# Patient Record
Sex: MALE | Race: WHITE | NOT HISPANIC OR LATINO | Employment: OTHER | URBAN - METROPOLITAN AREA
[De-identification: names, ages, dates, MRNs, and addresses within clinical notes are randomized per-mention and may not be internally consistent; named-entity substitution may affect disease eponyms.]

---

## 2017-01-09 ENCOUNTER — ANESTHESIA (OUTPATIENT)
Dept: PERIOP | Facility: HOSPITAL | Age: 72
DRG: 460 | End: 2017-01-09
Payer: MEDICARE

## 2017-01-12 ENCOUNTER — GENERIC CONVERSION - ENCOUNTER (OUTPATIENT)
Dept: OTHER | Facility: OTHER | Age: 72
End: 2017-01-12

## 2017-01-13 ENCOUNTER — HOSPITAL ENCOUNTER (OUTPATIENT)
Dept: RADIOLOGY | Facility: HOSPITAL | Age: 72
Setting detail: SURGERY ADMIT
Discharge: HOME | End: 2017-01-13
Payer: MEDICARE

## 2017-01-13 DIAGNOSIS — M48.061 SPINAL STENOSIS OF LUMBAR REGION: ICD-10-CM

## 2017-01-27 RX ORDER — ALBUTEROL SULFATE 90 UG/1
2 AEROSOL, METERED RESPIRATORY (INHALATION) EVERY 6 HOURS PRN
COMMUNITY
End: 2019-01-01 | Stop reason: SDUPTHER

## 2017-01-27 RX ORDER — AMLODIPINE BESYLATE 2.5 MG/1
2.5 TABLET ORAL DAILY
COMMUNITY
End: 2018-09-30 | Stop reason: SDUPTHER

## 2017-01-27 RX ORDER — ASPIRIN 81 MG/1
81 TABLET ORAL DAILY
COMMUNITY

## 2017-01-27 RX ORDER — CALCIUM CARBONATE 750 MG/1
1 TABLET, CHEWABLE ORAL AS NEEDED
COMMUNITY
End: 2017-09-25

## 2017-01-30 ENCOUNTER — APPOINTMENT (OUTPATIENT)
Dept: RADIOLOGY | Facility: HOSPITAL | Age: 72
DRG: 460 | End: 2017-01-30
Payer: MEDICARE

## 2017-01-30 ENCOUNTER — HOSPITAL ENCOUNTER (INPATIENT)
Facility: HOSPITAL | Age: 72
LOS: 2 days | DRG: 460 | End: 2017-02-01
Attending: NEUROLOGICAL SURGERY | Admitting: NEUROLOGICAL SURGERY
Payer: MEDICARE

## 2017-01-30 DIAGNOSIS — G95.9 CERVICAL MYELOPATHY (HCC): ICD-10-CM

## 2017-01-30 DIAGNOSIS — R26.81 GAIT INSTABILITY: ICD-10-CM

## 2017-01-30 DIAGNOSIS — R20.0 NUMBNESS: Primary | ICD-10-CM

## 2017-01-30 PROBLEM — M43.16 SPONDYLOLISTHESIS OF LUMBAR REGION: Status: ACTIVE | Noted: 2017-01-30

## 2017-01-30 PROBLEM — M51.369 DEGENERATIVE DISC DISEASE, LUMBAR: Status: ACTIVE | Noted: 2017-01-30

## 2017-01-30 PROBLEM — M48.061 FORAMINAL STENOSIS OF LUMBAR REGION: Status: ACTIVE | Noted: 2017-01-30

## 2017-01-30 PROBLEM — M51.36 DEGENERATIVE DISC DISEASE, LUMBAR: Status: ACTIVE | Noted: 2017-01-30

## 2017-01-30 LAB
ABO GROUP BLD: NORMAL
BLD GP AB SCN SERPL QL: NEGATIVE
GLUCOSE SERPL-MCNC: 117 MG/DL (ref 65–140)
PLATELET # BLD AUTO: 169 THOUSANDS/UL (ref 149–390)
PMV BLD AUTO: 10.6 FL (ref 8.9–12.7)
RH BLD: POSITIVE

## 2017-01-30 PROCEDURE — 72100 X-RAY EXAM L-S SPINE 2/3 VWS: CPT

## 2017-01-30 PROCEDURE — 86850 RBC ANTIBODY SCREEN: CPT | Performed by: NEUROLOGICAL SURGERY

## 2017-01-30 PROCEDURE — C1713 ANCHOR/SCREW BN/BN,TIS/BN: HCPCS | Performed by: NEUROLOGICAL SURGERY

## 2017-01-30 PROCEDURE — 86900 BLOOD TYPING SEROLOGIC ABO: CPT | Performed by: NEUROLOGICAL SURGERY

## 2017-01-30 PROCEDURE — 4A11X4G MONITORING OF PERIPHERAL NERVOUS ELECTRICAL ACTIVITY, INTRAOPERATIVE, EXTERNAL APPROACH: ICD-10-PCS | Performed by: NEUROLOGICAL SURGERY

## 2017-01-30 PROCEDURE — 95940 IONM IN OPERATNG ROOM 15 MIN: CPT | Performed by: NEUROLOGICAL SURGERY

## 2017-01-30 PROCEDURE — 0SG00A1 FUSION LUM JT W INTBD FUS DEV, POST APPR P COL, OPEN: ICD-10-PCS | Performed by: NEUROLOGICAL SURGERY

## 2017-01-30 PROCEDURE — 0ST40ZZ RESECTION OF LUMBOSACRAL DISC, OPEN APPROACH: ICD-10-PCS | Performed by: NEUROLOGICAL SURGERY

## 2017-01-30 PROCEDURE — 85049 AUTOMATED PLATELET COUNT: CPT | Performed by: NEUROLOGICAL SURGERY

## 2017-01-30 PROCEDURE — C1769 GUIDE WIRE: HCPCS | Performed by: NEUROLOGICAL SURGERY

## 2017-01-30 PROCEDURE — 0SG30A1 FUSION LUMSAC JT W INTBD FUS DEV, POST APPR P COL, OPEN: ICD-10-PCS | Performed by: NEUROLOGICAL SURGERY

## 2017-01-30 PROCEDURE — 82948 REAGENT STRIP/BLOOD GLUCOSE: CPT

## 2017-01-30 PROCEDURE — 86901 BLOOD TYPING SEROLOGIC RH(D): CPT | Performed by: NEUROLOGICAL SURGERY

## 2017-01-30 PROCEDURE — 0ST20ZZ RESECTION OF LUMBAR VERTEBRAL DISC, OPEN APPROACH: ICD-10-PCS | Performed by: NEUROLOGICAL SURGERY

## 2017-01-30 DEVICE — MAS 54850016535 4.75 EXT TAB CANN 6.5X35
Type: IMPLANTABLE DEVICE | Site: SPINE LUMBAR | Status: FUNCTIONAL
Brand: CD HORIZON® SOLERA® SPINAL SYSTEM

## 2017-01-30 DEVICE — SCREW SET 4.75MM SOLERA PERC: Type: IMPLANTABLE DEVICE | Site: SPINE LUMBAR | Status: FUNCTIONAL

## 2017-01-30 DEVICE — DBM 005005 PROGENIX DBM 5 CC SRVC FEE
Type: IMPLANTABLE DEVICE | Site: SPINE LUMBAR | Status: FUNCTIONAL
Brand: PROGENIX® PUTTY AND PROGENIX® PLUS

## 2017-01-30 DEVICE — SPACER 7770823 ELEVATE STD 23X8MM
Type: IMPLANTABLE DEVICE | Site: SPINE LUMBAR | Status: FUNCTIONAL
Brand: ELEVATE™ SPINAL SYSTEM

## 2017-01-30 RX ORDER — FLUTICASONE PROPIONATE 50 MCG
1 SPRAY, SUSPENSION (ML) NASAL AS NEEDED
Status: DISCONTINUED | OUTPATIENT
Start: 2017-01-30 | End: 2017-02-01 | Stop reason: HOSPADM

## 2017-01-30 RX ORDER — ACETAMINOPHEN 325 MG/1
975 TABLET ORAL ONCE
Status: COMPLETED | OUTPATIENT
Start: 2017-01-30 | End: 2017-01-30

## 2017-01-30 RX ORDER — DOCUSATE SODIUM 100 MG/1
100 CAPSULE, LIQUID FILLED ORAL 2 TIMES DAILY
Status: DISCONTINUED | OUTPATIENT
Start: 2017-01-30 | End: 2017-02-01 | Stop reason: HOSPADM

## 2017-01-30 RX ORDER — METOPROLOL SUCCINATE 25 MG/1
25 TABLET, EXTENDED RELEASE ORAL DAILY
Status: DISCONTINUED | OUTPATIENT
Start: 2017-01-31 | End: 2017-01-30

## 2017-01-30 RX ORDER — CHLORHEXIDINE GLUCONATE 0.12 MG/ML
15 RINSE ORAL ONCE
Status: DISCONTINUED | OUTPATIENT
Start: 2017-01-30 | End: 2017-01-30 | Stop reason: HOSPADM

## 2017-01-30 RX ORDER — LORAZEPAM 1 MG/1
1 TABLET ORAL EVERY 6 HOURS PRN
Status: DISCONTINUED | OUTPATIENT
Start: 2017-01-30 | End: 2017-02-01 | Stop reason: HOSPADM

## 2017-01-30 RX ORDER — LIDOCAINE HYDROCHLORIDE AND EPINEPHRINE 10; 10 MG/ML; UG/ML
INJECTION, SOLUTION INFILTRATION; PERINEURAL AS NEEDED
Status: DISCONTINUED | OUTPATIENT
Start: 2017-01-30 | End: 2017-01-30 | Stop reason: HOSPADM

## 2017-01-30 RX ORDER — ALBUMIN, HUMAN INJ 5% 5 %
SOLUTION INTRAVENOUS CONTINUOUS PRN
Status: DISCONTINUED | OUTPATIENT
Start: 2017-01-30 | End: 2017-01-30 | Stop reason: SURG

## 2017-01-30 RX ORDER — BISACODYL 10 MG
10 SUPPOSITORY, RECTAL RECTAL DAILY PRN
Status: DISCONTINUED | OUTPATIENT
Start: 2017-01-30 | End: 2017-02-01 | Stop reason: HOSPADM

## 2017-01-30 RX ORDER — NITROGLYCERIN 40 MG/1
1 PATCH TRANSDERMAL DAILY
Status: DISCONTINUED | OUTPATIENT
Start: 2017-01-31 | End: 2017-02-01 | Stop reason: HOSPADM

## 2017-01-30 RX ORDER — MIDAZOLAM HYDROCHLORIDE 1 MG/ML
INJECTION INTRAMUSCULAR; INTRAVENOUS AS NEEDED
Status: DISCONTINUED | OUTPATIENT
Start: 2017-01-30 | End: 2017-01-30 | Stop reason: SURG

## 2017-01-30 RX ORDER — LABETALOL HYDROCHLORIDE 5 MG/ML
5 INJECTION, SOLUTION INTRAVENOUS
Status: DISCONTINUED | OUTPATIENT
Start: 2017-01-30 | End: 2017-01-30 | Stop reason: HOSPADM

## 2017-01-30 RX ORDER — ALBUTEROL SULFATE 2.5 MG/3ML
2.5 SOLUTION RESPIRATORY (INHALATION) EVERY 4 HOURS PRN
Status: DISCONTINUED | OUTPATIENT
Start: 2017-01-30 | End: 2017-01-30 | Stop reason: HOSPADM

## 2017-01-30 RX ORDER — GABAPENTIN 100 MG/1
100 CAPSULE ORAL 3 TIMES DAILY
Status: DISCONTINUED | OUTPATIENT
Start: 2017-01-30 | End: 2017-02-01

## 2017-01-30 RX ORDER — SODIUM CHLORIDE, SODIUM LACTATE, POTASSIUM CHLORIDE, CALCIUM CHLORIDE 600; 310; 30; 20 MG/100ML; MG/100ML; MG/100ML; MG/100ML
INJECTION, SOLUTION INTRAVENOUS CONTINUOUS PRN
Status: DISCONTINUED | OUTPATIENT
Start: 2017-01-30 | End: 2017-01-30 | Stop reason: SURG

## 2017-01-30 RX ORDER — CHLORHEXIDINE GLUCONATE 0.12 MG/ML
15 RINSE ORAL EVERY 12 HOURS SCHEDULED
Status: DISCONTINUED | OUTPATIENT
Start: 2017-01-30 | End: 2017-01-30

## 2017-01-30 RX ORDER — LITHIUM CARBONATE 300 MG/1
300 TABLET, FILM COATED, EXTENDED RELEASE ORAL EVERY 12 HOURS SCHEDULED
Status: DISCONTINUED | OUTPATIENT
Start: 2017-01-30 | End: 2017-02-01 | Stop reason: HOSPADM

## 2017-01-30 RX ORDER — HYDROCODONE BITARTRATE AND ACETAMINOPHEN 5; 325 MG/1; MG/1
1 TABLET ORAL EVERY 4 HOURS PRN
Status: DISCONTINUED | OUTPATIENT
Start: 2017-01-30 | End: 2017-02-01 | Stop reason: HOSPADM

## 2017-01-30 RX ORDER — FENTANYL CITRATE/PF 50 MCG/ML
50 SYRINGE (ML) INJECTION
Status: DISCONTINUED | OUTPATIENT
Start: 2017-01-30 | End: 2017-01-30 | Stop reason: HOSPADM

## 2017-01-30 RX ORDER — AMLODIPINE BESYLATE 2.5 MG/1
2.5 TABLET ORAL DAILY
Status: DISCONTINUED | OUTPATIENT
Start: 2017-01-31 | End: 2017-02-01 | Stop reason: HOSPADM

## 2017-01-30 RX ORDER — GLYCOPYRROLATE 0.2 MG/ML
INJECTION INTRAMUSCULAR; INTRAVENOUS AS NEEDED
Status: DISCONTINUED | OUTPATIENT
Start: 2017-01-30 | End: 2017-01-30 | Stop reason: SURG

## 2017-01-30 RX ORDER — LORATADINE 10 MG/1
10 TABLET ORAL DAILY
Status: DISCONTINUED | OUTPATIENT
Start: 2017-01-31 | End: 2017-02-01 | Stop reason: HOSPADM

## 2017-01-30 RX ORDER — LIDOCAINE HYDROCHLORIDE 10 MG/ML
INJECTION, SOLUTION INFILTRATION; PERINEURAL AS NEEDED
Status: DISCONTINUED | OUTPATIENT
Start: 2017-01-30 | End: 2017-01-30 | Stop reason: SURG

## 2017-01-30 RX ORDER — BACLOFEN 10 MG/1
5 TABLET ORAL 3 TIMES DAILY
Status: DISCONTINUED | OUTPATIENT
Start: 2017-01-30 | End: 2017-01-31

## 2017-01-30 RX ORDER — SODIUM CHLORIDE, SODIUM LACTATE, POTASSIUM CHLORIDE, CALCIUM CHLORIDE 600; 310; 30; 20 MG/100ML; MG/100ML; MG/100ML; MG/100ML
125 INJECTION, SOLUTION INTRAVENOUS CONTINUOUS
Status: DISCONTINUED | OUTPATIENT
Start: 2017-01-30 | End: 2017-01-31

## 2017-01-30 RX ORDER — ROCURONIUM BROMIDE 10 MG/ML
INJECTION, SOLUTION INTRAVENOUS AS NEEDED
Status: DISCONTINUED | OUTPATIENT
Start: 2017-01-30 | End: 2017-01-30 | Stop reason: SURG

## 2017-01-30 RX ORDER — BUPIVACAINE HYDROCHLORIDE AND EPINEPHRINE 5; 5 MG/ML; UG/ML
INJECTION, SOLUTION EPIDURAL; INTRACAUDAL; PERINEURAL AS NEEDED
Status: DISCONTINUED | OUTPATIENT
Start: 2017-01-30 | End: 2017-01-30 | Stop reason: HOSPADM

## 2017-01-30 RX ORDER — SODIUM CHLORIDE 9 MG/ML
75 INJECTION, SOLUTION INTRAVENOUS CONTINUOUS
Status: DISCONTINUED | OUTPATIENT
Start: 2017-01-30 | End: 2017-01-31

## 2017-01-30 RX ORDER — FENTANYL CITRATE 50 UG/ML
25 INJECTION, SOLUTION INTRAMUSCULAR; INTRAVENOUS
Status: DISCONTINUED | OUTPATIENT
Start: 2017-01-30 | End: 2017-01-31

## 2017-01-30 RX ORDER — PREGABALIN 75 MG/1
150 CAPSULE ORAL ONCE
Status: COMPLETED | OUTPATIENT
Start: 2017-01-30 | End: 2017-01-30

## 2017-01-30 RX ORDER — PROPOFOL 10 MG/ML
INJECTION, EMULSION INTRAVENOUS CONTINUOUS PRN
Status: DISCONTINUED | OUTPATIENT
Start: 2017-01-30 | End: 2017-01-30 | Stop reason: SURG

## 2017-01-30 RX ORDER — ONDANSETRON 2 MG/ML
4 INJECTION INTRAMUSCULAR; INTRAVENOUS EVERY 6 HOURS PRN
Status: DISCONTINUED | OUTPATIENT
Start: 2017-01-30 | End: 2017-02-01 | Stop reason: HOSPADM

## 2017-01-30 RX ORDER — PANTOPRAZOLE SODIUM 40 MG/1
40 TABLET, DELAYED RELEASE ORAL
Status: DISCONTINUED | OUTPATIENT
Start: 2017-01-31 | End: 2017-02-01 | Stop reason: HOSPADM

## 2017-01-30 RX ORDER — ATORVASTATIN CALCIUM 40 MG/1
40 TABLET, FILM COATED ORAL
Status: DISCONTINUED | OUTPATIENT
Start: 2017-01-30 | End: 2017-02-01 | Stop reason: HOSPADM

## 2017-01-30 RX ORDER — ALBUTEROL SULFATE 90 UG/1
2 AEROSOL, METERED RESPIRATORY (INHALATION) EVERY 6 HOURS PRN
Status: DISCONTINUED | OUTPATIENT
Start: 2017-01-30 | End: 2017-02-01 | Stop reason: HOSPADM

## 2017-01-30 RX ORDER — PROPOFOL 10 MG/ML
INJECTION, EMULSION INTRAVENOUS AS NEEDED
Status: DISCONTINUED | OUTPATIENT
Start: 2017-01-30 | End: 2017-01-30 | Stop reason: SURG

## 2017-01-30 RX ORDER — SODIUM CHLORIDE 9 MG/ML
INJECTION, SOLUTION INTRAVENOUS CONTINUOUS PRN
Status: DISCONTINUED | OUTPATIENT
Start: 2017-01-30 | End: 2017-01-30 | Stop reason: SURG

## 2017-01-30 RX ORDER — FENTANYL CITRATE 50 UG/ML
INJECTION, SOLUTION INTRAMUSCULAR; INTRAVENOUS AS NEEDED
Status: DISCONTINUED | OUTPATIENT
Start: 2017-01-30 | End: 2017-01-30 | Stop reason: SURG

## 2017-01-30 RX ORDER — ONDANSETRON 2 MG/ML
INJECTION INTRAMUSCULAR; INTRAVENOUS AS NEEDED
Status: DISCONTINUED | OUTPATIENT
Start: 2017-01-30 | End: 2017-01-30 | Stop reason: SURG

## 2017-01-30 RX ORDER — HEPARIN SODIUM 5000 [USP'U]/ML
5000 INJECTION, SOLUTION INTRAVENOUS; SUBCUTANEOUS EVERY 8 HOURS SCHEDULED
Status: DISCONTINUED | OUTPATIENT
Start: 2017-01-31 | End: 2017-02-01 | Stop reason: HOSPADM

## 2017-01-30 RX ORDER — ACETAMINOPHEN 325 MG/1
975 TABLET ORAL EVERY 8 HOURS SCHEDULED
Status: DISCONTINUED | OUTPATIENT
Start: 2017-01-30 | End: 2017-01-31

## 2017-01-30 RX ORDER — MEPERIDINE HYDROCHLORIDE 25 MG/ML
12.5 INJECTION INTRAMUSCULAR; INTRAVENOUS; SUBCUTANEOUS AS NEEDED
Status: DISCONTINUED | OUTPATIENT
Start: 2017-01-30 | End: 2017-01-30 | Stop reason: HOSPADM

## 2017-01-30 RX ORDER — METHOCARBAMOL 750 MG/1
750 TABLET, FILM COATED ORAL 4 TIMES DAILY PRN
Status: DISCONTINUED | OUTPATIENT
Start: 2017-01-30 | End: 2017-01-31

## 2017-01-30 RX ORDER — DICYCLOMINE HCL 20 MG
20 TABLET ORAL EVERY 6 HOURS
Status: DISCONTINUED | OUTPATIENT
Start: 2017-01-30 | End: 2017-02-01 | Stop reason: HOSPADM

## 2017-01-30 RX ORDER — METOPROLOL SUCCINATE 25 MG/1
25 TABLET, EXTENDED RELEASE ORAL DAILY
Status: DISCONTINUED | OUTPATIENT
Start: 2017-01-30 | End: 2017-02-01 | Stop reason: HOSPADM

## 2017-01-30 RX ORDER — CALCIUM CHLORIDE 100 MG/ML
INJECTION INTRAVENOUS; INTRAVENTRICULAR AS NEEDED
Status: DISCONTINUED | OUTPATIENT
Start: 2017-01-30 | End: 2017-01-30 | Stop reason: SURG

## 2017-01-30 RX ORDER — ONDANSETRON 2 MG/ML
4 INJECTION INTRAMUSCULAR; INTRAVENOUS EVERY 4 HOURS PRN
Status: DISCONTINUED | OUTPATIENT
Start: 2017-01-30 | End: 2017-01-30 | Stop reason: HOSPADM

## 2017-01-30 RX ADMIN — SODIUM CHLORIDE 75 ML/HR: 0.9 INJECTION, SOLUTION INTRAVENOUS at 13:50

## 2017-01-30 RX ADMIN — BACLOFEN 5 MG: 10 TABLET ORAL at 16:34

## 2017-01-30 RX ADMIN — HYDROMORPHONE HYDROCHLORIDE 0.4 MG: 1 INJECTION, SOLUTION INTRAMUSCULAR; INTRAVENOUS; SUBCUTANEOUS at 12:22

## 2017-01-30 RX ADMIN — HYDROMORPHONE HYDROCHLORIDE 0.4 MG: 1 INJECTION, SOLUTION INTRAMUSCULAR; INTRAVENOUS; SUBCUTANEOUS at 12:42

## 2017-01-30 RX ADMIN — CEFAZOLIN SODIUM 2000 MG: 2 SOLUTION INTRAVENOUS at 08:00

## 2017-01-30 RX ADMIN — FLUTICASONE PROPIONATE AND SALMETEROL 1 PUFF: 50; 250 POWDER RESPIRATORY (INHALATION) at 22:38

## 2017-01-30 RX ADMIN — GLYCOPYRROLATE 0.6 MG: 0.2 INJECTION INTRAMUSCULAR; INTRAVENOUS at 09:08

## 2017-01-30 RX ADMIN — CALCIUM CHLORIDE 0.2 G: 100 INJECTION PARENTERAL at 08:59

## 2017-01-30 RX ADMIN — DEXMEDETOMIDINE HYDROCHLORIDE 0.4 MCG/KG/HR: 100 INJECTION, SOLUTION INTRAVENOUS at 08:05

## 2017-01-30 RX ADMIN — HYDROCODONE BITARTRATE AND ACETAMINOPHEN 1 TABLET: 5; 325 TABLET ORAL at 14:37

## 2017-01-30 RX ADMIN — GABAPENTIN 100 MG: 100 CAPSULE ORAL at 16:34

## 2017-01-30 RX ADMIN — MIDAZOLAM HYDROCHLORIDE 1 MG: 1 INJECTION, SOLUTION INTRAMUSCULAR; INTRAVENOUS at 07:58

## 2017-01-30 RX ADMIN — REMIFENTANIL HYDROCHLORIDE 0.2 MCG/KG/MIN: 1 INJECTION, POWDER, LYOPHILIZED, FOR SOLUTION INTRAVENOUS at 08:05

## 2017-01-30 RX ADMIN — DOCUSATE SODIUM 100 MG: 100 CAPSULE, LIQUID FILLED ORAL at 17:57

## 2017-01-30 RX ADMIN — GLYCOPYRROLATE 0.2 MG: 0.2 INJECTION INTRAMUSCULAR; INTRAVENOUS at 08:02

## 2017-01-30 RX ADMIN — HYDROCODONE BITARTRATE AND ACETAMINOPHEN 1 TABLET: 5; 325 TABLET ORAL at 19:32

## 2017-01-30 RX ADMIN — GABAPENTIN 100 MG: 100 CAPSULE ORAL at 21:07

## 2017-01-30 RX ADMIN — PHENYLEPHRINE HYDROCHLORIDE 20 MCG/MIN: 10 INJECTION INTRAVENOUS at 08:06

## 2017-01-30 RX ADMIN — PROPOFOL 200 MG: 10 INJECTION, EMULSION INTRAVENOUS at 08:00

## 2017-01-30 RX ADMIN — PROPOFOL 140 MCG/KG/MIN: 10 INJECTION, EMULSION INTRAVENOUS at 08:05

## 2017-01-30 RX ADMIN — DEXAMETHASONE SODIUM PHOSPHATE 10 MG: 10 INJECTION INTRAMUSCULAR; INTRAVENOUS at 08:33

## 2017-01-30 RX ADMIN — LITHIUM CARBONATE 300 MG: 300 TABLET, EXTENDED RELEASE ORAL at 22:28

## 2017-01-30 RX ADMIN — LIDOCAINE HYDROCHLORIDE 50 MG: 10 INJECTION, SOLUTION INFILTRATION; PERINEURAL at 08:00

## 2017-01-30 RX ADMIN — ONDANSETRON 4 MG: 2 INJECTION INTRAMUSCULAR; INTRAVENOUS at 11:24

## 2017-01-30 RX ADMIN — FENTANYL CITRATE 50 MCG: 50 INJECTION, SOLUTION INTRAMUSCULAR; INTRAVENOUS at 08:00

## 2017-01-30 RX ADMIN — SODIUM CHLORIDE, SODIUM LACTATE, POTASSIUM CHLORIDE, AND CALCIUM CHLORIDE: .6; .31; .03; .02 INJECTION, SOLUTION INTRAVENOUS at 07:49

## 2017-01-30 RX ADMIN — METHOCARBAMOL 750 MG: 750 TABLET ORAL at 14:37

## 2017-01-30 RX ADMIN — SODIUM CHLORIDE, SODIUM LACTATE, POTASSIUM CHLORIDE, AND CALCIUM CHLORIDE: .6; .31; .03; .02 INJECTION, SOLUTION INTRAVENOUS at 11:31

## 2017-01-30 RX ADMIN — HYDROMORPHONE HYDROCHLORIDE 0.4 MG: 1 INJECTION, SOLUTION INTRAMUSCULAR; INTRAVENOUS; SUBCUTANEOUS at 12:32

## 2017-01-30 RX ADMIN — ACETAMINOPHEN 975 MG: 325 TABLET, FILM COATED ORAL at 07:11

## 2017-01-30 RX ADMIN — NEOSTIGMINE METHYLSULFATE 3 MG: 1 INJECTION INTRAMUSCULAR; INTRAVENOUS; SUBCUTANEOUS at 09:08

## 2017-01-30 RX ADMIN — ALBUMIN HUMAN: 0.05 INJECTION, SOLUTION INTRAVENOUS at 08:46

## 2017-01-30 RX ADMIN — METOPROLOL SUCCINATE 25 MG: 25 TABLET, EXTENDED RELEASE ORAL at 21:14

## 2017-01-30 RX ADMIN — HYDROMORPHONE HYDROCHLORIDE 1 MG: 1 INJECTION, SOLUTION INTRAMUSCULAR; INTRAVENOUS; SUBCUTANEOUS at 21:07

## 2017-01-30 RX ADMIN — FENTANYL CITRATE 50 MCG: 50 INJECTION, SOLUTION INTRAMUSCULAR; INTRAVENOUS at 12:03

## 2017-01-30 RX ADMIN — PREGABALIN 150 MG: 75 CAPSULE ORAL at 07:11

## 2017-01-30 RX ADMIN — ROCURONIUM BROMIDE 10 MG: 10 INJECTION, SOLUTION INTRAVENOUS at 08:00

## 2017-01-30 RX ADMIN — BACLOFEN 5 MG: 10 TABLET ORAL at 21:14

## 2017-01-30 RX ADMIN — CHLORHEXIDINE GLUCONATE 15 ML: 1.2 RINSE ORAL at 07:11

## 2017-01-30 RX ADMIN — CEFAZOLIN SODIUM 1000 MG: 1 SOLUTION INTRAVENOUS at 16:35

## 2017-01-30 RX ADMIN — SODIUM CHLORIDE: 0.9 INJECTION, SOLUTION INTRAVENOUS at 08:10

## 2017-01-30 RX ADMIN — ATORVASTATIN CALCIUM 40 MG: 40 TABLET, FILM COATED ORAL at 16:34

## 2017-01-30 RX ADMIN — ROCURONIUM BROMIDE 30 MG: 10 INJECTION, SOLUTION INTRAVENOUS at 08:02

## 2017-01-31 ENCOUNTER — APPOINTMENT (INPATIENT)
Dept: RADIOLOGY | Facility: HOSPITAL | Age: 72
DRG: 460 | End: 2017-01-31
Attending: NEUROLOGICAL SURGERY
Payer: MEDICARE

## 2017-01-31 LAB
ANION GAP SERPL CALCULATED.3IONS-SCNC: 9 MMOL/L (ref 4–13)
BUN SERPL-MCNC: 19 MG/DL (ref 5–25)
CALCIUM SERPL-MCNC: 9.7 MG/DL (ref 8.3–10.1)
CHLORIDE SERPL-SCNC: 109 MMOL/L (ref 100–108)
CO2 SERPL-SCNC: 23 MMOL/L (ref 21–32)
CREAT SERPL-MCNC: 0.92 MG/DL (ref 0.6–1.3)
ERYTHROCYTE [DISTWIDTH] IN BLOOD BY AUTOMATED COUNT: 14.3 % (ref 11.6–15.1)
GFR SERPL CREATININE-BSD FRML MDRD: >60 ML/MIN/1.73SQ M
GLUCOSE SERPL-MCNC: 127 MG/DL (ref 65–140)
HCT VFR BLD AUTO: 35.8 % (ref 36.5–49.3)
HGB BLD-MCNC: 11.8 G/DL (ref 12–17)
MCH RBC QN AUTO: 29.3 PG (ref 26.8–34.3)
MCHC RBC AUTO-ENTMCNC: 33 G/DL (ref 31.4–37.4)
MCV RBC AUTO: 89 FL (ref 82–98)
PLATELET # BLD AUTO: 197 THOUSANDS/UL (ref 149–390)
PMV BLD AUTO: 11.2 FL (ref 8.9–12.7)
POTASSIUM SERPL-SCNC: 4.5 MMOL/L (ref 3.5–5.3)
RBC # BLD AUTO: 4.03 MILLION/UL (ref 3.88–5.62)
SODIUM SERPL-SCNC: 141 MMOL/L (ref 136–145)
WBC # BLD AUTO: 18.15 THOUSAND/UL (ref 4.31–10.16)

## 2017-01-31 PROCEDURE — 72100 X-RAY EXAM L-S SPINE 2/3 VWS: CPT

## 2017-01-31 PROCEDURE — 97163 PT EVAL HIGH COMPLEX 45 MIN: CPT

## 2017-01-31 PROCEDURE — G8988 SELF CARE GOAL STATUS: HCPCS

## 2017-01-31 PROCEDURE — 97166 OT EVAL MOD COMPLEX 45 MIN: CPT

## 2017-01-31 PROCEDURE — G8979 MOBILITY GOAL STATUS: HCPCS

## 2017-01-31 PROCEDURE — 85027 COMPLETE CBC AUTOMATED: CPT | Performed by: NEUROLOGICAL SURGERY

## 2017-01-31 PROCEDURE — G8987 SELF CARE CURRENT STATUS: HCPCS

## 2017-01-31 PROCEDURE — 80048 BASIC METABOLIC PNL TOTAL CA: CPT | Performed by: NEUROLOGICAL SURGERY

## 2017-01-31 RX ORDER — METHOCARBAMOL 750 MG/1
750 TABLET, FILM COATED ORAL EVERY 6 HOURS SCHEDULED
Status: DISCONTINUED | OUTPATIENT
Start: 2017-01-31 | End: 2017-02-01 | Stop reason: HOSPADM

## 2017-01-31 RX ORDER — HYDROCODONE BITARTRATE AND ACETAMINOPHEN 5; 325 MG/1; MG/1
2 TABLET ORAL EVERY 4 HOURS PRN
Status: DISCONTINUED | OUTPATIENT
Start: 2017-01-31 | End: 2017-02-01 | Stop reason: HOSPADM

## 2017-01-31 RX ADMIN — HYDROMORPHONE HYDROCHLORIDE 1 MG: 1 INJECTION, SOLUTION INTRAMUSCULAR; INTRAVENOUS; SUBCUTANEOUS at 23:49

## 2017-01-31 RX ADMIN — BACLOFEN 5 MG: 10 TABLET ORAL at 08:27

## 2017-01-31 RX ADMIN — HYDROMORPHONE HYDROCHLORIDE 1 MG: 1 INJECTION, SOLUTION INTRAMUSCULAR; INTRAVENOUS; SUBCUTANEOUS at 03:22

## 2017-01-31 RX ADMIN — METOPROLOL SUCCINATE 25 MG: 25 TABLET, EXTENDED RELEASE ORAL at 08:27

## 2017-01-31 RX ADMIN — ALBUTEROL SULFATE 2 PUFF: 90 AEROSOL, METERED RESPIRATORY (INHALATION) at 10:25

## 2017-01-31 RX ADMIN — GABAPENTIN 100 MG: 100 CAPSULE ORAL at 16:26

## 2017-01-31 RX ADMIN — PANTOPRAZOLE SODIUM 40 MG: 40 TABLET, DELAYED RELEASE ORAL at 05:18

## 2017-01-31 RX ADMIN — DICYCLOMINE HYDROCHLORIDE 20 MG: 20 TABLET ORAL at 23:54

## 2017-01-31 RX ADMIN — HYDROCODONE BITARTRATE AND ACETAMINOPHEN 1 TABLET: 5; 325 TABLET ORAL at 09:38

## 2017-01-31 RX ADMIN — FLUTICASONE PROPIONATE 1 SPRAY: 50 SPRAY, METERED NASAL at 10:25

## 2017-01-31 RX ADMIN — GABAPENTIN 100 MG: 100 CAPSULE ORAL at 08:26

## 2017-01-31 RX ADMIN — HEPARIN SODIUM 5000 UNITS: 5000 INJECTION, SOLUTION INTRAVENOUS; SUBCUTANEOUS at 21:41

## 2017-01-31 RX ADMIN — AMLODIPINE BESYLATE 2.5 MG: 2.5 TABLET ORAL at 08:26

## 2017-01-31 RX ADMIN — METHOCARBAMOL 750 MG: 750 TABLET ORAL at 17:49

## 2017-01-31 RX ADMIN — HYDROCODONE BITARTRATE AND ACETAMINOPHEN 2 TABLET: 5; 325 TABLET ORAL at 21:41

## 2017-01-31 RX ADMIN — ATORVASTATIN CALCIUM 40 MG: 40 TABLET, FILM COATED ORAL at 16:26

## 2017-01-31 RX ADMIN — HYDROMORPHONE HYDROCHLORIDE 1 MG: 1 INJECTION, SOLUTION INTRAMUSCULAR; INTRAVENOUS; SUBCUTANEOUS at 12:27

## 2017-01-31 RX ADMIN — METHOCARBAMOL 750 MG: 750 TABLET ORAL at 23:49

## 2017-01-31 RX ADMIN — DOCUSATE SODIUM 100 MG: 100 CAPSULE, LIQUID FILLED ORAL at 17:50

## 2017-01-31 RX ADMIN — FLUTICASONE PROPIONATE AND SALMETEROL 1 PUFF: 50; 250 POWDER RESPIRATORY (INHALATION) at 08:26

## 2017-01-31 RX ADMIN — HYDROMORPHONE HYDROCHLORIDE 1 MG: 1 INJECTION, SOLUTION INTRAMUSCULAR; INTRAVENOUS; SUBCUTANEOUS at 17:55

## 2017-01-31 RX ADMIN — HYDROCODONE BITARTRATE AND ACETAMINOPHEN 2 TABLET: 5; 325 TABLET ORAL at 14:58

## 2017-01-31 RX ADMIN — CEFAZOLIN SODIUM 1000 MG: 1 SOLUTION INTRAVENOUS at 00:53

## 2017-01-31 RX ADMIN — GABAPENTIN 100 MG: 100 CAPSULE ORAL at 21:41

## 2017-01-31 RX ADMIN — HYDROCODONE BITARTRATE AND ACETAMINOPHEN 1 TABLET: 5; 325 TABLET ORAL at 05:18

## 2017-01-31 RX ADMIN — HEPARIN SODIUM 5000 UNITS: 5000 INJECTION, SOLUTION INTRAVENOUS; SUBCUTANEOUS at 14:58

## 2017-01-31 RX ADMIN — DOCUSATE SODIUM 100 MG: 100 CAPSULE, LIQUID FILLED ORAL at 08:27

## 2017-01-31 RX ADMIN — FLUTICASONE PROPIONATE AND SALMETEROL 1 PUFF: 50; 250 POWDER RESPIRATORY (INHALATION) at 21:42

## 2017-01-31 RX ADMIN — HEPARIN SODIUM 5000 UNITS: 5000 INJECTION, SOLUTION INTRAVENOUS; SUBCUTANEOUS at 05:19

## 2017-01-31 RX ADMIN — LITHIUM CARBONATE 300 MG: 300 TABLET, EXTENDED RELEASE ORAL at 21:42

## 2017-02-01 ENCOUNTER — GENERIC CONVERSION - ENCOUNTER (OUTPATIENT)
Dept: OTHER | Facility: OTHER | Age: 72
End: 2017-02-01

## 2017-02-01 ENCOUNTER — HOSPITAL ENCOUNTER (INPATIENT)
Facility: HOSPITAL | Age: 72
LOS: 10 days | Discharge: HOME | DRG: 949 | End: 2017-02-11
Attending: PHYSICAL MEDICINE & REHABILITATION | Admitting: PHYSICAL MEDICINE & REHABILITATION
Payer: MEDICARE

## 2017-02-01 VITALS
BODY MASS INDEX: 28.03 KG/M2 | HEART RATE: 76 BPM | TEMPERATURE: 98.6 F | WEIGHT: 178.57 LBS | RESPIRATION RATE: 18 BRPM | HEIGHT: 67 IN | DIASTOLIC BLOOD PRESSURE: 75 MMHG | OXYGEN SATURATION: 96 % | SYSTOLIC BLOOD PRESSURE: 168 MMHG

## 2017-02-01 DIAGNOSIS — F31.9 BIPOLAR AFFECTIVE DISORDER (HCC): ICD-10-CM

## 2017-02-01 DIAGNOSIS — G95.9 CERVICAL MYELOPATHY (HCC): ICD-10-CM

## 2017-02-01 DIAGNOSIS — M51.06: Primary | ICD-10-CM

## 2017-02-01 LAB
BASOPHILS # BLD AUTO: 0.03 THOUSANDS/ΜL (ref 0–0.1)
BASOPHILS NFR BLD AUTO: 0 % (ref 0–1)
EOSINOPHIL # BLD AUTO: 0.22 THOUSAND/ΜL (ref 0–0.61)
EOSINOPHIL NFR BLD AUTO: 1 % (ref 0–6)
ERYTHROCYTE [DISTWIDTH] IN BLOOD BY AUTOMATED COUNT: 14.4 % (ref 11.6–15.1)
HCT VFR BLD AUTO: 35.5 % (ref 36.5–49.3)
HGB BLD-MCNC: 11.7 G/DL (ref 12–17)
LYMPHOCYTES # BLD AUTO: 1.92 THOUSANDS/ΜL (ref 0.6–4.47)
LYMPHOCYTES NFR BLD AUTO: 11 % (ref 14–44)
MCH RBC QN AUTO: 29.3 PG (ref 26.8–34.3)
MCHC RBC AUTO-ENTMCNC: 33 G/DL (ref 31.4–37.4)
MCV RBC AUTO: 89 FL (ref 82–98)
MONOCYTES # BLD AUTO: 2.04 THOUSAND/ΜL (ref 0.17–1.22)
MONOCYTES NFR BLD AUTO: 12 % (ref 4–12)
NEUTROPHILS # BLD AUTO: 12.77 THOUSANDS/ΜL (ref 1.85–7.62)
NEUTS SEG NFR BLD AUTO: 76 % (ref 43–75)
NRBC BLD AUTO-RTO: 0 /100 WBCS
PLATELET # BLD AUTO: 185 THOUSANDS/UL (ref 149–390)
PMV BLD AUTO: 11.1 FL (ref 8.9–12.7)
RBC # BLD AUTO: 3.99 MILLION/UL (ref 3.88–5.62)
WBC # BLD AUTO: 17.05 THOUSAND/UL (ref 4.31–10.16)

## 2017-02-01 PROCEDURE — 97535 SELF CARE MNGMENT TRAINING: CPT

## 2017-02-01 PROCEDURE — 97530 THERAPEUTIC ACTIVITIES: CPT

## 2017-02-01 PROCEDURE — 97116 GAIT TRAINING THERAPY: CPT

## 2017-02-01 PROCEDURE — 85025 COMPLETE CBC W/AUTO DIFF WBC: CPT | Performed by: PHYSICIAN ASSISTANT

## 2017-02-01 RX ORDER — HYDROMORPHONE HYDROCHLORIDE 2 MG/1
1 TABLET ORAL
Status: DISCONTINUED | OUTPATIENT
Start: 2017-02-01 | End: 2017-02-01

## 2017-02-01 RX ORDER — GABAPENTIN 100 MG/1
200 CAPSULE ORAL 3 TIMES DAILY
Status: DISCONTINUED | OUTPATIENT
Start: 2017-02-01 | End: 2017-02-01 | Stop reason: HOSPADM

## 2017-02-01 RX ORDER — HYDROMORPHONE HYDROCHLORIDE 2 MG/1
1 TABLET ORAL
Status: CANCELLED | OUTPATIENT
Start: 2017-02-01

## 2017-02-01 RX ORDER — HYDROMORPHONE HYDROCHLORIDE 2 MG/1
1 TABLET ORAL
Status: DISCONTINUED | OUTPATIENT
Start: 2017-02-01 | End: 2017-02-01 | Stop reason: HOSPADM

## 2017-02-01 RX ORDER — GABAPENTIN 100 MG/1
200 CAPSULE ORAL 3 TIMES DAILY
Status: CANCELLED | OUTPATIENT
Start: 2017-02-01

## 2017-02-01 RX ORDER — METOPROLOL SUCCINATE 25 MG/1
25 TABLET, EXTENDED RELEASE ORAL DAILY
Status: DISCONTINUED | OUTPATIENT
Start: 2017-02-02 | End: 2017-02-11 | Stop reason: HOSPADM

## 2017-02-01 RX ORDER — HYDROCODONE BITARTRATE AND ACETAMINOPHEN 5; 325 MG/1; MG/1
1 TABLET ORAL EVERY 4 HOURS PRN
Status: DISCONTINUED | OUTPATIENT
Start: 2017-02-01 | End: 2017-02-11 | Stop reason: HOSPADM

## 2017-02-01 RX ORDER — LORAZEPAM 0.5 MG/1
0.5 TABLET ORAL EVERY 6 HOURS PRN
Status: DISCONTINUED | OUTPATIENT
Start: 2017-02-01 | End: 2017-02-11 | Stop reason: HOSPADM

## 2017-02-01 RX ORDER — NITROGLYCERIN 40 MG/1
1 PATCH TRANSDERMAL DAILY
Status: CANCELLED | OUTPATIENT
Start: 2017-02-02

## 2017-02-01 RX ORDER — ALBUTEROL SULFATE 90 UG/1
2 AEROSOL, METERED RESPIRATORY (INHALATION) EVERY 6 HOURS PRN
Status: CANCELLED | OUTPATIENT
Start: 2017-02-01

## 2017-02-01 RX ORDER — LITHIUM CARBONATE 300 MG/1
600 TABLET, FILM COATED, EXTENDED RELEASE ORAL
Status: DISCONTINUED | OUTPATIENT
Start: 2017-02-02 | End: 2017-02-11 | Stop reason: HOSPADM

## 2017-02-01 RX ORDER — LITHIUM CARBONATE 300 MG/1
300 TABLET, FILM COATED, EXTENDED RELEASE ORAL EVERY 12 HOURS SCHEDULED
Status: CANCELLED | OUTPATIENT
Start: 2017-02-01

## 2017-02-01 RX ORDER — ATORVASTATIN CALCIUM 40 MG/1
40 TABLET, FILM COATED ORAL
Status: DISCONTINUED | OUTPATIENT
Start: 2017-02-01 | End: 2017-02-11 | Stop reason: HOSPADM

## 2017-02-01 RX ORDER — LORAZEPAM 1 MG/1
1 TABLET ORAL EVERY 6 HOURS PRN
Status: DISCONTINUED | OUTPATIENT
Start: 2017-02-01 | End: 2017-02-01

## 2017-02-01 RX ORDER — HYDROCODONE BITARTRATE AND ACETAMINOPHEN 5; 325 MG/1; MG/1
2 TABLET ORAL EVERY 4 HOURS PRN
Status: DISCONTINUED | OUTPATIENT
Start: 2017-02-01 | End: 2017-02-11 | Stop reason: HOSPADM

## 2017-02-01 RX ORDER — HYDROCODONE BITARTRATE AND ACETAMINOPHEN 5; 325 MG/1; MG/1
1 TABLET ORAL EVERY 4 HOURS PRN
Status: CANCELLED | OUTPATIENT
Start: 2017-02-01

## 2017-02-01 RX ORDER — HYDROMORPHONE HYDROCHLORIDE 2 MG/1
1 TABLET ORAL EVERY 4 HOURS PRN
Status: DISCONTINUED | OUTPATIENT
Start: 2017-02-01 | End: 2017-02-08

## 2017-02-01 RX ORDER — ATORVASTATIN CALCIUM 40 MG/1
40 TABLET, FILM COATED ORAL
Status: CANCELLED | OUTPATIENT
Start: 2017-02-01

## 2017-02-01 RX ORDER — GABAPENTIN 100 MG/1
200 CAPSULE ORAL 3 TIMES DAILY
Status: DISCONTINUED | OUTPATIENT
Start: 2017-02-01 | End: 2017-02-11 | Stop reason: HOSPADM

## 2017-02-01 RX ORDER — BISACODYL 10 MG
10 SUPPOSITORY, RECTAL RECTAL DAILY PRN
Status: CANCELLED | OUTPATIENT
Start: 2017-02-01

## 2017-02-01 RX ORDER — GUAIFENESIN 600 MG
600 TABLET, EXTENDED RELEASE 12 HR ORAL EVERY 12 HOURS
Status: DISCONTINUED | OUTPATIENT
Start: 2017-02-02 | End: 2017-02-11 | Stop reason: HOSPADM

## 2017-02-01 RX ORDER — LORATADINE 10 MG/1
10 TABLET ORAL DAILY
Status: DISCONTINUED | OUTPATIENT
Start: 2017-02-02 | End: 2017-02-11 | Stop reason: HOSPADM

## 2017-02-01 RX ORDER — ALBUTEROL SULFATE 90 UG/1
2 AEROSOL, METERED RESPIRATORY (INHALATION) EVERY 6 HOURS PRN
Status: DISCONTINUED | OUTPATIENT
Start: 2017-02-01 | End: 2017-02-11 | Stop reason: HOSPADM

## 2017-02-01 RX ORDER — LITHIUM CARBONATE 300 MG/1
300 TABLET, FILM COATED, EXTENDED RELEASE ORAL EVERY 12 HOURS SCHEDULED
Status: DISCONTINUED | OUTPATIENT
Start: 2017-02-01 | End: 2017-02-01

## 2017-02-01 RX ORDER — METOPROLOL SUCCINATE 25 MG/1
25 TABLET, EXTENDED RELEASE ORAL DAILY
Status: CANCELLED | OUTPATIENT
Start: 2017-02-02

## 2017-02-01 RX ORDER — DOCUSATE SODIUM 100 MG/1
100 CAPSULE, LIQUID FILLED ORAL 2 TIMES DAILY
Status: CANCELLED | OUTPATIENT
Start: 2017-02-01

## 2017-02-01 RX ORDER — GUAIFENESIN 600 MG
600 TABLET, EXTENDED RELEASE 12 HR ORAL EVERY 12 HOURS
Status: CANCELLED | OUTPATIENT
Start: 2017-02-02

## 2017-02-01 RX ORDER — PANTOPRAZOLE SODIUM 40 MG/1
40 TABLET, DELAYED RELEASE ORAL
Status: DISCONTINUED | OUTPATIENT
Start: 2017-02-02 | End: 2017-02-11 | Stop reason: HOSPADM

## 2017-02-01 RX ORDER — ACETAMINOPHEN 325 MG/1
650 TABLET ORAL EVERY 6 HOURS PRN
Status: DISCONTINUED | OUTPATIENT
Start: 2017-02-01 | End: 2017-02-04

## 2017-02-01 RX ORDER — AMLODIPINE BESYLATE 2.5 MG/1
2.5 TABLET ORAL DAILY
Status: DISCONTINUED | OUTPATIENT
Start: 2017-02-02 | End: 2017-02-11 | Stop reason: HOSPADM

## 2017-02-01 RX ORDER — HEPARIN SODIUM 5000 [USP'U]/ML
5000 INJECTION, SOLUTION INTRAVENOUS; SUBCUTANEOUS EVERY 8 HOURS SCHEDULED
Status: CANCELLED | OUTPATIENT
Start: 2017-02-01

## 2017-02-01 RX ORDER — HEPARIN SODIUM 5000 [USP'U]/ML
5000 INJECTION, SOLUTION INTRAVENOUS; SUBCUTANEOUS EVERY 8 HOURS SCHEDULED
Status: DISCONTINUED | OUTPATIENT
Start: 2017-02-02 | End: 2017-02-03

## 2017-02-01 RX ORDER — PANTOPRAZOLE SODIUM 40 MG/1
40 TABLET, DELAYED RELEASE ORAL
Status: CANCELLED | OUTPATIENT
Start: 2017-02-02

## 2017-02-01 RX ORDER — DOCUSATE SODIUM 100 MG/1
100 CAPSULE, LIQUID FILLED ORAL EVERY 12 HOURS SCHEDULED
Status: DISCONTINUED | OUTPATIENT
Start: 2017-02-01 | End: 2017-02-11 | Stop reason: HOSPADM

## 2017-02-01 RX ORDER — FLUTICASONE PROPIONATE 50 MCG
1 SPRAY, SUSPENSION (ML) NASAL AS NEEDED
Status: CANCELLED | OUTPATIENT
Start: 2017-02-01

## 2017-02-01 RX ORDER — DICYCLOMINE HCL 20 MG
20 TABLET ORAL EVERY 6 HOURS
Status: CANCELLED | OUTPATIENT
Start: 2017-02-01

## 2017-02-01 RX ORDER — METHOCARBAMOL 750 MG/1
750 TABLET, FILM COATED ORAL EVERY 6 HOURS SCHEDULED
Status: CANCELLED | OUTPATIENT
Start: 2017-02-01

## 2017-02-01 RX ORDER — METHOCARBAMOL 750 MG/1
750 TABLET, FILM COATED ORAL EVERY 6 HOURS SCHEDULED
Status: DISCONTINUED | OUTPATIENT
Start: 2017-02-01 | End: 2017-02-11 | Stop reason: HOSPADM

## 2017-02-01 RX ORDER — AMLODIPINE BESYLATE 2.5 MG/1
2.5 TABLET ORAL DAILY
Status: CANCELLED | OUTPATIENT
Start: 2017-02-02

## 2017-02-01 RX ORDER — FLUTICASONE PROPIONATE 50 MCG
2 SPRAY, SUSPENSION (ML) NASAL DAILY
Status: DISCONTINUED | OUTPATIENT
Start: 2017-02-02 | End: 2017-02-11 | Stop reason: HOSPADM

## 2017-02-01 RX ORDER — FLUTICASONE PROPIONATE 50 MCG
1 SPRAY, SUSPENSION (ML) NASAL DAILY PRN
Status: DISCONTINUED | OUTPATIENT
Start: 2017-02-01 | End: 2017-02-01

## 2017-02-01 RX ORDER — NITROGLYCERIN 40 MG/1
1 PATCH TRANSDERMAL DAILY
Status: DISCONTINUED | OUTPATIENT
Start: 2017-02-02 | End: 2017-02-01

## 2017-02-01 RX ORDER — LORATADINE 10 MG/1
10 TABLET ORAL DAILY
Status: CANCELLED | OUTPATIENT
Start: 2017-02-02

## 2017-02-01 RX ORDER — LORAZEPAM 1 MG/1
1 TABLET ORAL EVERY 6 HOURS PRN
Status: CANCELLED | OUTPATIENT
Start: 2017-02-01

## 2017-02-01 RX ORDER — GUAIFENESIN 600 MG
600 TABLET, EXTENDED RELEASE 12 HR ORAL EVERY 12 HOURS
Status: DISCONTINUED | OUTPATIENT
Start: 2017-02-01 | End: 2017-02-01 | Stop reason: HOSPADM

## 2017-02-01 RX ORDER — BISACODYL 10 MG
10 SUPPOSITORY, RECTAL RECTAL DAILY PRN
Status: DISCONTINUED | OUTPATIENT
Start: 2017-02-01 | End: 2017-02-11 | Stop reason: HOSPADM

## 2017-02-01 RX ORDER — HYDROCODONE BITARTRATE AND ACETAMINOPHEN 5; 325 MG/1; MG/1
2 TABLET ORAL EVERY 4 HOURS PRN
Status: CANCELLED | OUTPATIENT
Start: 2017-02-01

## 2017-02-01 RX ORDER — DICYCLOMINE HCL 20 MG
20 TABLET ORAL EVERY 6 HOURS
Status: DISCONTINUED | OUTPATIENT
Start: 2017-02-01 | End: 2017-02-02

## 2017-02-01 RX ORDER — LITHIUM CARBONATE 300 MG/1
300 TABLET, FILM COATED, EXTENDED RELEASE ORAL
Status: DISCONTINUED | OUTPATIENT
Start: 2017-02-01 | End: 2017-02-11 | Stop reason: HOSPADM

## 2017-02-01 RX ADMIN — FLUTICASONE PROPIONATE AND SALMETEROL 1 PUFF: 50; 250 POWDER RESPIRATORY (INHALATION) at 23:36

## 2017-02-01 RX ADMIN — DOCUSATE SODIUM 100 MG: 100 CAPSULE, LIQUID FILLED ORAL at 08:50

## 2017-02-01 RX ADMIN — HYDROMORPHONE HYDROCHLORIDE 1 MG: 2 TABLET ORAL at 22:45

## 2017-02-01 RX ADMIN — HYDROCODONE BITARTRATE AND ACETAMINOPHEN 2 TABLET: 5; 325 TABLET ORAL at 20:37

## 2017-02-01 RX ADMIN — LITHIUM CARBONATE 300 MG: 300 TABLET, EXTENDED RELEASE ORAL at 20:28

## 2017-02-01 RX ADMIN — ATORVASTATIN CALCIUM 40 MG: 40 TABLET, FILM COATED ORAL at 16:05

## 2017-02-01 RX ADMIN — GABAPENTIN 200 MG: 100 CAPSULE ORAL at 20:28

## 2017-02-01 RX ADMIN — FLUTICASONE PROPIONATE 1 SPRAY: 50 SPRAY, METERED NASAL at 08:52

## 2017-02-01 RX ADMIN — GUAIFENESIN 600 MG: 600 TABLET, EXTENDED RELEASE ORAL at 13:00

## 2017-02-01 RX ADMIN — AMLODIPINE BESYLATE 2.5 MG: 2.5 TABLET ORAL at 08:50

## 2017-02-01 RX ADMIN — HYDROMORPHONE HYDROCHLORIDE 1 MG: 2 TABLET ORAL at 10:36

## 2017-02-01 RX ADMIN — METOPROLOL SUCCINATE 25 MG: 25 TABLET, EXTENDED RELEASE ORAL at 08:50

## 2017-02-01 RX ADMIN — HEPARIN SODIUM 5000 UNITS: 5000 INJECTION, SOLUTION INTRAVENOUS; SUBCUTANEOUS at 06:00

## 2017-02-01 RX ADMIN — GABAPENTIN 100 MG: 100 CAPSULE ORAL at 08:50

## 2017-02-01 RX ADMIN — FLUTICASONE PROPIONATE AND SALMETEROL 1 PUFF: 50; 250 POWDER RESPIRATORY (INHALATION) at 08:50

## 2017-02-01 RX ADMIN — GABAPENTIN 200 MG: 100 CAPSULE ORAL at 16:05

## 2017-02-01 RX ADMIN — METHOCARBAMOL 750 MG: 750 TABLET ORAL at 23:36

## 2017-02-01 RX ADMIN — METHOCARBAMOL 750 MG: 750 TABLET ORAL at 18:35

## 2017-02-01 RX ADMIN — HYDROCODONE BITARTRATE AND ACETAMINOPHEN 2 TABLET: 5; 325 TABLET ORAL at 04:34

## 2017-02-01 RX ADMIN — HYDROCODONE BITARTRATE AND ACETAMINOPHEN 2 TABLET: 5; 325 TABLET ORAL at 08:50

## 2017-02-01 RX ADMIN — HYDROCODONE BITARTRATE AND ACETAMINOPHEN 2 TABLET: 5; 325 TABLET ORAL at 16:07

## 2017-02-01 RX ADMIN — HYDROMORPHONE HYDROCHLORIDE 1 MG: 2 TABLET ORAL at 18:34

## 2017-02-01 RX ADMIN — METHOCARBAMOL 750 MG: 750 TABLET ORAL at 06:00

## 2017-02-01 RX ADMIN — DOCUSATE SODIUM 100 MG: 100 CAPSULE, LIQUID FILLED ORAL at 20:28

## 2017-02-01 RX ADMIN — PANTOPRAZOLE SODIUM 40 MG: 40 TABLET, DELAYED RELEASE ORAL at 06:00

## 2017-02-01 RX ADMIN — METHOCARBAMOL 750 MG: 750 TABLET ORAL at 13:00

## 2017-02-01 RX ADMIN — ALBUTEROL SULFATE 2 PUFF: 90 AEROSOL, METERED RESPIRATORY (INHALATION) at 08:52

## 2017-02-02 LAB
ANION GAP SERPL CALCULATED.3IONS-SCNC: 10 MMOL/L (ref 4–13)
BASOPHILS # BLD AUTO: 0.02 THOUSANDS/ΜL (ref 0–0.1)
BASOPHILS NFR BLD AUTO: 0 % (ref 0–1)
BUN SERPL-MCNC: 15 MG/DL (ref 5–25)
CALCIUM SERPL-MCNC: 9.3 MG/DL (ref 8.3–10.1)
CHLORIDE SERPL-SCNC: 104 MMOL/L (ref 100–108)
CO2 SERPL-SCNC: 24 MMOL/L (ref 21–32)
CREAT SERPL-MCNC: 0.89 MG/DL (ref 0.6–1.3)
EOSINOPHIL # BLD AUTO: 0.3 THOUSAND/ΜL (ref 0–0.61)
EOSINOPHIL NFR BLD AUTO: 2 % (ref 0–6)
ERYTHROCYTE [DISTWIDTH] IN BLOOD BY AUTOMATED COUNT: 14.4 % (ref 11.6–15.1)
GFR SERPL CREATININE-BSD FRML MDRD: >60 ML/MIN/1.73SQ M
GLUCOSE SERPL-MCNC: 108 MG/DL (ref 65–140)
HCT VFR BLD AUTO: 34.5 % (ref 36.5–49.3)
HGB BLD-MCNC: 11.4 G/DL (ref 12–17)
LYMPHOCYTES # BLD AUTO: 1.52 THOUSANDS/ΜL (ref 0.6–4.47)
LYMPHOCYTES NFR BLD AUTO: 10 % (ref 14–44)
MCH RBC QN AUTO: 29.3 PG (ref 26.8–34.3)
MCHC RBC AUTO-ENTMCNC: 33 G/DL (ref 31.4–37.4)
MCV RBC AUTO: 89 FL (ref 82–98)
MONOCYTES # BLD AUTO: 1.52 THOUSAND/ΜL (ref 0.17–1.22)
MONOCYTES NFR BLD AUTO: 10 % (ref 4–12)
NEUTROPHILS # BLD AUTO: 12.15 THOUSANDS/ΜL (ref 1.85–7.62)
NEUTS SEG NFR BLD AUTO: 78 % (ref 43–75)
NRBC BLD AUTO-RTO: 0 /100 WBCS
PLATELET # BLD AUTO: 196 THOUSANDS/UL (ref 149–390)
PMV BLD AUTO: 11.4 FL (ref 8.9–12.7)
POTASSIUM SERPL-SCNC: 3.9 MMOL/L (ref 3.5–5.3)
RBC # BLD AUTO: 3.89 MILLION/UL (ref 3.88–5.62)
SODIUM SERPL-SCNC: 138 MMOL/L (ref 136–145)
WBC # BLD AUTO: 15.57 THOUSAND/UL (ref 4.31–10.16)

## 2017-02-02 PROCEDURE — 97140 MANUAL THERAPY 1/> REGIONS: CPT

## 2017-02-02 PROCEDURE — 97166 OT EVAL MOD COMPLEX 45 MIN: CPT

## 2017-02-02 PROCEDURE — 97535 SELF CARE MNGMENT TRAINING: CPT

## 2017-02-02 PROCEDURE — 97162 PT EVAL MOD COMPLEX 30 MIN: CPT

## 2017-02-02 PROCEDURE — 97116 GAIT TRAINING THERAPY: CPT

## 2017-02-02 PROCEDURE — 85025 COMPLETE CBC W/AUTO DIFF WBC: CPT | Performed by: PHYSICIAN ASSISTANT

## 2017-02-02 PROCEDURE — 80048 BASIC METABOLIC PNL TOTAL CA: CPT | Performed by: PHYSICIAN ASSISTANT

## 2017-02-02 PROCEDURE — 97530 THERAPEUTIC ACTIVITIES: CPT

## 2017-02-02 PROCEDURE — 97110 THERAPEUTIC EXERCISES: CPT

## 2017-02-02 RX ORDER — DICYCLOMINE HCL 20 MG
20 TABLET ORAL EVERY 6 HOURS PRN
Status: DISCONTINUED | OUTPATIENT
Start: 2017-02-02 | End: 2017-02-11 | Stop reason: HOSPADM

## 2017-02-02 RX ADMIN — GUAIFENESIN 600 MG: 600 TABLET, EXTENDED RELEASE ORAL at 22:13

## 2017-02-02 RX ADMIN — METHOCARBAMOL 750 MG: 750 TABLET ORAL at 17:03

## 2017-02-02 RX ADMIN — ALBUTEROL SULFATE 2 PUFF: 90 AEROSOL, METERED RESPIRATORY (INHALATION) at 09:27

## 2017-02-02 RX ADMIN — FLUTICASONE PROPIONATE AND SALMETEROL 1 PUFF: 50; 250 POWDER RESPIRATORY (INHALATION) at 22:12

## 2017-02-02 RX ADMIN — HYDROMORPHONE HYDROCHLORIDE 1 MG: 2 TABLET ORAL at 15:02

## 2017-02-02 RX ADMIN — HYDROCODONE BITARTRATE AND ACETAMINOPHEN 2 TABLET: 5; 325 TABLET ORAL at 05:25

## 2017-02-02 RX ADMIN — FLUTICASONE PROPIONATE 2 SPRAY: 50 SPRAY, METERED NASAL at 09:26

## 2017-02-02 RX ADMIN — METHOCARBAMOL 750 MG: 750 TABLET ORAL at 11:33

## 2017-02-02 RX ADMIN — HEPARIN SODIUM 5000 UNITS: 5000 INJECTION, SOLUTION INTRAVENOUS; SUBCUTANEOUS at 14:35

## 2017-02-02 RX ADMIN — ATORVASTATIN CALCIUM 40 MG: 40 TABLET, FILM COATED ORAL at 17:03

## 2017-02-02 RX ADMIN — FLUTICASONE PROPIONATE AND SALMETEROL 1 PUFF: 50; 250 POWDER RESPIRATORY (INHALATION) at 09:26

## 2017-02-02 RX ADMIN — DOCUSATE SODIUM 100 MG: 100 CAPSULE, LIQUID FILLED ORAL at 22:15

## 2017-02-02 RX ADMIN — HEPARIN SODIUM 5000 UNITS: 5000 INJECTION, SOLUTION INTRAVENOUS; SUBCUTANEOUS at 22:16

## 2017-02-02 RX ADMIN — GABAPENTIN 200 MG: 100 CAPSULE ORAL at 09:20

## 2017-02-02 RX ADMIN — HYDROCODONE BITARTRATE AND ACETAMINOPHEN 2 TABLET: 5; 325 TABLET ORAL at 22:23

## 2017-02-02 RX ADMIN — METOPROLOL SUCCINATE 25 MG: 25 TABLET, EXTENDED RELEASE ORAL at 09:23

## 2017-02-02 RX ADMIN — METHOCARBAMOL 750 MG: 750 TABLET ORAL at 23:57

## 2017-02-02 RX ADMIN — METHOCARBAMOL 750 MG: 750 TABLET ORAL at 05:24

## 2017-02-02 RX ADMIN — GABAPENTIN 200 MG: 100 CAPSULE ORAL at 17:03

## 2017-02-02 RX ADMIN — HYDROMORPHONE HYDROCHLORIDE 1 MG: 2 TABLET ORAL at 02:38

## 2017-02-02 RX ADMIN — TIOTROPIUM BROMIDE 18 MCG: 18 CAPSULE ORAL; RESPIRATORY (INHALATION) at 22:18

## 2017-02-02 RX ADMIN — DOCUSATE SODIUM 100 MG: 100 CAPSULE, LIQUID FILLED ORAL at 09:20

## 2017-02-02 RX ADMIN — HEPARIN SODIUM 5000 UNITS: 5000 INJECTION, SOLUTION INTRAVENOUS; SUBCUTANEOUS at 05:25

## 2017-02-02 RX ADMIN — HYDROCODONE BITARTRATE AND ACETAMINOPHEN 2 TABLET: 5; 325 TABLET ORAL at 17:02

## 2017-02-02 RX ADMIN — HYDROMORPHONE HYDROCHLORIDE 1 MG: 2 TABLET ORAL at 09:24

## 2017-02-02 RX ADMIN — PANTOPRAZOLE SODIUM 40 MG: 40 TABLET, DELAYED RELEASE ORAL at 05:24

## 2017-02-02 RX ADMIN — LITHIUM CARBONATE 600 MG: 300 TABLET, EXTENDED RELEASE ORAL at 22:14

## 2017-02-02 RX ADMIN — AMLODIPINE BESYLATE 2.5 MG: 2.5 TABLET ORAL at 09:23

## 2017-02-02 RX ADMIN — HYDROCODONE BITARTRATE AND ACETAMINOPHEN 2 TABLET: 5; 325 TABLET ORAL at 11:33

## 2017-02-02 RX ADMIN — GABAPENTIN 200 MG: 100 CAPSULE ORAL at 22:14

## 2017-02-03 PROCEDURE — 97530 THERAPEUTIC ACTIVITIES: CPT

## 2017-02-03 PROCEDURE — 97110 THERAPEUTIC EXERCISES: CPT

## 2017-02-03 PROCEDURE — 97535 SELF CARE MNGMENT TRAINING: CPT

## 2017-02-03 RX ORDER — SENNOSIDES 8.6 MG
1 TABLET ORAL
Status: DISCONTINUED | OUTPATIENT
Start: 2017-02-03 | End: 2017-02-11 | Stop reason: HOSPADM

## 2017-02-03 RX ADMIN — METHOCARBAMOL 750 MG: 750 TABLET ORAL at 17:48

## 2017-02-03 RX ADMIN — HYDROCODONE BITARTRATE AND ACETAMINOPHEN 2 TABLET: 5; 325 TABLET ORAL at 09:23

## 2017-02-03 RX ADMIN — FLUTICASONE PROPIONATE AND SALMETEROL 1 PUFF: 50; 250 POWDER RESPIRATORY (INHALATION) at 09:24

## 2017-02-03 RX ADMIN — HYDROCODONE BITARTRATE AND ACETAMINOPHEN 2 TABLET: 5; 325 TABLET ORAL at 19:49

## 2017-02-03 RX ADMIN — GABAPENTIN 200 MG: 100 CAPSULE ORAL at 09:23

## 2017-02-03 RX ADMIN — DOCUSATE SODIUM 100 MG: 100 CAPSULE, LIQUID FILLED ORAL at 09:23

## 2017-02-03 RX ADMIN — GUAIFENESIN 600 MG: 600 TABLET, EXTENDED RELEASE ORAL at 12:28

## 2017-02-03 RX ADMIN — METOPROLOL SUCCINATE 25 MG: 25 TABLET, EXTENDED RELEASE ORAL at 09:25

## 2017-02-03 RX ADMIN — GABAPENTIN 200 MG: 100 CAPSULE ORAL at 15:51

## 2017-02-03 RX ADMIN — HYDROCODONE BITARTRATE AND ACETAMINOPHEN 2 TABLET: 5; 325 TABLET ORAL at 15:01

## 2017-02-03 RX ADMIN — GUAIFENESIN 600 MG: 600 TABLET, EXTENDED RELEASE ORAL at 22:03

## 2017-02-03 RX ADMIN — ENOXAPARIN SODIUM 40 MG: 40 INJECTION SUBCUTANEOUS at 12:28

## 2017-02-03 RX ADMIN — HYDROCODONE BITARTRATE AND ACETAMINOPHEN 2 TABLET: 5; 325 TABLET ORAL at 03:17

## 2017-02-03 RX ADMIN — ALBUTEROL SULFATE 2 PUFF: 90 AEROSOL, METERED RESPIRATORY (INHALATION) at 09:26

## 2017-02-03 RX ADMIN — TIOTROPIUM BROMIDE 18 MCG: 18 CAPSULE ORAL; RESPIRATORY (INHALATION) at 21:14

## 2017-02-03 RX ADMIN — LITHIUM CARBONATE 300 MG: 300 TABLET, EXTENDED RELEASE ORAL at 19:50

## 2017-02-03 RX ADMIN — AMLODIPINE BESYLATE 2.5 MG: 2.5 TABLET ORAL at 09:25

## 2017-02-03 RX ADMIN — METHOCARBAMOL 750 MG: 750 TABLET ORAL at 12:28

## 2017-02-03 RX ADMIN — PANTOPRAZOLE SODIUM 40 MG: 40 TABLET, DELAYED RELEASE ORAL at 06:53

## 2017-02-03 RX ADMIN — FLUTICASONE PROPIONATE 2 SPRAY: 50 SPRAY, METERED NASAL at 09:24

## 2017-02-03 RX ADMIN — METHOCARBAMOL 750 MG: 750 TABLET ORAL at 06:53

## 2017-02-03 RX ADMIN — FLUTICASONE PROPIONATE AND SALMETEROL 1 PUFF: 50; 250 POWDER RESPIRATORY (INHALATION) at 21:13

## 2017-02-03 RX ADMIN — ALBUTEROL SULFATE 2 PUFF: 90 AEROSOL, METERED RESPIRATORY (INHALATION) at 15:02

## 2017-02-03 RX ADMIN — HYDROMORPHONE HYDROCHLORIDE 1 MG: 2 TABLET ORAL at 23:01

## 2017-02-03 RX ADMIN — HYDROMORPHONE HYDROCHLORIDE 1 MG: 2 TABLET ORAL at 06:54

## 2017-02-03 RX ADMIN — ATORVASTATIN CALCIUM 40 MG: 40 TABLET, FILM COATED ORAL at 15:51

## 2017-02-03 RX ADMIN — GABAPENTIN 200 MG: 100 CAPSULE ORAL at 21:11

## 2017-02-03 RX ADMIN — HYDROMORPHONE HYDROCHLORIDE 1 MG: 2 TABLET ORAL at 12:37

## 2017-02-04 LAB
BASOPHILS # BLD AUTO: 0.02 THOUSANDS/ΜL (ref 0–0.1)
BASOPHILS NFR BLD AUTO: 0 % (ref 0–1)
EOSINOPHIL # BLD AUTO: 0.43 THOUSAND/ΜL (ref 0–0.61)
EOSINOPHIL NFR BLD AUTO: 4 % (ref 0–6)
ERYTHROCYTE [DISTWIDTH] IN BLOOD BY AUTOMATED COUNT: 14.1 % (ref 11.6–15.1)
HCT VFR BLD AUTO: 34.3 % (ref 36.5–49.3)
HGB BLD-MCNC: 11.4 G/DL (ref 12–17)
LYMPHOCYTES # BLD AUTO: 1.58 THOUSANDS/ΜL (ref 0.6–4.47)
LYMPHOCYTES NFR BLD AUTO: 14 % (ref 14–44)
MCH RBC QN AUTO: 29.4 PG (ref 26.8–34.3)
MCHC RBC AUTO-ENTMCNC: 33.2 G/DL (ref 31.4–37.4)
MCV RBC AUTO: 88 FL (ref 82–98)
MONOCYTES # BLD AUTO: 1.09 THOUSAND/ΜL (ref 0.17–1.22)
MONOCYTES NFR BLD AUTO: 9 % (ref 4–12)
NEUTROPHILS # BLD AUTO: 8.56 THOUSANDS/ΜL (ref 1.85–7.62)
NEUTS SEG NFR BLD AUTO: 73 % (ref 43–75)
NRBC BLD AUTO-RTO: 0 /100 WBCS
PLATELET # BLD AUTO: 244 THOUSANDS/UL (ref 149–390)
PMV BLD AUTO: 11 FL (ref 8.9–12.7)
RBC # BLD AUTO: 3.88 MILLION/UL (ref 3.88–5.62)
WBC # BLD AUTO: 11.71 THOUSAND/UL (ref 4.31–10.16)

## 2017-02-04 PROCEDURE — 85025 COMPLETE CBC W/AUTO DIFF WBC: CPT | Performed by: NURSE PRACTITIONER

## 2017-02-04 PROCEDURE — 97535 SELF CARE MNGMENT TRAINING: CPT

## 2017-02-04 PROCEDURE — 97116 GAIT TRAINING THERAPY: CPT | Performed by: PHYSICAL THERAPIST

## 2017-02-04 PROCEDURE — 97530 THERAPEUTIC ACTIVITIES: CPT | Performed by: PHYSICAL THERAPIST

## 2017-02-04 PROCEDURE — 97110 THERAPEUTIC EXERCISES: CPT

## 2017-02-04 PROCEDURE — 97110 THERAPEUTIC EXERCISES: CPT | Performed by: PHYSICAL THERAPIST

## 2017-02-04 RX ORDER — ACETAMINOPHEN 325 MG/1
650 TABLET ORAL EVERY 6 HOURS SCHEDULED
Status: DISCONTINUED | OUTPATIENT
Start: 2017-02-04 | End: 2017-02-04

## 2017-02-04 RX ORDER — LIDOCAINE 50 MG/G
1 PATCH TOPICAL DAILY
Status: DISCONTINUED | OUTPATIENT
Start: 2017-02-04 | End: 2017-02-11 | Stop reason: HOSPADM

## 2017-02-04 RX ORDER — ACETAMINOPHEN 325 MG/1
650 TABLET ORAL EVERY 6 HOURS PRN
Status: DISCONTINUED | OUTPATIENT
Start: 2017-02-04 | End: 2017-02-11 | Stop reason: HOSPADM

## 2017-02-04 RX ADMIN — HYDROCODONE BITARTRATE AND ACETAMINOPHEN 2 TABLET: 5; 325 TABLET ORAL at 04:38

## 2017-02-04 RX ADMIN — HYDROMORPHONE HYDROCHLORIDE 1 MG: 2 TABLET ORAL at 11:48

## 2017-02-04 RX ADMIN — TIOTROPIUM BROMIDE 18 MCG: 18 CAPSULE ORAL; RESPIRATORY (INHALATION) at 21:23

## 2017-02-04 RX ADMIN — METOPROLOL SUCCINATE 25 MG: 25 TABLET, EXTENDED RELEASE ORAL at 08:40

## 2017-02-04 RX ADMIN — GABAPENTIN 200 MG: 100 CAPSULE ORAL at 16:09

## 2017-02-04 RX ADMIN — ALBUTEROL SULFATE 2 PUFF: 90 AEROSOL, METERED RESPIRATORY (INHALATION) at 19:25

## 2017-02-04 RX ADMIN — HYDROMORPHONE HYDROCHLORIDE 1 MG: 2 TABLET ORAL at 22:03

## 2017-02-04 RX ADMIN — AMLODIPINE BESYLATE 2.5 MG: 2.5 TABLET ORAL at 08:40

## 2017-02-04 RX ADMIN — FLUTICASONE PROPIONATE AND SALMETEROL 1 PUFF: 50; 250 POWDER RESPIRATORY (INHALATION) at 21:22

## 2017-02-04 RX ADMIN — METHOCARBAMOL 750 MG: 750 TABLET ORAL at 17:38

## 2017-02-04 RX ADMIN — GABAPENTIN 200 MG: 100 CAPSULE ORAL at 08:40

## 2017-02-04 RX ADMIN — METHOCARBAMOL 750 MG: 750 TABLET ORAL at 11:45

## 2017-02-04 RX ADMIN — HYDROCODONE BITARTRATE AND ACETAMINOPHEN 2 TABLET: 5; 325 TABLET ORAL at 14:09

## 2017-02-04 RX ADMIN — HYDROMORPHONE HYDROCHLORIDE 1 MG: 2 TABLET ORAL at 16:10

## 2017-02-04 RX ADMIN — HYDROCODONE BITARTRATE AND ACETAMINOPHEN 2 TABLET: 5; 325 TABLET ORAL at 08:48

## 2017-02-04 RX ADMIN — LITHIUM CARBONATE 600 MG: 300 TABLET, EXTENDED RELEASE ORAL at 19:17

## 2017-02-04 RX ADMIN — GABAPENTIN 200 MG: 100 CAPSULE ORAL at 21:21

## 2017-02-04 RX ADMIN — METHOCARBAMOL 750 MG: 750 TABLET ORAL at 00:24

## 2017-02-04 RX ADMIN — ENOXAPARIN SODIUM 40 MG: 40 INJECTION SUBCUTANEOUS at 08:40

## 2017-02-04 RX ADMIN — DOCUSATE SODIUM 100 MG: 100 CAPSULE, LIQUID FILLED ORAL at 21:21

## 2017-02-04 RX ADMIN — GUAIFENESIN 600 MG: 600 TABLET, EXTENDED RELEASE ORAL at 22:03

## 2017-02-04 RX ADMIN — ALBUTEROL SULFATE 2 PUFF: 90 AEROSOL, METERED RESPIRATORY (INHALATION) at 06:46

## 2017-02-04 RX ADMIN — ATORVASTATIN CALCIUM 40 MG: 40 TABLET, FILM COATED ORAL at 16:09

## 2017-02-04 RX ADMIN — HYDROCODONE BITARTRATE AND ACETAMINOPHEN 2 TABLET: 5; 325 TABLET ORAL at 19:17

## 2017-02-04 RX ADMIN — LORATADINE 10 MG: 10 TABLET ORAL at 08:40

## 2017-02-04 RX ADMIN — HYDROCODONE BITARTRATE AND ACETAMINOPHEN 2 TABLET: 5; 325 TABLET ORAL at 00:24

## 2017-02-04 RX ADMIN — METHOCARBAMOL 750 MG: 750 TABLET ORAL at 05:25

## 2017-02-04 RX ADMIN — GUAIFENESIN 600 MG: 600 TABLET, EXTENDED RELEASE ORAL at 11:45

## 2017-02-04 RX ADMIN — FLUTICASONE PROPIONATE 2 SPRAY: 50 SPRAY, METERED NASAL at 08:42

## 2017-02-04 RX ADMIN — DOCUSATE SODIUM 100 MG: 100 CAPSULE, LIQUID FILLED ORAL at 08:40

## 2017-02-04 RX ADMIN — FLUTICASONE PROPIONATE AND SALMETEROL 1 PUFF: 50; 250 POWDER RESPIRATORY (INHALATION) at 08:42

## 2017-02-04 RX ADMIN — PANTOPRAZOLE SODIUM 40 MG: 40 TABLET, DELAYED RELEASE ORAL at 05:25

## 2017-02-05 PROCEDURE — 97110 THERAPEUTIC EXERCISES: CPT

## 2017-02-05 PROCEDURE — 97116 GAIT TRAINING THERAPY: CPT

## 2017-02-05 PROCEDURE — 97535 SELF CARE MNGMENT TRAINING: CPT

## 2017-02-05 RX ADMIN — HYDROCODONE BITARTRATE AND ACETAMINOPHEN 2 TABLET: 5; 325 TABLET ORAL at 00:35

## 2017-02-05 RX ADMIN — FLUTICASONE PROPIONATE AND SALMETEROL 1 PUFF: 50; 250 POWDER RESPIRATORY (INHALATION) at 20:11

## 2017-02-05 RX ADMIN — GABAPENTIN 200 MG: 100 CAPSULE ORAL at 16:30

## 2017-02-05 RX ADMIN — ALBUTEROL SULFATE 2 PUFF: 90 AEROSOL, METERED RESPIRATORY (INHALATION) at 20:13

## 2017-02-05 RX ADMIN — AMLODIPINE BESYLATE 2.5 MG: 2.5 TABLET ORAL at 08:01

## 2017-02-05 RX ADMIN — TIOTROPIUM BROMIDE 18 MCG: 18 CAPSULE ORAL; RESPIRATORY (INHALATION) at 22:13

## 2017-02-05 RX ADMIN — DOCUSATE SODIUM 100 MG: 100 CAPSULE, LIQUID FILLED ORAL at 20:10

## 2017-02-05 RX ADMIN — LORATADINE 10 MG: 10 TABLET ORAL at 08:00

## 2017-02-05 RX ADMIN — LIDOCAINE 1 PATCH: 50 PATCH CUTANEOUS at 20:10

## 2017-02-05 RX ADMIN — DOCUSATE SODIUM 100 MG: 100 CAPSULE, LIQUID FILLED ORAL at 08:00

## 2017-02-05 RX ADMIN — HYDROCODONE BITARTRATE AND ACETAMINOPHEN 2 TABLET: 5; 325 TABLET ORAL at 11:49

## 2017-02-05 RX ADMIN — FLUTICASONE PROPIONATE AND SALMETEROL 1 PUFF: 50; 250 POWDER RESPIRATORY (INHALATION) at 08:02

## 2017-02-05 RX ADMIN — HYDROCODONE BITARTRATE AND ACETAMINOPHEN 2 TABLET: 5; 325 TABLET ORAL at 16:30

## 2017-02-05 RX ADMIN — METHOCARBAMOL 750 MG: 750 TABLET ORAL at 00:35

## 2017-02-05 RX ADMIN — PANTOPRAZOLE SODIUM 40 MG: 40 TABLET, DELAYED RELEASE ORAL at 05:07

## 2017-02-05 RX ADMIN — METHOCARBAMOL 750 MG: 750 TABLET ORAL at 05:07

## 2017-02-05 RX ADMIN — METHOCARBAMOL 750 MG: 750 TABLET ORAL at 11:45

## 2017-02-05 RX ADMIN — GABAPENTIN 200 MG: 100 CAPSULE ORAL at 08:01

## 2017-02-05 RX ADMIN — GUAIFENESIN 600 MG: 600 TABLET, EXTENDED RELEASE ORAL at 11:45

## 2017-02-05 RX ADMIN — FLUTICASONE PROPIONATE 2 SPRAY: 50 SPRAY, METERED NASAL at 08:02

## 2017-02-05 RX ADMIN — HYDROMORPHONE HYDROCHLORIDE 1 MG: 2 TABLET ORAL at 14:13

## 2017-02-05 RX ADMIN — METHOCARBAMOL 750 MG: 750 TABLET ORAL at 17:23

## 2017-02-05 RX ADMIN — ATORVASTATIN CALCIUM 40 MG: 40 TABLET, FILM COATED ORAL at 16:30

## 2017-02-05 RX ADMIN — HYDROCODONE BITARTRATE AND ACETAMINOPHEN 2 TABLET: 5; 325 TABLET ORAL at 06:24

## 2017-02-05 RX ADMIN — HYDROCODONE BITARTRATE AND ACETAMINOPHEN 2 TABLET: 5; 325 TABLET ORAL at 22:12

## 2017-02-05 RX ADMIN — GABAPENTIN 200 MG: 100 CAPSULE ORAL at 20:10

## 2017-02-05 RX ADMIN — GUAIFENESIN 600 MG: 600 TABLET, EXTENDED RELEASE ORAL at 22:12

## 2017-02-05 RX ADMIN — HYDROMORPHONE HYDROCHLORIDE 1 MG: 2 TABLET ORAL at 09:56

## 2017-02-05 RX ADMIN — ENOXAPARIN SODIUM 40 MG: 40 INJECTION SUBCUTANEOUS at 08:00

## 2017-02-05 RX ADMIN — LITHIUM CARBONATE 300 MG: 300 TABLET, EXTENDED RELEASE ORAL at 20:11

## 2017-02-05 RX ADMIN — METOPROLOL SUCCINATE 25 MG: 25 TABLET, EXTENDED RELEASE ORAL at 08:00

## 2017-02-06 ENCOUNTER — TREATMENT (OUTPATIENT)
Dept: OTHER | Facility: HOSPITAL | Age: 72
End: 2017-02-06

## 2017-02-06 LAB
ANION GAP SERPL CALCULATED.3IONS-SCNC: 8 MMOL/L (ref 4–13)
BASOPHILS # BLD AUTO: 0.03 THOUSANDS/ΜL (ref 0–0.1)
BASOPHILS NFR BLD AUTO: 0 % (ref 0–1)
BUN SERPL-MCNC: 19 MG/DL (ref 5–25)
CALCIUM SERPL-MCNC: 10.1 MG/DL (ref 8.3–10.1)
CHLORIDE SERPL-SCNC: 103 MMOL/L (ref 100–108)
CO2 SERPL-SCNC: 23 MMOL/L (ref 21–32)
CREAT SERPL-MCNC: 0.99 MG/DL (ref 0.6–1.3)
EOSINOPHIL # BLD AUTO: 0.46 THOUSAND/ΜL (ref 0–0.61)
EOSINOPHIL NFR BLD AUTO: 4 % (ref 0–6)
ERYTHROCYTE [DISTWIDTH] IN BLOOD BY AUTOMATED COUNT: 14.1 % (ref 11.6–15.1)
GFR SERPL CREATININE-BSD FRML MDRD: >60 ML/MIN/1.73SQ M
GLUCOSE SERPL-MCNC: 126 MG/DL (ref 65–140)
HCT VFR BLD AUTO: 35.6 % (ref 36.5–49.3)
HGB BLD-MCNC: 11.8 G/DL (ref 12–17)
LYMPHOCYTES # BLD AUTO: 1.58 THOUSANDS/ΜL (ref 0.6–4.47)
LYMPHOCYTES NFR BLD AUTO: 12 % (ref 14–44)
MCH RBC QN AUTO: 29.1 PG (ref 26.8–34.3)
MCHC RBC AUTO-ENTMCNC: 33.1 G/DL (ref 31.4–37.4)
MCV RBC AUTO: 88 FL (ref 82–98)
MONOCYTES # BLD AUTO: 0.94 THOUSAND/ΜL (ref 0.17–1.22)
MONOCYTES NFR BLD AUTO: 7 % (ref 4–12)
NEUTROPHILS # BLD AUTO: 10.23 THOUSANDS/ΜL (ref 1.85–7.62)
NEUTS SEG NFR BLD AUTO: 77 % (ref 43–75)
NRBC BLD AUTO-RTO: 0 /100 WBCS
PLATELET # BLD AUTO: 321 THOUSANDS/UL (ref 149–390)
PMV BLD AUTO: 11.1 FL (ref 8.9–12.7)
POTASSIUM SERPL-SCNC: 4.8 MMOL/L (ref 3.5–5.3)
RBC # BLD AUTO: 4.05 MILLION/UL (ref 3.88–5.62)
SODIUM SERPL-SCNC: 134 MMOL/L (ref 136–145)
WBC # BLD AUTO: 13.3 THOUSAND/UL (ref 4.31–10.16)

## 2017-02-06 PROCEDURE — 97530 THERAPEUTIC ACTIVITIES: CPT | Performed by: OCCUPATIONAL THERAPY ASSISTANT

## 2017-02-06 PROCEDURE — 97116 GAIT TRAINING THERAPY: CPT

## 2017-02-06 PROCEDURE — 97530 THERAPEUTIC ACTIVITIES: CPT

## 2017-02-06 PROCEDURE — 80048 BASIC METABOLIC PNL TOTAL CA: CPT | Performed by: NURSE PRACTITIONER

## 2017-02-06 PROCEDURE — 97110 THERAPEUTIC EXERCISES: CPT

## 2017-02-06 PROCEDURE — 85025 COMPLETE CBC W/AUTO DIFF WBC: CPT | Performed by: NURSE PRACTITIONER

## 2017-02-06 RX ORDER — CALCIUM CARBONATE 200(500)MG
500 TABLET,CHEWABLE ORAL DAILY PRN
Status: DISCONTINUED | OUTPATIENT
Start: 2017-02-06 | End: 2017-02-11 | Stop reason: HOSPADM

## 2017-02-06 RX ADMIN — ENOXAPARIN SODIUM 40 MG: 40 INJECTION SUBCUTANEOUS at 08:01

## 2017-02-06 RX ADMIN — HYDROCODONE BITARTRATE AND ACETAMINOPHEN 2 TABLET: 5; 325 TABLET ORAL at 07:02

## 2017-02-06 RX ADMIN — GABAPENTIN 200 MG: 100 CAPSULE ORAL at 15:46

## 2017-02-06 RX ADMIN — HYDROCODONE BITARTRATE AND ACETAMINOPHEN 2 TABLET: 5; 325 TABLET ORAL at 02:25

## 2017-02-06 RX ADMIN — HYDROCODONE BITARTRATE AND ACETAMINOPHEN 2 TABLET: 5; 325 TABLET ORAL at 20:25

## 2017-02-06 RX ADMIN — ATORVASTATIN CALCIUM 40 MG: 40 TABLET, FILM COATED ORAL at 15:46

## 2017-02-06 RX ADMIN — GUAIFENESIN 600 MG: 600 TABLET, EXTENDED RELEASE ORAL at 22:06

## 2017-02-06 RX ADMIN — DOCUSATE SODIUM 100 MG: 100 CAPSULE, LIQUID FILLED ORAL at 08:01

## 2017-02-06 RX ADMIN — FLUTICASONE PROPIONATE AND SALMETEROL 1 PUFF: 50; 250 POWDER RESPIRATORY (INHALATION) at 20:30

## 2017-02-06 RX ADMIN — TIOTROPIUM BROMIDE 18 MCG: 18 CAPSULE ORAL; RESPIRATORY (INHALATION) at 21:33

## 2017-02-06 RX ADMIN — PANTOPRAZOLE SODIUM 40 MG: 40 TABLET, DELAYED RELEASE ORAL at 05:27

## 2017-02-06 RX ADMIN — LITHIUM CARBONATE 600 MG: 300 TABLET, EXTENDED RELEASE ORAL at 20:29

## 2017-02-06 RX ADMIN — METHOCARBAMOL 750 MG: 750 TABLET ORAL at 05:28

## 2017-02-06 RX ADMIN — DOCUSATE SODIUM 100 MG: 100 CAPSULE, LIQUID FILLED ORAL at 20:29

## 2017-02-06 RX ADMIN — HYDROCODONE BITARTRATE AND ACETAMINOPHEN 2 TABLET: 5; 325 TABLET ORAL at 15:46

## 2017-02-06 RX ADMIN — HYDROMORPHONE HYDROCHLORIDE 1 MG: 2 TABLET ORAL at 22:11

## 2017-02-06 RX ADMIN — HYDROMORPHONE HYDROCHLORIDE 1 MG: 2 TABLET ORAL at 05:30

## 2017-02-06 RX ADMIN — AMLODIPINE BESYLATE 2.5 MG: 2.5 TABLET ORAL at 08:01

## 2017-02-06 RX ADMIN — GABAPENTIN 200 MG: 100 CAPSULE ORAL at 20:29

## 2017-02-06 RX ADMIN — GUAIFENESIN 600 MG: 600 TABLET, EXTENDED RELEASE ORAL at 11:42

## 2017-02-06 RX ADMIN — METHOCARBAMOL 750 MG: 750 TABLET ORAL at 11:42

## 2017-02-06 RX ADMIN — FLUTICASONE PROPIONATE 2 SPRAY: 50 SPRAY, METERED NASAL at 08:03

## 2017-02-06 RX ADMIN — GABAPENTIN 200 MG: 100 CAPSULE ORAL at 08:01

## 2017-02-06 RX ADMIN — HYDROCODONE BITARTRATE AND ACETAMINOPHEN 2 TABLET: 5; 325 TABLET ORAL at 11:44

## 2017-02-06 RX ADMIN — LORATADINE 10 MG: 10 TABLET ORAL at 08:01

## 2017-02-06 RX ADMIN — LIDOCAINE 1 PATCH: 50 PATCH CUTANEOUS at 20:30

## 2017-02-06 RX ADMIN — FLUTICASONE PROPIONATE AND SALMETEROL 1 PUFF: 50; 250 POWDER RESPIRATORY (INHALATION) at 08:02

## 2017-02-06 RX ADMIN — HYDROMORPHONE HYDROCHLORIDE 1 MG: 2 TABLET ORAL at 18:16

## 2017-02-06 RX ADMIN — METHOCARBAMOL 750 MG: 750 TABLET ORAL at 00:35

## 2017-02-06 RX ADMIN — HYDROMORPHONE HYDROCHLORIDE 1 MG: 2 TABLET ORAL at 10:28

## 2017-02-06 RX ADMIN — METOPROLOL SUCCINATE 25 MG: 25 TABLET, EXTENDED RELEASE ORAL at 08:02

## 2017-02-06 RX ADMIN — METHOCARBAMOL 750 MG: 750 TABLET ORAL at 18:12

## 2017-02-06 RX ADMIN — ALBUTEROL SULFATE 2 PUFF: 90 AEROSOL, METERED RESPIRATORY (INHALATION) at 07:03

## 2017-02-07 PROCEDURE — 97110 THERAPEUTIC EXERCISES: CPT

## 2017-02-07 PROCEDURE — 97530 THERAPEUTIC ACTIVITIES: CPT

## 2017-02-07 PROCEDURE — 97116 GAIT TRAINING THERAPY: CPT

## 2017-02-07 PROCEDURE — 97535 SELF CARE MNGMENT TRAINING: CPT

## 2017-02-07 RX ADMIN — HYDROMORPHONE HYDROCHLORIDE 1 MG: 2 TABLET ORAL at 16:22

## 2017-02-07 RX ADMIN — ENOXAPARIN SODIUM 40 MG: 40 INJECTION SUBCUTANEOUS at 08:32

## 2017-02-07 RX ADMIN — HYDROCODONE BITARTRATE AND ACETAMINOPHEN 2 TABLET: 5; 325 TABLET ORAL at 14:13

## 2017-02-07 RX ADMIN — METHOCARBAMOL 750 MG: 750 TABLET ORAL at 00:39

## 2017-02-07 RX ADMIN — METHOCARBAMOL 750 MG: 750 TABLET ORAL at 05:11

## 2017-02-07 RX ADMIN — HYDROCODONE BITARTRATE AND ACETAMINOPHEN 2 TABLET: 5; 325 TABLET ORAL at 18:15

## 2017-02-07 RX ADMIN — GABAPENTIN 200 MG: 100 CAPSULE ORAL at 20:42

## 2017-02-07 RX ADMIN — LITHIUM CARBONATE 300 MG: 300 TABLET, EXTENDED RELEASE ORAL at 20:42

## 2017-02-07 RX ADMIN — HYDROCODONE BITARTRATE AND ACETAMINOPHEN 2 TABLET: 5; 325 TABLET ORAL at 05:14

## 2017-02-07 RX ADMIN — PANTOPRAZOLE SODIUM 40 MG: 40 TABLET, DELAYED RELEASE ORAL at 05:13

## 2017-02-07 RX ADMIN — AMLODIPINE BESYLATE 2.5 MG: 2.5 TABLET ORAL at 08:31

## 2017-02-07 RX ADMIN — HYDROCODONE BITARTRATE AND ACETAMINOPHEN 2 TABLET: 5; 325 TABLET ORAL at 22:12

## 2017-02-07 RX ADMIN — GUAIFENESIN 600 MG: 600 TABLET, EXTENDED RELEASE ORAL at 11:26

## 2017-02-07 RX ADMIN — HYDROCODONE BITARTRATE AND ACETAMINOPHEN 2 TABLET: 5; 325 TABLET ORAL at 09:48

## 2017-02-07 RX ADMIN — FLUTICASONE PROPIONATE AND SALMETEROL 1 PUFF: 50; 250 POWDER RESPIRATORY (INHALATION) at 20:43

## 2017-02-07 RX ADMIN — LORATADINE 10 MG: 10 TABLET ORAL at 08:32

## 2017-02-07 RX ADMIN — GABAPENTIN 200 MG: 100 CAPSULE ORAL at 16:22

## 2017-02-07 RX ADMIN — METOPROLOL SUCCINATE 25 MG: 25 TABLET, EXTENDED RELEASE ORAL at 08:32

## 2017-02-07 RX ADMIN — GUAIFENESIN 600 MG: 600 TABLET, EXTENDED RELEASE ORAL at 22:02

## 2017-02-07 RX ADMIN — ATORVASTATIN CALCIUM 40 MG: 40 TABLET, FILM COATED ORAL at 16:22

## 2017-02-07 RX ADMIN — HYDROCODONE BITARTRATE AND ACETAMINOPHEN 2 TABLET: 5; 325 TABLET ORAL at 00:39

## 2017-02-07 RX ADMIN — FLUTICASONE PROPIONATE AND SALMETEROL 1 PUFF: 50; 250 POWDER RESPIRATORY (INHALATION) at 08:34

## 2017-02-07 RX ADMIN — ALBUTEROL SULFATE 2 PUFF: 90 AEROSOL, METERED RESPIRATORY (INHALATION) at 08:35

## 2017-02-07 RX ADMIN — METHOCARBAMOL 750 MG: 750 TABLET ORAL at 18:06

## 2017-02-07 RX ADMIN — LIDOCAINE 1 PATCH: 50 PATCH CUTANEOUS at 20:42

## 2017-02-07 RX ADMIN — HYDROMORPHONE HYDROCHLORIDE 1 MG: 2 TABLET ORAL at 06:58

## 2017-02-07 RX ADMIN — FLUTICASONE PROPIONATE 2 SPRAY: 50 SPRAY, METERED NASAL at 08:33

## 2017-02-07 RX ADMIN — GABAPENTIN 200 MG: 100 CAPSULE ORAL at 08:32

## 2017-02-07 RX ADMIN — METHOCARBAMOL 750 MG: 750 TABLET ORAL at 11:26

## 2017-02-08 PROCEDURE — 97110 THERAPEUTIC EXERCISES: CPT

## 2017-02-08 PROCEDURE — 97530 THERAPEUTIC ACTIVITIES: CPT | Performed by: OCCUPATIONAL THERAPY ASSISTANT

## 2017-02-08 PROCEDURE — 97530 THERAPEUTIC ACTIVITIES: CPT

## 2017-02-08 PROCEDURE — 97116 GAIT TRAINING THERAPY: CPT

## 2017-02-08 RX ORDER — HYDROMORPHONE HYDROCHLORIDE 2 MG/1
1 TABLET ORAL EVERY 6 HOURS PRN
Status: DISCONTINUED | OUTPATIENT
Start: 2017-02-08 | End: 2017-02-11 | Stop reason: HOSPADM

## 2017-02-08 RX ADMIN — HYDROCODONE BITARTRATE AND ACETAMINOPHEN 2 TABLET: 5; 325 TABLET ORAL at 07:13

## 2017-02-08 RX ADMIN — FLUTICASONE PROPIONATE AND SALMETEROL 1 PUFF: 50; 250 POWDER RESPIRATORY (INHALATION) at 08:13

## 2017-02-08 RX ADMIN — METOPROLOL SUCCINATE 25 MG: 25 TABLET, EXTENDED RELEASE ORAL at 08:11

## 2017-02-08 RX ADMIN — GABAPENTIN 200 MG: 100 CAPSULE ORAL at 08:10

## 2017-02-08 RX ADMIN — HYDROCODONE BITARTRATE AND ACETAMINOPHEN 2 TABLET: 5; 325 TABLET ORAL at 11:39

## 2017-02-08 RX ADMIN — METHOCARBAMOL 750 MG: 750 TABLET ORAL at 05:29

## 2017-02-08 RX ADMIN — HYDROMORPHONE HYDROCHLORIDE 1 MG: 2 TABLET ORAL at 09:43

## 2017-02-08 RX ADMIN — ALBUTEROL SULFATE 2 PUFF: 90 AEROSOL, METERED RESPIRATORY (INHALATION) at 08:13

## 2017-02-08 RX ADMIN — LITHIUM CARBONATE 600 MG: 300 TABLET, EXTENDED RELEASE ORAL at 20:13

## 2017-02-08 RX ADMIN — HYDROCODONE BITARTRATE AND ACETAMINOPHEN 2 TABLET: 5; 325 TABLET ORAL at 02:42

## 2017-02-08 RX ADMIN — FLUTICASONE PROPIONATE AND SALMETEROL 1 PUFF: 50; 250 POWDER RESPIRATORY (INHALATION) at 21:39

## 2017-02-08 RX ADMIN — PANTOPRAZOLE SODIUM 40 MG: 40 TABLET, DELAYED RELEASE ORAL at 05:29

## 2017-02-08 RX ADMIN — TIOTROPIUM BROMIDE 18 MCG: 18 CAPSULE ORAL; RESPIRATORY (INHALATION) at 21:43

## 2017-02-08 RX ADMIN — METHOCARBAMOL 750 MG: 750 TABLET ORAL at 23:48

## 2017-02-08 RX ADMIN — GUAIFENESIN 600 MG: 600 TABLET, EXTENDED RELEASE ORAL at 08:09

## 2017-02-08 RX ADMIN — HYDROMORPHONE HYDROCHLORIDE 1 MG: 2 TABLET ORAL at 05:28

## 2017-02-08 RX ADMIN — LORATADINE 10 MG: 10 TABLET ORAL at 08:11

## 2017-02-08 RX ADMIN — ENOXAPARIN SODIUM 40 MG: 40 INJECTION SUBCUTANEOUS at 08:11

## 2017-02-08 RX ADMIN — TIOTROPIUM BROMIDE 18 MCG: 18 CAPSULE ORAL; RESPIRATORY (INHALATION) at 02:43

## 2017-02-08 RX ADMIN — ATORVASTATIN CALCIUM 40 MG: 40 TABLET, FILM COATED ORAL at 15:50

## 2017-02-08 RX ADMIN — METHOCARBAMOL 750 MG: 750 TABLET ORAL at 11:39

## 2017-02-08 RX ADMIN — GABAPENTIN 200 MG: 100 CAPSULE ORAL at 21:38

## 2017-02-08 RX ADMIN — HYDROMORPHONE HYDROCHLORIDE 1 MG: 2 TABLET ORAL at 17:55

## 2017-02-08 RX ADMIN — METHOCARBAMOL 750 MG: 750 TABLET ORAL at 17:54

## 2017-02-08 RX ADMIN — GUAIFENESIN 600 MG: 600 TABLET, EXTENDED RELEASE ORAL at 21:38

## 2017-02-08 RX ADMIN — METHOCARBAMOL 750 MG: 750 TABLET ORAL at 00:23

## 2017-02-08 RX ADMIN — DOCUSATE SODIUM 100 MG: 100 CAPSULE, LIQUID FILLED ORAL at 08:09

## 2017-02-08 RX ADMIN — GABAPENTIN 200 MG: 100 CAPSULE ORAL at 15:49

## 2017-02-08 RX ADMIN — FLUTICASONE PROPIONATE 2 SPRAY: 50 SPRAY, METERED NASAL at 08:13

## 2017-02-08 RX ADMIN — HYDROCODONE BITARTRATE AND ACETAMINOPHEN 2 TABLET: 5; 325 TABLET ORAL at 15:49

## 2017-02-08 RX ADMIN — LIDOCAINE 1 PATCH: 50 PATCH CUTANEOUS at 21:38

## 2017-02-08 RX ADMIN — HYDROCODONE BITARTRATE AND ACETAMINOPHEN 2 TABLET: 5; 325 TABLET ORAL at 20:11

## 2017-02-08 RX ADMIN — AMLODIPINE BESYLATE 2.5 MG: 2.5 TABLET ORAL at 08:11

## 2017-02-09 LAB
ANION GAP SERPL CALCULATED.3IONS-SCNC: 8 MMOL/L (ref 4–13)
BASOPHILS # BLD AUTO: 0.05 THOUSANDS/ΜL (ref 0–0.1)
BASOPHILS NFR BLD AUTO: 0 % (ref 0–1)
BUN SERPL-MCNC: 19 MG/DL (ref 5–25)
CALCIUM SERPL-MCNC: 10.1 MG/DL (ref 8.3–10.1)
CHLORIDE SERPL-SCNC: 105 MMOL/L (ref 100–108)
CO2 SERPL-SCNC: 24 MMOL/L (ref 21–32)
CREAT SERPL-MCNC: 0.98 MG/DL (ref 0.6–1.3)
EOSINOPHIL # BLD AUTO: 0.47 THOUSAND/ΜL (ref 0–0.61)
EOSINOPHIL NFR BLD AUTO: 4 % (ref 0–6)
ERYTHROCYTE [DISTWIDTH] IN BLOOD BY AUTOMATED COUNT: 14 % (ref 11.6–15.1)
GFR SERPL CREATININE-BSD FRML MDRD: >60 ML/MIN/1.73SQ M
GLUCOSE SERPL-MCNC: 81 MG/DL (ref 65–140)
HCT VFR BLD AUTO: 36.4 % (ref 36.5–49.3)
HGB BLD-MCNC: 11.8 G/DL (ref 12–17)
LYMPHOCYTES # BLD AUTO: 1.36 THOUSANDS/ΜL (ref 0.6–4.47)
LYMPHOCYTES NFR BLD AUTO: 11 % (ref 14–44)
MCH RBC QN AUTO: 28.7 PG (ref 26.8–34.3)
MCHC RBC AUTO-ENTMCNC: 32.4 G/DL (ref 31.4–37.4)
MCV RBC AUTO: 89 FL (ref 82–98)
MONOCYTES # BLD AUTO: 0.9 THOUSAND/ΜL (ref 0.17–1.22)
MONOCYTES NFR BLD AUTO: 7 % (ref 4–12)
NEUTROPHILS # BLD AUTO: 9.75 THOUSANDS/ΜL (ref 1.85–7.62)
NEUTS SEG NFR BLD AUTO: 78 % (ref 43–75)
NRBC BLD AUTO-RTO: 0 /100 WBCS
PLATELET # BLD AUTO: 415 THOUSANDS/UL (ref 149–390)
PMV BLD AUTO: 10.2 FL (ref 8.9–12.7)
POTASSIUM SERPL-SCNC: 4.9 MMOL/L (ref 3.5–5.3)
RBC # BLD AUTO: 4.11 MILLION/UL (ref 3.88–5.62)
SODIUM SERPL-SCNC: 137 MMOL/L (ref 136–145)
WBC # BLD AUTO: 12.64 THOUSAND/UL (ref 4.31–10.16)

## 2017-02-09 PROCEDURE — 97530 THERAPEUTIC ACTIVITIES: CPT

## 2017-02-09 PROCEDURE — 85025 COMPLETE CBC W/AUTO DIFF WBC: CPT | Performed by: NURSE PRACTITIONER

## 2017-02-09 PROCEDURE — 97530 THERAPEUTIC ACTIVITIES: CPT | Performed by: OCCUPATIONAL THERAPY ASSISTANT

## 2017-02-09 PROCEDURE — 97110 THERAPEUTIC EXERCISES: CPT

## 2017-02-09 PROCEDURE — 97110 THERAPEUTIC EXERCISES: CPT | Performed by: OCCUPATIONAL THERAPY ASSISTANT

## 2017-02-09 PROCEDURE — 97535 SELF CARE MNGMENT TRAINING: CPT

## 2017-02-09 PROCEDURE — 80048 BASIC METABOLIC PNL TOTAL CA: CPT | Performed by: NURSE PRACTITIONER

## 2017-02-09 PROCEDURE — 97116 GAIT TRAINING THERAPY: CPT

## 2017-02-09 RX ADMIN — PANTOPRAZOLE SODIUM 40 MG: 40 TABLET, DELAYED RELEASE ORAL at 05:07

## 2017-02-09 RX ADMIN — GUAIFENESIN 600 MG: 600 TABLET, EXTENDED RELEASE ORAL at 20:54

## 2017-02-09 RX ADMIN — METHOCARBAMOL 750 MG: 750 TABLET ORAL at 23:41

## 2017-02-09 RX ADMIN — AMLODIPINE BESYLATE 2.5 MG: 2.5 TABLET ORAL at 08:51

## 2017-02-09 RX ADMIN — HYDROMORPHONE HYDROCHLORIDE 1 MG: 2 TABLET ORAL at 18:11

## 2017-02-09 RX ADMIN — HYDROMORPHONE HYDROCHLORIDE 1 MG: 2 TABLET ORAL at 09:26

## 2017-02-09 RX ADMIN — ENOXAPARIN SODIUM 40 MG: 40 INJECTION SUBCUTANEOUS at 08:51

## 2017-02-09 RX ADMIN — ALBUTEROL SULFATE 2 PUFF: 90 AEROSOL, METERED RESPIRATORY (INHALATION) at 08:50

## 2017-02-09 RX ADMIN — LIDOCAINE 1 PATCH: 50 PATCH CUTANEOUS at 21:02

## 2017-02-09 RX ADMIN — HYDROCODONE BITARTRATE AND ACETAMINOPHEN 2 TABLET: 5; 325 TABLET ORAL at 15:41

## 2017-02-09 RX ADMIN — TIOTROPIUM BROMIDE 18 MCG: 18 CAPSULE ORAL; RESPIRATORY (INHALATION) at 21:03

## 2017-02-09 RX ADMIN — METHOCARBAMOL 750 MG: 750 TABLET ORAL at 05:07

## 2017-02-09 RX ADMIN — HYDROCODONE BITARTRATE AND ACETAMINOPHEN 2 TABLET: 5; 325 TABLET ORAL at 01:12

## 2017-02-09 RX ADMIN — HYDROCODONE BITARTRATE AND ACETAMINOPHEN 2 TABLET: 5; 325 TABLET ORAL at 06:02

## 2017-02-09 RX ADMIN — GABAPENTIN 200 MG: 100 CAPSULE ORAL at 15:40

## 2017-02-09 RX ADMIN — METOPROLOL SUCCINATE 25 MG: 25 TABLET, EXTENDED RELEASE ORAL at 08:51

## 2017-02-09 RX ADMIN — GABAPENTIN 200 MG: 100 CAPSULE ORAL at 20:55

## 2017-02-09 RX ADMIN — METHOCARBAMOL 750 MG: 750 TABLET ORAL at 18:15

## 2017-02-09 RX ADMIN — FLUTICASONE PROPIONATE AND SALMETEROL 1 PUFF: 50; 250 POWDER RESPIRATORY (INHALATION) at 20:57

## 2017-02-09 RX ADMIN — HYDROCODONE BITARTRATE AND ACETAMINOPHEN 2 TABLET: 5; 325 TABLET ORAL at 20:52

## 2017-02-09 RX ADMIN — HYDROMORPHONE HYDROCHLORIDE 1 MG: 2 TABLET ORAL at 03:36

## 2017-02-09 RX ADMIN — GABAPENTIN 200 MG: 100 CAPSULE ORAL at 08:50

## 2017-02-09 RX ADMIN — ATORVASTATIN CALCIUM 40 MG: 40 TABLET, FILM COATED ORAL at 15:42

## 2017-02-09 RX ADMIN — DOCUSATE SODIUM 100 MG: 100 CAPSULE, LIQUID FILLED ORAL at 08:50

## 2017-02-09 RX ADMIN — HYDROMORPHONE HYDROCHLORIDE 1 MG: 2 TABLET ORAL at 23:41

## 2017-02-09 RX ADMIN — METHOCARBAMOL 750 MG: 750 TABLET ORAL at 11:22

## 2017-02-09 RX ADMIN — FLUTICASONE PROPIONATE 2 SPRAY: 50 SPRAY, METERED NASAL at 08:50

## 2017-02-09 RX ADMIN — GUAIFENESIN 600 MG: 600 TABLET, EXTENDED RELEASE ORAL at 08:50

## 2017-02-09 RX ADMIN — LORATADINE 10 MG: 10 TABLET ORAL at 08:50

## 2017-02-09 RX ADMIN — FLUTICASONE PROPIONATE AND SALMETEROL 1 PUFF: 50; 250 POWDER RESPIRATORY (INHALATION) at 08:50

## 2017-02-09 RX ADMIN — HYDROCODONE BITARTRATE AND ACETAMINOPHEN 2 TABLET: 5; 325 TABLET ORAL at 11:22

## 2017-02-09 RX ADMIN — LITHIUM CARBONATE 300 MG: 300 TABLET, EXTENDED RELEASE ORAL at 20:54

## 2017-02-10 PROCEDURE — 97116 GAIT TRAINING THERAPY: CPT

## 2017-02-10 PROCEDURE — 97530 THERAPEUTIC ACTIVITIES: CPT | Performed by: OCCUPATIONAL THERAPY ASSISTANT

## 2017-02-10 PROCEDURE — 97110 THERAPEUTIC EXERCISES: CPT

## 2017-02-10 PROCEDURE — 97110 THERAPEUTIC EXERCISES: CPT | Performed by: OCCUPATIONAL THERAPY ASSISTANT

## 2017-02-10 PROCEDURE — 97535 SELF CARE MNGMENT TRAINING: CPT | Performed by: OCCUPATIONAL THERAPY ASSISTANT

## 2017-02-10 RX ORDER — METHOCARBAMOL 750 MG/1
750 TABLET, FILM COATED ORAL EVERY 6 HOURS PRN
Qty: 120 TABLET | Refills: 0 | Status: SHIPPED | OUTPATIENT
Start: 2017-02-10 | End: 2017-09-13

## 2017-02-10 RX ORDER — HYDROCODONE BITARTRATE AND ACETAMINOPHEN 5; 325 MG/1; MG/1
1 TABLET ORAL EVERY 4 HOURS PRN
Qty: 30 TABLET | Refills: 0 | Status: SHIPPED | OUTPATIENT
Start: 2017-02-10 | End: 2017-02-17

## 2017-02-10 RX ORDER — GABAPENTIN 100 MG/1
200 CAPSULE ORAL 3 TIMES DAILY
Qty: 180 CAPSULE | Refills: 3 | Status: SHIPPED | OUTPATIENT
Start: 2017-02-10 | End: 2017-09-13

## 2017-02-10 RX ORDER — HYDROMORPHONE HYDROCHLORIDE 2 MG/1
1 TABLET ORAL EVERY 6 HOURS PRN
Qty: 20 TABLET | Refills: 0 | Status: SHIPPED | OUTPATIENT
Start: 2017-02-10 | End: 2017-02-17

## 2017-02-10 RX ADMIN — GUAIFENESIN 600 MG: 600 TABLET, EXTENDED RELEASE ORAL at 21:19

## 2017-02-10 RX ADMIN — ALBUTEROL SULFATE 2 PUFF: 90 AEROSOL, METERED RESPIRATORY (INHALATION) at 08:35

## 2017-02-10 RX ADMIN — AMLODIPINE BESYLATE 2.5 MG: 2.5 TABLET ORAL at 08:41

## 2017-02-10 RX ADMIN — LITHIUM CARBONATE 600 MG: 300 TABLET, EXTENDED RELEASE ORAL at 19:59

## 2017-02-10 RX ADMIN — HYDROMORPHONE HYDROCHLORIDE 1 MG: 2 TABLET ORAL at 18:17

## 2017-02-10 RX ADMIN — GABAPENTIN 200 MG: 100 CAPSULE ORAL at 08:40

## 2017-02-10 RX ADMIN — ATORVASTATIN CALCIUM 40 MG: 40 TABLET, FILM COATED ORAL at 15:57

## 2017-02-10 RX ADMIN — GABAPENTIN 200 MG: 100 CAPSULE ORAL at 21:20

## 2017-02-10 RX ADMIN — GABAPENTIN 200 MG: 100 CAPSULE ORAL at 15:57

## 2017-02-10 RX ADMIN — GUAIFENESIN 600 MG: 600 TABLET, EXTENDED RELEASE ORAL at 08:40

## 2017-02-10 RX ADMIN — METHOCARBAMOL 750 MG: 750 TABLET ORAL at 12:16

## 2017-02-10 RX ADMIN — HYDROCODONE BITARTRATE AND ACETAMINOPHEN 2 TABLET: 5; 325 TABLET ORAL at 08:41

## 2017-02-10 RX ADMIN — PANTOPRAZOLE SODIUM 40 MG: 40 TABLET, DELAYED RELEASE ORAL at 06:10

## 2017-02-10 RX ADMIN — METHOCARBAMOL 750 MG: 750 TABLET ORAL at 17:32

## 2017-02-10 RX ADMIN — LIDOCAINE 1 PATCH: 50 PATCH CUTANEOUS at 21:20

## 2017-02-10 RX ADMIN — HYDROMORPHONE HYDROCHLORIDE 1 MG: 2 TABLET ORAL at 06:10

## 2017-02-10 RX ADMIN — ENOXAPARIN SODIUM 40 MG: 40 INJECTION SUBCUTANEOUS at 08:41

## 2017-02-10 RX ADMIN — FLUTICASONE PROPIONATE 2 SPRAY: 50 SPRAY, METERED NASAL at 08:35

## 2017-02-10 RX ADMIN — FLUTICASONE PROPIONATE AND SALMETEROL 1 PUFF: 50; 250 POWDER RESPIRATORY (INHALATION) at 08:35

## 2017-02-10 RX ADMIN — ALBUTEROL SULFATE 2 PUFF: 90 AEROSOL, METERED RESPIRATORY (INHALATION) at 21:19

## 2017-02-10 RX ADMIN — HYDROCODONE BITARTRATE AND ACETAMINOPHEN 2 TABLET: 5; 325 TABLET ORAL at 03:08

## 2017-02-10 RX ADMIN — TIOTROPIUM BROMIDE 18 MCG: 18 CAPSULE ORAL; RESPIRATORY (INHALATION) at 21:19

## 2017-02-10 RX ADMIN — METOPROLOL SUCCINATE 25 MG: 25 TABLET, EXTENDED RELEASE ORAL at 08:39

## 2017-02-10 RX ADMIN — HYDROCODONE BITARTRATE AND ACETAMINOPHEN 2 TABLET: 5; 325 TABLET ORAL at 14:28

## 2017-02-10 RX ADMIN — FLUTICASONE PROPIONATE AND SALMETEROL 1 PUFF: 50; 250 POWDER RESPIRATORY (INHALATION) at 21:19

## 2017-02-10 RX ADMIN — METHOCARBAMOL 750 MG: 750 TABLET ORAL at 06:10

## 2017-02-10 RX ADMIN — HYDROCODONE BITARTRATE AND ACETAMINOPHEN 2 TABLET: 5; 325 TABLET ORAL at 22:21

## 2017-02-10 RX ADMIN — HYDROMORPHONE HYDROCHLORIDE 1 MG: 2 TABLET ORAL at 12:16

## 2017-02-11 VITALS
WEIGHT: 166.45 LBS | RESPIRATION RATE: 20 BRPM | SYSTOLIC BLOOD PRESSURE: 122 MMHG | TEMPERATURE: 98.1 F | HEIGHT: 67 IN | BODY MASS INDEX: 26.12 KG/M2 | DIASTOLIC BLOOD PRESSURE: 69 MMHG | OXYGEN SATURATION: 97 % | HEART RATE: 82 BPM

## 2017-02-11 RX ADMIN — ACETAMINOPHEN 650 MG: 325 TABLET, FILM COATED ORAL at 05:08

## 2017-02-11 RX ADMIN — PANTOPRAZOLE SODIUM 40 MG: 40 TABLET, DELAYED RELEASE ORAL at 05:08

## 2017-02-11 RX ADMIN — FLUTICASONE PROPIONATE 2 SPRAY: 50 SPRAY, METERED NASAL at 08:08

## 2017-02-11 RX ADMIN — DOCUSATE SODIUM 100 MG: 100 CAPSULE, LIQUID FILLED ORAL at 08:06

## 2017-02-11 RX ADMIN — ALBUTEROL SULFATE 2 PUFF: 90 AEROSOL, METERED RESPIRATORY (INHALATION) at 08:08

## 2017-02-11 RX ADMIN — METHOCARBAMOL 750 MG: 750 TABLET ORAL at 01:11

## 2017-02-11 RX ADMIN — LORATADINE 10 MG: 10 TABLET ORAL at 08:06

## 2017-02-11 RX ADMIN — ENOXAPARIN SODIUM 40 MG: 40 INJECTION SUBCUTANEOUS at 08:06

## 2017-02-11 RX ADMIN — FLUTICASONE PROPIONATE AND SALMETEROL 1 PUFF: 50; 250 POWDER RESPIRATORY (INHALATION) at 08:08

## 2017-02-11 RX ADMIN — HYDROCODONE BITARTRATE AND ACETAMINOPHEN 2 TABLET: 5; 325 TABLET ORAL at 09:28

## 2017-02-11 RX ADMIN — HYDROMORPHONE HYDROCHLORIDE 1 MG: 2 TABLET ORAL at 06:55

## 2017-02-11 RX ADMIN — METOPROLOL SUCCINATE 25 MG: 25 TABLET, EXTENDED RELEASE ORAL at 08:06

## 2017-02-11 RX ADMIN — AMLODIPINE BESYLATE 2.5 MG: 2.5 TABLET ORAL at 08:06

## 2017-02-11 RX ADMIN — GUAIFENESIN 600 MG: 600 TABLET, EXTENDED RELEASE ORAL at 08:11

## 2017-02-11 RX ADMIN — HYDROCODONE BITARTRATE AND ACETAMINOPHEN 2 TABLET: 5; 325 TABLET ORAL at 04:22

## 2017-02-11 RX ADMIN — METHOCARBAMOL 750 MG: 750 TABLET ORAL at 05:08

## 2017-02-11 RX ADMIN — GABAPENTIN 200 MG: 100 CAPSULE ORAL at 08:06

## 2017-02-14 ENCOUNTER — ALLSCRIPTS OFFICE VISIT (OUTPATIENT)
Dept: OTHER | Facility: OTHER | Age: 72
End: 2017-02-14

## 2017-02-14 ENCOUNTER — HOSPITAL ENCOUNTER (OUTPATIENT)
Dept: RADIOLOGY | Facility: HOSPITAL | Age: 72
Discharge: HOME | End: 2017-02-14
Payer: MEDICARE

## 2017-02-14 ENCOUNTER — GENERIC CONVERSION - ENCOUNTER (OUTPATIENT)
Dept: OTHER | Facility: OTHER | Age: 72
End: 2017-02-14

## 2017-02-14 ENCOUNTER — TRANSCRIBE ORDERS (OUTPATIENT)
Dept: ADMINISTRATIVE | Facility: HOSPITAL | Age: 72
End: 2017-02-14

## 2017-02-14 DIAGNOSIS — M25.552 PAIN IN LEFT HIP: ICD-10-CM

## 2017-02-14 DIAGNOSIS — M43.16 SPONDYLOLISTHESIS OF LUMBAR REGION: ICD-10-CM

## 2017-02-14 DIAGNOSIS — K76.0 FATTY (CHANGE OF) LIVER, NOT ELSEWHERE CLASSIFIED: ICD-10-CM

## 2017-02-14 DIAGNOSIS — E78.5 HYPERLIPIDEMIA: ICD-10-CM

## 2017-02-14 DIAGNOSIS — Z48.89 ENCOUNTER FOR OTHER SPECIFIED SURGICAL AFTERCARE: ICD-10-CM

## 2017-02-14 DIAGNOSIS — R79.9 ABNORMAL FINDING OF BLOOD CHEMISTRY: ICD-10-CM

## 2017-02-14 DIAGNOSIS — M47.816 SPONDYLOSIS OF LUMBAR REGION WITHOUT MYELOPATHY OR RADICULOPATHY: ICD-10-CM

## 2017-02-14 DIAGNOSIS — M48.061 SPINAL STENOSIS OF LUMBAR REGION: ICD-10-CM

## 2017-02-14 PROCEDURE — 73502 X-RAY EXAM HIP UNI 2-3 VIEWS: CPT

## 2017-02-15 ENCOUNTER — ALLSCRIPTS OFFICE VISIT (OUTPATIENT)
Dept: OTHER | Facility: OTHER | Age: 72
End: 2017-02-15

## 2017-02-15 ENCOUNTER — APPOINTMENT (OUTPATIENT)
Dept: PHYSICAL THERAPY | Facility: CLINIC | Age: 72
End: 2017-02-15
Payer: MEDICARE

## 2017-02-15 ENCOUNTER — GENERIC CONVERSION - ENCOUNTER (OUTPATIENT)
Dept: OTHER | Facility: OTHER | Age: 72
End: 2017-02-15

## 2017-02-15 PROCEDURE — G8979 MOBILITY GOAL STATUS: HCPCS

## 2017-02-15 PROCEDURE — 97162 PT EVAL MOD COMPLEX 30 MIN: CPT

## 2017-02-15 PROCEDURE — G8978 MOBILITY CURRENT STATUS: HCPCS

## 2017-02-22 ENCOUNTER — APPOINTMENT (OUTPATIENT)
Dept: PHYSICAL THERAPY | Facility: CLINIC | Age: 72
End: 2017-02-22
Payer: MEDICARE

## 2017-02-22 ENCOUNTER — GENERIC CONVERSION - ENCOUNTER (OUTPATIENT)
Dept: OTHER | Facility: OTHER | Age: 72
End: 2017-02-22

## 2017-02-22 PROCEDURE — 97110 THERAPEUTIC EXERCISES: CPT

## 2017-02-24 ENCOUNTER — TRANSCRIBE ORDERS (OUTPATIENT)
Dept: LAB | Facility: HOSPITAL | Age: 72
End: 2017-02-24

## 2017-02-24 ENCOUNTER — APPOINTMENT (OUTPATIENT)
Dept: PHYSICAL THERAPY | Facility: CLINIC | Age: 72
End: 2017-02-24
Payer: MEDICARE

## 2017-02-24 ENCOUNTER — LAB (OUTPATIENT)
Dept: LAB | Facility: HOSPITAL | Age: 72
End: 2017-02-24
Payer: MEDICARE

## 2017-02-24 DIAGNOSIS — C61 MALIGNANT NEOPLASM OF PROSTATE (HCC): Primary | ICD-10-CM

## 2017-02-24 DIAGNOSIS — I25.2 OLD MYOCARDIAL INFARCTION: ICD-10-CM

## 2017-02-24 DIAGNOSIS — C61 MALIGNANT NEOPLASM OF PROSTATE (HCC): ICD-10-CM

## 2017-02-24 LAB
ALBUMIN SERPL BCP-MCNC: 3.4 G/DL (ref 3.5–5)
ALP SERPL-CCNC: 241 U/L (ref 46–116)
ALT SERPL W P-5'-P-CCNC: 27 U/L (ref 12–78)
ANION GAP SERPL CALCULATED.3IONS-SCNC: 7 MMOL/L (ref 4–13)
AST SERPL W P-5'-P-CCNC: 13 U/L (ref 5–45)
BILIRUB SERPL-MCNC: 0.38 MG/DL (ref 0.2–1)
BUN SERPL-MCNC: 20 MG/DL (ref 5–25)
CALCIUM SERPL-MCNC: 9.9 MG/DL (ref 8.3–10.1)
CHLORIDE SERPL-SCNC: 105 MMOL/L (ref 100–108)
CHOLEST SERPL-MCNC: 133 MG/DL (ref 50–200)
CK SERPL-CCNC: 58 U/L (ref 39–308)
CO2 SERPL-SCNC: 26 MMOL/L (ref 21–32)
CREAT SERPL-MCNC: 1.02 MG/DL (ref 0.6–1.3)
GFR SERPL CREATININE-BSD FRML MDRD: >60 ML/MIN/1.73SQ M
GLUCOSE SERPL-MCNC: 91 MG/DL (ref 65–140)
HDLC SERPL-MCNC: 34 MG/DL (ref 40–60)
LDLC SERPL CALC-MCNC: 74 MG/DL (ref 0–100)
NT-PROBNP SERPL-MCNC: 155 PG/ML
POTASSIUM SERPL-SCNC: 4.6 MMOL/L (ref 3.5–5.3)
PROT SERPL-MCNC: 7.6 G/DL (ref 6.4–8.2)
PSA SERPL-MCNC: 0.1 NG/ML (ref 0–4)
SODIUM SERPL-SCNC: 138 MMOL/L (ref 136–145)
TRIGL SERPL-MCNC: 126 MG/DL

## 2017-02-24 PROCEDURE — 36415 COLL VENOUS BLD VENIPUNCTURE: CPT

## 2017-02-24 PROCEDURE — 80061 LIPID PANEL: CPT

## 2017-02-24 PROCEDURE — 80053 COMPREHEN METABOLIC PANEL: CPT

## 2017-02-24 PROCEDURE — G0103 PSA SCREENING: HCPCS

## 2017-02-24 PROCEDURE — 97110 THERAPEUTIC EXERCISES: CPT

## 2017-02-24 PROCEDURE — 82550 ASSAY OF CK (CPK): CPT

## 2017-02-24 PROCEDURE — 83880 ASSAY OF NATRIURETIC PEPTIDE: CPT

## 2017-03-01 ENCOUNTER — APPOINTMENT (OUTPATIENT)
Dept: PHYSICAL THERAPY | Facility: CLINIC | Age: 72
End: 2017-03-01
Payer: MEDICARE

## 2017-03-01 PROCEDURE — 97010 HOT OR COLD PACKS THERAPY: CPT

## 2017-03-01 PROCEDURE — 97110 THERAPEUTIC EXERCISES: CPT

## 2017-03-03 ENCOUNTER — TRANSCRIBE ORDERS (OUTPATIENT)
Dept: LAB | Facility: HOSPITAL | Age: 72
End: 2017-03-03

## 2017-03-03 ENCOUNTER — APPOINTMENT (OUTPATIENT)
Dept: PHYSICAL THERAPY | Facility: CLINIC | Age: 72
End: 2017-03-03
Payer: MEDICARE

## 2017-03-03 ENCOUNTER — LAB (OUTPATIENT)
Dept: LAB | Facility: HOSPITAL | Age: 72
End: 2017-03-03
Payer: MEDICARE

## 2017-03-03 DIAGNOSIS — R79.9 ABNORMAL FINDING OF BLOOD CHEMISTRY: ICD-10-CM

## 2017-03-03 LAB
ALBUMIN SERPL BCP-MCNC: 3.3 G/DL (ref 3.5–5)
ALP SERPL-CCNC: 240 U/L (ref 46–116)
ALT SERPL W P-5'-P-CCNC: 26 U/L (ref 12–78)
AST SERPL W P-5'-P-CCNC: 16 U/L (ref 5–45)
BILIRUB DIRECT SERPL-MCNC: 0.13 MG/DL (ref 0–0.2)
BILIRUB SERPL-MCNC: 0.48 MG/DL (ref 0.2–1)
PROT SERPL-MCNC: 7.7 G/DL (ref 6.4–8.2)

## 2017-03-03 PROCEDURE — 36415 COLL VENOUS BLD VENIPUNCTURE: CPT

## 2017-03-03 PROCEDURE — 97110 THERAPEUTIC EXERCISES: CPT

## 2017-03-03 PROCEDURE — 80076 HEPATIC FUNCTION PANEL: CPT

## 2017-03-03 PROCEDURE — 97010 HOT OR COLD PACKS THERAPY: CPT

## 2017-03-06 ENCOUNTER — ALLSCRIPTS OFFICE VISIT (OUTPATIENT)
Dept: OTHER | Facility: OTHER | Age: 72
End: 2017-03-06

## 2017-03-07 ENCOUNTER — GENERIC CONVERSION - ENCOUNTER (OUTPATIENT)
Dept: OTHER | Facility: OTHER | Age: 72
End: 2017-03-07

## 2017-03-08 ENCOUNTER — APPOINTMENT (OUTPATIENT)
Dept: PHYSICAL THERAPY | Facility: CLINIC | Age: 72
End: 2017-03-08
Payer: MEDICARE

## 2017-03-08 PROCEDURE — 97110 THERAPEUTIC EXERCISES: CPT

## 2017-03-10 ENCOUNTER — LAB (OUTPATIENT)
Dept: LAB | Facility: HOSPITAL | Age: 72
End: 2017-03-10
Payer: MEDICARE

## 2017-03-10 ENCOUNTER — TRANSCRIBE ORDERS (OUTPATIENT)
Dept: LAB | Facility: HOSPITAL | Age: 72
End: 2017-03-10

## 2017-03-10 ENCOUNTER — HOSPITAL ENCOUNTER (OUTPATIENT)
Dept: RADIOLOGY | Facility: HOSPITAL | Age: 72
Discharge: HOME | End: 2017-03-10
Attending: NEUROLOGICAL SURGERY
Payer: MEDICARE

## 2017-03-10 ENCOUNTER — APPOINTMENT (OUTPATIENT)
Dept: PHYSICAL THERAPY | Facility: CLINIC | Age: 72
End: 2017-03-10
Payer: MEDICARE

## 2017-03-10 DIAGNOSIS — Z48.89 ENCOUNTER FOR OTHER SPECIFIED SURGICAL AFTERCARE: ICD-10-CM

## 2017-03-10 DIAGNOSIS — M47.816 SPONDYLOSIS OF LUMBAR REGION WITHOUT MYELOPATHY OR RADICULOPATHY: ICD-10-CM

## 2017-03-10 DIAGNOSIS — M43.16 SPONDYLOLISTHESIS OF LUMBAR REGION: ICD-10-CM

## 2017-03-10 DIAGNOSIS — E78.5 HYPERLIPIDEMIA: ICD-10-CM

## 2017-03-10 DIAGNOSIS — M48.061 SPINAL STENOSIS OF LUMBAR REGION: ICD-10-CM

## 2017-03-10 DIAGNOSIS — F31.60 MIXED BIPOLAR I DISORDER (HCC): ICD-10-CM

## 2017-03-10 DIAGNOSIS — K76.0 FATTY (CHANGE OF) LIVER, NOT ELSEWHERE CLASSIFIED: ICD-10-CM

## 2017-03-10 DIAGNOSIS — R79.9 ABNORMAL FINDING OF BLOOD CHEMISTRY: ICD-10-CM

## 2017-03-10 DIAGNOSIS — F31.60 MIXED BIPOLAR I DISORDER (HCC): Primary | ICD-10-CM

## 2017-03-10 LAB
BUN SERPL-MCNC: 23 MG/DL (ref 5–25)
CREAT SERPL-MCNC: 1.09 MG/DL (ref 0.6–1.3)
GFR SERPL CREATININE-BSD FRML MDRD: >60 ML/MIN/1.73SQ M
LITHIUM SERPL-SCNC: 0.9 MMOL/L (ref 0.5–1)
TSH SERPL DL<=0.05 MIU/L-ACNC: 1.85 UIU/ML (ref 0.36–3.74)

## 2017-03-10 PROCEDURE — 80178 ASSAY OF LITHIUM: CPT

## 2017-03-10 PROCEDURE — 72020 X-RAY EXAM OF SPINE 1 VIEW: CPT

## 2017-03-10 PROCEDURE — 84080 ASSAY ALKALINE PHOSPHATASES: CPT

## 2017-03-10 PROCEDURE — 84520 ASSAY OF UREA NITROGEN: CPT

## 2017-03-10 PROCEDURE — 82565 ASSAY OF CREATININE: CPT

## 2017-03-10 PROCEDURE — 97110 THERAPEUTIC EXERCISES: CPT

## 2017-03-10 PROCEDURE — 84443 ASSAY THYROID STIM HORMONE: CPT

## 2017-03-10 PROCEDURE — 36415 COLL VENOUS BLD VENIPUNCTURE: CPT

## 2017-03-11 ENCOUNTER — TRANSCRIBE ORDERS (OUTPATIENT)
Dept: ADMINISTRATIVE | Facility: HOSPITAL | Age: 72
End: 2017-03-11

## 2017-03-11 DIAGNOSIS — C61 CANCER OF PROSTATE (HCC): Primary | ICD-10-CM

## 2017-03-11 DIAGNOSIS — C34.90 MALIGNANT NEOPLASM OF LUNG, UNSPECIFIED LATERALITY, UNSPECIFIED PART OF LUNG (HCC): ICD-10-CM

## 2017-03-14 LAB — ALP BONE SERPL-MCNC: 58.3 UG/L (ref 7.6–25.1)

## 2017-03-15 ENCOUNTER — APPOINTMENT (OUTPATIENT)
Dept: PHYSICAL THERAPY | Facility: CLINIC | Age: 72
End: 2017-03-15
Payer: MEDICARE

## 2017-03-15 PROCEDURE — 97110 THERAPEUTIC EXERCISES: CPT

## 2017-03-16 ENCOUNTER — ALLSCRIPTS OFFICE VISIT (OUTPATIENT)
Dept: OTHER | Facility: OTHER | Age: 72
End: 2017-03-16

## 2017-03-17 ENCOUNTER — HOSPITAL ENCOUNTER (OUTPATIENT)
Dept: RADIOLOGY | Facility: HOSPITAL | Age: 72
Discharge: HOME | End: 2017-03-17
Payer: MEDICARE

## 2017-03-17 ENCOUNTER — APPOINTMENT (OUTPATIENT)
Dept: PHYSICAL THERAPY | Facility: CLINIC | Age: 72
End: 2017-03-17
Payer: MEDICARE

## 2017-03-17 DIAGNOSIS — C34.90 MALIGNANT NEOPLASM OF LUNG, UNSPECIFIED LATERALITY, UNSPECIFIED PART OF LUNG (HCC): ICD-10-CM

## 2017-03-17 DIAGNOSIS — C61 CANCER OF PROSTATE (HCC): ICD-10-CM

## 2017-03-17 PROCEDURE — 74176 CT ABD & PELVIS W/O CONTRAST: CPT

## 2017-03-17 PROCEDURE — 71250 CT THORAX DX C-: CPT

## 2017-03-22 ENCOUNTER — APPOINTMENT (OUTPATIENT)
Dept: PHYSICAL THERAPY | Facility: CLINIC | Age: 72
End: 2017-03-22
Payer: MEDICARE

## 2017-03-22 PROCEDURE — 97110 THERAPEUTIC EXERCISES: CPT

## 2017-03-22 PROCEDURE — G8979 MOBILITY GOAL STATUS: HCPCS

## 2017-03-22 PROCEDURE — G8978 MOBILITY CURRENT STATUS: HCPCS

## 2017-03-22 PROCEDURE — 97112 NEUROMUSCULAR REEDUCATION: CPT

## 2017-03-24 ENCOUNTER — APPOINTMENT (OUTPATIENT)
Dept: PHYSICAL THERAPY | Facility: CLINIC | Age: 72
End: 2017-03-24
Payer: MEDICARE

## 2017-03-24 PROCEDURE — 97112 NEUROMUSCULAR REEDUCATION: CPT

## 2017-03-24 PROCEDURE — 97110 THERAPEUTIC EXERCISES: CPT

## 2017-03-27 ENCOUNTER — ALLSCRIPTS OFFICE VISIT (OUTPATIENT)
Dept: OTHER | Facility: OTHER | Age: 72
End: 2017-03-27

## 2017-03-29 ENCOUNTER — APPOINTMENT (OUTPATIENT)
Dept: PHYSICAL THERAPY | Facility: CLINIC | Age: 72
End: 2017-03-29
Payer: MEDICARE

## 2017-03-29 PROCEDURE — 97110 THERAPEUTIC EXERCISES: CPT

## 2017-03-31 ENCOUNTER — APPOINTMENT (OUTPATIENT)
Dept: PHYSICAL THERAPY | Facility: CLINIC | Age: 72
End: 2017-03-31
Payer: MEDICARE

## 2017-03-31 PROCEDURE — 97110 THERAPEUTIC EXERCISES: CPT

## 2017-04-04 ENCOUNTER — APPOINTMENT (OUTPATIENT)
Dept: PHYSICAL THERAPY | Facility: CLINIC | Age: 72
End: 2017-04-04
Payer: MEDICARE

## 2017-04-04 PROCEDURE — 97150 GROUP THERAPEUTIC PROCEDURES: CPT

## 2017-04-04 PROCEDURE — 97112 NEUROMUSCULAR REEDUCATION: CPT

## 2017-04-04 PROCEDURE — 97110 THERAPEUTIC EXERCISES: CPT

## 2017-04-07 ENCOUNTER — APPOINTMENT (OUTPATIENT)
Dept: PHYSICAL THERAPY | Facility: CLINIC | Age: 72
End: 2017-04-07
Payer: MEDICARE

## 2017-04-07 PROCEDURE — 97150 GROUP THERAPEUTIC PROCEDURES: CPT | Performed by: PHYSICAL THERAPIST

## 2017-04-07 PROCEDURE — 97110 THERAPEUTIC EXERCISES: CPT

## 2017-04-11 ENCOUNTER — ALLSCRIPTS OFFICE VISIT (OUTPATIENT)
Dept: OTHER | Facility: OTHER | Age: 72
End: 2017-04-11

## 2017-04-11 ENCOUNTER — APPOINTMENT (OUTPATIENT)
Dept: PHYSICAL THERAPY | Facility: CLINIC | Age: 72
End: 2017-04-11
Payer: MEDICARE

## 2017-04-11 DIAGNOSIS — M48.061 SPINAL STENOSIS OF LUMBAR REGION: ICD-10-CM

## 2017-04-11 DIAGNOSIS — M50.90 CERVICAL DISC DISORDER: ICD-10-CM

## 2017-04-11 PROCEDURE — 97110 THERAPEUTIC EXERCISES: CPT

## 2017-04-11 PROCEDURE — 97112 NEUROMUSCULAR REEDUCATION: CPT

## 2017-04-14 ENCOUNTER — APPOINTMENT (OUTPATIENT)
Dept: PHYSICAL THERAPY | Facility: CLINIC | Age: 72
End: 2017-04-14
Payer: MEDICARE

## 2017-04-14 PROCEDURE — 97112 NEUROMUSCULAR REEDUCATION: CPT

## 2017-04-14 PROCEDURE — 97110 THERAPEUTIC EXERCISES: CPT

## 2017-04-14 PROCEDURE — 97140 MANUAL THERAPY 1/> REGIONS: CPT

## 2017-04-18 ENCOUNTER — APPOINTMENT (OUTPATIENT)
Dept: PHYSICAL THERAPY | Facility: CLINIC | Age: 72
End: 2017-04-18
Payer: MEDICARE

## 2017-04-21 ENCOUNTER — APPOINTMENT (OUTPATIENT)
Dept: PHYSICAL THERAPY | Facility: CLINIC | Age: 72
End: 2017-04-21
Payer: MEDICARE

## 2017-04-21 PROCEDURE — 97110 THERAPEUTIC EXERCISES: CPT

## 2017-04-21 PROCEDURE — 97112 NEUROMUSCULAR REEDUCATION: CPT

## 2017-04-21 PROCEDURE — 97140 MANUAL THERAPY 1/> REGIONS: CPT

## 2017-04-25 ENCOUNTER — APPOINTMENT (OUTPATIENT)
Dept: PHYSICAL THERAPY | Facility: CLINIC | Age: 72
End: 2017-04-25
Payer: MEDICARE

## 2017-04-25 PROCEDURE — 97150 GROUP THERAPEUTIC PROCEDURES: CPT

## 2017-04-25 PROCEDURE — G8978 MOBILITY CURRENT STATUS: HCPCS

## 2017-04-25 PROCEDURE — G8979 MOBILITY GOAL STATUS: HCPCS

## 2017-04-25 PROCEDURE — 97112 NEUROMUSCULAR REEDUCATION: CPT

## 2017-04-26 ENCOUNTER — GENERIC CONVERSION - ENCOUNTER (OUTPATIENT)
Dept: OTHER | Facility: OTHER | Age: 72
End: 2017-04-26

## 2017-04-28 ENCOUNTER — APPOINTMENT (OUTPATIENT)
Dept: PHYSICAL THERAPY | Facility: CLINIC | Age: 72
End: 2017-04-28
Payer: MEDICARE

## 2017-04-28 PROCEDURE — 97112 NEUROMUSCULAR REEDUCATION: CPT

## 2017-04-28 PROCEDURE — 97150 GROUP THERAPEUTIC PROCEDURES: CPT

## 2017-05-01 ENCOUNTER — GENERIC CONVERSION - ENCOUNTER (OUTPATIENT)
Dept: OTHER | Facility: OTHER | Age: 72
End: 2017-05-01

## 2017-05-02 ENCOUNTER — APPOINTMENT (OUTPATIENT)
Dept: PHYSICAL THERAPY | Facility: CLINIC | Age: 72
End: 2017-05-02
Payer: MEDICARE

## 2017-05-02 PROCEDURE — 97112 NEUROMUSCULAR REEDUCATION: CPT

## 2017-05-02 PROCEDURE — 97150 GROUP THERAPEUTIC PROCEDURES: CPT

## 2017-05-04 ENCOUNTER — GENERIC CONVERSION - ENCOUNTER (OUTPATIENT)
Dept: OTHER | Facility: OTHER | Age: 72
End: 2017-05-04

## 2017-05-05 ENCOUNTER — APPOINTMENT (OUTPATIENT)
Dept: PHYSICAL THERAPY | Facility: CLINIC | Age: 72
End: 2017-05-05
Payer: MEDICARE

## 2017-05-05 PROCEDURE — 97112 NEUROMUSCULAR REEDUCATION: CPT

## 2017-05-09 ENCOUNTER — APPOINTMENT (OUTPATIENT)
Dept: PHYSICAL THERAPY | Facility: CLINIC | Age: 72
End: 2017-05-09
Payer: MEDICARE

## 2017-05-09 PROCEDURE — 97112 NEUROMUSCULAR REEDUCATION: CPT

## 2017-05-09 PROCEDURE — 97010 HOT OR COLD PACKS THERAPY: CPT

## 2017-05-10 DIAGNOSIS — E78.5 HYPERLIPIDEMIA: ICD-10-CM

## 2017-05-10 DIAGNOSIS — I71.9 AORTIC ANEURYSM WITHOUT RUPTURE (HCC): ICD-10-CM

## 2017-05-10 DIAGNOSIS — R79.9 ABNORMAL FINDING OF BLOOD CHEMISTRY: ICD-10-CM

## 2017-05-10 DIAGNOSIS — Z00.00 ENCOUNTER FOR GENERAL ADULT MEDICAL EXAMINATION WITHOUT ABNORMAL FINDINGS: ICD-10-CM

## 2017-05-10 DIAGNOSIS — C61 MALIGNANT NEOPLASM OF PROSTATE (HCC): ICD-10-CM

## 2017-05-12 ENCOUNTER — APPOINTMENT (OUTPATIENT)
Dept: PHYSICAL THERAPY | Facility: CLINIC | Age: 72
End: 2017-05-12
Payer: MEDICARE

## 2017-05-12 PROCEDURE — 97150 GROUP THERAPEUTIC PROCEDURES: CPT

## 2017-05-12 PROCEDURE — 97112 NEUROMUSCULAR REEDUCATION: CPT

## 2017-05-15 ENCOUNTER — GENERIC CONVERSION - ENCOUNTER (OUTPATIENT)
Dept: OTHER | Facility: OTHER | Age: 72
End: 2017-05-15

## 2017-05-15 ENCOUNTER — LAB (OUTPATIENT)
Dept: LAB | Facility: HOSPITAL | Age: 72
End: 2017-05-15
Payer: MEDICARE

## 2017-05-15 ENCOUNTER — TRANSCRIBE ORDERS (OUTPATIENT)
Dept: LAB | Facility: HOSPITAL | Age: 72
End: 2017-05-15

## 2017-05-15 DIAGNOSIS — C61 MALIGNANT NEOPLASM OF PROSTATE (HCC): ICD-10-CM

## 2017-05-15 DIAGNOSIS — E78.5 HYPERLIPIDEMIA: ICD-10-CM

## 2017-05-15 DIAGNOSIS — H02.409 PTOSIS, UNSPECIFIED LATERALITY: ICD-10-CM

## 2017-05-15 DIAGNOSIS — H02.409 PTOSIS, UNSPECIFIED LATERALITY: Primary | ICD-10-CM

## 2017-05-15 DIAGNOSIS — R79.9 ABNORMAL FINDING OF BLOOD CHEMISTRY: ICD-10-CM

## 2017-05-15 LAB
ALBUMIN SERPL BCP-MCNC: 3.7 G/DL (ref 3.5–5)
ALP SERPL-CCNC: 233 U/L (ref 46–116)
ALT SERPL W P-5'-P-CCNC: 34 U/L (ref 12–78)
ANION GAP SERPL CALCULATED.3IONS-SCNC: 4 MMOL/L (ref 4–13)
AST SERPL W P-5'-P-CCNC: 15 U/L (ref 5–45)
BILIRUB SERPL-MCNC: 0.51 MG/DL (ref 0.2–1)
BUN SERPL-MCNC: 23 MG/DL (ref 5–25)
CALCIUM SERPL-MCNC: 9.7 MG/DL (ref 8.3–10.1)
CHLORIDE SERPL-SCNC: 107 MMOL/L (ref 100–108)
CO2 SERPL-SCNC: 29 MMOL/L (ref 21–32)
CREAT SERPL-MCNC: 1.16 MG/DL (ref 0.6–1.3)
GFR SERPL CREATININE-BSD FRML MDRD: >60 ML/MIN/1.73SQ M
GLUCOSE SERPL-MCNC: 98 MG/DL (ref 65–140)
POTASSIUM SERPL-SCNC: 4.5 MMOL/L (ref 3.5–5.3)
PROT SERPL-MCNC: 7.5 G/DL (ref 6.4–8.2)
SODIUM SERPL-SCNC: 140 MMOL/L (ref 136–145)

## 2017-05-15 PROCEDURE — 36415 COLL VENOUS BLD VENIPUNCTURE: CPT

## 2017-05-15 PROCEDURE — 80053 COMPREHEN METABOLIC PANEL: CPT

## 2017-05-15 PROCEDURE — 84238 ASSAY NONENDOCRINE RECEPTOR: CPT

## 2017-05-15 PROCEDURE — 83519 RIA NONANTIBODY: CPT

## 2017-05-16 ENCOUNTER — APPOINTMENT (OUTPATIENT)
Dept: PHYSICAL THERAPY | Facility: CLINIC | Age: 72
End: 2017-05-16
Payer: MEDICARE

## 2017-05-16 PROCEDURE — 97112 NEUROMUSCULAR REEDUCATION: CPT

## 2017-05-16 PROCEDURE — 97110 THERAPEUTIC EXERCISES: CPT

## 2017-05-17 LAB
ACHR BIND AB SER-SCNC: <0.03 NMOL/L (ref 0–0.24)
ACHR BLOCK AB/ACHR TOTAL SFR SER: 10 % (ref 0–25)

## 2017-05-18 ENCOUNTER — ALLSCRIPTS OFFICE VISIT (OUTPATIENT)
Dept: OTHER | Facility: OTHER | Age: 72
End: 2017-05-18

## 2017-05-19 ENCOUNTER — APPOINTMENT (OUTPATIENT)
Dept: PHYSICAL THERAPY | Facility: CLINIC | Age: 72
End: 2017-05-19
Payer: MEDICARE

## 2017-05-19 PROCEDURE — 97110 THERAPEUTIC EXERCISES: CPT

## 2017-05-19 PROCEDURE — 97112 NEUROMUSCULAR REEDUCATION: CPT

## 2017-05-22 ENCOUNTER — HOSPITAL ENCOUNTER (OUTPATIENT)
Dept: NON INVASIVE DIAGNOSTICS | Facility: CLINIC | Age: 72
Discharge: HOME | End: 2017-05-22
Payer: MEDICARE

## 2017-05-22 DIAGNOSIS — I71.9 AORTIC ANEURYSM WITHOUT RUPTURE (HCC): ICD-10-CM

## 2017-05-22 PROCEDURE — 93978 VASCULAR STUDY: CPT

## 2017-05-23 ENCOUNTER — APPOINTMENT (OUTPATIENT)
Dept: PHYSICAL THERAPY | Facility: CLINIC | Age: 72
End: 2017-05-23
Payer: MEDICARE

## 2017-05-23 PROCEDURE — 97112 NEUROMUSCULAR REEDUCATION: CPT

## 2017-05-23 PROCEDURE — 97150 GROUP THERAPEUTIC PROCEDURES: CPT

## 2017-05-26 ENCOUNTER — APPOINTMENT (OUTPATIENT)
Dept: PHYSICAL THERAPY | Facility: CLINIC | Age: 72
End: 2017-05-26
Payer: MEDICARE

## 2017-05-26 LAB — ACHR MOD AB/ACHR TOTAL SFR SER: <12 % (ref 0–20)

## 2017-05-30 ENCOUNTER — APPOINTMENT (OUTPATIENT)
Dept: PHYSICAL THERAPY | Facility: CLINIC | Age: 72
End: 2017-05-30
Payer: MEDICARE

## 2017-06-05 ENCOUNTER — GENERIC CONVERSION - ENCOUNTER (OUTPATIENT)
Dept: OTHER | Facility: OTHER | Age: 72
End: 2017-06-05

## 2017-06-06 ENCOUNTER — ALLSCRIPTS OFFICE VISIT (OUTPATIENT)
Dept: OTHER | Facility: OTHER | Age: 72
End: 2017-06-06

## 2017-06-06 ENCOUNTER — TRANSCRIBE ORDERS (OUTPATIENT)
Dept: ADMINISTRATIVE | Facility: HOSPITAL | Age: 72
End: 2017-06-06

## 2017-06-06 DIAGNOSIS — I25.10 UNSPECIFIED CARDIOVASCULAR DISEASE: Primary | ICD-10-CM

## 2017-06-07 ENCOUNTER — GENERIC CONVERSION - ENCOUNTER (OUTPATIENT)
Dept: OTHER | Facility: OTHER | Age: 72
End: 2017-06-07

## 2017-06-07 ENCOUNTER — TRANSCRIBE ORDERS (OUTPATIENT)
Dept: LAB | Facility: HOSPITAL | Age: 72
End: 2017-06-07

## 2017-06-07 ENCOUNTER — LAB (OUTPATIENT)
Dept: LAB | Facility: HOSPITAL | Age: 72
End: 2017-06-07
Payer: MEDICARE

## 2017-06-07 DIAGNOSIS — Z00.00 ENCOUNTER FOR GENERAL ADULT MEDICAL EXAMINATION WITHOUT ABNORMAL FINDINGS: ICD-10-CM

## 2017-06-07 DIAGNOSIS — F31.60 MIXED BIPOLAR I DISORDER (HCC): ICD-10-CM

## 2017-06-07 DIAGNOSIS — F31.60 MIXED BIPOLAR I DISORDER (HCC): Primary | ICD-10-CM

## 2017-06-07 LAB
BUN SERPL-MCNC: 21 MG/DL (ref 5–25)
CREAT SERPL-MCNC: 1.18 MG/DL (ref 0.6–1.3)
GFR SERPL CREATININE-BSD FRML MDRD: >60 ML/MIN/1.73SQ M
HCV AB SER QL: NORMAL
LITHIUM SERPL-SCNC: 0.8 MMOL/L (ref 0.5–1)
TSH SERPL DL<=0.05 MIU/L-ACNC: 2.18 UIU/ML (ref 0.36–3.74)

## 2017-06-07 PROCEDURE — 80178 ASSAY OF LITHIUM: CPT

## 2017-06-07 PROCEDURE — 86803 HEPATITIS C AB TEST: CPT

## 2017-06-07 PROCEDURE — 82565 ASSAY OF CREATININE: CPT

## 2017-06-07 PROCEDURE — 84443 ASSAY THYROID STIM HORMONE: CPT

## 2017-06-07 PROCEDURE — 84520 ASSAY OF UREA NITROGEN: CPT

## 2017-06-07 PROCEDURE — 36415 COLL VENOUS BLD VENIPUNCTURE: CPT

## 2017-06-21 ENCOUNTER — GENERIC CONVERSION - ENCOUNTER (OUTPATIENT)
Dept: OTHER | Facility: OTHER | Age: 72
End: 2017-06-21

## 2017-07-13 ENCOUNTER — ALLSCRIPTS OFFICE VISIT (OUTPATIENT)
Dept: OTHER | Facility: OTHER | Age: 72
End: 2017-07-13

## 2017-07-17 ENCOUNTER — TRANSCRIBE ORDERS (OUTPATIENT)
Dept: ADMINISTRATIVE | Facility: HOSPITAL | Age: 72
End: 2017-07-17

## 2017-07-17 ENCOUNTER — ALLSCRIPTS OFFICE VISIT (OUTPATIENT)
Dept: OTHER | Facility: OTHER | Age: 72
End: 2017-07-17

## 2017-07-17 DIAGNOSIS — I71.9 HYALINE NECROSIS OF AORTA (HCC): Primary | ICD-10-CM

## 2017-07-18 DIAGNOSIS — E78.5 HYPERLIPIDEMIA: ICD-10-CM

## 2017-08-28 ENCOUNTER — ALLSCRIPTS OFFICE VISIT (OUTPATIENT)
Dept: OTHER | Facility: OTHER | Age: 72
End: 2017-08-28

## 2017-08-30 ENCOUNTER — LAB (OUTPATIENT)
Dept: LAB | Facility: HOSPITAL | Age: 72
End: 2017-08-30
Payer: MEDICARE

## 2017-08-30 ENCOUNTER — TRANSCRIBE ORDERS (OUTPATIENT)
Dept: LAB | Facility: HOSPITAL | Age: 72
End: 2017-08-30

## 2017-08-30 DIAGNOSIS — F31.60 MIXED BIPOLAR I DISORDER (HCC): Primary | ICD-10-CM

## 2017-08-30 LAB
BUN SERPL-MCNC: 23 MG/DL (ref 5–25)
CREAT SERPL-MCNC: 1.16 MG/DL (ref 0.6–1.3)
GFR SERPL CREATININE-BSD FRML MDRD: 64 ML/MIN/1.73SQ M
LITHIUM SERPL-SCNC: 0.8 MMOL/L (ref 0.5–1)
TSH SERPL DL<=0.05 MIU/L-ACNC: 1.71 UIU/ML (ref 0.36–3.74)

## 2017-08-30 PROCEDURE — 84520 ASSAY OF UREA NITROGEN: CPT

## 2017-08-30 PROCEDURE — 80178 ASSAY OF LITHIUM: CPT

## 2017-08-30 PROCEDURE — 82565 ASSAY OF CREATININE: CPT

## 2017-08-30 PROCEDURE — 36415 COLL VENOUS BLD VENIPUNCTURE: CPT

## 2017-08-30 PROCEDURE — 84443 ASSAY THYROID STIM HORMONE: CPT

## 2017-09-08 ENCOUNTER — ALLSCRIPTS OFFICE VISIT (OUTPATIENT)
Dept: OTHER | Facility: OTHER | Age: 72
End: 2017-09-08

## 2017-09-13 ENCOUNTER — HOSPITAL ENCOUNTER (EMERGENCY)
Facility: HOSPITAL | Age: 72
Discharge: HOME | End: 2017-09-13
Attending: EMERGENCY MEDICINE | Admitting: EMERGENCY MEDICINE
Payer: MEDICARE

## 2017-09-13 VITALS
BODY MASS INDEX: 27.41 KG/M2 | DIASTOLIC BLOOD PRESSURE: 108 MMHG | SYSTOLIC BLOOD PRESSURE: 162 MMHG | OXYGEN SATURATION: 97 % | HEART RATE: 76 BPM | RESPIRATION RATE: 22 BRPM | TEMPERATURE: 97.6 F | WEIGHT: 175 LBS

## 2017-09-13 DIAGNOSIS — S40.021A TRAUMATIC ECCHYMOSIS OF RIGHT UPPER ARM, INITIAL ENCOUNTER: Primary | ICD-10-CM

## 2017-09-13 PROCEDURE — 99282 EMERGENCY DEPT VISIT SF MDM: CPT

## 2017-09-13 RX ORDER — NITROGLYCERIN 40 MG/1
1 PATCH TRANSDERMAL DAILY PRN
COMMUNITY
End: 2018-01-21 | Stop reason: ALTCHOICE

## 2017-09-13 RX ORDER — DIAZEPAM 5 MG/1
5 TABLET ORAL
COMMUNITY
End: 2017-09-25

## 2017-09-13 NOTE — DISCHARGE INSTRUCTIONS

## 2017-09-13 NOTE — ED NOTES
Med student at the bedside at this time for patient evaluation        Paris Mcdonald RN  09/13/17 0580

## 2017-09-13 NOTE — ED NOTES
Dr Monserrat Gutierrez at the bedside at this time for patient evaluation        Charles Cisneros RN  09/13/17 0315

## 2017-09-13 NOTE — ED ATTENDING ATTESTATION
I, Charito Neal DO, saw and evaluated the patient  I have discussed the patient with the resident/non-physician practitioner and agree with the resident's/non-physician practitioner's findings, Plan of Care, and MDM as documented in the resident's/non-physician practitioner's note, except where noted  All available labs and Radiology studies were reviewed  At this point I agree with the current assessment done in the Emergency Department  I have conducted an independent evaluation of this patient a history and physical is as follows:    30-year-old male presents with rash on right arm that started Monday night  Recently switched to Spiriva from Atrovent and was concerned that it was related to medication changes  No itching, no difficulty breathing, no other complaints  On exam-no acute distress, heart regular, no respiratory stress, progression right upper extremity consistent with broken capillaries  Plan-patient admits to scratching forearm and suspect this is related to micro trauma      Critical Care Time  CritCare Time

## 2017-09-13 NOTE — ED PROVIDER NOTES
History  Chief Complaint   Patient presents with    Rash     rash began Monday night on right arm, noticed spot last night on left, has recently switched back to Spirva from Atrovent and not sure if related     A 63-year-old male with past medical history of CAD, COPD, aortic aneurysm, bipolar disorder, hyperlipidemia, hypertension, irritable bowel and history of lung cancer; presents with bruising to his right arm  Patient noticed the area earlier this morning  Patient is concerned that it may be an allergic reaction to his Spiriva, since he noticed it approximately 15 minutes after taking the medication  Patient denies injury to the area  Patient denies pain, itching, burning and swelling  Patient has never had similar symptoms  Patient does take a baby aspirin daily  Assessment and plan:  Ecchymosis to the right forearm, likely micro trauma from thinning of the skin  Reassured patient that it was not secondary to his Spiriva, and that he may continue with medication  History provided by:  Patient  Rash       Prior to Admission Medications   Prescriptions Last Dose Informant Patient Reported? Taking? Atorvastatin Calcium (LIPITOR PO)   Yes Yes   Sig: Take 40 mg by mouth daily  B Complex Vitamins (VITAMIN B COMPLEX PO)   Yes Yes   Sig: Take by mouth   Cholecalciferol (VITAMIN D PO)   Yes Yes   Sig: Take by mouth   Metoprolol Succinate (TOPROL XL PO)   Yes Yes   Sig: Take 25 mg by mouth daily  Tiotropium Bromide Monohydrate (SPIRIVA RESPIMAT IN)   Yes No   Sig: Inhale 1 Dose daily  acetaminophen (TYLENOL) 500 mg tablet   Yes Yes   Sig: Take 500 mg by mouth as needed for mild pain     albuterol (PROVENTIL HFA,VENTOLIN HFA) 90 mcg/act inhaler   Yes Yes   Sig: Inhale 2 puffs every 6 (six) hours as needed for wheezing   amLODIPine (NORVASC) 2 5 mg tablet   Yes Yes   Sig: Take 2 5 mg by mouth daily   aspirin (ECOTRIN LOW STRENGTH) 81 mg EC tablet   Yes Yes   Sig: Take 81 mg by mouth daily calcium carbonate (TUMS EX) 750 mg chewable tablet   Yes Yes   Sig: Chew 1 tablet as needed for indigestion or heartburn   diazepam (VALIUM) 5 mg tablet   Yes Yes   Sig: Take 5 mg by mouth daily at bedtime   dicyclomine (BENTYL) 20 mg tablet   Yes Yes   Sig: Take 20 mg by mouth every 6 (six) hours   fexofenadine (ALLEGRA) 180 MG tablet   Yes Yes   Sig: Take 180 mg by mouth daily   fluticasone (FLONASE) 50 mcg/act nasal spray   Yes Yes   Si spray into each nostril as needed  fluticasone-salmeterol (ADVAIR) 250-50 mcg/dose   Yes Yes   Sig: Inhale 1 puff every 12 (twelve) hours  ipratropium (ATROVENT HFA) 17 mcg/act inhaler   Yes Yes   Sig: Inhale 2 puffs every 6 (six) hours   lithium 300 MG tablet   Yes Yes   Sig: Take 600 mg by mouth every other day PM  Alternating with 300 mg every other day  THIS IS A CONTINUOUS RELEASE TABLET    nitroglycerin (NITRODUR) 0 2 mg/hr   Yes Yes   Sig: Place 1 patch on the skin daily as needed   omega-3-acid ethyl esters (LOVAZA) 1 g capsule   Yes Yes   Sig: Take 1 g by mouth 3 (three) times a day  omeprazole (PriLOSEC) 40 MG capsule   Yes Yes   Sig: Take 40 mg by mouth daily  Facility-Administered Medications: None       Past Medical History:   Diagnosis Date    Aortic aneurysm     Bipolar 1 disorder     Cardiac disease     MI    Cervical cord compression with myelopathy     COPD (chronic obstructive pulmonary disease)     Diverticulitis     Gait disturbance     uses cane, leg brace on right    Heart attack     Hx of resection of large bowel 5/3/2016    Hyperlipidemia     Hypertension     IBS (irritable bowel syndrome)     Lumbar stenosis     Lung cancer      and     Shortness of breath     Small bowel obstruction 2016       Past Surgical History:   Procedure Laterality Date    ANGIOPLASTY      stent    CERVICAL SPINE SURGERY      Cervical decompression with cervical fusion from C3-C7 for spinal stenosis      COLON SURGERY      LUNG CANCER SURGERY Right     wedge resection    LUNG LOBECTOMY Left     LA ARTHRODESIS ANT INTERBODY MIN DISCECTOMY, CERVICAL BELOW C2 N/A 5/2/2016    Procedure: Anterior cervical diskectomy C3/4, C5/6, C6/7 with anterior plate fixation fusion C3-7;  Posterior decompressive laminectomy C3-7 with lateral mass fixation fusion C3-7 (IMPULSE MONITORING); Surgeon: Rama Brewer MD;  Location: [facility];  Service: Neurosurgery    LA ARTHRODESIS POSTERIOR INTERBODY LUMBAR N/A 1/30/2017    Procedure: L4-5 AND L5-S1 DECOMPRESSIVE FORAMINOTOMIES, TRANSFORAMINAL LUMBAR INTERBODY AND PEDICLE SCREW FIXATION FUSION L4-S1 (IMPULSE); Surgeon: Rama Brewer MD;  Location: [facility];  Service: Neurosurgery    PROSTATECTOMY      for prostate CA - no chemo/RT    SIGMOIDECTOMY      for divertic    SMALL INTESTINE SURGERY N/A 11/17/2016    Procedure: Exploratory Laparotomy, Lysis of adhesions to release small bowel obstruction;  Surgeon: Nayeli Marshall MD;  Location: [facility];  Service:        Family History   Problem Relation Age of Onset    Heart attack Father     Colon cancer Father      I have reviewed and agree with the history as documented  Social History   Substance Use Topics    Smoking status: Former Smoker     Quit date: 2011    Smokeless tobacco: Never Used    Alcohol use 0 6 oz/week     1 Shots of liquor per week      Comment: social        Review of Systems   Skin: Positive for rash  All other systems reviewed and are negative        Physical Exam  ED Triage Vitals [09/13/17 0603]   Temperature Pulse Respirations Blood Pressure SpO2   97 6 °F (36 4 °C) 76 22 (!) 162/108 97 %      Temp Source Heart Rate Source Patient Position - Orthostatic VS BP Location FiO2 (%)   Oral -- -- -- --      Pain Score       No Pain           Physical Exam   General Appearance: alert and oriented, nad, non toxic appearing  Skin:  Warm, dry, intact  HEENT: atraumatic, normocephalic  Neck: Supple, trachea midline  Cardiac: RRR; no murmurs, rub, gallops  Pulmonary: lungs CTAB; no wheezes, rales, rhonchi  Extremities:  Right forearm with scattered ecchymosis  Forearm is nontender; without swelling and deformity  Full ROM of right upper extremity without pain  No pedal edema, 2+ pulses; no calf tenderness, no clubbing, no cyanosis  Neuro:  no focal motor or sensory deficits, CN 2-12 grossly intact  Psych:  Normal mood and affect, normal judgement and insight      ED Medications  Medications - No data to display    Diagnostic Studies  Labs Reviewed - No data to display    No orders to display       Procedures  Procedures      Phone Consults  ED Phone Contact    ED Course  ED Course            Identification of Seniors at [address] [place] [address] Most Recent Value   (ISAR) Identification of Seniors at Risk   Before the illness or injury that brought you to the Emergency, did you need someone to help you on a regular basis? 0 Filed at: 09/13/2017 0606   In the last 24 hours, have you needed more help than usual?  0 Filed at: 09/13/2017 5946   Have you been hospitalized for one or more nights during the past 6 months? 0 Filed at: 09/13/2017 0606   In general, do you see well?  0 Filed at: 09/13/2017 0606   In general, do you have serious problems with your memory? 0 Filed at: 09/13/2017 3131   Do you take more than three different medications every day? 1 Filed at: 09/13/2017 0606   ISAR Score  1 Filed at: 09/13/2017 0606                        [facility]  CritCare Time    Disposition  Final diagnoses:   Traumatic ecchymosis of right upper arm, initial encounter     ED Disposition     ED Disposition Condition Comment    Discharge  Pina Bhatti discharge to home/self care      Condition at discharge: Good        Follow-up Information     Follow up With Specialties Details Why Contact Ricardo Brooks MD Internal Medicine Schedule an appointment as soon as possible for a visit in 3 days For re-evaluation [address] [place] [address] Juan Kowalski [address]  [phone]          Discharge Medication List as of 9/13/2017  6:52 AM      CONTINUE these medications which have NOT CHANGED    Details   acetaminophen (TYLENOL) 500 mg tablet Take 500 mg by mouth as needed for mild pain , Until Discontinued, Historical Med      albuterol (PROVENTIL HFA,VENTOLIN HFA) 90 mcg/act inhaler Inhale 2 puffs every 6 (six) hours as needed for wheezing, Until Discontinued, Historical Med      amLODIPine (NORVASC) 2 5 mg tablet Take 2 5 mg by mouth daily, Until Discontinued, Historical Med      aspirin (ECOTRIN LOW STRENGTH) 81 mg EC tablet Take 81 mg by mouth daily, Until Discontinued, Historical Med      Atorvastatin Calcium (LIPITOR PO) Take 40 mg by mouth daily  , Until Discontinued, Historical Med      B Complex Vitamins (VITAMIN B COMPLEX PO) Take by mouth, Until Discontinued, Historical Med      calcium carbonate (TUMS EX) 750 mg chewable tablet Chew 1 tablet as needed for indigestion or heartburn, Until Discontinued, Historical Med      Cholecalciferol (VITAMIN D PO) Take by mouth, Until Discontinued, Historical Med      diazepam (VALIUM) 5 mg tablet Take 5 mg by mouth daily at bedtime, Historical Med      dicyclomine (BENTYL) 20 mg tablet Take 20 mg by mouth every 6 (six) hours, Until Discontinued, Historical Med      fexofenadine (ALLEGRA) 180 MG tablet Take 180 mg by mouth daily, Until Discontinued, Historical Med      fluticasone (FLONASE) 50 mcg/act nasal spray 1 spray into each nostril as needed  , Until Discontinued, Historical Med      fluticasone-salmeterol (ADVAIR) 250-50 mcg/dose Inhale 1 puff every 12 (twelve) hours  , Until Discontinued, Historical Med      ipratropium (ATROVENT HFA) 17 mcg/act inhaler Inhale 2 puffs every 6 (six) hours, Historical Med      lithium 300 MG tablet Take 600 mg by mouth every other day PM  Alternating with 300 mg every other day  THIS IS A CONTINUOUS RELEASE TABLET , Until Discontinued, Historical Med Metoprolol Succinate (TOPROL XL PO) Take 25 mg by mouth daily  , Until Discontinued, Historical Med      nitroglycerin (NITRODUR) 0 2 mg/hr Place 1 patch on the skin daily as needed, Historical Med      omega-3-acid ethyl esters (LOVAZA) 1 g capsule Take 1 g by mouth 3 (three) times a day , Until Discontinued, Historical Med      omeprazole (PriLOSEC) 40 MG capsule Take 40 mg by mouth daily  , Until Discontinued, Historical Med      Tiotropium Bromide Monohydrate (SPIRIVA RESPIMAT IN) Inhale 1 Dose daily  , Until Discontinued, Historical Med           No discharge procedures on file  ED Provider  Attending physically available and evaluated Georgina Zepeda  JUAN managed the patient along with the ED Attending      Electronically Signed by       Gerber Alcocer DO  Resident  09/13/17 5243

## 2017-09-21 ENCOUNTER — ALLSCRIPTS OFFICE VISIT (OUTPATIENT)
Dept: OTHER | Facility: OTHER | Age: 72
End: 2017-09-21

## 2017-09-25 ENCOUNTER — APPOINTMENT (EMERGENCY)
Dept: RADIOLOGY | Facility: HOSPITAL | Age: 72
End: 2017-09-25
Payer: MEDICARE

## 2017-09-25 ENCOUNTER — HOSPITAL ENCOUNTER (EMERGENCY)
Facility: HOSPITAL | Age: 72
Discharge: HOME | End: 2017-09-25
Attending: EMERGENCY MEDICINE | Admitting: EMERGENCY MEDICINE
Payer: MEDICARE

## 2017-09-25 VITALS
TEMPERATURE: 98.3 F | OXYGEN SATURATION: 97 % | SYSTOLIC BLOOD PRESSURE: 124 MMHG | HEART RATE: 54 BPM | DIASTOLIC BLOOD PRESSURE: 71 MMHG | RESPIRATION RATE: 20 BRPM

## 2017-09-25 DIAGNOSIS — R06.00 DYSPNEA ON EXERTION: Primary | ICD-10-CM

## 2017-09-25 LAB
ALBUMIN SERPL BCP-MCNC: 3.5 G/DL (ref 3.5–5)
ALP SERPL-CCNC: 188 U/L (ref 46–116)
ALT SERPL W P-5'-P-CCNC: 30 U/L (ref 12–78)
ANION GAP SERPL CALCULATED.3IONS-SCNC: 5 MMOL/L (ref 4–13)
AST SERPL W P-5'-P-CCNC: 19 U/L (ref 5–45)
ATRIAL RATE: 63 BPM
BASOPHILS # BLD AUTO: 0.03 THOUSANDS/ΜL (ref 0–0.1)
BASOPHILS NFR BLD AUTO: 0 % (ref 0–1)
BILIRUB SERPL-MCNC: 0.52 MG/DL (ref 0.2–1)
BUN SERPL-MCNC: 23 MG/DL (ref 5–25)
CALCIUM SERPL-MCNC: 9.3 MG/DL (ref 8.3–10.1)
CHLORIDE SERPL-SCNC: 109 MMOL/L (ref 100–108)
CO2 SERPL-SCNC: 26 MMOL/L (ref 21–32)
CREAT SERPL-MCNC: 1.26 MG/DL (ref 0.6–1.3)
EOSINOPHIL # BLD AUTO: 0.31 THOUSAND/ΜL (ref 0–0.61)
EOSINOPHIL NFR BLD AUTO: 4 % (ref 0–6)
ERYTHROCYTE [DISTWIDTH] IN BLOOD BY AUTOMATED COUNT: 13.8 % (ref 11.6–15.1)
GFR SERPL CREATININE-BSD FRML MDRD: 58 ML/MIN/1.73SQ M
GLUCOSE SERPL-MCNC: 100 MG/DL (ref 65–140)
HCT VFR BLD AUTO: 39.1 % (ref 36.5–49.3)
HGB BLD-MCNC: 12.9 G/DL (ref 12–17)
LYMPHOCYTES # BLD AUTO: 1.37 THOUSANDS/ΜL (ref 0.6–4.47)
LYMPHOCYTES NFR BLD AUTO: 15 % (ref 14–44)
MCH RBC QN AUTO: 29.3 PG (ref 26.8–34.3)
MCHC RBC AUTO-ENTMCNC: 33 G/DL (ref 31.4–37.4)
MCV RBC AUTO: 89 FL (ref 82–98)
MONOCYTES # BLD AUTO: 0.56 THOUSAND/ΜL (ref 0.17–1.22)
MONOCYTES NFR BLD AUTO: 6 % (ref 4–12)
NEUTROPHILS # BLD AUTO: 6.58 THOUSANDS/ΜL (ref 1.85–7.62)
NEUTS SEG NFR BLD AUTO: 75 % (ref 43–75)
NRBC BLD AUTO-RTO: 0 /100 WBCS
NT-PROBNP SERPL-MCNC: 99 PG/ML
P AXIS: 75 DEGREES
PLATELET # BLD AUTO: 184 THOUSANDS/UL (ref 149–390)
PMV BLD AUTO: 10.7 FL (ref 8.9–12.7)
POTASSIUM SERPL-SCNC: 4.8 MMOL/L (ref 3.5–5.3)
PR INTERVAL: 144 MS
PROT SERPL-MCNC: 7 G/DL (ref 6.4–8.2)
QRS AXIS: 74 DEGREES
QRSD INTERVAL: 98 MS
QT INTERVAL: 390 MS
QTC INTERVAL: 399 MS
RBC # BLD AUTO: 4.41 MILLION/UL (ref 3.88–5.62)
SODIUM SERPL-SCNC: 140 MMOL/L (ref 136–145)
T WAVE AXIS: 71 DEGREES
TROPONIN I SERPL-MCNC: <0.02 NG/ML
VENTRICULAR RATE: 63 BPM
WBC # BLD AUTO: 8.87 THOUSAND/UL (ref 4.31–10.16)

## 2017-09-25 PROCEDURE — 93005 ELECTROCARDIOGRAM TRACING: CPT

## 2017-09-25 PROCEDURE — 83880 ASSAY OF NATRIURETIC PEPTIDE: CPT | Performed by: EMERGENCY MEDICINE

## 2017-09-25 PROCEDURE — 80053 COMPREHEN METABOLIC PANEL: CPT | Performed by: EMERGENCY MEDICINE

## 2017-09-25 PROCEDURE — 99285 EMERGENCY DEPT VISIT HI MDM: CPT

## 2017-09-25 PROCEDURE — 84484 ASSAY OF TROPONIN QUANT: CPT | Performed by: EMERGENCY MEDICINE

## 2017-09-25 PROCEDURE — 71020 HB CHEST X-RAY 2VW FRONTAL&LATL: CPT

## 2017-09-25 PROCEDURE — 85025 COMPLETE CBC W/AUTO DIFF WBC: CPT | Performed by: EMERGENCY MEDICINE

## 2017-09-25 PROCEDURE — 36415 COLL VENOUS BLD VENIPUNCTURE: CPT

## 2017-09-28 ENCOUNTER — GENERIC CONVERSION - ENCOUNTER (OUTPATIENT)
Dept: OTHER | Facility: OTHER | Age: 72
End: 2017-09-28

## 2017-10-04 ENCOUNTER — GENERIC CONVERSION - ENCOUNTER (OUTPATIENT)
Dept: OTHER | Facility: OTHER | Age: 72
End: 2017-10-04

## 2017-10-30 ENCOUNTER — ALLSCRIPTS OFFICE VISIT (OUTPATIENT)
Dept: OTHER | Facility: OTHER | Age: 72
End: 2017-10-30

## 2017-11-02 ENCOUNTER — HOSPITAL ENCOUNTER (OUTPATIENT)
Dept: NON INVASIVE DIAGNOSTICS | Facility: CLINIC | Age: 72
Discharge: HOME | End: 2017-11-02
Payer: MEDICARE

## 2017-11-02 DIAGNOSIS — E78.5 HYPERLIPIDEMIA: ICD-10-CM

## 2017-11-02 PROCEDURE — 93306 TTE W/DOPPLER COMPLETE: CPT

## 2017-11-03 NOTE — PROGRESS NOTES
Assessment  1  Irritable bowel syndrome (564 1) (K58 9)   2  Gastroesophageal reflux disease (530 81) (K21 9)   3  Benign colon polyp (211 3) (K63 5)   4  Hepatic steatosis (571 8) (K76 0)    Plan  Gastroesophageal reflux disease    · Follow-up visit in 1 year Evaluation and Treatment  Follow-up  Status: Hold For -  Scheduling  Requested for: 10CHF9479   Ordered; For: Gastroesophageal reflux disease; Ordered By: Justina Martins Performed:  Due: 06WES9997    Discussion/Summary  Discussion Summary:   1  Gastroesophageal reflux disease  omeprazole  Fatty liver disease  next Assise was discussed with the patient  His transaminases have been normal Changes stool habits  Likely secondary to diet  I would consider adding fiber in his diet  also is due for colonoscopy in 2020  Follow up in the office in 1 year  Chief Complaint  Chief Complaint Free Text Note Form: Patient follow up  Complains of soft stools, but believes it is from kale he puts in his juice smoothies  Colonoscopy due in 2020  History of Present Illness  HPI: All over the 82-year-old gentleman who presents to us for follow-up of his gastroesophageal reflux disease  He had bowel obstruction in November 2016 at which time she had lysis of adhesions  Since then he has been careful with his diet  He has been drinking juices with [place]  He has felt that this has changed the consistency of his bowels and they are more soft  He also has had some leakage  He has decreased the juices and this has helped to have the bowel movements more formed  gastroesophageal reflux is controlled on omeprazole  He has tried to come off that in the past but this has not worked for him  At this time he has no dysphagia or odynophagia  He does not have any nocturnal symptoms  No symptoms of LPR  History Reviewed: The history was obtained today from the patient and I agree with the documented history        Review of Systems  Complete-Male GI Adult:   Constitutional: No fever or chills, feels well, no tiredness, no recent weight gain or weight loss  Eyes: No complaints of eye pain, no red eyes, no discharge from eyes, no itchy eyes  ENT: no complaints of earache, no hearing loss, no nosebleeds, no nasal discharge, no sore throat, no hoarseness  Cardiovascular: No complaints of slow heart rate, no fast heart rate, no chest pain, no palpitations, no leg claudication, no lower extremity  Respiratory: No complaints of shortness of breath, no wheezing, no cough, no SOB on exertion, no orthopnea or PND  Gastrointestinal: soft stools, but-- as noted in HPI  Genitourinary: No complaints of dysuria, no incontinence, no hesitancy, no nocturia, no genital lesion, no testicular pain  Musculoskeletal: No complaints of arthralgia, no myalgias, no joint swelling or stiffness, no limb pain or swelling  Integumentary: No complaints of skin rash or skin lesions, no itching, no skin wound, no dry skin  Neurological: No compliants of headache, no confusion, no convulsions, no numbness or tingling, no dizziness or fainting, no limb weakness, no difficulty walking  Psychiatric: Is not suicidal, no sleep disturbances, no anxiety or depression, no change in personality, no emotional problems  Endocrine: No complaints of proptosis, no hot flashes, no muscle weakness, no erectile dysfunction, no deepening of the voice, no feelings of weakness  Hematologic/Lymphatic: No complaints of swollen glands, no swollen glands in the neck, does not bleed easily, no easy bruising  ROS Reviewed:   ROS reviewed  Active Problems  1  Abnormal blood chemistry (790 6) (R79 9)   2  Adenocarcinoma of prostate (185) (C61)   3  Allergic rhinitis (477 9) (J30 9)   4  Aortic aneurysm (441 9) (I71 9)   5  Atherosclerotic heart disease of native coronary artery without angina pectoris (414 01)   (I25 10)   6  Benign colon polyp (211 3) (K63 5)   7  Cervical cord compression with myelopathy (336 9) (G95 20)   8  Cervical disc disease (722 91) (M50 90)   9  Chest pain (786 50) (R07 9)   10  Chronic bipolar disorder (296 80) (F31 9)   11  Chronic rhinitis (472 0) (J31 0)   12  COPD (chronic obstructive pulmonary disease) (496) (J44 9)   13  Cough (786 2) (R05)   14  Diverticulitis (562 11) (K57 92)   15  Dyspnea (786 09) (R06 00)   16  Encounter for postoperative care (V58 49) (Z48 89)   17  Gait disturbance (781 2) (R26 9)   18  Gastroesophageal reflux disease (530 81) (K21 9)   19  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)   20  Hepatic steatosis (571 8) (K76 0)   21  Hip pain, chronic, left (719 45,338 29) (M25 552,G89 29)   22  History of heart artery stent (V45 82) (Z95 5)   23  Hyperlipidemia (272 4) (E78 5)   24  Hypertension (401 9) (I10)   25  Irritable bowel syndrome (564 1) (K58 9)   26  Leukocytosis (288 60) (D72 829)   27  Lumbar spinal stenosis (724 02) (M48 061)   28  Muscle spasms of neck (728 85) (M62 838)   29  Need for pneumococcal vaccination (V03 82) (Z23)   30  Need for prophylactic vaccination and inoculation against influenza (V04 81) (Z23)   31  Neurogenic claudication (724 03) (M48 062)   32  Postop check (V67 00) (Z09)   33  Post-op pain (338 18) (G89 18)   34  Pre-operative cardiovascular examination (V72 81) (Z01 810)   35  Preoperative evaluation to rule out surgical contraindication (V72 83) (Z01 818)   36  Ptosis of left eyelid (374 30) (H02 402)   37  Screening for genitourinary condition (V81 6) (Z13 89)   38  Spondylolisthesis of lumbar region (738 4) (M43 16)   39  Spondylosis of lumbar region without myelopathy or radiculopathy (721 3) (M47 816)   40  Tremor (781 0) (R25 1)   41  Weakness of extremity (729 89) (R29 898)    Past Medical History  1  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)   2  Benign colon polyp (211 3) (K63 5)   3  History of Diverticulosis (562 10) (K57 90)   4  Gastroesophageal reflux disease (530 81) (K21 9)   5   History of acute bronchitis (V12 69) (Z87 09) 6  History of diverticulitis of colon (V12 79) (Z87 19)   7  History of malignant neoplasm of lung (V10 11) (Z85 118)   8  History of malignant neoplasm of prostate (V10 46) (Z85 46)   9  History of small bowel obstruction (V12 79) (Z87 19)   10  Irritable bowel syndrome (564 1) (K58 9)   11  History of Malignant neoplasm of lung, unspecified laterality   12  History of Myocardial infarction involving other coronary artery (410 80) (I21 29)  Active Problems And Past Medical History Reviewed: The active problems and past medical history were reviewed and updated today  Surgical History  1  History of Colon Surgery   2  History of Diagnostic Esophagogastroduodenoscopy   3  History of Lung Lobectomy   4  History of Surgery Prostate Prostatotomy   5  History of Wedge Resection Of Lung  Surgical History Reviewed: The surgical history was reviewed and updated today  Family History  Mother    1  Family history of Carcinoma Of The Small Intestine (V16 0)  Father    2  Family history of Carcinoma Of The Small Intestine (V16 0)   3  Family history of Coronary Artery Disease (V17 49)  Maternal Grandmother    4  Family history of   Paternal Grandmother    11  Family history of   Maternal Grandfather    6  Family history of   Paternal Grandfather    9  Family history of   Family History    8  Family history of Heart Disease (V17 49)   9  Family history of Hyperlipidemia   10  Family history of Hypertension (V17 49)  Family History Reviewed: The family history was reviewed and updated today  Social History   · Being A Social Drinker   · Daily Coffee Consumption (1  Cups/Day)   · Education history   · Former smoker ( 82) (B48 397)   · History of Former smoker (V1 82) (Q65 093)   · Lives independently with spouse   · Lives with spouse   · Marital history   · One child   · Retired  Social History Reviewed: The social history was reviewed and updated today        Current Meds 1  Advair Diskus 250-50 MCG/DOSE Inhalation Aerosol Powder Breath Activated; Use 1   inhalation twice  daily   Rinse mouth after  use; Therapy: 04TWB7419 to (Evaluate:11Oct2018)  Requested for: 52LWS5342; Last   Rx:21Nsc3095 Ordered   2  Allegra 180 MG TABS; TAKE 1 TABLET DAILY AS NEEDED; Therapy: (Recorded:40Tby1812) to Recorded   3  AmLODIPine Besylate 2 5 MG Oral Tablet; Take 1 tablet by mouth  daily; Therapy: 29SIF0295 to (Evaluate:52Mzh2151)  Requested for: 35NXM8513; Last   Rx:93Cfq1342 Ordered   4  Aspirin 81 MG Oral Tablet Delayed Release; TAKE 1 TABLET DAILY; Therapy: (Recorded:14Jun2016) to Recorded   5  Atorvastatin Calcium 40 MG Oral Tablet; Take 1 tablet by mouth  daily; Therapy: 41XYV3084 to (Evaluate:83Ptq9706)  Requested for: 49Aax6222; Last   Rx:06Dow8256 Ordered   6  Atrovent HFA 17 MCG/ACT Inhalation Aerosol Solution; INHALE 2 PUFFS 4 TIMES  DAILY; Therapy: 51QNG7610 to (Evaluate:12Oct2018)  Requested for: 86HWI1071; Last   Rx:17Oct2017; Status: ACTIVE - Retrospective By Protocol Authorization Ordered   7  Dicyclomine HCl - 20 MG Oral Tablet; TAKE 1 TABLET EVERY 4 TO 6 HOURS DAILY AS   NEEDED; Therapy: 95QBJ8904 to (Evaluate:50Eby5571)  Requested for: 08Eba3074; Last   Rx:53Hoa3718 Ordered   8  Fluticasone Propionate 50 MCG/ACT Nasal Suspension; USE 2 SPRAYS IN EACH   NOSTRIL ONCE DAILY; Therapy: 91RAM9859 to (Lucio Bello)  Requested for: 62ILW0491; Last   Rx:63Fjr6933 Ordered   9  Lithium Carbonate 300 MG Oral Capsule; alternate 1 capsule with 2 capsules everyother   day; Therapy: 71Hin5567 to Recorded   10  Metoprolol Succinate ER 25 MG Oral Tablet Extended Release 24 Hour; Take 1 tablet by    mouth  daily; Therapy: 17BHV7281 to (Evaluate:83Car4384)  Requested for: 10PIZ3956; Last    Rx:48Tar9732 Ordered   11  Nitroglycerin 0 2 MG/HR Transdermal Patch 24 Hour; APPLY 1 PATCH DAILY (OFF FOR    12 HOURS);     Therapy: 34Biv6751 to (Evaluate:23Mar2018)  Requested for: 97AOC5419; Last    Rx:28Mar2017 Ordered   12  Omega-3-acid Ethyl Esters 1 GM Oral Capsule; Take 1 capsule by mouth 3  times daily; Therapy: 30Lqe5374 to (Evaluate:29Exa6644)  Requested for: 42Pea2440; Last    Rx:37Iha2832 Ordered   13  Omeprazole 40 MG Oral Capsule Delayed Release; Take 1 capsule by mouth  daily; Therapy: 26JPF7170 to (Evaluate:91Qyt9124)  Requested for: 76AIQ2216; Last    Rx:68Yue6203 Ordered   14  ProAir  (90 Base) MCG/ACT Inhalation Aerosol Solution; INHALE 1 TO 2 PUFFS    EVERY 6 HOURS AS NEEDED; Therapy: 26Zpw5113 to (Evaluate:69Khx7866)  Requested for: 68Qly9110; Last    Rx:55Hbu2054 Ordered   15  Tylenol 500 MG CAPS; 1   PRN; Therapy: (Recorded:71Rfq1608) to Recorded   16  Vitamin B Complex Oral Tablet; TAKE 1 TABLET DAILY; Therapy: (Recorded:70Nfi4312) to Recorded   17  Vitamin D 2000 UNIT Oral Tablet; take 1 tablet by mouth once daily; Therapy: (Recorded:94Dxv1586) to Recorded  Medication List Reviewed: The medication list was reviewed and updated today  Allergies  1  Percocet TABS    Vitals  Vital Signs    Recorded: 06AAL8695 11:25AM   Heart Rate 60   Systolic 897, LUE, Sitting   Diastolic 80, LUE, Sitting   Height 5 ft 7 5 in   Weight 182 lb 2 0 oz   BMI Calculated 28 1   BSA Calculated 1 95   O2 Saturation 98     Physical Exam    Constitutional   General appearance: No acute distress, well appearing and well nourished  Eyes   Conjunctiva and lids: No swelling, erythema, or discharge  Ears, Nose, Mouth, and Throat   External inspection of ears and nose: Normal     Oropharynx: Normal with no erythema, edema, exudate or lesions  Pulmonary   Respiratory effort: No increased work of breathing or signs of respiratory distress  Auscultation of lungs: Clear to auscultation, equal breath sounds bilaterally, no wheezes, no rales, no rhonci  Cardiovascular   Auscultation of heart: Normal rate and rhythm, normal S1 and S2, without murmurs  Examination of extremities for edema and/or varicosities: Normal     Abdomen   Abdomen: Non-tender, no masses  Liver and spleen: No hepatomegaly or splenomegaly  Lymphatic   Palpation of lymph nodes in neck: No lymphadenopathy  Musculoskeletal   Gait and station: Normal     Skin   Skin and subcutaneous tissue: Normal without rashes or lesions  Neurologic   Cranial nerves: Cranial nerves 2-12 intact  Psychiatric   Orientation to person, place and time: Normal          Health Management  Benign colon polyp   COLONOSCOPY; every 5 years; Last 44YPW1144; Next Due: 77KFO0659; Active  COLONOSCOPY (GI, SURG); every 5 years; Last 99EYX2347; Next Due: 06UUP5577; Overdue    Future Appointments    Date/Time Provider Specialty Site   11/30/2017 10:00 AM Norma Haney MD Neurology [place] [address]   12/11/2017 01:30 PM BETITO Fong  Cardiology [facility] CARDIOLOGY [place]   11/20/2017 10:15 AM Niko Parker MD Vascular Surgery [facility]   01/23/2018 09:30 AM BETITO Espinoza   Internal Medicine [facility]     Signatures   Electronically signed by : Mitch Arce MD; Nov 2 2017  5:32PM EST                       (Author)

## 2017-11-15 ENCOUNTER — TRANSCRIBE ORDERS (OUTPATIENT)
Dept: LAB | Facility: HOSPITAL | Age: 72
End: 2017-11-15

## 2017-11-15 ENCOUNTER — LAB (OUTPATIENT)
Dept: LAB | Facility: HOSPITAL | Age: 72
End: 2017-11-15
Payer: MEDICARE

## 2017-11-15 DIAGNOSIS — F31.32 MODERATE DEPRESSED BIPOLAR I DISORDER (HCC): ICD-10-CM

## 2017-11-15 DIAGNOSIS — F31.32 MODERATE DEPRESSED BIPOLAR I DISORDER (HCC): Primary | ICD-10-CM

## 2017-11-15 LAB
BUN SERPL-MCNC: 24 MG/DL (ref 5–25)
CREAT SERPL-MCNC: 1.19 MG/DL (ref 0.6–1.3)
GFR SERPL CREATININE-BSD FRML MDRD: 62 ML/MIN/1.73SQ M
LITHIUM SERPL-SCNC: 0.9 MMOL/L (ref 0.5–1)
TSH SERPL DL<=0.05 MIU/L-ACNC: 1.75 UIU/ML (ref 0.36–3.74)

## 2017-11-15 PROCEDURE — 36415 COLL VENOUS BLD VENIPUNCTURE: CPT

## 2017-11-15 PROCEDURE — 84443 ASSAY THYROID STIM HORMONE: CPT

## 2017-11-15 PROCEDURE — 80178 ASSAY OF LITHIUM: CPT

## 2017-11-15 PROCEDURE — 84520 ASSAY OF UREA NITROGEN: CPT

## 2017-11-15 PROCEDURE — 82565 ASSAY OF CREATININE: CPT

## 2017-11-16 ENCOUNTER — HOSPITAL ENCOUNTER (OUTPATIENT)
Dept: RADIOLOGY | Facility: HOSPITAL | Age: 72
Discharge: HOME | End: 2017-11-16
Attending: SURGERY
Payer: MEDICARE

## 2017-11-16 DIAGNOSIS — I71.9 AORTIC ANEURYSM WITHOUT RUPTURE (HCC): ICD-10-CM

## 2017-11-16 DIAGNOSIS — M48.061 SPINAL STENOSIS OF LUMBAR REGION: ICD-10-CM

## 2017-11-16 PROCEDURE — 74176 CT ABD & PELVIS W/O CONTRAST: CPT

## 2017-11-17 DIAGNOSIS — M48.061 SPINAL STENOSIS OF LUMBAR REGION WITHOUT NEUROGENIC CLAUDICATION: ICD-10-CM

## 2017-11-17 DIAGNOSIS — I71.9 AORTIC ANEURYSM WITHOUT RUPTURE (HCC): ICD-10-CM

## 2017-11-17 DIAGNOSIS — M48.061 SPINAL STENOSIS OF LUMBAR REGION: ICD-10-CM

## 2017-11-17 DIAGNOSIS — R26.9 ABNORMALITY OF GAIT AND MOBILITY: ICD-10-CM

## 2017-11-20 ENCOUNTER — ALLSCRIPTS OFFICE VISIT (OUTPATIENT)
Dept: OTHER | Facility: OTHER | Age: 72
End: 2017-11-20

## 2017-11-21 NOTE — PROGRESS NOTES
Assessment    1  Aortic aneurysm (441 9) (I71 9)    Plan  Aortic aneurysm    · * CT ABDOMEN PELVIS WO CONTRAST; Status:Hold For - Scheduling; Requestedfor:63Afr4822;    Perform:[facility]iology; KDS:96HIZ4188; Ordered;aneurysm; Ordered By:Shan Villalobos;   · Follow Up After Tests Complete Evaluation and Treatment  Follow-up  Status: Hold For -Scheduling  Requested for: 06MIJ4371   Ordered; Aortic aneurysm; Ordered By: Stefanie Butcher Performed:  Due: 82RWE1974    Discussion/Summary  Discussion Summary:   1  4 9â5 0 cm infrarenal abdominal aortic aneurysm unchanged on recent CAT scan  Again we discussed the findings on CAT scan along with the indications for further evaluation treatment  At this time we'll plan noncontrast CT scan follow-up in 6 months  We did discuss the signs and symptoms of aneurysmal rupture and need for emergent evaluation if any symptoms arise  Ventral/incisional hernia  We did discuss this finding and I have advised follow-up with his surgeon  Chief Complaint  Chief Complaint Free Text Note Form:  Lenny here to review my CT ABD pelvisis here to review CT ABD pelvis 11/16/17 at [facility]  Pt denies abdominal pain and loss of appetite  History of Present Illness  HPI: 74-year-old with known abdominal aortic aneurysm measured at approximately 4 9 cm in size in March  He now presents after recent follow-up noncontrast CT scan  He notes no new abdominal or back pain  He remains active without any significant limitation  He does have a history of 2 colonic surgeries but does not follow-up with his colorectal surgeon at this time  Review of Systems  ROS Reviewed:   ROS reviewed  Active Problems    1  Abnormal blood chemistry (790 6) (R79 9)   2  Adenocarcinoma of prostate (185) (C61)   3  Allergic rhinitis (477 9) (J30 9)   4  Aortic aneurysm (441 9) (I71 9)   5  Atherosclerotic heart disease of native coronary artery without angina pectoris (414 01) (I25 10)   6   Benign colon polyp (211  3) (K63 5)   7  Cervical cord compression with myelopathy (336 9) (G95 20)   8  Cervical disc disease (722 91) (M50 90)   9  Chest pain (786 50) (R07 9)   10  Chronic bipolar disorder (296 80) (F31 9)   11  Chronic rhinitis (472 0) (J31 0)   12  COPD (chronic obstructive pulmonary disease) (496) (J44 9)   13  Cough (786 2) (R05)   14  Diverticulitis (562 11) (K57 92)   15  Dyspnea (786 09) (R06 00)   16  Encounter for postoperative care (V58 49) (Z48 89)   17  Gait disturbance (781 2) (R26 9)   18  Gastroesophageal reflux disease (530 81) (K21 9)   19  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)   20  Hepatic steatosis (571 8) (K76 0)   21  Hip pain, chronic, left (719 45,338 29) (M25 552,G89 29)   22  History of heart artery stent (V45 82) (Z95 5)   23  Hyperlipidemia (272 4) (E78 5)   24  Hypertension (401 9) (I10)   25  Irritable bowel syndrome (564 1) (K58 9)   26  Leukocytosis (288 60) (D72 829)   27  Lumbar spinal stenosis (724 02) (M48 061)   28  Muscle spasms of neck (728 85) (M62 838)   29  Need for pneumococcal vaccination (V03 82) (Z23)   30  Need for prophylactic vaccination and inoculation against influenza (V04 81) (Z23)   31  Neurogenic claudication (724 03) (M48 062)   32  Postop check (V67 00) (Z09)   33  Post-op pain (338 18) (G89 18)   34  Pre-operative cardiovascular examination (V72 81) (Z01 810)   35  Preoperative evaluation to rule out surgical contraindication (V72 83) (Z01 818)   36  Ptosis of left eyelid (374 30) (H02 402)   37  Screening for genitourinary condition (V81 6) (Z13 89)   38  Spondylolisthesis of lumbar region (738 4) (M43 16)   39  Spondylosis of lumbar region without myelopathy or radiculopathy (721 3) (M47 816)   40  Tremor (781 0) (R25 1)   41  Weakness of extremity (729 89) (R29 898)    Past Medical History  1  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)   2  Benign colon polyp (211 3) (K63 5)   3  History of Diverticulosis (562 10) (K57 90)   4   Gastroesophageal reflux disease (530 81) (K21 9)   5  History of acute bronchitis (V12 69) (Z87 09)   6  History of diverticulitis of colon (V12 79) (Z87 19)   7  History of malignant neoplasm of lung (V10 11) (Z85 118)   8  History of malignant neoplasm of prostate (V10 46) (Z85 46)   9  History of small bowel obstruction (V12 79) (Z87 19)   10  Irritable bowel syndrome (564 1) (K58 9)   11  History of Malignant neoplasm of lung, unspecified laterality   12  History of Myocardial infarction involving other coronary artery (410 80) (I21 29)  Active Problems And Past Medical History Reviewed: The active problems and past medical history were reviewed and updated today  Surgical History  1  History of Colon Surgery   2  History of Diagnostic Esophagogastroduodenoscopy   3  History of Lung Lobectomy   4  History of Surgery Prostate Prostatotomy   5  History of Wedge Resection Of Lung  Surgical History Reviewed: The surgical history was reviewed and updated today  Family History  Mother    1  Family history of Carcinoma Of The Small Intestine (V16 0)  Father    2  Family history of Carcinoma Of The Small Intestine (V16 0)   3  Family history of Coronary Artery Disease (V17 49)  Maternal Grandmother    4  Family history of   Paternal Grandmother    11  Family history of   Maternal Grandfather    6  Family history of   Paternal Grandfather    9  Family history of   Family History    8  Family history of Heart Disease (V17 49)   9  Family history of Hyperlipidemia   10  Family history of Hypertension (V17 49)  Family History Reviewed: The family history was reviewed and updated today         Social History     · Being A Social Drinker   · Daily Coffee Consumption (1  Cups/Day)   · Education history   · Former smoker (V15 82) (K28 774)   · History of Former smoker (V1 82) (X96 530)   · Lives independently with spouse   · Lives with spouse   · Marital history   · One child   · Retired  Social History Reviewed: The social history was reviewed and updated today  Current Meds   1  Advair Diskus 250-50 MCG/DOSE Inhalation Aerosol Powder Breath Activated; Use 1 inhalation twice  daily   Rinse mouth after  use; Therapy: 28WTZ4447 to (Evaluate:11Oct2018)  Requested for: 40OPJ5119; Last Rx:16Oct2017 Ordered   2  Allegra 180 MG TABS; TAKE 1 TABLET DAILY AS NEEDED; Therapy: (Recorded:14Jun2016) to Recorded   3  AmLODIPine Besylate 2 5 MG Oral Tablet; Take 1 tablet by mouth  daily; Therapy: 36AZG0141 to (Evaluate:48Mav1394)  Requested for: 64WIP6999; Last Rx:75Sqk0883 Ordered   4  Aspirin 81 MG Oral Tablet Delayed Release; TAKE 1 TABLET DAILY; Therapy: (Recorded:14Jun2016) to Recorded   5  Atorvastatin Calcium 40 MG Oral Tablet; Take 1 tablet by mouth  daily; Therapy: 41FJC7498 to (Evaluate:50Apy2553)  Requested for: 46Vah9384; Last Rx:11Iyq7062 Ordered   6  Atrovent HFA 17 MCG/ACT Inhalation Aerosol Solution; INHALE 2 PUFFS 4 TIMES  DAILY; Therapy: 63RHI1608 to (Evaluate:12Oct2018)  Requested for: 94VJY2836; Last Rx:17Oct2017 Ordered   7  Dicyclomine HCl - 20 MG Oral Tablet; TAKE 1 TABLET EVERY 4 TO 6 HOURS DAILY AS NEEDED; Therapy: 81QMC2933 to (Evaluate:32Zim6360)  Requested for: 92Pqz7563; Last Rx:28Cgb1921 Ordered   8  Fluticasone Propionate 50 MCG/ACT Nasal Suspension; USE 2 SPRAYS IN EACH NOSTRIL ONCE DAILY; Therapy: 50QHR3917 to ([facility])  Requested for: 34ONZ3105; Last Rx:40Uuh8446 Ordered   9  Lithium Carbonate 300 MG Oral Capsule; alternate 1 capsule with 2 capsules everyother day; Therapy: 74Hqb1155 to Recorded   10  Metoprolol Succinate ER 25 MG Oral Tablet Extended Release 24 Hour; Take 1 tablet by  mouth  daily; Therapy: 37ZVS0257 to (Evaluate:99Wjt4027)  Requested for: 20ODI6350; Last  Rx:50Kem1581 Ordered   11  Nitroglycerin 0 2 MG/HR Transdermal Patch 24 Hour; APPLY 1 PATCH DAILY (OFF FOR  12 HOURS);   Therapy: 20Apr2012 to (Evaluate:23Mar2018)  Requested for: 22UIV9511; Last Rx:28Mar2017 Ordered   12  Omega-3-acid Ethyl Esters 1 GM Oral Capsule; Take 1 capsule by mouth 3  times daily; Therapy: 24Apr2017 to (Evaluate:19Apr2018)  Requested for: 13Fye9122; Last  Rx:24Apr2017 Ordered   13  Omeprazole 40 MG Oral Capsule Delayed Release; Take 1 capsule by mouth  daily; Therapy: 90BQI1194 to (Evaluate:56Yxr8731)  Requested for: 36GUO7414; Last  Rx:72Apn6604 Ordered   14  ProAir  (90 Base) MCG/ACT Inhalation Aerosol Solution; INHALE 1 TO 2 PUFFS  EVERY 6 HOURS AS NEEDED; Therapy: 80Xkz8954 to (Evaluate:64Vgp8591)  Requested for: 76Zkd8366; Last  Rx:34Wfg3696 Ordered   15  Tylenol 500 MG CAPS; 1   PRN; Therapy: (Recorded:53Qrw3211) to Recorded   16  Vitamin B Complex Oral Tablet; TAKE 1 TABLET DAILY; Therapy: (Recorded:03Nov2016) to Recorded   17  Vitamin D 2000 UNIT Oral Tablet; take 1 tablet by mouth once daily; Therapy: (Recorded:47Olz2335) to Recorded  Medication List Reviewed: The medication list was reviewed and updated today  Allergies  1  Percocet TABS   2  NSAIDs    Vitals  Vital Signs    Recorded: 20Nov2017 10:06AM   Temperature 96 2 F, Tympanic   Heart Rate 64, R Radial   Pulse Quality Normal, R Radial   Respiration Quality Normal   Respiration 16   Systolic 305, RUE, Sitting   Diastolic 80, RUE, Sitting   Height 5 ft 7 in   Weight 181 lb    BMI Calculated 28 35   BSA Calculated 1 94       Physical Exam   Carotid: right 2+,-- no bruit heard on the right,-- left 2+-- and-- no bruit on the left  Radial: right 2+-- and-- left 2+  Femoral: right 2+,-- no bruit heard on the right,-- left 2+-- and-- no bruit on the left  Popliteal: right 2+-- and-- left 2+  Posterior tibialis: right 2+-- and-- left 2+  Dorsalis pedis: right 2+-- and-- left 2+  Distal Pulse Exam: Normal Capillary Refill  Extremities: No upper or lower extremity edema  LE Varicose Veins: No Varicose Veins are Present  The heart rate was normal  The rhythm was regular    Murmurs: No murmurs were heard   Pulmonary  Respiratory effort: No increased work of breathing or signs of respiratory distress  Auscultation of lungs: Clear to auscultation  No wheezing, no rales, no rhonchi  Abdomen  Abdomen:-- Ventral incisional hernia at site of previous midline abdominal incision  There is a large defect in the midline fascia  Palpable aneurysm  -- No bruit heard  Psychiatric  Orientation to person, place and time: Normal   Mood and affect: Normal   Eyes  Pupils and irises: Equal, round and reactive to light  Neck  Neck: No jugular venous distention, normal ROM and extension  No cervical adenopathy, trachea midline, neck supple  Neurologic Sensory exam normal  Motor skills intact  Musculoskeletal  Gait and station: Normal   Muscle strength/tone: Normal   Skin  Skin and subcutaneous tissue: Normal without rashes or lesions  Venous Disease: No lipodermatosclerosis, stasis dermatitis, hyperpigmentation, or atrophie rick noted on exam       Results/Data  Diagnostic Studies Reviewed Vasc: I personally reviewed the films/images/results in the office today  My interpretation follows  CT Scan Review Multiple previous CAT scans are reviewed  Most specifically recent study from 11/16/17 and 3/17/17 are reviewed  These are indicative of a 4 9â5 0 cm infrarenal abdominal aortic aneurysm which has not changed over that timeframe  Official report for the current study is not yet available  Future Appointments    Date/Time Provider Specialty Site   11/30/2017 10:00 AM Greg Ogden MD Neurology [address]   12/12/2017 04:00 PM BETITO Pierson  Cardiology [facility] CARDIOLOGY [place]   11/28/2017 05:00 PM BETITO Horne  Internal Medicine [facility]   01/23/2018 09:30 AM BETITO Horne   Internal Medicine [facility]       Signatures   Electronically signed by : Henrry Lutz MD; Nov 20 2017 10:41AM EST (Author)

## 2017-11-28 ENCOUNTER — ALLSCRIPTS OFFICE VISIT (OUTPATIENT)
Dept: OTHER | Facility: OTHER | Age: 72
End: 2017-11-28

## 2017-11-29 NOTE — PROGRESS NOTES
Assessment    1  Swelling around both eyes (782 2) (R22 0)    Discussion/Summary  Discussion Summary:   Puffiness under eyes: Discussed with patient possible etiologies of this including I fatigue, allergies, genetics  Discussed the importance of getting a good night's sleep, can try cool compresses, patient is already using allergy meds, and he had a recent thyroid test which was normal  He does not have swelling in other areas  Discussed trying a dust mite pillow cover underneath his normal pillow cover, an washing off eyes with warm washcloth before bed, and avoiding any irritants like pet dander  Chief Complaint  Chief Complaint Free Text Note Form: Patient presents to office today with puffiness under both eyes  Patient has been taking fluticasone and allergy medication  Patient denies pain or discomfort  History of Present Illness  HPI: Has been having puffiness under eyes  Patient has been using allergy meds for this without much relief      Review of Systems  Complete-Male:  Constitutional: no fever,-- no chills-- and-- not feeling tired  Cardiovascular: the heart rate was not slow,-- no chest pain,-- the heart rate was not fast-- and-- no palpitations  Respiratory: no shortness of breath,-- no cough,-- no wheezing-- and-- no shortness of breath during exertion  Active Problems  1  Abnormal blood chemistry (790 6) (R79 9)   2  Adenocarcinoma of prostate (185) (C61)   3  Allergic rhinitis (477 9) (J30 9)   4  Aortic aneurysm (441 9) (I71 9)   5  Atherosclerotic heart disease of native coronary artery without angina pectoris (414 01) (I25 10)   6  Benign colon polyp (211 3) (K63 5)   7  Cervical cord compression with myelopathy (336 9) (G95 20)   8  Cervical disc disease (722 91) (M50 90)   9  Chest pain (786 50) (R07 9)   10  Chronic bipolar disorder (296 80) (F31 9)   11  Chronic rhinitis (472 0) (J31 0)   12  COPD (chronic obstructive pulmonary disease) (496) (J44 9)   13   Cough (786 2) (R05)   14  Diverticulitis (562 11) (K57 92)   15  Dyspnea (786 09) (R06 00)   16  Encounter for postoperative care (V58 49) (Z48 89)   17  Gait disturbance (781 2) (R26 9)   18  Gastroesophageal reflux disease (530 81) (K21 9)   19  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)   20  Hepatic steatosis (571 8) (K76 0)   21  Hip pain, chronic, left (719 45,338 29) (M25 552,G89 29)   22  History of heart artery stent (V45 82) (Z95 5)   23  Hyperlipidemia (272 4) (E78 5)   24  Hypertension (401 9) (I10)   25  Irritable bowel syndrome (564 1) (K58 9)   26  Leukocytosis (288 60) (D72 829)   27  Lumbar spinal stenosis (724 02) (M48 061)   28  Muscle spasms of neck (728 85) (M62 838)   29  Need for pneumococcal vaccination (V03 82) (Z23)   30  Need for prophylactic vaccination and inoculation against influenza (V04 81) (Z23)   31  Neurogenic claudication (724 03) (M48 062)   32  Postop check (V67 00) (Z09)   33  Post-op pain (338 18) (G89 18)   34  Pre-operative cardiovascular examination (V72 81) (Z01 810)   35  Preoperative evaluation to rule out surgical contraindication (V72 83) (Z01 818)   36  Ptosis of left eyelid (374 30) (H02 402)   37  Screening for genitourinary condition (V81 6) (Z13 89)   38  Spondylolisthesis of lumbar region (738 4) (M43 16)   39  Spondylosis of lumbar region without myelopathy or radiculopathy (721 3) (M47 816)   40  Tremor (781 0) (R25 1)   41  Weakness of extremity (729 89) (R29 898)    Past Medical History  1  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)   2  Benign colon polyp (211 3) (K63 5)   3  History of Diverticulosis (562 10) (K57 90)   4  Gastroesophageal reflux disease (530 81) (K21 9)   5  History of acute bronchitis (V12 69) (Z87 09)   6  History of diverticulitis of colon (V12 79) (Z87 19)   7  History of malignant neoplasm of lung (V10 11) (Z85 118)   8  History of malignant neoplasm of prostate (V10 46) (Z85 46)   9  History of small bowel obstruction (V12 79) (Z87 19)   10  Irritable bowel syndrome (564 1) (K58 9)   11  History of Malignant neoplasm of lung, unspecified laterality   12  History of Myocardial infarction involving other coronary artery (410 80) (I21 29)    Surgical History  1  History of Colon Surgery   2  History of Diagnostic Esophagogastroduodenoscopy   3  History of Lung Lobectomy   4  History of Surgery Prostate Prostatotomy   5  History of Wedge Resection Of Lung    Family History  Mother    1  Family history of Carcinoma Of The Small Intestine (V16 0)  Father    2  Family history of Carcinoma Of The Small Intestine (V16 0)   3  Family history of Coronary Artery Disease (V17 49)  Maternal Grandmother    4  Family history of   Paternal Grandmother    11  Family history of   Maternal Grandfather    6  Family history of   Paternal Grandfather    9  Family history of   Family History    8  Family history of Heart Disease (V17 49)   9  Family history of Hyperlipidemia   10  Family history of Hypertension (V17 49)    Social History     · Being A Social Drinker   · Daily Coffee Consumption (1  Cups/Day)   · Education history   · Former smoker ( 82) (V40 855)   · History of Former smoker ( 82) (L05 575)   · Lives independently with spouse   · Lives with spouse   · Marital history   · One child   · Retired    Current Meds   1  Advair Diskus 250-50 MCG/DOSE Inhalation Aerosol Powder Breath Activated; Use 1 inhalation twice  daily   Rinse mouth after  use; Therapy: 93OQI7983 to (Evaluate:2018)  Requested for: 68IOC5991; Last Rx:2017 Ordered   2  Allegra 180 MG TABS; TAKE 1 TABLET DAILY AS NEEDED; Therapy: (Recorded:2016) to Recorded   3  AmLODIPine Besylate 2 5 MG Oral Tablet; Take 1 tablet by mouth  daily; Therapy: 51ZYS7555 to (Evaluate:02Ebv0881)  Requested for: 07EJS8448; Last Rx:50Lgb1029 Ordered   4  Aspirin 81 MG Oral Tablet Delayed Release; TAKE 1 TABLET DAILY; Therapy: (Recorded:2016) to Recorded   5  Atorvastatin Calcium 40 MG Oral Tablet; Take 1 tablet by mouth  daily; Therapy: 08ALE0125 to (Evaluate:13Bur7491)  Requested for: 14Tvf5247; Last Rx:20Phx4978 Ordered   6  Atrovent HFA 17 MCG/ACT Inhalation Aerosol Solution; INHALE 2 PUFFS 4 TIMES  DAILY; Therapy: 54ZNT5206 to (Evaluate:09Nie3618)  Requested for: 09YXD5028; Last Rx:73Xat0197 Ordered   7  Dicyclomine HCl - 20 MG Oral Tablet; TAKE 1 TABLET EVERY 4 TO 6 HOURS DAILY AS NEEDED; Therapy: 19XGV3940 to (Evaluate:32Hsl9315)  Requested for: 21Yis2001; Last Rx:94Rrj9674 Ordered   8  Fluticasone Propionate 50 MCG/ACT Nasal Suspension; USE 2 SPRAYS IN EACH NOSTRIL ONCE DAILY; Therapy: 63VKJ8606 to ([facility])  Requested for: 92EQJ2703; Last Rx:84Rqf9772 Ordered   9  Lithium Carbonate 300 MG Oral Capsule; alternate 1 capsule with 2 capsules everyother day; Therapy: 90Qax1317 to Recorded   10  Metoprolol Succinate ER 25 MG Oral Tablet Extended Release 24 Hour; Take 1 tablet by mouth  daily; Therapy: 60INI6672 to (Evaluate:36Trq0292)  Requested for: 08AOX6742; Last Rx:70Han6989  Ordered   11  Nitroglycerin 0 2 MG/HR Transdermal Patch 24 Hour; APPLY 1 PATCH DAILY (OFF FOR 12 HOURS); Therapy: 04Qdr0301 to (Evaluate:23Mar2018)  Requested for: 28Mar2017; Last Rx:28Mar2017  Ordered   12  Omega-3-acid Ethyl Esters 1 GM Oral Capsule; Take 1 capsule by mouth 3  times daily; Therapy: 93Yut5319 to (Evaluate:60Quf5238)  Requested for: 21Xvk4073; Last Rx:14Jlh7021  Ordered   13  Omeprazole 40 MG Oral Capsule Delayed Release; Take 1 capsule by mouth  daily; Therapy: 20AUG3308 to (Evaluate:48Plb8789)  Requested for: 04MPB0625; Last Rx:18Ltw6553  Ordered   14  ProAir  (90 Base) MCG/ACT Inhalation Aerosol Solution; INHALE 1 TO 2 PUFFS EVERY 6  HOURS AS NEEDED; Therapy: 79Bjk1464 to (Evaluate:73Lqh7302)  Requested for: 78Zsi8113; Last Rx:72Vlf9211  Ordered   15  Tylenol 500 MG CAPS; 1   PRN; Therapy: (Recorded:14Jun2016) to Recorded   16   Vitamin B Complex Oral Tablet; TAKE 1 TABLET DAILY; Therapy: (Recorded:70Xlu7058) to Recorded   17  Vitamin D 2000 UNIT Oral Tablet; take 1 tablet by mouth once daily; Therapy: (Recorded:20Apr2015) to Recorded    Allergies  1  Percocet TABS   2  NSAIDs    Vitals  Vital Signs    Recorded: 28Nov2017 04:36PM   Heart Rate 53   Systolic 923   Diastolic 64   Height 5 ft 7 in   Weight 181 lb 4 oz   BMI Calculated 28 39   BSA Calculated 1 94   O2 Saturation 95       Physical Exam   Constitutional  General appearance: No acute distress, well appearing and well nourished  Eyes  Conjunctiva and lids: Abnormal  -- slight puffiness under eyes, no irritation, no crusting -- EOEMI  Neck  Neck: Supple, symmetric, trachea midline, no masses  Thyroid: Normal, no thyromegaly  Health Management  Benign colon polyp   COLONOSCOPY; every 5 years; Last 86VVQ1046; Next Due: 49JCO3960; Active  COLONOSCOPY (GI, SURG); every 5 years; Last 32PVU3722; Next Due: 73VLF0757; Overdue    Future Appointments    Date/Time Provider Specialty Site   11/30/2017 10:00 AM Martha Helms MD Neurology [place] [address]   12/12/2017 04:00 PM BETITO Lopez  Cardiology [facility] CARDIOLOG[facility] [place]   01/23/2018 09:30 AM BETITO Hager   Internal Medicine [facility]       Signatures   Electronically signed by : BETITO Fritz ; Nov 28 2017  4:52PM EST                       (Author)

## 2017-11-30 ENCOUNTER — TRANSCRIBE ORDERS (OUTPATIENT)
Dept: ADMINISTRATIVE | Facility: HOSPITAL | Age: 72
End: 2017-11-30

## 2017-11-30 ENCOUNTER — ALLSCRIPTS OFFICE VISIT (OUTPATIENT)
Dept: OTHER | Facility: OTHER | Age: 72
End: 2017-11-30

## 2017-11-30 DIAGNOSIS — M48.061 SPINAL STENOSIS, LUMBAR REGION, WITHOUT NEUROGENIC CLAUDICATION: Primary | ICD-10-CM

## 2017-12-01 ENCOUNTER — HOSPITAL ENCOUNTER (OUTPATIENT)
Dept: RADIOLOGY | Facility: HOSPITAL | Age: 72
Discharge: HOME | End: 2017-12-01
Attending: PSYCHIATRY & NEUROLOGY
Payer: MEDICARE

## 2017-12-01 DIAGNOSIS — R26.9 ABNORMALITY OF GAIT AND MOBILITY: ICD-10-CM

## 2017-12-01 PROCEDURE — 72050 X-RAY EXAM NECK SPINE 4/5VWS: CPT

## 2017-12-05 NOTE — PROGRESS NOTES
Assessment    1  Gait disturbance (781 2) (R26 9)   2  Lumbar spinal stenosis (724 02) (M48 061)    Plan  Gait disturbance    · * XR SPINE CERVICAL COMPLETE 4 OR 5 VW NON INJURY; Status:Active; Requested  for:30Nov2017;    Perform:[facility]; Teressa De Paz; Ordered; For:Gait disturbance; Ordered By:Yelena Osuna;  Lumbar spinal stenosis    · * MRI LUMBAR SPINE WO CONTRAST; Status:Need Information - Financial  Authorization; Requested for:30Nov2017;    Perform:[facility] Radiology; Teressa De Paz; Last Updated By:Chris Marcial; 11/30/2017 10:48:17 AM;Ordered; For:Lumbar spinal stenosis; Ordered By:Yelena Osuna;   · Follow-up visit in 3 months Evaluation and Treatment  Follow-up  Status: Complete   Done: 72PTS5363   Ordered; For: Lumbar spinal stenosis; Ordered By: Chidi Vaca Performed:  Due: 63UGA2235; Last Updated By: Tanja Fierro; 11/30/2017 10:48:17 AM    Discussion/Summary  Discussion Summary:   1) Lumbar spine stenosis: Patient was doing significantly better after the surgery however complains of again lower back pain and thigh pain and difficulty with walking more so related with pain  Will obtain repeat MRI of the lumbar spine for further evaluation  Patient refusing EMG testing at this time  2) Mild ptosis of the left eye noted: Appears to be chronic by patient and his family  ACh receptor antibodies were negative  Patient was also evaluated by ophthalmology who felt that it is 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 Kilo syndrome causing ptosis  At this point patient does not have any clinical symptoms of double vision or difficulty chewing or swallowing  Seronegative myasthenia is a possibility however is much less likely and He is not interested in trying a Mestinon trial  No Cogan's twitch noted  Patient told to call us if any diplopia or dysphagia or dysarthria noted   Patient again was counseled to obtain an EMG however is refusing it  3) Cervical spinal stenosis/myelomalacia s/p surgery: Patient is following up with neurosurgery  Will order x-ray of the cervical spine to rule out any contribution for gait disturbance  4) Slightly decreased vibration sensation: NO other sensory abnormalities in the feet  This could be related to aging or an early neuropathy cannot be ruled out  Patient refused EMG in the past and again today  Good foot hygiene and foot care is recommended  Patient uses AFO for his flat feet and for follows up with podiatric as well  His right ankle pain could be related to the brace and mechanical injury  Patient to follow-up with podiatric and consider if any change needed for the AFO  Patient will follow-up with us in about 3 months to follow-up test results  Counseling Documentation With Imm: The patient was counseled regarding instructions for management, patient and family education  total time of encounter was 40 minutes and 20 minutes was spent counseling  Chief Complaint  Chief Complaint Free Text Note Form: Patient present for neurology follow up regarding lumbar spinal stenosis  History of Present Illness  HPI: Patient is a 82-year-old gentleman coming in for follow up in regards to difficulty with walking  Patient was last evaluated in July 2017  Today's history, 11/30/17:  Patient was last evaluated in July 2017 and was told to follow up on an as-needed basis  Patient states that he continues to have difficulty walking and is intermittent  He complaints of bilateral thigh pain which prevents him from walking  He denies any falls  He feels initially when he walks he may takes small shuffling steps however when he tries to focus he walks better and feels it is since surgery  He may feel off balance  He feels he improved after surgery but now he feels that he is going back down hill   He also complaints of lower back pain that is intermittent and some left hip pain and radiates into the left lower extremity  He uses a right AFO for few years and complaints of right ankle pain radiating to the foot and is not sure if it is related to the brace  He follows Podiatry, Dr Mariel Carroll as well  Lithium level 0 9, BUN 24, TSH 1 75  CT abdomen and pelvis on 11/16/2017 showed infrarenal abdominal aortic aneurysm measuring 4 7 cm and has increased in size steadily since her least imaging in 2010 however no significant adverse change in size when compared to recent prior scan  Unchanged appearance of abdomen and pelvis without evidence for metastatic disease, chronic findings including chronic diverticulosis, emphysema and postsurgical changes at lung bases and unchanged benign renal cysts  Brief History from prior visits:  Patient has history of MI, CAD, COPD, manic affective disorder, lung cancer, lung lobectomy, prostatectomy for carcinoma, sigmoidectomy for diverticulitis who had ambulatory dysfunction and left leg weakness and left arm weakness for few weeks beginning early 2016 and he was admitted in the hospital in April 2016 and had anteroseptal cervical discectomy C3-C7 with anterior plate fixation fusion C3-C7 and posterior decompressive laminectomy C3-C7 and lateral mass fixation fusion C3-C7 by Dr Ronnie Orta in April 2016 for extensive cervical myelopathy prior to the surgery which has improved significantly  He improved significantly after the surgery and was able to walk well  Patient says that about 6-8 weeks ago he feels that he has trouble walking and with balance, right thigh and stiffness of both legs and usually it is in the night or in the morning  He does complaint of neck pain, 6/10, shoots in to the head, denies any urinary or bowel incontinence  He feels occasionally slightly off balance  He denies any falls     Patient noted to have some ptosis of the left eye however noted on his picture ID as well and he is not clear if this is chronic  Patient denies any shortness of breath, difficulty chewing or swallowing, double vision blurry vision  Patient has history of chronic alcohol use for 25 years and was heavy when he was young and quit in April 2016  MRI cervical spine on 8/10/16 showed postoperative changes are resolving, no cord compression  C3-C7 combine anterior posterior fusion, progressive degenerative changes at caudal margin of the fusion without root compression  Potentially significant stenosis involving bilateral foramina at C5-C6 and left C6-C7 foramen and correlate for bilateral C6 and left C7 radiculitis  MRI thoracic spine in April 2016 was unremarkable  MRI brain in April 2016 showed mild microangiopathic changes in this supratentorial region and right caudate not visualized in 2011    Interval History (from visit in December 2016):  Patient was evaluated by neurosurgery, Dr Claudia Hackett and is planning to have a lower back surgery in January  Patient was also in the hospital recently for small bowel obstruction, status post surgery on 11/17/16  Bartholome Pinks He also checked with his wife and his ptosis seems to be present for a very long time  CT head without contrast on 9/9/16 showed no acute intracranial abnormalities and microangiopathic changes  MRI of the lumbar spine on 9/12/16 showed rotatory curvature within the lumbar spine resulting in spondylosis, most significant at L4-L5 and L5-S1  Central disc protrusion encroaching on the descending bilateral S1 nerve roots, mild/moderate bilateral neural foraminal stenosis at L4-L5 mild bilaterally at L5-S1  Incompletely characterized but essentially stable infrarenal abdominal aneurysm  Acetylcholine receptor antibodies were normal     Interval history 4/11/17:  Patient had a surgery performed by Dr Moore on 1/30/17 for his L-spine and has noticed that it was a big success  He is walking better and uses a brace  He says that brace is helping him   However he has burning and electric feeling on bottom of the right foot in last 1 week ( sometimes worse in his brace)  He has lower back pain and used to shoot in the buttock region but not any more  He is on Gabapentin after the surgery and is weaning it off  He has neck pain 5-7/10 and denies any radiation to upper extremities  He denies any numbness or tingling or weakness in bilateral upper extremities  Patient states again that his ptosis in the left eye is chronic   He denies any diplopia, difficulty chewing or swallowing  He denies any history of cataracts, diabetes  He has history of heart disease but not arrhythmias  Patient again says that he has left eye ptosis     Interval history, 7/13/17:  Patient today states that he has been doing much better  He states that his balance is overall good and is using a brace for the right ankle for longer distances and it helps  He has occasional lower back pain  He states that his wide had noticed the droopy eyelid in the left eye for several years and he is not sure if it is worse or same  Patient was evaluated by ophthalmology, Dr Stan Mccarthy and it was felt that left greater than right ptosis was likely related to levator dehiscence  However ACh Receptor antibody panel was ordered, which was negative  He denies any diplopia, dysphagia  He does feel fatigues after walking for about 15 minutes and he also has COPD  Review of Systems  Neurological ROS:   Constitutional: no fever, no chills, no recent weight gain, no recent weight loss, no complaints of feeling tired, no changes in appetite  HEENT:  no sinus problems, not feeling congested, no blurred vision, no dryness of the eyes, no eye pain, no hearing loss, no tinnitus, no mouth sores, no sore throat, no hoarseness, no dysphagia, no masses, no bleeding  Cardiovascular:  no chest pain or pressure, no palpitations present, the heart rate was not rapid or irregular, no swelling in the arms or legs, no poor circulation  Respiratory:  no unusual or persistant cough, no shortness of breath with or without exertion  Gastrointestinal:  no nausea, no vomiting, no diarrhea, no abdominal pain, no changes in bowel habits, no melena, no loss of bowel control  Genitourinary:  no incontinence, no feelings of urinary urgency, no increase in frequency, no urinary hesitancy, no dysuria, no hematuria  Musculoskeletal: head/neck/back pain and pain while walking  Integumentary  no masses, no rash, no skin lesions, no livedo reticularis  Psychiatric: anxiety  Endocrine  no unusual weight loss or gain, no excessive urination, no excessive thirst, no hair loss or gain, no hot or cold intolerance, no menstrual period change or irregularity, no loss of sexual ability or drive, no erection difficulty, no nipple discharge  Hematologic/Lymphatic:  no unusual bleeding, no tendency for easy bruising, no clotting skin or lumps  Neurological General: waking up at night  Neurological Gait: difficulty walking  ROS Reviewed:   ROS reviewed  Active Problems    1  Abnormal blood chemistry (790 6) (R79 9)   2  Adenocarcinoma of prostate (185) (C61)   3  Allergic rhinitis (477 9) (J30 9)   4  Aortic aneurysm (441 9) (I71 9)   5  Atherosclerotic heart disease of native coronary artery without angina pectoris (414 01)   (I25 10)   6  Benign colon polyp (211 3) (K63 5)   7  Cervical cord compression with myelopathy (336 9) (G95 20)   8  Cervical disc disease (722 91) (M50 90)   9  Chest pain (786 50) (R07 9)   10  Chronic bipolar disorder (296 80) (F31 9)   11  Chronic rhinitis (472 0) (J31 0)   12  COPD (chronic obstructive pulmonary disease) (496) (J44 9)   13  Cough (786 2) (R05)   14  Diverticulitis (562 11) (K57 92)   15  Dyspnea (786 09) (R06 00)   16  Encounter for postoperative care (V58 49) (Z48 89)   17  Gait disturbance (781 2) (R26 9)   18  Gastroesophageal reflux disease (530 81) (K21 9)   19   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)   20  Hepatic steatosis (571 8) (K76 0)   21  Hip pain, chronic, left (719 45,338 29) (M25 552,G89 29)   22  History of heart artery stent (V45 82) (Z95 5)   23  Hyperlipidemia (272 4) (E78 5)   24  Hypertension (401 9) (I10)   25  Irritable bowel syndrome (564 1) (K58 9)   26  Leukocytosis (288 60) (D72 829)   27  Lumbar spinal stenosis (724 02) (M48 061)   28  Muscle spasms of neck (728 85) (M62 838)   29  Need for pneumococcal vaccination (V03 82) (Z23)   30  Need for prophylactic vaccination and inoculation against influenza (V04 81) (Z23)   31  Neurogenic claudication (724 03) (M48 062)   32  Postop check (V67 00) (Z09)   33  Post-op pain (338 18) (G89 18)   34  Pre-operative cardiovascular examination (V72 81) (Z01 810)   35  Preoperative evaluation to rule out surgical contraindication (V72 83) (Z01 818)   36  Ptosis of left eyelid (374 30) (H02 402)   37  Screening for genitourinary condition (V81 6) (Z13 89)   38  Spondylolisthesis of lumbar region (738 4) (M43 16)   39  Spondylosis of lumbar region without myelopathy or radiculopathy (721 3) (M47 816)   40  Swelling around both eyes (782 2) (R22 0)   41  Tremor (781 0) (R25 1)   42  Weakness of extremity (729 89) (R29 898)    Past Medical History    1  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)   2  Benign colon polyp (211 3) (K63 5)   3  History of Diverticulosis (562 10) (K57 90)   4  Gastroesophageal reflux disease (530 81) (K21 9)   5  History of acute bronchitis (V12 69) (Z87 09)   6  History of diverticulitis of colon (V12 79) (Z87 19)   7  History of malignant neoplasm of lung (V10 11) (Z85 118)   8  History of malignant neoplasm of prostate (V10 46) (Z85 46)   9  History of small bowel obstruction (V12 79) (Z87 19)   10  Irritable bowel syndrome (564 1) (K58 9)   11  History of Malignant neoplasm of lung, unspecified laterality   12   History of Myocardial infarction involving other coronary artery (410 80) (I21 29)  Active Problems And Past Medical History Reviewed: The active problems and past medical history were reviewed and updated today  Surgical History    1  History of Colon Surgery   2  History of Diagnostic Esophagogastroduodenoscopy   3  History of Lung Lobectomy   4  History of Surgery Prostate Prostatotomy   5  History of Wedge Resection Of Lung  Surgical History Reviewed: The surgical history was reviewed and updated today  Family History  Mother    1  Family history of Carcinoma Of The Small Intestine (V16 0)  Father    2  Family history of Carcinoma Of The Small Intestine (V16 0)   3  Family history of Coronary Artery Disease (V17 49)  Maternal Grandmother    4  Family history of   Paternal Grandmother    11  Family history of   Maternal Grandfather    6  Family history of   Paternal Grandfather    9  Family history of   Family History    8  Family history of Heart Disease (V17 49)   9  Family history of Hyperlipidemia   10  Family history of Hypertension (V17 49)  Family History Reviewed: The family history was reviewed and updated today  Social History    · Being A Social Drinker   · Daily Coffee Consumption (1  Cups/Day)   · Education history   · Former smoker ( 84) (P37 872)   · History of Former smoker ( 84) (Q85 712)   · Lives independently with spouse   · Lives with spouse   · Marital history   · One child   · Retired  Social History Reviewed: The social history was reviewed and updated today  Current Meds   1  Advair Diskus 250-50 MCG/DOSE Inhalation Aerosol Powder Breath Activated; Use 1   inhalation twice  daily   Rinse mouth after  use; Therapy: 53AMB8037 to (Evaluate:2018)  Requested for: 27MQT9352; Last   Rx:2017 Ordered   2  Allegra 180 MG TABS; TAKE 1 TABLET DAILY AS NEEDED; Therapy: (Recorded:2016) to Recorded   3  AmLODIPine Besylate 2 5 MG Oral Tablet; Take 1 tablet by mouth  daily;    Therapy: 51MDF0540 to (Evaluate:56Vfd4610) Requested for: 64JPP3098; Last   Rx:36Mzs0502 Ordered   4  Aspirin 81 MG Oral Tablet Delayed Release; TAKE 1 TABLET DAILY; Therapy: (Recorded:93Hlz9209) to Recorded   5  Atorvastatin Calcium 40 MG Oral Tablet; Take 1 tablet by mouth  daily; Therapy: 99OLS7330 to (Evaluate:10Pfy8722)  Requested for: 90Ryx6708; Last   Rx:95Jxs0201 Ordered   6  Atrovent HFA 17 MCG/ACT Inhalation Aerosol Solution; INHALE 2 PUFFS 4 TIMES  DAILY; Therapy: 35NLX3378 to (Evaluate:50Ivt2324)  Requested for: 26DFA3263; Last   Rx:70Dmr2652 Ordered   7  Dicyclomine HCl - 20 MG Oral Tablet; TAKE 1 TABLET EVERY 4 TO 6 HOURS DAILY AS   NEEDED; Therapy: 28IBR2477 to (Evaluate:63Uyl7339)  Requested for: 57Dnb4602; Last   Rx:11Dcj4137 Ordered   8  Fluticasone Propionate 50 MCG/ACT Nasal Suspension; USE 2 SPRAYS IN EACH   NOSTRIL ONCE DAILY; Therapy: 01VAA5954 to (Boone Anna)  Requested for: 68BUK1446; Last   Rx:75Avt0124 Ordered   9  Lithium Carbonate 300 MG Oral Capsule; alternate 1 capsule with 2 capsules everyother   day; Therapy: 21Qcw3933 to Recorded   10  Metoprolol Succinate ER 25 MG Oral Tablet Extended Release 24 Hour; Take 1 tablet by    mouth  daily; Therapy: 59VTJ5689 to (Evaluate:87Rwy1490)  Requested for: 03GIY4131; Last    Rx:43Nou0740 Ordered   11  Nitroglycerin 0 2 MG/HR Transdermal Patch 24 Hour; APPLY 1 PATCH DAILY (OFF FOR    12 HOURS); Therapy: 79Ebh1240 to (Evaluate:23Mar2018)  Requested for: 28Mar2017; Last    Rx:28Mar2017 Ordered   12  Omega-3-acid Ethyl Esters 1 GM Oral Capsule; Take 1 capsule by mouth 3  times daily; Therapy: 33Vxp1955 to (Evaluate:20Tgx5477)  Requested for: 30Akj5092; Last    Rx:05Gmv8230 Ordered   13  Omeprazole 40 MG Oral Capsule Delayed Release; Take 1 capsule by mouth  daily; Therapy: 05OLR2973 to (Evaluate:13Mnu7608)  Requested for: 54SSQ9161; Last    Rx:16Ybk4359 Ordered   14   ProAir  (90 Base) MCG/ACT Inhalation Aerosol Solution; INHALE 1 TO 2 PUFFS    EVERY 6 HOURS AS NEEDED; Therapy: 71Lhm4726 to (Evaluate:09Sep2017)  Requested for: 72Nby4484; Last    Rx:30Zjy3461 Ordered   15  Tylenol 500 MG CAPS; 1   PRN; Therapy: (Recorded:21Ihn7000) to Recorded   16  Vitamin B Complex Oral Tablet; TAKE 1 TABLET DAILY; Therapy: (Recorded:31Ami5043) to Recorded   17  Vitamin D 2000 UNIT Oral Tablet; take 1 tablet by mouth once daily; Therapy: (Recorded:39Hzs6904) to Recorded  Medication List Reviewed: The medication list was reviewed and updated today  Allergies    1  Percocet TABS   2  NSAIDs    Vitals  Signs   Recorded: 23JNP6073 10:00AM   Heart Rate: 54  Respiration: 16  Systolic: 119, LUE, Sitting  Diastolic: 76, LUE, Sitting  Height: 5 ft 7 in  Weight: 183 lb   BMI Calculated: 28 66  BSA Calculated: 1 95    Physical Exam                  General examination:  Patient is awake and alert  Eyes: Mild left eyelid ptosis ( patient says it is chronic)   Conjunctiva and sclera are clear  Pupil slightly small on the left  HEENT: External examination is normal  Neck: Supple  Lungs: Clear to auscultation bilaterally  CVS: S1, S2 heard  Abdomen: Soft, nontender  Extremities: No clubbing, edema, cyanosis, flat feet noted  Skin: No rashes  Neurological examination:   Mental status: Patient is awake, alert, oriented to time place and person  Attention, concentration, fund of knowledge is intact  Language: No evidence of aphasia or dysarthria  Memory: Repetition 3/3 and recall 3/3  Cranial nerves: Pupils ? slightly smaller on the left  Extraocular movements intact  Mild left eye ptosis noted ( noted in picture on photo ID as well and chronic per the patient)  Visual fields are full  Fundi are difficult to visualize bilaterally  Facial sensation is intact  No facial weakness is noted  Finger rub test is intact bilaterally  Tongue and uvula are in midline  Gag is intact  Shoulder shrug is 5/5 bilaterally  Motor examination: Tone is normal  No atrophy noted   Strength is 5/5 throughout  No tremor or cogwheeling noted  Sensory examination: Light touch is intact  Pinprick is slightly diminished on the dorsum of the right foot  Vibration is slightly decreased at the toes bilaterally  Proprioception is intact  Deep tendon reflexes: Brisk throughout  Plantars are downgoing bilaterally  Coordination: Finger-nose test showed mild tremor bilaterally and finger tapping intact bilaterally  Gait: Patient has a normal base and stride  Results/Data  * CT ABDOMEN PELVIS WO CONTRAST [id number] 09:14AM Yuli Larson Order Number: [id number]    - Patient Instructions: To schedule this appointment, please contact Central Scheduling at [phone]  Test Name Result Flag Reference   CT ABDOMEN PELVIS WO CONTRAST (Report)     CT ABDOMEN AND PELVIS WITHOUT IV CONTRAST     INDICATION: History of abdominal aortic aneurysm, lung cancer, and prostate cancer  COMPARISON: CT of the chest, abdomen, and pelvis dated 3/17/2017 and CT of the abdomen/pelvis dated 11/14/2016, 12/19/2013, and 9/14/2010  TECHNIQUE: CT examination of the abdomen and pelvis was performed without intravenous contrast  Reformatted images were created in axial, sagittal, and coronal planes  Radiation dose length product (DLP) for this visit: 516 98 mGy-cm   This examination, like all CT scans performed in the 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  This could be congenital or postsurgical  No surgical clips or sutures are demonstrated, though  No suspicious hepatic masses  GALLBLADDER: No calcified gallstones  No pericholecystic inflammatory change  SPLEEN: Unremarkable  PANCREAS: Unremarkable  ADRENAL GLANDS: Unremarkable  KIDNEYS/URETERS: Unchanged renal cysts  No suspicious contour distorting masses  No urolithiasis  Renal collecting systems are decompressed  STOMACH AND BOWEL: Stomach is normal for level distention  No dilated loops of small bowel  Large stool burden  Prior colorectal reanastomosis  Scattered colonic diverticulosis  No colonic wall thickening or pericolonic fat stranding  APPENDIX: A normal appendix was visualized  ABDOMINOPELVIC CAVITY: Unchanged stranding around the retroperitoneal surgical clips seen on series 2 image 48  No is enteric masses  No ascites or encapsulated fluid collections  No free air  No mesenteric, retroperitoneal, or pelvic sidewall    lymphadenopathy  VESSELS: Infrarenal abdominal aortic aneurysm measuring 4 7 cm x 4 6 cm  No significant change from March 2017 or November 2016  The finding measured 3 6 cm in 2013 and 3 1 cm in 2010  No fat stranding around the aneurysm to suggest rupture  Extensive aortoiliac atherosclerosis and peripheral arterial calcifications     PELVIS     REPRODUCTIVE ORGANS: Prior prostatectomy  No pelvic soft tissue masses or sidewall lymphadenopathy  URINARY BLADDER: Unremarkable  ABDOMINAL WALL/INGUINAL REGIONS: Postsurgical changes of the ventral abdomen  Diastasis recti  No hernias  OSSEOUS STRUCTURES: Surgical posterior decompression and S1 with L4-5 and L5 1 discectomies and posterior fixation of L4-S1  No acute fracture or destructive osseous lesion  No osteoblastic metastases demonstrated  IMPRESSION:     1  Infrarenal abdominal aortic aneurysm measuring 4 7 cm  The aneurysm has increased in size steadily since earliest imaging in 2010  However, no significant interval change in size when compared to most recent prior       2  Unchanged appearance of the abdomen and pelvis without evidence for metastatic disease  3  Chronic findings including colonic diverticulosis, emphysema and postsurgical changes at the lung bases, and unchanged benign renal cysts  Workstation performed: [id number]     Signed by:   Willie Cuenca MD   11/21/17     (1) LITHIUM [id number] 07:29AM EPIC, Provider   Test ordered by: [employer]     Test Name Result Flag Reference   LITHIUM 0 9 mmol/L  0 5-1 0     (1) BUN [id number] 07:29AM EPIC, Provider   Test ordered by: [employer]     Test Name Result Flag Reference   BLOOD UREA NITROGEN 24 mg/dL  5-25     (1) CREATININE [id number] 07:29AM EPIC, Provider   Test ordered by: [employer]     Test Name Result Flag Reference   CREATININE 1 19 mg/dL  0 60-1 30   Standardized to IDMS reference method   eGFR 62 ml/min/1 73sq m     National Kidney Disease Education Program recommendations are as follows:  GFR calculation is accurate only with a steady state creatinine  Chronic Kidney disease less than 60 ml/min/1 73 sq  meters  Kidney failure less than 15 ml/min/1 73 sq  meters  (1) TSH [id number] 07:29AM EPIC, Provider   Test ordered by: [employer]     Test Name Result Flag Reference   TSH 1 750 uIU/mL  0 358-3 740   This is a patient instruction: This test is non-fasting  Please drink two glasses of water morning of bloodwork  Patients undergoing fluorescein dye angiography may retain small amounts of fluorescein in the body for 48-72 hours post procedure  Samples containing fluorescein can produce falsely depressed TSH values  If the patient had this procedure,a specimen should be resubmitted post fluorescein clearance       (1) LITHIUM [id number] 07:48AM EPIC, Provider   Test ordered by: [employer]     Test Name Result Flag Reference   LITHIUM 0 8 mmol/L  0 5-1 0     (1) BUN [id number] 07:48AM EPIC, Provider   Test ordered by: [employer]     Test Name Result Flag Reference   BLOOD UREA NITROGEN 23 mg/dL  5-25     (1) CREATININE [id number] 07:48AM EPIC, Provider   Test ordered by: Jose Luis Thornton     Test Name Result Flag Reference   CREATININE 1 16 mg/dL  0 60-1 30   Standardized to IDMS reference method   eGFR 64 ml/min/1 73sq m     National Kidney Disease Education Program recommendations are as follows:  GFR calculation is accurate only with a steady state creatinine  Chronic Kidney disease less than 60 ml/min/1 73 sq  meters  Kidney failure less than 15 ml/min/1 73 sq  meters  (1) TSH [id number] 07:48AM EPIC, Provider   Test ordered by: Jose Luis Thornton     Test Name Result Flag Reference   TSH 1 710 uIU/mL  0 358-3 740   This is a patient instruction: This test is non-fasting  Please drink two glasses of water morning of bloodwork  Patients undergoing fluorescein dye angiography may retain small amounts of fluorescein in the body for 48-72 hours post procedure  Samples containing fluorescein can produce falsely depressed TSH values  If the patient had this procedure,a specimen should be resubmitted post fluorescein clearance  Future Appointments    Date/Time Provider Specialty Site   12/12/2017 04:00 PM BETITO Alexander  Cardiology [facility] CARDIOLOG[facility]   01/23/2018 09:30 AM BETITO Zamora   Internal Medicine [facility]     Signatures   Electronically signed by : Renée Fletcher MD; Nov 30 2017 11:47AM EST                       (Author)

## 2017-12-06 ENCOUNTER — TRANSCRIBE ORDERS (OUTPATIENT)
Dept: LAB | Facility: HOSPITAL | Age: 72
End: 2017-12-06

## 2017-12-06 ENCOUNTER — APPOINTMENT (OUTPATIENT)
Dept: LAB | Facility: HOSPITAL | Age: 72
End: 2017-12-06
Payer: MEDICARE

## 2017-12-06 DIAGNOSIS — E78.5 HYPERLIPIDEMIA: ICD-10-CM

## 2017-12-06 LAB
ALBUMIN SERPL BCP-MCNC: 3.5 G/DL (ref 3.5–5)
ALP SERPL-CCNC: 183 U/L (ref 46–116)
ALT SERPL W P-5'-P-CCNC: 41 U/L (ref 12–78)
ANION GAP SERPL CALCULATED.3IONS-SCNC: 8 MMOL/L (ref 4–13)
AST SERPL W P-5'-P-CCNC: 25 U/L (ref 5–45)
BILIRUB SERPL-MCNC: 0.52 MG/DL (ref 0.2–1)
BUN SERPL-MCNC: 18 MG/DL (ref 5–25)
CALCIUM SERPL-MCNC: 9 MG/DL (ref 8.3–10.1)
CHLORIDE SERPL-SCNC: 110 MMOL/L (ref 100–108)
CHOLEST SERPL-MCNC: 130 MG/DL (ref 50–200)
CK SERPL-CCNC: 134 U/L (ref 39–308)
CO2 SERPL-SCNC: 24 MMOL/L (ref 21–32)
CREAT SERPL-MCNC: 1.16 MG/DL (ref 0.6–1.3)
GFR SERPL CREATININE-BSD FRML MDRD: 64 ML/MIN/1.73SQ M
GLUCOSE P FAST SERPL-MCNC: 109 MG/DL (ref 65–99)
HDLC SERPL-MCNC: 37 MG/DL (ref 40–60)
LDLC SERPL CALC-MCNC: 61 MG/DL (ref 0–100)
NT-PROBNP SERPL-MCNC: 93 PG/ML
POTASSIUM SERPL-SCNC: 4.2 MMOL/L (ref 3.5–5.3)
PROT SERPL-MCNC: 7.1 G/DL (ref 6.4–8.2)
SODIUM SERPL-SCNC: 142 MMOL/L (ref 136–145)
TRIGL SERPL-MCNC: 158 MG/DL

## 2017-12-06 PROCEDURE — 36415 COLL VENOUS BLD VENIPUNCTURE: CPT

## 2017-12-06 PROCEDURE — 83880 ASSAY OF NATRIURETIC PEPTIDE: CPT

## 2017-12-06 PROCEDURE — 80053 COMPREHEN METABOLIC PANEL: CPT

## 2017-12-06 PROCEDURE — 80061 LIPID PANEL: CPT

## 2017-12-06 PROCEDURE — 82550 ASSAY OF CK (CPK): CPT

## 2017-12-12 ENCOUNTER — TRANSCRIBE ORDERS (OUTPATIENT)
Dept: ADMINISTRATIVE | Facility: HOSPITAL | Age: 72
End: 2017-12-12

## 2017-12-12 ENCOUNTER — ALLSCRIPTS OFFICE VISIT (OUTPATIENT)
Dept: OTHER | Facility: OTHER | Age: 72
End: 2017-12-12

## 2017-12-12 DIAGNOSIS — Z85.46 PERSONAL HISTORY OF MALIGNANT NEOPLASM OF PROSTATE: Primary | ICD-10-CM

## 2017-12-12 DIAGNOSIS — I71.4 ABDOMINAL ANEURYSM (HCC): ICD-10-CM

## 2017-12-14 ENCOUNTER — HOSPITAL ENCOUNTER (OUTPATIENT)
Dept: RADIOLOGY | Facility: HOSPITAL | Age: 72
Discharge: HOME | End: 2017-12-14
Attending: PSYCHIATRY & NEUROLOGY
Payer: MEDICARE

## 2017-12-14 ENCOUNTER — GENERIC CONVERSION - ENCOUNTER (OUTPATIENT)
Dept: INTERNAL MEDICINE CLINIC | Facility: CLINIC | Age: 72
End: 2017-12-14

## 2017-12-14 DIAGNOSIS — M48.061 SPINAL STENOSIS OF LUMBAR REGION WITHOUT NEUROGENIC CLAUDICATION: ICD-10-CM

## 2017-12-14 PROCEDURE — 72148 MRI LUMBAR SPINE W/O DYE: CPT

## 2017-12-15 ENCOUNTER — GENERIC CONVERSION - ENCOUNTER (OUTPATIENT)
Dept: OTHER | Facility: OTHER | Age: 72
End: 2017-12-15

## 2017-12-19 ENCOUNTER — GENERIC CONVERSION - ENCOUNTER (OUTPATIENT)
Dept: INTERNAL MEDICINE CLINIC | Facility: CLINIC | Age: 72
End: 2017-12-19

## 2018-01-09 ENCOUNTER — ALLSCRIPTS OFFICE VISIT (OUTPATIENT)
Dept: OTHER | Facility: OTHER | Age: 73
End: 2018-01-09

## 2018-01-09 DIAGNOSIS — M71.38 OTHER BURSAL CYST, OTHER SITE: ICD-10-CM

## 2018-01-09 NOTE — MISCELLANEOUS
Message   Recorded as Task   Date: 11/30/2016 10:04 AM, Created By: Kaila Best   Task Name: Miscellaneous   Assigned To: Aletha Rajput   Regarding Patient: Anette Robins, Status: Active   Comment:    Aletha Rajput - 30 Nov 2016 10:04 AM     TASK CREATED  This pt recently had an exploratory lap with lysis of adhesions 11/17 secondary to a small bowel obstruction  He has concerns if this will impact his upcoming surgery on 1/9/17  Please advise  Thank Aminta Poon - 30 Nov 2016 12:09 PM     TASK REPLIED TO: Previously Assigned To Jose Luis Johnson  Should be OK  Please make sure patient is scheduled for a brief visit back in the office with me  Aletha Rajput - 30 Nov 2016 3:48 PM     TASK REPLIED TO: Previously Assigned To Aletha Rajput                      Pt aware of response and has an appt   on 12/15        Signatures   Electronically signed by : Erika Marina, ; Nov 30 2016  3:49PM EST                       (Author)

## 2018-01-10 NOTE — RESULT NOTES
Message   mild arthritis, otherwise ok     Verified Results  * XR HIP/PELV 2-3 VWS LEFT W PELVIS IF PERFORMED [id number] 10:34AM Lorene Section Order Number: [id number]     Test Name Result Flag Reference   * XR HIP/PELV 2-3 VWS LEFT (Report)     LEFT HIP     INDICATION: Left hip pain when walking and sitting     COMPARISON: None     VIEWS: AP pelvis and 2 coned down views; 3 images     FINDINGS:     There is no fracture or dislocation  Mild degenerative changes with small acetabular osteophytes  Status post laminectomy and fusion of the lower lumbar spine  No lytic or blastic lesions are seen  Soft tissues are unremarkable  IMPRESSION:       1  No acute osseous abnormality  2  Degenerative changes as described         Workstation performed: [id number]     Signed by:   Annemarie Kinney MD   2/15/17

## 2018-01-11 NOTE — PROGRESS NOTES
Assessment   1  Facet arthropathy, lumbar (721 3) (M46 96)   2  Synovial cyst of lumbar spine (727 40) (M71 38)    Plan    · *1 - [facility] Co-Management  *Gait and balance training and core muscle    balance training  Status: Active  Requested for: 93RWH5920   Ordered; For: Synovial cyst of lumbar spine; Ordered By: Kevin Nunes Performed:  Due: 82CMZ7570  Care Summary provided  : Yes   · Follow-up PRN Evaluation and Treatment  Follow-up  Status: Complete  Done:    02EXY4743   Ordered; For: Synovial cyst of lumbar spine; Ordered By: Kevin Nunes Performed:  Due: 51SPV0199    Discussion/Summary      This is a 46-year-old male status post a 2 level fixation fusion who has facet arthropathy and a synovial cyst at the adjacent level    have recommended against surgical intervention at this time  Instead physical therapy with local remedies would be most appropriate  If these are ineffective then a simple decompressive procedure and resection of the synovial cyst with trimming of the medial facet would be indicated  This could be done via a minimally invasive approach  He promised to call as midway through physical therapy to update us about his progress  We will make further plans based on his reaction to this therapy  Chief Complaint   Patient presents for Follow Up regarding new MRI Lspine s/p L4-5 and L5-S1 decompressive foraminotomies, transforaminal lumbar interbody and pedicle screw fixation fusion L4-S1 on 1/30/17 (1 year out)      History of Present Illness   Patient is a 74y old male with history of a previous cervical decompression with fixation fusion  He was doing good in regards to his neck, however, he developed bowel issues requiring surgery  He continued to improve except for his ambulation which was worsening  He was scheduled for a lumbar decompression  was seen post operatively and in general was doing well  He was advised follow up as needed     was referred back to our office on 12/15/17 for follow up regarding new MRI Lspine completed 12/14/17  Reviewed and patient was advised to schedule an appointment to discuss results in detail  patient reports some in his lower back described as pressure on the left side and also pain in hip and leg on the left side but is not constant  complains of intermittent back pain which currently is absent  This may be related to his activities  Physical therapy seems to exacerbate the deep lying pain  Review of Systems        Constitutional: No fever or chills, feels well, no tiredness, no recent weight gain or weight loss  Eyes: No complaints of eye pain, no red eyes, no discharge from eyes, no itchy eyes  ENT: no complaints of earache, no hearing loss, no nosebleeds, no nasal discharge, no sore throat, no hoarseness  Cardiovascular: No complaints of slow heart rate, no fast heart rate, no chest pain, no palpitations, no leg claudication, no lower extremity  Respiratory: No complaints of shortness of breath, no wheezing, no cough, no SOB on exertion, no orthopnea or PND  Gastrointestinal: No complaints of abdominal pain, no constipation, no nausea or vomiting, no diarrhea or bloody stools  Genitourinary: No complaints of dysuria, no incontinence, no hesitancy, no nocturia, no genital lesion, no testicular pain  Musculoskeletal: limb pain-- and-- lower back pain, but-- as noted in HPI  Integumentary: No complaints of skin rash or skin lesions, no itching, no skin wound, no dry skin  Neurological: No compliants of headache, no confusion, no convulsions, no numbness or tingling, no dizziness or fainting, no limb weakness, no difficulty walking  Psychiatric: Is not suicidal, no sleep disturbances, no anxiety or depression, no change in personality, no emotional problems        Endocrine: No complaints of proptosis, no hot flashes, no muscle weakness, no erectile dysfunction, no deepening of the voice, no feelings of weakness  Hematologic/Lymphatic: No complaints of swollen glands, no swollen glands in the neck, does not bleed easily, no easy bruising  ROS reviewed  Active Problems   1  Abnormal blood chemistry (790 6) (R79 9)   2  Abnormal MRI, lumbar spine (793 7) (R93 7)   3  Adenocarcinoma of prostate (185) (C61)   4  Allergic rhinitis (477 9) (J30 9)   5  Aortic aneurysm (441 9) (I71 9)   6  Atherosclerotic heart disease of native coronary artery without angina pectoris (414 01)     (I25 10)   7  Benign colon polyp (211 3) (K63 5)   8  Cervical cord compression with myelopathy (336 9) (G95 20)   9  Cervical disc disease (722 91) (M50 90)   10  Chest pain (786 50) (R07 9)   11  Chronic bipolar disorder (296 80) (F31 9)   12  Chronic rhinitis (472 0) (J31 0)   13  COPD (chronic obstructive pulmonary disease) (496) (J44 9)   14  Cough (786 2) (R05)   15  Diverticulitis (562 11) (K57 92)   16  Dyspnea (786 09) (R06 00)   17  Encounter for postoperative care (V58 49) (Z48 89)   18  Gait disturbance (781 2) (R26 9)   19  Gastroesophageal reflux disease (530 81) (K21 9)   20  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)   21  Hepatic steatosis (571 8) (K76 0)   22  Hip pain, chronic, left (719 45,338 29) (M25 552,G89 29)   23  History of heart artery stent (V45 82) (Z95 5)   24  Hyperlipidemia (272 4) (E78 5)   25  Hypertension (401 9) (I10)   26  Irritable bowel syndrome (564 1) (K58 9)   27  Leukocytosis (288 60) (D72 829)   28  Lumbar spinal stenosis (724 02) (M48 061)   29  Muscle spasms of neck (728 85) (M62 838)   30  Need for pneumococcal vaccination (V03 82) (Z23)   31  Need for prophylactic vaccination and inoculation against influenza (V04 81) (Z23)   32  Neurogenic claudication (724 03) (M48 062)   33  Postop check (V67 00) (Z09)   34  Post-op pain (338 18) (G89 18)   35  Pre-operative cardiovascular examination (V72 81) (Z01 810)   36   Preoperative evaluation to rule out surgical contraindication (V72 83) (Z01 818)   37  Ptosis of left eyelid (374 30) (H02 402)   38  Screening for genitourinary condition (V81 6) (Z13 89)   39  Spondylolisthesis of lumbar region (738 4) (M43 16)   40  Spondylosis of lumbar region without myelopathy or radiculopathy (721 3) (M47 816)   41  Swelling around both eyes (782 2) (R22 0)   42  Tremor (781 0) (R25 1)   43  Weakness of extremity (729 89) (R29 898)    Past Medical History   1  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)   2  Benign colon polyp (211 3) (K63 5)   3  History of Diverticulosis (562 10) (K57 90)   4  Gastroesophageal reflux disease (530 81) (K21 9)   5  History of acute bronchitis (V12 69) (Z87 09)   6  History of diverticulitis of colon (V12 79) (Z87 19)   7  History of malignant neoplasm of lung (V10 11) (Z85 118)   8  History of malignant neoplasm of prostate (V10 46) (Z85 46)   9  History of small bowel obstruction (V12 79) (Z87 19)   10  Irritable bowel syndrome (564 1) (K58 9)   11  History of Malignant neoplasm of lung, unspecified laterality   12  History of Myocardial infarction involving other coronary artery (410 80) (I21 29)     The active problems and past medical history were reviewed and updated today  Surgical History   1  History of Colon Surgery   2  History of Diagnostic Esophagogastroduodenoscopy   3  History of Lung Lobectomy   4  History of Surgery Prostate Prostatotomy   5  History of Wedge Resection Of Lung     The surgical history was reviewed and updated today  Family History   Mother    1  Family history of Carcinoma Of The Small Intestine (V16 0)  Father    2  Family history of Carcinoma Of The Small Intestine (V16 0)   3  Family history of Coronary Artery Disease (V17 49)  Maternal Grandmother    4  Family history of   Paternal Grandmother    11  Family history of   Maternal Grandfather    6  Family history of   Paternal Grandfather    9   Family history of   Family History    8  Family history of Heart Disease (V17 49)   9  Family history of Hyperlipidemia   10  Family history of Hypertension (V17 49)     The family history was reviewed and updated today  Social History    · Being A Social Drinker   · Daily Coffee Consumption (1  Cups/Day)   · Education history   · History of Former smoker (V15 82) (X38 701)   · Former smoker (V15 82) (K23 009)   · Lives independently with spouse   · Lives with spouse   · Marital history   · One child   · Retired  The social history was reviewed and updated today  Current Meds    1  Advair Diskus 250-50 MCG/DOSE Inhalation Aerosol Powder Breath Activated; Use 1     inhalation twice  daily   Rinse mouth after  use; Therapy: 32JOH2541 to (Evaluate:11Oct2018)  Requested for: 21BKO1737; Last     Rx:16Oct2017 Ordered   2  Allegra 180 MG TABS; TAKE 1 TABLET DAILY AS NEEDED; Therapy: (Recorded:14Jun2016) to Recorded   3  AmLODIPine Besylate 2 5 MG Oral Tablet; Take 1 tablet by mouth  daily; Therapy: 58AJQ4372 to (Evaluate:97Nav8022)  Requested for: 33GDU7760; Last     Rx:34Rkn7488 Ordered   4  Aspirin 81 MG Oral Tablet Delayed Release; TAKE 1 TABLET DAILY; Therapy: (Recorded:14Jun2016) to Recorded   5  Atorvastatin Calcium 40 MG Oral Tablet; Take 1 tablet by mouth  daily; Therapy: 06FCX4693 to (Evaluate:21Ohe7219)  Requested for: 55Tmt0574; Last     Rx:28Ejw4749 Ordered   6  Atrovent HFA 17 MCG/ACT Inhalation Aerosol Solution; INHALE 2 PUFFS 3 TIMES  DAILY; Therapy: 39AJU7346 to (Evaluate:38Rli6172)  Requested for: 07Jge3550 Recorded   7  Dicyclomine HCl - 20 MG Oral Tablet; TAKE 1 TABLET EVERY 4 TO 6 HOURS DAILY AS     NEEDED; Therapy: 19ADL9288 to (Evaluate:26Oue5573)  Requested for: 50Wdj6528; Last     Rx:61Fyu1963 Ordered   8  Fluticasone Propionate 50 MCG/ACT Nasal Suspension; USE 2 SPRAYS IN EACH     NOSTRIL ONCE DAILY; Therapy: 91CYQ2781 to (Daisy Wu)  Requested for: 81MVH8487;  Last Rx:05Nov2015 Ordered   9  Lithium Carbonate 300 MG Oral Capsule; alternate 1 capsule with 2 capsules everyother     day; Therapy: 93Ymt1131 to Recorded   10  Metoprolol Succinate ER 25 MG Oral Tablet Extended Release 24 Hour; Take 1 tablet by      mouth  daily; Therapy: 02KJW4419 to (Evaluate:55Kjw4229)  Requested for: 48WZF9114; Last      Rx:43Hwp2413 Ordered   11  Nitroglycerin 0 2 MG/HR Transdermal Patch 24 Hour; APPLY 1 PATCH DAILY (OFF FOR      12 HOURS); Therapy: 19Ooq6679 to (Evaluate:23Mar2018)  Requested for: 28Mar2017; Last      Rx:28Mar2017 Ordered   12  Omega-3-acid Ethyl Esters 1 GM Oral Capsule; Take 1 capsule by mouth 3  times daily; Therapy: 29Rok4996 to (Evaluate:26Rtm3983)  Requested for: 01Lvs8274; Last      Rx:74Mnw2368 Ordered   13  Omeprazole 40 MG Oral Capsule Delayed Release; Take 1 capsule by mouth  daily; Therapy: 23WBW1284 to (Evaluate:79Kih8259)  Requested for: 26QLL9746; Last      Rx:22Sep2017 Ordered   14  ProAir  (90 Base) MCG/ACT Inhalation Aerosol Solution; INHALE 1 TO 2 PUFFS      EVERY 6 HOURS AS NEEDED; Therapy: 95Sug3992 to (Evaluate:09Sep2017)  Requested for: 73Ejg4216; Last      Rx:14Sep2016 Ordered   15  Tylenol 500 MG CAPS; 1   PRN; Therapy: (Recorded:14Jun2016) to Recorded   16  Vitamin B Complex Oral Tablet; TAKE 1 TABLET DAILY; Therapy: (Recorded:03Nov2016) to Recorded   17  Vitamin D 2000 UNIT Oral Tablet; take 1 tablet by mouth once daily; Therapy: (Recorded:20Apr2015) to Recorded     The medication list was reviewed and updated today  Allergies   1  Percocet TABS   2  NSAIDs    Vitals   Vital Signs    Recorded: 89UHW1238 10:23AM   Temperature 97 4 F   Heart Rate 50   Respiration 16   Systolic 048   Diastolic 69   Height 5 ft 7 in   Weight 185 lb    BMI Calculated 28 98   BSA Calculated 1 96   Pain Scale 2     Physical Exam         Mental Status: Alert and Oriented x3  -- Memory is intact  -- Attentive   -- Speech is articulate and fluent  -- Knowledge and vocabulary consistent with education  -- Grossly nonfocal        Cranial Nerve Exam:  2nd cranial nerve: Normal with no noted deficit  -- 3rd, 4th, and 6th cranial nerves: PERRLA and EOMI without later gaze nystagmus  -- 5th cranial nerve: Normal with no noted deficit  -- 7th cranial nerve: Face symmetrical at grimace and at rest  -- 8th cranial nerve: Grossly intact to finger rub bilaterally  -- 9th and 10th cranial nerves: Uvula is mideline and gag reflex present  -- 11th cranial nerve: Shoulder shrug equal bilaterally  -- 12th cranial nerve: Tongue midline, no atrophy present  Motor System - Upper Extremities: Muscle strength: 5/5 bilaterally  -- Muscle tone: Normal bilaterally  -- Muscle Bulk: Normal Muscle bulk bilaterally  Motor System - Lower Extremities: Muscle strength: 5/5 bilaterally  -- Muscle tone: Normal bilaterally  -- Muscle Bulk: Normal Muscle bulk bilaterally  Reflexes:      Reflexes: Abnormal  -- Reduced throughout the lower extremities  Coordination: Coordination: Finger to nose was normal, heel to knee to shin was normal able to perform rapid alternating movements well bilaterally  -- Involuntary movements: None   Sensory:      Sensation: Abnormal  -- Patchy nonradicular sensory changes  Gait and Station:      Gait and station: Abnormal  -- Safe, slow, medium based gait  Results/Data   Diagnostic Studies Reviewed Neuro[facility]:      I personally reviewed the MRI of the LS spine in detail with the patient  MRI Review An MRI of the lumbosacral spine is carefully reviewed  This demonstrates a noncompressive synovial cyst at the adjacent level to the previous fusion  There is facet arthropathy at this level  Health Management   Benign colon polyp   COLONOSCOPY; every 5 years; Last 13JDU7871; Next Due: 10WXQ5938; Active  COLONOSCOPY (GI, SURG); every 5 years; Last 06YRL3146;  Next Due: 48PLV8492; Overdue    Future Appointments      Date/Time Provider Specialty Site   01/23/2018 09:30 AM BETITO Johnson   Internal Medicine [facility]   02/13/2018 10:00 AM Lokesh He MD Pulmonary Medicine [facility]     Signatures    Electronically signed by : BETITO Gomez ; Porter 10 2018  9:20AM EST                       (Author)

## 2018-01-11 NOTE — RESULT NOTES
Verified Results  (1) COMPREHENSIVE METABOLIC PANEL 14LIO2535 15:88OC Bakari Greco Order Number: [id number]     Test Name Result Flag Reference   GLUCOSE,RANDM 98 mg/dL     If the patient is fasting, the ADA then defines impaired fasting glucose as > 100 mg/dL and diabetes as > or equal to 123 mg/dL  SODIUM 140 mmol/L  136-145   POTASSIUM 4 5 mmol/L  3 5-5 3   CHLORIDE 107 mmol/L  100-108   CARBON DIOXIDE 29 mmol/L  21-32   ANION GAP (CALC) 4 mmol/L  4-13   BLOOD UREA NITROGEN 23 mg/dL  5-25   CREATININE 1 16 mg/dL  0 60-1 30   Standardized to IDMS reference method   CALCIUM 9 7 mg/dL  8 3-10 1   BILI, TOTAL 0 51 mg/dL  0 20-1 00   ALK PHOSPHATAS 233 U/L H    ALT (SGPT) 34 U/L  12-78   AST(SGOT) 15 U/L  5-45   ALBUMIN 3 7 g/dL  3 5-5 0   TOTAL PROTEIN 7 5 g/dL  6 4-8 2   eGFR Non-African American      >60 0 ml/min/1 73sq [facility] Disease Education Program recommendations are as follows:  GFR calculation is accurate only with a steady state creatinine  Chronic Kidney disease less than 60 ml/min/1 73 sq  meters  Kidney failure less than 15 ml/min/1 73 sq  meters

## 2018-01-12 NOTE — RESULT NOTES
Discussion/Summary   Hep C test normal     Verified Results  (1) HEP C ANTIBODY 87Sux9723 08:10AM Maddie Escobedo Order Number: [id number]     Test Name Result Flag Reference   HEPATITIS C ANTIBODY Non-reactive  Non-reactive

## 2018-01-12 NOTE — PROGRESS NOTES
Plan   Lumbar spinal stenosis    · Follow-up PRN Evaluation and Treatment  Follow-up  Status: Complete  Done:  31ZDW6438   Ordered; For: Lumbar spinal stenosis; Ordered By: Yoanna Marrero Performed:  Due: 61KRF8485  Post-op pain    · Gabapentin 100 MG Oral Capsule; take 2 capsule 3 times daily   Rx By: Yoanna Marrero; Dispense: 30 Days ; #:180 Capsule; Refill: 1; For: Post-op pain; JANNY = N; Verified Transmission to [facility]; Last Updated By: System, SureScriThirsty; 3/16/2017 11:55:56 AM    Methocarbamol 750 MG Oral Tablet; TAKE 1 TABLET EVERY 6 HOURS AS NEEDED; Therapy: 65IHC9319 to (Evaluate:15Apr2017)  Requested for: 93WPD5261; Last Rx:16Mar2017; Status: ACTIVE Ordered  Rx By: Yoanna Marrero; Dispense: 15 Days ; #:60 Tablet; Refill: 1;   For: Muscle spasms of neck; JANNY = N; Verified Transmission to [facility]; Last Updated By: System, SureScriThirsty; 3/27/2017 9:18:42 AM     Discussion/Summary    53-year-old male status post post-a transForaminal lumbar interbody fusion    In general is doing very well  I've recommended follow-up on a as-needed basis  Chief Complaint  Patient presents 6 weeks post-op s/p L4-5 and L5-S1 decompressive foraminotomies, transforaminal lumbar interbody and pedicle screw fixation fusion L4-S1 by Dr Doug Jones on 1/30/17      Post-Op  Post-Op Lumbar/Sacral Spine:   Trung Rahman is status post  1/30/17 (DKO) L4-5 AND L5-S1 DECOMPRESSIVE FORAMINOTOMIES, TRANSFORAMINAL LUMBAR INTERBODY AND PEDICLE SCREW FIXATION FUSION L4-S1 (IMPULSE) (N/A)  History of Present Illness:     Patient is a 72y old male who was admitted on 4/27/16 with numbness, tingling and weakness in his left upper extremity and bilateral lower extremities  He underwent a CT cervical spine with demonstrated evidence of critical spinal stenosis so he was seen by neurosurgery and underwent diskectomy, laminectomy and fusion on 5/2/16      Patient seen in office on 6/14/16 for 6 weeks pov with cervical spine xrays  He was severely myelopathic from the spinal cord compression preoperatively which was significantly improved, albeit completely gone  Continued follow-up is essential  We renewed his Lyrica 100 mg TID and Methocarbamol 750 mg q6h prn  We ordered MRI cervical Spine for 6 month follow up in Dec  2016  Patient was seen by Dr Debra Ramires (Neurology) for gait disturbance and numbness in his right thigh and stiffness of both the legs  An MRI lumbar was ordered and completed  Due findings patient was referred to our office for evaluation of this new problem  His primary complaint was that of gait  Walking any distance produces severe he feels that his legs just can't go further  Imaging studies demonstrate severe degenerative disc disease with posterior projecting disc osteophyte complexes at L4-5 and L5-S1  He failed to improve with conservative measures  Patient scheduled for L4/5 and L5/S1 decompressive foraminotomies Transforaminal lumbar interbody and pedicle screw fixation fusion L4-S1 on 1/9/17  Patient was advised to keep his routine follow up appointment today regarding his cervical spine as noted above, with MRI cervical spine completed 12/9/16  He is in general doing very well from the standpoint of his neck  Unfortunately he did have an intercurrent bowel issue requiring surgery  He does continue to improve except for his ambulation which is actually worsening  He is scheduled for a lumbar decompressive surgery in early January however I don't feel that moving this up would be of benefit given his recent GI surgery  On 1/30/17, he underwent a L4-5 and L5-S1 decompressive foraminotomies, transforaminal lumbar interbody and pedicle screw fixation fusion L4-S1      The patient reports lower extremity pain (more like soreness) and walking/standing/sitting intolerance (uses a cane but doing PT and improving), but no nausea, no fever, no lower extremity weakness, no lower extremity numbness and normal bowel and bladder function    The patient presents with complaints of left lower back pain (and center)  Physical Examination:   Assessment:   Plan:        Review of Systems    Constitutional: No fever or chills, feels well, no tiredness, no recent weight gain or weight loss  Eyes: No complaints of eye pain, no red eyes, no discharge from eyes, no itchy eyes  ENT: no complaints of earache, no hearing loss, no nosebleeds, no nasal discharge, no sore throat, no hoarseness  Cardiovascular: No complaints of slow heart rate, no fast heart rate, no chest pain, no palpitations, no leg claudication, no lower extremity  Respiratory: No complaints of shortness of breath, no wheezing, no cough, no SOB on exertion, no orthopnea or PND  Gastrointestinal: No complaints of abdominal pain, no constipation, no nausea or vomiting, no diarrhea or bloody stools  Genitourinary: No complaints of dysuria, no incontinence, no hesitancy, no nocturia, no genital lesion, no testicular pain  Musculoskeletal: limb pain and lower back pain, but as noted in HPI  Integumentary: No complaints of skin rash or skin lesions, no itching, no skin wound, no dry skin  Neurological: difficulty walking (uses a cane), but as noted in HPI, no numbness, no tingling and no limb weakness  Psychiatric: Is not suicidal, no sleep disturbances, no anxiety or depression, no change in personality, no emotional problems  Endocrine: No complaints of proptosis, no hot flashes, no muscle weakness, no erectile dysfunction, no deepening of the voice, no feelings of weakness  Hematologic/Lymphatic: No complaints of swollen glands, no swollen glands in the neck, does not bleed easily, no easy bruising  Active Problems     1  Abnormal blood chemistry (790 6) (R79 9)   2  Adenocarcinoma of prostate (185) (C61)   3  Allergic rhinitis (477 9) (J30 9)   4  Aortic aneurysm (441 9) (I71 9)   5   Atherosclerotic heart disease of native coronary artery without angina pectoris (414 01)   (I25 10)   6  Benign colon polyp (211 3) (K63 5)   7  Bipolar disorder (296 80) (F31 9)   8  Cervical cord compression with myelopathy (336 9) (G95 20)   9  Chest pain (786 50) (R07 9)   10  Cough (786 2) (R05)   11  Diverticulitis (562 11) (K57 92)   12  Dyspnea (786 09) (R06 00)   13  Encounter for postoperative care (V58 49) (Z48 89)   14  Fatty liver (571 8) (K76 0)   15  Gait disturbance (781 2) (R26 9)   16  Gastro-esophageal reflux (530 81) (K21 9)   17  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)   18  Hip pain, chronic, left (719 45,338 29) (M25 552,G89 29)   19  History of heart artery stent (V45 82) (Z95 5)   20  Hyperlipidemia (272 4) (E78 5)   21  Hypertension (401 9) (I10)   22  Irritable bowel syndrome (564 1) (K58 9)   23  Leukocytosis (288 60) (D72 829)   24  Lumbar spinal stenosis (724 02) (M48 06)   25  Muscle spasms of neck (728 85) (M62 838)   26  Need for pneumococcal vaccination (V03 82) (Z23)   27  Need for prophylactic vaccination and inoculation against influenza (V04 81) (Z23)   28  Neurogenic claudication (724 03) (M48 06)   29  Postop check (V67 00) (Z09)   30  Post-op pain (338 18) (G89 18)   31  Pre-operative cardiovascular examination (V72 81) (Z01 810)   32  Preoperative evaluation to rule out surgical contraindication (V72 83) (Z01 818)   33  Screening for genitourinary condition (V81 6) (Z13 89)   34  Spondylolisthesis of lumbar region (738 4) (M43 16)   35  Spondylosis of lumbar region without myelopathy or radiculopathy (721 3) (M47 816)   36  Tremor (781 0) (R25 1)   37  Weakness of extremity (729 89) (R29 898)    Chronic rhinitis (472 0) (J31 0)       Chronic obstructive pulmonary disease, unspecified (496) (J44 9)          Social History    · Being A Social Drinker   · Daily Coffee Consumption (1  Cups/Day)   · Education history   · [employer]   · Former smoker (I28 16) (O86 412)   · Quit July 2013  [place] [facility] Had smoked for 35 years, a pack of cigarettes a day   · Lives independently with spouse   · Lives with spouse   · Marital history   ·    · One child   · Retired   ·     Current Meds   1  Advair Diskus 250-50 MCG/DOSE Inhalation Aerosol Powder Breath Activated; USE 1   INHALATION TWICE A DAY RINSE MOUTH AFTER USE; Therapy: 04XKS6020 to (Naun Cutler)  Requested for: 86Mcp8931; Last   Rx:02Aug2016 Ordered   2  Albuterol Sulfate 108 (90 Base) MCG/ACT AERS; INHALE PUFFS  PRN; Therapy: (Recorded:86Qbp8790) to Recorded   3  Allegra 180 MG TABS; TAKE 1 TABLET DAILY AS NEEDED; Therapy: (Recorded:14Jun2016) to Recorded   4  AmLODIPine Besylate 2 5 MG Oral Tablet; Take 1 tablet daily; Therapy: 44GBG0290 to ([facility])  Requested for: 74XIN6943; Last   Rx:07Nov2016 Ordered   5  Aspirin 81 MG Oral Tablet Delayed Release; TAKE 1 TABLET DAILY; Therapy: (Recorded:14Jun2016) to Recorded   6  Atorvastatin Calcium 40 MG Oral Tablet; TAKE 1 TABLET DAILY  Requested for:   88IDX2319; Last Rx:08Xlz2266 Ordered   7  Dicyclomine HCl - 20 MG Oral Tablet; TAKE 1 TABLET EVERY 4 TO 6 HOURS DAILY AS   NEEDED; Therapy: 88YOJ2294 to (Evaluate:59Bug4964)  Requested for: 03Jtv2092; Last   Rx:29Aug2016 Ordered   8  Fluticasone Propionate 50 MCG/ACT Nasal Suspension; USE 2 SPRAYS IN EACH   NOSTRIL ONCE DAILY; Therapy: 23TYO9132 to ([phone])  Requested for: 38CDC9093; Last   Rx:05Nov2015 Ordered   9  Gabapentin 100 MG Oral Capsule; take 2 capsule 3 times daily  Requested for:   95SDN0170; Last Rx:06Mar2017 Ordered   10  Hydrocodone-Acetaminophen 5-325 MG Oral Tablet; Take 1/2 to 1 tablet every 6 hours as    needed for pain; Therapy: 00KDH5104 to (Last Rx:45Rrv1464) Ordered   11  Lithium Carbonate 300 MG Oral Capsule; alternate 1 capsule with 2 capsules everyother    day; Therapy: 94End5772 to Recorded   12  LORazepam 1 MG Oral Tablet; take 1 tablet daily prn; Therapy: (Recorded:03Nov2016) to Recorded   13   Metoprolol Succinate ER 25 MG Oral Tablet Extended Release 24 Hour; TAKE 1 TABLET    DAILY; Therapy: 89XCL4384 to (Tavo Goodrich)  Requested for: 55GFV7522; Last    Rx:07Nov2016 Ordered   14  Nitroglycerin 0 2 MG/HR Transdermal Patch 24 Hour; APPLY 1 PATCH DAILY (OFF FOR    12 HOURS); Therapy: 58Lad1975 to (Last Elisabeth Books)  Requested for: 68Xks9825 Ordered   15  Omega-3-acid Ethyl Esters 1 GM Oral Capsule; TAKE 1 TABLET TID  Requested for:    31Vpk5816; Last Rx:54Hmw5747 Ordered   16  Omeprazole 40 MG Oral Capsule Delayed Release; TAKE 1 CAPSULE DAILY; Last    Rx:07Nov2016 Ordered   17  ProAir  (90 Base) MCG/ACT Inhalation Aerosol Solution; INHALE 1 TO 2 PUFFS    EVERY 6 HOURS AS NEEDED; Therapy: 40Evl9637 to (Evaluate:09Sep2017)  Requested for: 72Bwl2302; Last    Rx:14Sep2016 Ordered   18  Robaxin TABS; Therapy: (Recorded:06Mar2017) to Recorded   19  Spiriva Respimat 2 5 MCG/ACT Inhalation Aerosol Solution; TAKE 2 INHALATIONS DAILY; Therapy: 87BUR6031 to (Evaluate:09Sep2017)  Requested for: 52Xjo9935; Last    Rx:14Sep2016 Ordered   20  Tylenol 500 MG CAPS; 1   PRN; Therapy: (Recorded:14Jun2016) to Recorded   21  Vitamin B Complex Oral Tablet; TAKE 1 TABLET DAILY; Therapy: (Recorded:03Nov2016) to Recorded   22  Vitamin D 2000 UNIT Oral Tablet; take 1 tablet by mouth once daily; Therapy: (Recorded:20Apr2015) to Recorded    Allergies    1  Percocet TABS    Vitals   Recorded: 87PXV7034 11:15AM   Temperature 96 1 F   Heart Rate 65   Respiration 16   Systolic 703   Diastolic 58   Height 5 ft 7 5 in   Weight 170 lb    BMI Calculated 26 23   BSA Calculated 1 9   Pain Scale 5     Physical Exam   (incisions are clean and dry  ROM is good  Station and gait are normal)  Results/Data    I personally reviewed the xrays of the LS spine in detail with the patient  X-ray Review X-rays of the LS spine demonstrate instrumentation to be in good position   There is no movement on flexion extension films  The calcified aorta outlines and abdominal aortic aneurysm which the patient artery new about  Message  Return to work or school:   Antonio Fiore is under my professional care  He was seen in my office on 3/16/17   He is able to return to work on  4/10/17            Future Appointments    Date/Time Provider Specialty Site   04/11/2017 02:30 PM Elver Jack MD Neurology [address] [facility]   05/18/2017 10:30 AM BETITO Carrington   Internal Medicine [facility]     Signatures   Electronically signed by : BETITO Juarez ; Apr 4 2017 12:58PM EST                       (Author)

## 2018-01-12 NOTE — MISCELLANEOUS
Message   Recorded as Task   Date: 08/15/2016 01:34 PM, Created By: Lucie Mcneill   Task Name: Miscellaneous   Assigned To: Aletha Rajput   Regarding Patient: Kwasi Bradley, Status: Active   Comment:    Aletha Rajput - 15 Aug 2016 1:34 PM     TASK CREATED  Pt volunteers in the ED of [facility]  He has already returned to his volunteer duties, but has just been asked for a return to duty note  This was provided and faxed to [phone]  Pt notified          Signatures   Electronically signed by : Blade Meadows, ; Aug 15 2016  1:34PM EST                       (Author)

## 2018-01-13 VITALS
WEIGHT: 175 LBS | HEART RATE: 61 BPM | BODY MASS INDEX: 27 KG/M2 | TEMPERATURE: 97.6 F | DIASTOLIC BLOOD PRESSURE: 76 MMHG | OXYGEN SATURATION: 99 % | SYSTOLIC BLOOD PRESSURE: 120 MMHG | RESPIRATION RATE: 18 BRPM

## 2018-01-13 VITALS
OXYGEN SATURATION: 97 % | BODY MASS INDEX: 26.98 KG/M2 | TEMPERATURE: 97 F | WEIGHT: 178 LBS | SYSTOLIC BLOOD PRESSURE: 112 MMHG | HEART RATE: 60 BPM | HEIGHT: 68 IN | DIASTOLIC BLOOD PRESSURE: 64 MMHG | RESPIRATION RATE: 18 BRPM

## 2018-01-13 VITALS
HEIGHT: 68 IN | OXYGEN SATURATION: 99 % | RESPIRATION RATE: 16 BRPM | HEART RATE: 55 BPM | SYSTOLIC BLOOD PRESSURE: 108 MMHG | BODY MASS INDEX: 26.83 KG/M2 | DIASTOLIC BLOOD PRESSURE: 62 MMHG | WEIGHT: 177.05 LBS | TEMPERATURE: 97.5 F

## 2018-01-13 VITALS
HEIGHT: 68 IN | SYSTOLIC BLOOD PRESSURE: 128 MMHG | OXYGEN SATURATION: 97 % | BODY MASS INDEX: 27.17 KG/M2 | WEIGHT: 179.25 LBS | HEART RATE: 48 BPM | DIASTOLIC BLOOD PRESSURE: 72 MMHG

## 2018-01-13 VITALS
HEIGHT: 68 IN | HEART RATE: 73 BPM | OXYGEN SATURATION: 95 % | BODY MASS INDEX: 26.07 KG/M2 | SYSTOLIC BLOOD PRESSURE: 120 MMHG | RESPIRATION RATE: 16 BRPM | WEIGHT: 172 LBS | TEMPERATURE: 96.2 F | DIASTOLIC BLOOD PRESSURE: 78 MMHG

## 2018-01-13 VITALS
WEIGHT: 169.56 LBS | HEART RATE: 67 BPM | DIASTOLIC BLOOD PRESSURE: 64 MMHG | OXYGEN SATURATION: 99 % | SYSTOLIC BLOOD PRESSURE: 112 MMHG | HEIGHT: 68 IN | BODY MASS INDEX: 25.7 KG/M2

## 2018-01-13 VITALS
HEIGHT: 67 IN | BODY MASS INDEX: 28.72 KG/M2 | RESPIRATION RATE: 16 BRPM | WEIGHT: 183 LBS | HEART RATE: 54 BPM | SYSTOLIC BLOOD PRESSURE: 124 MMHG | DIASTOLIC BLOOD PRESSURE: 76 MMHG

## 2018-01-13 VITALS
BODY MASS INDEX: 26.83 KG/M2 | HEIGHT: 68 IN | DIASTOLIC BLOOD PRESSURE: 80 MMHG | RESPIRATION RATE: 18 BRPM | HEART RATE: 69 BPM | SYSTOLIC BLOOD PRESSURE: 120 MMHG | OXYGEN SATURATION: 97 % | WEIGHT: 177.05 LBS

## 2018-01-13 VITALS
BODY MASS INDEX: 28.41 KG/M2 | DIASTOLIC BLOOD PRESSURE: 80 MMHG | SYSTOLIC BLOOD PRESSURE: 132 MMHG | HEIGHT: 67 IN | TEMPERATURE: 96.2 F | RESPIRATION RATE: 16 BRPM | WEIGHT: 181 LBS | HEART RATE: 64 BPM

## 2018-01-14 VITALS
SYSTOLIC BLOOD PRESSURE: 130 MMHG | OXYGEN SATURATION: 98 % | WEIGHT: 182.13 LBS | BODY MASS INDEX: 27.6 KG/M2 | HEART RATE: 60 BPM | HEIGHT: 68 IN | DIASTOLIC BLOOD PRESSURE: 80 MMHG

## 2018-01-14 VITALS
HEART RATE: 53 BPM | DIASTOLIC BLOOD PRESSURE: 64 MMHG | HEIGHT: 67 IN | OXYGEN SATURATION: 95 % | WEIGHT: 181.25 LBS | BODY MASS INDEX: 28.45 KG/M2 | SYSTOLIC BLOOD PRESSURE: 130 MMHG

## 2018-01-14 VITALS
DIASTOLIC BLOOD PRESSURE: 58 MMHG | HEART RATE: 65 BPM | WEIGHT: 170 LBS | SYSTOLIC BLOOD PRESSURE: 111 MMHG | HEIGHT: 68 IN | RESPIRATION RATE: 16 BRPM | BODY MASS INDEX: 25.76 KG/M2 | TEMPERATURE: 96.1 F

## 2018-01-14 VITALS
SYSTOLIC BLOOD PRESSURE: 134 MMHG | HEIGHT: 68 IN | BODY MASS INDEX: 26.45 KG/M2 | WEIGHT: 174.5 LBS | DIASTOLIC BLOOD PRESSURE: 75 MMHG | RESPIRATION RATE: 16 BRPM | HEART RATE: 89 BPM

## 2018-01-14 NOTE — RESULT NOTES
Verified Results  (1) CK (CPK) 86GNX2478 09:00AM Edelmira [place] Order Number: [id number]  TW Order Number: [id number] Order Number: [id number] Order Number: [id number]     Test Name Result Flag Reference   CK (CPK) 156 U/L

## 2018-01-14 NOTE — MISCELLANEOUS
Assessment    1  Small bowel obstruction (560 9) (K56 69)   2  Hypertension (401 9) (I10)   3  Hyperlipidemia (272 4) (E78 5)    Discussion/Summary  Discussion Summary:   S/P surgery for small bowel obstruction: no post op complications  HTN: continue blood pressure medication(s)  Hyperlipidemia: continue med(s), avoid saturated fats  Moving towards a more plant-based diet will also improve your cholesterol  Chief Complaint  Chief Complaint Free Text Note Form: TCM      History of Present Illness  TCM Communication [facility]: The patient is being contacted for follow-up after hospitalization  Hospital records were reviewed  He was hospitalized at [facility]  The date of admission: 11/17/2016, date of discharge: 11/25/2016  Diagnosis: Small bowel obstruction  He was discharged to home  Medications were not reviewed today  He scheduled a follow up appointment  The patient is currently asymptomatic  Counseling was provided to the patient  Communication performed and completed by Kaycee Workman   HPI: I reviewed hospitalization for SBO and exploratory laparotomy with lysis of adhesions  No f/c/s, cough, no SOB, no dysuria, no calf swelling or pain      Review of Systems  Complete-Male:   Constitutional: no fever, no chills and not feeling tired  Cardiovascular: the heart rate was not slow, no chest pain, the heart rate was not fast, no palpitations and no extremity edema  Respiratory: shortness of breath during exertion, but no shortness of breath and no cough  Gastrointestinal: no constipation and no diarrhea  Genitourinary: no dysuria  Psychiatric: no anxiety and no depression  Active Problems    1  Acute bronchitis (466 0) (J20 9)   2  Adenocarcinoma of prostate (185) (C61)   3  Allergic rhinitis (477 9) (J30 9)   4  Aortic aneurysm (441 9) (I71 9)   5  Atherosclerotic heart disease of native coronary artery without angina pectoris (414 01)   (I25 10)   6   Benign colon polyp (211  3) (K63 5)   7  Bipolar disorder (296 80) (F31 9)   8  Cervical cord compression with myelopathy (336 9) (G95 20)   9  Chest pain (786 50) (R07 9)   10  Chronic obstructive pulmonary disease (496) (J44 9)   11  Chronic obstructive pulmonary disease, unspecified (496) (J44 9)   12  Chronic rhinitis (472 0) (J31 0)   13  Cough (786 2) (R05)   14  Diverticulitis (562 11) (K57 92)   15  Dyspnea (786 09) (R06 00)   16  Fatty liver (571 8) (K76 0)   17  Gait disturbance (781 2) (R26 9)   18  Gastro-esophageal reflux (530 81) (K21 9)   19  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)   20  Heart burn (787 1) (R12)   21  History of heart artery stent (V45 82) (Z95 5)   22  Hypercholesterolemia (272 0) (E78 00)   23  Hyperlipidemia (272 4) (E78 5)   24  Hypertension (401 9) (I10)   25  Irritable bowel syndrome (564 1) (K58 9)   26  Leukocytosis (288 60) (D72 829)   27  Lumbar spinal stenosis (724 02) (M48 06)   28  Muscle spasms of neck (728 85) (M62 838)   29  Need for pneumococcal vaccination (V03 82) (Z23)   30  Pre-operative cardiovascular examination (V72 81) (Z01 810)   31  Preoperative evaluation to rule out surgical contraindication (V72 83) (Z01 818)   32  Screening for genitourinary condition (V81 6) (Z13 89)   33  Spondylolisthesis of lumbar region (738 4) (M43 16)   34  Spondylosis of lumbar region without myelopathy or radiculopathy (721 3) (M47 816)   35  Tremor (781 0) (R25 1)   36  Weakness of extremity (729 89) (M62 81)    Past Medical History    1  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)   2  Benign colon polyp (211 3) (K63 5)   3  History of Diverticulosis (562 10) (K57 90)   4  Gastro-esophageal reflux (530 81) (K21 9)   5  History of diverticulitis of colon (V12 79) (Z87 19)   6  History of malignant neoplasm of lung (V10 11) (Z85 118)   7  History of malignant neoplasm of prostate (V10 46) (Z85 46)   8  Irritable bowel syndrome (564 1) (K58 9)   9   History of Malignant neoplasm of lung, unspecified laterality   10  History of Myocardial infarction involving other coronary artery (410 80) (I21 29)    Surgical History    1  History of Colon Surgery   2  History of Diagnostic Esophagogastroduodenoscopy   3  History of Lung Lobectomy   4  History of Surgery Prostate Prostatotomy   5  History of Wedge Resection Of Lung    Family History  Mother    1  Family history of Carcinoma Of The Small Intestine (V16 0)  Father    2  Family history of Carcinoma Of The Small Intestine (V16 0)   3  Family history of Coronary Artery Disease (V17 49)  Maternal Grandmother    4  Family history of   Paternal Grandmother    11  Family history of   Maternal Grandfather    6  Family history of   Paternal Grandfather    9  Family history of   Family History    8  Family history of Heart Disease (V17 49)   9  Family history of Hyperlipidemia   10  Family history of Hypertension (V17 49)    Social History    · Being A Social Drinker   · Daily Coffee Consumption (1  Cups/Day)   · Education history   · Former smoker (V15 82) (C40 856)   · Lives independently with spouse   · Lives with spouse   · Marital history   · One child   · Retired    Current Meds   1  Advair Diskus 250-50 MCG/DOSE Inhalation Aerosol Powder Breath Activated; USE 1   INHALATION TWICE A DAY RINSE MOUTH AFTER USE; Therapy: 30YYR3964 to (Lindy Fuller)  Requested for: 23Mph7230; Last   Rx:68Cby9805 Ordered   2  Albuterol Sulfate 108 (90 Base) MCG/ACT AERS; INHALE PUFFS  PRN; Therapy: (Recorded:18Niu0601) to Recorded   3  Allegra 180 MG TABS; TAKE 1 TABLET DAILY AS NEEDED; Therapy: (Recorded:79Che4607) to Recorded   4  AmLODIPine Besylate 2 5 MG Oral Tablet; Take 1 tablet daily; Therapy: 53DUC3228 to (Lizzie Vivar)  Requested for: 31YXC2846; Last   Rx:2016 Ordered   5  AmLODIPine Besylate 2 5 MG Oral Tablet; TAKE ONE TABLET BY MOUTH IN THE   EVENING  Requested for: 18IBW8121; Last Rx:2015 Ordered   6  Aspirin 81 MG Oral Tablet Delayed Release; TAKE 1 TABLET DAILY; Therapy: (Recorded:14Jun2016) to Recorded   7  Atorvastatin Calcium 40 MG Oral Tablet; TAKE 1 TABLET DAILY  Requested for:   72DNY8789; Last FH:00AKE0881 Ordered   8  Dicyclomine HCl - 20 MG Oral Tablet; TAKE 1 TABLET EVERY 4 TO 6 HOURS DAILY AS   NEEDED; Therapy: 38TPY3793 to (Evaluate:68Kmf7801)  Requested for: 35Anj7725; Last   Rx:62Pxi1455 Ordered   9  Fluticasone Propionate 50 MCG/ACT Nasal Suspension; USE 2 SPRAYS IN EACH   NOSTRIL ONCE DAILY; Therapy: 04XCF9689 to (Mihailene Jewels)  Requested for: 50WSW0098; Last   Rx:05Nov2015 Ordered   10  Lithium Carbonate 300 MG Oral Capsule; alternate 1 capsule with 2 capsules everyother    day; Therapy: 21Nli7854 to Recorded   11  LORazepam 1 MG Oral Tablet; take 1 tablet daily prn; Therapy: (Recorded:03Nov2016) to Recorded   12  Metoprolol Succinate ER 25 MG Oral Tablet Extended Release 24 Hour; TAKE 1 TABLET    DAILY; Therapy: 24DUD4511 to (Pricila Hsieh)  Requested for: 41TID0835; Last    Rx:07Nov2016 Ordered   13  Metoprolol Succinate ER 25 MG Oral Tablet Extended Release 24 Hour; TAKE 1 TABLET    ONCE DAILY  Requested for: 73NJK8252; Last Rx:75Zli3111 Ordered   14  Nitroglycerin 0 2 MG/HR Transdermal Patch 24 Hour; APPLY 1 PATCH DAILY (OFF FOR    12 HOURS); Therapy: 29Wrj7701 to (Ke Anton)  Requested for: 08Ddj0315 Ordered   15  Omega-3-acid Ethyl Esters 1 GM Oral Capsule; TAKE 1 TABLET TID  Requested for:    61Eda1025; Last Rx:43Kiv6846 Ordered   16  Omeprazole 40 MG Oral Capsule Delayed Release; TAKE 1 CAPSULE DAILY; Last    Rx:07Nov2016 Ordered   17  ProAir  (90 Base) MCG/ACT Inhalation Aerosol Solution; INHALE 1 TO 2 PUFFS    EVERY 6 HOURS AS NEEDED; Therapy: 55Got5324 to (Evaluate:84Pms1718)  Requested for: 00Jln4797; Last    Rx:19Xua0748 Ordered   18  Spiriva Respimat 2 5 MCG/ACT Inhalation Aerosol Solution; TAKE 2 INHALATIONS DAILY;     Therapy: 01ZTW5784 to (Evaluate:12Uwa4415)  Requested for: 73Uwm5292; Last    Rx:60Nux9731 Ordered   19  Tylenol 500 MG CAPS; 1   PRN; Therapy: (Recorded:64Yns4209) to Recorded   20  Vitamin B Complex Oral Tablet; TAKE 1 TABLET DAILY; Therapy: (Recorded:67Spt1993) to Recorded   21  Vitamin D 2000 UNIT Oral Tablet; take 1 tablet by mouth once daily; Therapy: (Recorded:74Csy6329) to Recorded    Allergies    1  Percocet TABS    Vitals  Signs   Recorded: 02AJW2655 01:56PM   Respiration: 16  Systolic: 690, Sitting  Diastolic: 60, Sitting  Height: 5 ft 7 5 in  Weight: 174 lb   BMI Calculated: 26 85  BSA Calculated: 1 92    Physical Exam    Constitutional   General appearance: No acute distress, well appearing and well nourished  Head and Face   Head and face: Normal     Ears, Nose, Mouth, and Throat   External inspection of ears and nose: Normal     Oropharynx: Normal with no erythema, edema, exudate or lesions  Neck   Neck: Supple, symmetric, trachea midline, no masses  Thyroid: Normal, no thyromegaly  Pulmonary   Respiratory effort: No increased work of breathing or signs of respiratory distress  Auscultation of lungs: Clear to auscultation  Cardiovascular   Auscultation of heart: Normal rate and rhythm, normal S1 and S2, no murmurs  Carotid pulses: 2+ bilaterally  Examination of extremities for edema and/or varicosities: Normal     Abdomen   Abdomen: Non-tender, no masses  incision intact, no erythema or drainage  Lymphatic   Palpation of lymph nodes in neck: No lymphadenopathy  Neurologic   Cortical function: Normal mental status  Psychiatric   Recent and remote memory: Intact  Mood and affect: Normal        Health Management  Benign colon polyp   COLONOSCOPY; every 5 years; Last 61UTB8596; Next Due: 23QFK2778; Active  COLONOSCOPY (GI, SURG); every 5 years; Last 77XCN8935; Next Due: 93WNW4225;  Overdue    Future Appointments    Date/Time Provider Specialty Site   12/06/2016 03:00 PM Ludwin Pate MD Neurology [facility]   01/23/2017 10:30 AM NeuroSX, Nursetwo Schedule  [facility]   12/15/2016 10:00 AM BETITO Arauz  Neurosurgery [facility]   01/09/2017 08:00 AM BETITO Arauz  [address]   02/23/2017 10:00 AM BETITO Arauz  Neurosurgery [facility]   12/22/2016 03:45 PM Khadijah Bautista MD Vascular Surgery [facility]   12/20/2016 02:30 PM BETITO Sofia  [facility] Medicine [facility]   02/28/2017 10:30 AM BETITO Sofia   [facility] Medicine [facility]     Signatures   Electronically signed by : Kael Everett, ; Nov 25 2016  2:02PM EST                       (Author)    Electronically signed by : BETITO Nation ; Nov 30 2016  2:07PM EST                       (Author)

## 2018-01-15 VITALS
SYSTOLIC BLOOD PRESSURE: 113 MMHG | RESPIRATION RATE: 16 BRPM | HEART RATE: 63 BPM | BODY MASS INDEX: 26.98 KG/M2 | WEIGHT: 178 LBS | HEIGHT: 68 IN | DIASTOLIC BLOOD PRESSURE: 65 MMHG

## 2018-01-15 NOTE — MISCELLANEOUS
Message   Recorded as Task   Date: 08/11/2016 03:35 PM, Created By: Duke Cordova   Task Name: Miscellaneous   Assigned To: Aletha Rajput   Regarding Patient: Nini Mao, Status: Active   Comment:    Aletha Rajput - 11 Aug 2016 3:35 PM     TASK CREATED  Reviewed recent MRI C spine with NATI PICKARD and notified pt that MRI results do not account for his current symptoms of lower extremity weakness and instabiltiy  Pt asked if he should consider Neurology referral  Discussed with NATI Arreola and it was felt this was a reasonable request  Referral ordered  He was instructed to keep December appt  and a new script for f/u MRI would be sent to his home  He stated an understanding          Signatures   Electronically signed by : Nelson Martins, ; Aug 11 2016  3:36PM EST                       (Author)

## 2018-01-15 NOTE — MISCELLANEOUS
Message   Recorded as Task   Date: 02/14/2017 02:19 PM, Created By: Yeison Shaikh   Task Name: Med Renewal Request   Assigned To: Yeison Shaikh   Regarding Patient: Blessing Graham, Status: Active   Comment:    Alteha Rajput - 14 Feb 2017 2:19 PM     TASK CREATED  Pt requests a refill of Hydrocodone/acetaminophen 5/325  Pt admits that he did not always take the medication as prescribed  he took 1 5 pills = 7 5/325 and is therefore out of medication  Discussed with pt that this would be reviewed with a provider to determine refill and dosage  He did report he does not want to accept anything but the 7 5 mg dose  Discussed with DKO, and informed the pt we would provide a script for the medication and dosage prescribed at d/c  from the [facility]  When the script is p/u at the office he may schedule an appt with someone to discuss the management of his current discomfort  He was agreeable to this plan  It was explained that pts should not change the medication dosage on their own for safety reasons, as well as making it difficult to renew if they have taken more than prescribed  He stated an understanding  He reports he did this on the week end because the office was closed  He was informed he could call the office at any time even if closed, there is always someone on call  He stated an understanding     Aletha Rajput - 14 Feb 2017 2:23 PM     TASK EDITED  See patient report in chart        Signatures   Electronically signed by : [facility], ; Feb 14 2017  2:24PM EST                       (Author)

## 2018-01-16 NOTE — MISCELLANEOUS
Message   Recorded as Task   Date: 08/03/2016 02:17 PM, Created By: Kaila Best   Task Name: Miscellaneous   Assigned To: Aletha Rajput   Regarding Patient: Anette Robins, Status: Active   Comment:    Aletha Rajput - 03 Aug 2016 2:17 PM     TASK CREATED  Pt reports having a worsening of some symptoms  His post-op neck and shoulder pain has increased  He at times gets shooting pain from neck to head  His R leg feels different from his L, and at times he feels his balance is off  discussed with Etienne Magaña  Inquired if pt had a pain specialist since relief of the pain symptom was not necessarily an end result of this surgery  He is aware of this  it was discussed that if he would like to have the MRI due in Dec  completed earlier he may  He has decided to wait and monitor and if symptoms progressively worsen and gate because an issue he will consider MRI          Signatures   Electronically signed by : Erika Marina, ; Aug  3 2016  2:17PM EST                       (Author)

## 2018-01-16 NOTE — PROGRESS NOTES
Assessment    1  Lumbar spinal stenosis (724 02) (M48 06)   2  Neurogenic claudication (724 03) (M48 06)   3  Encounter for postoperative care (S36 47) (H88 20)    Discussion/Summary    Very pleasant 46-year-old male, returns for postoperative follow-up approximately 2 weeks, and to discuss his postprocedure pain medication  He reports his walking is markedly improved both duration and discomfort, his right leg pain is also somewhat less  He is overall very pleased with surgical outcome  Clinically he is doing very well, there are no focal deficits in his ambulation is improved  Incisions are well healing nicely  Pain medication was discussed, and he understands to call should he run a little low, he was advised to continue with his current taper, he reports, he staking hydromorphone approximately 3 times daily as opposed to scheduled for, and he reports he takes is hydrocodone 5/3/25, 1 1/4 or 1-1/2 tabs every 4-6 hours  He continues on Neurontin 200 mg 3 times daily, and is taking his methocarbamol 3-4 times daily as needed  He has been advised relative to tapering Neurontin but this should not be for at least 2 months, is also advised to at minimum take methocarbamol at bedtime for the foreseeable future  She understands activities, lifting no more than 10 pounds, occasional bending, and ambulation as tolerated  Wound care was reviewed with the patient, specifically he is to observe for redness, swelling, increased pain, drainage or fever, should these be observed he understands he is too call and/or return immediately for reassessment  Additionally patient understands he may shower, wash with soap and water, pat wound dry, he also understands he is to avoid immersion in water such as swimming, hot tub use or tub bath  Further follow-up is planned in approximately 4 weeks with Dr Judy Montelongo      These findings, impressions and recommendations are reviewed in great detail with the patient, he expressed understanding and agreement, his questions were answered completely and to his satisfaction  Follow up has been scheduled  The patient has the current Goals: Increase activity level gradually  The patent has the current Barriers: Post operative pain  Patient is able to Self-Care  The patient, patient's family was counseled regarding instructions for management, patient and family education, importance of compliance with treatment  Chief Complaint  Postoperative follow-up, 2 weeks, status post lumbar decompression  Review further pain management      Post-Op  Post-Op Lumbar/Sacral Spine:   Taurus Gale is status post on 1/30/2017   L4-5 AND L5-S1 DECOMPRESSIVE FORAMINOTOMIES, TRANSFORAMINAL LUMBAR INTERBODY AND PEDICLE SCREW FIXATION FUSION L4-S1 (IMPULSE) (N/A)        History of Present Illness: The patient reports back pain and lower extremity pain, but no nausea, no fever, no lower extremity weakness, no lower extremity numbness, normal bowel and bladder function and walking/standing/sitting tolerance  Physical Examination:   Surgical incision site is clean, dry and intact (the incision site was intact and had no drainage )  The surrounding skin findings included normal skin turgor  (Healing very nicely some small areas of eshar, no sign of infection, wound dry without drainage)  Evaluation of the back demonstrates no warmth, no erythema, no swelling, no induration, no ecchymosis and no tenderness  The gait was normal and the station was normal    Lower Extremity DVT Assessment: no signs or symptoms suggestive of deep vein thrombosis, no calf swelling and no palpable cord in calf  Assessment:   Post-op, the patient is doing well, with good pain control and without signs of an infection  Plan:        Review of Systems    Constitutional: No fever or chills, feels well, no tiredness, no recent weight gain or weight loss     Eyes: No complaints of eye pain, no red eyes, no discharge from eyes, no itchy eyes  ENT: no complaints of earache, no hearing loss, no nosebleeds, no nasal discharge, no sore throat, no hoarseness  Cardiovascular: No complaints of slow heart rate, no fast heart rate, no chest pain, no palpitations, no leg claudication, no lower extremity  Respiratory: No complaints of shortness of breath, no wheezing, no cough, no SOB on exertion, no orthopnea or PND  Gastrointestinal: No complaints of abdominal pain, no constipation, no nausea or vomiting, no diarrhea or bloody stools  Genitourinary: No complaints of dysuria, no incontinence, no hesitancy, no nocturia, no genital lesion, no testicular pain  Musculoskeletal: arthralgias, limb pain and joint stiffness, but as noted in HPI  Integumentary: skin wound and healing  Neurological: tingling (anterior thighs)  Psychiatric: Is not suicidal, no sleep disturbances, no anxiety or depression, no change in personality, no emotional problems  Endocrine: No complaints of proptosis, no hot flashes, no muscle weakness, no erectile dysfunction, no deepening of the voice, no feelings of weakness  Hematologic/Lymphatic: No complaints of swollen glands, no swollen glands in the neck, does not bleed easily, no easy bruising  ROS reviewed  Active Problems    1  Adenocarcinoma of prostate (185) (C61)   2  Allergic rhinitis (477 9) (J30 9)   3  Aortic aneurysm (441 9) (I71 9)   4  Atherosclerotic heart disease of native coronary artery without angina pectoris (414 01)   (I25 10)   5  Benign colon polyp (211 3) (K63 5)   6  Bipolar disorder (296 80) (F31 9)   7  Cervical cord compression with myelopathy (336 9) (G95 20)   8  Chest pain (786 50) (R07 9)   9  Chronic obstructive pulmonary disease, unspecified (496) (J44 9)   10  Chronic rhinitis (472 0) (J31 0)   11  Cough (786 2) (R05)   12  Diverticulitis (562 11) (K57 92)   13  Dyspnea (786 09) (R06 00)   14  Fatty liver (571 8) (K76 0)   15   Gait disturbance (781 2) (R26 9)   16  Gastro-esophageal reflux (530 81) (K21 9)   17  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)   18  Hip pain, chronic, left (719 45,338 29) (M25 552,G89 29)   19  History of heart artery stent (V45 82) (Z95 5)   20  Hyperlipidemia (272 4) (E78 5)   21  Hypertension (401 9) (I10)   22  Irritable bowel syndrome (564 1) (K58 9)   23  Leukocytosis (288 60) (D72 829)   24  Lumbar spinal stenosis (724 02) (M48 06)   25  Muscle spasms of neck (728 85) (M62 838)   26  Need for pneumococcal vaccination (V03 82) (Z23)   27  Need for prophylactic vaccination and inoculation against influenza (V04 81) (Z23)   28  Neurogenic claudication (724 03) (M48 06)   29  Postop check (V67 00) (Z09)   30  Post-op pain (338 18) (G89 18)   31  Pre-operative cardiovascular examination (V72 81) (Z01 810)   32  Preoperative evaluation to rule out surgical contraindication (V72 83) (Z01 818)   33  Screening for genitourinary condition (V81 6) (Z13 89)   34  Spondylolisthesis of lumbar region (738 4) (M43 16)   35  Spondylosis of lumbar region without myelopathy or radiculopathy (721 3) (M47 816)   36  Tremor (781 0) (R25 1)   37  Weakness of extremity (729 89) (M62 81)    Social History    · Being A Social Drinker   · Daily Coffee Consumption (1  Cups/Day)   · Education history   · [employer]   · Former smoker (B10 06) (J41 331)   · Quit July 2013  Cori Sanches Had smoked for 35 years, a pack of cigarettes a day   · Lives independently with spouse   · Lives with spouse   · Marital history   ·    · One child   · Retired   ·     Current Meds   1  Advair Diskus 250-50 MCG/DOSE Inhalation Aerosol Powder Breath Activated; USE 1   INHALATION TWICE A DAY RINSE MOUTH AFTER USE; Therapy: 92XSC0933 to (Claire Donta)  Requested for: 37Wkc6421; Last   Rx:29Ixp0828 Ordered   2  Albuterol Sulfate 108 (90 Base) MCG/ACT AERS; INHALE PUFFS  PRN; Therapy: (Recorded:14Sep2016) to Recorded   3   Allegra 180 MG TABS; TAKE 1 TABLET DAILY AS NEEDED; Therapy: (Recorded:14Jun2016) to Recorded   4  AmLODIPine Besylate 2 5 MG Oral Tablet; Take 1 tablet daily; Therapy: 85PUE6976 to (Tung Rubio)  Requested for: 80TJK8388; Last   Rx:07Nov2016 Ordered   5  Aspirin 81 MG Oral Tablet Delayed Release; TAKE 1 TABLET DAILY; Therapy: (Recorded:14Jun2016) to Recorded   6  Atorvastatin Calcium 40 MG Oral Tablet; TAKE 1 TABLET DAILY  Requested for:   46ECW1315; Last Rx:11Nah7592 Ordered   7  Dicyclomine HCl - 20 MG Oral Tablet; TAKE 1 TABLET EVERY 4 TO 6 HOURS DAILY AS   NEEDED; Therapy: 02ALB5712 to (Evaluate:65Ipv7734)  Requested for: 61Kye1850; Last   Rx:60Bqr7071 Ordered   8  Fluticasone Propionate 50 MCG/ACT Nasal Suspension; USE 2 SPRAYS IN EACH   NOSTRIL ONCE DAILY; Therapy: 29JFY9471 to (Juan Mccoy)  Requested for: 89TOZ0212; Last   Rx:05Nov2015 Ordered   9  Hydrocodone-Acetaminophen 5-325 MG Oral Tablet; TAKE 1 TABLET Every 4 hours PRN; Therapy: 17WUJ4498 to (Evaluate:79Xjj8939); Last Rx:99Meo7515 Ordered   10  Lithium Carbonate 300 MG Oral Capsule; alternate 1 capsule with 2 capsules everyother    day; Therapy: 15Fsu6764 to Recorded   11  LORazepam 1 MG Oral Tablet; take 1 tablet daily prn; Therapy: (Recorded:03Nov2016) to Recorded   12  Metoprolol Succinate ER 25 MG Oral Tablet Extended Release 24 Hour; TAKE 1 TABLET    DAILY; Therapy: 39ZVO0929 to (Tung Rubio)  Requested for: 72OAZ9428; Last    Rx:07Nov2016 Ordered   13  Nitroglycerin 0 2 MG/HR Transdermal Patch 24 Hour; APPLY 1 PATCH DAILY (OFF FOR    12 HOURS); Therapy: 29Hvw1035 to (Last Cynthea Reasons)  Requested for: 82Mkh5841 Ordered   14  Omega-3-acid Ethyl Esters 1 GM Oral Capsule; TAKE 1 TABLET TID  Requested for:    58Hyh3331; Last Rx:22Jun2016 Ordered   15  Omeprazole 40 MG Oral Capsule Delayed Release; TAKE 1 CAPSULE DAILY; Last    Rx:07Nov2016 Ordered   16   ProAir  (90 Base) MCG/ACT Inhalation Aerosol Solution; INHALE 1 TO 2 PUFFS    EVERY 6 HOURS AS NEEDED; Therapy: 25Fdy4345 to (Evaluate:09Sep2017)  Requested for: 50Gfr8578; Last    Rx:03Ctj0827 Ordered   17  Spiriva Respimat 2 5 MCG/ACT Inhalation Aerosol Solution; TAKE 2 INHALATIONS DAILY; Therapy: 11THB6929 to (Evaluate:09Sep2017)  Requested for: 54Wum3358; Last    Rx:11Jhk2592 Ordered   18  Tylenol 500 MG CAPS; 1   PRN; Therapy: (Recorded:86Ukp9988) to Recorded   19  Vitamin B Complex Oral Tablet; TAKE 1 TABLET DAILY; Therapy: (Recorded:68Xpu9873) to Recorded   20  Vitamin D 2000 UNIT Oral Tablet; take 1 tablet by mouth once daily; Therapy: (Recorded:95Saf5515) to Recorded    Allergies    1  Percocet TABS    Vitals   Recorded: 45QTY7190 01:12PM   Temperature 96 1 F   Heart Rate 85   Respiration 16   Systolic 198   Diastolic 75   Height 5 ft 7 5 in   Weight 167 lb 12 8 oz   BMI Calculated 25 89   BSA Calculated 1 89     Physical Exam     Respiratory Respiratory effort: Normal   Auscultation of lungs: Clear to auscultation bilaterally  Cardiovascular Auscultation of heart: Rate is regular  Rhythm is regular  No heart murmur appreciated  Future Appointments    Date/Time Provider Specialty Site   04/11/2017 02:30 PM Adria Wall MD Neurology [address]   03/06/2017 03:00 PM BETITO Jolley  Cardiology [facility]   03/14/2017 09:30 AM BETITO Nuno  Neurosurgery [facility]   05/18/2017 10:30 AM BETITO Dimas   Internal Medicine [facility]   03/27/2017 09:20 AM Arlington Callow, MD Pulmonary Medicine [facility]     Signatures   Electronically signed by : Cachorro Vidal [facility]; Feb 15 2017  1:57PM EST                       (Author)    Electronically signed by : BETITO Wells ; Feb 24 2017  2:44PM EST                       (Author)

## 2018-01-16 NOTE — MISCELLANEOUS
Message  S/w pt on telephone regarding surgery scheduled 1/13/17  Verified allergies and went over pre-op instructions, including bathing and NPO status  Patient currently denies taking any blood thinning medications and states that he has been holding his ASA and fish oil x10 days  Answered any questions he may have had at this time        Signatures   Electronically signed by : Shasta Joyce RN; Jan 12 2017 11:40AM EST                       (Author)

## 2018-01-16 NOTE — MISCELLANEOUS
Assessment    1  Hypertension (401 9) (I10)   2  Hyperlipidemia (272 4) (E78 5)   3  Chronic obstructive pulmonary disease, unspecified (496) (J44 9)    Discussion/Summary  Discussion Summary:   HTN: continue meds    COPD: continue inhalers    Back pain follow up surgery for post-op pain    Hyperchol: cont med  Chief Complaint  Chief Complaint Free Text Note Form: TCM      History of Present Illness  TCM Communication [facility]: The patient is being contacted for follow-up after hospitalization  Hospital records were reviewed  He was hospitalized at [facility]  The date of admission: 02/01/2017, date of discharge: 02/11/2017  Diagnosis: 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  He was discharged to home  Medications were not reviewed today  He scheduled a follow up appointment  Symptoms: leg pain left side  The patient is currently experiencing symptoms  Lower back pain Counseling was provided to the patient  Communication performed and completed by Emily Whitney   HPI: I reviewed hospitalization for back surgery  No f/c/s, no dysuria, no cough, no constipation  Pt does still have pain in both legs  No CP or SOB      Review of Systems  Complete-Male:   Constitutional: no fever, no chills and not feeling tired  Cardiovascular: the heart rate was not slow, no chest pain, the heart rate was not fast, no palpitations and no extremity edema  Respiratory: no shortness of breath, no cough, no wheezing and no shortness of breath during exertion  Gastrointestinal: no abdominal pain, no nausea, no constipation and no diarrhea  Genitourinary: no dysuria  Psychiatric: no anxiety and no depression  Active Problems    1  Acute bronchitis (466 0) (J20 9)   2  Adenocarcinoma of prostate (185) (C61)   3  Allergic rhinitis (477 9) (J30 9)   4  Aortic aneurysm (441 9) (I71 9)   5   Atherosclerotic heart disease of native coronary artery without angina pectoris (414 01)   (I25 10)   6  Benign colon polyp (211 3) (K63 5)   7  Bipolar disorder (296 80) (F31 9)   8  Cervical cord compression with myelopathy (336 9) (G95 20)   9  Chest pain (786 50) (R07 9)   10  Chronic obstructive pulmonary disease (496) (J44 9)   11  Chronic obstructive pulmonary disease, unspecified (496) (J44 9)   12  Chronic rhinitis (472 0) (J31 0)   13  Cough (786 2) (R05)   14  Diverticulitis (562 11) (K57 92)   15  Dyspnea (786 09) (R06 00)   16  Fatty liver (571 8) (K76 0)   17  Gait disturbance (781 2) (R26 9)   18  Gastro-esophageal reflux (530 81) (K21 9)   19  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)   20  Heart burn (787 1) (R12)   21  History of heart artery stent (V45 82) (Z95 5)   22  Hypercholesterolemia (272 0) (E78 00)   23  Hyperlipidemia (272 4) (E78 5)   24  Hypertension (401 9) (I10)   25  Irritable bowel syndrome (564 1) (K58 9)   26  Leukocytosis (288 60) (D72 829)   27  Lumbar spinal stenosis (724 02) (M48 06)   28  Muscle spasms of neck (728 85) (M62 838)   29  Need for pneumococcal vaccination (V03 82) (Z23)   30  Need for prophylactic vaccination and inoculation against influenza (V04 81) (Z23)   31  Neurogenic claudication (724 03) (M48 06)   32  Pre-operative cardiovascular examination (V72 81) (Z01 810)   33  Preoperative evaluation to rule out surgical contraindication (V72 83) (Z01 818)   34  Screening for genitourinary condition (V81 6) (Z13 89)   35  Small bowel obstruction (560 9) (K56 69)   36  Spondylolisthesis of lumbar region (738 4) (M43 16)   37  Spondylosis of lumbar region without myelopathy or radiculopathy (721 3) (M47 816)   38  Tremor (781 0) (R25 1)   39  Weakness of extremity (729 89) (M62 81)    Past Medical History    1  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)   2  Benign colon polyp (211 3) (K63 5)   3  History of Diverticulosis (562 10) (K57 90)   4  Gastro-esophageal reflux (530 81) (K21 9)   5   History of diverticulitis of colon (V12 79) (Z87 19)   6  History of malignant neoplasm of lung (V10 11) (Z85 118)   7  History of malignant neoplasm of prostate (V10 46) (Z85 46)   8  Irritable bowel syndrome (564 1) (K58 9)   9  History of Malignant neoplasm of lung, unspecified laterality   10  History of Myocardial infarction involving other coronary artery (410 80) (I21 29)    Surgical History    1  History of Colon Surgery   2  History of Diagnostic Esophagogastroduodenoscopy   3  History of Lung Lobectomy   4  History of Surgery Prostate Prostatotomy   5  History of Wedge Resection Of Lung    Family History  Mother    1  Family history of Carcinoma Of The Small Intestine (V16 0)  Father    2  Family history of Carcinoma Of The Small Intestine (V16 0)   3  Family history of Coronary Artery Disease (V17 49)  Maternal Grandmother    4  Family history of   Paternal Grandmother    11  Family history of   Maternal Grandfather    6  Family history of   Paternal Grandfather    9  Family history of   Family History    8  Family history of Heart Disease (V17 49)   9  Family history of Hyperlipidemia   10  Family history of Hypertension (V17 49)    Social History    · Being A Social Drinker   · Daily Coffee Consumption (1  Cups/Day)   · Education history   · Former smoker (V15 82) (X89 305)   · Lives independently with spouse   · Lives with spouse   · Marital history   · One child   · Retired  Social History Reviewed: The social history was reviewed and updated today  Current Meds   1  Advair Diskus 250-50 MCG/DOSE Inhalation Aerosol Powder Breath Activated; USE 1   INHALATION TWICE A DAY RINSE MOUTH AFTER USE; Therapy: 69PDH0966 to (Kellie Linton)  Requested for: 67Nmy6641; Last   Rx:38Ose2376 Ordered   2  Albuterol Sulfate 108 (90 Base) MCG/ACT AERS; INHALE PUFFS  PRN; Therapy: (Recorded:88Vbq2332) to Recorded   3  Allegra 180 MG TABS; TAKE 1 TABLET DAILY AS NEEDED; Therapy: (Recorded:2016) to Recorded   4  AmLODIPine Besylate 2 5 MG Oral Tablet; Take 1 tablet daily; Therapy: 86IMX6915 to (Kristian Blanco)  Requested for: 12NVJ4947; Last   Rx:07Nov2016 Ordered   5  Aspirin 81 MG Oral Tablet Delayed Release; TAKE 1 TABLET DAILY; Therapy: (Recorded:14Jun2016) to Recorded   6  Atorvastatin Calcium 40 MG Oral Tablet; TAKE 1 TABLET DAILY  Requested for:   23IYU2813; Last Rx:02Dvj6604 Ordered   7  Dicyclomine HCl - 20 MG Oral Tablet; TAKE 1 TABLET EVERY 4 TO 6 HOURS DAILY AS   NEEDED; Therapy: 10RNG2161 to (Evaluate:06Bxl5406)  Requested for: 43Zol7760; Last   Rx:79Klz4937 Ordered   8  Fluticasone Propionate 50 MCG/ACT Nasal Suspension; USE 2 SPRAYS IN EACH   NOSTRIL ONCE DAILY; Therapy: 42WHX4838 to (Laura Gatica)  Requested for: 83MDR6211; Last   Rx:05Nov2015 Ordered   9  Lithium Carbonate 300 MG Oral Capsule; alternate 1 capsule with 2 capsules everyother   day; Therapy: 79Ybq1789 to Recorded   10  LORazepam 1 MG Oral Tablet; take 1 tablet daily prn; Therapy: (Recorded:03Nov2016) to Recorded   11  Losartan Potassium 25 MG Oral Tablet; TAKE 1 TABLET DAILY  Requested for:    01ZFS3683; Last Rx:16Jan2017 Ordered   12  Metoprolol Succinate ER 25 MG Oral Tablet Extended Release 24 Hour; TAKE 1 TABLET    DAILY; Therapy: 69SAE4722 to (Kristian Blanco)  Requested for: 38ZFX6253; Last    Rx:07Nov2016 Ordered   13  Nitroglycerin 0 2 MG/HR Transdermal Patch 24 Hour; APPLY 1 PATCH DAILY (OFF FOR    12 HOURS); Therapy: 37Ucy1302 to (Ke Parsons)  Requested for: 04Qor7785 Ordered   14  Omega-3-acid Ethyl Esters 1 GM Oral Capsule; TAKE 1 TABLET TID  Requested for:    53Xuy3789; Last Rx:22Jun2016 Ordered   15  Omeprazole 40 MG Oral Capsule Delayed Release; TAKE 1 CAPSULE DAILY; Last    Rx:07Nov2016 Ordered   16  ProAir  (90 Base) MCG/ACT Inhalation Aerosol Solution; INHALE 1 TO 2 PUFFS    EVERY 6 HOURS AS NEEDED;     Therapy: 77Oya0936 to (Evaluate:09Dvy2569)  Requested for: 78Lxo6815; Last Rx: 67Nad6268 Ordered   17  Spiriva Respimat 2 5 MCG/ACT Inhalation Aerosol Solution; TAKE 2 INHALATIONS DAILY; Therapy: 22BSJ5343 to (Evaluate:09Sep2017)  Requested for: 14Sep2016; Last    Rx:14Sep2016 Ordered   18  Tylenol 500 MG CAPS; 1   PRN; Therapy: (Recorded:14Jun2016) to Recorded   19  Vitamin B Complex Oral Tablet; TAKE 1 TABLET DAILY; Therapy: (Recorded:69Rvn7234) to Recorded   20  Vitamin D 2000 UNIT Oral Tablet; take 1 tablet by mouth once daily; Therapy: (Recorded:72Fch5835) to Recorded  Medication List Reviewed: The medication list was reviewed and updated today  Allergies    1  Percocet TABS    Vitals  Signs   Recorded: 05ZWK9136 09:55AM   Temperature: 97 6 F, Oral  Heart Rate: 92  Respiration: 16  Systolic: 938, Sitting  Diastolic: 84, Sitting  Height: 5 ft 7 5 in  Weight: 168 lb 0 4 oz  BMI Calculated: 25 93  BSA Calculated: 1 89    Physical Exam    Constitutional   General appearance: No acute distress, well appearing and well nourished  Head and Face   Head and face: Normal     Eyes   Conjunctiva and lids: No erythema, swelling or discharge  Ears, Nose, Mouth, and Throat   External inspection of ears and nose: Normal     Neck   Neck: Supple, symmetric, trachea midline, no masses  Thyroid: Normal, no thyromegaly  Pulmonary   Respiratory effort: No increased work of breathing or signs of respiratory distress  Auscultation of lungs: Clear to auscultation  Cardiovascular   Auscultation of heart: Normal rate and rhythm, normal S1 and S2, no murmurs  Carotid pulses: 2+ bilaterally  Examination of extremities for edema and/or varicosities: Normal     Skin   Skin and subcutaneous tissue: Normal without rashes or lesions  incision intact, no drainage  Neurologic   Cortical function: Normal mental status  Psychiatric   Recent and remote memory: Intact  Mood and affect: Normal        Health Management  Benign colon polyp   COLONOSCOPY; every 5 years;  Last 90EFP8328; Next Due: 85TCT1767; Active  COLONOSCOPY (GI, SURG); every 5 years; Last 02JIF2789; Next Due: 18ABD5633; Overdue    Future Appointments    Date/Time Provider Specialty Site   04/11/2017 02:30 PM Elver Jack MD Neurology [address]   03/14/2017 09:30 AM BETITO Rice  Neurosurgery [facility] NEUROSURGICAL [facility]   04/27/2017 10:00 AM BETITO Carrington   Internal Medicine [facility]   03/27/2017 09:20 AM Pam Lee MD Pulmonary Medicine [place] [address]     Signatures   Electronically signed by : Marylin Alvarenga, ; Feb 13 2017  9:17AM EST                       (Author)    Electronically signed by : BETITO Carcamo ; Feb 14 2017 10:07AM EST                       (Author)

## 2018-01-16 NOTE — MISCELLANEOUS
Message   Date: 28 Sep 2017 9:13 AM EST, Recorded By: Edwardo Quinones   Calling For: Erick Byrne: Jessica George, Self   Phone: [phone] (Home), [phone] (Mobile Phone)   Reason: General Medical Question   pt called regarding appt for the 1st week of october he was seen this week in the emergency room due to sob and was told to f/up w/ pulmonary he called and receive a call back from dr Ailyn Severino whom he says told him to call the office to see where they can fit him in next week  Active Problems    1  Abnormal blood chemistry (790 6) (R79 9)   2  Adenocarcinoma of prostate (185) (C61)   3  Allergic rhinitis (477 9) (J30 9)   4  Aortic aneurysm (441 9) (I71 9)   5  Atherosclerotic heart disease of native coronary artery without angina pectoris (414 01)   (I25 10)   6  Benign colon polyp (211 3) (K63 5)   7  Cervical cord compression with myelopathy (336 9) (G95 20)   8  Cervical disc disease (722 91) (M50 90)   9  Chest pain (786 50) (R07 9)   10  Chronic bipolar disorder (296 80) (F31 9)   11  Chronic rhinitis (472 0) (J31 0)   12  COPD (chronic obstructive pulmonary disease) (496) (J44 9)   13  Cough (786 2) (R05)   14  Diverticulitis (562 11) (K57 92)   15  Dyspnea (786 09) (R06 00)   16  Encounter for postoperative care (V58 49) (Z48 89)   17  Fatty liver (571 8) (K76 0)   18  Gait disturbance (781 2) (R26 9)   19  Gastro-esophageal reflux (530 81) (K21 9)   20  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)   21  Hip pain, chronic, left (719 45,338 29) (M25 552,G89 29)   22  History of heart artery stent (V45 82) (Z95 5)   23  Hyperlipidemia (272 4) (E78 5)   24  Hypertension (401 9) (I10)   25  Irritable bowel syndrome (564 1) (K58 9)   26  Leukocytosis (288 60) (D72 829)   27  Lumbar spinal stenosis (724 02) (M48 06)   28  Muscle spasms of neck (728 85) (M62 838)   29  Need for pneumococcal vaccination (V03 82) (Z23)   30   Need for prophylactic vaccination and inoculation against influenza (V04 81) (Z23)   31  Neurogenic claudication (724 03) (M48 06)   32  Postop check (V67 00) (Z09)   33  Post-op pain (338 18) (G89 18)   34  Pre-operative cardiovascular examination (V72 81) (Z01 810)   35  Preoperative evaluation to rule out surgical contraindication (V72 83) (Z01 818)   36  Ptosis of left eyelid (374 30) (H02 402)   37  Screening for genitourinary condition (V81 6) (Z13 89)   38  Spondylolisthesis of lumbar region (738 4) (M43 16)   39  Spondylosis of lumbar region without myelopathy or radiculopathy (721 3) (M47 816)   40  Tremor (781 0) (R25 1)   41  Weakness of extremity (729 89) (R29 898)    Current Meds   1  Advair Diskus 250-50 MCG/DOSE Inhalation Aerosol Powder Breath Activated; Use 1   inhalation twice  daily   Rinse mouth after  use; Therapy: 43QIL8436 to (Evaluate:36Zgq1014)  Requested for: 47NWS8746; Last   Rx:00Jpf2993 Ordered   2  Allegra 180 MG TABS (Fexofenadine HCl); TAKE 1 TABLET DAILY AS NEEDED; Therapy: (Recorded:14Jun2016) to Recorded   3  AmLODIPine Besylate 2 5 MG Oral Tablet; Take 1 tablet by mouth  daily; Therapy: 79QQF6418 to (Evaluate:81Czu9281)  Requested for: 18NYR0432; Last   Rx:68Qec8322 Ordered   4  Aspirin 81 MG Oral Tablet Delayed Release; TAKE 1 TABLET DAILY; Therapy: (Recorded:14Jun2016) to Recorded   5  Atorvastatin Calcium 40 MG Oral Tablet; Take 1 tablet by mouth  daily; Therapy: 47WGO9173 to (Evaluate:12Xvv6028)  Requested for: 92Vdv1225; Last   Rx:83Ipv1328 Ordered   6  Atrovent HFA 17 MCG/ACT Inhalation Aerosol Solution; INHALE 2 PUFFS 4 TIMES   DAILY; Therapy: 11ENL2221 to (Eriberto Lindquist)  Requested for: 27Mar2017; Last   Rx:27Mar2017 Ordered   7  Dicyclomine HCl - 20 MG Oral Tablet; TAKE 1 TABLET EVERY 4 TO 6 HOURS DAILY AS   NEEDED; Therapy: 15GAX7491 to (Evaluate:43Ihc3309)  Requested for: 84Whe6220; Last   Rx:52Pqe8125 Ordered   8   Fluticasone Propionate 50 MCG/ACT Nasal Suspension; USE 2 SPRAYS IN EACH   NOSTRIL ONCE DAILY; Therapy: 67HRB0708 to [phone])  Requested for: 17EYM2859; Last   Rx:05Nov2015 Ordered   9  Lithium Carbonate 300 MG Oral Capsule; alternate 1 capsule with 2 capsules everyother   day; Therapy: 53Dbg9316 to Recorded   10  Metoprolol Succinate ER 25 MG Oral Tablet Extended Release 24 Hour; Take 1 tablet by    mouth  daily; Therapy: 79SYI1310 to (Evaluate:51Hab8845)  Requested for: 88ZNQ7297; Last    Rx:22Sep2017 Ordered   11  Nitroglycerin 0 2 MG/HR Transdermal Patch 24 Hour; APPLY 1 PATCH DAILY (OFF FOR    12 HOURS); Therapy: 75Ghc2813 to (Evaluate:23Mar2018)  Requested for: 28Mar2017; Last    Rx:28Mar2017 Ordered   12  Omega-3-acid Ethyl Esters 1 GM Oral Capsule (Lovaza); Take 1 capsule by mouth 3     times daily; Therapy: 34Zln8579 to (Evaluate:93Fgy5409)  Requested for: 02Kks1261; Last    Rx:89Wli6671 Ordered   13  Omeprazole 40 MG Oral Capsule Delayed Release; Take 1 capsule by mouth  daily; Therapy: 65CNO8908 to (Evaluate:87Ofs5059)  Requested for: 13IXZ1958; Last    Rx:78Nor1128 Ordered   14  ProAir  (90 Base) MCG/ACT Inhalation Aerosol Solution; INHALE 1 TO 2 PUFFS    EVERY 6 HOURS AS NEEDED; Therapy: 10Bvi7703 to (Evaluate:29Wwy7898)  Requested for: 29Jyh7238; Last    Rx:17Kgs0526 Ordered   15  Tylenol 500 MG CAPS; 1   PRN; Therapy: (Recorded:12Ell8567) to Recorded   16  Vitamin B Complex Oral Tablet; TAKE 1 TABLET DAILY; Therapy: (Recorded:53Cfa3352) to Recorded   17  Vitamin D 2000 UNIT Oral Tablet; take 1 tablet by mouth once daily; Therapy: (Recorded:33Pew1600) to Recorded    Allergies    1   Percocet TABS    Signatures   Electronically signed by : Cristela Dejesus, ; Sep 28 2017  9:17AM EST                       (Author)

## 2018-01-16 NOTE — PROGRESS NOTES
Assessment    1  Encounter for preventive health examination (V70 0) (Z00 00)    Discussion/Summary  Impression: Subsequent Annual Wellness Visit, with preventive exam as well as age and risk appropriate counseling completed  Cardiovascular screening and counseling: the risks and benefits of screening were discussed and screening is current  Diabetes screening and counseling: the risks and benefits of screening were discussed and screening is current  Colorectal cancer screening and counseling: the risks and benefits of screening were discussed and screening is current  Prostate cancer screening and counseling: the risks and benefits of screening were discussed, screening is current and sees Dr Satish Stern  Glaucoma screening and counseling: the risks and benefits of screening were discussed and screening is current  Immunizations: the risks and benefits of influenza vaccination were discussed with the patient, influenza vaccine is up to date this year, influenza vaccination is recommended annually, the risks and benefits of pneumococcal vaccination were discussed with the patient, I rec Prevnar 13 today, the risks and benefits of the Zostavax vaccine were discussed with the patient and Zostavax vaccination up to date  History of Present Illness  Welcome to Medicare and Wellness Visits: The patient is being seen for the subsequent annual wellness visit  Drug and Alcohol Use: The patient is a former cigarette smoker and quit smoking 2013  He has 40 pack year(s) of cigarette use  The patient reports occasional alcohol use  Alcohol concern:   The patient has no concerns about alcohol abuse  Diet and Physical Activity: Current diet includes well balanced meals  He exercises 3 times per week  Exercise: walking, stretching  Mood Disorder and Cognitive Impairment Screening: He denies feeling down, depressed, or hopeless over the past two weeks   He denies feeling little interest or pleasure in doing things over the past two weeks  Cognitive impairment screening: denies difficulty learning/retaining new information, denies difficulty handling complex tasks, denies difficulty with reasoning, denies difficulty with spatial ability and orientation, denies difficulty with language and denies difficulty with behavior  Functional Ability/Level of Safety: Hearing is normal bilaterally  The patient is currently able to do activities of daily living without limitations, able to do instrumental activities of daily living without limitations, able to participate in social activities without limitations and able to drive without limitations  Activities of daily living details: does not need help using the phone, no transportation help needed, does not need help shopping, no meal preparation help needed, does not need help doing housework, does not need help doing laundry, does not need help managing medications and does not need help managing money  Home safety risk factors:  no unfamiliar surroundings, no loose rugs, no poor household lighting, no uneven floors, no household clutter, grab bars in the bathroom and handrails on the stairs  Advance Directives: Advance directives: living will, durable power of  for health care directives and advance directives  Co-Managers and Medical Equipment/Suppliers: See Patient Care Team       Preventive Quality Program 65 and Older: Falls Risk: The patient fell 0 times in the past 12 months  Urinary Incontinence Symptoms includes: no urinary incontinence        Patient Care Team    Care Team Member Role Specialty Office Number   Edwardo Cisneros MD Specialist Pulmonary Medicine [phone]   Roxane Matt MD Attending Internal Medicine [phone]   Manuel Arroyo MD Specialist Cardiology [phone] [address] [facility]  Colon and Rectal Surgery [phone]   Guevara Montiel MD Specialist Vascular Surgery [phone]   Arsen ABDI   Specialist Neurosurgery [phone]   Gabi Helms MD  Ophthalmology [phone]   Ivana Bell MD  Gastroenterology Adult [phone]     Review of Systems    Constitutional: no fever, no chills, no malaise and no fatigue  Cardiovascular: no chest pain, no palpitations, the heart is not racing and no lightheadedness  Respiratory: no shortness of breath, no wheezing and no cough  Gastrointestinal: no abdominal pain, no nausea, no vomiting, no diarrhea and no constipation  Psychiatric: no anxiety and no depression  Active Problems    1  Acute bronchitis (466 0) (J20 9)   2  Adenocarcinoma of prostate (185) (C61)   3  Allergic rhinitis (477 9) (J30 9)   4  Aortic aneurysm (441 9) (I71 9)   5  Atherosclerotic heart disease of native coronary artery without angina pectoris (414 01)   (I25 10)   6  Benign colon polyp (211 3) (K63 5)   7  Bipolar disorder (296 80) (F31 9)   8  Cervical cord compression with myelopathy (336 9) (G95 20)   9  Chest pain (786 50) (R07 9)   10  Chronic obstructive pulmonary disease (496) (J44 9)   11  Chronic obstructive pulmonary disease, unspecified (496) (J44 9)   12  Chronic rhinitis (472 0) (J31 0)   13  Cough (786 2) (R05)   14  Diverticulitis (562 11) (K57 92)   15  Dyspnea (786 09) (R06 00)   16  Fatty liver (571 8) (K76 0)   17  Gait disturbance (781 2) (R26 9)   18  Gastro-esophageal reflux (530 81) (K21 9)   19  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)   20  Heart burn (787 1) (R12)   21  History of heart artery stent (V45 82) (Z95 5)   22  Hypercholesterolemia (272 0) (E78 00)   23  Hyperlipidemia (272 4) (E78 5)   24  Hypertension (401 9) (I10)   25  Irritable bowel syndrome (564 1) (K58 9)   26  Leukocytosis (288 60) (D72 829)   27  Lumbar spinal stenosis (724 02) (M48 06)   28  Muscle spasms of neck (728 85) (M62 838)   29  Pre-operative cardiovascular examination (V72 81) (Z01 810)   30   Preoperative evaluation to rule out surgical contraindication (V72 83) (Z01 818) 31  Screening for genitourinary condition (V81 6) (Z13 89)   32  Tremor (781 0) (R25 1)   33  Weakness of extremity (729 89) (M62 81)    Past Medical History    · 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)   · Benign colon polyp (211 3) (K63 5)   · History of Diverticulosis (562 10) (K57 90)   · Gastro-esophageal reflux (530 81) (K21 9)   · History of diverticulitis of colon (V12 79) (Z87 19)   · History of malignant neoplasm of lung (V10 11) (Z85 118)   · History of malignant neoplasm of prostate (V10 46) (Z85 46)   · Irritable bowel syndrome (564 1) (K58 9)   · History of Malignant neoplasm of lung, unspecified laterality   · History of Myocardial infarction involving other coronary artery (410 80) (I21 29)    Surgical History    · History of Colon Surgery   · History of Diagnostic Esophagogastroduodenoscopy   · History of Lung Lobectomy   · History of Surgery Prostate Prostatotomy   · History of Wedge Resection Of Lung    Family History  Mother    · Family history of Carcinoma Of The Small Intestine (V16 0)  Father    · Family history of Carcinoma Of The Small Intestine (V16 0)   · Family history of Coronary Artery Disease (V17 49)  Maternal Grandmother    · Family history of   Paternal Grandmother    · Family history of   Maternal Grandfather    · Family history of   Paternal Grandfather    · Family history of   Family History    · Family history of Heart Disease (V17 49)   · Family history of Hyperlipidemia   · Family history of Hypertension (V17 49)    Social History    · Being A Social Drinker   · Daily Coffee Consumption (1  Cups/Day)   · Education history   · College-    · Former smoker (O46 70) (F84 677)   · Quit 2013  Nnamdi Agudelo Had smoked for 35 years, a pack of cigarettes a day   · Lives independently with spouse   · Lives with spouse   · Marital history   ·    · One child   · Retired   ·     Current Meds   1   Advair Diskus 250-50 MCG/DOSE Inhalation Aerosol Powder Breath Activated; USE 1   INHALATION TWICE A DAY RINSE MOUTH AFTER USE; Therapy: 51ZTR6155 to (490 08 485)  Requested for: 60Ssz7752; Last   Rx:33Dud7832 Ordered   2  Albuterol Sulfate 108 (90 Base) MCG/ACT AERS; INHALE PUFFS  PRN; Therapy: (Recorded:75Oxr2084) to Recorded   3  Allegra 180 MG TABS; TAKE 1 TABLET DAILY AS NEEDED; Therapy: (Recorded:14Jun2016) to Recorded   4  AmLODIPine Besylate 2 5 MG Oral Tablet; TAKE ONE TABLET BY MOUTH IN THE   EVENING  Requested for: 05YAZ2591; Last Rx:11Nov2015 Ordered   5  Aspirin 81 MG Oral Tablet Delayed Release; TAKE 1 TABLET DAILY; Therapy: (Recorded:14Jun2016) to Recorded   6  Atorvastatin Calcium 40 MG Oral Tablet; TAKE 1 TABLET DAILY  Requested for:   99YOJ3324; Last Rx:55Fry5707 Ordered   7  Dicyclomine HCl - 20 MG Oral Tablet; TAKE 1 TABLET EVERY 4 TO 6 HOURS DAILY AS   NEEDED; Therapy: 40CGL3367 to (Evaluate:24Lfs8062)  Requested for: 49Gdo7381; Last   Rx:54Uip6263 Ordered   8  Fluticasone Propionate 50 MCG/ACT Nasal Suspension; USE 2 SPRAYS IN EACH   NOSTRIL ONCE DAILY; Therapy: 33VZM1381 to (05 [phone] 30)  Requested for: 12WBL3586; Last   Rx:05Cou9463 Ordered   9  Lithium Carbonate 300 MG Oral Capsule; alternate 1 capsule with 2 capsules everyother   day; Therapy: 34Qiv2898 to Recorded   10  Metoprolol Succinate ER 25 MG Oral Tablet Extended Release 24 Hour; TAKE 1 TABLET    ONCE DAILY  Requested for: 09FHS2678; Last Rx:96Oen7288 Ordered   11  Nitroglycerin 0 2 MG/HR Transdermal Patch 24 Hour; APPLY 1 PATCH DAILY (OFF FOR    12 HOURS); Therapy: 64Lwz4474 to (Last [facility])  Requested for: 40Qgr0751 Ordered   12  Omega-3-acid Ethyl Esters 1 GM Oral Capsule; TAKE 1 TABLET TID  Requested for:    22Ekb6925; Last Rx:76Bto7725 Ordered   13  Omeprazole 40 MG Oral Capsule Delayed Release; TAKE 1 CAPSULE DAILY; Therapy: (Dot Cutler) to Recorded   14   ProAir  (90 Base) MCG/ACT Inhalation Aerosol Solution; INHALE 1 TO 2 PUFFS    EVERY 6 HOURS AS NEEDED; Therapy: 93Xth7910 to (Evaluate:07Uqc0001)  Requested for: 31Mat5003; Last    Rx:49Jrt6887 Ordered   15  Spiriva Respimat 2 5 MCG/ACT Inhalation Aerosol Solution; TAKE 2 INHALATIONS    DAILY; Therapy: 34UVO0089 to (Evaluate:34Klq6093)  Requested for: 75Rmo2156; Last    Rx:84Oht4918 Ordered   16  Tylenol 500 MG CAPS; 1   PRN; Therapy: (Recorded:46Oeh3074) to Recorded   17  Vitamin D 2000 UNIT Oral Tablet; take 1 tablet by mouth once daily; Therapy: (Recorded:23Vjq8974) to Recorded    Allergies    1  Percocet TABS    Immunizations   1 2 3    Influenza  10-Oct-2012 01-Oct-2015 01-Oct-2013    Tetanus  Dec 2009       Vitals  Signs    Systolic: 071  Diastolic: 80  Heart Rate: 72  Respiration: 12  Height: 5 ft 10 in  Weight: 182 lb   BMI Calculated: 26 11  BSA Calculated: 2 01    Physical Exam    Constitutional   General appearance: No acute distress, well appearing and well nourished  Head and Face   Head and face: Normal     Ears, Nose, Mouth, and Throat   Oropharynx: Normal with no erythema, edema, exudate or lesions  Neck   Neck: Supple, symmetric, trachea midline, no masses  Thyroid: Normal, no thyromegaly  Pulmonary   Respiratory effort: No increased work of breathing or signs of respiratory distress  Auscultation of lungs: Clear to auscultation  Cardiovascular   Auscultation of heart: Normal rate and rhythm, normal S1 and S2, no murmurs  Carotid pulses: 2+ bilaterally  Examination of extremities for edema and/or varicosities: Normal     Lymphatic   Palpation of lymph nodes in neck: No lymphadenopathy  Neurologic   Cortical function: Normal mental status  Psychiatric   Recent and remote memory: Intact  Mood and affect: Normal        Health Management  Benign colon polyp   COLONOSCOPY; every 5 years; Last 68GXS0247; Next Due: 88JTF6644; Active  COLONOSCOPY (GI, SURG); every 5 years; Last 06RLN3672;  Next Due: 03DGY8890; Overdue    Future Appointments    Date/Time Provider Specialty Site   12/06/2016 03:00 PM Noa Addison MD Neurology [place] [address]   11/03/2016 01:30 PM Romana Boozer, M D  Neurosurgery [facility]   12/15/2016 10:00 AM Romana Boozer, M D   Neurosurgery [facility]   11/17/2016 02:45 PM Jonathan Grigsby MD Vascular Surgery [address] [place] [address]     Signatures   Electronically signed by : BETITO Padilla ; Oct 25 2016  2:23PM EST                       (Author)

## 2018-01-17 ENCOUNTER — APPOINTMENT (OUTPATIENT)
Dept: PHYSICAL THERAPY | Facility: CLINIC | Age: 73
End: 2018-01-17
Payer: MEDICARE

## 2018-01-17 DIAGNOSIS — M71.38 OTHER BURSAL CYST, OTHER SITE: ICD-10-CM

## 2018-01-17 PROCEDURE — 97162 PT EVAL MOD COMPLEX 30 MIN: CPT

## 2018-01-17 PROCEDURE — G8978 MOBILITY CURRENT STATUS: HCPCS

## 2018-01-17 PROCEDURE — G8979 MOBILITY GOAL STATUS: HCPCS

## 2018-01-18 ENCOUNTER — APPOINTMENT (OUTPATIENT)
Dept: PHYSICAL THERAPY | Facility: CLINIC | Age: 73
End: 2018-01-18
Payer: MEDICARE

## 2018-01-18 PROCEDURE — 97110 THERAPEUTIC EXERCISES: CPT

## 2018-01-18 PROCEDURE — 97112 NEUROMUSCULAR REEDUCATION: CPT

## 2018-01-18 NOTE — MISCELLANEOUS
Chief Complaint  Chief Complaint Free Text Note Form: No LESLY was made for this patient because he was discharged to acute rehab  Active Problems    1  Acute bronchitis (466 0) (J20 9)   2  Adenocarcinoma of prostate (185) (C61)   3  Allergic rhinitis (477 9) (J30 9)   4  Aortic aneurysm (441 9) (I71 9)   5  Atherosclerotic heart disease of native coronary artery without angina pectoris (414 01)   (I25 10)   6  Benign colon polyp (211 3) (K63 5)   7  Bipolar disorder (296 80) (F31 9)   8  Cervical cord compression with myelopathy (336 9) (G95 20)   9  Chest pain (786 50) (R07 9)   10  Chronic obstructive pulmonary disease (496) (J44 9)   11  Chronic obstructive pulmonary disease, unspecified (496) (J44 9)   12  Chronic rhinitis (472 0) (J31 0)   13  Cough (786 2) (R05)   14  Diverticulitis (562 11) (K57 92)   15  Dyspnea (786 09) (R06 00)   16  Fatty liver (571 8) (K76 0)   17  Gait disturbance (781 2) (R26 9)   18  Gastro-esophageal reflux (530 81) (K21 9)   19  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)   20  Heart burn (787 1) (R12)   21  History of heart artery stent (V45 82) (Z95 5)   22  Hypercholesterolemia (272 0) (E78 00)   23  Hyperlipidemia (272 4) (E78 5)   24  Hypertension (401 9) (I10)   25  Irritable bowel syndrome (564 1) (K58 9)   26  Leukocytosis (288 60) (D72 829)   27  Lumbar spinal stenosis (724 02) (M48 06)   28  Muscle spasms of neck (728 85) (M62 838)   29  Need for pneumococcal vaccination (V03 82) (Z23)   30  Need for prophylactic vaccination and inoculation against influenza (V04 81) (Z23)   31  Neurogenic claudication (724 03) (M48 06)   32  Pre-operative cardiovascular examination (V72 81) (Z01 810)   33  Preoperative evaluation to rule out surgical contraindication (V72 83) (Z01 818)   34  Screening for genitourinary condition (V81 6) (Z13 89)   35  Small bowel obstruction (560 9) (K56 69)   36  Spondylolisthesis of lumbar region (738 4) (M43 16)   37   Spondylosis of lumbar region without myelopathy or radiculopathy (721 3) (M47 816)   38  Tremor (781 0) (R25 1)   39  Weakness of extremity (729 89) (M62 81)    Past Medical History    1  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)   2  Benign colon polyp (211 3) (K63 5)   3  History of Diverticulosis (562 10) (K57 90)   4  Gastro-esophageal reflux (530 81) (K21 9)   5  History of diverticulitis of colon (V12 79) (Z87 19)   6  History of malignant neoplasm of lung (V10 11) (Z85 118)   7  History of malignant neoplasm of prostate (V10 46) (Z85 46)   8  Irritable bowel syndrome (564 1) (K58 9)   9  History of Malignant neoplasm of lung, unspecified laterality   10  History of Myocardial infarction involving other coronary artery (410 80) (I21 29)    Surgical History    1  History of Colon Surgery   2  History of Diagnostic Esophagogastroduodenoscopy   3  History of Lung Lobectomy   4  History of Surgery Prostate Prostatotomy   5  History of Wedge Resection Of Lung    Family History  Mother    1  Family history of Carcinoma Of The Small Intestine (V16 0)  Father    2  Family history of Carcinoma Of The Small Intestine (V16 0)   3  Family history of Coronary Artery Disease (V17 49)  Maternal Grandmother    4  Family history of   Paternal Grandmother    11  Family history of   Maternal Grandfather    6  Family history of   Paternal Grandfather    9  Family history of   Family History    8  Family history of Heart Disease (V17 49)   9  Family history of Hyperlipidemia   10  Family history of Hypertension (V17 49)    Social History    · Being A Social Drinker   · Daily Coffee Consumption (1  Cups/Day)   · Education history   · Former smoker (V15 82) (D04 451)   · Lives independently with spouse   · Lives with spouse   · Marital history   · One child   · Retired    Current Meds   1   Advair Diskus 250-50 MCG/DOSE Inhalation Aerosol Powder Breath Activated; USE 1   INHALATION TWICE A DAY RINSE MOUTH AFTER USE; Therapy: 63JFS8958 to (467 20 767)  Requested for: 67Feb3403; Last   Rx:30Asc0543 Ordered   2  Albuterol Sulfate 108 (90 Base) MCG/ACT AERS; INHALE PUFFS  PRN; Therapy: (Recorded:36Xna2945) to Recorded   3  Allegra 180 MG TABS (Fexofenadine HCl); TAKE 1 TABLET DAILY AS NEEDED; Therapy: (Recorded:14Jun2016) to Recorded   4  AmLODIPine Besylate 2 5 MG Oral Tablet; Take 1 tablet daily; Therapy: 43QPS6645 to (Skyler Whitfield)  Requested for: 63CHF5606; Last   Rx:07Nov2016 Ordered   5  Aspirin 81 MG Oral Tablet Delayed Release; TAKE 1 TABLET DAILY; Therapy: (Recorded:14Jun2016) to Recorded   6  Atorvastatin Calcium 40 MG Oral Tablet; TAKE 1 TABLET DAILY  Requested for:   36DXZ0004; Last Rx:43Ruv2789 Ordered   7  Dicyclomine HCl - 20 MG Oral Tablet; TAKE 1 TABLET EVERY 4 TO 6 HOURS DAILY AS   NEEDED; Therapy: 75OQN0139 to (Evaluate:00Kqv4406)  Requested for: 32Nxx1102; Last   Rx:97Wgv4719 Ordered   8  Fluticasone Propionate 50 MCG/ACT Nasal Suspension; USE 2 SPRAYS IN EACH   NOSTRIL ONCE DAILY; Therapy: 69ZST9842 to (Frederick Pham)  Requested for: 07OXX2735; Last   Rx:05Nov2015 Ordered   9  Lithium Carbonate 300 MG Oral Capsule; alternate 1 capsule with 2 capsules everyother   day; Therapy: 41Qxn1824 to Recorded   10  LORazepam 1 MG Oral Tablet; take 1 tablet daily prn; Therapy: (Recorded:03Nov2016) to Recorded   11  Losartan Potassium 25 MG Oral Tablet; TAKE 1 TABLET DAILY  Requested for:    86TNR7633; Last Rx:16Jan2017 Ordered   12  Metoprolol Succinate ER 25 MG Oral Tablet Extended Release 24 Hour; TAKE 1 TABLET    DAILY; Therapy: 60QEI8121 to (Skyler Whitfield)  Requested for: 95WMW0651; Last    Rx:07Nov2016 Ordered   13  Nitroglycerin 0 2 MG/HR Transdermal Patch 24 Hour; APPLY 1 PATCH DAILY (OFF FOR    12 HOURS); Therapy: 95Kbe1056 to (Last [facility])  Requested for: 83Qgd5642 Ordered   14   Omega-3-acid Ethyl Esters 1 GM Oral Capsule (Lovaza); TAKE 1 TABLET TID  Requested for: 46Aje6538; Last Rx:22Jun2016 Ordered   15  Omeprazole 40 MG Oral Capsule Delayed Release; TAKE 1 CAPSULE DAILY; Last    Rx:07Nov2016 Ordered   16  ProAir  (90 Base) MCG/ACT Inhalation Aerosol Solution; INHALE 1 TO 2 PUFFS    EVERY 6 HOURS AS NEEDED; Therapy: 49Yky9463 to (Evaluate:09Sep2017)  Requested for: 40Djq4093; Last    Rx:23Lll7031 Ordered   17  Spiriva Respimat 2 5 MCG/ACT Inhalation Aerosol Solution; TAKE 2 INHALATIONS DAILY; Therapy: 30TED8551 to (Evaluate:79Izn4164)  Requested for: 43Umv5873; Last    Rx:60Ahd6993 Ordered   18  Tylenol 500 MG CAPS; 1   PRN; Therapy: (Recorded:14Jun2016) to Recorded   19  Vitamin B Complex Oral Tablet; TAKE 1 TABLET DAILY; Therapy: (Recorded:03Nov2016) to Recorded   20  Vitamin D 2000 UNIT Oral Tablet; take 1 tablet by mouth once daily; Therapy: (Recorded:99Kqy6110) to Recorded    Allergies    1  Percocet TABS    Health Management  Benign colon polyp   COLONOSCOPY; every 5 years; Last 13CDY5318; Next Due: 88KBJ4876; Active  COLONOSCOPY (GI, SURG); every 5 years; Last 16FQA6901; Next Due: 44MZI8110; Overdue    Future Appointments    Date/Time Provider Specialty Site   04/11/2017 02:30 PM Sachin Lin MD Neurology [facility]  [address]   02/13/2017 10:00 AM [facility] Schedule  [facility]   03/14/2017 09:30 AM BETITO Schwartz  Neurosurgery [facility]   04/27/2017 10:00 AM BETITO Flores   [facility] Medicine [facility]   03/27/2017 09:20 AM Rick Ruiz MD Pulmonary Medicine [place] [address]     Signatures   Electronically signed by : Skye Dai, ; Feb 1 2017  3:09PM EST                       (Author)

## 2018-01-21 ENCOUNTER — HOSPITAL ENCOUNTER (INPATIENT)
Facility: HOSPITAL | Age: 73
LOS: 2 days | Discharge: HOME | DRG: 871 | End: 2018-01-23
Attending: EMERGENCY MEDICINE | Admitting: HOSPITALIST
Payer: MEDICARE

## 2018-01-21 ENCOUNTER — APPOINTMENT (EMERGENCY)
Dept: RADIOLOGY | Facility: HOSPITAL | Age: 73
DRG: 871 | End: 2018-01-21
Payer: MEDICARE

## 2018-01-21 DIAGNOSIS — A41.9 SEPSIS (HCC): ICD-10-CM

## 2018-01-21 DIAGNOSIS — J18.9 PNEUMONIA: ICD-10-CM

## 2018-01-21 DIAGNOSIS — I48.91 NEW ONSET A-FIB (HCC): ICD-10-CM

## 2018-01-21 DIAGNOSIS — J11.1 INFLUENZA: ICD-10-CM

## 2018-01-21 DIAGNOSIS — J44.1 COPD EXACERBATION (HCC): Primary | ICD-10-CM

## 2018-01-21 PROBLEM — I49.9 ARRHYTHMIA: Status: ACTIVE | Noted: 2018-01-21

## 2018-01-21 PROBLEM — I50.30 DIASTOLIC HEART FAILURE (HCC): Chronic | Status: ACTIVE | Noted: 2018-01-21

## 2018-01-21 PROBLEM — I50.30 DIASTOLIC HEART FAILURE (HCC): Status: ACTIVE | Noted: 2018-01-21

## 2018-01-21 LAB
ANION GAP SERPL CALCULATED.3IONS-SCNC: 8 MMOL/L (ref 4–13)
BASOPHILS # BLD AUTO: 0.02 THOUSANDS/ΜL (ref 0–0.1)
BASOPHILS NFR BLD AUTO: 0 % (ref 0–1)
BUN SERPL-MCNC: 26 MG/DL (ref 5–25)
CALCIUM SERPL-MCNC: 9.4 MG/DL (ref 8.3–10.1)
CHLORIDE SERPL-SCNC: 111 MMOL/L (ref 100–108)
CO2 SERPL-SCNC: 22 MMOL/L (ref 21–32)
CREAT SERPL-MCNC: 1.29 MG/DL (ref 0.6–1.3)
EOSINOPHIL # BLD AUTO: 0.16 THOUSAND/ΜL (ref 0–0.61)
EOSINOPHIL NFR BLD AUTO: 1 % (ref 0–6)
ERYTHROCYTE [DISTWIDTH] IN BLOOD BY AUTOMATED COUNT: 14.4 % (ref 11.6–15.1)
FLUAV AG SPEC QL IA: POSITIVE
FLUBV AG SPEC QL IA: NEGATIVE
GFR SERPL CREATININE-BSD FRML MDRD: 56 ML/MIN/1.73SQ M
GLUCOSE SERPL-MCNC: 118 MG/DL (ref 65–140)
HCT VFR BLD AUTO: 39.6 % (ref 36.5–49.3)
HGB BLD-MCNC: 13.3 G/DL (ref 12–17)
INR PPP: 1.04 (ref 0.86–1.16)
L PNEUMO1 AG UR QL IA.RAPID: NEGATIVE
LACTATE SERPL-SCNC: 1.1 MMOL/L (ref 0.5–2)
LYMPHOCYTES # BLD AUTO: 0.71 THOUSANDS/ΜL (ref 0.6–4.47)
LYMPHOCYTES NFR BLD AUTO: 5 % (ref 14–44)
MCH RBC QN AUTO: 29.8 PG (ref 26.8–34.3)
MCHC RBC AUTO-ENTMCNC: 33.6 G/DL (ref 31.4–37.4)
MCV RBC AUTO: 89 FL (ref 82–98)
MONOCYTES # BLD AUTO: 0.99 THOUSAND/ΜL (ref 0.17–1.22)
MONOCYTES NFR BLD AUTO: 6 % (ref 4–12)
NEUTROPHILS # BLD AUTO: 13.42 THOUSANDS/ΜL (ref 1.85–7.62)
NEUTS SEG NFR BLD AUTO: 88 % (ref 43–75)
NRBC BLD AUTO-RTO: 0 /100 WBCS
PLATELET # BLD AUTO: 172 THOUSANDS/UL (ref 149–390)
PMV BLD AUTO: 11.3 FL (ref 8.9–12.7)
POTASSIUM SERPL-SCNC: 4.1 MMOL/L (ref 3.5–5.3)
PROTHROMBIN TIME: 13.6 SECONDS (ref 12.1–14.4)
RBC # BLD AUTO: 4.46 MILLION/UL (ref 3.88–5.62)
S PNEUM AG UR QL: NEGATIVE
SODIUM SERPL-SCNC: 141 MMOL/L (ref 136–145)
TROPONIN I SERPL-MCNC: <0.02 NG/ML
WBC # BLD AUTO: 15.36 THOUSAND/UL (ref 4.31–10.16)

## 2018-01-21 PROCEDURE — 96374 THER/PROPH/DIAG INJ IV PUSH: CPT

## 2018-01-21 PROCEDURE — 87449 NOS EACH ORGANISM AG IA: CPT | Performed by: HOSPITALIST

## 2018-01-21 PROCEDURE — 71046 X-RAY EXAM CHEST 2 VIEWS: CPT

## 2018-01-21 PROCEDURE — 80048 BASIC METABOLIC PNL TOTAL CA: CPT | Performed by: EMERGENCY MEDICINE

## 2018-01-21 PROCEDURE — 84484 ASSAY OF TROPONIN QUANT: CPT | Performed by: INTERNAL MEDICINE

## 2018-01-21 PROCEDURE — 87040 BLOOD CULTURE FOR BACTERIA: CPT | Performed by: EMERGENCY MEDICINE

## 2018-01-21 PROCEDURE — 93005 ELECTROCARDIOGRAM TRACING: CPT | Performed by: EMERGENCY MEDICINE

## 2018-01-21 PROCEDURE — 87070 CULTURE OTHR SPECIMN AEROBIC: CPT | Performed by: HOSPITALIST

## 2018-01-21 PROCEDURE — 93005 ELECTROCARDIOGRAM TRACING: CPT | Performed by: HOSPITALIST

## 2018-01-21 PROCEDURE — 84484 ASSAY OF TROPONIN QUANT: CPT | Performed by: HOSPITALIST

## 2018-01-21 PROCEDURE — 87400 INFLUENZA A/B EACH AG IA: CPT | Performed by: EMERGENCY MEDICINE

## 2018-01-21 PROCEDURE — 87205 SMEAR GRAM STAIN: CPT | Performed by: HOSPITALIST

## 2018-01-21 PROCEDURE — 85025 COMPLETE CBC W/AUTO DIFF WBC: CPT | Performed by: EMERGENCY MEDICINE

## 2018-01-21 PROCEDURE — 94760 N-INVAS EAR/PLS OXIMETRY 1: CPT

## 2018-01-21 PROCEDURE — 99285 EMERGENCY DEPT VISIT HI MDM: CPT

## 2018-01-21 PROCEDURE — 85610 PROTHROMBIN TIME: CPT | Performed by: EMERGENCY MEDICINE

## 2018-01-21 PROCEDURE — 36415 COLL VENOUS BLD VENIPUNCTURE: CPT | Performed by: EMERGENCY MEDICINE

## 2018-01-21 PROCEDURE — 94644 CONT INHLJ TX 1ST HOUR: CPT

## 2018-01-21 PROCEDURE — 96376 TX/PRO/DX INJ SAME DRUG ADON: CPT

## 2018-01-21 PROCEDURE — 83605 ASSAY OF LACTIC ACID: CPT | Performed by: EMERGENCY MEDICINE

## 2018-01-21 RX ORDER — FLUTICASONE PROPIONATE 50 MCG
1 SPRAY, SUSPENSION (ML) NASAL DAILY
Status: DISCONTINUED | OUTPATIENT
Start: 2018-01-22 | End: 2018-01-23 | Stop reason: HOSPADM

## 2018-01-21 RX ORDER — ACETAMINOPHEN 325 MG/1
650 TABLET ORAL EVERY 6 HOURS PRN
Status: DISCONTINUED | OUTPATIENT
Start: 2018-01-21 | End: 2018-01-23 | Stop reason: HOSPADM

## 2018-01-21 RX ORDER — DOCUSATE SODIUM 100 MG/1
100 CAPSULE, LIQUID FILLED ORAL 2 TIMES DAILY
Status: DISCONTINUED | OUTPATIENT
Start: 2018-01-21 | End: 2018-01-23 | Stop reason: HOSPADM

## 2018-01-21 RX ORDER — DIAZEPAM 2 MG/1
2 TABLET ORAL AS NEEDED
Status: DISCONTINUED | OUTPATIENT
Start: 2018-01-21 | End: 2018-01-23 | Stop reason: HOSPADM

## 2018-01-21 RX ORDER — CHLORAL HYDRATE 500 MG
1000 CAPSULE ORAL DAILY
Status: DISCONTINUED | OUTPATIENT
Start: 2018-01-22 | End: 2018-01-23 | Stop reason: HOSPADM

## 2018-01-21 RX ORDER — ALBUTEROL SULFATE 90 UG/1
2 AEROSOL, METERED RESPIRATORY (INHALATION) EVERY 6 HOURS PRN
Status: DISCONTINUED | OUTPATIENT
Start: 2018-01-21 | End: 2018-01-21

## 2018-01-21 RX ORDER — LITHIUM CARBONATE 300 MG/1
600 TABLET, FILM COATED, EXTENDED RELEASE ORAL EVERY OTHER DAY
Status: DISCONTINUED | OUTPATIENT
Start: 2018-01-22 | End: 2018-01-23 | Stop reason: HOSPADM

## 2018-01-21 RX ORDER — PANTOPRAZOLE SODIUM 40 MG/1
40 TABLET, DELAYED RELEASE ORAL
Status: DISCONTINUED | OUTPATIENT
Start: 2018-01-21 | End: 2018-01-23 | Stop reason: HOSPADM

## 2018-01-21 RX ORDER — DILTIAZEM HYDROCHLORIDE 60 MG/1
60 CAPSULE, EXTENDED RELEASE ORAL EVERY 12 HOURS SCHEDULED
Status: DISCONTINUED | OUTPATIENT
Start: 2018-01-21 | End: 2018-01-21

## 2018-01-21 RX ORDER — METHYLPREDNISOLONE SODIUM SUCCINATE 40 MG/ML
40 INJECTION, POWDER, LYOPHILIZED, FOR SOLUTION INTRAMUSCULAR; INTRAVENOUS EVERY 12 HOURS SCHEDULED
Status: DISCONTINUED | OUTPATIENT
Start: 2018-01-21 | End: 2018-01-22

## 2018-01-21 RX ORDER — POLYETHYLENE GLYCOL 3350 17 G/17G
17 POWDER, FOR SOLUTION ORAL DAILY PRN
Status: DISCONTINUED | OUTPATIENT
Start: 2018-01-21 | End: 2018-01-23 | Stop reason: HOSPADM

## 2018-01-21 RX ORDER — OSELTAMIVIR PHOSPHATE 75 MG/1
75 CAPSULE ORAL EVERY 12 HOURS SCHEDULED
Status: DISCONTINUED | OUTPATIENT
Start: 2018-01-21 | End: 2018-01-23

## 2018-01-21 RX ORDER — ONDANSETRON 2 MG/ML
4 INJECTION INTRAMUSCULAR; INTRAVENOUS EVERY 6 HOURS PRN
Status: DISCONTINUED | OUTPATIENT
Start: 2018-01-21 | End: 2018-01-23 | Stop reason: HOSPADM

## 2018-01-21 RX ORDER — ALBUTEROL SULFATE 90 UG/1
2 AEROSOL, METERED RESPIRATORY (INHALATION) 4 TIMES DAILY
Status: DISCONTINUED | OUTPATIENT
Start: 2018-01-21 | End: 2018-01-22

## 2018-01-21 RX ORDER — SENNOSIDES 8.6 MG
1 TABLET ORAL DAILY
Status: DISCONTINUED | OUTPATIENT
Start: 2018-01-21 | End: 2018-01-23 | Stop reason: HOSPADM

## 2018-01-21 RX ORDER — AZITHROMYCIN 250 MG/1
500 TABLET, FILM COATED ORAL EVERY 24 HOURS
Status: DISCONTINUED | OUTPATIENT
Start: 2018-01-21 | End: 2018-01-23 | Stop reason: HOSPADM

## 2018-01-21 RX ORDER — ATORVASTATIN CALCIUM 40 MG/1
40 TABLET, FILM COATED ORAL
Status: DISCONTINUED | OUTPATIENT
Start: 2018-01-21 | End: 2018-01-23 | Stop reason: HOSPADM

## 2018-01-21 RX ORDER — AMLODIPINE BESYLATE 2.5 MG/1
2.5 TABLET ORAL DAILY
Status: DISCONTINUED | OUTPATIENT
Start: 2018-01-21 | End: 2018-01-23 | Stop reason: HOSPADM

## 2018-01-21 RX ORDER — LITHIUM CARBONATE 300 MG/1
300 TABLET, FILM COATED, EXTENDED RELEASE ORAL EVERY OTHER DAY
Status: DISCONTINUED | OUTPATIENT
Start: 2018-01-22 | End: 2018-01-21

## 2018-01-21 RX ORDER — METOPROLOL SUCCINATE 25 MG/1
25 TABLET, EXTENDED RELEASE ORAL DAILY
Status: DISCONTINUED | OUTPATIENT
Start: 2018-01-22 | End: 2018-01-21

## 2018-01-21 RX ORDER — SODIUM CHLORIDE 9 MG/ML
75 INJECTION, SOLUTION INTRAVENOUS CONTINUOUS
Status: DISCONTINUED | OUTPATIENT
Start: 2018-01-21 | End: 2018-01-21

## 2018-01-21 RX ORDER — SODIUM CHLORIDE FOR INHALATION 0.9 %
3 VIAL, NEBULIZER (ML) INHALATION ONCE
Status: COMPLETED | OUTPATIENT
Start: 2018-01-21 | End: 2018-01-21

## 2018-01-21 RX ORDER — ALBUTEROL SULFATE 2.5 MG/3ML
2.5 SOLUTION RESPIRATORY (INHALATION) EVERY 6 HOURS PRN
Status: DISCONTINUED | OUTPATIENT
Start: 2018-01-21 | End: 2018-01-23 | Stop reason: HOSPADM

## 2018-01-21 RX ORDER — LITHIUM CARBONATE 300 MG/1
600 TABLET, FILM COATED, EXTENDED RELEASE ORAL EVERY OTHER DAY
Status: DISCONTINUED | OUTPATIENT
Start: 2018-01-21 | End: 2018-01-21

## 2018-01-21 RX ORDER — DICYCLOMINE HCL 20 MG
20 TABLET ORAL EVERY 6 HOURS
Status: DISCONTINUED | OUTPATIENT
Start: 2018-01-21 | End: 2018-01-23 | Stop reason: HOSPADM

## 2018-01-21 RX ORDER — LITHIUM CARBONATE 300 MG/1
300 TABLET, FILM COATED, EXTENDED RELEASE ORAL EVERY OTHER DAY
Status: DISCONTINUED | OUTPATIENT
Start: 2018-01-21 | End: 2018-01-23 | Stop reason: HOSPADM

## 2018-01-21 RX ORDER — LEVALBUTEROL 1.25 MG/.5ML
1.25 SOLUTION, CONCENTRATE RESPIRATORY (INHALATION)
Status: DISCONTINUED | OUTPATIENT
Start: 2018-01-21 | End: 2018-01-21

## 2018-01-21 RX ORDER — METHYLPREDNISOLONE SODIUM SUCCINATE 125 MG/2ML
125 INJECTION, POWDER, LYOPHILIZED, FOR SOLUTION INTRAMUSCULAR; INTRAVENOUS ONCE
Status: COMPLETED | OUTPATIENT
Start: 2018-01-21 | End: 2018-01-21

## 2018-01-21 RX ORDER — METHYLPREDNISOLONE SODIUM SUCCINATE 40 MG/ML
40 INJECTION, POWDER, LYOPHILIZED, FOR SOLUTION INTRAMUSCULAR; INTRAVENOUS EVERY 8 HOURS SCHEDULED
Status: DISCONTINUED | OUTPATIENT
Start: 2018-01-21 | End: 2018-01-21

## 2018-01-21 RX ORDER — DIAZEPAM 5 MG/1
5 TABLET ORAL AS NEEDED
COMMUNITY
End: 2021-08-10

## 2018-01-21 RX ORDER — METOPROLOL SUCCINATE 25 MG/1
25 TABLET, EXTENDED RELEASE ORAL
Status: DISCONTINUED | OUTPATIENT
Start: 2018-01-21 | End: 2018-01-23 | Stop reason: HOSPADM

## 2018-01-21 RX ORDER — ASPIRIN 81 MG/1
81 TABLET, CHEWABLE ORAL DAILY
Status: DISCONTINUED | OUTPATIENT
Start: 2018-01-21 | End: 2018-01-23 | Stop reason: HOSPADM

## 2018-01-21 RX ORDER — LORATADINE 10 MG/1
10 TABLET ORAL DAILY
Status: DISCONTINUED | OUTPATIENT
Start: 2018-01-22 | End: 2018-01-23 | Stop reason: HOSPADM

## 2018-01-21 RX ORDER — OSELTAMIVIR PHOSPHATE 75 MG/1
75 CAPSULE ORAL ONCE
Status: COMPLETED | OUTPATIENT
Start: 2018-01-21 | End: 2018-01-21

## 2018-01-21 RX ORDER — MELATONIN
2000 DAILY
Status: DISCONTINUED | OUTPATIENT
Start: 2018-01-22 | End: 2018-01-23 | Stop reason: HOSPADM

## 2018-01-21 RX ADMIN — METHYLPREDNISOLONE SODIUM SUCCINATE 40 MG: 40 INJECTION, POWDER, FOR SOLUTION INTRAMUSCULAR; INTRAVENOUS at 22:33

## 2018-01-21 RX ADMIN — ENOXAPARIN SODIUM 80 MG: 80 INJECTION SUBCUTANEOUS at 22:38

## 2018-01-21 RX ADMIN — ISODIUM CHLORIDE 13 ML: 0.03 SOLUTION RESPIRATORY (INHALATION) at 05:05

## 2018-01-21 RX ADMIN — ALBUTEROL SULFATE 10 MG: 2.5 SOLUTION RESPIRATORY (INHALATION) at 05:05

## 2018-01-21 RX ADMIN — ALBUTEROL SULFATE 2 PUFF: 90 AEROSOL, METERED RESPIRATORY (INHALATION) at 13:23

## 2018-01-21 RX ADMIN — PANTOPRAZOLE SODIUM 40 MG: 40 TABLET, DELAYED RELEASE ORAL at 08:05

## 2018-01-21 RX ADMIN — ASPIRIN 81 MG 81 MG: 81 TABLET ORAL at 09:38

## 2018-01-21 RX ADMIN — DICYCLOMINE HYDROCHLORIDE 20 MG: 20 TABLET ORAL at 08:05

## 2018-01-21 RX ADMIN — FLUTICASONE PROPIONATE AND SALMETEROL 1 PUFF: 50; 250 POWDER RESPIRATORY (INHALATION) at 11:13

## 2018-01-21 RX ADMIN — ENOXAPARIN SODIUM 80 MG: 80 INJECTION SUBCUTANEOUS at 11:06

## 2018-01-21 RX ADMIN — SENNOSIDES 8.6 MG: 8.6 TABLET, FILM COATED ORAL at 08:21

## 2018-01-21 RX ADMIN — OSELTAMIVIR PHOSPHATE 75 MG: 75 CAPSULE ORAL at 22:41

## 2018-01-21 RX ADMIN — METHYLPREDNISOLONE SODIUM SUCCINATE 125 MG: 125 INJECTION, POWDER, FOR SOLUTION INTRAMUSCULAR; INTRAVENOUS at 06:08

## 2018-01-21 RX ADMIN — IPRATROPIUM BROMIDE 0.5 MG: 0.5 SOLUTION RESPIRATORY (INHALATION) at 12:29

## 2018-01-21 RX ADMIN — IPRATROPIUM BROMIDE 2 PUFF: 17 AEROSOL, METERED RESPIRATORY (INHALATION) at 19:44

## 2018-01-21 RX ADMIN — OSELTAMIVIR PHOSPHATE 75 MG: 75 CAPSULE ORAL at 06:27

## 2018-01-21 RX ADMIN — METHYLPREDNISOLONE SODIUM SUCCINATE 40 MG: 40 INJECTION, POWDER, FOR SOLUTION INTRAMUSCULAR; INTRAVENOUS at 05:00

## 2018-01-21 RX ADMIN — IPRATROPIUM BROMIDE 2 PUFF: 17 AEROSOL, METERED RESPIRATORY (INHALATION) at 14:14

## 2018-01-21 RX ADMIN — AMLODIPINE BESYLATE 2.5 MG: 2.5 TABLET ORAL at 22:31

## 2018-01-21 RX ADMIN — LEVALBUTEROL HYDROCHLORIDE 1.25 MG: 1.25 SOLUTION, CONCENTRATE RESPIRATORY (INHALATION) at 12:29

## 2018-01-21 RX ADMIN — FLUTICASONE PROPIONATE AND SALMETEROL 1 PUFF: 50; 250 POWDER RESPIRATORY (INHALATION) at 19:45

## 2018-01-21 RX ADMIN — LITHIUM CARBONATE 300 MG: 300 TABLET, FILM COATED, EXTENDED RELEASE ORAL at 22:31

## 2018-01-21 RX ADMIN — DIAZEPAM 2 MG: 2 TABLET ORAL at 22:31

## 2018-01-21 RX ADMIN — METOPROLOL SUCCINATE 25 MG: 25 TABLET, EXTENDED RELEASE ORAL at 22:32

## 2018-01-21 RX ADMIN — AZITHROMYCIN MONOHYDRATE 500 MG: 500 INJECTION, POWDER, LYOPHILIZED, FOR SOLUTION INTRAVENOUS at 07:45

## 2018-01-21 RX ADMIN — IPRATROPIUM BROMIDE 1 MG: 0.5 SOLUTION RESPIRATORY (INHALATION) at 05:05

## 2018-01-21 RX ADMIN — CEFTRIAXONE SODIUM 1000 MG: 10 INJECTION, POWDER, FOR SOLUTION INTRAVENOUS at 06:52

## 2018-01-21 RX ADMIN — SODIUM CHLORIDE 75 ML/HR: 0.9 INJECTION, SOLUTION INTRAVENOUS at 08:05

## 2018-01-21 NOTE — PROGRESS NOTES
[facility] Internal Medicine Progress Note  Patient: Saadia Guevara 81 y o  male   MRN: [id number]  PCP: Naya Lemon MD  Unit/Bed#: ED 12 Encounter: [id number]  Date Of Visit: 01/21/18    Assessment:    Principal Problem:    Sepsis ([facility] )  Active Problems:    COPD exacerbation ([facility] )    Hypercholesterolemia    Hypertension    Influenza    Arrhythmia    Diastolic heart failure ([facility] )      Plan:    # Sepsis - met SIRs criteria with tachycardia/leukocytosis, source being influenza A  - s/p official cxr results w/o infiltrate thus d/c ctx/azithromycin; will hold on ID consult for now  - cont tamiflu  - f/u blood cx   - cont supportive care  - nl lactic acid, trend leukocytosis    # COPD exacerbation - likely precipated by above  - titrate solumedrol to bid  - cont pulm toilet (use xopenex instead), advair    # New onset afib/flutter - etiology possibly d/t to above  - Cardiology consulted  - pending tsh though has been nl more recently in 11/2017  - echo pending  - serial troponin  - cont toprol xl, [id number] of 3 thus would require a/c; on therapeutic dosed lovenox for now  - cont telemetry monitoring    # Chronic diastolic HF, grade I s/p echo 11/2017  - no sign of exacerbation    # HTN, stable    # Bipolar d/o - stable, cont meds      VTE Pharmacologic Prophylaxis:   Pharmacologic: Enoxaparin (Lovenox)  Mechanical VTE Prophylaxis in Place: No    Patient Centered Rounds: I have performed bedside rounds with nursing staff today  Discussions with Specialists or Other Care Team Provider:     Education and Discussions with Family / Patient: Patient    Time Spent for Care: 30 minutes  More than 50% of total time spent on counseling and coordination of care as described above      Current Length of Stay: 0 day(s)    Current Patient Status: Inpatient   Certification Statement: The patient will continue to require additional inpatient hospital stay due to Clinical condition    Discharge Plan / Estimated Discharge Date: Code Status: Level 1 - Full Code      Subjective:   Dyspnea only with activity and during coughing fits otherwise fine at rest   Remains in aflutter, HR in l00's; denies cp, palpitations  Objective:     Vitals:   Temp (24hrs), Av 4 °F (36 9 °C), Min:98 4 °F (36 9 °C), Max:98 4 °F (36 9 °C)    HR:  [] 92  Resp:  [19-43] 19  BP: (106-134)/(56-97) 113/56  SpO2:  [87 %-100 %] 95 %  Body mass index is 27 78 kg/m²  Input and Output Summary (last 24 hours):     No intake or output data in the 24 hours ending 18 1155    Physical Exam:     Physical Exam   Constitutional: He is oriented to person, place, and time  He appears well-developed and well-nourished  Cardiovascular: Normal heart sounds  Exam reveals no gallop and no friction rub  No murmur heard  Irregularly, irregular, tachycardiac   Pulmonary/Chest: Effort normal  No respiratory distress  He has wheezes  He has no rales  He exhibits no tenderness  Good air entry, scattered wheezing   Abdominal: Soft  Bowel sounds are normal  He exhibits no distension and no mass  There is no tenderness  There is no rebound and no guarding  Musculoskeletal: Normal range of motion  He exhibits no edema, tenderness or deformity  Neurological: He is alert and oriented to person, place, and time  He has normal reflexes  He displays normal reflexes  No cranial nerve deficit  He exhibits normal muscle tone  Coordination normal    Skin: Skin is warm and dry  No rash noted  No erythema  No pallor  Psychiatric: He has a normal mood and affect   His behavior is normal  Judgment and thought content normal        Additional Data:     Labs:      Results from last 7 days  Lab Units 18  0455   WBC Thousand/uL 15 36*   HEMOGLOBIN g/dL 13 3   HEMATOCRIT % 39 6   PLATELETS Thousands/uL 172   NEUTROS PCT % 88*   LYMPHS PCT % 5*   MONOS PCT % 6   EOS PCT % 1       Results from last 7 days  Lab Units 18  0455   SODIUM mmol/L 141   POTASSIUM mmol/L 4 1 CHLORIDE mmol/L 111*   CO2 mmol/L 22   BUN mg/dL 26*   CREATININE mg/dL 1 29   CALCIUM mg/dL 9 4   GLUCOSE RANDOM mg/dL 118       Results from last 7 days  Lab Units 01/21/18  0646   INR  1 04       * I Have Reviewed All Lab Data Listed Above  * Additional Pertinent Lab Tests Reviewed: All Labs Within Last 24 Hours Reviewed    Imaging:    Imaging Reports Reviewed Today Include: CXR  Imaging Personally Reviewed by Myself Includes:      Recent Cultures (last 7 days):           Last 24 Hours Medication List:     amLODIPine 2 5 mg Oral Daily   aspirin 81 mg Oral Daily   atorvastatin 40 mg Oral After Dinner   [START ON 1/22/2018] cholecalciferol 2,000 Units Oral Daily   dicyclomine 20 mg Oral Q6H   docusate sodium 100 mg Oral BID   enoxaparin 1 mg/kg Subcutaneous Q12H [facility]   [START ON 1/22/2018] fish oil 1,000 mg Oral Daily   [START ON 1/22/2018] fluticasone 1 spray Nasal Daily   fluticasone-salmeterol 1 puff Inhalation Q12H [facility]   ipratropium 2 puff Inhalation Q6H   [START ON 1/22/2018] lithium carbonate 300 mg Oral Every Other Day   lithium carbonate 600 mg Oral Every Other Day   [START ON 1/22/2018] loratadine 10 mg Oral Daily   methylPREDNISolone sodium succinate 40 mg Intravenous Q8H [facility]   [START ON 1/22/2018] metoprolol succinate 25 mg Oral Daily   oseltamivir 75 mg Oral Q12H KATHRYN   pantoprazole 40 mg Oral Early Morning   senna 1 tablet Oral Daily        Today, Patient Was Seen By: Anna Dotson DO    ** Please Note: This note has been constructed using a voice recognition system   **

## 2018-01-21 NOTE — ED NOTES
Assumed care of patient at this time  Patient aware he is awaiting occupied bed to be available  Patient currently ordering dinner tray at this time  Patient refusing bentyl at this time due to no stomach complaints          Sean Nagel RN  01/21/18 1600

## 2018-01-21 NOTE — ED ATTENDING ATTESTATION
Eden Baca DO, saw and evaluated the patient  I have discussed the patient with the resident/non-physician practitioner and agree with the resident's/non-physician practitioner's findings, Plan of Care, and MDM as documented in the resident's/non-physician practitioner's note, except where noted  All available labs and Radiology studies were reviewed  At this point I agree with the current assessment done in the Emergency Department  I have conducted an independent evaluation of this patient a history and physical is as follows:    80 yo male with hx of COPD presents for evaluation of cough with phlegm, dyspnea  Last COPD exacerbation 2 years ago  Symptoms generalized in nature, no a/e factors  Wife has similar symptoms  Denies f/c/n/v, CP, abd pain, leg pain or swelling  Pt has conversational dyspnea on exam   Decreased air movement bilaterally with bibasilar rhonchi  Imp: acute COPD exacerbation  Plan: cardiac eval, flu swab, tx sx, admit for further eval and tx as pt is hypoxic and acutely dyspneic        Critical Care Time  CritCare Time    Procedures

## 2018-01-21 NOTE — H&P
H&P- Hallie Fish 1945, 81 y o  male MRN: [id number]    Unit/Bed#: ED 12 Encounter: [id number]    Primary Care Provider: Christoph Maloney MD   Date and time admitted to hospital: 1/21/2018  4:29 AM        * Sepsis [facility])   Assessment & Plan    Patient presented with leukocytosis tachycardia tachypnea found positive rapid influenza test   opacity on imaging  Follow-up official imaging study report  Patient started on Tamiflu, antibiotic to cover CAP  Check Legionella and urine antigen  Follow-up urine and blood culture  Follow-up temperature curve and WBC  Continue nebs respiratory protocol  Droplet precaution  ID evaluation  Lactic acid pending  Gentle IV hydration Tylenol antiemetic        Influenza   Assessment & Plan    Management as above        COPD exacerbation (HCC)   Assessment & Plan    Continue nebs, Symbicort  Respiratory protocol  Solu-Medrol  Antibiotic          Arrhythmia   Assessment & Plan    No history of atrial fibrillation, patient will be evaluated by cardiologist  Most likely triggered by underlying infectious, process will check TSH   Admitted to telemetry, serial cardiac  Echocardiogram  Beta-blocker for HR control  Cardiology consult   [id number] 3, patient on Aspirin ,therapeutic dose of Lovenox (renal function at baseline)        Hypertension   Assessment & Plan    Continue amlodipine        Hypercholesterolemia   Assessment & Plan    Continue statin        Diastolic heart failure (HCC)   Assessment & Plan    Grade 1 diastolic dysfunction on most recent echocardiogram  Continue blood pressure meds              VTE Prophylaxis: Enoxaparin (Lovenox)  / sequential compression device   Code Status: full  POLST: There is no POLST form on file for this patient (pre-hospital)      Anticipated Length of Stay:  Patient will be admitted on an Inpatient basis with an anticipated length of stay of  > 2 midnights     Justification for Hospital Stay: ID and cardiology evaluation    Total Time for Visit, including Counseling / Coordination of Care: 45 minutes  Greater than 50% of this total time spent on direct patient counseling and coordination of care  Chief Complaint:   Shortness of breath ,tachypnea, tachycardia    History of Present Illness:    Terrell Barnhart is a 81 y o  male with history of COPD, no oxygen dependent, dHF,hypertension who presented with 2 days of sore throat,getting worse dyspnea on exertion associated with yellow sputum productive cough, chills, nasal congestion, wheezes  Patient denies fever nausea vomiting diarrhea abdominal pain chest pain headache, leg edema  Extensive workup in the emergency room reveal positive rapid influenza A test ,consolidation on imaging study supportive pneumonia   due to respiratory distress patient was given IV Solu-Medrol, nebs , started on antibiotic for community-acquired pneumonia and Tamiflu  Patient reports improvement of symptoms with current treatment  WBC [id number] lactic acid pending heart rate 118 rest rate 28 present on admission  EKG showed atrial fibrillation ( patient does not have a history of AFib)  Remains afebrile and hemodynamically stable  He will be admitted on an inpatient basis for multidisciplinary approach   Review of Systems:    Review of Systems   Constitutional: Positive for activity change  HENT: Positive for sore throat  Respiratory: Positive for cough and shortness of breath          Past Medical and Surgical History:     Past Medical History:   Diagnosis Date    Aortic aneurysm ([facility] )     Bipolar 1 disorder ([facility] )     Cardiac disease     MI    Cervical cord compression with myelopathy ([facility] )     COPD (chronic obstructive pulmonary disease) (HCC)     Diverticulitis     Gait disturbance     uses cane, leg brace on right    Heart attack 1996    Hx of resection of large bowel 5/3/2016    Hyperlipidemia     Hypertension     IBS (irritable bowel syndrome)     Lumbar stenosis     Lung cancer ([facility] ) 2007 and 2011    Shortness of breath     Small bowel obstruction 11/16/2016       Past Surgical History:   Procedure Laterality Date    ANGIOPLASTY      stent    CERVICAL SPINE SURGERY      Cervical decompression with cervical fusion from C3-C7 for spinal stenosis   COLON SURGERY      LUNG CANCER SURGERY Right     wedge resection    LUNG LOBECTOMY Left     [place] ARTHRODESIS ANT INTERBODY MIN DISCECTOMY, CERVICAL BELOW C2 N/A 5/2/2016    Procedure: Anterior cervical diskectomy C3/4, C5/6, C6/7 with anterior plate fixation fusion C3-7;  Posterior decompressive laminectomy C3-7 with lateral mass fixation fusion C3-7 (IMPULSE MONITORING); Surgeon: Hailee Coughlin MD;  [facility]: [facility];  Service: Neurosurgery    [place] ARTHRODESIS POSTERIOR INTERBODY LUMBAR N/A 1/30/2017    Procedure: L4-5 AND L5-S1 DECOMPRESSIVE FORAMINOTOMIES, TRANSFORAMINAL LUMBAR INTERBODY AND PEDICLE SCREW FIXATION FUSION L4-S1 (IMPULSE); Surgeon: Hailee Coughlin MD;  Location: [facility];  Service: Neurosurgery    PROSTATECTOMY      for prostate CA - no chemo/RT    SIGMOIDECTOMY      for divertic    SMALL INTESTINE SURGERY N/A 11/17/2016    Procedure: Exploratory Laparotomy, Lysis of adhesions to release small bowel obstruction;  Surgeon: Laverne Boeck, MD;  Location: [facility];  Service:        Meds/Allergies:    Prior to Admission medications    Medication Sig Start Date End Date Taking? Authorizing Provider   acetaminophen (TYLENOL) 500 mg tablet Take 500 mg by mouth as needed for mild pain     Yes Historical Provider, MD   albuterol (PROVENTIL HFA,VENTOLIN HFA) 90 mcg/act inhaler Inhale 2 puffs every 6 (six) hours as needed for wheezing   Yes Historical Provider, MD   amLODIPine (NORVASC) 2 5 mg tablet Take 2 5 mg by mouth daily   Yes Historical Provider, MD   aspirin (ECOTRIN LOW STRENGTH) 81 mg EC tablet Take 81 mg by mouth daily   Yes Historical Provider, MD   Atorvastatin Calcium (LIPITOR PO) Take 40 mg by mouth daily  Yes Historical Provider, MD   B Complex Vitamins (VITAMIN B COMPLEX PO) Take by mouth   Yes Historical Provider, MD   Cholecalciferol (VITAMIN D PO) Take 2,000 Units by mouth daily     Yes Historical Provider, MD   diazepam (VALIUM) 5 mg tablet Take 5 mg by mouth as needed for anxiety   Yes Historical Provider, MD   dicyclomine (BENTYL) 20 mg tablet Take 20 mg by mouth every 6 (six) hours   Yes Historical Provider, MD   fexofenadine (ALLEGRA) 180 MG tablet Take 180 mg by mouth daily   Yes Historical Provider, MD   fluticasone (FLONASE) 50 mcg/act nasal spray 1 spray into each nostril as needed  Yes Historical Provider, MD   fluticasone-salmeterol (ADVAIR) 250-50 mcg/dose Inhale 1 puff every 12 (twelve) hours  Yes Historical Provider, MD   ipratropium (ATROVENT HFA) 17 mcg/act inhaler Inhale 2 puffs every 6 (six) hours   Yes Historical Provider, MD   lithium 300 MG tablet Take 600 mg by mouth every other day PM  Alternating with 300 mg every other day  THIS IS A CONTINUOUS RELEASE TABLET    Yes Historical Provider, MD   Metoprolol Succinate (TOPROL XL PO) Take 25 mg by mouth daily  Yes Historical Provider, MD   omega-3-acid ethyl esters (LOVAZA) 1 g capsule Take 1 g by mouth 3 (three) times a day  Yes Historical Provider, MD   omeprazole (PriLOSEC) 40 MG capsule Take 40 mg by mouth daily  Yes Historical Provider, MD   nitroglycerin (NITRODUR) 0 2 mg/hr Place 1 patch on the skin daily as needed  1/21/18  Historical Provider, MD   Tiotropium Bromide Monohydrate (SPIRIVA RESPIMAT IN) Inhale 1 Dose daily  1/21/18  Historical Provider, MD     I have reviewed home medications with a medical source (PCP, Pharmacy, other)  Allergies:    Allergies   Allergen Reactions    Oxycodone Rash       Social History:     Marital Status: /Civil Union   Occupation: none  Patient Pre-hospital Living Situation: home  Patient Pre-hospital Level of Mobility: reg  Patient Pre-hospital Diet Restrictions: reg  Substance Use History:   History   Alcohol Use    Yes     Comment: rarely     History   Smoking Status    Former Smoker    Quit date: 2011   Smokeless Tobacco    Never Used     History   Drug Use No       Family History:    non-contributory    Physical Exam:     Vitals:   Blood Pressure: 131/88 (01/21/18 0630)  Pulse: (!) 110 (01/21/18 0630)  Temperature: 98 4 °F (36 9 °C) (01/21/18 0425)  Respirations: (!) 43 (01/21/18 0630)  Weight - Scale: 81 6 kg (180 lb) (01/21/18 0425)  SpO2: 97 % (01/21/18 0630)    Physical Exam   Constitutional: He appears well-nourished  HENT:   Head: Normocephalic  Eyes: Pupils are equal, round, and reactive to light  Neck: Normal range of motion  Cardiovascular:   Irregular heart rate   Pulmonary/Chest:   Scattered wheezes   Abdominal: He exhibits distension  There is no tenderness  Musculoskeletal: He exhibits no edema  Neurological: He is alert  No cranial nerve deficit  Skin: Skin is warm  Psychiatric: He has a normal mood and affect  Additional Data:     Lab Results: I have personally reviewed pertinent reports  Results from last 7 days  Lab Units 01/21/18  0455   WBC Thousand/uL 15 36*   HEMOGLOBIN g/dL 13 3   HEMATOCRIT % 39 6   PLATELETS Thousands/uL 172   NEUTROS PCT % 88*   LYMPHS PCT % 5*   MONOS PCT % 6   EOS PCT % 1       Results from last 7 days  Lab Units 01/21/18  0455   SODIUM mmol/L 141   POTASSIUM mmol/L 4 1   CHLORIDE mmol/L 111*   CO2 mmol/L 22   BUN mg/dL 26*   CREATININE mg/dL 1 29   CALCIUM mg/dL 9 4   GLUCOSE RANDOM mg/dL 118       Results from last 7 days  Lab Units 01/21/18  0646   INR  1 04       Imaging: I have personally reviewed pertinent reports        XR chest 2 views   ED Interpretation by Bela Sarabia DO (01/21 0601)   Right lower lobe infiltrate           EKG, Pathology, and Other Studies Reviewed on Admission:   EKG: Afib  Allscripts / Epic Records Reviewed: Yes     ** Please Note: This note has been constructed using a voice recognition system   **

## 2018-01-21 NOTE — ASSESSMENT & PLAN NOTE
No history of atrial fibrillation, patient will be evaluated by cardiologist  Most likely triggered by underlying infectious, process will check TSH   Admitted to telemetry, serial cardiac  Echocardiogram  Beta-blocker for HR control  Cardiology consult   [id number] 3, patient on Aspirin ,therapeutic dose of Lovenox (renal function at baseline)

## 2018-01-21 NOTE — ED PROVIDER NOTES
History  Chief Complaint   Patient presents with    Flu Symptoms     flu symptoms, yellow phlegm, cough, sob,      82 y/o male, hx of COPD, presents to the ED for cough, sob, and congestion x 2 days  Patient states that his wife has similar symptoms  On advair and atrovent daily for copd  Takes albuterol prn- has not needed it more then usual over the last 2 days  Denies any fever, abd pain, chest pain, N/V/D, or urinary symptoms  States that he has not had a COPD exacerbation in over 2 yrs, which was the last time he had steriods and abx  He reports that he is a past smoker and not on oxygen at home  No other complaints  History provided by:  Patient  Flu Symptoms   Presenting symptoms: cough and shortness of breath    Presenting symptoms: no diarrhea, no fever, no headaches, no nausea, no sore throat and no vomiting    Severity:  Moderate  Onset quality:  Gradual  Duration:  2 days  Progression:  Worsening  Chronicity:  New  Relieved by:  None tried  Worsened by:  Nothing  Ineffective treatments:  None tried  Associated symptoms: nasal congestion    Associated symptoms: no chills, no ear pain and no neck stiffness    Risk factors: being elderly and sick contacts        Prior to Admission Medications   Prescriptions Last Dose Informant Patient Reported? Taking? Atorvastatin Calcium (LIPITOR PO) 1/21/2018 at Unknown time  Yes Yes   Sig: Take 40 mg by mouth daily  B Complex Vitamins (VITAMIN B COMPLEX PO) 1/21/2018 at Unknown time  Yes Yes   Sig: Take by mouth   Cholecalciferol (VITAMIN D PO) 1/21/2018 at Unknown time  Yes Yes   Sig: Take 2,000 Units by mouth daily     Metoprolol Succinate (TOPROL XL PO) 1/21/2018 at Unknown time  Yes Yes   Sig: Take 25 mg by mouth daily  acetaminophen (TYLENOL) 500 mg tablet 1/21/2018 at Unknown time  Yes Yes   Sig: Take 500 mg by mouth as needed for mild pain     albuterol (PROVENTIL HFA,VENTOLIN HFA) 90 mcg/act inhaler 1/21/2018 at Unknown time  Yes Yes   Sig: Inhale 2 puffs every 6 (six) hours as needed for wheezing   amLODIPine (NORVASC) 2 5 mg tablet 2018 at Unknown time  Yes Yes   Sig: Take 2 5 mg by mouth daily   aspirin (ECOTRIN LOW STRENGTH) 81 mg EC tablet 2018 at Unknown time  Yes Yes   Sig: Take 81 mg by mouth daily   diazepam (VALIUM) 5 mg tablet   Yes Yes   Sig: Take 5 mg by mouth as needed for anxiety   dicyclomine (BENTYL) 20 mg tablet 2018 at Unknown time  Yes Yes   Sig: Take 20 mg by mouth every 6 (six) hours   fexofenadine (ALLEGRA) 180 MG tablet 2018 at Unknown time  Yes Yes   Sig: Take 180 mg by mouth daily   fluticasone (FLONASE) 50 mcg/act nasal spray 2018 at Unknown time  Yes Yes   Si spray into each nostril as needed  fluticasone-salmeterol (ADVAIR) 250-50 mcg/dose 2018 at Unknown time  Yes Yes   Sig: Inhale 1 puff every 12 (twelve) hours  ipratropium (ATROVENT HFA) 17 mcg/act inhaler 2018 at Unknown time  Yes Yes   Sig: Inhale 2 puffs every 6 (six) hours   lithium 300 MG tablet 2018 at Unknown time  Yes Yes   Sig: Take 600 mg by mouth every other day PM  Alternating with 300 mg every other day  THIS IS A CONTINUOUS RELEASE TABLET    omega-3-acid ethyl esters (LOVAZA) 1 g capsule 2018 at Unknown time  Yes Yes   Sig: Take 1 g by mouth 3 (three) times a day  omeprazole (PriLOSEC) 40 MG capsule 2018 at Unknown time  Yes Yes   Sig: Take 40 mg by mouth daily        Facility-Administered Medications: None       Past Medical History:   Diagnosis Date    Aortic aneurysm (HCC)     Bipolar 1 disorder (HCC)     Cardiac disease     MI    Cervical cord compression with myelopathy (HCC)     COPD (chronic obstructive pulmonary disease) (HCC)     Diverticulitis     Gait disturbance     uses cane, leg brace on right    Heart attack     Hx of resection of large bowel 5/3/2016    Hyperlipidemia     Hypertension     IBS (irritable bowel syndrome)     Lumbar stenosis     Lung cancer ([facility] [address] ) 2007 and 2011    Shortness of breath     Small bowel obstruction 11/16/2016       Past Surgical History:   Procedure Laterality Date    ANGIOPLASTY      stent    CERVICAL SPINE SURGERY      Cervical decompression with cervical fusion from C3-C7 for spinal stenosis   COLON SURGERY      LUNG CANCER SURGERY Right     wedge resection    LUNG LOBECTOMY Left     TX ARTHRODESIS ANT INTERBODY MIN DISCECTOMY, CERVICAL BELOW C2 N/A 5/2/2016    Procedure: Anterior cervical diskectomy C3/4, C5/6, C6/7 with anterior plate fixation fusion C3-7;  Posterior decompressive laminectomy C3-7 with lateral mass fixation fusion C3-7 (IMPULSE MONITORING); Surgeon: Usha Arango MD;  [facility];  Service: Neurosurgery    TX ARTHRODESIS POSTERIOR INTERBODY LUMBAR N/A 1/30/2017    Procedure: L4-5 AND L5-S1 DECOMPRESSIVE FORAMINOTOMIES, TRANSFORAMINAL LUMBAR INTERBODY AND PEDICLE SCREW FIXATION FUSION L4-S1 (IMPULSE); Surgeon: Usha Arango MD;  [facility];  Service: Neurosurgery    PROSTATECTOMY      for prostate CA - no chemo/RT    SIGMOIDECTOMY      for divertic    SMALL INTESTINE SURGERY N/A 11/17/2016    Procedure: Exploratory Laparotomy, Lysis of adhesions to release small bowel obstruction;  Surgeon: Ivonne Wells MD;  [facility];  Service:        Family History   Problem Relation Age of Onset    Heart attack Father     Colon cancer Father      I have reviewed and agree with the history as documented  Social History   Substance Use Topics    Smoking status: Former Smoker     Quit date: 2011    Smokeless tobacco: Never Used    Alcohol use Yes      Comment: rarely        Review of Systems   Constitutional: Negative for chills and fever  HENT: Positive for congestion  Negative for ear pain and sore throat  Eyes: Negative for pain and visual disturbance  Respiratory: Positive for cough and shortness of breath  Negative for wheezing      Cardiovascular: Negative for chest pain and leg swelling  Gastrointestinal: Negative for abdominal pain, diarrhea, nausea and vomiting  Genitourinary: Negative for dysuria, frequency, hematuria and urgency  Musculoskeletal: Negative for neck pain and neck stiffness  Skin: Negative for rash and wound  Neurological: Negative for weakness, numbness and headaches  Psychiatric/Behavioral: Negative for agitation and confusion  All other systems reviewed and are negative  Physical Exam  ED Triage Vitals   Temperature Pulse Respirations Blood Pressure SpO2   01/21/18 0425 01/21/18 0425 01/21/18 0425 01/21/18 0425 01/21/18 0425   98 4 °F (36 9 °C) 105 (!) 28 134/97 (!) 87 %      Temp Source Heart Rate Source Patient Position - Orthostatic VS BP Location FiO2 (%)   01/21/18 2154 01/21/18 0630 01/21/18 1115 01/21/18 1115 --   Oral Monitor Lying Right arm       Pain Score       01/21/18 0425       No Pain           Orthostatic Vital Signs  Vitals:    01/21/18 1212 01/21/18 1515 01/21/18 1856 01/21/18 2154   BP: 120/69 106/76 115/64 133/67   Pulse: 103 104 88 77   Patient Position - Orthostatic VS:   Lying Lying       Physical Exam   Constitutional: He is oriented to person, place, and time  He appears well-developed and well-nourished  HENT:   Head: Normocephalic and atraumatic  Mouth/Throat: Oropharynx is clear and moist    Eyes: EOM are normal  Pupils are equal, round, and reactive to light  Neck: Normal range of motion  Neck supple  Cardiovascular: An irregularly irregular rhythm present  Tachycardia present  Pulmonary/Chest: He has decreased breath sounds in the right lower field and the left lower field  He has rhonchi in the right lower field and the left lower field  Conversational dyspnea  Abdominal: Soft  Bowel sounds are normal  He exhibits no distension  There is no tenderness  Musculoskeletal: Normal range of motion  Neurological: He is alert and oriented to person, place, and time     No focal deficits Skin: Skin is warm and dry  Nursing note and vitals reviewed        ED Medications  Medications   oseltamivir (TAMIFLU) capsule 75 mg (75 mg Oral Given 1/21/18 2241)   aspirin chewable tablet 81 mg (81 mg Oral Given 1/21/18 0938)   amLODIPine (NORVASC) tablet 2 5 mg (2 5 mg Oral Given 1/21/18 2231)   atorvastatin (LIPITOR) tablet 40 mg (0 mg Oral Hold 1/21/18 1956)   cholecalciferol (VITAMIN D3) tablet 2,000 Units (not administered)   diazepam (VALIUM) tablet 2 mg (2 mg Oral Given 1/21/18 2231)   dicyclomine (BENTYL) tablet 20 mg (20 mg Oral Not Given 1/21/18 1953)   loratadine (CLARITIN) tablet 10 mg (not administered)   fluticasone (FLONASE) 50 mcg/act nasal spray 1 spray (not administered)   fluticasone-salmeterol (ADVAIR) 250-50 mcg/dose inhaler 1 puff (1 puff Inhalation Not Given 1/21/18 2207)   fish oil capsule 1,000 mg (not administered)   pantoprazole (PROTONIX) EC tablet 40 mg (40 mg Oral Given 1/21/18 0805)   ondansetron (ZOFRAN) injection 4 mg (not administered)   docusate sodium (COLACE) capsule 100 mg (100 mg Oral Not Given 1/21/18 1946)   senna (SENOKOT) tablet 8 6 mg (8 6 mg Oral Given 1/21/18 0821)   polyethylene glycol (MIRALAX) packet 17 g (not administered)   acetaminophen (TYLENOL) tablet 650 mg (not administered)   enoxaparin (LOVENOX) subcutaneous injection 80 mg (80 mg Subcutaneous Given 1/21/18 2238)   methylPREDNISolone sodium succinate (Solu-MEDROL) injection 40 mg (40 mg Intravenous Given 1/21/18 2233)   azithromycin (ZITHROMAX) tablet 500 mg (500 mg Oral Not Given 1/21/18 1505)   ceftriaxone (ROCEPHIN) 1 g/50 mL in dextrose IVPB (1,000 mg Intravenous Not Given 1/21/18 1508)   ipratropium (ATROVENT HFA) inhaler 2 puff (2 puffs Inhalation Not Given 1/21/18 2207)   albuterol (PROVENTIL HFA,VENTOLIN HFA) inhaler 2 puff (not administered)   albuterol inhalation solution 2 5 mg (not administered)   metoprolol succinate (TOPROL-XL) 24 hr tablet 25 mg (25 mg Oral Given 1/21/18 2237)   lithium carbonate (LITHOBID) CR tablet 300 mg (300 mg Oral Given 1/21/18 2231)   lithium carbonate (LITHOBID) CR tablet 600 mg (not administered)   albuterol inhalation solution 10 mg (10 mg Nebulization Given 1/21/18 0505)   ipratropium (ATROVENT) 0 02 % inhalation solution 1 mg (1 mg Nebulization Given 1/21/18 0505)   sodium chloride 0 9 % inhalation solution 3 mL (13 mL Nebulization Given 1/21/18 0505)   methylPREDNISolone sodium succinate (Solu-MEDROL) injection 125 mg (125 mg Intravenous Given 1/21/18 9537)   oseltamivir (TAMIFLU) capsule 75 mg (75 mg Oral Given 1/21/18 0611)   ceftriaxone (ROCEPHIN) 1 g/50 mL in dextrose IVPB (0 mg Intravenous Stopped 1/21/18 2640)   azithromycin (ZITHROMAX) 500 mg in sodium chloride 0 9% 250mL IVPB 500 mg (0 mg Intravenous Stopped 1/21/18 0850)       Diagnostic Studies  Results Reviewed     Procedure Component Value Units Date/Time    Strep Pneumoniae, Urine [[id number]]  (Normal) Collected:  01/21/18 1121    Lab Status:  Final result Specimen:  Urine from Urine, Clean Catch Updated:  01/21/18 2041     Strep pneumoniae antigen, urine Negative    Legionella antigen, urine [[id number]]  (Normal) Collected:  01/21/18 1121    Lab Status:  Final result Specimen:  Urine from Urine, Clean Catch Updated:  01/21/18 2041     Legionella Urinary Antigen Negative    Troponin I [[id number]]  (Normal) Collected:  01/21/18 1234    Lab Status:  Final result Specimen:  Blood from Arm, Left Updated:  01/21/18 1308     Troponin I <0 02 ng/mL     Narrative:         [employer] Chemistry analyzer 99% cutoff is > 0 04 ng/mL in network labs    o cTnI 99% cutoff is useful only when applied to patients in the clinical setting of myocardial ischemia  o cTnI 99% cutoff should be interpreted in the context of clinical history, ECG findings and possibly cardiac imaging to establish correct diagnosis    o cTnI 99% cutoff may be suggestive but clearly not indicative of a coronary event without the clinical setting of myocardial ischemia  Troponin I [[id number]]     Lab Status:  No result Specimen:  Blood     Troponin I [[id number]]  (Normal) Collected:  01/21/18 1119    Lab Status:  Final result Specimen:  Blood from Arm, Left Updated:  01/21/18 1200     Troponin I <0 02 ng/mL     Narrative:         [employer] Chemistry analyzer 99% cutoff is > 0 04 ng/mL in network labs    o cTnI 99% cutoff is useful only when applied to patients in the clinical setting of myocardial ischemia  o cTnI 99% cutoff should be interpreted in the context of clinical history, ECG findings and possibly cardiac imaging to establish correct diagnosis  o cTnI 99% cutoff may be suggestive but clearly not indicative of a coronary event without the clinical setting of myocardial ischemia  Sputum culture and Gram stain [[id number]] Collected:  01/21/18 0827    Lab Status: In process Specimen:  Sputum from Expectorated Sputum Updated:  01/21/18 0850    Troponin I [[id number]]  (Normal) Collected:  01/21/18    Lab Status:  Final result Specimen:  Blood Updated:  01/21/18 0721     Troponin I <0 02 ng/mL     Narrative:         [employer] Chemistry analyzer 99% cutoff is > 0 04 ng/mL in network labs    o cTnI 99% cutoff is useful only when applied to patients in the clinical setting of myocardial ischemia  o cTnI 99% cutoff should be interpreted in the context of clinical history, ECG findings and possibly cardiac imaging to establish correct diagnosis  o cTnI 99% cutoff may be suggestive but clearly not indicative of a coronary event without the clinical setting of myocardial ischemia  Lactic acid, plasma [[id number]]  (Normal) Collected:  01/21/18 0645    Lab Status:  Final result Specimen:  Blood from Arm, Left Updated:  01/21/18 0719     LACTIC ACID 1 1 mmol/L     Narrative:         Result may be elevated if tourniquet was used during collection      Protime-INR [[id number]]  (Normal) Collected:  01/21/18 0646    Lab Status:  Final result Specimen:  Blood from Arm, Left Updated:  01/21/18 0716     Protime 13 6 seconds      INR 1 04    Blood culture [[id number]] Collected:  01/21/18 0645    Lab Status: In process Specimen:  Blood from Arm, Right Updated:  01/21/18 0653    Blood culture [[id number]] Collected:  01/21/18 0645    Lab Status: In process Specimen:  Blood from Arm, Left Updated:  01/21/18 1284    Basic metabolic panel [[id number]]  (Abnormal) Collected:  01/21/18 0455    Lab Status:  Final result Specimen:  Blood from Arm, Left Updated:  01/21/18 0532     Sodium 141 mmol/L      Potassium 4 1 mmol/L      Chloride 111 (H) mmol/L      CO2 22 mmol/L      Anion Gap 8 mmol/L      BUN 26 (H) mg/dL      Creatinine 1 29 mg/dL      Glucose 118 mg/dL      Calcium 9 4 mg/dL      eGFR 56 ml/min/1 73sq m     Narrative:         National Kidney Disease Education Program recommendations are as follows:  GFR calculation is accurate only with a steady state creatinine  Chronic Kidney disease less than 60 ml/min/1 73 sq  meters  Kidney failure less than 15 ml/min/1 73 sq  meters      Rapid Influenza Screen with Reflex PCR (indicated for patients <2 mo of age) [[id number]]  (Abnormal) Collected:  01/21/18 0457    Lab Status:  Final result Specimen:  Nasopharyngeal from Nasopharyngeal Swab Updated:  01/21/18 0521     Rapid Influenza A Ag Positive (A)     Rapid Influenza B Ag Negative    CBC and differential [[id number]]  (Abnormal) Collected:  01/21/18 0455    Lab Status:  Final result Specimen:  Blood from Arm, Left Updated:  01/21/18 0515     WBC 15 36 (H) Thousand/uL      RBC 4 46 Million/uL      Hemoglobin 13 3 g/dL      Hematocrit 39 6 %      MCV 89 fL      MCH 29 8 pg      MCHC 33 6 g/dL      RDW 14 4 %      MPV 11 3 fL      Platelets 614 Thousands/uL      nRBC 0 /100 WBCs      Neutrophils Relative 88 (H) %      Lymphocytes Relative 5 (L) %      Monocytes Relative 6 %      Eosinophils Relative 1 %      Basophils Relative 0 %      Neutrophils Absolute 13 42 (H) Thousands/µL      Lymphocytes Absolute 0 71 Thousands/µL      Monocytes Absolute 0 99 Thousand/µL      Eosinophils Absolute 0 16 Thousand/µL      Basophils Absolute 0 02 Thousands/µL                  XR chest 2 views   ED Interpretation by Racquel Lynch DO (01/21 0601)   Right lower lobe infiltrate       Final Result by Baldemar Quiroga DO (01/21 1050)      Stable chest without acute infiltrates  COPD with post surgical changes right hemithorax  Workstation performed: [id number]         XR chest pa & lateral    (Results Pending)         Procedures  ECG 12 Lead Documentation  Date/Time: 1/21/2018 6:42 AM  Performed by: Renu Moura  Authorized by: Nany Matta     Indications / Diagnosis:  Sob   ECG reviewed by me, the ED Provider: yes    Patient location:  ED  Previous ECG:     Previous ECG:  Compared to current    Comparison ECG info:  9/25/17    Similarity:  Changes noted  Rate:     ECG rate:  109    ECG rate assessment: tachycardic    Rhythm:     Rhythm: atrial fibrillation    Ectopy:     Ectopy: none    QRS:     QRS axis:  Normal    QRS intervals:  Normal  ST segments:     ST segments: no acute changes   T waves:     T waves comment:  No acute changes           Phone Consults  ED Phone Contact    ED Course  ED Course as of Jan 21 2246   Sun Jan 21, 2018   0601 Rapid Influenza A AgOra Rinks Positive   2763 80 y/o male, copd exacerbation, pneumonia, +flu, new onset afib  Admitted to [facility]            Identification of Seniors at [facility] Most Recent Value   (ISAR) Identification of Seniors at Risk   Before the illness or injury that brought you to the Emergency, did you need someone to help you on a regular basis? 0 Filed at: 01/21/2018 0427   In the last 24 hours, have you needed more help than usual?  1 Filed at: 01/21/2018 4282   Have you been hospitalized for one or more nights during the past 6 months?   0 Filed at: 01/21/2018 0427   In general, do you see well?  0 Filed at: 01/21/2018 0427   In general, do you have serious problems with your memory? 0 Filed at: 01/21/2018 1613   Do you take more than three different medications every day? 1 Filed at: 01/21/2018 0427   ISAR Score  2 Filed at: 01/21/2018 0427                    Initial Sepsis Screening     Row Name 01/21/18 5492                Is the patient's history suggestive of a new or worsening infection? (!)  Yes (Proceed)  -SP        Suspected source of infection pneumonia  -SP        Are two or more of the following signs & symptoms of infection both present and new to the patient? (!)  Yes (Proceed)  -SP        Indicate SIRS criteria Tachycardia > 90 bpm;Tachypnea > 20 resp per min;Leukocytosis (WBC > 41210 IJL)  -SP        If the answer is yes to both questions, suspicion of sepsis is present          If severe sepsis is present AND tissue hypoperfusion perists in the hour after fluid resuscitation or lactate > 4, the patient meets criteria for SEPTIC SHOCK          Are any of the following organ dysfunction criteria present within 6 hours of suspected infection and SIRS criteria that are NOT considered to be chronic conditions? No  -SP        Organ dysfunction          Date of presentation of severe sepsis          Time of presentation of severe sepsis          Tissue hypoperfusion persists in the hour after crystalloid fluid administration, evidenced, by either:          Was hypotension present within one hour of the conclusion of crystalloid fluid administration?           Date of presentation of septic shock          Time of presentation of septic shock            User Key  (r) = Recorded By, (t) = Taken By, (c) = Cosigned By    Initials Name Provider Type    PAZ Ugalde DO Physician                  MDM  Number of Diagnoses or Management Options  COPD exacerbation [facility]): new and requires workup  Influenza: new and requires workup  New onset a-fib [facility]): new and requires workup  Pneumonia: new and requires workup  Diagnosis management comments: Patient with cough and sob likely 2/2 copd exacerbation  Will get labs, cxr, flu, and give steriods, and breathing treatment for symptom relief  Will also admit  Patient reevaluated and feels improved  Patient updated on results of tests and plan of care including admission to hospital for further evaluation of presenting complaint  Patient demonstrates verbal understanding and agrees with plan  Report to Dr Georgina Martinez  with [facility] for continuation of patient care          Amount and/or Complexity of Data Reviewed  Clinical lab tests: ordered and reviewed  Tests in the radiology section of CPT®: ordered and reviewed  Tests in the medicine section of CPT®: ordered and reviewed  Discussion of test results with the performing providers: yes  Decide to obtain previous medical records or to obtain history from someone other than the patient: yes  Obtain history from someone other than the patient: yes  Review and summarize past medical records: yes  Discuss the patient with other providers: yes  Independent visualization of images, tracings, or specimens: yes    Patient Progress  Patient progress: improved    CritCare Time    Disposition  Final diagnoses:   COPD exacerbation ([facility] )   Pneumonia   Influenza   New onset a-fib ([facility] )     Time reflects when diagnosis was documented in both MDM as applicable and the Disposition within this note     Time User Action Codes Description Comment    1/21/2018  6:24 AM Mauriciowsgavin [place] A Add [J44 1] COPD exacerbation ([facility] )     1/21/2018  6:24 AM Mauriciowsgavin [place] A Add [J18 9] Pneumonia     1/21/2018  6:24 AM Karen [place] A Add [J11 1] Influenza     1/21/2018  6:24 AM Jeremie Jones Add [I48 91] New onset a-fib ([facility] )     1/21/2018  6:33 AM Nicol Abebe Add [A41 9] Sepsis [facility])       ED Disposition     ED Disposition Condition Comment    Admit  Case was discussed with [facility] and the patient's admission status was agreed to be Admission Status: inpatient status to the service of Dr Jose Luis Oviedo   Follow-up Information    None       Current Discharge Medication List      CONTINUE these medications which have NOT CHANGED    Details   acetaminophen (TYLENOL) 500 mg tablet Take 500 mg by mouth as needed for mild pain  albuterol (PROVENTIL HFA,VENTOLIN HFA) 90 mcg/act inhaler Inhale 2 puffs every 6 (six) hours as needed for wheezing      amLODIPine (NORVASC) 2 5 mg tablet Take 2 5 mg by mouth daily      aspirin (ECOTRIN LOW STRENGTH) 81 mg EC tablet Take 81 mg by mouth daily      Atorvastatin Calcium (LIPITOR PO) Take 40 mg by mouth daily  B Complex Vitamins (VITAMIN B COMPLEX PO) Take by mouth      Cholecalciferol (VITAMIN D PO) Take 2,000 Units by mouth daily        diazepam (VALIUM) 5 mg tablet Take 5 mg by mouth as needed for anxiety      dicyclomine (BENTYL) 20 mg tablet Take 20 mg by mouth every 6 (six) hours      fexofenadine (ALLEGRA) 180 MG tablet Take 180 mg by mouth daily      fluticasone (FLONASE) 50 mcg/act nasal spray 1 spray into each nostril as needed  fluticasone-salmeterol (ADVAIR) 250-50 mcg/dose Inhale 1 puff every 12 (twelve) hours  ipratropium (ATROVENT HFA) 17 mcg/act inhaler Inhale 2 puffs every 6 (six) hours      lithium 300 MG tablet Take 600 mg by mouth every other day PM  Alternating with 300 mg every other day  THIS IS A CONTINUOUS RELEASE TABLET       Metoprolol Succinate (TOPROL XL PO) Take 25 mg by mouth daily  omega-3-acid ethyl esters (LOVAZA) 1 g capsule Take 1 g by mouth 3 (three) times a day  omeprazole (PriLOSEC) 40 MG capsule Take 40 mg by mouth daily  No discharge procedures on file  ED Provider  Attending physically available and evaluated Mery Lafleur I managed the patient along with the ED Attending      Electronically Signed by         Jose F Ott DO  01/21/18 2363

## 2018-01-21 NOTE — ASSESSMENT & PLAN NOTE
Patient presented with leukocytosis tachycardia tachypnea found positive rapid influenza test   opacity on imaging    Follow-up official imaging study report  Patient started on Tamiflu, antibiotic to cover CAP  Check Legionella and urine antigen  Follow-up urine and blood culture  Follow-up temperature curve and WBC  Continue nebs respiratory protocol  Droplet precaution  ID evaluation  Lactic acid pending  Gentle IV hydration Tylenol antiemetic

## 2018-01-21 NOTE — SEPSIS NOTE
Sepsis Note   Patel Ryan 81 y o  male MRN: [id number]  Unit/Bed#: ED 12 Encounter: [id number]            Initial Sepsis Screening     Row Name 01/21/18 3895                Is the patient's history suggestive of a new or worsening infection? (!)  Yes (Proceed)  -SP        Suspected source of infection pneumonia  -SP        Are two or more of the following signs & symptoms of infection both present and new to the patient? (!)  Yes (Proceed)  -SP        Indicate SIRS criteria Tachycardia > 90 bpm;Tachypnea > 20 resp per min;Leukocytosis (WBC > 60936 IJL)  -SP        If the answer is yes to both questions, suspicion of sepsis is present          If severe sepsis is present AND tissue hypoperfusion perists in the hour after fluid resuscitation or lactate > 4, the patient meets criteria for SEPTIC SHOCK          Are any of the following organ dysfunction criteria present within 6 hours of suspected infection and SIRS criteria that are NOT considered to be chronic conditions? No  -SP        Organ dysfunction          Date of presentation of severe sepsis          Time of presentation of severe sepsis          Tissue hypoperfusion persists in the hour after crystalloid fluid administration, evidenced, by either:          Was hypotension present within one hour of the conclusion of crystalloid fluid administration?           Date of presentation of septic shock          Time of presentation of septic shock            User Key  (r) = Recorded By, (t) = Taken By, (c) = Cosigned By    [address] Name Provider Type    PAZ Kuhn, DO Physician

## 2018-01-21 NOTE — ED NOTES
Patient bed still noted as being occupied  Patient aware  Patient made comfortable        Leanor RUSLAN Humphrey  01/21/18 7063

## 2018-01-21 NOTE — CONSULTS
Consultation - Infectious Disease   Teresa Hernandez 81 y o  male MRN: [id number]  Unit/Bed#: ED 12 Encounter: [id number]      IMPRESSION & RECOMMENDATIONS:   Impression/Recommendations: This is a 81 y o  male, with multiple medical problems, presented to the ER earlier today with dyspnea and hypoxia  His rapid influenza test was positive for influenza A  Tamiflu was started  Ceftriaxone/azithromycin was also started for pneumonia  Patient is clinically improved  1   Sepsis, POA , presented with leukocytosis, tachycardia and tachypnea  Source is most likely respiratory infection  Despite sepsis, patient remains hemodynamically stable, without hypotension  Admission blood cultures negative so far  Antibiotic/antiviral plan as in below  Monitor hemodynamics  Follow-up on pending blood cultures  2  Influenza A  Rapid test positive  PCR pending  Patient is accident needed  Hopefully, his clinical infection will be attenuated  Agree with Tamiflu  Continue Tamiflu  Droplet precaution  Monitor respiratory symptoms  3   Possible bacterial pneumonia  CXR is not convincing the patient has significant respiratory symptoms, hypoxia and leukocytosis  I would not expect to see leukocytosis with influenza alone  Will continue antibiotic regimen for community-acquired pneumonia for now  Will repeat CXR in a m  Continue ceftriaxone/azithromycin for now  Monitor temperature/WBC  Monitor respiratory symptoms  Repeat CXR in a m  4   COPD exacerbation  Systemic corticosteroid started per primary service  Steroid and nebulizers per primary service  5   Hypoxia, secondary to all above  This is much improved with O2 support  O2 support per primary service  Monitor O2 saturation  Discussed with patient in detail regarding the above plan  Thank you for this consultation  We will follow along with you  HISTORY OF PRESENT ILLNESS:  Reason for Consult:  Pneumonia      HPI: Nu Pyle Iggy Bartholomew is a 81 y o  male, with multiple medical problems, came to the ER early this morning with 2 day history of progressing dyspnea and cough productive of yellow sputum  He also had fever/chills  On presentation, patient did not have fever but had leukocytosis  Ceftriaxone/azithromycin was started for presumptive pneumonia  His rapid influenza came back positive  Tamiflu was also started  Patient also had evidence of COPD exacerbation  O2 and systemic corticosteroid were given  For all these reasons, we are asked to evaluate the patient  At present, patient states that he feels a little better  Dyspnea is improved  Cough is improved  Patient states that he did receive influenza vaccination a few months ago  His wife also had respiratory symptoms last week  Patient works as a volunteer in the [employer]  REVIEW OF SYSTEMS:  A complete 12 point system-based review of systems is otherwise negative  PAST MEDICAL HISTORY:  Past Medical History:   Diagnosis Date    Aortic aneurysm (HCC)     [facility] 1 disorder ([facility])     Cardiac disease     MI    [facility] with [facility]     COPD (chronic obstructive pulmonary disease) ([facility])     Diverticulitis     Gait disturbance     uses cane, leg brace on right    Heart attack 1996    Hx of resection of large bowel 5/3/2016    Hyperlipidemia     Hypertension     IBS (irritable bowel syndrome)     Lumbar stenosis     Lung cancer [facility]     2007 and 2011    Shortness of breath     Small bowel obstruction 11/16/2016     Past Surgical History:   Procedure Laterality Date    ANGIOPLASTY      stent    CERVICAL SPINE SURGERY      Cervical decompression with cervical fusion from C3-C7 for spinal stenosis      COLON SURGERY      LUNG CANCER SURGERY Right     wedge resection    LUNG LOBECTOMY Left     GA ARTHRODESIS ANT INTERBODY MIN DISCECTOMY, CERVICAL BELOW C2 N/A 5/2/2016    Procedure: Anterior cervical diskectomy C3/4, C5/6, C6/7 with anterior plate fixation fusion C3-7;  Posterior decompressive laminectomy C3-7 with lateral mass fixation fusion C3-7 (IMPULSE MONITORING); Surgeon: Kulwinder Herrera MD;  [facility];  Service: Neurosurgery    AL ARTHRODESIS POSTERIOR INTERBODY LUMBAR N/A 2017    Procedure: L4-5 AND L5-S1 DECOMPRESSIVE FORAMINOTOMIES, TRANSFORAMINAL LUMBAR INTERBODY AND PEDICLE SCREW FIXATION FUSION L4-S1 (IMPULSE); Surgeon: Kulwinder Herrera MD;  [facility];  Service: Neurosurgery    PROSTATECTOMY      for prostate CA - no chemo/RT    SIGMOIDECTOMY      for divertic    SMALL INTESTINE SURGERY N/A 2016    Procedure: Exploratory Laparotomy, Lysis of adhesions to release small bowel obstruction;  Surgeon: Amber Davies MD;  [facility];  Service:      Problem list reviewed  FAMILY HISTORY:  Non-contributory    SOCIAL HISTORY:  History   Alcohol Use    Yes     Comment: rarely     History   Drug Use No     History   Smoking Status    Former Smoker    Quit date:    Smokeless Tobacco    Never Used       ALLERGIES:  Allergies   Allergen Reactions    Oxycodone Rash       MEDICATIONS:  All current active medications have been reviewed  Patient is currently on ceftriaxone/azithromycin//Tamiflu  PHYSICAL EXAM:  Vitals:  HR:  [] 92  Resp:  [19-43] 19  BP: (106-134)/(56-97) 113/56  SpO2:  [87 %-100 %] 95 %  Temp (24hrs), Av 4 °F (36 9 °C), Min:98 4 °F (36 9 °C), Max:98 4 °F (36 9 °C)  Current: Temperature: 98 4 °F (36 9 °C)     Physical Exam:  General:  Well-nourished, well-developed, in no acute distress  Awake, alert and oriented x 3    Eyes:  Conjunctive clear with no hemorrhages or effusions  Oropharynx:  No ulcers, no lesions, pharynx benign, no tonsillitis  Neck:  Supple, no lymphadenopathy, no mass, nontender  Lungs:  Expansion symmetric, mild bibasilar rales, mild diffuse wheezing, no accessory muscle use  Cardiac:  Tachycardic with slightly irregular rhythm, normal S1, normal S2, no murmurs  Abdomen:  Soft, nondistended, non-tender, no HSM  Extremities:  No edema, no erythema, nontender  No ulcers  Skin:  No rashes, no ulcers  Neurological:  Moves all four extremities spontaneously, sensation grossly intact    LABS, IMAGING, & OTHER STUDIES:  Lab Results:  I have personally reviewed pertinent labs  Results from last 7 days  Lab Units 01/21/18  0455   SODIUM mmol/L 141   POTASSIUM mmol/L 4 1   CHLORIDE mmol/L 111*   CO2 mmol/L 22   ANION GAP mmol/L 8   BUN mg/dL 26*   CREATININE mg/dL 1 29   EGFR ml/min/1 73sq m 56   GLUCOSE RANDOM mg/dL 118   CALCIUM mg/dL 9 4       Results from last 7 days  Lab Units 01/21/18  0455   WBC Thousand/uL 15 36*   HEMOGLOBIN g/dL 13 3   PLATELETS Thousands/uL 172           Imaging Studies:   I have personally reviewed pertinent imaging study reports and images in PACS  CXR reviewed personally  No obvious infiltrates  EKG, Pathology, and Other Studies:   I have personally reviewed pertinent reports

## 2018-01-22 ENCOUNTER — APPOINTMENT (INPATIENT)
Dept: NON INVASIVE DIAGNOSTICS | Facility: HOSPITAL | Age: 73
DRG: 871 | End: 2018-01-22
Payer: MEDICARE

## 2018-01-22 ENCOUNTER — APPOINTMENT (INPATIENT)
Dept: RADIOLOGY | Facility: HOSPITAL | Age: 73
DRG: 871 | End: 2018-01-22
Attending: INTERNAL MEDICINE
Payer: MEDICARE

## 2018-01-22 VITALS
SYSTOLIC BLOOD PRESSURE: 128 MMHG | BODY MASS INDEX: 26.52 KG/M2 | DIASTOLIC BLOOD PRESSURE: 76 MMHG | OXYGEN SATURATION: 98 % | WEIGHT: 175 LBS | TEMPERATURE: 97.2 F | RESPIRATION RATE: 18 BRPM | HEART RATE: 61 BPM | HEIGHT: 68 IN

## 2018-01-22 VITALS
WEIGHT: 167.8 LBS | RESPIRATION RATE: 16 BRPM | DIASTOLIC BLOOD PRESSURE: 75 MMHG | TEMPERATURE: 96.1 F | SYSTOLIC BLOOD PRESSURE: 126 MMHG | BODY MASS INDEX: 25.43 KG/M2 | HEART RATE: 85 BPM | HEIGHT: 68 IN

## 2018-01-22 VITALS
RESPIRATION RATE: 16 BRPM | BODY MASS INDEX: 25.47 KG/M2 | WEIGHT: 168.03 LBS | TEMPERATURE: 97.6 F | SYSTOLIC BLOOD PRESSURE: 128 MMHG | DIASTOLIC BLOOD PRESSURE: 84 MMHG | HEIGHT: 68 IN | HEART RATE: 92 BPM

## 2018-01-22 LAB
ANION GAP SERPL CALCULATED.3IONS-SCNC: 7 MMOL/L (ref 4–13)
ATRIAL RATE: 220 BPM
ATRIAL RATE: 340 BPM
BUN SERPL-MCNC: 30 MG/DL (ref 5–25)
CALCIUM SERPL-MCNC: 9.8 MG/DL (ref 8.3–10.1)
CHLORIDE SERPL-SCNC: 111 MMOL/L (ref 100–108)
CO2 SERPL-SCNC: 22 MMOL/L (ref 21–32)
CREAT SERPL-MCNC: 1.27 MG/DL (ref 0.6–1.3)
ERYTHROCYTE [DISTWIDTH] IN BLOOD BY AUTOMATED COUNT: 14.2 % (ref 11.6–15.1)
GFR SERPL CREATININE-BSD FRML MDRD: 57 ML/MIN/1.73SQ M
GLUCOSE SERPL-MCNC: 123 MG/DL (ref 65–140)
HCT VFR BLD AUTO: 39 % (ref 36.5–49.3)
HGB BLD-MCNC: 12.6 G/DL (ref 12–17)
MAGNESIUM SERPL-MCNC: 2.2 MG/DL (ref 1.6–2.6)
MCH RBC QN AUTO: 28.9 PG (ref 26.8–34.3)
MCHC RBC AUTO-ENTMCNC: 32.3 G/DL (ref 31.4–37.4)
MCV RBC AUTO: 89 FL (ref 82–98)
P AXIS: 0 DEGREES
PLATELET # BLD AUTO: 190 THOUSANDS/UL (ref 149–390)
PMV BLD AUTO: 11.3 FL (ref 8.9–12.7)
POTASSIUM SERPL-SCNC: 4.4 MMOL/L (ref 3.5–5.3)
QRS AXIS: 75 DEGREES
QRS AXIS: 79 DEGREES
QRSD INTERVAL: 92 MS
QRSD INTERVAL: 96 MS
QT INTERVAL: 298 MS
QT INTERVAL: 340 MS
QTC INTERVAL: 401 MS
QTC INTERVAL: 404 MS
RBC # BLD AUTO: 4.36 MILLION/UL (ref 3.88–5.62)
SODIUM SERPL-SCNC: 140 MMOL/L (ref 136–145)
T WAVE AXIS: 50 DEGREES
T WAVE AXIS: 66 DEGREES
TSH SERPL DL<=0.05 MIU/L-ACNC: 0.93 UIU/ML (ref 0.36–3.74)
VENTRICULAR RATE: 109 BPM
VENTRICULAR RATE: 85 BPM
WBC # BLD AUTO: 13.43 THOUSAND/UL (ref 4.31–10.16)

## 2018-01-22 PROCEDURE — 94640 AIRWAY INHALATION TREATMENT: CPT

## 2018-01-22 PROCEDURE — 71046 X-RAY EXAM CHEST 2 VIEWS: CPT

## 2018-01-22 PROCEDURE — 80048 BASIC METABOLIC PNL TOTAL CA: CPT | Performed by: HOSPITALIST

## 2018-01-22 PROCEDURE — 83735 ASSAY OF MAGNESIUM: CPT | Performed by: HOSPITALIST

## 2018-01-22 PROCEDURE — 94760 N-INVAS EAR/PLS OXIMETRY 1: CPT

## 2018-01-22 PROCEDURE — 85027 COMPLETE CBC AUTOMATED: CPT | Performed by: HOSPITALIST

## 2018-01-22 PROCEDURE — 93306 TTE W/DOPPLER COMPLETE: CPT

## 2018-01-22 PROCEDURE — 84443 ASSAY THYROID STIM HORMONE: CPT | Performed by: HOSPITALIST

## 2018-01-22 RX ORDER — ALBUTEROL SULFATE 90 UG/1
2 AEROSOL, METERED RESPIRATORY (INHALATION) EVERY 4 HOURS PRN
Status: DISCONTINUED | OUTPATIENT
Start: 2018-01-22 | End: 2018-01-23 | Stop reason: HOSPADM

## 2018-01-22 RX ORDER — PREDNISONE 20 MG/1
40 TABLET ORAL DAILY
Status: DISCONTINUED | OUTPATIENT
Start: 2018-01-23 | End: 2018-01-23

## 2018-01-22 RX ADMIN — ENOXAPARIN SODIUM 80 MG: 80 INJECTION SUBCUTANEOUS at 08:54

## 2018-01-22 RX ADMIN — VITAMIN D, TAB 1000IU (100/BT) 2000 UNITS: 25 TAB at 08:51

## 2018-01-22 RX ADMIN — ENOXAPARIN SODIUM 80 MG: 80 INJECTION SUBCUTANEOUS at 21:48

## 2018-01-22 RX ADMIN — Medication 1000 MG: at 08:51

## 2018-01-22 RX ADMIN — ATORVASTATIN CALCIUM 40 MG: 40 TABLET, FILM COATED ORAL at 17:17

## 2018-01-22 RX ADMIN — FLUTICASONE PROPIONATE AND SALMETEROL 1 PUFF: 50; 250 POWDER RESPIRATORY (INHALATION) at 21:49

## 2018-01-22 RX ADMIN — ALBUTEROL SULFATE 2 PUFF: 90 AEROSOL, METERED RESPIRATORY (INHALATION) at 08:53

## 2018-01-22 RX ADMIN — LORATADINE 10 MG: 10 TABLET ORAL at 08:51

## 2018-01-22 RX ADMIN — IPRATROPIUM BROMIDE 2 PUFF: 17 AEROSOL, METERED RESPIRATORY (INHALATION) at 12:00

## 2018-01-22 RX ADMIN — AZITHROMYCIN 500 MG: 250 TABLET, FILM COATED ORAL at 08:51

## 2018-01-22 RX ADMIN — ASPIRIN 81 MG 81 MG: 81 TABLET ORAL at 08:52

## 2018-01-22 RX ADMIN — ALBUTEROL SULFATE 2.5 MG: 2.5 SOLUTION RESPIRATORY (INHALATION) at 23:15

## 2018-01-22 RX ADMIN — FLUTICASONE PROPIONATE AND SALMETEROL 1 PUFF: 50; 250 POWDER RESPIRATORY (INHALATION) at 08:53

## 2018-01-22 RX ADMIN — FLUTICASONE PROPIONATE 1 SPRAY: 50 SPRAY, METERED NASAL at 08:52

## 2018-01-22 RX ADMIN — LITHIUM CARBONATE 600 MG: 300 TABLET, FILM COATED, EXTENDED RELEASE ORAL at 21:49

## 2018-01-22 RX ADMIN — PANTOPRAZOLE SODIUM 40 MG: 40 TABLET, DELAYED RELEASE ORAL at 05:32

## 2018-01-22 RX ADMIN — CEFTRIAXONE SODIUM 1000 MG: 10 INJECTION, POWDER, FOR SOLUTION INTRAVENOUS at 05:58

## 2018-01-22 RX ADMIN — IPRATROPIUM BROMIDE 2 PUFF: 17 AEROSOL, METERED RESPIRATORY (INHALATION) at 08:53

## 2018-01-22 RX ADMIN — AMLODIPINE BESYLATE 2.5 MG: 2.5 TABLET ORAL at 21:48

## 2018-01-22 RX ADMIN — OSELTAMIVIR PHOSPHATE 75 MG: 75 CAPSULE ORAL at 10:25

## 2018-01-22 RX ADMIN — METOPROLOL SUCCINATE 25 MG: 25 TABLET, EXTENDED RELEASE ORAL at 17:17

## 2018-01-22 RX ADMIN — IPRATROPIUM BROMIDE 2 PUFF: 17 AEROSOL, METERED RESPIRATORY (INHALATION) at 21:49

## 2018-01-22 RX ADMIN — IPRATROPIUM BROMIDE 2 PUFF: 17 AEROSOL, METERED RESPIRATORY (INHALATION) at 17:17

## 2018-01-22 RX ADMIN — METHYLPREDNISOLONE SODIUM SUCCINATE 40 MG: 40 INJECTION, POWDER, FOR SOLUTION INTRAMUSCULAR; INTRAVENOUS at 08:52

## 2018-01-22 RX ADMIN — OSELTAMIVIR PHOSPHATE 75 MG: 75 CAPSULE ORAL at 22:30

## 2018-01-22 RX ADMIN — DIAZEPAM 2 MG: 2 TABLET ORAL at 21:51

## 2018-01-22 NOTE — ED NOTES
Contacted p-7 rn for tele box number unavailable at this time in an isolation room       Suzanne Padilla, RUSLAN  01/21/18 9463

## 2018-01-22 NOTE — PROGRESS NOTES
Progress Note - Infectious Disease   Karla Malone 81 y o  male MRN: [id number]  Unit/Bed#: [facility] 709-01 Encounter: [id number]      Impression/Recommendations:  1  Sepsis, POA , presented with leukocytosis, tachycardia and tachypnea  Source is most likely respiratory infection  Despite sepsis, patient remains hemodynamically stable, without hypotension  He is clinically much improved  Sepsis has resolved  Tachycardia /tachypnea have resolved  WBC decreasing  Admission blood cultures negative so far  Antibiotic/antiviral plan as in below  Monitor hemodynamics  Follow-up on pending blood cultures      2  Influenza A  Rapid test positive  PCR pending  Patient is vaccinated  Hopefully, his clinical infection will be attenuated  Continue Tamiflu  Follow-up influenza PCR  Monitor respiratory symptoms  3   Possible bacterial pneumonia  Admission CXR is not convincing  However, patient has significant respiratory symptoms, hypoxia and leukocytosis  I would not expect to see leukocytosis with influenza alone  Repeat CXR this morning also without pneumonia  I will treat him for bronchitis with azithromycin  No further need for ceftriaxone  Continue azithromycin x 5 day course  No further need for IV ceftriaxone  I would discontinue  Monitor temperature/WBC  Monitor respiratory symptoms       4  COPD exacerbation  Systemic corticosteroid started per primary service  Steroid and nebulizers per primary service      5  Hypoxia, secondary to all above  This is much improved with O2 support  O2 support per primary service  Monitor O2 saturation      Discussed with patient in detail regarding the above plan  Antibiotics:  Tamiflu/ceftriaxone/azithromycin # 2    Subjective:  Patient feels a lot better  Dyspnea/cough improved  Temperature is down  No further chills  He is tolerating antibiotic well  No nausea, vomiting or diarrhea      Objective:  Vitals:  HR:  [] 68  Resp:  [18-20] 18  BP: (106-133)/(64-76) 125/70  SpO2:  [96 %-98 %] 98 %  Temp (24hrs), Av 4 °F (36 9 °C), Min:98 1 °F (36 7 °C), Max:98 6 °F (37 °C)  Current: Temperature: 98 6 °F (37 °C)    Physical Exam:     General: Awake, alert, cooperative, no distress  Lungs: Expansion symmetric, no rales, no wheezing, respirations unlabored  Heart[de-identified]  Regular rate and rhythm, S1 and S2 normal, no murmur  Abdomen: Soft, nondistended, non-tender, bowel sounds active all four quadrants,        no masses, no organomegaly  Extremities: No edema  No erythema/warmth  No ulcer  Nontender to palpation  Skin:  No rash  Invasive Devices     Peripheral Intravenous Line            Peripheral IV 18 Left Wrist 1 day                Labs studies:   I have personally reviewed pertinent labs  Results from last 7 days  Lab Units 18  0553 18  0455   SODIUM mmol/L 140 141   POTASSIUM mmol/L 4 4 4 1   CHLORIDE mmol/L 111* 111*   CO2 mmol/L 22 22   ANION GAP mmol/L 7 8   BUN mg/dL 30* 26*   CREATININE mg/dL 1 27 1 29   EGFR ml/min/1 73sq m 57 56   GLUCOSE RANDOM mg/dL 123 118   CALCIUM mg/dL 9 8 9 4       Results from last 7 days  Lab Units 18  0553 18  0455   WBC Thousand/uL 13 43* 15 36*   HEMOGLOBIN g/dL 12 6 13 3   PLATELETS Thousands/uL 190 172       Results from last 7 days  Lab Units 18  1121 18  0827 18  0645   BLOOD CULTURE   --   --  No Growth at 24 hrs  No Growth at 24 hrs  SPUTUM CULTURE   --  Culture results to follow  2+ Growth of Candida sp  presumptively albicans*  --    GRAM STAIN RESULT   --  1+ Epithelial Cells  3+ Polys  2+ Gram positive cocci in pairs  1+ Budding yeast  --    LEGIONELLA URINARY ANTIGEN  Negative  --   --        Imaging Studies:   I have personally reviewed pertinent imaging study reports and images in PACS  Repeat CXR reviewed personally  No infiltrates or consolidations      EKG, Pathology, and Other Studies:   I have personally reviewed pertinent reports

## 2018-01-22 NOTE — ED NOTES
Contacted p-7 charge RN due to no bed status change  She will have RN assigned to that room call me back        Bronwyn Burton RN  01/21/18 4026

## 2018-01-22 NOTE — ED NOTES
Patient advised his room is currently in the cleaning stage         Sheryle Latina, RN  01/21/18 2047

## 2018-01-22 NOTE — SOCIAL WORK
Pt new admit to the floor  CM met with patient and explained cm role  Pt alert and oriented  Pt reports she lives in [facility] w/a loft w/2 [address]  Pt report being independent PTA, reports good support from family and friends, he drives and has transport home w/wife  Pt reports DME: cane, denies VNA and rehab  Pts pharmacy is [facility] in [place]  Pts PCP is Geraldine Sprague  Pts PCP is Bety Tam     CM reviewed d/c planning process including the following: identifying help at home, patient preference for d/c planning needs, Discharge Lounge, [facility] Meds to Bed program, availability of treatment team to discuss questions or concerns patient and/or family may have regarding understanding medications and recognizing signs and symptoms once discharged  CM also encouraged patient to follow up with all recommended appointments after discharge  Patient advised of importance for patient and family to participate in managing patients medical well being

## 2018-01-22 NOTE — ASSESSMENT & PLAN NOTE
· No acute issues  Prior echo had shown grade 1 diastolic dysfunction  · Patient is euvolemic    Follow up on echo today

## 2018-01-22 NOTE — ASSESSMENT & PLAN NOTE
Patient presented with leukocytosis, tachycardia, tachypnea, found positive rapid influenza test with opacity on imaging  Repeat chest x-ray this morning shows no evidence of pneumonia  Leukocytosis is improved, tachycardia and tachypnea resolved  He is on Tamiflu and will be continued on azithromycin for bronchitis    Ceftriaxone discontinued  Nebs p r n , droplet precautions  DC telemetry, DC IV fluids

## 2018-01-22 NOTE — ED NOTES
Per patient request contacted pharmacy and updated patient medications administration times         Nazia Rice, RN  01/21/18 2034

## 2018-01-22 NOTE — PROGRESS NOTES
Progress Note - Elfida Meckel 1945, 81 y o  male MRN: [id number]    Unit/Bed#: [facility] 709-01 Encounter: [id number]    Primary Care Provider: Roger Mora MD   Date and time admitted to hospital: 1/21/2018  4:29 AM    * Sepsis [facility])   Assessment & Plan    Patient presented with leukocytosis, tachycardia, tachypnea, found positive rapid influenza test with opacity on imaging  Repeat chest x-ray this morning shows no evidence of pneumonia  Strept and Legionella urine antigen negative  Leukocytosis is improved, tachycardia and tachypnea resolved  He is on Tamiflu and will be continued on azithromycin for bronchitis  Ceftriaxone discontinued  Nebs p r n , droplet precautions  DC IV fluids      Influenza   Assessment & Plan    · Influenza A positive  · Continue Tamiflu      COPD exacerbation (HCC)   Assessment & Plan    · Mild exacerbation  Patient significantly improved with nonlabored respirations and saturating high 90s on room air    · Continue home medications, p r n  nebs and change steroids p o  beginning tomorrow with a short taper by 10 mg daily over the next 4 days  · Continue respiratory protocol, p o  azithromycin      Hypertension   Assessment & Plan    · Stable, continue amlodipine      Diastolic heart failure (HCC)   Assessment & Plan    · No acute issues  Prior echo had shown grade 1 diastolic dysfunction  · Patient is euvolemic  Follow up on echo today     Atrial fibrillation   Assessment & Plan    · Likely secondary to respiratory infection   · Patient is now in normal sinus rhythm      Chronic kidney disease, stage III (moderate)   Assessment & Plan    · Creatinine is at baseline          VTE Pharmacologic Prophylaxis:   Pharmacologic: Enoxaparin (Lovenox)  Mechanical VTE Prophylaxis in Place: No    Patient Centered Rounds: I have performed bedside rounds with nursing staff today      Discussions with Specialists or Other Care Team Provider:  Nursing    Education and Discussions with Family / Patient:  Patient    Current Length of Stay: 1 day(s)    Current Patient Status: Inpatient   Certification Statement: The patient will continue to require additional inpatient hospital stay due to Sepsis, influenza A infection    Discharge Plan:  Anticipate next 24 hours if stable    Code Status: Level 1 - Full Code      Subjective:   Feels much better today  Denies any chest pains or shortness of breath, no fever or chills    Objective:     Vitals:   Temp (24hrs), Av 4 °F (36 9 °C), Min:98 1 °F (36 7 °C), Max:98 6 °F (37 °C)    HR:  [] 68  Resp:  [18-20] 18  BP: (106-133)/(64-76) 125/70  SpO2:  [96 %-98 %] 98 %  Body mass index is 27 kg/m²  Input and Output Summary (last 24 hours):        Intake/Output Summary (Last 24 hours) at 18 1344  Last data filed at 18 1156   Gross per 24 hour   Intake             1000 ml   Output              600 ml   Net              400 ml       Physical Exam:  General Appearance:    Alert, cooperative, no distress, appropriately responsive    Head:    Normocephalic, without obvious abnormality, atraumatic, mucous membranes moist    Eyes:    Conjunctiva/corneas clear, EOM's intact   Neck:   Supple   Lungs:     Clear to auscultation bilaterally, respirations unlabored, no crackles or wheeze     Heart:    Regular rate and rhythm, S1 and S2    Abdomen:     Soft, non-tender, bowel sounds active all four quadrants,     no masses, no organomegaly   Extremities:   Extremities normal, atraumatic, no cyanosis or edema   Neurologic:  nonfocal         Additional Data:     Labs:      Results from last 7 days  Lab Units 18  0553 18  0455   WBC Thousand/uL 13 43* 15 36*   HEMOGLOBIN g/dL 12 6 13 3   HEMATOCRIT % 39 0 39 6   PLATELETS Thousands/uL 190 172   NEUTROS PCT %  --  88*   LYMPHS PCT %  --  5*   MONOS PCT %  --  6   EOS PCT %  --  1       Results from last 7 days  Lab Units 18  0553   SODIUM mmol/L 140   POTASSIUM mmol/L 4 4   CHLORIDE mmol/L 111*   CO2 mmol/L 22   BUN mg/dL 30*   CREATININE mg/dL 1 27   CALCIUM mg/dL 9 8   GLUCOSE RANDOM mg/dL 123       Results from last 7 days  Lab Units 01/21/18  0646   INR  1 04       * I Have Reviewed All Lab Data Listed Above  * Additional Pertinent Lab Tests Reviewed: Alix [facility] Admission Reviewed    Cultures:   Blood Culture:   Lab Results   Component Value Date    BLOODCX No Growth at 24 hrs  01/21/2018    BLOODCX No Growth at 24 hrs  01/21/2018    BLOODCX No Growth After 5 Days  11/14/2016    BLOODCX No Growth After 5 Days  11/14/2016     Urine Culture:   Lab Results   Component Value Date    URINECX No Growth <1000 cfu/mL 11/14/2016     Sputum Culture: No components found for: SPUTUMCX  Wound Culture: No results found for: WOUNDCULT    Last 24 Hours Medication List:     amLODIPine 2 5 mg Oral Daily   aspirin 81 mg Oral Daily   atorvastatin 40 mg Oral After Dinner   azithromycin 500 mg Oral Q24H   cholecalciferol 2,000 Units Oral Daily   dicyclomine 20 mg Oral Q6H   docusate sodium 100 mg Oral BID   enoxaparin 1 mg/kg Subcutaneous Q12H [facility]   fish oil 1,000 mg Oral Daily   fluticasone 1 spray Nasal Daily   fluticasone-salmeterol 1 puff Inhalation Q12H KATHRYN   ipratropium 2 puff Inhalation 4x Daily   lithium carbonate 300 mg Oral Every Other Day   lithium carbonate 600 mg Oral Every Other Day   loratadine 10 mg Oral Daily   metoprolol succinate 25 mg Oral After Dinner   oseltamivir 75 mg Oral Q12H KATHRYN   pantoprazole 40 mg Oral Early Morning   [START ON 1/23/2018] predniSONE 40 mg Oral Daily   senna 1 tablet Oral Daily        Today, Patient Was Seen By: Pham Jacinto MD    ** Please Note: Dragon 360 Dictation voice to text software may have been used in the creation of this document   **

## 2018-01-22 NOTE — ED NOTES
Patient provided mask for transport updated p-7 charge oanh that patient is on his way to floor        Sydney Leggett RN  01/21/18 0803

## 2018-01-22 NOTE — RESTORATIVE TECHNICIAN NOTE
Restorative Specialist Mobility Note       Activity: Ambulate in abraham, Ambulate in room, Bathroom privileges, Chair, Dangle, Stand at bedside (Educated/encouraged pt to ambulate w/assistance 3-4 x's/day   Pt callbell, phone/tray within reach )     Assistive Device: None (Face mask on for flu precautions)       Carlita CORTEZ, Restorative Technician,

## 2018-01-22 NOTE — ED NOTES
Patient requesting his atrovent and advair inhalers at this time  Patient states he usually takes these meds at 1900 in the evening  Patient medicated as requested        Raymundo Gooden RN  01/21/18 2047

## 2018-01-22 NOTE — ASSESSMENT & PLAN NOTE
· Mild exacerbation    Patient significantly improved with nonlabored respirations and saturating high 90s on room air    · Continue home medications, p r n  nebs and change steroids p o  beginning tomorrow with a short taper by 10 mg daily over the next 4 days  · Continue respiratory protocol, p o  azithromycin

## 2018-01-22 NOTE — CASE MANAGEMENT
Initial Clinical Review    Admission: Date/Time/Statement: 1/21/18 @ 0634     Orders Placed This Encounter   Procedures    Inpatient Admission     Standing Status:   Standing     Number of Occurrences:   1     Order Specific Question:   Admitting Physician     Answer:   Clayton Saint     Order Specific Question:   Level of Care     Answer:   Med Surg [16]     Order Specific Question:   Estimated length of stay     Answer:   More than 2 Midnights     Order Specific Question:   Certification     Answer:   I certify that inpatient services are medically necessary for this patient for a duration of greater than two midnights  See H&P and MD Progress Notes for additional information about the patient's course of treatment  ED: Date/Time/Mode of Arrival:   ED Arrival Information     Expected Arrival Acuity Means of Arrival Escorted By Service Admission Type    - 1/21/2018 04:18 Emergent Walk-In Self General Medicine Emergency    Arrival Complaint    Flu Like Symptoms          Chief Complaint:   Chief Complaint   Patient presents with    Flu Symptoms     flu symptoms, yellow phlegm, cough, sob,        History of Illness:     Gabriella Lauren is a 81 y o  male with history of COPD, no oxygen dependent, dHF,hypertension who presented with 2 days of sore throat,getting worse dyspnea on exertion associated with yellow sputum productive cough, chills, nasal congestion, wheezes  Patient denies fever nausea vomiting diarrhea abdominal pain chest pain headache, leg edema  Extensive workup in the emergency room reveal positive rapid influenza A test ,consolidation on imaging study supportive pneumonia   due to respiratory distress patient was given IV Solu-Medrol, nebs , started on antibiotic for community-acquired pneumonia and Tamiflu    Patient reports improvement of symptoms with current treatment  WBC 05422 lactic acid pending heart rate 118 rest rate 28 present on admission  EKG showed atrial fibrillation ( patient does not have a history of AFib)  ED Vital Signs:   ED Triage Vitals   Temperature Pulse Respirations Blood Pressure SpO2   01/21/18 0425 01/21/18 0425 01/21/18 0425 01/21/18 0425 01/21/18 0425   98 4 °F (36 9 °C) 105 (!) 28 134/97 (!) 87 %      Temp Source Heart Rate Source Patient Position - Orthostatic VS BP Location FiO2 (%)   01/21/18 2154 01/21/18 0630 01/21/18 1115 01/21/18 1115 --   Oral Monitor Lying Right arm       Pain Score       01/21/18 0425       No Pain        Wt Readings from Last 1 Encounters:   01/22/18 79 4 kg (175 lb)       Vital Signs (abnormal):     01/21/18 2154  98 1 °F (36 7 °C)  77  18  133/67  96 %  --  Lying   01/21/18 1856  --  88  20  115/64  96 %  Nasal cannula  Lying   01/21/18 1515  --  104  20  106/76  --  --  --   01/21/18 1212  --  103  18  120/69  96 %  --  --   01/21/18 1115  --  92  19  113/56  95 %  Nasal cannula  Lying   01/21/18 1045  --  84  20  106/68  95 %  None (Room air)  --   01/21/18 0630  --   110   43  131/88  97 %  None (Room air)  --   01/21/18 0545  --   118   30  --  100 %  --  --   01/21/18 0507  --  --  --  --  100 %  --                 Abnormal Labs/Diagnostic Test Results:     Lab Units 01/21/18  0455   WBC Thousand/uL 15 36     XR chest 2 views  Right lower lobe infiltrate     EKG: Afib    Culture SPUTUM     Culture results to follow         2+ Growth of Candida sp  presumptively albicans        Stain     1+ Epithelial Cells      3+ Polys      2+ Gram positive cocci in pairs      1+ Budding yeast       ED Treatment:   Medication Administration from 01/21/2018 0418 to 01/21/2018 2124       Date/Time Order Dose Route     01/21/2018 0505 albuterol inhalation solution 10 mg 10 mg Nebulization     01/21/2018 0505 ipratropium (ATROVENT) 0 02 % inhalation solution 1 mg 1 mg Nebulization     01/21/2018 0505 sodium chloride 0 9 % inhalation solution 3 mL 13 mL Nebulization     01/21/2018 0608 methylPREDNISolone sodium succinate (Solu-MEDROL) injection 125 mg 125 mg Intravenous     01/21/2018 0627 oseltamivir (TAMIFLU) capsule 75 mg 75 mg Oral     01/21/2018 0652 ceftriaxone (ROCEPHIN) 1 g/50 mL in dextrose IVPB 1,000 mg Intravenous     01/21/2018 0745 azithromycin (ZITHROMAX) 500 mg in sodium chloride 0 9% 250mL IVPB 500 mg 500 mg Intravenous     01/21/2018 0500 methylPREDNISolone sodium succinate (Solu-MEDROL) injection 40 mg 40 mg Intravenous     01/21/2018 0938 aspirin chewable tablet 81 mg 81 mg Oral     01/21/2018 1323 albuterol (PROVENTIL HFA,VENTOLIN HFA) inhaler 2 puff 2 puff Inhalation     01/21/2018 0805 dicyclomine (BENTYL) tablet 20 mg 20 mg Oral     01/21/2018 1945 fluticasone-salmeterol (ADVAIR) 250-50 mcg/dose inhaler 1 puff 1 puff Inhalation     01/21/2018 1113 fluticasone-salmeterol (ADVAIR) 250-50 mcg/dose inhaler 1 puff 1 puff Inhalation     01/21/2018 0805 pantoprazole (PROTONIX) EC tablet 40 mg 40 mg Oral     01/21/2018 0805 sodium chloride 0 9 % infusion 75 mL/hr Intravenous     01/21/2018 0821 senna (SENOKOT) tablet 8 6 mg 8 6 mg Oral     01/21/2018 1106 enoxaparin (LOVENOX) subcutaneous injection 80 mg 80 mg Subcutaneous     01/21/2018 1229 ipratropium (ATROVENT) 0 02 % inhalation solution 0 5 mg 0 5 mg Nebulization     01/21/2018 1229 levalbuterol (XOPENEX) inhalation solution 1 25 mg 1 25 mg Nebulization     01/21/2018 1944 ipratropium (ATROVENT HFA) inhaler 2 puff 2 puff Inhalation     01/21/2018 1414 ipratropium (ATROVENT HFA) inhaler 2 puff 2 puff Inhalation          Past Medical/Surgical History:    Active Ambulatory Problems     Diagnosis Date Noted    Numbness 04/27/2016    Gait instability 04/27/2016    Cervical myelopathy (HCC) 04/28/2016    Adenocarcinoma of prostate ([facility] ) 05/03/2016    Coronary arteriosclerosis in native artery 05/03/2016    Bipolar affective disorder ([facility] ) 05/03/2016    COPD exacerbation ([facility] ) 05/03/2016    Gastroesophageal reflux disease 05/03/2016    Hypercholesterolemia 05/03/2016    Hypertension 05/03/2016    Aneurysm of infrarenal abdominal aorta ([facility] ) 05/03/2016    Hx of resection of large bowel 05/03/2016    Chronic kidney disease, stage III (moderate) 05/04/2016    Impaired mobility and activities of daily living 05/05/2016    Cervical radiculopathy due to degenerative joint disease of spine 05/05/2016    Small bowel obstruction 11/16/2016    Degenerative disc disease, lumbar 01/30/2017    Foraminal stenosis of lumbar region 01/30/2017    Spondylolisthesis of lumbar region 01/30/2017    Myelopathy concurrent with and due to lumbosacral intervertebral disc disorder 02/01/2017     Resolved Ambulatory Problems     Diagnosis Date Noted    Weakness of left lower extremity 04/27/2016    Acute kidney injury ([facility] ) 05/04/2016    Spondylogenic compression of cervical spinal cord 05/05/2016     Past Medical History:   Diagnosis Date    Aortic aneurysm (HCC)     Bipolar 1 disorder (HCC)     Cardiac disease     Cervical cord compression with myelopathy (HCC)     [facility]     Diverticulitis     Gait disturbance     Heart attack 1996    Hx of resection of large bowel 5/3/2016    Hyperlipidemia     Hypertension     IBS (irritable bowel syndrome)     Lumbar stenosis     Lung cancer ([facility] )     Shortness of breath     Small bowel obstruction 11/16/2016       Admitting Diagnosis: Influenza [J11 1]  Pneumonia [J18 9]  New onset a-fib ([facility] ) [I48 91]  COPD exacerbation ([facility]  ) [J44 1]  Flu-like symptoms [R68 89]  Sepsis ([place] [address] ) [A41 9]    Age/Sex: 81 y o  male    Assessment/Plan:     Sepsis ([facility] )   Assessment & Plan     Patient presented with leukocytosis tachycardia tachypnea found positive rapid influenza test   opacity on imaging    Follow-up official imaging study report  Patient started on Tamiflu, antibiotic to cover CAP  Check Legionella and urine antigen  Follow-up urine and blood culture  Follow-up temperature curve and WBC  Continue nebs respiratory protocol  Droplet precaution  ID evaluation  Lactic acid pending  Gentle IV hydration Tylenol antiemetic       Influenza   Assessment & Plan     Management as above       COPD exacerbation (HCC)   Assessment & Plan     Continue nebs, Symbicort  Respiratory protocol  Solu-Medrol  Antibiotic          Arrhythmia   Assessment & Plan     No history of atrial fibrillation, patient will be evaluated by cardiologist  Most likely triggered by underlying infectious, process will check TSH   Admitted to telemetry, serial cardiac  Echocardiogram  Beta-blocker for HR control  Cardiology consult   [id number], patient on Aspirin ,therapeutic dose of Lovenox (renal function at baseline)       Hypertension   Assessment & Plan     Continue amlodipine       Hypercholesterolemia   Assessment & Plan     Continue statin       Diastolic heart failure (HCC)   Assessment & Plan     Grade 1 diastolic dysfunction on most recent echocardiogram  Continue blood pressure meds            Admission Orders:    SPUTUM CULTURE   ECHO  TELEMETRY  2 3 GM NA DIET   CONSULT INFECTIOUS DISEASE   CONSULT CARDIOLOGY     Scheduled Meds:   amLODIPine 2 5 mg Oral Daily   aspirin 81 mg Oral Daily   atorvastatin 40 mg Oral After Dinner   azithromycin 500 mg Oral Q24H   cholecalciferol 2,000 Units Oral Daily   dicyclomine 20 mg Oral Q6H   docusate sodium 100 mg Oral BID   enoxaparin 1 mg/kg Subcutaneous Q12H KATHRYN   fish oil 1,000 mg Oral Daily   fluticasone 1 spray Nasal Daily   fluticasone-salmeterol 1 puff Inhalation Q12H KATHRYN   ipratropium 2 puff Inhalation 4x Daily   lithium carbonate 300 mg Oral Every Other Day   lithium carbonate 600 mg Oral Every Other Day   loratadine 10 mg Oral Daily   metoprolol succinate 25 mg Oral After Dinner   oseltamivir 75 mg Oral Q12H KATHRYN   pantoprazole 40 mg Oral Early Morning   [START ON 1/23/2018] predniSONE 40 mg Oral Daily   senna 1 tablet Oral Daily     Continuous Infusions:    PRN Meds:  acetaminophen    albuterol    diazepam    ondansetron    polyethylene glycol

## 2018-01-23 ENCOUNTER — APPOINTMENT (OUTPATIENT)
Dept: PHYSICAL THERAPY | Facility: CLINIC | Age: 73
End: 2018-01-23
Payer: MEDICARE

## 2018-01-23 VITALS
BODY MASS INDEX: 26.52 KG/M2 | DIASTOLIC BLOOD PRESSURE: 96 MMHG | RESPIRATION RATE: 18 BRPM | WEIGHT: 175 LBS | HEART RATE: 62 BPM | TEMPERATURE: 97.5 F | SYSTOLIC BLOOD PRESSURE: 155 MMHG | OXYGEN SATURATION: 96 % | HEIGHT: 68 IN

## 2018-01-23 VITALS
HEART RATE: 50 BPM | SYSTOLIC BLOOD PRESSURE: 141 MMHG | TEMPERATURE: 97.4 F | HEIGHT: 67 IN | WEIGHT: 185 LBS | RESPIRATION RATE: 16 BRPM | BODY MASS INDEX: 29.03 KG/M2 | DIASTOLIC BLOOD PRESSURE: 69 MMHG

## 2018-01-23 VITALS
HEART RATE: 67 BPM | DIASTOLIC BLOOD PRESSURE: 76 MMHG | BODY MASS INDEX: 28.44 KG/M2 | OXYGEN SATURATION: 98 % | WEIGHT: 181.19 LBS | HEIGHT: 67 IN | SYSTOLIC BLOOD PRESSURE: 124 MMHG

## 2018-01-23 PROBLEM — I48.91 NEW ONSET ATRIAL FIBRILLATION (HCC): Status: ACTIVE | Noted: 2018-01-23

## 2018-01-23 LAB
ATRIAL RATE: 51 BPM
BACTERIA SPT RESP CULT: ABNORMAL
BACTERIA SPT RESP CULT: ABNORMAL
GRAM STN SPEC: ABNORMAL
P AXIS: 73 DEGREES
PR INTERVAL: 136 MS
QRS AXIS: 68 DEGREES
QRSD INTERVAL: 104 MS
QT INTERVAL: 428 MS
QTC INTERVAL: 394 MS
T WAVE AXIS: 58 DEGREES
VENTRICULAR RATE: 51 BPM

## 2018-01-23 PROCEDURE — 93005 ELECTROCARDIOGRAM TRACING: CPT | Performed by: PHYSICIAN ASSISTANT

## 2018-01-23 RX ORDER — AZITHROMYCIN 500 MG/1
250 TABLET, FILM COATED ORAL EVERY 24 HOURS
Qty: 2 TABLET | Refills: 0 | Status: SHIPPED | OUTPATIENT
Start: 2018-01-24 | End: 2018-01-23

## 2018-01-23 RX ORDER — AZITHROMYCIN 250 MG/1
250 TABLET, FILM COATED ORAL EVERY 24 HOURS
Qty: 2 TABLET | Refills: 0 | Status: SHIPPED | OUTPATIENT
Start: 2018-01-24 | End: 2018-01-26

## 2018-01-23 RX ORDER — OSELTAMIVIR PHOSPHATE 30 MG/1
30 CAPSULE ORAL EVERY 12 HOURS SCHEDULED
Qty: 4 CAPSULE | Refills: 0 | Status: SHIPPED | OUTPATIENT
Start: 2018-01-24 | End: 2018-01-26

## 2018-01-23 RX ORDER — OSELTAMIVIR PHOSPHATE 30 MG/1
30 CAPSULE ORAL EVERY 12 HOURS SCHEDULED
Status: DISCONTINUED | OUTPATIENT
Start: 2018-01-23 | End: 2018-01-23 | Stop reason: HOSPADM

## 2018-01-23 RX ORDER — PREDNISONE 10 MG/1
TABLET ORAL
Qty: 6 TABLET | Refills: 0 | Status: SHIPPED | OUTPATIENT
Start: 2018-01-24 | End: 2018-01-29 | Stop reason: ALTCHOICE

## 2018-01-23 RX ADMIN — FLUTICASONE PROPIONATE AND SALMETEROL 1 PUFF: 50; 250 POWDER RESPIRATORY (INHALATION) at 08:48

## 2018-01-23 RX ADMIN — PANTOPRAZOLE SODIUM 40 MG: 40 TABLET, DELAYED RELEASE ORAL at 06:58

## 2018-01-23 RX ADMIN — Medication 1000 MG: at 08:46

## 2018-01-23 RX ADMIN — IPRATROPIUM BROMIDE 2 PUFF: 17 AEROSOL, METERED RESPIRATORY (INHALATION) at 11:35

## 2018-01-23 RX ADMIN — VITAMIN D, TAB 1000IU (100/BT) 2000 UNITS: 25 TAB at 08:46

## 2018-01-23 RX ADMIN — FLUTICASONE PROPIONATE 1 SPRAY: 50 SPRAY, METERED NASAL at 08:48

## 2018-01-23 RX ADMIN — ENOXAPARIN SODIUM 80 MG: 80 INJECTION SUBCUTANEOUS at 08:48

## 2018-01-23 RX ADMIN — ASPIRIN 81 MG 81 MG: 81 TABLET ORAL at 08:47

## 2018-01-23 RX ADMIN — AZITHROMYCIN 500 MG: 250 TABLET, FILM COATED ORAL at 08:47

## 2018-01-23 RX ADMIN — ALBUTEROL SULFATE 2 PUFF: 90 AEROSOL, METERED RESPIRATORY (INHALATION) at 11:35

## 2018-01-23 RX ADMIN — LORATADINE 10 MG: 10 TABLET ORAL at 08:47

## 2018-01-23 RX ADMIN — ATORVASTATIN CALCIUM 40 MG: 40 TABLET, FILM COATED ORAL at 17:26

## 2018-01-23 RX ADMIN — IPRATROPIUM BROMIDE 2 PUFF: 17 AEROSOL, METERED RESPIRATORY (INHALATION) at 08:48

## 2018-01-23 RX ADMIN — PREDNISONE 40 MG: 20 TABLET ORAL at 08:47

## 2018-01-23 RX ADMIN — DICYCLOMINE HYDROCHLORIDE 20 MG: 20 TABLET ORAL at 08:51

## 2018-01-23 RX ADMIN — METOPROLOL SUCCINATE 25 MG: 25 TABLET, EXTENDED RELEASE ORAL at 17:26

## 2018-01-23 NOTE — CONSULTS
Consultation - Cardiology   Gabriella Lauren 81 y o  male MRN: [id number]  Unit/Bed#: [facility] 709-01 Encounter: [id number]    Assessment/Plan     Assessment:  81y o  year old male past medical history significant of hypertension, , coronary artery disease status post PCI in 1998 presents for evaluation of atrial fibrillation  Plan:  1  New onset Atrial fibrillation  · Likely secondary to infection as troponins have been negative, echo showed normal ejection fraction, TSH within normal limits, potassium and magnesium within normal limits  · Initial EKG on admission on January 21st showed atrial fibrillation with incomplete right bundle branch block with a heart rate 85  Patient spontaneously converted shortly after presenting to the ED has been normal sinus since  Telemetry was then subsequently discontinued  Patient had a repeat EKG performed today showed normal sinus  Heart rate currently in the 50s  Patient currently on metoprolol succinate 25 daily  · No prior history of AFib  · CHADS2 Vasc 3 points for CAD, HTN, and age  · Patient currently on therapeutic Lovenox, will discontinue Lovenox  · Patient currently opting to speak to his Cardiologist Dr Evelyn Aleman before being started on anticoagulation  Will schedule close follow up so patient can discuss options for anticoagulation with his Cardiologist  · Patient can be discharged from the hospital from a Cardiology standpoint     Coronary artery disease  · Status post MI in [address] [place] [address], PCI in 1998  · Continue aspirin, statin, beta-blocker  · No current chest pain    History of Present Illness   Physician Requesting Consult: Jann Canada MD  Reason for Consult / Principal Problem:  Atrial fibrillation  HPI: Gabriella Lauren is a 81y o  year old male past medical history significant of hypertension, , coronary artery disease status post PCI in 1998 presents for evaluation of atrial fibrillation    Patient presented 2 days ago with complaints of productive cough with yellow sputum, chills, nasal congestion, wheezing  He was found to be in acute atrial fibrillation in the ED  Shortly after admission patient was back in normal sinus rhythm and telemetry was discontinued  Patient notes he has never had atrial fibrillation in the past   Patient was noted to positive for flu A  Currently being treated with antibiotics for bronchitis and COPD exacerbation  Patient had an echo performed showed a normal ejection fraction with grade 1 diastolic dysfunction  Troponins have been negative x3  Potassium 4 4, magnesium 2 2   TSH normal  Repeat EKG performed today showed normal sinus rhythm         Inpatient consult to Cardiology     Performed by  Jean Isabel     Authorized by Yariel Dalal              Review of Systems   Constitutional: Negative for appetite change, chills, diaphoresis, fatigue, fever and unexpected weight change  HENT: Positive for congestion, rhinorrhea and sore throat  Eyes: Negative for photophobia and visual disturbance  Respiratory: Positive for cough, shortness of breath and wheezing  Cardiovascular: Negative for chest pain, palpitations and leg swelling  Gastrointestinal: Negative for abdominal pain, anal bleeding, blood in stool, constipation, diarrhea, nausea and vomiting  Genitourinary: Negative for decreased urine volume, difficulty urinating, dysuria, flank pain, frequency, hematuria and urgency  Musculoskeletal: Negative for arthralgias, back pain, joint swelling and myalgias  Skin: Negative for color change and rash  Neurological: Negative for dizziness, seizures, facial asymmetry, speech difficulty, numbness and headaches  Psychiatric/Behavioral: Negative for agitation, confusion and decreased concentration  The patient is not nervous/anxious          Historical Information   Past Medical History:   Diagnosis Date    [facility]     [facility] disorder ([facility] )     Cardiac disease     MI    Cervical cord compression with myelopathy ([facility] )     COPD (chronic obstructive pulmonary disease) (HCC)     Diverticulitis     Gait disturbance     uses cane, leg brace on right    Heart attack 1996    Hx of resection of large bowel 5/3/2016    Hyperlipidemia     Hypertension     IBS (irritable bowel syndrome)     Lumbar stenosis     Lung cancer ([facility] )     2007 and 2011    Shortness of breath     Small bowel obstruction 11/16/2016     Past Surgical History:   Procedure Laterality Date    ANGIOPLASTY      stent    CERVICAL SPINE SURGERY      Cervical decompression with cervical fusion from C3-C7 for spinal stenosis   COLON SURGERY      LUNG CANCER SURGERY Right     wedge resection    LUNG LOBECTOMY Left     MD ARTHRODESIS ANT INTERBODY MIN DISCECTOMY, CERVICAL BELOW C2 N/A 5/2/2016    Procedure: Anterior cervical diskectomy C3/4, C5/6, C6/7 with anterior plate fixation fusion C3-7;  Posterior decompressive laminectomy C3-7 with lateral mass fixation fusion C3-7 (IMPULSE MONITORING); Surgeon: Naa Padron MD;  [facility]: [facility];  Service: Neurosurgery    MD ARTHRODESIS POSTERIOR INTERBODY LUMBAR N/A 1/30/2017    Procedure: L4-5 AND L5-S1 DECOMPRESSIVE FORAMINOTOMIES, TRANSFORAMINAL LUMBAR INTERBODY AND PEDICLE SCREW FIXATION FUSION L4-S1 (IMPULSE);   Surgeon: Naa Padron MD;  [facility]: [facility];  Service: Neurosurgery    PROSTATECTOMY      for prostate CA - no chemo/RT    SIGMOIDECTOMY      for divertic    SMALL INTESTINE SURGERY N/A 11/17/2016    Procedure: Exploratory Laparotomy, Lysis of adhesions to release small bowel obstruction;  Surgeon: Horace Bello MD;  [facility];  Service:      History   Alcohol Use    Yes     Comment: rarely     History   Drug Use No     History   Smoking Status    Former Smoker    Quit date: 2011   Smokeless Tobacco    Never Used     Family History:   Family History   Problem Relation Age of Onset    Heart attack Father     Colon cancer Father Meds/Allergies   current meds:   Current Facility-Administered Medications   Medication Dose Route Frequency    acetaminophen (TYLENOL) tablet 650 mg  650 mg Oral Q6H PRN    albuterol (PROVENTIL HFA,VENTOLIN HFA) inhaler 2 puff  2 puff Inhalation Q4H PRN    albuterol inhalation solution 2 5 mg  2 5 mg Nebulization Q6H PRN    amLODIPine (NORVASC) tablet 2 5 mg  2 5 mg Oral Daily    aspirin chewable tablet 81 mg  81 mg Oral Daily    atorvastatin (LIPITOR) tablet 40 mg  40 mg Oral After Dinner    azithromycin (ZITHROMAX) tablet 500 mg  500 mg Oral Q24H    cholecalciferol (VITAMIN D3) tablet 2,000 Units  2,000 Units Oral Daily    diazepam (VALIUM) tablet 2 mg  2 mg Oral PRN    dicyclomine (BENTYL) tablet 20 mg  20 mg Oral Q6H    docusate sodium (COLACE) capsule 100 mg  100 mg Oral BID    enoxaparin (LOVENOX) subcutaneous injection 80 mg  1 mg/kg Subcutaneous Q12H [facility]    fish oil capsule 1,000 mg  1,000 mg Oral Daily    fluticasone (FLONASE) 50 mcg/act nasal spray 1 spray  1 spray Nasal Daily    fluticasone-salmeterol (ADVAIR) 250-50 mcg/dose inhaler 1 puff  1 puff Inhalation Q12H [facility]    ipratropium (ATROVENT HFA) inhaler 2 puff  2 puff Inhalation 4x Daily    lithium carbonate (LITHOBID) CR tablet 300 mg  300 mg Oral Every Other Day    lithium carbonate (LITHOBID) CR tablet 600 mg  600 mg Oral Every Other Day    loratadine (CLARITIN) tablet 10 mg  10 mg Oral Daily    metoprolol succinate (TOPROL-XL) 24 hr tablet 25 mg  25 mg Oral After Dinner    ondansetron (ZOFRAN) injection 4 mg  4 mg Intravenous Q6H PRN    oseltamivir (TAMIFLU) capsule 30 mg  30 mg Oral Q12H [facility]    pantoprazole (PROTONIX) EC tablet 40 mg  40 mg Oral Early Morning    polyethylene glycol (MIRALAX) packet 17 g  17 g Oral Daily PRN    [START ON 1/24/2018] predniSONE tablet 30 mg  30 mg Oral Daily    senna (SENOKOT) tablet 8 6 mg  1 tablet Oral Daily    and PTA meds:   Prior to Admission Medications   Prescriptions Last Dose Informant Patient Reported? Taking? Atorvastatin Calcium (LIPITOR PO) 2018 at Unknown time  Yes Yes   Sig: Take 40 mg by mouth daily  B Complex Vitamins (VITAMIN B COMPLEX PO) 2018 at Unknown time  Yes Yes   Sig: Take by mouth   Cholecalciferol (VITAMIN D PO) 2018 at Unknown time  Yes Yes   Sig: Take 2,000 Units by mouth daily     Metoprolol Succinate (TOPROL XL PO) 2018 at Unknown time  Yes Yes   Sig: Take 25 mg by mouth daily  acetaminophen (TYLENOL) 500 mg tablet 2018 at Unknown time  Yes Yes   Sig: Take 500 mg by mouth as needed for mild pain  albuterol (PROVENTIL HFA,VENTOLIN HFA) 90 mcg/act inhaler 2018 at Unknown time  Yes Yes   Sig: Inhale 2 puffs every 6 (six) hours as needed for wheezing   amLODIPine (NORVASC) 2 5 mg tablet 2018 at Unknown time  Yes Yes   Sig: Take 2 5 mg by mouth daily   aspirin (ECOTRIN LOW STRENGTH) 81 mg EC tablet 2018 at Unknown time  Yes Yes   Sig: Take 81 mg by mouth daily   diazepam (VALIUM) 5 mg tablet   Yes Yes   Sig: Take 5 mg by mouth as needed for anxiety   dicyclomine (BENTYL) 20 mg tablet 2018 at Unknown time  Yes Yes   Sig: Take 20 mg by mouth every 6 (six) hours   fexofenadine (ALLEGRA) 180 MG tablet 2018 at Unknown time  Yes Yes   Sig: Take 180 mg by mouth daily   fluticasone (FLONASE) 50 mcg/act nasal spray 2018 at Unknown time  Yes Yes   Si spray into each nostril as needed  fluticasone-salmeterol (ADVAIR) 250-50 mcg/dose 2018 at Unknown time  Yes Yes   Sig: Inhale 1 puff every 12 (twelve) hours     ipratropium (ATROVENT HFA) 17 mcg/act inhaler 2018 at Unknown time  Yes Yes   Sig: Inhale 2 puffs every 6 (six) hours   lithium 300 MG tablet 2018 at Unknown time  Yes Yes   Sig: Take 600 mg by mouth every other day PM  Alternating with 300 mg every other day  THIS IS A CONTINUOUS RELEASE TABLET    omega-3-acid ethyl esters (LOVAZA) 1 g capsule 2018 at Unknown time  Yes Yes Sig: Take 1 g by mouth 3 (three) times a day  omeprazole (PriLOSEC) 40 MG capsule 1/21/2018 at Unknown time  Yes Yes   Sig: Take 40 mg by mouth daily  Facility-Administered Medications: None     Allergies   Allergen Reactions    Oxycodone Rash       Objective   Vitals: Blood pressure 118/75, pulse (!) 53, temperature 97 8 °F (36 6 °C), temperature source Oral, resp  rate 18, height 5' 7 5" (1 715 m), weight 79 4 kg (175 lb), SpO2 96 %  Orthostatic Blood Pressures    Flowsheet Row Most Recent Value   Blood Pressure  118/75 filed at 01/23/2018 0719   Patient Position - Orthostatic VS  Lying filed at 01/23/2018 0719            Intake/Output Summary (Last 24 hours) at 01/23/18 1359  Last data filed at 01/23/18 1254   Gross per 24 hour   Intake             1505 ml   Output                0 ml   Net             1505 ml       Invasive Devices     Peripheral Intravenous Line            Peripheral IV 01/21/18 Left Wrist 2 days                Physical Exam   Constitutional: He is oriented to person, place, and time  He appears well-developed and well-nourished  No distress  HENT:   Head: Normocephalic and atraumatic  Nose: Nose normal    Mouth/Throat: Oropharynx is clear and moist  No oropharyngeal exudate  Eyes: EOM are normal  Pupils are equal, round, and reactive to light  Right eye exhibits no discharge  Left eye exhibits no discharge  No scleral icterus  Neck: Normal range of motion  Neck supple  No JVD present  Cardiovascular: Normal rate, regular rhythm, normal heart sounds and intact distal pulses  Exam reveals no gallop and no friction rub  No murmur heard  Pulmonary/Chest: Effort normal and breath sounds normal  No respiratory distress  He has no wheezes  He has no rales  He exhibits no tenderness  Abdominal: Soft  Bowel sounds are normal  He exhibits no distension and no mass  There is no tenderness  There is no rebound and no guarding  Musculoskeletal: Normal range of motion   He exhibits no edema, tenderness or deformity  Lymphadenopathy:     He has no cervical adenopathy  Neurological: He is alert and oriented to person, place, and time  He has normal reflexes  Skin: Skin is warm and dry  No rash noted  He is not diaphoretic  No erythema  No pallor  Psychiatric: He has a normal mood and affect  His behavior is normal        Lab Results: I have personally reviewed pertinent lab results  Imaging: I have personally reviewed pertinent reports  EKG:  Sinus bradycardia  VTE Prophylaxis: Sequential compression device (Venodyne)     Code Status: Level 1 - Full Code  Advance Directive and Living Will:      Power of :    POLST:      Counseling / Coordination of Care  Total floor / unit time spent today 60 minutes  Greater than 50% of total time was spent with the patient and / or family counseling and / or coordination of care    A description of the counseling / coordination of care:See HPI

## 2018-01-23 NOTE — ASSESSMENT & PLAN NOTE
· Mild acute exacerbation  · Patient respiratory status significantly improved with nonlabored respirations and saturating high 90s on room air  · Continue respiratory protocol  · Continue p o   Azithromycin today 4/5  · Continue prednisone taper by 10mg/day   · Sputum culture with candida albicans, likely colonization

## 2018-01-23 NOTE — ASSESSMENT & PLAN NOTE
· No prior history in review of Dr Kate Braden outpatient records   · [id number] = 3 given HTN, age 74-69, CHF   · Likely triggered by respiratory illness   · Appreciate Cardiology input  · Because he presented with atrial fibrillation, Cardiology recommends anticoagulation at least temporarily  However patient refusing and would like to discuss this with his primary cardiologist Dr Delene Litten    · D/ therapeutic lovenox   · Continue asa, beta blocker

## 2018-01-23 NOTE — DISCHARGE INSTRUCTIONS
Please take prednisone 30 mg on Wednesday 1/24/18 in decreased by 10 mg tablet every day until off  Take prednisone 20 mg on Thursday and 10 mg on Friday  Take azithromycin 250 mg for 2 more days starting on Wednesday  Take Tamiflu 30 mg twice a day starting on Wednesday for 2 more days  Please follow up with Dr Shelby Arzate regarding long-term anticoagulation/blood thinner for your atrial fibrillation  Based on your risk factors including high blood pressure, diastolic congestive heart failure and her age, you are at high risk for stroke  Anticoagulation would present stroke  Please read below for signs and symptoms of stroke  If you experience any unilateral weakness, numbness, tingling, difficulty speaking or slurring of her words, dizziness, falls please present to the emergency department immediately as these are signs and symptoms of stroke  Please take aspirin daily  The cardiologist office will call you regarding your appointment  If they do not call, please call them tomorrow to schedule  Effects of a Stroke   WHAT YOU NEED TO KNOW:   The effects of a stroke may be different for everyone  They may depend on where the stroke happened in your brain and how much damage occurred there  Some people may make a full recovery  Other people may have long-term effects  It is important to talk to your healthcare provider about any symptoms you have after a stroke  There are medicines and therapies to help manage the effects of a stroke  DISCHARGE INSTRUCTIONS:   Call 911 or have someone else call 911 for any of the following:   · You have any of the following signs of a stroke:      ? Numbness or drooping on one side of your face      ? Weakness in an arm or leg     ?  Confusion or difficulty speaking     ? Dizziness, a severe headache, or vision loss     ·             · You cannot be woken up       · You fall and hit your head       · You have a seizure       · You feel lightheaded, short of breath, and have chest pain      · You cough up blood  Seek care immediately if:   · Your arm or leg is painful, red, or larger than normal       · You feel weak, dizzy, or faint       · You have a fall without hitting your head       · Your blood sugar level or blood pressure is higher or lower than your healthcare provider said it should be      · You bleed heavily or cannot stop bleeding from a cut or injury       · You have a new, severe headache  Contact your healthcare provider if:   · You have a fever       · You have a rash       · You have new or worsening symptoms       · You feel extremely sad or anxious       · You feel you are unable to cope with your condition       · You have trouble sleeping       · You have open sores       · You choke or cough when eating or drinking       · You have questions or concerns about your condition or care  Problems with language, speech, and memory:   · Difficulty finding the right words or putting complete sentences together     · Difficulty putting words together that make sense     · Difficulty paying attention or a short attention span     · Difficulty writing or reading     · Problems with memory or being disoriented to time, place, or situation     · Problems thinking clearly or feeling confused     · Difficulty understanding written or spoken language or learning something new  Muscle and nerve problems: A stroke may affect one side of your body or part of one side  You may be at an increased risk for falling if you have difficulty moving your leg muscles   You may have any of the following:  · Inability to move your arm, leg, or one side of your face (paralysis)     · Muscle weakness, spasms, or muscles that stay in one position (contracted)     · Poor balance, difficulty walking, or difficulty grasping objects     · Changes in your vision or poor vision     · Ignoring, or being unaware of one side of your body     · Numbness of your arm, hand, fingers, leg, foot, or toes     · Pain, tickling, or prickling in weak or paralyzed parts of your body  Bowel and bladder problems:   · Loss of control of your urine or bowel movements     · Feeling like you have to urinate frequently, even when your bladder is not full     · Difficulty emptying your bladder or constipation  Swallowing and eating problems:   · Difficulty swallowing food     · Difficulty feeding yourself     · A lack of taste or a change in the way you taste things     · An increased risk for aspiration (food moves into the lungs) and pneumonia due to swallowing problems  Changes in personality or mood:   · Depression, sadness, irritability, or hopelessness     · Anger, frustration, or anxiety     · Difficulty controlling emotions or expressing inappropriate emotions     · Quick mood changes  Fatigue:   · Low energy levels     · Tiring easily     · A lack of motivation  Problems sleeping:   · Development of sleep apnea     · Changes in sleep such as sleeping too much or not enough  Problems with sexual function:   · Difficulty having or keeping an erection     · Vaginal dryness     · A decreased interest in sex  Self care after a stroke:   · Go to rehabilitation (rehab) as directed  Rehab is an important part of treatment  A speech therapist helps you relearn or improve your ability to talk and swallow  You may start slowly and start doing more difficult tasks over time  Physical therapists can help you gain strength and build endurance  Occupational therapists teach you new ways to do daily activities, such as getting dressed  Therapy can help you improve your ability to walk or keep your balance  Your therapy may include tasks or movements you will need to do for everyday activities  An example is being able to raise or lower yourself from a chair      · Prevent falls in your home  Remove anything you might trip over  Tape electrical cords down  Keep paths clear throughout your home  Make sure your home is well lighted  Put nonslip materials on surfaces that might be slippery  An example is your bathtub or shower floor       · Use assistive devices  A cane or walker may help you keep your balance as you walk       · Manage other medical conditions  Manage your heart conditions, blood pressure, and diabetes  Management of these conditions can reduce your risk for another stroke  Take your medicines as directed  Follow your healthcare provider's instructions for diet       · Join a support group  Life after a stroke can be difficult  It may be helpful to talk to others who have had a stroke  There are also support groups for family members or support persons of those whom have had a stroke  Ask your healthcare provider for more information about support groups  Know the signs of depression: Depression may happen after you have a stroke  Depression can lower your quality of life and cause you to stop doing things to help you be stronger  Depression can cause you to become less independent  Know the signs of depression so that you can receive help  Tell your family and friends that if they see these signs, to let your healthcare provider know  You may show any of the following signs of depression:  · Extreme sadness     · Avoiding social interaction with family or friends     · A lack of interest in things you once enjoyed     · Irritability     · Trouble sleeping     · Low energy levels     · A change in eating habits or sudden weight gain or loss  Driving after a stroke: Do not drive a car without talking to your healthcare provider or occupational therapist  The effects of a stroke may make it difficult or unsafe for you to drive  You may need to have a  evaluation before you can safely and legally drive a car  You may also need to take a  training program or get special equipment installed in your car  Ask your healthcare provider for more information about driving after a stroke     For more information and support: · National Stroke Association  [address]  Phone: 3- 149 [phone]  Web Address: [employer]  Follow up with your healthcare provider as directed: You may need to come in for regular tests of your brain function  If you are taking warfarin, you will need to come in for regular blood tests  Your INR levels will also need to be checked  These tests help make sure you are taking the right amount of warfarin  Write down your questions so you remember to ask them during your visits  © 2017 [address] Information is for End User's use only and may not be sold, redistributed or otherwise used for commercial purposes  All illustrations and images included in CareNotes® are the copyrighted property of [employer]  or [employer]  The above information is an  only  It is not intended as medical advice for individual conditions or treatments  Talk to your doctor, nurse or pharmacist before following any medical regimen to see if it is safe and effective for you        A-fib (Atrial Fibrillation)   WHAT YOU NEED TO KNOW:   A-fib may come and go, or it may be a long-term condition  A-fib can cause blood clots, stroke, or heart failure  These conditions may become life-threatening  It is important to treat and manage a-fib to help prevent a blood clot, stroke, or heart failure          DISCHARGE INSTRUCTIONS:   Call 911 for any of the following:   · You have any of the following signs of a heart attack:      ¨ Squeezing, pressure, or pain in your chest that lasts longer than 5 minutes or returns    ¨ Discomfort or pain in your back, neck, jaw, stomach, or arm     ¨ Trouble breathing    ¨ Nausea or vomiting    ¨ Lightheadedness or a sudden cold sweat, especially with chest pain or trouble breathing    · You have any of the following signs of a stroke:      ¨ Numbness or drooping on one side of your face     ¨ Weakness in an arm or leg    ¨ Confusion or difficulty speaking    ¨ Dizziness, a severe headache, or vision loss  Seek care immediately if:  You have any of the following signs of a blood clot:  · You feel lightheaded, are short of breath, and have chest pain  · You cough up blood  · You have swelling, redness, pain, or warmth in your arm or leg  Contact your cardiologist or healthcare provider if:   · Your heart rate is higher than your healthcare provider said it should be  · You have new or worsening swelling in your legs, feet, ankles, or abdomen  · You are short of breath, even at rest      · You have questions or concerns about your condition or care  Medicines: You may need any of the following:  · Heart medicines  help control your heart rate and rhythm  You may need more than one medicine to treat your symptoms  · Blood thinners    help prevent blood clots  Examples of blood thinners include heparin and warfarin  Clots can cause strokes, heart attacks, and death  The following are general safety guidelines to follow while you are taking a blood thinner:    ¨ Watch for bleeding and bruising while you take blood thinners  Watch for bleeding from your gums or nose  Watch for blood in your urine and bowel movements  Use a soft washcloth on your skin, and a soft toothbrush to brush your teeth  This can keep your skin and gums from bleeding  If you shave, use an electric shaver  Do not play contact sports  ¨ Tell your dentist and other healthcare providers that you take anticoagulants  Wear a bracelet or necklace that says you take this medicine  ¨ Do not start or stop any medicines unless your healthcare provider tells you to  Many medicines cannot be used with blood thinners  ¨ Tell your healthcare provider right away if you forget to take the medicine, or if you take too much  ¨ Warfarin  is a blood thinner that you may need to take   The following are things you should be aware of if you take warfarin  § Foods and medicines can affect the amount of warfarin in your blood  Do not make major changes to your diet while you take warfarin  Warfarin works best when you eat about the same amount of vitamin K every day  Vitamin K is found in green leafy vegetables and certain other foods  Ask for more information about what to eat when you are taking warfarin  § You will need to see your healthcare provider for follow-up visits when you are on warfarin  You will need regular blood tests  These tests are used to decide how much medicine you need  · Antiplatelets , such as aspirin, help prevent blood clots  Take your antiplatelet medicine exactly as directed  These medicines make it more likely for you to bleed or bruise  If you are told to take aspirin, do not take acetaminophen or ibuprofen instead  · Take your medicine as directed  Contact your healthcare provider if you think your medicine is not helping or if you have side effects  Tell him or her if you are allergic to any medicine  Keep a list of the medicines, vitamins, and herbs you take  Include the amounts, and when and why you take them  Bring the list or the pill bottles to follow-up visits  Carry your medicine list with you in case of an emergency  Follow up with your cardiologist as directed: You will need regular blood tests and monitoring  Write down your questions so you remember to ask them during your visits  Manage A-fib:   · Know your target heart rate  Learn how to take your pulse and monitor your heart rate  · Manage other health conditions  This includes high blood pressure, sleep apnea, thyroid disease, diabetes, and other heart conditions  Take medicine as directed and follow your treatment plan  · Limit or do not drink alcohol  Alcohol can make a-fib hard to manage  Ask your healthcare provider if it is safe for you to drink alcohol   A drink of alcohol is 12 ounces of beer, 5 ounces of wine, or 1½ ounces of liquor  · Do not smoke  Nicotine and other chemicals in cigarettes and cigars can cause heart and lung damage  Ask your healthcare provider for information if you currently smoke and need help to quit  E-cigarettes or smokeless tobacco still contain nicotine  Talk to your healthcare provider before you use these products  · Eat heart-healthy foods  Heart healthy foods will help keep your cholesterol low  These include fruits, vegetables, whole-grain breads, low-fat dairy products, beans, lean meats, and fish  Replace butter and margarine with heart-healthy oils such as olive oil and canola oil  · Maintain a healthy weight  Ask your healthcare provider how much you should weigh  Ask him to help you create a weight loss plan if you are overweight  · Exercise for 30 minutes  most days of the week  Ask your healthcare provider about the best exercise plan for you  © 2017 [employer] Information is for End User's use only and may not be sold, redistributed or otherwise used for commercial purposes  All illustrations and images included in CareNotes® are the copyrighted property of [employer]  or [address]  The above information is an  only  It is not intended as medical advice for individual conditions or treatments  Talk to your doctor, nurse or pharmacist before following any medical regimen to see if it is safe and effective for you  Influenza   WHAT YOU NEED TO KNOW:   Influenza (the flu) is an infection caused by the influenza virus  The flu is easily spread when an infected person coughs, sneezes, or has close contact with others  You may be able to spread the flu to others for 1 week or longer after signs or symptoms appear  DISCHARGE INSTRUCTIONS:   Call 911 for any of the following:   · You have trouble breathing, and your lips look purple or blue      · You have a seizure    Seek care immediately if:   · You are dizzy, or you are urinating less or not at all       · You have a headache with a stiff neck, and you feel tired or confused      · You have new pain or pressure in your chest      · Your symptoms, such as shortness of breath, vomiting, or diarrhea, get worse       · Your symptoms, such as fever and coughing, seem to get better, but then get worse  Contact your healthcare provider if:   · You have new muscle pain or weakness      · You have questions or concerns about your condition or care  Medicines: You may need any of the following:  · Acetaminophen decreases pain and fever  It is available without a doctor's order  Ask how much to take and how often to take it  Follow directions  Acetaminophen can cause liver damage if not taken correctly      · NSAIDs , such as ibuprofen, help decrease swelling, pain, and fever  This medicine is available with or without a doctor's order  NSAIDs can cause stomach bleeding or kidney problems in certain people  If you take blood thinner medicine, always ask your healthcare provider if NSAIDs are safe for you  Always read the medicine label and follow directions      · Antivirals help fight a viral infection      · Take your medicine as directed  Contact your healthcare provider if you think your medicine is not helping or if you have side effects  Tell him or her if you are allergic to any medicine  Keep a list of the medicines, vitamins, and herbs you take  Include the amounts, and when and why you take them  Bring the list or the pill bottles to follow-up visits  Carry your medicine list with you in case of an emergency  Rest as much as you can to help you recover  Drink liquids as directed to help prevent dehydration  Ask how much liquid to drink each day and which liquids are best for you  Prevent the spread of influenza:   · Wash your hands often  Use soap and water  Wash your hands after you use the bathroom, change a child's diapers, or sneeze  Wash your hands before you prepare or eat food  Use gel hand cleanser when soap and water are not available  Do not touch your eyes, nose, or mouth unless you have washed your hands first            · Cover your mouth when you sneeze or cough  Cough into a tissue or the bend of your arm      · Clean shared items with a germ-killing   Clean table surfaces, doorknobs, and light switches  Do not share towels, silverware, and dishes with people who are sick  Wash bed sheets, towels, silverware, and dishes with soap and water       · Wear a mask over your mouth and nose if you are sick or are near anyone who is sick       · Stay away from others if you are sick       · Influenza vaccine helps prevent influenza (flu)  Everyone older than 6 months should get a yearly influenza vaccine  Get the vaccine as soon as it is available, usually in September or October each year  Follow up with your healthcare provider as directed: Write down your questions so you remember to ask them during your visits  © 2017 [facility] Information is for End User's use only and may not be sold, redistributed or otherwise used for commercial purposes  All illustrations and images included in CareNotes® are the copyrighted property of [employer]  or [employer]  The above information is an  only  It is not intended as medical advice for individual conditions or treatments   Talk to your doctor, nurse or pharmacist before following any medical regimen to see if it is safe and effective for you

## 2018-01-23 NOTE — ASSESSMENT & PLAN NOTE
· Prior echo had shown grade 1 diastolic dysfunction, U9UL echo yesterday with LVEF 55%  · Patient is euvolemic

## 2018-01-23 NOTE — CONSULTS
Chief Complaint  Patient presents for a routine follow up with test results  History of Present Illness  This gentleman has a complex history  Coronary angioplasty in 1998  Myocardial infarction in 1996 with angioplasty  Robotic prostatic surgery in 2007 for prostate cancer  Carcinoma of the lung on the left side in 2007  In March of 2011 carcinoma of the right lung  At that time he had a wedge resection lesion was 2 1 cm-lymph nodes were negative-there was lymphocytic invasion  He did not have chemotherapy-the margins were not involved  He is followed [facility] for his lung cancer--he gets a CAT scan every 6 months   His aortic abdominal aneurysm 3 2 cm and is monitored at the time of his contrast CAT scan  He has fatty liver  Stress test done March of 2011 with 12% difference-he may have some residual ischemia but he is asymptomatic  He had a repeat stress test in March of 2013; it was read as scar and mild ischemia  He is asymptomatic  On my last visit in April 2013 his LDL was 124 and triglycerides were 250; at that time increase his Lipitor to 30 and he is on fish oil  His present laboratory values have improved  His HDL is 32 from 24 his triglycerides have dropped to 84 from approximately 250; his LDL is below 100 and liver functions are normal  I did not make any changes in his medical program at this time  Most recent CAT scan of chest due to 2013 no evidence of significant mediastinal or axillary adenopathy and small nodules are stable    Patient seen on March 11, 2014  From cardiac standpoint he has been stable  He's had multiple tests but CAT scans for diverticulosis  He is followed by GI physician  One of the CAT scan shows aortic aneurysms is now 4 cm  I asked him to get in touch with the vascular group  He will let me know if he wants to remain here or be followed at another institution  [facility]  I will repeat his cholesterol levels    His blood pressure he tells me is less than 1:30 on average  He is presently taking cold tablets and his blood pressure today is elevated  He is basically on a beta blocker, statin, and arb  From a cardiac standpoint he has been very stable     Patient seen September 15, 2014 previous myocardial infarction, PTCA in [address], prostate carcinoma, 2 separate carcinomas of the lung as noted above in 2007 2011, fatty liver, hyperlipidemia most recent LDL of 100 in July of 2014  Last CAT scan showed a aortic aneurysm 4 cm  He is being referred for vascular for further follow up  He is on a statin, arb, and beta blocker  He is asymptomatic  His last stress test was in 2013  His there is consideration of possibly getting a colon resection  We will do a stress test before that  2013 he had mild ischemia  Patient seen February 3, 2050 his diagnoses are listed in the history of present illness  Essentially has myocardial infarction 1996, angioplasty 1998, hyperlipidemia with his last LDL in January 2015 of 86, hypertension which has been variable, [place] aneurysm followed by the vascular group, prostate carcinoma 2007 followed by urology, colon resection for diverticulitis followed by: Surgery, cancer of the lung 2007 in 2011 followed by oncology,  Recent stress made his blood pressure go up  We did increase his amlodipine  He is dealing with a stress better  I would decrease the amlodipine 2 5 daily  He'll be changing from Diovan to losartan  He will call us with blood pressures at that time  May require further changes in his medication  From cardiac standpoint, he has been stable  His last stress test October 2014  He is on a statin, ACE inhibitor, and beta blocker  Patient seen May 12, 2015  His history is summarized above  He is having no chest pain  His last stress test October 2015  We discussed the use of a fibrate   He was put on by another physician  He prefers to remain on that   His his triglyceride levels were quite high  I was considering dropping the dose and checking his triglycerides but he wants to stay on this medication  His LDL is 86 in January 2015  A cardiac standpoint he is stable  He did see a psychiatrist since he was extraordinarily anxious about last visit  He is improved  He is in lithium was increased  Patient seen November 11, 2015  His history is summarized above  However in general, he has myocardial infarction 1996, stent [address], low HDL and elevated triglycerides, hypertension, abdominal aneurysm of 3 9 cm range followed by vascular, prostate carcinoma status post treatment, lung cancer operated on in 2007 2011 with recent CAT scan negative for tumor recurrence, and bipolar history  From the LAD his LDL is 86  This was October 2015  His BNP is 236  Triglycerides are 126  I'm going to discontinue his fiber 8 and check his triglycerides and I will put him on a carbohydrate restriction and continue to fish oil    Patient seen February 1, 2016  Carries a diagnosis of previous myocardial infarction, stents in 1998, elevated triglycerides and very low HDL, hypertension, abdominal aneurysm that's under surveillance, past history of prostate carcinoma with a PSA of less than 0 1, lung carcinoma in 07 and 2011, history of bipolar, chronic obstructive lung disease  Recently and in the hospital in the emergency room after being treated by his family doctor for bronchitis  He was placed on steroids, Z-Gerson, and ultimately Levaquin  He never had pneumonia  He essentially got better with treatment for bronchospasm  In the emergency room we did have a CAT scan  No embolus  No tumor  No pneumonia  This was done in December 2016  The good news is that his HDL is now up to 49  It had been 32    Patient seen March 6, 2017  He has a complex past history that includes the following   Myocardial infarction, stent in 1998 his coronaries, elevated cholesterol, hypertension, abdominal aneurysm followed by vascular, prostate carcinoma, lung carcinoma operated on 2007 2011 end followed in [place], chronic obstructive lung disease, cervical spine surgery 2016, back surgery 2017, hyperlipidemia, fatty liver,  His alkaline phosphatase is elevated  I suspect this is secondary to his back surgery  His a fatty liver  His no evidence of metastases in his liver  I'll order a bone specific alkaline phosphatase  From cardiac standpoint his been amazingly stable  He also had a bowel obstruction recently  His LDL is 74 from a cardiac standpoint he has no angina or heart failure  His echocardiogram is normal  That was on a February 2017  Patient seen June 6, 2017  Long list of issues above  From cardiac standpoint he is very stable  His creatinine is 1 16  His aortic aneurysm is slightly less than 5 cm was being watched carefully  His alkaline phosphatase elevation is probably secondary to his back fusion  It is coming down very slowly  His last LDL is less than 80  His no cardiac complaints  Patient seen December 12, 2017  His complex past history is best summarized by March 6, 2017 note  I also should have mentioned that he is bipolar and is on lithium  Presently is creatinine is 1  16  Alk phosphatase is 233 last year and it is down to 183  This is not from the liver  It was from the bone  LDL is 61  Id a CT scan of the abdomen and pelvis  Aortic aneurysm 4 7 cm  This is followed could for carefully by the vascular group  Echocardiogram shows left her function to be 65%  There is minimal mitral regurgitation  His pulmonary disease is followed by the pulmonary group  From cardiac standpoint he is very stable  No change in cardiac medications that includes a statin and a beta-blocker and aspirin  As noted above he had a stent in 1998 to is coronaries  He has no chest pain           Active Problems    1  Abnormal blood chemistry (790 6) (R79 9)   2  Adenocarcinoma of prostate (185) (C61)   3   Allergic rhinitis (477 9) (J30 9)   4  Aortic aneurysm (441 9) (I71 9)   5  Atherosclerotic heart disease of native coronary artery without angina pectoris (414 01)   (I25 10)   6  Benign colon polyp (211 3) (K63 5)   7  Cervical cord compression with myelopathy (336 9) (G95 20)   8  Cervical disc disease (722 91) (M50 90)   9  Chest pain (786 50) (R07 9)   10  Chronic bipolar disorder (296 80) (F31 9)   11  Chronic rhinitis (472 0) (J31 0)   12  COPD (chronic obstructive pulmonary disease) (496) (J44 9)   13  Cough (786 2) (R05)   14  Diverticulitis (562 11) (K57 92)   15  Dyspnea (786 09) (R06 00)   16  Encounter for postoperative care (V58 49) (Z48 89)   17  Gait disturbance (781 2) (R26 9)   18  Gastroesophageal reflux disease (530 81) (K21 9)   19  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)   20  Hepatic steatosis (571 8) (K76 0)   21  Hip pain, chronic, left (719 45,338 29) (M25 552,G89 29)   22  History of heart artery stent (V45 82) (Z95 5)   23  Hyperlipidemia (272 4) (E78 5)   24  Hypertension (401 9) (I10)   25  Irritable bowel syndrome (564 1) (K58 9)   26  Leukocytosis (288 60) (D72 829)   27  Lumbar spinal stenosis (724 02) (M48 061)   28  Muscle spasms of neck (728 85) (M62 838)   29  Need for pneumococcal vaccination (V03 82) (Z23)   30  Need for prophylactic vaccination and inoculation against influenza (V04 81) (Z23)   31  Neurogenic claudication (724 03) (M48 062)   32  Postop check (V67 00) (Z09)   33  Post-op pain (338 18) (G89 18)   34  Pre-operative cardiovascular examination (V72 81) (Z01 810)   35  Preoperative evaluation to rule out surgical contraindication (V72 83) (Z01 818)   36  Ptosis of left eyelid (374 30) (H02 402)   37  Screening for genitourinary condition (V81 6) (Z13 89)   38  Spondylolisthesis of lumbar region (738 4) (M43 16)   39  Spondylosis of lumbar region without myelopathy or radiculopathy (721 3) (M47 816)   40  Swelling around both eyes (782 2) (R22 0)   41  Tremor (781 0) (R25 1)   42   Weakness of extremity (729 89) (R29 898)    Past Medical History    · 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)   · Benign colon polyp (211 3) (K63 5)   · History of Diverticulosis (562 10) (K57 90)   · Gastroesophageal reflux disease (530 81) (K21 9)   · History of acute bronchitis (V12 69) (Z87 09)   · History of diverticulitis of colon (V12 79) (Z87 19)   · History of malignant neoplasm of lung (V10 11) (Z85 118)   · History of malignant neoplasm of prostate (V10 46) (Z85 46)   · History of small bowel obstruction (V12 79) (Z87 19)   · Irritable bowel syndrome (564 1) (K58 9)   · History of Malignant neoplasm of lung, unspecified laterality   · History of Myocardial infarction involving other coronary artery (410 80) (I21 29)    Surgical History    · History of Colon Surgery   · History of Diagnostic Esophagogastroduodenoscopy   · History of Lung Lobectomy   · History of Surgery Prostate Prostatotomy   · History of Wedge Resection Of Lung    Family History    · Family history of Carcinoma Of The Small Intestine (V16 0)    · Family history of Carcinoma Of The Small Intestine (V16 0)   · Family history of Coronary Artery Disease (V17 49)    · Family history of     · Family history of     · Family history of     · Family history of     · Family history of Heart Disease (V17 49)   · Family history of Hyperlipidemia   · Family history of Hypertension (V17 49)    Social History    · Being A Social Drinker   · Daily Coffee Consumption (1  Cups/Day)   · Education history   · Former smoker (V15 82) (H38 610)   · History of Former smoker (V15 82) (I81 325)   · Lives independently with spouse   · Lives with spouse   · Marital history   · One child   · Retired  The social history was reviewed and is unchanged  Current Meds   1  Advair Diskus 250-50 MCG/DOSE Inhalation Aerosol Powder Breath Activated; Use 1   inhalation twice  daily   Rinse mouth after  use;    Therapy: 00QAR9359 to (Evaluate:11Oct2018)  Requested for: 04IJR9073; Last   Rx:59Lge3579 Ordered   2  Allegra 180 MG TABS; TAKE 1 TABLET DAILY AS NEEDED; Therapy: (Recorded:14Jun2016) to Recorded   3  AmLODIPine Besylate 2 5 MG Oral Tablet; Take 1 tablet by mouth  daily; Therapy: 74UUG4510 to (Evaluate:93Jgh8824)  Requested for: 71NAP1381; Last   Rx:24Glk4645 Ordered   4  Aspirin 81 MG Oral Tablet Delayed Release; TAKE 1 TABLET DAILY; Therapy: (Recorded:12Gkn6695) to Recorded   5  Atorvastatin Calcium 40 MG Oral Tablet; Take 1 tablet by mouth  daily; Therapy: 56TAY5804 to (Evaluate:10Abg5125)  Requested for: 90Gbx3872; Last   Rx:58Jfk1957 Ordered   6  Atrovent HFA 17 MCG/ACT Inhalation Aerosol Solution; INHALE 2 PUFFS 3 TIMES  DAILY; Therapy: 00PYE7872 to (Evaluate:61Fku1825)  Requested for: 50Jxy6269 Recorded   7  Dicyclomine HCl - 20 MG Oral Tablet; TAKE 1 TABLET EVERY 4 TO 6 HOURS DAILY AS   NEEDED; Therapy: 75LPZ5450 to (Evaluate:53Phb7426)  Requested for: 54Pxv0500; Last   Rx:76Uun7203 Ordered   8  Fluticasone Propionate 50 MCG/ACT Nasal Suspension; USE 2 SPRAYS IN EACH   NOSTRIL ONCE DAILY; Therapy: 23GZD8213 to (Minoo Leal)  Requested for: 99AUC4807; Last   Rx:05Nco1742 Ordered   9  Lithium Carbonate 300 MG Oral Capsule; alternate 1 capsule with 2 capsules everyother   day; Therapy: 44Vjz3920 to Recorded   10  Metoprolol Succinate ER 25 MG Oral Tablet Extended Release 24 Hour; Take 1 tablet by    mouth  daily; Therapy: 81GEJ1604 to (Evaluate:93Hcc0849)  Requested for: 33LBH1271; Last    Rx:35Jiw1137 Ordered   11  Nitroglycerin 0 2 MG/HR Transdermal Patch 24 Hour; APPLY 1 PATCH DAILY (OFF FOR    12 HOURS); Therapy: 79Dqg8274 to (Evaluate:23Mar2018)  Requested for: 28Mar2017; Last    Rx:28Mar2017 Ordered   12  Omega-3-acid Ethyl Esters 1 GM Oral Capsule; Take 1 capsule by mouth 3  times daily; Therapy: 24Apr2017 to (Evaluate:19Apr2018)  Requested for: 24Apr2017; Last    Rx:24Apr2017 Ordered   13  Omeprazole 40 MG Oral Capsule Delayed Release; Take 1 capsule by mouth  daily; Therapy: 60YNZ6710 to (Evaluate:52Hap4992)  Requested for: 92HAU8304; Last    Rx:38Hvh3574 Ordered   14  ProAir  (90 Base) MCG/ACT Inhalation Aerosol Solution; INHALE 1 TO 2 PUFFS    EVERY 6 HOURS AS NEEDED; Therapy: 37Fhs9313 to (Evaluate:22Cwv9164)  Requested for: 82Tvq3507; Last    Rx:90Yjt0821 Ordered   15  Tylenol 500 MG CAPS; 1   PRN; Therapy: (Recorded:91Tql1692) to Recorded   16  Vitamin B Complex Oral Tablet; TAKE 1 TABLET DAILY; Therapy: (Recorded:55Yns5856) to Recorded   17  Vitamin D 2000 UNIT Oral Tablet; take 1 tablet by mouth once daily; Therapy: (Recorded:01Emo2726) to Recorded    The medication list was reviewed and updated today  Allergies    1  Percocet TABS   2  NSAIDs    Vitals   Recorded: 97Bwz1732 03:59PM   Heart Rate 67   Systolic 949, LUE, Sitting   Diastolic 76, LUE, Sitting   Height 5 ft 7 in   Weight 181 lb 3 oz   BMI Calculated 28 38   BSA Calculated 1 94   O2 Saturation 98     Physical Exam    Constitutional   General appearance: No acute distress, well appearing and well nourished  Still somewhat painful from surgery in May 2016  Neck   Neck and thyroid: Abnormal     Pulmonary   Auscultation of lungs: Abnormal   Postop for carcinoma of the lung  Cardiovascular   Auscultation of heart: Normal rate and rhythm, normal S1 and S2, no murmurs  Examination of extremities for edema and/or varicosities: Normal     Abdomen   Abdomen: Abnormal   Known abdominal aneurysm  Neurologic - Cranial nerves: II - XII intact  Psychiatric - Orientation to person, place, and time: Normal  Mood and affect: Normal       Assessment    1  Atherosclerotic heart disease of native coronary artery without angina pectoris (414 01)   (I25 10)   · Hx MI and angioplasty in 1996  Stent in 1998   2  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)   3  Hypertension (401 9) (I10)   4   Hyperlipidemia (272 4) (E78 5)    Plan  Abnormal blood chemistry, Hyperlipidemia    · (1) HEMOGLOBIN A1C; Status:Active; Requested for:12Oct2018; Perform:[facility]; XFF:01OXF3339;XUQVCPH; For:Abnormal blood chemistry, Hyperlipidemia; Ordered By:Lucas Loving; Hyperlipidemia    · (1) CBC/PLT/DIFF; Status:Active; Requested for:12Oct2018; Perform:[facility]; QDV:68PXP0020;UYRLJDX;  Quyen Barrera; Ordered By:Jeancarlos Loving;   · (1) CK (CPK); Status:Active; Requested for:12Oct2018; Perform:[facility]; JIR:43SGN0404;DPQTDWR;  Quyen Barrera; Ordered By:Jeancarlos Loving;   · (1) COMPREHENSIVE METABOLIC PANEL; Status:Active; Requested for:12Oct2018; Perform:[facility]; CFY:14XVJ6217;MSDPWVN;  Quyen Barrera; Ordered By:Jeancarlos Loving;   · (1) LIPID PANEL, FASTING; Status:Active; Requested for:12Oct2018; Perform:[facility]; ZHY:27RMU4623;WRCTMPF;  Quyen Barrera; Ordered By:Jeancarlos Loving;   · Follow Up After Tests Complete Evaluation and Treatment  Follow-up  Status: Hold For -  Scheduling  Requested for: 19Alz8726   Ordered; For: Hyperlipidemia; Ordered By: Alejandro Sullivan Performed:  Due: 34JJZ9703    Discussion/Summary    From a cardiac standpoint, he has had a myocardial infarction 1996 and angioplasty 1998  His other diagnoses are listed above  His blood pressure is fine and his last LDL is 100  He has had a very low HDL in the past  He has been as low as 32  He is 37 now  His LDL is up to 100  I'm increasing his Lipitor to 40 a day  I would prefer in to be at 79 he'll DL  A stress test may be done in next several months after he sees a colon surgeon    Patient seen December 29, 2014  Psychologically he is very upset about having to condominiums  Blood pressure is elevated  I am changing his medication Norvasc 5 mg and he may require further changes  Making no changes in his cholesterol medication   His been asymptomatic with regard to his cardiac situation  He is a colon resection sigmoid area due to restriction in November 2014  Stress test was done before that  LV function is 58% with a scar and minimal ischemia    Patient seen on May 12, 2015  He has improved psychologically  He did see a psychiatrist his medications were changed  Blood pressures been fine  He has no chest pain no heart failure  All his issues are being followed by specialist as noted in previous note  We did discuss the use offibrates  He strongly wants to continue  He was placed on this by his physician in [place] many years ago  Patient seen February 3, 2015  His stress level seems to be better  With the amlodipine 2 5 twice a day blood pressure somewhat lower  Her go back to 2 5 amlodipine daily  When he is changes from Diovan to losartan, we may have to readjust  His LDL in January of 2015 is 80  His no cardiac symptoms at this time  His list of diagnoses is noted in the history of present illness  Patient seen November 11, 2015  His been extremely stable  Major change am making today's decrease his fiber 8  Will recheck his lipid profile  The meantime he has gained 11 pounds  I'm asked him to lose weight and go on carbohydrate restriction  We did call the vascular group  They are following abdominal aneurysm    He should seen March 6, 2017  Diagnosis and labs are noted in the history of present illness  Pending will be an alkaline phosphatase of fractionation from bone  Cardiac-wise he is stable    Patient seen June 6, 2017 from cardiac standpoint is very stable  No change in medication  His multiple other issues  The most notable issue I think is his aortic aneurysm is somewhat less than 5 cm being watched on an every 6 month basis  Patient seen December 12, 2017  His multiple diagnosis from a cardiac standpoint he had a stent in his coronaries in 1998 and a history of a myocardial infarction but he has well-preserved left ventricular function  His LDL is 61  Uric aneurysms followed carefully by vascular        Future Appointments    Signatures   Electronically signed by : BETITO Weaver ; Dec 12 2017  4:23PM EST                       (Author)

## 2018-01-23 NOTE — ASSESSMENT & PLAN NOTE
· No prior history in review of Dr Hansel Vincent outpatient records   · [id number]-Xigd = 3 given HTN, age 74-69, CHF   · Likely triggered by respiratory illness   · Cards consult fell off, d/w cards AP-will re-order cardiology consultation today   · Check EKG now, was taken off of tele yesterday, last charted to have irregular rhythm on 1/21  · On therapeutic lovenox   · Continue asa, beta blocker

## 2018-01-23 NOTE — PROGRESS NOTES
Progress Note - Berhane Arenas 1945, 81 y o  male MRN: [id number]    Unit/Bed#: [facility] 709-01 Encounter: [id number] DOS: 1/23/18    Primary Care Provider: Solo Colon MD   Date and time admitted to hospital: 1/21/2018  4:29 AM    Influenza   Assessment & Plan    · Influenza A positive   · Continue Tamiflu 4/5   · Supportive care         COPD exacerbation ([place] [address] )   Assessment & Plan    · Mild acute exacerbation  · Patient respiratory status significantly improved with nonlabored respirations and saturating high 90s on room air  · Continue respiratory protocol  · Continue p o  Azithromycin today 4/5  · Continue prednisone taper by 10mg/day   · Obtain ambulatory pulse ox   · Sputum culture with candida albicans, likely colonization         * Sepsis ([place] [address] )   Assessment & Plan    · Patient presented with leukocytosis, tachycardia, tachypnea, found positive rapid influenza test with opacity on imaging  Repeat chest x-ray shows no evidence of pneumonia  · Leukocytosis is improved, tachycardia and tachypnea resolved  · He is on Tamiflu 4/5 and will be continued on azithromycin 4/5 for bronchitis  Ceftriaxone discontinued  · Nebs p r n , droplet precautions  · Blood cultures neg to date         New onset atrial fibrillation (HCC)   Assessment & Plan    · No prior history in review of Dr Crissy Tsai outpatient records   · [id number] = 3 given HTN, age 74-69, CHF   · Likely triggered by respiratory illness   · Cards consult fell off, d/w cards AP-will re-order cardiology consultation today   · Check EKG now, was taken off of tele yesterday, last charted to have irregular rhythm on 1/21  · On therapeutic lovenox   · Continue asa, beta blocker         Diastolic heart failure ([facility] )   Assessment & Plan    · Prior echo had shown grade 1 diastolic dysfunction, W1UY echo yesterday with LVEF 55%  · Patient is euvolemic          Bipolar affective disorder [facility])   Assessment & Plan    · Continue lithium         Chronic kidney disease, stage III (moderate)   Assessment & Plan    · Creatinine is at baseline        Hypercholesterolemia   Assessment & Plan    · Continue statin        Hypertension   Assessment & Plan    · Stable, continue amlodipine and metoprolol         CAD (coronary artery disease)   Assessment & Plan    · S/p MI , angioplasty   · continue asa, statin, beta blocker  · No angina           VTE Pharmacologic Prophylaxis:   Pharmacologic: Enoxaparin (Lovenox)  Mechanical VTE Prophylaxis in Place: Yes    Patient Centered Rounds: I have performed bedside rounds with nursing staff today  Discussions with Specialists or Other Care Team Provider: nursing, cards AP     Education and Discussions with Family / Patient: patient     Time Spent for Care: 30 minutes  More than 50% of total time spent on counseling and coordination of care as described above  Current Length of Stay: 2 day(s)    Current Patient Status: Inpatient   Certification Statement: The patient will continue to require additional inpatient hospital stay due to cardiology evaluation, follow up blood cultures, d/c planning in regards to anticoagulation     Discharge Plan / Estimated Discharge Date: pending clinical course, possible d/c today or tomorrow     Code Status: Level 1 - Full Code      Subjective:   Patient seen and examined at bedside nursing  Patient denies any new complaints  States he wishes ambulating in the halls without oxygen and denies any shortness of breath, chest pain or palpitations  We discussed his new onset atrial fibrillation and he tells me does want to be on long-term anticoagulation   States he has good relationship with his cardiologist who like to talk to him  Patient hopes to go home today      Objective:     Vitals:   Temp (24hrs), Av 8 °F (36 6 °C), Min:97 3 °F (36 3 °C), Max:98 2 °F (36 8 °C)    HR:  [53-80] 53  Resp:  [18] 18  BP: ()/(57-75) 118/75  SpO2:  [95 %-98 %] 96 %  Body mass index is 27 kg/m²  Input and Output Summary (last 24 hours): Intake/Output Summary (Last 24 hours) at 01/23/18 1311  Last data filed at 01/23/18 1254   Gross per 24 hour   Intake             1505 ml   Output                0 ml   Net             1505 ml       Physical Exam:     Physical Exam   Constitutional: He is oriented to person, place, and time  He appears well-developed and well-nourished  No distress  HENT:   Head: Normocephalic and atraumatic  Mouth/Throat: Oropharynx is clear and moist    Eyes: EOM are normal  No scleral icterus  Neck: Normal range of motion  Neck supple  Cardiovascular: Regular rhythm and normal heart sounds  No murmur heard  Reg rhythm, slightly bradycardic rate    Pulmonary/Chest: Effort normal and breath sounds normal  No respiratory distress  He has no wheezes  He has no rales  No w/r/r   Abdominal: Soft  Bowel sounds are normal    Musculoskeletal: Normal range of motion  He exhibits no edema  Neurological: He is oriented to person, place, and time  Skin: Skin is warm and dry  Psychiatric: He has a normal mood and affect  Vitals reviewed  Additional Data:     Labs:      Results from last 7 days  Lab Units 01/22/18  0553 01/21/18  0455   WBC Thousand/uL 13 43* 15 36*   HEMOGLOBIN g/dL 12 6 13 3   HEMATOCRIT % 39 0 39 6   PLATELETS Thousands/uL 190 172   NEUTROS PCT %  --  88*   LYMPHS PCT %  --  5*   MONOS PCT %  --  6   EOS PCT %  --  1       Results from last 7 days  Lab Units 01/22/18  0553   SODIUM mmol/L 140   POTASSIUM mmol/L 4 4   CHLORIDE mmol/L 111*   CO2 mmol/L 22   BUN mg/dL 30*   CREATININE mg/dL 1 27   CALCIUM mg/dL 9 8   GLUCOSE RANDOM mg/dL 123       Results from last 7 days  Lab Units 01/21/18  0646   INR  1 04       * I Have Reviewed All Lab Data Listed Above  * Additional Pertinent Lab Tests Reviewed:  All Labs For Current Hospital Admission Reviewed    Imaging:    Imaging Reports Reviewed Today Include: cxr   Imaging Personally Reviewed by Myself Includes:  None     Recent Cultures (last 7 days):       Results from last 7 days  Lab Units 01/21/18  1121 01/21/18  0827 01/21/18  0645   BLOOD CULTURE   --   --  No Growth at 48 hrs  No Growth at 48 hrs  SPUTUM CULTURE   --  2+ Growth of Candida sp  presumptively albicans*  3+ Growth of   --    GRAM STAIN RESULT   --  1+ Epithelial Cells  3+ Polys  2+ Gram positive cocci in pairs  1+ Budding yeast  --    LEGIONELLA URINARY ANTIGEN  Negative  --   --        Last 24 Hours Medication List:     amLODIPine 2 5 mg Oral Daily   aspirin 81 mg Oral Daily   atorvastatin 40 mg Oral After Dinner   azithromycin 500 mg Oral Q24H   cholecalciferol 2,000 Units Oral Daily   dicyclomine 20 mg Oral Q6H   docusate sodium 100 mg Oral BID   enoxaparin 1 mg/kg Subcutaneous Q12H KATHRYN   fish oil 1,000 mg Oral Daily   fluticasone 1 spray Nasal Daily   fluticasone-salmeterol 1 puff Inhalation Q12H KATHRYN   ipratropium 2 puff Inhalation 4x Daily   lithium carbonate 300 mg Oral Every Other Day   lithium carbonate 600 mg Oral Every Other Day   loratadine 10 mg Oral Daily   metoprolol succinate 25 mg Oral After Dinner   oseltamivir 30 mg Oral Q12H KATHRYN   pantoprazole 40 mg Oral Early Morning   [START ON 1/24/2018] predniSONE 30 mg Oral Daily   senna 1 tablet Oral Daily        Today, Patient Was Seen By: Eduardo Spring PA-C    ** Please Note: This note has been constructed using a voice recognition system   **

## 2018-01-23 NOTE — PROGRESS NOTES
Progress Note - Infectious Disease   Ova Gey 81 y o  male MRN: [id number]  Unit/Bed#: [facility] 709-01 Encounter: [id number]      Impression/Recommendations:  1   Sepsis, POA , presented with leukocytosis, tachycardia and tachypnea   Source is most likely respiratory infection   Despite sepsis, patient remains hemodynamically stable, without hypotension   He is clinically much improved  Sepsis has resolved  Tachycardia /tachypnea have resolved  WBC decreasing  Admission blood cultures negative so far  Antibiotic/antiviral plan as in below  Monitor hemodynamics  Follow-up on pending blood cultures      2  Influenza A   Rapid test positive   PCR pending   Patient is vaccinated   Hopefully, his clinical infection will be attenuated  Continue Tamiflu x5 day course, another 2 day  Follow-up influenza PCR  Monitor respiratory symptoms      3   Probable bronchitis  This may be all influenza  However, given leukocytosis, would treat him with a course of antibiotic for possible bacterial bronchitis  CXR without pneumonia  Continue azithromycin x 5 day course, another 2 days  Monitor temperature/WBC  Monitor respiratory symptoms       4   COPD exacerbation   Systemic corticosteroid started per primary service  Steroid and nebulizers per primary service      5   Hypoxia, secondary to all above   This is much improved with O2 support  Patient is now off O2  He is ambulating well  Monitor      Discussed with patient in detail regarding the above plan  Okay for discharge from ID viewpoint      Antibiotics:  Tamiflu/azithromycin # 3     Subjective:  Patient feels well  Dyspnea/cough much improved  He feels back to his normal self  Temperature stays down  No further chills  He is tolerating antibiotic well  No nausea, vomiting or diarrhea      Objective:  Vitals:  HR:  [53-80] 53  Resp:  [18] 18  BP: ()/(57-75) 118/75  SpO2:  [95 %-98 %] 97 %  Temp (24hrs), Av 9 °F (36 6 °C), Min:97 3 °F (36 3 °C), Max:98 6 °F (37 °C)  Current: Temperature: 97 8 °F (36 6 °C)    Physical Exam:     General: Awake, alert, cooperative, no distress  Lungs: Expansion symmetric, no rales, no wheezing, respirations unlabored  Heart[de-identified]  Regular rate and rhythm, S1 and S2 normal, no murmur  Abdomen: Soft, nondistended, non-tender, bowel sounds active all four quadrants,        no masses, no organomegaly  Extremities: No edema  No erythema/warmth  No ulcer  Nontender to palpation  Skin:  No rash  Invasive Devices     Peripheral Intravenous Line            Peripheral IV 01/21/18 Left Wrist 2 days                Labs studies:   I have personally reviewed pertinent labs  Results from last 7 days  Lab Units 01/22/18  0553 01/21/18  0455   SODIUM mmol/L 140 141   POTASSIUM mmol/L 4 4 4 1   CHLORIDE mmol/L 111* 111*   CO2 mmol/L 22 22   ANION GAP mmol/L 7 8   BUN mg/dL 30* 26*   CREATININE mg/dL 1 27 1 29   EGFR ml/min/1 73sq m 57 56   GLUCOSE RANDOM mg/dL 123 118   CALCIUM mg/dL 9 8 9 4       Results from last 7 days  Lab Units 01/22/18  0553 01/21/18  0455   WBC Thousand/uL 13 43* 15 36*   HEMOGLOBIN g/dL 12 6 13 3   PLATELETS Thousands/uL 190 172       Results from last 7 days  Lab Units 01/21/18  1121 01/21/18  0827 01/21/18  0645   BLOOD CULTURE   --   --  No Growth at 48 hrs  No Growth at 48 hrs  SPUTUM CULTURE   --  2+ Growth of Candida sp  presumptively albicans*  3+ Growth of   --    GRAM STAIN RESULT   --  1+ Epithelial Cells  3+ Polys  2+ Gram positive cocci in pairs  1+ Budding yeast  --    LEGIONELLA URINARY ANTIGEN  Negative  --   --        Imaging Studies:   I have personally reviewed pertinent imaging study reports and images in PACS  EKG, Pathology, and Other Studies:   I have personally reviewed pertinent reports

## 2018-01-23 NOTE — ASSESSMENT & PLAN NOTE
· Prior echo had shown grade 1 diastolic dysfunction, Y7FG echo yesterday with LVEF 55%  · Patient is euvolemic

## 2018-01-23 NOTE — RESULT NOTES
Message  Reviewed MRI L-spine  As new cyst noted will refer patient to neurosurgery  I called the patient and discussed it with him and made him aware he will be referred to neurosurgery      Plan  Abnormal MRI, lumbar spine    · *1 - [facility]  Please evaluate the recently developed  cyst notd on MRI L-spine  Status: Active  Requested for: [id number]  Care Summary provided  : Yes    Results/Data     * MRI LUMBAR SPINE WO CONTRAST   MRI LUMBAR SPINE WO CONTRAST: MRI LUMBAR SPINE WITHOUT CONTRAST     INDICATION:  Central low back pain, difficulty walking since surgery in January 20, 2017     COMPARISON:  MR 9/9/2016     TECHNIQUE:  Sagittal T1, sagittal T2, sagittal inversion recovery, axial T1 and axial T2,  coronal T2        IMAGE QUALITY:  Diagnostic     FINDINGS:     ALIGNMENT:  Decreased degenerative anterolisthesis of L4 on L5  Bilateral posterior  fusion L4-S1, accomplished with independent rods and graft material at the L4-5 and  L5-S1 levels  Left laminectomy at both levels  Monia Le Leftward translation of L1 and, to     a lesser degree L2  MARROW SIGNAL:  Artifact from posterior surgical hardware obscures to some degree  the marrow signal at L4 and L5  Reactive marrow edema has increased in the  osteophytes extending ventrally from the L5 and S1 vertebral bodies  New marrow  edema in the    spinous process of L3  DISTAL CORD AND CONUS:  Normal size and signal within the distal cord and conus  The conus ends at the L1 level  PARASPINAL SOFT TISSUES:  Paraspinal soft tissues are unremarkable  Previously  documented 4 5 cm infrarenal abdominal aortic aneurysm  SACRUM:  Normal signal within the sacrum  No evidence of insufficiency or stress  fracture  LOWER THORACIC DISC SPACES:  Normal disc height and signal   No disc herniation,  canal stenosis or foraminal narrowing  LUMBAR DISC SPACES:          L1-L2:  Small marginal osteophytes       L2-L3:  Mild facet arthrosis, very slight retrolisthesis of L2, minor bulge  L3-L4:  Decreased disc height, circumferential bulge  L3 spinous process marrow  edema, new  24 mm dorsal epidural mass centered the disc space which may represent  a bursal cyst or, synovial facet cyst   This does produce mass effect upon the thecal  sac    yet no definite compression of the descending L4 root within the lateral recess  L4-L5:  Posterior fusion, right laminectomy, interbody graft fused, slightly reduced  anterolisthesis  No critical stenosis  L5-S1:  Circumferential bulging of the disc, marginal osteophytes, posterior fusion, right  laminectomy and interbody graft  Material present out in the right foramen likely bulging  disc  This is in contact with but does not clearly compress the L5    root  IMPRESSION:     PLIF L4-S1 accomplished with independent rods, pedicle screws and right laminectomy  and placement of graft material in the both disc spaces  No critical  mass effect  Complex 2 4 cm dorsal epidural cyst has developed at the L3-L4 level, representing  either synovial facet or bursal cyst   Although this narrows the canal, there is no critical  mass effect upon the cauda equina  Stable 4 5 cm infrarenal aortic aneurysm  Workstation performed: [id number]     Signed by:    Britta Díaz MD   12/15/17     Signatures   Electronically signed by : Akanksha Riojas MD; Dec 15 2017 11:10AM EST                       (Author)

## 2018-01-23 NOTE — RESTORATIVE TECHNICIAN NOTE
Restorative Specialist Mobility Note       Activity: Ambulate in abraham, Ambulate in room, Bathroom privileges, Chair, Dangle, Stand at bedside (Educated/encouraged pt to ambulate w/assistance 3-4 x's/day   Pt callbell, phone/tray within reach )     Assistive Device: None (Face mask on for flu precautions)       Luther CORTEZ, Restorative Technician,

## 2018-01-23 NOTE — SOCIAL WORK
CM was informed that pt is medically stable for dc today 1/23  Pt states no family or friends are available to provide transport  No money      Taxi voucher provided to pts bedside RN An

## 2018-01-23 NOTE — ASSESSMENT & PLAN NOTE
· Patient presented with leukocytosis, tachycardia, tachypnea, found positive rapid influenza test with opacity on imaging  Repeat chest x-ray shows no evidence of pneumonia  · Leukocytosis is improved, tachycardia and tachypnea resolved  · He is on Tamiflu 4/5 and will be continued on azithromycin 4/5 for bronchitis    Ceftriaxone discontinued  · Nebs p r n , droplet precautions  · Blood cultures neg to date

## 2018-01-23 NOTE — ASSESSMENT & PLAN NOTE
· Patient presented with leukocytosis, tachycardia, tachypnea, found positive rapid influenza test with opacity on imaging  Repeat chest x-ray shows no evidence of pneumonia    · Leukocytosis is improved, tachycardia and tachypnea resolved  · Continue Tamiflu and azithromycin each for a 5 day course  · Blood cultures neg to date

## 2018-01-23 NOTE — DISCHARGE SUMMARY
Discharge- Laury Lundborg 1945, 81 y o  male MRN: [id number]    [facility] Encounter: [id number] DOS: 1/23/18    Primary Care Provider: Lenny Coker MD   Date and time admitted to hospital: 1/21/2018  4:29 AM    Influenza   Assessment & Plan    · Influenza A positive   · Continue Tamiflu for 5 day course  · Supportive care         COPD exacerbation ([place] [address] )   Assessment & Plan    · Mild acute exacerbation  · Patient respiratory status significantly improved with nonlabored respirations and saturating high 90s on room air  · Continue respiratory protocol  · Continue p o  Azithromycin today 4/5  · Continue prednisone taper by 10mg/day   · Sputum culture with candida albicans, likely colonization         * Sepsis ([place] [address] )   Assessment & Plan    · Patient presented with leukocytosis, tachycardia, tachypnea, found positive rapid influenza test with opacity on imaging  Repeat chest x-ray shows no evidence of pneumonia  · Leukocytosis is improved, tachycardia and tachypnea resolved  · Continue Tamiflu and azithromycin each for a 5 day course  · Blood cultures neg to date         New onset atrial fibrillation (HCC)   Assessment & Plan    · No prior history in review of Dr Sharlene Joseph outpatient records   · [id number] = 3 given HTN, age 74-69, CHF   · Likely triggered by respiratory illness   · Appreciate Cardiology input  · Because he presented with atrial fibrillation, Cardiology recommends anticoagulation at least temporarily  However patient refusing and would like to discuss this with his primary cardiologist Dr Noemi Talley  · D/ therapeutic lovenox   · Continue asa, beta blocker         Diastolic heart failure (HCC)   Assessment & Plan    · Prior echo had shown grade 1 diastolic dysfunction, H9TO echo yesterday with LVEF 55%  · Patient is euvolemic          Bipolar affective disorder ([facility] )   Assessment & Plan    · Continue lithium         Chronic kidney disease, stage III (moderate)   Assessment & Plan    · Creatinine is at baseline        Hypercholesterolemia   Assessment & Plan    · Continue statin        Hypertension   Assessment & Plan    · Stable, continue amlodipine and metoprolol         CAD (coronary artery disease)   Assessment & Plan    · S/p MI 1996, angioplasty 1998  · continue asa, statin, beta blocker  · No angina           Consultations During Hospital Stay:  · Infectious Disease  · Cardiology    Procedures Performed:     · None    Significant Findings / Test Results:     · Chest x-ray 1/21 with stable chest without acute infiltrates, COPD with postsurgical changes right hemithorax  · Chest x-ray 1/22 with chronic lung changes  Postsurgical changes on the right  No active pulmonary disease  · Echocardiogram- left ventricular ejection fraction 55%, no regional wall motion abnormalities  · New onset atrial fibrillation, now in normal sinus rhythm  · Acute hypoxic respiratory failure  · Sepsis POA  · Influenza a infection  · COPD exacerbation, acute bronchitis    Incidental Findings:   · None     Test Results Pending at Discharge (will require follow up): · None     Outpatient Tests Requested:  · Follow-up with Cardiology    Complications:  None    Reason for Admission:  Shortness of breath    Hospital Course:     Saadia Guevara is a 81 y o  male patient with past medical history significant for diastolic heart failure, hypertension, history of COPD who originally presented to the hospital on 1/21/2018 due to 2 day history of sore throat, dyspnea on exertion with productive cough, chills, congestion, wheezing  Patient was admitted for further evaluation  He was found to have mild COPD exacerbation and possible pneumonia and started on IV steroids and IV antibiotics  Patient was evaluated by Infectious Disease the end felt that there was no pneumonia after 2 chest x-rays  Patient maintained on azithromycin for COPD exacerbation/acute bronchitis and Tamiflu for influenza a infection  Patient was weaned off oxygen with improvement in respiratory status  Patient on admission was found to be in atrial fibrillation and he did not have a prior history of this  Cardiology was consulted and felt that the patient would benefit from long-term anticoagulation  Patient refusing and wishes to discuss this with his primary cardiologist before starting  Patient's chads Vasc score is 3 and patient would benefit from was thinners  I discussed with with the patient and Cardiology  Signs and symptoms of stroke were discussed with the patient and he was given reading material prior to discharge  Patient expressed understanding and agreed with plan for close outpatient follow-up with his cardiologist regarding anticoagulation  Please see above list of diagnoses and related plan for additional information  Condition at Discharge: stable     Discharge Day Visit / Exam:     * Please refer to separate progress note for these details *    Discussion with Family: n/a     Discharge instructions/Information to patient and family:   See after visit summary for information provided to patient and family  Provisions for Follow-Up Care:  See after visit summary for information related to follow-up care and any pertinent home health orders  Disposition:     Home    For Discharges to [facility] SNF:   · Not Applicable to this Patient - Not Applicable to this Patient    Planned Readmission: none     Discharge Statement:  I spent 35 minutes discharging the patient  This time was spent on the day of discharge  I had direct contact with the patient on the day of discharge  Greater than 50% of the total time was spent examining patient, answering all patient questions, arranging and discussing plan of care with patient as well as directly providing post-discharge instructions  Additional time then spent on discharge activities      Discharge Medications:  See after visit summary for reconciled discharge medications provided to patient and family        ** Please Note: This note has been constructed using a voice recognition system **

## 2018-01-23 NOTE — ASSESSMENT & PLAN NOTE
· Mild acute exacerbation  · Patient respiratory status significantly improved with nonlabored respirations and saturating high 90s on room air  · Continue respiratory protocol  · Continue p o   Azithromycin today 4/5  · Continue prednisone taper by 10mg/day   · Obtain ambulatory pulse ox   · Sputum culture with candida albicans, likely colonization

## 2018-01-24 ENCOUNTER — TRANSITIONAL CARE MANAGEMENT (OUTPATIENT)
Dept: INTERNAL MEDICINE CLINIC | Facility: CLINIC | Age: 73
End: 2018-01-24

## 2018-01-25 ENCOUNTER — APPOINTMENT (OUTPATIENT)
Dept: PHYSICAL THERAPY | Facility: CLINIC | Age: 73
End: 2018-01-25
Payer: MEDICARE

## 2018-01-26 ENCOUNTER — PATIENT OUTREACH (OUTPATIENT)
Dept: INTERNAL MEDICINE CLINIC | Facility: CLINIC | Age: 73
End: 2018-01-26

## 2018-01-26 LAB
BACTERIA BLD CULT: NORMAL
BACTERIA BLD CULT: NORMAL

## 2018-01-26 NOTE — PROGRESS NOTES
HPI completed  Patient states he has all medication, DME in home  patient is ambulatory without assistive device  Patient denies any current s/sx of infection/exacerbation of COPD  Patient is completing outpatient PT  Patient states neither he nor his spouse need assistive/community services  patient states they are able to self care and he is feeling well  Educated patient on BPCI/CM role and contact information  Verbalized understanding

## 2018-01-29 ENCOUNTER — OFFICE VISIT (OUTPATIENT)
Dept: CARDIOLOGY CLINIC | Facility: CLINIC | Age: 73
End: 2018-01-29
Payer: MEDICARE

## 2018-01-29 VITALS
OXYGEN SATURATION: 99 % | DIASTOLIC BLOOD PRESSURE: 74 MMHG | HEART RATE: 65 BPM | SYSTOLIC BLOOD PRESSURE: 136 MMHG | WEIGHT: 180.3 LBS | BODY MASS INDEX: 28.3 KG/M2 | HEIGHT: 67 IN

## 2018-01-29 DIAGNOSIS — I48.0 PAROXYSMAL ATRIAL FIBRILLATION (HCC): Primary | ICD-10-CM

## 2018-01-29 DIAGNOSIS — I48.91 ATRIAL FIBRILLATION, UNSPECIFIED TYPE (HCC): Primary | ICD-10-CM

## 2018-01-29 PROCEDURE — 99214 OFFICE O/P EST MOD 30 MIN: CPT | Performed by: INTERNAL MEDICINE

## 2018-01-29 RX ORDER — WARFARIN SODIUM 2.5 MG/1
TABLET ORAL
COMMUNITY
End: 2018-01-29 | Stop reason: ALTCHOICE

## 2018-01-29 NOTE — PROGRESS NOTES
This gentleman has a complex history  Coronary angioplasty in 1998  Myocardial infarction in 1996 with angioplasty  Robotic prostatic surgery in 2007 for prostate cancer  Carcinoma of the lung on the left side in 2007  In March of 2011 carcinoma of the right lung  At that time he had a wedge resection lesion was 2 1 cm-lymph nodes were negative-there was lymphocytic invasion  He did not have chemotherapy-the margins were not involved  He is followed [facility] for his lung cancer--he gets a CAT scan every 6 months   His aortic abdominal aneurysm 3 2 cm and is monitored at the time of his contrast CAT scan  He has fatty liver  Stress test done March of 2011 with 12% difference-he may have some residual ischemia but he is asymptomatic  He had a repeat stress test in March of 2013; it was read as scar and mild ischemia  He is asymptomatic  On my last visit in April 2013 his LDL was 124 and triglycerides were 250; at that time increase his Lipitor to 30 and he is on fish oil  His present laboratory values have improved  His HDL is 32 from 24 his triglycerides have dropped to 84 from approximately 250; his LDL is below 100 and liver functions are normal  I did not make any changes in his medical program at this time  Most recent CAT scan of chest due to 2013 no evidence of significant mediastinal or axillary adenopathy and small nodules are stable     Patient seen on March 11, 2014  From cardiac standpoint he has been stable  He's had multiple tests but CAT scans for diverticulosis  He is followed by GI physician  One of the CAT scan shows aortic aneurysms is now 4 cm  I asked him to get in touch with the vascular group  He will let me know if he wants to remain here or be followed at another institution  His carcinoma is followed as [address] [place] [address]  I will repeat his cholesterol levels  His blood pressure he tells me is less than 1:30 on average   He is presently taking cold tablets and his blood pressure today is elevated  He is basically on a beta blocker, statin, and arb  From a cardiac standpoint he has been very stable      Patient seen September 15, 2014 previous myocardial infarction, PTCA in [address], prostate carcinoma, 2 separate carcinomas of the lung as noted above in 2007 2011, fatty liver, hyperlipidemia most recent LDL of 100 in July of 2014  Last CAT scan showed a aortic aneurysm 4 cm  He is being referred for vascular for further follow up  He is on a statin, arb, and beta blocker  He is asymptomatic  His last stress test was in 2013  His there is consideration of possibly getting a colon resection  We will do a stress test before that  2013 he had mild ischemia      Patient seen February 3, 2050 his diagnoses are listed in the history of present illness  Essentially has myocardial infarction 1996, angioplasty 1998, hyperlipidemia with his last LDL in January 2015 of 86, hypertension which has been variable, [place] aneurysm followed by the vascular group, prostate carcinoma 2007 followed by urology, colon resection for diverticulitis followed by: Surgery, cancer of the lung 2007 in 2011 followed by oncology,  Recent stress made his blood pressure go up  We did increase his amlodipine  He is dealing with a stress better  I would decrease the amlodipine 2 5 daily  He'll be changing from Diovan to losartan  He will call us with blood pressures at that time  May require further changes in his medication  From cardiac standpoint, he has been stable  His last stress test October 2014  He is on a statin, ACE inhibitor, and beta blocker      Patient seen May 12, 2015  His history is summarized above  He is having no chest pain  His last stress test October 2015  We discussed the use of a fibrate   He was put on by another physician  He prefers to remain on that  His his triglyceride levels were quite high   I was considering dropping the dose and checking his triglycerides but he wants to stay on this medication  His LDL is 86 in January 2015  A cardiac standpoint he is stable  He did see a psychiatrist since he was extraordinarily anxious about last visit  He is improved  He is in lithium was increased      Patient seen November 11, 2015      His history is summarized above  However in general, he has myocardial infarction 1996, stent in [address], low HDL and elevated triglycerides, hypertension, abdominal aneurysm of 3 9 cm range followed by vascular, prostate carcinoma status post treatment, lung cancer operated on in 2007 2011 with recent CAT scan negative for tumor recurrence, and bipolar history  From the LAD his LDL is 86  This was October 2015  His BNP is 236  Triglycerides are 126  I'm going to discontinue his fiber 8 and check his triglycerides and I will put him on a carbohydrate restriction and continue to fish oil     Patient seen February 1, 2016  Carries a diagnosis of previous myocardial infarction, stents in 1998, elevated triglycerides and very low HDL, hypertension, abdominal aneurysm that's under surveillance, past history of prostate carcinoma with a PSA of less than 0 1, lung carcinoma in 07 and 2011, history of bipolar, chronic obstructive lung disease  Recently and in the hospital in the emergency room after being treated by his family doctor for bronchitis  He was placed on steroids, Z-Gerson, and ultimately Levaquin  He never had pneumonia  He essentially got better with treatment for bronchospasm  In the emergency room we did have a CAT scan  No embolus  No tumor  No pneumonia  This was done in December 2016  The good news is that his HDL is now up to 49  It had been 32     Patient seen March 6, 2017  He has a complex past history that includes the following   Myocardial infarction, stent in 1998 his coronaries, elevated cholesterol, hypertension, abdominal aneurysm followed by vascular, prostate carcinoma, lung carcinoma operated on 2007 2011 end followed in [facility] obstructive lung disease, cervical spine surgery 2016, back surgery 2017, hyperlipidemia, fatty liver,  His alkaline phosphatase is elevated  I suspect this is secondary to his back surgery  His a fatty liver  His no evidence of metastases in his liver  I'll order a bone specific alkaline phosphatase  From cardiac standpoint his been amazingly stable  He also had a bowel obstruction recently  His LDL is 74 from a cardiac standpoint he has no angina or heart failure  His echocardiogram is normal  That was on a February 2017      Patient seen June 6, 2017  Long list of issues above  From cardiac standpoint he is very stable  His creatinine is 1 16  His aortic aneurysm is slightly less than 5 cm was being watched carefully  His alkaline phosphatase elevation is probably secondary to his back fusion  It is coming down very slowly  His last LDL is less than 80  His no cardiac complaints      Patient seen December 12, 2017  His complex past history is best summarized by March 6, 2017 note  I also should have mentioned that he is bipolar and is on lithium  Presently is creatinine is 1  16  Alk phosphatase is 233 last year and it is down to 183  This is not from the liver  It was from the bone  LDL is 61  Id a CT scan of the abdomen and pelvis  Aortic aneurysm 4 7 cm  This is followed could for carefully by the vascular group  Echocardiogram shows left her function to be 65%  There is minimal mitral regurgitation  His pulmonary disease is followed by the pulmonary group  From cardiac standpoint he is very stable  No change in cardiac medications that includes a statin and a beta-blocker and aspirin  As noted above he had a stent in 1998 to is coronaries  He has no chest pain              Active Problems    1  Abnormal blood chemistry (790 6) (R79 9)   2  Adenocarcinoma of prostate (185) (C61)   3  Allergic rhinitis (477 9) (J30 9)   4  Aortic aneurysm (441 9) (I71 9)   5   Atherosclerotic heart disease of native coronary artery without angina pectoris (414 01)   (I25 10)   6  Benign colon polyp (211 3) (K63 5)   7  Cervical cord compression with myelopathy (336 9) (G95 20)   8  Cervical disc disease (722 91) (M50 90)   9  Chest pain (786 50) (R07 9)   10  Chronic bipolar disorder (296 80) (F31 9)   11  Chronic rhinitis (472 0) (J31 0)   12  COPD (chronic obstructive pulmonary disease) (496) (J44 9)   13  Cough (786 2) (R05)   14  Diverticulitis (562 11) (K57 92)   15  Dyspnea (786 09) (R06 00)   16  Encounter for postoperative care (V58 49) (Z48 89)   17  Gait disturbance (781 2) (R26 9)   18  Gastroesophageal reflux disease (530 81) (K21 9)   19  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)   20  Hepatic steatosis (571 8) (K76 0)   21  Hip pain, chronic, left (719 45,338 29) (M25 552,G89 29)   22  History of heart artery stent (V45 82) (Z95 5)   23  Hyperlipidemia (272 4) (E78 5)   24  Hypertension (401 9) (I10)   25  Irritable bowel syndrome (564 1) (K58 9)   26  Leukocytosis (288 60) (D72 829)   27  Lumbar spinal stenosis (724 02) (M48 061)   28  Muscle spasms of neck (728 85) (M62 838)   29  Need for pneumococcal vaccination (V03 82) (Z23)   30  Need for prophylactic vaccination and inoculation against influenza (V04 81) (Z23)   31  Neurogenic claudication (724 03) (M48 062)   32  Postop check (V67 00) (Z09)   33  Post-op pain (338 18) (G89 18)   34  Pre-operative cardiovascular examination (V72 81) (Z01 810)   35  Preoperative evaluation to rule out surgical contraindication (V72 83) (Z01 818)   36  Ptosis of left eyelid (374 30) (H02 402)   37  Screening for genitourinary condition (V81 6) (Z13 89)   38  Spondylolisthesis of lumbar region (738 4) (M43 16)   39  Spondylosis of lumbar region without myelopathy or radiculopathy (721 3) (M47 816)   40  Swelling around both eyes (782 2) (R22 0)   41  Tremor (781 0) (R25 1)   42   Weakness of extremity (729 89) (R29 898)     Past Medical History    · 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)   · Benign colon polyp (211 3) (K63 5)   · History of Diverticulosis (562 10) (K57 90)   · Gastroesophageal reflux disease (530 81) (K21 9)   · History of acute bronchitis (V12 69) (Z87 09)   · History of diverticulitis of colon (V12 79) (Z87 19)   · History of malignant neoplasm of lung (V10 11) (Z85 118)   · History of malignant neoplasm of prostate (V10 46) (Z85 46)   · History of small bowel obstruction (V12 79) (Z87 19)   · Irritable bowel syndrome (564 1) (K58 9)   · History of Malignant neoplasm of lung, unspecified laterality   · History of Myocardial infarction involving other coronary artery (410 80) (I21 29)     Surgical History    · History of Colon Surgery   · History of Diagnostic Esophagogastroduodenoscopy   · History of Lung Lobectomy   · History of Surgery Prostate Prostatotomy   · History of Wedge Resection Of Lung     Family History    · Family history of Carcinoma Of The Small Intestine (V16 0)    · Family history of Carcinoma Of The Small Intestine (V16 0)   · Family history of Coronary Artery Disease (V17 49)    · Family history of     · Family history of     · Family history of     · Family history of     · Family history of Heart Disease (V17 49)   · Family history of Hyperlipidemia   · Family history of Hypertension (V17 49)     Social History    · Being A Social Drinker   · Daily Coffee Consumption (1  Cups/Day)   · Education history   · Former smoker (V15 82) (T83 524)   · History of Former smoker (V1 82) (V50 436)   · Lives independently with spouse   · Lives with spouse   · Marital history   · One child   · Retired  The social history was reviewed and is unchanged  Current Meds   1  Advair Diskus 250-50 MCG/DOSE Inhalation Aerosol Powder Breath Activated; Use 1   inhalation twice  daily   Rinse mouth after  use; Therapy: 53SWU6076 to (Evaluate:2018)  Requested for: 23ZZT0792; Last   Rx:2017 Ordered   2   Allegra 180 MG TABS; TAKE 1 TABLET DAILY AS NEEDED; Therapy: (Recorded:14Jun2016) to Recorded   3  AmLODIPine Besylate 2 5 MG Oral Tablet; Take 1 tablet by mouth  daily; Therapy: 25KZI0426 to (Evaluate:00Aav7780)  Requested for: 41EWV7762; Last   Rx:57Vcx5406 Ordered   4  Aspirin 81 MG Oral Tablet Delayed Release; TAKE 1 TABLET DAILY; Therapy: (Recorded:14Jun2016) to Recorded   5  Atorvastatin Calcium 40 MG Oral Tablet; Take 1 tablet by mouth  daily; Therapy: 99AFF4872 to (Evaluate:89Epw9501)  Requested for: 50Dgt4641; Last   Rx:56Gzv9076 Ordered   6  Atrovent HFA 17 MCG/ACT Inhalation Aerosol Solution; INHALE 2 PUFFS 3 TIMES  DAILY; Therapy: 09IQL4540 to (Evaluate:02Noo4463)  Requested for: 25Pry9187 Recorded   7  Dicyclomine HCl - 20 MG Oral Tablet; TAKE 1 TABLET EVERY 4 TO 6 HOURS DAILY AS   NEEDED; Therapy: 44JFM9850 to (Evaluate:42Vpp1185)  Requested for: 81Xgp4836; Last   Rx:91Cyi2922 Ordered   8  Fluticasone Propionate 50 MCG/ACT Nasal Suspension; USE 2 SPRAYS IN EACH   NOSTRIL ONCE DAILY; Therapy: 43OYW5140 to (72 231 101)  Requested for: 59KOY7551; Last   Rx:05Nov2015 Ordered   9  Lithium Carbonate 300 MG Oral Capsule; alternate 1 capsule with 2 capsules everyother   day; Therapy: 98Ryv9979 to Recorded   10  Metoprolol Succinate ER 25 MG Oral Tablet Extended Release 24 Hour; Take 1 tablet by    mouth  daily; Therapy: 62TOZ5919 to (Evaluate:67Jsi7596)  Requested for: 57QDE4714; Last    Rx:98Uxe0761 Ordered   11  Nitroglycerin 0 2 MG/HR Transdermal Patch 24 Hour; APPLY 1 PATCH DAILY (OFF FOR    12 HOURS); Therapy: 74Aum9425 to (Evaluate:23Mar2018)  Requested for: 28Mar2017; Last    Rx:28Mar2017 Ordered   12  Omega-3-acid Ethyl Esters 1 GM Oral Capsule; Take 1 capsule by mouth 3  times daily; Therapy: 82Lnd3957 to (Evaluate:81Xys6900)  Requested for: 70Vmx3751; Last    Rx:24Apr2017 Ordered   13  Omeprazole 40 MG Oral Capsule Delayed Release; Take 1 capsule by mouth  daily;     Therapy: 44SVC4420 to (Evaluate:34Lpv2403)  Requested for: 79CWD7244; Last    Rx:13Ktu1720 Ordered   14  ProAir  (90 Base) MCG/ACT Inhalation Aerosol Solution; INHALE 1 TO 2 PUFFS    EVERY 6 HOURS AS NEEDED; Therapy: 29Hnu8352 to (Evaluate:16Jvr3451)  Requested for: 00Bih5297; Last    Rx:12Atg3293 Ordered   15  Tylenol 500 MG CAPS; 1   PRN; Therapy: (Recorded:85Qjy5223) to Recorded   16  Vitamin B Complex Oral Tablet; TAKE 1 TABLET DAILY; Therapy: (Recorded:38Teg5854) to Recorded   17  Vitamin D 2000 UNIT Oral Tablet; take 1 tablet by mouth once daily; Therapy: (Recorded:54Inw5922) to Recorded     The medication list was reviewed and updated today  Allergies    1  Percocet TABS   2  NSAIDs     Vitals    Recorded: 57Hcp3122 03:59PM   Heart Rate 67   Systolic 852, LUE, Sitting   Diastolic 76, LUE, Sitting   Height 5 ft 7 in   Weight 181 lb 3 oz   BMI Calculated 28 38   BSA Calculated 1 94   O2 Saturation 98      Physical Exam     Constitutional   General appearance: No acute distress, well appearing and well nourished    Still somewhat painful from surgery in May 2016  Neck   Neck and thyroid: Abnormal     Pulmonary   Auscultation of lungs: Abnormal   Postop for carcinoma of the lung  Cardiovascular   Auscultation of heart: Normal rate and rhythm, normal S1 and S2, no murmurs  Examination of extremities for edema and/or varicosities: Normal     Abdomen   Abdomen: Abnormal   Known abdominal aneurysm  Neurologic - Cranial nerves: II - XII intact  Psychiatric - Orientation to person, place, and time: Normal  Mood and affect: Normal       Assessment    1  Atherosclerotic heart disease of native coronary artery without angina pectoris (414 01)   (I25 10)   · Hx MI and angioplasty in 1996  Stent in 1998   2  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)   3  Hypertension (401 9) (I10)   4   Hyperlipidemia (272 4) (E78 5)     Plan  Abnormal blood chemistry, Hyperlipidemia    · (1) HEMOGLOBIN A1C; Status:Active; Requested for:12Oct2018; Perform:[facility]; DDN:[id number];EJJFDRC; For:Abnormal blood chemistry, Hyperlipidemia; Ordered By:Mag Loving; Hyperlipidemia    · (1) CBC/PLT/DIFF; Status:Active; Requested for:12Oct2018; Perform:[facility]; JLK:84FVE9479;LZJFEMN;  Maylon Bang; Ordered By:Jeancarlos Loving;   · (1) CK (CPK); Status:Active; Requested for:12Oct2018; Perform:[facility]; GRETTA:31GWP5650;PJMPBAN;  Maylon Bang; Ordered By:Jeancarlos Loving;   · (1) COMPREHENSIVE METABOLIC PANEL; Status:Active; Requested for:12Oct2018; Perform:[facility]; NHP:47THR7608;VLYMPXS;  Maylon Bang; Ordered By:Jeancarlos Loving;   · (1) LIPID PANEL, FASTING; Status:Active; Requested for:12Oct2018; Perform:[facility]; KIRK:78OKZ3689;RUSGPXE;  Maylon Bang; Ordered By:Jeancarlos Loving;   · Follow Up After Tests Complete Evaluation and Treatment  Follow-up  Status: Hold For -  Scheduling  Requested for: 12Dec2017   Ordered; For: Hyperlipidemia; Ordered By: Richard Bolanos Performed:  Due: 54DCB2883     Discussion/Summary     From a cardiac standpoint, he has had a myocardial infarction 1996 and angioplasty 1998  His other diagnoses are listed above  His blood pressure is fine and his last LDL is 100  He has had a very low HDL in the past  He has been as low as 32  He is 37 now  His LDL is up to 100  I'm increasing his Lipitor to 40 a day  I would prefer in to be at 79 he'll DL  A stress test may be done in next several months after he sees a colon surgeon     Patient seen December 29, 2014  Psychologically he is very upset about having to condominiums  Blood pressure is elevated  I am changing his medication Norvasc 5 mg and he may require further changes  Making no changes in his cholesterol medication  His been asymptomatic with regard to his cardiac situation   He is a colon resection sigmoid area due to restriction in November 2014  Stress test was done before that  LV function is 58% with a scar and minimal ischemia     Patient seen on May 12, 2015  He has improved psychologically  He did see a psychiatrist his medications were changed  Blood pressures been fine  He has no chest pain no heart failure  All his issues are being followed by specialist as noted in previous note  We did discuss the use offibrates  He strongly wants to continue  He was placed on this by his physician in [place] many years ago       Patient seen February 3, 2015  His stress level seems to be better  With the amlodipine 2 5 twice a day blood pressure somewhat lower  Her go back to 2 5 amlodipine daily  When he is changes from Diovan to losartan, we may have to readjust  His LDL in January of 2015 is 80  His no cardiac symptoms at this time  His list of diagnoses is noted in the history of present illness      Patient seen November 11, 2015  His been extremely stable  Major change am making today's decrease his fiber 8  Will recheck his lipid profile  The meantime he has gained 11 pounds  I'm asked him to lose weight and go on carbohydrate restriction  We did call the vascular group  They are following abdominal aneurysm     He should seen March 6, 2017  Diagnosis and labs are noted in the history of present illness  Pending will be an alkaline phosphatase of fractionation from bone  Cardiac-wise he is stable     Patient seen June 6, 2017 from cardiac standpoint is very stable  No change in medication  His multiple other issues  The most notable issue I think is his aortic aneurysm is somewhat less than 5 cm being watched on an every 6 month basis      Patient seen December 12, 2017  His multiple diagnosis from a cardiac standpoint he had a stent in his coronaries in 1998 and a history of a myocardial infarction but he has well-preserved left ventricular function  His LDL is 61  Uric aneurysms followed carefully by vascular          Patient seen January 29, 2018  Since I last saw him he was in the hospital with the flu  He was placed on antibiotics  He had an episode of atrial fibrillation  He is not aware of the atrial fibrillation  Therefore the cardiologist recommended that he would be best served if he was placed on anticoagulation  He came in today to discuss that  I am placing him on anticoagulation  He has chosen Coumadin  We will do several Holter is and perhaps decide if he really does need the Coumadin  He is quite reluctant to take it    Future Appointments     Signatures   Electronically signed by : BETITO Bolanos ; Dec 12 2017  4:23PM EST                       (Author)            Electronically signed by Miriam Nieves MD at 1/22/2018 11:33 PM

## 2018-01-30 ENCOUNTER — OFFICE VISIT (OUTPATIENT)
Dept: INTERNAL MEDICINE CLINIC | Facility: CLINIC | Age: 73
End: 2018-01-30
Payer: MEDICARE

## 2018-01-30 ENCOUNTER — PATIENT OUTREACH (OUTPATIENT)
Dept: INTERNAL MEDICINE CLINIC | Facility: CLINIC | Age: 73
End: 2018-01-30

## 2018-01-30 ENCOUNTER — APPOINTMENT (OUTPATIENT)
Dept: PHYSICAL THERAPY | Facility: CLINIC | Age: 73
End: 2018-01-30
Payer: MEDICARE

## 2018-01-30 VITALS
BODY MASS INDEX: 27.37 KG/M2 | RESPIRATION RATE: 18 BRPM | DIASTOLIC BLOOD PRESSURE: 80 MMHG | TEMPERATURE: 98.2 F | SYSTOLIC BLOOD PRESSURE: 120 MMHG | OXYGEN SATURATION: 99 % | HEART RATE: 63 BPM | WEIGHT: 180.6 LBS | HEIGHT: 68 IN

## 2018-01-30 DIAGNOSIS — E78.00 HYPERCHOLESTEROLEMIA: Chronic | ICD-10-CM

## 2018-01-30 DIAGNOSIS — I48.91 NEW ONSET ATRIAL FIBRILLATION (HCC): Primary | ICD-10-CM

## 2018-01-30 DIAGNOSIS — J11.1 INFLUENZA: ICD-10-CM

## 2018-01-30 DIAGNOSIS — I10 HYPERTENSION, UNSPECIFIED TYPE: Chronic | ICD-10-CM

## 2018-01-30 PROCEDURE — 99496 TRANSJ CARE MGMT HIGH F2F 7D: CPT | Performed by: INTERNAL MEDICINE

## 2018-01-30 PROCEDURE — 93000 ELECTROCARDIOGRAM COMPLETE: CPT | Performed by: INTERNAL MEDICINE

## 2018-01-30 NOTE — ASSESSMENT & PLAN NOTE
Patient is either having frequent ectopy on exam, or is back in Afib, will check EKG today  EKG done today shows normal sinus rhythm with premature atrial contractions, no A fib showed up

## 2018-01-30 NOTE — PROGRESS NOTES
Assessment/Plan:    New onset atrial fibrillation [facility])    Patient is either having frequent ectopy on exam, or is back in Afib, will check EKG today  EKG done today shows normal sinus rhythm with premature atrial contractions, no A fib showed up  Influenza    Treated, improved, no signs of secondary infection, follow-up if respiratory symptoms return  Hypertension   Controlled, continue current medications  Hypercholesterolemia    Continue statin  Diagnoses and all orders for this visit:    New onset atrial fibrillation ([address] )  -     POCT ECG    Influenza    Hypertension, unspecified type    Hypercholesterolemia    Other orders  -     fluticasone-salmeterol (ADVAIR) 250-50 mcg/dose inhaler; Inhale 1 puff every 12 (twelve) hours          Subjective:      Patient ID: Teresa Hernandez is a 81 y o  male  Date and time hospital follow up call was made:  1/24/2018 10:36 AM  Hospital care reviewed:  Records reviewed  Patient was hopsitalized at:  [facility]  Date of admission:  1/21/18  Date of discharge:  1/23/18  Diagnosis:  Sepsis  Were the patients medicaitons reviewed and updated:  No  Current symptoms:  Fatigue  Scheduled for follow up?:  Yes  Patients specialists:  Cardiologist  I have advised the patient to call PCP with any new or worsening symptoms (please type in name along with any credentials):  Gracy Gutierrez  Counseling:  Patient          I reviewed patient's hospitalization for influenza and new onset Afib  Atrial fibrillation was thought to be precipitated by acute respiratory illness, patient will get Holter monitor  He did go back into the normal sinus rhythm during the hospitalization as confirmed on EKG  influenza:  Symptoms have improved , no signs of secondary infection on history          The following portions of the patient's history were reviewed and updated as appropriate: allergies, current medications, past family history, past medical history, past social history, past surgical history and problem list     Family History   Problem Relation Age of Onset    Colon cancer Mother     Heart attack Father     Colon cancer Father     Coronary artery disease Father     Heart disease Family     Hyperlipidemia Family     Hypertension Family      Past Medical History:   Diagnosis Date    Aortic aneurysm ([place] [address] )     Benign colon polyp     Bipolar 1 disorder ([place] [address] )     Cardiac disease     MI    Cervical cord compression with myelopathy ([place] [address] )     COPD (chronic obstructive pulmonary disease) (HCC)     Diverticulitis     Diverticulosis     Gait disturbance     uses cane, leg brace on right    GERD (gastroesophageal reflux disease)     Heart attack 1996    Hx of resection of large bowel 5/3/2016    Hyperlipidemia     Hypertension     IBS (irritable bowel syndrome)     Lumbar stenosis     Lung cancer ([place] [address] )     2007 Left lower lobectomy and 2011 Right lung with surgery     Myocardial infarction     involving other coronary artery    Prostate cancer ([place] [address] )     Shortness of breath     Small bowel obstruction 11/16/2016     Social History     Social History    Marital status: /Civil Union     Spouse name: N/A    Number of children: 1    Years of education: N/A     Occupational History    Not on file       Social History Main Topics    Smoking status: Former Smoker     Packs/day: 1 00     Years: 35 00     Quit date: 2011    Smokeless tobacco: Never Used    Alcohol use Yes      Comment: rarely    Drug use: No    Sexual activity: Not on file     Other Topics Concern    Not on file     Social History Narrative    No narrative on file       Current Outpatient Prescriptions:     acetaminophen (TYLENOL) 500 mg tablet, Take 500 mg by mouth as needed for mild pain , Disp: , Rfl:     albuterol (PROVENTIL HFA,VENTOLIN HFA) 90 mcg/act inhaler, Inhale 2 puffs every 6 (six) hours as needed for wheezing, Disp: , Rfl:     amLODIPine (NORVASC) 2 5 mg tablet, Take 2 5 mg by mouth daily, Disp: , Rfl:     aspirin (ECOTRIN LOW STRENGTH) 81 mg EC tablet, Take 81 mg by mouth daily, Disp: , Rfl:     Atorvastatin Calcium (LIPITOR PO), Take 40 mg by mouth daily  , Disp: , Rfl:     B Complex Vitamins (VITAMIN B COMPLEX PO), Take by mouth, Disp: , Rfl:     Cholecalciferol (VITAMIN D PO), Take 2,000 Units by mouth daily  , Disp: , Rfl:     diazepam (VALIUM) 5 mg tablet, Take 5 mg by mouth as needed for anxiety, Disp: , Rfl:     dicyclomine (BENTYL) 20 mg tablet, Take 20 mg by mouth every 6 (six) hours, Disp: , Rfl:     fexofenadine (ALLEGRA) 180 MG tablet, Take 180 mg by mouth daily, Disp: , Rfl:     fluticasone (FLONASE) 50 mcg/act nasal spray, 1 spray into each nostril as needed  , Disp: , Rfl:     fluticasone-salmeterol (ADVAIR) 250-50 mcg/dose inhaler, Inhale 1 puff every 12 (twelve) hours, Disp: , Rfl:     ipratropium (ATROVENT HFA) 17 mcg/act inhaler, Inhale 2 puffs every 6 (six) hours, Disp: , Rfl:     lithium 300 MG tablet, Take 600 mg by mouth every other day PM  Alternating with 300 mg every other day THIS IS A CONTINUOUS RELEASE TABLET , Disp: , Rfl:     Metoprolol Succinate (TOPROL XL PO), Take 25 mg by mouth daily  , Disp: , Rfl:     omega-3-acid ethyl esters (LOVAZA) 1 g capsule, Take 1 g by mouth 3 (three) times a day , Disp: , Rfl:     omeprazole (PriLOSEC) 40 MG capsule, Take 40 mg by mouth daily  , Disp: , Rfl:   Allergies   Allergen Reactions    Nsaids      Annotation - [id number]: unable to take due to use of lithium   Oxycodone Rash       Review of Systems   Constitutional: Negative for chills, fatigue and fever  HENT: Negative for congestion, nosebleeds and postnasal drip  Eyes: Negative for pain and visual disturbance  Respiratory: Negative for cough, chest tightness, shortness of breath and wheezing  Cardiovascular: Negative for chest pain, palpitations and leg swelling     Gastrointestinal: Negative for abdominal pain, constipation, diarrhea, nausea and vomiting  Endocrine: Negative for polydipsia and polyuria  Genitourinary: Negative for dysuria, flank pain and hematuria  Musculoskeletal: Negative for arthralgias  Skin: Negative for rash  Neurological: Negative for dizziness, tremors and headaches  Hematological: Does not bruise/bleed easily  Psychiatric/Behavioral: Negative for confusion and dysphoric mood  The patient is not nervous/anxious  Objective:     Physical Exam   Constitutional: He is oriented to person, place, and time  He appears well-developed and well-nourished  No distress  HENT:   Head: Normocephalic and atraumatic  Right Ear: External ear normal    Left Ear: External ear normal    Eyes: Conjunctivae are normal  No scleral icterus  Neck: Normal range of motion  Neck supple  No tracheal deviation present  No thyromegaly present  Cardiovascular: Normal rate and normal heart sounds  An irregular rhythm present  Frequent extrasystoles are present  No murmur heard  Pulmonary/Chest: Effort normal and breath sounds normal  No respiratory distress  He has no wheezes  He has no rales  Abdominal: Soft  Bowel sounds are normal  There is no tenderness  There is no rebound and no guarding  Musculoskeletal: He exhibits no edema  Lymphadenopathy:     He has no cervical adenopathy  Neurological: He is alert and oriented to person, place, and time  Psychiatric: He has a normal mood and affect  His behavior is normal  Judgment and thought content normal    Vitals reviewed

## 2018-01-30 NOTE — PROGRESS NOTES
F2F visit with patient in PCP office  Patient is here for KHADIJAH CHAVEZ visit post hospital stay 1/21-1/23 for A-fib  Patient saw Dr Giorgi Tolentino yesterday for a F?U  Patient has decided that he will not be taking any additional anticoagulant except for his 81mg of aspirin  Patient denies chest pain/pressure, SOB, edema, palpitations, dizziness or weakness  Patient denies any further symptoms from the flu  No fever, chills, cough, pain, body aches  Patient does have some mild to moderate low back pain  patient is post laminectomy January, 2017  Patient has restarted his outpatient PT on advice of neurosurgery  Patient will be having a Holter monitor placed to R/O any further cardiac issues  Reviewed s/sx of cardiac distress & how/when to report new/worsening symptoms  Reviewed BPCI/CM role and contact information  Patient states both he & his wife are coping well & need no community services at this time

## 2018-02-01 ENCOUNTER — APPOINTMENT (OUTPATIENT)
Dept: PHYSICAL THERAPY | Facility: CLINIC | Age: 73
End: 2018-02-01
Payer: MEDICARE

## 2018-02-02 ENCOUNTER — OFFICE VISIT (OUTPATIENT)
Dept: PHYSICAL THERAPY | Facility: CLINIC | Age: 73
End: 2018-02-02
Payer: MEDICARE

## 2018-02-02 DIAGNOSIS — M51.36 DEGENERATIVE DISC DISEASE, LUMBAR: Primary | ICD-10-CM

## 2018-02-02 PROCEDURE — G8978 MOBILITY CURRENT STATUS: HCPCS

## 2018-02-02 PROCEDURE — 97112 NEUROMUSCULAR REEDUCATION: CPT

## 2018-02-02 PROCEDURE — 97164 PT RE-EVAL EST PLAN CARE: CPT

## 2018-02-02 PROCEDURE — G8979 MOBILITY GOAL STATUS: HCPCS

## 2018-02-02 NOTE — PROGRESS NOTES
PT Re-Evaluation     Today's date: 2018  Patient name: Laury Lundborg  : 1945  MRN: [id number]  Referring provider: Dario Kehr, MD  Dx:   Encounter Diagnosis   Name Primary?  Degenerative disc disease, lumbar Yes       Start Time: 1100  Stop Time: 1200  Total time in clinic (min): 60 minutes    Assessment  Impairments: abnormal gait, abnormal movement, activity intolerance, impaired balance, impaired physical strength, lacks appropriate home exercise program and pain with function    Patient is not irritable  Assessment details: Laury Lundborg is a 81 y o  male who presents to physical therapy for a re-evaluation following admission to the hospital secondary to the flu  Patient demonstrates impaired lower extremity strength per MMT and the 5x STS, impaired balance per the Presley and the mCTSIB, and impaired gait speed and endurance per the 6 MWT  Patient may benefit from continued skilled physical therapy to address the above impairments and facilitate return to daily activities with independence and decreased risk for falls     Understanding of Dx/Px/POC: good  Goals  STG 1: Patient will complete the Presley Balance Assessment with a score of 48/56 in 4 weeks   STG 2: Patient will increase bilateral LE strength by 1/2 MMT grade in 4 weeks  STG 3: Patient will complete the 5x STS in <15 seconds with good stability and no UE use in 4 weeks  STG 4: Patient will report LBP <4/10 at worst in 4 weeks    LTG 1: Patient will deny falls or near falls in 8 weeks  LTG 2: Patient will be independent with HEP in 8 weeks  LTG 3: Patient will complete the Presley Balance Assessment with a score of 53/56 in 8 weeks   LTG 4: Patient will ambulate 1100 feet during the 6 MWT with no AD and O2 saturation above 90% during the following test in 8 weeks      Plan  Patient would benefit from: skilled PT  Referral necessary: NoPlanned therapy interventions: neuromuscular re-education, balance, strengthening, patient education, gait training, functional ROM exercises and home exercise program  Frequency: 2x week  Duration in visits: 12  Duration in weeks: 6  Treatment plan discussed with: patient        Subjective Evaluation    History of Present Illness  Mechanism of injury: Patient was admitted to the hospital with the flu but is feeling much better now  Patient denies other changes with functional mobility at home  Patient reports that he has continued with his exercises and reports that they have been helping  Not a recurrent problem Quality of life: good    Pain  Current pain ratin  At best pain ratin  At worst pain ratin  Location: low back  Quality: sharp, burning and radiating (Left hip/buttock pain as well that travels down the left lateral thigh)    Social Support  Stairs in house: yes (does not go upstairs)   Lives in: condominium  Lives with: spouse    not workingHand dominance: right    Treatments  Current treatment: physical therapy  Patient Goals  Patient goals for therapy: improved balance, decreased pain, return to sport/leisure activities, independence with ADLs/IADLs and increased strength          Objective     Static Posture     Head  Forward  Cervical Spine  Shifted right  Shoulders  Asymmetric shoulders and rounded  Ambulation     Ambulation: Level Surfaces   Ambulation without assistive device: independent    Observational Gait   Gait: asymmetric   Decreased walking speed and stride length  Left foot contact pattern: heel to toe  Right foot contact pattern: heel to toe  Left arm swing: decreased  Right arm swing: within functional limits    Quality of Movement During Gait     Pelvis    Pelvis (Left): Positive Trendelenburg  Pelvis (Right): Positive Trendelenburg  Ankle    Ankle (Left): Positive pronated  Ankle (Right): Positive pronated       PT/OT Neuro Exam  Neurologic Exam   Balance Test    6 Minute Walk Test (ft): 880 feet with noted SOB   2 Minute Walk Test (ft): NT Gait Speed (ft/s): 20 feet/6 94 =2 88 ft/sec   5x Sit To Stand (s): 18 13 sec with no UE   Presley 45/56         MCTSIB Number of Seconds   Feet Together, Eyes Open 30   Feet Together, Eyes Closed 30   Feet Together, Eyes Open Foam 10 31   Feet Together, Eyes Closed Foam unable       Manual Muscle Testing - Hip Left Right   Flexion 4+ 4+   Extension 4 4+   Abduction 4- 4   External Rotation NT NT     Manual Muscle Testing - Knee Left Right   Flexion 4+ 4+   Extension 4 4+     Manual Muscle Testing - Ankle Left Right   Doriflexion 4 4-   Plantarflexion NT NT        Transfers    Sit To Stand Independent   W/C To Bed Independent   Sit To Supine Independent   Roll Independent       Precautions: C3-7 cervical fusion, adenocarcinoma of the prostate, COPD, LBP with spondylosis and spondylolisthesis     Daily Treatment Diary     Exercise Diary  2/2            TA in supine 10"x10            TA with marches 15x ea 2#            TA with bridges 15x            TA with heel slides 12x ea            Clamshells in supine GTB 15x             Tandem gait 2 laps

## 2018-02-05 DIAGNOSIS — I70.90: Primary | ICD-10-CM

## 2018-02-05 RX ORDER — OMEGA-3-ACID ETHYL ESTERS 1 G/1
CAPSULE, LIQUID FILLED ORAL
Qty: 270 CAPSULE | Refills: 5 | Status: SHIPPED | OUTPATIENT
Start: 2018-02-05 | End: 2019-03-08 | Stop reason: SDUPTHER

## 2018-02-06 ENCOUNTER — OFFICE VISIT (OUTPATIENT)
Dept: PHYSICAL THERAPY | Facility: CLINIC | Age: 73
End: 2018-02-06
Payer: MEDICARE

## 2018-02-06 DIAGNOSIS — M51.36 DEGENERATIVE DISC DISEASE, LUMBAR: Primary | ICD-10-CM

## 2018-02-06 PROCEDURE — 97110 THERAPEUTIC EXERCISES: CPT

## 2018-02-06 PROCEDURE — 97112 NEUROMUSCULAR REEDUCATION: CPT

## 2018-02-06 NOTE — PROGRESS NOTES
Daily Note     Today's date: 2018  Patient name: Dequan Flores  : 1945  MRN: [id number]  Referring provider: Gwendolyn Barry MD  Dx:   Encounter Diagnosis   Name Primary?  Degenerative disc disease, lumbar Yes                  Subjective: Pt said he was feeling a little under the weather today and feeling tired  Pt said is feeling some pain L LB 3/10  Objective: See treatment diary below    Daily Treatment Diary      Exercise Diary                     TA in supine 10"x10  10" x 10                   TA with marches 15x ea 2#  20 x 2  2#                     TA with bridges 15x  10 x 2                    TA with heel slides 12x ea  15x ea                   Clamshells in supine GTB 15x   GTB  2  X 10                   Tandem gait 2 laps  2 laps                    FTEO foam    2 x 30"                    FTEC foam    2x30"                    tandem stance    2 x 30"                    alt toe taps    2 x 20                                                                                                                                                                                                                                                                           Assessment: Tolerated treatment well  Pt performed balance exercises well, demonstrated some postural sway on tandem balance  Pt reports that he is feeling good and has no pain at end of session  Patient exhibited good technique with therapeutic exercises and would benefit from continued PT     Supervised by Jonny Martínez PTA for entire session  Plan: Continue per plan of care

## 2018-02-08 ENCOUNTER — OFFICE VISIT (OUTPATIENT)
Dept: PHYSICAL THERAPY | Facility: CLINIC | Age: 73
End: 2018-02-08
Payer: MEDICARE

## 2018-02-08 DIAGNOSIS — M51.36 DEGENERATIVE DISC DISEASE, LUMBAR: Primary | ICD-10-CM

## 2018-02-08 PROCEDURE — 97110 THERAPEUTIC EXERCISES: CPT

## 2018-02-08 PROCEDURE — 97112 NEUROMUSCULAR REEDUCATION: CPT

## 2018-02-12 ENCOUNTER — PROCEDURE VISIT (OUTPATIENT)
Dept: CARDIOLOGY CLINIC | Facility: CLINIC | Age: 73
End: 2018-02-12
Payer: MEDICARE

## 2018-02-12 DIAGNOSIS — I48.91 ATRIAL FIBRILLATION, UNSPECIFIED TYPE (HCC): Primary | ICD-10-CM

## 2018-02-13 ENCOUNTER — OFFICE VISIT (OUTPATIENT)
Dept: PULMONOLOGY | Facility: CLINIC | Age: 73
End: 2018-02-13
Payer: MEDICARE

## 2018-02-13 ENCOUNTER — OFFICE VISIT (OUTPATIENT)
Dept: PHYSICAL THERAPY | Facility: CLINIC | Age: 73
End: 2018-02-13
Payer: MEDICARE

## 2018-02-13 VITALS
HEIGHT: 68 IN | WEIGHT: 180 LBS | BODY MASS INDEX: 27.28 KG/M2 | OXYGEN SATURATION: 99 % | HEART RATE: 70 BPM | TEMPERATURE: 96.3 F | DIASTOLIC BLOOD PRESSURE: 72 MMHG | RESPIRATION RATE: 18 BRPM | SYSTOLIC BLOOD PRESSURE: 144 MMHG

## 2018-02-13 DIAGNOSIS — J44.9 CHRONIC OBSTRUCTIVE PULMONARY DISEASE, UNSPECIFIED COPD TYPE (HCC): Primary | ICD-10-CM

## 2018-02-13 DIAGNOSIS — M51.36 DEGENERATIVE DISC DISEASE, LUMBAR: Primary | ICD-10-CM

## 2018-02-13 PROCEDURE — 97110 THERAPEUTIC EXERCISES: CPT

## 2018-02-13 PROCEDURE — 97112 NEUROMUSCULAR REEDUCATION: CPT

## 2018-02-13 PROCEDURE — 94010 BREATHING CAPACITY TEST: CPT | Performed by: INTERNAL MEDICINE

## 2018-02-13 PROCEDURE — 99214 OFFICE O/P EST MOD 30 MIN: CPT | Performed by: INTERNAL MEDICINE

## 2018-02-13 NOTE — PROGRESS NOTES
Daily Note     Today's date: 2018  Patient name: Dane Ruiz  : 1945  MRN: [id number]  Referring provider: Estella Calle MD  Dx:   Encounter Diagnosis   Name Primary?  Degenerative disc disease, lumbar Yes                  Subjective: Pt reports moderate LBP, did not formally rate  Reports that he was sore following last session but feels better today  Objective: See treatment diary below    Daily Treatment Diary      Exercise Diary                 TA in supine 10"x10  10" x 10  5" x 10reps x 2 sets  10"x10               TA with marches 15x ea 2#  20 x 2  2#   20 x 2 ,                  TA with bridges 15x  10 x 2   2 x 10  15x 2               TA with heel slides 12x ea  15x ea  2 x10 each side  2x15 ea side               Clamshells in supine GTB 15x   GTB  2  X 10  GTB  2 x10  GTB   25x               Tandem gait 2 laps  2 laps  2 laps                  Semitandem foam    2 x 30"  NP  2x30"                FTEC foam    2x30"  2 x 30"  2x30"                tandem stance    2 x 30"  2 x30"  2x30"                alt toe taps    2 x 20  2 x 20  2#  20x2 from  foam                abdominal brace + knee ext + SLR      2 x 10  d/c                high knee marches      forward/ lateral  2 laps each                  SLS unilateral UE      2 x 30"  2x30"               Figure 4 stretch in supine - B       60 sec ea               Hamstring stretch in sitting                        SB marches        15xea                SB LAQ        15xea                TB step outs        10x2                                                                       Assessment:  Patient tolerated session well with fatigue but no exacerbation of LBP  Patient tolerated progression of exercises with most difficulty with core stabilization with sitting on the stability ball  Plan to continue with exercises from today next session  Plan: Continue per plan of care

## 2018-02-13 NOTE — PROGRESS NOTES
Progress Note - Pulmonary   Gabriella Lauren 81 y o  male MRN: [id number]  Unit/Bed#:  Encounter: [id number]      Assessment:  · Chronic obstructive pulmonary disease  · Influenza a infection  · Allergic rhinitis  Plan:  · Spirometry  · Advair 250/50, 1 inhalation twice a day  · Ipratropium HFA 2 inhalations 4 times a day  · Follow-up in 6 months  Discussion:  The patient's influenza infection has resolved  He is back to his baseline  His COPD is in remission  He is concerned about the medication cost   He will check with his prescription plan to see which is the preferred alternative to Advair  He will call me with that and I will prescribe it  His allergic rhinitis is well treated  He will continue with the fluticasone nasal spray 2 sprays to each nostril once a day  I will see him in 6 months in a follow-up visit  Subjective: The patient is here for a follow-up visit  About 3 weeks ago he was admitted to [facility] with influenza a infection  Was treated with Tamiflu  His influenza infection was mild  He received the vaccine last fall  Initially he felt tired and weak also had shortness of breath on exertion  Over the last few days he feels he has completely recovered and he is back to his baseline  He has shortness of breath on exertion which is chronic  He has occasional cough but no sputum production  Denies any wheezing or chest tightness  No nocturnal symptoms  Vitals: Blood pressure 144/72, pulse 70, temperature (!) 96 3 °F (35 7 °C), resp  rate 18, height 5' 7 5" (1 715 m), weight 81 6 kg (180 lb), SpO2 99 %  ,     Physical Exam  Gen: Awake, alert, oriented x 3, no acute distress  HEENT: Mucous membranes moist, no oral lesions, no thrush  NECK: No accessory muscle use, JVP not elevated  Cardiac: Regular, single S1, single S2, no murmurs, no rubs, no gallops  Lungs:  Decreased breath sounds  No wheezing or rhonchi    Abdomen: normoactive bowel sounds, soft nontender, nondistended, no rebound or rigidity, no guarding  Extremities: no cyanosis, no clubbing, no edema    Labs reviewed and his nasal swab was positive for influenza A  Spirometry done today in the office and it showed moderate airflow limitation  No significant change compared to the study from 2015        Kuldip Alvarez MD

## 2018-02-14 ENCOUNTER — APPOINTMENT (OUTPATIENT)
Dept: LAB | Facility: HOSPITAL | Age: 73
End: 2018-02-14
Payer: MEDICARE

## 2018-02-14 ENCOUNTER — TRANSCRIBE ORDERS (OUTPATIENT)
Dept: LAB | Facility: HOSPITAL | Age: 73
End: 2018-02-14

## 2018-02-14 ENCOUNTER — HOSPITAL ENCOUNTER (OUTPATIENT)
Dept: NON INVASIVE DIAGNOSTICS | Facility: HOSPITAL | Age: 73
Discharge: HOME | End: 2018-02-14

## 2018-02-14 DIAGNOSIS — I48.0 PAROXYSMAL ATRIAL FIBRILLATION (HCC): ICD-10-CM

## 2018-02-14 DIAGNOSIS — F31.60 MIXED BIPOLAR I DISORDER (HCC): Primary | ICD-10-CM

## 2018-02-14 DIAGNOSIS — F31.60 MIXED BIPOLAR I DISORDER (HCC): ICD-10-CM

## 2018-02-14 LAB
BUN SERPL-MCNC: 20 MG/DL (ref 5–25)
CREAT SERPL-MCNC: 1.11 MG/DL (ref 0.6–1.3)
GFR SERPL CREATININE-BSD FRML MDRD: 67 ML/MIN/1.73SQ M
LITHIUM SERPL-SCNC: 0.7 MMOL/L (ref 0.5–1)
TSH SERPL DL<=0.05 MIU/L-ACNC: 1.87 UIU/ML (ref 0.36–3.74)

## 2018-02-14 PROCEDURE — 93225 XTRNL ECG REC<48 HRS REC: CPT

## 2018-02-14 PROCEDURE — 80178 ASSAY OF LITHIUM: CPT

## 2018-02-14 PROCEDURE — 93224 XTRNL ECG REC UP TO 48 HRS: CPT | Performed by: INTERNAL MEDICINE

## 2018-02-14 PROCEDURE — 82565 ASSAY OF CREATININE: CPT

## 2018-02-14 PROCEDURE — 93226 XTRNL ECG REC<48 HR SCAN A/R: CPT

## 2018-02-14 PROCEDURE — 36415 COLL VENOUS BLD VENIPUNCTURE: CPT

## 2018-02-14 PROCEDURE — 84520 ASSAY OF UREA NITROGEN: CPT

## 2018-02-14 PROCEDURE — 84443 ASSAY THYROID STIM HORMONE: CPT

## 2018-02-15 ENCOUNTER — OFFICE VISIT (OUTPATIENT)
Dept: PHYSICAL THERAPY | Facility: CLINIC | Age: 73
End: 2018-02-15
Payer: MEDICARE

## 2018-02-15 DIAGNOSIS — M51.36 DEGENERATIVE DISC DISEASE, LUMBAR: Primary | ICD-10-CM

## 2018-02-15 PROCEDURE — 97110 THERAPEUTIC EXERCISES: CPT | Performed by: PHYSICAL THERAPIST

## 2018-02-15 PROCEDURE — 97112 NEUROMUSCULAR REEDUCATION: CPT | Performed by: PHYSICAL THERAPIST

## 2018-02-15 NOTE — PROGRESS NOTES
Today's date: 2/15/2018  Patient name: Juhi Ackerman  : 1945  MRN: [id number]  Referring provider: Torito Francis MD  Dx:   Encounter Diagnosis   Name Primary?  Degenerative disc disease, lumbar Yes                  Subjective: Pt reports LBP upon arrival  Reports 4/10 pain this morning, states that he put anesthetic cream on, and currently rates pain as 2-3/10  Reports some discomfort in left hip  Objective: See treatment diary below    Daily Treatment Diary      Exercise Diary  2/2  2/6  2/8  2/13  2/15/18             TA in supine 10"x10  10" x 10  5" x 10reps x 2 sets  10"x10  d/c             TA with marches 15x ea 2#  20 x 2  2#   20 x 2 ,                  TA with bridges 15x  10 x 2   2 x 10  15x 2               TA with heel slides 12x ea  15x ea  2 x10 each side  2x15 ea side               Clamshells in supine GTB 15x   GTB  2  X 10  GTB  2 x10  GTB   25x               Tandem gait 2 laps  2 laps  2 laps  2 laps              Semitandem foam    2 x 30"  NP  2x30"  30''x2              FTEC foam    2x30"  2 x 30"  2x30"  30''x2              tandem stance    2 x 30"  2 x30"  2x30"                alt toe taps    2 x 20  2 x 20  2#  20x2 from  foam  2x10 from foam              abdominal brace + knee ext + SLR      2 x 10  d/c                high knee marches      forward/ lateral  2 laps each                  SLS unilateral UE      2 x 30"  2x30"  30''x2             Figure 4 stretch in supine - B       60 sec ea               Hamstring stretch in sitting                        SB marches        15xea  2x10              SB LAQ        15xea  2x10              TB step outs        10x2                STS         2X10                                                                                                              Assessment: Patient educated in incorporating TA with balance training  Patient demonstrates some mild instability at times with proprioceptive training   Was challenged with some core weakness and instability noted with seated pball exercises  Plan: Continue per plan of care

## 2018-02-20 ENCOUNTER — OFFICE VISIT (OUTPATIENT)
Dept: PHYSICAL THERAPY | Facility: CLINIC | Age: 73
End: 2018-02-20
Payer: MEDICARE

## 2018-02-20 DIAGNOSIS — M51.36 DEGENERATIVE DISC DISEASE, LUMBAR: Primary | ICD-10-CM

## 2018-02-20 PROCEDURE — 97112 NEUROMUSCULAR REEDUCATION: CPT

## 2018-02-20 PROCEDURE — 97110 THERAPEUTIC EXERCISES: CPT

## 2018-02-20 NOTE — PROGRESS NOTES
Today's date: 2018  Patient name: Chata Gonzalez  : 1945  MRN: [id number]  Referring provider: Katy Phillips MD  Dx:   Encounter Diagnosis   Name Primary?  Degenerative disc disease, lumbar Yes                  Subjective: Pt reports pain on L side of LB has increased and is feeling radicular symptoms down posterior thigh  Pain was 6/10-7/10 pain yesterday while at work and increases with weightbearing on L side    Pt said his balance most feels off when his is walking and turning       Objective: See treatment diary below    Daily Treatment Diary      Exercise Diary  2/2  2/6  2/8  2/13  2/15/18  2/20/           TA in supine 10"x10  10" x 10  5" x 10reps x 2 sets  10"x10  d/c             TA with marches 15x ea 2#  20 x 2  2#   20 x 2 ,       20x           TA with bridges 15x  10 x 2   2 x 10  15x 2    10 x 3           TA with heel slides 12x ea  15x ea  2 x10 each side  2x15 ea side               Clamshells in supine GTB 15x   GTB  2  X 10  GTB  2 x10  GTB   25x               Tandem gait 2 laps  2 laps  2 laps  2 laps  3 laps            Semitandem foam    2 x 30"  NP  2x30"  30''x2  2 x 30"            FTEC foam    2x30"  2 x 30"  2x30"  30''x2  2 x 30"    2 x 30"  HT/HN            tandem stance    2 x 30"  2 x30"  2x30"                alt toe taps    2 x 20  2 x 20  2#  20x2 from  foam  2x10 from foam              abdominal brace + knee ext + SLR      2 x 10  d/c                high knee marches      forward/ lateral  2 laps each                  SLS unilateral UE      2 x 30"  2x30"  30''x2             Figure 4 stretch in supine - B       60 sec ea               Hamstring stretch in sitting                        SB marches        15xea  2x10  20x 2            SB LAQ        15xea  2x10  2 x 10            TB step outs        10x2                STS         2X10             Ambulation w/ 360 turns      2 laps       Piriformis stretch      2 x 30"                                                                              Assessment:  Session started 15 min late  Pt performed exercises well though exhibits instability while seated on [place] ball  Pt able to perform ambulation with 360 turns with minimal dizziness  Pt said he was feeling well post session          Plan: Continue per plan of care

## 2018-02-22 ENCOUNTER — OFFICE VISIT (OUTPATIENT)
Dept: PHYSICAL THERAPY | Facility: CLINIC | Age: 73
End: 2018-02-22
Payer: MEDICARE

## 2018-02-22 DIAGNOSIS — M51.36 DEGENERATIVE DISC DISEASE, LUMBAR: Primary | ICD-10-CM

## 2018-02-22 PROCEDURE — 97112 NEUROMUSCULAR REEDUCATION: CPT

## 2018-02-22 PROCEDURE — 97110 THERAPEUTIC EXERCISES: CPT

## 2018-02-22 NOTE — PROGRESS NOTES
Today's date: 2018  Patient name: Prabhjot Sandhu  : 1945  MRN: [id number]  Referring provider: Malaika Alvarez MD  Dx:   Encounter Diagnosis   Name Primary?  Degenerative disc disease, lumbar Yes                  Subjective: Pt reports continued pain on L side of LB has increased and is feeling radicular symptoms down posterior thigh   Pain 6/10-7/10 pain today but currently is 3/37 due to application of aspercream       Objective: See treatment diary below    Daily Treatment Diary      Exercise Diary  2/2  2/6  2/8  2/13  2/15/18  2/20  2/22         TA in supine 10"x10  10" x 10  5" x 10reps x 2 sets  10"x10  d/c             TA with marches 15x ea 2#  20 x 2  2#   20 x 2 ,       20x           TA with bridges 15x  10 x 2   2 x 10  15x 2    10 x 3           TA with heel slides 12x ea  15x ea  2 x10 each side  2x15 ea side               Clamshells in supine GTB 15x   GTB  2  X 10  GTB  2 x10  GTB   25x               Tandem gait 2 laps  2 laps  2 laps  2 laps  3 laps  3 laps          Semitandem foam    2 x 30"  NP  2x30"  30''x2  2 x 30"  2 x 30"          FTEC foam    2x30"  2 x 30"  2x30"  30''x2  2 x 30"    2 x 30"  HT/HN  2 x 30"          tandem stance    2 x 30"  2 x30"  2x30"      2 x 30"          alt toe taps    2 x 20  2 x 20  2#  20x2 from  foam  2x10 from foam    2 x10  foam          abdominal brace + knee ext + SLR      2 x 10  d/c                high knee marches      forward/ lateral  2 laps each        3 laps          SLS unilateral UE      2 x 30"  2x30"  30''x2             Figure 4 stretch in supine - B       60 sec ea               Hamstring stretch in sitting                        SB marches        15xea  2x10  20x 2  20x2          SB LAQ        15xea  2x10  2 x 10  20 x2          TB step outs        10x2                STS         2X10             Ambulation w/ 360 turns      2 laps 2 laps      Piriformis stretch      2 x 30"       nustep       10 min   L6      Ambulation w/ HT/HN       1 lap each      Step ups fwd/lat       20x ea                                      Assessment:  Pt has difficulty raising knees high and advancing onto step, improves when given Vcs  Pt demonstrates improved balance on swiss ball but is unable to fully extend leg  Pt said his LB was 4/10pain at the end of the session  Plan: Continue per plan of care

## 2018-02-26 ENCOUNTER — OFFICE VISIT (OUTPATIENT)
Dept: INTERNAL MEDICINE CLINIC | Facility: CLINIC | Age: 73
End: 2018-02-26
Payer: MEDICARE

## 2018-02-26 VITALS
OXYGEN SATURATION: 98 % | BODY MASS INDEX: 28.38 KG/M2 | HEIGHT: 67 IN | WEIGHT: 180.8 LBS | RESPIRATION RATE: 18 BRPM | DIASTOLIC BLOOD PRESSURE: 78 MMHG | SYSTOLIC BLOOD PRESSURE: 162 MMHG | HEART RATE: 71 BPM

## 2018-02-26 DIAGNOSIS — M25.552 LEFT HIP PAIN: Primary | ICD-10-CM

## 2018-02-26 PROBLEM — A41.9 SEPSIS (HCC): Status: RESOLVED | Noted: 2018-01-21 | Resolved: 2018-02-26

## 2018-02-26 PROCEDURE — 99213 OFFICE O/P EST LOW 20 MIN: CPT | Performed by: INTERNAL MEDICINE

## 2018-02-26 NOTE — PROGRESS NOTES
Assessment/Plan:    No problem-specific Assessment & Plan notes found for this encounter  Diagnoses and all orders for this visit:    Left hip pain  -     Ambulatory referral to Orthopedic Surgery; Future  -     XR hip/pelv 2-3 vws left if performed; Future          Subjective:      Patient ID: Berhane Arenas is a 81 y o  male  Left hip pain: Patient has had this about a year, but worse recently  He has difficulty bearing weight on it  No rash in the area  No fevers chills or sweats  He can't sleep on his left side due to pain  The following portions of the patient's history were reviewed and updated as appropriate: allergies, current medications, past family history, past medical history, past social history, past surgical history and problem list     Review of Systems   Constitutional: Negative for chills and fatigue  Gastrointestinal: Negative for constipation and diarrhea  Genitourinary: Negative for dysuria  Musculoskeletal: Positive for arthralgias  Skin: Negative for rash  Objective:      /78 (BP Location: Left arm, Patient Position: Sitting, Cuff Size: Standard)   Pulse 71   Resp 18   Ht 5' 7" (1 702 m)   Wt 82 kg (180 lb 12 8 oz)   SpO2 98%   BMI 28 32 kg/m²          Physical Exam   Constitutional: He appears well-developed and well-nourished     Musculoskeletal:    Some pain with range of motion of left hip, no weakness

## 2018-02-27 ENCOUNTER — APPOINTMENT (OUTPATIENT)
Dept: PHYSICAL THERAPY | Facility: CLINIC | Age: 73
End: 2018-02-27
Payer: MEDICARE

## 2018-02-27 ENCOUNTER — TRANSCRIBE ORDERS (OUTPATIENT)
Dept: RADIOLOGY | Facility: CLINIC | Age: 73
End: 2018-02-27

## 2018-02-27 ENCOUNTER — APPOINTMENT (OUTPATIENT)
Dept: RADIOLOGY | Facility: CLINIC | Age: 73
End: 2018-02-27
Payer: MEDICARE

## 2018-02-27 ENCOUNTER — TELEPHONE (OUTPATIENT)
Dept: CARDIOLOGY CLINIC | Facility: CLINIC | Age: 73
End: 2018-02-27

## 2018-02-27 DIAGNOSIS — C61 MALIGNANT NEOPLASM OF PROSTATE (HCC): Primary | ICD-10-CM

## 2018-02-27 DIAGNOSIS — M25.552 LEFT HIP PAIN: ICD-10-CM

## 2018-02-27 PROCEDURE — 73502 X-RAY EXAM HIP UNI 2-3 VIEWS: CPT

## 2018-02-27 NOTE — PROGRESS NOTES
Order(s) created erroneously   Erroneous order ID: [id number]   Order canceled by: Radha Ram   Order cancel date/time: 02/26/2018 9:55 PM

## 2018-02-27 NOTE — TELEPHONE ENCOUNTER
PT CALLED HE RECVD LETTER TO SEE DR Uzma Chaidez IN MAY,SCHEDULED 5/8 PT HAS SCRIPT TO GET BLOOD WORK IN OCTOBER,SHOULD HE GET BW DONR BEFORE HIS MAY APPT?  ID SO CAN HE USE THE SAME SCRIPT   PLEASE ADVISE HIM

## 2018-03-01 ENCOUNTER — APPOINTMENT (OUTPATIENT)
Dept: PHYSICAL THERAPY | Facility: CLINIC | Age: 73
End: 2018-03-01
Payer: MEDICARE

## 2018-03-06 ENCOUNTER — EVALUATION (OUTPATIENT)
Dept: PHYSICAL THERAPY | Facility: CLINIC | Age: 73
End: 2018-03-06
Payer: MEDICARE

## 2018-03-06 DIAGNOSIS — M25.552 LEFT HIP PAIN: ICD-10-CM

## 2018-03-06 DIAGNOSIS — M51.36 DEGENERATIVE DISC DISEASE, LUMBAR: Primary | ICD-10-CM

## 2018-03-06 PROCEDURE — G8979 MOBILITY GOAL STATUS: HCPCS

## 2018-03-06 PROCEDURE — G8978 MOBILITY CURRENT STATUS: HCPCS

## 2018-03-06 PROCEDURE — 97110 THERAPEUTIC EXERCISES: CPT

## 2018-03-06 PROCEDURE — 97164 PT RE-EVAL EST PLAN CARE: CPT

## 2018-03-06 NOTE — PROGRESS NOTES
PT Re-Evaluation     Today's date: 3/6/2018  Patient name: Nan Hillman  : 1945  MRN: [id number]  Referring provider: Sher Covarrubias MD  Dx:   Encounter Diagnoses   Name Primary?  Degenerative disc disease, lumbar Yes    Left hip pain        Start Time: 1000  Stop Time: 1100  Total time in clinic (min): 60 minutes    Assessment  Impairments: abnormal gait, abnormal movement, activity intolerance, impaired balance and pain with function    Patient is not irritable  Assessment details: Re-evaluation completed this session  Compared to initial evaluation patient demonstrates significantly improved LBP, improved balance with no increased risk for falls per the Presley, and improved LE strength per MMT and the 5x STS  Patient complains of increased left hip pain with weight bearing activities and defers treatment at this time to follow up with the orthopedic MD  Patient has made progress toward all his goals and is in agreement with a 30 day hold at this time pending his visit to the orthopedic     Understanding of Dx/Px/POC: good  Goals  STG 1: Patient will complete the Presley Balance Assessment with a score of 48/56 in 4 weeks - met  STG 2: Patient will increase bilateral LE strength by 1/2 MMT grade in 4 weeks - partially met  STG 3: Patient will complete the 5x STS in <15 seconds with good stability and no UE use in 4 weeks - met  STG 4: Patient will report LBP <4/10 at worst in 4 weeks - met    LTG 1: Patient will deny falls or near falls in 8 weeks - met  LTG 2: Patient will be independent with HEP in 8 weeks - met  LTG 3: Patient will complete the Presley Balance Assessment with a score of 53/56 in 8 weeks - partially met        Plan  Referral necessary: No  Plan details: 30 day hold        Subjective Evaluation    History of Present Illness  Mechanism of injury: Patient reports that he has been having increased pain in the left hip, took xrays with noted mild-mod OA and osteoporosis - going to see the orthopedic on 3/27  Patient reports it also goes down the thigh and the back of the knee  Worse with walking, transfers, sitting, ER of the left hip is very painful  Patient also reports that his back is much better and he only has mild pain occasionally  Not a recurrent problem   Quality of life: excellent    Pain  Current pain ratin  At best pain ratin  At worst pain rating: 3  Location: low back  Quality: sharp (Left hip/buttock pain as well that travels down the left lateral thigh)    Social Support  Stairs in house: yes (does not go upstairs)   Lives in: condominium  Lives with: spouse    Employment status: not working  Hand dominance: right      Diagnostic Tests  X-ray: abnormal (OA in the left hip)  Treatments  Current treatment: physical therapy  Patient Goals  Patient goals for therapy: improved balance, decreased pain, return to sport/leisure activities, independence with ADLs/IADLs and increased strength          Objective     Static Posture     Head  Forward  Cervical Spine  Shifted right  Shoulders  Asymmetric shoulders and rounded  Passive Range of Motion   Left Hip   Flexion: 90 degrees with pain  Extension: 5 degrees with pain  External rotation (90/90): 45 degrees with pain    Ambulation     Ambulation: Level Surfaces   Ambulation without assistive device: independent    Observational Gait   Gait: asymmetric   Decreased walking speed and stride length  Left foot contact pattern: heel to toe  Right foot contact pattern: heel to toe  Left arm swing: decreased  Right arm swing: within functional limits    Quality of Movement During Gait     Pelvis    Pelvis (Left): Positive Trendelenburg  Pelvis (Right): Positive Trendelenburg  Ankle    Ankle (Left): Positive pronated  Ankle (Right): Positive pronated       PT/OT Neuro Exam  Neurologic Exam   Balance Test    6 Minute Walk Test (ft): 880 feet with noted SOB   2 Minute Walk Test (ft): NT   Gait Speed (ft/s): 20 feet/6 94 =2 88 ft/sec   5x Sit To Stand (s): 15 13 sec with no UE   Presley 50/56         MCTSIB Number of Seconds   Feet Together, Eyes Open 30   Feet Together, Eyes Closed 30   Feet Together, Eyes Open Foam 10 31   Feet Together, Eyes Closed Foam unable       Manual Muscle Testing - Hip Left Right   Flexion 4+ 4+   Extension 4 4+   Abduction 4- 4   External Rotation NT NT     Manual Muscle Testing - Knee Left Right   Flexion 4+ 5   Extension 4 5     Manual Muscle Testing - Ankle Left Right   Doriflexion 4+ 4   Plantarflexion NT NT        Transfers    Sit To Stand Independent   W/C To Bed Independent   Sit To Supine Independent   Roll Independent       Precautions: C3-7 cervical fusion, adenocarcinoma of the prostate, COPD, LBP with spondylosis and spondylolisthesis     Daily Treatment Diary     Exercise Diary  2/2  2/6  2/8  2/13  2/15/18  2/20  2/22  3/6       TA in supine 10"x10  10" x 10  5" x 10reps x 2 sets  10"x10  d/c             TA with marches 15x ea 2#  20 x 2  2#   20 x 2 ,       20x           TA with bridges 15x  10 x 2   2 x 10  15x 2    10 x 3           TA with heel slides 12x ea  15x ea  2 x10 each side  2x15 ea side               Clamshells in supine GTB 15x   GTB  2  X 10  GTB  2 x10  GTB   25x               Tandem gait 2 laps  2 laps  2 laps  2 laps  3 laps  3 laps          Semitandem foam    2 x 30"  NP  2x30"  30''x2  2 x 30"  2 x 30"          FTEC foam    2x30"  2 x 30"  2x30"  30''x2  2 x 30"    2 x 30"  HT/HN  2 x 30"          tandem stance    2 x 30"  2 x30"  2x30"      2 x 30"          alt toe taps    2 x 20  2 x 20  2#  20x2 from  foam  2x10 from foam    2 x10  foam          abdominal brace + knee ext + SLR      2 x 10  d/c                high knee marches      forward/ lateral  2 laps each        3 laps          SLS unilateral UE      2 x 30"  2x30"  30''x2             Figure 4 stretch in supine - B       60 sec ea               Hamstring stretch in sitting                60 secx2        SB elizabeth        15xea  2x10  20x 2  20x2          SB LAQ        15xea  2x10  2 x 10  20 x2          TB step outs        10x2                STS         2X10             Ambulation w/ 360 turns      2 laps 2 laps      Piriformis stretch L      2 x 30"  2x30"     nustep       10 min   L6      Ambulation w/ HT/HN       1 lap each      Step ups fwd/lat       20x ea      SKC        30"x2     Isometric hip add/abd                3"x15 ea

## 2018-03-08 ENCOUNTER — APPOINTMENT (OUTPATIENT)
Dept: PHYSICAL THERAPY | Facility: CLINIC | Age: 73
End: 2018-03-08
Payer: MEDICARE

## 2018-03-13 ENCOUNTER — APPOINTMENT (OUTPATIENT)
Dept: PHYSICAL THERAPY | Facility: CLINIC | Age: 73
End: 2018-03-13
Payer: MEDICARE

## 2018-03-13 ENCOUNTER — PATIENT OUTREACH (OUTPATIENT)
Dept: INTERNAL MEDICINE CLINIC | Facility: CLINIC | Age: 73
End: 2018-03-13

## 2018-03-13 NOTE — PROGRESS NOTES
Outreach TC to patient for Outpatient care   No answer to call  Left voicemail requesting that patient return a telephone call to [address]; Marlen Baltazar RN,BSN @ [phone]

## 2018-03-14 ENCOUNTER — APPOINTMENT (OUTPATIENT)
Dept: LAB | Facility: HOSPITAL | Age: 73
End: 2018-03-14
Payer: MEDICARE

## 2018-03-14 DIAGNOSIS — C61 MALIGNANT NEOPLASM OF PROSTATE (HCC): ICD-10-CM

## 2018-03-14 LAB — PSA SERPL-MCNC: <0.1 NG/ML (ref 0–4)

## 2018-03-14 PROCEDURE — 84153 ASSAY OF PSA TOTAL: CPT

## 2018-03-15 ENCOUNTER — APPOINTMENT (OUTPATIENT)
Dept: PHYSICAL THERAPY | Facility: CLINIC | Age: 73
End: 2018-03-15
Payer: MEDICARE

## 2018-03-20 ENCOUNTER — OFFICE VISIT (OUTPATIENT)
Dept: UROLOGY | Facility: CLINIC | Age: 73
End: 2018-03-20
Payer: MEDICARE

## 2018-03-20 VITALS
DIASTOLIC BLOOD PRESSURE: 84 MMHG | HEIGHT: 67 IN | HEART RATE: 78 BPM | WEIGHT: 180 LBS | BODY MASS INDEX: 28.25 KG/M2 | SYSTOLIC BLOOD PRESSURE: 135 MMHG

## 2018-03-20 DIAGNOSIS — C61 PROSTATE CANCER (HCC): Primary | ICD-10-CM

## 2018-03-20 LAB
SL AMB  POCT GLUCOSE, UA: ABNORMAL
SL AMB LEUKOCYTE ESTERASE,UA: ABNORMAL
SL AMB POCT BLOOD,UA: ABNORMAL
SL AMB POCT CLARITY,UA: CLEAR
SL AMB POCT COLOR,UA: YELLOW
SL AMB POCT KETONES,UA: ABNORMAL
SL AMB POCT NITRITE,UA: ABNORMAL
SL AMB POCT PH,UA: 5.5
SL AMB POCT URINE PROTEIN: ABNORMAL

## 2018-03-20 PROCEDURE — 81002 URINALYSIS NONAUTO W/O SCOPE: CPT | Performed by: PHYSICIAN ASSISTANT

## 2018-03-20 PROCEDURE — 99213 OFFICE O/P EST LOW 20 MIN: CPT | Performed by: PHYSICIAN ASSISTANT

## 2018-03-20 NOTE — PROGRESS NOTES
UROLOGY PROGRESS NOTE   Patient Identifiers: Marla Cantu (MRN [id number])  Date of Service: 3/20/2018    Subjective:    81year old male hx of prostate cancer  He had an RRP in [place] in 2007  PSA <0 1  He has good urinary control with no other complaints  He had previous cervical and more recent lumbar surgery with Dr Tre Macario   Patient has  no complaints  Objective:     VITALS:    Vitals:    03/20/18 1146   BP: 135/84   Pulse: 78           LABS:  Lab Results   Component Value Date    HGB 12 6 01/22/2018    HCT 39 0 01/22/2018    WBC 13 43 (H) 01/22/2018     01/22/2018   ]    Lab Results   Component Value Date     01/22/2018    K 4 4 01/22/2018     (H) 01/22/2018    CO2 22 01/22/2018    BUN 20 02/14/2018    CREATININE 1 11 02/14/2018    CALCIUM 9 8 01/22/2018    GLUCOSE 123 01/22/2018   ]    INPATIENT MEDS:    Current Outpatient Prescriptions:     acetaminophen (TYLENOL) 500 mg tablet, Take 500 mg by mouth as needed for mild pain , Disp: , Rfl:     albuterol (PROVENTIL HFA,VENTOLIN HFA) 90 mcg/act inhaler, Inhale 2 puffs every 6 (six) hours as needed for wheezing, Disp: , Rfl:     amLODIPine (NORVASC) 2 5 mg tablet, Take 2 5 mg by mouth daily, Disp: , Rfl:     aspirin (ECOTRIN LOW STRENGTH) 81 mg EC tablet, Take 81 mg by mouth daily, Disp: , Rfl:     Atorvastatin Calcium (LIPITOR PO), Take 40 mg by mouth daily  , Disp: , Rfl:     B Complex Vitamins (VITAMIN B COMPLEX PO), Take by mouth, Disp: , Rfl:     Cholecalciferol (VITAMIN D PO), Take 2,000 Units by mouth daily  , Disp: , Rfl:     diazepam (VALIUM) 5 mg tablet, Take 5 mg by mouth as needed for anxiety, Disp: , Rfl:     dicyclomine (BENTYL) 20 mg tablet, Take 20 mg by mouth every 6 (six) hours, Disp: , Rfl:     fluticasone (FLONASE) 50 mcg/act nasal spray, 1 spray into each nostril as needed    , Disp: , Rfl:     fluticasone-salmeterol (ADVAIR) 250-50 mcg/dose inhaler, Inhale 1 puff every 12 (twelve) hours, Disp: , Rfl:     ipratropium (ATROVENT HFA) 17 mcg/act inhaler, Inhale 2 puffs every 6 (six) hours, Disp: , Rfl:     lithium 300 MG tablet, Take 600 mg by mouth every other day PM  Alternating with 300 mg every other day THIS IS A CONTINUOUS RELEASE TABLET , Disp: , Rfl:     Metoprolol Succinate (TOPROL XL PO), Take 25 mg by mouth daily  , Disp: , Rfl:     omega-3-acid ethyl esters (LOVAZA) 1 g capsule, TAKE 1 CAPSULE BY MOUTH 3  TIMES DAILY, Disp: 270 capsule, Rfl: 5    omeprazole (PriLOSEC) 40 MG capsule, Take 40 mg by mouth daily  , Disp: , Rfl:     fexofenadine (ALLEGRA) 180 MG tablet, Take 180 mg by mouth daily, Disp: , Rfl:       Physical Exam:   /84 (BP Location: Right arm, Patient Position: Sitting, Cuff Size: Adult)   Pulse 78   Ht 5' 7" (1 702 m)   Wt 81 6 kg (180 lb)   BMI 28 19 kg/m²   GEN: no acute distress    RESP: breathing comfortably with no accessory muscle use    ABD: soft, non-tender, non-distended   INCISION:    EXT: no significant peripheral edema   (Male): Penis circumcised, phallus normal, meatus patent  Testicles descended into scrotum bilaterally without masses nor tenderness  No inguinal hernias bilaterally  [place]: Prostate is surgically abscent    RADIOLOGY:   none     Assessment:   1   Prostate cancer     Plan:   - follow up one year PSA prior  -  -  -

## 2018-03-27 ENCOUNTER — OFFICE VISIT (OUTPATIENT)
Dept: OBGYN CLINIC | Facility: HOSPITAL | Age: 73
End: 2018-03-27
Payer: MEDICARE

## 2018-03-27 VITALS
HEART RATE: 69 BPM | WEIGHT: 179.9 LBS | SYSTOLIC BLOOD PRESSURE: 162 MMHG | DIASTOLIC BLOOD PRESSURE: 94 MMHG | BODY MASS INDEX: 28.24 KG/M2 | HEIGHT: 67 IN

## 2018-03-27 DIAGNOSIS — M25.552 LEFT HIP PAIN: Primary | ICD-10-CM

## 2018-03-27 PROCEDURE — 20610 DRAIN/INJ JOINT/BURSA W/O US: CPT | Performed by: ORTHOPAEDIC SURGERY

## 2018-03-27 PROCEDURE — 99203 OFFICE O/P NEW LOW 30 MIN: CPT | Performed by: ORTHOPAEDIC SURGERY

## 2018-03-27 RX ORDER — BETAMETHASONE SODIUM PHOSPHATE AND BETAMETHASONE ACETATE 3; 3 MG/ML; MG/ML
6 INJECTION, SUSPENSION INTRA-ARTICULAR; INTRALESIONAL; INTRAMUSCULAR; SOFT TISSUE
Status: COMPLETED | OUTPATIENT
Start: 2018-03-27 | End: 2018-03-27

## 2018-03-27 RX ORDER — LIDOCAINE HYDROCHLORIDE 10 MG/ML
2 INJECTION, SOLUTION INFILTRATION; PERINEURAL
Status: COMPLETED | OUTPATIENT
Start: 2018-03-27 | End: 2018-03-27

## 2018-03-27 RX ORDER — BUPIVACAINE HYDROCHLORIDE 2.5 MG/ML
2 INJECTION, SOLUTION INFILTRATION; PERINEURAL
Status: COMPLETED | OUTPATIENT
Start: 2018-03-27 | End: 2018-03-27

## 2018-03-27 RX ADMIN — BUPIVACAINE HYDROCHLORIDE 2 ML: 2.5 INJECTION, SOLUTION INFILTRATION; PERINEURAL at 11:55

## 2018-03-27 RX ADMIN — BETAMETHASONE SODIUM PHOSPHATE AND BETAMETHASONE ACETATE 6 MG: 3; 3 INJECTION, SUSPENSION INTRA-ARTICULAR; INTRALESIONAL; INTRAMUSCULAR; SOFT TISSUE at 11:55

## 2018-03-27 RX ADMIN — LIDOCAINE HYDROCHLORIDE 2 ML: 10 INJECTION, SOLUTION INFILTRATION; PERINEURAL at 11:55

## 2018-03-27 NOTE — PROGRESS NOTES
81 y o male presents for evaluation of progressive left hip pain  Patient states that his pain is well localized to the lateral aspect of hip  Patient denies any groin pain  Patient's pain is made worse with increased activity and subsided somewhat at rest   Patient denies any radicular pain  Patient denies any weakness  Patient denies any radicular pain  Patient denies any numbness or tingling  Patient has a history of multiple lumbar and cervical spine procedure  Patient has recently been doing intensive physical therapy for lumbar back pain and his hip pain has precluded him from participating to his full capacity  He states that he is unable to sleep on his left side because of increased pain  However he is able to sleep on his right side  Review of Systems  Review of systems negative unless otherwise specified in HPI    Past Medical History  Past Medical History:   Diagnosis Date    [facility]     Benign colon polyp     Bipolar 1 disorder [facility]     Cardiac disease     MI    Cervical cord compression with myelopathy [facility]     COPD (chronic obstructive pulmonary disease) (HCC)     Diverticulitis     Diverticulosis     Gait disturbance     uses cane, leg brace on right    GERD (gastroesophageal reflux disease)     Heart attack 1996    Hx of resection of large bowel 5/3/2016    Hyperlipidemia     Hypertension     IBS (irritable bowel syndrome)     Lumbar stenosis     [facility]     2007 Left lower lobectomy and 2011 Right lung with surgery     Myocardial infarction     involving other coronary artery    Prostate cancer [facility]     Shortness of breath     Small bowel obstruction 11/16/2016       Past Surgical History  Past Surgical History:   Procedure Laterality Date    ANGIOPLASTY      stent    CERVICAL SPINE SURGERY      Cervical decompression with cervical fusion from C3-C7 for spinal stenosis      COLON SURGERY  11/04/2014    ESOPHAGOGASTRODUODENOSCOPY 01/03/2013    with possible Schatzki's ring & small hiatal hernia, mild gastritis    LUNG CANCER SURGERY Right 03/2011    wedge resection for lung tumor, 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    LUNG LOBECTOMY Left     VA ARTHRODESIS ANT INTERBODY MIN DISCECTOMY, CERVICAL BELOW C2 N/A 5/2/2016    Procedure: Anterior cervical diskectomy C3/4, C5/6, C6/7 with anterior plate fixation fusion C3-7;  Posterior decompressive laminectomy C3-7 with lateral mass fixation fusion C3-7 (IMPULSE MONITORING); Surgeon: Emily Silva MD;  [facility];  Service: Neurosurgery    VA ARTHRODESIS POSTERIOR INTERBODY LUMBAR N/A 1/30/2017    Procedure: L4-5 AND L5-S1 DECOMPRESSIVE FORAMINOTOMIES, TRANSFORAMINAL LUMBAR INTERBODY AND PEDICLE SCREW FIXATION FUSION L4-S1 (IMPULSE); Surgeon: Emily Silva MD;  [facility];  Service: Neurosurgery    PROSTATECTOMY  2007    for prostate CA - no chemo/RT    SIGMOIDECTOMY      for divertic    SMALL INTESTINE SURGERY N/A 11/17/2016    Procedure: Exploratory Laparotomy, Lysis of adhesions to release small bowel obstruction;  Surgeon: Angelina Chacon MD;  Location: [facility];  Service:        Current Medications  Current Outpatient Prescriptions on File Prior to Visit   Medication Sig Dispense Refill    acetaminophen (TYLENOL) 500 mg tablet Take 500 mg by mouth as needed for mild pain   albuterol (PROVENTIL HFA,VENTOLIN HFA) 90 mcg/act inhaler Inhale 2 puffs every 6 (six) hours as needed for wheezing      amLODIPine (NORVASC) 2 5 mg tablet Take 2 5 mg by mouth daily      aspirin (ECOTRIN LOW STRENGTH) 81 mg EC tablet Take 81 mg by mouth daily      Atorvastatin Calcium (LIPITOR PO) Take 40 mg by mouth daily          B Complex Vitamins (VITAMIN B COMPLEX PO) Take by mouth      Cholecalciferol (VITAMIN D PO) Take 2,000 Units by mouth daily        diazepam (VALIUM) 5 mg tablet Take 5 mg by mouth as needed for anxiety      dicyclomine (BENTYL) 20 mg tablet Take 20 mg by mouth every 6 (six) hours      fexofenadine (ALLEGRA) 180 MG tablet Take 180 mg by mouth daily      fluticasone (FLONASE) 50 mcg/act nasal spray 1 spray into each nostril as needed   fluticasone-salmeterol (ADVAIR) 250-50 mcg/dose inhaler Inhale 1 puff every 12 (twelve) hours      ipratropium (ATROVENT HFA) 17 mcg/act inhaler Inhale 2 puffs every 6 (six) hours      lithium 300 MG tablet Take 600 mg by mouth every other day PM  Alternating with 300 mg every other day  THIS IS A CONTINUOUS RELEASE TABLET       Metoprolol Succinate (TOPROL XL PO) Take 25 mg by mouth daily   omega-3-acid ethyl esters (LOVAZA) 1 g capsule TAKE 1 CAPSULE BY MOUTH 3  TIMES DAILY 270 capsule 5    omeprazole (PriLOSEC) 40 MG capsule Take 40 mg by mouth daily  No current facility-administered medications on file prior to visit          Recent Labs [facility]    0  Lab Value Date/Time   HCT 39 0 01/22/2018 0553   HCT 39 5 10/19/2015 0911   HGB 12 6 01/22/2018 0553   HGB 12 6 10/19/2015 0911   WBC 13 43 (H) 01/22/2018 0553   WBC 9 47 10/19/2015 0911   INR 1 04 01/21/2018 0646   ESR 79 (H) 05/10/2016 0731   CRP 66 4 (H) 05/10/2016 0644   GLUCOSE 123 01/22/2018 0553   GLUCOSE 187 (H) 11/14/2016 0615   GLUCOSE 99 10/19/2015 0912         Physical exam  · General: Awake, Alert, Oriented  · Eyes: Pupils equal, round and reactive to light  · Heart: regular rate and rhythm  · Lungs: No audible wheezing  · Abdomen: soft  left Lower extremity  · Skin intact, no erythema, no ecchymosis  · Tenderness palpation over lateral trochanteric flare, no tenderness palpation in groin  · No groin pain with internal external rotation of hip  · 5/5 strength with knee flexion-extension, hip flexion extension  · Distal extremity warm and sensate    Procedure  Large joint arthrocentesis  Date/Time: 3/27/2018 11:55 AM  Site marked: site marked  Timeout: Immediately prior to procedure a time out was called to verify the correct patient, procedure, equipment, support staff and site/side marked as required   Supporting Documentation  Indications: pain   Procedure Details  Location: hip - L greater trochanteric bursa  Needle size: 22 G  Ultrasound guidance: no  Approach: direct   Medications administered: 2 mL bupivacaine 0 25 %; 2 mL lidocaine 1 %; 6 mg betamethasone acetate-betamethasone sodium phosphate 6 (3-3) mg/mL    Patient tolerance: patient tolerated the procedure well with no immediate complications  Dressing:  Sterile dressing applied          Imaging  X-rays of left hip reviewed at today's visit  They reveal mild degenerative changes left hip joint without joint space narrowing or significant subchondral sclerosis  Assessment:   72 y o male with progressive left hip pain secondary to left greater trochanteric bursitis and a tight iliotibial band    Plan  · Weightbearing:  As tolerated left lower extremeity  · Activity:   As tolerated  · Discussion was had with patient concerning etiology of pain  Given patient's constellation of symptoms and physical exam findings, his pain is likely stemming from tight iliotibial band and irritation of his greater trochanteric bursa  This was explained in detail patient and patient was in agreement with this diagnosis  Patient was provided with corticosteroid injection left greater trochanteric bursa in attempt to alleviate his symptoms  Patient tolerated this procedure well  Patient was provided with exercises in attempt to loosen his iliotibial band  This was provided to the patient in writing for the patient's future reference  Patient will be seen back in office in 3 months time for repeat evaluation    · Follow-up: 3 months

## 2018-03-27 NOTE — PATIENT INSTRUCTIONS
WEight bearing as tolerated left lower extremity    Continue home exercises as described     OK for continued PT as it relates to lumbar pain     Follow up in 3 months

## 2018-04-05 ENCOUNTER — EVALUATION (OUTPATIENT)
Dept: PHYSICAL THERAPY | Facility: CLINIC | Age: 73
End: 2018-04-05
Payer: MEDICARE

## 2018-04-05 DIAGNOSIS — M51.36 DEGENERATIVE DISC DISEASE, LUMBAR: Primary | ICD-10-CM

## 2018-04-05 PROCEDURE — 97164 PT RE-EVAL EST PLAN CARE: CPT

## 2018-04-05 PROCEDURE — G8978 MOBILITY CURRENT STATUS: HCPCS

## 2018-04-05 PROCEDURE — G8979 MOBILITY GOAL STATUS: HCPCS

## 2018-04-05 NOTE — PROGRESS NOTES
PT Re-Evaluation     Today's date: 2018  Patient name: Tha Baker  : 1945  MRN: [id number]  Referring provider: Colby Pop MD  Dx:   Encounter Diagnosis   Name Primary?  Degenerative disc disease, lumbar Yes       Start Time: 1545  Stop Time: 1620  Total time in clinic (min): 35 minutes    Assessment  Impairments: abnormal gait, abnormal movement, activity intolerance, impaired balance and pain with function    Patient is not irritable  Assessment details: Re-evaluation completed this session  Results of re-eval suggest LLE weakness, low back and L hip pain due to bursitis, impaired endurance, and impaired balance  Pt has shown no change in functional outcome measures since last re-eval, but has increased low back pain and weakness in LLE due to bursitis  This is all resulting in difficulty squatting to the floor, intolerance to activity due to pain, and walking for long distances  Pt  Would benefit form PT to address these deficits  Understanding of Dx/Px/POC: good  Goals  STG 1: Patient will complete the Presley Balance Assessment with a score of 53/56 in 4 weeks   STG 2: Patient will increase LLE strength by 1/2 MMT grade in 4 weeks  STG 3: Pt  Will improve endurance demonstrated by increasing 6 minute walk test by >160ft by 4 weeks in order to facilitate participation in community walks  STG 4: Patient will report LBP <4/10 at worst in 4 weeks   STG 5: Pt  Will consistently report <2/10 pain following recreational walks in 4 weeks           Plan  Referral necessary: No  Planned modality interventions: TENS and cryotherapy  Planned therapy interventions: abdominal trunk stabilization, balance, body mechanics training, flexibility, gait training, home exercise program, manual therapy, neuromuscular re-education, patient education, postural training, strengthening and therapeutic exercise  Frequency: 2x week  Duration in weeks: 4  Treatment plan discussed with: patient  Plan details: Continue current POC, assess 6 MWT next visit  Subjective Evaluation    History of Present Illness  Mechanism of injury: Reports seeing orthopedist and receiving Cortizone injection in L hip on 3/27/18  Pain has improved since injection, but pt  Feels that injection is taking time to take effect  Also reports continued mild weakness in LLE  Has been consistent with HEP performing 4-5 times/week  Goals reduce pain, improve mobility, and endurance   Not a recurrent problem   Quality of life: excellent    Pain  Current pain rating: 3  At best pain ratin  At worst pain ratin  Location: low back  Quality: sharp (Left hip/buttock pain as well that travels down the left lateral thigh)    Social Support  Stairs in house: yes (does not go upstairs)   Lives in: condominium  Lives with: spouse    Employment status: not working  Hand dominance: right      Diagnostic Tests  X-ray: abnormal (OA in the left hip)  Treatments  Current treatment: physical therapy  Patient Goals  Patient goals for therapy: improved balance, decreased pain, return to sport/leisure activities, independence with ADLs/IADLs and increased strength          Objective     Static Posture     Head  Forward  Cervical Spine  Shifted right  Shoulders  Asymmetric shoulders and rounded  Passive Range of Motion   Left Hip   Flexion: 90 degrees with pain  Extension: 5 degrees with pain  External rotation (90/90): 45 degrees with pain    Ambulation     Ambulation: Level Surfaces   Ambulation without assistive device: independent    Observational Gait   Gait: asymmetric   Decreased walking speed and stride length  Left foot contact pattern: heel to toe  Right foot contact pattern: heel to toe  Left arm swing: decreased  Right arm swing: within functional limits    Quality of Movement During Gait     Pelvis    Pelvis (Left): Positive Trendelenburg  Pelvis (Right): Positive Trendelenburg  Ankle    Ankle (Left): Positive pronated  Ankle (Right): Positive pronated       PT/OT Neuro Exam  Neurologic Exam   Balance Test    6 Minute Walk Test (ft): NT   2 Minute Walk Test (ft): NT   Gait Speed (ft/s): 20 feet/6 55 =3 05 ft/sec   5x Sit To Stand (s): 12 52 sec with no UE   Presley 49/56         MCTSIB Number of Seconds   Feet Together, Eyes Open NT   Feet Together, Eyes Closed NT   Feet Together, Eyes Open Foam NT   Feet Together, Eyes Closed Foam NT       Manual Muscle Testing - Hip Left Right   Flexion 4 5   Extension 3 (pain) 4+   Abduction 3(pain) 5   External Rotation NT NT     Manual Muscle Testing - Knee Left Right   Flexion 4 5   Extension 4 5     Manual Muscle Testing - Ankle Left Right   Doriflexion 4 5   Plantarflexion NT NT        Transfers    Sit To Stand Independent   W/C To Bed Independent   Sit To Supine Independent   Roll Independent     R-evaluation completed by Tre SHARP under supervision of Su Lockwood DPT    Precautions: C3-7 cervical fusion, adenocarcinoma of the prostate, COPD, LBP with spondylosis and spondylolisthesis   Daily Treatment Diary     Exercise Diary  2/2  2/6  2/8  2/13  2/15/18  2/20  2/22  3/6       TA in supine 10"x10  10" x 10  5" x 10reps x 2 sets  10"x10  d/c             TA with marches 15x ea 2#  20 x 2  2#   20 x 2 ,       20x           TA with bridges 15x  10 x 2   2 x 10  15x 2    10 x 3           TA with heel slides 12x ea  15x ea  2 x10 each side  2x15 ea side               Clamshells in supine GTB 15x   GTB  2  X 10  GTB  2 x10  GTB   25x               Tandem gait 2 laps  2 laps  2 laps  2 laps  3 laps  3 laps          Semitandem foam    2 x 30"  NP  2x30"  30''x2  2 x 30"  2 x 30"          FTEC foam    2x30"  2 x 30"  2x30"  30''x2  2 x 30"    2 x 30"  HT/HN  2 x 30"          tandem stance    2 x 30"  2 x30"  2x30"      2 x 30"          alt toe taps    2 x 20  2 x 20  2#  20x2 from  foam  2x10 from foam    2 x10  foam          abdominal brace + knee ext + SLR      2 x 10  d/c              high knee marches      forward/ lateral  2 laps each        3 laps          SLS unilateral UE      2 x 30"  2x30"  30''x2             Figure 4 stretch in supine - B       60 sec ea               Hamstring stretch in sitting                60 secx2        SB marches        15xea  2x10  20x 2  20x2          SB LAQ        15xea  2x10  2 x 10  20 x2          TB step outs        10x2                STS         2X10             Ambulation w/ 360 turns      2 laps 2 laps      Piriformis stretch L      2 x 30"  2x30"     nustep       10 min   L6      Ambulation w/ HT/HN       1 lap each      Step ups fwd/lat       20x ea      SKC        30"x2     Isometric hip add/abd                3"x15 ea

## 2018-04-10 ENCOUNTER — OFFICE VISIT (OUTPATIENT)
Dept: PHYSICAL THERAPY | Facility: CLINIC | Age: 73
End: 2018-04-10
Payer: MEDICARE

## 2018-04-10 DIAGNOSIS — M25.552 LEFT HIP PAIN: ICD-10-CM

## 2018-04-10 DIAGNOSIS — M51.36 DEGENERATIVE DISC DISEASE, LUMBAR: Primary | ICD-10-CM

## 2018-04-10 PROCEDURE — 97110 THERAPEUTIC EXERCISES: CPT

## 2018-04-10 PROCEDURE — 97112 NEUROMUSCULAR REEDUCATION: CPT

## 2018-04-10 NOTE — PROGRESS NOTES
Daily Note     Today's date: 4/10/2018  Patient name: Lalia Cerda  : 1945  MRN: [id number]  Referring provider: Petros Saucedo MD  Dx:   Encounter Diagnosis     ICD-10-CM    1  Degenerative disc disease, lumbar M51 36    2  Left hip pain M25 552        Start Time: 1000  Stop Time: 1100  Total time in clinic (min): 60 minutes    Subjective: Patient reports that he went for a walk yesterday and did the best he has done in a long time, walked for 20 minutes  Patient reports that his hip and back are a little sore, but not bad at the moment        Objective: See treatment diary below    Precautions: C3-7 cervical fusion, adenocarcinoma of the prostate, COPD, LBP with spondylosis and spondylolisthesis     Daily Treatment Diary     6 MWT: 3119 with no AD, noted SOB  Gait speed: 20 ft/5 81 sec = 3 44 ft/sec    Exercise Diary  2/2  2/6  2/8  2/13  2/15/18  2/20  2/22  3/6  4/10     Hip IR/ER   10" x 10  5" x 10reps x 2 sets  10"x10  d/c        15x L     TA with marches 15x ea 2#  20 x 2  2#   20 x 2 ,       20x           TA with bridges 15x  10 x 2   2 x 10  15x 2    10 x 3      15x2     TA with knee extension, lifting  12x ea  15x ea  2 x10 each side  2x15 ea side         15xea     Clamshells in supine GTB 15x   GTB  2  X 10  GTB  2 x10  GTB   25x               Tandem gait 2 laps  2 laps  2 laps  2 laps  3 laps  3 laps          Semitandem foam    2 x 30"  NP  2x30"  30''x2  2 x 30"  2 x 30"          FTEC foam    2x30"  2 x 30"  2x30"  30''x2  2 x 30"    2 x 30"  HT/HN  2 x 30"    2x30"      tandem stance    2 x 30"  2 x30"  2x30"      2 x 30"          alt toe taps    2 x 20  2 x 20  2#  20x2 from  foam  2x10 from foam    2 x10  foam         Hip 3 way      2 x 10  d/c          10x ea standing on right  OTB    high knee marches      forward/ lateral  2 laps each        3 laps          SLS unilateral UE      2 x 30"  2x30"  30''x2             Figure 4 stretch in sitting, left       60 sec ea          60"    Hamstring stretch in sitting                60 secx2  60"      SB marches        15xea  2x10  20x 2  20x2          SB LAQ        15xea  2x10  2 x 10  20 x2          TB step outs        10x2          OTB  10x2      STS         2X10             Ambulation w/ 360 turns      2 laps 2 laps      Piriformis stretch L      2 x 30"  2x30" 60"    nustep       10 min   L6  8 min  L5    Ambulation w/ HT/HN       1 lap each      Step ups fwd/lat       20x ea      SKC        30"x2     Isometric hip add/abd                3"x15 ea       Manual: left hip distraction with IR and ER                   Assessment: Tolerated treatment fait with a mild increase in hip and back pain following the 6 MWT, but he demonstrates signficant improvement in gait speed and endurance per the 6 MWT  Patient with improved symptoms by end of session, good exercise technique noted throughout visit  Patient would benefit from continued PT  Plan: Progress treatment as tolerated  Patient to perform PT 1x per week for 4 weeks  Plan to attempt hip 3 way with OTB next session

## 2018-04-14 DIAGNOSIS — I25.10 CORONARY ARTERY DISEASE INVOLVING NATIVE CORONARY ARTERY OF NATIVE HEART WITHOUT ANGINA PECTORIS: Primary | ICD-10-CM

## 2018-04-16 RX ORDER — ATORVASTATIN CALCIUM 40 MG/1
TABLET, FILM COATED ORAL
Qty: 90 TABLET | Refills: 5 | Status: SHIPPED | OUTPATIENT
Start: 2018-04-16 | End: 2019-04-17 | Stop reason: SDUPTHER

## 2018-04-17 ENCOUNTER — OFFICE VISIT (OUTPATIENT)
Dept: PHYSICAL THERAPY | Facility: CLINIC | Age: 73
End: 2018-04-17
Payer: MEDICARE

## 2018-04-17 ENCOUNTER — APPOINTMENT (OUTPATIENT)
Dept: PHYSICAL THERAPY | Facility: CLINIC | Age: 73
End: 2018-04-17
Payer: MEDICARE

## 2018-04-17 DIAGNOSIS — M25.552 LEFT HIP PAIN: ICD-10-CM

## 2018-04-17 DIAGNOSIS — M51.36 DEGENERATIVE DISC DISEASE, LUMBAR: Primary | ICD-10-CM

## 2018-04-17 PROCEDURE — 97112 NEUROMUSCULAR REEDUCATION: CPT

## 2018-04-17 PROCEDURE — 97110 THERAPEUTIC EXERCISES: CPT

## 2018-04-17 NOTE — PROGRESS NOTES
Daily Note     Today's date: 2018  Patient name: Denny Sim  : 1945  MRN: [id number]  Referring provider: Preet Hairston MD  Dx:   Encounter Diagnosis     ICD-10-CM    1  Degenerative disc disease, lumbar M51 36    2  Left hip pain M25 552        Start Time: 1000  Stop Time: 1100  Total time in clinic (min): 60 minutes    Subjective: Patient reports he is doing well with the exercises at home, reports 1/10 LBP and left hip pain upon start of session        Objective: See treatment diary below    Precautions: C3-7 cervical fusion, adenocarcinoma of the prostate, COPD, LBP with spondylosis and spondylolisthesis     Daily Treatment Diary     Exercise Diary   2/20  2/22  3/6  4/10  4/17   Seated hip IR/ER        15x L 15xL with OTB   TA with marches  20x           TA with bridges  10 x 3      15x2  30x   TA with knee extension, lifting        15xea     Quadruped with alt UE lift     10x ea arm   Cat-camel        Supine on 1/2 FR with TA and marches     15x ea   Clamshells in supine             Tandem gait  3 laps  3 laps          Semitandem foam  2 x 30"  2 x 30"          FTEC foam  2 x 30"    2 x 30"  HT/HN  2 x 30"    2x30" 3x30"    tandem stance    2 x 30"      foam 30"x2    alt toe taps    2 x10  foam         Hip 3 way        10x ea standing on right  OTB 10x ea standing on right    high knee marches    3 laps          SLS unilateral UE             Figure 4 stretch in sitting, left        60"   60"   Hamstring stretch in sitting      60 secx2  60"  60" ea    SB marches  20x 2  20x2          SB LAQ  2 x 10  20 x2          TB step outs        OTB  10x2  OTB 10x ea    STS             Ambulation w/ 360 turns 2 laps 2 laps      Piriformis stretch L 2 x 30"  2x30" 60" 60" x 2   nustep  10 min   L6  8 min  L5    Ambulation w/ HT/HN  1 lap each      Step ups fwd/lat  20x ea      SKC   30"x2     Isometric hip add/abd      3"x15 ea       Manual: left hip distraction with IR and ER     3 min Assessment: Tolerated treatment well with increased activity tolerance this visit  Attempted quadruped with alternating UE elevation with difficulty maintaining core stability to limit trunk movement, able to improve with cues  Patient also presents with difficulty maintaining balance and core stability with supine on the 1/2 foam roller with LE marching  Patient with improved standing balance, progressing to tandem on the foam with minimal postural sway  Patient would benefit from continued PT  Plan: Progress treatment as tolerated

## 2018-04-18 ENCOUNTER — APPOINTMENT (OUTPATIENT)
Dept: PHYSICAL THERAPY | Facility: CLINIC | Age: 73
End: 2018-04-18
Payer: MEDICARE

## 2018-04-24 ENCOUNTER — APPOINTMENT (OUTPATIENT)
Dept: PHYSICAL THERAPY | Facility: CLINIC | Age: 73
End: 2018-04-24
Payer: MEDICARE

## 2018-04-25 ENCOUNTER — OFFICE VISIT (OUTPATIENT)
Dept: PHYSICAL THERAPY | Facility: CLINIC | Age: 73
End: 2018-04-25
Payer: MEDICARE

## 2018-04-25 DIAGNOSIS — M25.552 LEFT HIP PAIN: ICD-10-CM

## 2018-04-25 DIAGNOSIS — M51.36 DEGENERATIVE DISC DISEASE, LUMBAR: Primary | ICD-10-CM

## 2018-04-25 PROCEDURE — 97112 NEUROMUSCULAR REEDUCATION: CPT

## 2018-04-25 PROCEDURE — 97110 THERAPEUTIC EXERCISES: CPT

## 2018-04-25 NOTE — PROGRESS NOTES
Daily Note     Today's date: 2018  Patient name: Ren Murphy  : 1945  MRN: [id number]  Referring provider: Karin Murray MD  Dx:   Encounter Diagnosis     ICD-10-CM    1  Degenerative disc disease, lumbar M51 36    2  Left hip pain M25 552        Start Time: 1200  Stop Time: 1255  Total time in clinic (min): 55 minutes    Subjective: Patient reports his hip is really bothering him today  He was trying to adjust a heating pad under his back over the weekend and thinks he pulled a muscle  Objective: See treatment diary below  Precautions: C3-7 cervical fusion, adenocarcinoma of the prostate, COPD, LBP with spondylosis and spondylolisthesis     Daily Treatment Diary     Exercise Diary         Seated hip IR/ER  15xL with OTB       TA with bridges 30x        Quadruped with alt UE lift, then alt LE lift 10x ea arm  5x ea leg       Cat-camel 10x       Supine on 1/2 FR with TA with alt UE  15x ea       Supine on 1/2 FR with TA and marches 15x ea       Clamshells in supine        Tandem gait         Semitandem foam         FTEC foam 30"x2        tandem stance Foam 30"x2        alt toe taps        Hip 3 way OTB 12x ea standing on RLE        high knee marches         SLS unilateral UE        Figure 4 stretch in sitting, left        Hamstring stretch in sitting         SB marches         SB LAQ         TB step outs GTB  10x ea        STS        Ambulation w/ 360 turns        Piriformis stretch L 60"x2       nustep        Ambulation w/ HT/HN        Step ups fwd/lat        SKC        Isometric hip add/abd        Manual: left hip distraction with IR and ER 3 min x 2           Assessment: Tolerated treatment well with a reduction in hip pain following long axis distraction with IR and Er  Patient tolerated progression of core stabilization exercises with difficulty and required cues for exercise technique   Patient demonstrates improved balance overall and would benefit from continued PT      Plan: Progress treatment as tolerated

## 2018-05-01 ENCOUNTER — OFFICE VISIT (OUTPATIENT)
Dept: PHYSICAL THERAPY | Facility: CLINIC | Age: 73
End: 2018-05-01
Payer: MEDICARE

## 2018-05-01 DIAGNOSIS — M51.36 DEGENERATIVE DISC DISEASE, LUMBAR: Primary | ICD-10-CM

## 2018-05-01 DIAGNOSIS — M25.552 LEFT HIP PAIN: ICD-10-CM

## 2018-05-01 PROCEDURE — 97110 THERAPEUTIC EXERCISES: CPT

## 2018-05-01 PROCEDURE — 97112 NEUROMUSCULAR REEDUCATION: CPT

## 2018-05-01 NOTE — PROGRESS NOTES
Daily Note     Today's date: 2018  Patient name: Sadie Quispe  : 1945  MRN: [id number]  Referring provider: Raymundo Ty MD  Dx:   Encounter Diagnosis     ICD-10-CM    1  Degenerative disc disease, lumbar M51 36    2  Left hip pain M25 552        Start Time: 0900  Stop Time: 1000  Total time in clinic (min): 60 minutes    Subjective: Patient reports that his back is doing better but his hip hurts this morning  Objective: See treatment diary below  Precautions: C3-7 cervical fusion, adenocarcinoma of the prostate, COPD, LBP with spondylosis and spondylolisthesis     Daily Treatment Diary     Exercise Diary        Seated hip IR/ER  15xL with OTB 15x L with OTB      TA with bridges 30x  Single leg, maintaining even pelvis, 15x ea      Quadruped with alt UE lift, then alt LE lift 10x ea arm  5x ea leg 10x ea arm  5x ea leg      Cat-camel 10x 15x      Supine on 1/2 FR with TA with alt UE  15x ea 15x ea      Supine on 1/2 FR with TA and marches 15x ea 20x ea      Clamshells in supine        Tandem gait         Semitandem foam         FTEC foam 30"x2 30"x1  semitandem 30"x1 ea       tandem stance, foam 30"x2 30"x2       alt toe taps        Hip 3 way OTB 12x ea standing on RLE OTB 12x ea standing on RLE       high knee marches         SLS unilateral UE        Figure 4 stretch in sitting, left        Hamstring stretch in sitting         SB marches         SB LAQ         TB step outs GTB  10x ea GTB 15x ea       STS        Ambulation w/ 360 turns        Piriformis stretch L 60"x2       nustep        Ambulation w/ HT/HN        Step ups fwd/lat        SKC        Isometric hip add/abd        Hip hikes: Standing on right  4" step  15x       Manual: left hip distraction with IR and ER 3 min x 2 3 minx2            Assessment: Tolerated treatment well with reduction in hip pain and ability to progress repetitions of exercises   Attempted hip hikes this session, patient demonstrated fair exercise form but complained of stress on the right ankle  Patient exhibited good technique with therapeutic exercises and would benefit from continued PT  Plan: Perform a progress update next session with potential discharge

## 2018-05-08 ENCOUNTER — EVALUATION (OUTPATIENT)
Dept: PHYSICAL THERAPY | Facility: CLINIC | Age: 73
End: 2018-05-08
Payer: MEDICARE

## 2018-05-08 ENCOUNTER — APPOINTMENT (OUTPATIENT)
Dept: PHYSICAL THERAPY | Facility: CLINIC | Age: 73
End: 2018-05-08
Payer: MEDICARE

## 2018-05-08 DIAGNOSIS — M51.36 DEGENERATIVE DISC DISEASE, LUMBAR: Primary | ICD-10-CM

## 2018-05-08 DIAGNOSIS — M25.552 LEFT HIP PAIN: ICD-10-CM

## 2018-05-08 PROCEDURE — G8979 MOBILITY GOAL STATUS: HCPCS

## 2018-05-08 PROCEDURE — G8980 MOBILITY D/C STATUS: HCPCS

## 2018-05-08 PROCEDURE — 97112 NEUROMUSCULAR REEDUCATION: CPT

## 2018-05-08 NOTE — PROGRESS NOTES
PT Discharge Summary    Today's date: 2018  Patient name: Katty Patel  : 1945  MRN: [id number]  Referring provider: Alanis Almanza MD  Dx:   Encounter Diagnoses   Name Primary?  Degenerative disc disease, lumbar Yes    Left hip pain        Start Time: 1500  Stop Time: 1550  Total time in clinic (min): 50 minutes    Assessment    Patient is not irritable  Assessment details: Progress update completed this session  Compared to initial evaluation patient demonstrates significantly improved LE strength, balance per the mCSTIB and the Presley, gait speed and endurance per the 6 MWT, and decreased hip and back pain per patient report  Patient is independent with his HEP and has met most of his goals, he is in agreement with discharge from PT to update HEP at this time  Understanding of Dx/Px/POC: good  Goals  STG 1: Patient will complete the Presley Balance Assessment with a score of 53/56 in 4 weeks - MET  STG 2: Patient will increase LLE strength by 1/2 MMT grade in 4 weeks - MET  STG 3: Pt  Will improve endurance demonstrated by increasing 6 minute walk test by >160ft by 4 weeks in order to facilitate participation in community walks  - MET  STG 4: Patient will report LBP <4/10 at worst in 4 weeks - PARTIALLY MET  STG 5: Pt  Will consistently report <2/10 pain following recreational walks in 4 weeks - MET    Plan  Treatment plan discussed with: patient  Plan details: Discharge from physical therapy to updated [facility]  Subjective Evaluation    History of Present Illness  Mechanism of injury: Patient reports that he has improved in balance, pain, and core strength  He notes he has seen a real connection between exercise and reduction in pain   Patient reports his pain is less intense and does not last all   Not a recurrent problem   Quality of life: excellent    Pain  Current pain ratin  At best pain ratin  At worst pain ratin (Worse in the morning)  Location: low back  Quality: sharp (Left hip/buttock pain as well that travels down the left lateral thigh)    Social Support  Stairs in house: yes (does not go upstairs)   Lives in: condominium  Lives with: spouse    Employment status: not working  Hand dominance: right      Diagnostic Tests  X-ray: abnormal (OA in the left hip)  Treatments  Current treatment: physical therapy  Patient Goals  Patient goals for therapy: improved balance, decreased pain, return to sport/leisure activities, independence with ADLs/IADLs and increased strength          Objective     Static Posture     Head  Forward  Cervical Spine  Shifted right  Shoulders  Asymmetric shoulders and rounded  Passive Range of Motion   Left Hip   Flexion: 110 degrees   Extension: 5 degrees   External rotation (90/90): 60 degrees     Ambulation     Ambulation: Level Surfaces   Ambulation without assistive device: independent    Observational Gait   Gait: asymmetric   Left foot contact pattern: heel to toe  Right foot contact pattern: heel to toe  Left arm swing: decreased  Right arm swing: within functional limits    Quality of Movement During Gait     Pelvis    Pelvis (Left): Positive Trendelenburg  Ankle    Ankle (Left): Positive pronated  Ankle (Right): Positive pronated       PT/OT Neuro Exam  Neurologic Exam   Balance Test    6 Minute Walk Test (ft): 1100 ft with no AD   2 Minute Walk Test (ft): NT   Gait Speed (ft/s): 20 feet/5 5 sec = 3 64 ft/sec   5x Sit To Stand (s): 12 52 sec with no UE   Presley 53/56       MCTSIB Number of Seconds   Feet Together, Eyes Open 30    Feet Together, Eyes Closed 30   Feet Together, Eyes Open Foam 30   Feet Together, Eyes Closed Foam 30       Manual Muscle Testing - Hip Left Right   Flexion 4+ 5   Extension 4- 4+   Abduction 4- 5   External Rotation NT NT     Manual Muscle Testing - Knee Left Right   Flexion 5 5   Extension 5 5     Manual Muscle Testing - Ankle Left Right   Doriflexion 5 5   Plantarflexion NT NT        Transfers    Sit To Stand Independent   W/C To Bed Independent   Sit To Supine Independent   Roll Independent       Precautions: C3-7 cervical fusion, adenocarcinoma of the prostate, COPD, LBP with spondylosis and spondylolisthesis   Daily Treatment Diary       Exercise Diary  4/25 5/1 5/8     Seated hip IR/ER  15xL with OTB 15x L with OTB      TA with bridges 30x  Single leg, maintaining even pelvis, 15x ea      Quadruped with alt UE lift, then alt LE lift 10x ea arm  5x ea leg 10x ea arm  5x ea leg 10x ea arm     Cat-camel 10x 15x      Supine on 1/2 FR with TA with alt UE  15x ea 15x ea      Supine on 1/2 FR with TA and marches 15x ea 20x ea      Clamshells in supine        Tandem gait         Semitandem foam         FTEC foam 30"x2 30"x1  semitandem 30"x1 ea       tandem stance, foam 30"x2 30"x2       alt toe taps        Hip 3 way OTB 12x ea standing on RLE OTB 12x ea standing on RLE       high knee marches         SLS unilateral UE        Figure 4 stretch in sitting, left        Hamstring stretch in sitting         SB marches         SB LAQ         TB step outs GTB  10x ea GTB 15x ea       STS        Ambulation w/ 360 turns        Piriformis stretch L 60"x2       nustep        Ambulation w/ HT/HN        Step ups fwd/lat        SKC        Isometric hip add/abd        Hip hikes: Standing on right  4" step  15x       Manual: left hip distraction with IR and ER 3 min x 2 3 minx2

## 2018-05-09 ENCOUNTER — APPOINTMENT (OUTPATIENT)
Dept: PHYSICAL THERAPY | Facility: CLINIC | Age: 73
End: 2018-05-09
Payer: MEDICARE

## 2018-05-15 ENCOUNTER — TRANSCRIBE ORDERS (OUTPATIENT)
Dept: LAB | Facility: CLINIC | Age: 73
End: 2018-05-15

## 2018-05-15 ENCOUNTER — APPOINTMENT (OUTPATIENT)
Dept: LAB | Facility: CLINIC | Age: 73
End: 2018-05-15
Payer: MEDICARE

## 2018-05-15 DIAGNOSIS — F31.60 MIXED BIPOLAR I DISORDER (HCC): Primary | ICD-10-CM

## 2018-05-15 DIAGNOSIS — F31.60 MIXED BIPOLAR I DISORDER (HCC): ICD-10-CM

## 2018-05-15 LAB
BUN SERPL-MCNC: 21 MG/DL (ref 5–25)
CREAT SERPL-MCNC: 1.2 MG/DL (ref 0.6–1.3)
GFR SERPL CREATININE-BSD FRML MDRD: 61 ML/MIN/1.73SQ M
LITHIUM SERPL-SCNC: 0.8 MMOL/L (ref 0.5–1)
TSH SERPL DL<=0.05 MIU/L-ACNC: 1.54 UIU/ML (ref 0.36–3.74)

## 2018-05-15 PROCEDURE — 36415 COLL VENOUS BLD VENIPUNCTURE: CPT

## 2018-05-15 PROCEDURE — 84520 ASSAY OF UREA NITROGEN: CPT

## 2018-05-15 PROCEDURE — 82565 ASSAY OF CREATININE: CPT

## 2018-05-15 PROCEDURE — 84443 ASSAY THYROID STIM HORMONE: CPT

## 2018-05-15 PROCEDURE — 80178 ASSAY OF LITHIUM: CPT

## 2018-05-16 ENCOUNTER — HOSPITAL ENCOUNTER (OUTPATIENT)
Dept: RADIOLOGY | Facility: HOSPITAL | Age: 73
Discharge: HOME | End: 2018-05-16
Payer: MEDICARE

## 2018-05-16 DIAGNOSIS — I71.4 ABDOMINAL ANEURYSM (HCC): ICD-10-CM

## 2018-05-16 DIAGNOSIS — Z85.46 PERSONAL HISTORY OF MALIGNANT NEOPLASM OF PROSTATE: ICD-10-CM

## 2018-05-16 PROCEDURE — 74176 CT ABD & PELVIS W/O CONTRAST: CPT

## 2018-05-16 PROCEDURE — 71250 CT THORAX DX C-: CPT

## 2018-06-02 ENCOUNTER — TRANSCRIBE ORDERS (OUTPATIENT)
Dept: LAB | Facility: CLINIC | Age: 73
End: 2018-06-02

## 2018-06-02 ENCOUNTER — APPOINTMENT (OUTPATIENT)
Dept: LAB | Facility: CLINIC | Age: 73
End: 2018-06-02
Payer: MEDICARE

## 2018-06-02 DIAGNOSIS — Z85.46 PERSONAL HISTORY OF MALIGNANT NEOPLASM OF PROSTATE: Primary | ICD-10-CM

## 2018-06-02 DIAGNOSIS — Z85.118 PERSONAL HISTORY OF MALIGNANT NEOPLASM OF BRONCHUS AND LUNG: ICD-10-CM

## 2018-06-02 LAB
ALBUMIN SERPL BCP-MCNC: 3.6 G/DL (ref 3.5–5)
ALP SERPL-CCNC: 150 U/L (ref 46–116)
ALT SERPL W P-5'-P-CCNC: 36 U/L (ref 12–78)
ANION GAP SERPL CALCULATED.3IONS-SCNC: 5 MMOL/L (ref 4–13)
AST SERPL W P-5'-P-CCNC: 23 U/L (ref 5–45)
BASOPHILS # BLD AUTO: 0.07 THOUSANDS/ΜL (ref 0–0.1)
BASOPHILS NFR BLD AUTO: 1 % (ref 0–1)
BILIRUB SERPL-MCNC: 0.66 MG/DL (ref 0.2–1)
BUN SERPL-MCNC: 18 MG/DL (ref 5–25)
CALCIUM SERPL-MCNC: 9.2 MG/DL (ref 8.3–10.1)
CHLORIDE SERPL-SCNC: 108 MMOL/L (ref 100–108)
CO2 SERPL-SCNC: 29 MMOL/L (ref 21–32)
CREAT SERPL-MCNC: 1.17 MG/DL (ref 0.6–1.3)
EOSINOPHIL # BLD AUTO: 0.41 THOUSAND/ΜL (ref 0–0.61)
EOSINOPHIL NFR BLD AUTO: 4 % (ref 0–6)
ERYTHROCYTE [DISTWIDTH] IN BLOOD BY AUTOMATED COUNT: 14.7 % (ref 11.6–15.1)
GFR SERPL CREATININE-BSD FRML MDRD: 63 ML/MIN/1.73SQ M
GLUCOSE P FAST SERPL-MCNC: 103 MG/DL (ref 65–99)
HCT VFR BLD AUTO: 42.5 % (ref 36.5–49.3)
HGB BLD-MCNC: 13.7 G/DL (ref 12–17)
IMM GRANULOCYTES # BLD AUTO: 0.04 THOUSAND/UL (ref 0–0.2)
IMM GRANULOCYTES NFR BLD AUTO: 0 % (ref 0–2)
LYMPHOCYTES # BLD AUTO: 1.92 THOUSANDS/ΜL (ref 0.6–4.47)
LYMPHOCYTES NFR BLD AUTO: 21 % (ref 14–44)
MCH RBC QN AUTO: 29.1 PG (ref 26.8–34.3)
MCHC RBC AUTO-ENTMCNC: 32.2 G/DL (ref 31.4–37.4)
MCV RBC AUTO: 90 FL (ref 82–98)
MONOCYTES # BLD AUTO: 0.82 THOUSAND/ΜL (ref 0.17–1.22)
MONOCYTES NFR BLD AUTO: 9 % (ref 4–12)
NEUTROPHILS # BLD AUTO: 6.06 THOUSANDS/ΜL (ref 1.85–7.62)
NEUTS SEG NFR BLD AUTO: 65 % (ref 43–75)
NRBC BLD AUTO-RTO: 0 /100 WBCS
PLATELET # BLD AUTO: 183 THOUSANDS/UL (ref 149–390)
PMV BLD AUTO: 11.6 FL (ref 8.9–12.7)
POTASSIUM SERPL-SCNC: 4.5 MMOL/L (ref 3.5–5.3)
PROT SERPL-MCNC: 6.9 G/DL (ref 6.4–8.2)
RBC # BLD AUTO: 4.7 MILLION/UL (ref 3.88–5.62)
SODIUM SERPL-SCNC: 142 MMOL/L (ref 136–145)
WBC # BLD AUTO: 9.32 THOUSAND/UL (ref 4.31–10.16)

## 2018-06-02 PROCEDURE — 36415 COLL VENOUS BLD VENIPUNCTURE: CPT

## 2018-06-02 PROCEDURE — 85025 COMPLETE CBC W/AUTO DIFF WBC: CPT

## 2018-06-02 PROCEDURE — 80053 COMPREHEN METABOLIC PANEL: CPT

## 2018-06-05 RX ORDER — METOPROLOL SUCCINATE 25 MG/1
TABLET, EXTENDED RELEASE ORAL
COMMUNITY
Start: 2018-04-28 | End: 2018-06-16 | Stop reason: SDUPTHER

## 2018-06-06 ENCOUNTER — OFFICE VISIT (OUTPATIENT)
Dept: VASCULAR SURGERY | Facility: CLINIC | Age: 73
End: 2018-06-06
Payer: MEDICARE

## 2018-06-06 ENCOUNTER — TELEPHONE (OUTPATIENT)
Dept: VASCULAR SURGERY | Facility: CLINIC | Age: 73
End: 2018-06-06

## 2018-06-06 VITALS
DIASTOLIC BLOOD PRESSURE: 72 MMHG | SYSTOLIC BLOOD PRESSURE: 136 MMHG | HEIGHT: 67 IN | WEIGHT: 144 LBS | HEART RATE: 64 BPM | BODY MASS INDEX: 22.6 KG/M2 | TEMPERATURE: 96.3 F | RESPIRATION RATE: 18 BRPM

## 2018-06-06 DIAGNOSIS — I71.4 ANEURYSM OF INFRARENAL ABDOMINAL AORTA (HCC): Primary | Chronic | ICD-10-CM

## 2018-06-06 PROCEDURE — 99214 OFFICE O/P EST MOD 30 MIN: CPT | Performed by: SURGERY

## 2018-06-06 RX ORDER — LITHIUM CARBONATE 300 MG/1
TABLET, FILM COATED, EXTENDED RELEASE ORAL
COMMUNITY
Start: 2018-05-24 | End: 2018-06-06 | Stop reason: SDUPTHER

## 2018-06-06 NOTE — ASSESSMENT & PLAN NOTE
5 2 cm abdominal aortic aneurysm based upon recent CT scan  We discussed the findings along with the indications for treatment, the treatment options available and the associated risks and benefits  We will plan on proceeding with CT angiogram to better define anatomy in preparation for endovascular repair  Will also obtain cardiac clearance in preparation for surgery

## 2018-06-06 NOTE — PROGRESS NOTES
Assessment/Plan:    Aneurysm of infrarenal abdominal aorta (HCC)  5 2 cm abdominal aortic aneurysm based upon recent CT scan  We discussed the findings along with the indications for treatment, the treatment options available and the associated risks and benefits  We will plan on proceeding with CT angiogram to better define anatomy in preparation for endovascular repair  Will also obtain cardiac clearance in preparation for surgery  Diagnoses and all orders for this visit:    Aneurysm of infrarenal abdominal aorta ([address] )  -     CTA abdomen pelvis w wo contrast; Future  -     Ambulatory referral to Cardiology; Future    Other orders  -     metoprolol succinate (TOPROL-XL) 25 mg 24 hr tablet;   -     Discontinue: lithium carbonate (LITHOBID) 300 mg CR tablet;           Subjective:      Patient ID: Perla Sweet is a 81 y o  male  Patient had CT chest/abd/pelvis on 5/16  Patient has known AAA  Patient denies stomach pain or post prandial pain  He has chronic back pain  He offers no other complaints or concerns at this time  72-year-old with known infrarenal abdominal aortic aneurysm presents after recent CT scan  He denies any abdominal pain  He does have some chronic lower back pain with the history of spinal surgery  He has also been diagnosed with bursitis of the left hip  Otherwise he remains active with no major limitations  Noncontrast CT scan 05/16/2018 with infrarenal abdominal aortic aneurysm of maximum diameter of 5 2 cm  On comparison to previous study 11/16/2017 at the same position the aneurysm measured 5 0 cm in maximum diameter  Overall office visit of 30 min of which approximately 20 min was spent with patient discussion as noted above          The following portions of the patient's history were reviewed and updated as appropriate: allergies, current medications, past family history, past medical history, past social history, past surgical history and problem list     Review of Systems   Constitutional: Negative  HENT: Negative  Eyes: Negative  Respiratory: Negative  Cardiovascular: Negative  Gastrointestinal: Negative  Endocrine: Negative  Genitourinary: Negative  Musculoskeletal: Negative  Skin: Negative  Allergic/Immunologic: Negative  Neurological: Negative  Hematological: Negative  Psychiatric/Behavioral: Negative            Objective:      /72 (BP Location: Right arm, Patient Position: Sitting, Cuff Size: Adult)   Pulse 64   Temp (!) 96 3 °F (35 7 °C) (Tympanic)   Resp 18   Ht 5' 7" (1 702 m)   Wt 65 3 kg (144 lb)   BMI 22 55 kg/m²          Physical Exam

## 2018-06-06 NOTE — TELEPHONE ENCOUNTER
Spoke with Moe Rios at Cardiology  Patient was recently seen in January may not need to be seen  Was told to give to nursing and if he needs an appt then they will call him to make

## 2018-06-06 NOTE — PATIENT INSTRUCTIONS
Aneurysm of infrarenal abdominal aorta (HCC)  5 2 cm abdominal aortic aneurysm based upon recent CT scan  We discussed the findings along with the indications for treatment, the treatment options available and the associated risks and benefits  We will plan on proceeding with CT angiogram to better define anatomy in preparation for endovascular repair  Will also obtain cardiac clearance in preparation for surgery

## 2018-06-06 NOTE — LETTER
June 6, 2018     Pauline Lowery MD  [address] [address]    Patient: Doris Chavira   YOB: 1945   Date of Visit: 6/6/2018       Dear Dr Epi Swann:    Thank you for referring Geraldo Phoenix to me for evaluation  Below are the relevant portions of my assessment and plan of care  Aneurysm of infrarenal abdominal aorta (HCC)  5 2 cm abdominal aortic aneurysm based upon recent CT scan  We discussed the findings along with the indications for treatment, the treatment options available and the associated risks and benefits  We will plan on proceeding with CT angiogram to better define anatomy in preparation for endovascular repair  Will also obtain cardiac clearance in preparation for surgery  If you have questions, please do not hesitate to call me  I look forward to following Mar Alegre along with you           Sincerely,        Syeda Lees MD        CC: Shaun Prado MD

## 2018-06-08 ENCOUNTER — HOSPITAL ENCOUNTER (OUTPATIENT)
Dept: RADIOLOGY | Facility: HOSPITAL | Age: 73
Discharge: HOME | End: 2018-06-08
Attending: SURGERY
Payer: MEDICARE

## 2018-06-08 ENCOUNTER — OFFICE VISIT (OUTPATIENT)
Dept: INTERNAL MEDICINE CLINIC | Facility: CLINIC | Age: 73
End: 2018-06-08
Payer: MEDICARE

## 2018-06-08 VITALS
HEIGHT: 68 IN | TEMPERATURE: 97.5 F | BODY MASS INDEX: 28.07 KG/M2 | WEIGHT: 185.2 LBS | SYSTOLIC BLOOD PRESSURE: 150 MMHG | OXYGEN SATURATION: 95 % | DIASTOLIC BLOOD PRESSURE: 96 MMHG | HEART RATE: 73 BPM

## 2018-06-08 DIAGNOSIS — E78.2 MIXED HYPERLIPIDEMIA: ICD-10-CM

## 2018-06-08 DIAGNOSIS — I48.91 NEW ONSET ATRIAL FIBRILLATION (HCC): ICD-10-CM

## 2018-06-08 DIAGNOSIS — I71.4 ANEURYSM OF INFRARENAL ABDOMINAL AORTA (HCC): Chronic | ICD-10-CM

## 2018-06-08 DIAGNOSIS — C61 ADENOCARCINOMA OF PROSTATE (HCC): ICD-10-CM

## 2018-06-08 DIAGNOSIS — I10 ESSENTIAL HYPERTENSION: ICD-10-CM

## 2018-06-08 DIAGNOSIS — K21.9 GASTROESOPHAGEAL REFLUX DISEASE, ESOPHAGITIS PRESENCE NOT SPECIFIED: Primary | ICD-10-CM

## 2018-06-08 DIAGNOSIS — Z23 NEED FOR SHINGLES VACCINE: ICD-10-CM

## 2018-06-08 PROCEDURE — 74174 CTA ABD&PLVS W/CONTRAST: CPT

## 2018-06-08 PROCEDURE — 99214 OFFICE O/P EST MOD 30 MIN: CPT | Performed by: INTERNAL MEDICINE

## 2018-06-08 RX ADMIN — IOHEXOL 100 ML: 350 INJECTION, SOLUTION INTRAVENOUS at 15:21

## 2018-06-08 NOTE — PROGRESS NOTES
Assessment/Plan:    Gastroesophageal reflux disease   GERD: can do trial off the proton pump inhibitor to see if symptoms of heartburn return  Try behavioral changes to reduce GERD including watching diet and avoiding foods that cause symptoms  Common foods that cause symptoms are coffee, alcohol, chocolate, spicy foods, and fatty foods  Try to titrate down med slowly to avoid possible rebound hyperacidity  Since patient has a lot of the 40s, will try every other day dosing of the 40 mg  If patient has symptoms on the day he is not taking the medication, then will try 20 mg daily    Essential hypertension  A little up today, continue meds    Mixed hyperlipidemia   Continue with diet, exercise, and statin    Aden[facility] of prostate ([facility] )   PSA in March was less than 0 1, follow-up Urology    New onset atrial fibrillation ([facility] )   Feels in normal sinus rhythm on exam with occasional ectopy       Diagnoses and all orders for this visit:    Gastroesophageal reflux disease, esophagitis presence not specified    Essential hypertension    Mixed hyperlipidemia    Adenocarcinoma of prostate ([facility] )    New onset atrial fibrillation ([facility] )    Need for shingles vaccine  -     Zoster Vac Recomb Adjuvanted ([address]) 50 MCG SUSR; Inject 50 mcg into the shoulder, thigh, or buttocks once for 1 dose Repeat one dose in 2 to 6 months          Subjective:      Patient ID: Eduarda Toney is a 81 y o  male  GERD:  Patient taking omeprazole 40 mg daily, he has a lot of tablets left, no significant GERD problems,   Has occasional sensation of food getting stuck  hypertension: Patient tolerating blood pressure meds, no ankle swelling or constipation  No acute cardiopulmonary complaints     hyperlipidemia:  Patient tolerating atorvastatin, no significant muscle aches          The following portions of the patient's history were reviewed and updated as appropriate: allergies, current medications, past family history, past medical history, past social history, past surgical history and problem list     Review of Systems   Constitutional: Negative for chills, fatigue and fever  HENT: Negative for congestion, nosebleeds, postnasal drip, sore throat and trouble swallowing  Eyes: Negative for pain  Respiratory: Positive for shortness of breath (  With exertion)  Negative for cough, chest tightness and wheezing  Cardiovascular: Negative for chest pain, palpitations and leg swelling  Gastrointestinal: Negative for abdominal pain, constipation, diarrhea, nausea and vomiting  Endocrine: Negative for polydipsia and polyuria  Genitourinary: Negative for dysuria, flank pain and hematuria  Musculoskeletal: Negative for arthralgias  Skin: Negative for rash  Neurological: Negative for dizziness, tremors and headaches  Hematological: Does not bruise/bleed easily  Psychiatric/Behavioral: Negative for confusion and dysphoric mood  The patient is not nervous/anxious  Objective:      /96   Pulse 73   Temp 97 5 °F (36 4 °C)   Ht 5' 7 5" (1 715 m)   Wt 84 kg (185 lb 3 2 oz)   SpO2 95%   BMI 28 58 kg/m²          Physical Exam   Constitutional: He is oriented to person, place, and time  He appears well-developed and well-nourished  No distress  HENT:   Head: Normocephalic and atraumatic  Right Ear: External ear normal    Left Ear: External ear normal    Eyes: Conjunctivae are normal  No scleral icterus  Neck: Normal range of motion  Neck supple  No tracheal deviation present  No thyromegaly present  Cardiovascular: Normal rate, regular rhythm and normal heart sounds  Occasional extrasystoles are present  No murmur heard  Pulmonary/Chest: Effort normal and breath sounds normal  No respiratory distress  He has no wheezes  He has no rales  Abdominal: Soft  Bowel sounds are normal  There is no tenderness  There is no rebound and no guarding  Musculoskeletal: He exhibits no edema     Lymphadenopathy:     He has no cervical adenopathy  Neurological: He is alert and oriented to person, place, and time  Psychiatric: He has a normal mood and affect  His behavior is normal  Judgment and thought content normal    Vitals reviewed

## 2018-06-08 NOTE — PATIENT INSTRUCTIONS
Problem List Items Addressed This Visit     Adenocarcinoma of prostate [facility])      PSA in March was less than 0 1, follow-up Urology         Gastroesophageal reflux disease - Primary      GERD: can do trial off the proton pump inhibitor to see if symptoms of heartburn return  Try behavioral changes to reduce GERD including watching diet and avoiding foods that cause symptoms  Common foods that cause symptoms are coffee, alcohol, chocolate, spicy foods, and fatty foods  Try to titrate down med slowly to avoid possible rebound hyperacidity  Since patient has a lot of the 40s, will try every other day dosing of the 40 mg    If patient has symptoms on the day he is not taking the medication, then will try 20 mg daily         Mixed hyperlipidemia      Continue with diet, exercise, and statin         Essential hypertension     A little up today, continue meds         New onset atrial fibrillation (HCC)      Feels in normal sinus rhythm on exam with occasional ectopy           Other Visit Diagnoses     Need for shingles vaccine        Relevant Medications    Zoster Vac Recomb Adjuvanted ([address]) 50 MCG SUSR

## 2018-06-08 NOTE — ASSESSMENT & PLAN NOTE
GERD: can do trial off the proton pump inhibitor to see if symptoms of heartburn return  Try behavioral changes to reduce GERD including watching diet and avoiding foods that cause symptoms  Common foods that cause symptoms are coffee, alcohol, chocolate, spicy foods, and fatty foods  Try to titrate down med slowly to avoid possible rebound hyperacidity  Since patient has a lot of the 40s, will try every other day dosing of the 40 mg    If patient has symptoms on the day he is not taking the medication, then will try 20 mg daily

## 2018-06-12 ENCOUNTER — TELEPHONE (OUTPATIENT)
Dept: PULMONOLOGY | Facility: CLINIC | Age: 73
End: 2018-06-12

## 2018-06-12 NOTE — TELEPHONE ENCOUNTER
Patient calling to schedule follow up with Dr Thomas Hannah for September in [facility]  Once his schedule is out, can you please schedule

## 2018-06-14 ENCOUNTER — TELEPHONE (OUTPATIENT)
Dept: PULMONOLOGY | Facility: CLINIC | Age: 73
End: 2018-06-14

## 2018-06-14 NOTE — TELEPHONE ENCOUNTER
Went to set up appointment but already done        ----- Message from [facility] sent at 6/13/2018  8:18 AM EDT -----  Set up an appointment with Dr Darlene Hernandez for September when the schedule is opened

## 2018-06-16 DIAGNOSIS — I10 ESSENTIAL HYPERTENSION: Primary | ICD-10-CM

## 2018-06-16 RX ORDER — METOPROLOL SUCCINATE 25 MG/1
TABLET, EXTENDED RELEASE ORAL
Qty: 90 TABLET | Refills: 5 | Status: SHIPPED | OUTPATIENT
Start: 2018-06-16 | End: 2019-06-28 | Stop reason: SDUPTHER

## 2018-06-19 ENCOUNTER — OFFICE VISIT (OUTPATIENT)
Dept: CARDIOLOGY CLINIC | Facility: CLINIC | Age: 73
End: 2018-06-19
Payer: MEDICARE

## 2018-06-19 VITALS
DIASTOLIC BLOOD PRESSURE: 84 MMHG | WEIGHT: 188.7 LBS | BODY MASS INDEX: 28.6 KG/M2 | HEIGHT: 68 IN | SYSTOLIC BLOOD PRESSURE: 146 MMHG | HEART RATE: 47 BPM

## 2018-06-19 DIAGNOSIS — I25.10 CORONARY ARTERY DISEASE INVOLVING NATIVE CORONARY ARTERY OF NATIVE HEART WITHOUT ANGINA PECTORIS: ICD-10-CM

## 2018-06-19 DIAGNOSIS — I10 ESSENTIAL HYPERTENSION: ICD-10-CM

## 2018-06-19 DIAGNOSIS — Z95.5 HISTORY OF HEART ARTERY STENT: ICD-10-CM

## 2018-06-19 DIAGNOSIS — I25.2 PAST HISTORY OF MYOCARDIAL INFARCTION: ICD-10-CM

## 2018-06-19 DIAGNOSIS — Z01.810 PRE-OPERATIVE CARDIOVASCULAR EXAMINATION: Primary | ICD-10-CM

## 2018-06-19 PROCEDURE — 99214 OFFICE O/P EST MOD 30 MIN: CPT | Performed by: INTERNAL MEDICINE

## 2018-06-19 PROCEDURE — 93000 ELECTROCARDIOGRAM COMPLETE: CPT | Performed by: INTERNAL MEDICINE

## 2018-06-19 NOTE — PROGRESS NOTES
Follow-up - Cardiology   Laurie Leon 81 y o  male MRN: [id number]        Problems    Problem List Items Addressed This Visit     Essential hypertension      Other Visit Diagnoses     Pre-operative cardiovascular examination    -  Primary    Relevant Orders    POCT ECG    Coronary artery disease involving native coronary artery of native heart without angina pectoris        History of heart artery stent        Past history of myocardial infarction                Plan we will do a nuclear stress test prior to his anticipated procedure on his aortic aneurysm  Last nuclear was 2014  HPI: Laurie Leon is a 81y o  year old male Patient seen November 11, 2015      His history is summarized above  However in general, he has myocardial infarction 1996, stent in [address], low HDL and elevated triglycerides, hypertension, abdominal aneurysm of 3 9 cm range followed by vascular, prostate carcinoma status post treatment, lung cancer operated on in 2007 2011 with recent CAT scan negative for tumor recurrence, and bipolar history  From the LAD his LDL is 86  This was October 2015  His BNP is 236  Triglycerides are 126  I'm going to discontinue his fiber 8 and check his triglycerides and I will put him on a carbohydrate restriction and continue to fish oil     Patient seen February 1, 2016  Carries a diagnosis of previous myocardial infarction, stents in 1998, elevated triglycerides and very low HDL, hypertension, abdominal aneurysm that's under surveillance, past history of prostate carcinoma with a PSA of less than 0 1, lung carcinoma in 07 and 2011, history of bipolar, chronic obstructive lung disease  Recently and in the hospital in the emergency room after being treated by his family doctor for bronchitis  He was placed on steroids, Z-Gerson, and ultimately Levaquin  He never had pneumonia  He essentially got better with treatment for bronchospasm  In the emergency room we did have a CAT scan  No embolus   No tumor  No pneumonia  This was done in December 2016  The good news is that his HDL is now up to 49  It had been 32     Patient seen March 6, 2017  He has a complex past history that includes the following  Myocardial infarction, stent in 1998 his coronaries, elevated cholesterol, hypertension, abdominal aneurysm followed by vascular, prostate carcinoma, lung carcinoma operated on 2007 2011 end followed in [place], chronic obstructive lung disease, cervical spine surgery 2016, back surgery 2017, hyperlipidemia, fatty liver,  His alkaline phosphatase is elevated  I suspect this is secondary to his back surgery  His a fatty liver  His no evidence of metastases in his liver  I'll order a bone specific alkaline phosphatase  From cardiac standpoint his been amazingly stable  He also had a bowel obstruction recently  His LDL is 74 from a cardiac standpoint he has no angina or heart failure  His echocardiogram is normal  That was on a February 2017      Patient seen June 6, 2017  Long list of issues above  From cardiac standpoint he is very stable  His creatinine is 1 16  His aortic aneurysm is slightly less than 5 cm was being watched carefully  His alkaline phosphatase elevation is probably secondary to his back fusion  It is coming down very slowly  His last LDL is less than 80  His no cardiac complaints      Patient seen December 12, 2017  His complex past history is best summarized by March 6, 2017 note  I also should have mentioned that he is bipolar and is on lithium  Presently is creatinine is 1  16  Alk phosphatase is 233 last year and it is down to 183  This is not from the liver  It was from the bone  LDL is 61  Id a CT scan of the abdomen and pelvis  Aortic aneurysm 4 7 cm  This is followed could for carefully by the vascular group  Echocardiogram shows left her function to be 65%  There is minimal mitral regurgitation  His pulmonary disease is followed by the pulmonary group    From cardiac standpoint he is very stable  No change in cardiac medications that includes a statin and a beta-blocker and aspirin  As noted above he had a stent in 1998 to is coronaries  He has no chest      Patient seen June 19, 2018  His history includes the following old myocardial infarction  Stent in his coronary 1998  Hyperlipidemia  Hypertension  Abdominal aneurysm that is now 46 mm and he is scheduled for a stent  History prostate cancer  Lung cancer 2007 2011 with surgery in followed in [facility]  obstructive lung disease  Cervical spine surgery  Back surgery in 2017  Hyperlipidemia  Fatty liver  Bowel obstruction in the past  One episode of atrial fibrillation 2018  Now not taking Coumadin  No chest pain  To clear him for surgery I will do a nuclear stress test   Last nuclear stress test 2014  History of bipolar on lithium  Premature atrial contractions on Holter in February 2018  No cardiac complaints        Review of Systems   Constitutional: Negative  Respiratory: Negative  Neurological: Negative  Psychiatric/Behavioral: Negative            Past Medical History:   Diagnosis Date    Aortic aneurysm (HCC)     Benign colon polyp     Bipolar 1 disorder ([address] )     Cardiac disease     MI    Cervical cord compression with myelopathy (HCC)     COPD (chronic obstructive pulmonary disease) (HCC)     Diverticulitis     Diverticulosis     Gait disturbance     uses cane, leg brace on right    GERD (gastroesophageal reflux disease)     Heart attack ([address] ) 1996    Hx of resection of large bowel 5/3/2016    Hyperlipidemia     Hypertension     IBS (irritable bowel syndrome)     Lumbar stenosis     [facility]     2007 Left lower lobectomy and 2011 Right lung with surgery     Myocardial infarction [facility])     involving other coronary artery    Prostate cancer [facility] )     Shortness of breath     Small bowel obstruction ([facility] ) 11/16/2016     History   Alcohol Use    Yes     Comment: rarely     History Drug Use No     History   Smoking Status    Former Smoker    Packs/day: 1 00    Years: 35 00    Quit date: 2011   Smokeless Tobacco    Never Used       Allergies: Allergies   Allergen Reactions    Nsaids      Annotation - [id number]: unable to take due to use of lithium   Oxycodone Rash       Medications:     Current Outpatient Prescriptions:     acetaminophen (TYLENOL) 500 mg tablet, Take 500 mg by mouth as needed for mild pain , Disp: , Rfl:     albuterol (PROVENTIL HFA,VENTOLIN HFA) 90 mcg/act inhaler, Inhale 2 puffs every 6 (six) hours as needed for wheezing, Disp: , Rfl:     amLODIPine (NORVASC) 2 5 mg tablet, Take 2 5 mg by mouth daily, Disp: , Rfl:     aspirin (ECOTRIN LOW STRENGTH) 81 mg EC tablet, Take 81 mg by mouth daily, Disp: , Rfl:     atorvastatin (LIPITOR) 40 mg tablet, TAKE 1 TABLET BY MOUTH  DAILY, Disp: 90 tablet, Rfl: 5    B Complex Vitamins (VITAMIN B COMPLEX PO), Take by mouth, Disp: , Rfl:     Cholecalciferol (VITAMIN D PO), Take 2,000 Units by mouth daily  , Disp: , Rfl:     diazepam (VALIUM) 5 mg tablet, Take 5 mg by mouth as needed for anxiety, Disp: , Rfl:     dicyclomine (BENTYL) 20 mg tablet, Take 20 mg by mouth every 6 (six) hours, Disp: , Rfl:     fexofenadine (ALLEGRA) 180 MG tablet, Take 180 mg by mouth daily, Disp: , Rfl:     fluticasone (FLONASE) 50 mcg/act nasal spray, 1 spray into each nostril as needed    , Disp: , Rfl:     fluticasone-salmeterol (ADVAIR) 250-50 mcg/dose inhaler, Inhale 1 puff every 12 (twelve) hours, Disp: , Rfl:     ipratropium (ATROVENT HFA) 17 mcg/act inhaler, Inhale 2 puffs every 6 (six) hours, Disp: , Rfl:     lithium 300 MG tablet, Take 600 mg by mouth every other day PM  Alternating with 300 mg every other day THIS IS A CONTINUOUS RELEASE TABLET , Disp: , Rfl:     metoprolol succinate (TOPROL-XL) 25 mg 24 hr tablet, TAKE 1 TABLET BY MOUTH  DAILY, Disp: 90 tablet, Rfl: 5    omega-3-acid ethyl esters (LOVAZA) 1 g capsule, TAKE 1 CAPSULE BY MOUTH 3  TIMES DAILY, Disp: 270 capsule, Rfl: 5    omeprazole (PriLOSEC) 40 MG capsule, Take 40 mg by mouth daily  , Disp: , Rfl:       Physical Exam   Constitutional: He is oriented to person, place, and time  He appears well-developed  Cardiovascular:   Sinus bradycardia   Pulmonary/Chest: Effort normal    Musculoskeletal: He exhibits no edema  Neurological: He is oriented to person, place, and time  Laboratory Studies:  CMP:    NT-proBNP:   Lab Results   Component Value Date    NTBNP 93 2017      Coags:    Lipid Profile:   Lab Results   Component Value Date    CHOL 130 2017     Lab Results   Component Value Date    HDL 37 (L) 2017     Lab Results   Component Value Date    LDLCALC 61 2017     Lab Results   Component Value Date    TRIG 158 (H) 2017       Cardiac testing:     EKG reviewed personally:     Results for orders placed during the hospital encounter of 18   Echo complete with contrast if indicated    Narrative [facility]  [facility], [address] [facility]  [phone]    Transthoracic Echocardiogram  2D, M-mode, Doppler, and Color Doppler    Study date:  2018    Patient: Tianna Maddox  MR number: [id number]  Account number: [de-identified]  : 1945  Age: 81 years  Gender: Male  Status: Inpatient  Location: Bedside  Height: 67 in  Weight: 179 5 lb  BP: 128/ 76 mmHg    Indications: Limited for LV function    Diagnoses: I48 0 - Atrial fibrillation    Sonographer:  DANA Dubois, RDCS  Primary Physician:  Sylwia Levine MD  Referring Physician:  Meño Crabtree MD  Group:  [facility]  Cardiology Fellow:  Marcy Frias MD  Interpreting Physician:  Nkechi Mac MD    SUMMARY    LEFT VENTRICLE:  Systolic function was normal  Ejection fraction was estimated to be 55 %  There were no regional wall motion abnormalities    Wall thickness was at the upper limits of normal     MITRAL VALVE:  There was trace regurgitation  TRICUSPID VALVE:  There was mild regurgitation  Estimated peak PA pressure was 43 mmHg  HISTORY: PRIOR HISTORY: COPD, Hypertension, Hyperlipidemia, A fib, Sepsis    PROCEDURE: The procedure was performed at the bedside  This was a routine study  The transthoracic approach was used  The study included complete 2D imaging, M-mode, complete spectral Doppler, and color Doppler  The heart rate was 72 bpm,  at the start of the study  Images were obtained from the parasternal, apical, subcostal, and suprasternal notch acoustic windows  Image quality was adequate  LEFT VENTRICLE: Size was normal  Systolic function was normal  Ejection fraction was estimated to be 55 %  There were no regional wall motion abnormalities  Wall thickness was at the upper limits of normal  There was no evidence of  concentric hypertrophy  DOPPLER: Left ventricular diastolic function parameters were normal     RIGHT VENTRICLE: The size was normal  Systolic function was normal     LEFT ATRIUM: Size was normal     RIGHT ATRIUM: Size was normal     MITRAL VALVE: Valve structure was normal  There was normal leaflet separation  DOPPLER: The transmitral velocity was within the normal range  There was no evidence for stenosis  There was trace regurgitation  AORTIC VALVE: The valve was trileaflet  Leaflets exhibited normal thickness and normal cuspal separation  DOPPLER: Transaortic velocity was within the normal range  There was no evidence for stenosis  There was no regurgitation  TRICUSPID VALVE: The valve structure was normal  There was normal leaflet separation  DOPPLER: The transtricuspid velocity was within the normal range  There was no evidence for stenosis  There was mild regurgitation  Pulmonary artery  systolic pressure was mildly increased  Estimated peak PA pressure was 43 mmHg  PULMONIC VALVE: Not well visualized   DOPPLER: The transpulmonic velocity was within the normal range  There was no evidence for stenosis  There was no significant regurgitation  PERICARDIUM: There was no pericardial effusion  A pericardial fat pad was present  AORTA: The root exhibited normal size  SYSTEMIC VEINS: IVC: The inferior vena cava was normal in size and course  Respirophasic changes were normal     PULMONARY VEINS: Not assessed  SYSTEM MEASUREMENT TABLES    2D  %FS: 28 95 %  EDV(Teich): 115 71 ml  EF(Teich): 55 44 %  ESV(Teich): 51 56 ml  IVSd: 1 07 cm  LA Diam: 3 51 cm  LVEDV MOD A4C: 115 44 ml  LVEF MOD A4C: 60 62 %  LVESV MOD A4C: 45 46 ml  LVIDd: 4 95 cm  LVIDs: 3 52 cm  LVLd A4C: 8 78 cm  LVLs A4C: 7 06 cm  LVPWd: 0 96 cm  SV MOD A4C: 69 98 ml  SV(Teich): 64 15 ml    CW  TR Vmax: 2 93 m/s  TR maxP 25 mmHg    PW  E': 0 1 m/s  E/E': 8 44  MV A [place]: 0 59 m/s  MV Dec [place]: 4 51 m/s2  MV DecT: 178 2 ms  MV E [place]oyo: 0 8 m/s  MV E/A Ratio: 1 37  MV PHT: 51 68 ms  MVA By PHT: 4 26 cm2    [employer] Accredited [facility]chocardi[facility]    Prepared and electronically signed by    Kanika Kramer MD  Signed 2018 14:50:06       No results found for this or any previous visit  No results found for this or any previous visit  No results found for this or any previous visit  Katty Malik MD    Portions of the record may have been created with voice recognition software   Occasional wrong word or "sound a like" substitutions may have occurred due to the inherent limitations of voice recognition software   Read the chart carefully and recognize, using context, where substitutions have occurred

## 2018-06-26 ENCOUNTER — HOSPITAL ENCOUNTER (OUTPATIENT)
Dept: NON INVASIVE DIAGNOSTICS | Facility: CLINIC | Age: 73
Discharge: HOME | End: 2018-06-26
Payer: MEDICARE

## 2018-06-26 DIAGNOSIS — I10 ESSENTIAL HYPERTENSION: ICD-10-CM

## 2018-06-26 DIAGNOSIS — Z01.810 PRE-OPERATIVE CARDIOVASCULAR EXAMINATION: ICD-10-CM

## 2018-06-26 PROCEDURE — 93017 CV STRESS TEST TRACING ONLY: CPT

## 2018-06-26 PROCEDURE — 78452 HT MUSCLE IMAGE SPECT MULT: CPT

## 2018-06-26 PROCEDURE — A9502 TC99M TETROFOSMIN: HCPCS

## 2018-06-26 RX ADMIN — REGADENOSON 0.4 MG: 0.08 INJECTION, SOLUTION INTRAVENOUS at 09:10

## 2018-06-27 LAB
MAX DIASTOLIC BP: 88 MMHG
MAX HEART RATE: 100 BPM
MAX PREDICTED HEART RATE: 150 BPM
MAX. SYSTOLIC BP: 172 MMHG
PROTOCOL NAME: NORMAL
REASON FOR TERMINATION: NORMAL
TARGET HR FORMULA: NORMAL
TEST INDICATION: NORMAL
TIME IN EXERCISE PHASE: NORMAL

## 2018-07-03 ENCOUNTER — OFFICE VISIT (OUTPATIENT)
Dept: OBGYN CLINIC | Facility: HOSPITAL | Age: 73
End: 2018-07-03
Payer: MEDICARE

## 2018-07-03 VITALS
HEART RATE: 59 BPM | SYSTOLIC BLOOD PRESSURE: 166 MMHG | BODY MASS INDEX: 28.6 KG/M2 | WEIGHT: 188.7 LBS | DIASTOLIC BLOOD PRESSURE: 83 MMHG | HEIGHT: 68 IN

## 2018-07-03 DIAGNOSIS — M25.552 LEFT HIP PAIN: ICD-10-CM

## 2018-07-03 DIAGNOSIS — M70.72 OTHER BURSITIS OF HIP, LEFT HIP: Primary | ICD-10-CM

## 2018-07-03 PROCEDURE — 99213 OFFICE O/P EST LOW 20 MIN: CPT | Performed by: STUDENT IN AN ORGANIZED HEALTH CARE EDUCATION/TRAINING PROGRAM

## 2018-07-03 PROCEDURE — 20610 DRAIN/INJ JOINT/BURSA W/O US: CPT | Performed by: ORTHOPAEDIC SURGERY

## 2018-07-03 RX ORDER — BUPIVACAINE HYDROCHLORIDE 2.5 MG/ML
2 INJECTION, SOLUTION INFILTRATION; PERINEURAL
Status: COMPLETED | OUTPATIENT
Start: 2018-07-03 | End: 2018-07-03

## 2018-07-03 RX ORDER — LIDOCAINE HYDROCHLORIDE 10 MG/ML
2 INJECTION, SOLUTION INFILTRATION; PERINEURAL
Status: COMPLETED | OUTPATIENT
Start: 2018-07-03 | End: 2018-07-03

## 2018-07-03 RX ORDER — BETAMETHASONE SODIUM PHOSPHATE AND BETAMETHASONE ACETATE 3; 3 MG/ML; MG/ML
12 INJECTION, SUSPENSION INTRA-ARTICULAR; INTRALESIONAL; INTRAMUSCULAR; SOFT TISSUE
Status: COMPLETED | OUTPATIENT
Start: 2018-07-03 | End: 2018-07-03

## 2018-07-03 RX ADMIN — LIDOCAINE HYDROCHLORIDE 2 ML: 10 INJECTION, SOLUTION INFILTRATION; PERINEURAL at 09:32

## 2018-07-03 RX ADMIN — BETAMETHASONE SODIUM PHOSPHATE AND BETAMETHASONE ACETATE 12 MG: 3; 3 INJECTION, SUSPENSION INTRA-ARTICULAR; INTRALESIONAL; INTRAMUSCULAR; SOFT TISSUE at 09:32

## 2018-07-03 RX ADMIN — BUPIVACAINE HYDROCHLORIDE 2 ML: 2.5 INJECTION, SOLUTION INFILTRATION; PERINEURAL at 09:32

## 2018-07-03 NOTE — PROGRESS NOTES
81 y o male presents for a 3 month follow-up after a left greater trochanteric bursa injection  Patient states that he received immediate pain reliefe which lasted for about 4 weeks before returning  Patient states that the left hip now waxes and wanes  He takes Excedrin and uses a topical lidocaine cream which provides some pain relief  Patient also has a history of lower back pain for which he has follow-up with another physician  On present exam, patient has well localized left lateral hip pain which is worse with palpation and improves with rest and the modalities mentioned above  No other orthopaedic complaints at this time  Review of Systems  Review of systems negative unless otherwise specified in HPI    Past Medical History  Past Medical History:   Diagnosis Date    A[facility]     Benign colon polyp     Bipolar 1 disorder ([facility] )     Cardiac disease     MI    Cervical cord compression with myelopathy ([facility] )     COPD (chronic obstructive pulmonary disease) (HCC)     Diverticulitis     Diverticulosis     Gait disturbance     uses cane, leg brace on right    GERD (gastroesophageal reflux disease)     Heart attack ([facility] ) 1996    Hx of resection of large bowel 5/3/2016    Hyperlipidemia     Hypertension     IBS (irritable bowel syndrome)     Lumbar stenosis     Lung cancer ([facility] )     2007 Left lower lobectomy and 2011 Right lung with surgery     Myocardial infarction [facility])     involving other coronary artery    Prostate cancer ([facility] )     Shortness of breath     Small bowel obstruction ([facility] ) 11/16/2016       Past Surgical History  Past Surgical History:   Procedure Laterality Date    ANGIOPLASTY      stent    CERVICAL SPINE SURGERY      Cervical decompression with cervical fusion from C3-C7 for spinal stenosis      COLON SURGERY  11/04/2014    ESOPHAGOGASTRODUODENOSCOPY  01/03/2013    with possible Schatzki's ring & small hiatal hernia, mild gastritis    LUNG CANCER SURGERY Right 03/2011    wedge resection for lung tumor, 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    LUNG LOBECTOMY Left     AL ARTHRODESIS ANT INTERBODY MIN DISCECTOMY, CERVICAL BELOW C2 N/A 5/2/2016    Procedure: Anterior cervical diskectomy C3/4, C5/6, C6/7 with anterior plate fixation fusion C3-7;  Posterior decompressive laminectomy C3-7 with lateral mass fixation fusion C3-7 (IMPULSE MONITORING); Surgeon: Hanna Ruiz MD;  [facility]: [facility];  Service: Neurosurgery    AL ARTHRODESIS POSTERIOR INTERBODY LUMBAR N/A 1/30/2017    Procedure: L4-5 AND L5-S1 DECOMPRESSIVE FORAMINOTOMIES, TRANSFORAMINAL LUMBAR INTERBODY AND PEDICLE SCREW FIXATION FUSION L4-S1 (IMPULSE); Surgeon: Hanna Ruiz MD;  [facility];  Service: Neurosurgery    PROSTATECTOMY  2007    for prostate CA - no chemo/RT    SIGMOIDECTOMY      for divertic    SMALL INTESTINE SURGERY N/A 11/17/2016    Procedure: Exploratory Laparotomy, Lysis of adhesions to release small bowel obstruction;  Surgeon: Faisal Hicks MD;  Location: [facility];  Service:        Current Medications  Current Outpatient Prescriptions on File Prior to Visit   Medication Sig Dispense Refill    acetaminophen (TYLENOL) 500 mg tablet Take 500 mg by mouth as needed for mild pain        albuterol (PROVENTIL HFA,VENTOLIN HFA) 90 mcg/act inhaler Inhale 2 puffs every 6 (six) hours as needed for wheezing      amLODIPine (NORVASC) 2 5 mg tablet Take 2 5 mg by mouth daily      aspirin (ECOTRIN LOW STRENGTH) 81 mg EC tablet Take 81 mg by mouth daily      atorvastatin (LIPITOR) 40 mg tablet TAKE 1 TABLET BY MOUTH  DAILY 90 tablet 5    B Complex Vitamins (VITAMIN B COMPLEX PO) Take by mouth      Cholecalciferol (VITAMIN D PO) Take 2,000 Units by mouth daily        diazepam (VALIUM) 5 mg tablet Take 5 mg by mouth as needed for anxiety      dicyclomine (BENTYL) 20 mg tablet Take 20 mg by mouth every 6 (six) hours      fexofenadine (ALLEGRA) 180 MG tablet Take 180 mg by mouth daily      fluticasone (FLONASE) 50 mcg/act nasal spray 1 spray into each nostril as needed   fluticasone-salmeterol (ADVAIR) 250-50 mcg/dose inhaler Inhale 1 puff every 12 (twelve) hours      ipratropium (ATROVENT HFA) 17 mcg/act inhaler Inhale 2 puffs every 6 (six) hours      lithium 300 MG tablet Take 600 mg by mouth every other day PM  Alternating with 300 mg every other day  THIS IS A CONTINUOUS RELEASE TABLET       metoprolol succinate (TOPROL-XL) 25 mg 24 hr tablet TAKE 1 TABLET BY MOUTH  DAILY 90 tablet 5    omega-3-acid ethyl esters (LOVAZA) 1 g capsule TAKE 1 CAPSULE BY MOUTH 3  TIMES DAILY 270 capsule 5    omeprazole (PriLOSEC) 40 MG capsule Take 40 mg by mouth daily  No current facility-administered medications on file prior to visit          Recent Labs [facility])    0  Lab Value Date/Time   HCT 42 5 06/02/2018 0835   HCT 39 5 10/19/2015 0911   HGB 13 7 06/02/2018 0835   HGB 12 6 10/19/2015 0911   WBC 9 32 06/02/2018 0835   WBC 9 47 10/19/2015 0911   INR 1 04 01/21/2018 0646   ESR 79 (H) 05/10/2016 0731   CRP 66 4 (H) 05/10/2016 0644   GLUCOSE 123 01/22/2018 0553   GLUCOSE 187 (H) 11/14/2016 0615   GLUCOSE 99 10/19/2015 0912         Physical exam  · General: Awake, Alert, Oriented  · Eyes: Pupils equal, round and reactive to light  · Heart: regular rate and rhythm  · Lungs: No audible wheezing  · Abdomen: soft  left Lower extremity  · Skin intact  · TTP over posterior aspect of greater troch  · Motor intact TA/GS/QD/HS/IP  · 2+DP    Procedure  Procedure- Orthopedics   Alex He 81 y o  male MRN: [id number]  Unit/Bed#: [unfilled]    Large joint arthrocentesis  Date/Time: 7/3/2018 9:32 AM  Procedure Details  Location: hip - L greater trochanteric bursa  Needle size: 22 G  Ultrasound guidance: no  Approach: lateral  Medications administered: 2 mL bupivacaine 0 25 %; 2 mL lidocaine 1 %; 12 mg betamethasone acetate-betamethasone sodium phosphate 6 (3-3) mg/mL    Patient tolerance: patient tolerated the procedure well with no immediate complications  Dressing:  Sterile dressing applied        Madai Feliz MD    Imaging  February left hip x-ray shows mild degenerative changes     80 yo M with persistent Left GT bursitis s/p Celestone injection  -WBAT LLE  -Continue stretches as recommended by PT  -Continue local modalities   -Please return to the clinic in 3 months for follow-up with Dr Hamilton Arce

## 2018-07-12 ENCOUNTER — OFFICE VISIT (OUTPATIENT)
Dept: VASCULAR SURGERY | Facility: CLINIC | Age: 73
End: 2018-07-12
Payer: MEDICARE

## 2018-07-12 VITALS
BODY MASS INDEX: 29.66 KG/M2 | WEIGHT: 189 LBS | SYSTOLIC BLOOD PRESSURE: 134 MMHG | HEIGHT: 67 IN | DIASTOLIC BLOOD PRESSURE: 68 MMHG | HEART RATE: 72 BPM | TEMPERATURE: 96.8 F

## 2018-07-12 DIAGNOSIS — I71.4 ANEURYSM OF INFRARENAL ABDOMINAL AORTA (HCC): Primary | Chronic | ICD-10-CM

## 2018-07-12 PROCEDURE — 99214 OFFICE O/P EST MOD 30 MIN: CPT | Performed by: SURGERY

## 2018-07-12 NOTE — ASSESSMENT & PLAN NOTE
5 2 cm infrarenal abdominal aortic aneurysm  We discussed the findings on recent CT angiogram along with the indications for treatment and treatment options available  We discussed at length the option of proceeding with endovascular aneurysm repair  He understands the risks and benefits and would like to proceed    He will require his cardiac clearance 1st but has already undergone stress test

## 2018-07-12 NOTE — PATIENT INSTRUCTIONS
Aneurysm of infrarenal abdominal aorta (HCC)  5 2 cm infrarenal abdominal aortic aneurysm  We discussed the findings on recent CT angiogram along with the indications for treatment and treatment options available  We discussed at length the option of proceeding with endovascular aneurysm repair  He understands the risks and benefits and would like to proceed    He will require his cardiac clearance 1st but has already undergone stress test

## 2018-07-12 NOTE — TELEPHONE ENCOUNTER
pts ov for 7/11 was cx, pt saw Dr Enedina Vizcarra 6/19 instead  He ordered stress which was done 6/26/18  Called his office s/w Vana Severe - she will let Dr Enedina Vizcarra know we are awaiting clearance

## 2018-07-12 NOTE — PROGRESS NOTES
Assessment/Plan:    Aneurysm of infrarenal abdominal aorta (HCC)  5 2 cm infrarenal abdominal aortic aneurysm  We discussed the findings on recent CT angiogram along with the indications for treatment and treatment options available  We discussed at length the option of proceeding with endovascular aneurysm repair  He understands the risks and benefits and would like to proceed  He will require his cardiac clearance 1st but has already undergone stress test        Diagnoses and all orders for this visit:    Aneurysm of infrarenal abdominal aorta (HCC)          Subjective:      Patient ID: Paulina Darby is a 81 y o  male  Patient had CTA on 6/8/2018  Patient has known AAA  Patient has chronic back pain  Patient has a bloated feeling occasionally  28-year-old with known abdominal aortic aneurysm which was noted to have increased in size on recent follow-up study now presents following CT angiogram   On examination today he denies any new abdominal or back pain  He remains active without any blue limitations though he does have some underlying shortness of breath as well as a long history of spinal disease and associated musculoskeletal symptoms  CT scan is reviewed at length and diagrammed in preparation for endovascular aneurysm repair  The anatomy is amenable to endovascular repair and maximum aneurysm size is 5 2 cm  I have spent 30 minutes with patient and his wife today in which greater than 50% of this time was spent in counseling/coordination of care regarding Diagnostic results, Prognosis, Risks and benefits of tx options and Impressions  The following portions of the patient's history were reviewed and updated as appropriate: allergies, current medications, past family history, past medical history, past social history, past surgical history and problem list     Review of Systems   Constitutional: Negative for chills and fever     HENT: Negative for dental problem, sinus pain and sinus pressure  Eyes: Negative for pain and discharge  Respiratory: Positive for shortness of breath (with activity)  Cardiovascular: Negative for leg swelling  Gastrointestinal: Negative for diarrhea and nausea  Endocrine: Negative for cold intolerance and heat intolerance  Genitourinary: Positive for urgency  Musculoskeletal: Positive for arthralgias, back pain and gait problem  Skin: Negative for wound  Allergic/Immunologic: Negative for food allergies  Neurological: Negative for dizziness and light-headedness  Psychiatric/Behavioral: Negative for behavioral problems and self-injury           Objective:      /68 (BP Location: Right arm, Patient Position: Sitting)   Pulse 72   Temp (!) 96 8 °F (36 °C)   Ht 5' 7" (1 702 m)   Wt 85 7 kg (189 lb)   BMI 29 60 kg/m²          Physical Exam      Operative Scheduling Information:    Hospital:  [facility]    Physician:  Tommy Vieira    Surgery:   Endovascular aneurysm repairSurgery:    Urgency:  Standard    Case Length:  Normal    Post-op Bed:  Stepdown    OR Table:  Discovery    Equipment Needs:  Rep:  Viper excluder graft    Medication Instructions:  Aspirin:   Continue (do not hold)    Hydration:  No

## 2018-07-12 NOTE — LETTER
July 12, 2018     Zulay BroderickOtiliopa U  51   [address] [address]    Patient: Ren Murphy   YOB: 1945   Date of Visit: 7/12/2018       Dear Dr Jhon Rodriguez: Thank you for referring Render Holiday to me for evaluation  Below are the relevant portions of my assessment and plan of care  Aneurysm of infrarenal abdominal aorta (HCC)  5 2 cm infrarenal abdominal aortic aneurysm  We discussed the findings on recent CT angiogram along with the indications for treatment and treatment options available  We discussed at length the option of proceeding with endovascular aneurysm repair  He understands the risks and benefits and would like to proceed  He will require his cardiac clearance 1st but has already undergone stress test       If you have questions, please do not hesitate to call me  I look forward to following Lisa Ward along with you           Sincerely,        Richie Granado MD        CC: No Recipients

## 2018-07-16 ENCOUNTER — TELEPHONE (OUTPATIENT)
Dept: VASCULAR SURGERY | Facility: CLINIC | Age: 73
End: 2018-07-16

## 2018-07-16 NOTE — TELEPHONE ENCOUNTER
The patient was seen by Doctor Dariusz Jiménez on 07/12/18  The patient needs Cardiac clearance for an Endovascular Aneurysm Repair  I called on 07/12/18 and 07/13/18 to check if the patient needed to be seen since he recently was seen by Doctor Ronnie Avila  I was told he was not in and that His MA wasn't either that they would be back on Monday 07/16/18  The Paperwork was faxed Over and Mireya His MA filled it and faxed it over  I also sent the paperwork to nursing and the surgery schedulers

## 2018-07-18 ENCOUNTER — PREP FOR PROCEDURE (OUTPATIENT)
Dept: VASCULAR SURGERY | Facility: CLINIC | Age: 73
End: 2018-07-18

## 2018-07-18 ENCOUNTER — TELEPHONE (OUTPATIENT)
Dept: VASCULAR SURGERY | Facility: CLINIC | Age: 73
End: 2018-07-18

## 2018-07-18 DIAGNOSIS — I71.4 ABDOMINAL AORTIC ANEURYSM WITHOUT RUPTURE (HCC): Primary | ICD-10-CM

## 2018-07-18 NOTE — TELEPHONE ENCOUNTER
I spoke with pt and confirmed date of surgery for 8/6/18 with Dr Nikhil Castrejon at [place]  Pt will go for his pre admission blood work and CXR  NPO after MN  Showering and other instructions reviewed and understood

## 2018-07-24 ENCOUNTER — APPOINTMENT (OUTPATIENT)
Dept: LAB | Facility: HOSPITAL | Age: 73
End: 2018-07-24
Attending: SURGERY
Payer: MEDICARE

## 2018-07-24 ENCOUNTER — HOSPITAL ENCOUNTER (OUTPATIENT)
Dept: RADIOLOGY | Facility: HOSPITAL | Age: 73
Discharge: HOME | End: 2018-07-24
Payer: MEDICARE

## 2018-07-24 DIAGNOSIS — I71.4 ABDOMINAL AORTIC ANEURYSM WITHOUT RUPTURE (HCC): ICD-10-CM

## 2018-07-24 DIAGNOSIS — I71.4 ANEURYSM OF INFRARENAL ABDOMINAL AORTA (HCC): Chronic | ICD-10-CM

## 2018-07-24 LAB
ABO GROUP BLD: NORMAL
ANION GAP SERPL CALCULATED.3IONS-SCNC: 6 MMOL/L (ref 4–13)
BLD GP AB SCN SERPL QL: NEGATIVE
BUN SERPL-MCNC: 21 MG/DL (ref 5–25)
CALCIUM SERPL-MCNC: 9.4 MG/DL (ref 8.3–10.1)
CHLORIDE SERPL-SCNC: 109 MMOL/L (ref 100–108)
CO2 SERPL-SCNC: 27 MMOL/L (ref 21–32)
CREAT SERPL-MCNC: 1.18 MG/DL (ref 0.6–1.3)
ERYTHROCYTE [DISTWIDTH] IN BLOOD BY AUTOMATED COUNT: 13.5 % (ref 11.6–15.1)
EST. AVERAGE GLUCOSE BLD GHB EST-MCNC: 105 MG/DL
GFR SERPL CREATININE-BSD FRML MDRD: 62 ML/MIN/1.73SQ M
GLUCOSE P FAST SERPL-MCNC: 89 MG/DL (ref 65–99)
HBA1C MFR BLD: 5.3 % (ref 4.2–6.3)
HCT VFR BLD AUTO: 43 % (ref 36.5–49.3)
HGB BLD-MCNC: 14 G/DL (ref 12–17)
INR PPP: 1.01 (ref 0.86–1.17)
MCH RBC QN AUTO: 30.1 PG (ref 26.8–34.3)
MCHC RBC AUTO-ENTMCNC: 32.6 G/DL (ref 31.4–37.4)
MCV RBC AUTO: 93 FL (ref 82–98)
PLATELET # BLD AUTO: 199 THOUSANDS/UL (ref 149–390)
PMV BLD AUTO: 11.4 FL (ref 8.9–12.7)
POTASSIUM SERPL-SCNC: 4.6 MMOL/L (ref 3.5–5.3)
PROTHROMBIN TIME: 13.4 SECONDS (ref 11.8–14.2)
RBC # BLD AUTO: 4.65 MILLION/UL (ref 3.88–5.62)
RH BLD: POSITIVE
SODIUM SERPL-SCNC: 142 MMOL/L (ref 136–145)
SPECIMEN EXPIRATION DATE: NORMAL
WBC # BLD AUTO: 8.79 THOUSAND/UL (ref 4.31–10.16)

## 2018-07-24 PROCEDURE — 85027 COMPLETE CBC AUTOMATED: CPT

## 2018-07-24 PROCEDURE — 86901 BLOOD TYPING SEROLOGIC RH(D): CPT

## 2018-07-24 PROCEDURE — 83036 HEMOGLOBIN GLYCOSYLATED A1C: CPT

## 2018-07-24 PROCEDURE — 85610 PROTHROMBIN TIME: CPT

## 2018-07-24 PROCEDURE — 36415 COLL VENOUS BLD VENIPUNCTURE: CPT

## 2018-07-24 PROCEDURE — 80048 BASIC METABOLIC PNL TOTAL CA: CPT

## 2018-07-24 PROCEDURE — 71046 X-RAY EXAM CHEST 2 VIEWS: CPT

## 2018-07-24 PROCEDURE — 86850 RBC ANTIBODY SCREEN: CPT

## 2018-07-24 PROCEDURE — 86900 BLOOD TYPING SEROLOGIC ABO: CPT

## 2018-07-24 NOTE — PRE-PROCEDURE INSTRUCTIONS
Pre-Surgery Instructions:   Medication Instructions    albuterol (PROVENTIL HFA,VENTOLIN HFA) 90 mcg/act inhaler Instructed patient per Anesthesia Guidelines   amLODIPine (NORVASC) 2 5 mg tablet Instructed patient per Anesthesia Guidelines   aspirin (ECOTRIN LOW STRENGTH) 81 mg EC tablet Instructed patient per Anesthesia Guidelines   atorvastatin (LIPITOR) 40 mg tablet Instructed patient per Anesthesia Guidelines   B Complex Vitamins (VITAMIN B COMPLEX PO) Instructed patient per Anesthesia Guidelines   Cholecalciferol (VITAMIN D PO) Instructed patient per Anesthesia Guidelines   fexofenadine (ALLEGRA) 180 MG tablet Instructed patient per Anesthesia Guidelines   fluticasone (FLONASE) 50 mcg/act nasal spray Instructed patient per Anesthesia Guidelines   fluticasone-salmeterol (ADVAIR) 250-50 mcg/dose inhaler Instructed patient per Anesthesia Guidelines   ipratropium (ATROVENT HFA) 17 mcg/act inhaler Instructed patient per Anesthesia Guidelines   lithium 300 MG tablet Instructed patient per Anesthesia Guidelines   metoprolol succinate (TOPROL-XL) 25 mg 24 hr tablet Instructed patient per Anesthesia Guidelines   omega-3-acid ethyl esters (LOVAZA) 1 g capsule Instructed patient per Anesthesia Guidelines  Acetaminophen Med Class     Continue to take this medication on your normal schedule  If this is an oral medication and you take it in the morning, then you may take this medicine with a sip of water  Antipsychotic Med Class     Continue to take this medication on your normal schedule  If this is an oral medication and you take it in the morning, then you may take this medicine with a sip of water  Benzodiazepine antagonist Med Class     If this medication is needed please continue to take on your normal schedule  If you take it in the morning, then you may take this medicine with a sip of water    ASA Med Class: Aspirin     Should be discontinued at least one week prior to planned operation, unless specifically stated otherwise by surgical service  Your Surgeon may have patient stop taking aspirin up to a week before surgery if having intracranial, middle ear, posterior eye, spine surgery or prostate surgery  [Patients taking aspirin for coronary stents should be reviewed by an anesthesiologist in the optimization clinic  Please do not discontinue aspirin in patients with coronary stents unless given specific permission to do so by the cardiologist who prescribed medication ]   If your surgeon approves please continue to take this medication on your normal schedule  You may take this medication on the morning of your surgery with a sip of water  Beta blocker Med Class     Continue to take this heart medication on your normal schedule  If this is an oral medication and you take it in the morning, then you may take this medicine with a sip of water  Calcium Channel Blocker Med Class     Continue to take this heart medication on your normal schedule  If this is an oral medication and you take it in the morning, then you may take this medicine with a sip of water  Inhalational Med Class     Continue to take these inhaler medications on your normal schedule up to and including the day of surgery  NonStatin Med Class     Continue to take this medication on your normal schedule  If this is an oral medication and you take it in the morning, then you may take this medicine with a sip of water  Statin Med Class     Continue to take this medication on your normal schedule  If this is an oral medication and you take it in the morning, then you may take this medicine with a sip of water  Pre op instructions reviewed with pAtient and wife at [facility] appointment on 7/24    Meds, bathing and hospital instructions reviewed at this time with understanding

## 2018-07-27 ENCOUNTER — OFFICE VISIT (OUTPATIENT)
Dept: INTERNAL MEDICINE CLINIC | Facility: CLINIC | Age: 73
End: 2018-07-27
Payer: MEDICARE

## 2018-07-27 VITALS
SYSTOLIC BLOOD PRESSURE: 132 MMHG | BODY MASS INDEX: 27.92 KG/M2 | HEART RATE: 54 BPM | TEMPERATURE: 97.5 F | HEIGHT: 68 IN | WEIGHT: 184.2 LBS | DIASTOLIC BLOOD PRESSURE: 80 MMHG | OXYGEN SATURATION: 98 %

## 2018-07-27 DIAGNOSIS — L98.9 SKIN LESION: Primary | ICD-10-CM

## 2018-07-27 PROCEDURE — 99213 OFFICE O/P EST LOW 20 MIN: CPT | Performed by: INTERNAL MEDICINE

## 2018-07-27 RX ORDER — SULFAMETHOXAZOLE AND TRIMETHOPRIM 800; 160 MG/1; MG/1
1 TABLET ORAL EVERY 12 HOURS SCHEDULED
Qty: 14 TABLET | Refills: 0 | Status: SHIPPED | OUTPATIENT
Start: 2018-07-27 | End: 2018-08-03

## 2018-07-27 NOTE — ASSESSMENT & PLAN NOTE
This could be some scar tissue, other possibility includes early ingrown hair reaction    Will start Bactrim and refer to surgery to see if anything else needs to be done for this

## 2018-07-27 NOTE — PROGRESS NOTES
Assessment/Plan:    Skin lesion    This could be some scar tissue, other possibility includes early ingrown hair reaction  Will start Bactrim and refer to surgery to see if anything else needs to be done for this       Diagnoses and all orders for this visit:    Skin lesion  -     sulfamethoxazole-trimethoprim (BACTRIM DS) 800-160 mg per tablet; Take 1 tablet by mouth every 12 (twelve) hours for 7 days  -     Ambulatory referral to General Surgery; Future          Subjective:      Patient ID: Pina Bhatti is a 81 y o  male  Patient noticed a red bump around where his navel used to be  Noticed it about a week ago, more prominent over the past few days  He has slight tenderness when he pushes on the area  No drainage  Patient is undergoing vascular surgery in about 10 days  The following portions of the patient's history were reviewed and updated as appropriate: allergies, current medications, past family history, past medical history, past social history, past surgical history and problem list     Review of Systems   Constitutional: Negative for chills and fever  Skin: Positive for wound           Objective:      /80   Pulse (!) 54   Temp 97 5 °F (36 4 °C)   Ht 5' 7 5" (1 715 m)   Wt 83 6 kg (184 lb 3 2 oz)   SpO2 98%   BMI 28 42 kg/m²          Physical Exam   Skin:

## 2018-08-01 ENCOUNTER — TRANSCRIBE ORDERS (OUTPATIENT)
Dept: LAB | Facility: CLINIC | Age: 73
End: 2018-08-01

## 2018-08-01 ENCOUNTER — APPOINTMENT (OUTPATIENT)
Dept: LAB | Facility: CLINIC | Age: 73
DRG: 684 | End: 2018-08-01
Payer: MEDICARE

## 2018-08-01 DIAGNOSIS — F31.60 MIXED BIPOLAR I DISORDER (HCC): ICD-10-CM

## 2018-08-01 DIAGNOSIS — F31.0 BIPOLAR I DISORDER, MOST RECENT EPISODE HYPOMANIC (HCC): Primary | ICD-10-CM

## 2018-08-01 LAB
BUN SERPL-MCNC: 28 MG/DL (ref 5–25)
CREAT SERPL-MCNC: 1.73 MG/DL (ref 0.6–1.3)
GFR SERPL CREATININE-BSD FRML MDRD: 39 ML/MIN/1.73SQ M
LITHIUM SERPL-SCNC: 1 MMOL/L (ref 0.5–1)
TSH SERPL DL<=0.05 MIU/L-ACNC: 1.33 UIU/ML (ref 0.36–3.74)

## 2018-08-01 PROCEDURE — 84520 ASSAY OF UREA NITROGEN: CPT

## 2018-08-01 PROCEDURE — 80178 ASSAY OF LITHIUM: CPT

## 2018-08-01 PROCEDURE — 84443 ASSAY THYROID STIM HORMONE: CPT

## 2018-08-01 PROCEDURE — 82565 ASSAY OF CREATININE: CPT

## 2018-08-01 PROCEDURE — 36415 COLL VENOUS BLD VENIPUNCTURE: CPT

## 2018-08-03 ENCOUNTER — HOSPITAL ENCOUNTER (INPATIENT)
Facility: HOSPITAL | Age: 73
LOS: 1 days | Discharge: HOME | DRG: 684 | End: 2018-08-04
Attending: EMERGENCY MEDICINE | Admitting: INTERNAL MEDICINE
Payer: MEDICARE

## 2018-08-03 ENCOUNTER — ANESTHESIA EVENT (OUTPATIENT)
Dept: PERIOP | Facility: HOSPITAL | Age: 73
DRG: 269 | End: 2018-08-03
Payer: MEDICARE

## 2018-08-03 DIAGNOSIS — N17.9 AKI (ACUTE KIDNEY INJURY) (HCC): Primary | ICD-10-CM

## 2018-08-03 DIAGNOSIS — E87.5 HYPERKALEMIA: ICD-10-CM

## 2018-08-03 DIAGNOSIS — Z86.79 HISTORY OF ABDOMINAL AORTIC ANEURYSM: ICD-10-CM

## 2018-08-03 LAB
ANION GAP SERPL CALCULATED.3IONS-SCNC: 5 MMOL/L (ref 4–13)
BACTERIA UR QL AUTO: NORMAL /HPF
BASOPHILS # BLD AUTO: 0.05 THOUSANDS/ΜL (ref 0–0.1)
BASOPHILS NFR BLD AUTO: 1 % (ref 0–1)
BILIRUB UR QL STRIP: NEGATIVE
BUN SERPL-MCNC: 32 MG/DL (ref 5–25)
CALCIUM SERPL-MCNC: 8.5 MG/DL (ref 8.3–10.1)
CHLORIDE SERPL-SCNC: 113 MMOL/L (ref 100–108)
CLARITY UR: CLEAR
CO2 SERPL-SCNC: 20 MMOL/L (ref 21–32)
COLOR UR: YELLOW
COLOR, POC: YELLOW
CREAT SERPL-MCNC: 1.67 MG/DL (ref 0.6–1.3)
EOSINOPHIL # BLD AUTO: 0.27 THOUSAND/ΜL (ref 0–0.61)
EOSINOPHIL NFR BLD AUTO: 4 % (ref 0–6)
ERYTHROCYTE [DISTWIDTH] IN BLOOD BY AUTOMATED COUNT: 13.2 % (ref 11.6–15.1)
GFR SERPL CREATININE-BSD FRML MDRD: 41 ML/MIN/1.73SQ M
GLUCOSE SERPL-MCNC: 110 MG/DL (ref 65–140)
GLUCOSE UR STRIP-MCNC: NEGATIVE MG/DL
HCT VFR BLD AUTO: 38.5 % (ref 36.5–49.3)
HGB BLD-MCNC: 12.9 G/DL (ref 12–17)
HGB UR QL STRIP.AUTO: ABNORMAL
HYALINE CASTS #/AREA URNS LPF: NORMAL /LPF
IMM GRANULOCYTES # BLD AUTO: 0.02 THOUSAND/UL (ref 0–0.2)
IMM GRANULOCYTES NFR BLD AUTO: 0 % (ref 0–2)
KETONES UR STRIP-MCNC: NEGATIVE MG/DL
LEUKOCYTE ESTERASE UR QL STRIP: NEGATIVE
LYMPHOCYTES # BLD AUTO: 1.11 THOUSANDS/ΜL (ref 0.6–4.47)
LYMPHOCYTES NFR BLD AUTO: 14 % (ref 14–44)
MCH RBC QN AUTO: 30.4 PG (ref 26.8–34.3)
MCHC RBC AUTO-ENTMCNC: 33.5 G/DL (ref 31.4–37.4)
MCV RBC AUTO: 91 FL (ref 82–98)
MONOCYTES # BLD AUTO: 0.59 THOUSAND/ΜL (ref 0.17–1.22)
MONOCYTES NFR BLD AUTO: 8 % (ref 4–12)
NEUTROPHILS # BLD AUTO: 5.69 THOUSANDS/ΜL (ref 1.85–7.62)
NEUTS SEG NFR BLD AUTO: 73 % (ref 43–75)
NITRITE UR QL STRIP: NEGATIVE
NON-SQ EPI CELLS URNS QL MICRO: NORMAL /HPF
NRBC BLD AUTO-RTO: 0 /100 WBCS
PH UR STRIP.AUTO: 5.5 [PH] (ref 4.5–8)
PLATELET # BLD AUTO: 180 THOUSANDS/UL (ref 149–390)
PMV BLD AUTO: 10.5 FL (ref 8.9–12.7)
POTASSIUM SERPL-SCNC: 5.5 MMOL/L (ref 3.5–5.3)
PROT UR STRIP-MCNC: ABNORMAL MG/DL
RBC # BLD AUTO: 4.25 MILLION/UL (ref 3.88–5.62)
RBC #/AREA URNS AUTO: NORMAL /HPF
SODIUM SERPL-SCNC: 138 MMOL/L (ref 136–145)
SP GR UR STRIP.AUTO: 1.01 (ref 1–1.03)
UROBILINOGEN UR QL STRIP.AUTO: 0.2 E.U./DL
WBC # BLD AUTO: 7.73 THOUSAND/UL (ref 4.31–10.16)
WBC #/AREA URNS AUTO: NORMAL /HPF

## 2018-08-03 PROCEDURE — 81001 URINALYSIS AUTO W/SCOPE: CPT

## 2018-08-03 PROCEDURE — 80048 BASIC METABOLIC PNL TOTAL CA: CPT | Performed by: EMERGENCY MEDICINE

## 2018-08-03 PROCEDURE — 96360 HYDRATION IV INFUSION INIT: CPT

## 2018-08-03 PROCEDURE — 85025 COMPLETE CBC W/AUTO DIFF WBC: CPT | Performed by: EMERGENCY MEDICINE

## 2018-08-03 PROCEDURE — 99223 1ST HOSP IP/OBS HIGH 75: CPT | Performed by: INTERNAL MEDICINE

## 2018-08-03 PROCEDURE — 99284 EMERGENCY DEPT VISIT MOD MDM: CPT

## 2018-08-03 PROCEDURE — 36415 COLL VENOUS BLD VENIPUNCTURE: CPT | Performed by: EMERGENCY MEDICINE

## 2018-08-03 RX ORDER — LITHIUM CARBONATE 300 MG/1
300 TABLET, FILM COATED, EXTENDED RELEASE ORAL EVERY OTHER DAY
Status: DISCONTINUED | OUTPATIENT
Start: 2018-08-04 | End: 2018-08-04 | Stop reason: HOSPADM

## 2018-08-03 RX ORDER — LITHIUM CARBONATE 300 MG/1
600 TABLET, FILM COATED, EXTENDED RELEASE ORAL EVERY OTHER DAY
Status: DISCONTINUED | OUTPATIENT
Start: 2018-08-03 | End: 2018-08-04 | Stop reason: HOSPADM

## 2018-08-03 RX ORDER — DICYCLOMINE HCL 20 MG
20 TABLET ORAL EVERY 6 HOURS
Status: DISCONTINUED | OUTPATIENT
Start: 2018-08-03 | End: 2018-08-04 | Stop reason: HOSPADM

## 2018-08-03 RX ORDER — METOPROLOL SUCCINATE 25 MG/1
25 TABLET, EXTENDED RELEASE ORAL DAILY
Status: DISCONTINUED | OUTPATIENT
Start: 2018-08-03 | End: 2018-08-04 | Stop reason: HOSPADM

## 2018-08-03 RX ORDER — ONDANSETRON 2 MG/ML
4 INJECTION INTRAMUSCULAR; INTRAVENOUS EVERY 6 HOURS PRN
Status: DISCONTINUED | OUTPATIENT
Start: 2018-08-03 | End: 2018-08-04 | Stop reason: HOSPADM

## 2018-08-03 RX ORDER — DIAZEPAM 5 MG/1
5 TABLET ORAL DAILY PRN
Status: DISCONTINUED | OUTPATIENT
Start: 2018-08-03 | End: 2018-08-04 | Stop reason: HOSPADM

## 2018-08-03 RX ORDER — ATORVASTATIN CALCIUM 40 MG/1
40 TABLET, FILM COATED ORAL DAILY
Status: DISCONTINUED | OUTPATIENT
Start: 2018-08-03 | End: 2018-08-04 | Stop reason: HOSPADM

## 2018-08-03 RX ORDER — AMLODIPINE BESYLATE 2.5 MG/1
2.5 TABLET ORAL DAILY
Status: DISCONTINUED | OUTPATIENT
Start: 2018-08-03 | End: 2018-08-04 | Stop reason: HOSPADM

## 2018-08-03 RX ORDER — SODIUM CHLORIDE 9 MG/ML
75 INJECTION, SOLUTION INTRAVENOUS CONTINUOUS
Status: DISCONTINUED | OUTPATIENT
Start: 2018-08-03 | End: 2018-08-04 | Stop reason: HOSPADM

## 2018-08-03 RX ORDER — ASPIRIN 81 MG/1
81 TABLET ORAL DAILY
Status: DISCONTINUED | OUTPATIENT
Start: 2018-08-03 | End: 2018-08-04 | Stop reason: HOSPADM

## 2018-08-03 RX ORDER — FLUTICASONE PROPIONATE 50 MCG
1 SPRAY, SUSPENSION (ML) NASAL 2 TIMES DAILY
Status: DISCONTINUED | OUTPATIENT
Start: 2018-08-03 | End: 2018-08-04 | Stop reason: HOSPADM

## 2018-08-03 RX ORDER — HEPARIN SODIUM 5000 [USP'U]/ML
5000 INJECTION, SOLUTION INTRAVENOUS; SUBCUTANEOUS EVERY 8 HOURS SCHEDULED
Status: DISCONTINUED | OUTPATIENT
Start: 2018-08-03 | End: 2018-08-04 | Stop reason: HOSPADM

## 2018-08-03 RX ORDER — LORATADINE 10 MG/1
10 TABLET ORAL DAILY
Status: DISCONTINUED | OUTPATIENT
Start: 2018-08-03 | End: 2018-08-04 | Stop reason: HOSPADM

## 2018-08-03 RX ORDER — ALBUTEROL SULFATE 90 UG/1
2 AEROSOL, METERED RESPIRATORY (INHALATION) EVERY 6 HOURS PRN
Status: DISCONTINUED | OUTPATIENT
Start: 2018-08-03 | End: 2018-08-04 | Stop reason: HOSPADM

## 2018-08-03 RX ORDER — FLUTICASONE FUROATE AND VILANTEROL 100; 25 UG/1; UG/1
1 POWDER RESPIRATORY (INHALATION) DAILY
Status: DISCONTINUED | OUTPATIENT
Start: 2018-08-03 | End: 2018-08-04 | Stop reason: HOSPADM

## 2018-08-03 RX ORDER — ACETAMINOPHEN 325 MG/1
500 TABLET ORAL DAILY PRN
Status: DISCONTINUED | OUTPATIENT
Start: 2018-08-03 | End: 2018-08-04 | Stop reason: HOSPADM

## 2018-08-03 RX ORDER — PANTOPRAZOLE SODIUM 40 MG/1
40 TABLET, DELAYED RELEASE ORAL
Status: DISCONTINUED | OUTPATIENT
Start: 2018-08-04 | End: 2018-08-04 | Stop reason: HOSPADM

## 2018-08-03 RX ADMIN — SODIUM CHLORIDE 1000 ML: 0.9 INJECTION, SOLUTION INTRAVENOUS at 09:28

## 2018-08-03 RX ADMIN — LITHIUM CARBONATE EXTENDED-RELEASE TABLET 600 MG: 300 TABLET, FILM COATED, EXTENDED RELEASE ORAL at 17:43

## 2018-08-03 RX ADMIN — ATORVASTATIN CALCIUM 40 MG: 40 TABLET, FILM COATED ORAL at 15:48

## 2018-08-03 RX ADMIN — HEPARIN SODIUM 5000 UNITS: 5000 INJECTION, SOLUTION INTRAVENOUS; SUBCUTANEOUS at 15:49

## 2018-08-03 RX ADMIN — ASPIRIN 81 MG: 81 TABLET, COATED ORAL at 15:48

## 2018-08-03 RX ADMIN — METOPROLOL SUCCINATE 25 MG: 25 TABLET, EXTENDED RELEASE ORAL at 15:48

## 2018-08-03 RX ADMIN — SODIUM CHLORIDE 75 ML/HR: 0.9 INJECTION, SOLUTION INTRAVENOUS at 14:25

## 2018-08-03 RX ADMIN — SODIUM CHLORIDE 75 ML/HR: 0.9 INJECTION, SOLUTION INTRAVENOUS at 12:07

## 2018-08-03 RX ADMIN — IPRATROPIUM BROMIDE 2 PUFF: 17 AEROSOL, METERED RESPIRATORY (INHALATION) at 17:43

## 2018-08-03 RX ADMIN — AMLODIPINE BESYLATE 2.5 MG: 2.5 TABLET ORAL at 15:48

## 2018-08-03 RX ADMIN — HEPARIN SODIUM 5000 UNITS: 5000 INJECTION, SOLUTION INTRAVENOUS; SUBCUTANEOUS at 21:55

## 2018-08-03 RX ADMIN — FLUTICASONE FUROATE AND VILANTEROL TRIFENATATE 1 PUFF: 100; 25 POWDER RESPIRATORY (INHALATION) at 15:48

## 2018-08-03 RX ADMIN — FLUTICASONE PROPIONATE 1 SPRAY: 50 SPRAY, METERED NASAL at 17:43

## 2018-08-03 NOTE — ED ATTENDING ATTESTATION
Lidia Arita MD, saw and evaluated the patient  I have discussed the patient with the resident/non-physician practitioner and agree with the resident's/non-physician practitioner's findings, Plan of Care, and MDM as documented in the resident's/non-physician practitioner's note, except where noted  All available labs and Radiology studies were reviewed  At this point I agree with the current assessment done in the Emergency Department  I have conducted an independent evaluation of this patient a history and physical is as follows:     Pt was having labs to check lithium  and was noted to have increase in creatinine and came to ED Pt denies change in urination States he is eating and drinking well  Pt is on Lithium which level was noted to be 1 0 on August 1  Pt denies feeling poorly He is concerned because of increase in Cr in brief period an scheduled for surgery on Monday  Pt was on bactirm for a pustule that was removed by surgery   PE: alert nad heart reg lungs clear small wound healing well no evidence of infection neuro nonfocal MDM: will check labs and give fluids Discuss with vascular  Critical Care Time  CritCare Time    Procedures

## 2018-08-03 NOTE — ASSESSMENT & PLAN NOTE
Hx MI 1996 ansd stent placed 1998 - follows with Dr Kassidy Menjivar and recently cleared for AAA repair  Continue amlodipine 2 5mg/ metoprolol 25mg/ lipitor 40  Nil acute presently

## 2018-08-03 NOTE — ASSESSMENT & PLAN NOTE
Suspected 2" Bactrim DS over last 6 days  DC Bactrim - comlpeted course with improved abdominal wall wound  Monitor BMP  IVF

## 2018-08-03 NOTE — PLAN OF CARE
DISCHARGE PLANNING     Discharge to home or other facility with appropriate resources Progressing        Knowledge Deficit     Patient/family/caregiver demonstrates understanding of disease process, treatment plan, medications, and discharge instructions Progressing        PAIN - ADULT     Verbalizes/displays adequate comfort level or baseline comfort level Progressing

## 2018-08-03 NOTE — ED PROVIDER NOTES
History  Chief Complaint   Patient presents with    Abnormal Lab     Pt states scheduled for AAA surgery on Monday and styasusi had abnormal lab result  Pt states here to see if the result was correct as all other tests done were normal     81year old male presents to the ED for evaluation of increased creatinine  Patient is due to have AAA repair of his 5 2 infrarenal abdominal aortic aneusym by Dr Bouchra Ghotra on August 6th  Patient is on bactrim for the past week given concern for celluitis under his umblilicus  Despite not taking this bactrim this morning, he has been compliant with his medications  Patient states that he drinks 3 16 oz of water everyday  Patient endorses increased urination but has had this for several months  Denies hematuria, dysruria  Denies fever, chest pain, shortness of breath, nausea, vomiting, abdominal pain, constipation, diarrhea  Prior to Admission Medications   Prescriptions Last Dose Informant Patient Reported? Taking? B Complex Vitamins (VITAMIN B COMPLEX PO)   Yes No   Sig: Take by mouth   Cholecalciferol (VITAMIN D PO)   Yes No   Sig: Take 2,000 Units by mouth daily     acetaminophen (TYLENOL) 500 mg tablet   Yes No   Sig: Take 500 mg by mouth as needed for mild pain     albuterol (PROVENTIL HFA,VENTOLIN HFA) 90 mcg/act inhaler   Yes No   Sig: Inhale 2 puffs every 6 (six) hours as needed for wheezing   amLODIPine (NORVASC) 2 5 mg tablet   Yes No   Sig: Take 2 5 mg by mouth daily   aspirin (ECOTRIN LOW STRENGTH) 81 mg EC tablet   Yes No   Sig: Take 81 mg by mouth daily   atorvastatin (LIPITOR) 40 mg tablet   No No   Sig: TAKE 1 TABLET BY MOUTH  DAILY   diazepam (VALIUM) 5 mg tablet   Yes No   Sig: Take 5 mg by mouth as needed for anxiety   dicyclomine (BENTYL) 20 mg tablet   Yes No   Sig: Take 20 mg by mouth every 6 (six) hours   fexofenadine (ALLEGRA) 180 MG tablet   Yes No   Sig: Take 180 mg by mouth daily   fluticasone (FLONASE) 50 mcg/act nasal spray   Yes No   Sig: 1 spray into each nostril as needed  fluticasone-salmeterol (ADVAIR) 250-50 mcg/dose inhaler   Yes No   Sig: Inhale 1 puff every 12 (twelve) hours   ipratropium (ATROVENT HFA) 17 mcg/act inhaler   Yes No   Sig: Inhale 2 puffs every 6 (six) hours   lithium 300 MG tablet   Yes No   Sig: Take 600 mg by mouth every other day PM  Alternating with 300 mg every other day  THIS IS A CONTINUOUS RELEASE TABLET    metoprolol succinate (TOPROL-XL) 25 mg 24 hr tablet   No No   Sig: TAKE 1 TABLET BY MOUTH  DAILY   omega-3-acid ethyl esters (LOVAZA) 1 g capsule   No No   Sig: TAKE 1 CAPSULE BY MOUTH 3  TIMES DAILY   omeprazole (PriLOSEC) 40 MG capsule   Yes No   Sig: Take 40 mg by mouth daily  Facility-Administered Medications: None       Past Medical History:   Diagnosis Date    Aortic aneurysm (HCC)     Benign colon polyp     [facility] 1 disorder [facility]     Cardiac disease     MI    [facility]ical [facility] compression [facility] myelopathy [facility]     COPD (chronic obstructive pulmonary disease) ([facility])     Diverticulitis     Diverticulosis     Gait disturbance     uses cane, leg brace on right    GERD (gastroesophageal reflux disease)     Heart attack ([facility] ) 1996    Hx of resection of large bowel 5/3/2016    Hyperlipidemia     Hypertension     IBS (irritable bowel syndrome)     Lumbar stenosis     Lung cancer ([facility] )     2007 Left lower lobectomy and 2011 Right lung with surgery     Myocardial infarction [facility])     involving other coronary artery    Prostate cancer ([facility] )     Shortness of breath     [facility] 11/16[facility]2016       Past Surgical History:   Procedure Laterality Date    ANGIOPLASTY      stent    CARDIAC SURGERY      CERVICAL SPINE SURGERY      Cervical decompression with cervical fusion from C3-C7 for spinal stenosis      COLON SURGERY  11/04/2014    ESOPHAGOGASTRODUODENOSCOPY  01/03/2013    with possible Schatzki's ring & small hiatal hernia, mild gastritis    LUNG CANCER SURGERY Right 03/2011    wedge resection for lung tumor, 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    LUNG LOBECTOMY Left     WI ARTHRODESIS ANT INTERBODY MIN DISCECTOMY, CERVICAL BELOW C2 N/A 5/2/2016    Procedure: Anterior cervical diskectomy C3/4, C5/6, C6/7 with anterior plate fixation fusion C3-7;  Posterior decompressive laminectomy C3-7 with lateral mass fixation fusion C3-7 (IMPULSE MONITORING); Surgeon: Floridalma López MD;  [facility];  Service: Neurosurgery    WI ARTHRODESIS POSTERIOR INTERBODY LUMBAR N/A 1/30/2017    Procedure: L4-5 AND L5-S1 DECOMPRESSIVE FORAMINOTOMIES, TRANSFORAMINAL LUMBAR INTERBODY AND PEDICLE SCREW FIXATION FUSION L4-S1 (IMPULSE); Surgeon: Floridalma López MD;  [facility]: [facility];  Service: Neurosurgery    PROSTATECTOMY  2007    for prostate CA - no chemo/RT    SIGMOIDECTOMY      for divertic    SMALL INTESTINE SURGERY N/A 11/17/2016    Procedure: Exploratory Laparotomy, Lysis of adhesions to release small bowel obstruction;  Surgeon: Ludmila Palacios MD;  [facility];  Service:        Family History   Problem Relation Age of Onset    Heart attack Father     Colon cancer Father     Coronary artery disease Father     Heart disease Family     Hyperlipidemia Family     Hypertension Family      I have reviewed and agree with the history as documented  Social History   Substance Use Topics    Smoking status: Former Smoker     Packs/day: 1 00     Years: 35 00     Quit date: 2011    Smokeless tobacco: Never Used    Alcohol use Yes      Comment: 6 drinks a week        Review of Systems   Constitutional: Negative for appetite change, chills, diaphoresis, fatigue and fever  HENT: Negative for congestion, ear discharge, ear pain, hearing loss, postnasal drip, rhinorrhea, sneezing and sore throat  Eyes: Negative for pain, discharge and redness  Respiratory: Negative for cough, choking, chest tightness, shortness of breath, wheezing and stridor  Cardiovascular: Negative for chest pain and palpitations  Gastrointestinal: Negative for abdominal distention, abdominal pain, blood in stool, constipation, diarrhea, nausea and vomiting  Genitourinary: Negative for decreased urine volume, difficulty urinating, dysuria, flank pain, frequency and hematuria  Musculoskeletal: Negative for arthralgias, gait problem, joint swelling and neck pain  Skin: Negative for color change, pallor and rash  Allergic/Immunologic: Negative for environmental allergies, food allergies and immunocompromised state  Neurological: Negative for dizziness, seizures, weakness, light-headedness, numbness and headaches  Hematological: Negative for adenopathy  Does not bruise/bleed easily  Psychiatric/Behavioral: Negative for agitation and behavioral problems  Physical Exam  ED Triage Vitals   Temperature Pulse Respirations Blood Pressure SpO2   08/03/18 0838 08/03/18 0828 08/03/18 0828 08/03/18 0828 08/03/18 0828   97 6 °F (36 4 °C) (!) 54 18 168/81 97 %      Temp Source Heart Rate Source Patient Position - Orthostatic VS BP Location FiO2 (%)   08/03/18 0838 08/03/18 0828 08/03/18 0828 08/03/18 0828 --   Oral Monitor Sitting Left arm       Pain Score       08/03/18 0828       No Pain           Orthostatic Vital Signs  Vitals:    08/03/18 1414 08/03/18 1500 08/03/18 2300 08/04/18 0800   BP: 138/88 147/80 109/64 144/71   Pulse: 58 60 56 (!) 50   Patient Position - Orthostatic VS: Sitting Sitting Lying Sitting       Physical Exam   Constitutional: He is oriented to person, place, and time  He appears well-developed and well-nourished  HENT:   Head: Normocephalic and atraumatic  Nose: Nose normal    Mouth/Throat: Oropharynx is clear and moist    Eyes: Conjunctivae and EOM are normal  Pupils are equal, round, and reactive to light  Neck: Normal range of motion  Neck supple  Cardiovascular: Normal rate, regular rhythm and normal heart sounds    Exam reveals no gallop and no friction rub  No murmur heard  Pulmonary/Chest: Effort normal and breath sounds normal  No respiratory distress  He has no wheezes  He has no rales  Abdominal: Soft  Bowel sounds are normal  He exhibits no distension  There is no tenderness  There is no rebound and no guarding  Musculoskeletal: Normal range of motion  Neurological: He is alert and oriented to person, place, and time  Skin: Skin is warm and dry  Psychiatric: He has a normal mood and affect  His behavior is normal    Nursing note and vitals reviewed  ED Medications  Medications   sodium chloride 0 9 % bolus 1,000 mL (0 mL Intravenous Stopped 8/3/18 1002)       Diagnostic Studies  Results Reviewed     Procedure Component Value Units Date/Time    Basic metabolic panel [[id number]]  (Abnormal) Collected:  08/03/18 1002    Lab Status:  Final result Specimen:  Blood from Arm, Left Updated:  08/03/18 1035     Sodium 138 mmol/L      Potassium 5 5 (H) mmol/L      Chloride 113 (H) mmol/L      CO2 20 (L) mmol/L      Anion Gap 5 mmol/L      BUN 32 (H) mg/dL      Creatinine 1 67 (H) mg/dL      Glucose 110 mg/dL      Calcium 8 5 mg/dL      eGFR 41 ml/min/1 73sq m     Narrative:         National Kidney Disease Education Program recommendations are as follows:  GFR calculation is accurate only with a steady state creatinine  Chronic Kidney disease less than 60 ml/min/1 73 sq  meters  Kidney failure less than 15 ml/min/1 73 sq  meters      CBC and differential [[id number]] Collected:  08/03/18 1002    Lab Status:  Final result Specimen:  Blood from Arm, Left Updated:  08/03/18 1010     WBC 7 73 Thousand/uL      RBC 4 25 Million/uL      Hemoglobin 12 9 g/dL      Hematocrit 38 5 %      MCV 91 fL      MCH 30 4 pg      MCHC 33 5 g/dL      RDW 13 2 %      MPV 10 5 fL      Platelets 887 Thousands/uL      nRBC 0 /100 WBCs      Neutrophils Relative 73 %      Immat GRANS % 0 %      Lymphocytes Relative 14 %      Monocytes Relative 8 %      Eosinophils Relative 4 %      Basophils Relative 1 %      Neutrophils Absolute 5 69 Thousands/µL      Immature Grans Absolute 0 02 Thousand/uL      Lymphocytes Absolute 1 11 Thousands/µL      Monocytes Absolute 0 59 Thousand/µL      Eosinophils Absolute 0 27 Thousand/µL      Basophils Absolute 0 05 Thousands/µL     Urine Microscopic [[id number]]  (Normal) Collected:  08/03/18 0952    Lab Status:  Final result Specimen:  Urine from Urine, Clean Catch Updated:  08/03/18 0957     RBC, UA None Seen /hpf      WBC, UA None Seen /hpf      Epithelial Cells None Seen /hpf      Bacteria, UA None Seen /hpf      Hyaline Casts, UA None Seen /lpf     POCT urinalysis dipstick [[id number]]  (Normal) Resulted:  08/03/18 0942    Lab Status:  Final result Specimen:  Urine Updated:  08/03/18 0942     Color, UA yellow    ED Urine Macroscopic [[id number]]  (Abnormal) Collected:  08/03/18 0952    Lab Status:  Final result Specimen:  Urine Updated:  08/03/18 0941     Color, UA Yellow     Clarity, UA Clear     pH, UA 5 5     Leukocytes, UA Negative     Nitrite, UA Negative     Protein,  (2+) (A) mg/dl      Glucose, UA Negative mg/dl      Ketones, UA Negative mg/dl      Urobilinogen, UA 0 2 E U /dl      Bilirubin, UA Negative     Blood, UA Trace (A)     Specific [place], UA 1 015    Narrative:       CLINITEK RESULT                 No orders to display         Procedures  Procedures      Phone Consults  ED Phone Contact    ED Course  ED Course as of Aug 05 2209   Fri Aug 03, 2018   1041 Protein, UA: (!) 100 (2+)   1059 Admitted to [facility] for AILYN            Identification of Seniors at Risk      Most Recent Value   (ISAR) Identification of Seniors at Risk   Before the illness or injury that brought you to the Emergency, did you need someone to help you on a regular basis?   0 Filed at: 08/03/2018 0830   In the last 24 hours, have you needed more help than usual?  0 Filed at: 08/03/2018 0830   Have you been hospitalized for one or more nights during the past 6 months? 0 Filed at: 08/03/2018 0830   In general, do you see well?  0 Filed at: 08/03/2018 0830   In general, do you have serious problems with your memory? 0 Filed at: 08/03/2018 0830   Do you take more than three different medications every day? 1 Filed at: 08/03/2018 0830   ISAR Score  1 Filed at: 08/03/2018 0830                          MDM  Number of Diagnoses or Management Options  Diagnosis management comments: 81year old male presents to the ED for evaluation of abnormal lab    MDM: I will order cbc, bmp, urinalysis, normal saline bolus  Reassess  May contact vascular surgery to let them know patient is here and if patient is still amenable to surgery on Monday  CritCare Time    Disposition  Final diagnoses:   [facility]   Hyperkalemia   History of abdominal aortic aneurysm     Time reflects when diagnosis was documented in both MDM as applicable and the Disposition within this note     Time User Action Codes Description Comment    8/3/2018 10:58 AM Mariano [place] Add [N17 9] AILYN (acute kidney injury) ([facility] [address] )     8/3/2018 10:58 AM Mariano [place] Add [E87 5] Hyperkalemia     8/3/2018 10:59 AM Mariano [place] Add [Z86 79] History of abdominal aortic aneurysm       ED Disposition     ED Disposition Condition Comment    Admit  Case was discussed with [facility] and the patient's admission status was agreed to be Admission Status: inpatient status to the service of Dr Sheryl Dumont           Follow-up Information    None         Discharge Medication List as of 8/4/2018  2:46 PM      CONTINUE these medications which have NOT CHANGED    Details   acetaminophen (TYLENOL) 500 mg tablet Take 500 mg by mouth as needed for mild pain , Historical Med      albuterol (PROVENTIL HFA,VENTOLIN HFA) 90 mcg/act inhaler Inhale 2 puffs every 6 (six) hours as needed for wheezing, Historical Med      amLODIPine (NORVASC) 2 5 mg tablet Take 2 5 mg by mouth daily, Historical Med      aspirin (ECOTRIN LOW STRENGTH) 81 mg EC tablet Take 81 mg by mouth daily, Historical Med      atorvastatin (LIPITOR) 40 mg tablet TAKE 1 TABLET BY MOUTH  DAILY, Normal      B Complex Vitamins (VITAMIN B COMPLEX PO) Take by mouth, Historical Med      Cholecalciferol (VITAMIN D PO) Take 2,000 Units by mouth daily  , Historical Med      diazepam (VALIUM) 5 mg tablet Take 5 mg by mouth as needed for anxiety, Historical Med      dicyclomine (BENTYL) 20 mg tablet Take 20 mg by mouth every 6 (six) hours, Historical Med      fexofenadine (ALLEGRA) 180 MG tablet Take 180 mg by mouth daily, Historical Med      fluticasone (FLONASE) 50 mcg/act nasal spray 1 spray into each nostril as needed   , Historical Med      fluticasone-salmeterol (ADVAIR) 250-50 mcg/dose inhaler Inhale 1 puff every 12 (twelve) hours, Historical Med      ipratropium (ATROVENT HFA) 17 mcg/act inhaler Inhale 2 puffs every 6 (six) hours, Historical Med      lithium 300 MG tablet Take 600 mg by mouth every other day PM  Alternating with 300 mg every other day  THIS IS A CONTINUOUS RELEASE TABLET , Historical Med      metoprolol succinate (TOPROL-XL) 25 mg 24 hr tablet TAKE 1 TABLET BY MOUTH  DAILY, Normal      omega-3-acid ethyl esters (LOVAZA) 1 g capsule TAKE 1 CAPSULE BY MOUTH 3  TIMES DAILY, Normal      omeprazole (PriLOSEC) 40 MG capsule Take 40 mg by mouth daily  , Historical Med           No discharge procedures on file  ED Provider  Attending physically available and evaluated Georgina Zepeda  JUAN managed the patient along with the ED Attending      Electronically Signed by         Lorri Benitez MD  08/05/18 4725

## 2018-08-03 NOTE — H&P
H&P- Eduarda Toney 1945, 81 y o  male MRN: [id number]    Unit/Bed#: [facility] 806-01 Encounter: [id number]    Primary Care Provider: Juan Ram MD   Date and time admitted to hospital: 8/3/2018  8:30 AM    Aneurysm of infrarenal abdominal aorta [facility])   Assessment & Plan    Planned elective repair on Monday august 6  Consult vascular        CAD (coronary artery disease)   Assessment & Plan    Hx MI 1996 ansd stent placed 1998 - follows with Dr Tc Kruse and recently cleared for AAA repair  Continue amlodipine 2 5mg/ metoprolol 25mg/ lipitor 40  Nil acute presently        Adenocarcinoma of prostate ([facility] [address] )   Assessment & Plan    Stable  outpt f/u        * AILYN (acute kidney injury) ([facility] [address] )   Assessment & Plan    Suspected 2" Bactrim DS over last 6 days  DC Bactrim - comlpeted course with improved abdominal wall wound  Monitor BMP  IVF          VTE Prophylaxis: Heparin  / sequential compression device   Code Status: Level 1 - Full Code  POLST:     Anticipated Length of Stay:  Patient will be admitted on an Inpatient basis with an anticipated length of stay of  > 2 midnights  Justification for Hospital Stay:     Total Time for Visit, including Counseling / Coordination of Care: 1 hour  Greater than 50% of this total time spent on direct patient counseling and coordination of care  Chief Complaint:   By blood test was abnormal    of Present Illness:    Eduarda Toney is a 81 y o  male who presents with abnormal renal functions that he noticed when he checked his records on [employer] website  He was reviewed his labs done recently in anticipation of AAA repair Monday and also having been started on Bactrim by PCP 6 days ago  Note he also takes lithium for a long time  No other new change to medications or any aspects of health  all other ROS negative  Note extensive PMHx ofCAD, lung CA s/p resection, Prostate CA, cspine surgery      Review of Systems:    Review of Systems   All other systems reviewed and are negative  Past Medical and Surgical History:     Past Medical History:   Diagnosis Date    Aortic aneurysm ([facility] [address] )     Benign colon polyp     Bipolar 1 disorder ([facility] [address] )     Cardiac disease     MI    Cervical cord compression with myelopathy (HCC)     COPD (chronic obstructive pulmonary disease) (HCC)     Diverticulitis     Diverticulosis     Gait disturbance     uses cane, leg brace on right    GERD (gastroesophageal reflux disease)     Heart attack ([facility] [address] ) 1996    Hx of resection of large bowel 5/3/2016    Hyperlipidemia     Hypertension     IBS (irritable bowel syndrome)     Lumbar stenosis     Lung cancer ([facility] [address] )     2007 Left lower lobectomy and 2011 Right lung with surgery     Myocardial infarction [facility])     involving other coronary artery    Prostate cancer ([facility] [address] )     Shortness of breath     Small bowel obstruction ([facility] [address] ) 11/16/2016       Past Surgical History:   Procedure Laterality Date    ANGIOPLASTY      stent    CARDIAC SURGERY      CERVICAL SPINE SURGERY      Cervical decompression with cervical fusion from C3-C7 for spinal stenosis   COLON SURGERY  11/04/2014    ESOPHAGOGASTRODUODENOSCOPY  01/03/2013    with possible Schatzki's ring & small hiatal hernia, mild gastritis    LUNG CANCER SURGERY Right 03/2011    wedge resection for lung tumor, 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    LUNG LOBECTOMY Left     AL ARTHRODESIS ANT INTERBODY MIN DISCECTOMY, CERVICAL BELOW C2 N/A 5/2/2016    Procedure: Anterior cervical diskectomy C3/4, C5/6, C6/7 with anterior plate fixation fusion C3-7;  Posterior decompressive laminectomy C3-7 with lateral mass fixation fusion C3-7 (IMPULSE MONITORING); Surgeon: Komal Jackson MD;  Location: [facility];  Service: Neurosurgery    AL ARTHRODESIS POSTERIOR INTERBODY LUMBAR N/A 1/30/2017    Procedure: L4-5 AND L5-S1 DECOMPRESSIVE FORAMINOTOMIES, TRANSFORAMINAL LUMBAR INTERBODY AND PEDICLE SCREW FIXATION FUSION L4-S1 (IMPULSE);   Surgeon: Chyna Hill Oriana Medel MD;  Location: [facility];  Service: Neurosurgery    PROSTATECTOMY      for prostate CA - no chemo/RT    SIGMOIDECTOMY      for divertic    SMALL INTESTINE SURGERY N/A 2016    Procedure: Exploratory Laparotomy, Lysis of adhesions to release small bowel obstruction;  Surgeon: Cierra Rivero MD;  Location: [facility];  Service:        Meds/Allergies:    PTA meds:   Prior to Admission Medications   Prescriptions Last Dose Informant Patient Reported? Taking? B Complex Vitamins (VITAMIN B COMPLEX PO)   Yes No   Sig: Take by mouth   Cholecalciferol (VITAMIN D PO)   Yes No   Sig: Take 2,000 Units by mouth daily     acetaminophen (TYLENOL) 500 mg tablet   Yes No   Sig: Take 500 mg by mouth as needed for mild pain  albuterol (PROVENTIL HFA,VENTOLIN HFA) 90 mcg/act inhaler   Yes No   Sig: Inhale 2 puffs every 6 (six) hours as needed for wheezing   amLODIPine (NORVASC) 2 5 mg tablet   Yes No   Sig: Take 2 5 mg by mouth daily   aspirin (ECOTRIN LOW STRENGTH) 81 mg EC tablet   Yes No   Sig: Take 81 mg by mouth daily   atorvastatin (LIPITOR) 40 mg tablet   No No   Sig: TAKE 1 TABLET BY MOUTH  DAILY   diazepam (VALIUM) 5 mg tablet   Yes No   Sig: Take 5 mg by mouth as needed for anxiety   dicyclomine (BENTYL) 20 mg tablet   Yes No   Sig: Take 20 mg by mouth every 6 (six) hours   fexofenadine (ALLEGRA) 180 MG tablet   Yes No   Sig: Take 180 mg by mouth daily   fluticasone (FLONASE) 50 mcg/act nasal spray   Yes No   Si spray into each nostril as needed       fluticasone-salmeterol (ADVAIR) 250-50 mcg/dose inhaler   Yes No   Sig: Inhale 1 puff every 12 (twelve) hours   ipratropium (ATROVENT HFA) 17 mcg/act inhaler   Yes No   Sig: Inhale 2 puffs every 6 (six) hours   lithium 300 MG tablet   Yes No   Sig: Take 600 mg by mouth every other day PM  Alternating with 300 mg every other day  THIS IS A CONTINUOUS RELEASE TABLET    metoprolol succinate (TOPROL-XL) 25 mg 24 hr tablet   No No   Sig: TAKE 1 TABLET BY MOUTH  DAILY   omega-3-acid ethyl esters (LOVAZA) 1 g capsule   No No   Sig: TAKE 1 CAPSULE BY MOUTH 3  TIMES DAILY   omeprazole (PriLOSEC) 40 MG capsule   Yes No   Sig: Take 40 mg by mouth daily  Facility-Administered Medications: None       Allergies: Allergies   Allergen Reactions    Nsaids      Annotation - [id number]: unable to take due to use of lithium   Oxycodone Rash     History:     Marital Status: /Civil Union   Occupation:   Patient Pre-hospital Living Situation:   Patient Pre-hospital Level of Mobility:   Patient Pre-hospital Diet Restrictions:   Substance Use History:   History   Alcohol Use    Yes     Comment: 6 drinks a week     History   Smoking Status    Former Smoker    Packs/day: 1 00    Years: 35 00    Quit date: 2011   Smokeless Tobacco    Never Used     History   Drug Use No       Family History:    Family History   Problem Relation Age of Onset    Heart attack Father     Colon cancer Father     Coronary artery disease Father     Heart disease Family     Hyperlipidemia Family     Hypertension Family        Physical Exam:     Vitals:   Blood Pressure: 138/88 (08/03/18 1414)  Pulse: 58 (08/03/18 1414)  Temperature: 97 7 °F (36 5 °C) (08/03/18 1414)  Temp Source: Oral (08/03/18 1414)  Respirations: 18 (08/03/18 1414)  Height: 5' 7 5" (171 5 cm) (08/03/18 1414)  Weight - Scale: 83 kg (182 lb 15 7 oz) (08/03/18 1414)  SpO2: 95 % (08/03/18 1414)    Physical Exam   Constitutional: He is oriented to person, place, and time  HENT:   Head: Normocephalic  Eyes: Pupils are equal, round, and reactive to light  Cardiovascular: Normal heart sounds  Pulmonary/Chest: Effort normal and breath sounds normal    Abdominal: Soft  Small suture near umbilicus   Neurological: He is alert and oriented to person, place, and time  Skin: There is pallor           Lab and Imaging Results: I have personally reviewed pertinent films in PACS      Results from last 7 days  Lab Units 08/03/18  1002   WBC Thousand/uL 7 73   HEMOGLOBIN g/dL 12 9   HEMATOCRIT % 38 5   PLATELETS Thousands/uL 180   NEUTROS PCT % 73   LYMPHS PCT % 14   MONOS PCT % 8   EOS PCT % 4       Results from last 7 days  Lab Units 08/03/18  1002   SODIUM mmol/L 138   POTASSIUM mmol/L 5 5*   CHLORIDE mmol/L 113*   CO2 mmol/L 20*   BUN mg/dL 32*   CREATININE mg/dL 1 67*   CALCIUM mg/dL 8 5   GLUCOSE RANDOM mg/dL 110           Xr Chest Pa & Lateral    Result Date: 7/27/2018  Narrative: CHEST INDICATION:   I71 4: Abdominal aortic aneurysm, without rupture  COMPARISON:  January 22, 2018 EXAM PERFORMED/VIEWS:  XR CHEST PA & LATERAL FINDINGS: Cardiomediastinal silhouette appears unremarkable  Surgical clips at the right hilum and staples in the upper right lung  Chronic right-sided pleural thickening, especially at the apex  Several left-sided calcified granulomas  Lungs and pleural spaces otherwise clear  No pneumothorax  Bilateral healed rib fractures  Prior cervical spine surgery  Degenerative disease in the thoracic spine  Impression: Postoperative changes in the right hemithorax  No evidence of acute abnormality in the chest  Workstation performed: [id number]       EKG, Pathology, and Other Studies Reviewed on Admission:   Allscripts Records Reviewed: Yes     ** Please Note: Dragon 360 Dictation voice to text software may have been used in the creation of this document   **

## 2018-08-03 NOTE — RESPIRATORY THERAPY NOTE
RT Protocol Note  Rhoda Phan 81 y o  male MRN: [id number]  Unit/Bed#: [facility] 806-01 Encounter: [id number]    Assessment    Principal Problem:    AILYN (acute kidney injury) ([place] [address] )  Active Problems:    Adenocarcinoma of prostate ([place] [address] )    CAD (coronary artery disease)    Aneurysm of infrarenal abdominal aorta (HCC)      Home Pulmonary Medications:  Advair, atrovent MDI QID, albuteral mdi prn       Past Medical History:   Diagnosis Date    Aortic aneurysm ([facility])     Benign colon polyp     Bipolar 1 disorder ([place] [address] )     Cardiac disease     MI    Cervical cord compression with myelopathy ([facility])     COPD (chronic obstructive pulmonary disease) ([place] [address] )     Diverticulitis     Diverticulosis     Gait disturbance     uses cane, leg brace on right    GERD (gastroesophageal reflux disease)     Heart attack ([place] [address] ) 1996    Hx of resection of large bowel 5/3/2016    Hyperlipidemia     Hypertension     IBS (irritable bowel syndrome)     Lumbar stenosis     Lung cancer ([place] [address] )     2007 Left lower lobectomy and 2011 Right lung with surgery     Myocardial infarction [facility])     involving other coronary artery    Prostate cancer ([place] [address] )     Shortness of breath     Small bowel obstruction ([place] [address] ) 11/16/2016     Social History     Social History    Marital status: /Civil Union     Spouse name: N/A    Number of children: 1    Years of education: N/A     Occupational History    Retired      Social History Main Topics    Smoking status: Former Smoker     Packs/day: 1 00     Years: 35 00     Quit date: 2011    Smokeless tobacco: Never Used    Alcohol use Yes      Comment: 6 drinks a week    Drug use: No    Sexual activity: Not Asked     Other Topics Concern    None     Social History Narrative    None       Subjective         Objective    Physical Exam:   Assessment Type: (P) Assess only  General Appearance: (P) Awake, Alert  Respiratory Pattern: (P) Normal  Chest Assessment: (P) Chest expansion symmetrical  Bilateral Breath Sounds: (P) Clear, Diminished  R Breath Sounds: (P) Clear, Diminished  L Breath Sounds: (P) Clear, Diminished    Vitals:  Blood pressure 138/88, pulse 58, temperature 97 7 °F (36 5 °C), temperature source Oral, resp  rate 18, height 5' 7 5" (1 715 m), weight 83 kg (182 lb 15 7 oz), SpO2 95 %  Imaging and other studies: I have personally reviewed pertinent reports  Plan    Respiratory Plan: (P) Discontinue Protocol        Resp Comments: (P) Pt evaluated per resp protocol  Pt does have hx of COPD for which he sees Dr Odalys Nickerson for and has a regiment that he feels is working well  Pt has been ordered on the same regiment (all MDI's) by [facility]  Pt was not admitted for any pulmonary issues and denies sob/distress @ this time  Will plan to DC pt from resp protocol

## 2018-08-04 VITALS
RESPIRATION RATE: 16 BRPM | TEMPERATURE: 97.7 F | BODY MASS INDEX: 27.73 KG/M2 | HEART RATE: 50 BPM | OXYGEN SATURATION: 98 % | WEIGHT: 182.98 LBS | HEIGHT: 68 IN | DIASTOLIC BLOOD PRESSURE: 71 MMHG | SYSTOLIC BLOOD PRESSURE: 144 MMHG

## 2018-08-04 LAB
ALBUMIN SERPL BCP-MCNC: 3.5 G/DL (ref 3.5–5)
ALP SERPL-CCNC: 126 U/L (ref 46–116)
ALT SERPL W P-5'-P-CCNC: 38 U/L (ref 12–78)
ANION GAP SERPL CALCULATED.3IONS-SCNC: 5 MMOL/L (ref 4–13)
AST SERPL W P-5'-P-CCNC: 27 U/L (ref 5–45)
BILIRUB SERPL-MCNC: 0.43 MG/DL (ref 0.2–1)
BUN SERPL-MCNC: 24 MG/DL (ref 5–25)
CALCIUM SERPL-MCNC: 8.8 MG/DL (ref 8.3–10.1)
CHLORIDE SERPL-SCNC: 112 MMOL/L (ref 100–108)
CO2 SERPL-SCNC: 20 MMOL/L (ref 21–32)
CREAT SERPL-MCNC: 1.42 MG/DL (ref 0.6–1.3)
ERYTHROCYTE [DISTWIDTH] IN BLOOD BY AUTOMATED COUNT: 13.2 % (ref 11.6–15.1)
GFR SERPL CREATININE-BSD FRML MDRD: 50 ML/MIN/1.73SQ M
GLUCOSE SERPL-MCNC: 97 MG/DL (ref 65–140)
HCT VFR BLD AUTO: 38.9 % (ref 36.5–49.3)
HGB BLD-MCNC: 12.7 G/DL (ref 12–17)
MCH RBC QN AUTO: 29.8 PG (ref 26.8–34.3)
MCHC RBC AUTO-ENTMCNC: 32.6 G/DL (ref 31.4–37.4)
MCV RBC AUTO: 91 FL (ref 82–98)
PLATELET # BLD AUTO: 183 THOUSANDS/UL (ref 149–390)
PMV BLD AUTO: 10.6 FL (ref 8.9–12.7)
POTASSIUM SERPL-SCNC: 4.9 MMOL/L (ref 3.5–5.3)
PROT SERPL-MCNC: 6.6 G/DL (ref 6.4–8.2)
RBC # BLD AUTO: 4.26 MILLION/UL (ref 3.88–5.62)
SODIUM SERPL-SCNC: 137 MMOL/L (ref 136–145)
TSH SERPL DL<=0.05 MIU/L-ACNC: 1.8 UIU/ML (ref 0.36–3.74)
WBC # BLD AUTO: 7.67 THOUSAND/UL (ref 4.31–10.16)

## 2018-08-04 PROCEDURE — 99239 HOSP IP/OBS DSCHRG MGMT >30: CPT | Performed by: GENERAL PRACTICE

## 2018-08-04 PROCEDURE — 84443 ASSAY THYROID STIM HORMONE: CPT | Performed by: INTERNAL MEDICINE

## 2018-08-04 PROCEDURE — 85027 COMPLETE CBC AUTOMATED: CPT | Performed by: INTERNAL MEDICINE

## 2018-08-04 PROCEDURE — 80053 COMPREHEN METABOLIC PANEL: CPT | Performed by: INTERNAL MEDICINE

## 2018-08-04 RX ADMIN — IPRATROPIUM BROMIDE 2 PUFF: 17 AEROSOL, METERED RESPIRATORY (INHALATION) at 00:57

## 2018-08-04 RX ADMIN — SODIUM CHLORIDE 75 ML/HR: 0.9 INJECTION, SOLUTION INTRAVENOUS at 03:27

## 2018-08-04 RX ADMIN — ASPIRIN 81 MG: 81 TABLET, COATED ORAL at 10:31

## 2018-08-04 RX ADMIN — METOPROLOL SUCCINATE 25 MG: 25 TABLET, EXTENDED RELEASE ORAL at 10:30

## 2018-08-04 RX ADMIN — AMLODIPINE BESYLATE 2.5 MG: 2.5 TABLET ORAL at 10:31

## 2018-08-04 RX ADMIN — IPRATROPIUM BROMIDE 2 PUFF: 17 AEROSOL, METERED RESPIRATORY (INHALATION) at 06:20

## 2018-08-04 RX ADMIN — ATORVASTATIN CALCIUM 40 MG: 40 TABLET, FILM COATED ORAL at 10:30

## 2018-08-04 RX ADMIN — PANTOPRAZOLE SODIUM 40 MG: 40 TABLET, DELAYED RELEASE ORAL at 06:19

## 2018-08-04 RX ADMIN — FLUTICASONE PROPIONATE 1 SPRAY: 50 SPRAY, METERED NASAL at 10:31

## 2018-08-04 RX ADMIN — LORATADINE 10 MG: 10 TABLET ORAL at 10:30

## 2018-08-04 RX ADMIN — FLUTICASONE FUROATE AND VILANTEROL TRIFENATATE 1 PUFF: 100; 25 POWDER RESPIRATORY (INHALATION) at 10:31

## 2018-08-04 RX ADMIN — HEPARIN SODIUM 5000 UNITS: 5000 INJECTION, SOLUTION INTRAVENOUS; SUBCUTANEOUS at 06:19

## 2018-08-04 NOTE — DISCHARGE INSTRUCTIONS
Would avoid Bactrim as it led to an acute injury  Please return to hospital on Monday for surgery  Acute Kidney Injury, Ambulatory Care   GENERAL INFORMATION:   Acute kidney injury  happens when your kidneys suddenly stop working correctly  Normally, the kidneys turn fluid, chemicals, and waste from your blood into urine  In acute kidney injury, your kidneys can no longer do this  In most cases, it is temporary, but it may become a chronic kidney condition  Common symptoms include the following:   · Decreased urination or dark-colored urine    · Swelling in your arms, legs, or feet     · Abdominal or low back pain    · Vomiting, diarrhea, or loss of appetite    · Fatigue     · Skin rash  Seek immediate care for the following symptoms:   · Heart beating faster than normal for you    · Sudden chest pain or trouble breathing    · Seizure  Treatment for acute kidney injury:  Treatment depends upon the cause of your acute kidney injury and how severe it is  Medicines may be given to increase blood flow to your kidneys and protect your kidneys  You may also need medicine to decrease inflammation in your kidneys  You may be given IV fluids to replenish fluids and help your heart pump blood  Dialysis may be needed to remove chemicals and waste from your blood when your kidneys cannot  Manage acute kidney injury:   · Manage other health conditions  Care for your diabetes, high blood pressure, or heart disease  These conditions increase your risk for acute kidney injury  · Talk to your healthcare provider before you take over-the-counter-medicine  NSAIDs, stomach medicine, or laxatives may harm your kidneys and increase your risk for acute kidney injury  Follow up with your healthcare provider as directed:  Write down your questions so you remember to ask them during your visits  CARE AGREEMENT:   You have the right to help plan your care  Learn about your health condition and how it may be treated   Discuss treatment options with your caregivers to decide what care you want to receive  You always have the right to refuse treatment  The above information is an  only  It is not intended as medical advice for individual conditions or treatments  Talk to your doctor, nurse or pharmacist before following any medical regimen to see if it is safe and effective for you  © 2014 [address] is for End User's use only and may not be sold, redistributed or otherwise used for commercial purposes  All illustrations and images included in CareNotes® are the copyrighted property of A D A M [employer]  or [employer]

## 2018-08-04 NOTE — ASSESSMENT & PLAN NOTE
Planned elective repair on Monday august 6  D/w Vascular - ok to d/c home w/ planned readmission Monday

## 2018-08-04 NOTE — SOCIAL WORK
CM met with pt and pt aware cm role at discharge   Pt lives in a condo with his wife Nir Barnhart [phone]   There is one step to get in and a ramp   Pt can live on one level   Prior to admission pt was independent all ADL" s   Pt has a walker and cane at home but does not use them   Pt denies any VNA in the past and was at [facility] reahb   Pt denies any mental health for [facility] rehab in the past  Pt gets medication from [facility]   Pt is for d/c today and will return Monday for AAA surgery   Pt wife will transport home   CM reviewed d/c planning process including the following: identifying help at home, patient preference for d/c planning needs, Discharge Lounge, [facility] Meds to Bed program, availability of treatment team to discuss questions or concerns patient and/or family may have regarding understanding medications and recognizing signs and symptoms once discharged  CM also encouraged patient to follow up with all recommended appointments after discharge  Patient advised of importance for patient and family to participate in managing patients medical well being

## 2018-08-04 NOTE — ASSESSMENT & PLAN NOTE
Hx MI 1996 ansd stent placed 1998 - follows with Dr Ravin Knox and recently cleared for AAA repair  Continue amlodipine 2 5mg/ metoprolol 25mg/ lipitor 40  Nil acute presently

## 2018-08-04 NOTE — DISCHARGE SUMMARY
Discharge- Shabana Pritchett 1945, 81 y o  male MRN: [id number]    Unit/Bed#: [facility] 806-01 Encounter: [id number]    Primary Care Provider: Allegra Genao MD   Date and time admitted to hospital: 8/3/2018  8:30 AM        * AILYN (acute kidney injury) (HCC)resolved as of 8/4/2018   Assessment & Plan    Suspected 2" Bactrim DS over last 6 days  DC Bactrim - comlpeted course with improved abdominal wall wound  IVF given, and Cr improved to 1 4, which is near b/l of 1 2        Aneurysm of infrarenal abdominal aorta [facility])   Assessment & Plan    Planned elective repair on Monday august 6  D/w Vascular - ok to d/c home w/ planned readmission Monday        CAD (coronary artery disease)   Assessment & Plan    Hx MI 1996 ansd stent placed 1998 - follows with Dr Michael Gregorio and recently cleared for AAA repair  Continue amlodipine 2 5mg/ metoprolol 25mg/ lipitor 40  Nil acute presently        Adenocarcinoma of prostate [facility])   Assessment & Plan    Stable  outpt f/u          Discharging Physician / Practitioner: Flynn Cary DO  PCP: Allegra Genao MD  Admission Date:   Admission Orders     Ordered        08/03/18 1058  Inpatient Admission (expected length of stay for this patient is greater than two midnights)  Once             Discharge Date: 08/04/18    Resolved Problems  Date Reviewed: 8/4/2018          Resolved    * (Principal)AILYN (acute kidney injury) ([facility] [address] ) 8/4/2018     Resolved by  Flynn Cary DO          Consultations During Hospital Stay:  · none    Procedures Performed:     · none    Significant Findings / Test Results:     · See above    Incidental Findings:   · none     Test Results Pending at Discharge (will require follow up):   · none     Outpatient Tests Requested:  · none    Complications:  none    Reason for Admission: AILYN    Hospital Course:     Shabana Pritchett is a 81 y o  male patient who originally presented to the hospital on 8/3/2018 due to abnormal BW    Pt given IVF and his AILYN and hyperK resolved  AILYN 2/2 Bactrim  I have listed an intolerance to Bactrim on the chart as it causes AILYN and hyperK  Should avoid Bactrim unless infxn resistant to all other abx  I talked w/ vascular surgery and confirmed pt is to report to [facility] 6 AM for his EVAR  He is cleared for his surgery  Pt has suture in abdomen  I told pt suture should stay in and not be removed until Monday at earliest, possibly at end of coming week  Please note that patient was discharged earlier than expected as his AILYN resolved w/ IVF and vascular said he was stable for d/c and planned re-admit  Please see above list of diagnoses and related plan for additional information  Condition at Discharge: stable     Discharge Day Visit / Exam:     Subjective:  No acute complaints  Vitals: Blood Pressure: 144/71 (08/04/18 0800)  Pulse: (!) 50 (08/04/18 0800)  Temperature: 97 7 °F (36 5 °C) (08/04/18 0800)  Temp Source: Oral (08/04/18 0800)  Respirations: 16 (08/04/18 0800)  Height: 5' 7 5" (171 5 cm) (08/03/18 1414)  Weight - Scale: 83 kg (182 lb 15 7 oz) (08/03/18 1414)  SpO2: 98 % (08/04/18 0800)  Exam:   Physical Exam   Constitutional: He is oriented to person, place, and time  No distress  HENT:   Head: Normocephalic and atraumatic  Eyes: Conjunctivae and EOM are normal    Neck: Normal range of motion  Neck supple  Cardiovascular: Normal rate and regular rhythm  Pulmonary/Chest: Effort normal and breath sounds normal  He has no wheezes  He has no rales  Abdominal: Soft  Bowel sounds are normal  He exhibits no distension  There is no tenderness  Musculoskeletal: Normal range of motion  He exhibits no edema  Neurological: He is alert and oriented to person, place, and time  Skin: Skin is warm  He is not diaphoretic  Discussion with Family: yes    Discharge instructions/Information to patient and family:   See after visit summary for information provided to patient and family        Provisions for Follow-Up Care:  See after visit summary for information related to follow-up care and any pertinent home health orders  Disposition:     Home    For Discharges to [facility] SNF:   · Not Applicable to this Patient - Not Applicable to this Patient    Planned Readmission: Patient will be readmitted by vascular service 8/6 for EVAR     Discharge Statement:  I spent 40 minutes discharging the patient  This time was spent on the day of discharge  I had direct contact with the patient on the day of discharge  Greater than 50% of the total time was spent examining patient, answering all patient questions, arranging and discussing plan of care with patient as well as directly providing post-discharge instructions  Additional time then spent on discharge activities  Discharge Medications:  See after visit summary for reconciled discharge medications provided to patient and family        ** Please Note: This note has been constructed using a voice recognition system **

## 2018-08-04 NOTE — ASSESSMENT & PLAN NOTE
Suspected 2" Bactrim DS over last 6 days  DC Bactrim - comlpeted course with improved abdominal wall wound  IVF given, and Cr improved to 1 4, which is near b/l of 1 2

## 2018-08-05 NOTE — ANESTHESIA PREPROCEDURE EVALUATION
Review of Systems/Medical History  Patient summary reviewed  Chart reviewed  No history of anesthetic complications     Cardiovascular  Hyperlipidemia, Hypertension , Past MI (1996) , CAD , Cardiac stents (1998)  History of percutaneous transluminal coronary angioplasty, Dysrhythmias (h/o PVCs) , Aortic disease (AAA 5 2 cm infrarenal),   Comment: 7/28/18 - negative nuclear stress test,  Pulmonary  Smoker ex-smoker  , COPD , Shortness of breath,   Comment: H/o lung cancer, s/p LL lobectomy 2007, Right wedge resection 2011  GI/Hepatic    GERD well controlled,   Comment: S/p colonic resection 2016; irritable bowel syndrome     Chronic kidney disease (Cr = 1 42 (8/5)) , Prostatic disorder (s/p prostatectomy), history of prostate cancer       Endo/Other     GYN       Hematology   Musculoskeletal  Back pain , lumbar pain and spinal stenosis,   Arthritis     Neurology      Comment: S/p cervical fusion C3-7 Psychology   Depression (long term lithium therapy) , bipolar disorder,              Physical Exam    Airway    Mallampati score: II  TM Distance: >3 FB  Neck ROM: full     Dental       Cardiovascular  Rhythm: regular, Rate: normal,     Pulmonary  Breath sounds clear to auscultation,     Other Findings        Anesthesia Plan  ASA Score- 3     Anesthesia Type- general with ASA Monitors  Additional Monitors: arterial line  Airway Plan: ETT  Comment: Large bore PIV x2  Plan Factors-    Induction- intravenous  Postoperative Plan- Plan for postoperative opioid use  Planned trial extubation    Informed Consent- Anesthetic plan and risks discussed with patient  I personally reviewed this patient with the CRNA  Discussed and agreed on the Anesthesia Plan with the CRNA  Gely Antunez

## 2018-08-06 ENCOUNTER — ANESTHESIA (OUTPATIENT)
Dept: PERIOP | Facility: HOSPITAL | Age: 73
DRG: 269 | End: 2018-08-06
Payer: MEDICARE

## 2018-08-06 ENCOUNTER — APPOINTMENT (OUTPATIENT)
Dept: RADIOLOGY | Facility: HOSPITAL | Age: 73
DRG: 269 | End: 2018-08-06
Attending: SURGERY
Payer: MEDICARE

## 2018-08-06 ENCOUNTER — HOSPITAL ENCOUNTER (INPATIENT)
Facility: HOSPITAL | Age: 73
LOS: 1 days | Discharge: HOME | DRG: 269 | End: 2018-08-07
Attending: SURGERY | Admitting: SURGERY
Payer: MEDICARE

## 2018-08-06 DIAGNOSIS — I71.4 ABDOMINAL AORTIC ANEURYSM WITHOUT RUPTURE (HCC): ICD-10-CM

## 2018-08-06 LAB
ANION GAP SERPL CALCULATED.3IONS-SCNC: 6 MMOL/L (ref 4–13)
ANION GAP SERPL CALCULATED.3IONS-SCNC: 7 MMOL/L (ref 4–13)
BASOPHILS # BLD AUTO: 0.03 THOUSANDS/ΜL (ref 0–0.1)
BASOPHILS NFR BLD AUTO: 0 % (ref 0–1)
BUN SERPL-MCNC: 20 MG/DL (ref 5–25)
BUN SERPL-MCNC: 20 MG/DL (ref 5–25)
CALCIUM SERPL-MCNC: 8.1 MG/DL (ref 8.3–10.1)
CALCIUM SERPL-MCNC: 8.3 MG/DL (ref 8.3–10.1)
CHLORIDE SERPL-SCNC: 113 MMOL/L (ref 100–108)
CHLORIDE SERPL-SCNC: 113 MMOL/L (ref 100–108)
CO2 SERPL-SCNC: 21 MMOL/L (ref 21–32)
CO2 SERPL-SCNC: 21 MMOL/L (ref 21–32)
CREAT SERPL-MCNC: 1.17 MG/DL (ref 0.6–1.3)
CREAT SERPL-MCNC: 1.22 MG/DL (ref 0.6–1.3)
EOSINOPHIL # BLD AUTO: 0.14 THOUSAND/ΜL (ref 0–0.61)
EOSINOPHIL NFR BLD AUTO: 2 % (ref 0–6)
ERYTHROCYTE [DISTWIDTH] IN BLOOD BY AUTOMATED COUNT: 13.3 % (ref 11.6–15.1)
GFR SERPL CREATININE-BSD FRML MDRD: 60 ML/MIN/1.73SQ M
GFR SERPL CREATININE-BSD FRML MDRD: 63 ML/MIN/1.73SQ M
GLUCOSE SERPL-MCNC: 103 MG/DL (ref 65–140)
GLUCOSE SERPL-MCNC: 107 MG/DL (ref 65–140)
GLUCOSE SERPL-MCNC: 113 MG/DL (ref 65–140)
HCT VFR BLD AUTO: 34.2 % (ref 36.5–49.3)
HGB BLD-MCNC: 11.1 G/DL (ref 12–17)
IMM GRANULOCYTES # BLD AUTO: 0.1 THOUSAND/UL (ref 0–0.2)
IMM GRANULOCYTES NFR BLD AUTO: 1 % (ref 0–2)
KCT BLD-ACNC: 177 SEC (ref 89–137)
KCT BLD-ACNC: 195 SEC (ref 89–137)
KCT BLD-ACNC: 212 SEC (ref 89–137)
LYMPHOCYTES # BLD AUTO: 1.34 THOUSANDS/ΜL (ref 0.6–4.47)
LYMPHOCYTES NFR BLD AUTO: 17 % (ref 14–44)
MAGNESIUM SERPL-MCNC: 1.7 MG/DL (ref 1.6–2.6)
MCH RBC QN AUTO: 29.6 PG (ref 26.8–34.3)
MCHC RBC AUTO-ENTMCNC: 32.5 G/DL (ref 31.4–37.4)
MCV RBC AUTO: 91 FL (ref 82–98)
MONOCYTES # BLD AUTO: 0.69 THOUSAND/ΜL (ref 0.17–1.22)
MONOCYTES NFR BLD AUTO: 9 % (ref 4–12)
NEUTROPHILS # BLD AUTO: 5.7 THOUSANDS/ΜL (ref 1.85–7.62)
NEUTS SEG NFR BLD AUTO: 71 % (ref 43–75)
NRBC BLD AUTO-RTO: 0 /100 WBCS
PLATELET # BLD AUTO: 156 THOUSANDS/UL (ref 149–390)
PMV BLD AUTO: 10.7 FL (ref 8.9–12.7)
POTASSIUM SERPL-SCNC: 4.8 MMOL/L (ref 3.5–5.3)
POTASSIUM SERPL-SCNC: 5 MMOL/L (ref 3.5–5.3)
RBC # BLD AUTO: 3.75 MILLION/UL (ref 3.88–5.62)
SODIUM SERPL-SCNC: 140 MMOL/L (ref 136–145)
SODIUM SERPL-SCNC: 141 MMOL/L (ref 136–145)
SPECIMEN SOURCE: ABNORMAL
WBC # BLD AUTO: 8 THOUSAND/UL (ref 4.31–10.16)

## 2018-08-06 PROCEDURE — 76937 US GUIDE VASCULAR ACCESS: CPT

## 2018-08-06 PROCEDURE — C1760 CLOSURE DEV, VASC: HCPCS | Performed by: SURGERY

## 2018-08-06 PROCEDURE — 34713 PERQ ACCESS & CLSR FEM ART: CPT | Performed by: RADIOLOGY

## 2018-08-06 PROCEDURE — 34705 EVAC RPR A-BIILIAC NDGFT: CPT | Performed by: SURGERY

## 2018-08-06 PROCEDURE — 83735 ASSAY OF MAGNESIUM: CPT | Performed by: SURGERY

## 2018-08-06 PROCEDURE — C1894 INTRO/SHEATH, NON-LASER: HCPCS | Performed by: SURGERY

## 2018-08-06 PROCEDURE — 85025 COMPLETE CBC W/AUTO DIFF WBC: CPT | Performed by: SURGERY

## 2018-08-06 PROCEDURE — 80048 BASIC METABOLIC PNL TOTAL CA: CPT | Performed by: ANESTHESIOLOGY

## 2018-08-06 PROCEDURE — 04V03DZ RESTRICTION OF ABDOMINAL AORTA WITH INTRALUMINAL DEVICE, PERCUTANEOUS APPROACH: ICD-10-PCS | Performed by: SURGERY

## 2018-08-06 PROCEDURE — B41BYZZ FLUOROSCOPY OF OTHER INTRA-ABDOMINAL ARTERIES USING OTHER CONTRAST: ICD-10-PCS | Performed by: RADIOLOGY

## 2018-08-06 PROCEDURE — 85347 COAGULATION TIME ACTIVATED: CPT

## 2018-08-06 PROCEDURE — 82948 REAGENT STRIP/BLOOD GLUCOSE: CPT

## 2018-08-06 PROCEDURE — 76937 US GUIDE VASCULAR ACCESS: CPT | Performed by: RADIOLOGY

## 2018-08-06 PROCEDURE — 34705 EVAC RPR A-BIILIAC NDGFT: CPT | Performed by: RADIOLOGY

## 2018-08-06 PROCEDURE — 80048 BASIC METABOLIC PNL TOTAL CA: CPT | Performed by: SURGERY

## 2018-08-06 PROCEDURE — C1769 GUIDE WIRE: HCPCS | Performed by: SURGERY

## 2018-08-06 PROCEDURE — 34713 PERQ ACCESS & CLSR FEM ART: CPT | Performed by: SURGERY

## 2018-08-06 DEVICE — IMPLANTABLE DEVICE: Type: IMPLANTABLE DEVICE | Site: AORTA | Status: FUNCTIONAL

## 2018-08-06 DEVICE — PERCLOSE PROGLIDE™ SUTURE-MEDIATED CLOSURE SYSTEM
Type: IMPLANTABLE DEVICE | Site: ARTERIAL | Status: FUNCTIONAL
Brand: PERCLOSE PROGLIDE™

## 2018-08-06 DEVICE — IMPLANTABLE DEVICE: Type: IMPLANTABLE DEVICE | Site: ARTERIAL | Status: FUNCTIONAL

## 2018-08-06 RX ORDER — ACETAMINOPHEN 325 MG/1
650 TABLET ORAL EVERY 6 HOURS PRN
Status: DISCONTINUED | OUTPATIENT
Start: 2018-08-06 | End: 2018-08-07 | Stop reason: HOSPADM

## 2018-08-06 RX ORDER — HYDROCODONE BITARTRATE AND ACETAMINOPHEN 5; 325 MG/1; MG/1
1 TABLET ORAL EVERY 4 HOURS PRN
Status: DISCONTINUED | OUTPATIENT
Start: 2018-08-06 | End: 2018-08-07 | Stop reason: HOSPADM

## 2018-08-06 RX ORDER — HEPARIN SODIUM 5000 [USP'U]/ML
5000 INJECTION, SOLUTION INTRAVENOUS; SUBCUTANEOUS EVERY 8 HOURS SCHEDULED
Status: DISCONTINUED | OUTPATIENT
Start: 2018-08-06 | End: 2018-08-07 | Stop reason: HOSPADM

## 2018-08-06 RX ORDER — MEPERIDINE HYDROCHLORIDE 25 MG/ML
12.5 INJECTION INTRAMUSCULAR; INTRAVENOUS; SUBCUTANEOUS
Status: DISCONTINUED | OUTPATIENT
Start: 2018-08-06 | End: 2018-08-06 | Stop reason: HOSPADM

## 2018-08-06 RX ORDER — IPRATROPIUM BROMIDE AND ALBUTEROL SULFATE 2.5; .5 MG/3ML; MG/3ML
3 SOLUTION RESPIRATORY (INHALATION) ONCE AS NEEDED
Status: DISCONTINUED | OUTPATIENT
Start: 2018-08-06 | End: 2018-08-06 | Stop reason: HOSPADM

## 2018-08-06 RX ORDER — HEPARIN SODIUM 1000 [USP'U]/ML
INJECTION, SOLUTION INTRAVENOUS; SUBCUTANEOUS AS NEEDED
Status: DISCONTINUED | OUTPATIENT
Start: 2018-08-06 | End: 2018-08-06 | Stop reason: SURG

## 2018-08-06 RX ORDER — PANTOPRAZOLE SODIUM 40 MG/1
40 TABLET, DELAYED RELEASE ORAL
Status: DISCONTINUED | OUTPATIENT
Start: 2018-08-07 | End: 2018-08-07 | Stop reason: HOSPADM

## 2018-08-06 RX ORDER — FLUTICASONE FUROATE AND VILANTEROL 200; 25 UG/1; UG/1
1 POWDER RESPIRATORY (INHALATION)
Status: DISCONTINUED | OUTPATIENT
Start: 2018-08-06 | End: 2018-08-07 | Stop reason: HOSPADM

## 2018-08-06 RX ORDER — ASPIRIN 81 MG/1
81 TABLET ORAL DAILY
Status: DISCONTINUED | OUTPATIENT
Start: 2018-08-07 | End: 2018-08-07 | Stop reason: HOSPADM

## 2018-08-06 RX ORDER — ROCURONIUM BROMIDE 10 MG/ML
INJECTION, SOLUTION INTRAVENOUS AS NEEDED
Status: DISCONTINUED | OUTPATIENT
Start: 2018-08-06 | End: 2018-08-06 | Stop reason: SURG

## 2018-08-06 RX ORDER — SODIUM CHLORIDE 9 MG/ML
75 INJECTION, SOLUTION INTRAVENOUS CONTINUOUS
Status: DISCONTINUED | OUTPATIENT
Start: 2018-08-06 | End: 2018-08-07

## 2018-08-06 RX ORDER — GLYCOPYRROLATE 0.2 MG/ML
INJECTION INTRAMUSCULAR; INTRAVENOUS AS NEEDED
Status: DISCONTINUED | OUTPATIENT
Start: 2018-08-06 | End: 2018-08-06 | Stop reason: SURG

## 2018-08-06 RX ORDER — CHLORHEXIDINE GLUCONATE 0.12 MG/ML
15 RINSE ORAL ONCE
Status: COMPLETED | OUTPATIENT
Start: 2018-08-06 | End: 2018-08-06

## 2018-08-06 RX ORDER — SODIUM CHLORIDE FOR INHALATION 0.9 %
VIAL, NEBULIZER (ML) INHALATION
Status: COMPLETED
Start: 2018-08-06 | End: 2018-08-06

## 2018-08-06 RX ORDER — MAGNESIUM SULFATE 1 G/100ML
1 INJECTION INTRAVENOUS ONCE
Status: COMPLETED | OUTPATIENT
Start: 2018-08-06 | End: 2018-08-06

## 2018-08-06 RX ORDER — ALBUTEROL SULFATE 90 UG/1
2 AEROSOL, METERED RESPIRATORY (INHALATION) EVERY 6 HOURS PRN
Status: DISCONTINUED | OUTPATIENT
Start: 2018-08-06 | End: 2018-08-07 | Stop reason: HOSPADM

## 2018-08-06 RX ORDER — ONDANSETRON 2 MG/ML
4 INJECTION INTRAMUSCULAR; INTRAVENOUS ONCE AS NEEDED
Status: DISCONTINUED | OUTPATIENT
Start: 2018-08-06 | End: 2018-08-06 | Stop reason: HOSPADM

## 2018-08-06 RX ORDER — LITHIUM CARBONATE 300 MG/1
600 TABLET, FILM COATED, EXTENDED RELEASE ORAL EVERY OTHER DAY
Status: DISCONTINUED | OUTPATIENT
Start: 2018-08-07 | End: 2018-08-07 | Stop reason: HOSPADM

## 2018-08-06 RX ORDER — ONDANSETRON 2 MG/ML
INJECTION INTRAMUSCULAR; INTRAVENOUS AS NEEDED
Status: DISCONTINUED | OUTPATIENT
Start: 2018-08-06 | End: 2018-08-06 | Stop reason: SURG

## 2018-08-06 RX ORDER — DIAZEPAM 5 MG/1
5 TABLET ORAL DAILY PRN
Status: DISCONTINUED | OUTPATIENT
Start: 2018-08-06 | End: 2018-08-07 | Stop reason: HOSPADM

## 2018-08-06 RX ORDER — IPRATROPIUM BROMIDE AND ALBUTEROL SULFATE 2.5; .5 MG/3ML; MG/3ML
3 SOLUTION RESPIRATORY (INHALATION) ONCE
Status: COMPLETED | OUTPATIENT
Start: 2018-08-06 | End: 2018-08-06

## 2018-08-06 RX ORDER — FENTANYL CITRATE/PF 50 MCG/ML
25 SYRINGE (ML) INJECTION
Status: DISCONTINUED | OUTPATIENT
Start: 2018-08-06 | End: 2018-08-06 | Stop reason: HOSPADM

## 2018-08-06 RX ORDER — PROTAMINE SULFATE 10 MG/ML
INJECTION, SOLUTION INTRAVENOUS AS NEEDED
Status: DISCONTINUED | OUTPATIENT
Start: 2018-08-06 | End: 2018-08-06 | Stop reason: SURG

## 2018-08-06 RX ORDER — SODIUM CHLORIDE 9 MG/ML
75 INJECTION, SOLUTION INTRAVENOUS CONTINUOUS
Status: DISCONTINUED | OUTPATIENT
Start: 2018-08-06 | End: 2018-08-06

## 2018-08-06 RX ORDER — METOPROLOL SUCCINATE 25 MG/1
25 TABLET, EXTENDED RELEASE ORAL DAILY
Status: DISCONTINUED | OUTPATIENT
Start: 2018-08-07 | End: 2018-08-07 | Stop reason: HOSPADM

## 2018-08-06 RX ORDER — MIDAZOLAM HYDROCHLORIDE 1 MG/ML
INJECTION INTRAMUSCULAR; INTRAVENOUS AS NEEDED
Status: DISCONTINUED | OUTPATIENT
Start: 2018-08-06 | End: 2018-08-06 | Stop reason: SURG

## 2018-08-06 RX ORDER — PROMETHAZINE HYDROCHLORIDE 25 MG/ML
12.5 INJECTION, SOLUTION INTRAMUSCULAR; INTRAVENOUS ONCE AS NEEDED
Status: DISCONTINUED | OUTPATIENT
Start: 2018-08-06 | End: 2018-08-06 | Stop reason: HOSPADM

## 2018-08-06 RX ORDER — SODIUM CHLORIDE, SODIUM LACTATE, POTASSIUM CHLORIDE, CALCIUM CHLORIDE 600; 310; 30; 20 MG/100ML; MG/100ML; MG/100ML; MG/100ML
INJECTION, SOLUTION INTRAVENOUS CONTINUOUS PRN
Status: DISCONTINUED | OUTPATIENT
Start: 2018-08-06 | End: 2018-08-06 | Stop reason: SURG

## 2018-08-06 RX ORDER — ESMOLOL HYDROCHLORIDE 10 MG/ML
INJECTION INTRAVENOUS AS NEEDED
Status: DISCONTINUED | OUTPATIENT
Start: 2018-08-06 | End: 2018-08-06 | Stop reason: SURG

## 2018-08-06 RX ORDER — AMLODIPINE BESYLATE 2.5 MG/1
2.5 TABLET ORAL DAILY
Status: DISCONTINUED | OUTPATIENT
Start: 2018-08-07 | End: 2018-08-07 | Stop reason: HOSPADM

## 2018-08-06 RX ORDER — ATORVASTATIN CALCIUM 40 MG/1
40 TABLET, FILM COATED ORAL DAILY
Status: DISCONTINUED | OUTPATIENT
Start: 2018-08-06 | End: 2018-08-07 | Stop reason: HOSPADM

## 2018-08-06 RX ORDER — FLUTICASONE PROPIONATE 50 MCG
1 SPRAY, SUSPENSION (ML) NASAL DAILY PRN
Status: DISCONTINUED | OUTPATIENT
Start: 2018-08-06 | End: 2018-08-07 | Stop reason: HOSPADM

## 2018-08-06 RX ORDER — PROPOFOL 10 MG/ML
INJECTION, EMULSION INTRAVENOUS AS NEEDED
Status: DISCONTINUED | OUTPATIENT
Start: 2018-08-06 | End: 2018-08-06 | Stop reason: SURG

## 2018-08-06 RX ORDER — LIDOCAINE HYDROCHLORIDE 10 MG/ML
INJECTION, SOLUTION INFILTRATION; PERINEURAL AS NEEDED
Status: DISCONTINUED | OUTPATIENT
Start: 2018-08-06 | End: 2018-08-06 | Stop reason: SURG

## 2018-08-06 RX ORDER — FENTANYL CITRATE 50 UG/ML
INJECTION, SOLUTION INTRAMUSCULAR; INTRAVENOUS AS NEEDED
Status: DISCONTINUED | OUTPATIENT
Start: 2018-08-06 | End: 2018-08-06 | Stop reason: SURG

## 2018-08-06 RX ADMIN — HEPARIN SODIUM 1000 UNITS: 1000 INJECTION INTRAVENOUS; SUBCUTANEOUS at 09:24

## 2018-08-06 RX ADMIN — ESMOLOL HYDROCHLORIDE 30 MG: 100 INJECTION, SOLUTION INTRAVENOUS at 09:53

## 2018-08-06 RX ADMIN — NEOSTIGMINE METHYLSULFATE 3 MG: 1 INJECTION, SOLUTION INTRAMUSCULAR; INTRAVENOUS; SUBCUTANEOUS at 09:53

## 2018-08-06 RX ADMIN — MIDAZOLAM 2 MG: 1 INJECTION INTRAMUSCULAR; INTRAVENOUS at 07:45

## 2018-08-06 RX ADMIN — ISODIUM CHLORIDE 3 ML: 0.03 SOLUTION RESPIRATORY (INHALATION) at 07:29

## 2018-08-06 RX ADMIN — GLYCOPYRROLATE 0.4 MG: 0.2 INJECTION, SOLUTION INTRAMUSCULAR; INTRAVENOUS at 09:53

## 2018-08-06 RX ADMIN — SODIUM CHLORIDE 75 ML/HR: 0.9 INJECTION, SOLUTION INTRAVENOUS at 10:42

## 2018-08-06 RX ADMIN — CHLORHEXIDINE GLUCONATE 15 ML: 1.2 RINSE ORAL at 07:29

## 2018-08-06 RX ADMIN — IPRATROPIUM BROMIDE 2 PUFF: 17 AEROSOL, METERED RESPIRATORY (INHALATION) at 20:16

## 2018-08-06 RX ADMIN — FENTANYL CITRATE 100 MCG: 50 INJECTION, SOLUTION INTRAMUSCULAR; INTRAVENOUS at 07:54

## 2018-08-06 RX ADMIN — FENTANYL CITRATE 100 MCG: 50 INJECTION, SOLUTION INTRAMUSCULAR; INTRAVENOUS at 09:55

## 2018-08-06 RX ADMIN — SODIUM CHLORIDE: 0.9 INJECTION, SOLUTION INTRAVENOUS at 08:15

## 2018-08-06 RX ADMIN — ONDANSETRON 4 MG: 2 INJECTION INTRAMUSCULAR; INTRAVENOUS at 09:48

## 2018-08-06 RX ADMIN — HEPARIN SODIUM 2000 UNITS: 1000 INJECTION INTRAVENOUS; SUBCUTANEOUS at 09:00

## 2018-08-06 RX ADMIN — FENTANYL CITRATE 25 MCG: 50 INJECTION, SOLUTION INTRAMUSCULAR; INTRAVENOUS at 10:26

## 2018-08-06 RX ADMIN — HEPARIN SODIUM 5000 UNITS: 5000 INJECTION, SOLUTION INTRAVENOUS; SUBCUTANEOUS at 21:36

## 2018-08-06 RX ADMIN — FENTANYL CITRATE 25 MCG: 50 INJECTION, SOLUTION INTRAMUSCULAR; INTRAVENOUS at 10:29

## 2018-08-06 RX ADMIN — ROCURONIUM BROMIDE 20 MG: 10 INJECTION INTRAVENOUS at 08:59

## 2018-08-06 RX ADMIN — GLYCOPYRROLATE 0.2 MG: 0.2 INJECTION, SOLUTION INTRAMUSCULAR; INTRAVENOUS at 08:13

## 2018-08-06 RX ADMIN — ROCURONIUM BROMIDE 60 MG: 10 INJECTION INTRAVENOUS at 07:54

## 2018-08-06 RX ADMIN — PROPOFOL 170 MG: 10 INJECTION, EMULSION INTRAVENOUS at 07:54

## 2018-08-06 RX ADMIN — LIDOCAINE HYDROCHLORIDE 50 MG: 10 INJECTION, SOLUTION INFILTRATION; PERINEURAL at 07:54

## 2018-08-06 RX ADMIN — IPRATROPIUM BROMIDE 2 PUFF: 17 AEROSOL, METERED RESPIRATORY (INHALATION) at 14:27

## 2018-08-06 RX ADMIN — HEPARIN SODIUM 7000 UNITS: 1000 INJECTION INTRAVENOUS; SUBCUTANEOUS at 08:50

## 2018-08-06 RX ADMIN — ATORVASTATIN CALCIUM 40 MG: 40 TABLET, FILM COATED ORAL at 14:16

## 2018-08-06 RX ADMIN — ACETAMINOPHEN 650 MG: 325 TABLET, FILM COATED ORAL at 14:16

## 2018-08-06 RX ADMIN — ROCURONIUM BROMIDE 20 MG: 10 INJECTION INTRAVENOUS at 08:35

## 2018-08-06 RX ADMIN — SODIUM CHLORIDE, SODIUM LACTATE, POTASSIUM CHLORIDE, AND CALCIUM CHLORIDE: .6; .31; .03; .02 INJECTION, SOLUTION INTRAVENOUS at 09:52

## 2018-08-06 RX ADMIN — SODIUM CHLORIDE, SODIUM LACTATE, POTASSIUM CHLORIDE, AND CALCIUM CHLORIDE: .6; .31; .03; .02 INJECTION, SOLUTION INTRAVENOUS at 07:43

## 2018-08-06 RX ADMIN — PROTAMINE SULFATE 40 MG: 10 INJECTION, SOLUTION INTRAVENOUS at 09:42

## 2018-08-06 RX ADMIN — CEFAZOLIN SODIUM 2000 MG: 2 SOLUTION INTRAVENOUS at 08:21

## 2018-08-06 RX ADMIN — MAGNESIUM SULFATE HEPTAHYDRATE 1 G: 1 INJECTION, SOLUTION INTRAVENOUS at 14:27

## 2018-08-06 RX ADMIN — IPRATROPIUM BROMIDE AND ALBUTEROL SULFATE 3 ML: .5; 3 SOLUTION RESPIRATORY (INHALATION) at 07:29

## 2018-08-06 NOTE — DISCHARGE INSTRUCTIONS
DISCHARGE INSTRUCTIONS  ENDOVASCULAR ANEURYSM REPAIR    Following discharge from the hospital, you may have some questions about your operation, your activities or your general condition  These instructions may answer some of your questions and help you adjust during the first few weeks following your operation  ACTIVITY:  Limit your physical activity to slow walking  Avoid climbing or heavy lifting for the first two weeks after surgery  Walking up steps and normal activities may be resumed, as you feel ready  You should not drive a car for three to four days following discharge from the hospital   You may ride in a car upon discharge  DIET:  Resume your normal diet  Try to eat low fat and low cholesterol foods  INCISION:  You may include the operated area in a shower on the second day following surgery  Sitting in a tub is not recommended for the first week following surgery or if you have any open wounds  Your physician may have chosen to use a type of adhesive glue, to close your incision  There are stitches present under the skin which will absorb on their own  The glue is used to cover the incision, assist in closure, and prevent contamination  This adhesive will darken and peel away on its own within one to two weeks  If one is present, you may remove the dressing, band-aid or steri-strips over your wound after two days  Numbness in the region of the incision may occur following the surgery  This normally resolves in six to twelve months  It is normal to have some bruising, swelling or mild discoloration around the incision  If increasing redness or pain develops, call our office immediately  If you notice any active bleeding, apply pressure to the site and call 911 or go to the nearest Emergency Department       Appt terrell/ Dr Crowe Brine: 8/21/18 at 11:45am, [facility] office  [address] [place] [address]-718[address] [place] [address] [phone] TOLL FREE [phone]  [address] [facility] , [address], [place], [address]  [address], [place], [address]  [address], [facility], [address]  [address] [facility], [address] [facility], [address], [facility], [address]  [address], [address]th [address], [address]  [address], [address]   [address] [facility], [address] [facility], [address] [facility], [address] [facility], [place], Diana Holder 6    FOLLOW UP STUDIES:  Doppler ultrasound studies, CT scans and abdominal x-rays are very important to your post-operative care  Your surgeon will arrange for them at your first postoperative visit

## 2018-08-06 NOTE — OP NOTE
OPERATIVE REPORT  PATIENT NAME: Conor Negro    :  1945  MRN: [id number]  Pt Location: [facility] HYBRID OR ROOM 02    SURGERY DATE: 2018    Surgeon(s) and Role:     * Joe Phan MD - Primary     * Sobeida Suárez MD - [address] [place] [address] MD Vinod - Assisting    Aneurysm of infrarenal abdominal aorta [facility]) [I71 4]    Post-Op Diagnosis Codes:     * Aneurysm of infrarenal abdominal aorta ([address] ) [I71 4]      Procedure(s):  REPAIR ANEURYSM ENDOVASCULAR ABDOMINAL AORTIC  (EVAR) WITH BILATERAL PERCUTANEOUS FEMORAL ACCESS WITH ULTRASOUND GUIDANCE ON THE RIGHT AND PRE CLOSURE    Specimen(s):  * No specimens in log *    Estimated Blood Loss:   Minimal    Drains:  Urethral Catheter Latex 16 Fr  (Active)   Site Assessment Clean;Skin intact 2018  7:57 AM   Collection Container Standard drainage bag 2018  7:57 AM   Securement Method Securing device (Describe) 2018  7:57 AM   Number of days: 0       Anesthesia Type:   General    Operative Indications:  Aneurysm of infrarenal abdominal aorta (HCC) [I704]  81year-old with increase in aneurysm size now measured at 5 2 cm presents for elective aneurysm repair    Operative Findings:  Successful exclusion of aortic aneurysm with 23 x 14 5 x 140 mm [place] C3 graft via the right femoral artery with left 16 x 95 mm iliac limb  At the conclusion of the procedure there was a late endoleak via lumbar arteries  Bilateral renal arteries and bilateral internal arteries remained widely patent with good graft position and rapid outflow  Patient had palpable pedal pulses at the conclusion of the procedure  Complications:   None    Procedure and Technique: Both a vascular surgeon and interventional radiologist were present throughout this procedures due to their unique skill sets and the necessity to simultaneously manipulate multiple endovascular devices    Qualified surgical resident was not available    General anesthesia was administered  Radial A-line was placed  The abdomen and bilateral upper legs were prepped and draped in normal sterile fashion with chlorhexidine prep  The femoral head was imaged with fluoroscopy and marked bilaterally  Transverse incisions were made overlying both common femoral arteries  Ultrasound was used to identify the common femoral arteries and micropuncture kit its were utilized to cannulate both common femoral arteries under direct ultrasound guidance on the right  The tract was dilated and pre closure was performed with 2 ProGlide closure systems in each common femoral artery  An 8 [place] sheath was placed in both common femoral arteries  IV heparin was administered  A 12 [place]e dry seal sheath was placed via the left common femoral puncture and a 12 [place] DrySeal sheath was placed via the right common femoral artery puncture  A pigtail catheter was passed via the left femoral sheath into the pararenal aorta  A 23 x 14 5 x 140 mm [place] C3 graft was then passed via the right sheath and positioned appropriately at the pararenal aorta  A pararenal flush aortogram was performed with CO2  The renal arteries were marked  The stent graft was then partially deployed just below the lowest renal artery  The contralateral sheath was withdrawn into the aortic sac and the contralateral limb was cannulated with a [place] the catheter and Bentson wire  Positioning within the main body graft was confirmed by positioning a pigtail catheter at the aortic neck and rotating it 360°  The pigtail catheter was then advanced to a position just above the main body graft and a pararenal aortogram was performed  The renal arteries were then again marked  The graft was adjusted and deployed proximally  A retrograde image of the iliac bifurcation was obtained in an appropriate obliquity via the contralateral sheath with CO2  The internal iliac artery was marked    A 16 x 95 mm iliac limb was then passed via the contralateral sheath, positioned appropriately and deployed  The delivery system was then removed  A retrograde image was performed of the ipsilateral iliac bifurcation in an appropriate obliquity using CO2  The iliac bifurcation was marked  The main body graft was then fully deployed  The delivery system was then removed  Angioplasty was then performed with an aortic compliant balloon at all points of graft overlap and all landing points  A completion arteriogram was then performed with 40 cc of dilute, 1-1, contrast   This arteriogram showed wide patency of bilateral main renal arteries and bilateral internal iliac arteries  There was rapid flow through the graft with good positioning  There is no evidence of a type 1 or type 3 endoleak  There was a late type 2 endoleak via lumbar arteries  All guidewires and catheters were withdrawn and the pre closure system was used to close both common femoral artery puncture sites  Manual compression was held for hemostasis  The wounds were then closed with Histacryl dressings  The patient was then woken from anesthesia and transferred to the recovery room  I was present for the entire procedure    Patient Disposition:  PACU      Vascular Quality Initiative - EVAR    Patient History  Preop Functional Status: Fully active; able to carry on all predisease activities without restriction    Genetic History: none  Prior Aortic Surgery: none  Unfit for Open AAA Repair?:  no  Maximum AAA Diameter: 52 mm  Right Iliac Aneurysm: none  Left Iliac Aneurysm: none  Aortic Neck Length:  22 mm  Aortic Neck Diameter:  21 mm  Aorta-Neck Length:na  Neck-AAA Angle: <45 degrees        Procedure Information  Contrast:  20 ml  Fluoro time:  17 1 minutes  Crystalloid: 900 ml    Right-sided access: Percutaneous femoral  Ultrasound Guidance: yes     Right-sided iliac adjunct: none    Left-sided access: Percutaneous femoral  Ultrasound Guidance: no        Left-sided iliac adjunct:  none    Aortic main device:  bifurcated, infra-renal    Right iliac endpoint: Common  Left iliac endpoint: Common    Anchors Used?:  none    Coil Occlusion?: none    Devices  ______________________________________________________________    Main Aortic Device : Nalini MORSE C3 23 x 14 5 by 140 mm  Device Diameter:  23 mm  Device Length:  140 mm  ______________________________________________________________    Number of Proximal Aortic Extensions:  none  ______________________________________________________________    Number of RIGHT Iliac Devices:  none  ______________________________________________________________    Number of LEFT Iliac Devices:  1    Distal endpoint: Common    ______________________________________________________________    Intraoperative Complications/Endoleak:     Endoleak at completion?: lumbar branch (II)  Renal artery coverage?:  no  Iliac artery injury?: none  Iliac/Femoral Thrombectomy?: no  Distal Embolectomy?: no  Conversion to open repair?:  no  Other complications?: no    SIGNATURE: Jad Mehta MD  DATE: August 6, 2018  TIME: 9:53 AM

## 2018-08-06 NOTE — PROGRESS NOTES
Post-op Note - Vascular Surgery   Gearl Stains 81 y o  male MRN: [id number]  Unit/Bed#: [facility] 529-01 Encounter: [id number]    Assessment/Plan:  81 y o  male with known abdominal aortic aneurysm s/p EVAR w/ successful exclusion of aortic aneurysm with 23 x 14 5 x 140 mm [place] C3 graft via the right femoral artery with left 16 x 95 mm iliac limb  Subjective/Objective     Subjective: Patient doing well post-operatively  His pain is well controlled and mostly soreness in his groin and lower abdomen  He denies any nausea  Objective:     Vitals: Blood pressure 166/84, pulse (!) 50, temperature (!) 97 4 °F (36 3 °C), temperature source Oral, resp  rate 14, height 5' 7 5" (1 715 m), weight 82 1 kg (181 lb), SpO2 99 %  ,Body mass index is 27 93 kg/m²      I/O       08/04 0701 - 08/05 0700 08/05 0701 - 08/06 0700 08/06 0701 - 08/07 0700    I V  (mL/kg)   1900 (23 1)    Total Intake(mL/kg)   1900 (23 1)    Urine (mL/kg/hr)   1225 (2)    Total Output     1225    Net     +675                 Physical Exam:  GEN: NAD  HEENT: MMM  CV: RRR, 2+ bilateral femoral, DP/PT  Lung: Normal effort  Ab: Soft, NT/ND, bilateral groin incisions, no ecchymosis   Extrem: No CCE,   Neuro: AOx3 motor/sensory grossly intact bilaterally    Lab, Imaging and other studies:   CBC with diff:   Lab Results   Component Value Date    WBC 8 00 08/06/2018    HGB 11 1 (L) 08/06/2018    HCT 34 2 (L) 08/06/2018    MCV 91 08/06/2018     08/06/2018    MCH 29 6 08/06/2018    MCHC 32 5 08/06/2018    RDW 13 3 08/06/2018    MPV 10 7 08/06/2018    NRBC 0 08/06/2018   , BMP/CMP:   Lab Results   Component Value Date     08/06/2018    K 5 0 08/06/2018     (H) 08/06/2018    CO2 21 08/06/2018    ANIONGAP 6 08/06/2018    BUN 20 08/06/2018    CREATININE 1 17 08/06/2018    GLUCOSE 107 08/06/2018    CALCIUM 8 3 08/06/2018    EGFR 63 08/06/2018   VTE Pharmacologic Prophylaxis: Heparin  VTE Mechanical Prophylaxis: sequential compression device

## 2018-08-06 NOTE — H&P (VIEW-ONLY)
Assessment/Plan:    Aneurysm of infrarenal abdominal aorta (HCC)  5 2 cm infrarenal abdominal aortic aneurysm  We discussed the findings on recent CT angiogram along with the indications for treatment and treatment options available  We discussed at length the option of proceeding with endovascular aneurysm repair  He understands the risks and benefits and would like to proceed  He will require his cardiac clearance 1st but has already undergone stress test        Diagnoses and all orders for this visit:    Aneurysm of infrarenal abdominal aorta (HCC)          Subjective:      Patient ID: Pina Bhatti is a 81 y o  male  Patient had CTA on 6/8/2018  Patient has known AAA  Patient has chronic back pain  Patient has a bloated feeling occasionally  61-year-old with known abdominal aortic aneurysm which was noted to have increased in size on recent follow-up study now presents following CT angiogram   On examination today he denies any new abdominal or back pain  He remains active without any blue limitations though he does have some underlying shortness of breath as well as a long history of spinal disease and associated musculoskeletal symptoms  CT scan is reviewed at length and diagrammed in preparation for endovascular aneurysm repair  The anatomy is amenable to endovascular repair and maximum aneurysm size is 5 2 cm  I have spent 30 minutes with patient and his wife today in which greater than 50% of this time was spent in counseling/coordination of care regarding Diagnostic results, Prognosis, Risks and benefits of tx options and Impressions  The following portions of the patient's history were reviewed and updated as appropriate: allergies, current medications, past family history, past medical history, past social history, past surgical history and problem list     Review of Systems   Constitutional: Negative for chills and fever     HENT: Negative for dental problem, sinus pain and sinus pressure  Eyes: Negative for pain and discharge  Respiratory: Positive for shortness of breath (with activity)  Cardiovascular: Negative for leg swelling  Gastrointestinal: Negative for diarrhea and nausea  Endocrine: Negative for cold intolerance and heat intolerance  Genitourinary: Positive for urgency  Musculoskeletal: Positive for arthralgias, back pain and gait problem  Skin: Negative for wound  Allergic/Immunologic: Negative for food allergies  Neurological: Negative for dizziness and light-headedness  Psychiatric/Behavioral: Negative for behavioral problems and self-injury           Objective:      /68 (BP Location: Right arm, Patient Position: Sitting)   Pulse 72   Temp (!) 96 8 °F (36 °C)   Ht 5' 7" (1 702 m)   Wt 85 7 kg (189 lb)   BMI 29 60 kg/m²          Physical Exam      Operative Scheduling Information:    Hospital:  [facility]    Physician:  Paulie Valle    Surgery:   Endovascular aneurysm repairSurgery:    Urgency:  Standard    Case Length:  Normal    Post-op Bed:  Stepdown    OR Table:  Discovery    Equipment Needs:  Rep:  [place] excluder graft    Medication Instructions:  Aspirin:   Continue (do not hold)    Hydration:  No

## 2018-08-06 NOTE — ANESTHESIA PROCEDURE NOTES
Arterial Line Insertion  Date/Time: 8/6/2018 8:15 AM  Performed by: Enrico Slaughter by: Aysha Morning   Consent: Verbal consent obtained  Risks and benefits: risks, benefits and alternatives were discussed  Consent given by: patient  Patient understanding: patient states understanding of the procedure being performed  Time out: Immediately prior to procedure a "time out" was called to verify the correct patient, procedure, equipment, support staff and site/side marked as required  Preparation: Patient was prepped and draped in the usual sterile fashion    Indications: hemodynamic monitoring  Orientation:  Left  Location: radial artery  Sedation:  Patient sedated: GA     Procedure Details:  Needle gauge: 20  Seldinger technique: Seldinger technique used  Number of attempts: 2    Post-procedure:  Post-procedure: dressing applied  Waveform: good waveform  Post-procedure CNS: normal  Patient tolerance: Patient tolerated the procedure well with no immediate complications

## 2018-08-06 NOTE — CASE MANAGEMENT
Initial Clinical Review    Admission: Date/Time/Statement: 8/3/18 @ 1058     Orders Placed This Encounter   Procedures    Inpatient Admission (expected length of stay for this patient is greater than two midnights)     Standing Status:   Standing     Number of Occurrences:   1     Order Specific Question:   Admitting Physician     Answer:   Jeronimo Acosta [[id number]]     Order Specific Question:   Level of Care     Answer:   Med Surg [[id number]]     Order Specific Question:   Estimated length of stay     Answer:   More than 2 Midnights     Order Specific Question:   Certification     Answer:   I certify that inpatient services are medically necessary for this patient for a duration of greater than two midnights  See H&P and MD Progress Notes for additional information about the patient's course of treatment  ED: Date/Time/Mode of Arrival:   ED Arrival Information     Expected Arrival Acuity Means of Arrival Escorted By Service Admission Type    - 8/3/2018 08:20 Urgent Walk-In Self Family [facility] Urgent    Arrival Complaint    Abnormal Test Result          Chief Complaint:   Chief Complaint   Patient presents with    Abnormal Lab     Pt states scheduled for AAA surgery on Monday and styates had abnormal lab result  Pt states here to see if the result was correct as all other tests done were normal       History of Illness: 81 y o  male who presents with abnormal renal functions that he noticed when he checked his records on [employer] website  He was reviewed his labs done recently in anticipation of AAA repair Monday and also having been started on Bactrim by PCP 6 days ago  Note he also takes lithium for a long time  No other new change to medications or any aspects of health  all other ROS negative  Note extensive PMHx ofCAD, lung CA s/p resection, Prostate CA, cspine surgery      ED Vital Signs:   ED Triage Vitals   Temperature Pulse Respirations Blood Pressure SpO2   08/03/18 0838 08/03/18 0828 08/03/18 0828 08/03/18 9797 08/03/18 0828   97 6 °F (36 4 °C) (!) 54 18 168/81 97 %      Temp Source Heart Rate Source Patient Position - Orthostatic VS BP Location FiO2 (%)   08/03/18 0838 08/03/18 0828 08/03/18 0828 08/03/18 0828 --   Oral Monitor Sitting Left arm       Pain Score       08/03/18 0828       No Pain        Wt Readings from Last 1 Encounters:   08/06/18 82 1 kg (181 lb)       Vital Signs (abnormal): 97 3--50--16--144/71    Abnormal Labs/Diagnostic Test Results:    Ref Range & Units 08/03/18 1002   Sodium 136 - 145 mmol/L 138    Potassium 3 5 - 5 3 mmol/L 5 5     Chloride 100 - 108 mmol/L 113     CO2 21 - 32 mmol/L 20     Anion Gap 4 - 13 mmol/L 5    BUN 5 - 25 mg/dL 32     Creatinine 0 60 - 1 30 mg/dL 1 67     Glucose 65 - 140 mg/dL 110       Ref Range & Units 08/03/18 0952   Color, UA  Yellow    Clarity, UA  Clear    pH, UA 4 5 - 8 0 5 5    Leukocytes, UA Negative Negative    Nitrite, UA Negative Negative    Protein, UA Negative mg/dl 100 (2+)     Glucose, UA Negative mg/dl Negative    Ketones, UA Negative mg/dl Negative    Urobilinogen, UA 0 2, 1 0 E U /dl E U /dl 0 2    Bilirubin, UA Negative Negative    Blood, UA Negative Trace     Specific [place], UA 1 003 - 1 030 1 015          ED Treatment:   Medication Administration from 08/03/2018 0820 to 08/03/2018 1406       Date/Time Order Dose Route Action Action by Comments     08/03/2018 0928 sodium chloride 0 9 % bolus 1,000 mL 1,000 mL Intravenous [address] Juliet Martinez RN      08/03/2018 1207 sodium chloride 0 9 % infusion 75 mL/hr Intravenous New Bag Sruthi Gibson RN           Past Medical/Surgical History:    Active Ambulatory Problems     Diagnosis Date Noted    Numbness 04/27/2016    Gait instability 04/27/2016    Cervical myelopathy (HCC) 04/28/2016    Adenocarcinoma of prostate ([facility] ) 05/03/2016    CAD (coronary artery disease) 05/03/2016    Bipolar affective disorder ([facility] ) 05/03/2016    COPD exacerbation ([place] [address] ) 05/03/2016    Gastroesophageal reflux disease 05/03/2016    Mixed hyperlipidemia 05/03/2016    Essential hypertension 05/03/2016    Aneurysm of infrarenal abdominal aorta ([facility] ) 05/03/2016    Hx of resection of large bowel 05/03/2016    Impaired mobility and activities of daily living 05/05/2016    Cervical radiculopathy due to degenerative joint disease of spine 05/05/2016    Degenerative disc disease, lumbar 01/30/2017    Foraminal stenosis of lumbar region 01/30/2017    Spondylolisthesis of lumbar region 01/30/2017    Myelopathy concurrent with and due to lumbosacral intervertebral disc disorder 02/01/2017    Arrhythmia 22/39/6811    Diastolic heart failure ([address] ) 01/21/2018    New onset atrial fibrillation ([address] ) 01/23/2018    Left hip pain 02/26/2018    Other bursitis of hip, left hip 07/03/2018    Skin lesion 07/27/2018     Past Medical History:   Diagnosis Date    Aortic aneurysm (HCC)     Benign colon polyp     Bipolar 1 disorder (HCC)     Cardiac disease     Cervical cord compression with myelopathy ([address] )     [facility]ch[facility] disease) [facility]     Diverticulitis     Diverticulosis     Gait disturbance     GERD (gastroesophageal reflux disease)     Heart attack ([address] ) 1996    Hx of resection of large bowel 5/3/2016    Hyperlipidemia     Hypertension     IBS (irritable bowel syndrome)     Lumbar stenosis     Lung cancer ([address] )     Myocardial infarction ([address] )     Prostate cancer ([address] )     Shortness of breath     Small bowel obstruction ([facility] ) 11/16/2016       Admitting Diagnosis: Hyperkalemia [E87 5]  AILYN (acute kidney injury) ([address] ) [N17 9]  History of abdominal aortic aneurysm [Z86 79]  Illness, unspecified [R69]    Age/Sex: 81 y o  male    Assessment/Plan:   Aneurysm of infrarenal abdominal aorta [facility])   Assessment & Plan     Planned elective repair on Monday august 6  Consult vascular          CAD (coronary artery disease)   Assessment & Plan     Hx MI 1996 ansd stent placed 1998 - follows with   Maximilian Masters and recently cleared for AAA repair  Continue amlodipine 2 5mg/ metoprolol 25mg/ lipitor 40  Nil acute presently          Adenocarcinoma of prostate ([facility] )   Assessment & Plan     Stable  outpt f/u          * AILYN (acute kidney injury) ([facility] )   Assessment & Plan     Suspected 2" Bactrim DS over last 6 days  DC Bactrim - comlpeted course with improved abdominal wall wound  Monitor BMP  IVF             VTE Prophylaxis: Heparin  / sequential compression device   Code Status: Level 1 - Full Code  POLST:      Anticipated Length of Stay:  Patient will be admitted on an Inpatient basis with an anticipated length of stay of  > 2 midnights     Justification for Hospital Stay:          Admission Orders:  M/S/Tele unit  Telem  Consult vascular surgery  Cardiac diet  SCD's    Scheduled Meds:   Facility-Administered Medications Ordered in Other Encounters:  acetaminophen 650 mg Oral Q6H PRN Diego Loveless    albuterol 2 puff Inhalation Q6H PRN Diego Loveless    [START ON 8/7/2018] amLODIPine 2 5 mg Oral Daily Diego Loveless    [START ON 8/7/2018] aspirin 81 mg Oral Daily Makayla Mata    atorvastatin 40 mg Oral Daily Makayla Mata    diazepam 5 mg Oral Daily PRN Dania Dulce Maria Mata    fluticasone 1 spray Nasal Daily PRN Dania Dulce Maria Mata    fluticasone-vilanterol 1 puff Inhalation Daily Makayla Mata    heparin (porcine) 5,000 Units Subcutaneous Q8H [facility] Makayla Mata    ipratropium 2 puff Inhalation Q6H Makayla Mata    [START ON 8/7/2018] lithium carbonate 600 mg Oral Every Other Day Dania Dulce Maria Mata    magnesium sulfate 1 g Intravenous Once Diego Loveless    [START ON 8/7/2018] metoprolol succinate 25 mg Oral Daily Diego Loveless    [START ON 8/7/2018] pantoprazole 40 mg Oral Early Morning Makayla Mata    sodium chloride 75 mL/hr Intravenous Continuous Torey Gann MD Last Rate: Stopped (08/06/18 7952)   sodium chloride 75 mL/hr Intravenous Continuous Diego Loveless Last Rate: 75 mL/hr (08/06/18 1042)     Continuous Infusions: NSS @ 75 ml/hr        D/C summary --   Hospital Course:      Laila Cerda is a 81 y o  male patient who originally presented to the hospital on 8/3/2018 due to abnormal BW  Pt given IVF and his AILYN and hyperK resolved  AILYN 2/2 Bactrim  I have listed an intolerance to Bactrim on the chart as it causes AILYN and hyperK  Should avoid Bactrim unless infxn resistant to all other abx  I talked w/ vascular surgery and confirmed pt is to report to [facility] 6 AM for his EVAR  He is cleared for his surgery       Pt has suture in abdomen    I told pt suture should stay in and not be removed until Monday at earliest, possibly at end of coming week      Please note that patient was discharged earlier than expected as his AILYN resolved w/ IVF and vascular said he was stable for d/c and planned re-admit

## 2018-08-07 ENCOUNTER — TRANSITIONAL CARE MANAGEMENT (OUTPATIENT)
Dept: INTERNAL MEDICINE CLINIC | Facility: CLINIC | Age: 73
End: 2018-08-07

## 2018-08-07 VITALS
OXYGEN SATURATION: 96 % | RESPIRATION RATE: 18 BRPM | HEART RATE: 62 BPM | BODY MASS INDEX: 27.43 KG/M2 | TEMPERATURE: 97.6 F | SYSTOLIC BLOOD PRESSURE: 146 MMHG | DIASTOLIC BLOOD PRESSURE: 72 MMHG | HEIGHT: 68 IN | WEIGHT: 181 LBS

## 2018-08-07 LAB
ANION GAP SERPL CALCULATED.3IONS-SCNC: 5 MMOL/L (ref 4–13)
BASOPHILS # BLD AUTO: 0.02 THOUSANDS/ΜL (ref 0–0.1)
BASOPHILS NFR BLD AUTO: 0 % (ref 0–1)
BUN SERPL-MCNC: 14 MG/DL (ref 5–25)
CALCIUM SERPL-MCNC: 8.8 MG/DL (ref 8.3–10.1)
CHLORIDE SERPL-SCNC: 115 MMOL/L (ref 100–108)
CO2 SERPL-SCNC: 22 MMOL/L (ref 21–32)
CREAT SERPL-MCNC: 1 MG/DL (ref 0.6–1.3)
EOSINOPHIL # BLD AUTO: 0.38 THOUSAND/ΜL (ref 0–0.61)
EOSINOPHIL NFR BLD AUTO: 3 % (ref 0–6)
ERYTHROCYTE [DISTWIDTH] IN BLOOD BY AUTOMATED COUNT: 13.4 % (ref 11.6–15.1)
GFR SERPL CREATININE-BSD FRML MDRD: 76 ML/MIN/1.73SQ M
GLUCOSE SERPL-MCNC: 105 MG/DL (ref 65–140)
HCT VFR BLD AUTO: 35.1 % (ref 36.5–49.3)
HGB BLD-MCNC: 11.7 G/DL (ref 12–17)
IMM GRANULOCYTES # BLD AUTO: 0.04 THOUSAND/UL (ref 0–0.2)
IMM GRANULOCYTES NFR BLD AUTO: 0 % (ref 0–2)
LYMPHOCYTES # BLD AUTO: 1.01 THOUSANDS/ΜL (ref 0.6–4.47)
LYMPHOCYTES NFR BLD AUTO: 9 % (ref 14–44)
MAGNESIUM SERPL-MCNC: 1.9 MG/DL (ref 1.6–2.6)
MCH RBC QN AUTO: 30.5 PG (ref 26.8–34.3)
MCHC RBC AUTO-ENTMCNC: 33.3 G/DL (ref 31.4–37.4)
MCV RBC AUTO: 92 FL (ref 82–98)
MONOCYTES # BLD AUTO: 0.84 THOUSAND/ΜL (ref 0.17–1.22)
MONOCYTES NFR BLD AUTO: 8 % (ref 4–12)
NEUTROPHILS # BLD AUTO: 8.97 THOUSANDS/ΜL (ref 1.85–7.62)
NEUTS SEG NFR BLD AUTO: 80 % (ref 43–75)
NRBC BLD AUTO-RTO: 0 /100 WBCS
PLATELET # BLD AUTO: 158 THOUSANDS/UL (ref 149–390)
PMV BLD AUTO: 10.9 FL (ref 8.9–12.7)
POTASSIUM SERPL-SCNC: 4.7 MMOL/L (ref 3.5–5.3)
RBC # BLD AUTO: 3.83 MILLION/UL (ref 3.88–5.62)
SODIUM SERPL-SCNC: 142 MMOL/L (ref 136–145)
WBC # BLD AUTO: 11.26 THOUSAND/UL (ref 4.31–10.16)

## 2018-08-07 PROCEDURE — 80048 BASIC METABOLIC PNL TOTAL CA: CPT | Performed by: SURGERY

## 2018-08-07 PROCEDURE — 83735 ASSAY OF MAGNESIUM: CPT | Performed by: SURGERY

## 2018-08-07 PROCEDURE — 85025 COMPLETE CBC W/AUTO DIFF WBC: CPT | Performed by: SURGERY

## 2018-08-07 PROCEDURE — 99024 POSTOP FOLLOW-UP VISIT: CPT | Performed by: SURGERY

## 2018-08-07 RX ADMIN — AMLODIPINE BESYLATE 2.5 MG: 2.5 TABLET ORAL at 08:55

## 2018-08-07 RX ADMIN — FLUTICASONE PROPIONATE 1 SPRAY: 50 SPRAY, METERED NASAL at 09:00

## 2018-08-07 RX ADMIN — IPRATROPIUM BROMIDE 2 PUFF: 17 AEROSOL, METERED RESPIRATORY (INHALATION) at 05:05

## 2018-08-07 RX ADMIN — METOPROLOL SUCCINATE 25 MG: 25 TABLET, EXTENDED RELEASE ORAL at 08:54

## 2018-08-07 RX ADMIN — ATORVASTATIN CALCIUM 40 MG: 40 TABLET, FILM COATED ORAL at 08:55

## 2018-08-07 RX ADMIN — HEPARIN SODIUM 5000 UNITS: 5000 INJECTION, SOLUTION INTRAVENOUS; SUBCUTANEOUS at 05:05

## 2018-08-07 RX ADMIN — PANTOPRAZOLE SODIUM 40 MG: 40 TABLET, DELAYED RELEASE ORAL at 05:05

## 2018-08-07 RX ADMIN — ASPIRIN 81 MG: 81 TABLET, COATED ORAL at 08:54

## 2018-08-07 NOTE — SOCIAL WORK
Cm met with patient to complete a general SW assessment and discuss discharge needs  Patient is a 30 day readmission; recently discharged on 8/4/2018  Cm confirmed all information from the most recent social work note (8/4/2018); patient reports all information is the same  No additional needs reported  See copied note below:    CM met with pt and pt aware cm role at discharge   Pt lives in a condo with his wife Jefferson Pod [phone]   There is one step to get in and a ramp   Pt can live on one level   Prior to admission pt was independent all ADL" s   Pt has a walker and cane at home but does not use them   Pt denies any VNA in the past and was at [facility] for reahb   Pt denies any mental health for [facility] rehab in the past  Pt gets medication from [facility]   Pt is for d/c today and will return Monday for AAA surgery   Pt wife will transport home   CM reviewed d/c planning process including the following: identifying help at home, patient preference for d/c planning needs, Discharge Lounge, [facility] Meds to Bed program, availability of treatment team to discuss questions or concerns patient and/or family may have regarding understanding medications and recognizing signs and symptoms once discharged  CM also encouraged patient to follow up with all recommended appointments after discharge  Patient advised of importance for patient and family to participate in managing patients medical well being

## 2018-08-07 NOTE — DISCHARGE SUMMARY
Discharge Summary    Denny Sim 81 y o  male MRN: [id number]    Unit/Bed#: [facility] 529-01 Encounter: [id number]    Admission Date: 8/6/2018     Discharge Date: 8/7/2018    Attending: Dr Yudith Schrader    Admitting Diagnosis: Aneurysm of infrarenal abdominal aorta (HCC) [I71 4]    Principle Diagnosis: Aneurysm of infrarenal abdominal aorta ([facility] ) [I71 4]    Secondary Diagnosis:  Past Medical History:   Diagnosis Date    [facility] )     Benign colon polyp     Bipolar 1 disorder ([facility] )     Cardiac disease     MI    Cervical cord compression with myelopathy ([facility] )     COPD (chronic obstructive pulmonary disease) ([facility] )     Diverticulitis     Diverticulosis     Gait disturbance     uses cane, leg brace on right    GERD (gastroesophageal reflux disease)     Heart attack ([facility] ) 1996    Hx of resection of large bowel 5/3/2016    Hyperlipidemia     Hypertension     IBS (irritable bowel syndrome)     Lumbar stenosis     Lung cancer ([facility] )     2007 Left lower lobectomy and 2011 Right lung with surgery     Myocardial infarction [facility])     involving other coronary artery    Prostate cancer ([facility] )     Shortness of breath     Small bowel obstruction ([facility] ) 11/16/2016     Past Surgical History:   Procedure Laterality Date    ANGIOPLASTY      stent    CARDIAC SURGERY      CERVICAL SPINE SURGERY      Cervical decompression with cervical fusion from C3-C7 for spinal stenosis      COLON SURGERY  11/04/2014    ESOPHAGOGASTRODUODENOSCOPY  01/03/2013    with possible Schatzki's ring & small hiatal hernia, mild gastritis    LUNG CANCER SURGERY Right 03/2011    wedge resection for lung tumor, 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    LUNG LOBECTOMY Left     TN ARTHRODESIS ANT INTERBODY MIN DISCECTOMY, CERVICAL BELOW C2 N/A 5/2/2016    Procedure: Anterior cervical diskectomy C3/4, C5/6, C6/7 with anterior plate fixation fusion C3-7;  Posterior decompressive laminectomy C3-7 with lateral mass fixation fusion C3-7 (IMPULSE MONITORING); Surgeon: Dottie Morales MD;  [facility];  Service: Neurosurgery    [place] ARTHRODESIS POSTERIOR INTERBODY LUMBAR N/A 1/30/2017    Procedure: L4-5 AND L5-S1 DECOMPRESSIVE FORAMINOTOMIES, TRANSFORAMINAL LUMBAR INTERBODY AND PEDICLE SCREW FIXATION FUSION L4-S1 (IMPULSE); Surgeon: Dottie Morales MD;  [facility];  Service: Neurosurgery    PROSTATECTOMY  2007    for prostate CA - no chemo/RT    SIGMOIDECTOMY      for divertic    SMALL INTESTINE SURGERY N/A 11/17/2016    Procedure: Exploratory Laparotomy, Lysis of adhesions to release small bowel obstruction;  Surgeon: Mirian Farooq MD;  [facility];  Service:         Procedures Performed: EVAR 8/6- Dr Ozzie Oviedo    Discharge Medications:  See after visit summary for reconciled discharge medications provided to patient and family  Hospital Course:   27-year-old gentleman admitted electively for treatment of AAA on 8/6/18  He underwent EVAR by Dr Ozzie Oviedo on the day of his admission as scheduled  He tolerated his procedure well  He was transferred to step-down unit for recovery overnight  On POD #1 he was found to be hemodynamically stable and neurovascular exam was intact  He was able to tolerate a diet, ambulate and void without difficulty  His pain was well controlled  His creatinine remained stable at 1 0 following surgery  He was deemed appropriate for discharge home in the care of his family  He was given instructions for his discharge medications, instructions for wound care, and activity recommendations  He was instructed to call with any questions concerns or changes in his condition  Condition at Discharge: stable     Provisions for Follow-Up Care:  See after visit summary for information related to follow-up care and any pertinent home health orders        Disposition: Home    Discharge instructions/Information to patient and family:   See after visit summary for information provided to patient and family  Planned Readmission: No    Discharge Statement   I spent 30 minutes discharging the patient  This time was spent on the day of discharge  I had direct contact with the patient on the day of discharge  Additional documentation is required if more than 30 minutes were spent on discharge

## 2018-08-07 NOTE — PROGRESS NOTES
Patient complained of some abdominal fullness  Vitals:    08/07/18 0250   BP: 138/55   Pulse: 55   Resp: 20   Temp: 97 8 °F (36 6 °C)   SpO2: 95%     Gen: NAD, lying in bed  Bilateral groin: Incisions healing well, no hematoma, mild bruising in right groin    Hgb: 67    A: 81year old male with AAA s/p EVAR on 8/6      P:  - Monitor for improvement in abdominal fullness  - Discharge planning per Vascular surgery

## 2018-08-07 NOTE — PHYSICIAN ADVISOR
Current patient class: Inpatient  The patient is currently on Hospital Day: 2 at [facility]        The patient was admitted to the hospital  on 8/6/18 at [phone] for the following diagnosis:  Aneurysm of infrarenal abdominal aorta (HCC) [I71 4]       There is documentation in the medical record of an expected length of stay of at least 2 midnights  The patient is therefore expected to satisfy the 2 midnight benchmark and given the 2 midnight presumption is appropriate for INPATIENT ADMISSION  Given this expectation of a satisfying stay, CMS instructs us that the patient is most often appropriate for inpatient admission under part A provided medical necessity is documented in the chart  After review of the relevant documentation, labs, vital signs and test results, the patient is appropriate for INPATIENT ADMISSION  Admission to the hospital as an inpatient is a complex decision making process which requires the practitioner to consider the patients presenting complaint, history and physical examination and all relevant testing  With this in mind, in this case, the patient was deemed appropriate for INPATIENT ADMISSION  After review of the documentation and testing available at the time of the admission I concur with this clinical determination of medical necessity  The patient does have an inpatient admission within the previous 30 days  The patient was admitted on 8/3/18 and discharged on 8/4/18 as an inpatient  The patient therefore required readmission review  During that admission, the patient was admitted with AILYN secondary to antibiotic use for abscess with improvement after IVF hydration  The patient is otherwise a planned re-admit for Monday August 6th for AAA repair, In this case the patient should be considered a SEPARATE and UNRELATED INPATIENT ADMISSION  The patient had been discharged in stable condition with a completed care plan   There were no unresolved acute medical issues at the time of discharged which would have reasonably been expected to prompt this readmission  Rationale is as follows: The patient is a 81 yrs male with a complicated PMH including HTN, HLD and AAA with a 56 2 cm infrarenal aneurysm scheduled for elective repair with vascular surgery on 8/6/2018  The patient underwent a successful and uncomplicated EVAR and was transferred to the step-down unit for recovery  He was noted to have stable renal function post surgery  The patient's procedure is an INPATIENT ONLY and therefore he is appropriate for inpatient admission  Furthermore, this was a planned admission and should be considered SEPARATE AND UNRELATED given the previous admission was secondary to AILYN from medication use with resolution and dc in stable condition       The patients vitals on arrival were ED Triage Vitals   Temperature Pulse Respirations Blood Pressure SpO2   08/06/18 0618 08/06/18 0618 08/06/18 0618 08/06/18 0618 08/06/18 0618   99 5 °F (37 5 °C) 63 16 134/86 98 %      Temp Source Heart Rate Source Patient Position - Orthostatic VS BP Location FiO2 (%)   08/06/18 0618 08/06/18 0618 -- -- --   Tymp Core Monitor         Pain Score       08/06/18 1026       5           Past Medical History:   Diagnosis Date    Aortic aneurysm (HCC)     Benign colon polyp     Bipolar 1 disorder (HCC)     Cardiac disease     MI    Cervical cord compression with myelopathy (HCC)     COPD (chronic obstructive pulmonary disease) (HCC)     Diverticulitis     Diverticulosis     Gait disturbance     uses cane, leg brace on right    GERD (gastroesophageal reflux disease)     Heart attack ([facility] ) 1996    Hx of resection of large bowel 5/3/2016    Hyperlipidemia     Hypertension     IBS (irritable bowel syndrome)     Lumbar stenosis     Lung cancer ([facility] )     2007 Left lower lobectomy and 2011 Right lung with surgery     Myocardial infarction [facility])     involving other coronary artery    Prostate cancer ([facility] )     Shortness of breath     Small bowel obstruction ([facility] ) 11/16/2016     Past Surgical History:   Procedure Laterality Date    ANGIOPLASTY      stent    CARDIAC SURGERY      CERVICAL SPINE SURGERY      Cervical decompression with cervical fusion from C3-C7 for spinal stenosis   COLON SURGERY  11/04/2014    ESOPHAGOGASTRODUODENOSCOPY  01/03/2013    with possible Schatzki's ring & small hiatal hernia, mild gastritis    LUNG CANCER SURGERY Right 03/2011    wedge resection for lung tumor, 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    LUNG LOBECTOMY Left     VT ARTHRODESIS ANT INTERBODY MIN DISCECTOMY, CERVICAL BELOW C2 N/A 5/2/2016    Procedure: Anterior cervical diskectomy C3/4, C5/6, C6/7 with anterior plate fixation fusion C3-7;  Posterior decompressive laminectomy C3-7 with lateral mass fixation fusion C3-7 (IMPULSE MONITORING); Surgeon: Komal Jackson MD;  [facility];  Service: Neurosurgery    VT ARTHRODESIS POSTERIOR INTERBODY LUMBAR N/A 1/30/2017    Procedure: L4-5 AND L5-S1 DECOMPRESSIVE FORAMINOTOMIES, TRANSFORAMINAL LUMBAR INTERBODY AND PEDICLE SCREW FIXATION FUSION L4-S1 (IMPULSE);   Surgeon: Komal Jackson MD;  [facility];  Service: Neurosurgery    [address] RPR DPLMNT AORTO-AORTIC NDGFT N/A 8/6/2018    Procedure: REPAIR ANEURYSM ENDOVASCULAR ABDOMINAL AORTIC  (EVAR) WITH BILATERAL PERCUTANEOUS FEMORAL ACCESS WITH ULTRASOUND GUIDANCE ON THE RIGHT AND PRE CLOSURE;  Surgeon: Destini Knight MD;  [facility];  Service: Vascular    PROSTATECTOMY  2007    for prostate CA - no chemo/RT    SIGMOIDECTOMY      for divertic    SMALL INTESTINE SURGERY N/A 11/17/2016    Procedure: Exploratory Laparotomy, Lysis of adhesions to release small bowel obstruction;  Surgeon: Rivera Limon MD;  [facility];  Service:            Consults have been placed to:   IP CONSULT TO CASE MANAGEMENT    Vitals:    08/06/18 1900 08/06/18 8850 08/07/18 0250 08/07/18 0708   BP: (!) 90/47 118/68 138/55 146/72   Pulse: 63 55 55 62   Resp: 18 20 20 18   Temp: 98 °F (36 7 °C) 97 7 °F (36 5 °C) 97 8 °F (36 6 °C) 97 6 °F (36 4 °C)   TempSrc: Oral Oral Oral Oral   SpO2: 98% 97% 95% 96%   Weight:       Height:           Most recent labs:    Recent Labs      08/07/18   0503   WBC  11 26*   HGB  11 7*   HCT  35 1*   PLT  158   K  4 7   NA  142   CALCIUM  8 8   BUN  14   CREATININE  1 00       Scheduled Meds:  Current Facility-Administered Medications:  acetaminophen 650 mg Oral Q6H PRN [place] Carmen Mata   albuterol 2 puff Inhalation Q6H PRN [place] Carmen Mata   amLODIPine 2 5 mg Oral Daily Makayla Mata   aspirin 81 mg Oral Daily Makayla Mata   atorvastatin 40 mg Oral Daily Makayla Mata   diazepam 5 mg Oral Daily PRN [place] Carmen Mata   fluticasone 1 spray Nasal Daily PRN [place] Carmen Mata   fluticasone-vilanterol 1 puff Inhalation Daily Makayla Mata   heparin (porcine) 5,000 Units Subcutaneous Q8H [address] Makayla Mata   HYDROcodone-acetaminophen 1 tablet Oral Q4H PRN Silke Shields MD   ipratropium 2 puff Inhalation Q6H Makayla Mata   lithium carbonate 600 mg Oral Every Other Day [place] Carmen Mata   metoprolol succinate 25 mg Oral Daily Makayla Mata   pantoprazole 40 mg Oral Early Morning Makayla Mata     Continuous Infusions:   PRN Meds:   acetaminophen    albuterol    diazepam    fluticasone    HYDROcodone-acetaminophen    Surgical procedures (if appropriate):  Procedure(s):  REPAIR ANEURYSM ENDOVASCULAR ABDOMINAL AORTIC  (EVAR) WITH BILATERAL PERCUTANEOUS FEMORAL ACCESS WITH ULTRASOUND GUIDANCE ON THE RIGHT AND PRE CLOSURE

## 2018-08-07 NOTE — NURSING NOTE
Pt  Seen and discharged by vascular  Discharge instructions and medication regime reviewed with pt  Verbalized understanding of same

## 2018-08-07 NOTE — PLAN OF CARE
Problem: DISCHARGE PLANNING - CARE MANAGEMENT  Goal: Discharge to post-acute care or home with appropriate resources  INTERVENTIONS:  - Conduct assessment to determine patient/family and health care team treatment goals, and need for post-acute services based on payer coverage, community resources, and patient preferences, and barriers to discharge  - Address psychosocial, clinical, and financial barriers to discharge as identified in assessment in conjunction with the patient/family and health care team  - Arrange appropriate level of post-acute services according to patients   needs and preference and payer coverage in collaboration with the physician and health care team  - Communicate with and update the patient/family, physician, and health care team regarding progress on the discharge plan  - Arrange appropriate transportation to post-acute venues  Outcome: Completed Date Met: 08/07/18  Patient to be discharged with appropriate services when medically cleared by MD  No additional needs reported at present  CM to follow as needed

## 2018-08-07 NOTE — CASE MANAGEMENT
Initial Clinical Review    Age/Sex: 81 y o  male admitted for elective surgery  Surgery Date: 8/6/18    Procedure:   REPAIR ANEURYSM ENDOVASCULAR ABDOMINAL AORTIC  (EVAR) WITH BILATERAL PERCUTANEOUS FEMORAL ACCESS WITH ULTRASOUND GUIDANCE ON THE RIGHT AND PRE CLOSURE     Anesthesia: General    Admission Orders: Date/Time/Statement: 8/6/18 @ 0959 INPATIENT    Orders Placed This Encounter   Procedures    Inpatient Admission     Standing Status:   Standing     Number of Occurrences:   1     Order Specific Question:   Admitting Physician     Answer:   Thomas Crowe     Order Specific Question:   Level of Care     Answer:   Level 1 Stepdown [13]     Order Specific Question:   Estimated length of stay     Answer:   Inpatient Only Surgery       Vital Signs: /72   Pulse 62   Temp 97 6 °F (36 4 °C) (Oral)   Resp 18   Ht 5' 7 5" (1 715 m)   Wt 82 1 kg (181 lb)   SpO2 96%   BMI 27 93 kg/m²     Diet:  Cardiac Step 1    Mobility: Ambulate in [place]    DVT Prophylaxis: sequential compression device    08/07             amLODIPine (NORVASC) tablet 2 5 mg  Dose: 2 5 mg  Freq: Daily Route: PO  Start: 08/07/18 0900 End: 08/07/18 1532    Admin Instructions:   LOOK ALIKE SOUND ALIKE MED    Order specific questions:   Hold for systolic blood pressure less than (mmHg) 110             0855     1532-D/C'd      amLODIPine (NORVASC) tablet 2 5 mg  Dose: 2 5 mg  Freq: Daily Route: PO  Start: 08/03/18 1415 End: 08/04/18 1903    Admin Instructions:   LOOK ALIKE SOUND ALIKE MED    Order specific questions:   Hold for systolic blood pressure less than (mmHg) 110         1548      1031     1903-D/C'd         aspirin (ECOTRIN LOW STRENGTH) EC tablet 81 mg  Dose: 81 mg  Freq: Daily Route: PO  Start: 08/07/18 0900 End: 08/07/18 1532    Admin Instructions:   Do Not Crush - Enteric coated               0854     1532-D/C'd      aspirin (ECOTRIN LOW STRENGTH) EC tablet 81 mg  Dose: 81 mg  Freq: Daily Route: PO  Start: 08/03/18 1415 End: 08/04/18 1903    Admin Instructions:   Do Not Crush - Enteric coated  1548      1031     1903-D/C'd         atorvastatin (LIPITOR) tablet 40 mg  Dose: 40 mg  Freq: Daily Route: PO  Start: 08/06/18 1015 End: 08/07/18 1532            1416      0855     1532-D/C'd      atorvastatin (LIPITOR) tablet 40 mg  Dose: 40 mg  Freq: Daily Route: PO  Start: 08/03/18 1415 End: 08/04/18 1903         1548      1030     1903-D/C'd         ceFAZolin (ANCEF) 1,000 mg in sodium chloride 0 9 % 1,000 mL irrigation bottle  Freq: Once Route: IR  Start: 08/06/18 1000 End: 08/06/18 1106    Admin Instructions:   Look alike sound alike  1000     1106-D/C'd       ceFAZolin (ANCEF) IVPB (premix) 2,000 mg  Dose: 2,000 mg  Freq: Once Route: IV  Start: 08/06/18 0645 End: 08/06/18 7671    Admin Instructions: To be given in pre-op holding  Look alike sound alike             9152         chlorhexidine (PERIDEX) 0 12 % oral rinse 15 mL  Dose: 15 mL  Freq: Once Route: SWISH & SPIT  Start: 08/06/18 0645 End: 08/06/18 0729    Admin Instructions:   Swish orally for 30 seconds, then expectorate   Do not swallow  **DISPOSE IN 8 GALLON BLACK CONTAINER**            0708         dicyclomine (BENTYL) tablet 20 mg  Dose: 20 mg  Freq: Every 6 hours Route: PO  Start: 08/03/18 1415 End: 08/04/18 1903         (3906) (7134) (7708) (6183)     1417     1903-D/C'd         fluticasone (FLONASE) 50 mcg/act nasal spray 1 spray  Dose: 1 spray  Freq: 2 times daily Route: NA  Start: 08/03/18 1800 End: 08/04/18 1903    Admin Instructions:   LOOK ALIKE SOUND ALIKE MED         4580      6060     1903-D/C'd         fluticasone-vilanterol (BREO ELLIPTA) 100-25 mcg/inh inhaler 1 puff  Dose: 1 puff  Freq: Daily Route: IN  Start: 08/03/18 1415 End: 08/04/18 1903    Admin Instructions:   Rinse mouth after use           7121 3855     1903-D/C'd         fluticasone-vilanterol (BREO ELLIPTA) 200-25 MCG/INH inhaler 1 puff  Dose: 1 puff  Freq: Daily (RESP) Route: IN  Start: 08/06/18 1300 End: 08/07/18 1532    Admin Instructions:   Rinse mouth after use  (0975) (4576)     1532-D/C'd      heparin (porcine) 2,000 Units, papaverine 60 mg in multi-electrolyte (ISOLYTE-S PH 7 4 equivalent) 500 mL irrigation  Freq: Once Route: IR  Start: 08/06/18 1000 End: 08/06/18 1106    Admin Instructions:   LOOK ALIKE SOUND ALIKE MED            1000     1106-D/C'd       heparin (porcine) subcutaneous injection 5,000 Units  Dose: 5,000 Units  Freq: Every 8 hours scheduled Route: SC  Start: 08/06/18 2200 End: 08/07/18 1532    Admin Instructions:   Check for allergies to pork or pork derivatives/dietary restrictions before administration  High alert medication  LOOK ALIKE SOUND ALIKE MED            2136      0505     1532-D/C'd      heparin (porcine) subcutaneous injection 5,000 Units  Dose: 5,000 Units  Freq: Every 8 hours scheduled Route: SC  Start: 08/03/18 1415 End: 08/04/18 1903    Admin Instructions:   Check for allergies to pork or pork derivatives/dietary restrictions before administration  High alert medication  LOOK ALIKE SOUND ALIKE MED         4070     2157      0681     1400     1903-D/C'd         ipratropium (ATROVENT HFA) inhaler 2 puff  Dose: 2 puff  Freq: Every 6 hours Route: IN  Start: 08/06/18 1200 End: 08/07/18 1532    Admin Instructions:   **SEAL THE FOLLOWING LEFTOVER/UNUSED MEDICATION IN A ZIP LOCK BAG AND SEND TO PHARMACY**            1429     2016      (4307) [C]     6194     1200     1532-D/C'd      ipratropium (ATROVENT HFA) inhaler 2 puff  Dose: 2 puff  Freq: Every 6 hours Route: IN  Start: 08/03/18 1800 End: 08/04/18 1903    Admin Instructions:   **SEAL THE FOLLOWING LEFTOVER/UNUSED MEDICATION IN A ZIP LOCK BAG AND SEND TO PHARMACY**         1748      0052     0620     1200     1903-D/C'd         ipratropium-albuterol (DUO-NEB) 0 5-2 5 mg/3 mL inhalation solution 3 mL  Dose: 3 mL  Freq:  Once Route: NEBULIZATION  Start: 08/06/18 9276 End: 08/06/18 0729    Admin Instructions:   Give in pre-procedure area prior to OR            0729         lithium carbonate (LITHOBID) CR tablet 300 mg  Dose: 300 mg  Freq: Every other day Route: PO  Start: 08/04/18 0900 End: 08/04/18 1903    Admin Instructions:   Swallow whole; do not crush, chew or split  LOOK ALIKE SOUND ALIKE MED          1903-D/C'd         lithium carbonate (LITHOBID) CR tablet 600 mg  Dose: 600 mg  Freq: Every other day Route: PO  Start: 08/07/18 0900 End: 08/07/18 1532    Admin Instructions:   Swallow whole; do not crush, chew or split  LOOK ALIKE SOUND ALIKE MED             (8839) [C]     1532-D/C'd      lithium carbonate (LITHOBID) CR tablet 600 mg  Dose: 600 mg  Freq: Every other day Route: PO  Start: 08/03/18 1500 End: 08/04/18 1903    Admin Instructions:   Swallow whole; do not crush, chew or split  LOOK ALIKE SOUND ALIKE MED         1745      1903-D/C'd         loratadine (CLARITIN) tablet 10 mg  Dose: 10 mg  Freq: Daily Route: PO  Start: 08/03/18 1415 End: 08/04/18 1903         (3807) 2553     1903-D/C'd         magnesium sulfate IVPB (premix) SOLN 1 g  Dose: 1 g  Freq: Once Route: IV  Last Dose: Stopped (08/06/18 1600)  Start: 08/06/18 1300 End: 08/06/18 1600    Admin Instructions:   High-alert medication! 1427     1600         metoprolol succinate (TOPROL-XL) 24 hr tablet 25 mg  Dose: 25 mg  Freq: Daily Route: PO  Start: 08/07/18 0900 End: 08/07/18 1532    Admin Instructions:   Hold for heart rate less than 50 beats per minute  Do not crush or chew  LOOK ALIKE SOUND ALIKE MED    Order specific questions:   Hold for systolic blood pressure less than (mmHg) 110             0854     1532-D/C'd      metoprolol succinate (TOPROL-XL) 24 hr tablet 25 mg  Dose: 25 mg  Freq: Daily Route: PO  Start: 08/03/18 1415 End: 08/04/18 1903    Admin Instructions:   Hold for heart rate less than 50 beats per minute  Do not crush or chew   LOOK ALIKE SOUND ALIKE MED    Order specific questions:   Hold for systolic blood pressure less than (mmHg) 110         1548      1030     1903-D/C'd         pantoprazole (PROTONIX) EC tablet 40 mg  Dose: 40 mg  Freq: Daily (early morning) Route: PO  Start: 08/07/18 0600 End: 08/07/18 1532    Admin Instructions:   Swallow whole; do not crush, chew or split  LOOK ALIKE SOUND ALIKE MED             0505     1532-D/C'd      pantoprazole (PROTONIX) EC tablet 40 mg  Dose: 40 mg  Freq: Daily (early morning) Route: PO  Start: 08/04/18 0600 End: 08/04/18 1903    Admin Instructions:   Swallow whole; do not crush, chew or split  LOOK ALIKE SOUND ALIKE MED          8534     1903-D/C'd         sodium chloride 0 9 % bolus 1,000 mL  Dose: 1,000 mL  Freq: Once Route: IV  Indications of Use: FLUID AND ELECTROLYTE DISTURBANCE  Last Dose: Stopped (08/03/18 1002)  Start: 08/03/18 0900 End: 08/03/18 1002         0928     1002            sodium chloride 0 9 % inhalation solution **AcuDose Override Pull**  Start: 08/06/18 0701 End: 08/06/18 0729    Admin Instructions:   Created by cabinet override            0729         Medications 07/29 07/30 07/31 08/01 08/02 08/03 08/04 08/05 08/06 08/07       Continuous Meds Sorted by Name   for Emilio Pena as of 08/07/18 1538    Legend:                          Inactive     Active     Other Encounter    Linked               Medications 07/29 07/30 07/31 08/01 08/02 08/03 08/04 08/05 08/06 08/07   heparin sodium 1000 units in 500 mL infusion (premix) for sheath patency  Freq:  Intra-op continuous PRN  Last Dose: Stopped (08/06/18 1100)  Start: 08/06/18 2687 End: 08/06/18 0923            1816     1100         lactated ringers infusion  Freq: Continuous PRN  Start: 08/06/18 0743 End: 08/06/18 1010            0743     0904     0952     0959     1010-D/C'd       sodium chloride 0 9 % infusion  Rate: 75 mL/hr Dose: 75 mL/hr  Freq: Continuous Route: IV  Last Dose: 75 mL/hr (08/06/18 1042)  Start: 08/06/18 1015 End: 08/07/18 7416 1042      0634-D/C'd      sodium chloride 0 9 % infusion  Rate: 75 mL/hr Dose: 75 mL/hr  Freq: Continuous Route: IV  Indications of Use: IV Hydration  Last Dose: Stopped (08/06/18 0958)  Start: 08/06/18 0645 End: 08/06/18 1438            0815     0904     0958     1003     1041     1438-D/C'd       sodium chloride 0 9 % infusion  Rate: 75 mL/hr Dose: 75 mL/hr  Freq: Continuous Route: IV  Indications of Use: IV Hydration  Last Dose: 75 mL/hr (08/04/18 0327)  Start: 08/03/18 1145 End: 08/04/18 1903         1207     1425      0327     1903-D/C'd         Medications 07/29 07/30 07/31 08/01 08/02 08/03 08/04 08/05 08/06 08/07       PRN Meds Sorted by Name   for Wilmon Scales as of 08/07/18 1538    Legend:                          Inactive     Active     Other Encounter    Linked               Medications 07/29 07/30 07/31 08/01 08/02 08/03 08/04 08/05 08/06 08/07   acetaminophen (TYLENOL) tablet 488 mg  Dose: 488 mg  Freq: Daily PRN Route: PO  PRN Reason: mild pain  Start: 08/03/18 1412 End: 08/04/18 1903          1903-D/C'd         acetaminophen (TYLENOL) tablet 650 mg  Dose: 650 mg  Freq: Every 6 hours PRN Route: PO  PRN Reasons: mild pain,headaches,fever  Start: 08/06/18 1003 End: 08/07/18 1532            1416      1532-D/C'd      albuterol (PROVENTIL HFA,VENTOLIN HFA) inhaler 2 puff  Dose: 2 puff  Freq: Every 6 hours PRN Route: IN  PRN Reason: wheezing  Start: 08/06/18 0959 End: 08/07/18 1532    Admin Instructions:   **SEAL THE FOLLOWING LEFTOVER/UNUSED MEDICATION IN A ZIP LOCK BAG AND SEND TO PHARMACY**             1532-D/C'd      albuterol (PROVENTIL HFA,VENTOLIN HFA) inhaler 2 puff  Dose: 2 puff  Freq: Every 6 hours PRN Route: IN  PRN Reason: wheezing  Start: 08/03/18 1407 End: 08/04/18 1712    Admin Instructions:   **SEAL THE FOLLOWING LEFTOVER/UNUSED MEDICATION IN A ZIP LOCK BAG AND SEND TO PHARMACY**          (2491) 5556-D/C'a         diazepam (VALIUM) tablet 5 mg  Dose: 5 mg  Freq: Daily PRN Route: PO  PRN Reason: anxiety  Start: 08/06/18 1113 End: 08/07/18 1532    Admin Instructions:   High alert medication  1532-D/C'd      diazepam (VALIUM) tablet 5 mg  Dose: 5 mg  Freq: Daily PRN Route: PO  PRN Reason: anxiety  Start: 08/03/18 1418 End: 08/04/18 1903    Admin Instructions:   High alert medication  1903-D/C'd         esmolol (BREVIBLOC) IV bolus  Freq: As needed  Start: 08/06/18 0953 End: 08/06/18 1010            0953     1010-D/C'd       fentaNYL (SUBLIMAZE) injection 25 mcg  Dose: 25 mcg  Freq: Every 3 minutes PRN Route: IV  PRN Reason: Pain - PACU  PRN Comment: breakthrough pain  First Line  Start: 08/06/18 1007 End: 08/06/18 1106    Admin Instructions:   ANALGESICS Select a single agent  If more than one agent selected, indicate sequence to follow  NOTE: If pain score greater than 3 after at least 2 doses of medication, the nurse may administer the next sequenced medication  Moving to the next sequenced medication discontinues the previous medication in the sequence  High Alert medication  LOOK ALIKE SOUND ALIKE MED            1026 [C]     1029 [C]     1106-D/C'd       fentanyl citrate (PF) 100 MCG/2ML  Freq: As needed Route: IV  Start: 08/06/18 0754 End: 08/06/18 1010            0754     0955     1010-D/C'd       fluticasone (FLONASE) 50 mcg/act nasal spray 1 spray  Dose: 1 spray  Freq: Daily PRN Route: NA  PRN Reasons: rhinitis,allergies  Start: 08/06/18 1001 End: 08/07/18 1532    Admin Instructions:   LOOK ALIKE SOUND ALIKE MED             0900     1532-D/C'd      glycopyrrolate (ROBINUL) injection  Freq: As needed Route: IV  PRN Reason: pulmonary secretions  Start: 08/06/18 0813 End: 08/06/18 1010            0813     0953     1010-D/C'd       heparin (porcine) injection  Freq: As needed  Start: 08/06/18 0850 End: 08/06/18 1010            0850     0900     0924     1010-D/C'd       heparin sodium 1000 units in 500 mL infusion (premix) for sheath patency  Freq:  Intra-op continuous PRN  Start: 08/06/18 0923 End: 08/06/18 0923            0923     1100         HYDROcodone-acetaminophen (NORCO) 5-325 mg per tablet 1 tablet  Dose: 1 tablet  Freq: Every 4 hours PRN Route: PO  PRN Reason: moderate pain  Start: 08/06/18 1447 End: 08/07/18 1532    Admin Instructions:   High Alert Medication  LOOK ALIKE SOUND ALIKE MED             1532-D/C'd      iodixanol (VISIPAQUE) 320 MG/ML injection  Freq: As needed  Start: 08/06/18 0937 End: 08/06/18 0954            0937     0954-D/C'd       ipratropium-albuterol (DUO-NEB) 0 5-2 5 mg/3 mL inhalation solution 3 mL  Dose: 3 mL  Freq: Once as needed Route: NEBULIZATION  PRN Reason: wheezing  Start: 08/06/18 1015 End: 08/06/18 1106            1106-D/C'd       lactated ringers infusion  Freq: Continuous PRN  Start: 08/06/18 0743 End: 08/06/18 1010            0743     0904     0952     0959     1010-D/C'd       lidocaine (XYLOCAINE) 1 % injection  Freq: As needed  Start: 08/06/18 0754 End: 08/06/18 1010            0754     1010-D/C'd       meperidine (DEMEROL) injection 12 5 mg  Dose: 12 5 mg  Freq: Every 10 minutes PRN Route: IV  PRN Reason: shivering  Start: 08/06/18 1007 End: 08/06/18 1106    Admin Instructions:   High alert medication  1106-D/C'd       midazolam (VERSED) injection  Freq: As needed Route: IV  Start: 08/06/18 0745 End: 08/06/18 1010            0745     1010-D/C'd       neostigmine (BLOXIVERZ) injection  Freq: As needed Route: IV  Start: 08/06/18 0953 End: 08/06/18 1010            0953     1010-D/C'd       ondansetron (ZOFRAN) injection  Freq: As needed  PRN Reasons: nausea,vomiting  Start: 08/06/18 0948 End: 08/06/18 1010            0948     1010-D/C'd       ondansetron (ZOFRAN) injection 4 mg  Dose: 4 mg  Freq: Once as needed Route: IV  PRN Reason: nausea  PRN Comment: First Line For Nausea  Start: 08/06/18 1007 End: 08/06/18 1106    Admin Instructions:   Push over 2 minutes              1106-D/C'd       ondansetron (ZOFRAN) injection 4 mg  Dose: 4 mg  Freq: Every 6 hours PRN Route: IV  PRN Reasons: nausea,vomiting  Start: 08/03/18 1409 End: 08/04/18 1903    Admin Instructions:   Push over 2 minutes  1903-D/C'd         phenlyephrine bolus from bag  Freq: As needed  Start: 08/06/18 0809 End: 08/06/18 1010            0809     0846     1010-D/C'd       promethazine (PHENERGAN) injection 12 5 mg  Dose: 12 5 mg  Freq: Once as needed Route: IV  PRN Reason: nausea  PRN Comment: Second Line For Nausea  Start: 08/06/18 1007 End: 08/06/18 1106    Admin Instructions:   If given IV, dilute in 10 mL of NS  Max rate: 25 mg/min  If given IM, administer via deep IM injection  1106-D/C'd       propofol (DIPRIVAN) 200 MG/20ML bolus injection  Freq: As needed Route: IV  Start: 08/06/18 0754 End: 08/06/18 1010            0754     1010-D/C'd       protamine injection  Freq: As needed  Start: 08/06/18 0942 End: 08/06/18 1010            0942     1010-D/C'd       psyllium (METAMUCIL) 1 packet  Dose: 1 packet  Freq: Daily PRN Route: PO  PRN Comment: constipation  Start: 08/04/18 0928 End: 08/04/18 1903    Admin Instructions:   Drink at least 8 ounces of liquid with each dose            1903-D/C'd         rocuronium (ZEMURON) injection  Freq: As needed  Start: 08/06/18 0754 End: 08/06/18 1010            0754     0835     0859     1010-D/C'd       Medications 07/29 07/30 07/31 08/01 08/02 08/03 08/04 08/05 08/06 08/07            Orders: Arterial line, stein catheter

## 2018-08-07 NOTE — ASSESSMENT & PLAN NOTE
AAA    ~S/P EVAR- 8/6    Plan:  --D/C [facility]  --D/C [facility]  --Saline lock  --OOB/Amb  --Continue diet  --Pain contril  --Med/Surg  --D/C Home later today

## 2018-08-07 NOTE — RESTORATIVE TECHNICIAN NOTE
Restorative Specialist Mobility Note       Activity: Chair, Dangle, Stand at bedside, Turn, Ambulate in room, Ambulate in [place]     Assistive Device: Other (Comment) (pt pushed dash monitor during ambulation)     Ambulation Response:  Tolerated fairly well  Repositioned: Sitting, Up in chair

## 2018-08-09 ENCOUNTER — TELEPHONE (OUTPATIENT)
Dept: VASCULAR SURGERY | Facility: CLINIC | Age: 73
End: 2018-08-09

## 2018-08-09 NOTE — TELEPHONE ENCOUNTER
Procedure: REPAIR ANEURYSM ENDOVASCULAR ABDOMINAL AORTIC  (EVAR) WITH BILATERAL PERCUTANEOUS FEMORAL ACCESS WITH ULTRASOUND GUIDANCE ON THE RIGHT AND PRE CLOSURE (N/A Abdomen)    Date of Procedure: 8/6/18                               Surgeon: Dr Mariah Augustin    Discharge Date: 8/7/18      Leg weakness?: no    Leg swelling?: no    Leg numbness?: no    Chest pain?:  no    Shortness of breath?: yes- has copd  Has not gotten any worse post po    Orthopnea?: no    Bowel/Bladder function stable?: yes    ASA or Plavix?: Asa 81mg    Bleeding?: no    Pain Controlled?: tylenol otc prn    Incision stable?: yes    Fever/chills?: no      NEXT OFFICE VISIT SCHEDULED: 8/21/18    Any further questions/concerns?  no

## 2018-08-10 ENCOUNTER — OFFICE VISIT (OUTPATIENT)
Dept: INTERNAL MEDICINE CLINIC | Facility: CLINIC | Age: 73
End: 2018-08-10
Payer: MEDICARE

## 2018-08-10 ENCOUNTER — PATIENT OUTREACH (OUTPATIENT)
Dept: INTERNAL MEDICINE CLINIC | Facility: CLINIC | Age: 73
End: 2018-08-10

## 2018-08-10 VITALS
WEIGHT: 181.6 LBS | DIASTOLIC BLOOD PRESSURE: 74 MMHG | OXYGEN SATURATION: 97 % | SYSTOLIC BLOOD PRESSURE: 122 MMHG | BODY MASS INDEX: 28.5 KG/M2 | HEART RATE: 61 BPM | HEIGHT: 67 IN | TEMPERATURE: 97.8 F

## 2018-08-10 DIAGNOSIS — I10 ESSENTIAL HYPERTENSION: ICD-10-CM

## 2018-08-10 DIAGNOSIS — I71.4 ANEURYSM OF INFRARENAL ABDOMINAL AORTA (HCC): Primary | Chronic | ICD-10-CM

## 2018-08-10 DIAGNOSIS — J44.9 CHRONIC OBSTRUCTIVE PULMONARY DISEASE, UNSPECIFIED COPD TYPE (HCC): ICD-10-CM

## 2018-08-10 PROCEDURE — 99496 TRANSJ CARE MGMT HIGH F2F 7D: CPT | Performed by: INTERNAL MEDICINE

## 2018-08-10 NOTE — PATIENT INSTRUCTIONS
Problem List Items Addressed This Visit     COPD (chronic obstructive pulmonary disease) ([facility] [address] )      Continue inhalers         Essential hypertension      Controlled, continue current medications         Aneurysm of infrarenal abdominal aorta (HCC) - Primary (Chronic)      Status post endovascular repair, follow-up vascular surgery    Patient has mild constipation has been improving, use instructed to use MiraLax if he were to have any more constipation

## 2018-08-10 NOTE — PROGRESS NOTES
Assessment/Plan:    Aneurysm of infrarenal abdominal aorta (HCC)   Status post endovascular repair, follow-up vascular surgery  Patient has mild constipation has been improving, use instructed to use MiraLax if he were to have any more constipation    Essential hypertension   Controlled, continue current medications    COPD (chronic obstructive pulmonary disease) ([facility])   Continue inhalers       Diagnoses and all orders for this visit:    Aneurysm of infrarenal abdominal aorta ([facility] [address] )    Essential hypertension    Chronic obstructive pulmonary disease, unspecified COPD type ([facility] [address] )          Subjective:   Date and time hospital follow up call was made:  8/7/2018  8:30 AM  Hospital care reviewed:  Records reviewed  Patient was hopsitalized at:  [facility]  Date of admission:  8/3/18  Date of discharge:  8/4/18  Diagnosis:  AILYN  Disposition:  Home  Were the patients medicaitons reviewed and updated:  No  Current symptoms:  None  Post hospital issues:  None  Should patient be enrolled in anticoag monitoring?:  No  Scheduled for follow up?:  Yes  Patients specialists:  Other (comment)  Other specialists Name:  Surgeon, Dr Bethany Troncoso  Do you need help managing your perscriptions or medications:  Yes  What type of assistance do you need:  He doesn't want to take Bactrim anymore  I have advised the patient to call PCP with any new or worsening symptoms (please type in name along with any credentials):  Yessica Boggs,   Are you recieving outpatient services:  No  Are you recieving home care services:  No  Are you using any community resources:  No  Have you fallen in the last 12 months:  No  Interperter language line required?:  No  Counseling:  Patient            Patient ID: Alex He is a 81 y o  male  I reviewed pt's hospitalization for EVAR for aortic aneurysm  Pt has had a little constipation, but this has been improving  He was hydrated preop due to an elevated creatinine    Most recent creatinine was normal at 1 0  No fevers chills or sweats, no difficulty urinating, no calf pain or swelling, no cough  No oozing from groin procedure entry site        The following portions of the patient's history were reviewed and updated as appropriate: allergies, current medications, past family history, past medical history, past social history, past surgical history and problem list     Review of Systems   Constitutional: Positive for fatigue  Negative for chills and fever  HENT: Negative for congestion, nosebleeds, postnasal drip, sore throat and trouble swallowing  Eyes: Negative for pain  Respiratory: Negative for cough, chest tightness, shortness of breath and wheezing  Cardiovascular: Negative for chest pain, palpitations and leg swelling  Gastrointestinal: Positive for constipation (  Mild)  Negative for abdominal pain, diarrhea, nausea and vomiting  Endocrine: Negative for polydipsia and polyuria  Genitourinary: Negative for dysuria, flank pain and hematuria  Musculoskeletal: Negative for arthralgias  Skin: Negative for rash  Neurological: Negative for dizziness, tremors and headaches  Hematological: Does not bruise/bleed easily  Psychiatric/Behavioral: Negative for confusion and dysphoric mood  The patient is not nervous/anxious  Objective:      /74   Pulse 61   Temp 97 8 °F (36 6 °C)   Ht 5' 7" (1 702 m)   Wt 82 4 kg (181 lb 9 6 oz)   SpO2 97%   BMI 28 44 kg/m²          Physical Exam   Constitutional: He is oriented to person, place, and time  He appears well-developed and well-nourished  No distress  HENT:   Head: Normocephalic and atraumatic  Right Ear: External ear normal    Left Ear: External ear normal    Eyes: Conjunctivae are normal  No scleral icterus  Neck: Normal range of motion  Neck supple  No tracheal deviation present  No thyromegaly present  Cardiovascular: Normal rate, regular rhythm and normal heart sounds  Pulmonary/Chest: Effort normal and breath sounds normal  No respiratory distress  He has no wheezes  He has no rales  Abdominal: Soft  Bowel sounds are normal  There is no tenderness  There is no rebound and no guarding  Musculoskeletal: He exhibits no edema  Lymphadenopathy:     He has no cervical adenopathy  Neurological: He is alert and oriented to person, place, and time  Psychiatric: He has a normal mood and affect   His behavior is normal  Judgment and thought content normal

## 2018-08-10 NOTE — PROGRESS NOTES
HPI completed during face to face visit with patient at PCP office  Patient educated on BPCI/CM role/contact information  Patient agrees to additional calls  Patient is s/p AAA repair at [facility]  Comprehensive assessment completed at the same visit  Please see notes from that assessment for additional details  Comprehensive assessment completed at face to face visit with patient at PCP visit  patient is S/P endovascular AAA repair  Patient states he is doing very well  Patient denies any pain, no numbness/tingling of BLE, good pedal pulses, no discoloration of BLE  Patient does wear a brace on his RLE  This is unrelated to current issue  Patient has had some intermittent constipation & was advised to use Miralax  Reviewed discharge list of medications with understanding verbalized  Reviewed future appointments  Patient lives with a spouse who is currently providing transportation, IADL assist & is available for any other assist as needed  Patient declines any need for community services,DME, medication assist or transportation  agrees to additional calls

## 2018-08-10 NOTE — ASSESSMENT & PLAN NOTE
Status post endovascular repair, follow-up vascular surgery    Patient has mild constipation has been improving, use instructed to use MiraLax if he were to have any more constipation

## 2018-08-20 ENCOUNTER — APPOINTMENT (OUTPATIENT)
Dept: LAB | Facility: CLINIC | Age: 73
End: 2018-08-20
Payer: MEDICARE

## 2018-08-20 LAB
BUN SERPL-MCNC: 20 MG/DL (ref 5–25)
CREAT SERPL-MCNC: 1.13 MG/DL (ref 0.6–1.3)
GFR SERPL CREATININE-BSD FRML MDRD: 65 ML/MIN/1.73SQ M
LITHIUM SERPL-SCNC: 0.7 MMOL/L (ref 0.5–1)
TSH SERPL DL<=0.05 MIU/L-ACNC: 1.42 UIU/ML (ref 0.36–3.74)

## 2018-08-20 PROCEDURE — 84443 ASSAY THYROID STIM HORMONE: CPT

## 2018-08-20 PROCEDURE — 82565 ASSAY OF CREATININE: CPT

## 2018-08-20 PROCEDURE — 36415 COLL VENOUS BLD VENIPUNCTURE: CPT

## 2018-08-20 PROCEDURE — 80178 ASSAY OF LITHIUM: CPT

## 2018-08-20 PROCEDURE — 84520 ASSAY OF UREA NITROGEN: CPT

## 2018-08-21 ENCOUNTER — OFFICE VISIT (OUTPATIENT)
Dept: VASCULAR SURGERY | Facility: CLINIC | Age: 73
End: 2018-08-21

## 2018-08-21 VITALS
WEIGHT: 180 LBS | TEMPERATURE: 97.1 F | SYSTOLIC BLOOD PRESSURE: 124 MMHG | HEIGHT: 67 IN | RESPIRATION RATE: 18 BRPM | DIASTOLIC BLOOD PRESSURE: 70 MMHG | HEART RATE: 72 BPM | BODY MASS INDEX: 28.25 KG/M2

## 2018-08-21 DIAGNOSIS — I71.4 ANEURYSM OF INFRARENAL ABDOMINAL AORTA (HCC): Primary | Chronic | ICD-10-CM

## 2018-08-21 PROCEDURE — 99024 POSTOP FOLLOW-UP VISIT: CPT | Performed by: SURGERY

## 2018-08-21 RX ORDER — LITHIUM CARBONATE 300 MG/1
300 TABLET, FILM COATED, EXTENDED RELEASE ORAL DAILY
COMMUNITY
Start: 2018-08-16

## 2018-08-21 NOTE — PROGRESS NOTES
Assessment/Plan:    Aneurysm of infrarenal abdominal aorta (HCC)  Abdominal aortic aneurysm status post endovascular repair  He is done well and has now returned to relatively normal level of activity  There are no restrictions at this point and I have encouraged him to return to work  We will plan standard follow-up with CT angiogram in approximately 1 month  We will recheck his creatinine prior to this study  Diagnoses and all orders for this visit:    Aneurysm of infrarenal abdominal aorta (HCC)    Other orders  -     lithium carbonate (LITHOBID) 300 mg CR tablet;           Subjective:      Patient ID: Sp Barahona is a 81 y o  male  Patient is s/p EVAR on 8/6 by Dr Bright Castaneda  Patient denies any abdominal pain or post prandial pain  He chronic back pain  He offers no concerns or complaints at this time  He denies fever or chills  82-year-old status post endovascular repair of abdominal aortic aneurysm on 08/06/2018  He has no complaints at this time and has returned to a relatively normal level of activity  On examination his puncture wounds are healing well  He has mild residual bruising with easily palpable femoral and popliteal pulses  Abdominal exam is benign and I cannot palpate a aortic pulsation  Of note he did have some evidence of acute increase in creatinine preprocedure following Bactrim use for a UTI  His most recent and post procedural creatinine levels have returned to normal         The following portions of the patient's history were reviewed and updated as appropriate: allergies, current medications, past family history, past medical history, past social history, past surgical history and problem list     Review of Systems   Constitutional: Positive for unexpected weight change (weight loss 3 lbs)  HENT: Negative  Eyes: Negative  Respiratory: Negative  Cardiovascular: Negative  Gastrointestinal: Negative  Endocrine: Negative  Genitourinary: Negative  Musculoskeletal: Negative  Skin: Negative  Allergic/Immunologic: Negative  Neurological: Negative  Hematological: Negative  Psychiatric/Behavioral: Negative            Objective:      /70 (BP Location: Right arm, Patient Position: Sitting, Cuff Size: Adult)   Pulse 72   Temp (!) 97 1 °F (36 2 °C) (Tympanic)   Resp 18   Ht 5' 7" (1 702 m)   Wt 81 6 kg (180 lb)   BMI 28 19 kg/m²          Physical Exam

## 2018-08-21 NOTE — LETTER
August 21, 2018     Patient: Lizy Barksdale   YOB: 1945   Date of Visit: 8/21/2018       To Whom it May Concern:    Janiya Montilla is under my professional care  He was seen in my office on 8/21/2018  He may return to work on 08/22/2018  If you have any questions or concerns, please don't hesitate to call           Sincerely,          Enedina Regalado MD        CC: No Recipients

## 2018-08-21 NOTE — LETTER
August 21, 2018     Mayra Helm, [id number] [id number]    Patient: Alex Hudson   YOB: 1945   Date of Visit: 8/21/2018       Dear Dr Kalpana Still: Thank you for referring Shagufta Mitchell to me for evaluation  Below are the relevant portions of my assessment and plan of care  Aneurysm of infrarenal abdominal aorta (HCC)  Abdominal aortic aneurysm status post endovascular repair  He is done well and has now returned to relatively normal level of activity  There are no restrictions at this point and I have encouraged him to return to work  We will plan standard follow-up with CT angiogram in approximately 1 month  We will recheck his creatinine prior to this study  If you have questions, please do not hesitate to call me  I look forward to following Sofi Pascal along with you           Sincerely,        Bethany Troncoso MD        CC: No Recipients

## 2018-08-21 NOTE — PATIENT INSTRUCTIONS
Aneurysm of infrarenal abdominal aorta (HCC)  Abdominal aortic aneurysm status post endovascular repair  He is done well and has now returned to relatively normal level of activity  There are no restrictions at this point and I have encouraged him to return to work  We will plan standard follow-up with CT angiogram in approximately 1 month  We will recheck his creatinine prior to this study

## 2018-08-21 NOTE — ASSESSMENT & PLAN NOTE
Abdominal aortic aneurysm status post endovascular repair  He is done well and has now returned to relatively normal level of activity  There are no restrictions at this point and I have encouraged him to return to work  We will plan standard follow-up with CT angiogram in approximately 1 month  We will recheck his creatinine prior to this study

## 2018-08-28 ENCOUNTER — OFFICE VISIT (OUTPATIENT)
Dept: INTERNAL MEDICINE CLINIC | Facility: CLINIC | Age: 73
End: 2018-08-28
Payer: MEDICARE

## 2018-08-28 VITALS
SYSTOLIC BLOOD PRESSURE: 118 MMHG | TEMPERATURE: 97.3 F | DIASTOLIC BLOOD PRESSURE: 64 MMHG | BODY MASS INDEX: 28.22 KG/M2 | HEIGHT: 67 IN | WEIGHT: 179.8 LBS | HEART RATE: 60 BPM | OXYGEN SATURATION: 98 %

## 2018-08-28 DIAGNOSIS — L98.9 SKIN LESION: Primary | ICD-10-CM

## 2018-08-28 PROCEDURE — 99213 OFFICE O/P EST LOW 20 MIN: CPT | Performed by: INTERNAL MEDICINE

## 2018-08-28 RX ORDER — LITHIUM CARBONATE 300 MG
600 TABLET ORAL EVERY OTHER DAY
COMMUNITY
End: 2018-09-11 | Stop reason: CLARIF

## 2018-08-28 NOTE — PATIENT INSTRUCTIONS
Problem List Items Addressed This Visit     Skin lesion - Primary     Will refer to surg for eval, I can't tell if this is a little suture or ingrown hair           Relevant Orders    Ambulatory referral to General Surgery

## 2018-08-28 NOTE — PROGRESS NOTES
Assessment/Plan:    Skin lesion  Will refer to surg for eval, I can't tell if this is a little suture or ingrown hair  Diagnoses and all orders for this visit:    Skin lesion  -     Ambulatory referral to General Surgery; Future    Other orders  -     lithium 300 MG tablet; Take 600 mg by mouth every other day          Subjective:      Patient ID: Tito Cardona is a 81 y o  male  Patient having similar issue around old incision site in mid abdomen, where  He had a little losing right next to the old incision site, no erythema no pain, no fevers chills or sweats  The following portions of the patient's history were reviewed and updated as appropriate: allergies, current medications, past family history, past medical history, past social history, past surgical history and problem list     Review of Systems   Constitutional: Negative for chills and fever  Skin: Positive for wound  Objective:      /64   Pulse 60   Temp (!) 97 3 °F (36 3 °C)   Ht 5' 7" (1 702 m)   Wt 81 6 kg (179 lb 12 8 oz)   SpO2 98%   BMI 28 16 kg/m²          Physical Exam   Constitutional: He appears well-developed and well-nourished     Skin:   Has slight pimple like structure just next to midline incision in lower abdomen, has some firmness under skin which could be scar tissue verses mesh, no erythema, no purulent drainage

## 2018-09-10 PROCEDURE — 87070 CULTURE OTHR SPECIMN AEROBIC: CPT | Performed by: SURGERY

## 2018-09-10 PROCEDURE — 87205 SMEAR GRAM STAIN: CPT | Performed by: SURGERY

## 2018-09-11 ENCOUNTER — TRANSCRIBE ORDERS (OUTPATIENT)
Dept: LAB | Facility: CLINIC | Age: 73
End: 2018-09-11

## 2018-09-11 ENCOUNTER — APPOINTMENT (OUTPATIENT)
Dept: LAB | Facility: CLINIC | Age: 73
End: 2018-09-11
Payer: MEDICARE

## 2018-09-11 ENCOUNTER — LAB REQUISITION (OUTPATIENT)
Dept: LAB | Facility: HOSPITAL | Age: 73
End: 2018-09-11
Payer: MEDICARE

## 2018-09-11 ENCOUNTER — OFFICE VISIT (OUTPATIENT)
Dept: PULMONOLOGY | Facility: CLINIC | Age: 73
End: 2018-09-11
Payer: MEDICARE

## 2018-09-11 VITALS
SYSTOLIC BLOOD PRESSURE: 110 MMHG | TEMPERATURE: 97.3 F | RESPIRATION RATE: 16 BRPM | OXYGEN SATURATION: 99 % | HEIGHT: 68 IN | DIASTOLIC BLOOD PRESSURE: 60 MMHG | BODY MASS INDEX: 27.28 KG/M2 | WEIGHT: 180 LBS | HEART RATE: 53 BPM

## 2018-09-11 DIAGNOSIS — J30.9 ALLERGIC RHINITIS: ICD-10-CM

## 2018-09-11 DIAGNOSIS — J44.9 COPD (CHRONIC OBSTRUCTIVE PULMONARY DISEASE) (HCC): Primary | ICD-10-CM

## 2018-09-11 DIAGNOSIS — S31.609A: ICD-10-CM

## 2018-09-11 DIAGNOSIS — I71.4 ANEURYSM OF INFRARENAL ABDOMINAL AORTA (HCC): Chronic | ICD-10-CM

## 2018-09-11 LAB
CREAT SERPL-MCNC: 1.09 MG/DL (ref 0.6–1.3)
GFR SERPL CREATININE-BSD FRML MDRD: 68 ML/MIN/1.73SQ M

## 2018-09-11 PROCEDURE — 82565 ASSAY OF CREATININE: CPT

## 2018-09-11 PROCEDURE — 99213 OFFICE O/P EST LOW 20 MIN: CPT | Performed by: INTERNAL MEDICINE

## 2018-09-11 PROCEDURE — 36415 COLL VENOUS BLD VENIPUNCTURE: CPT

## 2018-09-11 NOTE — PROGRESS NOTES
Office Progress Note - Pulmonary    Jaylin Nair 81 y o  male MRN: [id number]    Encounter: [id number]      Assessment:  · Chronic obstructive pulmonary disease  · Allergic rhinitis  Plan:   · Advair 250/50, 1 inhalation twice a day  · Ipratropium HFA 2 inhalations 4 times a day  · Follow-up in 6 months  Discussion:   The patient's COPD is in remission  I have maintained him on the Advair 250/50, 1 inhalation twice a day  His co-payment is very high when he is in the donut hole  The plan is to use what he has of Advair and in the beginning of the year he will call the office and we will switch him to Bonifacio Sitter 1 inhalation once a day  Hopefully we will be able to help him with samples so he will not hit the donut hole early  His allergic rhinitis is well treated  I have maintained him on the fluticasone nasal spray 2 sprays to each nostril once a day  I have reminded him to take the influenza vaccine this season  I will see him in 6 months in a follow-up visit  Subjective: The patient is here for a follow-up visit  He had endovascular repair of his infrarenal abdominal aortic aneurysm  He did very well  He denies any shortness of breath  He has occasional cough with no significant sputum production  Denies any chest pain or palpitations  He is using Advair 250/50, 1 inhalation twice a day  He is also using ipratropium HFA 2 inhalations 4 times a day  His allergic rhinitis is well treated  He is on fluticasone 2 sprays to each nostril once a day  He has no nocturnal symptoms  Review of systems:  A 12 point system review is done and aside from what is stated above the rest of the review of systems is negative  Family history and social history are reviewed  Medications list is reviewed  Vitals: Blood pressure 110/60, pulse (!) 53, temperature (!) 97 3 °F (36 3 °C), resp  rate 16, height 5' 7 5" (1 715 m), weight 81 6 kg (180 lb), SpO2 99 %  ,     Physical Exam  Gen: Awake, alert, oriented x 3, no acute distress  HEENT: Mucous membranes moist, no oral lesions, no thrush  NECK: No accessory muscle use, JVP not elevated  Cardiac: Regular, single S1, single S2, no murmurs, no rubs, no gallops  Lungs:   Clear breath sounds  No wheezing or rhonchi  Abdomen: normoactive bowel sounds, soft nontender, nondistended, no rebound or rigidity, no guarding  Extremities: no cyanosis, no clubbing, no edema  Neuro:  Grossly nonfocal   Skin:  No rash

## 2018-09-12 ENCOUNTER — TELEPHONE (OUTPATIENT)
Dept: ADMINISTRATIVE | Facility: HOSPITAL | Age: 73
End: 2018-09-12

## 2018-09-12 DIAGNOSIS — I71.4 ABDOMINAL AORTIC ANEURYSM WITHOUT RUPTURE (HCC): Primary | ICD-10-CM

## 2018-09-13 ENCOUNTER — HOSPITAL ENCOUNTER (OUTPATIENT)
Dept: CT IMAGING | Facility: HOSPITAL | Age: 73
Discharge: HOME | End: 2018-09-13
Attending: SURGERY
Payer: MEDICARE

## 2018-09-13 DIAGNOSIS — I71.4 ABDOMINAL AORTIC ANEURYSM WITHOUT RUPTURE (HCC): Primary | ICD-10-CM

## 2018-09-13 DIAGNOSIS — I71.4 ANEURYSM OF INFRARENAL ABDOMINAL AORTA (HCC): Chronic | ICD-10-CM

## 2018-09-13 PROCEDURE — 74174 CTA ABD&PLVS W/CONTRAST: CPT

## 2018-09-13 RX ADMIN — IOHEXOL 100 ML: 350 INJECTION, SOLUTION INTRAVENOUS at 09:31

## 2018-09-14 LAB
BACTERIA WND AEROBE CULT: NO GROWTH
GRAM STN SPEC: NORMAL

## 2018-09-17 ENCOUNTER — TELEPHONE (OUTPATIENT)
Dept: INTERNAL MEDICINE CLINIC | Facility: CLINIC | Age: 73
End: 2018-09-17

## 2018-09-17 NOTE — TELEPHONE ENCOUNTER
Pt volunteers at [facility] and they are giving out flu shots and he would like to know which one you want him to have due to his vast medical history    States he got the "regular" (quad) one last year    Please advise ty

## 2018-09-17 NOTE — TELEPHONE ENCOUNTER
I echo by the recommendations of high dose vaccine for people over 65, so the high dose vaccine for him

## 2018-09-29 ENCOUNTER — APPOINTMENT (OUTPATIENT)
Dept: LAB | Facility: CLINIC | Age: 73
End: 2018-09-29
Payer: MEDICARE

## 2018-09-29 ENCOUNTER — TRANSCRIBE ORDERS (OUTPATIENT)
Dept: LAB | Facility: CLINIC | Age: 73
End: 2018-09-29

## 2018-09-29 DIAGNOSIS — F31.60 MIXED BIPOLAR I DISORDER (HCC): Primary | ICD-10-CM

## 2018-09-29 LAB
BUN SERPL-MCNC: 18 MG/DL (ref 5–25)
CREAT SERPL-MCNC: 1.15 MG/DL (ref 0.6–1.3)
GFR SERPL CREATININE-BSD FRML MDRD: 64 ML/MIN/1.73SQ M
LITHIUM SERPL-SCNC: 0.7 MMOL/L (ref 0.5–1)
TSH SERPL DL<=0.05 MIU/L-ACNC: 1.25 UIU/ML (ref 0.36–3.74)

## 2018-09-29 PROCEDURE — 36415 COLL VENOUS BLD VENIPUNCTURE: CPT

## 2018-09-29 PROCEDURE — 80178 ASSAY OF LITHIUM: CPT

## 2018-09-29 PROCEDURE — 82565 ASSAY OF CREATININE: CPT

## 2018-09-29 PROCEDURE — 84520 ASSAY OF UREA NITROGEN: CPT

## 2018-09-29 PROCEDURE — 84443 ASSAY THYROID STIM HORMONE: CPT

## 2018-09-30 DIAGNOSIS — J44.9 COPD (CHRONIC OBSTRUCTIVE PULMONARY DISEASE) (HCC): Primary | ICD-10-CM

## 2018-09-30 DIAGNOSIS — I10 ESSENTIAL HYPERTENSION: Primary | ICD-10-CM

## 2018-10-01 RX ORDER — AMLODIPINE BESYLATE 2.5 MG/1
TABLET ORAL
Qty: 90 TABLET | Refills: 3 | Status: SHIPPED | OUTPATIENT
Start: 2018-10-01 | End: 2019-12-03 | Stop reason: SDUPTHER

## 2018-10-09 ENCOUNTER — OFFICE VISIT (OUTPATIENT)
Dept: OBGYN CLINIC | Facility: HOSPITAL | Age: 73
End: 2018-10-09
Payer: MEDICARE

## 2018-10-09 VITALS
WEIGHT: 174.8 LBS | SYSTOLIC BLOOD PRESSURE: 148 MMHG | BODY MASS INDEX: 27.44 KG/M2 | HEIGHT: 67 IN | DIASTOLIC BLOOD PRESSURE: 85 MMHG | HEART RATE: 53 BPM

## 2018-10-09 DIAGNOSIS — M70.72 OTHER BURSITIS OF HIP, LEFT HIP: Primary | ICD-10-CM

## 2018-10-09 DIAGNOSIS — M25.552 LEFT HIP PAIN: ICD-10-CM

## 2018-10-09 PROCEDURE — 20610 DRAIN/INJ JOINT/BURSA W/O US: CPT | Performed by: ORTHOPAEDIC SURGERY

## 2018-10-09 PROCEDURE — 99213 OFFICE O/P EST LOW 20 MIN: CPT | Performed by: ORTHOPAEDIC SURGERY

## 2018-10-09 RX ORDER — BUPIVACAINE HYDROCHLORIDE 2.5 MG/ML
4 INJECTION, SOLUTION INFILTRATION; PERINEURAL
Status: COMPLETED | OUTPATIENT
Start: 2018-10-09 | End: 2018-10-09

## 2018-10-09 RX ORDER — BETAMETHASONE SODIUM PHOSPHATE AND BETAMETHASONE ACETATE 3; 3 MG/ML; MG/ML
12 INJECTION, SUSPENSION INTRA-ARTICULAR; INTRALESIONAL; INTRAMUSCULAR; SOFT TISSUE
Status: COMPLETED | OUTPATIENT
Start: 2018-10-09 | End: 2018-10-09

## 2018-10-09 RX ORDER — FLUOXETINE 10 MG/1
10 TABLET, FILM COATED ORAL DAILY
COMMUNITY
End: 2019-01-15

## 2018-10-09 RX ADMIN — BUPIVACAINE HYDROCHLORIDE 4 ML: 2.5 INJECTION, SOLUTION INFILTRATION; PERINEURAL at 09:18

## 2018-10-09 RX ADMIN — BETAMETHASONE SODIUM PHOSPHATE AND BETAMETHASONE ACETATE 12 MG: 3; 3 INJECTION, SUSPENSION INTRA-ARTICULAR; INTRALESIONAL; INTRAMUSCULAR; SOFT TISSUE at 09:18

## 2018-10-09 NOTE — PROGRESS NOTES
Assessment:  No diagnosis found  Plan:  Left GT bursa injected today  Ice today  Activities as tolerated  Explained to the patient that some of his symptoms are coming for the L-spine, the injection will help to determine which symptoms are GT and which are L-spine    To do next visit:  Return in about 3 months (around 1/9/2019)  Scribe Attestation    I,:   Arby Galeazzi am acting as a scribe while in the presence of the attending physician :        I,:   Lucian Garcia MD personally performed the services described in this documentation    as scribed in my presence :              Subjective:   Mikie Crane is a 81 y o  male who presents today for follow up of left troch bursitis  Patient received an injection at the last visit on 7/3/18 which did help for a few months but since has worn off  He states his pain comes and goes  Patient localizes his pain to the lateral hip and along the IT band as well as posterior over the SI joint and sciatic notch  Patient denies any groin pain today  He has increased pain with forward flexion of the back  Patient presents with the assistance of a cane today  Patient had a prior lumbar fusion L4-S1 in 2016        Review of systems negative unless otherwise specified in HPI    Past Medical History:   Diagnosis Date    Aortic aneurysm ([address] )     Benign colon polyp     Bipolar 1 disorder ([address] )     Cardiac disease     MI    Cervical cord compression with myelopathy (HCC)     COPD (chronic obstructive pulmonary disease) (HCC)     Diverticulitis     Diverticulosis     Gait disturbance     uses cane, leg brace on right    GERD (gastroesophageal reflux disease)     Heart attack ([address] ) 1996    Hx of resection of large bowel 5/3/2016    Hyperlipidemia     Hypertension     IBS (irritable bowel syndrome)     Lumbar stenosis     Lung cancer ([address] )     2007 Left lower lobectomy and 2011 Right lung with surgery     Myocardial infarction [facility])     involving other coronary artery    Prostate cancer ([facility] [address] )     Shortness of breath     Small bowel obstruction ([facility] [address] ) 11/16/2016       Past Surgical History:   Procedure Laterality Date    ANGIOPLASTY      stent    CARDIAC SURGERY      CERVICAL SPINE SURGERY      Cervical decompression with cervical fusion from C3-C7 for spinal stenosis   COLON SURGERY  11/04/2014    ESOPHAGOGASTRODUODENOSCOPY  01/03/2013    with possible Schatzki's ring & small hiatal hernia, mild gastritis    IR EVAR  8/6/2018    LUNG CANCER SURGERY Right 03/2011    wedge resection for lung tumor, 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    LUNG LOBECTOMY Left     MI ARTHRODESIS ANT INTERBODY MIN DISCECTOMY, CERVICAL BELOW C2 N/A 5/2/2016    Procedure: Anterior cervical diskectomy C3/4, C5/6, C6/7 with anterior plate fixation fusion C3-7;  Posterior decompressive laminectomy C3-7 with lateral mass fixation fusion C3-7 (IMPULSE MONITORING); Surgeon: Regina Israel MD;  [facility];  Service: Neurosurgery    MI ARTHRODESIS POSTERIOR INTERBODY LUMBAR N/A 1/30/2017    Procedure: L4-5 AND L5-S1 DECOMPRESSIVE FORAMINOTOMIES, TRANSFORAMINAL LUMBAR INTERBODY AND PEDICLE SCREW FIXATION FUSION L4-S1 (IMPULSE);   Surgeon: Regina Israel MD;  [facility];  Service: Neurosurgery    MI [address] DPLMNT AORTO-AORTIC NDGFT N/A 8/6/2018    Procedure: REPAIR ANEURYSM ENDOVASCULAR ABDOMINAL AORTIC  (EVAR) WITH BILATERAL PERCUTANEOUS FEMORAL ACCESS WITH ULTRASOUND GUIDANCE ON THE RIGHT AND PRE CLOSURE;  Surgeon: Caro Arevalo MD;  [facility];  Service: Vascular    PROSTATECTOMY  2007    for prostate CA - no chemo/RT    SIGMOIDECTOMY      for divertic    SMALL INTESTINE SURGERY N/A 11/17/2016    Procedure: Exploratory Laparotomy, Lysis of adhesions to release small bowel obstruction;  Surgeon: Enid Gama MD;  [facility];  Service:        Family History   Problem Relation Age of Onset    Heart attack Father  Colon cancer Father     Coronary artery disease Father     Heart disease Family     Hyperlipidemia Family     Hypertension Family        Social History     Occupational History    Retired      Social History Main Topics    Smoking status: Former Smoker     Packs/day: 1 00     Years: 35 00     Types: Cigarettes     Quit date: 2011    Smokeless tobacco: Never Used    Alcohol use Yes      Comment: 6 drinks a week    Drug use: No    Sexual activity: Not on file         Current Outpatient Prescriptions:     acetaminophen (TYLENOL) 500 mg tablet, Take 500 mg by mouth as needed for mild pain , Disp: , Rfl:     ADVAIR DISKUS 250-50 MCG/DOSE inhaler, USE 1 INHALATION TWICE  DAILY   RINSE MOUTH AFTER  USE, Disp: 1 Inhaler, Rfl: 5    albuterol (PROVENTIL HFA,VENTOLIN HFA) 90 mcg/act inhaler, Inhale 2 puffs every 6 (six) hours as needed for wheezing, Disp: , Rfl:     amLODIPine (NORVASC) 2 5 mg tablet, TAKE 1 TABLET BY MOUTH  DAILY, Disp: 90 tablet, Rfl: 3    aspirin (ECOTRIN LOW STRENGTH) 81 mg EC tablet, Take 81 mg by mouth daily, Disp: , Rfl:     atorvastatin (LIPITOR) 40 mg tablet, TAKE 1 TABLET BY MOUTH  DAILY, Disp: 90 tablet, Rfl: 5    B Complex Vitamins (VITAMIN B COMPLEX PO), Take by mouth, Disp: , Rfl:     Cholecalciferol (VITAMIN D PO), Take 2,000 Units by mouth daily  , Disp: , Rfl:     diazepam (VALIUM) 5 mg tablet, Take 5 mg by mouth as needed for anxiety, Disp: , Rfl:     dicyclomine (BENTYL) 20 mg tablet, Take 20 mg by mouth every 6 (six) hours, Disp: , Rfl:     fexofenadine (ALLEGRA) 180 MG tablet, Take 180 mg by mouth daily, Disp: , Rfl:     FLUoxetine (PROzac) 10 MG tablet, Take 10 mg by mouth daily, Disp: , Rfl:     fluticasone (FLONASE) 50 mcg/act nasal spray, 1 spray into each nostril as needed    , Disp: , Rfl:     ipratropium (ATROVENT HFA) 17 mcg/act inhaler, Inhale 2 puffs every 6 (six) hours, Disp: , Rfl:     lithium carbonate (LITHOBID) 300 mg CR tablet, Take 300 mg by mouth One tab by mouth every other day and alternating with 2 tabs every other day at bedtime  , Disp: , Rfl:     metoprolol succinate (TOPROL-XL) 25 mg 24 hr tablet, TAKE 1 TABLET BY MOUTH  DAILY, Disp: 90 tablet, Rfl: 5    omega-3-acid ethyl esters (LOVAZA) 1 g capsule, TAKE 1 CAPSULE BY MOUTH 3  TIMES DAILY, Disp: 270 capsule, Rfl: 5    omeprazole (PriLOSEC) 40 MG capsule, Take 40 mg by mouth daily  , Disp: , Rfl:     Wheat Dextrin (BENEFIBER DRINK MIX PO), Take 1 Package by mouth, Disp: , Rfl:     Allergies   Allergen Reactions    Bactrim [Sulfamethoxazole-Trimethoprim] Other (See Comments)     AILYN    Nsaids      Annotation - [id number]: unable to take due to use of lithium   Oxycodone Rash            Vitals:    10/09/18 0838   BP: 148/85   Pulse: (!) 53       Objective:          Physical Exam                    Left Hip Exam     Tenderness   The patient is experiencing tenderness in the greater trochanter (SI joint and sciatic notch)  Range of Motion   The patient has normal left hip ROM      Other   Sensation: normal  Pulse: present    Comments:    No erythema, ecchymosis or effusion on exam today            Diagnostics, reviewed and taken today if performed as documented:    None performed      Procedures, if performed today:  Large joint arthrocentesis  Date/Time: 10/9/2018 9:18 AM  Consent given by: patient  Site marked: site marked  Timeout: Immediately prior to procedure a time out was called to verify the correct patient, procedure, equipment, support staff and site/side marked as required   Supporting Documentation  Indications: pain   Procedure Details  Location: hip - L greater trochanteric bursa  Preparation: Patient was prepped and draped in the usual sterile fashion  Needle size: 22 G  Ultrasound guidance: no  Approach: lateral  Medications administered: 4 mL bupivacaine 0 25 %; 12 mg betamethasone acetate-betamethasone sodium phosphate 6 (3-3) mg/mL    Patient tolerance: patient tolerated the procedure well with no immediate complications  Dressing:  Sterile dressing applied        Portions of the record may have been created with voice recognition software   Occasional wrong word or "sound a like" substitutions may have occurred due to the inherent limitations of voice recognition software   Read the chart carefully and recognize, using context, where substitutions have occurred

## 2018-10-11 ENCOUNTER — OFFICE VISIT (OUTPATIENT)
Dept: INTERNAL MEDICINE CLINIC | Facility: CLINIC | Age: 73
End: 2018-10-11
Payer: MEDICARE

## 2018-10-11 VITALS
DIASTOLIC BLOOD PRESSURE: 72 MMHG | SYSTOLIC BLOOD PRESSURE: 112 MMHG | HEIGHT: 67 IN | TEMPERATURE: 97.6 F | BODY MASS INDEX: 27.4 KG/M2 | OXYGEN SATURATION: 99 % | HEART RATE: 53 BPM | WEIGHT: 174.6 LBS

## 2018-10-11 DIAGNOSIS — Z00.00 MEDICARE ANNUAL WELLNESS VISIT, SUBSEQUENT: ICD-10-CM

## 2018-10-11 DIAGNOSIS — K21.9 GASTROESOPHAGEAL REFLUX DISEASE, ESOPHAGITIS PRESENCE NOT SPECIFIED: Primary | ICD-10-CM

## 2018-10-11 DIAGNOSIS — E78.2 MIXED HYPERLIPIDEMIA: ICD-10-CM

## 2018-10-11 DIAGNOSIS — I10 ESSENTIAL HYPERTENSION: ICD-10-CM

## 2018-10-11 DIAGNOSIS — F31.9 BIPOLAR AFFECTIVE DISORDER, REMISSION STATUS UNSPECIFIED (HCC): ICD-10-CM

## 2018-10-11 PROCEDURE — 99214 OFFICE O/P EST MOD 30 MIN: CPT | Performed by: INTERNAL MEDICINE

## 2018-10-11 PROCEDURE — G0439 PPPS, SUBSEQ VISIT: HCPCS | Performed by: INTERNAL MEDICINE

## 2018-10-11 NOTE — ASSESSMENT & PLAN NOTE
Continue medication  Avoid saturated fats  Moving towards a more plant based diet will also improve your cholesterol

## 2018-10-11 NOTE — PATIENT INSTRUCTIONS
Problem List Items Addressed This Visit        Digestive    Gastroesophageal reflux disease - Primary       GERD: can do trial off the proton pump inhibitor to see if symptoms of heartburn return  Try behavioral changes to reduce GERD including watching diet and avoiding foods that cause symptoms  Common foods that cause symptoms are coffee, alcohol, chocolate, spicy foods, and fatty foods  Try to titrate down med slowly to avoid possible rebound hyperacidity  Next step would be to try 20 mg of omeprazole every other day  Cardiovascular and Mediastinum    Essential hypertension       Controlled, continue medications  Continue with healthy diet, regular exercise, and pursue stress relieving activities, as these will all help lower blood pressure  Other    Bipolar affective disorder ([facility] [address] )     Follow up psych  Mixed hyperlipidemia       Continue medication  Avoid saturated fats  Moving towards a more plant based diet will also improve your cholesterol  Medicare annual wellness visit, subsequent      Discussed preventative health, cancer screening, immunizations, and safety issues  I recommend getting the Shingrix shot to help prevent Shingles  You can get it a pharmacy, and they can administer it there  It is a two shot series with the second shot needed between 2-6 months after the first shot  I would not recommend getting the shot before an important or fun event in case you were to have a reaction to the shot like a sore arm or flu-like symptoms  I make the same recommendation about any shot, as people can have a reaction to any shot

## 2018-10-11 NOTE — ASSESSMENT & PLAN NOTE
Controlled, continue medications  Continue with healthy diet, regular exercise, and pursue stress relieving activities, as these will all help lower blood pressure

## 2018-10-11 NOTE — PROGRESS NOTES
Assessment/Plan:    Gastroesophageal reflux disease    GERD: can do trial off the proton pump inhibitor to see if symptoms of heartburn return  Try behavioral changes to reduce GERD including watching diet and avoiding foods that cause symptoms  Common foods that cause symptoms are coffee, alcohol, chocolate, spicy foods, and fatty foods  Try to titrate down med slowly to avoid possible rebound hyperacidity  Next step would be to try 20 mg of omeprazole every other day  Essential hypertension    Controlled, continue medications  Continue with healthy diet, regular exercise, and pursue stress relieving activities, as these will all help lower blood pressure  Mixed hyperlipidemia    Continue medication  Avoid saturated fats  Moving towards a more plant based diet will also improve your cholesterol  Bipolar affective disorder ([facility] )  Follow up psych  Medicare annual wellness visit, subsequent   Discussed preventative health, cancer screening, immunizations, and safety issues  I recommend getting the Shingrix shot to help prevent Shingles  You can get it a pharmacy, and they can administer it there  It is a two shot series with the second shot needed between 2-6 months after the first shot  I would not recommend getting the shot before an important or fun event in case you were to have a reaction to the shot like a sore arm or flu-like symptoms  I make the same recommendation about any shot, as people can have a reaction to any shot         Diagnoses and all orders for this visit:    Gastroesophageal reflux disease, esophagitis presence not specified    Essential hypertension    Mixed hyperlipidemia    Bipolar affective disorder, remission status unspecified ([facility] )    Medicare annual wellness visit, subsequent    Other orders  -     Cancel: influenza vaccine, [id number], high-dose, PF 0 5 mL, for patients 65 yr+ (FLUZONE HIGH-DOSE)          Subjective:      Patient ID: Nan Rafy is a 81 y o  male  Interval history:  Patient has been feeling depressed, saw Psychiatry, and fluoxetine 10 mg was started about 2 weeks ago  GERD:  Patient is currently taking omeprazole 40 mg every other day, no significant GERD problems, no problems with food getting stuck    Hypertension:  Patient reports compliance with amlodipine, no constipation or ankle swelling  The following portions of the patient's history were reviewed and updated as appropriate: allergies, current medications, past family history, past medical history, past social history, past surgical history and problem list     Review of Systems   Constitutional: Negative for chills, fatigue and fever  HENT: Negative for congestion, nosebleeds, postnasal drip, sore throat and trouble swallowing  Eyes: Negative for pain  Respiratory: Negative for cough, chest tightness, shortness of breath and wheezing  Cardiovascular: Negative for chest pain, palpitations and leg swelling  Gastrointestinal: Negative for abdominal pain, bowel incontinence, constipation, diarrhea, nausea and vomiting  Endocrine: Negative for polydipsia and polyuria  Genitourinary: Negative for dysuria, flank pain and hematuria  Musculoskeletal: Negative for arthralgias  Skin: Negative for rash  Neurological: Negative for dizziness, tremors and headaches  Hematological: Does not bruise/bleed easily  Psychiatric/Behavioral: Positive for dysphoric mood  Negative for confusion and sleep disturbance  The patient is not nervous/anxious  Objective:      /72   Pulse (!) 53   Temp 97 6 °F (36 4 °C)   Ht 5' 7" (1 702 m)   Wt 79 2 kg (174 lb 9 6 oz)   SpO2 99%   BMI 27 35 kg/m²          Physical Exam   Constitutional: He is oriented to person, place, and time  He appears well-developed and well-nourished  No distress  HENT:   Head: Normocephalic and atraumatic     Right Ear: External ear normal    Left Ear: External ear normal    Eyes: Conjunctivae are normal  No scleral icterus  Neck: Normal range of motion  Neck supple  No tracheal deviation present  No thyromegaly present  Cardiovascular: Normal rate, regular rhythm and normal heart sounds  No murmur heard  Pulmonary/Chest: Effort normal and breath sounds normal  No respiratory distress  He has no wheezes  He has no rales  Abdominal: Soft  Bowel sounds are normal  There is no tenderness  There is no rebound and no guarding  Musculoskeletal: He exhibits no edema  Lymphadenopathy:     He has no cervical adenopathy  Neurological: He is alert and oriented to person, place, and time  Psychiatric: He has a normal mood and affect  His behavior is normal  Judgment and thought content normal    Vitals reviewed  Assessment and Plan:    Problem List Items Addressed This Visit        Digestive    Gastroesophageal reflux disease - Primary       GERD: can do trial off the proton pump inhibitor to see if symptoms of heartburn return  Try behavioral changes to reduce GERD including watching diet and avoiding foods that cause symptoms  Common foods that cause symptoms are coffee, alcohol, chocolate, spicy foods, and fatty foods  Try to titrate down med slowly to avoid possible rebound hyperacidity  Next step would be to try 20 mg of omeprazole every other day  Cardiovascular and Mediastinum    Essential hypertension       Controlled, continue medications  Continue with healthy diet, regular exercise, and pursue stress relieving activities, as these will all help lower blood pressure  Other    Bipolar affective disorder ([facility] [address] )     Follow up psych  Mixed hyperlipidemia       Continue medication  Avoid saturated fats  Moving towards a more plant based diet will also improve your cholesterol  Medicare annual wellness visit, subsequent      Discussed preventative health, cancer screening, immunizations, and safety issues      I recommend getting the Shingrix shot to help prevent Shingles  You can get it a pharmacy, and they can administer it there  It is a two shot series with the second shot needed between 2-6 months after the first shot  I would not recommend getting the shot before an important or fun event in case you were to have a reaction to the shot like a sore arm or flu-like symptoms  I make the same recommendation about any shot, as people can have a reaction to any shot  Health Maintenance Due   Topic Date Due    Pneumococcal PPSV23/PCV13 65+ Years / High and Highest Risk (2 of 2 - PPSV23) 12/20/2016    INFLUENZA VACCINE  07/01/2018         HPI:  Sunny Matthews is a 81 y o  male here for his Subsequent Wellness Visit      Patient Active Problem List   Diagnosis    Numbness    Gait instability    Cervical myelopathy (HCC)    Adenocarcinoma of prostate ([address] )    CAD (coronary artery disease)    Bipolar affective disorder ([facility] [address] [facility]    COPD (chronic obstructive pulmonary disease) ([facility] [address] )    Gastroesophageal reflux disease    Mixed hyperlipidemia    Essential hypertension    Aneurysm of infrarenal abdominal aorta (HCC)    Hx of resection of large bowel    Impaired mobility and activities of daily living    Cervical radiculopathy due to degenerative joint disease of spine    Degenerative disc disease, lumbar    Foraminal stenosis of lumbar region    Spondylolisthesis of lumbar region    Myelopathy concurrent with and due to lumbosacral intervertebral disc disorder    Arrhythmia    Diastolic heart failure ([address] )    New onset atrial fibrillation ([address] )    Left hip pain    Other bursitis of hip, left hip    Skin lesion    Medicare annual wellness visit, subsequent     Past Medical History:   Diagnosis Date    Aortic aneurysm ([address] )     Benign colon polyp     Bipolar 1 disorder ([address] )     Cardiac disease     MI    Cervical cord compression with myelopathy ([address] )     COPD (chronic obstructive pulmonary disease) ([address] )  Diverticulitis     Diverticulosis     Gait disturbance     uses cane, leg brace on right    GERD (gastroesophageal reflux disease)     Heart attack ([facility] [address] ) 1996    Hx of resection of large bowel 5/3/2016    Hyperlipidemia     Hypertension     IBS (irritable bowel syndrome)     Lumbar stenosis     Lung cancer ([facility] [address] )     2007 Left lower lobectomy and 2011 Right lung with surgery     Myocardial infarction [facility])     involving other coronary artery    Prostate cancer ([facility] [address] )     Shortness of breath     Small bowel obstruction ([facility] [address] ) 11/16/2016     Past Surgical History:   Procedure Laterality Date    ANGIOPLASTY      stent    CARDIAC SURGERY      CERVICAL SPINE SURGERY      Cervical decompression with cervical fusion from C3-C7 for spinal stenosis   COLON SURGERY  11/04/2014    ESOPHAGOGASTRODUODENOSCOPY  01/03/2013    with possible Schatzki's ring & small hiatal hernia, mild gastritis    IR EVAR  8/6/2018    LUNG CANCER SURGERY Right 03/2011    wedge resection for lung tumor, 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    LUNG LOBECTOMY Left     ME ARTHRODESIS ANT INTERBODY MIN DISCECTOMY, CERVICAL BELOW C2 N/A 5/2/2016    Procedure: Anterior cervical diskectomy C3/4, C5/6, C6/7 with anterior plate fixation fusion C3-7;  Posterior decompressive laminectomy C3-7 with lateral mass fixation fusion C3-7 (IMPULSE MONITORING); Surgeon: Regina Israel MD;  [facility];  Service: Neurosurgery    ME ARTHRODESIS POSTERIOR INTERBODY LUMBAR N/A 1/30/2017    Procedure: L4-5 AND L5-S1 DECOMPRESSIVE FORAMINOTOMIES, TRANSFORAMINAL LUMBAR INTERBODY AND PEDICLE SCREW FIXATION FUSION L4-S1 (IMPULSE);   Surgeon: Regina Israel MD;  [facility];  Service: Neurosurgery    ME [address] RPR DPLMNT AORTO-AORTIC NDGFT N/A 8/6/2018    Procedure: REPAIR ANEURYSM ENDOVASCULAR ABDOMINAL AORTIC  (EVAR) WITH BILATERAL PERCUTANEOUS FEMORAL ACCESS WITH ULTRASOUND GUIDANCE ON THE RIGHT AND PRE CLOSURE;  Surgeon: Sandra Llanes MD;  [facility];  Service: Vascular    PROSTATECTOMY  2007    for prostate CA - no chemo/RT    SIGMOIDECTOMY      for divertic    SMALL INTESTINE SURGERY N/A 11/17/2016    Procedure: Exploratory Laparotomy, Lysis of adhesions to release small bowel obstruction;  Surgeon: Kenrick Diego MD;  [facility];  Service:      Family History   Problem Relation Age of Onset    Heart attack Father     Colon cancer Father     Coronary artery disease Father     Heart disease Family     Hyperlipidemia Family     Hypertension Family      History   Smoking Status    Former Smoker    Packs/day: 1 00    Years: 35 00    Types: Cigarettes    Quit date: 2011   Smokeless Tobacco    Never Used     History   Alcohol Use    Yes     Comment: 6 drinks a week      History   Drug Use No       Current Outpatient Prescriptions   Medication Sig Dispense Refill    acetaminophen (TYLENOL) 500 mg tablet Take 500 mg by mouth as needed for mild pain   ADVAIR DISKUS 250-50 MCG/DOSE inhaler USE 1 INHALATION TWICE  DAILY    RINSE MOUTH AFTER  USE 1 Inhaler 5    albuterol (PROVENTIL HFA,VENTOLIN HFA) 90 mcg/act inhaler Inhale 2 puffs every 6 (six) hours as needed for wheezing      amLODIPine (NORVASC) 2 5 mg tablet TAKE 1 TABLET BY MOUTH  DAILY 90 tablet 3    aspirin (ECOTRIN LOW STRENGTH) 81 mg EC tablet Take 81 mg by mouth daily      atorvastatin (LIPITOR) 40 mg tablet TAKE 1 TABLET BY MOUTH  DAILY 90 tablet 5    B Complex Vitamins (VITAMIN B COMPLEX PO) Take by mouth      Cholecalciferol (VITAMIN D PO) Take 2,000 Units by mouth daily        diazepam (VALIUM) 5 mg tablet Take 5 mg by mouth as needed for anxiety      dicyclomine (BENTYL) 20 mg tablet Take 20 mg by mouth every 6 (six) hours      fexofenadine (ALLEGRA) 180 MG tablet Take 180 mg by mouth daily      FLUoxetine (PROzac) 10 MG tablet Take 10 mg by mouth daily      fluticasone (FLONASE) 50 mcg/act nasal spray 1 spray into each nostril as needed   ipratropium (ATROVENT HFA) 17 mcg/act inhaler Inhale 2 puffs every 6 (six) hours      lithium carbonate (LITHOBID) 300 mg CR tablet Take 300 mg by mouth One tab by mouth every other day and alternating with 2 tabs every other day at bedtime   metoprolol succinate (TOPROL-XL) 25 mg 24 hr tablet TAKE 1 TABLET BY MOUTH  DAILY 90 tablet 5    omega-3-acid ethyl esters (LOVAZA) 1 g capsule TAKE 1 CAPSULE BY MOUTH 3  TIMES DAILY 270 capsule 5    omeprazole (PriLOSEC) 40 MG capsule Take 40 mg by mouth daily   Wheat Dextrin (BENEFIBER DRINK MIX PO) Take 1 Package by mouth       No current facility-administered medications for this visit  Allergies   Allergen Reactions    Bactrim [Sulfamethoxazole-Trimethoprim] Other (See Comments)     AILYN    Nsaids      Annotation - [id number]: unable to take due to use of lithium   Oxycodone Rash     Immunization History   Administered Date(s) Administered    Influenza 10/01/2015    Influenza Quadrivalent Preservative Free 3 years and older IM 10/01/2015    Influenza Split High Dose Preservative Free IM 10/25/2016    Influenza TIV (IM) 1945, 10/10/2012    Pneumococcal Conjugate 13-Valent 10/25/2016    Td (adult), adsorbed 12/01/2009    Zoster 01/01/2014       Patient Care Team:  Meagan Hamm MD as PCP - General  Jono Mcmahan, MD Leon Calle, MD Johnny Sevilla, MD Humberto Christiansen, MD Ivonne Wells, MD Tyrone Carter, PADAVE Hamm, MD Sebastian Serrano, MD Sulema Cheney, RN as Care Manager    Medicare Screening Tests and Risk Assessments:      Health Risk Assessment:  Patient rates overall health as fair  Patient feels that their physical health rating is Same  Eyesight was rated as Same  Hearing was rated as Same  Patient feels that their emotional and mental health rating is Slightly worse  Pain experienced by patient in the last 7 days has been Some  Patient's pain rating has been 8/10  Emotional/Mental Health:  Patient has been feeling nervous/anxious  PHQ-9 Depression Screening:    Frequency of the following problems over the past two weeks:      1  Little interest or pleasure in doing things: 1 - several days      2  Feeling down, depressed, or hopeless: 1 - several days      3  Trouble falling or staying asleep, or sleeping too much: 0 - not at all      4  Feeling tired or having little energy: 0 - not at all      5  Poor appetite or overeatin - not at all      6  Feeling bad about yourself - or that you are a failure or have let yourself or your family down: 1 - several days      7  Trouble concentrating on things, such as reading the newspaper or watching television: 0 - not at all      8  Moving or speaking so slowly that other people could have noticed  Or the opposite - being so fidgety or restless that you have been moving around a lot more than usual: 0 - not at all      9  Thoughts that you would be better off dead, or of hurting yourself in some way: 0 - not at all  PHQ-2 Score: 2  PHQ-9 Score: 3    Broken Bones/Falls: Fall Risk Assessment:    In the past year, patient has experienced: No history of falling in past year          Bladder/Bowel:  Patient has leaked urine accidently in the last six months  Patient reports no loss of bowel control  Immunizations:  Patient has had a flu vaccination within the last year  Patient has received a pneumonia shot  Patient has received a shingles shot  Patient has received tetanus/diphtheria shot  (Additional Comments: The patient had Tdap at [facility], unsure of date)    Home Safety:  Patient does not have trouble with stairs inside or outside of their home     Patient currently reports that there are no safety hazards present in home, working smoke alarms,     Preventative Screenings:   prostate cancer screen performed, colon cancer screen completed, 2015  cholesterol screen completed,     Nutrition:  Current diet: Regular with servings of the following:    Medications:  Patient is not currently taking any over-the-counter supplements  Patient is able to manage medications  Lifestyle Choices:  Patient reports no tobacco use  Patient has smoked or used tobacco in the past   Patient has stopped his tobacco use  Patient reports no alcohol use  Patient drives a vehicle  Patient wears seat belt  Current level of exercise of physical activity described by patient as: somewhat active  Activities of Daily Living:  Can get out of bed by his or her self, able to dress self, able to make own meals, able to do own shopping, able to bathe self, can do own laundry/housekeeping, can manage own money, pay bills and track expenses    Previous Hospitalizations:  Hospitalization or ED visit in past 12 months  Number of hospitalizations within the last year: 1-2        Advanced Directives:  Patient has decided on a power of   Patient has spoken to designated power of   Patient has completed advanced directive  Preventative Screening/Counseling:      Cardiovascular:      General: Risks and Benefits Discussed and Screening Current          Diabetes:      General: Risks and Benefits Discussed and Screening Current          Colorectal Cancer:      General: Risks and Benefits Discussed and Screening Current          Prostate Cancer:      General: Risks and Benefits Discussed and Screening Current          Immunizations:      Influenza: Risks & Benefits Discussed, Influenza UTD This Year and Influenza Recommended Annually      Pneumococcal: Risks & Benefits Discussed and Lifetime Vaccine Completed      Shingrix: Risks & Benefits Discussed      Zostavax: Risks & Benefits Discussed and Zostavax Vaccine UTD      TDAP: Risks & Benefits Discussed, Vaccine Status Unknown and Tdap Vaccine UTD  Additional Comments:  Unsure of date of Pneumovax 23        Other Preventative Counseling (Non-Medicare):  Car/seat belt/driving safety reviewed, Skin self-exam and Sunscreen use

## 2018-10-11 NOTE — ASSESSMENT & PLAN NOTE
GERD: can do trial off the proton pump inhibitor to see if symptoms of heartburn return  Try behavioral changes to reduce GERD including watching diet and avoiding foods that cause symptoms  Common foods that cause symptoms are coffee, alcohol, chocolate, spicy foods, and fatty foods  Try to titrate down med slowly to avoid possible rebound hyperacidity  Next step would be to try 20 mg of omeprazole every other day

## 2018-10-11 NOTE — ASSESSMENT & PLAN NOTE
Discussed preventative health, cancer screening, immunizations, and safety issues  I recommend getting the Shingrix shot to help prevent Shingles  You can get it a pharmacy, and they can administer it there  It is a two shot series with the second shot needed between 2-6 months after the first shot  I would not recommend getting the shot before an important or fun event in case you were to have a reaction to the shot like a sore arm or flu-like symptoms  I make the same recommendation about any shot, as people can have a reaction to any shot

## 2018-10-12 ENCOUNTER — TRANSCRIBE ORDERS (OUTPATIENT)
Dept: LAB | Facility: CLINIC | Age: 73
End: 2018-10-12

## 2018-10-12 ENCOUNTER — APPOINTMENT (OUTPATIENT)
Dept: LAB | Facility: CLINIC | Age: 73
End: 2018-10-12
Payer: MEDICARE

## 2018-10-12 DIAGNOSIS — R79.9 ABNORMAL FINDING OF BLOOD CHEMISTRY: ICD-10-CM

## 2018-10-12 DIAGNOSIS — E78.5 HYPERLIPIDEMIA: ICD-10-CM

## 2018-10-12 LAB
ALBUMIN SERPL BCP-MCNC: 3.9 G/DL (ref 3.5–5)
ALP SERPL-CCNC: 110 U/L (ref 46–116)
ALT SERPL W P-5'-P-CCNC: 33 U/L (ref 12–78)
ANION GAP SERPL CALCULATED.3IONS-SCNC: 9 MMOL/L (ref 4–13)
AST SERPL W P-5'-P-CCNC: 15 U/L (ref 5–45)
BASOPHILS # BLD AUTO: 0.03 THOUSANDS/ΜL (ref 0–0.1)
BASOPHILS NFR BLD AUTO: 0 % (ref 0–1)
BILIRUB SERPL-MCNC: 0.62 MG/DL (ref 0.2–1)
BUN SERPL-MCNC: 26 MG/DL (ref 5–25)
CALCIUM SERPL-MCNC: 9.6 MG/DL (ref 8.3–10.1)
CHLORIDE SERPL-SCNC: 107 MMOL/L (ref 100–108)
CHOLEST SERPL-MCNC: 108 MG/DL (ref 50–200)
CK SERPL-CCNC: 59 U/L (ref 39–308)
CO2 SERPL-SCNC: 24 MMOL/L (ref 21–32)
CREAT SERPL-MCNC: 1.29 MG/DL (ref 0.6–1.3)
EOSINOPHIL # BLD AUTO: 0.24 THOUSAND/ΜL (ref 0–0.61)
EOSINOPHIL NFR BLD AUTO: 2 % (ref 0–6)
ERYTHROCYTE [DISTWIDTH] IN BLOOD BY AUTOMATED COUNT: 14 % (ref 11.6–15.1)
EST. AVERAGE GLUCOSE BLD GHB EST-MCNC: 105 MG/DL
GFR SERPL CREATININE-BSD FRML MDRD: 56 ML/MIN/1.73SQ M
GLUCOSE P FAST SERPL-MCNC: 100 MG/DL (ref 65–99)
HBA1C MFR BLD: 5.3 % (ref 4.2–6.3)
HCT VFR BLD AUTO: 41.6 % (ref 36.5–49.3)
HDLC SERPL-MCNC: 34 MG/DL (ref 40–60)
HGB BLD-MCNC: 13.5 G/DL (ref 12–17)
IMM GRANULOCYTES # BLD AUTO: 0.04 THOUSAND/UL (ref 0–0.2)
IMM GRANULOCYTES NFR BLD AUTO: 0 % (ref 0–2)
LDLC SERPL CALC-MCNC: 44 MG/DL (ref 0–100)
LYMPHOCYTES # BLD AUTO: 1.76 THOUSANDS/ΜL (ref 0.6–4.47)
LYMPHOCYTES NFR BLD AUTO: 16 % (ref 14–44)
MCH RBC QN AUTO: 30 PG (ref 26.8–34.3)
MCHC RBC AUTO-ENTMCNC: 32.5 G/DL (ref 31.4–37.4)
MCV RBC AUTO: 92 FL (ref 82–98)
MONOCYTES # BLD AUTO: 0.88 THOUSAND/ΜL (ref 0.17–1.22)
MONOCYTES NFR BLD AUTO: 8 % (ref 4–12)
NEUTROPHILS # BLD AUTO: 8.06 THOUSANDS/ΜL (ref 1.85–7.62)
NEUTS SEG NFR BLD AUTO: 74 % (ref 43–75)
NONHDLC SERPL-MCNC: 74 MG/DL
NRBC BLD AUTO-RTO: 0 /100 WBCS
PLATELET # BLD AUTO: 206 THOUSANDS/UL (ref 149–390)
PMV BLD AUTO: 11.4 FL (ref 8.9–12.7)
POTASSIUM SERPL-SCNC: 4.3 MMOL/L (ref 3.5–5.3)
PROT SERPL-MCNC: 7.3 G/DL (ref 6.4–8.2)
RBC # BLD AUTO: 4.5 MILLION/UL (ref 3.88–5.62)
SODIUM SERPL-SCNC: 140 MMOL/L (ref 136–145)
TRIGL SERPL-MCNC: 152 MG/DL
WBC # BLD AUTO: 11.01 THOUSAND/UL (ref 4.31–10.16)

## 2018-10-12 PROCEDURE — 83036 HEMOGLOBIN GLYCOSYLATED A1C: CPT

## 2018-10-12 PROCEDURE — 85025 COMPLETE CBC W/AUTO DIFF WBC: CPT

## 2018-10-12 PROCEDURE — 82550 ASSAY OF CK (CPK): CPT

## 2018-10-12 PROCEDURE — 80053 COMPREHEN METABOLIC PANEL: CPT

## 2018-10-12 PROCEDURE — 36415 COLL VENOUS BLD VENIPUNCTURE: CPT

## 2018-10-12 PROCEDURE — 80061 LIPID PANEL: CPT

## 2018-11-07 ENCOUNTER — OFFICE VISIT (OUTPATIENT)
Dept: CARDIOLOGY CLINIC | Facility: CLINIC | Age: 73
End: 2018-11-07
Payer: MEDICARE

## 2018-11-07 VITALS
BODY MASS INDEX: 26.83 KG/M2 | WEIGHT: 177 LBS | SYSTOLIC BLOOD PRESSURE: 120 MMHG | OXYGEN SATURATION: 99 % | HEIGHT: 68 IN | HEART RATE: 52 BPM | DIASTOLIC BLOOD PRESSURE: 68 MMHG

## 2018-11-07 DIAGNOSIS — I71.4 ABDOMINAL AORTIC ANEURYSM WITHOUT RUPTURE (HCC): ICD-10-CM

## 2018-11-07 DIAGNOSIS — I25.2 PAST HISTORY OF MYOCARDIAL INFARCTION: ICD-10-CM

## 2018-11-07 DIAGNOSIS — E78.2 MIXED HYPERLIPIDEMIA: Primary | ICD-10-CM

## 2018-11-07 DIAGNOSIS — Z95.5 HISTORY OF HEART ARTERY STENT: ICD-10-CM

## 2018-11-07 DIAGNOSIS — I25.10 CORONARY ARTERY DISEASE INVOLVING NATIVE CORONARY ARTERY OF NATIVE HEART WITHOUT ANGINA PECTORIS: ICD-10-CM

## 2018-11-07 DIAGNOSIS — I10 ESSENTIAL HYPERTENSION: ICD-10-CM

## 2018-11-07 PROCEDURE — 99214 OFFICE O/P EST MOD 30 MIN: CPT | Performed by: INTERNAL MEDICINE

## 2018-11-07 NOTE — PROGRESS NOTES
Follow-up - Cardiology   Gracie West 81 y o  male MRN: [id number]        Problems    Problem List Items Addressed This Visit     Mixed hyperlipidemia - Primary    Essential hypertension      Other Visit Diagnoses     Abdominal aortic aneurysm without rupture [facility])        Coronary artery disease involving native coronary artery of native heart without angina pectoris        History of heart artery stent        Past history of myocardial infarction                Plan no change in meds  He had a is abdominal aorta stent in August   He has no leak  Cardiac-wise he is stable  HPI: Gracie West is a 81y o  year old male Patient seen June 6, 2017  Long list of issues above  From cardiac standpoint he is very stable  His creatinine is 1 16  His aortic aneurysm is slightly less than 5 cm was being watched carefully  His alkaline phosphatase elevation is probably secondary to his back fusion  It is coming down very slowly  His last LDL is less than 80  His no cardiac complaints      Patient seen December 12, 2017  His complex past history is best summarized by March 6, 2017 note  I also should have mentioned that he is bipolar and is on lithium  Presently is creatinine is 1  16  Alk phosphatase is 233 last year and it is down to 183  This is not from the liver  It was from the bone  LDL is 61  Id a CT scan of the abdomen and pelvis  Aortic aneurysm 4 7 cm  This is followed could for carefully by the vascular group  Echocardiogram shows left her function to be 65%  There is minimal mitral regurgitation  His pulmonary disease is followed by the pulmonary group  From cardiac standpoint he is very stable  No change in cardiac medications that includes a statin and a beta-blocker and aspirin  As noted above he had a stent in 1998 to is coronaries   He has no chest        Patient seen June 19, 2018  His history includes the following old myocardial infarction  Stent in his coronary 1998  Hyperlipidemia  Hypertension  Abdominal aneurysm that is now 46 mm and he is scheduled for a stent  History prostate cancer  Lung cancer 2007 2011 with surgery in followed in [facility]  obstructive lung disease  Cervical spine surgery  Back surgery in 2017  Hyperlipidemia  Fatty liver  Bowel obstruction in the past  One episode of atrial fibrillation 2018  Now not taking Coumadin  No chest pain  To clear him for surgery I will do a nuclear stress test   Last nuclear stress test 2014  History of bipolar on lithium  Premature atrial contractions on Holter in February 2018  No cardiac complaints      Patient seen November 7, 2018  Since I last saw him he got a stent in his aortic aneurysm  There is no leak  From a cardiac standpoint very stable  A1c is 5 3  LDL is 44  He has no chest pain  No recurrent atrial fibrillation  No heart failure  His last nuclear stress test his preoperative in July of 2018  There is a scar present  Otherwise diagnosis is as summarized in previous note        Review of Systems   Constitutional: Negative  Respiratory: Negative  Endocrine: Negative  Genitourinary: Negative  Neurological: Negative  Hematological: Negative  Psychiatric/Behavioral: Negative            Past Medical History:   Diagnosis Date    Aortic aneurysm (HCC)     Benign colon polyp     Bipolar 1 disorder ([address] )     Cardiac disease     MI    Cervical cord compression with myelopathy (HCC)     COPD (chronic obstructive pulmonary disease) (HCC)     Diverticulitis     Diverticulosis     Gait disturbance     uses cane, leg brace on right    GERD (gastroesophageal reflux disease)     Heart attack ([address] ) 1996    Hx of resection of large bowel 5/3/2016    Hyperlipidemia     Hypertension     IBS (irritable bowel syndrome)     Lumbar stenosis     [facility]     2007 Left lower lobectomy and 2011 Right lung with surgery     Myocardial infarction [facility])     involving other coronary artery    Prostate cancer (HCC)     Shortness of breath     [facility] bowel obstruction [facility] [address] [facility] 11/16/2016     History   Alcohol Use    Yes     Comment: 6 drinks a week     History   Drug Use No     History   Smoking Status    Former Smoker    Packs/day: 1 00    Years: 35 00    Types: Cigarettes    Quit date: 2011   Smokeless Tobacco    Never Used       Allergies: Allergies   Allergen Reactions    Bactrim [Sulfamethoxazole-Trimethoprim] Other (See Comments)     AILYN    Nsaids      Annotation - [id number]: unable to take due to use of lithium   Oxycodone Rash       Medications:     Current Outpatient Prescriptions:     acetaminophen (TYLENOL) 500 mg tablet, Take 500 mg by mouth as needed for mild pain , Disp: , Rfl:     ADVAIR DISKUS 250-50 MCG/DOSE inhaler, USE 1 INHALATION TWICE  DAILY   RINSE MOUTH AFTER  USE, Disp: 1 Inhaler, Rfl: 5    albuterol (PROVENTIL HFA,VENTOLIN HFA) 90 mcg/act inhaler, Inhale 2 puffs every 6 (six) hours as needed for wheezing, Disp: , Rfl:     amLODIPine (NORVASC) 2 5 mg tablet, TAKE 1 TABLET BY MOUTH  DAILY, Disp: 90 tablet, Rfl: 3    aspirin (ECOTRIN LOW STRENGTH) 81 mg EC tablet, Take 81 mg by mouth daily, Disp: , Rfl:     atorvastatin (LIPITOR) 40 mg tablet, TAKE 1 TABLET BY MOUTH  DAILY, Disp: 90 tablet, Rfl: 5    B Complex Vitamins (VITAMIN B COMPLEX PO), Take by mouth, Disp: , Rfl:     Cholecalciferol (VITAMIN D PO), Take 2,000 Units by mouth daily  , Disp: , Rfl:     diazepam (VALIUM) 5 mg tablet, Take 5 mg by mouth as needed for anxiety, Disp: , Rfl:     dicyclomine (BENTYL) 20 mg tablet, Take 20 mg by mouth every 6 (six) hours, Disp: , Rfl:     fexofenadine (ALLEGRA) 180 MG tablet, Take 180 mg by mouth daily, Disp: , Rfl:     FLUoxetine (PROzac) 10 MG tablet, Take 10 mg by mouth daily, Disp: , Rfl:     fluticasone (FLONASE) 50 mcg/act nasal spray, 1 spray into each nostril as needed    , Disp: , Rfl:     ipratropium (ATROVENT HFA) 17 mcg/act inhaler, Inhale 2 puffs every 6 (six) hours, Disp: , Rfl:     lithium carbonate (LITHOBID) 300 mg CR tablet, Take 300 mg by mouth One tab by mouth every other day and alternating with 2 tabs every other day at bedtime  , Disp: , Rfl:     metoprolol succinate (TOPROL-XL) 25 mg 24 hr tablet, TAKE 1 TABLET BY MOUTH  DAILY, Disp: 90 tablet, Rfl: 5    omega-3-acid ethyl esters (LOVAZA) 1 g capsule, TAKE 1 CAPSULE BY MOUTH 3  TIMES DAILY, Disp: 270 capsule, Rfl: 5    omeprazole (PriLOSEC) 40 MG capsule, Take 40 mg by mouth every other day  , Disp: , Rfl:     Wheat Dextrin (BENEFIBER DRINK MIX PO), Take 1 Package by mouth, Disp: , Rfl:       Physical Exam   Constitutional: He is oriented to person, place, and time  Cardiovascular: Regular rhythm  No murmur heard  Pulmonary/Chest: Breath sounds normal    Musculoskeletal: He exhibits no edema  Neurological: He is oriented to person, place, and time           Laboratory Studies:  CMP:      Invalid input(s): ALBUMIN  NT-proBNP:   Lab Results   Component Value Date    NTBNP 93 2017      Coags:    Lipid Profile:   Lab Results   Component Value Date    CHOL 139 10/19/2015     Lab Results   Component Value Date    HDL 34 (L) 10/12/2018     Lab Results   Component Value Date    LDLCALC 44 10/12/2018     Lab Results   Component Value Date    TRIG 152 (H) 10/12/2018       Cardiac testing:     EKG reviewed personally:     Results for orders placed during the hospital encounter of 18   Echo complete with contrast if indicated    Narrative [address]  [address]  [phone]    Transthoracic Echocardiogram  2D, M-mode, Doppler, and Color Doppler    Study date:  2018    Patient: Vickie Rosales  MR number: [id number]  Account number: [de-identified]  : 1945  Age: 81 years  Gender: Male  Status: Inpatient  Location: Bedside  Height: 67 in  Weight: 179 5 lb  BP: 128/ 76 mmHg    Indications: Limited for LV function    Diagnoses: I48 0 - Atrial fibrillation    Sonographer:  DANA Doherty, RDCS  Primary Physician:  Sherrill Goss MD  Referring Physician:  Mere Crain MD  Group:  [facility]  Cardiology Fellow:  Jazzy Hough MD  Interpreting Physician:  Zachary Ortiz MD    SUMMARY    LEFT VENTRICLE:  Systolic function was normal  Ejection fraction was estimated to be 55 %  There were no regional wall motion abnormalities  Wall thickness was at the upper limits of normal     MITRAL VALVE:  There was trace regurgitation  TRICUSPID VALVE:  There was mild regurgitation  Estimated peak PA pressure was 43 mmHg  HISTORY: PRIOR HISTORY: COPD, Hypertension, Hyperlipidemia, A fib, Sepsis    PROCEDURE: The procedure was performed at the bedside  This was a routine study  The transthoracic approach was used  The study included complete 2D imaging, M-mode, complete spectral Doppler, and color Doppler  The heart rate was 72 bpm,  at the start of the study  Images were obtained from the parasternal, apical, subcostal, and suprasternal notch acoustic windows  Image quality was adequate  LEFT VENTRICLE: Size was normal  Systolic function was normal  Ejection fraction was estimated to be 55 %  There were no regional wall motion abnormalities  Wall thickness was at the upper limits of normal  There was no evidence of  concentric hypertrophy  DOPPLER: Left ventricular diastolic function parameters were normal     RIGHT VENTRICLE: The size was normal  Systolic function was normal     LEFT ATRIUM: Size was normal     RIGHT ATRIUM: Size was normal     MITRAL VALVE: Valve structure was normal  There was normal leaflet separation  DOPPLER: The transmitral velocity was within the normal range  There was no evidence for stenosis  There was trace regurgitation  AORTIC VALVE: The valve was trileaflet  Leaflets exhibited normal thickness and normal cuspal separation   DOPPLER: Transaortic velocity was within the normal range  There was no evidence for stenosis  There was no regurgitation  TRICUSPID VALVE: The valve structure was normal  There was normal leaflet separation  DOPPLER: The transtricuspid velocity was within the normal range  There was no evidence for stenosis  There was mild regurgitation  Pulmonary artery  systolic pressure was mildly increased  Estimated peak PA pressure was 43 mmHg  PULMONIC VALVE: Not well visualized  DOPPLER: The transpulmonic velocity was within the normal range  There was no evidence for stenosis  There was no significant regurgitation  PERICARDIUM: There was no pericardial effusion  A pericardial fat pad was present  AORTA: The root exhibited normal size  SYSTEMIC VEINS: IVC: The inferior vena cava was normal in size and course  Respirophasic changes were normal     PULMONARY VEINS: Not assessed  SYSTEM MEASUREMENT TABLES    2D  %FS: 28 95 %  EDV(Teich): 115 71 ml  EF(Teich): 55 44 %  ESV(Teich): 51 56 ml  IVSd: 1 07 cm  LA Diam: 3 51 cm  LVEDV MOD A4C: 115 44 ml  LVEF MOD A4C: 60 62 %  LVESV MOD A4C: 45 46 ml  LVIDd: 4 95 cm  LVIDs: 3 52 cm  LVLd A4C: 8 78 cm  LVLs A4C: 7 06 cm  LVPWd: 0 96 cm  SV MOD A4C: 69 98 ml  SV(Teich): 64 15 ml    CW  TR Vmax: 2 93 m/s  TR maxP 25 mmHg    PW  E': 0 1 m/s  E/E': 8 44  MV A [place]: 0 59 m/s  MV Dec [place]: 4 51 m/s2  MV DecT: 178 2 ms  MV E Goyo: 0 8 m/s  MV E/A Ratio: 1 37  MV PHT: 51 68 ms  MVA By PHT: 4 26 cm2    [employer] Accredited Echocardiography Laboratory    Prepared and electronically signed by    Eric Day MD  Signed 2018 14:50:06       No results found for this or any previous visit  No results found for this or any previous visit    Results for orders placed during the hospital encounter of 18   NM myocardial perfusion spect (stress and/or rest)    Brett Warner [address] [place] [address]  [phone]    Rest/Stress Gated SPECT Myocardial Perfusion Imaging After Regadenoson    Patient: Jerri Bansal  MR number: [id number]  Account number: [de-identified]  : 1945  Age: 81 years  Gender: Male  Status: Outpatient  Location: [address] [place] [address] [facility] [place]  Height: 67 in  Weight: 185 lb  BP: 160/ 80 mmHg    Allergies: NSAIDS, OXYCODONE    Diagnosis: Z01 810 - Encounter for preprocedural cardiovascular examination    Interpreting Physician:  Lolis Lu MD  Referring Physician:  Debra Cullen MD  Primary Physician:  Zoraida Almonte MD  Technician:  Lissette Bonilla  RN:  Celso Robles RN  Group:  [facility]  Report Prepared by[de-identified]  Celso Robles RN    INDICATIONS: Pre-operative risk assessment for AAA repair    HISTORY: The patient is a 81year old  male  History of 5 2 cm AAA, bipolar disorder, cervical and lumbar back surgery, wears brace right leg Chest pain status: no chest pain  Coronary artery disease risk factors: dyslipidemia, hypertension, and former smoking  Cardiovascular  history: coronary artery disease, prior myocardial infarction (), and arrhythmia  Prior cardiovascular procedures: percutaneous transluminal coronary angioplasty s/p stent x 1 () Co-morbidity: history of lung cancer s/p  chemotherapy and left lobectomy and right wedge resection,COPD, and prostate cancer s/p prostatectomy Medications: a beta blocker, a calcium channel blocker, aspirin, and a lipid lowering agent  Previous test results: abnormal nuclear  study  PHYSICAL EXAM: Baseline physical exam screening: no wheezes audible  REST ECG: Normal sinus rhythm  The ECG showed occasional premature ventricular contractions  PROCEDURE: The study was performed at the [address] [facility] [place]  A regadenoson infusion pharmacologic stress test was performed   Gated SPECT myocardial perfusion imaging was performed after stress and at rest  Systolic blood  pressure was 160 mmHg, at the start of the study  Diastolic blood pressure was 80 mmHg, at the start of the study  The heart rate was 64 bpm, at the start of the study  IV double checked  Regadenoson protocol:  HR bpm SBP mmHg DBP mmHg Symptoms  Baseline 64 160 80 none  Immediate 100 172 88 mild dyspnea  1 min 67 148 86 subsiding  2 min 65 144 82 none  The patient also performed low level exercise  STRESS SUMMARY: Duration of pharmacologic stress was 3 min  Maximal heart rate during stress was 100 bpm  The heart rate response to stress was normal  There was resting hypertension with an appropriate blood pressure response to stress  The rate-pressure product for the peak heart rate and blood pressure was 92073  There was no chest pain during stress  The stress test was terminated due to protocol completion  Pre oxygen saturation: 97 %  Peak oxygen saturation: 97 %  The stress ECG was negative for ischemia and normal  Arrhythmia during stress: isolated premature ventricular beats  ISOTOPE ADMINISTRATION:  Resting isotope administration Stress isotope administration  Agent Tetrofosmin Tetrofosmin  Dose 10 47 mCi 32 6 mCi  Date 06/26/2018 06/26/2018  Injection-image interval 40 min 54 min    The radiopharmaceutical was injected at the peak effect of pharmacologic stress  MYOCARDIAL PERFUSION IMAGING:  The image quality was good  Rotating projection images reveal mild diaphragmatic attenuation  Left ventricular size was normal  The TID ratio was 1 17  PERFUSION DEFECTS:  -  No significant ischemia visualized  There was a small, severe, fixed myocardial perfusion defect of the basalto mid inferoseptal wall  PERFUSION QUANTITATION:  The summed perfusion score measured 6 during stress, 2 at rest, with a difference of 3  GATED SPECT:  Non-gated study secondary to frequent PVCs  SUMMARY:  -  Stress results: There was resting hypertension with an appropriate blood pressure response to stress  There was no chest pain during stress  -  ECG conclusions: The stress ECG was negative for ischemia and normal   -  Perfusion imaging: No significant ischemia visualized  There was a small, severe, fixed myocardial perfusion defect of the basalto mid inferoseptal wall  -  Gated SPECT: Non-gated study secondary to frequent PVCs  Prepared and signed by    Lorenzo Lindquist MD  Signed 06/26/2018 13:18:39         Jackie Crystal MD    Portions of the record may have been created with voice recognition software   Occasional wrong word or "sound a like" substitutions may have occurred due to the inherent limitations of voice recognition software   Read the chart carefully and recognize, using context, where substitutions have occurred

## 2018-11-15 ENCOUNTER — HOSPITAL ENCOUNTER (OUTPATIENT)
Dept: NON INVASIVE DIAGNOSTICS | Facility: CLINIC | Age: 73
Discharge: HOME | End: 2018-11-15
Payer: MEDICARE

## 2018-11-15 DIAGNOSIS — I71.4 ABDOMINAL AORTIC ANEURYSM WITHOUT RUPTURE (HCC): ICD-10-CM

## 2018-11-15 PROCEDURE — 93978 VASCULAR STUDY: CPT

## 2018-11-15 PROCEDURE — 93978 VASCULAR STUDY: CPT | Performed by: SURGERY

## 2018-11-28 ENCOUNTER — HOSPITAL ENCOUNTER (OUTPATIENT)
Dept: RADIOLOGY | Facility: HOSPITAL | Age: 73
Discharge: HOME | End: 2018-11-28
Attending: ORTHOPAEDIC SURGERY
Payer: MEDICARE

## 2018-11-28 ENCOUNTER — OFFICE VISIT (OUTPATIENT)
Dept: OBGYN CLINIC | Facility: HOSPITAL | Age: 73
End: 2018-11-28
Payer: MEDICARE

## 2018-11-28 VITALS
BODY MASS INDEX: 26.83 KG/M2 | DIASTOLIC BLOOD PRESSURE: 88 MMHG | WEIGHT: 177 LBS | SYSTOLIC BLOOD PRESSURE: 150 MMHG | HEART RATE: 54 BPM | HEIGHT: 68 IN

## 2018-11-28 DIAGNOSIS — M25.552 PAIN IN LEFT HIP: Primary | ICD-10-CM

## 2018-11-28 DIAGNOSIS — Z98.890 HISTORY OF LUMBAR SURGERY: ICD-10-CM

## 2018-11-28 DIAGNOSIS — M25.552 PAIN IN LEFT HIP: ICD-10-CM

## 2018-11-28 DIAGNOSIS — R26.81 GAIT INSTABILITY: ICD-10-CM

## 2018-11-28 DIAGNOSIS — M25.552 LEFT HIP PAIN: ICD-10-CM

## 2018-11-28 DIAGNOSIS — M70.62 TROCHANTERIC BURSITIS OF LEFT HIP: ICD-10-CM

## 2018-11-28 DIAGNOSIS — M51.06: ICD-10-CM

## 2018-11-28 PROCEDURE — 73502 X-RAY EXAM HIP UNI 2-3 VIEWS: CPT

## 2018-11-28 PROCEDURE — 20610 DRAIN/INJ JOINT/BURSA W/O US: CPT | Performed by: PHYSICIAN ASSISTANT

## 2018-11-28 PROCEDURE — 99213 OFFICE O/P EST LOW 20 MIN: CPT | Performed by: ORTHOPAEDIC SURGERY

## 2018-11-28 RX ORDER — BETAMETHASONE SODIUM PHOSPHATE AND BETAMETHASONE ACETATE 3; 3 MG/ML; MG/ML
6 INJECTION, SUSPENSION INTRA-ARTICULAR; INTRALESIONAL; INTRAMUSCULAR; SOFT TISSUE
Status: COMPLETED | OUTPATIENT
Start: 2018-11-28 | End: 2018-11-28

## 2018-11-28 RX ORDER — FLUOXETINE 10 MG/1
10 CAPSULE ORAL DAILY
COMMUNITY
Start: 2018-11-13

## 2018-11-28 RX ORDER — LIDOCAINE HYDROCHLORIDE 10 MG/ML
2 INJECTION, SOLUTION INFILTRATION; PERINEURAL
Status: COMPLETED | OUTPATIENT
Start: 2018-11-28 | End: 2018-11-28

## 2018-11-28 RX ADMIN — BETAMETHASONE SODIUM PHOSPHATE AND BETAMETHASONE ACETATE 6 MG: 3; 3 INJECTION, SUSPENSION INTRA-ARTICULAR; INTRALESIONAL; INTRAMUSCULAR; SOFT TISSUE at 15:05

## 2018-11-28 RX ADMIN — LIDOCAINE HYDROCHLORIDE 2 ML: 10 INJECTION, SOLUTION INFILTRATION; PERINEURAL at 15:05

## 2018-11-28 NOTE — PROGRESS NOTES
Subjective; Adult male patient known to the practice  He has a significant past history for lumbar spine fusion January 2017  This surgery was done for leg weakness alteration in gait and pain  He believes it has helped him  He wears a right foot and ankle orthosis which preceded the lumbar spinal surgery for a different reason  He has left lateral hip pain in the form of bursitis that is improved by periodic injections  In lieu of his January follow-up appointment he comes the office today because of increased hip pain  He diagrams on a piece of paper anterior thigh pain from the ileo inguinal crease down to the distal thigh posterior thigh pain from the inferior buttock to the popliteal crease  He diagrams no symptoms or pain below the level of the knee or distal thigh left leg  He reports no symptoms in the right hip or thigh  His last x-ray of the left hip was February also noted are recent CT of the abdomen and pelvis done in September of this year  He is accompanied by his wife, and repeat x-rays of the left hip were done at this time      Past Medical History:   Diagnosis Date    Aortic aneurysm (HCC)     Benign colon polyp     [facility] disorder [facility] )     Cardiac disease     MI    Cervical cord compression with myelopathy (HCC)     COPD (chronic obstructive pulmonary disease) (HCC)     Diverticulitis     Diverticulosis     Gait disturbance     uses cane, leg brace on right    GERD (gastroesophageal reflux disease)     Heart attack ([address] ) 1996    Hx of resection of large bowel 5/3/2016    Hyperlipidemia     Hypertension     IBS (irritable bowel syndrome)     Lumbar stenosis     Lung cancer ([facility] )     2007 Left lower lobectomy and 2011 Right lung with surgery     Myocardial infarction [facility])     involving other coronary artery    Prostate cancer ([facility] )     Shortness of breath     Small bowel obstruction ([facility] [address] [facility] ) 11/16/2016       Past Surgical History:   Procedure Laterality Date    ANGIOPLASTY      stent    CARDIAC SURGERY      CERVICAL SPINE SURGERY      Cervical decompression with cervical fusion from C3-C7 for spinal stenosis   COLON SURGERY  11/04/2014    ESOPHAGOGASTRODUODENOSCOPY  01/03/2013    with possible Schatzki's ring & small hiatal hernia, mild gastritis    IR EVAR  8/6/2018    LUNG CANCER SURGERY Right 03/2011    wedge resection for lung tumor, 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    LUNG LOBECTOMY Left     IN ARTHRODESIS ANT INTERBODY MIN DISCECTOMY, CERVICAL BELOW C2 N/A 5/2/2016    Procedure: Anterior cervical diskectomy C3/4, C5/6, C6/7 with anterior plate fixation fusion C3-7;  Posterior decompressive laminectomy C3-7 with lateral mass fixation fusion C3-7 (IMPULSE MONITORING); Surgeon: Trent Hall MD;  [facility];  Service: Neurosurgery    IN ARTHRODESIS POSTERIOR INTERBODY LUMBAR N/A 1/30/2017    Procedure: L4-5 AND L5-S1 DECOMPRESSIVE FORAMINOTOMIES, TRANSFORAMINAL LUMBAR INTERBODY AND PEDICLE SCREW FIXATION FUSION L4-S1 (IMPULSE);   Surgeon: Trent Hall MD;  [facility];  Service: Neurosurgery    IN [address] RPR DPLMNT AORTO-AORTIC NDGFT N/A 8/6/2018    Procedure: REPAIR ANEURYSM ENDOVASCULAR ABDOMINAL AORTIC  (EVAR) WITH BILATERAL PERCUTANEOUS FEMORAL ACCESS WITH ULTRASOUND GUIDANCE ON THE RIGHT AND PRE CLOSURE;  Surgeon: Claudio Shi MD;  [facility];  Service: Vascular    PROSTATECTOMY  2007    for prostate CA - no chemo/RT    SIGMOIDECTOMY      for divertic    SMALL INTESTINE SURGERY N/A 11/17/2016    Procedure: Exploratory Laparotomy, Lysis of adhesions to release small bowel obstruction;  Surgeon: Amber Davies MD;  [facility];  Service:        Family History   Problem Relation Age of Onset    Heart attack Father     Colon cancer Father     Coronary artery disease Father     Heart disease Family     Hyperlipidemia Family     Hypertension Family        Social History   Substance Use Topics    Smoking status: Former Smoker     Packs/day: 1 00     Years: 35 00     Types: Cigarettes     Quit date: 2011    Smokeless tobacco: Never Used    Alcohol use Yes      Comment: 6 drinks a week     Exam;    This gentleman appears so somewhat comfortable while seated slightly pale, and with slight tremulous or shaking  Again noted is the ankle-foot orthosis right lower extremity right ankle  In the seated position both legs were extended the hips flexed at 90° the knees fully extended and no gross quad atrophy was visible quad size thigh circumference was symmetrical he had symmetrical motor function hip flexion knee extension  With hip flexion to 90° internal and wrote external rotation are nonpainful right hip  With 90° of hip flexion left hip he has discomfort in the buttock with internal rotation  Sensory intact to soft sharp touch L2-L3 L4 dermatomes bilaterally  New x-rays left hip series, the appearance of his hip on today's x-ray again is age-appropriate though it has degenerative signs these are modest, and exhibits no overt signs of a fracture  Large joint arthrocentesis  Date/Time: 11/28/2018 3:05 PM  Consent given by: patient  Procedure Details  Location: hip - L greater trochanteric bursa  Needle size: 22 G  Approach: lateral  Medications administered: 2 mL lidocaine 1 %; 6 mg betamethasone acetate-betamethasone sodium phosphate 6 (3-3) mg/mL    Patient tolerance: patient tolerated the procedure well with no immediate complications  Dressing:  Sterile dressing applied      Impression; Left hip and leg pain  Left greater troch bursitis  Past history of lumbar fusion surgery and decompression for stenosis due to leg weakness and instability  Use of a right foot and ankle orthosis    Plan;    He received a well placed injection to the left greater troch bursa today    The attending surgeon recommended a fluoroscopic guided hip injection to identify or clarify etiology of his hip and thigh pain,(back versus hip in origin)  His experience was supervised by exam provided by and plan formulated by the attending surgeon it was my privilege to assist him in its delivery

## 2018-12-11 ENCOUNTER — TRANSCRIBE ORDERS (OUTPATIENT)
Dept: RADIOLOGY | Facility: HOSPITAL | Age: 73
End: 2018-12-11

## 2018-12-11 ENCOUNTER — HOSPITAL ENCOUNTER (OUTPATIENT)
Dept: RADIOLOGY | Facility: HOSPITAL | Age: 73
Discharge: HOME | End: 2018-12-11
Admitting: RADIOLOGY
Payer: MEDICARE

## 2018-12-11 DIAGNOSIS — M25.552 LEFT HIP PAIN: ICD-10-CM

## 2018-12-11 PROCEDURE — 77002 NEEDLE LOCALIZATION BY XRAY: CPT

## 2018-12-11 PROCEDURE — 20610 DRAIN/INJ JOINT/BURSA W/O US: CPT

## 2018-12-11 RX ORDER — LIDOCAINE HYDROCHLORIDE 10 MG/ML
10 INJECTION, SOLUTION INFILTRATION; PERINEURAL
Status: COMPLETED | OUTPATIENT
Start: 2018-12-11 | End: 2018-12-11

## 2018-12-11 RX ORDER — BUPIVACAINE HYDROCHLORIDE 2.5 MG/ML
20 INJECTION, SOLUTION EPIDURAL; INFILTRATION; INTRACAUDAL
Status: COMPLETED | OUTPATIENT
Start: 2018-12-11 | End: 2018-12-11

## 2018-12-11 RX ORDER — METHYLPREDNISOLONE ACETATE 80 MG/ML
80 INJECTION, SUSPENSION INTRA-ARTICULAR; INTRALESIONAL; INTRAMUSCULAR; SOFT TISSUE
Status: COMPLETED | OUTPATIENT
Start: 2018-12-11 | End: 2018-12-11

## 2018-12-11 RX ADMIN — IOHEXOL 3 ML: 300 INJECTION, SOLUTION INTRAVENOUS at 13:59

## 2018-12-11 RX ADMIN — BUPIVACAINE HYDROCHLORIDE 2 ML: 2.5 INJECTION, SOLUTION EPIDURAL; INFILTRATION; INTRACAUDAL; PERINEURAL at 13:59

## 2018-12-11 RX ADMIN — METHYLPREDNISOLONE ACETATE 80 MG: 80 INJECTION, SUSPENSION INTRA-ARTICULAR; INTRALESIONAL; INTRAMUSCULAR; SOFT TISSUE at 14:00

## 2018-12-11 RX ADMIN — LIDOCAINE HYDROCHLORIDE 5 ML: 10 INJECTION, SOLUTION INFILTRATION; PERINEURAL at 14:00

## 2019-01-01 DIAGNOSIS — J44.9 CHRONIC OBSTRUCTIVE PULMONARY DISEASE, UNSPECIFIED COPD TYPE (HCC): Primary | ICD-10-CM

## 2019-01-01 DIAGNOSIS — I25.10 CORONARY ARTERY DISEASE INVOLVING NATIVE HEART WITHOUT ANGINA PECTORIS, UNSPECIFIED VESSEL OR LESION TYPE: Primary | ICD-10-CM

## 2019-01-02 RX ORDER — NITROGLYCERIN 40 MG/1
PATCH TRANSDERMAL
Qty: 90 PATCH | Refills: 3 | Status: SHIPPED | OUTPATIENT
Start: 2019-01-02 | End: 2020-10-05

## 2019-01-02 RX ORDER — IPRATROPIUM BROMIDE 17 UG/1
AEROSOL, METERED RESPIRATORY (INHALATION)
Qty: 51.6 G | Refills: 5 | Status: SHIPPED | OUTPATIENT
Start: 2019-01-02 | End: 2019-12-20 | Stop reason: SDUPTHER

## 2019-01-04 ENCOUNTER — DOCUMENTATION (OUTPATIENT)
Dept: PULMONOLOGY | Facility: CLINIC | Age: 74
End: 2019-01-04

## 2019-01-15 ENCOUNTER — OFFICE VISIT (OUTPATIENT)
Dept: OBGYN CLINIC | Facility: HOSPITAL | Age: 74
End: 2019-01-15
Payer: MEDICARE

## 2019-01-15 VITALS
HEART RATE: 64 BPM | HEIGHT: 68 IN | BODY MASS INDEX: 27.19 KG/M2 | DIASTOLIC BLOOD PRESSURE: 76 MMHG | SYSTOLIC BLOOD PRESSURE: 130 MMHG | WEIGHT: 179.4 LBS

## 2019-01-15 DIAGNOSIS — M70.62 TROCHANTERIC BURSITIS OF LEFT HIP: Primary | ICD-10-CM

## 2019-01-15 DIAGNOSIS — M16.12 PRIMARY OSTEOARTHRITIS OF ONE HIP, LEFT: ICD-10-CM

## 2019-01-15 PROCEDURE — 20610 DRAIN/INJ JOINT/BURSA W/O US: CPT | Performed by: PHYSICIAN ASSISTANT

## 2019-01-15 PROCEDURE — 99213 OFFICE O/P EST LOW 20 MIN: CPT | Performed by: ORTHOPAEDIC SURGERY

## 2019-01-15 RX ORDER — BETAMETHASONE SODIUM PHOSPHATE AND BETAMETHASONE ACETATE 3; 3 MG/ML; MG/ML
12 INJECTION, SUSPENSION INTRA-ARTICULAR; INTRALESIONAL; INTRAMUSCULAR; SOFT TISSUE
Status: COMPLETED | OUTPATIENT
Start: 2019-01-15 | End: 2019-01-15

## 2019-01-15 RX ORDER — BUPIVACAINE HYDROCHLORIDE 5 MG/ML
2 INJECTION, SOLUTION EPIDURAL; INTRACAUDAL
Status: COMPLETED | OUTPATIENT
Start: 2019-01-15 | End: 2019-01-15

## 2019-01-15 RX ORDER — LIDOCAINE HYDROCHLORIDE 20 MG/ML
2 INJECTION, SOLUTION INFILTRATION; PERINEURAL
Status: COMPLETED | OUTPATIENT
Start: 2019-01-15 | End: 2019-01-15

## 2019-01-15 RX ADMIN — BUPIVACAINE HYDROCHLORIDE 2 ML: 5 INJECTION, SOLUTION EPIDURAL; INTRACAUDAL at 09:53

## 2019-01-15 RX ADMIN — BETAMETHASONE SODIUM PHOSPHATE AND BETAMETHASONE ACETATE 12 MG: 3; 3 INJECTION, SUSPENSION INTRA-ARTICULAR; INTRALESIONAL; INTRAMUSCULAR; SOFT TISSUE at 09:53

## 2019-01-15 RX ADMIN — LIDOCAINE HYDROCHLORIDE 2 ML: 20 INJECTION, SOLUTION INFILTRATION; PERINEURAL at 09:53

## 2019-01-15 NOTE — PROGRESS NOTES
Assessment:  No diagnosis found  Plan:      To do next visit:  No Follow-up on file  The above stated was discussed in layman's terms and the patient expressed understanding  All questions were answered to the patient's satisfaction  Scribe Attestation    I,:   Rainer Persaud am acting as a scribe while in the presence of the attending physician :        I,:   Payton Hernandez MD personally performed the services described in this documentation    as scribed in my presence :              Subjective:   Elfida Meckel is a 72 y o  male who presents       Review of systems negative unless otherwise specified in HPI    Past Medical History:   Diagnosis Date    Aortic aneurysm ([facility] )     Benign colon polyp     Bipolar 1 disorder ([address] )     Cardiac disease     MI    Cervical cord compression with myelopathy ([address] )     COPD (chronic obstructive pulmonary disease) ([address] )     Diverticulitis     Diverticulosis     Gait disturbance     uses cane, leg brace on right    GERD (gastroesophageal reflux disease)     Heart attack ([address] ) 1996    Hx of resection of large bowel 5/3/2016    Hyperlipidemia     Hypertension     IBS (irritable bowel syndrome)     Lumbar stenosis     Lung cancer ([facility] [address] )     2007 Left lower lobectomy and 2011 Right lung with surgery     Myocardial infarction [facility])     involving other coronary artery    Prostate cancer ([facility] [address] )     Shortness of breath     Small bowel obstruction ([facility] [address] ) 11/16/2016       Past Surgical History:   Procedure Laterality Date    ANGIOPLASTY      stent    CARDIAC SURGERY      CERVICAL SPINE SURGERY      Cervical decompression with cervical fusion from C3-C7 for spinal stenosis      COLON SURGERY  11/04/2014    ESOPHAGOGASTRODUODENOSCOPY  01/03/2013    with possible Schatzki's ring & small hiatal hernia, mild gastritis    FL INJECTION LEFT HIP (NON ARTHROGRAM)  12/11/2018    IR EVAR  8/6/2018    LUNG CANCER SURGERY Right 03/2011    wedge resection for lung tumor, 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    LUNG LOBECTOMY Left     VT ARTHRODESIS ANT INTERBODY MIN DISCECTOMY, CERVICAL BELOW C2 N/A 5/2/2016    Procedure: Anterior cervical diskectomy C3/4, C5/6, C6/7 with anterior plate fixation fusion C3-7;  Posterior decompressive laminectomy C3-7 with lateral mass fixation fusion C3-7 (IMPULSE MONITORING); Surgeon: John Klein MD;  [facility];  Service: Neurosurgery    VT ARTHRODESIS POSTERIOR INTERBODY LUMBAR N/A 1/30/2017    Procedure: L4-5 AND L5-S1 DECOMPRESSIVE FORAMINOTOMIES, TRANSFORAMINAL LUMBAR INTERBODY AND PEDICLE SCREW FIXATION FUSION L4-S1 (IMPULSE);   Surgeon: John Klein MD;  [facility];  Service: Neurosurgery    VT [address] RPR DPLMNT AORTO-AORTIC NDGFT N/A 8/6/2018    Procedure: REPAIR ANEURYSM ENDOVASCULAR ABDOMINAL AORTIC  (EVAR) WITH BILATERAL PERCUTANEOUS FEMORAL ACCESS WITH ULTRASOUND GUIDANCE ON THE RIGHT AND PRE CLOSURE;  Surgeon: Liliana Patterson MD;  [facility];  Service: Vascular    PROSTATECTOMY  2007    for prostate CA - no chemo/RT    SIGMOIDECTOMY      for divertic    SMALL INTESTINE SURGERY N/A 11/17/2016    Procedure: Exploratory Laparotomy, Lysis of adhesions to release small bowel obstruction;  Surgeon: Melani Acuna MD;  [facility];  Service:        Family History   Problem Relation Age of Onset    Heart attack Father     Colon cancer Father     Coronary artery disease Father     Heart disease Family     Hyperlipidemia Family     Hypertension Family        Social History     Occupational History    Retired      Social History Main Topics    Smoking status: Former Smoker     Packs/day: 1 00     Years: 35 00     Types: Cigarettes     Quit date: 2011    Smokeless tobacco: Never Used    Alcohol use Yes      Comment: 6 drinks a week    Drug use: No    Sexual activity: Not on file         Current Outpatient Prescriptions:     acetaminophen (TYLENOL) 500 mg tablet, Take 500 mg by mouth as needed for mild pain , Disp: , Rfl:     ADVAIR DISKUS 250-50 MCG/DOSE inhaler, USE 1 INHALATION TWICE  DAILY   RINSE MOUTH AFTER  USE, Disp: 1 Inhaler, Rfl: 5    amLODIPine (NORVASC) 2 5 mg tablet, TAKE 1 TABLET BY MOUTH  DAILY, Disp: 90 tablet, Rfl: 3    aspirin (ECOTRIN LOW STRENGTH) 81 mg EC tablet, Take 81 mg by mouth daily, Disp: , Rfl:     atorvastatin (LIPITOR) 40 mg tablet, TAKE 1 TABLET BY MOUTH  DAILY, Disp: 90 tablet, Rfl: 5    ATROVENT HFA 17 MCG/ACT inhaler, USE 2 PUFFS 4 TIMES DAILY, Disp: 51 6 g, Rfl: 5    B Complex Vitamins (VITAMIN B COMPLEX PO), Take by mouth, Disp: , Rfl:     Cholecalciferol (VITAMIN D PO), Take 2,000 Units by mouth daily  , Disp: , Rfl:     diazepam (VALIUM) 5 mg tablet, Take 5 mg by mouth as needed for anxiety, Disp: , Rfl:     dicyclomine (BENTYL) 20 mg tablet, Take 20 mg by mouth every 6 (six) hours, Disp: , Rfl:     fexofenadine (ALLEGRA) 180 MG tablet, Take 180 mg by mouth daily, Disp: , Rfl:     FLUoxetine (PROzac) 10 mg capsule, , Disp: , Rfl:     fluticasone (FLONASE) 50 mcg/act nasal spray, 1 spray into each nostril as needed  , Disp: , Rfl:     lithium carbonate (LITHOBID) 300 mg CR tablet, Take 300 mg by mouth One tab by mouth every other day and alternating with 2 tabs every other day at bedtime   , Disp: , Rfl:     metoprolol succinate (TOPROL-XL) 25 mg 24 hr tablet, TAKE 1 TABLET BY MOUTH  DAILY, Disp: 90 tablet, Rfl: 5    nitroglycerin (NITRODUR) 0 2 mg/hr, APPLY 1 PATCH DAILY (OFF  FOR 12 HOURS), Disp: 90 patch, Rfl: 3    omega-3-acid ethyl esters (LOVAZA) 1 g capsule, TAKE 1 CAPSULE BY MOUTH 3  TIMES DAILY, Disp: 270 capsule, Rfl: 5    omeprazole (PriLOSEC) 40 MG capsule, Take 40 mg by mouth every other day  , Disp: , Rfl:     PROAIR  (90 Base) MCG/ACT inhaler, INHALE 2 PUFFS BY MOUTH  EVERY 6 HOURS AS NEEDED , Disp: 1 Inhaler, Rfl: 5    Wheat Dextrin (BENEFIBER DRINK MIX PO), Take 1 Package by mouth, Disp: , Rfl: Allergies   Allergen Reactions    Bactrim [Sulfamethoxazole-Trimethoprim] Other (See Comments)     AILYN    Nsaids      Annotation - [id number]: unable to take due to use of lithium   Oxycodone Rash            Vitals:    01/15/19 0856   BP: 130/76   Pulse: 64       Objective:                    Ortho Exam    Diagnostics, reviewed and taken today if performed as documented:    None performed      The attending physician has personally reviewed the pertinent films in PACS and interpretation is as follows:      Procedures, if performed today:    Procedures    None performed      Portions of the record may have been created with voice recognition software   Occasional wrong word or "sound a like" substitutions may have occurred due to the inherent limitations of voice recognition software   Read the chart carefully and recognize, using context, where substitutions have occurred

## 2019-01-25 ENCOUNTER — APPOINTMENT (OUTPATIENT)
Dept: LAB | Facility: CLINIC | Age: 74
End: 2019-01-25
Payer: MEDICARE

## 2019-01-25 ENCOUNTER — TRANSCRIBE ORDERS (OUTPATIENT)
Dept: LAB | Facility: CLINIC | Age: 74
End: 2019-01-25

## 2019-01-25 DIAGNOSIS — F31.60 MIXED BIPOLAR I DISORDER (HCC): Primary | ICD-10-CM

## 2019-01-25 DIAGNOSIS — F31.60 MIXED BIPOLAR I DISORDER (HCC): ICD-10-CM

## 2019-01-25 LAB
BUN SERPL-MCNC: 25 MG/DL (ref 5–25)
CREAT SERPL-MCNC: 1.3 MG/DL (ref 0.6–1.3)
GFR SERPL CREATININE-BSD FRML MDRD: 55 ML/MIN/1.73SQ M
LITHIUM SERPL-SCNC: 0.8 MMOL/L (ref 0.5–1)
TSH SERPL DL<=0.05 MIU/L-ACNC: 0.84 UIU/ML (ref 0.36–3.74)

## 2019-01-25 PROCEDURE — 84520 ASSAY OF UREA NITROGEN: CPT

## 2019-01-25 PROCEDURE — 36415 COLL VENOUS BLD VENIPUNCTURE: CPT

## 2019-01-25 PROCEDURE — 82565 ASSAY OF CREATININE: CPT

## 2019-01-25 PROCEDURE — 84443 ASSAY THYROID STIM HORMONE: CPT

## 2019-01-25 PROCEDURE — 80178 ASSAY OF LITHIUM: CPT

## 2019-02-14 ENCOUNTER — OFFICE VISIT (OUTPATIENT)
Dept: INTERNAL MEDICINE CLINIC | Facility: CLINIC | Age: 74
End: 2019-02-14
Payer: MEDICARE

## 2019-02-14 VITALS
WEIGHT: 177.4 LBS | HEIGHT: 68 IN | TEMPERATURE: 97.5 F | HEART RATE: 55 BPM | DIASTOLIC BLOOD PRESSURE: 80 MMHG | OXYGEN SATURATION: 95 % | BODY MASS INDEX: 26.89 KG/M2 | SYSTOLIC BLOOD PRESSURE: 122 MMHG

## 2019-02-14 DIAGNOSIS — K21.9 GASTROESOPHAGEAL REFLUX DISEASE, ESOPHAGITIS PRESENCE NOT SPECIFIED: Primary | ICD-10-CM

## 2019-02-14 DIAGNOSIS — F31.9 BIPOLAR AFFECTIVE DISORDER, REMISSION STATUS UNSPECIFIED (HCC): ICD-10-CM

## 2019-02-14 DIAGNOSIS — R14.3 EXCESSIVE GAS: ICD-10-CM

## 2019-02-14 DIAGNOSIS — I48.91 NEW ONSET ATRIAL FIBRILLATION (HCC): ICD-10-CM

## 2019-02-14 DIAGNOSIS — C34.90 MALIGNANT NEOPLASM OF LUNG, UNSPECIFIED LATERALITY, UNSPECIFIED PART OF LUNG (HCC): ICD-10-CM

## 2019-02-14 DIAGNOSIS — I50.32 CHRONIC DIASTOLIC HEART FAILURE (HCC): ICD-10-CM

## 2019-02-14 DIAGNOSIS — I71.4 ABDOMINAL AORTIC ANEURYSM WITHOUT RUPTURE (HCC): ICD-10-CM

## 2019-02-14 DIAGNOSIS — I10 ESSENTIAL HYPERTENSION: ICD-10-CM

## 2019-02-14 DIAGNOSIS — G95.9 CERVICAL MYELOPATHY (HCC): ICD-10-CM

## 2019-02-14 DIAGNOSIS — E78.2 MIXED HYPERLIPIDEMIA: ICD-10-CM

## 2019-02-14 PROCEDURE — 99214 OFFICE O/P EST MOD 30 MIN: CPT | Performed by: INTERNAL MEDICINE

## 2019-02-14 RX ORDER — OMEPRAZOLE 20 MG/1
20 CAPSULE, DELAYED RELEASE ORAL DAILY
Qty: 90 CAPSULE | Refills: 3 | Status: SHIPPED | OUTPATIENT
Start: 2019-02-14 | End: 2019-10-02 | Stop reason: SDUPTHER

## 2019-02-14 RX ORDER — DICYCLOMINE HCL 20 MG
20 TABLET ORAL EVERY 6 HOURS
Qty: 150 TABLET | Refills: 3 | Status: SHIPPED | OUTPATIENT
Start: 2019-02-14 | End: 2019-10-03

## 2019-02-14 NOTE — PATIENT INSTRUCTIONS
Problem List Items Addressed This Visit        Digestive    Gastroesophageal reflux disease - Primary     I recommend trying omeprazole 20 mg daily, and then if patient doing well, he can titrate down to 20 mg every other day if he tolerates that dosing regimen         Relevant Medications    omeprazole (PriLOSEC) 20 mg delayed release capsule    dicyclomine (BENTYL) 20 mg tablet       Respiratory    Malignant neoplasm of lung (HCC)       Cardiovascular and Mediastinum    Diastolic heart failure (HCC) (Chronic)    Essential hypertension     Controlled, continue current meds         New onset atrial fibrillation (HCC)       Nervous and Auditory    Cervical myelopathy (HCC)       Other    Bipolar affective disorder (HCC)     Continue meds as per psych         Mixed hyperlipidemia     Continue statin         Excessive gas     I recommend trial of simethicone 3 times a day with meals as needed         Relevant Medications    dicyclomine (BENTYL) 20 mg tablet      Other Visit Diagnoses     Abdominal aortic aneurysm without rupture ([facility] )

## 2019-02-14 NOTE — PROGRESS NOTES
Assessment/Plan:    Gastroesophageal reflux disease  I recommend trying omeprazole 20 mg daily, and then if patient doing well, he can titrate down to 20 mg every other day if he tolerates that dosing regimen    Excessive gas  I recommend trial of simethicone 3 times a day with meals as needed    Bipolar affective disorder ([facility] )  Continue meds as per psych    Essential hypertension  Controlled, continue current meds    Mixed hyperlipidemia  Continue statin       Diagnoses and all orders for this visit:    Gastroesophageal reflux disease, esophagitis presence not specified  -     omeprazole (PriLOSEC) 20 mg delayed release capsule; Take 1 capsule (20 mg total) by mouth daily    Abdominal aortic aneurysm without rupture (HCC)    Cervical myelopathy (HCC)    Chronic diastolic heart failure (HCC)    New onset atrial fibrillation (HCC)    Malignant neoplasm of lung, unspecified laterality, unspecified part of lung (HCC)    Excessive gas  -     dicyclomine (BENTYL) 20 mg tablet; Take 1 tablet (20 mg total) by mouth every 6 (six) hours    Bipolar affective disorder, remission status unspecified ([facility] )    Essential hypertension    Mixed hyperlipidemia          Subjective:      Patient ID: Patel Davis is a 72 y o  male  GERD:  Patient decreased omeprazole from 40 mg daily down to 40 mg 3 times a week, but he get some breakthrough GERD symptoms  No blood in the stool, occasionally food feels little like it is getting stuck  Hypertension:  Patient reports compliance with blood pressure meds, no cardiopulmonary complaints    Next hypercholesterolemia:  Patient tolerating statin without any significant muscle aches in legs      The following portions of the patient's history were reviewed and updated as appropriate: allergies, current medications, past family history, past medical history, past social history, past surgical history and problem list     Review of Systems   Constitutional: Negative for chills, fatigue and fever  HENT: Negative for congestion, nosebleeds, postnasal drip, sore throat and trouble swallowing  Eyes: Negative for pain  Respiratory: Negative for cough, chest tightness, shortness of breath and wheezing  Cardiovascular: Negative for chest pain, palpitations and leg swelling  Gastrointestinal: Negative for abdominal pain, constipation, diarrhea, nausea and vomiting  Has some gas   Endocrine: Negative for polydipsia and polyuria  Genitourinary: Negative for dysuria, flank pain and hematuria  Musculoskeletal: Negative for arthralgias  Skin: Negative for rash  Neurological: Negative for dizziness, tremors and headaches  Hematological: Does not bruise/bleed easily  Psychiatric/Behavioral: Negative for confusion and dysphoric mood  The patient is not nervous/anxious  Objective:      /80   Pulse 55   Temp 97 5 °F (36 4 °C)   Ht 5' 7 5" (1 715 m)   Wt 80 5 kg (177 lb 6 4 oz)   SpO2 95%   BMI 27 37 kg/m²          Physical Exam   Constitutional: He is oriented to person, place, and time  He appears well-developed and well-nourished  No distress  HENT:   Head: Normocephalic and atraumatic  Right Ear: External ear normal    Left Ear: External ear normal    Eyes: Conjunctivae are normal  No scleral icterus  Neck: Normal range of motion  Neck supple  No tracheal deviation present  No thyromegaly present  Cardiovascular: Normal rate, regular rhythm and normal heart sounds  Pulmonary/Chest: Effort normal and breath sounds normal  No respiratory distress  He has no wheezes  He has no rales  Abdominal: Soft  Bowel sounds are normal  There is no tenderness  There is no rebound and no guarding  Musculoskeletal: He exhibits no edema  Lymphadenopathy:     He has no cervical adenopathy  Neurological: He is alert and oriented to person, place, and time  Psychiatric: He has a normal mood and affect   His behavior is normal  Judgment and thought content normal  Vitals reviewed

## 2019-02-14 NOTE — ASSESSMENT & PLAN NOTE
I recommend trying omeprazole 20 mg daily, and then if patient doing well, he can titrate down to 20 mg every other day if he tolerates that dosing regimen

## 2019-03-06 ENCOUNTER — OFFICE VISIT (OUTPATIENT)
Dept: PULMONOLOGY | Facility: CLINIC | Age: 74
End: 2019-03-06
Payer: MEDICARE

## 2019-03-06 VITALS
HEART RATE: 51 BPM | SYSTOLIC BLOOD PRESSURE: 120 MMHG | WEIGHT: 178.4 LBS | DIASTOLIC BLOOD PRESSURE: 68 MMHG | BODY MASS INDEX: 27.04 KG/M2 | OXYGEN SATURATION: 97 % | HEIGHT: 68 IN | TEMPERATURE: 97.6 F

## 2019-03-06 DIAGNOSIS — J30.9 ALLERGIC RHINITIS: ICD-10-CM

## 2019-03-06 DIAGNOSIS — J44.9 COPD (CHRONIC OBSTRUCTIVE PULMONARY DISEASE) (HCC): Primary | ICD-10-CM

## 2019-03-06 PROCEDURE — 99213 OFFICE O/P EST LOW 20 MIN: CPT | Performed by: INTERNAL MEDICINE

## 2019-03-06 NOTE — PROGRESS NOTES
Office Progress Note - Pulmonary    Laury Lundborg 72 y o  male MRN: [id number]    Encounter: [id number]      Assessment:  · Chronic obstructive pulmonary disease  · Allergic rhinitis  Plan:   · Advair 250/50, 1 inhalation twice a day  · Discontinue the Advair after he finishes what he has at home and start him on Breo 200/25, 1 inhalation once a day  · Ipratropium HFA 2 inhalations 4 times a day  · Fluticasone nasal spray 2 sprays to each nostril once a day  · Start fexofenadine 180 mg p o  Daily in about a month  · Follow-up in 6 months  Discussion:   The patient's COPD is in remission  I have maintained him on the Advair 250/50, 1 inhalation twice a day  The patient has 2 Advir Diskus  Once he finishes dose he will call my office and we will change the Advair to Breo 200/25, 1 inhalation once a day  He will continue to use the ipratropium HFA 2 inhalations 4 times a day  His allergic rhinitis is in remission  He will continue the fluticasone nasal spray 2 sprays to each nostril once a day  As his allergies usually flares up in the spring I have recommended to start taking the fexofenadine 180 mg once a day in about a month  Subjective: The patient is here for a follow-up visit  He denies any significant cough or sputum production  Denies wheezing or chest tightness  He has no nocturnal symptoms  He is using Advair twice a day and ipratropium inhaler 4 times a day  He does not need to use his rescue inhaler  His nose sometimes gets congested  He is using the fluticasone nasal spray 2 sprays to each nostril once a day  He still volunteering at [employer]  Review of systems:  A 12 point system review is done and aside from what is stated above the rest of the review of systems is negative  Family history and social history are reviewed  Medications list is reviewed        Vitals: Blood pressure 120/68, pulse (!) 51, temperature 97 6 °F (36 4 °C), temperature source Tympanic, height 5' 7 5" (1 715 m), weight 80 9 kg (178 lb 6 4 oz), SpO2 97 %  ,     Physical Exam  Gen: Awake, alert, oriented x 3, no acute distress  HEENT: Mucous membranes moist, no oral lesions, no thrush  NECK: No accessory muscle use, JVP not elevated  Cardiac: Regular, single S1, single S2, no murmurs, no rubs, no gallops  Lungs:  Decreased breath sound for the right base  No wheezing  Abdomen: normoactive bowel sounds, soft nontender, nondistended, no rebound or rigidity, no guarding  Extremities: no cyanosis, no clubbing, no edema  Neuro:  Grossly nonfocal   Skin:  No rash        Answers for HPI/ROS submitted by the patient on 3/3/2019   Primary symptoms  Do you experience frequent throat clearing?: Yes  When did you first notice your symptoms?: more than 1 month ago  How often do your symptoms occur?: intermittently  Since you first noticed this problem, how has it changed?: unchanged  Have you had a change in appetite?: No  Do you have chest pain?: No  Do you have shortness of breath that occurs with effort or exertion?: No  Do you have ear congestion?: No  Do you have ear pain?: No  Do you have a fever?: No  Do you have headaches?: No  Do you have nasal congestion?: No  Do you have shortness of breath when lying flat?: No  Do you have shortness of breath when you wake up?: No  Do you have post-nasal drip?: No  Do you have a runny nose?: No  Do you have sneezing?: No  Do you have a sore throat?: No  Do you have sweats?: No  Do you have trouble swallowing?: No  Have you experienced weight loss?: No

## 2019-03-08 DIAGNOSIS — I70.90: ICD-10-CM

## 2019-03-10 RX ORDER — OMEGA-3-ACID ETHYL ESTERS 1 G/1
CAPSULE, LIQUID FILLED ORAL
Qty: 270 CAPSULE | Refills: 5 | Status: SHIPPED | OUTPATIENT
Start: 2019-03-10 | End: 2020-04-23

## 2019-03-20 ENCOUNTER — APPOINTMENT (OUTPATIENT)
Dept: LAB | Facility: CLINIC | Age: 74
End: 2019-03-20
Payer: MEDICARE

## 2019-03-20 ENCOUNTER — TRANSCRIBE ORDERS (OUTPATIENT)
Dept: LAB | Facility: CLINIC | Age: 74
End: 2019-03-20

## 2019-03-20 DIAGNOSIS — I25.10 CORONARY ARTERY DISEASE INVOLVING NATIVE CORONARY ARTERY OF NATIVE HEART WITHOUT ANGINA PECTORIS: ICD-10-CM

## 2019-03-20 DIAGNOSIS — C61 PROSTATE CANCER (HCC): ICD-10-CM

## 2019-03-20 LAB
ALBUMIN SERPL BCP-MCNC: 4 G/DL (ref 3.5–5)
ALP SERPL-CCNC: 128 U/L (ref 46–116)
ALT SERPL W P-5'-P-CCNC: 30 U/L (ref 12–78)
ANION GAP SERPL CALCULATED.3IONS-SCNC: 5 MMOL/L (ref 4–13)
AST SERPL W P-5'-P-CCNC: 16 U/L (ref 5–45)
BASOPHILS # BLD AUTO: 0.06 THOUSANDS/ΜL (ref 0–0.1)
BASOPHILS NFR BLD AUTO: 1 % (ref 0–1)
BILIRUB SERPL-MCNC: 0.8 MG/DL (ref 0.2–1)
BUN SERPL-MCNC: 22 MG/DL (ref 5–25)
CALCIUM SERPL-MCNC: 9.2 MG/DL (ref 8.3–10.1)
CHLORIDE SERPL-SCNC: 109 MMOL/L (ref 100–108)
CHOLEST SERPL-MCNC: 131 MG/DL (ref 50–200)
CK SERPL-CCNC: 96 U/L (ref 39–308)
CO2 SERPL-SCNC: 27 MMOL/L (ref 21–32)
CREAT SERPL-MCNC: 1.28 MG/DL (ref 0.6–1.3)
EOSINOPHIL # BLD AUTO: 0.27 THOUSAND/ΜL (ref 0–0.61)
EOSINOPHIL NFR BLD AUTO: 2 % (ref 0–6)
ERYTHROCYTE [DISTWIDTH] IN BLOOD BY AUTOMATED COUNT: 12.7 % (ref 11.6–15.1)
GFR SERPL CREATININE-BSD FRML MDRD: 56 ML/MIN/1.73SQ M
GLUCOSE P FAST SERPL-MCNC: 105 MG/DL (ref 65–99)
HCT VFR BLD AUTO: 41.9 % (ref 36.5–49.3)
HDLC SERPL-MCNC: 43 MG/DL (ref 40–60)
HGB BLD-MCNC: 13.4 G/DL (ref 12–17)
IMM GRANULOCYTES # BLD AUTO: 0.05 THOUSAND/UL (ref 0–0.2)
IMM GRANULOCYTES NFR BLD AUTO: 1 % (ref 0–2)
LDLC SERPL CALC-MCNC: 73 MG/DL (ref 0–100)
LYMPHOCYTES # BLD AUTO: 1.41 THOUSANDS/ΜL (ref 0.6–4.47)
LYMPHOCYTES NFR BLD AUTO: 13 % (ref 14–44)
MCH RBC QN AUTO: 30 PG (ref 26.8–34.3)
MCHC RBC AUTO-ENTMCNC: 32 G/DL (ref 31.4–37.4)
MCV RBC AUTO: 94 FL (ref 82–98)
MONOCYTES # BLD AUTO: 0.82 THOUSAND/ΜL (ref 0.17–1.22)
MONOCYTES NFR BLD AUTO: 7 % (ref 4–12)
NEUTROPHILS # BLD AUTO: 8.44 THOUSANDS/ΜL (ref 1.85–7.62)
NEUTS SEG NFR BLD AUTO: 76 % (ref 43–75)
NONHDLC SERPL-MCNC: 88 MG/DL
NRBC BLD AUTO-RTO: 0 /100 WBCS
PLATELET # BLD AUTO: 199 THOUSANDS/UL (ref 149–390)
PMV BLD AUTO: 11.3 FL (ref 8.9–12.7)
POTASSIUM SERPL-SCNC: 4.4 MMOL/L (ref 3.5–5.3)
PROT SERPL-MCNC: 7.1 G/DL (ref 6.4–8.2)
PSA SERPL-MCNC: <0.1 NG/ML (ref 0–4)
RBC # BLD AUTO: 4.47 MILLION/UL (ref 3.88–5.62)
SODIUM SERPL-SCNC: 141 MMOL/L (ref 136–145)
TRIGL SERPL-MCNC: 77 MG/DL
WBC # BLD AUTO: 11.05 THOUSAND/UL (ref 4.31–10.16)

## 2019-03-20 PROCEDURE — 80053 COMPREHEN METABOLIC PANEL: CPT

## 2019-03-20 PROCEDURE — 80061 LIPID PANEL: CPT

## 2019-03-20 PROCEDURE — 36415 COLL VENOUS BLD VENIPUNCTURE: CPT

## 2019-03-20 PROCEDURE — 82550 ASSAY OF CK (CPK): CPT

## 2019-03-20 PROCEDURE — 85025 COMPLETE CBC W/AUTO DIFF WBC: CPT

## 2019-03-20 PROCEDURE — 84153 ASSAY OF PSA TOTAL: CPT

## 2019-03-28 ENCOUNTER — OFFICE VISIT (OUTPATIENT)
Dept: UROLOGY | Facility: AMBULATORY SURGERY CENTER | Age: 74
End: 2019-03-28
Payer: MEDICARE

## 2019-03-28 VITALS
HEIGHT: 68 IN | HEART RATE: 56 BPM | DIASTOLIC BLOOD PRESSURE: 64 MMHG | BODY MASS INDEX: 26.83 KG/M2 | WEIGHT: 177 LBS | SYSTOLIC BLOOD PRESSURE: 124 MMHG

## 2019-03-28 DIAGNOSIS — C61 PROSTATE CANCER (HCC): Primary | ICD-10-CM

## 2019-03-28 PROCEDURE — 99213 OFFICE O/P EST LOW 20 MIN: CPT | Performed by: PHYSICIAN ASSISTANT

## 2019-03-28 NOTE — PROGRESS NOTES
UROLOGY PROGRESS NOTE   Patient Identifiers: Irving Lr (MRN [id number])  Date of Service: 3/28/2019    Subjective:     68-year-old man history of prostate cancer  He had a radical prostatectomy in [place] in 2007  PSA remains undetectable less than 0 1  He has very good urinary control with no other complaints  He has occasional urgency and social situations  We did discuss the possible use of anticholinergic in this setting  Patient has  no complaints  Objective:     VITALS:    Vitals:    03/28/19 1053   BP: 124/64   Pulse: 56     AUA SYMPTOM SCORE      Most Recent Value   AUA SYMPTOM SCORE   How often have you had a sensation of not emptying your bladder completely after you finished urinating? 1   How often have you had to urinate again less than two hours after you finished urinating? 3   How often have you found you stopped and started again several times when you urinate? 1   How often have you found it difficult to postpone urination? 1   How often have you had a weak urinary stream?  1   How often have you had to push or strain to begin urination? 0   How many times did you most typically get up to urinate from the time you went to bed at night until the time you got up in the morning?   2   Quality of Life: If you were to spend the rest of your life with your urinary condition just the way it is now, how would you feel about that?  2   AUA SYMPTOM SCORE  9            LABS:  Lab Results   Component Value Date    HGB 13 4 03/20/2019    HCT 41 9 03/20/2019    WBC 11 05 (H) 03/20/2019     03/20/2019   ]    Lab Results   Component Value Date     10/19/2015    K 4 4 03/20/2019     (H) 03/20/2019    CO2 27 03/20/2019    BUN 22 03/20/2019    CREATININE 1 28 03/20/2019    CALCIUM 9 2 03/20/2019    GLUCOSE 187 (H) 11/14/2016   ]        INPATIENT MEDS:    Current Outpatient Medications:     acetaminophen (TYLENOL) 500 mg tablet, Take 500 mg by mouth as needed for mild pain , Disp: , Rfl:     ADVAIR DISKUS 250-50 MCG/DOSE inhaler, USE 1 INHALATION TWICE  DAILY   RINSE MOUTH AFTER  USE, Disp: 1 Inhaler, Rfl: 5    amLODIPine (NORVASC) 2 5 mg tablet, TAKE 1 TABLET BY MOUTH  DAILY, Disp: 90 tablet, Rfl: 3    aspirin (ECOTRIN LOW STRENGTH) 81 mg EC tablet, Take 81 mg by mouth daily, Disp: , Rfl:     atorvastatin (LIPITOR) 40 mg tablet, TAKE 1 TABLET BY MOUTH  DAILY, Disp: 90 tablet, Rfl: 5    ATROVENT HFA 17 MCG/ACT inhaler, USE 2 PUFFS 4 TIMES DAILY, Disp: 51 6 g, Rfl: 5    B Complex Vitamins (VITAMIN B COMPLEX PO), Take by mouth, Disp: , Rfl:     Cholecalciferol (VITAMIN D PO), Take 2,000 Units by mouth daily  , Disp: , Rfl:     diazepam (VALIUM) 5 mg tablet, Take 5 mg by mouth as needed for anxiety, Disp: , Rfl:     dicyclomine (BENTYL) 20 mg tablet, Take 1 tablet (20 mg total) by mouth every 6 (six) hours, Disp: 150 tablet, Rfl: 3    fexofenadine (ALLEGRA) 180 MG tablet, Take 180 mg by mouth daily, Disp: , Rfl:     FLUoxetine (PROzac) 10 mg capsule, , Disp: , Rfl:     fluticasone (FLONASE) 50 mcg/act nasal spray, 1 spray into each nostril as needed  , Disp: , Rfl:     lithium carbonate (LITHOBID) 300 mg CR tablet, Take 300 mg by mouth One tab by mouth every other day and alternating with 2 tabs every other day at bedtime   , Disp: , Rfl:     metoprolol succinate (TOPROL-XL) 25 mg 24 hr tablet, TAKE 1 TABLET BY MOUTH  DAILY, Disp: 90 tablet, Rfl: 5    nitroglycerin (NITRODUR) 0 2 mg/hr, APPLY 1 PATCH DAILY (OFF  FOR 12 HOURS), Disp: 90 patch, Rfl: 3    omega-3-acid ethyl esters (LOVAZA) 1 g capsule, TAKE 1 CAPSULE BY MOUTH 3  TIMES DAILY, Disp: 270 capsule, Rfl: 5    omeprazole (PriLOSEC) 20 mg delayed release capsule, Take 1 capsule (20 mg total) by mouth daily, Disp: 90 capsule, Rfl: 3    PROAIR  (90 Base) MCG/ACT inhaler, INHALE 2 PUFFS BY MOUTH  EVERY 6 HOURS AS NEEDED , Disp: 1 Inhaler, Rfl: 5    Wheat Dextrin (BENEFIBER DRINK MIX PO), Take 1 Package by mouth, Disp: , Rfl:       Physical Exam:   /64 (BP Location: Right arm, Patient Position: Sitting, Cuff Size: Adult)   Pulse 56   Ht 5' 7 5" (1 715 m)   Wt 80 3 kg (177 lb)   BMI 27 31 kg/m²   GEN: no acute distress    RESP: breathing comfortably with no accessory muscle use    ABD: soft, non-tender, non-distended   INCISION:    EXT: no significant peripheral edema   (Male): Penis circumcised, phallus normal, meatus patent  Testicles descended into scrotum bilaterally without masses nor tenderness  No inguinal hernias bilaterally  [place]: Prostate is   Surgically absent with no rectal masses  RADIOLOGY:     None     Assessment:    1  Prostate cancer     Plan:   - he has cleared more than 10 years of surveillance and his PSA has remained undetectable  - he may follow up p r n  And continue to get annual PSA testing with his primary physician  - if he would like he could try oxybutynin or VESIcare p r n   In social situations for his severe urgency  -

## 2019-04-02 ENCOUNTER — OFFICE VISIT (OUTPATIENT)
Dept: CARDIOLOGY CLINIC | Facility: CLINIC | Age: 74
End: 2019-04-02
Payer: MEDICARE

## 2019-04-02 VITALS
DIASTOLIC BLOOD PRESSURE: 76 MMHG | HEIGHT: 68 IN | WEIGHT: 178.8 LBS | BODY MASS INDEX: 27.1 KG/M2 | SYSTOLIC BLOOD PRESSURE: 140 MMHG | HEART RATE: 47 BPM | OXYGEN SATURATION: 98 %

## 2019-04-02 DIAGNOSIS — I10 ESSENTIAL HYPERTENSION: ICD-10-CM

## 2019-04-02 DIAGNOSIS — I71.4 ABDOMINAL AORTIC ANEURYSM WITHOUT RUPTURE (HCC): ICD-10-CM

## 2019-04-02 DIAGNOSIS — E78.2 MIXED HYPERLIPIDEMIA: ICD-10-CM

## 2019-04-02 DIAGNOSIS — I25.2 PAST HISTORY OF MYOCARDIAL INFARCTION: ICD-10-CM

## 2019-04-02 DIAGNOSIS — I25.10 ATHEROSCLEROSIS OF NATIVE CORONARY ARTERY OF NATIVE HEART WITHOUT ANGINA PECTORIS: Primary | ICD-10-CM

## 2019-04-02 DIAGNOSIS — I25.10 CORONARY ARTERY DISEASE INVOLVING NATIVE CORONARY ARTERY OF NATIVE HEART WITHOUT ANGINA PECTORIS: ICD-10-CM

## 2019-04-02 DIAGNOSIS — Z95.5 HISTORY OF HEART ARTERY STENT: ICD-10-CM

## 2019-04-02 PROCEDURE — 99214 OFFICE O/P EST MOD 30 MIN: CPT | Performed by: INTERNAL MEDICINE

## 2019-04-02 PROCEDURE — 93000 ELECTROCARDIOGRAM COMPLETE: CPT | Performed by: INTERNAL MEDICINE

## 2019-04-16 ENCOUNTER — OFFICE VISIT (OUTPATIENT)
Dept: OBGYN CLINIC | Facility: HOSPITAL | Age: 74
End: 2019-04-16
Payer: MEDICARE

## 2019-04-16 VITALS
HEART RATE: 63 BPM | SYSTOLIC BLOOD PRESSURE: 141 MMHG | BODY MASS INDEX: 26.98 KG/M2 | HEIGHT: 68 IN | WEIGHT: 178 LBS | DIASTOLIC BLOOD PRESSURE: 85 MMHG

## 2019-04-16 DIAGNOSIS — M70.62 TROCHANTERIC BURSITIS OF LEFT HIP: Primary | ICD-10-CM

## 2019-04-16 DIAGNOSIS — M16.12 PRIMARY OSTEOARTHRITIS OF LEFT HIP: ICD-10-CM

## 2019-04-16 PROCEDURE — 99213 OFFICE O/P EST LOW 20 MIN: CPT | Performed by: ORTHOPAEDIC SURGERY

## 2019-04-16 PROCEDURE — 20610 DRAIN/INJ JOINT/BURSA W/O US: CPT | Performed by: ORTHOPAEDIC SURGERY

## 2019-04-16 RX ORDER — BUPIVACAINE HYDROCHLORIDE 2.5 MG/ML
2 INJECTION, SOLUTION INFILTRATION; PERINEURAL
Status: COMPLETED | OUTPATIENT
Start: 2019-04-16 | End: 2019-04-16

## 2019-04-16 RX ORDER — METHYLPREDNISOLONE ACETATE 40 MG/ML
2 INJECTION, SUSPENSION INTRA-ARTICULAR; INTRALESIONAL; INTRAMUSCULAR; SOFT TISSUE
Status: COMPLETED | OUTPATIENT
Start: 2019-04-16 | End: 2019-04-16

## 2019-04-16 RX ORDER — LIDOCAINE HYDROCHLORIDE 20 MG/ML
2 INJECTION, SOLUTION EPIDURAL; INFILTRATION; INTRACAUDAL; PERINEURAL
Status: COMPLETED | OUTPATIENT
Start: 2019-04-16 | End: 2019-04-16

## 2019-04-16 RX ADMIN — METHYLPREDNISOLONE ACETATE 2 ML: 40 INJECTION, SUSPENSION INTRA-ARTICULAR; INTRALESIONAL; INTRAMUSCULAR; SOFT TISSUE at 09:56

## 2019-04-16 RX ADMIN — LIDOCAINE HYDROCHLORIDE 2 ML: 20 INJECTION, SOLUTION EPIDURAL; INFILTRATION; INTRACAUDAL; PERINEURAL at 09:56

## 2019-04-16 RX ADMIN — BUPIVACAINE HYDROCHLORIDE 2 ML: 2.5 INJECTION, SOLUTION INFILTRATION; PERINEURAL at 09:56

## 2019-04-17 DIAGNOSIS — I25.10 CORONARY ARTERY DISEASE INVOLVING NATIVE CORONARY ARTERY OF NATIVE HEART WITHOUT ANGINA PECTORIS: ICD-10-CM

## 2019-04-17 RX ORDER — ATORVASTATIN CALCIUM 40 MG/1
TABLET, FILM COATED ORAL
Qty: 90 TABLET | Refills: 5 | Status: SHIPPED | OUTPATIENT
Start: 2019-04-17 | End: 2020-04-27

## 2019-04-19 DIAGNOSIS — Z00.00 HEALTHCARE MAINTENANCE: Primary | ICD-10-CM

## 2019-04-20 ENCOUNTER — APPOINTMENT (OUTPATIENT)
Dept: LAB | Facility: CLINIC | Age: 74
End: 2019-04-20
Payer: MEDICARE

## 2019-04-20 ENCOUNTER — TRANSCRIBE ORDERS (OUTPATIENT)
Dept: LAB | Facility: CLINIC | Age: 74
End: 2019-04-20

## 2019-04-20 DIAGNOSIS — F31.60 MIXED BIPOLAR I DISORDER (HCC): Primary | ICD-10-CM

## 2019-04-20 DIAGNOSIS — F31.60 MIXED BIPOLAR I DISORDER (HCC): ICD-10-CM

## 2019-04-20 LAB
BUN SERPL-MCNC: 29 MG/DL (ref 5–25)
CREAT SERPL-MCNC: 1.31 MG/DL (ref 0.6–1.3)
GFR SERPL CREATININE-BSD FRML MDRD: 54 ML/MIN/1.73SQ M
LITHIUM SERPL-SCNC: 0.7 MMOL/L (ref 0.5–1)
TSH SERPL DL<=0.05 MIU/L-ACNC: 1.15 UIU/ML (ref 0.36–3.74)

## 2019-04-20 PROCEDURE — 80178 ASSAY OF LITHIUM: CPT

## 2019-04-20 PROCEDURE — 36415 COLL VENOUS BLD VENIPUNCTURE: CPT

## 2019-04-20 PROCEDURE — 82565 ASSAY OF CREATININE: CPT

## 2019-04-20 PROCEDURE — 84443 ASSAY THYROID STIM HORMONE: CPT

## 2019-04-20 PROCEDURE — 84520 ASSAY OF UREA NITROGEN: CPT

## 2019-04-24 ENCOUNTER — TRANSCRIBE ORDERS (OUTPATIENT)
Dept: ADMINISTRATIVE | Facility: HOSPITAL | Age: 74
End: 2019-04-24

## 2019-04-24 DIAGNOSIS — Z85.110 PERSONAL HISTORY OF MALIGNANT CARCINOID TUMOR OF BRONCHUS AND LUNG: Primary | ICD-10-CM

## 2019-04-25 ENCOUNTER — TELEPHONE (OUTPATIENT)
Dept: VASCULAR SURGERY | Facility: CLINIC | Age: 74
End: 2019-04-25

## 2019-04-26 DIAGNOSIS — J44.9 CHRONIC OBSTRUCTIVE PULMONARY DISEASE, UNSPECIFIED COPD TYPE (HCC): Primary | ICD-10-CM

## 2019-04-26 RX ORDER — FLUTICASONE FUROATE AND VILANTEROL 200; 25 UG/1; UG/1
1 POWDER RESPIRATORY (INHALATION) DAILY
Qty: 3 INHALER | Refills: 3 | Status: SHIPPED | OUTPATIENT
Start: 2019-04-26 | End: 2019-12-20 | Stop reason: SDUPTHER

## 2019-05-09 ENCOUNTER — HOSPITAL ENCOUNTER (OUTPATIENT)
Dept: RADIOLOGY | Facility: HOSPITAL | Age: 74
Discharge: HOME | End: 2019-05-09
Payer: MEDICARE

## 2019-05-09 DIAGNOSIS — M16.12 PRIMARY OSTEOARTHRITIS OF ONE HIP, LEFT: ICD-10-CM

## 2019-05-09 PROCEDURE — 77002 NEEDLE LOCALIZATION BY XRAY: CPT

## 2019-05-09 PROCEDURE — 20610 DRAIN/INJ JOINT/BURSA W/O US: CPT

## 2019-05-09 RX ORDER — METHYLPREDNISOLONE ACETATE 80 MG/ML
80 INJECTION, SUSPENSION INTRA-ARTICULAR; INTRALESIONAL; INTRAMUSCULAR; SOFT TISSUE ONCE
Status: COMPLETED | OUTPATIENT
Start: 2019-05-09 | End: 2019-05-09

## 2019-05-09 RX ORDER — BUPIVACAINE HYDROCHLORIDE 2.5 MG/ML
10 INJECTION, SOLUTION EPIDURAL; INFILTRATION; INTRACAUDAL ONCE
Status: COMPLETED | OUTPATIENT
Start: 2019-05-09 | End: 2019-05-09

## 2019-05-09 RX ORDER — LIDOCAINE HYDROCHLORIDE 10 MG/ML
20 INJECTION, SOLUTION EPIDURAL; INFILTRATION; INTRACAUDAL; PERINEURAL ONCE
Status: COMPLETED | OUTPATIENT
Start: 2019-05-09 | End: 2019-05-09

## 2019-05-09 RX ADMIN — BUPIVACAINE HYDROCHLORIDE 2 ML: 2.5 INJECTION, SOLUTION EPIDURAL; INFILTRATION; INTRACAUDAL at 13:55

## 2019-05-09 RX ADMIN — LIDOCAINE HYDROCHLORIDE 5 ML: 10 INJECTION, SOLUTION EPIDURAL; INFILTRATION; INTRACAUDAL; PERINEURAL at 13:55

## 2019-05-09 RX ADMIN — IOHEXOL 3 ML: 300 INJECTION, SOLUTION INTRAVENOUS at 13:54

## 2019-05-09 RX ADMIN — METHYLPREDNISOLONE ACETATE 80 MG: 80 INJECTION, SUSPENSION INTRA-ARTICULAR; INTRALESIONAL; INTRAMUSCULAR; SOFT TISSUE at 13:55

## 2019-05-10 ENCOUNTER — HOSPITAL ENCOUNTER (OUTPATIENT)
Dept: RADIOLOGY | Facility: HOSPITAL | Age: 74
Discharge: HOME | End: 2019-05-10
Payer: MEDICARE

## 2019-05-10 DIAGNOSIS — Z85.110 PERSONAL HISTORY OF MALIGNANT CARCINOID TUMOR OF BRONCHUS AND LUNG: ICD-10-CM

## 2019-05-10 PROCEDURE — 71250 CT THORAX DX C-: CPT

## 2019-05-10 PROCEDURE — 74176 CT ABD & PELVIS W/O CONTRAST: CPT

## 2019-06-07 DIAGNOSIS — J44.9 CHRONIC OBSTRUCTIVE PULMONARY DISEASE, UNSPECIFIED COPD TYPE (HCC): ICD-10-CM

## 2019-06-19 ENCOUNTER — OFFICE VISIT (OUTPATIENT)
Dept: INTERNAL MEDICINE CLINIC | Facility: CLINIC | Age: 74
End: 2019-06-19
Payer: MEDICARE

## 2019-06-19 VITALS
SYSTOLIC BLOOD PRESSURE: 120 MMHG | DIASTOLIC BLOOD PRESSURE: 80 MMHG | WEIGHT: 177.2 LBS | TEMPERATURE: 98.1 F | OXYGEN SATURATION: 96 % | BODY MASS INDEX: 26.86 KG/M2 | HEIGHT: 68 IN | HEART RATE: 57 BPM

## 2019-06-19 DIAGNOSIS — E78.2 MIXED HYPERLIPIDEMIA: ICD-10-CM

## 2019-06-19 DIAGNOSIS — G96.198 EXTRADURAL CYST OF SPINE: ICD-10-CM

## 2019-06-19 DIAGNOSIS — I10 ESSENTIAL HYPERTENSION: Primary | ICD-10-CM

## 2019-06-19 PROCEDURE — 99214 OFFICE O/P EST MOD 30 MIN: CPT | Performed by: INTERNAL MEDICINE

## 2019-06-19 RX ORDER — DIAZEPAM 2 MG/1
2 TABLET ORAL EVERY 6 HOURS PRN
COMMUNITY
Start: 2019-05-05 | End: 2021-12-22 | Stop reason: SDUPTHER

## 2019-06-28 DIAGNOSIS — I10 ESSENTIAL HYPERTENSION: ICD-10-CM

## 2019-06-30 RX ORDER — METOPROLOL SUCCINATE 25 MG/1
TABLET, EXTENDED RELEASE ORAL
Qty: 90 TABLET | Refills: 5 | Status: SHIPPED | OUTPATIENT
Start: 2019-06-30 | End: 2020-06-30

## 2019-07-02 ENCOUNTER — HOSPITAL ENCOUNTER (OUTPATIENT)
Dept: RADIOLOGY | Facility: HOSPITAL | Age: 74
Discharge: HOME | End: 2019-07-02
Payer: MEDICARE

## 2019-07-02 DIAGNOSIS — G96.198 EXTRADURAL CYST OF SPINE: ICD-10-CM

## 2019-07-02 PROCEDURE — 72148 MRI LUMBAR SPINE W/O DYE: CPT

## 2019-07-03 DIAGNOSIS — M48.061 FORAMINAL STENOSIS OF LUMBAR REGION: Primary | ICD-10-CM

## 2019-07-11 ENCOUNTER — TRANSCRIBE ORDERS (OUTPATIENT)
Dept: LAB | Facility: CLINIC | Age: 74
End: 2019-07-11

## 2019-07-11 ENCOUNTER — APPOINTMENT (OUTPATIENT)
Dept: LAB | Facility: CLINIC | Age: 74
End: 2019-07-11
Payer: MEDICARE

## 2019-07-11 DIAGNOSIS — F31.60 MIXED BIPOLAR I DISORDER (HCC): Primary | ICD-10-CM

## 2019-07-11 DIAGNOSIS — F31.60 MIXED BIPOLAR I DISORDER (HCC): ICD-10-CM

## 2019-07-11 LAB
BUN SERPL-MCNC: 24 MG/DL (ref 5–25)
CREAT SERPL-MCNC: 1.09 MG/DL (ref 0.6–1.3)
GFR SERPL CREATININE-BSD FRML MDRD: 68 ML/MIN/1.73SQ M
LITHIUM SERPL-SCNC: 0.8 MMOL/L (ref 0.5–1)
TSH SERPL DL<=0.05 MIU/L-ACNC: 1.34 UIU/ML (ref 0.36–3.74)

## 2019-07-11 PROCEDURE — 84520 ASSAY OF UREA NITROGEN: CPT

## 2019-07-11 PROCEDURE — 82565 ASSAY OF CREATININE: CPT

## 2019-07-11 PROCEDURE — 80178 ASSAY OF LITHIUM: CPT

## 2019-07-11 PROCEDURE — 36415 COLL VENOUS BLD VENIPUNCTURE: CPT

## 2019-07-11 PROCEDURE — 84443 ASSAY THYROID STIM HORMONE: CPT

## 2019-07-16 ENCOUNTER — OFFICE VISIT (OUTPATIENT)
Dept: OBGYN CLINIC | Facility: HOSPITAL | Age: 74
End: 2019-07-16
Payer: MEDICARE

## 2019-07-16 VITALS
HEART RATE: 60 BPM | WEIGHT: 177 LBS | DIASTOLIC BLOOD PRESSURE: 93 MMHG | HEIGHT: 68 IN | BODY MASS INDEX: 26.83 KG/M2 | SYSTOLIC BLOOD PRESSURE: 161 MMHG

## 2019-07-16 DIAGNOSIS — M70.62 GREATER TROCHANTERIC BURSITIS, LEFT: Primary | ICD-10-CM

## 2019-07-16 DIAGNOSIS — M16.12 PRIMARY OSTEOARTHRITIS OF ONE HIP, LEFT: ICD-10-CM

## 2019-07-16 PROCEDURE — 99213 OFFICE O/P EST LOW 20 MIN: CPT | Performed by: ORTHOPAEDIC SURGERY

## 2019-07-16 PROCEDURE — 20610 DRAIN/INJ JOINT/BURSA W/O US: CPT | Performed by: ORTHOPAEDIC SURGERY

## 2019-07-16 RX ORDER — BUPIVACAINE HYDROCHLORIDE 2.5 MG/ML
2 INJECTION, SOLUTION INFILTRATION; PERINEURAL
Status: COMPLETED | OUTPATIENT
Start: 2019-07-16 | End: 2019-07-16

## 2019-07-16 RX ORDER — LIDOCAINE HYDROCHLORIDE 20 MG/ML
2 INJECTION, SOLUTION INFILTRATION; PERINEURAL
Status: COMPLETED | OUTPATIENT
Start: 2019-07-16 | End: 2019-07-16

## 2019-07-16 RX ORDER — BETAMETHASONE SODIUM PHOSPHATE AND BETAMETHASONE ACETATE 3; 3 MG/ML; MG/ML
12 INJECTION, SUSPENSION INTRA-ARTICULAR; INTRALESIONAL; INTRAMUSCULAR; SOFT TISSUE
Status: COMPLETED | OUTPATIENT
Start: 2019-07-16 | End: 2019-07-16

## 2019-07-16 RX ADMIN — LIDOCAINE HYDROCHLORIDE 2 ML: 20 INJECTION, SOLUTION INFILTRATION; PERINEURAL at 09:17

## 2019-07-16 RX ADMIN — BETAMETHASONE SODIUM PHOSPHATE AND BETAMETHASONE ACETATE 12 MG: 3; 3 INJECTION, SUSPENSION INTRA-ARTICULAR; INTRALESIONAL; INTRAMUSCULAR; SOFT TISSUE at 09:17

## 2019-07-16 RX ADMIN — BUPIVACAINE HYDROCHLORIDE 2 ML: 2.5 INJECTION, SOLUTION INFILTRATION; PERINEURAL at 09:17

## 2019-07-16 NOTE — PROGRESS NOTES
72 y o male presenting as a follow up for left hip pain  At last visit he was provided a greater trochanteric injection, and a few weeks later obtained an intra-articular injection with radiology  He reports that these injections improved his pain, but they have since worn off  In the interim he has also seen his PCP who ordered an MRI of the back showing a new L3 disc herniation  He denies any pain shooting into the foot, but does have burning pain to the anteromedial thigh  Review of Systems  Review of systems negative unless otherwise specified in HPI    Past Medical History  Past Medical History:   Diagnosis Date    [facility]     Benign colon polyp     Bipolar 1 disorder ([facility]     Cardiac disease     MI    Cervical cord compression with myelopathy ([facility]     COPD (chronic obstructive pulmonary disease) (HCC)     Diverticulitis     Diverticulosis     Gait disturbance     uses cane, leg brace on right    GERD (gastroesophageal reflux disease)     Heart attack ([facility] ) 1996    Hx of resection of large bowel 5/3/2016    Hyperlipidemia     Hypertension     IBS (irritable bowel syndrome)     Lumbar stenosis     Lung cancer ([facility]     2007 Left lower lobectomy and 2011 Right lung with surgery     Myocardial infarction [facility])     involving other coronary artery    Prostate cancer ([facility] )     Shortness of breath     Small bowel obstruction ([facility] ) 11/16/2016       Past Surgical History  Past Surgical History:   Procedure Laterality Date    ANGIOPLASTY      stent    CARDIAC SURGERY      CERVICAL SPINE SURGERY      Cervical decompression with cervical fusion from C3-C7 for spinal stenosis      COLON SURGERY  11/04/2014    ESOPHAGOGASTRODUODENOSCOPY  01/03/2013    with possible Schatzki's ring & small hiatal hernia, mild gastritis    FL INJECTION LEFT HIP (NON ARTHROGRAM)  12/11/2018    FL INJECTION LEFT HIP (NON ARTHROGRAM)  5/9/2019    IR EVAR  8/6/2018    LUNG CANCER SURGERY Right 03/2011    wedge resection for lung tumor, 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    LUNG LOBECTOMY Left     DC ARTHRODESIS ANT INTERBODY MIN DISCECTOMY, CERVICAL BELOW C2 N/A 5/2/2016    Procedure: Anterior cervical diskectomy C3/4, C5/6, C6/7 with anterior plate fixation fusion C3-7;  Posterior decompressive laminectomy C3-7 with lateral mass fixation fusion C3-7 (IMPULSE MONITORING); Surgeon: Margaret Aiken MD;  [facility];  Service: Neurosurgery    DC ARTHRODESIS POSTERIOR INTERBODY LUMBAR N/A 1/30/2017    Procedure: L4-5 AND L5-S1 DECOMPRESSIVE FORAMINOTOMIES, TRANSFORAMINAL LUMBAR INTERBODY AND PEDICLE SCREW FIXATION FUSION L4-S1 (IMPULSE); Surgeon: Margaret Aiken MD;  [facility];  Service: Neurosurgery    DC 1808 Juan Hunt RPR DPLMNT AORTO-AORTIC NDGFT N/A 8/6/2018    Procedure: REPAIR ANEURYSM ENDOVASCULAR ABDOMINAL AORTIC  (EVAR) WITH BILATERAL PERCUTANEOUS FEMORAL ACCESS WITH ULTRASOUND GUIDANCE ON THE RIGHT AND PRE CLOSURE;  Surgeon: Kellie Barton MD;  [facility];  Service: Vascular    PROSTATECTOMY  2007    for prostate CA - no chemo/RT    SIGMOIDECTOMY      for divertic    SMALL INTESTINE SURGERY N/A 11/17/2016    Procedure: Exploratory Laparotomy, Lysis of adhesions to release small bowel obstruction;  Surgeon: Rafael Kc MD;  [facility];  Service:        Current Medications  Current Outpatient Medications on File Prior to Visit   Medication Sig Dispense Refill    acetaminophen (TYLENOL) 500 mg tablet Take 500 mg by mouth as needed for mild pain        amLODIPine (NORVASC) 2 5 mg tablet TAKE 1 TABLET BY MOUTH  DAILY 90 tablet 3    aspirin (ECOTRIN LOW STRENGTH) 81 mg EC tablet Take 81 mg by mouth daily      atorvastatin (LIPITOR) 40 mg tablet TAKE 1 TABLET BY MOUTH  DAILY 90 tablet 5    ATROVENT HFA 17 MCG/ACT inhaler USE 2 PUFFS 4 TIMES DAILY 51 6 g 5    B Complex Vitamins (VITAMIN B COMPLEX PO) Take by mouth      Cholecalciferol (VITAMIN D PO) Take 2,000 Units by mouth daily        diazepam (VALIUM) 2 mg tablet       diazepam (VALIUM) 5 mg tablet Take 5 mg by mouth as needed for anxiety      dicyclomine (BENTYL) 20 mg tablet Take 1 tablet (20 mg total) by mouth every 6 (six) hours 150 tablet 3    fexofenadine (ALLEGRA) 180 MG tablet Take 180 mg by mouth daily      FLUoxetine (PROzac) 10 mg capsule       fluticasone (FLONASE) 50 mcg/act nasal spray 1 spray into each nostril as needed   fluticasone-vilanterol (BREO ELLIPTA) 200-25 MCG/INH inhaler Inhale 1 puff daily Rinse mouth after use  3 Inhaler 3    lithium carbonate (LITHOBID) 300 mg CR tablet Take 300 mg by mouth One tab by mouth every other day and alternating with 2 tabs every other day at bedtime   metoprolol succinate (TOPROL-XL) 25 mg 24 hr tablet TAKE 1 TABLET BY MOUTH  DAILY 90 tablet 5    nitroglycerin (NITRODUR) 0 2 mg/hr APPLY 1 PATCH DAILY (OFF  FOR 12 HOURS) 90 patch 3    omega-3-acid ethyl esters (LOVAZA) 1 g capsule TAKE 1 CAPSULE BY MOUTH 3  TIMES DAILY 270 capsule 5    omeprazole (PriLOSEC) 20 mg delayed release capsule Take 1 capsule (20 mg total) by mouth daily 90 capsule 3    PROAIR  (90 Base) MCG/ACT inhaler USE 2 PUFFS EVERY 6 HOURS  AS NEEDED 1 Inhaler 5    Wheat Dextrin (BENEFIBER DRINK MIX PO) Take 1 Package by mouth       No current facility-administered medications on file prior to visit          Recent Labs [facility])  0   Lab Value Date/Time    HCT 41 9 03/20/2019 0847    HCT 39 5 10/19/2015 0911    HGB 13 4 03/20/2019 0847    HGB 12 6 10/19/2015 0911    WBC 11 05 (H) 03/20/2019 0847    WBC 9 47 10/19/2015 0911    INR 1 01 07/24/2018 1150    ESR 79 (H) 05/10/2016 0731    CRP 66 4 (H) 05/10/2016 0644    GLUCOSE 187 (H) 11/14/2016 0615    GLUCOSE 99 10/19/2015 0912    HGBA1C 5 3 10/12/2018 0816         Physical exam  · General: Awake, Alert, Oriented  · Eyes: Pupils equal, round and reactive to light  · Heart: regular rate and rhythm  · Lungs: No audible wheezing  · Abdomen: soft  MSK LLE:  -skin intact without lesions or erythema  -TTP over the greater trochanter  -Moderate groin pain with internal/external rotation of the hip  -SILT L3-S1  -Motor grossly intact to hip flexion, knee flex/ex, ankle DF/PF, EHL/FHL  -Negative straight leg raise for radicular sxs    Imaging  Imaging of the right hip shows moderate degenerative changes with mild coxa-valga deformity    Procedure  Large joint arthrocentesis: L greater trochanteric bursa  Date/Time: 7/16/2019 9:17 AM  Consent given by: patient  Site marked: site marked  Timeout: Immediately prior to procedure a time out was called to verify the correct patient, procedure, equipment, support staff and site/side marked as required   Supporting Documentation  Indications: pain   Procedure Details  Location: hip - L greater trochanteric bursa  Preparation: Patient was prepped and draped in the usual sterile fashion  Needle size: 22 G  Approach: lateral  Medications administered: 2 mL bupivacaine 0 25 %; 2 mL lidocaine 2 %; 12 mg betamethasone acetate-betamethasone sodium phosphate 6 (3-3) mg/mL    Patient tolerance: patient tolerated the procedure well with no immediate complications  Dressing:  Sterile dressing applied            Assessment/Plan:   73 y o male with left hip osteoarthritis and greater trochanteric bursitis  Patient was educated that because he has been getting relief from his injections, it confirms that he does have hip pathology, but that some of the residual pain that does not improve with injections could be due to his disc herniation  He was instructed to follow up in 3 months for repeat examination

## 2019-08-13 ENCOUNTER — OFFICE VISIT (OUTPATIENT)
Dept: NEUROSURGERY | Facility: CLINIC | Age: 74
End: 2019-08-13
Payer: MEDICARE

## 2019-08-13 VITALS
BODY MASS INDEX: 27.13 KG/M2 | HEART RATE: 54 BPM | HEIGHT: 68 IN | DIASTOLIC BLOOD PRESSURE: 72 MMHG | TEMPERATURE: 97 F | WEIGHT: 179 LBS | SYSTOLIC BLOOD PRESSURE: 158 MMHG | RESPIRATION RATE: 16 BRPM

## 2019-08-13 DIAGNOSIS — M48.061 FORAMINAL STENOSIS OF LUMBAR REGION: Primary | ICD-10-CM

## 2019-08-13 PROCEDURE — 99213 OFFICE O/P EST LOW 20 MIN: CPT | Performed by: NEUROLOGICAL SURGERY

## 2019-08-13 NOTE — PROGRESS NOTES
[facility] Office Note  Jaylin Nair is a 72 y o  male      Type of Visit: Follow-up      Diagnoses and all orders for this visit:    Foraminal stenosis of lumbar region  -     Ambulatory referral to Neurosurgery  -     Ambulatory referral to Physical Therapy; Future        DISCUSSION SUMMARY  MRI of the LS spine demonstrates a disc herniation at the L3-4 level eccentric to the left side  The patient is radiculopathy involving the hip and upper leg which is likely result of this disc herniation as well as his known hip pathology    I have recommended that he go through physical therapy and return to the office in 6 weeks time for re-evaluation  If he is unimproved then a minimally invasive resection of this disc herniation would be indicated  Return in about 6 weeks (around 9/24/2019)  CHIEF COMPLAINT  Patient presents for follow up with MRI Lsp    NEUROSURGERY PROCEDURES  5/2/16 DKO: Anterior cervical diskectomy C3/4, C5/6, C6/7 with anterior plate fixation fusion C3-7;  Posterior decompressive laminectomy C3-7 with lateral mass fixation fusion C3-7 (IMPULSE MONITORING) (N/A ) - anterior part end of procedure at 1359  1/30/17 DKO: L4-5 and L5-S1 decompressive foraminotomies, transforaminal lumbar interbody and pedicle screw fixation fusion L4-S1  HISTORY OF PRESENT ILLNESS  Patient is a right-handed male known to our practice for previous surgical history as noted above in the cervical and lumbar region  Last seen on 1/9/18  Patient reports he had a complex 2 4 cm epidural cyst at L3-L4 on MRI in 2017, patient wondering if some of his back pain can be related to this epidural cyst     Patient is having periods of exacerbation followed by resolution  His low back pain is centered in his back and radiates to the left hip and upper thigh  It causes him to have worsening gait problems  He has difficulty traversing stairs with his left leg    He is receiving steroid injections for his hip in this helps his back  He denies having fallen  He has periods where there is leg pain is so severe that he can barely move and then other periods of time where this is much improved  I reviewed the remainder of the HPI  REVIEW OF SYSTEMS  Review of Systems   Constitutional: Negative  HENT: Negative  Eyes: Negative  Respiratory: Negative  Cardiovascular: Negative  Gastrointestinal: Negative  Endocrine: Negative  Genitourinary: Negative  Musculoskeletal: Positive for back pain (low back pain in the center radiating to the left side into hip and into the thigh) and gait problem (off balance at times and uses a cane when needed)  Skin: Negative  Allergic/Immunologic: Negative  Neurological: Positive for weakness (both legs from previous surgeries)  Hematological: Negative  Psychiatric/Behavioral: Negative  All other systems reviewed and are negative  I reviewed the ROS  I did carefully reviewed the ROS      MEDICAL HISTORY  Active Ambulatory Problems     Diagnosis Date Noted    Gait instability 04/27/2016    Cervical myelopathy (HCC) 04/28/2016    Adenocarcinoma of prostate ([facility] ) 05/03/2016    CAD (coronary artery disease) 05/03/2016    Bipolar affective disorder ([facility] ) 05/03/2016    COPD (chronic obstructive pulmonary disease) ([facility] ) 05/03/2016    Gastroesophageal reflux disease 05/03/2016    Mixed hyperlipidemia 05/03/2016    Essential hypertension 05/03/2016    [facility] 05/03/2016    History of lumbar surgery 05/03/2016    Impaired mobility and activities of daily living 05/05/2016    Cervical radiculopathy due to degenerative joint disease of spine 05/05/2016    Degenerative disc disease, lumbar 01/30/2017    Foraminal stenosis of lumbar region 01/30/2017    Spondylolisthesis of lumbar region 01/30/2017    Myelopathy concurrent with and due to lumbosacral intervertebral disc disorder 02/01/2017    Arrhythmia 60/58/6762    Diastolic heart failure ([address] ) 01/21/2018    New onset atrial fibrillation ([address] ) 01/23/2018    Pain in left hip 02/26/2018    Greater trochanteric bursitis, left 07/03/2018    Skin lesion 07/27/2018    Medicare annual wellness visit, subsequent 10/11/2018    Trochanteric bursitis of left hip 11/28/2018    Primary osteoarthritis of left hip 01/15/2019    Malignant neoplasm of lung ([address] ) 02/14/2019    Excessive gas 02/14/2019    Extradural cyst of spine 06/19/2019     Resolved Ambulatory Problems     Diagnosis Date Noted    Weakness of left lower extremity 04/27/2016    AILYN (acute kidney injury) ([address] ) 05/04/2016    Chronic kidney disease, stage III (moderate) ([facility]) 05/04/2016    Spondylogenic compression of cervical spinal cord 05/05/2016    Small bowel obstruction ([facility] ) 11/16/2016    Sepsis ([facility] ) 01/21/2018    Influenza 01/21/2018     Past Medical History:   Diagnosis Date    Aortic aneurysm (HCC)     Benign colon polyp     Bipolar 1 disorder (HCC)     Cardiac disease     Cervical cord compression with myelopathy ([facility])     Diverticulitis     Diverticulosis     Gait disturbance     GERD (gastroesophageal reflux disease)     Heart attack ([address] ) 1996    Hx of resection of large bowel 5/3/2016    Hyperlipidemia     Hypertension     IBS (irritable bowel syndrome)     Lumbar stenosis     Lung cancer ([address] )     Myocardial infarction ([address] )     Prostate cancer ([facility] )     Shortness of breath        Past Surgical History:   Procedure Laterality Date    ANGIOPLASTY      stent    CARDIAC SURGERY      CERVICAL SPINE SURGERY      Cervical decompression with cervical fusion from C3-C7 for spinal stenosis      COLON SURGERY  11/04/2014    ESOPHAGOGASTRODUODENOSCOPY  01/03/2013    with possible Schatzki's ring & small hiatal hernia, mild gastritis    FL INJECTION LEFT HIP (NON ARTHROGRAM)  12/11/2018    FL INJECTION LEFT HIP (NON ARTHROGRAM)  5/9/2019    IR EVAR  8/6/2018    LUNG CANCER SURGERY Right 2011    wedge resection for lung tumor, 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    LUNG LOBECTOMY Left     ND ARTHRODESIS ANT INTERBODY MIN DISCECTOMY, CERVICAL BELOW C2 N/A 2016    Procedure: Anterior cervical diskectomy C3/4, C5/6, C6/7 with anterior plate fixation fusion C3-7;  Posterior decompressive laminectomy C3-7 with lateral mass fixation fusion C3-7 (IMPULSE MONITORING); Surgeon: Adolphus Boas, MD;  [facility];  Service: Neurosurgery    ND ARTHRODESIS POSTERIOR INTERBODY LUMBAR N/A 2017    Procedure: L4-5 AND L5-S1 DECOMPRESSIVE FORAMINOTOMIES, TRANSFORAMINAL LUMBAR INTERBODY AND PEDICLE SCREW FIXATION FUSION L4-S1 (IMPULSE);   Surgeon: Adolphus Boas, MD;  [facility];  Service: Neurosurgery     Juan Hunt RPR DPLMNT AORTO-AORTIC NDGFT N/A 2018    Procedure: REPAIR ANEURYSM ENDOVASCULAR ABDOMINAL AORTIC  (EVAR) WITH BILATERAL PERCUTANEOUS FEMORAL ACCESS WITH ULTRASOUND GUIDANCE ON THE RIGHT AND PRE CLOSURE;  Surgeon: William Tavarez MD;  [facility];  Service: Vascular    PROSTATECTOMY      for prostate CA - no chemo/RT    SIGMOIDECTOMY      for divertic    SMALL INTESTINE SURGERY N/A 2016    Procedure: Exploratory Laparotomy, Lysis of adhesions to release small bowel obstruction;  Surgeon: Kathleen Tabor MD;  [facility];  Service:        Social History     Tobacco Use   Smoking Status Former Smoker    Packs/day: 1 00    Years: 35 00    Pack years: 35 00    Types: Cigarettes    Last attempt to quit:     Years since quittin 6   Smokeless Tobacco Never Used       Social History     Substance and Sexual Activity   Alcohol Use Yes    Comment: 6 drinks a week       Social History     Substance and Sexual Activity   Drug Use No       Vitals:    19 1505   BP: 158/72   BP Location: Right arm   Patient Position: Sitting   Cuff Size: Standard   Pulse: (!) 54   Resp: 16   Temp: (!) 97 °F (36 1 °C) TempSrc: Tympanic   Weight: 81 2 kg (179 lb)   Height: 5' 7 5" (1 715 m)         Current Outpatient Medications:     acetaminophen (TYLENOL) 500 mg tablet, Take 500 mg by mouth as needed for mild pain , Disp: , Rfl:     amLODIPine (NORVASC) 2 5 mg tablet, TAKE 1 TABLET BY MOUTH  DAILY, Disp: 90 tablet, Rfl: 3    aspirin (ECOTRIN LOW STRENGTH) 81 mg EC tablet, Take 81 mg by mouth daily, Disp: , Rfl:     atorvastatin (LIPITOR) 40 mg tablet, TAKE 1 TABLET BY MOUTH  DAILY, Disp: 90 tablet, Rfl: 5    ATROVENT HFA 17 MCG/ACT inhaler, USE 2 PUFFS 4 TIMES DAILY, Disp: 51 6 g, Rfl: 5    Cholecalciferol (VITAMIN D PO), Take 2,000 Units by mouth daily  , Disp: , Rfl:     diazepam (VALIUM) 2 mg tablet, Take 2 mg by mouth every 6 (six) hours as needed , Disp: , Rfl:     diazepam (VALIUM) 5 mg tablet, Take 5 mg by mouth as needed for anxiety, Disp: , Rfl:     dicyclomine (BENTYL) 20 mg tablet, Take 1 tablet (20 mg total) by mouth every 6 (six) hours, Disp: 150 tablet, Rfl: 3    fexofenadine (ALLEGRA) 180 MG tablet, Take 180 mg by mouth daily, Disp: , Rfl:     FLUoxetine (PROzac) 10 mg capsule, Take 10 mg by mouth daily , Disp: , Rfl:     fluticasone (FLONASE) 50 mcg/act nasal spray, 1 spray into each nostril as needed  , Disp: , Rfl:     fluticasone-vilanterol (BREO ELLIPTA) 200-25 MCG/INH inhaler, Inhale 1 puff daily Rinse mouth after use , Disp: 3 Inhaler, Rfl: 3    lithium carbonate (LITHOBID) 300 mg CR tablet, Take 300 mg by mouth One tab by mouth every other day and alternating with 2 tabs every other day at bedtime   , Disp: , Rfl:     metoprolol succinate (TOPROL-XL) 25 mg 24 hr tablet, TAKE 1 TABLET BY MOUTH  DAILY, Disp: 90 tablet, Rfl: 5    nitroglycerin (NITRODUR) 0 2 mg/hr, APPLY 1 PATCH DAILY (OFF  FOR 12 HOURS), Disp: 90 patch, Rfl: 3    omega-3-acid ethyl esters (LOVAZA) 1 g capsule, TAKE 1 CAPSULE BY MOUTH 3  TIMES DAILY, Disp: 270 capsule, Rfl: 5    omeprazole (PriLOSEC) 20 mg delayed release capsule, Take 1 capsule (20 mg total) by mouth daily, Disp: 90 capsule, Rfl: 3    PROAIR  (90 Base) MCG/ACT inhaler, USE 2 PUFFS EVERY 6 HOURS  AS NEEDED, Disp: 1 Inhaler, Rfl: 5     Allergies   Allergen Reactions    Bactrim [Sulfamethoxazole-Trimethoprim] Other (See Comments)     AILYN    Nsaids      Annotation - [id number]: unable to take due to use of lithium   Oxycodone Rash        The following portions of the patient's history were updated by MA and reviewed by MD: allergies, current medications, past family history, past medical history, past social history, past surgical history and problem list       Physical Exam  Awake and alert  Extraocular movements are intact  Speech is clear and comprehensible  His power in his upper extremities appear to be 5/5  In the lower extremities his power for hip flexion and extension is at 4/5 on the left and these actions are intact on the right  He has a straight leg raising sign on the left and none on the right he has no cross leg raising sign  He wears an AFO on the right side  His deep tendon reflexes are absent at the patella on the left and 1/4 on the right there 1/4 at the Achilles bilaterally  His station and gait demonstrates a slow wide-based gait  He cannot perform tandem gait  RESULTS/DATA  An MRI of the lumbosacral spine is carefully reviewed and compared with previous studies  This demonstrates some a disc herniation at the L3-4 level eccentric to the left side  This causes moderate neural foraminal narrowing  The instrumentation is in adequate position

## 2019-08-16 ENCOUNTER — EVALUATION (OUTPATIENT)
Dept: PHYSICAL THERAPY | Facility: REHABILITATION | Age: 74
End: 2019-08-16
Payer: MEDICARE

## 2019-08-16 DIAGNOSIS — G89.29 CHRONIC LEFT-SIDED LOW BACK PAIN, WITH SCIATICA PRESENCE UNSPECIFIED: Primary | ICD-10-CM

## 2019-08-16 DIAGNOSIS — M54.5 CHRONIC LEFT-SIDED LOW BACK PAIN, WITH SCIATICA PRESENCE UNSPECIFIED: Primary | ICD-10-CM

## 2019-08-16 DIAGNOSIS — M48.061 FORAMINAL STENOSIS OF LUMBAR REGION: ICD-10-CM

## 2019-08-16 PROCEDURE — 97162 PT EVAL MOD COMPLEX 30 MIN: CPT

## 2019-08-21 ENCOUNTER — OFFICE VISIT (OUTPATIENT)
Dept: PHYSICAL THERAPY | Facility: REHABILITATION | Age: 74
End: 2019-08-21
Payer: MEDICARE

## 2019-08-21 DIAGNOSIS — G89.29 CHRONIC LEFT-SIDED LOW BACK PAIN, WITH SCIATICA PRESENCE UNSPECIFIED: ICD-10-CM

## 2019-08-21 DIAGNOSIS — M54.5 CHRONIC LEFT-SIDED LOW BACK PAIN, WITH SCIATICA PRESENCE UNSPECIFIED: ICD-10-CM

## 2019-08-21 DIAGNOSIS — M48.061 FORAMINAL STENOSIS OF LUMBAR REGION: Primary | ICD-10-CM

## 2019-08-21 PROCEDURE — 97110 THERAPEUTIC EXERCISES: CPT

## 2019-08-21 PROCEDURE — 97112 NEUROMUSCULAR REEDUCATION: CPT

## 2019-08-21 NOTE — PROGRESS NOTES
Daily Note     Today's date: 2019  Patient name: Tameka Abreu  : 1945  MRN: [id number]  Referring provider: Merline Shores, MD  Dx:   Encounter Diagnosis     ICD-10-CM    1  Foraminal stenosis of lumbar region M99 83    2  Chronic left-sided low back pain, with sciatica presence unspecified M54 5     G89 29          Subjective: Pt reports pain 4/10 in low back and L hip this morning  Reports compliance with standing lumbar extension, however, has not done PPU d/t difficulty of performing on soft bed at home  Objective: See treatment diary below      Assessment: Tolerated treatment well  Following lumbar extension, pt reports centralization of pain  Pt tolerated addition of lumbar stabilization exercise without adverse effects today  Mod VCs required for initiation of TrA  Patient would benefit from continued PT in order to fully centralize symptoms and improve lumbar stability and body mechanics for bending and lifting  Plan: Continue per plan of care        Precautions: PMH: COPD, Gerd, Hx of MI, HTN, Lung Cancer, Prostate Cancer, Lumbar stenosis, Aortic aneurysm        Manual                        L1-L5 P-A Grade III-IV Mob                                                                                                                             Exercise Diary   8 21                     Nu step (warmup)  5'                     Prone on elbows  2 min                     PPU  10x                     PPU with self OP                       Standing lumbar ext 3"x15            Standing lumbar ext w/ towel OP  3"x10                     TrA in prone  10" 2x10                     Bridges  1x10                      TrA w/ hip abd  5" 2x10                      TrA w/ hip add  5" 2x10

## 2019-08-23 ENCOUNTER — OFFICE VISIT (OUTPATIENT)
Dept: PHYSICAL THERAPY | Facility: REHABILITATION | Age: 74
End: 2019-08-23
Payer: MEDICARE

## 2019-08-23 DIAGNOSIS — M54.5 CHRONIC LEFT-SIDED LOW BACK PAIN, WITH SCIATICA PRESENCE UNSPECIFIED: ICD-10-CM

## 2019-08-23 DIAGNOSIS — G89.29 CHRONIC LEFT-SIDED LOW BACK PAIN, WITH SCIATICA PRESENCE UNSPECIFIED: ICD-10-CM

## 2019-08-23 DIAGNOSIS — M48.061 FORAMINAL STENOSIS OF LUMBAR REGION: Primary | ICD-10-CM

## 2019-08-23 PROCEDURE — 97112 NEUROMUSCULAR REEDUCATION: CPT

## 2019-08-23 PROCEDURE — 97110 THERAPEUTIC EXERCISES: CPT

## 2019-08-23 NOTE — PROGRESS NOTES
Daily Note     Today's date: 2019  Patient name: Leta Hall  : 1945  MRN: [id number]  Referring provider: Elza Lesches, MD  Dx:   Encounter Diagnosis     ICD-10-CM    1  Foraminal stenosis of lumbar region M99 83    2  Chronic left-sided low back pain, with sciatica presence unspecified M54 5     G89 29          Subjective: Pt reports mild discomfort in the low back pre treatment  Reports compliance with HEP and extension exercises daily  Objective: See treatment diary below      Assessment: Tolerated treatment well  Pt tolerated progression of lumbar stabilization exercise without adverse effects today  Less VCs required for initiation of TrA  Following standing lumbar extension, pt reports centralization of pain (0/10)  Patient would benefit from continued PT in order to improve lumbar stability and body mechanics for bending and lifting  Plan: Continue per plan of care        Precautions: PMH: COPD, Gerd, Hx of MI, HTN, Lung Cancer, Prostate Cancer, Lumbar stenosis, Aortic aneurysm        Manual                        L1-L5 P-A Grade III-IV Mob                                                                                                                             Exercise Diary   8 21  8 20                   Nu step (warmup)  5'  6'                   Prone on elbows  2 min                     PPU  10x                     PPU with self OP                       Standing lumbar ext 3"x15 3"x15           Standing lumbar ext w/ towel OP  3"x10  3"x10                   TrA in prone  10" 2x10  10" 2x10                   Bridges  1x10  2x10                    TrA w/ hip abd  5" 2x10  5" 2x10                    TrA w/ hip add  5" 2x10  5" 2x10 OTB                    TrA with march   1x10 ea

## 2019-08-28 ENCOUNTER — OFFICE VISIT (OUTPATIENT)
Dept: PHYSICAL THERAPY | Facility: REHABILITATION | Age: 74
End: 2019-08-28
Payer: MEDICARE

## 2019-08-28 DIAGNOSIS — M48.061 FORAMINAL STENOSIS OF LUMBAR REGION: Primary | ICD-10-CM

## 2019-08-28 DIAGNOSIS — G89.29 CHRONIC LEFT-SIDED LOW BACK PAIN, WITH SCIATICA PRESENCE UNSPECIFIED: ICD-10-CM

## 2019-08-28 DIAGNOSIS — M54.5 CHRONIC LEFT-SIDED LOW BACK PAIN, WITH SCIATICA PRESENCE UNSPECIFIED: ICD-10-CM

## 2019-08-28 PROCEDURE — 97110 THERAPEUTIC EXERCISES: CPT

## 2019-08-28 PROCEDURE — 97140 MANUAL THERAPY 1/> REGIONS: CPT

## 2019-08-28 NOTE — PROGRESS NOTES
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    2  Chronic left-sided low back pain, with sciatica presence unspecified M54 5     G89 29          Subjective: Pt reports no pain in the back pre tx  Reports keeping up with HEP  Objective: See treatment diary below    Assessment: Tolerated treatment well  Pt tolerated progression of lumbar stabilization to sitting position without adverse effects  Demonstrates improvements in initiation of TrA today  Min VCs to not hold breathe  Patient would benefit from continued PT in order to improve lumbar stability and body mechanics for bending and lifting  Plan: Continue per plan of care        Precautions: PMH: COPD, Gerd, Hx of MI, HTN, Lung Cancer, Prostate Cancer, Lumbar stenosis, Aortic aneurysm        Manual                        L1-L5 P-A Grade III-IV Mob                                                                                                                             Exercise Diary   8 21  7 25  10 30                 Nu step (warmup)  5'  6'  6'                 Prone on elbows  2 min                     PPU  10x                     PPU with self OP                       Standing lumbar ext 3"x15 3"x15 3"x15          Standing lumbar ext w/ towel OP  3"x10  3"x10  3"x15                 TrA in prone  10" 2x10  10" 2x10  10" 2x10                 Bridges  1x10  2x10  2x10                  TrA w/ hip abd  5" 2x10  5" 2x10 OTB  5" 2x10 OTB                  TrA w/ hip add  5" 2x10  5" 2x10   5" 2x10                  TrA with march   1x10 ea  2x10 ea                  Seated pball TrA      10"x10                  Seated pball D2 flex      1x10 ea

## 2019-08-30 ENCOUNTER — OFFICE VISIT (OUTPATIENT)
Dept: PHYSICAL THERAPY | Facility: REHABILITATION | Age: 74
End: 2019-08-30
Payer: MEDICARE

## 2019-08-30 DIAGNOSIS — M54.5 CHRONIC LEFT-SIDED LOW BACK PAIN, WITH SCIATICA PRESENCE UNSPECIFIED: ICD-10-CM

## 2019-08-30 DIAGNOSIS — M48.061 FORAMINAL STENOSIS OF LUMBAR REGION: Primary | ICD-10-CM

## 2019-08-30 DIAGNOSIS — G89.29 CHRONIC LEFT-SIDED LOW BACK PAIN, WITH SCIATICA PRESENCE UNSPECIFIED: ICD-10-CM

## 2019-08-30 PROCEDURE — 97112 NEUROMUSCULAR REEDUCATION: CPT

## 2019-08-30 PROCEDURE — 97110 THERAPEUTIC EXERCISES: CPT

## 2019-08-30 NOTE — PROGRESS NOTES
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    2  Chronic left-sided low back pain, with sciatica presence unspecified M54 5     G89 29          Subjective: Pt reports no pain in the back pre tx, mild L hip pain pre tx  Reports keeping up with HEP  Objective: See treatment diary below    Assessment: Tolerated treatment well  Pt tolerated progression of lumbar stabilization today and addition of mini squats to begin working on LE strength and an intro to lifting mechanics  Patient would benefit from continued PT in order to improve lumbar stability and body mechanics for bending and lifting  Plan: Continue per plan of care        Precautions: PMH: COPD, Gerd, Hx of MI, HTN, Lung Cancer, Prostate Cancer, Lumbar stenosis, Aortic aneurysm        Manual                        L1-L5 P-A Grade III-IV Mob                                                                                                                             Exercise Diary   8 21  8 23  8 28             Nu step (warmup)  5'  6'  6'  6'           Prone on elbows  2 min                 PPU  10x                 PPU with self OP                   Standing lumbar ext 3"x15 3"x15 3"x15 3"x15       Standing lumbar ext w/ towel OP  3"x10  3"x10  3"x15  3"x15           TrA in prone  10" 2x10  10" 2x10  10" 2x10  10"2x10           Bridges  1x10  2x10  2x10  2x12            TrA w/ hip abd  5" 2x10  5" 2x10 OTB  5" 2x10 OTB  5" 3x10 OTB            TrA w/ hip add  5" 2x10  5" 2x10   5" 2x10  5" 2x10            TrA with march   1x10 ea  2x10 ea  2x15 ea            Seated pball TrA      10"x10  10"x10            Seated pball D2 flex      1x10 ea  2x10 ea            Mini squat with hip abd TB       1x10

## 2019-09-03 ENCOUNTER — OFFICE VISIT (OUTPATIENT)
Dept: PHYSICAL THERAPY | Facility: REHABILITATION | Age: 74
End: 2019-09-03
Payer: MEDICARE

## 2019-09-03 DIAGNOSIS — G89.29 CHRONIC LEFT-SIDED LOW BACK PAIN, WITH SCIATICA PRESENCE UNSPECIFIED: ICD-10-CM

## 2019-09-03 DIAGNOSIS — M54.5 CHRONIC LEFT-SIDED LOW BACK PAIN, WITH SCIATICA PRESENCE UNSPECIFIED: ICD-10-CM

## 2019-09-03 DIAGNOSIS — M48.061 FORAMINAL STENOSIS OF LUMBAR REGION: Primary | ICD-10-CM

## 2019-09-03 PROCEDURE — 97110 THERAPEUTIC EXERCISES: CPT

## 2019-09-03 PROCEDURE — 97112 NEUROMUSCULAR REEDUCATION: CPT

## 2019-09-03 NOTE — PROGRESS NOTES
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    2  Chronic left-sided low back pain, with sciatica presence unspecified M54 5     G89 29          Subjective: Pt reports no pain in the back pre tx, mild soreness in low back yesterday after significantly increasing activity at home (walking, exercise)  Objective: See treatment diary below    Assessment: Tolerated treatment well  Pt tolerated progression of lumbar stabilization today and demonstrated improvements in technique with mini squats as well as requiring less VCs, for mini squats  Patient would benefit from continued PT in order to improve lumbar stability and body mechanics for bending and lifting  Plan: Continue per plan of care  Precautions: PMH: COPD, Gerd, Hx of MI, HTN, Lung Cancer, Prostate Cancer, Lumbar stenosis, Aortic aneurysm        Manual                        L1-L5 P-A Grade III-IV Mob                                                                                                                             Exercise Diary   8 21  8 23  8 28 8  30   9 3         Nu step (warmup)  5'  6'  6'  6'  8'         Prone on elbows  2 min                 PPU  10x                 PPU with self OP                   Standing lumbar ext 3"x15 3"x15 3"x15 3"x15 3"x15      Standing lumbar ext w/ towel OP  3"x10  3"x10  3"x15  3"x15  3"x15         TrA in prone  10" 2x10  10" 2x10  10" 2x10  10" 2x10  10" 3x10         Bridges  1x10  2x10  2x10  2x12  3x12          TrA w/ hip abd  5" 2x10  5" 2x10 OTB  5" 2x10 OTB  5" 3x10 OTB  5" [id number]          TrA w/ hip add  5" 2x10  5" 2x10   5" 2x10  5" 2x10  5" 3x10          TrA with march   1x10 ea  2x10 ea  2x15 ea  2x15 ea          Seated pball TrA      10"x10  10"x10  10"x10          Seated pball D2 flex      1x10 ea  2x10 ea  2x10 ea          Mini squat with hip abd TB       1x10  1x10 YTB

## 2019-09-05 ENCOUNTER — OFFICE VISIT (OUTPATIENT)
Dept: PHYSICAL THERAPY | Facility: REHABILITATION | Age: 74
End: 2019-09-05
Payer: MEDICARE

## 2019-09-05 DIAGNOSIS — M48.061 FORAMINAL STENOSIS OF LUMBAR REGION: Primary | ICD-10-CM

## 2019-09-05 DIAGNOSIS — G89.29 CHRONIC LEFT-SIDED LOW BACK PAIN, WITH SCIATICA PRESENCE UNSPECIFIED: ICD-10-CM

## 2019-09-05 DIAGNOSIS — M54.5 CHRONIC LEFT-SIDED LOW BACK PAIN, WITH SCIATICA PRESENCE UNSPECIFIED: ICD-10-CM

## 2019-09-05 PROCEDURE — 97112 NEUROMUSCULAR REEDUCATION: CPT

## 2019-09-05 PROCEDURE — 97110 THERAPEUTIC EXERCISES: CPT

## 2019-09-05 NOTE — PROGRESS NOTES
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    2  Chronic left-sided low back pain, with sciatica presence unspecified M54 5     G89 29          Subjective: Pt reports no pain in the back pre tx  Pt "feeling good"  Objective: See treatment diary below    Assessment: Tolerated treatment well  Pt tolerated progression of lumbar stabilization today  Demonstrates good technique with mini squats requiring min VCs  Patient would benefit from continued PT in order to improve lumbar stability and body mechanics for bending and lifting  Plan: Continue per plan of care  Add stability press and hip hinge nv  Precautions: PMH: COPD, Gerd, Hx of MI, HTN, Lung Cancer, Prostate Cancer, Lumbar stenosis, Aortic aneurysm        Manual                        L1-L5 P-A Grade III-IV Mob                                                                                                                             Exercise Diary   8 21  8 23  8 28 8  30   9 3  9 5       Nu step (warmup)  5'  6'  6'  6'  8'  8'       Prone on elbows  2 min                 PPU  10x                 PPU with self OP                   Standing lumbar ext 3"x15 3"x15 3"x15 3"x15 3"x15 3"x15     Standing lumbar ext w/ towel OP  3"x10  3"x10  3"x15  3"x15  3"x15  3"x15       TrA in prone  10" 2x10  10" 2x10  10" 2x10  10" 2x10  10" 3x10  10" 3x10       Bridges  1x10  2x10  2x10  2x12  3x12  3x12        TrA w/ hip abd  5" 2x10  5" 2x10 OTB  5" 2x10 OTB  5" 3x10 OTB  5" [id number]  5" 3x10 GTB        TrA w/ hip add  5" 2x10  5" 2x10   5" 2x10  5" 2x10  5" 3x10  5" 3x10        TrA with march   1x10 ea  2x10 ea  2x15 ea  2x15 ea  2x15 ea        Seated pball TrA      10"x10  10"x10  10"x10  10"x10        Seated pball D2 flex      1x10 ea  2x10 ea  2x10 ea  2x10 YTB        Mini squat with hip abd TB       1x10  1x10 YTB  2x10 YTB       Stability Press                    Hip hinge

## 2019-09-10 ENCOUNTER — OFFICE VISIT (OUTPATIENT)
Dept: PHYSICAL THERAPY | Facility: REHABILITATION | Age: 74
End: 2019-09-10
Payer: MEDICARE

## 2019-09-10 DIAGNOSIS — M54.5 CHRONIC LEFT-SIDED LOW BACK PAIN, WITH SCIATICA PRESENCE UNSPECIFIED: ICD-10-CM

## 2019-09-10 DIAGNOSIS — M48.061 FORAMINAL STENOSIS OF LUMBAR REGION: Primary | ICD-10-CM

## 2019-09-10 DIAGNOSIS — G89.29 CHRONIC LEFT-SIDED LOW BACK PAIN, WITH SCIATICA PRESENCE UNSPECIFIED: ICD-10-CM

## 2019-09-10 PROCEDURE — 97112 NEUROMUSCULAR REEDUCATION: CPT

## 2019-09-10 PROCEDURE — 97110 THERAPEUTIC EXERCISES: CPT

## 2019-09-10 NOTE — PROGRESS NOTES
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    2  Chronic left-sided low back pain, with sciatica presence unspecified M54 5     G89 29          Subjective: Pt reports no pain pre tx  Reports being able to incorporate better bending/lifting mechanics from working on mini squats  Objective: See treatment diary below    Assessment: Tolerated treatment well  Pt demonstrates good lumbar stabilization  Demonstrates good technique with mini squats requiring min VCs  Pt demonstrated good lifting mechanics with hip hinge to  laundry basket from 6" step this visit  Patient would benefit from continued PT in order to improve lumbar stability and body mechanics for bending and lifting  Plan: Continue per plan of care  Progress lifting and body mechanics training nv  Precautions: PMH: COPD, Gerd, Hx of MI, HTN, Lung Cancer, Prostate Cancer, Lumbar stenosis, Aortic aneurysm        Manual                        L1-L5 P-A Grade III-IV Mob                                                                                                                             Exercise Diary   8 21  8 23  8 28 8  30   9 3  9 5  9 10     Nu step (warmup)  5'  6'  6'  6'  8'  8'  8'     Prone on elbows  2 min                 PPU  10x                 PPU with self OP                   Standing lumbar ext 3"x15 3"x15 3"x15 3"x15 3"x15 3"x15 3"x15    Standing lumbar ext w/ towel OP  3"x10  3"x10  3"x15  3"x15  3"x15  3"x15  3"x15     TrA in prone  10" 2x10  10" 2x10  10" 2x10  10" 2x10  10" 3x10  10" 3x10  10" 3x10     Bridges  1x10  2x10  2x10  2x12  3x12  3x12  3x15      TrA w/ hip abd  5" 2x10  5" 2x10 OTB  5" 2x10 OTB  5" 3x10 OTB  5" [id number]  5" 3x10 GTB  5" 3x10 GTB      TrA w/ hip add  5" 2x10  5" 2x10   5" 2x10  5" 2x10  5" 3x10  5" 3x10  5" 3x10      TrA with march   1x10 ea  2x10 ea  2x15 ea  2x15 ea  2x15 ea  2x20 ea      Seated pball TrA      10"x10  10"x10  10"x10  10"x10  dc      Seated pball D2 flex      1x10 ea  2x10 ea  2x10 ea  2x10 YTB  2x10 YTB      Mini squat with hip abd TB       1x10  1x10 YTB  2x10 YTB  2x10 YTB     Stability Press              nv      Hip hinge ( laundry basket form 6" step)              1x10

## 2019-09-12 ENCOUNTER — OFFICE VISIT (OUTPATIENT)
Dept: PHYSICAL THERAPY | Facility: REHABILITATION | Age: 74
End: 2019-09-12
Payer: MEDICARE

## 2019-09-12 DIAGNOSIS — M48.061 FORAMINAL STENOSIS OF LUMBAR REGION: Primary | ICD-10-CM

## 2019-09-12 DIAGNOSIS — G89.29 CHRONIC LEFT-SIDED LOW BACK PAIN, WITH SCIATICA PRESENCE UNSPECIFIED: ICD-10-CM

## 2019-09-12 DIAGNOSIS — M54.5 CHRONIC LEFT-SIDED LOW BACK PAIN, WITH SCIATICA PRESENCE UNSPECIFIED: ICD-10-CM

## 2019-09-12 PROCEDURE — 97110 THERAPEUTIC EXERCISES: CPT

## 2019-09-12 PROCEDURE — 97112 NEUROMUSCULAR REEDUCATION: CPT

## 2019-09-12 NOTE — PROGRESS NOTES
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    2  Chronic left-sided low back pain, with sciatica presence unspecified M54 5     G89 29          Subjective: Pt reports mild low back soreness pre tx  Objective: See treatment diary below    Assessment: Tolerated treatment well  Pt demonstrates good technique with lumbar stabilization  Demonstrates good technique with mini squats and hip hinging requiring no VCs  Tolerated use of 10# weight for hip hinge without adverse effects  Reintroduced gentle lumbar flexion today which pt tolerated well with no peripheralization of symptoms  Patient would benefit from continued PT in order to improve lumbar stability and body mechanics for bending and lifting  Plan: Continue per plan of care  Progress lifting and body mechanics training nv  Precautions: PMH: COPD, Gerd, Hx of MI, HTN, Lung Cancer, Prostate Cancer, Lumbar stenosis, Aortic aneurysm        Manual                        L1-L5 P-A Grade III-IV Mob                                                                                                                             Exercise Diary   8 21  8 23  8 28 8  30   9 3  9 5  9 10  9 12   Nu step (warmup)  5'  6'  6'  6'  8'  8'  8'  8'   Prone on elbows  2 min                 PPU  10x                 PPU with self OP                   Standing lumbar ext 3"x15 3"x15 3"x15 3"x15 3"x15 3"x15 3"x15 3"x15   Standing lumbar ext w/ towel OP  3"x10  3"x10  3"x15  3"x15  3"x15  3"x15  3"x15  3"x15   TrA in prone  10" 2x10  10" 2x10  10" 2x10  10" 2x10  10" 3x10  10" 3x10  10" 3x10  10" 3x10   Bridges  1x10  2x10  2x10  2x12  3x12  3x12  3x15  3x15    TrA w/ hip abd  5" 2x10  5" 2x10 OTB  5" 2x10 OTB  5" 3x10 OTB  5" 6o13CLK  5" 3x10 GTB  5" 3x10 GTB  2"8R96 GTB    TrA w/ hip add  5" 2x10  5" 2x10   5" 2x10  5" 2x10  5" 3x10  5" 3x10  5" 3x10  5" 3x15    TrA with march   1x10 ea  2x10 ea  2x15 ea  2x15 ea  2x15 ea  2x20 ea  2x20 ea    Seated pball TrA      10"x10  10"x10  10"x10  10"x10  dc      Seated pball D2 flex      1x10 ea  2x10 ea  2x10 ea  2x10 YTB  2x10 YTB  2x10 YTB    Mini squat with hip abd TB       1x10  1x10 YTB  2x10 YTB  2x10 YTB  2x10 YTB   Stability Press                    Hip hinge ( laundry basket form 6" step)              1x10  2x5 10#    DKC              10"x5

## 2019-09-16 ENCOUNTER — EVALUATION (OUTPATIENT)
Dept: PHYSICAL THERAPY | Facility: REHABILITATION | Age: 74
End: 2019-09-16
Payer: MEDICARE

## 2019-09-16 DIAGNOSIS — G89.29 CHRONIC LEFT-SIDED LOW BACK PAIN, WITH SCIATICA PRESENCE UNSPECIFIED: ICD-10-CM

## 2019-09-16 DIAGNOSIS — M48.061 FORAMINAL STENOSIS OF LUMBAR REGION: Primary | ICD-10-CM

## 2019-09-16 DIAGNOSIS — M54.5 CHRONIC LEFT-SIDED LOW BACK PAIN, WITH SCIATICA PRESENCE UNSPECIFIED: ICD-10-CM

## 2019-09-16 PROCEDURE — 97110 THERAPEUTIC EXERCISES: CPT

## 2019-09-16 PROCEDURE — 97112 NEUROMUSCULAR REEDUCATION: CPT

## 2019-09-16 NOTE — PROGRESS NOTES
PT Re-Evaluation     Today's date: 2019  Patient name: Tito Cardona  : 1945  MRN: [id number]  Referring provider: Anuel Limon MD  Dx:   Encounter Diagnosis     ICD-10-CM    1  Foraminal stenosis of lumbar region M99 83    2  Chronic left-sided low back pain, with sciatica presence unspecified M54 5     G89 29                   Assessment  Assessment details: Pt presents to PT for re-evaluation of LBP  Since beginning physical therapy, pt has fully centralized pain in low back  The pt has improved lumbar spine extension ROM, lumbar stability, and has also seen improvements in functional mobility  Pt has improved standing and walking tolerance to greater than one hour  Additionally, the pt has demonstrated independence in posture management, as well as independence with bending and lifting mechanics  The pt is agreeable to d/c today to [facility]  Pt was educated on HEP and self management of LBP  Pt was instructed to seek the help of physical therapy if any further issues arise in the future  Symptom irritability: moderateUnderstanding of Dx/Px/POC: good   Prognosis: good    Goals  STG (3 weeks)  1  Pt to be I in HEP  Met   2  Pt to reduce pain with activity by 50%  Met   3  Pt to improve lumbar AROM by 50% Partially met  4  Pt to be I in posture management without VCs  Met  LTG (6 weeks)  1  Pt to reduce pain with activity to < 2/10  Met  2 Pt to improve FOTO score to predicted dc value  3  Pt to manage symptoms independently  Met   4  Pt to improve standing and walking tolerance to > 1 hour  Met  Plan  Plan details: Discharge to [facility]    Planned therapy interventions: home exercise program  Plan of Care beginning date: 2019  Plan of Care expiration date: 10/16/2019  Treatment plan discussed with: patient        Subjective Evaluation    Pain  Current pain ratin  At best pain ratin  At worst pain ratin  Relieving factors: rest and medications  Progression: improved    Social Support    Employment status: not working  Exercise comments: walks a mile 4-5 times per week  Diagnostic Tests  MRI studies: abnormal  Treatments  Previous treatment: physical therapy        Objective     Red Flags: Negative for night pain, unexplained weight loss, bowel or bladder dysfunction, saddle anesthesia, fever, chills, N/V, changes in vision, headaches, dizziness, gait disturbances   Positive: hx of Ca    Posture:  Seated: Good    Gait:   Pt ambulates with increased step width and decreased step length  B increased toe out gait, with decreased heel strike at [facility]  Flat foot at IC  Decreased hip extension in TS  Neuro Screen:  Reflexes: 3+ B patellar, 2+ B Achilles  Dermatomes: In tact to light touch  Myotomes: WNL    Range of Motion:    Lumbar ROM  Flexion Min loss   Extension Mod loss   R Lateral Flexion Min loss   L Lateral Flexion Min loss   R Rotation Mod loss   L Rotation Mod loss        Manual Muscle Testing:    Lower Quarter  Hip flexion R 4+/5 L 4/5   Hip extension R 4/5 L 4/5   Hip abduction    Hip adduction    Hip external rotation R 4+/5 L 4/5   Hip internal rotation R 4+/5 L 4/5   Knee flexion R 4/5 L 4-/5   Knee extension R 4+/5 L 4/5   Ankle DF R 5/5 L 5/5   Ankle PF R 4+/5 L 4+/5   Ankle inversion    Ankle eversion    Great toe extension R 5/5 L 5/5   Great toe flexion          Precautions: PMH: COPD, Gerd, Hx of MI, HTN, Lung Cancer, Prostate Cancer, Lumbar stenosis, Aortic aneurysm       Precautions: PMH: COPD, Gerd, Hx of MI, HTN, Lung Cancer, Prostate Cancer, Lumbar stenosis, Aortic aneurysm        Manual                        L1-L5 P-A Grade III-IV Mob                                                                                                                             Exercise Diary   8 21  8 23  8 28 8  30   9 3  9 5  9 10  9 12 9 16   Nu step (warmup)  5'  6'  6'  6'  8'  8'  8'  8' np   Prone on elbows  2 min                  PPU  10x                  PPU with self OP                    Standing lumbar ext 3"x15 3"x15 3"x15 3"x15 3"x15 3"x15 3"x15 3"x15 3"x15   Standing lumbar ext w/ towel OP  3"x10  3"x10  3"x15  3"x15  3"x15  3"x15  3"x15  3"x15 3"x15   TrA in prone  10" 2x10  10" 2x10  10" 2x10  10" 2x10  10" 3x10  10" 3x10  10" 3x10  10" 3x10 10"3x15   Bridges  1x10  2x10  2x10  2x12  3x12  3x12  3x15  3x15     TrA w/ hip abd  5" 2x10  5" 2x10 OTB  5" 2x10 OTB  5" 3x10 OTB  5" [id number]  5" 3x10 GTB  5" 3x10 GTB  3"1C56 GTB np    TrA w/ hip add  5" 2x10  5" 2x10   5" 2x10  5" 2x10  5" 3x10  5" 3x10  5" 3x10  5" 3x15 np    TrA with march   1x10 ea  2x10 ea  2x15 ea  2x15 ea  2x15 ea  2x20 ea  2x20 ea 2x20 ea    Seated pball TrA      10"x10  10"x10  10"x10  10"x10  dc       Seated pball D2 flex      1x10 ea  2x10 ea  2x10 ea  2x10 YTB  2x10 YTB  2x10 YTB np    Mini squat with hip abd TB       1x10  1x10 YTB  2x10 YTB  2x10 YTB  2x10 YTB 2x12 YTB   Stability Press                     Hip hinge ( laundry basket form 6" step)              1x10  2x5 10# 2x10 10#    DKC               10"x5 10"x5

## 2019-09-17 ENCOUNTER — OFFICE VISIT (OUTPATIENT)
Dept: PULMONOLOGY | Facility: CLINIC | Age: 74
End: 2019-09-17
Payer: MEDICARE

## 2019-09-17 VITALS
TEMPERATURE: 97.8 F | OXYGEN SATURATION: 97 % | DIASTOLIC BLOOD PRESSURE: 80 MMHG | BODY MASS INDEX: 26.73 KG/M2 | HEIGHT: 68 IN | HEART RATE: 66 BPM | SYSTOLIC BLOOD PRESSURE: 130 MMHG | WEIGHT: 176.4 LBS

## 2019-09-17 DIAGNOSIS — J44.9 CHRONIC OBSTRUCTIVE PULMONARY DISEASE, UNSPECIFIED COPD TYPE (HCC): Primary | ICD-10-CM

## 2019-09-17 DIAGNOSIS — J30.9 ALLERGIC RHINITIS, UNSPECIFIED SEASONALITY, UNSPECIFIED TRIGGER: ICD-10-CM

## 2019-09-17 PROCEDURE — 99213 OFFICE O/P EST LOW 20 MIN: CPT | Performed by: INTERNAL MEDICINE

## 2019-09-24 ENCOUNTER — OFFICE VISIT (OUTPATIENT)
Dept: NEUROSURGERY | Facility: CLINIC | Age: 74
End: 2019-09-24
Payer: MEDICARE

## 2019-09-24 VITALS
TEMPERATURE: 96.6 F | SYSTOLIC BLOOD PRESSURE: 128 MMHG | HEIGHT: 68 IN | WEIGHT: 176.1 LBS | HEART RATE: 65 BPM | DIASTOLIC BLOOD PRESSURE: 84 MMHG | BODY MASS INDEX: 26.69 KG/M2 | RESPIRATION RATE: 16 BRPM

## 2019-09-24 DIAGNOSIS — M51.26 HERNIATED LUMBAR DISC WITHOUT MYELOPATHY: ICD-10-CM

## 2019-09-24 DIAGNOSIS — M51.36 DEGENERATIVE DISC DISEASE, LUMBAR: Primary | ICD-10-CM

## 2019-09-24 DIAGNOSIS — M48.061 FORAMINAL STENOSIS OF LUMBAR REGION: ICD-10-CM

## 2019-09-24 DIAGNOSIS — R26.81 GAIT INSTABILITY: ICD-10-CM

## 2019-09-24 PROCEDURE — 99212 OFFICE O/P EST SF 10 MIN: CPT | Performed by: NEUROLOGICAL SURGERY

## 2019-09-26 DIAGNOSIS — J44.9 CHRONIC OBSTRUCTIVE PULMONARY DISEASE, UNSPECIFIED COPD TYPE (HCC): ICD-10-CM

## 2019-09-26 RX ORDER — ALBUTEROL SULFATE 90 UG/1
2 AEROSOL, METERED RESPIRATORY (INHALATION) EVERY 6 HOURS PRN
Qty: 3 INHALER | Refills: 3 | Status: SHIPPED | OUTPATIENT
Start: 2019-09-26 | End: 2022-05-05 | Stop reason: SDUPTHER

## 2019-10-02 DIAGNOSIS — K21.9 GASTROESOPHAGEAL REFLUX DISEASE, ESOPHAGITIS PRESENCE NOT SPECIFIED: ICD-10-CM

## 2019-10-02 RX ORDER — OMEPRAZOLE 20 MG/1
CAPSULE, DELAYED RELEASE ORAL
Qty: 90 CAPSULE | Refills: 3 | Status: SHIPPED | OUTPATIENT
Start: 2019-10-02 | End: 2020-10-05

## 2019-10-03 ENCOUNTER — OFFICE VISIT (OUTPATIENT)
Dept: INTERNAL MEDICINE CLINIC | Facility: CLINIC | Age: 74
End: 2019-10-03
Payer: MEDICARE

## 2019-10-03 VITALS
SYSTOLIC BLOOD PRESSURE: 136 MMHG | HEIGHT: 68 IN | HEART RATE: 78 BPM | BODY MASS INDEX: 26.64 KG/M2 | DIASTOLIC BLOOD PRESSURE: 76 MMHG | WEIGHT: 175.8 LBS | OXYGEN SATURATION: 98 %

## 2019-10-03 DIAGNOSIS — R14.3 EXCESSIVE GAS: ICD-10-CM

## 2019-10-03 DIAGNOSIS — K21.9 GASTROESOPHAGEAL REFLUX DISEASE, ESOPHAGITIS PRESENCE NOT SPECIFIED: ICD-10-CM

## 2019-10-03 DIAGNOSIS — Z23 NEED FOR 23-POLYVALENT PNEUMOCOCCAL POLYSACCHARIDE VACCINE: ICD-10-CM

## 2019-10-03 DIAGNOSIS — I10 ESSENTIAL HYPERTENSION: Primary | ICD-10-CM

## 2019-10-03 DIAGNOSIS — J44.9 CHRONIC OBSTRUCTIVE PULMONARY DISEASE, UNSPECIFIED COPD TYPE (HCC): ICD-10-CM

## 2019-10-03 PROCEDURE — G0009 ADMIN PNEUMOCOCCAL VACCINE: HCPCS

## 2019-10-03 PROCEDURE — 99214 OFFICE O/P EST MOD 30 MIN: CPT | Performed by: INTERNAL MEDICINE

## 2019-10-03 PROCEDURE — 90732 PPSV23 VACC 2 YRS+ SUBQ/IM: CPT

## 2019-10-03 RX ORDER — DICYCLOMINE HCL 20 MG
20 TABLET ORAL EVERY 6 HOURS PRN
Qty: 90 TABLET | Refills: 3
Start: 2019-10-03 | End: 2022-03-30 | Stop reason: SDUPTHER

## 2019-10-03 NOTE — PROGRESS NOTES
Assessment/Plan:    Essential hypertension  Controlled, continue med along with healthy diet and exercise    COPD (chronic obstructive pulmonary disease) ([facility])  Stable, continue inhalers and follow up Pulmonary    Gastroesophageal reflux disease  Discussed trying every other day dosing of the omeprazole if he tolerates it that way       Diagnoses and all orders for this visit:    Essential hypertension    Need for 23-polyvalent pneumococcal polysaccharide vaccine  -     PNEUMOCOCCAL POLYSACCHARIDE VACCINE 23-VALENT =>3YO SQ IM    Excessive gas  -     dicyclomine (BENTYL) 20 mg tablet; Take 1 tablet (20 mg total) by mouth every 6 (six) hours as needed (abd cramping)    Chronic obstructive pulmonary disease, unspecified COPD type ([facility] )    Gastroesophageal reflux disease, esophagitis presence not specified    Other orders  -     Cancel: influenza vaccine, [id number], high-dose, PF 0 5 mL (FLUZONE HIGH-DOSE)          Subjective:      Patient ID: Evelia Ludwig is a 72 y o  male  Hypertension:  Patient reports compliance with blood pressure meds, no ankle swelling, no constipation, no lightheadedness  Hypercholesterolemia:  Patient tolerating statin without any significant muscle aches in legs  COPD:  Patient follows with Pulmonary for this and has been doing well    GERD:  Patient using 20 mg daily of the proton pump inhibitor, this patient has occasional GERD symptoms, no food getting stuck  The following portions of the patient's history were reviewed and updated as appropriate: allergies, current medications, past family history, past medical history, past social history, past surgical history and problem list     Review of Systems   Constitutional: Negative for chills, fatigue and fever  HENT: Negative for congestion, nosebleeds, postnasal drip, sore throat and trouble swallowing  Eyes: Negative for pain  Respiratory: Negative for cough, chest tightness, shortness of breath and wheezing  Cardiovascular: Negative for chest pain, palpitations and leg swelling  Gastrointestinal: Negative for abdominal pain, constipation, diarrhea, nausea and vomiting  Endocrine: Negative for polydipsia and polyuria  Genitourinary: Negative for dysuria, flank pain and hematuria  Musculoskeletal: Negative for arthralgias  Skin: Negative for rash  Neurological: Negative for dizziness, tremors and headaches  Hematological: Does not bruise/bleed easily  Psychiatric/Behavioral: Negative for confusion and dysphoric mood  The patient is not nervous/anxious  Objective:      /76   Pulse 78   Ht 5' 7 5" (1 715 m)   Wt 79 7 kg (175 lb 12 8 oz)   SpO2 98%   BMI 27 13 kg/m²          Physical Exam   Constitutional: He is oriented to person, place, and time  He appears well-developed and well-nourished  No distress  HENT:   Head: Normocephalic and atraumatic  Right Ear: External ear normal    Left Ear: External ear normal    Eyes: Conjunctivae are normal  No scleral icterus  Neck: Normal range of motion  Neck supple  No tracheal deviation present  No thyromegaly present  Cardiovascular: Normal rate, regular rhythm and normal heart sounds  No murmur heard  Pulmonary/Chest: Effort normal and breath sounds normal  No respiratory distress  He has no wheezes  He has no rales  Abdominal: Soft  Bowel sounds are normal  There is no tenderness  There is no rebound and no guarding  Musculoskeletal: He exhibits no edema  Lymphadenopathy:     He has no cervical adenopathy  Neurological: He is alert and oriented to person, place, and time  Psychiatric: He has a normal mood and affect  His behavior is normal  Judgment and thought content normal    Vitals reviewed

## 2019-10-03 NOTE — PATIENT INSTRUCTIONS
Problem List Items Addressed This Visit        Digestive    Gastroesophageal reflux disease     Discussed trying every other day dosing of the omeprazole if he tolerates it that way         Relevant Medications    dicyclomine (BENTYL) 20 mg tablet       Respiratory    COPD (chronic obstructive pulmonary disease) ([facility])     Stable, continue inhalers and follow up Pulmonary            Cardiovascular and Mediastinum    Essential hypertension - Primary     Controlled, continue med along with healthy diet and exercise            Other    Excessive gas    Relevant Medications    dicyclomine (BENTYL) 20 mg tablet      Other Visit Diagnoses     Need for 23-polyvalent pneumococcal polysaccharide vaccine        Relevant Orders    PNEUMOCOCCAL POLYSACCHARIDE VACCINE 23-VALENT =>3YO SQ IM

## 2019-10-04 ENCOUNTER — APPOINTMENT (OUTPATIENT)
Dept: LAB | Facility: CLINIC | Age: 74
End: 2019-10-04
Payer: MEDICARE

## 2019-10-04 ENCOUNTER — TRANSCRIBE ORDERS (OUTPATIENT)
Dept: LAB | Facility: CLINIC | Age: 74
End: 2019-10-04

## 2019-10-04 DIAGNOSIS — F31.30 BIPOLAR I DISORDER, MOST RECENT EPISODE DEPRESSED (HCC): ICD-10-CM

## 2019-10-04 DIAGNOSIS — F31.30 BIPOLAR I DISORDER, MOST RECENT EPISODE DEPRESSED (HCC): Primary | ICD-10-CM

## 2019-10-04 LAB
BUN SERPL-MCNC: 27 MG/DL (ref 5–25)
CREAT SERPL-MCNC: 1.18 MG/DL (ref 0.6–1.3)
GFR SERPL CREATININE-BSD FRML MDRD: 62 ML/MIN/1.73SQ M
LITHIUM SERPL-SCNC: 0.7 MMOL/L (ref 0.5–1)
TSH SERPL DL<=0.05 MIU/L-ACNC: 1.16 UIU/ML (ref 0.36–3.74)

## 2019-10-04 PROCEDURE — 84443 ASSAY THYROID STIM HORMONE: CPT

## 2019-10-04 PROCEDURE — 80178 ASSAY OF LITHIUM: CPT

## 2019-10-04 PROCEDURE — 36415 COLL VENOUS BLD VENIPUNCTURE: CPT

## 2019-10-04 PROCEDURE — 82565 ASSAY OF CREATININE: CPT

## 2019-10-04 PROCEDURE — 84520 ASSAY OF UREA NITROGEN: CPT

## 2019-10-22 ENCOUNTER — OFFICE VISIT (OUTPATIENT)
Dept: OBGYN CLINIC | Facility: HOSPITAL | Age: 74
End: 2019-10-22
Payer: MEDICARE

## 2019-10-22 VITALS
DIASTOLIC BLOOD PRESSURE: 94 MMHG | HEART RATE: 64 BPM | SYSTOLIC BLOOD PRESSURE: 158 MMHG | WEIGHT: 175 LBS | HEIGHT: 68 IN | BODY MASS INDEX: 26.52 KG/M2

## 2019-10-22 DIAGNOSIS — M70.62 GREATER TROCHANTERIC BURSITIS, LEFT: Primary | ICD-10-CM

## 2019-10-22 DIAGNOSIS — M16.12 PRIMARY OSTEOARTHRITIS OF ONE HIP, LEFT: ICD-10-CM

## 2019-10-22 DIAGNOSIS — R26.81 GAIT INSTABILITY: ICD-10-CM

## 2019-10-22 PROCEDURE — 99213 OFFICE O/P EST LOW 20 MIN: CPT | Performed by: ORTHOPAEDIC SURGERY

## 2019-10-22 PROCEDURE — 20610 DRAIN/INJ JOINT/BURSA W/O US: CPT | Performed by: ORTHOPAEDIC SURGERY

## 2019-10-22 RX ORDER — LIDOCAINE HYDROCHLORIDE 10 MG/ML
2 INJECTION, SOLUTION INFILTRATION; PERINEURAL
Status: COMPLETED | OUTPATIENT
Start: 2019-10-22 | End: 2019-10-22

## 2019-10-22 RX ORDER — BETAMETHASONE SODIUM PHOSPHATE AND BETAMETHASONE ACETATE 3; 3 MG/ML; MG/ML
12 INJECTION, SUSPENSION INTRA-ARTICULAR; INTRALESIONAL; INTRAMUSCULAR; SOFT TISSUE
Status: COMPLETED | OUTPATIENT
Start: 2019-10-22 | End: 2019-10-22

## 2019-10-22 RX ORDER — BUPIVACAINE HYDROCHLORIDE 2.5 MG/ML
2 INJECTION, SOLUTION INFILTRATION; PERINEURAL
Status: COMPLETED | OUTPATIENT
Start: 2019-10-22 | End: 2019-10-22

## 2019-10-22 RX ADMIN — BETAMETHASONE SODIUM PHOSPHATE AND BETAMETHASONE ACETATE 12 MG: 3; 3 INJECTION, SUSPENSION INTRA-ARTICULAR; INTRALESIONAL; INTRAMUSCULAR; SOFT TISSUE at 09:15

## 2019-10-22 RX ADMIN — BUPIVACAINE HYDROCHLORIDE 2 ML: 2.5 INJECTION, SOLUTION INFILTRATION; PERINEURAL at 09:15

## 2019-10-22 RX ADMIN — LIDOCAINE HYDROCHLORIDE 2 ML: 10 INJECTION, SOLUTION INFILTRATION; PERINEURAL at 09:15

## 2019-10-22 NOTE — PROGRESS NOTES
Assessment:  1  Greater trochanteric bursitis, left     2  Primary osteoarthritis of one hip, left     3  Gait instability         Plan:  The patient was provided with left trochanteric bursa steroid injection  The patient should follow up in 3 months  To do next visit:  Return in about 3 months (around 1/22/2020)  The above stated was discussed in layman's terms and the patient expressed understanding  All questions were answered to the patient's satisfaction  Scribe Attestation    I,:   Dilan Arcos am acting as a scribe while in the presence of the attending physician :        I,:   Hong Gordon MD personally performed the services described in this documentation    as scribed in my presence :              Subjective:   Cherry Mayfield is a 72 y o  male who presents for follow up of left hip  He is s/p left trochanteric bursa steroid injection, 7/16/19  Today he complains of left lateral and anterior hip and left anterolateral thigh pain to the mid thigh  He had completed physical therapy for his low back with benefit to low back pain          Review of systems negative unless otherwise specified in HPI    Past Medical History:   Diagnosis Date    [facility]ortic [facility] [address] )     Benign colon polyp     Bipolar 1 disorder ([facility] [address] )     Cardiac disease     MI    Cervical cord compression with myelopathy (HCC)     COPD (chronic obstructive pulmonary disease) (HCC)     Diverticulitis     Diverticulosis     Gait disturbance     uses cane, leg brace on right    GERD (gastroesophageal reflux disease)     Heart attack ([facility] [address] ) 1996    Hx of resection of large bowel 5/3/2016    Hyperlipidemia     Hypertension     IBS (irritable bowel syndrome)     Lumbar stenosis     Lung cancer ([facility] [address] )     2007 Left lower lobectomy and 2011 Right lung with surgery     Myocardial infarction [facility])     involving other coronary artery    Prostate cancer (HCC)     Shortness of breath     Small bowel obstruction ([address] ) 11/16/2016       Past Surgical History:   Procedure Laterality Date    ANGIOPLASTY      stent    CARDIAC SURGERY      CERVICAL SPINE SURGERY      Cervical decompression with cervical fusion from C3-C7 for spinal stenosis   COLON SURGERY  11/04/2014    ESOPHAGOGASTRODUODENOSCOPY  01/03/2013    with possible Schatzki's ring & small hiatal hernia, mild gastritis    FL INJECTION LEFT HIP (NON ARTHROGRAM)  12/11/2018    FL INJECTION LEFT HIP (NON ARTHROGRAM)  5/9/2019    IR EVAR  8/6/2018    LUNG CANCER SURGERY Right 03/2011    wedge resection for lung tumor, 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    LUNG LOBECTOMY Left     NH ARTHRODESIS ANT INTERBODY MIN DISCECTOMY, CERVICAL BELOW C2 N/A 5/2/2016    Procedure: Anterior cervical diskectomy C3/4, C5/6, C6/7 with anterior plate fixation fusion C3-7;  Posterior decompressive laminectomy C3-7 with lateral mass fixation fusion C3-7 (IMPULSE MONITORING); Surgeon: Patrick Gallego MD;  [facility];  Service: Neurosurgery    NH ARTHRODESIS POSTERIOR INTERBODY LUMBAR N/A 1/30/2017    Procedure: L4-5 AND L5-S1 DECOMPRESSIVE FORAMINOTOMIES, TRANSFORAMINAL LUMBAR INTERBODY AND PEDICLE SCREW FIXATION FUSION L4-S1 (IMPULSE);   Surgeon: Patrick Gallego MD;  [facility];  Service: Neurosurgery    [facility] Juan Hunt RPR DPLMNT AORTO-AORTIC NDGFT N/A 8/6/2018    Procedure: REPAIR ANEURYSM ENDOVASCULAR ABDOMINAL AORTIC  (EVAR) WITH BILATERAL PERCUTANEOUS FEMORAL ACCESS WITH ULTRASOUND GUIDANCE ON THE RIGHT AND PRE CLOSURE;  Surgeon: Zack Borden MD;  [facility];  Service: Vascular    PROSTATECTOMY  2007    for prostate CA - no chemo/RT    SIGMOIDECTOMY      for divertic    SMALL INTESTINE SURGERY N/A 11/17/2016    Procedure: Exploratory Laparotomy, Lysis of adhesions to release small bowel obstruction;  Surgeon: Eugenia Machuca MD;  [facility];  Service:        Family History   Problem Relation Age of Onset    Heart attack Father     Colon cancer Father     Coronary artery disease Father     Heart disease Family     Hyperlipidemia Family     Hypertension Family        Social History     Occupational History    Occupation: Retired   Tobacco Use    Smoking status: Former Smoker     Packs/day: 1 00     Years: 35 00     Pack years: 35 00     Types: Cigarettes     Last attempt to quit:      Years since quittin 8    Smokeless tobacco: Never Used   Substance and Sexual Activity    Alcohol use: Yes     Comment: 6 drinks a week    Drug use: No    Sexual activity: Not on file         Current Outpatient Medications:     acetaminophen (TYLENOL) 500 mg tablet, Take 500 mg by mouth as needed for mild pain , Disp: , Rfl:     albuterol (PROAIR HFA) 90 mcg/act inhaler, Inhale 2 puffs every 6 (six) hours as needed for wheezing, Disp: 3 Inhaler, Rfl: 3    amLODIPine (NORVASC) 2 5 mg tablet, TAKE 1 TABLET BY MOUTH  DAILY, Disp: 90 tablet, Rfl: 3    aspirin (ECOTRIN LOW STRENGTH) 81 mg EC tablet, Take 81 mg by mouth daily, Disp: , Rfl:     atorvastatin (LIPITOR) 40 mg tablet, TAKE 1 TABLET BY MOUTH  DAILY, Disp: 90 tablet, Rfl: 5    ATROVENT HFA 17 MCG/ACT inhaler, USE 2 PUFFS 4 TIMES DAILY, Disp: 51 6 g, Rfl: 5    Cholecalciferol (VITAMIN D PO), Take 2,000 Units by mouth daily  , Disp: , Rfl:     diazepam (VALIUM) 2 mg tablet, Take 2 mg by mouth every 6 (six) hours as needed , Disp: , Rfl:     diazepam (VALIUM) 5 mg tablet, Take 5 mg by mouth as needed for anxiety, Disp: , Rfl:     dicyclomine (BENTYL) 20 mg tablet, Take 1 tablet (20 mg total) by mouth every 6 (six) hours as needed (abd cramping), Disp: 90 tablet, Rfl: 3    fexofenadine (ALLEGRA) 180 MG tablet, Take 180 mg by mouth daily, Disp: , Rfl:     FLUoxetine (PROzac) 10 mg capsule, Take 10 mg by mouth daily , Disp: , Rfl:     fluticasone (FLONASE) 50 mcg/act nasal spray, 1 spray into each nostril as needed    , Disp: , Rfl:     fluticasone-vilanterol (BREO ELLIPTA) 200-25 MCG/INH inhaler, Inhale 1 puff daily Rinse mouth after use , Disp: 3 Inhaler, Rfl: 3    lithium carbonate (LITHOBID) 300 mg CR tablet, Take 300 mg by mouth One tab by mouth every other day and alternating with 2 tabs every other day at bedtime  , Disp: , Rfl:     metoprolol succinate (TOPROL-XL) 25 mg 24 hr tablet, TAKE 1 TABLET BY MOUTH  DAILY, Disp: 90 tablet, Rfl: 5    nitroglycerin (NITRODUR) 0 2 mg/hr, APPLY 1 PATCH DAILY (OFF  FOR 12 HOURS) (Patient taking differently: as needed ), Disp: 90 patch, Rfl: 3    omega-3-acid ethyl esters (LOVAZA) 1 g capsule, TAKE 1 CAPSULE BY MOUTH 3  TIMES DAILY, Disp: 270 capsule, Rfl: 5    omeprazole (PriLOSEC) 20 mg delayed release capsule, TAKE 1 CAPSULE BY MOUTH  DAILY, Disp: 90 capsule, Rfl: 3    Allergies   Allergen Reactions    Bactrim [Sulfamethoxazole-Trimethoprim] Other (See Comments)     AILYN    Nsaids      Annotation - [id number]: unable to take due to use of lithium      Oxycodone Rash            Vitals:    10/22/19 0840   BP: (!) 175/95   Pulse: 74       Objective:  Physical exam  · General: Awake, Alert, Oriented  · Eyes: Pupils equal, round and reactive to light  · Heart: regular rate and rhythm  · Lungs: No audible wheezing  · Abdomen: soft                    Ortho Exam   Left hip:  TTP trochanteric bursa   No pain with ROM  No groin pain with IR  NVID     Diagnostics, reviewed and taken today if performed as documented:    None performed     Procedures, if performed today:    Large joint arthrocentesis: L greater trochanteric bursa  Date/Time: 10/22/2019 9:15 AM  Consent given by: patient  Site marked: site marked  Supporting Documentation  Indications: pain   Procedure Details  Location: hip - L greater trochanteric bursa  Needle size: 22 G  Ultrasound guidance: no  Approach: lateral  Medications administered: 12 mg betamethasone acetate-betamethasone sodium phosphate 6 (3-3) mg/mL; 2 mL bupivacaine 0 25 %; 2 mL lidocaine 1 %    Patient tolerance: patient tolerated the procedure well with no immediate complications  Dressing:  Sterile dressing applied             Portions of the record may have been created with voice recognition software  Occasional wrong word or "sound a like" substitutions may have occurred due to the inherent limitations of voice recognition software  Read the chart carefully and recognize, using context, where substitutions have occurred

## 2019-11-12 ENCOUNTER — TELEPHONE (OUTPATIENT)
Dept: NEUROSURGERY | Facility: CLINIC | Age: 74
End: 2019-11-12

## 2019-11-12 NOTE — TELEPHONE ENCOUNTER
Pt called inquiring about a f/u with [facility]  He reports some of his previous symptoms have improved with therapy, but others have not and he has had new symptoms arrise   Appt offered and accepted

## 2019-11-19 ENCOUNTER — HOSPITAL ENCOUNTER (OUTPATIENT)
Dept: NON INVASIVE DIAGNOSTICS | Facility: CLINIC | Age: 74
Discharge: HOME | End: 2019-11-19
Payer: MEDICARE

## 2019-11-19 DIAGNOSIS — I71.4 ABDOMINAL AORTIC ANEURYSM WITHOUT RUPTURE (HCC): ICD-10-CM

## 2019-11-19 PROCEDURE — 93978 VASCULAR STUDY: CPT

## 2019-11-19 PROCEDURE — 93978 VASCULAR STUDY: CPT | Performed by: SURGERY

## 2019-11-19 PROCEDURE — 93924 LWR XTR VASC STDY BILAT: CPT

## 2019-11-23 ENCOUNTER — APPOINTMENT (OUTPATIENT)
Dept: LAB | Facility: CLINIC | Age: 74
End: 2019-11-23
Payer: MEDICARE

## 2019-11-23 DIAGNOSIS — I25.10 CORONARY ARTERY DISEASE INVOLVING NATIVE CORONARY ARTERY OF NATIVE HEART WITHOUT ANGINA PECTORIS: ICD-10-CM

## 2019-11-23 DIAGNOSIS — E78.2 MIXED HYPERLIPIDEMIA: ICD-10-CM

## 2019-11-23 DIAGNOSIS — I71.4 ABDOMINAL AORTIC ANEURYSM WITHOUT RUPTURE (HCC): ICD-10-CM

## 2019-11-23 LAB
ALBUMIN SERPL BCP-MCNC: 3.6 G/DL (ref 3.5–5)
ALP SERPL-CCNC: 101 U/L (ref 46–116)
ALT SERPL W P-5'-P-CCNC: 33 U/L (ref 12–78)
ANION GAP SERPL CALCULATED.3IONS-SCNC: 4 MMOL/L (ref 4–13)
AST SERPL W P-5'-P-CCNC: 21 U/L (ref 5–45)
BASOPHILS # BLD AUTO: 0.07 THOUSANDS/ΜL (ref 0–0.1)
BASOPHILS NFR BLD AUTO: 1 % (ref 0–1)
BILIRUB SERPL-MCNC: 0.75 MG/DL (ref 0.2–1)
BUN SERPL-MCNC: 22 MG/DL (ref 5–25)
CALCIUM SERPL-MCNC: 9 MG/DL (ref 8.3–10.1)
CHLORIDE SERPL-SCNC: 106 MMOL/L (ref 100–108)
CO2 SERPL-SCNC: 30 MMOL/L (ref 21–32)
CREAT SERPL-MCNC: 1.14 MG/DL (ref 0.6–1.3)
EOSINOPHIL # BLD AUTO: 0.18 THOUSAND/ΜL (ref 0–0.61)
EOSINOPHIL NFR BLD AUTO: 2 % (ref 0–6)
ERYTHROCYTE [DISTWIDTH] IN BLOOD BY AUTOMATED COUNT: 12.6 % (ref 11.6–15.1)
EST. AVERAGE GLUCOSE BLD GHB EST-MCNC: 94 MG/DL
GFR SERPL CREATININE-BSD FRML MDRD: 64 ML/MIN/1.73SQ M
GLUCOSE P FAST SERPL-MCNC: 96 MG/DL (ref 65–99)
HBA1C MFR BLD: 4.9 % (ref 4.2–6.3)
HCT VFR BLD AUTO: 41.9 % (ref 36.5–49.3)
HGB BLD-MCNC: 13.4 G/DL (ref 12–17)
IMM GRANULOCYTES # BLD AUTO: 0.03 THOUSAND/UL (ref 0–0.2)
IMM GRANULOCYTES NFR BLD AUTO: 0 % (ref 0–2)
LYMPHOCYTES # BLD AUTO: 1.35 THOUSANDS/ΜL (ref 0.6–4.47)
LYMPHOCYTES NFR BLD AUTO: 15 % (ref 14–44)
MCH RBC QN AUTO: 30 PG (ref 26.8–34.3)
MCHC RBC AUTO-ENTMCNC: 32 G/DL (ref 31.4–37.4)
MCV RBC AUTO: 94 FL (ref 82–98)
MONOCYTES # BLD AUTO: 0.64 THOUSAND/ΜL (ref 0.17–1.22)
MONOCYTES NFR BLD AUTO: 7 % (ref 4–12)
NEUTROPHILS # BLD AUTO: 6.74 THOUSANDS/ΜL (ref 1.85–7.62)
NEUTS SEG NFR BLD AUTO: 75 % (ref 43–75)
NRBC BLD AUTO-RTO: 0 /100 WBCS
NT-PROBNP SERPL-MCNC: 422 PG/ML
PLATELET # BLD AUTO: 196 THOUSANDS/UL (ref 149–390)
PMV BLD AUTO: 11 FL (ref 8.9–12.7)
POTASSIUM SERPL-SCNC: 4.1 MMOL/L (ref 3.5–5.3)
PROT SERPL-MCNC: 7.1 G/DL (ref 6.4–8.2)
RBC # BLD AUTO: 4.46 MILLION/UL (ref 3.88–5.62)
SODIUM SERPL-SCNC: 140 MMOL/L (ref 136–145)
WBC # BLD AUTO: 9.01 THOUSAND/UL (ref 4.31–10.16)

## 2019-11-23 PROCEDURE — 83036 HEMOGLOBIN GLYCOSYLATED A1C: CPT

## 2019-11-23 PROCEDURE — 83880 ASSAY OF NATRIURETIC PEPTIDE: CPT

## 2019-11-23 PROCEDURE — 36415 COLL VENOUS BLD VENIPUNCTURE: CPT

## 2019-11-23 PROCEDURE — 85025 COMPLETE CBC W/AUTO DIFF WBC: CPT

## 2019-11-23 PROCEDURE — 80053 COMPREHEN METABOLIC PANEL: CPT

## 2019-12-03 ENCOUNTER — OFFICE VISIT (OUTPATIENT)
Dept: CARDIOLOGY CLINIC | Facility: CLINIC | Age: 74
End: 2019-12-03
Payer: MEDICARE

## 2019-12-03 VITALS
HEART RATE: 52 BPM | WEIGHT: 179.4 LBS | SYSTOLIC BLOOD PRESSURE: 132 MMHG | DIASTOLIC BLOOD PRESSURE: 66 MMHG | BODY MASS INDEX: 27.19 KG/M2 | HEIGHT: 68 IN | OXYGEN SATURATION: 94 %

## 2019-12-03 DIAGNOSIS — I25.10 CORONARY ARTERY DISEASE INVOLVING NATIVE CORONARY ARTERY OF NATIVE HEART WITHOUT ANGINA PECTORIS: ICD-10-CM

## 2019-12-03 DIAGNOSIS — E78.2 MIXED HYPERLIPIDEMIA: ICD-10-CM

## 2019-12-03 DIAGNOSIS — I10 ESSENTIAL HYPERTENSION: ICD-10-CM

## 2019-12-03 DIAGNOSIS — I10 ESSENTIAL HYPERTENSION: Primary | ICD-10-CM

## 2019-12-03 DIAGNOSIS — Z95.5 HISTORY OF HEART ARTERY STENT: ICD-10-CM

## 2019-12-03 DIAGNOSIS — I25.2 PAST HISTORY OF MYOCARDIAL INFARCTION: ICD-10-CM

## 2019-12-03 DIAGNOSIS — I71.4 ABDOMINAL AORTIC ANEURYSM WITHOUT RUPTURE (HCC): ICD-10-CM

## 2019-12-03 PROCEDURE — 99214 OFFICE O/P EST MOD 30 MIN: CPT | Performed by: INTERNAL MEDICINE

## 2019-12-03 RX ORDER — AMLODIPINE BESYLATE 2.5 MG/1
2.5 TABLET ORAL DAILY
Qty: 90 TABLET | Refills: 3 | Status: SHIPPED | OUTPATIENT
Start: 2019-12-03 | End: 2020-10-26

## 2019-12-03 NOTE — PROGRESS NOTES
Follow-up - Cardiology   Chavez Harris 72 y o  male MRN: [id number]        Problems    Problem List Items Addressed This Visit        Cardiovascular and Mediastinum    CAD (coronary artery disease)    Essential hypertension - Primary       Other    Mixed hyperlipidemia      Other Visit Diagnoses     Abdominal aortic aneurysm without rupture [facility])        History of heart artery stent        Past history of myocardial infarction                Plan patient seen December 3, 2019  History of old myocardial infarction-no chest pain  Nuclear study 02/2018 without ischemia  Possibly small scar  Echocardiogram 2018 PA pressure my mildly elevated  Cardiac stent 1998-no angina  LDL 73  Hypertension well treated  Stent in abdominal aorta  Followed by vascular  Prostate carcinoma history  Lung cancer operated of 2002 and 2011  Lung cancer followed in [place]  Chronic obstructive lung disease  Cervical spine surgery in the past  Back surgery 2017  History of atrial fibrillation in 2018 under stressful situation no recurrence of atrial fibrillation  Treated for bipolar  He is basically asymptomatic from a cardiac standpoint  Made no change in his medication  HPI: Chavez Harris is a 72y o  year old male    HPI: Chavez Harris is a 72y o  year old male    HPI: Tonny Baig is a 81y o  year old male Patient seen June 6, 2017  Long list of issues above  From cardiac standpoint he is very stable  His creatinine is 1 16  His aortic aneurysm is slightly less than 5 cm was being watched carefully  His alkaline phosphatase elevation is probably secondary to his back fusion  It is coming down very slowly  His last LDL is less than 80  His no cardiac complaints      Patient seen December 12, 2017  His complex past history is best summarized by March 6, 2017 note  I also should have mentioned that he is bipolar and is on lithium  Presently is creatinine is 1  16   Alk phosphatase is 233 last year and it is down to 183  This is not from the liver  It was from the bone  LDL is 61  Id a CT scan of the abdomen and pelvis  Aortic aneurysm 4 7 cm  This is followed could for carefully by the vascular group  Echocardiogram shows left her function to be 65%  There is minimal mitral regurgitation  His pulmonary disease is followed by the pulmonary group  From cardiac standpoint he is very stable  No change in cardiac medications that includes a statin and a beta-blocker and aspirin  As noted above he had a stent in 1998 to is coronaries  He has no chest        Patient seen June 19, 2018  His history includes the following old myocardial infarction  Stent in his coronary 1998  Hyperlipidemia  Hypertension  Abdominal aneurysm that is now 46 mm and he is scheduled for a stent  History prostate cancer  Lung cancer 2007 2011 with surgery in followed in [facility]  obstructive lung disease  Cervical spine surgery  Back surgery in 2017  Hyperlipidemia  Fatty liver  Bowel obstruction in the past  One episode of atrial fibrillation 2018   Now not taking Coumadin  No chest pain  To clear him for surgery I will do a nuclear stress test   Last nuclear stress test 2014  History of bipolar on lithium  Premature atrial contractions on Holter in February 2018  No cardiac complaints        Patient seen November 7, 2018  Since I last saw him he got a stent in his aortic aneurysm  Teryl Block is no leak  From a cardiac standpoint very stable  A1c is 5 3  LDL is 44  He has no chest pain  No recurrent atrial fibrillation  No heart failure  His last nuclear stress test his preoperative in July of 2018  Teryl Block is a scar present  Otherwise diagnosis is as summarized in previous note           Review of Systems   Constitutional: Negative  Respiratory: Negative  Cardiovascular: Negative  Neurological: Negative            Past Medical History:   Diagnosis Date    Aortic aneurysm (HCC)     Benign colon polyp     Bipolar 1 disorder [facility])     Cardiac disease     MI    Cervical cord compression with myelopathy (HCC)     COPD (chronic obstructive pulmonary disease) (HCC)     Diverticulitis     Diverticulosis     Gait disturbance     uses cane, leg brace on right    GERD (gastroesophageal reflux disease)     Heart attack ([place] [address] )     Hx of resection of large bowel 5/3/2016    Hyperlipidemia     Hypertension     IBS (irritable bowel syndrome)     Lumbar stenosis     Lung cancer ([place] [address] )      Left lower lobectomy and  Right lung with surgery     Myocardial infarction [facility])     involving other coronary artery    Prostate cancer ([place] [address] )     Shortness of breath     Small bowel obstruction ([place] [address] ) 2016     Social History     Substance and Sexual Activity   Alcohol Use Yes    Comment: 6 drinks a week     Social History     Substance and Sexual Activity   Drug Use No     Social History     Tobacco Use   Smoking Status Former Smoker    Packs/day: 1 00    Years: 35 00    Pack years: 35 00    Types: Cigarettes    Last attempt to quit:     Years since quittin 9   Smokeless Tobacco Never Used       Allergies: Allergies   Allergen Reactions    Bactrim [Sulfamethoxazole-Trimethoprim] Other (See Comments)     AILYN    Nsaids      Annotation - [id number]: unable to take due to use of lithium      Oxycodone Rash       Medications:     Current Outpatient Medications:     acetaminophen (TYLENOL) 500 mg tablet, Take 500 mg by mouth as needed for mild pain , Disp: , Rfl:     albuterol (PROAIR HFA) 90 mcg/act inhaler, Inhale 2 puffs every 6 (six) hours as needed for wheezing, Disp: 3 Inhaler, Rfl: 3    amLODIPine (NORVASC) 2 5 mg tablet, TAKE 1 TABLET BY MOUTH  DAILY, Disp: 90 tablet, Rfl: 3    aspirin (ECOTRIN LOW STRENGTH) 81 mg EC tablet, Take 81 mg by mouth daily, Disp: , Rfl:     atorvastatin (LIPITOR) 40 mg tablet, TAKE 1 TABLET BY MOUTH  DAILY, Disp: 90 tablet, Rfl: 5    ATROVENT HFA 17 MCG/ACT inhaler, USE 2 PUFFS 4 TIMES DAILY, Disp: 51 6 g, Rfl: 5    Cholecalciferol (VITAMIN D PO), Take 2,000 Units by mouth daily  , Disp: , Rfl:     diazepam (VALIUM) 2 mg tablet, Take 2 mg by mouth every 6 (six) hours as needed , Disp: , Rfl:     diazepam (VALIUM) 5 mg tablet, Take 5 mg by mouth as needed for anxiety, Disp: , Rfl:     dicyclomine (BENTYL) 20 mg tablet, Take 1 tablet (20 mg total) by mouth every 6 (six) hours as needed (abd cramping), Disp: 90 tablet, Rfl: 3    fexofenadine (ALLEGRA) 180 MG tablet, Take 180 mg by mouth daily, Disp: , Rfl:     FLUoxetine (PROzac) 10 mg capsule, Take 10 mg by mouth daily , Disp: , Rfl:     fluticasone (FLONASE) 50 mcg/act nasal spray, 1 spray into each nostril as needed  , Disp: , Rfl:     fluticasone-vilanterol (BREO ELLIPTA) 200-25 MCG/INH inhaler, Inhale 1 puff daily Rinse mouth after use , Disp: 3 Inhaler, Rfl: 3    lithium carbonate (LITHOBID) 300 mg CR tablet, Take 300 mg by mouth One tab by mouth every other day and alternating with 2 tabs every other day at bedtime  , Disp: , Rfl:     metoprolol succinate (TOPROL-XL) 25 mg 24 hr tablet, TAKE 1 TABLET BY MOUTH  DAILY, Disp: 90 tablet, Rfl: 5    nitroglycerin (NITRODUR) 0 2 mg/hr, APPLY 1 PATCH DAILY (OFF  FOR 12 HOURS) (Patient taking differently: as needed ), Disp: 90 patch, Rfl: 3    omega-3-acid ethyl esters (LOVAZA) 1 g capsule, TAKE 1 CAPSULE BY MOUTH 3  TIMES DAILY, Disp: 270 capsule, Rfl: 5    omeprazole (PriLOSEC) 20 mg delayed release capsule, TAKE 1 CAPSULE BY MOUTH  DAILY, Disp: 90 capsule, Rfl: 3      Physical Exam   Constitutional: He is oriented to person, place, and time  He appears well-developed and well-nourished  No distress  Cardiovascular: Normal rate and normal heart sounds  No murmur heard  Pulmonary/Chest: Effort normal and breath sounds normal  No stridor  No respiratory distress  Musculoskeletal: Normal range of motion  He exhibits no edema, tenderness or deformity     Neurological: He is alert and oriented to person, place, and time  Skin: Skin is warm and dry  No rash noted  He is not diaphoretic  No erythema  Laboratory Studies:  CMP:      Invalid input(s): ALBUMIN  NT-proBNP:   Lab Results   Component Value Date    NTBNP 422 (H) 2019      Coags:    Lipid Profile:   Lab Results   Component Value Date    CHOL 139 10/19/2015     Lab Results   Component Value Date    HDL 43 2019     Lab Results   Component Value Date    LDLCALC 73 2019     Lab Results   Component Value Date    TRIG 77 2019       Cardiac testing:     EKG reviewed personally:     Results for orders placed during the hospital encounter of 18   Echo complete with contrast if indicated    N[facility]ative [facility]  [address]  [phone]    Transthoracic Echocardiogram  2D, M-mode, Doppler, and Color Doppler    Study date:  2018    Patient: Panchito Lauren  MR number: [id number]  Account number: [de-identified]  : 1945  Age: 81 years  Gender: Male  Status: Inpatient  Location: Bedside  Height: 67 in  Weight: 179 5 lb  BP: 128/ 76 mmHg    Indications: Limited for LV function    Diagnoses: I48 0 - Atrial fibrillation    Sonographer:  DANA Henry, RDCS  Primary Physician:  Grecia Gloria MD  Referring Physician:  Karin Rainey MD  Group:  [facility]  Cardiology Fellow:  Chela Higgins MD  Interpreting Physician:  Tracy Davis MD    SUMMARY    LEFT VENTRICLE:  Systolic function was normal  Ejection fraction was estimated to be 55 %  There were no regional wall motion abnormalities  Wall thickness was at the upper limits of normal     MITRAL VALVE:  There was trace regurgitation  TRICUSPID VALVE:  There was mild regurgitation  Estimated peak PA pressure was 43 mmHg      HISTORY: PRIOR HISTORY: COPD, Hypertension, Hyperlipidemia, A fib, Sepsis    PROCEDURE: The procedure was performed at the bedside  This was a routine study  The transthoracic approach was used  The study included complete 2D imaging, M-mode, complete spectral Doppler, and color Doppler  The heart rate was 72 bpm,  at the start of the study  Images were obtained from the parasternal, apical, subcostal, and suprasternal notch acoustic windows  Image quality was adequate  LEFT VENTRICLE: Size was normal  Systolic function was normal  Ejection fraction was estimated to be 55 %  There were no regional wall motion abnormalities  Wall thickness was at the upper limits of normal  There was no evidence of  concentric hypertrophy  DOPPLER: Left ventricular diastolic function parameters were normal     RIGHT VENTRICLE: The size was normal  Systolic function was normal     LEFT ATRIUM: Size was normal     RIGHT ATRIUM: Size was normal     MITRAL VALVE: Valve structure was normal  There was normal leaflet separation  DOPPLER: The transmitral velocity was within the normal range  There was no evidence for stenosis  There was trace regurgitation  AORTIC VALVE: The valve was trileaflet  Leaflets exhibited normal thickness and normal cuspal separation  DOPPLER: Transaortic velocity was within the normal range  There was no evidence for stenosis  There was no regurgitation  TRICUSPID VALVE: The valve structure was normal  There was normal leaflet separation  DOPPLER: The transtricuspid velocity was within the normal range  There was no evidence for stenosis  There was mild regurgitation  Pulmonary artery  systolic pressure was mildly increased  Estimated peak PA pressure was 43 mmHg  PULMONIC VALVE: Not well visualized  DOPPLER: The transpulmonic velocity was within the normal range  There was no evidence for stenosis  There was no significant regurgitation  PERICARDIUM: There was no pericardial effusion  A pericardial fat pad was present  AORTA: The root exhibited normal size      SYSTEMIC VEINS: IVC: The inferior vena cava was normal in size and course  Respirophasic changes were normal     PULMONARY VEINS: Not assessed  SYSTEM MEASUREMENT TABLES    2D  %FS: 28 95 %  EDV(Teich): 115 71 ml  EF(Teich): 55 44 %  ESV(Teich): 51 56 ml  IVSd: 1 07 cm  LA Diam: 3 51 cm  LVEDV MOD A4C: 115 44 ml  LVEF MOD A4C: 60 62 %  LVESV MOD A4C: 45 46 ml  LVIDd: 4 95 cm  LVIDs: 3 52 cm  LVLd A4C: 8 78 cm  LVLs A4C: 7 06 cm  LVPWd: 0 96 cm  SV MOD A4C: 69 98 ml  SV(Teich): 64 15 ml    CW  TR Vmax: 2 93 m/s  TR maxP 25 mmHg    PW  E': 0 1 m/s  E/E': 8 44  MV A Goyo: 0 59 m/s  MV Dec Doddridge: 4 51 m/s2  MV DecT: 178 2 ms  MV E Goyo: 0 8 m/s  MV E/A Ratio: 1 37  MV PHT: 51 68 ms  MVA By PHT: 4 26 cm2    [facility] Accredited Echocardi[facility]    Prepared and electronically signed by    Tina Alva MD  Signed 2018 14:50:06       No results found for this or any previous visit  No results found for this or any previous visit    Results for orders placed during the hospital encounter of 18   NM myocardial perfusion spect (stress and/or rest)    [address]  [address] [facility], [address] [place] [address]  [phone]    Rest/Stress Gated SPECT Myocardial Perfusion Imaging After Regadenoson    Patient: Jeremias Alvarez  MR number: [id number]  Account number: [de-identified]  : 1945  Age: 81 years  Gender: Male  Status: Outpatient  Location: [address] [place] [address] [facility]  Height: 67 in  Weight: 185 lb  BP: 160/ 80 mmHg    Allergies: NSAIDS, OXYCODONE    Diagnosis: Z01 810 - Encounter for preprocedural cardiovascular examination    Interpreting Physician:  Arabella Dan MD  Referring Physician:  Madai Shelton MD  Primary Physician:  Sierra Lyon MD  Technician:  Soo Reynoso  RN:  Renee Scott RN  Group:  [facility]  Report Prepared by[de-identified]  Renee Scott RN    INDICATIONS: Pre-operative risk assessment for AAA repair    HISTORY: The patient is a 81 year old  male  History of 5 2 cm AAA, bipolar disorder, cervical and lumbar back surgery, wears brace right leg Chest pain status: no chest pain  Coronary artery disease risk factors: dyslipidemia, hypertension, and former smoking  Cardiovascular  history: coronary artery disease, prior myocardial infarction (1996), and arrhythmia  Prior cardiovascular procedures: percutaneous transluminal coronary angioplasty s/p stent x 1 (1998) Co-morbidity: history of lung cancer s/p  chemotherapy and left lobectomy and right wedge resection,COPD, and prostate cancer s/p prostatectomy Medications: a beta blocker, a calcium channel blocker, aspirin, and a lipid lowering agent  Previous test results: abnormal nuclear  study  PHYSICAL EXAM: Baseline physical exam screening: no wheezes audible  REST ECG: Normal sinus rhythm  The ECG showed occasional premature ventricular contractions  PROCEDURE: The study was performed at the [place] [address] [facility]  A regadenoson infusion pharmacologic stress test was performed  Gated SPECT myocardial perfusion imaging was performed after stress and at rest  Systolic blood  pressure was 160 mmHg, at the start of the study  Diastolic blood pressure was 80 mmHg, at the start of the study  The heart rate was 64 bpm, at the start of the study  IV double checked  Regadenoson protocol:  HR bpm SBP mmHg DBP mmHg Symptoms  Baseline 64 160 80 none  Immediate 100 172 88 mild dyspnea  1 min 67 148 86 subsiding  2 min 65 144 82 none  The patient also performed low level exercise  STRESS SUMMARY: Duration of pharmacologic stress was 3 min  Maximal heart rate during stress was 100 bpm  The heart rate response to stress was normal  There was resting hypertension with an appropriate blood pressure response to stress  The rate-pressure product for the peak heart rate and blood pressure was 64816  There was no chest pain during stress   The stress test was terminated due to protocol completion  Pre oxygen saturation: 97 %  Peak oxygen saturation: 97 %  The stress ECG was negative for ischemia and normal  Arrhythmia during stress: isolated premature ventricular beats  ISOTOPE ADMINISTRATION:  Resting isotope administration Stress isotope administration  Agent Tetrofosmin Tetrofosmin  Dose 10 47 mCi 32 6 mCi  Date 06/26/2018 06/26/2018  Injection-image interval 40 min 54 min    The radiopharmaceutical was injected at the peak effect of pharmacologic stress  MYOCARDIAL PERFUSION IMAGING:  The image quality was good  Rotating projection images reveal mild diaphragmatic attenuation  Left ventricular size was normal  The TID ratio was 1 17  PERFUSION DEFECTS:  -  No significant ischemia visualized  There was a small, severe, fixed myocardial perfusion defect of the basalto mid inferoseptal wall  PERFUSION QUANTITATION:  The summed perfusion score measured 6 during stress, 2 at rest, with a difference of 3  GATED SPECT:  Non-gated study secondary to frequent PVCs  SUMMARY:  -  Stress results: There was resting hypertension with an appropriate blood pressure response to stress  There was no chest pain during stress  -  ECG conclusions: The stress ECG was negative for ischemia and normal   -  Perfusion imaging: No significant ischemia visualized  There was a small, severe, fixed myocardial perfusion defect of the basalto mid inferoseptal wall  -  Gated SPECT: Non-gated study secondary to frequent PVCs  Prepared and signed by    Charito Burger MD  Signed 06/26/2018 13:18:39         Clai Nguyen MD    Portions of the record may have been created with voice recognition software   Occasional wrong word or "sound a like" substitutions may have occurred due to the inherent limitations of voice recognition software   Read the chart carefully and recognize, using context, where substitutions have occurred

## 2019-12-11 ENCOUNTER — TELEPHONE (OUTPATIENT)
Dept: NEUROLOGY | Facility: CLINIC | Age: 74
End: 2019-12-11

## 2019-12-11 ENCOUNTER — OFFICE VISIT (OUTPATIENT)
Dept: INTERNAL MEDICINE CLINIC | Facility: CLINIC | Age: 74
End: 2019-12-11
Payer: MEDICARE

## 2019-12-11 VITALS
WEIGHT: 177.2 LBS | BODY MASS INDEX: 26.86 KG/M2 | HEART RATE: 58 BPM | TEMPERATURE: 98.1 F | DIASTOLIC BLOOD PRESSURE: 78 MMHG | OXYGEN SATURATION: 93 % | SYSTOLIC BLOOD PRESSURE: 136 MMHG | HEIGHT: 68 IN

## 2019-12-11 DIAGNOSIS — Z00.00 MEDICARE ANNUAL WELLNESS VISIT, SUBSEQUENT: ICD-10-CM

## 2019-12-11 DIAGNOSIS — R26.9 GAIT ABNORMALITY: Primary | ICD-10-CM

## 2019-12-11 DIAGNOSIS — I10 ESSENTIAL HYPERTENSION: ICD-10-CM

## 2019-12-11 DIAGNOSIS — E78.2 MIXED HYPERLIPIDEMIA: ICD-10-CM

## 2019-12-11 PROCEDURE — 99214 OFFICE O/P EST MOD 30 MIN: CPT | Performed by: INTERNAL MEDICINE

## 2019-12-11 PROCEDURE — G0439 PPPS, SUBSEQ VISIT: HCPCS | Performed by: INTERNAL MEDICINE

## 2019-12-11 NOTE — PATIENT INSTRUCTIONS
Problem List Items Addressed This Visit        Cardiovascular and Mediastinum    Essential hypertension     Slightly elevated at 1st, then came down to normal range on recheck, continue meds            Other    Mixed hyperlipidemia     Continue statin along with healthy diet and exercise         Medicare annual wellness visit, subsequent     Discussed preventative health, cancer screening, immunizations, and safety issues  I recommend getting the Shingrix shot to help prevent Shingles  You can get it a pharmacy, and they can administer it there  It is a two shot series with the second shot needed between 2-6 months after the first shot  I would not recommend getting the shot before an important or fun event in case you were to have a reaction to the shot like a sore arm or flu-like symptoms  I make the same recommendation about any shot, as people can have a reaction to any shot  Gait abnormality - Primary     With gait a little shuffling, I recommend eval with neurology  Pt also does have chronic low back pain symptoms  Relevant Orders    Ambulatory referral to Neurology          Medicare Preventive Visit Patient Instructions  Thank you for completing your Welcome to Medicare Visit or Medicare Annual Wellness Visit today  Your next wellness visit will be due in one year (12/11/2020)  The screening/preventive services that you may require over the next 5-10 years are detailed below  Some tests may not apply to you based off risk factors and/or age  Screening tests ordered at today's visit but not completed yet may show as past due  Also, please note that scanned in results may not display below    Preventive Screenings:  Service Recommendations Previous Testing/Comments   Colorectal Cancer Screening  · Colonoscopy    · Fecal Occult Blood Test (FOBT)/Fecal Immunochemical Test (FIT)  · Fecal DNA/Cologuard Test  · Flexible Sigmoidoscopy Age: 54-65 years old   Colonoscopy: every 10 years (May be performed more frequently if at higher risk)  OR  FOBT/FIT: every 1 year  OR  Cologuard: every 3 years  OR  Sigmoidoscopy: every 5 years  Screening may be recommended earlier than age 48 if at higher risk for colorectal cancer  Also, an individualized decision between you and your healthcare provider will decide whether screening between the ages of 74-80 would be appropriate  Colonoscopy: 06/11/2015  FOBT/FIT: Not on file  Cologuard: Not on file  Sigmoidoscopy: Not on file    Screening Current     Prostate Cancer Screening Individualized decision between patient and health care provider in men between ages of 53-78   Medicare will cover every 12 months beginning on the day after your 50th birthday PSA: <0 1 ng/mL     History Prostate Cancer     Hepatitis C Screening Once for adults born between 1945 and 1965  More frequently in patients at high risk for Hepatitis C Hep C Antibody: 06/07/2017    Screening Current   Diabetes Screening 1-2 times per year if you're at risk for diabetes or have pre-diabetes Fasting glucose: 96 mg/dL   A1C: 4 9 %    Screening Current   Cholesterol Screening Once every 5 years if you don't have a lipid disorder  May order more often based on risk factors  Lipid panel: 03/20/2019    Screening Not Indicated  History Lipid Disorder      Other Preventive Screenings Covered by Medicare:  1  Abdominal Aortic Aneurysm (AAA) Screening: covered once if your at risk  You're considered to be at risk if you have a family history of AAA or a male between the age of 73-68 who smoking at least 100 cigarettes in your lifetime    2  Lung Cancer Screening: covers low dose CT scan once per year if you meet all of the following conditions: (1) Age 50-69; (2) No signs or symptoms of lung cancer; (3) Current smoker or have quit smoking within the last 15 years; (4) You have a tobacco smoking history of at least 30 pack years (packs per day x number of years you smoked); (5) You get a written order from a healthcare provider  3  Glaucoma Screening: covered annually if you're considered high risk: (1) You have diabetes OR (2) Family history of glaucoma OR (3)  aged 48 and older OR (3)  American aged 72 and older  3  Osteoporosis Screening: covered every 2 years if you meet one of the following conditions: (1) Have a vertebral abnormality; (2) On glucocorticoid therapy for more than 3 months; (3) Have primary hyperparathyroidism; (4) On osteoporosis medications and need to assess response to drug therapy  5  HIV Screening: covered annually if you're between the age of 12-76  Also covered annually if you are younger than 13 and older than 72 with risk factors for HIV infection  For pregnant patients, it is covered up to 3 times per pregnancy  Immunizations:  Immunization Recommendations   Influenza Vaccine Annual influenza vaccination during flu season is recommended for all persons aged >= 6 months who do not have contraindications   Pneumococcal Vaccine (Prevnar and Pneumovax)  * Prevnar = PCV13  * Pneumovax = PPSV23 Adults 25-60 years old: 1-3 doses may be recommended based on certain risk factors  Adults 72 years old: Prevnar (PCV13) vaccine recommended followed by Pneumovax (PPSV23) vaccine  If already received PPSV23 since turning 65, then PCV13 recommended at least one year after PPSV23 dose  Hepatitis B Vaccine 3 dose series if at intermediate or high risk (ex: diabetes, end stage renal disease, liver disease)   Tetanus (Td) Vaccine - COST NOT COVERED BY MEDICARE PART B Following completion of primary series, a booster dose should be given every 10 years to maintain immunity against tetanus  Td may also be given as tetanus wound prophylaxis  Tdap Vaccine - COST NOT COVERED BY MEDICARE PART B Recommended at least once for all adults  For pregnant patients, recommended with each pregnancy     Shingles Vaccine (Shingrix) - COST NOT COVERED BY MEDICARE PART B  2 shot series recommended in those aged 48 and above     Health Maintenance Due:      Topic Date Due    CRC Screening: Colonoscopy  10/31/2023    Hepatitis C Screening  Completed     Immunizations Due:  There are no preventive care reminders to display for this patient  Advance Directives   What are advance directives? Advance directives are legal documents that state your wishes and plans for medical care  These plans are made ahead of time in case you lose your ability to make decisions for yourself  Advance directives can apply to any medical decision, such as the treatments you want, and if you want to donate organs  What are the types of advance directives? There are many types of advance directives, and each state has rules about how to use them  You may choose a combination of any of the following:  · Living will: This is a written record of the treatment you want  You can also choose which treatments you do not want, which to limit, and which to stop at a certain time  This includes surgery, medicine, IV fluid, and tube feedings  · Durable power of  for healthcare [facility]): This is a written record that states who you want to make healthcare choices for you when you are unable to make them for yourself  This person, called a proxy, is usually a family member or a friend  You may choose more than 1 proxy  · Do not resuscitate (DNR) order:  A DNR order is used in case your heart stops beating or you stop breathing  It is a request not to have certain forms of treatment, such as CPR  A DNR order may be included in other types of advance directives  · Medical directive: This covers the care that you want if you are in a coma, near death, or unable to make decisions for yourself  You can list the treatments you want for each condition  Treatment may include pain medicine, surgery, blood transfusions, dialysis, IV or tube feedings, and a ventilator (breathing machine)  · Values history:   This document has questions about your views, beliefs, and how you feel and think about life  This information can help others choose the care that you would choose  Why are advance directives important? An advance directive helps you control your care  Although spoken wishes may be used, it is better to have your wishes written down  Spoken wishes can be misunderstood, or not followed  Treatments may be given even if you do not want them  An advance directive may make it easier for your family to make difficult choices about your care  Weight Management   Why it is important to manage your weight:  Being overweight increases your risk of health conditions such as heart disease, high blood pressure, type 2 diabetes, and certain types of cancer  It can also increase your risk for osteoarthritis, sleep apnea, and other respiratory problems  Aim for a slow, steady weight loss  Even a small amount of weight loss can lower your risk of health problems  How to lose weight safely:  A safe and healthy way to lose weight is to eat fewer calories and get regular exercise  You can lose up about 1 pound a week by decreasing the number of calories you eat by 500 calories each day  Healthy meal plan for weight management:  A healthy meal plan includes a variety of foods, contains fewer calories, and helps you stay healthy  A healthy meal plan includes the following:  · Eat whole-grain foods more often  A healthy meal plan should contain fiber  Fiber is the part of grains, fruits, and vegetables that is not broken down by your body  Whole-grain foods are healthy and provide extra fiber in your diet  Some examples of whole-grain foods are whole-wheat breads and pastas, oatmeal, brown rice, and bulgur  · Eat a variety of vegetables every day  Include dark, leafy greens such as spinach, kale, nya greens, and mustard greens  Eat yellow and orange vegetables such as carrots, sweet potatoes, and winter squash     · Eat a variety of fruits every day  Choose fresh or canned fruit (canned in its own juice or light syrup) instead of juice  Fruit juice has very little or no fiber  · Eat low-fat dairy foods  Drink fat-free (skim) milk or 1% milk  Eat fat-free yogurt and low-fat cottage cheese  Try low-fat cheeses such as mozzarella and other reduced-fat cheeses  · Choose meat and other protein foods that are low in fat  Choose beans or other legumes such as split peas or lentils  Choose fish, skinless poultry (chicken or turkey), or lean cuts of red meat (beef or pork)  Before you cook meat or poultry, cut off any visible fat  · Use less fat and oil  Try baking foods instead of frying them  Add less fat, such as margarine, sour cream, regular salad dressing and mayonnaise to foods  Eat fewer high-fat foods  Some examples of high-fat foods include french fries, doughnuts, ice cream, and cakes  · Eat fewer sweets  Limit foods and drinks that are high in sugar  This includes candy, cookies, regular soda, and sweetened drinks  Exercise:  Exercise at least 30 minutes per day on most days of the week  Some examples of exercise include walking, biking, dancing, and swimming  You can also fit in more physical activity by taking the stairs instead of the elevator or parking farther away from stores  Ask your healthcare provider about the best exercise plan for you  © Copyright [employer] 2018 Information is for End User's use only and may not be sold, redistributed or otherwise used for commercial purposes   All illustrations and images included in CareNotes® are the copyrighted property of A D [employer] M , [employer]  or [address] [place] [address] [email or web address]

## 2019-12-11 NOTE — PROGRESS NOTES
Assessment/Plan:    Medicare annual wellness visit, subsequent  Discussed preventative health, cancer screening, immunizations, and safety issues  I recommend getting the Shingrix shot to help prevent Shingles  You can get it a pharmacy, and they can administer it there  It is a two shot series with the second shot needed between 2-6 months after the first shot  I would not recommend getting the shot before an important or fun event in case you were to have a reaction to the shot like a sore arm or flu-like symptoms  I make the same recommendation about any shot, as people can have a reaction to any shot  Gait abnormality  With gait a little shuffling, I recommend eval with neurology  Pt also does have chronic low back pain symptoms  Essential hypertension  Slightly elevated at 1st, then came down to normal range on recheck, continue meds    Mixed hyperlipidemia  Continue statin along with healthy diet and exercise       Diagnoses and all orders for this visit:    Gait abnormality  -     Ambulatory referral to Neurology; Future    Essential hypertension    Mixed hyperlipidemia    Medicare annual wellness visit, subsequent          Subjective:      Patient ID: Kirit Daniels is a 72 y o  male  Patient was here for annual wellness visit, when walking was noticed that he had a shuffling gait and slightly stooped posture  Patient denies difficulty getting out of chairs, patient denies tremor, patient denies micrographia,      The following portions of the patient's history were reviewed and updated as appropriate: allergies, current medications, past family history, past medical history, past social history, past surgical history and problem list     Review of Systems   Constitutional: Negative for chills, fatigue and fever  HENT: Negative for congestion, nosebleeds, postnasal drip, sore throat and trouble swallowing  Eyes: Negative for pain     Respiratory: Negative for cough, chest tightness, shortness of breath and wheezing  Cardiovascular: Negative for chest pain, palpitations and leg swelling  Gastrointestinal: Negative for abdominal pain, constipation, diarrhea, nausea and vomiting  Endocrine: Negative for polydipsia and polyuria  Genitourinary: Negative for dysuria, flank pain and hematuria  Musculoskeletal: Negative for arthralgias  Skin: Negative for rash  Neurological: Negative for dizziness, tremors and headaches  Hematological: Does not bruise/bleed easily  Psychiatric/Behavioral: Negative for confusion and dysphoric mood  The patient is not nervous/anxious  Objective:      /78   Pulse 58   Temp 98 1 °F (36 7 °C)   Ht 5' 7 5" (1 715 m)   Wt 80 4 kg (177 lb 3 2 oz)   SpO2 93%   BMI 27 34 kg/m²          Physical Exam   Constitutional: He is oriented to person, place, and time  He appears well-developed and well-nourished  No distress  HENT:   Head: Normocephalic and atraumatic  Right Ear: External ear normal    Left Ear: External ear normal    Eyes: Conjunctivae are normal  No scleral icterus  Neck: Normal range of motion  Neck supple  No tracheal deviation present  No thyromegaly present  Cardiovascular: Normal rate, regular rhythm and normal heart sounds  Pulmonary/Chest: Effort normal and breath sounds normal  No respiratory distress  He has no wheezes  He has no rales  Abdominal: Soft  Bowel sounds are normal  There is no tenderness  There is no rebound and no guarding  Musculoskeletal: He exhibits no edema  Lymphadenopathy:     He has no cervical adenopathy  Neurological: He is alert and oriented to person, place, and time  Posture little stooped, shuffling gait, but patient is wearing new thick soled sneakers and walking on a carpet   Psychiatric: He has a normal mood and affect   His behavior is normal  Judgment and thought content normal

## 2019-12-11 NOTE — PROGRESS NOTES
Assessment and Plan:     Problem List Items Addressed This Visit     None           Preventive health issues were discussed with patient, and age appropriate screening tests were ordered as noted in patient's After Visit Summary  Personalized health advice and appropriate referrals for health education or preventive services given if needed, as noted in patient's After Visit Summary       History of Present Illness:     Patient presents for Medicare Annual Wellness visit    Patient Care Team:  Iron Gutierrez MD as PCP - Reid Spitz, MD Emmalene Chen, MD Alfreda July, MD Achilles Medley, MD Normand Gosselin, MD Sven Catalan, MD Lenor Mylar, MD Dee Dee Silva MD     Problem List:     Patient Active Problem List   Diagnosis    Gait instability    Cervical myelopathy [facility])    Adenocarcinoma of prostate ([facility]    CAD (coronary artery disease)    Bipolar affective disorder ([facility] )    COPD (chronic obstructive pulmonary disease) ([facility] )    Gastroesophageal reflux disease    Mixed hyperlipidemia    Essential hypertension    Aneurysm of infrarenal abdominal aorta ([facility] )    History of lumbar surgery    Impaired mobility and activities of daily living    Cervical radiculopathy due to degenerative joint disease of spine    Degenerative disc disease, lumbar    Foraminal stenosis of lumbar region    Spondylolisthesis of lumbar region    Myelopathy concurrent with and due to lumbosacral intervertebral disc disorder    Arrhythmia    Diastolic heart failure ([address]    New onset atrial fibrillation ([address] )    Pain in left hip    Greater trochanteric bursitis, left    Skin lesion    Medicare annual wellness visit, subsequent    Trochanteric bursitis of left hip    Primary osteoarthritis of left hip    Malignant neoplasm of lung ([address] )    Excessive gas    Extradural cyst of spine    Herniated lumbar disc without myelopathy      Past Medical and Surgical History:     Past Medical History:   Diagnosis Date    Aortic aneurysm (HCC)     Benign colon polyp     Bipolar 1 disorder ([facility] )     Cardiac disease     MI    Cervical cord compression with myelopathy (HCC)     COPD (chronic obstructive pulmonary disease) (HCC)     Diverticulitis     Diverticulosis     Gait disturbance     uses cane, leg brace on right    GERD (gastroesophageal reflux disease)     Heart attack ([facility] ) 1996    Hx of resection of large bowel 5/3/2016    Hyperlipidemia     Hypertension     IBS (irritable bowel syndrome)     Lumbar stenosis     Lung cancer ([facility] )     2007 Left lower lobectomy and 2011 Right lung with surgery     Myocardial infarction [facility])     involving other coronary artery    Prostate cancer ([facility] )     Shortness of breath     Small bowel obstruction ([facility] ) 11/16/2016     Past Surgical History:   Procedure Laterality Date    ANGIOPLASTY      stent    CARDIAC SURGERY      CERVICAL SPINE SURGERY      Cervical decompression with cervical fusion from C3-C7 for spinal stenosis   COLON SURGERY  11/04/2014    ESOPHAGOGASTRODUODENOSCOPY  01/03/2013    with possible Schatzki's ring & small hiatal hernia, mild gastritis    FL INJECTION LEFT HIP (NON ARTHROGRAM)  12/11/2018    FL INJECTION LEFT HIP (NON ARTHROGRAM)  5/9/2019    IR EVAR  8/6/2018    LUNG CANCER SURGERY Right 03/2011    wedge resection for lung tumor, 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    LUNG LOBECTOMY Left     WA ARTHRODESIS ANT INTERBODY MIN DISCECTOMY, CERVICAL BELOW C2 N/A 5/2/2016    Procedure: Anterior cervical diskectomy C3/4, C5/6, C6/7 with anterior plate fixation fusion C3-7;  Posterior decompressive laminectomy C3-7 with lateral mass fixation fusion C3-7 (IMPULSE MONITORING);   Surgeon: Patrick Gallego MD;  [facility];  Service: Neurosurgery    WA ARTHRODESIS POSTERIOR INTERBODY LUMBAR N/A 1/30/2017    Procedure: L4-5 AND L5-S1 DECOMPRESSIVE FORAMINOTOMIES, TRANSFORAMINAL LUMBAR INTERBODY AND PEDICLE SCREW FIXATION FUSION L4-S1 (IMPULSE);   Surgeon: Sabino Martinez MD;  [facility];  Service: Neurosurgery    [place] [address] RPR DPLMNT AORTO-AORTIC NDGFT N/A 2018    Procedure: REPAIR ANEURYSM ENDOVASCULAR ABDOMINAL AORTIC  (EVAR) WITH BILATERAL PERCUTANEOUS FEMORAL ACCESS WITH ULTRASOUND GUIDANCE ON THE RIGHT AND PRE CLOSURE;  Surgeon: Dc Garcia MD;  [facility];  Service: Vascular    PROSTATECTOMY      for prostate CA - no chemo/RT    SIGMOIDECTOMY      for divertic    SMALL INTESTINE SURGERY N/A 2016    Procedure: Exploratory Laparotomy, Lysis of adhesions to release small bowel obstruction;  Surgeon: Laura Gates MD;  [facility];  Service:       Family History:     Family History   Problem Relation Age of Onset    Heart attack Father     Colon cancer Father     Coronary artery disease Father     Heart disease Family     Hyperlipidemia Family     Hypertension Family       Social History:     Social History     Socioeconomic History    Marital status: /Civil Union     Spouse name: Not on file    Number of children: 1    Years of education: Not on file    Highest education level: Not on file   Occupational History    Occupation: Retired   Social Needs    Financial resource strain: Not on file    Food insecurity:     Worry: Not on file     Inability: Not on file    Transportation needs:     Medical: Not on file     Non-medical: Not on file   Tobacco Use    Smoking status: Former Smoker     Packs/day: 1 00     Years: 35 00     Pack years: 35 00     Types: Cigarettes     Last attempt to quit: 2011     Years since quittin 9    Smokeless tobacco: Never Used   Substance and Sexual Activity    Alcohol use: Yes     Comment: 6 drinks a week    Drug use: No    Sexual activity: Not on file   Lifestyle    Physical activity:     Days per week: Not on file     Minutes per session: Not on file    Stress: Not on file   Relationships  Social connections:     Talks on phone: Not on file     Gets together: Not on file     Attends Muslim service: Not on file     Active member of club or organization: Not on file     Attends meetings of clubs or organizations: Not on file     Relationship status: Not on file    Intimate partner violence:     Fear of current or ex partner: Not on file     Emotionally abused: Not on file     Physically abused: Not on file     Forced sexual activity: Not on file   Other Topics Concern    Not on file   Social History Narrative    Not on file       Medications and Allergies:     Current Outpatient Medications   Medication Sig Dispense Refill    acetaminophen (TYLENOL) 500 mg tablet Take 500 mg by mouth as needed for mild pain   albuterol (PROAIR HFA) 90 mcg/act inhaler Inhale 2 puffs every 6 (six) hours as needed for wheezing 3 Inhaler 3    amLODIPine (NORVASC) 2 5 mg tablet Take 1 tablet (2 5 mg total) by mouth daily 90 tablet 3    aspirin (ECOTRIN LOW STRENGTH) 81 mg EC tablet Take 81 mg by mouth daily      atorvastatin (LIPITOR) 40 mg tablet TAKE 1 TABLET BY MOUTH  DAILY 90 tablet 5    ATROVENT HFA 17 MCG/ACT inhaler USE 2 PUFFS 4 TIMES DAILY 51 6 g 5    Cholecalciferol (VITAMIN D PO) Take 2,000 Units by mouth daily        diazepam (VALIUM) 2 mg tablet Take 2 mg by mouth every 6 (six) hours as needed       diazepam (VALIUM) 5 mg tablet Take 5 mg by mouth as needed for anxiety      dicyclomine (BENTYL) 20 mg tablet Take 1 tablet (20 mg total) by mouth every 6 (six) hours as needed (abd cramping) 90 tablet 3    fexofenadine (ALLEGRA) 180 MG tablet Take 180 mg by mouth daily      FLUoxetine (PROzac) 10 mg capsule Take 10 mg by mouth daily       fluticasone (FLONASE) 50 mcg/act nasal spray 1 spray into each nostril as needed   fluticasone-vilanterol (BREO ELLIPTA) 200-25 MCG/INH inhaler Inhale 1 puff daily Rinse mouth after use   3 Inhaler 3    lithium carbonate (LITHOBID) 300 mg CR tablet Take 300 mg by mouth One tab by mouth every other day and alternating with 2 tabs every other day at bedtime   metoprolol succinate (TOPROL-XL) 25 mg 24 hr tablet TAKE 1 TABLET BY MOUTH  DAILY 90 tablet 5    nitroglycerin (NITRODUR) 0 2 mg/hr APPLY 1 PATCH DAILY (OFF  FOR 12 HOURS) (Patient taking differently: as needed ) 90 patch 3    omega-3-acid ethyl esters (LOVAZA) 1 g capsule TAKE 1 CAPSULE BY MOUTH 3  TIMES DAILY 270 capsule 5    omeprazole (PriLOSEC) 20 mg delayed release capsule TAKE 1 CAPSULE BY MOUTH  DAILY 90 capsule 3     No current facility-administered medications for this visit  Allergies   Allergen Reactions    Bactrim [Sulfamethoxazole-Trimethoprim] Other (See Comments)     AILYN    Nsaids      Annotation - [id number]: unable to take due to use of lithium   Oxycodone Rash      Immunizations:     Immunization History   Administered Date(s) Administered    INFLUENZA 10/01/2015, 10/07/2018    Influenza Quadrivalent Preservative Free 3 years and older IM 10/01/2015    Influenza Split High Dose Preservative Free IM 10/25/2016, 10/03/2018    Influenza TIV (IM) 1947, 10/10/2012    Influenza, high dose seasonal 0 5 mL 09/27/2019    Pneumococcal Conjugate 13-Valent 10/25/2016    Pneumococcal Polysaccharide PPV23 10/03/2019    Td (adult), adsorbed 12/01/2009    Zoster 01/01/2014, 01/01/2014      Health Maintenance:         Topic Date Due    CRC Screening: Colonoscopy  10/31/2023    Hepatitis C Screening  Completed     There are no preventive care reminders to display for this patient  Medicare Health Risk Assessment:     /89   Pulse 58   Temp 98 1 °F (36 7 °C)   Ht 5' 7 5" (1 715 m)   Wt 80 4 kg (177 lb 3 2 oz)   SpO2 93%   BMI 27 34 kg/m²      Geneva Musa is here for his Subsequent Wellness visit  Health Risk Assessment:   Patient rates overall health as fair  Patient feels that their physical health rating is same  Eyesight was rated as same   Hearing was rated as same  Patient feels that their emotional and mental health rating is same  Pain experienced in the last 7 days has been some  Patient's pain rating has been 4/10  Patient states that he has experienced no weight loss or gain in last 6 months  Depression Screening:   PHQ-2 Score: 0      Fall Risk Screening: In the past year, patient has experienced: no history of falling in past year      Home Safety:  Patient has trouble with stairs inside or outside of their home  Patient has working smoke alarms and has working carbon monoxide detector  Home safety hazards include: none  Nutrition:   Current diet is Regular  Medications:   Patient is currently taking over-the-counter supplements  OTC medications include: see medication list  Patient is able to manage medications  Activities of Daily Living (ADLs)/Instrumental Activities of Daily Living (IADLs):   Walk and transfer into and out of bed and chair?: Yes  Dress and groom yourself?: Yes    Bathe or shower yourself?: Yes    Feed yourself? Yes  Do your laundry/housekeeping?: Yes  Manage your money, pay your bills and track your expenses?: Yes  Make your own meals?: Yes    Do your own shopping?: Yes    Durable Medical Equipment Suppliers  walker, cane    Previous Hospitalizations:   Any hospitalizations or ED visits within the last 12 months?: No      Advance Care Planning:   Living will: Yes    Durable POA for healthcare:  Yes    Advanced directive: Yes      Cognitive Screening:   Provider or family/friend/caregiver concerned regarding cognition?: No    PREVENTIVE SCREENINGS      Cardiovascular Screening:    General: Screening Not Indicated and History Lipid Disorder      Diabetes Screening:     General: Screening Current      Colorectal Cancer Screening:     General: Screening Current      Prostate Cancer Screening:    General: History Prostate Cancer      Osteoporosis Screening:    General: Screening Not Indicated      Abdominal Aortic Aneurysm (AAA) Screening:    Risk factors include: age between 73-69 yo and tobacco use        General: Screening Not Indicated and History AAA      Lung Cancer Screening:     General: Screening Not Indicated and History Lung Cancer      Hepatitis C Screening:    General: Screening Current    Other Counseling Topics:   Car/seat belt/driving safety, skin self-exam and sunscreen         Iron Gutierrez MD

## 2019-12-11 NOTE — ASSESSMENT & PLAN NOTE
With gait a little shuffling, I recommend eval with neurology  Pt also does have chronic low back pain symptoms

## 2019-12-12 ENCOUNTER — TELEPHONE (OUTPATIENT)
Dept: NEUROLOGY | Facility: CLINIC | Age: 74
End: 2019-12-12

## 2019-12-12 NOTE — TELEPHONE ENCOUNTER
Best contact number for NEFTALI:[phone]    Emergency Contact name and number:    Referring provider:Melany Cardona    Referring provider Telephone:________________    Primary Care Provider Name: Leo Duncan    Reason for Appointment/Dx:Gait Abnormality    Neurology Location patient would like to be seen:    Order received? Yes                                                Records Received? No    Have you ever seen another Neurologist? No    Insurance Information    Insurance Name:Medicare A,B/[employer]    ID/Policy #:    Secondary Insurance:    ID/Policy#: Workman's Comp/ Accident/ School  Information      Workman's Comp/Accident/School related?  No    If yes name of Insurance company:    Date of Injury:    Open Claim:No    [address] Name and Telephone Number:    Notes:Brianda Hunt/rosie garcias srnt                   Appointment date:03/27/20 10:00pm _____________________________

## 2019-12-20 DIAGNOSIS — J44.9 CHRONIC OBSTRUCTIVE PULMONARY DISEASE, UNSPECIFIED COPD TYPE (HCC): ICD-10-CM

## 2019-12-21 ENCOUNTER — APPOINTMENT (OUTPATIENT)
Dept: LAB | Facility: CLINIC | Age: 74
End: 2019-12-21
Payer: MEDICARE

## 2019-12-21 ENCOUNTER — TRANSCRIBE ORDERS (OUTPATIENT)
Dept: LAB | Facility: CLINIC | Age: 74
End: 2019-12-21

## 2019-12-21 DIAGNOSIS — F31.60 MIXED BIPOLAR I DISORDER (HCC): ICD-10-CM

## 2019-12-21 DIAGNOSIS — F31.60 MIXED BIPOLAR I DISORDER (HCC): Primary | ICD-10-CM

## 2019-12-21 LAB
BUN SERPL-MCNC: 21 MG/DL (ref 5–25)
CREAT SERPL-MCNC: 1.07 MG/DL (ref 0.6–1.3)
GFR SERPL CREATININE-BSD FRML MDRD: 69 ML/MIN/1.73SQ M
LITHIUM SERPL-SCNC: 0.5 MMOL/L (ref 0.5–1)
TSH SERPL DL<=0.05 MIU/L-ACNC: 1.81 UIU/ML (ref 0.36–3.74)

## 2019-12-21 PROCEDURE — 84520 ASSAY OF UREA NITROGEN: CPT

## 2019-12-21 PROCEDURE — 82565 ASSAY OF CREATININE: CPT

## 2019-12-21 PROCEDURE — 80178 ASSAY OF LITHIUM: CPT

## 2019-12-21 PROCEDURE — 36415 COLL VENOUS BLD VENIPUNCTURE: CPT

## 2019-12-21 PROCEDURE — 84443 ASSAY THYROID STIM HORMONE: CPT

## 2019-12-23 ENCOUNTER — TELEPHONE (OUTPATIENT)
Dept: UROLOGY | Facility: MEDICAL CENTER | Age: 74
End: 2019-12-23

## 2019-12-23 ENCOUNTER — HOSPITAL ENCOUNTER (EMERGENCY)
Facility: HOSPITAL | Age: 74
Discharge: HOME | End: 2019-12-23
Attending: EMERGENCY MEDICINE
Payer: MEDICARE

## 2019-12-23 VITALS
HEIGHT: 68 IN | DIASTOLIC BLOOD PRESSURE: 91 MMHG | HEART RATE: 50 BPM | OXYGEN SATURATION: 97 % | WEIGHT: 177.47 LBS | SYSTOLIC BLOOD PRESSURE: 171 MMHG | TEMPERATURE: 97.5 F | BODY MASS INDEX: 26.9 KG/M2 | RESPIRATION RATE: 20 BRPM

## 2019-12-23 DIAGNOSIS — R35.0 URINARY FREQUENCY: Primary | ICD-10-CM

## 2019-12-23 LAB
ALBUMIN SERPL BCP-MCNC: 3.1 G/DL (ref 3.5–5)
ALP SERPL-CCNC: 107 U/L (ref 46–116)
ALT SERPL W P-5'-P-CCNC: 32 U/L (ref 12–78)
ANION GAP SERPL CALCULATED.3IONS-SCNC: 2 MMOL/L (ref 4–13)
AST SERPL W P-5'-P-CCNC: 22 U/L (ref 5–45)
BACTERIA UR QL AUTO: NORMAL /HPF
BASOPHILS # BLD AUTO: 0.04 THOUSANDS/ΜL (ref 0–0.1)
BASOPHILS NFR BLD AUTO: 1 % (ref 0–1)
BILIRUB SERPL-MCNC: 0.87 MG/DL (ref 0.2–1)
BILIRUB UR QL STRIP: NEGATIVE
BUN SERPL-MCNC: 21 MG/DL (ref 5–25)
CALCIUM SERPL-MCNC: 8.7 MG/DL (ref 8.3–10.1)
CHLORIDE SERPL-SCNC: 109 MMOL/L (ref 100–108)
CLARITY UR: CLEAR
CO2 SERPL-SCNC: 29 MMOL/L (ref 21–32)
COLOR UR: YELLOW
COLOR, POC: ABNORMAL
CREAT SERPL-MCNC: 1.1 MG/DL (ref 0.6–1.3)
EOSINOPHIL # BLD AUTO: 0.12 THOUSAND/ΜL (ref 0–0.61)
EOSINOPHIL NFR BLD AUTO: 1 % (ref 0–6)
ERYTHROCYTE [DISTWIDTH] IN BLOOD BY AUTOMATED COUNT: 12.8 % (ref 11.6–15.1)
GFR SERPL CREATININE-BSD FRML MDRD: 67 ML/MIN/1.73SQ M
GLUCOSE SERPL-MCNC: 99 MG/DL (ref 65–140)
GLUCOSE UR STRIP-MCNC: NEGATIVE MG/DL
HCT VFR BLD AUTO: 40.3 % (ref 36.5–49.3)
HGB BLD-MCNC: 12.9 G/DL (ref 12–17)
HGB UR QL STRIP.AUTO: ABNORMAL
HYALINE CASTS #/AREA URNS LPF: NORMAL /LPF
IMM GRANULOCYTES # BLD AUTO: 0.03 THOUSAND/UL (ref 0–0.2)
IMM GRANULOCYTES NFR BLD AUTO: 0 % (ref 0–2)
KETONES UR STRIP-MCNC: NEGATIVE MG/DL
LEUKOCYTE ESTERASE UR QL STRIP: NEGATIVE
LYMPHOCYTES # BLD AUTO: 0.9 THOUSANDS/ΜL (ref 0.6–4.47)
LYMPHOCYTES NFR BLD AUTO: 11 % (ref 14–44)
MCH RBC QN AUTO: 29.7 PG (ref 26.8–34.3)
MCHC RBC AUTO-ENTMCNC: 32 G/DL (ref 31.4–37.4)
MCV RBC AUTO: 93 FL (ref 82–98)
MONOCYTES # BLD AUTO: 0.66 THOUSAND/ΜL (ref 0.17–1.22)
MONOCYTES NFR BLD AUTO: 8 % (ref 4–12)
NEUTROPHILS # BLD AUTO: 6.63 THOUSANDS/ΜL (ref 1.85–7.62)
NEUTS SEG NFR BLD AUTO: 79 % (ref 43–75)
NITRITE UR QL STRIP: NEGATIVE
NON-SQ EPI CELLS URNS QL MICRO: NORMAL /HPF
NRBC BLD AUTO-RTO: 0 /100 WBCS
PH UR STRIP.AUTO: 7 [PH] (ref 4.5–8)
PLATELET # BLD AUTO: 157 THOUSANDS/UL (ref 149–390)
PMV BLD AUTO: 11.4 FL (ref 8.9–12.7)
POTASSIUM SERPL-SCNC: 3.9 MMOL/L (ref 3.5–5.3)
PROT SERPL-MCNC: 6.4 G/DL (ref 6.4–8.2)
PROT UR STRIP-MCNC: >=300 MG/DL
RBC # BLD AUTO: 4.34 MILLION/UL (ref 3.88–5.62)
RBC #/AREA URNS AUTO: NORMAL /HPF
SODIUM SERPL-SCNC: 140 MMOL/L (ref 136–145)
SP GR UR STRIP.AUTO: 1.02 (ref 1–1.03)
UROBILINOGEN UR QL STRIP.AUTO: 1 E.U./DL
WBC # BLD AUTO: 8.38 THOUSAND/UL (ref 4.31–10.16)
WBC #/AREA URNS AUTO: NORMAL /HPF

## 2019-12-23 PROCEDURE — 81001 URINALYSIS AUTO W/SCOPE: CPT

## 2019-12-23 PROCEDURE — 51798 US URINE CAPACITY MEASURE: CPT

## 2019-12-23 PROCEDURE — 99284 EMERGENCY DEPT VISIT MOD MDM: CPT | Performed by: EMERGENCY MEDICINE

## 2019-12-23 PROCEDURE — 85025 COMPLETE CBC W/AUTO DIFF WBC: CPT | Performed by: EMERGENCY MEDICINE

## 2019-12-23 PROCEDURE — 36415 COLL VENOUS BLD VENIPUNCTURE: CPT | Performed by: EMERGENCY MEDICINE

## 2019-12-23 PROCEDURE — 99283 EMERGENCY DEPT VISIT LOW MDM: CPT

## 2019-12-23 PROCEDURE — 80053 COMPREHEN METABOLIC PANEL: CPT | Performed by: EMERGENCY MEDICINE

## 2019-12-23 RX ORDER — IPRATROPIUM BROMIDE 17 UG/1
AEROSOL, METERED RESPIRATORY (INHALATION)
Qty: 51.6 G | Refills: 5 | Status: SHIPPED | OUTPATIENT
Start: 2019-12-23 | End: 2021-06-21 | Stop reason: SDUPTHER

## 2019-12-23 NOTE — ED NOTES
Patient ambulatory to bathroom to provide urine sample at this time      John Farias RN  12/23/19 1960

## 2019-12-23 NOTE — TELEPHONE ENCOUNTER
This is a patient of Dr Nidia oMsley seen in [place]  Patient was in ER for urinary frequency  Patitent states he is urinating a lot at night  Please call him back at [phone]

## 2019-12-23 NOTE — ED PROVIDER NOTES
History  Chief Complaint   Patient presents with    Urinary Frequency     Patient is a 81-year-old male with history of prostate cancer status post prostatectomy who presents for urinary frequency  Patient says last 2-3 days he has been having increasing urination and some incontinence  He said that originally he felt like he was having to strain more to urinate but says that that has improved  He says that originally he felt like he was not emptying completely but says that now he feels like he completely empties but has has to go more frequently  He denies any dysuria, hematuria, flank pain, fevers, chills, nausea, vomiting  He says that originally he had significant incontinence but that has since improved some minor dribbling especially last night  He denies any increasing back pain from his chronic back issues  He denies any numbness or tingling or weakness in his legs  Prior to Admission Medications   Prescriptions Last Dose Informant Patient Reported? Taking? ATROVENT HFA 17 MCG/ACT inhaler   No No   Sig: USE 2 PUFFS 4 TIMES DAILY   BREO ELLIPTA 200-25 MCG/INH inhaler   No No   Sig: USE 1 INHALATION BY MOUTH  DAILY   RINSE MOUTH AFTER  USE  Cholecalciferol (VITAMIN D PO)  Self Yes No   Sig: Take 2,000 Units by mouth daily     FLUoxetine (PROzac) 10 mg capsule  Self Yes No   Sig: Take 10 mg by mouth daily    acetaminophen (TYLENOL) 500 mg tablet  Self Yes No   Sig: Take 500 mg by mouth as needed for mild pain     albuterol (PROAIR HFA) 90 mcg/act inhaler  Self No No   Sig: Inhale 2 puffs every 6 (six) hours as needed for wheezing   amLODIPine (NORVASC) 2 5 mg tablet   No No   Sig: Take 1 tablet (2 5 mg total) by mouth daily   aspirin (ECOTRIN LOW STRENGTH) 81 mg EC tablet  Self Yes No   Sig: Take 81 mg by mouth daily   atorvastatin (LIPITOR) 40 mg tablet  Self No No   Sig: TAKE 1 TABLET BY MOUTH  DAILY   diazepam (VALIUM) 2 mg tablet  Self Yes No   Sig: Take 2 mg by mouth every 6 (six) hours as needed    diazepam (VALIUM) 5 mg tablet  Self Yes No   Sig: Take 5 mg by mouth as needed for anxiety   dicyclomine (BENTYL) 20 mg tablet  Self No No   Sig: Take 1 tablet (20 mg total) by mouth every 6 (six) hours as needed (abd cramping)   fexofenadine (ALLEGRA) 180 MG tablet  Self Yes No   Sig: Take 180 mg by mouth daily   fluticasone (FLONASE) 50 mcg/act nasal spray  Self Yes No   Si spray into each nostril as needed  lithium carbonate (LITHOBID) 300 mg CR tablet  Self Yes No   Sig: Take 300 mg by mouth One tab by mouth every other day and alternating with 2 tabs every other day at bedtime      metoprolol succinate (TOPROL-XL) 25 mg 24 hr tablet  Self No No   Sig: TAKE 1 TABLET BY MOUTH  DAILY   nitroglycerin (NITRODUR) 0 2 mg/hr  Self No No   Sig: APPLY 1 PATCH DAILY (OFF  FOR 12 HOURS)   Patient taking differently: as needed    omega-3-acid ethyl esters (LOVAZA) 1 g capsule  Self No No   Sig: TAKE 1 CAPSULE BY MOUTH 3  TIMES DAILY   omeprazole (PriLOSEC) 20 mg delayed release capsule  Self No No   Sig: TAKE 1 CAPSULE BY MOUTH  DAILY      Facility-Administered Medications: None       Past Medical History:   Diagnosis Date    Aortic aneurysm (HCC)     Benign colon polyp     Bipolar 1 disorder (HCC)     Cardiac disease     MI    Cervical cord compression with myelopathy (HCC)     COPD (chronic obstructive pulmonary disease) (HCC)     Diverticulitis     Diverticulosis     Gait disturbance     uses cane, leg brace on right    GERD (gastroesophageal reflux disease)     [facility] )     Hx of resection of large bowel 5/3/2016    Hyperlipidemia     Hypertension     IBS (irritable bowel syndrome)     Lumbar stenosis     Lung cancer ([facility] )      Left lower lobectomy and  Right lung with surgery     Myocardial infarction [facility])     involving other coronary artery    Prostate cancer ([facility] [facility] )     Shortness of breath     Small bowel obstruction ([facility] ) 2016       Past Surgical History:   Procedure Laterality Date    ANGIOPLASTY      stent    CARDIAC SURGERY      CERVICAL SPINE SURGERY      Cervical decompression with cervical fusion from C3-C7 for spinal stenosis   COLON SURGERY  11/04/2014    ESOPHAGOGASTRODUODENOSCOPY  01/03/2013    with possible Schatzki's ring & small hiatal hernia, mild gastritis    FL INJECTION LEFT HIP (NON ARTHROGRAM)  12/11/2018    FL INJECTION LEFT HIP (NON ARTHROGRAM)  5/9/2019    IR EVAR  8/6/2018    LUNG CANCER SURGERY Right 03/2011    wedge resection for lung tumor, 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    LUNG LOBECTOMY Left     NV ARTHRODESIS ANT INTERBODY MIN DISCECTOMY, CERVICAL BELOW C2 N/A 5/2/2016    Procedure: Anterior cervical diskectomy C3/4, C5/6, C6/7 with anterior plate fixation fusion C3-7;  Posterior decompressive laminectomy C3-7 with lateral mass fixation fusion C3-7 (IMPULSE MONITORING); Surgeon: Barbara Rae MD;  [facility];  Service: Neurosurgery    NV ARTHRODESIS POSTERIOR INTERBODY LUMBAR N/A 1/30/2017    Procedure: L4-5 AND L5-S1 DECOMPRESSIVE FORAMINOTOMIES, TRANSFORAMINAL LUMBAR INTERBODY AND PEDICLE SCREW FIXATION FUSION L4-S1 (IMPULSE);   Surgeon: Barbara Rae MD;  [facility];  Service: Neurosurgery    NV [address] R[address] DPLMNT AORTO-AORTIC NDGFT N/A 8/6/2018    Procedure: REPAIR ANEURYSM ENDOVASCULAR ABDOMINAL AORTIC  (EVAR) WITH BILATERAL PERCUTANEOUS FEMORAL ACCESS WITH ULTRASOUND GUIDANCE ON THE RIGHT AND PRE CLOSURE;  Surgeon: Baylee Jones MD;  [facility];  Service: Vascular    PROSTATECTOMY  2007    for prostate CA - no chemo/RT    SIGMOIDECTOMY      for divertic    SMALL INTESTINE SURGERY N/A 11/17/2016    Procedure: Exploratory Laparotomy, Lysis of adhesions to release small bowel obstruction;  Surgeon: Leonel Espinoza MD;  [facility];  Service:        Family History   Problem Relation Age of Onset    Heart attack Father     Colon cancer Father     Coronary artery disease Father     Heart disease Family     Hyperlipidemia Family     Hypertension Family      I have reviewed and agree with the history as documented  Social History     Tobacco Use    Smoking status: Former Smoker     Packs/day: 1 00     Years: 35 00     Pack years: 35 00     Types: Cigarettes     Last attempt to quit:      Years since quittin 9    Smokeless tobacco: Never Used   Substance Use Topics    Alcohol use: Yes     Comment: 6 drinks a week    Drug use: No        Review of Systems   Constitutional: Negative for chills, fever and unexpected weight change  HENT: Negative for congestion, sore throat and trouble swallowing  Eyes: Negative for pain, discharge and itching  Respiratory: Negative for cough, chest tightness, shortness of breath and wheezing  Cardiovascular: Negative for chest pain, palpitations and leg swelling  Gastrointestinal: Negative for abdominal pain, blood in stool, diarrhea, nausea and vomiting  Endocrine: Negative for polyuria  Genitourinary: Positive for frequency  Negative for difficulty urinating, dysuria, hematuria, penile swelling and testicular pain  Musculoskeletal: Negative for arthralgias and back pain  Neurological: Negative for dizziness, syncope, weakness, light-headedness and headaches  Physical Exam  ED Triage Vitals [19 07]   Temperature Pulse Respirations Blood Pressure SpO2   97 5 °F (36 4 °C) 55 20 (!) 185/90 97 %      Temp Source Heart Rate Source Patient Position - Orthostatic VS BP Location FiO2 (%)   Oral Monitor Lying Right arm --      Pain Score       No Pain             Orthostatic Vital Signs  Vitals:    19 0719 19 0730   BP: (!) 185/90 (!) 171/91   Pulse: 55 (!) 50   Patient Position - Orthostatic VS: Lying Lying       Physical Exam   Constitutional: He is oriented to person, place, and time  He appears well-developed and well-nourished  No distress     HENT:   Head: Normocephalic and atraumatic  Right Ear: External ear normal    Left Ear: External ear normal    Eyes: Pupils are equal, round, and reactive to light  Conjunctivae and EOM are normal    Neck: Normal range of motion  Cardiovascular: Normal rate, regular rhythm, normal heart sounds and intact distal pulses  Exam reveals no gallop and no friction rub  No murmur heard  Pulmonary/Chest: Effort normal and breath sounds normal  No respiratory distress  He has no wheezes  He has no rales  Abdominal: Soft  Bowel sounds are normal  He exhibits no distension  There is no tenderness  There is no guarding  Musculoskeletal: Normal range of motion  He exhibits no edema, tenderness or deformity  Lymphadenopathy:     He has no cervical adenopathy  Neurological: He is alert and oriented to person, place, and time  No cranial nerve deficit or sensory deficit  He exhibits normal muscle tone  Skin: Skin is warm and dry  Psychiatric: He has a normal mood and affect  His behavior is normal    Nursing note and vitals reviewed        ED Medications  Medications - No data to display    Diagnostic Studies  Results Reviewed     Procedure Component Value Units Date/Time    Urine Microscopic [[id number]]  (Normal) Collected:  12/23/19 0810    Lab Status:  Final result Specimen:  Urine, Clean Catch Updated:  12/23/19 0840     RBC, UA None Seen /hpf      WBC, UA None Seen /hpf      Epithelial Cells None Seen /hpf      Bacteria, UA None Seen /hpf      Hyaline Casts, UA None Seen /lpf     Comprehensive metabolic panel [[id number]]  (Abnormal) Collected:  12/23/19 0759    Lab Status:  Final result Specimen:  Blood from Arm, Right Updated:  12/23/19 0828     Sodium 140 mmol/L      Potassium 3 9 mmol/L      Chloride 109 mmol/L      CO2 29 mmol/L      ANION GAP 2 mmol/L      BUN 21 mg/dL      Creatinine 1 10 mg/dL      Glucose 99 mg/dL      Calcium 8 7 mg/dL      AST 22 U/L      ALT 32 U/L      Alkaline Phosphatase 107 U/L      Total Protein 6 4 g/dL      Albumin 3 1 g/dL      Total Bilirubin 0 87 mg/dL      eGFR 67 ml/min/1 73sq m     Narrative:       [place] guidelines for Chronic Kidney Disease (CKD):     Stage 1 with normal or high GFR (GFR > 90 mL/min/1 73 square meters)    Stage 2 Mild CKD (GFR = 60-89 mL/min/1 73 square meters)    Stage 3A Moderate CKD (GFR = 45-59 mL/min/1 73 square meters)    Stage 3B Moderate CKD (GFR = 30-44 mL/min/1 73 square meters)    Stage 4 Severe CKD (GFR = 15-29 mL/min/1 73 square meters)    Stage 5 End Stage CKD (GFR <15 mL/min/1 73 square meters)  Note: GFR calculation is accurate only with a steady state creatinine    POCT urinalysis dipstick [[id number]]  (Abnormal) Resulted:  12/23/19 0809    Lab Status:  Final result Specimen:  Urine Updated:  12/23/19 0809     Color, UA see chart    CBC and differential [[id number]]  (Abnormal) Collected:  12/23/19 0759    Lab Status:  Final result Specimen:  Blood from Arm, Right Updated:  12/23/19 0809     WBC 8 38 Thousand/uL      RBC 4 34 Million/uL      Hemoglobin 12 9 g/dL      Hematocrit 40 3 %      MCV 93 fL      MCH 29 7 pg      MCHC 32 0 g/dL      RDW 12 8 %      MPV 11 4 fL      Platelets 508 Thousands/uL      nRBC 0 /100 WBCs      Neutrophils Relative 79 %      Immat GRANS % 0 %      Lymphocytes Relative 11 %      Monocytes Relative 8 %      Eosinophils Relative 1 %      Basophils Relative 1 %      Neutrophils Absolute 6 63 Thousands/µL      Immature Grans Absolute 0 03 Thousand/uL      Lymphocytes Absolute 0 90 Thousands/µL      Monocytes Absolute 0 66 Thousand/µL      Eosinophils Absolute 0 12 Thousand/µL      Basophils Absolute 0 04 Thousands/µL     Urine Macroscopic, POC [[id number]]  (Abnormal) Collected:  12/23/19 0810    Lab Status:  Final result Specimen:  Urine Updated:  12/23/19 0809     Color, UA Yellow     Clarity, UA Clear     pH, UA 7 0     Leukocytes, UA Negative     Nitrite, UA Negative     Protein, UA >=300 mg/dl Glucose, UA Negative mg/dl      Ketones, UA Negative mg/dl      Urobilinogen, UA 1 0 E U /dl      Bilirubin, UA Negative     Blood, UA Small     Specific [place], UA 1 020    Narrative:       [facility] RESULT                 No orders to display         Procedures  Procedures      ED Course                               MDM  Number of Diagnoses or Management Options  Urinary frequency:   Diagnosis management comments: 72-year-old male presenting for increasing urinary frequency and some minor incontinence  No dysuria, hematuria, flank pain, fevers or chills  No increasing back pain, leg weakness, saddle anesthesia has been able to ambulate per baseline  Will obtain CBC, BMP to check electrolytes and kidney function  Will obtain UA  UA negative for infection  Labs within normal limits  Believe patient's symptoms may be secondary to overstimulation of bladder  Patient told to follow up with his urologist         Disposition  Final diagnoses:   Urinary frequency     Time reflects when diagnosis was documented in both MDM as applicable and the Disposition within this note     Time User Action Codes Description Comment    12/23/2019  9:02 AM Michael Elizabeth Add [R35 0] Urinary frequency       ED Disposition     ED Disposition Condition Date/Time Comment    Discharge Stable Mon Dec 23, 2019  9:02 AM Sai Flores discharge to home/self care              Follow-up Information     Follow up With Specialties Details Why Contact Info    Leo Duncan MD Internal Medicine Schedule an appointment as soon as possible for a visit  For follow-up of symptoms [address] [address] [place] Nila Ortega MD Urology Schedule an appointment as soon as possible for a visit  For follow-up of symptoms [facility]  [phone]            Discharge Medication List as of 12/23/2019  9:04 AM      START taking these medications    Details   ATROVENT HFA 17 MCG/ACT inhaler USE 2 PUFFS 4 TIMES DAILY, Normal         CONTINUE these medications which have NOT CHANGED    Details   acetaminophen (TYLENOL) 500 mg tablet Take 500 mg by mouth as needed for mild pain , Historical Med      albuterol (PROAIR HFA) 90 mcg/act inhaler Inhale 2 puffs every 6 (six) hours as needed for wheezing, Starting Thu 9/26/2019, Normal      amLODIPine (NORVASC) 2 5 mg tablet Take 1 tablet (2 5 mg total) by mouth daily, Starting Tue 12/3/2019, Normal      aspirin (ECOTRIN LOW STRENGTH) 81 mg EC tablet Take 81 mg by mouth daily, Historical Med      atorvastatin (LIPITOR) 40 mg tablet TAKE 1 TABLET BY MOUTH  DAILY, Normal      BREO ELLIPTA 200-25 MCG/INH inhaler USE 1 INHALATION BY MOUTH  DAILY   RINSE MOUTH AFTER  USE , Normal      Cholecalciferol (VITAMIN D PO) Take 2,000 Units by mouth daily  , Historical Med      !! diazepam (VALIUM) 2 mg tablet Take 2 mg by mouth every 6 (six) hours as needed , Starting Sun 5/5/2019, Historical Med      !! diazepam (VALIUM) 5 mg tablet Take 5 mg by mouth as needed for anxiety, Historical Med      dicyclomine (BENTYL) 20 mg tablet Take 1 tablet (20 mg total) by mouth every 6 (six) hours as needed (abd cramping), Starting Thu 10/3/2019, No Print      fexofenadine (ALLEGRA) 180 MG tablet Take 180 mg by mouth daily, Historical Med      FLUoxetine (PROzac) 10 mg capsule Take 10 mg by mouth daily , Starting Tue 11/13/2018, Historical Med      fluticasone (FLONASE) 50 mcg/act nasal spray 1 spray into each nostril as needed   , Historical Med      lithium carbonate (LITHOBID) 300 mg CR tablet Take 300 mg by mouth One tab by mouth every other day and alternating with 2 tabs every other day at bedtime   , Starting Th 8/16/2018, Historical Med      metoprolol succinate (TOPROL-XL) 25 mg 24 hr tablet TAKE 1 TABLET BY MOUTH  DAILY, Normal      nitroglycerin (NITRODUR) 0 2 mg/hr APPLY 1 PATCH DAILY (OFF  FOR 12 HOURS), Normal      omega-3-acid ethyl esters (LOVAZA) 1 g capsule TAKE 1 CAPSULE BY MOUTH 3  TIMES DAILY, Normal      omeprazole (PriLOSEC) 20 mg delayed release capsule TAKE 1 CAPSULE BY MOUTH  DAILY, Normal       !! - Potential duplicate medications found  Please discuss with provider  No discharge procedures on file  ED Provider  Attending physically available and evaluated Sagar Foley  I managed the patient along with the ED Attending      Electronically Signed by         Aaron Bazan DO  12/23/19 7561

## 2019-12-23 NOTE — ED NOTES
Patient voided 200 cc of urine   Bladder scanned right after, PVR is [address], RN  12/23/19 7614

## 2019-12-23 NOTE — TELEPHONE ENCOUNTER
Patient managed in the [facility] office, last seen by Tarik Rodriguez PA-C on 3/28/19  Patient with history of prostate cancer, S/P prostatectomy in [place] in 2007  At time of last visit PSA was undetectable and patient without any urinary complaints  Currently no urology follow up scheduled  Reviewed chart, patient seen in the ER this morning with reports of urinary frequency and dribbling at night  Bladder scan done in ER showed PVR of 16 ml  UA obtained in ER, shows no signs of infection, culture not obtained  Patient discharged with instruction to follow up with urology  Spoke with patient  Patient requesting to make an appointment with Dr Bethany Hill to further discuss  Patient instructed on avoiding bladder irritants and limiting fluid intake at night  Patient scheduled for 1/8/20 at 9:30 in the [facility] office with Dr Bethany Hill

## 2019-12-23 NOTE — ED ATTENDING ATTESTATION
12/23/2019  IRenu MD, saw and evaluated the patient  I have discussed the patient with the resident/non-physician practitioner and agree with the resident's/non-physician practitioner's findings, Plan of Care, and MDM as documented in the resident's/non-physician practitioner's note, except where noted  All available labs and Radiology studies were reviewed  I was present for key portions of any procedure(s) performed by the resident/non-physician practitioner and I was immediately available to provide assistance  At this point I agree with the current assessment done in the Emergency Department  I have conducted an independent evaluation of this patient a history and physical is as follows:   The patient presents with complaints of increasing urination with incontinence and frequency patient has no headache no abdominal pain no back pain patient has a prior prostatectomy for prostate cancer  Patient has no actual dysuria  He has noticed no blood in his urine he has had no fever or chills  Exam the patient is in no acute distress  Lungs clear heart regular abdomen soft positive bowel sounds nontender nondistended no mass noted  Patient was able and is bladder a bladder scanner was placed after he emptied his bladder there was only 16 cc of urine  Lab workup unremarkable urine negative including microscopic  Impression urinary frequency nothing on urinalysis to suggest urinary tract infection will have patient follow up Urology as an outpatient  ED Course         Critical Care Time  Procedures

## 2020-01-03 ENCOUNTER — TELEPHONE (OUTPATIENT)
Dept: INTERNAL MEDICINE CLINIC | Facility: CLINIC | Age: 75
End: 2020-01-03

## 2020-01-03 NOTE — TELEPHONE ENCOUNTER
Patient wears a brace on his leg  He needs to have the straps replaced and he called [phone] [address] [address] and he will need a script  [facility] phone number is [phone], and fax is [phone]  Patient no longer sees the doctor who gave him the brace

## 2020-01-03 NOTE — TELEPHONE ENCOUNTER
I called patient to find out which leg, if he just needs the straps, or the whole leg brace and straps, if there is a name for the brace, and the diagnosis for this, left message

## 2020-01-03 NOTE — TELEPHONE ENCOUNTER
Rt leg, he needs the straps for sure not positive about the whole brace    A "keyonna brace" is the name of it      He needs it for pronation/pain

## 2020-01-04 ENCOUNTER — TELEPHONE (OUTPATIENT)
Dept: OTHER | Facility: OTHER | Age: 75
End: 2020-01-04

## 2020-01-04 NOTE — TELEPHONE ENCOUNTER
Re: the repair of the brace by [facility]  I got confused by the messages back and forth  I would only like the repair to the strap and no other procedure     Sorry for the confusion  If there are any other concerns, please give me a call back

## 2020-01-08 ENCOUNTER — OFFICE VISIT (OUTPATIENT)
Dept: UROLOGY | Facility: CLINIC | Age: 75
End: 2020-01-08
Payer: MEDICARE

## 2020-01-08 VITALS
HEIGHT: 68 IN | HEART RATE: 56 BPM | BODY MASS INDEX: 26.28 KG/M2 | WEIGHT: 173.4 LBS | DIASTOLIC BLOOD PRESSURE: 88 MMHG | SYSTOLIC BLOOD PRESSURE: 144 MMHG

## 2020-01-08 DIAGNOSIS — R35.0 URINARY FREQUENCY: Primary | ICD-10-CM

## 2020-01-08 DIAGNOSIS — N39.43 POST-VOID DRIBBLING: ICD-10-CM

## 2020-01-08 DIAGNOSIS — Z85.46 HISTORY OF PROSTATE CANCER: ICD-10-CM

## 2020-01-08 DIAGNOSIS — R39.15 URINARY URGENCY: ICD-10-CM

## 2020-01-08 DIAGNOSIS — R32 URINARY INCONTINENCE, UNSPECIFIED TYPE: ICD-10-CM

## 2020-01-08 LAB
SL AMB  POCT GLUCOSE, UA: NORMAL
SL AMB LEUKOCYTE ESTERASE,UA: NORMAL
SL AMB POCT BILIRUBIN,UA: NORMAL
SL AMB POCT BLOOD,UA: NORMAL
SL AMB POCT CLARITY,UA: CLEAR
SL AMB POCT COLOR,UA: YELLOW
SL AMB POCT KETONES,UA: NORMAL
SL AMB POCT NITRITE,UA: NORMAL
SL AMB POCT PH,UA: 8
SL AMB POCT SPECIFIC GRAVITY,UA: 1
SL AMB POCT URINE PROTEIN: NORMAL
SL AMB POCT UROBILINOGEN: NORMAL

## 2020-01-08 PROCEDURE — 99214 OFFICE O/P EST MOD 30 MIN: CPT | Performed by: UROLOGY

## 2020-01-08 PROCEDURE — 81002 URINALYSIS NONAUTO W/O SCOPE: CPT | Performed by: UROLOGY

## 2020-01-08 RX ORDER — TOLTERODINE 4 MG/1
4 CAPSULE, EXTENDED RELEASE ORAL DAILY
Qty: 30 CAPSULE | Refills: 3 | Status: SHIPPED | OUTPATIENT
Start: 2020-01-08 | End: 2020-03-05 | Stop reason: ALTCHOICE

## 2020-01-08 NOTE — PROGRESS NOTES
Progress Note - Urology  Ruth Willis 69 y o  male MRN: [id number]  Encounter: [id number]      Chief Complaint:   Chief Complaint   Patient presents with    Prostate Cancer    Urinary Frequency       HPI:  68-year-old male history of prostate cancer status post robotic prostatectomy in [place] in 2007  His PSA has remained undetectable throughout the entire time  Most recently noted in March of this year  He had good flow good control for several years but approximately 3 years ago began to note that he had some episodes of dripping  Primarily postvoid  Since December he had noted exacerbation of the situation and continuation so that he may wear 2 pads per day  He does not have any large volumes of urine leakage  He can sense when he needs to void  He does void generally in the sitting position for better urinary stream   He has no dysuria or hematuria  Recent visit to the emergency facility did show a normal urinalysis  He also has had some back surgery over the past few years  But he has no evidence of neuropathy  He has no saddle sensation  MEDS:    Current Outpatient Medications:     acetaminophen (TYLENOL) 500 mg tablet, Take 500 mg by mouth as needed for mild pain , Disp: , Rfl:     albuterol (PROAIR HFA) 90 mcg/act inhaler, Inhale 2 puffs every 6 (six) hours as needed for wheezing, Disp: 3 Inhaler, Rfl: 3    amLODIPine (NORVASC) 2 5 mg tablet, Take 1 tablet (2 5 mg total) by mouth daily, Disp: 90 tablet, Rfl: 3    aspirin (ECOTRIN LOW STRENGTH) 81 mg EC tablet, Take 81 mg by mouth daily, Disp: , Rfl:     atorvastatin (LIPITOR) 40 mg tablet, TAKE 1 TABLET BY MOUTH  DAILY, Disp: 90 tablet, Rfl: 5    ATROVENT HFA 17 MCG/ACT inhaler, USE 2 PUFFS 4 TIMES DAILY, Disp: 51 6 g, Rfl: 5    BREO ELLIPTA 200-25 MCG/INH inhaler, USE 1 INHALATION BY MOUTH  DAILY    RINSE MOUTH AFTER  USE , Disp: 180 each, Rfl: 3    Cholecalciferol (VITAMIN D PO), Take 2,000 Units by mouth daily  , Disp: , Rfl:     diazepam (VALIUM) 2 mg tablet, Take 2 mg by mouth every 6 (six) hours as needed , Disp: , Rfl:     diazepam (VALIUM) 5 mg tablet, Take 5 mg by mouth as needed for anxiety, Disp: , Rfl:     dicyclomine (BENTYL) 20 mg tablet, Take 1 tablet (20 mg total) by mouth every 6 (six) hours as needed (abd cramping), Disp: 90 tablet, Rfl: 3    fexofenadine (ALLEGRA) 180 MG tablet, Take 180 mg by mouth daily, Disp: , Rfl:     FLUoxetine (PROzac) 10 mg capsule, Take 10 mg by mouth daily , Disp: , Rfl:     fluticasone (FLONASE) 50 mcg/act nasal spray, 1 spray into each nostril as needed  , Disp: , Rfl:     lithium carbonate (LITHOBID) 300 mg CR tablet, Take 300 mg by mouth One tab by mouth every other day and alternating with 2 tabs every other day at bedtime   , Disp: , Rfl:     metoprolol succinate (TOPROL-XL) 25 mg 24 hr tablet, TAKE 1 TABLET BY MOUTH  DAILY, Disp: 90 tablet, Rfl: 5    nitroglycerin (NITRODUR) 0 2 mg/hr, APPLY 1 PATCH DAILY (OFF  FOR 12 HOURS) (Patient taking differently: as needed ), Disp: 90 patch, Rfl: 3    omega-3-acid ethyl esters (LOVAZA) 1 g capsule, TAKE 1 CAPSULE BY MOUTH 3  TIMES DAILY, Disp: 270 capsule, Rfl: 5    omeprazole (PriLOSEC) 20 mg delayed release capsule, TAKE 1 CAPSULE BY MOUTH  DAILY, Disp: 90 capsule, Rfl: 3      PMH:  Past Medical History:   Diagnosis Date    Aortic aneurysm (HCC)     Benign colon polyp     Bipolar 1 disorder (HCC)     Cardiac disease     MI    Cervical cord compression with myelopathy (HCC)     COPD (chronic obstructive pulmonary disease) (HCC)     Diverticulitis     Diverticulosis     Gait disturbance     uses cane, leg brace on right    GERD (gastroesophageal reflux disease)     Heart attack ([facility] [address] ) 1996    Hx of resection of large bowel 5/3/2016    Hyperlipidemia     Hypertension     IBS (irritable bowel syndrome)     Lumbar stenosis     Lung cancer ([facility] [address] )     2007 Left lower lobectomy and 2011 Right lung with surgery     Myocardial infarction [facility])     involving other coronary artery    Prostate cancer ([facility] [address] )     Shortness of breath     Small bowel obstruction ([facility] [address] ) 11/16/2016         PSH  Past Surgical History:   Procedure Laterality Date    ANGIOPLASTY      stent    CARDIAC SURGERY      CERVICAL SPINE SURGERY      Cervical decompression with cervical fusion from C3-C7 for spinal stenosis   COLON SURGERY  11/04/2014    ESOPHAGOGASTRODUODENOSCOPY  01/03/2013    with possible Schatzki's ring & small hiatal hernia, mild gastritis    FL INJECTION LEFT HIP (NON ARTHROGRAM)  12/11/2018    FL INJECTION LEFT HIP (NON ARTHROGRAM)  5/9/2019    IR EVAR  8/6/2018    LUNG CANCER SURGERY Right 03/2011    wedge resection for lung tumor, 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    LUNG LOBECTOMY Left     VT ARTHRODESIS ANT INTERBODY MIN DISCECTOMY, CERVICAL BELOW C2 N/A 5/2/2016    Procedure: Anterior cervical diskectomy C3/4, C5/6, C6/7 with anterior plate fixation fusion C3-7;  Posterior decompressive laminectomy C3-7 with lateral mass fixation fusion C3-7 (IMPULSE MONITORING); Surgeon: Lobo Grace MD;  [facility];  Service: Neurosurgery    VT ARTHRODESIS POSTERIOR INTERBODY LUMBAR N/A 1/30/2017    Procedure: L4-5 AND L5-S1 DECOMPRESSIVE FORAMINOTOMIES, TRANSFORAMINAL LUMBAR INTERBODY AND PEDICLE SCREW FIXATION FUSION L4-S1 (IMPULSE);   Surgeon: Lobo Grace MD;  [facility];  Service: Neurosurgery    [place] [address] R[address] DPLMNT AORTO-AORTIC NDGFT N/A 8/6/2018    Procedure: REPAIR ANEURYSM ENDOVASCULAR ABDOMINAL AORTIC  (EVAR) WITH BILATERAL PERCUTANEOUS FEMORAL ACCESS WITH ULTRASOUND GUIDANCE ON THE RIGHT AND PRE CLOSURE;  Surgeon: Lance Limon MD;  [facility];  Service: Vascular    PROSTATECTOMY  2007    for prostate CA - no chemo/RT    SIGMOIDECTOMY      for divertic    SMALL INTESTINE SURGERY N/A 11/17/2016    Procedure: Exploratory Laparotomy, Lysis of adhesions to release small bowel obstruction;  Surgeon: Alan Ordonez MD;  [facility];  Service:          FH  Family History   Problem Relation Age of Onset    Heart attack Father     Colon cancer Father     Coronary artery disease Father     Heart disease Family     Hyperlipidemia Family     Hypertension Family         SH  Social History     Socioeconomic History    Marital status: /Civil Union     Spouse name: None    Number of children: 1    Years of education: None    Highest education level: None   Occupational History    Occupation: Retired   Social Needs    Financial resource strain: None    Food insecurity:     Worry: None     Inability: None    Transportation needs:     Medical: None     Non-medical: None   Tobacco Use    Smoking status: Former Smoker     Packs/day: 1 00     Years: 35 00     Pack years: 35 00     Types: Cigarettes     Last attempt to quit:      Years since quittin 0    Smokeless tobacco: Never Used   Substance and Sexual Activity    Alcohol use: Yes     Comment: 6 drinks a week    Drug use: No    Sexual activity: None   Lifestyle    Physical activity:     Days per week: None     Minutes per session: None    Stress: None   Relationships    Social connections:     Talks on phone: None     Gets together: None     Attends Yazdanism service: None     Active member of club or organization: None     Attends meetings of clubs or organizations: None     Relationship status: None    Intimate partner violence:     Fear of current or ex partner: None     Emotionally abused: None     Physically abused: None     Forced sexual activity: None   Other Topics Concern    None   Social History Narrative    None          ROS:  Review of Systems   Constitutional: Positive for activity change  Respiratory: Negative  Cardiovascular: Negative  Gastrointestinal:          Occasional abdominal cramping    Prior history of bowel resection for obstruction   Musculoskeletal: Positive for back pain and gait problem  Neurological:         Primarily related to gait dysfunction   Psychiatric/Behavioral: Negative  Vitals:  Blood pressure 144/88, pulse 56, height 5' 7 5" (1 715 m), weight 78 7 kg (173 lb 6 4 oz)  Physical Exam:   82-year-old male in no obvious distress  He is unsteady gait  Having multiple back surgeries  Examination of the abdomen reveals a postop ventral hernia a large midline scar above the umbilicus extending below the umbilicus  I appreciate no abdominal mass  There is no abdominal tenderness  Cecy Embs shows an uncircumcised penis  He is wearing a pad but does not appear to be wet  The scrotum is palpated  There is no mass  Both testes are palpated unremarkable no hernia defect noted cord is normal and testes show no mass  On rectal examination there is good sphincter tone  He is able to contract the rectal sphincter  No masses palpated         Lab, Imaging and other studies:  Recent Results (from the past 672 hour(s))   Creatinine, serum    Collection Time: 12/21/19  8:07 AM   Result Value Ref Range    Creatinine 1 07 0 60 - 1 30 mg/dL    eGFR 69 ml/min/1 73sq m   Lithium level    Collection Time: 12/21/19  8:07 AM   Result Value Ref Range    Lithium Lvl 0 5 0 5 - 1 0 mmol/L   TSH, 3rd generation    Collection Time: 12/21/19  8:07 AM   Result Value Ref Range    TSH 3RD GENERATON 1 810 0 358 - 3 740 uIU/mL   BUN    Collection Time: 12/21/19  8:07 AM   Result Value Ref Range    BUN 21 5 - 25 mg/dL   CBC and differential    Collection Time: 12/23/19  7:59 AM   Result Value Ref Range    WBC 8 38 4 31 - 10 16 Thousand/uL    RBC 4 34 3 88 - 5 62 Million/uL    Hemoglobin 12 9 12 0 - 17 0 g/dL    Hematocrit 40 3 36 5 - 49 3 %    MCV 93 82 - 98 fL    MCH 29 7 26 8 - 34 3 pg    MCHC 32 0 31 4 - 37 4 g/dL    RDW 12 8 11 6 - 15 1 %    MPV 11 4 8 9 - 12 7 fL    Platelets 280 335 - 984 Thousands/uL    nRBC 0 /100 WBCs    Neutrophils Relative 79 (H) 43 - 75 %    Immat GRANS % 0 0 - 2 %    Lymphocytes Relative 11 (L) 14 - 44 %    Monocytes Relative 8 4 - 12 %    Eosinophils Relative 1 0 - 6 %    Basophils Relative 1 0 - 1 %    Neutrophils Absolute 6 63 1 85 - 7 62 Thousands/µL    Immature Grans Absolute 0 03 0 00 - 0 20 Thousand/uL    Lymphocytes Absolute 0 90 0 60 - 4 47 Thousands/µL    Monocytes Absolute 0 66 0 17 - 1 22 Thousand/µL    Eosinophils Absolute 0 12 0 00 - 0 61 Thousand/µL    Basophils Absolute 0 04 0 00 - 0 10 Thousands/µL   Comprehensive metabolic panel    Collection Time: 12/23/19  7:59 AM   Result Value Ref Range    Sodium 140 136 - 145 mmol/L    Potassium 3 9 3 5 - 5 3 mmol/L    Chloride 109 (H) 100 - 108 mmol/L    CO2 29 21 - 32 mmol/L    ANION GAP 2 (L) 4 - 13 mmol/L    BUN 21 5 - 25 mg/dL    Creatinine 1 10 0 60 - 1 30 mg/dL    Glucose 99 65 - 140 mg/dL    Calcium 8 7 8 3 - 10 1 mg/dL    AST 22 5 - 45 U/L    ALT 32 12 - 78 U/L    Alkaline Phosphatase 107 46 - 116 U/L    Total Protein 6 4 6 4 - 8 2 g/dL    Albumin 3 1 (L) 3 5 - 5 0 g/dL    Total Bilirubin 0 87 0 20 - 1 00 mg/dL    eGFR 67 ml/min/1 73sq m   POCT urinalysis dipstick    Collection Time: 12/23/19  8:09 AM   Result Value Ref Range    Color, UA see chart    Urine Macroscopic, POC    Collection Time: 12/23/19  8:10 AM   Result Value Ref Range    Color, UA Yellow     Clarity, UA Clear     pH, UA 7 0 4 5 - 8 0    Leukocytes, UA Negative Negative    Nitrite, UA Negative Negative    Protein, UA >=300 (A) Negative mg/dl    Glucose, UA Negative Negative mg/dl    Ketones, UA Negative Negative mg/dl    Urobilinogen, UA 1 0 0 2, 1 0 E U /dl E U /dl    Bilirubin, UA Negative Negative    Blood, UA Small (A) Negative    Specific Gravity, UA 1 020 1 003 - 1 030   Urine Microscopic    Collection Time: 12/23/19  8:10 AM   Result Value Ref Range    RBC, UA None Seen None Seen, 0-5 /hpf    WBC, UA None Seen None Seen, 0-5, 5-55, 5-65 /hpf    Epithelial Cells None Seen None Seen, Occasional /hpf    Bacteria, UA None Seen None Seen, Occasional /hpf    Hyaline Casts, UA None Seen None Seen /lpf   POCT urine dip    Collection Time: 01/08/20  9:26 AM   Result Value Ref Range    LEUKOCYTE ESTERASE,UA -     NITRITE,UA -     SL AMB POCT UROBILINOGEN -     POCT URINE PROTEIN +++      PH,UA 8     BLOOD,UA ++     SPECIFIC GRAVITY,UA 1 000     KETONES,UA -     BILIRUBIN,UA -     GLUCOSE, UA -      COLOR,UA yellow     CLARITY,UA clear            IMPRESSION:   1  Postvoid urinary dribbling   2  History prostate cancer   3  Urinary incontinence --mild   4  Urinary urgency    PLAN:   I did instruct him on the use of Kegel exercises  He will be more  Diligent in the process  I will also give him a trial of Detrol LA 4 mg and recheck in 3 months    Please note :  Voice dictation software has been used to create this document  There may be inadvertent transcription errors

## 2020-01-21 ENCOUNTER — OFFICE VISIT (OUTPATIENT)
Dept: OBGYN CLINIC | Facility: HOSPITAL | Age: 75
End: 2020-01-21
Payer: MEDICARE

## 2020-01-21 VITALS
HEART RATE: 65 BPM | SYSTOLIC BLOOD PRESSURE: 162 MMHG | BODY MASS INDEX: 26.22 KG/M2 | HEIGHT: 68 IN | WEIGHT: 173 LBS | DIASTOLIC BLOOD PRESSURE: 98 MMHG

## 2020-01-21 DIAGNOSIS — M70.62 GREATER TROCHANTERIC BURSITIS, LEFT: Primary | ICD-10-CM

## 2020-01-21 PROCEDURE — 99213 OFFICE O/P EST LOW 20 MIN: CPT | Performed by: ORTHOPAEDIC SURGERY

## 2020-01-21 PROCEDURE — 20610 DRAIN/INJ JOINT/BURSA W/O US: CPT | Performed by: ORTHOPAEDIC SURGERY

## 2020-01-21 RX ORDER — BETAMETHASONE SODIUM PHOSPHATE AND BETAMETHASONE ACETATE 3; 3 MG/ML; MG/ML
12 INJECTION, SUSPENSION INTRA-ARTICULAR; INTRALESIONAL; INTRAMUSCULAR; SOFT TISSUE
Status: COMPLETED | OUTPATIENT
Start: 2020-01-21 | End: 2020-01-21

## 2020-01-21 RX ORDER — BUPIVACAINE HYDROCHLORIDE 2.5 MG/ML
2 INJECTION, SOLUTION INFILTRATION; PERINEURAL
Status: COMPLETED | OUTPATIENT
Start: 2020-01-21 | End: 2020-01-21

## 2020-01-21 RX ORDER — LIDOCAINE HYDROCHLORIDE 10 MG/ML
2 INJECTION, SOLUTION INFILTRATION; PERINEURAL
Status: COMPLETED | OUTPATIENT
Start: 2020-01-21 | End: 2020-01-21

## 2020-01-21 RX ADMIN — BUPIVACAINE HYDROCHLORIDE 2 ML: 2.5 INJECTION, SOLUTION INFILTRATION; PERINEURAL at 10:09

## 2020-01-21 RX ADMIN — BETAMETHASONE SODIUM PHOSPHATE AND BETAMETHASONE ACETATE 12 MG: 3; 3 INJECTION, SUSPENSION INTRA-ARTICULAR; INTRALESIONAL; INTRAMUSCULAR; SOFT TISSUE at 10:09

## 2020-01-21 RX ADMIN — LIDOCAINE HYDROCHLORIDE 2 ML: 10 INJECTION, SOLUTION INFILTRATION; PERINEURAL at 10:09

## 2020-01-21 NOTE — PROGRESS NOTES
Assessment:  1  Greater trochanteric bursitis, left         Plan:  The patient was provided with left trochanteric bursa steroid injection  He should follow up in 3 months  To do next visit:  Return in about 3 months (around 4/21/2020)  The above stated was discussed in layman's terms and the patient expressed understanding  All questions were answered to the patient's satisfaction  Scribe Attestation    I,:   Sergio Severin am acting as a scribe while in the presence of the attending physician :        I,:   Alena Posadas MD personally performed the services described in this documentation    as scribed in my presence :              Subjective:   Naz Prater is a 69 y o  male who presents for follow up of bilateral hips  Today he complains of mild low back pain with greater lateral hip, anterior thigh and lateral thigh pain  He rates his pain at 3/10 and 8/10 at its worse  Bending, stretching and transitional positions aggravate  He did participate in physical therapy through Dr Redmond with benefit  He did a HEP with benefit following  He is s/p cervical and lumbar surgery with Dr Redmond          Review of systems negative unless otherwise specified in HPI    Past Medical History:   Diagnosis Date    [facility]     Benign colon polyp     [facility] ([facility] [address] [facility]     Cardiac disease     MI    Cervical cord compression with myelopathy (HCC)     COPD (chronic obstructive pulmonary disease) (HCC)     Diverticulitis     Diverticulosis     Gait disturbance     uses cane, leg brace on right    GERD (gastroesophageal reflux disease)     Heart attack ([facility] [address] ) 1996    Hx of resection of large bowel 5/3/2016    Hyperlipidemia     Hypertension     IBS (irritable bowel syndrome)     Lumbar stenosis     Lung cancer ([facility] [address] )     2007 Left lower lobectomy and 2011 Right lung with surgery     Myocardial infarction [facility])     involving other coronary artery    Prostate cancer ([facility] [address] )     Shortness of breath     Small bowel obstruction ([facility] [address] ) 11/16/2016       Past Surgical History:   Procedure Laterality Date    ANGIOPLASTY      stent    CARDIAC SURGERY      CERVICAL SPINE SURGERY      Cervical decompression with cervical fusion from C3-C7 for spinal stenosis   COLON SURGERY  11/04/2014    ESOPHAGOGASTRODUODENOSCOPY  01/03/2013    with possible Schatzki's ring & small hiatal hernia, mild gastritis    FL INJECTION LEFT HIP (NON ARTHROGRAM)  12/11/2018    FL INJECTION LEFT HIP (NON ARTHROGRAM)  5/9/2019    IR EVAR  8/6/2018    LUNG CANCER SURGERY Right 03/2011    wedge resection for lung tumor, 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    LUNG LOBECTOMY Left     [facility] ARTHRODESIS ANT INTERBODY MIN DISCECTOMY, CERVICAL BELOW C2 N/A 5/2/2016    Procedure: Anterior cervical diskectomy C3/4, C5/6, C6/7 with anterior plate fixation fusion C3-7;  Posterior decompressive laminectomy C3-7 with lateral mass fixation fusion C3-7 (IMPULSE MONITORING); Surgeon: Faustino Forrest MD;  [facility];  Service: Neurosurgery    VA ARTHRODESIS POSTERIOR INTERBODY LUMBAR N/A 1/30/2017    Procedure: L4-5 AND L5-S1 DECOMPRESSIVE FORAMINOTOMIES, TRANSFORAMINAL LUMBAR INTERBODY AND PEDICLE SCREW FIXATION FUSION L4-S1 (IMPULSE);   Surgeon: Faustino Forrest MD;  [facility];  Service: Neurosurgery    [facility] [address] RPR DPLMNT AORTO-AORTIC NDGFT N/A 8/6/2018    Procedure: REPAIR ANEURYSM ENDOVASCULAR ABDOMINAL AORTIC  (EVAR) WITH BILATERAL PERCUTANEOUS FEMORAL ACCESS WITH ULTRASOUND GUIDANCE ON THE RIGHT AND PRE CLOSURE;  Surgeon: Jamie Rodriguez MD;  [facility];  Service: Vascular    PROSTATECTOMY  2007    for prostate CA - no chemo/RT    SIGMOIDECTOMY      for divertic    SMALL INTESTINE SURGERY N/A 11/17/2016    Procedure: Exploratory Laparotomy, Lysis of adhesions to release small bowel obstruction;  Surgeon: Shiela García MD;  [facility];  Service:        Family History   Problem Relation Age of Onset    Heart attack Father     Colon cancer Father     Coronary artery disease Father     Heart disease Family     Hyperlipidemia Family     Hypertension Family        Social History     Occupational History    Occupation: Retired   Tobacco Use    Smoking status: Former Smoker     Packs/day: 1 00     Years: 35 00     Pack years: 35 00     Types: Cigarettes     Last attempt to quit: 2011     Years since quittin 0    Smokeless tobacco: Never Used   Substance and Sexual Activity    Alcohol use: Yes     Comment: 6 drinks a week    Drug use: No    Sexual activity: Not on file         Current Outpatient Medications:     acetaminophen (TYLENOL) 500 mg tablet, Take 500 mg by mouth as needed for mild pain , Disp: , Rfl:     albuterol (PROAIR HFA) 90 mcg/act inhaler, Inhale 2 puffs every 6 (six) hours as needed for wheezing, Disp: 3 Inhaler, Rfl: 3    amLODIPine (NORVASC) 2 5 mg tablet, Take 1 tablet (2 5 mg total) by mouth daily, Disp: 90 tablet, Rfl: 3    aspirin (ECOTRIN LOW STRENGTH) 81 mg EC tablet, Take 81 mg by mouth daily, Disp: , Rfl:     atorvastatin (LIPITOR) 40 mg tablet, TAKE 1 TABLET BY MOUTH  DAILY, Disp: 90 tablet, Rfl: 5    ATROVENT HFA 17 MCG/ACT inhaler, USE 2 PUFFS 4 TIMES DAILY, Disp: 51 6 g, Rfl: 5    BREO ELLIPTA 200-25 MCG/INH inhaler, USE 1 INHALATION BY MOUTH  DAILY    RINSE MOUTH AFTER  USE , Disp: 180 each, Rfl: 3    Cholecalciferol (VITAMIN D PO), Take 2,000 Units by mouth daily  , Disp: , Rfl:     diazepam (VALIUM) 2 mg tablet, Take 2 mg by mouth every 6 (six) hours as needed , Disp: , Rfl:     diazepam (VALIUM) 5 mg tablet, Take 5 mg by mouth as needed for anxiety, Disp: , Rfl:     dicyclomine (BENTYL) 20 mg tablet, Take 1 tablet (20 mg total) by mouth every 6 (six) hours as needed (abd cramping), Disp: 90 tablet, Rfl: 3    fexofenadine (ALLEGRA) 180 MG tablet, Take 180 mg by mouth daily, Disp: , Rfl:     FLUoxetine (PROzac) 10 mg capsule, Take 10 mg by mouth daily , Disp: , Rfl:     fluticasone (FLONASE) 50 mcg/act nasal spray, 1 spray into each nostril as needed  , Disp: , Rfl:     lithium carbonate (LITHOBID) 300 mg CR tablet, Take 300 mg by mouth One tab by mouth every other day and alternating with 2 tabs every other day at bedtime  , Disp: , Rfl:     metoprolol succinate (TOPROL-XL) 25 mg 24 hr tablet, TAKE 1 TABLET BY MOUTH  DAILY, Disp: 90 tablet, Rfl: 5    nitroglycerin (NITRODUR) 0 2 mg/hr, APPLY 1 PATCH DAILY (OFF  FOR 12 HOURS) (Patient taking differently: as needed ), Disp: 90 patch, Rfl: 3    omega-3-acid ethyl esters (LOVAZA) 1 g capsule, TAKE 1 CAPSULE BY MOUTH 3  TIMES DAILY, Disp: 270 capsule, Rfl: 5    omeprazole (PriLOSEC) 20 mg delayed release capsule, TAKE 1 CAPSULE BY MOUTH  DAILY, Disp: 90 capsule, Rfl: 3    tolterodine (DETROL LA) 4 mg 24 hr capsule, Take 1 capsule (4 mg total) by mouth daily, Disp: 30 capsule, Rfl: 3    Allergies   Allergen Reactions    Bactrim [Sulfamethoxazole-Trimethoprim] Other (See Comments)     AILYN    Nsaids      Annotation - [id number]: unable to take due to use of lithium      Oxycodone Rash            Vitals:    01/21/20 0929   BP: 162/98   Pulse: 65       Objective:  Physical exam  · General: Awake, Alert, Oriented  · Eyes: Pupils equal, round and reactive to light  · Heart: regular rate and rhythm  · Lungs: No audible wheezing  · Abdomen: soft                    Ortho Exam   Bilateral hips:  TTP over left trochanteric bursa  Non tender right trochanteric bursa  Patient sits comfortably in chair with hip flexed at 90 degrees  Patient stands from seated position without assistance  Calf compartments soft and supple  Sensation intact  Toes are warm sensate and mobile      Diagnostics, reviewed and taken today if performed as documented:    None performed     Procedures, if performed today:    Large joint arthrocentesis: L greater trochanteric bursa  Date/Time: 1/21/2020 10:09 AM  Consent given by: patient  Site marked: site marked  Supporting Documentation  Indications: pain   Procedure Details  Location: hip - L greater trochanteric bursa  Needle size: 22 G  Ultrasound guidance: no  Approach: lateral  Medications administered: 12 mg betamethasone acetate-betamethasone sodium phosphate 6 (3-3) mg/mL; 2 mL bupivacaine 0 25 %; 2 mL lidocaine 1 %    Patient tolerance: patient tolerated the procedure well with no immediate complications  Dressing:  Sterile dressing applied          Portions of the record may have been created with voice recognition software  Occasional wrong word or "sound a like" substitutions may have occurred due to the inherent limitations of voice recognition software  Read the chart carefully and recognize, using context, where substitutions have occurred

## 2020-01-27 ENCOUNTER — OFFICE VISIT (OUTPATIENT)
Dept: INTERNAL MEDICINE CLINIC | Facility: CLINIC | Age: 75
End: 2020-01-27
Payer: MEDICARE

## 2020-01-27 VITALS
HEART RATE: 60 BPM | OXYGEN SATURATION: 93 % | SYSTOLIC BLOOD PRESSURE: 112 MMHG | WEIGHT: 169.6 LBS | TEMPERATURE: 98.5 F | DIASTOLIC BLOOD PRESSURE: 76 MMHG | BODY MASS INDEX: 26.17 KG/M2

## 2020-01-27 DIAGNOSIS — C61 ADENOCARCINOMA OF PROSTATE (HCC): ICD-10-CM

## 2020-01-27 DIAGNOSIS — R22.41 SKIN LUMP OF LEG, RIGHT: Primary | ICD-10-CM

## 2020-01-27 PROCEDURE — 99213 OFFICE O/P EST LOW 20 MIN: CPT | Performed by: INTERNAL MEDICINE

## 2020-01-27 NOTE — ASSESSMENT & PLAN NOTE
Probable superficial thrombophlebitis, can try warm compress on this  It does not have a head on it, but patient instructed to contact me if it starts looking more like a boil

## 2020-01-27 NOTE — PROGRESS NOTES
Assessment/Plan:    Skin lump of leg, right  Probable superficial thrombophlebitis, can try warm compress on this  It does not have a head on it, but patient instructed to contact me if it starts looking more like a boil  Diagnoses and all orders for this visit:    Skin lump of leg, right    Adenocarcinoma of prostate ([facility] [address] )  -     PSA Total, Diagnostic; Future        BMI Counseling: Body mass index is 26 17 kg/m²  The BMI is above normal  Nutrition recommendations include encouraging healthy choices of fruits and vegetables and moderation in carbohydrate intake  Exercise recommendations include exercising 3-5 times per week  Subjective:      Patient ID: Evelia Ludwig is a 69 y o  male  Pt noticed a lump on right calf for a few days  No significant tenderness, no drainage  No fevers or chills      The following portions of the patient's history were reviewed and updated as appropriate: allergies, current medications, past family history, past medical history, past social history, past surgical history and problem list     Review of Systems   Constitutional: Negative for chills and fever  Objective:      /76 (BP Location: Left arm, Patient Position: Sitting, Cuff Size: Large)   Pulse 60   Temp 98 5 °F (36 9 °C)   Wt 76 9 kg (169 lb 9 6 oz)   SpO2 93%   BMI 26 17 kg/m²          Physical Exam   Skin:   Patient has 3/4 inch diameter area of erythema right medial calf, slight prominence in the area, no drainage   Vitals reviewed

## 2020-01-27 NOTE — PATIENT INSTRUCTIONS
Problem List Items Addressed This Visit        Musculoskeletal and Integument    Skin lump of leg, right - Primary     Probable superficial thrombophlebitis, can try warm compress on this  It does not have a head on it, but patient instructed to contact me if it starts looking more like a boil              Genitourinary    Adenocarcinoma of prostate [facility])    Relevant Orders    PSA Total, Diagnostic

## 2020-02-05 ENCOUNTER — OFFICE VISIT (OUTPATIENT)
Dept: UROLOGY | Facility: CLINIC | Age: 75
End: 2020-02-05
Payer: MEDICARE

## 2020-02-05 VITALS — HEIGHT: 68 IN | BODY MASS INDEX: 26.17 KG/M2

## 2020-02-05 DIAGNOSIS — R35.0 URINARY FREQUENCY: Primary | ICD-10-CM

## 2020-02-05 LAB — POST-VOID RESIDUAL VOLUME, ML POC: 41 ML

## 2020-02-05 PROCEDURE — 3074F SYST BP LT 130 MM HG: CPT | Performed by: PHYSICIAN ASSISTANT

## 2020-02-05 PROCEDURE — 3078F DIAST BP <80 MM HG: CPT | Performed by: PHYSICIAN ASSISTANT

## 2020-02-05 PROCEDURE — 51798 US URINE CAPACITY MEASURE: CPT | Performed by: PHYSICIAN ASSISTANT

## 2020-02-05 PROCEDURE — 1160F RVW MEDS BY RX/DR IN RCRD: CPT | Performed by: PHYSICIAN ASSISTANT

## 2020-02-05 PROCEDURE — 99213 OFFICE O/P EST LOW 20 MIN: CPT | Performed by: PHYSICIAN ASSISTANT

## 2020-02-05 PROCEDURE — 1036F TOBACCO NON-USER: CPT | Performed by: PHYSICIAN ASSISTANT

## 2020-02-05 PROCEDURE — 4040F PNEUMOC VAC/ADMIN/RCVD: CPT | Performed by: PHYSICIAN ASSISTANT

## 2020-02-05 NOTE — PROGRESS NOTES
UROLOGY PROGRESS NOTE   Patient Identifiers: Karen Lambert (MRN [id number])  Date of Service: 2/5/2020    Subjective:     72-year-old man history of prostate cancer  He had a robotic prostatectomy in  [place] in 2007  His PSA has been< 0 1  He saw Dr Giana Dia January 8th and was complaining of some episodes of dripping primarily postvoid  He was started on Detrol and was wearing up to 2 pads per day  He stopped me on Monday morning and was complaining of difficulty voiding and starting the stream   I instructed him to stop the medication  Today his PVR is 40 mL  He feels most of his symptoms have subsided  Reason for visit:  Incontinence follow-up    Objective:     VITALS:    There were no vitals filed for this visit  LABS:  Lab Results   Component Value Date    HGB 12 9 12/23/2019    HCT 40 3 12/23/2019    WBC 8 38 12/23/2019     12/23/2019   ]    Lab Results   Component Value Date     10/19/2015    K 3 9 12/23/2019     (H) 12/23/2019    CO2 29 12/23/2019    BUN 21 12/23/2019    CREATININE 1 10 12/23/2019    CALCIUM 8 7 12/23/2019    GLUCOSE 187 (H) 11/14/2016   ]        INPATIENT MEDS:    Current Outpatient Medications:     acetaminophen (TYLENOL) 500 mg tablet, Take 500 mg by mouth as needed for mild pain , Disp: , Rfl:     albuterol (PROAIR HFA) 90 mcg/act inhaler, Inhale 2 puffs every 6 (six) hours as needed for wheezing, Disp: 3 Inhaler, Rfl: 3    amLODIPine (NORVASC) 2 5 mg tablet, Take 1 tablet (2 5 mg total) by mouth daily, Disp: 90 tablet, Rfl: 3    aspirin (ECOTRIN LOW STRENGTH) 81 mg EC tablet, Take 81 mg by mouth daily, Disp: , Rfl:     atorvastatin (LIPITOR) 40 mg tablet, TAKE 1 TABLET BY MOUTH  DAILY, Disp: 90 tablet, Rfl: 5    ATROVENT HFA 17 MCG/ACT inhaler, USE 2 PUFFS 4 TIMES DAILY, Disp: 51 6 g, Rfl: 5    BREO ELLIPTA 200-25 MCG/INH inhaler, USE 1 INHALATION BY MOUTH  DAILY    RINSE MOUTH AFTER  USE , Disp: 180 each, Rfl: 3    Cholecalciferol (VITAMIN D PO), Take 2,000 Units by mouth daily  , Disp: , Rfl:     diazepam (VALIUM) 2 mg tablet, Take 2 mg by mouth every 6 (six) hours as needed , Disp: , Rfl:     diazepam (VALIUM) 5 mg tablet, Take 5 mg by mouth as needed for anxiety, Disp: , Rfl:     dicyclomine (BENTYL) 20 mg tablet, Take 1 tablet (20 mg total) by mouth every 6 (six) hours as needed (abd cramping), Disp: 90 tablet, Rfl: 3    fexofenadine (ALLEGRA) 180 MG tablet, Take 180 mg by mouth daily, Disp: , Rfl:     FLUoxetine (PROzac) 10 mg capsule, Take 10 mg by mouth daily , Disp: , Rfl:     fluticasone (FLONASE) 50 mcg/act nasal spray, 1 spray into each nostril as needed  , Disp: , Rfl:     lithium carbonate (LITHOBID) 300 mg CR tablet, Take 300 mg by mouth One tab by mouth every other day and alternating with 2 tabs every other day at bedtime  , Disp: , Rfl:     metoprolol succinate (TOPROL-XL) 25 mg 24 hr tablet, TAKE 1 TABLET BY MOUTH  DAILY, Disp: 90 tablet, Rfl: 5    nitroglycerin (NITRODUR) 0 2 mg/hr, APPLY 1 PATCH DAILY (OFF  FOR 12 HOURS) (Patient taking differently: as needed ), Disp: 90 patch, Rfl: 3    omega-3-acid ethyl esters (LOVAZA) 1 g capsule, TAKE 1 CAPSULE BY MOUTH 3  TIMES DAILY, Disp: 270 capsule, Rfl: 5    omeprazole (PriLOSEC) 20 mg delayed release capsule, TAKE 1 CAPSULE BY MOUTH  DAILY, Disp: 90 capsule, Rfl: 3    tolterodine (DETROL LA) 4 mg 24 hr capsule, Take 1 capsule (4 mg total) by mouth daily, Disp: 30 capsule, Rfl: 3      Physical Exam:   Ht 5' 7 5" (1 715 m)   BMI 26 17 kg/m²   GEN: no acute distress    RESP: breathing comfortably with no accessory muscle use    ABD: soft, non-tender, non-distended   INCISION:    EXT: no significant peripheral edema       RADIOLOGY:    none     Assessment:    1  Prostate cancer status post radical prostatectomy   2   Post prostatectomy incontinence     Plan:   - he will stay off the anticholinergic for now  - I will see him back in about 3 months for follow-up  - he should call me if his symptoms recur or with any questions or concerns  - we may consider a cystoscopy to rule out any stricture or bladder neck contracture in the future

## 2020-03-05 ENCOUNTER — OFFICE VISIT (OUTPATIENT)
Dept: INTERNAL MEDICINE CLINIC | Facility: CLINIC | Age: 75
End: 2020-03-05
Payer: MEDICARE

## 2020-03-05 VITALS
WEIGHT: 172 LBS | HEART RATE: 66 BPM | BODY MASS INDEX: 26.07 KG/M2 | TEMPERATURE: 97.6 F | RESPIRATION RATE: 16 BRPM | DIASTOLIC BLOOD PRESSURE: 82 MMHG | OXYGEN SATURATION: 92 % | HEIGHT: 68 IN | SYSTOLIC BLOOD PRESSURE: 122 MMHG

## 2020-03-05 DIAGNOSIS — J44.9 CHRONIC OBSTRUCTIVE PULMONARY DISEASE, UNSPECIFIED COPD TYPE (HCC): ICD-10-CM

## 2020-03-05 DIAGNOSIS — K21.9 GASTROESOPHAGEAL REFLUX DISEASE, ESOPHAGITIS PRESENCE NOT SPECIFIED: Primary | ICD-10-CM

## 2020-03-05 DIAGNOSIS — I10 ESSENTIAL HYPERTENSION: ICD-10-CM

## 2020-03-05 PROCEDURE — 3008F BODY MASS INDEX DOCD: CPT | Performed by: INTERNAL MEDICINE

## 2020-03-05 PROCEDURE — 1036F TOBACCO NON-USER: CPT | Performed by: INTERNAL MEDICINE

## 2020-03-05 PROCEDURE — 99214 OFFICE O/P EST MOD 30 MIN: CPT | Performed by: INTERNAL MEDICINE

## 2020-03-05 PROCEDURE — 3079F DIAST BP 80-89 MM HG: CPT | Performed by: INTERNAL MEDICINE

## 2020-03-05 PROCEDURE — 4040F PNEUMOC VAC/ADMIN/RCVD: CPT | Performed by: INTERNAL MEDICINE

## 2020-03-05 PROCEDURE — 3074F SYST BP LT 130 MM HG: CPT | Performed by: INTERNAL MEDICINE

## 2020-03-05 PROCEDURE — 1160F RVW MEDS BY RX/DR IN RCRD: CPT | Performed by: INTERNAL MEDICINE

## 2020-03-05 NOTE — PATIENT INSTRUCTIONS
Problem List Items Addressed This Visit        Digestive    Gastroesophageal reflux disease - Primary     GERD: can do trial off the proton pump inhibitor to see if symptoms of heartburn return  Try behavioral changes to reduce GERD including watching diet and avoiding foods that cause symptoms  Common foods that cause symptoms are coffee, alcohol, chocolate, spicy foods, and fatty foods  Try to titrate down med slowly to avoid possible rebound hyperacidity    Patient start by trying every other day dosing            Respiratory    COPD (chronic obstructive pulmonary disease) (HCC)     Stable, continue inhalers            Cardiovascular and Mediastinum    Essential hypertension     Controlled, continue current meds along with healthy diet and exercise

## 2020-03-05 NOTE — PROGRESS NOTES
Assessment/Plan:    Gastroesophageal reflux disease  GERD: can do trial off the proton pump inhibitor to see if symptoms of heartburn return  Try behavioral changes to reduce GERD including watching diet and avoiding foods that cause symptoms  Common foods that cause symptoms are coffee, alcohol, chocolate, spicy foods, and fatty foods  Try to titrate down med slowly to avoid possible rebound hyperacidity  Patient start by trying every other day dosing    Essential hypertension  Controlled, continue current meds along with healthy diet and exercise    COPD (chronic obstructive pulmonary disease) ([facility])  Stable, continue inhalers       Diagnoses and all orders for this visit:    Gastroesophageal reflux disease, esophagitis presence not specified    Chronic obstructive pulmonary disease, unspecified COPD type ([facility] )    Essential hypertension          Subjective:      Patient ID: Verna Rosales is a 69 y o  male  HTN: Pt reports compliance with meds, no lightheadedness    GERD:  Patient taking proton pump inhibitor daily, no significant GERD symptoms, no food getting stuck    COPD:  Patient using inhalers, but uses the albuterol as needed  He is able to walk without significant shortness of breath      The following portions of the patient's history were reviewed and updated as appropriate: allergies, current medications, past family history, past medical history, past social history, past surgical history and problem list     Review of Systems   Constitutional: Negative for chills, fatigue and fever  HENT: Negative for congestion, nosebleeds, postnasal drip, sore throat and trouble swallowing  Eyes: Negative for pain  Respiratory: Negative for cough, chest tightness, shortness of breath and wheezing  Cardiovascular: Negative for chest pain, palpitations and leg swelling  Gastrointestinal: Negative for abdominal pain, constipation, diarrhea, nausea and vomiting     Endocrine: Negative for polydipsia and polyuria  Genitourinary: Negative for dysuria, flank pain and hematuria  Musculoskeletal: Negative for arthralgias  Skin: Negative for rash  Neurological: Negative for dizziness, tremors, light-headedness and headaches  Hematological: Does not bruise/bleed easily  Psychiatric/Behavioral: Negative for confusion and dysphoric mood  The patient is not nervous/anxious  Objective:      /82   Pulse 66   Temp 97 6 °F (36 4 °C) (Oral)   Resp 16   Ht 5' 7 5" (1 715 m)   Wt 78 kg (172 lb)   SpO2 92%   BMI 26 54 kg/m²          Physical Exam   Constitutional: He is oriented to person, place, and time  He appears well-developed and well-nourished  No distress  HENT:   Head: Normocephalic and atraumatic  Right Ear: External ear normal    Left Ear: External ear normal    Eyes: Conjunctivae are normal  No scleral icterus  Neck: Normal range of motion  Neck supple  No tracheal deviation present  No thyromegaly present  Cardiovascular: Normal rate, regular rhythm and normal heart sounds  No murmur heard  Pulmonary/Chest: Effort normal and breath sounds normal  No respiratory distress  He has no wheezes  He has no rales  Abdominal: Soft  Bowel sounds are normal  There is no tenderness  There is no rebound and no guarding  Musculoskeletal: He exhibits no edema  Lymphadenopathy:     He has no cervical adenopathy  Neurological: He is alert and oriented to person, place, and time  Psychiatric: He has a normal mood and affect  His behavior is normal  Judgment and thought content normal    Vitals reviewed

## 2020-03-05 NOTE — ASSESSMENT & PLAN NOTE
GERD: can do trial off the proton pump inhibitor to see if symptoms of heartburn return  Try behavioral changes to reduce GERD including watching diet and avoiding foods that cause symptoms  Common foods that cause symptoms are coffee, alcohol, chocolate, spicy foods, and fatty foods  Try to titrate down med slowly to avoid possible rebound hyperacidity    Patient start by trying every other day dosing

## 2020-03-16 ENCOUNTER — TRANSCRIBE ORDERS (OUTPATIENT)
Dept: LAB | Facility: CLINIC | Age: 75
End: 2020-03-16

## 2020-03-16 ENCOUNTER — APPOINTMENT (OUTPATIENT)
Dept: LAB | Facility: CLINIC | Age: 75
End: 2020-03-16
Payer: MEDICARE

## 2020-03-16 DIAGNOSIS — F31.60 MIXED BIPOLAR I DISORDER (HCC): ICD-10-CM

## 2020-03-16 DIAGNOSIS — C61 ADENOCARCINOMA OF PROSTATE (HCC): ICD-10-CM

## 2020-03-16 DIAGNOSIS — F31.60 MIXED BIPOLAR I DISORDER (HCC): Primary | ICD-10-CM

## 2020-03-16 LAB
BUN SERPL-MCNC: 27 MG/DL (ref 5–25)
CREAT SERPL-MCNC: 1.13 MG/DL (ref 0.6–1.3)
GFR SERPL CREATININE-BSD FRML MDRD: 65 ML/MIN/1.73SQ M
LITHIUM SERPL-SCNC: 0.8 MMOL/L (ref 0.5–1)
PSA SERPL-MCNC: 0.1 NG/ML (ref 0–4)
TSH SERPL DL<=0.05 MIU/L-ACNC: 1.62 UIU/ML (ref 0.36–3.74)

## 2020-03-16 PROCEDURE — 36415 COLL VENOUS BLD VENIPUNCTURE: CPT

## 2020-03-16 PROCEDURE — 80178 ASSAY OF LITHIUM: CPT

## 2020-03-16 PROCEDURE — 84520 ASSAY OF UREA NITROGEN: CPT

## 2020-03-16 PROCEDURE — 84443 ASSAY THYROID STIM HORMONE: CPT

## 2020-03-16 PROCEDURE — 82565 ASSAY OF CREATININE: CPT

## 2020-03-16 PROCEDURE — 84153 ASSAY OF PSA TOTAL: CPT

## 2020-03-18 DIAGNOSIS — I71.4 ABDOMINAL AORTIC ANEURYSM WITHOUT RUPTURE (HCC): Primary | ICD-10-CM

## 2020-03-23 ENCOUNTER — TRANSCRIBE ORDERS (OUTPATIENT)
Dept: ADMINISTRATIVE | Facility: HOSPITAL | Age: 75
End: 2020-03-23

## 2020-03-23 DIAGNOSIS — Z85.118 PERSONAL HISTORY OF MALIGNANT NEOPLASM OF BRONCHUS AND LUNG: Primary | ICD-10-CM

## 2020-03-24 ENCOUNTER — TELEPHONE (OUTPATIENT)
Dept: NEUROLOGY | Facility: CLINIC | Age: 75
End: 2020-03-24

## 2020-03-24 NOTE — TELEPHONE ENCOUNTER
If the issues is fairly long standing, which it sounds like it is, I would probably reschedule  We can offer video visit but it would be difficult to assess his gait over the phone

## 2020-03-24 NOTE — TELEPHONE ENCOUNTER
Pt called and states that he has upcoming appt w/ Dr Mary Mason on 3/27/20 (consult appt)  States that he is considered high risk  COVID-19 screening completed and he answered NO to all questions  "I am feeling fine"  Pt is asking for your recommendation  Asking if he should reschedule his appt?                [phone] ok to leave detailed message

## 2020-03-24 NOTE — TELEPHONE ENCOUNTER
Called pt and advised of the below  He verbalized understanding     Appt rescheduled 4/23/20 at 11 am

## 2020-03-30 ENCOUNTER — TELEPHONE (OUTPATIENT)
Dept: GASTROENTEROLOGY | Facility: CLINIC | Age: 75
End: 2020-03-30

## 2020-03-31 ENCOUNTER — PREP FOR PROCEDURE (OUTPATIENT)
Dept: GASTROENTEROLOGY | Facility: MEDICAL CENTER | Age: 75
End: 2020-03-31

## 2020-03-31 DIAGNOSIS — Z86.010 HISTORY OF COLON POLYPS: Primary | ICD-10-CM

## 2020-04-07 ENCOUNTER — TELEMEDICINE (OUTPATIENT)
Dept: PULMONOLOGY | Facility: CLINIC | Age: 75
End: 2020-04-07

## 2020-04-07 DIAGNOSIS — J44.9 CHRONIC OBSTRUCTIVE PULMONARY DISEASE, UNSPECIFIED COPD TYPE (HCC): Primary | ICD-10-CM

## 2020-04-07 DIAGNOSIS — J30.1 SEASONAL ALLERGIC RHINITIS DUE TO POLLEN: ICD-10-CM

## 2020-04-07 PROCEDURE — 99213 OFFICE O/P EST LOW 20 MIN: CPT | Performed by: PHYSICIAN ASSISTANT

## 2020-04-23 ENCOUNTER — TELEMEDICINE (OUTPATIENT)
Dept: NEUROLOGY | Facility: CLINIC | Age: 75
End: 2020-04-23
Payer: MEDICARE

## 2020-04-23 DIAGNOSIS — I70.90: ICD-10-CM

## 2020-04-23 DIAGNOSIS — R26.9 GAIT ABNORMALITY: ICD-10-CM

## 2020-04-23 PROCEDURE — 99215 OFFICE O/P EST HI 40 MIN: CPT | Performed by: PSYCHIATRY & NEUROLOGY

## 2020-04-23 RX ORDER — OMEGA-3-ACID ETHYL ESTERS 1 G/1
CAPSULE, LIQUID FILLED ORAL
Qty: 270 CAPSULE | Refills: 5 | Status: SHIPPED | OUTPATIENT
Start: 2020-04-23 | End: 2021-06-08

## 2020-04-26 DIAGNOSIS — I25.10 CORONARY ARTERY DISEASE INVOLVING NATIVE CORONARY ARTERY OF NATIVE HEART WITHOUT ANGINA PECTORIS: ICD-10-CM

## 2020-04-27 RX ORDER — ATORVASTATIN CALCIUM 40 MG/1
TABLET, FILM COATED ORAL
Qty: 90 TABLET | Refills: 5 | Status: SHIPPED | OUTPATIENT
Start: 2020-04-27 | End: 2021-09-08

## 2020-05-06 ENCOUNTER — TELEMEDICINE (OUTPATIENT)
Dept: UROLOGY | Facility: CLINIC | Age: 75
End: 2020-05-06
Payer: MEDICARE

## 2020-05-06 DIAGNOSIS — C61 PROSTATE CANCER (HCC): Primary | ICD-10-CM

## 2020-05-06 PROCEDURE — 99442 PR PHYS/QHP TELEPHONE EVALUATION 11-20 MIN: CPT | Performed by: PHYSICIAN ASSISTANT

## 2020-05-21 ENCOUNTER — OFFICE VISIT (OUTPATIENT)
Dept: OBGYN CLINIC | Facility: HOSPITAL | Age: 75
End: 2020-05-21
Payer: MEDICARE

## 2020-05-21 VITALS
BODY MASS INDEX: 26.98 KG/M2 | HEIGHT: 68 IN | SYSTOLIC BLOOD PRESSURE: 179 MMHG | WEIGHT: 178 LBS | DIASTOLIC BLOOD PRESSURE: 96 MMHG | HEART RATE: 60 BPM

## 2020-05-21 DIAGNOSIS — M70.62 TROCHANTERIC BURSITIS OF LEFT HIP: Primary | ICD-10-CM

## 2020-05-21 PROCEDURE — 20610 DRAIN/INJ JOINT/BURSA W/O US: CPT | Performed by: ORTHOPAEDIC SURGERY

## 2020-05-21 PROCEDURE — 1036F TOBACCO NON-USER: CPT | Performed by: ORTHOPAEDIC SURGERY

## 2020-05-21 PROCEDURE — 4040F PNEUMOC VAC/ADMIN/RCVD: CPT | Performed by: ORTHOPAEDIC SURGERY

## 2020-05-21 PROCEDURE — 3077F SYST BP >= 140 MM HG: CPT | Performed by: ORTHOPAEDIC SURGERY

## 2020-05-21 PROCEDURE — 99213 OFFICE O/P EST LOW 20 MIN: CPT | Performed by: ORTHOPAEDIC SURGERY

## 2020-05-21 PROCEDURE — 3080F DIAST BP >= 90 MM HG: CPT | Performed by: ORTHOPAEDIC SURGERY

## 2020-05-21 PROCEDURE — 1160F RVW MEDS BY RX/DR IN RCRD: CPT | Performed by: ORTHOPAEDIC SURGERY

## 2020-05-21 PROCEDURE — 3008F BODY MASS INDEX DOCD: CPT | Performed by: ORTHOPAEDIC SURGERY

## 2020-05-21 RX ORDER — LIDOCAINE HYDROCHLORIDE 10 MG/ML
2 INJECTION, SOLUTION INFILTRATION; PERINEURAL
Status: COMPLETED | OUTPATIENT
Start: 2020-05-21 | End: 2020-05-21

## 2020-05-21 RX ORDER — BETAMETHASONE SODIUM PHOSPHATE AND BETAMETHASONE ACETATE 3; 3 MG/ML; MG/ML
12 INJECTION, SUSPENSION INTRA-ARTICULAR; INTRALESIONAL; INTRAMUSCULAR; SOFT TISSUE
Status: COMPLETED | OUTPATIENT
Start: 2020-05-21 | End: 2020-05-21

## 2020-05-21 RX ORDER — BUPIVACAINE HYDROCHLORIDE 2.5 MG/ML
2 INJECTION, SOLUTION INFILTRATION; PERINEURAL
Status: COMPLETED | OUTPATIENT
Start: 2020-05-21 | End: 2020-05-21

## 2020-05-21 RX ADMIN — BETAMETHASONE SODIUM PHOSPHATE AND BETAMETHASONE ACETATE 12 MG: 3; 3 INJECTION, SUSPENSION INTRA-ARTICULAR; INTRALESIONAL; INTRAMUSCULAR; SOFT TISSUE at 16:02

## 2020-05-21 RX ADMIN — LIDOCAINE HYDROCHLORIDE 2 ML: 10 INJECTION, SOLUTION INFILTRATION; PERINEURAL at 16:02

## 2020-05-21 RX ADMIN — BUPIVACAINE HYDROCHLORIDE 2 ML: 2.5 INJECTION, SOLUTION INFILTRATION; PERINEURAL at 16:02

## 2020-06-01 ENCOUNTER — HOSPITAL ENCOUNTER (OUTPATIENT)
Dept: RADIOLOGY | Facility: HOSPITAL | Age: 75
Discharge: HOME | End: 2020-06-01
Payer: MEDICARE

## 2020-06-01 DIAGNOSIS — Z85.118 PERSONAL HISTORY OF MALIGNANT NEOPLASM OF BRONCHUS AND LUNG: ICD-10-CM

## 2020-06-01 PROCEDURE — 71250 CT THORAX DX C-: CPT

## 2020-06-01 PROCEDURE — 74176 CT ABD & PELVIS W/O CONTRAST: CPT

## 2020-06-09 ENCOUNTER — TELEPHONE (OUTPATIENT)
Dept: INTERNAL MEDICINE CLINIC | Facility: CLINIC | Age: 75
End: 2020-06-09

## 2020-06-11 ENCOUNTER — OFFICE VISIT (OUTPATIENT)
Dept: INTERNAL MEDICINE CLINIC | Facility: CLINIC | Age: 75
End: 2020-06-11
Payer: MEDICARE

## 2020-06-11 VITALS
HEIGHT: 68 IN | DIASTOLIC BLOOD PRESSURE: 86 MMHG | HEART RATE: 58 BPM | TEMPERATURE: 97.6 F | SYSTOLIC BLOOD PRESSURE: 148 MMHG | WEIGHT: 178.4 LBS | BODY MASS INDEX: 27.04 KG/M2 | OXYGEN SATURATION: 95 %

## 2020-06-11 DIAGNOSIS — I48.91 NEW ONSET ATRIAL FIBRILLATION (HCC): ICD-10-CM

## 2020-06-11 DIAGNOSIS — F31.9 BIPOLAR AFFECTIVE DISORDER, REMISSION STATUS UNSPECIFIED (HCC): ICD-10-CM

## 2020-06-11 DIAGNOSIS — J44.9 CHRONIC OBSTRUCTIVE PULMONARY DISEASE, UNSPECIFIED COPD TYPE (HCC): ICD-10-CM

## 2020-06-11 DIAGNOSIS — I10 ESSENTIAL HYPERTENSION: ICD-10-CM

## 2020-06-11 DIAGNOSIS — I25.10 CORONARY ARTERY DISEASE INVOLVING NATIVE HEART WITHOUT ANGINA PECTORIS, UNSPECIFIED VESSEL OR LESION TYPE: ICD-10-CM

## 2020-06-11 DIAGNOSIS — E78.2 MIXED HYPERLIPIDEMIA: ICD-10-CM

## 2020-06-11 DIAGNOSIS — K21.9 GASTROESOPHAGEAL REFLUX DISEASE, ESOPHAGITIS PRESENCE NOT SPECIFIED: Primary | ICD-10-CM

## 2020-06-11 PROCEDURE — 1036F TOBACCO NON-USER: CPT | Performed by: INTERNAL MEDICINE

## 2020-06-11 PROCEDURE — 1160F RVW MEDS BY RX/DR IN RCRD: CPT | Performed by: INTERNAL MEDICINE

## 2020-06-11 PROCEDURE — 99214 OFFICE O/P EST MOD 30 MIN: CPT | Performed by: INTERNAL MEDICINE

## 2020-06-11 PROCEDURE — 3008F BODY MASS INDEX DOCD: CPT | Performed by: INTERNAL MEDICINE

## 2020-06-11 PROCEDURE — 3077F SYST BP >= 140 MM HG: CPT | Performed by: INTERNAL MEDICINE

## 2020-06-11 PROCEDURE — 4040F PNEUMOC VAC/ADMIN/RCVD: CPT | Performed by: INTERNAL MEDICINE

## 2020-06-11 PROCEDURE — 3079F DIAST BP 80-89 MM HG: CPT | Performed by: INTERNAL MEDICINE

## 2020-06-15 ENCOUNTER — TRANSCRIBE ORDERS (OUTPATIENT)
Dept: LAB | Facility: CLINIC | Age: 75
End: 2020-06-15

## 2020-06-15 ENCOUNTER — APPOINTMENT (OUTPATIENT)
Dept: LAB | Facility: CLINIC | Age: 75
End: 2020-06-15
Payer: MEDICARE

## 2020-06-15 DIAGNOSIS — Z85.118 PERSONAL HISTORY OF MALIGNANT NEOPLASM OF BRONCHUS AND LUNG: Primary | ICD-10-CM

## 2020-06-15 DIAGNOSIS — Z85.118 PERSONAL HISTORY OF MALIGNANT NEOPLASM OF BRONCHUS AND LUNG: ICD-10-CM

## 2020-06-15 LAB
ALBUMIN SERPL BCP-MCNC: 3.7 G/DL (ref 3.5–5)
ALP SERPL-CCNC: 123 U/L (ref 46–116)
ALT SERPL W P-5'-P-CCNC: 33 U/L (ref 12–78)
ANION GAP SERPL CALCULATED.3IONS-SCNC: 3 MMOL/L (ref 4–13)
AST SERPL W P-5'-P-CCNC: 21 U/L (ref 5–45)
BASOPHILS # BLD AUTO: 0.04 THOUSANDS/ΜL (ref 0–0.1)
BASOPHILS NFR BLD AUTO: 1 % (ref 0–1)
BILIRUB SERPL-MCNC: 0.93 MG/DL (ref 0.2–1)
BUN SERPL-MCNC: 24 MG/DL (ref 5–25)
CALCIUM SERPL-MCNC: 9 MG/DL (ref 8.3–10.1)
CHLORIDE SERPL-SCNC: 107 MMOL/L (ref 100–108)
CO2 SERPL-SCNC: 31 MMOL/L (ref 21–32)
CREAT SERPL-MCNC: 1.29 MG/DL (ref 0.6–1.3)
EOSINOPHIL # BLD AUTO: 0.25 THOUSAND/ΜL (ref 0–0.61)
EOSINOPHIL NFR BLD AUTO: 3 % (ref 0–6)
ERYTHROCYTE [DISTWIDTH] IN BLOOD BY AUTOMATED COUNT: 12.8 % (ref 11.6–15.1)
GFR SERPL CREATININE-BSD FRML MDRD: 55 ML/MIN/1.73SQ M
GLUCOSE P FAST SERPL-MCNC: 89 MG/DL (ref 65–99)
HCT VFR BLD AUTO: 43 % (ref 36.5–49.3)
HGB BLD-MCNC: 14 G/DL (ref 12–17)
IMM GRANULOCYTES # BLD AUTO: 0.04 THOUSAND/UL (ref 0–0.2)
IMM GRANULOCYTES NFR BLD AUTO: 1 % (ref 0–2)
LYMPHOCYTES # BLD AUTO: 1.23 THOUSANDS/ΜL (ref 0.6–4.47)
LYMPHOCYTES NFR BLD AUTO: 14 % (ref 14–44)
MCH RBC QN AUTO: 30.8 PG (ref 26.8–34.3)
MCHC RBC AUTO-ENTMCNC: 32.6 G/DL (ref 31.4–37.4)
MCV RBC AUTO: 95 FL (ref 82–98)
MONOCYTES # BLD AUTO: 0.72 THOUSAND/ΜL (ref 0.17–1.22)
MONOCYTES NFR BLD AUTO: 8 % (ref 4–12)
NEUTROPHILS # BLD AUTO: 6.49 THOUSANDS/ΜL (ref 1.85–7.62)
NEUTS SEG NFR BLD AUTO: 73 % (ref 43–75)
NRBC BLD AUTO-RTO: 0 /100 WBCS
PLATELET # BLD AUTO: 180 THOUSANDS/UL (ref 149–390)
PMV BLD AUTO: 11.3 FL (ref 8.9–12.7)
POTASSIUM SERPL-SCNC: 4.5 MMOL/L (ref 3.5–5.3)
PROT SERPL-MCNC: 7 G/DL (ref 6.4–8.2)
PSA SERPL-MCNC: <0.1 NG/ML (ref 0–4)
RBC # BLD AUTO: 4.55 MILLION/UL (ref 3.88–5.62)
SODIUM SERPL-SCNC: 141 MMOL/L (ref 136–145)
WBC # BLD AUTO: 8.77 THOUSAND/UL (ref 4.31–10.16)

## 2020-06-15 PROCEDURE — 84153 ASSAY OF PSA TOTAL: CPT

## 2020-06-15 PROCEDURE — 80053 COMPREHEN METABOLIC PANEL: CPT

## 2020-06-15 PROCEDURE — 85025 COMPLETE CBC W/AUTO DIFF WBC: CPT

## 2020-06-15 PROCEDURE — 36415 COLL VENOUS BLD VENIPUNCTURE: CPT

## 2020-06-18 ENCOUNTER — TELEPHONE (OUTPATIENT)
Dept: PULMONOLOGY | Facility: CLINIC | Age: 75
End: 2020-06-18

## 2020-06-22 ENCOUNTER — PREP FOR PROCEDURE (OUTPATIENT)
Dept: GASTROENTEROLOGY | Facility: CLINIC | Age: 75
End: 2020-06-22

## 2020-06-22 ENCOUNTER — DOCUMENTATION (OUTPATIENT)
Dept: CARDIAC SURGERY | Facility: CLINIC | Age: 75
End: 2020-06-22

## 2020-06-22 ENCOUNTER — TELEPHONE (OUTPATIENT)
Dept: PULMONOLOGY | Facility: CLINIC | Age: 75
End: 2020-06-22

## 2020-06-22 DIAGNOSIS — Z20.822 ENCOUNTER FOR LABORATORY TESTING FOR COVID-19 VIRUS: Primary | ICD-10-CM

## 2020-06-22 DIAGNOSIS — R91.1 LUNG NODULE: Primary | ICD-10-CM

## 2020-06-29 ENCOUNTER — TELEPHONE (OUTPATIENT)
Dept: NEUROLOGY | Facility: CLINIC | Age: 75
End: 2020-06-29

## 2020-06-30 DIAGNOSIS — I10 ESSENTIAL HYPERTENSION: ICD-10-CM

## 2020-06-30 RX ORDER — METOPROLOL SUCCINATE 25 MG/1
TABLET, EXTENDED RELEASE ORAL
Qty: 90 TABLET | Refills: 5 | Status: SHIPPED | OUTPATIENT
Start: 2020-06-30 | End: 2021-09-08

## 2020-07-06 ENCOUNTER — HOSPITAL ENCOUNTER (OUTPATIENT)
Dept: RADIOLOGY | Facility: HOSPITAL | Age: 75
Discharge: HOME | End: 2020-07-06
Attending: INTERNAL MEDICINE | Admitting: RADIOLOGY
Payer: MEDICARE

## 2020-07-06 VITALS
SYSTOLIC BLOOD PRESSURE: 188 MMHG | HEART RATE: 44 BPM | RESPIRATION RATE: 17 BRPM | WEIGHT: 173 LBS | HEIGHT: 67 IN | OXYGEN SATURATION: 98 % | DIASTOLIC BLOOD PRESSURE: 90 MMHG | TEMPERATURE: 98.1 F | BODY MASS INDEX: 27.15 KG/M2

## 2020-07-06 DIAGNOSIS — R91.1 LUNG NODULE: ICD-10-CM

## 2020-07-06 LAB
ERYTHROCYTE [DISTWIDTH] IN BLOOD BY AUTOMATED COUNT: 13.1 % (ref 11.6–15.1)
HCT VFR BLD AUTO: 40.4 % (ref 36.5–49.3)
HGB BLD-MCNC: 13.4 G/DL (ref 12–17)
INR PPP: 0.93 (ref 0.84–1.19)
MCH RBC QN AUTO: 30.8 PG (ref 26.8–34.3)
MCHC RBC AUTO-ENTMCNC: 33.2 G/DL (ref 31.4–37.4)
MCV RBC AUTO: 93 FL (ref 82–98)
PLATELET # BLD AUTO: 150 THOUSANDS/UL (ref 149–390)
PMV BLD AUTO: 10.8 FL (ref 8.9–12.7)
PROTHROMBIN TIME: 12.5 SECONDS (ref 11.6–14.5)
RBC # BLD AUTO: 4.35 MILLION/UL (ref 3.88–5.62)
WBC # BLD AUTO: 7.8 THOUSAND/UL (ref 4.31–10.16)

## 2020-07-06 PROCEDURE — 88341 IMHCHEM/IMCYTCHM EA ADD ANTB: CPT | Performed by: PATHOLOGY

## 2020-07-06 PROCEDURE — 88342 IMHCHEM/IMCYTCHM 1ST ANTB: CPT | Performed by: PATHOLOGY

## 2020-07-06 PROCEDURE — 88312 SPECIAL STAINS GROUP 1: CPT | Performed by: PATHOLOGY

## 2020-07-06 PROCEDURE — 77012 CT SCAN FOR NEEDLE BIOPSY: CPT | Performed by: RADIOLOGY

## 2020-07-06 PROCEDURE — 32405 PR BIOPSY LUNG/MEDIASTINUM PERCUTANEOUS NEEDLE: CPT | Performed by: RADIOLOGY

## 2020-07-06 PROCEDURE — 88334 PATH CONSLTJ SURG CYTO XM EA: CPT | Performed by: PATHOLOGY

## 2020-07-06 PROCEDURE — 88333 PATH CONSLTJ SURG CYTO XM 1: CPT | Performed by: PATHOLOGY

## 2020-07-06 PROCEDURE — 99153 MOD SED SAME PHYS/QHP EA: CPT

## 2020-07-06 PROCEDURE — 32405 HB PERCUT BX LUNG/MEDIASTINUM: CPT

## 2020-07-06 PROCEDURE — 99152 MOD SED SAME PHYS/QHP 5/>YRS: CPT

## 2020-07-06 PROCEDURE — 85610 PROTHROMBIN TIME: CPT | Performed by: RADIOLOGY

## 2020-07-06 PROCEDURE — 88305 TISSUE EXAM BY PATHOLOGIST: CPT | Performed by: PATHOLOGY

## 2020-07-06 PROCEDURE — 77012 CT SCAN FOR NEEDLE BIOPSY: CPT

## 2020-07-06 PROCEDURE — 99152 MOD SED SAME PHYS/QHP 5/>YRS: CPT | Performed by: RADIOLOGY

## 2020-07-06 PROCEDURE — 85027 COMPLETE CBC AUTOMATED: CPT | Performed by: RADIOLOGY

## 2020-07-06 RX ORDER — FENTANYL CITRATE 50 UG/ML
INJECTION, SOLUTION INTRAMUSCULAR; INTRAVENOUS CODE/TRAUMA/SEDATION MEDICATION
Status: COMPLETED | OUTPATIENT
Start: 2020-07-06 | End: 2020-07-06

## 2020-07-06 RX ORDER — MIDAZOLAM HYDROCHLORIDE 2 MG/2ML
INJECTION, SOLUTION INTRAMUSCULAR; INTRAVENOUS CODE/TRAUMA/SEDATION MEDICATION
Status: COMPLETED | OUTPATIENT
Start: 2020-07-06 | End: 2020-07-06

## 2020-07-06 RX ORDER — SODIUM CHLORIDE 9 MG/ML
75 INJECTION, SOLUTION INTRAVENOUS CONTINUOUS
Status: DISCONTINUED | OUTPATIENT
Start: 2020-07-06 | End: 2020-07-06 | Stop reason: HOSPADM

## 2020-07-06 RX ADMIN — MIDAZOLAM 1 MG: 1 INJECTION INTRAMUSCULAR; INTRAVENOUS at 10:43

## 2020-07-06 RX ADMIN — MIDAZOLAM 1 MG: 1 INJECTION INTRAMUSCULAR; INTRAVENOUS at 10:51

## 2020-07-06 RX ADMIN — FENTANYL CITRATE 50 MCG: 50 INJECTION, SOLUTION INTRAMUSCULAR; INTRAVENOUS at 10:51

## 2020-07-06 RX ADMIN — MIDAZOLAM 1 MG: 1 INJECTION INTRAMUSCULAR; INTRAVENOUS at 11:00

## 2020-07-06 RX ADMIN — FENTANYL CITRATE 50 MCG: 50 INJECTION, SOLUTION INTRAMUSCULAR; INTRAVENOUS at 10:44

## 2020-07-06 RX ADMIN — SODIUM CHLORIDE 75 ML/HR: 0.9 INJECTION, SOLUTION INTRAVENOUS at 08:54

## 2020-07-06 NOTE — DISCHARGE INSTRUCTIONS
Needle Biopsy of the Lung    WHAT YOU NEED TO KNOW:  A needle biopsy of the lung is a procedure to remove cells or tissue from your lung  You may have a fine needle aspiration biopsy (FNAB), or a core needle biopsy (CNB)  A FNAB is used to remove cells through a thin needle  CNB uses a thicker needle to remove lung tissue  The samples are collected and tested for inflammation, infection, or cancer  DISCHARGE INSTRUCTIONS:   Resume your normal diet  Small sips of flat soda will help with nausea  Limit your activity for 24 hours  Wound Care:      - Remove band aid in 24 hours      Contact Interventional Radiology at [phone] [place] PATIENTS: Contact Interventional Radiology at [phone]) Rasheeda Hernández PATIENTS: Contact Interventional Radiology at [phone]) if any of the following occur:    - You have a fever greater than 101*    - You cough up large amounts of bright red blood     - You have chest pain with breathing    - You have shortness of breath    -You have persistent nausea and vomiting    - You have pus, redness or swelling around your biopsy site    - You have questions or concerns about your condition or care

## 2020-07-06 NOTE — BRIEF OP NOTE (RAD/CATH)
IR IMAGE GUIDED BIOPSY/ASPIRATION LUNG Procedure Note    PATIENT NAME: Nan Hillman  : 1945  MRN: [id number]    Pre-op Diagnosis:   1  Lung nodule      Post-op Diagnosis:   1  Lung nodule        Surgeon:   Amy Dior MD  Assistants:     No qualified resident was available, Resident is only observing    Estimated Blood Loss:  Minimal  Findings:  Successful left lung biopsy      Specimens:  18 gauge core x2    Complications:  Non    Anesthesia: Conscious sedation    Amy Dior MD     Date: 2020  Time: 1:39 PM

## 2020-07-10 ENCOUNTER — TELEPHONE (OUTPATIENT)
Dept: PULMONOLOGY | Facility: CLINIC | Age: 75
End: 2020-07-10

## 2020-07-10 DIAGNOSIS — Z20.822 ENCOUNTER FOR LABORATORY TESTING FOR COVID-19 VIRUS: ICD-10-CM

## 2020-07-10 PROCEDURE — U0003 INFECTIOUS AGENT DETECTION BY NUCLEIC ACID (DNA OR RNA); SEVERE ACUTE RESPIRATORY SYNDROME CORONAVIRUS 2 (SARS-COV-2) (CORONAVIRUS DISEASE [COVID-19]), AMPLIFIED PROBE TECHNIQUE, MAKING USE OF HIGH THROUGHPUT TECHNOLOGIES AS DESCRIBED BY CMS-2020-01-R: HCPCS

## 2020-07-10 NOTE — TELEPHONE ENCOUNTER
I have called the patient with the results  It showed granulomas and negative for malignancy  The patient will follow-up with me in the office in early October

## 2020-07-11 LAB
INPATIENT: NORMAL
SARS-COV-2 RNA SPEC QL NAA+PROBE: NOT DETECTED

## 2020-07-15 ENCOUNTER — ANESTHESIA EVENT (OUTPATIENT)
Dept: GASTROENTEROLOGY | Facility: HOSPITAL | Age: 75
End: 2020-07-15

## 2020-07-15 RX ORDER — SODIUM CHLORIDE 9 MG/ML
125 INJECTION, SOLUTION INTRAVENOUS CONTINUOUS
Status: CANCELLED | OUTPATIENT
Start: 2020-07-15

## 2020-07-15 RX ORDER — LIDOCAINE HYDROCHLORIDE 10 MG/ML
0.5 INJECTION, SOLUTION EPIDURAL; INFILTRATION; INTRACAUDAL; PERINEURAL ONCE AS NEEDED
Status: CANCELLED | OUTPATIENT
Start: 2020-07-15

## 2020-07-16 ENCOUNTER — HOSPITAL ENCOUNTER (OUTPATIENT)
Dept: GASTROENTEROLOGY | Facility: HOSPITAL | Age: 75
Setting detail: OUTPATIENT SURGERY
Discharge: HOME | End: 2020-07-16
Attending: INTERNAL MEDICINE
Payer: MEDICARE

## 2020-07-16 ENCOUNTER — ANESTHESIA (OUTPATIENT)
Dept: GASTROENTEROLOGY | Facility: HOSPITAL | Age: 75
End: 2020-07-16

## 2020-07-16 VITALS
HEIGHT: 67 IN | SYSTOLIC BLOOD PRESSURE: 135 MMHG | DIASTOLIC BLOOD PRESSURE: 76 MMHG | BODY MASS INDEX: 27.47 KG/M2 | TEMPERATURE: 98.9 F | RESPIRATION RATE: 16 BRPM | HEART RATE: 51 BPM | OXYGEN SATURATION: 95 % | WEIGHT: 175 LBS

## 2020-07-16 DIAGNOSIS — Z86.010 HISTORY OF COLON POLYPS: ICD-10-CM

## 2020-07-16 PROCEDURE — 45385 COLONOSCOPY W/LESION REMOVAL: CPT | Performed by: INTERNAL MEDICINE

## 2020-07-16 PROCEDURE — 88305 TISSUE EXAM BY PATHOLOGIST: CPT | Performed by: PATHOLOGY

## 2020-07-16 RX ORDER — SODIUM CHLORIDE 9 MG/ML
INJECTION, SOLUTION INTRAVENOUS CONTINUOUS PRN
Status: DISCONTINUED | OUTPATIENT
Start: 2020-07-16 | End: 2020-07-16 | Stop reason: SURG

## 2020-07-16 RX ORDER — PROPOFOL 10 MG/ML
INJECTION, EMULSION INTRAVENOUS CONTINUOUS PRN
Status: DISCONTINUED | OUTPATIENT
Start: 2020-07-16 | End: 2020-07-16 | Stop reason: SURG

## 2020-07-16 RX ORDER — PROPOFOL 10 MG/ML
INJECTION, EMULSION INTRAVENOUS AS NEEDED
Status: DISCONTINUED | OUTPATIENT
Start: 2020-07-16 | End: 2020-07-16 | Stop reason: SURG

## 2020-07-16 RX ADMIN — SODIUM CHLORIDE: 0.9 INJECTION, SOLUTION INTRAVENOUS at 13:16

## 2020-07-16 RX ADMIN — PROPOFOL 70 MG: 10 INJECTION, EMULSION INTRAVENOUS at 13:22

## 2020-07-16 RX ADMIN — PROPOFOL 100 MCG/KG/MIN: 10 INJECTION, EMULSION INTRAVENOUS at 13:22

## 2020-07-16 NOTE — ANESTHESIA PREPROCEDURE EVALUATION
Review of Systems/Medical History  Patient summary reviewed  Chart reviewed  No history of anesthetic complications     Cardiovascular  EKG reviewed, Hyperlipidemia, Hypertension , Past MI , CAD , Dysrhythmias , atrial fibrillation, Aortic disease,   Comment: Stress test neg  Echo 2018: 55%EF,mildTR  Infrarenal AAA,  Pulmonary  COPD ,   Comment: Lung cancer     GI/Hepatic    GERD ,   Comment: Diverticulitis     Prostatic disorder, history of prostate cancer       Endo/Other     GYN       Hematology   Musculoskeletal  Back pain , cervical pain,        Neurology   Psychology   Anxiety, Depression , bipolar disorder,              Physical Exam    Airway    Mallampati score: I  TM Distance: >3 FB  Neck ROM: full     Dental   No notable dental hx     Cardiovascular      Pulmonary      Other Findings        Anesthesia Plan  ASA Score- 3     Anesthesia Type- IV sedation with anesthesia with ASA Monitors  Additional Monitors:   Airway Plan:         Plan Factors-  Patient did not smoke on day of surgery  Induction-     Postoperative Plan-     Informed Consent- Anesthetic plan and risks discussed with patient  I personally reviewed this patient with the CRNA  Discussed and agreed on the Anesthesia Plan with the CRNA  Wilbur Frank

## 2020-07-16 NOTE — ANESTHESIA POSTPROCEDURE EVALUATION
Post-Op Assessment Note    CV Status:  Stable    Pain management: adequate     Mental Status:  Awake and lethargic   Hydration Status:  Stable   PONV Controlled:  None   Airway Patency:  Patent   Post Op Vitals Reviewed: Yes      Staff: CRNA           /56 (07/16/20 1349)    Temp      Pulse 60 (07/16/20 1349)   Resp 16 (07/16/20 1349)    SpO2 100 % (07/16/20 1349)

## 2020-07-18 ENCOUNTER — APPOINTMENT (OUTPATIENT)
Dept: LAB | Facility: CLINIC | Age: 75
End: 2020-07-18
Payer: MEDICARE

## 2020-07-18 ENCOUNTER — TRANSCRIBE ORDERS (OUTPATIENT)
Dept: LAB | Facility: CLINIC | Age: 75
End: 2020-07-18

## 2020-07-18 DIAGNOSIS — F31.60 MIXED BIPOLAR I DISORDER (HCC): Primary | ICD-10-CM

## 2020-07-18 DIAGNOSIS — F31.60 MIXED BIPOLAR I DISORDER (HCC): ICD-10-CM

## 2020-07-18 LAB
BUN SERPL-MCNC: 21 MG/DL (ref 5–25)
CREAT SERPL-MCNC: 1.29 MG/DL (ref 0.6–1.3)
GFR SERPL CREATININE-BSD FRML MDRD: 55 ML/MIN/1.73SQ M
LITHIUM SERPL-SCNC: 0.8 MMOL/L (ref 0.5–1)
TSH SERPL DL<=0.05 MIU/L-ACNC: 1.08 UIU/ML (ref 0.36–3.74)

## 2020-07-18 PROCEDURE — 36415 COLL VENOUS BLD VENIPUNCTURE: CPT

## 2020-07-18 PROCEDURE — 80178 ASSAY OF LITHIUM: CPT

## 2020-07-18 PROCEDURE — 82565 ASSAY OF CREATININE: CPT

## 2020-07-18 PROCEDURE — 84443 ASSAY THYROID STIM HORMONE: CPT

## 2020-07-18 PROCEDURE — 84520 ASSAY OF UREA NITROGEN: CPT

## 2020-07-22 ENCOUNTER — TELEPHONE (OUTPATIENT)
Dept: GASTROENTEROLOGY | Facility: MEDICAL CENTER | Age: 75
End: 2020-07-22

## 2020-07-27 ENCOUNTER — TELEPHONE (OUTPATIENT)
Dept: GASTROENTEROLOGY | Facility: MEDICAL CENTER | Age: 75
End: 2020-07-27

## 2020-07-27 NOTE — TELEPHONE ENCOUNTER
----- Message from Jayy Conteh MD sent at 7/27/2020  2:35 PM EDT -----  Please call patient :  Benign but precancerous colon polyps  Removed  Repeat colonoscopy in 2-3 years

## 2020-08-05 ENCOUNTER — APPOINTMENT (OUTPATIENT)
Dept: LAB | Facility: CLINIC | Age: 75
End: 2020-08-05
Payer: MEDICARE

## 2020-08-05 DIAGNOSIS — E78.2 MIXED HYPERLIPIDEMIA: ICD-10-CM

## 2020-08-05 DIAGNOSIS — I25.10 CORONARY ARTERY DISEASE INVOLVING NATIVE CORONARY ARTERY OF NATIVE HEART WITHOUT ANGINA PECTORIS: ICD-10-CM

## 2020-08-05 DIAGNOSIS — I10 ESSENTIAL HYPERTENSION: ICD-10-CM

## 2020-08-05 DIAGNOSIS — I71.4 ABDOMINAL AORTIC ANEURYSM WITHOUT RUPTURE (HCC): ICD-10-CM

## 2020-08-05 DIAGNOSIS — C61 PROSTATE CANCER (HCC): ICD-10-CM

## 2020-08-05 LAB
ALBUMIN SERPL BCP-MCNC: 3.4 G/DL (ref 3.5–5)
ALP SERPL-CCNC: 106 U/L (ref 46–116)
ALT SERPL W P-5'-P-CCNC: 29 U/L (ref 12–78)
ANION GAP SERPL CALCULATED.3IONS-SCNC: 3 MMOL/L (ref 4–13)
AST SERPL W P-5'-P-CCNC: 22 U/L (ref 5–45)
BASOPHILS # BLD AUTO: 0.05 THOUSANDS/ΜL (ref 0–0.1)
BASOPHILS NFR BLD AUTO: 1 % (ref 0–1)
BILIRUB SERPL-MCNC: 0.83 MG/DL (ref 0.2–1)
BUN SERPL-MCNC: 27 MG/DL (ref 5–25)
CALCIUM SERPL-MCNC: 9.6 MG/DL (ref 8.3–10.1)
CHLORIDE SERPL-SCNC: 110 MMOL/L (ref 100–108)
CHOLEST SERPL-MCNC: 140 MG/DL (ref 50–200)
CK SERPL-CCNC: 113 U/L (ref 39–308)
CO2 SERPL-SCNC: 27 MMOL/L (ref 21–32)
CREAT SERPL-MCNC: 1.34 MG/DL (ref 0.6–1.3)
EOSINOPHIL # BLD AUTO: 0.2 THOUSAND/ΜL (ref 0–0.61)
EOSINOPHIL NFR BLD AUTO: 2 % (ref 0–6)
ERYTHROCYTE [DISTWIDTH] IN BLOOD BY AUTOMATED COUNT: 13.3 % (ref 11.6–15.1)
GFR SERPL CREATININE-BSD FRML MDRD: 53 ML/MIN/1.73SQ M
GLUCOSE P FAST SERPL-MCNC: 98 MG/DL (ref 65–99)
HCT VFR BLD AUTO: 39.8 % (ref 36.5–49.3)
HDLC SERPL-MCNC: 43 MG/DL
HGB BLD-MCNC: 13.2 G/DL (ref 12–17)
IMM GRANULOCYTES # BLD AUTO: 0.02 THOUSAND/UL (ref 0–0.2)
IMM GRANULOCYTES NFR BLD AUTO: 0 % (ref 0–2)
LDLC SERPL CALC-MCNC: 81 MG/DL (ref 0–100)
LYMPHOCYTES # BLD AUTO: 1.16 THOUSANDS/ΜL (ref 0.6–4.47)
LYMPHOCYTES NFR BLD AUTO: 13 % (ref 14–44)
MCH RBC QN AUTO: 31.5 PG (ref 26.8–34.3)
MCHC RBC AUTO-ENTMCNC: 33.2 G/DL (ref 31.4–37.4)
MCV RBC AUTO: 95 FL (ref 82–98)
MONOCYTES # BLD AUTO: 0.76 THOUSAND/ΜL (ref 0.17–1.22)
MONOCYTES NFR BLD AUTO: 8 % (ref 4–12)
NEUTROPHILS # BLD AUTO: 6.95 THOUSANDS/ΜL (ref 1.85–7.62)
NEUTS SEG NFR BLD AUTO: 76 % (ref 43–75)
NONHDLC SERPL-MCNC: 97 MG/DL
NRBC BLD AUTO-RTO: 0 /100 WBCS
PLATELET # BLD AUTO: 165 THOUSANDS/UL (ref 149–390)
PMV BLD AUTO: 11.2 FL (ref 8.9–12.7)
POTASSIUM SERPL-SCNC: 4.2 MMOL/L (ref 3.5–5.3)
PROT SERPL-MCNC: 6.5 G/DL (ref 6.4–8.2)
PSA SERPL-MCNC: <0.1 NG/ML (ref 0–4)
RBC # BLD AUTO: 4.19 MILLION/UL (ref 3.88–5.62)
SODIUM SERPL-SCNC: 140 MMOL/L (ref 136–145)
TRIGL SERPL-MCNC: 80 MG/DL
WBC # BLD AUTO: 9.14 THOUSAND/UL (ref 4.31–10.16)

## 2020-08-05 PROCEDURE — 80061 LIPID PANEL: CPT

## 2020-08-05 PROCEDURE — 80053 COMPREHEN METABOLIC PANEL: CPT

## 2020-08-05 PROCEDURE — 84153 ASSAY OF PSA TOTAL: CPT

## 2020-08-05 PROCEDURE — 85025 COMPLETE CBC W/AUTO DIFF WBC: CPT

## 2020-08-05 PROCEDURE — 82550 ASSAY OF CK (CPK): CPT

## 2020-08-05 PROCEDURE — 36415 COLL VENOUS BLD VENIPUNCTURE: CPT

## 2020-08-10 ENCOUNTER — OFFICE VISIT (OUTPATIENT)
Dept: GASTROENTEROLOGY | Facility: CLINIC | Age: 75
End: 2020-08-10
Payer: MEDICARE

## 2020-08-10 VITALS
DIASTOLIC BLOOD PRESSURE: 90 MMHG | HEART RATE: 43 BPM | SYSTOLIC BLOOD PRESSURE: 177 MMHG | TEMPERATURE: 96.1 F | WEIGHT: 170 LBS | BODY MASS INDEX: 26.63 KG/M2

## 2020-08-10 DIAGNOSIS — Z86.010 HX OF ADENOMATOUS COLONIC POLYPS: Primary | ICD-10-CM

## 2020-08-10 PROCEDURE — 3080F DIAST BP >= 90 MM HG: CPT | Performed by: PHYSICIAN ASSISTANT

## 2020-08-10 PROCEDURE — 3077F SYST BP >= 140 MM HG: CPT | Performed by: PHYSICIAN ASSISTANT

## 2020-08-10 PROCEDURE — 1160F RVW MEDS BY RX/DR IN RCRD: CPT | Performed by: PHYSICIAN ASSISTANT

## 2020-08-10 PROCEDURE — 99213 OFFICE O/P EST LOW 20 MIN: CPT | Performed by: PHYSICIAN ASSISTANT

## 2020-08-10 PROCEDURE — 4040F PNEUMOC VAC/ADMIN/RCVD: CPT | Performed by: PHYSICIAN ASSISTANT

## 2020-08-10 PROCEDURE — 1036F TOBACCO NON-USER: CPT | Performed by: PHYSICIAN ASSISTANT

## 2020-08-10 NOTE — PROGRESS NOTES
[facility] - Outpatient Follow-up Note  Mikie Crane 69 y o  male MRN: [id number]  Encounter: [id number]          ASSESSMENT AND PLAN:      1  Hx of adenomatous colonic polyps: underwent colonoscopy 7/16/20 for screening purposes  He had multiple polyps including cream removed from the cecum, ascending colon x3, transverse colon x2, descending colon and sigmoid colon polyp all removed  Three of which were tubular adenomas  He was recommended to have repeat in 2 years secondary to personal history of polyps  He denies any complaints including abdominal pain, change in bowel habits, melena or hematochezia  -repeat colonoscopy in 2 years, reminder entered  -follow-up as needed    ______________________________________________________________________    SUBJECTIVE:  Cam Roper is a pleasant 28-year-old male with a past medical history of hypertension, hyperlipidemia, diverticulosis, COPD, bipolar 1 disorder, CAD who is here for follow-up after colonoscopy  He underwent a colonoscopy 07/16/2024 colon cancer screening purposes  He had multiple polyps removed throughout the colon including 3 from the cecum, 3 from the ascending colon, 2 from the transverse colon, a descending polyp as well as a sigmoid polyp  Biopsies did reveal 3 tubular adenomas  Repeat colonoscopy was recommended in 2 years due to personal history of polyps  Today he denies any GI complaints including nausea, vomiting, abdominal pain, change in bowel movements, melena or hematochezia  REVIEW OF SYSTEMS IS OTHERWISE NEGATIVE        Historical Information   Past Medical History:   Diagnosis Date    Aortic aneurysm (HCC)     Benign colon polyp     Bipolar 1 disorder [facility] )     Cardiac disease     MI    Cervical cord compression with myelopathy (HCC)     COPD (chronic obstructive pulmonary disease) (HCC)     Diverticulitis     Diverticulosis     Gait disturbance     uses cane, leg brace on right    GERD (gastroesophageal reflux disease)     Heart attack ([facility] 1996    Hx of resection of large bowel 5/3/2016    Hyperlipidemia     Hypertension     IBS (irritable bowel syndrome)     Lumbar stenosis     Lung cancer [facility]     2007 Left lower lobectomy and 2011 Right lung with surgery     Myocardial infarction [facility])     involving other coronary artery    Prostate cancer ([facility] )     Shortness of breath     Small bowel obstruction ([facility] ) 11/16/2016     Past Surgical History:   Procedure Laterality Date    ANGIOPLASTY      stent    CARDIAC SURGERY      CERVICAL SPINE SURGERY      Cervical decompression with cervical fusion from C3-C7 for spinal stenosis   COLON SURGERY  11/04/2014    ESOPHAGOGASTRODUODENOSCOPY  01/03/2013    with possible Schatzki's ring & small hiatal hernia, mild gastritis    FL INJECTION LEFT HIP (NON ARTHROGRAM)  12/11/2018    FL INJECTION LEFT HIP (NON ARTHROGRAM)  5/9/2019    IR EVAR  8/6/2018    IR IMAGE GUIDED BIOPSY/ASPIRATION LUNG  7/6/2020    LUNG CANCER SURGERY Right 03/2011    wedge resection for lung tumor, 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    LUNG LOBECTOMY Left     MO ARTHRODESIS ANT INTERBODY MIN DISCECTOMY, CERVICAL BELOW C2 N/A 5/2/2016    Procedure: Anterior cervical diskectomy C3/4, C5/6, C6/7 with anterior plate fixation fusion C3-7;  Posterior decompressive laminectomy C3-7 with lateral mass fixation fusion C3-7 (IMPULSE MONITORING); Surgeon: Erendira Angulo MD;  [facility];  Service: Neurosurgery    MO ARTHRODESIS POSTERIOR INTERBODY LUMBAR N/A 1/30/2017    Procedure: L4-5 AND L5-S1 DECOMPRESSIVE FORAMINOTOMIES, TRANSFORAMINAL LUMBAR INTERBODY AND PEDICLE SCREW FIXATION FUSION L4-S1 (IMPULSE);   Surgeon: Erendira Angulo MD;  Location: [facility];  Service: Neurosurgery    [address] RPR DPLMNT AORTO-AORTIC NDGFT N/A 8/6/2018    Procedure: REPAIR ANEURYSM ENDOVASCULAR ABDOMINAL AORTIC  (EVAR) WITH BILATERAL PERCUTANEOUS FEMORAL ACCESS WITH ULTRASOUND GUIDANCE ON THE RIGHT AND PRE CLOSURE;  Surgeon: Claudio Shi MD;  [facility];  Service: Vascular    PROSTATECTOMY      for prostate CA - no chemo/RT    SIGMOIDECTOMY      for divertic    SMALL INTESTINE SURGERY N/A 2016    Procedure: Exploratory Laparotomy, Lysis of adhesions to release small bowel obstruction;  Surgeon: Amber Davies MD;  [facility];  Service:      Social History   Social History     Substance and Sexual Activity   Alcohol Use Yes    Frequency: 4 or more times a week    Drinks per session: 1 or 2    Comment: 6 drinks a week     Social History     Substance and Sexual Activity   Drug Use No     Social History     Tobacco Use   Smoking Status Former Smoker    Packs/day: 1 00    Years: 35 00    Pack years: 35 00    Types: Cigarettes    Last attempt to quit:     Years since quittin 6   Smokeless Tobacco Never Used     Family History   Problem Relation Age of Onset    Heart attack Father     Colon cancer Father     Coronary artery disease Father     Heart disease Family     Hyperlipidemia Family     Hypertension Family        Meds/Allergies       Current Outpatient Medications:     acetaminophen (TYLENOL) 500 mg tablet    albuterol (PROAIR HFA) 90 mcg/act inhaler    amLODIPine (NORVASC) 2 5 mg tablet    aspirin (ECOTRIN LOW STRENGTH) 81 mg EC tablet    atorvastatin (LIPITOR) 40 mg tablet    ATROVENT HFA 17 MCG/ACT inhaler    BREO ELLIPTA 200-25 MCG/INH inhaler    Cholecalciferol (VITAMIN D PO)    diazepam (VALIUM) 2 mg tablet    diazepam (VALIUM) 5 mg tablet    dicyclomine (BENTYL) 20 mg tablet    fexofenadine (ALLEGRA) 180 MG tablet    FLUoxetine (PROzac) 10 mg capsule    fluticasone (FLONASE) 50 mcg/act nasal spray    lithium carbonate (LITHOBID) 300 mg CR tablet    metoprolol succinate (TOPROL-XL) 25 mg 24 hr tablet    nitroglycerin (NITRODUR) 0 2 mg/hr    omega-3-acid ethyl esters (LOVAZA) 1 g capsule    omeprazole (PriLOSEC) 20 mg delayed release capsule    Allergies   Allergen Reactions    Bactrim [Sulfamethoxazole-Trimethoprim] Other (See Comments)     AILYN    Nsaids      Annotation - [id number]: unable to take due to use of lithium   Oxycodone Rash           Objective     Blood pressure (!) 177/90, pulse (!) 43, temperature (!) 96 1 °F (35 6 °C), temperature source Tympanic, weight 77 1 kg (170 lb)  Body mass index is 26 63 kg/m²  PHYSICAL EXAM:      General Appearance:   Alert, cooperative, no distress   HEENT:   Normocephalic, atraumatic, anicteric  Right eye exhibits no discharge  Left eye exhibits no discharge  No scleral icterus     Neck:  Supple, symmetrical, trachea midline, no stridor    Lungs:   Clear to auscultation bilaterally; no rales, rhonchi or wheezing; respirations unlabored    Heart[de-identified]   Regular rate and rhythm; no murmur, rub, or gallop  Abdomen:   Soft, non-tender, non-distended; normal bowel sounds; no masses, no organomegaly    Genitalia:   Deferred    Rectal:   Deferred    Extremities:  No cyanosis, clubbing or edema        Skin:  No jaundice, rashes, or lesions          Lab Results:   No visits with results within 1 Day(s) from this visit     Latest known visit with results is:   Appointment on 08/05/2020   Component Date Value    PSA, Diagnostic 08/05/2020 <0 1     WBC 08/05/2020 9 14     RBC 08/05/2020 4 19     Hemoglobin 08/05/2020 13 2     Hematocrit 08/05/2020 39 8     MCV 08/05/2020 95     MCH 08/05/2020 31 5     MCHC 08/05/2020 33 2     RDW 08/05/2020 13 3     MPV 08/05/2020 11 2     Platelets 07/94/7616 165     nRBC 08/05/2020 0     Neutrophils Relative 08/05/2020 76*    Immat GRANS % 08/05/2020 0     Lymphocytes Relative 08/05/2020 13*    Monocytes Relative 08/05/2020 8     Eosinophils Relative 08/05/2020 2     Basophils Relative 08/05/2020 1     Neutrophils Absolute 08/05/2020 6 95     Immature Grans Absolute 08/05/2020 0 02     Lymphocytes Absolute 08/05/2020 1 16     Monocytes Absolute 08/05/2020 0 76     Eosinophils Absolute 08/05/2020 0 20     Basophils Absolute 08/05/2020 0 05     Total CK 08/05/2020 113     Cholesterol 08/05/2020 140     Triglycerides 08/05/2020 80     HDL, Direct 08/05/2020 43     LDL Calculated 08/05/2020 81     Non-HDL-Chol (CHOL-HDL) 08/05/2020 97     Sodium 08/05/2020 140     Potassium 08/05/2020 4 2     Chloride 08/05/2020 110*    CO2 08/05/2020 27     ANION GAP 08/05/2020 3*    BUN 08/05/2020 27*    Creatinine 08/05/2020 1 34*    Glucose, Fasting 08/05/2020 98     Calcium 08/05/2020 9 6     AST 08/05/2020 22     ALT 08/05/2020 29     Alkaline Phosphatase 08/05/2020 106     Total Protein 08/05/2020 6 5     Albumin 08/05/2020 3 4*    Total Bilirubin 08/05/2020 0 83     eGFR 08/05/2020 53          Radiology Results:   No results found

## 2020-08-11 ENCOUNTER — OFFICE VISIT (OUTPATIENT)
Dept: NEUROLOGY | Facility: CLINIC | Age: 75
End: 2020-08-11
Payer: MEDICARE

## 2020-08-11 VITALS
SYSTOLIC BLOOD PRESSURE: 156 MMHG | WEIGHT: 173.2 LBS | HEIGHT: 67 IN | DIASTOLIC BLOOD PRESSURE: 84 MMHG | TEMPERATURE: 96.9 F | BODY MASS INDEX: 27.18 KG/M2

## 2020-08-11 DIAGNOSIS — Z97.8: ICD-10-CM

## 2020-08-11 DIAGNOSIS — R26.9 UNSPECIFIED ABNORMALITIES OF GAIT AND MOBILITY: Primary | ICD-10-CM

## 2020-08-11 PROCEDURE — 3077F SYST BP >= 140 MM HG: CPT | Performed by: PSYCHIATRY & NEUROLOGY

## 2020-08-11 PROCEDURE — 4040F PNEUMOC VAC/ADMIN/RCVD: CPT | Performed by: PSYCHIATRY & NEUROLOGY

## 2020-08-11 PROCEDURE — 1036F TOBACCO NON-USER: CPT | Performed by: PSYCHIATRY & NEUROLOGY

## 2020-08-11 PROCEDURE — 1160F RVW MEDS BY RX/DR IN RCRD: CPT | Performed by: PSYCHIATRY & NEUROLOGY

## 2020-08-11 PROCEDURE — 3079F DIAST BP 80-89 MM HG: CPT | Performed by: PSYCHIATRY & NEUROLOGY

## 2020-08-11 PROCEDURE — 3008F BODY MASS INDEX DOCD: CPT | Performed by: PSYCHIATRY & NEUROLOGY

## 2020-08-11 PROCEDURE — 99214 OFFICE O/P EST MOD 30 MIN: CPT | Performed by: PSYCHIATRY & NEUROLOGY

## 2020-08-11 NOTE — PROGRESS NOTES
Assessment/Plan:    Gait abnormality  72-year-old man presents in follow-up for a multifactorial gait disorder  Patient has some very subtle right-sided motor symptoms including a action tremor slight rigidity  The symptoms may be related to his lithium  Given that he has numerous processes contributing to his gait dysfunction is difficult to say with certainty if there is a primary neurologic component at play  We discussed a few different options regarding management including monitoring his symptoms over time verses a diagnostic trial of carbidopa/levodopa  Given his polypharmacy we agreed it was better to hold on any medication trials  Will refer the patient for physical therapy for his multifactorial gait disorder  PM&R referral for his ill-fitting right ankle brace  Diagnoses and all orders for this visit:    Unspecified abnormalities of gait and mobility  -     Cancel: Ambulatory referral to Physical Medicine Rehab; Future  -     Ambulatory referral to Physical Therapy; Future  -     Ambulatory referral to Physical Medicine Rehab; Future    Braces as ambulation aid  -     Cancel: Ambulatory referral to Physical Medicine Rehab; Future  -     Ambulatory referral to Physical Medicine Rehab; Future        Subjective:     Patient ID: Burt Tai is a 69 y o  male  I had the pleasure of seeing your patient, Burt Tai in the Movement Disorders Clinic at the [facility]  Jayna Jarrett is a 72-year-old man who presents in follow up for a multifactorial gait disorder  He has longstanding gait dysfunction and was evaluated for this in our office back in 2016  His gait dysfunction has contributions from his cervical and lumbar degenerative disease, prior myelopathy and possible peripheral neuropathy and various musculoskeletal issues  Patient is on lithium for his bipolar disorder    He does not have any of the typical non motor symptoms we would expect to accompany idiopathic Parkinson's disease  Interval history:  Progressive over the past few years   One close call when getting out of the shower  Overall his symptoms have been stable since her 1st telemedicine visit  The following portions of the patient's history were reviewed and updated as appropriate: allergies, current medications, past family history, past medical history, past social history, past surgical history and problem list       Objective:  /84   Temp (!) 96 9 °F (36 1 °C)   Ht 5' 7" (1 702 m)   Wt 78 6 kg (173 lb 3 2 oz)   BMI 27 13 kg/m²     Physical Exam    Neurological Exam     GENERAL MEDICAL EXAMINATION:  General appearance: alert, in no apparent distress  Appropriately dressed and groomed  Conversing and interacting appropriately  Eyes: Sclera are non-injected  Ears, nose, Mouth, Throat: Mucous membranes are moist    Resp: Breathing comfortably on RA   Musculoskeletal: No evidence of deformities  No contractures  No Edema  Skin: No visible rashes  Warm and well perfused  Psych: normal and appropriate affect     Mental Status:  Alert and oriented to person place and time  Able to relate history without difficulty  Attentive to conversation  Language is fluent and appropriate with normal prosody  There were no paraphasic errors  Speech was not dysarthric  Able to follow both midline and appendicular commands  Somewhat sedate appearing     General Neurology examination:   No hypomimia or hypophonia  Normal global spontaneous movements  Slight rigidity in the right upper extremity  Rapid alternating movements were effortful throughout but without obvious decrement or hesitations  There is a postural and kinetic tremor in the right upper extremity, up to 3 cm  This resolved when at rest   He had some difficulty arising from the chair  Feet were externally rotated  Base was very narrow  Reduced step height bilaterally, more so on the left    Ankle brace on the right   Turn in 4 steps  Mostly carries his cane  Dysmetria: none on FNF  Dyskinesia: none  Dystonia: ? left hand striatal     Review of Systems   Constitutional: Negative  Negative for appetite change and fever  HENT: Negative  Negative for hearing loss, tinnitus, trouble swallowing and voice change  Eyes: Negative  Negative for photophobia and pain  Respiratory: Negative  Negative for shortness of breath  Cardiovascular: Negative  Negative for palpitations  Gastrointestinal: Negative  Negative for nausea and vomiting  Endocrine: Negative  Negative for cold intolerance  Genitourinary: Negative  Negative for dysuria, frequency and urgency  Musculoskeletal: Positive for gait problem (balance and difficulty walking )  Negative for myalgias and neck pain  Skin: Negative  Negative for rash  Neurological: Negative for dizziness, tremors, seizures, syncope, facial asymmetry, speech difficulty, weakness, light-headedness, numbness and headaches  Hematological: Negative  Does not bruise/bleed easily  Psychiatric/Behavioral: Negative  Negative for confusion, hallucinations and sleep disturbance  The above ROS was reviewed and updated  Ana Lilia Arcos MD  Medical Director   [facility]  Movement and Memory Specialist       Current Outpatient Medications on File Prior to Visit   Medication Sig Dispense Refill    acetaminophen (TYLENOL) 500 mg tablet Take 500 mg by mouth as needed for mild pain        albuterol (PROAIR HFA) 90 mcg/act inhaler Inhale 2 puffs every 6 (six) hours as needed for wheezing 3 Inhaler 3    amLODIPine (NORVASC) 2 5 mg tablet Take 1 tablet (2 5 mg total) by mouth daily 90 tablet 3    aspirin (ECOTRIN LOW STRENGTH) 81 mg EC tablet Take 81 mg by mouth daily      atorvastatin (LIPITOR) 40 mg tablet TAKE 1 TABLET BY MOUTH  DAILY 90 tablet 5    ATROVENT HFA 17 MCG/ACT inhaler USE 2 PUFFS 4 TIMES DAILY 51 6 g 5    BREO ELLIPTA 200-25 MCG/INH inhaler USE 1 INHALATION BY MOUTH  DAILY   RINSE MOUTH AFTER  USE  180 each 3    Cholecalciferol (VITAMIN D PO) Take 2,000 Units by mouth daily        diazepam (VALIUM) 2 mg tablet Take 2 mg by mouth every 6 (six) hours as needed       diazepam (VALIUM) 5 mg tablet Take 5 mg by mouth as needed for anxiety      dicyclomine (BENTYL) 20 mg tablet Take 1 tablet (20 mg total) by mouth every 6 (six) hours as needed (abd cramping) 90 tablet 3    fexofenadine (ALLEGRA) 180 MG tablet Take 180 mg by mouth as needed       FLUoxetine (PROzac) 10 mg capsule Take 10 mg by mouth daily       fluticasone (FLONASE) 50 mcg/act nasal spray 1 spray into each nostril daily       lithium carbonate (LITHOBID) 300 mg CR tablet Take 300 mg by mouth One tab by mouth every other day and alternating with 2 tabs every other day at bedtime   metoprolol succinate (TOPROL-XL) 25 mg 24 hr tablet TAKE 1 TABLET BY MOUTH  DAILY 90 tablet 5    nitroglycerin (NITRODUR) 0 2 mg/hr APPLY 1 PATCH DAILY (OFF  FOR 12 HOURS) (Patient taking differently: as needed ) 90 patch 3    omega-3-acid ethyl esters (LOVAZA) 1 g capsule TAKE 1 CAPSULE BY MOUTH 3  TIMES DAILY 270 capsule 5    omeprazole (PriLOSEC) 20 mg delayed release capsule TAKE 1 CAPSULE BY MOUTH  DAILY 90 capsule 3     No current facility-administered medications on file prior to visit

## 2020-08-11 NOTE — ASSESSMENT & PLAN NOTE
82-year-old man presents in follow-up for a multifactorial gait disorder  Patient has some very subtle right-sided motor symptoms including a action tremor slight rigidity  The symptoms may be related to his lithium  Given that he has numerous processes contributing to his gait dysfunction is difficult to say with certainty if there is a primary neurologic component at play  We discussed a few different options regarding management including monitoring his symptoms over time verses a diagnostic trial of carbidopa/levodopa  Given his polypharmacy we agreed it was better to hold on any medication trials  Will refer the patient for physical therapy for his multifactorial gait disorder  PM&R referral for his ill-fitting right ankle brace

## 2020-08-18 ENCOUNTER — TELEPHONE (OUTPATIENT)
Dept: OBGYN CLINIC | Facility: HOSPITAL | Age: 75
End: 2020-08-18

## 2020-08-18 NOTE — TELEPHONE ENCOUNTER
Left voice message for patient call to  back regarding his appointment on 8/20/20  If the patient calls back please do COVID screening and document on the appointment line      Thank you

## 2020-08-20 ENCOUNTER — OFFICE VISIT (OUTPATIENT)
Dept: OBGYN CLINIC | Facility: HOSPITAL | Age: 75
End: 2020-08-20
Payer: MEDICARE

## 2020-08-20 VITALS
DIASTOLIC BLOOD PRESSURE: 76 MMHG | BODY MASS INDEX: 27.94 KG/M2 | HEIGHT: 67 IN | WEIGHT: 178 LBS | HEART RATE: 56 BPM | SYSTOLIC BLOOD PRESSURE: 124 MMHG

## 2020-08-20 DIAGNOSIS — M70.62 TROCHANTERIC BURSITIS OF LEFT HIP: Primary | ICD-10-CM

## 2020-08-20 PROCEDURE — 99212 OFFICE O/P EST SF 10 MIN: CPT | Performed by: ORTHOPAEDIC SURGERY

## 2020-08-20 PROCEDURE — 1036F TOBACCO NON-USER: CPT | Performed by: ORTHOPAEDIC SURGERY

## 2020-08-20 PROCEDURE — 4040F PNEUMOC VAC/ADMIN/RCVD: CPT | Performed by: ORTHOPAEDIC SURGERY

## 2020-08-20 PROCEDURE — 3074F SYST BP LT 130 MM HG: CPT | Performed by: ORTHOPAEDIC SURGERY

## 2020-08-20 PROCEDURE — 3078F DIAST BP <80 MM HG: CPT | Performed by: ORTHOPAEDIC SURGERY

## 2020-08-20 PROCEDURE — 20610 DRAIN/INJ JOINT/BURSA W/O US: CPT | Performed by: ORTHOPAEDIC SURGERY

## 2020-08-20 PROCEDURE — 3008F BODY MASS INDEX DOCD: CPT | Performed by: ORTHOPAEDIC SURGERY

## 2020-08-20 PROCEDURE — 1160F RVW MEDS BY RX/DR IN RCRD: CPT | Performed by: ORTHOPAEDIC SURGERY

## 2020-08-20 RX ORDER — LIDOCAINE HYDROCHLORIDE 10 MG/ML
2 INJECTION, SOLUTION INFILTRATION; PERINEURAL
Status: COMPLETED | OUTPATIENT
Start: 2020-08-20 | End: 2020-08-20

## 2020-08-20 RX ORDER — BETAMETHASONE SODIUM PHOSPHATE AND BETAMETHASONE ACETATE 3; 3 MG/ML; MG/ML
6 INJECTION, SUSPENSION INTRA-ARTICULAR; INTRALESIONAL; INTRAMUSCULAR; SOFT TISSUE
Status: COMPLETED | OUTPATIENT
Start: 2020-08-20 | End: 2020-08-20

## 2020-08-20 RX ADMIN — BETAMETHASONE SODIUM PHOSPHATE AND BETAMETHASONE ACETATE 6 MG: 3; 3 INJECTION, SUSPENSION INTRA-ARTICULAR; INTRALESIONAL; INTRAMUSCULAR; SOFT TISSUE at 13:22

## 2020-08-20 RX ADMIN — LIDOCAINE HYDROCHLORIDE 2 ML: 10 INJECTION, SOLUTION INFILTRATION; PERINEURAL at 13:22

## 2020-08-20 NOTE — PROGRESS NOTES
Subjective;    69year-old individual well known to the practice  He receives periodic injections for left hip trochanteric bursitis  An injection was provided by the attending surgeon in May is the best shot he has ever received  Also today on arrival, and automatic blood pressure measuring device has registered a hypertensive value  This gentleman believes his pressure is far lower  His blood pressure was retaken with a spike go manometer and stethoscope  Left arm seated blood pressure was 126/74  Past Medical History:   Diagnosis Date    Aortic aneurysm (HCC)     Benign colon polyp     Bipolar 1 disorder ([address] )     Cardiac disease     MI    Cervical cord compression with myelopathy (HCC)     COPD [facility]chronic obstructive pulmonary disease) [facility]     Diverticulitis     Diverticulosis     Gait disturbance     uses cane, leg brace on right    GERD (gastroesophageal reflux disease)     Heart attack ([address] ) 1996    Hx of resection of large bowel 5/3/2016    Hyperlipidemia     Hypertension     IBS (irritable bowel syndrome)     Lumbar stenosis     [facility] [address] [facility]     2007 Left lower lobectomy and 2011 Right lung with surgery     Myocardial infarction [facility])     involving other coronary artery    Prostate cancer ([facility] [address] )     Shortness of breath     Small bowel obstruction ([address] ) 11/16/2016       Past Surgical History:   Procedure Laterality Date    ANGIOPLASTY      stent    CARDIAC SURGERY      CERVICAL SPINE SURGERY      Cervical decompression with cervical fusion from C3-C7 for spinal stenosis      COLON SURGERY  11/04/2014    ESOPHAGOGASTRODUODENOSCOPY  01/03/2013    with possible Schatzki's ring & small hiatal hernia, mild gastritis    FL INJECTION LEFT HIP (NON ARTHROGRAM)  12/11/2018    FL INJECTION LEFT HIP (NON ARTHROGRAM)  5/9/2019    IR EVAR  8/6/2018    IR IMAGE GUIDED BIOPSY/ASPIRATION LUNG  7/6/2020    LUNG CANCER SURGERY Right 03/2011    wedge resection for lung tumor, 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    LUNG LOBECTOMY Left     [place] ARTHRODESIS ANT INTERBODY MIN DISCECTOMY, CERVICAL BELOW C2 N/A 2016    Procedure: Anterior cervical diskectomy C3/4, C5/6, C6/7 with anterior plate fixation fusion C3-7;  Posterior decompressive laminectomy C3-7 with lateral mass fixation fusion C3-7 (IMPULSE MONITORING); Surgeon: John Klein MD;  [facility];  Service: Neurosurgery    [place] ARTHRODESIS POSTERIOR INTERBODY LUMBAR N/A 2017    Procedure: L4-5 AND L5-S1 DECOMPRESSIVE FORAMINOTOMIES, TRANSFORAMINAL LUMBAR INTERBODY AND PEDICLE SCREW FIXATION FUSION L4-S1 (IMPULSE);   Surgeon: John Klein MD;  [facility];  Service: Neurosurgery    [place] [address] R[address] DPLMNT AORTO-AORTIC NDGFT N/A 2018    Procedure: REPAIR ANEURYSM ENDOVASCULAR ABDOMINAL AORTIC  (EVAR) WITH BILATERAL PERCUTANEOUS FEMORAL ACCESS WITH ULTRASOUND GUIDANCE ON THE RIGHT AND PRE CLOSURE;  Surgeon: Liliana Patterson MD;  [facility];  Service: Vascular    PROSTATECTOMY      for prostate CA - no chemo/RT    SIGMOIDECTOMY      for divertic    SMALL INTESTINE SURGERY N/A 2016    Procedure: Exploratory Laparotomy, Lysis of adhesions to release small bowel obstruction;  Surgeon: Melani Acuna MD;  [facility];  Service:        Family History   Problem Relation Age of Onset    Heart attack Father     Colon cancer Father     Coronary artery disease Father     Heart disease Family     Hyperlipidemia Family     Hypertension Family        Social History     Tobacco Use    Smoking status: Former Smoker     Packs/day: 1 00     Years: 35 00     Pack years: 35 00     Types: Cigarettes     Last attempt to quit: 2011     Years since quittin 6    Smokeless tobacco: Never Used   Substance Use Topics    Alcohol use: Yes     Frequency: 4 or more times a week     Drinks per session: 1 or 2     Comment: 6 drinks a week    Drug use: No     Exam;    This gentleman exhibits tenderness to palpation overlying the lateral aspect of his left hip  He has no discoloration overlying the lateral aspect of his left hip  He has no intra-articular hip pain of significance left hip    Large joint arthrocentesis: L greater trochanteric bursa  Date/Time: 8/20/2020 1:22 PM  Consent given by: patient  Procedure Details  Location: hip - L greater trochanteric bursa  Needle size: 22 G  Approach: lateral  Medications administered: 2 mL lidocaine 1 %; 6 mg betamethasone acetate-betamethasone sodium phosphate 6 (3-3) mg/mL    Patient tolerance: patient tolerated the procedure well with no immediate complications  Dressing:  Sterile dressing applied      Impression; Left hip greater trochanteric bursitis    Plan;    He underwent injection of steroid to the left hip bursa  We can see him in follow-up in 2-3 months as his symptoms occur or recur    This completed his treatment his again tire experience was supervised by and plan formulated by the attending

## 2020-09-15 ENCOUNTER — EVALUATION (OUTPATIENT)
Dept: PHYSICAL THERAPY | Facility: REHABILITATION | Age: 75
End: 2020-09-15
Payer: MEDICARE

## 2020-09-15 DIAGNOSIS — R26.9 UNSPECIFIED ABNORMALITIES OF GAIT AND MOBILITY: Primary | ICD-10-CM

## 2020-09-15 PROCEDURE — 97161 PT EVAL LOW COMPLEX 20 MIN: CPT

## 2020-09-15 NOTE — PROGRESS NOTES
PT Evaluation     Today's date: 9/15/2020  Patient name: Eden Lee  : 1945  MRN: [id number]  Referring provider: Himanshu Restrepo MD  Dx:   Encounter Diagnosis     ICD-10-CM    1  Unspecified abnormalities of gait and mobility  R26 9 Ambulatory referral to Physical Therapy                  Assessment  Assessment details: Pt is a 69 y o  male who presents to PT for evaluation of abnormal gait  Pt reports he has had ongoing gait disturbances for the past several years  Reports no balance issues, no worries or fear of falling  Limited in stairs, walking for prolonged periods, making sharp turns, and STS transfers  Pt demonstrates weakness of BL LE as well as balance deficits contributing to his gait abnormalities  Pt would benefit from skilled PT in order to normalize gait, improve balance, and maximize safety with functional mobility tasks  Pt was educated regarding physical therapy diagnosis and the recommended plan of care, as well as the potential risks and benefits of treatment  Impairments: abnormal gait, abnormal movement and lacks appropriate home exercise program  Functional limitations: gait, stairsUnderstanding of Dx/Px/POC: good   Prognosis: fair    Goals  STG (3 weeks)  1  Pt to be I in HEP  2  Pt to increase LE strength by 1/2 grade  LTG (By DC)  1 Pt to perform 10 or more STS in 30 seconds  2 Pt to improve FOTO score to predicted dc value  3  Pt to peform SLS BL for 10 seconds or more      Plan  Patient would benefit from: skilled physical therapy  Planned therapy interventions: home exercise program, graded exercise, gait training, coordination, balance/weight bearing training, balance, patient education and neuromuscular re-education  Frequency: 2x week  Duration in visits: 20  Duration in weeks: 8  Plan of Care beginning date: 9/15/2020  Plan of Care expiration date: 12/15/2020  Treatment plan discussed with: patient        Subjective Evaluation    History of Present Illness  Date of onset: 9/15/2019  Pain  No pain reported  Progression: no change    Social Support  Steps to enter house: no  Stairs in house: yes   Lives in: multiple-level home    Treatments  No previous or current treatments  Patient Goals  Patient goal: improve gait and balance        Objective     Neurological Testing     Sensation     Knee   Left Knee   Intact: light touch    Right Knee   Intact: light touch     Reflexes   Left   Patellar (L4): brisk (3+)    Right   Patellar (L4): normal (2+)    Strength/Myotome Testing     Left Hip   Planes of Motion   Flexion: 4-  External rotation: 4+  Internal rotation: 4+    Right Hip   Planes of Motion   Flexion: 4  External rotation: 4+  Internal rotation: 4+    Left Knee   Flexion: 4  Extension: 5    Right Knee   Flexion: 4+  Extension: 5    Left Ankle/Foot   Dorsiflexion: 4-  Plantar flexion: 4+  Great toe extension: 5    Right Ankle/Foot   Dorsiflexion: 4  Plantar flexion: 4+  Great toe extension: 5    Functional Assessment        Comments  30 seconds STS: 3 times  FT EO: able to hold 30 seconds  FT EC: able to hold 30 seconds  FT EO foam: Able to hold 30 seconds  FT EC foam: Able to hold 8 seconds  Tandem: R unable / L 5 seconds  SLS: Unable to hold      Flowsheet Rows      Most Recent Value   PT/OT G-Codes   Current Score  60   Projected Score  66             Precautions: Fall risk  PMH: MI, HTN, aortic aneurysm, Ca, lumbar stenosis, COPD    Manuals 9/15                                                                Neuro Re-Ed             Tandem Stance             Obstacle course (over and around obstacles)             Standing marches             FT EC on Biodex Foam             Marching on foam (fwd, lat)                                       Ther Ex             Nu step warmup             STS             Standing toe raises/ heel raises             Standing hip 3 way             Step ups             Pt ed 8'                                      Ther Activity Gait Training             Gait with SPC                          Modalities

## 2020-09-23 ENCOUNTER — OFFICE VISIT (OUTPATIENT)
Dept: PHYSICAL THERAPY | Facility: REHABILITATION | Age: 75
End: 2020-09-23
Payer: MEDICARE

## 2020-09-23 DIAGNOSIS — R26.9 UNSPECIFIED ABNORMALITIES OF GAIT AND MOBILITY: Primary | ICD-10-CM

## 2020-09-23 PROCEDURE — 97112 NEUROMUSCULAR REEDUCATION: CPT

## 2020-09-23 PROCEDURE — 97110 THERAPEUTIC EXERCISES: CPT

## 2020-09-23 NOTE — PROGRESS NOTES
Daily Note     Today's date: 2020  Patient name: Laury Lundborg  : 1945  MRN: [id number]  Referring provider: Taryn Pereira MD  Dx:   Encounter Diagnosis     ICD-10-CM    1  Unspecified abnormalities of gait and mobility  R26 9                   Subjective: Pt reports he is doing well  Reports compliance with HEP  Objective: See treatment diary below      Assessment: Tolerated treatment well  Pt tolerated tx well without adverse effects or pain  Pt demonstrated moderate difficulty with balance exercises  Required max VCs for technique with exercises this visit  Patient demonstrated fatigue post treatment and would benefit from continued PT  Plan: Progress treatment as tolerated         Precautions: Fall risk  PMH: MI, HTN, aortic aneurysm, Ca, lumbar stenosis, COPD    Manuals 9/15 9/23                                                               Neuro Re-Ed             Tandem Stance  2x1'           Obstacle course (over and around obstacles)             Standing marches  20x ea           FT EC on Biodex Foam             Marching on foam (fwd, lat)             SLS  3x30"                        Ther Ex             Nu step warmup  10' L3           STS  2x10           Standing toe raises/ heel raises  2x20 ea           Standing hip 3 way BL  2x15 ea           Step ups             Pt ed 8'                                      Ther Activity                                       Gait Training             Gait with [facility]                          Modalities

## 2020-09-25 ENCOUNTER — OFFICE VISIT (OUTPATIENT)
Dept: PHYSICAL THERAPY | Facility: REHABILITATION | Age: 75
End: 2020-09-25
Payer: MEDICARE

## 2020-09-25 DIAGNOSIS — R26.9 UNSPECIFIED ABNORMALITIES OF GAIT AND MOBILITY: Primary | ICD-10-CM

## 2020-09-25 PROCEDURE — 97112 NEUROMUSCULAR REEDUCATION: CPT

## 2020-09-25 PROCEDURE — 97110 THERAPEUTIC EXERCISES: CPT

## 2020-09-25 NOTE — PROGRESS NOTES
Daily Note     Today's date: 2020  Patient name: Hawa Corey  : 1945  MRN: [id number]  Referring provider: Remi Escalante MD  Dx:   Encounter Diagnosis     ICD-10-CM    1  Unspecified abnormalities of gait and mobility  R26 9                   Subjective: Pt reports he is doing well  No new complaints this visit  Objective: See treatment diary below      Assessment: Tolerated treatment well  Pt required mod VCs for appropriate technique with exercises this visit  Requires VCs for heel strike and adequate dorsiflexion for foot clearance during swing phase of gait  Patient demonstrated fatigue post treatment and would benefit from continued PT      Plan: Progress treatment as tolerated         Precautions: Fall risk  PMH: MI, HTN, aortic aneurysm, Ca, lumbar stenosis, COPD    Manuals 9/15 9/23 9/25                                                              Neuro Re-Ed             Tandem Stance  2x1'           Obstacle course (over and around obstacles)             Standing marches  20x ea 2x20 on biodex foam          FT EC on Biodex Foam             Marching on foam (fwd, lat)             SLS  3x30"           FT EO Biodex   3x1' L 4          Step ups to biodex foam   20x ea on 4"          Airex balance beam   8 laps (fwd, lat)          Ther Ex             Nu step warmup  10' L3 10' L3          STS  2x10           Standing toe raises/ heel raises  2x20 ea           Standing hip 3 way BL  2x15 ea                        Pt ed 8'                                      Ther Activity                                       Gait Training             Gait with SPC                          Modalities

## 2020-09-30 ENCOUNTER — OFFICE VISIT (OUTPATIENT)
Dept: PHYSICAL THERAPY | Facility: REHABILITATION | Age: 75
End: 2020-09-30
Payer: MEDICARE

## 2020-09-30 DIAGNOSIS — R26.9 UNSPECIFIED ABNORMALITIES OF GAIT AND MOBILITY: Primary | ICD-10-CM

## 2020-09-30 PROCEDURE — 97112 NEUROMUSCULAR REEDUCATION: CPT

## 2020-09-30 PROCEDURE — 97110 THERAPEUTIC EXERCISES: CPT

## 2020-09-30 NOTE — PROGRESS NOTES
Daily Note     Today's date: 2020  Patient name: Prabhjot Sandhu  : 1945  MRN: [id number]  Referring provider: Gerson Medel MD  Dx:   Encounter Diagnosis     ICD-10-CM    1  Unspecified abnormalities of gait and mobility  R26 9                   Subjective: Pt reports he is doing well  No new complaints reported this visit  Objective: See treatment diary below      Assessment: Tolerated treatment fair  Pt tolerated tx without adverse effects  Pt demonstrated difficulty with biodex LOS and random control this visit, requiring max VCs and education for weight shifting this session  Patient would benefit from continued PT      Plan: Progress treatment as tolerated         Precautions: Fall risk  PMH: MI, HTN, aortic aneurysm, Ca, lumbar stenosis, COPD    Manuals 9/15 9/23 9/25 9/30                                                             Neuro Re-Ed             Tandem Stance  2x1'           Obstacle course (over and around obstacles)             Standing marches  20x ea 2x20 on biodex foam          Biodex LOS    5'         Biodex Random control    5'         SLS  3x30"           FT EO Biodex   3x1' L 4 3x1' L4         Step ups    20x ea on 4" (with biodex foam) 10x ea 6" (to SLS)         Airex balance beam   8 laps (fwd, lat) 8 laps (fwd, lat march)         Ther Ex             Nu step warmup  10' L3 10' L3 10' L3         STS  2x10           Standing toe raises/ heel raises  2x20 ea           Standing hip 3 way BL  2x15 ea                        Pt ed 8'   15'                                   Ther Activity                                       Gait Training             Gait with [facility]                          Modalities

## 2020-10-02 ENCOUNTER — OFFICE VISIT (OUTPATIENT)
Dept: PHYSICAL THERAPY | Facility: REHABILITATION | Age: 75
End: 2020-10-02
Payer: MEDICARE

## 2020-10-02 DIAGNOSIS — R26.9 UNSPECIFIED ABNORMALITIES OF GAIT AND MOBILITY: Primary | ICD-10-CM

## 2020-10-02 PROCEDURE — 97110 THERAPEUTIC EXERCISES: CPT

## 2020-10-02 PROCEDURE — 97112 NEUROMUSCULAR REEDUCATION: CPT

## 2020-10-03 DIAGNOSIS — K21.9 GASTROESOPHAGEAL REFLUX DISEASE: ICD-10-CM

## 2020-10-03 DIAGNOSIS — I25.10 CORONARY ARTERY DISEASE INVOLVING NATIVE HEART WITHOUT ANGINA PECTORIS, UNSPECIFIED VESSEL OR LESION TYPE: ICD-10-CM

## 2020-10-05 ENCOUNTER — OFFICE VISIT (OUTPATIENT)
Dept: PULMONOLOGY | Facility: CLINIC | Age: 75
End: 2020-10-05
Payer: MEDICARE

## 2020-10-05 VITALS
SYSTOLIC BLOOD PRESSURE: 124 MMHG | BODY MASS INDEX: 27.15 KG/M2 | DIASTOLIC BLOOD PRESSURE: 80 MMHG | OXYGEN SATURATION: 97 % | WEIGHT: 173 LBS | TEMPERATURE: 97.7 F | HEIGHT: 67 IN | RESPIRATION RATE: 16 BRPM | HEART RATE: 57 BPM

## 2020-10-05 DIAGNOSIS — Z23 ENCOUNTER FOR IMMUNIZATION: ICD-10-CM

## 2020-10-05 DIAGNOSIS — J30.1 SEASONAL ALLERGIC RHINITIS DUE TO POLLEN: ICD-10-CM

## 2020-10-05 DIAGNOSIS — Z85.118 HISTORY OF LUNG CANCER: ICD-10-CM

## 2020-10-05 DIAGNOSIS — J44.9 CHRONIC OBSTRUCTIVE PULMONARY DISEASE, UNSPECIFIED COPD TYPE (HCC): Primary | ICD-10-CM

## 2020-10-05 PROCEDURE — 90662 IIV NO PRSV INCREASED AG IM: CPT

## 2020-10-05 PROCEDURE — 99214 OFFICE O/P EST MOD 30 MIN: CPT | Performed by: INTERNAL MEDICINE

## 2020-10-05 PROCEDURE — G0008 ADMIN INFLUENZA VIRUS VAC: HCPCS

## 2020-10-05 RX ORDER — NITROGLYCERIN 40 MG/1
PATCH TRANSDERMAL
Qty: 90 PATCH | Refills: 3 | Status: SHIPPED | OUTPATIENT
Start: 2020-10-05 | End: 2022-05-25

## 2020-10-05 RX ORDER — OMEPRAZOLE 20 MG/1
CAPSULE, DELAYED RELEASE ORAL
Qty: 90 CAPSULE | Refills: 3 | Status: SHIPPED | OUTPATIENT
Start: 2020-10-05 | End: 2021-12-24

## 2020-10-06 ENCOUNTER — OFFICE VISIT (OUTPATIENT)
Dept: CARDIOLOGY CLINIC | Facility: CLINIC | Age: 75
End: 2020-10-06
Payer: MEDICARE

## 2020-10-06 VITALS
WEIGHT: 175.4 LBS | HEIGHT: 68 IN | DIASTOLIC BLOOD PRESSURE: 68 MMHG | SYSTOLIC BLOOD PRESSURE: 134 MMHG | HEART RATE: 48 BPM | BODY MASS INDEX: 26.58 KG/M2 | OXYGEN SATURATION: 99 % | TEMPERATURE: 97.2 F

## 2020-10-06 DIAGNOSIS — I25.10 CORONARY ARTERY DISEASE INVOLVING NATIVE CORONARY ARTERY OF NATIVE HEART WITHOUT ANGINA PECTORIS: ICD-10-CM

## 2020-10-06 DIAGNOSIS — I25.2 PAST HISTORY OF MYOCARDIAL INFARCTION: ICD-10-CM

## 2020-10-06 DIAGNOSIS — I10 ESSENTIAL HYPERTENSION: Primary | ICD-10-CM

## 2020-10-06 DIAGNOSIS — E78.2 MIXED HYPERLIPIDEMIA: ICD-10-CM

## 2020-10-06 DIAGNOSIS — I71.4 ABDOMINAL AORTIC ANEURYSM WITHOUT RUPTURE (HCC): ICD-10-CM

## 2020-10-06 DIAGNOSIS — Z95.5 HISTORY OF HEART ARTERY STENT: ICD-10-CM

## 2020-10-06 PROCEDURE — 99214 OFFICE O/P EST MOD 30 MIN: CPT | Performed by: INTERNAL MEDICINE

## 2020-10-07 ENCOUNTER — APPOINTMENT (OUTPATIENT)
Dept: PHYSICAL THERAPY | Facility: REHABILITATION | Age: 75
End: 2020-10-07
Payer: MEDICARE

## 2020-10-09 ENCOUNTER — APPOINTMENT (OUTPATIENT)
Dept: PHYSICAL THERAPY | Facility: REHABILITATION | Age: 75
End: 2020-10-09
Payer: MEDICARE

## 2020-10-12 ENCOUNTER — APPOINTMENT (OUTPATIENT)
Dept: PHYSICAL THERAPY | Facility: REHABILITATION | Age: 75
End: 2020-10-12
Payer: MEDICARE

## 2020-10-12 ENCOUNTER — OFFICE VISIT (OUTPATIENT)
Dept: PHYSICAL THERAPY | Facility: REHABILITATION | Age: 75
End: 2020-10-12
Payer: MEDICARE

## 2020-10-12 DIAGNOSIS — R26.9 UNSPECIFIED ABNORMALITIES OF GAIT AND MOBILITY: Primary | ICD-10-CM

## 2020-10-12 PROCEDURE — 97112 NEUROMUSCULAR REEDUCATION: CPT

## 2020-10-12 PROCEDURE — 97110 THERAPEUTIC EXERCISES: CPT

## 2020-10-13 ENCOUNTER — OFFICE VISIT (OUTPATIENT)
Dept: INTERNAL MEDICINE CLINIC | Facility: CLINIC | Age: 75
End: 2020-10-13
Payer: MEDICARE

## 2020-10-13 VITALS
RESPIRATION RATE: 16 BRPM | HEART RATE: 58 BPM | HEIGHT: 67 IN | DIASTOLIC BLOOD PRESSURE: 86 MMHG | BODY MASS INDEX: 27.12 KG/M2 | SYSTOLIC BLOOD PRESSURE: 126 MMHG | OXYGEN SATURATION: 98 % | WEIGHT: 172.8 LBS | TEMPERATURE: 97.3 F

## 2020-10-13 DIAGNOSIS — L60.8 TOENAIL DEFORMITY: ICD-10-CM

## 2020-10-13 DIAGNOSIS — E78.2 MIXED HYPERLIPIDEMIA: ICD-10-CM

## 2020-10-13 DIAGNOSIS — I10 ESSENTIAL HYPERTENSION: Primary | ICD-10-CM

## 2020-10-13 DIAGNOSIS — S91.109A OPEN WOUND OF TOE, INITIAL ENCOUNTER: ICD-10-CM

## 2020-10-13 DIAGNOSIS — K21.9 GASTROESOPHAGEAL REFLUX DISEASE, UNSPECIFIED WHETHER ESOPHAGITIS PRESENT: ICD-10-CM

## 2020-10-13 DIAGNOSIS — F31.9 BIPOLAR AFFECTIVE DISORDER, REMISSION STATUS UNSPECIFIED (HCC): ICD-10-CM

## 2020-10-13 PROCEDURE — 99214 OFFICE O/P EST MOD 30 MIN: CPT | Performed by: INTERNAL MEDICINE

## 2020-10-14 ENCOUNTER — OFFICE VISIT (OUTPATIENT)
Dept: PHYSICAL THERAPY | Facility: REHABILITATION | Age: 75
End: 2020-10-14
Payer: MEDICARE

## 2020-10-14 DIAGNOSIS — R26.9 UNSPECIFIED ABNORMALITIES OF GAIT AND MOBILITY: Primary | ICD-10-CM

## 2020-10-14 PROCEDURE — 97112 NEUROMUSCULAR REEDUCATION: CPT

## 2020-10-15 ENCOUNTER — LAB (OUTPATIENT)
Dept: LAB | Facility: CLINIC | Age: 75
End: 2020-10-15
Payer: MEDICARE

## 2020-10-15 DIAGNOSIS — F31.60 BIPOLAR I DISORDER, MOST RECENT EPISODE MIXED (HCC): Primary | ICD-10-CM

## 2020-10-15 LAB
BUN SERPL-MCNC: 27 MG/DL (ref 5–25)
CREAT SERPL-MCNC: 1.25 MG/DL (ref 0.6–1.3)
GFR SERPL CREATININE-BSD FRML MDRD: 57 ML/MIN/1.73SQ M
LITHIUM SERPL-SCNC: 0.8 MMOL/L (ref 0.5–1)
TSH SERPL DL<=0.05 MIU/L-ACNC: 2.07 UIU/ML (ref 0.36–3.74)

## 2020-10-15 PROCEDURE — 36415 COLL VENOUS BLD VENIPUNCTURE: CPT

## 2020-10-15 PROCEDURE — 84443 ASSAY THYROID STIM HORMONE: CPT

## 2020-10-15 PROCEDURE — 84520 ASSAY OF UREA NITROGEN: CPT

## 2020-10-15 PROCEDURE — 80178 ASSAY OF LITHIUM: CPT

## 2020-10-15 PROCEDURE — 82565 ASSAY OF CREATININE: CPT

## 2020-10-16 ENCOUNTER — APPOINTMENT (OUTPATIENT)
Dept: PHYSICAL THERAPY | Facility: REHABILITATION | Age: 75
End: 2020-10-16
Payer: MEDICARE

## 2020-10-19 ENCOUNTER — OFFICE VISIT (OUTPATIENT)
Dept: PHYSICAL THERAPY | Facility: REHABILITATION | Age: 75
End: 2020-10-19
Payer: MEDICARE

## 2020-10-19 DIAGNOSIS — R26.9 UNSPECIFIED ABNORMALITIES OF GAIT AND MOBILITY: Primary | ICD-10-CM

## 2020-10-19 PROCEDURE — 97110 THERAPEUTIC EXERCISES: CPT

## 2020-10-19 PROCEDURE — 97112 NEUROMUSCULAR REEDUCATION: CPT

## 2020-10-23 ENCOUNTER — OFFICE VISIT (OUTPATIENT)
Dept: PHYSICAL THERAPY | Facility: REHABILITATION | Age: 75
End: 2020-10-23
Payer: MEDICARE

## 2020-10-23 DIAGNOSIS — R26.9 UNSPECIFIED ABNORMALITIES OF GAIT AND MOBILITY: Primary | ICD-10-CM

## 2020-10-23 PROCEDURE — 97112 NEUROMUSCULAR REEDUCATION: CPT | Performed by: PHYSICAL MEDICINE & REHABILITATION

## 2020-10-23 PROCEDURE — 97110 THERAPEUTIC EXERCISES: CPT | Performed by: PHYSICAL MEDICINE & REHABILITATION

## 2020-10-25 DIAGNOSIS — I10 ESSENTIAL HYPERTENSION: ICD-10-CM

## 2020-10-26 RX ORDER — AMLODIPINE BESYLATE 2.5 MG/1
TABLET ORAL
Qty: 90 TABLET | Refills: 3 | Status: SHIPPED | OUTPATIENT
Start: 2020-10-26 | End: 2021-12-06

## 2020-10-29 ENCOUNTER — OFFICE VISIT (OUTPATIENT)
Dept: OBGYN CLINIC | Facility: HOSPITAL | Age: 75
End: 2020-10-29
Payer: MEDICARE

## 2020-10-29 VITALS
HEART RATE: 76 BPM | WEIGHT: 174 LBS | HEIGHT: 67 IN | DIASTOLIC BLOOD PRESSURE: 90 MMHG | SYSTOLIC BLOOD PRESSURE: 164 MMHG | BODY MASS INDEX: 27.31 KG/M2

## 2020-10-29 DIAGNOSIS — M70.62 TROCHANTERIC BURSITIS OF LEFT HIP: Primary | ICD-10-CM

## 2020-10-29 PROCEDURE — 20610 DRAIN/INJ JOINT/BURSA W/O US: CPT

## 2020-10-29 PROCEDURE — 99213 OFFICE O/P EST LOW 20 MIN: CPT

## 2020-10-29 RX ORDER — BUPIVACAINE HYDROCHLORIDE 2.5 MG/ML
2 INJECTION, SOLUTION INFILTRATION; PERINEURAL
Status: COMPLETED | OUTPATIENT
Start: 2020-10-29 | End: 2020-10-29

## 2020-10-29 RX ORDER — BETAMETHASONE SODIUM PHOSPHATE AND BETAMETHASONE ACETATE 3; 3 MG/ML; MG/ML
12 INJECTION, SUSPENSION INTRA-ARTICULAR; INTRALESIONAL; INTRAMUSCULAR; SOFT TISSUE
Status: COMPLETED | OUTPATIENT
Start: 2020-10-29 | End: 2020-10-29

## 2020-10-29 RX ORDER — LIDOCAINE HYDROCHLORIDE 10 MG/ML
2 INJECTION, SOLUTION INFILTRATION; PERINEURAL
Status: COMPLETED | OUTPATIENT
Start: 2020-10-29 | End: 2020-10-29

## 2020-10-29 RX ADMIN — BETAMETHASONE SODIUM PHOSPHATE AND BETAMETHASONE ACETATE 12 MG: 3; 3 INJECTION, SUSPENSION INTRA-ARTICULAR; INTRALESIONAL; INTRAMUSCULAR; SOFT TISSUE at 15:57

## 2020-10-29 RX ADMIN — BUPIVACAINE HYDROCHLORIDE 2 ML: 2.5 INJECTION, SOLUTION INFILTRATION; PERINEURAL at 15:57

## 2020-10-29 RX ADMIN — LIDOCAINE HYDROCHLORIDE 2 ML: 10 INJECTION, SOLUTION INFILTRATION; PERINEURAL at 15:57

## 2020-11-10 DIAGNOSIS — I71.4 ABDOMINAL AORTIC ANEURYSM WITHOUT RUPTURE (HCC): Primary | ICD-10-CM

## 2020-11-17 ENCOUNTER — TELEMEDICINE (OUTPATIENT)
Dept: NEUROLOGY | Facility: CLINIC | Age: 75
End: 2020-11-17
Payer: MEDICARE

## 2020-11-17 DIAGNOSIS — R26.81 GAIT INSTABILITY: Primary | ICD-10-CM

## 2020-11-17 PROCEDURE — 99213 OFFICE O/P EST LOW 20 MIN: CPT | Performed by: PSYCHIATRY & NEUROLOGY

## 2020-11-29 NOTE — MISCELLANEOUS
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 The Attending was available to me as a supervising provider if needed.

## 2020-12-10 ENCOUNTER — HOSPITAL ENCOUNTER (OUTPATIENT)
Dept: NON INVASIVE DIAGNOSTICS | Facility: CLINIC | Age: 75
Discharge: HOME | End: 2020-12-10
Payer: MEDICARE

## 2020-12-10 DIAGNOSIS — I71.4 ABDOMINAL AORTIC ANEURYSM WITHOUT RUPTURE (HCC): ICD-10-CM

## 2020-12-10 PROCEDURE — 93978 VASCULAR STUDY: CPT

## 2020-12-10 PROCEDURE — 93978 VASCULAR STUDY: CPT | Performed by: SURGERY

## 2020-12-10 PROCEDURE — 93923 UPR/LXTR ART STDY 3+ LVLS: CPT

## 2020-12-14 ENCOUNTER — OFFICE VISIT (OUTPATIENT)
Dept: INTERNAL MEDICINE CLINIC | Facility: CLINIC | Age: 75
End: 2020-12-14
Payer: MEDICARE

## 2020-12-14 VITALS — SYSTOLIC BLOOD PRESSURE: 130 MMHG | DIASTOLIC BLOOD PRESSURE: 80 MMHG

## 2020-12-14 DIAGNOSIS — Z00.00 MEDICARE ANNUAL WELLNESS VISIT, SUBSEQUENT: Primary | ICD-10-CM

## 2020-12-14 PROCEDURE — G0439 PPPS, SUBSEQ VISIT: HCPCS | Performed by: INTERNAL MEDICINE

## 2020-12-14 PROCEDURE — 1123F ACP DISCUSS/DSCN MKR DOCD: CPT | Performed by: INTERNAL MEDICINE

## 2020-12-15 ENCOUNTER — LAB (OUTPATIENT)
Dept: LAB | Facility: CLINIC | Age: 75
End: 2020-12-15
Payer: MEDICARE

## 2020-12-15 ENCOUNTER — TRANSCRIBE ORDERS (OUTPATIENT)
Dept: LAB | Facility: CLINIC | Age: 75
End: 2020-12-15

## 2020-12-15 DIAGNOSIS — F31.60 MIXED BIPOLAR I DISORDER (HCC): ICD-10-CM

## 2020-12-15 DIAGNOSIS — F31.60 MIXED BIPOLAR I DISORDER (HCC): Primary | ICD-10-CM

## 2020-12-15 LAB
BUN SERPL-MCNC: 31 MG/DL (ref 5–25)
CREAT SERPL-MCNC: 1.17 MG/DL (ref 0.6–1.3)
GFR SERPL CREATININE-BSD FRML MDRD: 62 ML/MIN/1.73SQ M
LITHIUM SERPL-SCNC: 0.6 MMOL/L (ref 0.5–1)
TSH SERPL DL<=0.05 MIU/L-ACNC: 1.69 UIU/ML (ref 0.36–3.74)

## 2020-12-15 PROCEDURE — 84520 ASSAY OF UREA NITROGEN: CPT

## 2020-12-15 PROCEDURE — 80178 ASSAY OF LITHIUM: CPT

## 2020-12-15 PROCEDURE — 82565 ASSAY OF CREATININE: CPT

## 2020-12-15 PROCEDURE — 84443 ASSAY THYROID STIM HORMONE: CPT

## 2020-12-15 PROCEDURE — 36415 COLL VENOUS BLD VENIPUNCTURE: CPT

## 2020-12-28 ENCOUNTER — TRANSCRIBE ORDERS (OUTPATIENT)
Dept: ADMINISTRATIVE | Facility: HOSPITAL | Age: 75
End: 2020-12-28

## 2020-12-28 DIAGNOSIS — Z85.118 PERSONAL HISTORY OF OTHER MALIGNANT NEOPLASM OF BRONCHUS AND LUNG: Primary | ICD-10-CM

## 2021-01-06 ENCOUNTER — TELEPHONE (OUTPATIENT)
Dept: UROLOGY | Facility: CLINIC | Age: 76
End: 2021-01-06

## 2021-01-06 DIAGNOSIS — C61 PROSTATE CANCER (HCC): Primary | ICD-10-CM

## 2021-01-07 ENCOUNTER — LAB (OUTPATIENT)
Dept: LAB | Facility: CLINIC | Age: 76
End: 2021-01-07
Payer: MEDICARE

## 2021-01-07 DIAGNOSIS — C61 PROSTATE CANCER (HCC): ICD-10-CM

## 2021-01-07 LAB — PSA SERPL-MCNC: <0.1 NG/ML (ref 0–4)

## 2021-01-07 PROCEDURE — 84153 ASSAY OF PSA TOTAL: CPT

## 2021-01-12 ENCOUNTER — OFFICE VISIT (OUTPATIENT)
Dept: UROLOGY | Facility: CLINIC | Age: 76
End: 2021-01-12
Payer: MEDICARE

## 2021-01-12 VITALS
BODY MASS INDEX: 28.09 KG/M2 | WEIGHT: 179 LBS | HEART RATE: 52 BPM | HEIGHT: 67 IN | SYSTOLIC BLOOD PRESSURE: 132 MMHG | DIASTOLIC BLOOD PRESSURE: 84 MMHG

## 2021-01-12 DIAGNOSIS — R35.0 URINARY FREQUENCY: Primary | ICD-10-CM

## 2021-01-12 DIAGNOSIS — C61 PROSTATE CANCER (HCC): ICD-10-CM

## 2021-01-12 LAB
SL AMB  POCT GLUCOSE, UA: NORMAL
SL AMB LEUKOCYTE ESTERASE,UA: NORMAL
SL AMB POCT BILIRUBIN,UA: NORMAL
SL AMB POCT BLOOD,UA: NORMAL
SL AMB POCT CLARITY,UA: CLEAR
SL AMB POCT COLOR,UA: YELLOW
SL AMB POCT KETONES,UA: NORMAL
SL AMB POCT NITRITE,UA: NORMAL
SL AMB POCT PH,UA: 6.5
SL AMB POCT SPECIFIC GRAVITY,UA: 1.01
SL AMB POCT URINE PROTEIN: 300
SL AMB POCT UROBILINOGEN: 0.2

## 2021-01-12 PROCEDURE — 99213 OFFICE O/P EST LOW 20 MIN: CPT | Performed by: PHYSICIAN ASSISTANT

## 2021-01-12 PROCEDURE — 81002 URINALYSIS NONAUTO W/O SCOPE: CPT | Performed by: PHYSICIAN ASSISTANT

## 2021-01-12 NOTE — PROGRESS NOTES
UROLOGY PROGRESS NOTE   Patient Identifiers: Ludwin Sharpe (MRN [id number])  Date of Service: 1/12/2021    Subjective:     74-year-old man history of prostate cancer  He had a robotic prostatectomy in [place] in 2007  PSA has been< 0 1  He uses about 2 pads per day for postvoid dribbling which is relatively new for him  PVR was around 40 mL  The majority of his dribbling has resolved  Reason for visit:  Prostate cancer and incontinence follow-up      Objective:     VITALS:    Vitals:    01/12/21 0841   BP: 132/84   Pulse: (!) 52           LABS:  Lab Results   Component Value Date    HGB 13 2 08/05/2020    HCT 39 8 08/05/2020    WBC 9 14 08/05/2020     08/05/2020   ]    Lab Results   Component Value Date     10/19/2015    K 4 2 08/05/2020     (H) 08/05/2020    CO2 27 08/05/2020    BUN 31 (H) 12/15/2020    CREATININE 1 17 12/15/2020    CALCIUM 9 6 08/05/2020    GLUCOSE 187 (H) 11/14/2016   ]        INPATIENT MEDS:    Current Outpatient Medications:     acetaminophen (TYLENOL) 500 mg tablet, Take 500 mg by mouth as needed for mild pain , Disp: , Rfl:     albuterol (PROAIR HFA) 90 mcg/act inhaler, Inhale 2 puffs every 6 (six) hours as needed for wheezing, Disp: 3 Inhaler, Rfl: 3    amLODIPine (NORVASC) 2 5 mg tablet, TAKE 1 TABLET BY MOUTH  DAILY, Disp: 90 tablet, Rfl: 3    aspirin (ECOTRIN LOW STRENGTH) 81 mg EC tablet, Take 81 mg by mouth daily, Disp: , Rfl:     atorvastatin (LIPITOR) 40 mg tablet, TAKE 1 TABLET BY MOUTH  DAILY, Disp: 90 tablet, Rfl: 5    ATROVENT HFA 17 MCG/ACT inhaler, USE 2 PUFFS 4 TIMES DAILY, Disp: 51 6 g, Rfl: 5    BREO ELLIPTA 200-25 MCG/INH inhaler, USE 1 INHALATION BY MOUTH  DAILY    RINSE MOUTH AFTER  USE , Disp: 180 each, Rfl: 3    Cholecalciferol (VITAMIN D PO), Take 2,000 Units by mouth daily  , Disp: , Rfl:     diazepam (VALIUM) 2 mg tablet, Take 2 mg by mouth every 6 (six) hours as needed , Disp: , Rfl:     diazepam (VALIUM) 5 mg tablet, Take 5 mg by mouth as needed for anxiety, Disp: , Rfl:     dicyclomine (BENTYL) 20 mg tablet, Take 1 tablet (20 mg total) by mouth every 6 (six) hours as needed (abd cramping), Disp: 90 tablet, Rfl: 3    fexofenadine (ALLEGRA) 180 MG tablet, Take 180 mg by mouth as needed , Disp: , Rfl:     FLUoxetine (PROzac) 10 mg capsule, Take 10 mg by mouth daily , Disp: , Rfl:     fluticasone (FLONASE) 50 mcg/act nasal spray, 1 spray into each nostril daily , Disp: , Rfl:     lithium carbonate (LITHOBID) 300 mg CR tablet, Take 300 mg by mouth One tab by mouth every other day and alternating with 2 tabs every other day at bedtime  , Disp: , Rfl:     metoprolol succinate (TOPROL-XL) 25 mg 24 hr tablet, TAKE 1 TABLET BY MOUTH  DAILY, Disp: 90 tablet, Rfl: 5    nitroglycerin (NITRODUR) 0 2 mg/hr, APPLY 1 PATCH DAILY (OFF  FOR 12 HOURS), Disp: 90 patch, Rfl: 3    omega-3-acid ethyl esters (LOVAZA) 1 g capsule, TAKE 1 CAPSULE BY MOUTH 3  TIMES DAILY, Disp: 270 capsule, Rfl: 5    omeprazole (PriLOSEC) 20 mg delayed release capsule, TAKE 1 CAPSULE BY MOUTH  DAILY, Disp: 90 capsule, Rfl: 3    mupirocin (BACTROBAN) 2 % ointment, Apply topically 2 (two) times a day as needed (rash) (Patient not taking: Reported on 11/17/2020), Disp: 22 g, Rfl: 0      Physical Exam:   /84 (BP Location: Left arm, Patient Position: Sitting, Cuff Size: Standard)   Pulse (!) 52   Ht 5' 7" (1 702 m)   Wt 81 2 kg (179 lb)   BMI 28 04 kg/m²   GEN: no acute distress    RESP: breathing comfortably with no accessory muscle use    ABD: soft, non-tender, non-distended   INCISION:    EXT: no significant peripheral edema         RADIOLOGY:    none     Assessment:   1  Prostate cancer  2   Mild post prostatectomy incontinence     Plan:   -follow up in one year with psa prior  -  -  -

## 2021-01-19 ENCOUNTER — HOSPITAL ENCOUNTER (OUTPATIENT)
Dept: RADIOLOGY | Facility: HOSPITAL | Age: 76
Discharge: HOME | End: 2021-01-19
Payer: MEDICARE

## 2021-01-19 DIAGNOSIS — Z85.118 PERSONAL HISTORY OF OTHER MALIGNANT NEOPLASM OF BRONCHUS AND LUNG: ICD-10-CM

## 2021-01-19 PROCEDURE — G1004 CDSM NDSC: HCPCS

## 2021-01-19 PROCEDURE — 71250 CT THORAX DX C-: CPT

## 2021-01-26 ENCOUNTER — OFFICE VISIT (OUTPATIENT)
Dept: NEUROLOGY | Facility: CLINIC | Age: 76
End: 2021-01-26
Payer: MEDICARE

## 2021-01-26 ENCOUNTER — TELEPHONE (OUTPATIENT)
Dept: PULMONOLOGY | Facility: CLINIC | Age: 76
End: 2021-01-26

## 2021-01-26 VITALS
BODY MASS INDEX: 27.84 KG/M2 | DIASTOLIC BLOOD PRESSURE: 82 MMHG | HEIGHT: 67 IN | HEART RATE: 51 BPM | SYSTOLIC BLOOD PRESSURE: 138 MMHG | WEIGHT: 177.4 LBS

## 2021-01-26 DIAGNOSIS — R26.81 GAIT INSTABILITY: Primary | ICD-10-CM

## 2021-01-26 DIAGNOSIS — R26.9 GAIT ABNORMALITY: ICD-10-CM

## 2021-01-26 PROCEDURE — 99214 OFFICE O/P EST MOD 30 MIN: CPT | Performed by: PSYCHIATRY & NEUROLOGY

## 2021-01-26 NOTE — ASSESSMENT & PLAN NOTE
Harrison Fried is a right handed 53-year-old male with past medical history of afib, cervical and lumbar radiculopathy and left trochanteric bursitis who presents as a follow up for difficulty with gait  Since our last visit he reports fluctuating worsening of his gait accompanied by shuffling  Seen by physiotherapy at [facility] who advised against the ankle brace as it was not benefitting him  They recommended a right foot insert for limb length discrepancy  Patient current using bilateral foot inserts  Last MRI L spine done in July 2019 showed evidence suggestive of a Left L3 radiculopathy  He receives injections for left trochanteric bursitis  He completed PT on site last year  Now he does the therapies provided to him at home when the pain isnt bothersome  Physical exam shows shuffling of his gait but more notably pronation at his ankle       Plan:  · Advised patient to continue PT as gait has a large MSK component   · Given his hx of taking Lithium, patient may be developing parkinsonism   · Would like to rule out central cause of patient symptoms with MRI brain   · Will call patient with results   · Follow up with Dr Cynthia Carlos

## 2021-01-26 NOTE — PROGRESS NOTES
Assessment/Plan:    Gait abnormality  Jo Harmon is a right handed 82-year-old male with past medical history of afib, cervical and lumbar radiculopathy and left trochanteric bursitis who presents as a follow up for difficulty with gait  Since our last visit he reports fluctuating worsening of his gait accompanied by shuffling  Seen by physiotherapy at [facility] who advised against the ankle brace as it was not benefitting him  They recommended a right foot insert for limb length discrepancy  Patient current using bilateral foot inserts  Last MRI L spine done in July 2019 showed evidence suggestive of a Left L3 radiculopathy  He receives injections for left trochanteric bursitis  He completed PT on site last year  Now he does the therapies provided to him at home when the pain isnt bothersome  Physical exam shows shuffling of his gait but more notably pronation at his ankle  Plan:  · Advised patient to continue PT as gait has a large MSK component   · Given his hx of taking Lithium, patient may be developing parkinsonism   · Would like to rule out central cause of patient symptoms with MRI brain   · Will call patient with results   · Follow up with Dr Newton Nageotte      Diagnoses and all orders for this visit:    Gait instability  -     MRI brain wo contrast; Future    Gait abnormality      Subjective:     Jo Husbands is a right handed 82-year-old male with past medical history of afib, carvical and lumbar radiculopathy and left trochanteric bursitis who presents as a follow up for difficulty with gait  Since our last visit his walking fluctuates between good days and bad days  Wife has noticed that he has been shuffling more consistently  On his bad days, he feels off balance and with pain located in his anterolateral thigh, back and down the posterior legs  Currently needs cane for assistance walking  At times uses a walker  Last MRI L spine done in July 2019 showed evidence suggestive of a Left L3 radiculopathy   He receives injections for left trochanteric bursitis  Patient reports that in early December he fell off of his bed while he was sleeping  He landed on a carpeted floor on the left side of his body  Denies hitting his head and was down for approximately 5 minutes as he had difficulty standing up  Since then he has been experiencing pain in his right anterolateral thigh  Shuffling gait preceded this fall  After the fall patient did not seek medical attention  Patient continues to have tremors of bilateral UE R>L  Wife notices the tremor at rest however during our visit it correlates more with action  He completed PT on site last year  Now he does the therapies provided to him at home when the pain isnt bothersome  He went to see physiotherapy at [facility] who advised against the ankle brace as it was not benefitting him  They recommended a right foot insert for limb length discrepancy  Patient current using bilateral foot inserts  Patient ID: Gerda Del Valle 70 y o  male      The following portions of the patient's history were reviewed and updated as appropriate: allergies, current medications, past family history, past medical history, past social history, past surgical history and problem list     Objective:  /82 (BP Location: Left arm, Patient Position: Sitting, Cuff Size: Standard)   Pulse (!) 51   Ht 5' 7" (1 702 m)   Wt 80 5 kg (177 lb 6 4 oz)   BMI 27 78 kg/m²     Physical Exam  Eyes:      Extraocular Movements: Extraocular movements intact  Neurological Exam  Mental Status  Awake, alert and oriented to person, place and time  Cranial Nerves  CN II: Visual fields full to confrontation  CN III, IV, VI: Extraocular movements intact bilaterally  CN XI: Shoulder shrug strength is normal   Drooping eye lids       Motor   The following abnormal movements were seen:  Strength 5/5 UE deltoids, biceps and triceps bilaterally   Hip flexors: R 5/5, L 5-/5  Quads: 5/5 bilaterally Hamstrings 4+bilaterally     Action tremor, not prominent at rest      Coordination  High frequency, mod amplitude tremor on finger to nose     Gait  Walks with assistance of cane   Pronation at the ankle   Reduced step height   Review of Systems   Constitutional: Negative  Negative for appetite change and fever  HENT: Negative  Negative for hearing loss, tinnitus, trouble swallowing and voice change  Eyes: Negative  Negative for photophobia and pain  Respiratory: Negative  Negative for shortness of breath  Cardiovascular: Negative  Negative for palpitations  Gastrointestinal: Negative  Negative for nausea and vomiting  Endocrine: Negative  Negative for cold intolerance  Genitourinary: Negative  Negative for dysuria, frequency and urgency  Musculoskeletal: Positive for back pain (Lower back pain) and gait problem (Shuffling)  Negative for myalgias and neck pain  Thigh pain   Skin: Negative  Negative for rash  Neurological: Negative for dizziness, tremors, seizures, syncope, facial asymmetry, speech difficulty, weakness, light-headedness, numbness and headaches  Hematological: Negative  Does not bruise/bleed easily  Psychiatric/Behavioral: Negative  Negative for confusion, hallucinations and sleep disturbance  All other systems reviewed and are negative  The above ROS was reviewed and updated  Current Outpatient Medications on File Prior to Visit   Medication Sig Dispense Refill    acetaminophen (TYLENOL) 500 mg tablet Take 500 mg by mouth as needed for mild pain        albuterol (PROAIR HFA) 90 mcg/act inhaler Inhale 2 puffs every 6 (six) hours as needed for wheezing 3 Inhaler 3    amLODIPine (NORVASC) 2 5 mg tablet TAKE 1 TABLET BY MOUTH  DAILY 90 tablet 3    aspirin (ECOTRIN LOW STRENGTH) 81 mg EC tablet Take 81 mg by mouth daily      atorvastatin (LIPITOR) 40 mg tablet TAKE 1 TABLET BY MOUTH  DAILY 90 tablet 5    ATROVENT HFA 17 MCG/ACT inhaler USE 2 PUFFS 4 TIMES DAILY 51 6 g 5    BREO ELLIPTA 200-25 MCG/INH inhaler USE 1 INHALATION BY MOUTH  DAILY   RINSE MOUTH AFTER  USE  180 each 3    Cholecalciferol (VITAMIN D PO) Take 2,000 Units by mouth daily        diazepam (VALIUM) 2 mg tablet Take 2 mg by mouth every 6 (six) hours as needed       diazepam (VALIUM) 5 mg tablet Take 5 mg by mouth as needed for anxiety      dicyclomine (BENTYL) 20 mg tablet Take 1 tablet (20 mg total) by mouth every 6 (six) hours as needed (abd cramping) 90 tablet 3    fexofenadine (ALLEGRA) 180 MG tablet Take 180 mg by mouth as needed       FLUoxetine (PROzac) 10 mg capsule Take 10 mg by mouth daily       fluticasone (FLONASE) 50 mcg/act nasal spray 1 spray into each nostril daily       lithium carbonate (LITHOBID) 300 mg CR tablet Take 300 mg by mouth One tab by mouth every other day and alternating with 2 tabs every other day at bedtime   metoprolol succinate (TOPROL-XL) 25 mg 24 hr tablet TAKE 1 TABLET BY MOUTH  DAILY 90 tablet 5    nitroglycerin (NITRODUR) 0 2 mg/hr APPLY 1 PATCH DAILY (OFF  FOR 12 HOURS) 90 patch 3    omega-3-acid ethyl esters (LOVAZA) 1 g capsule TAKE 1 CAPSULE BY MOUTH 3  TIMES DAILY 270 capsule 5    omeprazole (PriLOSEC) 20 mg delayed release capsule TAKE 1 CAPSULE BY MOUTH  DAILY 90 capsule 3    mupirocin (BACTROBAN) 2 % ointment Apply topically 2 (two) times a day as needed (rash) (Patient not taking: Reported on 11/17/2020) 22 g 0     No current facility-administered medications on file prior to visit          Meghana Cheney MD  Medical Director   [address]  [facility]

## 2021-02-05 ENCOUNTER — HOSPITAL ENCOUNTER (OUTPATIENT)
Dept: RADIOLOGY | Facility: HOSPITAL | Age: 76
Discharge: HOME | End: 2021-02-05
Attending: PSYCHIATRY & NEUROLOGY
Payer: MEDICARE

## 2021-02-05 DIAGNOSIS — R26.81 GAIT INSTABILITY: ICD-10-CM

## 2021-02-05 PROCEDURE — 70551 MRI BRAIN STEM W/O DYE: CPT

## 2021-02-05 PROCEDURE — G1004 CDSM NDSC: HCPCS

## 2021-02-06 DIAGNOSIS — J44.9 CHRONIC OBSTRUCTIVE PULMONARY DISEASE, UNSPECIFIED COPD TYPE (HCC): ICD-10-CM

## 2021-02-09 ENCOUNTER — OFFICE VISIT (OUTPATIENT)
Dept: OBGYN CLINIC | Facility: HOSPITAL | Age: 76
End: 2021-02-09
Payer: MEDICARE

## 2021-02-09 VITALS
DIASTOLIC BLOOD PRESSURE: 74 MMHG | SYSTOLIC BLOOD PRESSURE: 160 MMHG | HEART RATE: 59 BPM | BODY MASS INDEX: 27.31 KG/M2 | HEIGHT: 67 IN | WEIGHT: 174 LBS

## 2021-02-09 DIAGNOSIS — M70.62 TROCHANTERIC BURSITIS OF LEFT HIP: Primary | ICD-10-CM

## 2021-02-09 PROCEDURE — 20610 DRAIN/INJ JOINT/BURSA W/O US: CPT | Performed by: ORTHOPAEDIC SURGERY

## 2021-02-09 PROCEDURE — 99213 OFFICE O/P EST LOW 20 MIN: CPT | Performed by: ORTHOPAEDIC SURGERY

## 2021-02-09 RX ORDER — BUPIVACAINE HYDROCHLORIDE 2.5 MG/ML
2 INJECTION, SOLUTION INFILTRATION; PERINEURAL
Status: COMPLETED | OUTPATIENT
Start: 2021-02-09 | End: 2021-02-09

## 2021-02-09 RX ORDER — LIDOCAINE HYDROCHLORIDE 10 MG/ML
2 INJECTION, SOLUTION INFILTRATION; PERINEURAL
Status: COMPLETED | OUTPATIENT
Start: 2021-02-09 | End: 2021-02-09

## 2021-02-09 RX ORDER — BETAMETHASONE SODIUM PHOSPHATE AND BETAMETHASONE ACETATE 3; 3 MG/ML; MG/ML
12 INJECTION, SUSPENSION INTRA-ARTICULAR; INTRALESIONAL; INTRAMUSCULAR; SOFT TISSUE
Status: COMPLETED | OUTPATIENT
Start: 2021-02-09 | End: 2021-02-09

## 2021-02-09 RX ADMIN — BUPIVACAINE HYDROCHLORIDE 2 ML: 2.5 INJECTION, SOLUTION INFILTRATION; PERINEURAL at 14:03

## 2021-02-09 RX ADMIN — BETAMETHASONE SODIUM PHOSPHATE AND BETAMETHASONE ACETATE 12 MG: 3; 3 INJECTION, SUSPENSION INTRA-ARTICULAR; INTRALESIONAL; INTRAMUSCULAR; SOFT TISSUE at 14:03

## 2021-02-09 RX ADMIN — LIDOCAINE HYDROCHLORIDE 2 ML: 10 INJECTION, SOLUTION INFILTRATION; PERINEURAL at 14:03

## 2021-02-09 NOTE — PROGRESS NOTES
Assessment:  1  Trochanteric bursitis of left hip         Plan:  The patient was provided with left trochanteric bursa steroid injection  The patient tolerated the procedure well  He was shown IT band and buttock stretches  The patient should follow up in 3 months  To do next visit:  Return in about 3 months (around 5/9/2021)  The above stated was discussed in layman's terms and the patient expressed understanding  All questions were answered to the patient's satisfaction  Scribe Attestation    I,:  Amalia Chadwick am acting as a scribe while in the presence of the attending physician :       I,:  Dhara Singh MD personally performed the services described in this documentation    as scribed in my presence :             Subjective:   Chavez Harris is a 70 y o  male who presents for follow up of left hip  He is s/p left trochanteric bursa steroid injection with benefit, 10/29/2020  Today he complains of left lateral hip, buttock and lateral thigh pain  Prolonged weight bearing and repetitive bending aggravates while rest alleviates          Review of systems negative unless otherwise specified in HPI    Past Medical History:   Diagnosis Date    [facility] [place]     Benign colon polyp     Bipolar 1 disorder ([facility] [facility]     Cardiac disease     MI    Cervical cord compression with myelopathy (HCC)     COPD (chronic obstructive pulmonary disease) (HCC)     Diverticulitis     Diverticulosis     Gait disturbance     uses cane, leg brace on right    GERD (gastroesophageal reflux disease)     Heart attack ([facility] [address] ) 1996    Hx of resection of large bowel 5/3/2016    Hyperlipidemia     Hypertension     IBS (irritable bowel syndrome)     Lumbar stenosis     Lung cancer ([facility] [address] )     2007 Left lower lobectomy and 2011 Right lung with surgery     Myocardial infarction [facility])     involving other coronary artery    Prostate cancer (HCC)     Shortness of breath     Small bowel obstruction (HCC) 11/16/2016       Past Surgical History:   Procedure Laterality Date    ANGIOPLASTY      stent    CARDIAC SURGERY      CERVICAL SPINE SURGERY      Cervical decompression with cervical fusion from C3-C7 for spinal stenosis   COLON SURGERY  11/04/2014    ESOPHAGOGASTRODUODENOSCOPY  01/03/2013    with possible Schatzki's ring & small hiatal hernia, mild gastritis    FL INJECTION LEFT HIP (NON ARTHROGRAM)  12/11/2018    FL INJECTION LEFT HIP (NON ARTHROGRAM)  5/9/2019    IR BIOPSY LUNG  7/6/2020    IR EVAR  8/6/2018    LUNG CANCER SURGERY Right 03/2011    wedge resection for lung tumor, 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    LUNG LOBECTOMY Left     CT ARTHRODESIS ANT INTERBODY MIN DISCECTOMY, CERVICAL BELOW C2 N/A 5/2/2016    Procedure: Anterior cervical diskectomy C3/4, C5/6, C6/7 with anterior plate fixation fusion C3-7;  Posterior decompressive laminectomy C3-7 with lateral mass fixation fusion C3-7 (IMPULSE MONITORING); Surgeon: Talha Paris MD;  [facility];  Service: Neurosurgery    CT ARTHRODESIS POSTERIOR INTERBODY LUMBAR N/A 1/30/2017    Procedure: L4-5 AND L5-S1 DECOMPRESSIVE FORAMINOTOMIES, TRANSFORAMINAL LUMBAR INTERBODY AND PEDICLE SCREW FIXATION FUSION L4-S1 (IMPULSE);   Surgeon: Talha Paris MD;  [facility];  Service: Neurosurgery    CT [address] RPR DPLMNT AORTO-AORTIC NDGFT N/A 8/6/2018    Procedure: REPAIR ANEURYSM ENDOVASCULAR ABDOMINAL AORTIC  (EVAR) WITH BILATERAL PERCUTANEOUS FEMORAL ACCESS WITH ULTRASOUND GUIDANCE ON THE RIGHT AND PRE CLOSURE;  Surgeon: Gwendolyn Hernandez MD;  [facility];  Service: Vascular    PROSTATECTOMY  2007    for prostate CA - no chemo/RT    SIGMOIDECTOMY      for divertic    SMALL INTESTINE SURGERY N/A 11/17/2016    Procedure: Exploratory Laparotomy, Lysis of adhesions to release small bowel obstruction;  Surgeon: Cuong Torres MD;  [facility];  Service:        Family History   Problem Relation Age of Onset    Heart attack Father     Colon cancer Father     Coronary artery disease Father     Heart disease Family     Hyperlipidemia Family     Hypertension Family        Social History     Occupational History    Occupation: Retired   Tobacco Use    Smoking status: Former Smoker     Packs/day: 1 00     Years: 35 00     Pack years: 35 00     Types: Cigarettes     Quit date: 2011     Years since quitting: 10 1    Smokeless tobacco: Never Used   Substance and Sexual Activity    Alcohol use: Yes     Frequency: 4 or more times a week     Drinks per session: 1 or 2     Comment: 6 drinks a week    Drug use: No    Sexual activity: Not on file         Current Outpatient Medications:     acetaminophen (TYLENOL) 500 mg tablet, Take 500 mg by mouth as needed for mild pain , Disp: , Rfl:     albuterol (PROAIR HFA) 90 mcg/act inhaler, Inhale 2 puffs every 6 (six) hours as needed for wheezing, Disp: 3 Inhaler, Rfl: 3    amLODIPine (NORVASC) 2 5 mg tablet, TAKE 1 TABLET BY MOUTH  DAILY, Disp: 90 tablet, Rfl: 3    aspirin (ECOTRIN LOW STRENGTH) 81 mg EC tablet, Take 81 mg by mouth daily, Disp: , Rfl:     atorvastatin (LIPITOR) 40 mg tablet, TAKE 1 TABLET BY MOUTH  DAILY, Disp: 90 tablet, Rfl: 5    ATROVENT HFA 17 MCG/ACT inhaler, USE 2 PUFFS 4 TIMES DAILY, Disp: 51 6 g, Rfl: 5    Breo Ellipta 200-25 MCG/INH inhaler, USE 1 INHALATION BY MOUTH  DAILY    RINSE MOUTH AFTER  USE , Disp: 180 each, Rfl: 3    Cholecalciferol (VITAMIN D PO), Take 2,000 Units by mouth daily  , Disp: , Rfl:     diazepam (VALIUM) 2 mg tablet, Take 2 mg by mouth every 6 (six) hours as needed , Disp: , Rfl:     diazepam (VALIUM) 5 mg tablet, Take 5 mg by mouth as needed for anxiety, Disp: , Rfl:     dicyclomine (BENTYL) 20 mg tablet, Take 1 tablet (20 mg total) by mouth every 6 (six) hours as needed (abd cramping), Disp: 90 tablet, Rfl: 3    fexofenadine (ALLEGRA) 180 MG tablet, Take 180 mg by mouth as needed , Disp: , Rfl:     FLUoxetine (PROzac) 10 mg capsule, Take 10 mg by mouth daily , Disp: , Rfl:     fluticasone (FLONASE) 50 mcg/act nasal spray, 1 spray into each nostril daily , Disp: , Rfl:     lithium carbonate (LITHOBID) 300 mg CR tablet, Take 300 mg by mouth One tab by mouth every other day and alternating with 2 tabs every other day at bedtime  , Disp: , Rfl:     metoprolol succinate (TOPROL-XL) 25 mg 24 hr tablet, TAKE 1 TABLET BY MOUTH  DAILY, Disp: 90 tablet, Rfl: 5    mupirocin (BACTROBAN) 2 % ointment, Apply topically 2 (two) times a day as needed (rash) (Patient not taking: Reported on 11/17/2020), Disp: 22 g, Rfl: 0    nitroglycerin (NITRODUR) 0 2 mg/hr, APPLY 1 PATCH DAILY (OFF  FOR 12 HOURS), Disp: 90 patch, Rfl: 3    omega-3-acid ethyl esters (LOVAZA) 1 g capsule, TAKE 1 CAPSULE BY MOUTH 3  TIMES DAILY, Disp: 270 capsule, Rfl: 5    omeprazole (PriLOSEC) 20 mg delayed release capsule, TAKE 1 CAPSULE BY MOUTH  DAILY, Disp: 90 capsule, Rfl: 3    Allergies   Allergen Reactions    Bactrim [Sulfamethoxazole-Trimethoprim] Other (See Comments)     AILYN    Nsaids      Annotation - [id number]: unable to take due to use of lithium   Oxycodone Rash            Vitals:    02/09/21 1300   BP: 160/74   Pulse: 59       Objective:  Physical exam  · General: Awake, Alert, Oriented  · Eyes: Pupils equal, round and reactive to light  · Heart: regular rate and rhythm  · Lungs: No audible wheezing  · Abdomen: soft                    Ortho Exam   Left hip:  TTP over greater trochanter  Good arc of motion  Patient sits comfortably in chair with hip flexed at 90 degrees  Patient stands from seated position without assistance  Calf compartments soft and supple  Sensation intact  Toes are warm sensate and mobile      Diagnostics, reviewed and taken today if performed as documented:    None performed     Procedures, if performed today:    Large joint arthrocentesis  Universal Protocol:  Consent: Verbal consent obtained    Risks and benefits: risks, benefits and alternatives were discussed  Consent given by: patient  Time out: Immediately prior to procedure a "time out" was called to verify the correct patient, procedure, equipment, support staff and site/side marked as required  Timeout called at: 2/9/2021 2:02 PM   Patient understanding: patient states understanding of the procedure being performed  Site marked: the operative site was marked  Patient identity confirmed: verbally with patient    Supporting Documentation  Indications: pain   Procedure Details  Location: hip -   Needle size: 22 G  Ultrasound guidance: no  Approach: lateral  Medications administered: 12 mg betamethasone acetate-betamethasone sodium phosphate 6 (3-3) mg/mL; 2 mL bupivacaine 0 25 %; 2 mL lidocaine 1 %    Patient tolerance: patient tolerated the procedure well with no immediate complications  Dressing:  Sterile dressing applied               Portions of the record may have been created with voice recognition software  Occasional wrong word or "sound a like" substitutions may have occurred due to the inherent limitations of voice recognition software  Read the chart carefully and recognize, using context, where substitutions have occurred

## 2021-02-13 DIAGNOSIS — Z23 ENCOUNTER FOR IMMUNIZATION: ICD-10-CM

## 2021-02-17 ENCOUNTER — TELEPHONE (OUTPATIENT)
Dept: NEUROLOGY | Facility: CLINIC | Age: 76
End: 2021-02-17

## 2021-02-17 ENCOUNTER — IMMUNIZATIONS (OUTPATIENT)
Dept: FAMILY MEDICINE CLINIC | Facility: HOSPITAL | Age: 76
End: 2021-02-17

## 2021-02-17 DIAGNOSIS — Z23 ENCOUNTER FOR IMMUNIZATION: Primary | ICD-10-CM

## 2021-02-17 PROCEDURE — 91300 SARS-COV-2 / COVID-19 MRNA VACCINE (PFIZER-BIONTECH) 30 MCG: CPT

## 2021-02-17 PROCEDURE — 0001A SARS-COV-2 / COVID-19 MRNA VACCINE (PFIZER-BIONTECH) 30 MCG: CPT

## 2021-02-17 NOTE — TELEPHONE ENCOUNTER
Patient calling in requesting MRI results  He would like to speak to Dr Silvestre Acuña personally if avail  Please call pt when avail, thanks!         Brie Hinton to leave a detailed message

## 2021-02-17 NOTE — TELEPHONE ENCOUNTER
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

## 2021-03-10 ENCOUNTER — IMMUNIZATIONS (OUTPATIENT)
Dept: FAMILY MEDICINE CLINIC | Facility: HOSPITAL | Age: 76
End: 2021-03-10

## 2021-03-10 DIAGNOSIS — Z23 ENCOUNTER FOR IMMUNIZATION: Primary | ICD-10-CM

## 2021-03-10 PROCEDURE — 0002A SARS-COV-2 / COVID-19 MRNA VACCINE (PFIZER-BIONTECH) 30 MCG: CPT

## 2021-03-10 PROCEDURE — 91300 SARS-COV-2 / COVID-19 MRNA VACCINE (PFIZER-BIONTECH) 30 MCG: CPT

## 2021-03-16 ENCOUNTER — TRANSCRIBE ORDERS (OUTPATIENT)
Dept: VASCULAR SURGERY | Facility: CLINIC | Age: 76
End: 2021-03-16

## 2021-03-16 DIAGNOSIS — I71.4 ABDOMINAL AORTIC ANEURYSM WITHOUT RUPTURE (HCC): Primary | ICD-10-CM

## 2021-03-17 ENCOUNTER — OFFICE VISIT (OUTPATIENT)
Dept: INTERNAL MEDICINE CLINIC | Facility: CLINIC | Age: 76
End: 2021-03-17
Payer: MEDICARE

## 2021-03-17 VITALS
TEMPERATURE: 97.3 F | WEIGHT: 165 LBS | SYSTOLIC BLOOD PRESSURE: 158 MMHG | HEART RATE: 51 BPM | BODY MASS INDEX: 25.9 KG/M2 | HEIGHT: 67 IN | DIASTOLIC BLOOD PRESSURE: 80 MMHG | OXYGEN SATURATION: 99 %

## 2021-03-17 DIAGNOSIS — G20 PARKINSONISM, UNSPECIFIED PARKINSONISM TYPE (HCC): ICD-10-CM

## 2021-03-17 DIAGNOSIS — I10 ESSENTIAL HYPERTENSION: Primary | ICD-10-CM

## 2021-03-17 DIAGNOSIS — R26.81 GAIT INSTABILITY: ICD-10-CM

## 2021-03-17 DIAGNOSIS — E78.2 MIXED HYPERLIPIDEMIA: ICD-10-CM

## 2021-03-17 DIAGNOSIS — J44.9 CHRONIC OBSTRUCTIVE PULMONARY DISEASE, UNSPECIFIED COPD TYPE (HCC): ICD-10-CM

## 2021-03-17 DIAGNOSIS — F31.9 BIPOLAR AFFECTIVE DISORDER, REMISSION STATUS UNSPECIFIED (HCC): ICD-10-CM

## 2021-03-17 DIAGNOSIS — S91.109A OPEN WOUND OF TOE, INITIAL ENCOUNTER: ICD-10-CM

## 2021-03-17 PROBLEM — G20.C PARKINSONISM: Status: ACTIVE | Noted: 2021-03-17

## 2021-03-17 PROCEDURE — 99214 OFFICE O/P EST MOD 30 MIN: CPT | Performed by: INTERNAL MEDICINE

## 2021-03-17 NOTE — PATIENT INSTRUCTIONS
Problem List Items Addressed This Visit        Respiratory    COPD (chronic obstructive pulmonary disease) ([facility] [address] [address] [facility]      Reading stable, continue inhalers and follow up Pulmonary            Cardiovascular and Mediastinum    Essential hypertension - Primary      A little elevated today, continue meds along with monitoring            Nervous and Auditory    Parkinsonism ([facility] [address] [address] )     Follow up neuro            Other    Gait instability      Continue with physical therapy and neurology         Bipolar affective disorder ([facility] [address] [facility]      Continue meds follow-up psych         Mixed hyperlipidemia      Continue statin along with healthy diet and exercise           Other Visit Diagnoses     Open wound of toe, initial encounter        Relevant Medications    mupirocin (BACTROBAN) 2 % ointment

## 2021-03-17 NOTE — PROGRESS NOTES
Assessment/Plan:    COPD (chronic obstructive pulmonary disease) ([facility])   Reading stable, continue inhalers and follow up Pulmonary    Essential hypertension   A little elevated today, continue meds along with monitoring    Mixed hyperlipidemia   Continue statin along with healthy diet and exercise    Gait instability   Continue with physical therapy and neurology    Bipolar affective disorder ([facility] )   Continue meds follow-up psych    Parkinsonism ([place] [address] )  Follow up neuro       Diagnoses and all orders for this visit:    Essential hypertension    Open wound of toe, initial encounter  -     mupirocin (BACTROBAN) 2 % ointment; Apply topically 2 (two) times a day as needed (rash)    Chronic obstructive pulmonary disease, unspecified COPD type ([facility])    Mixed hyperlipidemia    Gait instability    Bipolar affective disorder, remission status unspecified ([facility])    Parkinsonism, unspecified Parkinsonism type ([place] [address] )        BMI Counseling: Body mass index is 25 84 kg/m²  The BMI is above normal  Nutrition recommendations include encouraging healthy choices of fruits and vegetables and moderation in carbohydrate intake  Exercise recommendations include exercising 3-5 times per week  Subjective:      Patient ID: Bonnie Zhang is a 70 y o  male  Hypertension: Patient reports compliance with meds, no lightheadedness  Hypercholesterolemia: Patient tolerating statin without any significant muscle aches in legs        Gait difficulties and tremor:  Neurology discussed with patient possibility of vascular parkinsonism, and to maximize his vascular risk reduction which patient is currently doing     COPD: Patient reports compliance with inhalers, breathing is stable      The following portions of the patient's history were reviewed and updated as appropriate: allergies, current medications, past family history, past medical history, past social history, past surgical history and problem list     Review of Systems Constitutional: Negative for chills, fatigue and fever  HENT: Negative for congestion, nosebleeds, postnasal drip, sore throat and trouble swallowing  Eyes: Negative for pain  Respiratory: Positive for shortness of breath ( with exertion)  Negative for cough, chest tightness and wheezing  Cardiovascular: Negative for chest pain, palpitations and leg swelling  Gastrointestinal: Negative for abdominal pain, constipation, diarrhea, nausea and vomiting  Endocrine: Negative for polydipsia and polyuria  Genitourinary: Negative for dysuria, flank pain and hematuria  Musculoskeletal: Positive for arthralgias and back pain  Skin: Negative for rash  Neurological: Positive for tremors and headaches ( occasional)  Negative for dizziness and light-headedness  Hematological: Does not bruise/bleed easily  Psychiatric/Behavioral: Negative for confusion and dysphoric mood  The patient is not nervous/anxious  Objective:      /80   Pulse (!) 51   Temp (!) 97 3 °F (36 3 °C) (Temporal)   Ht 5' 7" (1 702 m)   Wt 74 8 kg (165 lb)   SpO2 99%   BMI 25 84 kg/m²          Physical Exam  Vitals signs reviewed  Constitutional:       General: He is not in acute distress  Appearance: Normal appearance  He is well-developed  HENT:      Head: Normocephalic and atraumatic  Right Ear: External ear normal       Left Ear: External ear normal    Eyes:      General: No scleral icterus  Conjunctiva/sclera: Conjunctivae normal    Neck:      Musculoskeletal: Normal range of motion and neck supple  Thyroid: No thyromegaly  Trachea: No tracheal deviation  Cardiovascular:      Rate and Rhythm: Normal rate and regular rhythm  Heart sounds: Normal heart sounds  No murmur  Pulmonary:      Effort: Pulmonary effort is normal  No respiratory distress  Breath sounds: Normal breath sounds  No wheezing or rales     Abdominal:      General: Bowel sounds are normal       Palpations: Abdomen is soft  Tenderness: There is no abdominal tenderness  There is no guarding or rebound  Musculoskeletal:      Right lower leg: No edema  Left lower leg: No edema  Lymphadenopathy:      Cervical: No cervical adenopathy  Neurological:      General: No focal deficit present  Mental Status: He is alert and oriented to person, place, and time  Comments: Intermittent resting tremor right hand   Psychiatric:         Mood and Affect: Mood normal          Behavior: Behavior normal          Thought Content:  Thought content normal          Judgment: Judgment normal

## 2021-03-31 ENCOUNTER — TELEPHONE (OUTPATIENT)
Dept: NEUROLOGY | Facility: CLINIC | Age: 76
End: 2021-03-31

## 2021-03-31 ENCOUNTER — APPOINTMENT (OUTPATIENT)
Dept: LAB | Facility: CLINIC | Age: 76
End: 2021-03-31
Payer: MEDICARE

## 2021-03-31 ENCOUNTER — TRANSCRIBE ORDERS (OUTPATIENT)
Dept: LAB | Facility: CLINIC | Age: 76
End: 2021-03-31

## 2021-03-31 DIAGNOSIS — F31.60 MIXED BIPOLAR I DISORDER (HCC): ICD-10-CM

## 2021-03-31 DIAGNOSIS — F31.60 MIXED BIPOLAR I DISORDER (HCC): Primary | ICD-10-CM

## 2021-03-31 LAB
BUN SERPL-MCNC: 38 MG/DL (ref 5–25)
CREAT SERPL-MCNC: 1.71 MG/DL (ref 0.6–1.3)
GFR SERPL CREATININE-BSD FRML MDRD: 39 ML/MIN/1.73SQ M
LITHIUM SERPL-SCNC: 1.1 MMOL/L (ref 0.5–1)
TSH SERPL DL<=0.05 MIU/L-ACNC: 1.96 UIU/ML (ref 0.36–3.74)

## 2021-03-31 PROCEDURE — 84443 ASSAY THYROID STIM HORMONE: CPT

## 2021-03-31 PROCEDURE — 82565 ASSAY OF CREATININE: CPT

## 2021-03-31 PROCEDURE — 84520 ASSAY OF UREA NITROGEN: CPT

## 2021-03-31 PROCEDURE — 36415 COLL VENOUS BLD VENIPUNCTURE: CPT

## 2021-03-31 PROCEDURE — 80178 ASSAY OF LITHIUM: CPT

## 2021-03-31 NOTE — TELEPHONE ENCOUNTER
Called and advised pt's wife Lorri Love of all of the below  She verbalized clear understanding of all instructions

## 2021-03-31 NOTE — TELEPHONE ENCOUNTER
Perhaps he could be seen by his PCP sooner, they could evaluate the patient and see if he would need any workup for an infection as the cause of this sudden worsening of hallucinations  Thanks!

## 2021-03-31 NOTE — TELEPHONE ENCOUNTER
Wife calling in, states she thinks the patient needs to see a doctor right away  States the patient has been hallucinating and she is scared and concerned  States this was a slow, progressive change but has been worse the past 4 days  States he is sees people floating in and out of the room and that he has been confusiong them with her  States this is intermittent  Not angry or aggressive in nature  She states she is scared because she knows he also has a history of stroke and "just really thinks he needs to be seen right away"  Informed her that if she is really concerned and thinks it is an emergency perhaps they could present to the emergency department  States she will not be going to the emergency room and that it will be too difficult  Freezing? No   Shuffling? Yes, constant   Difficulty Walking? Yes, using a cane mostly, and sometimes a walker   Recent Falls? Yes, this morning was the last fall (fell in the bathroom after losing balance  )  States he has fallen a few times the past few months  No injuries  Any extra movements? Tremor in right hand   Any Hallucinations? Yes, please see above   What time of day are symptoms worse? Not noticed   Current medications confirmed as: No meds from our office  Does patient have a DBS? No    Best call back [phone], okay with detailed messages       Added appt to wait list for [facility] and Dr Michael Segura

## 2021-04-01 ENCOUNTER — TELEPHONE (OUTPATIENT)
Dept: INTERNAL MEDICINE CLINIC | Facility: CLINIC | Age: 76
End: 2021-04-01

## 2021-04-01 NOTE — TELEPHONE ENCOUNTER
I was doing chart prep and noticed the patients symptoms  I called the patients wife and discussed the reason he wants to wait until tomorrow to be seen  The patient's fall was on Tuesday he did not fall he slid off the toilet  I asked why he slid off the toilet and he did not know  Per the wife the patients smile is the same as before no changes she will jonathan to tell us his BP and p after he calls back  I will rvw with Melquiades Adrian to see if a virtual appt needs to be completed

## 2021-04-01 NOTE — TELEPHONE ENCOUNTER
The patient's wife called back his BP is 156-82  I rvwd with Gracie and she was ok with him waiting until tomorrow

## 2021-04-02 ENCOUNTER — HOSPITAL ENCOUNTER (EMERGENCY)
Facility: HOSPITAL | Age: 76
Discharge: HOME | End: 2021-04-02
Attending: EMERGENCY MEDICINE | Admitting: EMERGENCY MEDICINE
Payer: MEDICARE

## 2021-04-02 ENCOUNTER — APPOINTMENT (EMERGENCY)
Dept: CT IMAGING | Facility: HOSPITAL | Age: 76
End: 2021-04-02
Payer: MEDICARE

## 2021-04-02 ENCOUNTER — OFFICE VISIT (OUTPATIENT)
Dept: INTERNAL MEDICINE CLINIC | Facility: CLINIC | Age: 76
End: 2021-04-02
Payer: MEDICARE

## 2021-04-02 VITALS
BODY MASS INDEX: 26.06 KG/M2 | DIASTOLIC BLOOD PRESSURE: 78 MMHG | OXYGEN SATURATION: 99 % | SYSTOLIC BLOOD PRESSURE: 124 MMHG | HEIGHT: 67 IN | HEART RATE: 54 BPM | TEMPERATURE: 97.6 F | WEIGHT: 166 LBS

## 2021-04-02 VITALS
RESPIRATION RATE: 12 BRPM | HEART RATE: 44 BPM | DIASTOLIC BLOOD PRESSURE: 87 MMHG | SYSTOLIC BLOOD PRESSURE: 190 MMHG | TEMPERATURE: 98 F | OXYGEN SATURATION: 99 %

## 2021-04-02 DIAGNOSIS — R41.82 ALTERED MENTAL STATUS, UNSPECIFIED ALTERED MENTAL STATUS TYPE: Primary | ICD-10-CM

## 2021-04-02 DIAGNOSIS — I48.91 NEW ONSET ATRIAL FIBRILLATION (HCC): ICD-10-CM

## 2021-04-02 DIAGNOSIS — I25.10 CORONARY ARTERY DISEASE INVOLVING NATIVE HEART WITHOUT ANGINA PECTORIS, UNSPECIFIED VESSEL OR LESION TYPE: ICD-10-CM

## 2021-04-02 DIAGNOSIS — F31.9 BIPOLAR AFFECTIVE DISORDER, REMISSION STATUS UNSPECIFIED (HCC): ICD-10-CM

## 2021-04-02 DIAGNOSIS — I10 ESSENTIAL HYPERTENSION: ICD-10-CM

## 2021-04-02 DIAGNOSIS — E78.2 MIXED HYPERLIPIDEMIA: ICD-10-CM

## 2021-04-02 DIAGNOSIS — J44.9 CHRONIC OBSTRUCTIVE PULMONARY DISEASE, UNSPECIFIED COPD TYPE (HCC): ICD-10-CM

## 2021-04-02 DIAGNOSIS — R41.0 CONFUSION: Primary | ICD-10-CM

## 2021-04-02 DIAGNOSIS — C34.90 MALIGNANT NEOPLASM OF LUNG, UNSPECIFIED LATERALITY, UNSPECIFIED PART OF LUNG (HCC): ICD-10-CM

## 2021-04-02 LAB
ALBUMIN SERPL BCP-MCNC: 3.5 G/DL (ref 3.5–5)
ALP SERPL-CCNC: 113 U/L (ref 46–116)
ALT SERPL W P-5'-P-CCNC: 35 U/L (ref 12–78)
ANION GAP SERPL CALCULATED.3IONS-SCNC: 10 MMOL/L (ref 4–13)
AST SERPL W P-5'-P-CCNC: 25 U/L (ref 5–45)
BACTERIA UR QL AUTO: NORMAL /HPF
BASOPHILS # BLD AUTO: 0.03 THOUSANDS/ΜL (ref 0–0.1)
BASOPHILS NFR BLD AUTO: 0 % (ref 0–1)
BILIRUB SERPL-MCNC: 0.84 MG/DL (ref 0.2–1)
BILIRUB UR QL STRIP: NEGATIVE
BUN SERPL-MCNC: 27 MG/DL (ref 5–25)
CALCIUM SERPL-MCNC: 9.3 MG/DL (ref 8.3–10.1)
CHLORIDE SERPL-SCNC: 111 MMOL/L (ref 100–108)
CLARITY UR: CLEAR
CO2 SERPL-SCNC: 26 MMOL/L (ref 21–32)
COLOR UR: YELLOW
CREAT SERPL-MCNC: 1.13 MG/DL (ref 0.6–1.3)
EOSINOPHIL # BLD AUTO: 0.14 THOUSAND/ΜL (ref 0–0.61)
EOSINOPHIL NFR BLD AUTO: 2 % (ref 0–6)
ERYTHROCYTE [DISTWIDTH] IN BLOOD BY AUTOMATED COUNT: 12.9 % (ref 11.6–15.1)
FOLATE SERPL-MCNC: 10.8 NG/ML (ref 3.1–17.5)
GFR SERPL CREATININE-BSD FRML MDRD: 64 ML/MIN/1.73SQ M
GLUCOSE SERPL-MCNC: 100 MG/DL (ref 65–140)
GLUCOSE UR STRIP-MCNC: NEGATIVE MG/DL
HCT VFR BLD AUTO: 40.3 % (ref 36.5–49.3)
HGB BLD-MCNC: 13.5 G/DL (ref 12–17)
HGB UR QL STRIP.AUTO: ABNORMAL
IMM GRANULOCYTES # BLD AUTO: 0.04 THOUSAND/UL (ref 0–0.2)
IMM GRANULOCYTES NFR BLD AUTO: 1 % (ref 0–2)
KETONES UR STRIP-MCNC: NEGATIVE MG/DL
LEUKOCYTE ESTERASE UR QL STRIP: NEGATIVE
LYMPHOCYTES # BLD AUTO: 0.97 THOUSANDS/ΜL (ref 0.6–4.47)
LYMPHOCYTES NFR BLD AUTO: 11 % (ref 14–44)
MCH RBC QN AUTO: 31.8 PG (ref 26.8–34.3)
MCHC RBC AUTO-ENTMCNC: 33.5 G/DL (ref 31.4–37.4)
MCV RBC AUTO: 95 FL (ref 82–98)
MONOCYTES # BLD AUTO: 0.63 THOUSAND/ΜL (ref 0.17–1.22)
MONOCYTES NFR BLD AUTO: 7 % (ref 4–12)
NEUTROPHILS # BLD AUTO: 6.86 THOUSANDS/ΜL (ref 1.85–7.62)
NEUTS SEG NFR BLD AUTO: 79 % (ref 43–75)
NITRITE UR QL STRIP: NEGATIVE
NON-SQ EPI CELLS URNS QL MICRO: NORMAL /HPF
NRBC BLD AUTO-RTO: 0 /100 WBCS
PH UR STRIP.AUTO: 7 [PH] (ref 4.5–8)
PLATELET # BLD AUTO: 166 THOUSANDS/UL (ref 149–390)
PMV BLD AUTO: 10.7 FL (ref 8.9–12.7)
POTASSIUM SERPL-SCNC: 4.1 MMOL/L (ref 3.5–5.3)
PROT SERPL-MCNC: 6.1 G/DL (ref 6.4–8.2)
PROT UR STRIP-MCNC: ABNORMAL MG/DL
RBC # BLD AUTO: 4.25 MILLION/UL (ref 3.88–5.62)
RBC #/AREA URNS AUTO: NORMAL /HPF
SODIUM SERPL-SCNC: 147 MMOL/L (ref 136–145)
SP GR UR STRIP.AUTO: 1.01 (ref 1–1.03)
UROBILINOGEN UR QL STRIP.AUTO: 0.2 E.U./DL
VIT B12 SERPL-MCNC: 324 PG/ML (ref 100–900)
WBC # BLD AUTO: 8.67 THOUSAND/UL (ref 4.31–10.16)
WBC #/AREA URNS AUTO: NORMAL /HPF

## 2021-04-02 PROCEDURE — 81001 URINALYSIS AUTO W/SCOPE: CPT

## 2021-04-02 PROCEDURE — 99285 EMERGENCY DEPT VISIT HI MDM: CPT

## 2021-04-02 PROCEDURE — 96360 HYDRATION IV INFUSION INIT: CPT

## 2021-04-02 PROCEDURE — 36415 COLL VENOUS BLD VENIPUNCTURE: CPT | Performed by: PHYSICIAN ASSISTANT

## 2021-04-02 PROCEDURE — 96361 HYDRATE IV INFUSION ADD-ON: CPT

## 2021-04-02 PROCEDURE — 82746 ASSAY OF FOLIC ACID SERUM: CPT | Performed by: PHYSICIAN ASSISTANT

## 2021-04-02 PROCEDURE — 70450 CT HEAD/BRAIN W/O DYE: CPT

## 2021-04-02 PROCEDURE — 99215 OFFICE O/P EST HI 40 MIN: CPT | Performed by: INTERNAL MEDICINE

## 2021-04-02 PROCEDURE — 99284 EMERGENCY DEPT VISIT MOD MDM: CPT | Performed by: PHYSICIAN ASSISTANT

## 2021-04-02 PROCEDURE — 82607 VITAMIN B-12: CPT | Performed by: PHYSICIAN ASSISTANT

## 2021-04-02 PROCEDURE — 80053 COMPREHEN METABOLIC PANEL: CPT | Performed by: PHYSICIAN ASSISTANT

## 2021-04-02 PROCEDURE — 85025 COMPLETE CBC W/AUTO DIFF WBC: CPT | Performed by: PHYSICIAN ASSISTANT

## 2021-04-02 RX ADMIN — SODIUM CHLORIDE 1000 ML: 0.9 INJECTION, SOLUTION INTRAVENOUS at 12:09

## 2021-04-02 NOTE — PATIENT INSTRUCTIONS
Problem List Items Addressed This Visit        Respiratory    COPD (chronic obstructive pulmonary disease) ([facility] [place] [address] )      Continue inhalers         Malignant neoplasm of lung ([facility] [place] [address] )      CT done in January showed no evidence of recurrent disease, but there was an area for which 3-4 month follow-up CT was recommended, follow-up Pulmonary            Cardiovascular and Mediastinum    CAD (coronary artery disease)      No acute cardiopulmonary complaints, continue meds and follow-up Cardiology         Essential hypertension      Controlled, continue current meds         New onset atrial fibrillation [facility])     Patient had a reported episode of AFib in 2018 associated with a respiratory infection, this has been felt to be an isolated episode, and patient not on anticoagulation  Patient does follow with Cardiology  Other    Bipolar affective disorder [facility])      Follow-up Psychiatry         Mixed hyperlipidemia      Continue atorvastatin along with healthy diet and exercise         Change in mental status - Primary        Exam today is normal except for the history patient is giving  Recent labs show that patient is likely dehydrated with his elevated BUN and creatinine, these tests were normal in December  Pt not on water pill  No nausea, no vomiting, no diarrhea, history regarding any underlying infection is normal, but UTI is always strong possibility, as is acute renal failure  Other etiologies include adverse side effect from medications, even though he has not had any recent changes in medications, he can be having adverse reaction to the diazepam, lithium, fluoxetine  Patient moving all 4 extremities okay, no focal weakness  Other possibility includes delirium/ psychosis with his underlying psychiatric condition     Alcohol may also be a contributor even though he has had no recent change in this, I recommend checking B12 and folate, and likely hydration with a "banana bag "    Discussed with patient and wife my best recommendation at this point would be to get to the hospital where he can get hydrated, have levels of lithium and other meds checked,  Get urinalysis done, and if symptoms persisting, then evaluation with Psychiatry         Relevant Orders    Transfer to other facility (Completed)

## 2021-04-02 NOTE — PROGRESS NOTES
Assessment/Plan:    Change in mental status     Exam today is normal except for the history patient is giving  Recent labs show that patient is likely dehydrated with his elevated BUN and creatinine, these tests were normal in December  Pt not on water pill  No nausea, no vomiting, no diarrhea, history regarding any underlying infection is normal, but UTI is always strong possibility, as is acute renal failure  Other etiologies include adverse side effect from medications, even though he has not had any recent changes in medications, he can be having adverse reaction to the diazepam, lithium, fluoxetine  Patient moving all 4 extremities okay, no focal weakness  Other possibility includes delirium/ psychosis with his underlying psychiatric condition  Alcohol may also be a contributor even though he has had no recent change in this, I recommend checking B12 and folate, and likely hydration with a "banana bag "    Discussed with patient and wife my best recommendation at this point would be to get to the hospital where he can get hydrated, have levels of lithium and other meds checked,  Get urinalysis done, and if symptoms persisting, then evaluation with Psychiatry    New onset atrial fibrillation [facility])  Patient had a reported episode of AFib in 2018 associated with a respiratory infection, this has been felt to be an isolated episode, and patient not on anticoagulation  Patient does follow with Cardiology        Essential hypertension   Controlled, continue current meds    CAD (coronary artery disease)   No acute cardiopulmonary complaints, continue meds and follow-up Cardiology    Bipolar affective disorder [facility])   Follow-up Psychiatry    Mixed hyperlipidemia   Continue atorvastatin along with healthy diet and exercise    COPD (chronic obstructive pulmonary disease) ([facility] )   Continue inhalers    Malignant neoplasm of lung ([facility] )   CT done in January showed no evidence of recurrent disease, but there was an area for which 3-4 month follow-up CT was recommended, follow-up Pulmonary       Diagnoses and all orders for this visit:    Altered mental status, unspecified altered mental status type  -     Transfer to other facility    New onset atrial fibrillation [facility])    Essential hypertension    Coronary artery disease involving native heart without angina pectoris, unspecified vessel or lesion type    Bipolar affective disorder, remission status unspecified ([facility] )    Mixed hyperlipidemia    Chronic obstructive pulmonary disease, unspecified COPD type ([facility] )    Malignant neoplasm of lung, unspecified laterality, unspecified part of lung ([facility] )          Subjective:      Patient ID: Campos Burch is a 70 y o  male  Pt reports he has been confused, he is having problems remembering things, pt also recalling events that haven't happened  He also thinks he may be having hallucinations  He woke up next to his wife not knowing who she was  Onset a few weeks ago with stating he was feeling like somebody else was in the house  Pt has been mildly agitated by this, denies feelings of paranoia  Pt thinks there are other woman that he is  to, and he is confusing his wife with them  Discussed with wife, and she has witnessed this and confirms all of this  She has not noticed any safety issues  Wife also called neurology with these concerns  No history of this  Pt reports  Drinking 3 drinks of scotch a day, and this amount has not changed recently  No fevers, no chills, no sweats, no pain with urination, no cough or cold symptoms, no signs of joint infection or skin infection  No recent new medications or increase or decrease in medications , wife put status medications for him  He does take 7 mg of diazepam in the evening on a regular basis, he does not take it during the day  Pt reports falling asleep ok, but not sleeping well through he night     Patient does sometimes get tired during the day and will take a nap sometimes  No recent major life stressor, he reports his anxiety is higher than normal, denies any increased depression  Pt did fall recently off toilet, he did lightly hit his head he reports, no loss of consciousness  He is not sure how long he was down on the ground, but he does not think it was long  No recent anesthesia  The following portions of the patient's history were reviewed and updated as appropriate: allergies, current medications, past family history, past medical history, past social history, past surgical history and problem list     Review of Systems   Constitutional: Negative for chills, fatigue and fever  HENT: Negative for congestion, nosebleeds, postnasal drip, sore throat and trouble swallowing  Eyes: Negative for pain  Respiratory: Negative for cough, chest tightness, shortness of breath and wheezing  Cardiovascular: Negative for chest pain, palpitations and leg swelling  Gastrointestinal: Negative for abdominal pain, constipation, diarrhea, nausea and vomiting  Endocrine: Negative for polydipsia and polyuria  Genitourinary: Negative for dysuria, flank pain, frequency, hematuria and urgency  Musculoskeletal: Positive for arthralgias and back pain  Skin: Negative for rash  Neurological: Positive for tremors  Negative for dizziness, light-headedness and headaches  Hematological: Does not bruise/bleed easily  Psychiatric/Behavioral: Positive for hallucinations and sleep disturbance  Negative for confusion, dysphoric mood and suicidal ideas  The patient is nervous/anxious  Objective:      /78   Pulse (!) 54   Temp 97 6 °F (36 4 °C) (Temporal)   Ht 5' 7" (1 702 m)   Wt 75 3 kg (166 lb)   SpO2 99%   BMI 26 00 kg/m²          Physical Exam  Vitals signs reviewed  Constitutional:       General: He is not in acute distress  Appearance: Normal appearance  He is well-developed  HENT:      Head: Normocephalic and atraumatic  Right Ear: External ear normal       Left Ear: External ear normal    Eyes:      General: No scleral icterus  Conjunctiva/sclera: Conjunctivae normal    Neck:      Musculoskeletal: Normal range of motion and neck supple  Thyroid: No thyromegaly  Trachea: No tracheal deviation  Cardiovascular:      Rate and Rhythm: Normal rate and regular rhythm  Heart sounds: Normal heart sounds  No murmur  Pulmonary:      Effort: Pulmonary effort is normal  No respiratory distress  Breath sounds: Normal breath sounds  No wheezing or rales  Abdominal:      General: Bowel sounds are normal       Palpations: Abdomen is soft  Tenderness: There is no abdominal tenderness  There is no guarding or rebound  Musculoskeletal:      Right lower leg: No edema  Left lower leg: No edema  Lymphadenopathy:      Cervical: No cervical adenopathy  Neurological:      General: No focal deficit present  Mental Status: He is alert and oriented to person, place, and time        Comments: No problems naming objects, can spell WORLD forwards and backwards, 3/3 recall,  No slurred speech, Patient knows what to do if there were a fire in the house   Psychiatric:         Mood and Affect: Mood normal          Behavior: Behavior normal          Judgment: Judgment normal

## 2021-04-02 NOTE — ASSESSMENT & PLAN NOTE
CT done in January showed no evidence of recurrent disease, but there was an area for which 3-4 month follow-up CT was recommended, follow-up Pulmonary

## 2021-04-02 NOTE — ASSESSMENT & PLAN NOTE
Patient had a reported episode of AFib in 2018 associated with a respiratory infection, this has been felt to be an isolated episode, and patient not on anticoagulation  Patient does follow with Cardiology

## 2021-04-02 NOTE — ASSESSMENT & PLAN NOTE
Exam today is normal except for the history patient is giving  Recent labs show that patient is likely dehydrated with his elevated BUN and creatinine, these tests were normal in December  Pt not on water pill  No nausea, no vomiting, no diarrhea, history regarding any underlying infection is normal, but UTI is always strong possibility, as is acute renal failure  Other etiologies include adverse side effect from medications, even though he has not had any recent changes in medications, he can be having adverse reaction to the diazepam, lithium, fluoxetine  Patient moving all 4 extremities okay, no focal weakness  Other possibility includes delirium/ psychosis with his underlying psychiatric condition     Alcohol may also be a contributor even though he has had no recent change in this, I recommend checking B12 and folate, and likely hydration with a "banana bag "    Discussed with patient and wife my best recommendation at this point would be to get to the hospital where he can get hydrated, have levels of lithium and other meds checked,  Get urinalysis done, and if symptoms persisting, then evaluation with Psychiatry

## 2021-04-02 NOTE — ED PROVIDER NOTES
History  Chief Complaint   Patient presents with    Altered Mental Status     Pt admits following symptoms for past 1-3 weeks: forgetfulness, visual/auditory hallucinations  Pt alert and oriented  61-year-old male presents to the emergency department with complaints of hallucinations  Wife states he has been intermittently confused and that he often the states her for someone else  States symptoms have been intermittent over the past 1-3 weeks  Also notes that he has been forgetful and has to write things down or to remember them  Has not had any known episodes of speaking to someone who was not physically in the room  Wife states that he often confuses her for other women from his past and asks her how she got into the house  Has not been verbally or physically aggressive  States that they saw his PCP earlier today who suggested ER evaluation and hydration  Follows with a psychiatrist regularly for bipolar disorder  History provided by:  Patient   used: No    Altered Mental Status  Presenting symptoms: confusion    Severity:  Mild  Most recent episode:  Yesterday  Episode history:  Multiple  Timing:  Intermittent  Progression:  Waxing and waning  Chronicity:  New  Associated symptoms: no abdominal pain, normal movement, no agitation, no bladder incontinence, no decreased appetite, no depression, no difficulty breathing, no eye deviation, no fever, no headaches, no light-headedness, no nausea, no palpitations, no rash, no seizures, no slurred speech, no suicidal behavior, no visual change, no vomiting and no weakness        Prior to Admission Medications   Prescriptions Last Dose Informant Patient Reported? Taking? ATROVENT HFA 17 MCG/ACT inhaler  Self No No   Sig: USE 2 PUFFS 4 TIMES DAILY   Patient taking differently: 3 times a day   Breo Ellipta 200-25 MCG/INH inhaler   No No   Sig: USE 1 INHALATION BY MOUTH  DAILY   RINSE MOUTH AFTER  USE     Cholecalciferol (VITAMIN D PO) Self Yes No   Sig: Take 2,000 Units by mouth daily     FLUoxetine (PROzac) 10 mg capsule  Self Yes No   Sig: Take 10 mg by mouth daily    acetaminophen (TYLENOL) 500 mg tablet  Self Yes No   Sig: Take 500 mg by mouth as needed for mild pain  albuterol (PROAIR HFA) 90 mcg/act inhaler  Self No No   Sig: Inhale 2 puffs every 6 (six) hours as needed for wheezing   amLODIPine (NORVASC) 2 5 mg tablet  Self No No   Sig: TAKE 1 TABLET BY MOUTH  DAILY   aspirin (ECOTRIN LOW STRENGTH) 81 mg EC tablet  Self Yes No   Sig: Take 81 mg by mouth daily   atorvastatin (LIPITOR) 40 mg tablet  Self No No   Sig: TAKE 1 TABLET BY MOUTH  DAILY   diazepam (VALIUM) 2 mg tablet  Self Yes No   Sig: Take 2 mg by mouth every 6 (six) hours as needed    diazepam (VALIUM) 5 mg tablet  Self Yes No   Sig: Take 5 mg by mouth as needed for anxiety   dicyclomine (BENTYL) 20 mg tablet  Self No No   Sig: Take 1 tablet (20 mg total) by mouth every 6 (six) hours as needed (abd cramping)   fexofenadine (ALLEGRA) 180 MG tablet  Self Yes No   Sig: Take 180 mg by mouth as needed    fluticasone (FLONASE) 50 mcg/act nasal spray  Self Yes No   Si spray into each nostril daily    lithium carbonate (LITHOBID) 300 mg CR tablet  Self Yes No   Sig: Take 300 mg by mouth One tab by mouth every other day and alternating with 2 tabs every other day at bedtime      metoprolol succinate (TOPROL-XL) 25 mg 24 hr tablet  Self No No   Sig: TAKE 1 TABLET BY MOUTH  DAILY   mupirocin (BACTROBAN) 2 % ointment   No No   Sig: Apply topically 2 (two) times a day as needed (rash)   Patient not taking: Reported on 2021   nitroglycerin (NITRODUR) 0 2 mg/hr  Self No No   Sig: APPLY 1 PATCH DAILY (OFF  FOR 12 HOURS)   omega-3-acid ethyl esters (LOVAZA) 1 g capsule  Self No No   Sig: TAKE 1 CAPSULE BY MOUTH 3  TIMES DAILY   omeprazole (PriLOSEC) 20 mg delayed release capsule  Self No No   Sig: TAKE 1 CAPSULE BY MOUTH  DAILY      Facility-Administered Medications: None       Past Medical History:   Diagnosis Date    Aortic aneurysm (HCC)     Benign colon polyp     Bipolar 1 disorder ([facility] )     Cardiac disease     MI    Cervical cord compression with myelopathy (HCC)     COPD (chronic obstructive pulmonary disease) (HCC)     Diverticulitis     Diverticulosis     Gait disturbance     uses cane, leg brace on right    GERD (gastroesophageal reflux disease)     Heart attack ([facility] ) 1996    Hx of resection of large bowel 5/3/2016    Hyperlipidemia     Hypertension     IBS (irritable bowel syndrome)     Lumbar stenosis     Lung cancer ([facility] )     2007 Left lower lobectomy and 2011 Right lung with surgery     Myocardial infarction [facility])     involving other coronary artery    Prostate cancer ([facility] )     Shortness of breath     Small bowel obstruction ([facility] ) 11/16/2016       Past Surgical History:   Procedure Laterality Date    ANGIOPLASTY      stent    CARDIAC SURGERY      CERVICAL SPINE SURGERY      Cervical decompression with cervical fusion from C3-C7 for spinal stenosis   COLON SURGERY  11/04/2014    ESOPHAGOGASTRODUODENOSCOPY  01/03/2013    with possible Schatzki's ring & small hiatal hernia, mild gastritis    FL INJECTION LEFT HIP (NON ARTHROGRAM)  12/11/2018    FL INJECTION LEFT HIP (NON ARTHROGRAM)  5/9/2019    IR BIOPSY LUNG  7/6/2020    IR EVAR  8/6/2018    LUNG CANCER SURGERY Right 03/2011    wedge resection for lung tumor, 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    LUNG LOBECTOMY Left     OH ARTHRODESIS ANT INTERBODY MIN DISCECTOMY, CERVICAL BELOW C2 N/A 5/2/2016    Procedure: Anterior cervical diskectomy C3/4, C5/6, C6/7 with anterior plate fixation fusion C3-7;  Posterior decompressive laminectomy C3-7 with lateral mass fixation fusion C3-7 (IMPULSE MONITORING);   Surgeon: Nena Calle MD;  [facility];  Service: Neurosurgery    OH ARTHRODESIS POSTERIOR INTERBODY LUMBAR N/A 1/30/2017    Procedure: L4-5 AND L5-S1 DECOMPRESSIVE FORAMINOTOMIES, TRANSFORAMINAL LUMBAR INTERBODY AND PEDICLE SCREW FIXATION FUSION L4-S1 (IMPULSE); Surgeon: Yara Cisneros MD;  [facility];  Service: Neurosurgery    [address] RPR DPLMNT AORTO-AORTIC NDGFT N/A 8/6/2018    Procedure: REPAIR ANEURYSM ENDOVASCULAR ABDOMINAL AORTIC  (EVAR) WITH BILATERAL PERCUTANEOUS FEMORAL ACCESS WITH ULTRASOUND GUIDANCE ON THE RIGHT AND PRE CLOSURE;  Surgeon: Mikel Henderson MD;  [facility];  Service: Vascular    PROSTATECTOMY  2007    for prostate CA - no chemo/RT    SIGMOIDECTOMY      for divertic    SMALL INTESTINE SURGERY N/A 11/17/2016    Procedure: Exploratory Laparotomy, Lysis of adhesions to release small bowel obstruction;  Surgeon: Hannah Sandy MD;  [facility];  Service:        Family History   Problem Relation Age of Onset    Heart attack Father     Colon cancer Father     Coronary artery disease Father     Heart disease Family     Hyperlipidemia Family     Hypertension Family      I have reviewed and agree with the history as documented  E-Cigarette/Vaping    E-Cigarette Use Never User      E-Cigarette/Vaping Substances    Nicotine No     THC No     CBD No     Flavoring No     Other No     Unknown No      Social History     Tobacco Use    Smoking status: Former Smoker     Packs/day: 1 00     Years: 35 00     Pack years: 35 00     Types: Cigarettes     Quit date: 2011     Years since quitting: 10 2    Smokeless tobacco: Never Used   Substance Use Topics    Alcohol use: Yes     Frequency: 4 or more times a week     Drinks per session: 1 or 2     Comment: 6 drinks a week    Drug use: No       Review of Systems   Constitutional: Negative for activity change, appetite change, chills, decreased appetite and fever  HENT: Negative for congestion, dental problem, drooling, ear discharge, ear pain, mouth sores, nosebleeds, rhinorrhea, sore throat and trouble swallowing  Eyes: Negative for pain, discharge and itching     Respiratory: Negative for cough, chest tightness, shortness of breath and wheezing  Cardiovascular: Negative for chest pain and palpitations  Gastrointestinal: Negative for abdominal pain, blood in stool, constipation, diarrhea, nausea and vomiting  Endocrine: Negative for cold intolerance and heat intolerance  Genitourinary: Negative for bladder incontinence, difficulty urinating, dysuria, flank pain, frequency and urgency  Skin: Negative for rash  Allergic/Immunologic: Negative for food allergies and immunocompromised state  Neurological: Negative for dizziness, seizures, syncope, weakness, light-headedness, numbness and headaches  Psychiatric/Behavioral: Positive for confusion  Negative for agitation, behavioral problems, self-injury and suicidal ideas  The patient is not nervous/anxious  Physical Exam  Physical Exam  Vitals signs and nursing note reviewed  Constitutional:       General: He is not in acute distress  Appearance: He is not diaphoretic  HENT:      Head: Normocephalic and atraumatic  Right Ear: External ear normal       Left Ear: External ear normal       Mouth/Throat:      Pharynx: No oropharyngeal exudate  Eyes:      Conjunctiva/sclera: Conjunctivae normal    Neck:      Vascular: No JVD  Trachea: No tracheal deviation  Cardiovascular:      Rate and Rhythm: Normal rate and regular rhythm  Heart sounds: Normal heart sounds  No murmur  No friction rub  No gallop  Pulmonary:      Effort: Pulmonary effort is normal  No respiratory distress  Breath sounds: Normal breath sounds  No wheezing or rales  Chest:      Chest wall: No tenderness  Abdominal:      General: Bowel sounds are normal  There is no distension  Palpations: Abdomen is soft  Tenderness: There is no abdominal tenderness  There is no guarding  Musculoskeletal: Normal range of motion  General: No tenderness or deformity     Lymphadenopathy:      Cervical: No cervical adenopathy  Skin:     General: Skin is warm and dry  Findings: No erythema or rash  Neurological:      Mental Status: He is alert and oriented to person, place, and time  GCS: GCS eye subscore is 4  GCS verbal subscore is 5  GCS motor subscore is 6     Psychiatric:         Mood and Affect: Mood normal          Behavior: Behavior normal          Vital Signs  ED Triage Vitals [04/02/21 1112]   Temperature Pulse Respirations Blood Pressure SpO2   98 °F (36 7 °C) (!) 44 18 (!) 193/85 99 %      Temp Source Heart Rate Source Patient Position - Orthostatic VS BP Location FiO2 (%)   Oral Monitor Sitting Left arm --      Pain Score       No Pain           Vitals:    04/02/21 1112 04/02/21 1323   BP: (!) 193/85 (!) 190/87   Pulse: (!) 44    Patient Position - Orthostatic VS: Sitting Lying         Visual Acuity  Visual Acuity      Most Recent Value   L Pupil Size (mm)  3   R Pupil Size (mm)  3          ED Medications  Medications   sodium chloride 0 9 % bolus 1,000 mL (0 mL Intravenous Stopped 4/2/21 1412)       Diagnostic Studies  Results Reviewed     Procedure Component Value Units Date/Time    Urine Microscopic [[id number]]  (Normal) Collected: 04/02/21 1417    Lab Status: Final result Specimen: Urine, Clean Catch Updated: 04/02/21 1500     RBC, UA 0-1 /hpf      WBC, UA None Seen /hpf      Epithelial Cells Occasional /hpf      Bacteria, UA None Seen /hpf     Urine Macroscopic, POC [[id number]]  (Abnormal) Collected: 04/02/21 1417    Lab Status: Final result Specimen: Urine Updated: 04/02/21 1418     Color, UA Yellow     Clarity, UA Clear     pH, UA 7 0     Leukocytes, UA Negative     Nitrite, UA Negative     Protein,  (2+) mg/dl      Glucose, UA Negative mg/dl      Ketones, UA Negative mg/dl      Urobilinogen, UA 0 2 E U /dl      Bilirubin, UA Negative     Blood, UA Trace     Specific [place], UA 1 015    Narrative:      CLINITEK RESULT    Comprehensive metabolic panel [[id number]]  (Abnormal) Collected: 04/02/21 1210    Lab Status: Final result Specimen: Blood from Arm, Left Updated: 04/02/21 1240     Sodium 147 mmol/L      Potassium 4 1 mmol/L      Chloride 111 mmol/L      CO2 26 mmol/L      ANION GAP 10 mmol/L      BUN 27 mg/dL      Creatinine 1 13 mg/dL      Glucose 100 mg/dL      Calcium 9 3 mg/dL      AST 25 U/L      ALT 35 U/L      Alkaline Phosphatase 113 U/L      Total Protein 6 1 g/dL      Albumin 3 5 g/dL      Total Bilirubin 0 84 mg/dL      eGFR 64 ml/min/1 73sq m     Narrative:      [place] guidelines for Chronic Kidney Disease (CKD):     Stage 1 with normal or high GFR (GFR > 90 mL/min/1 73 square meters)    Stage 2 Mild CKD (GFR = 60-89 mL/min/1 73 square meters)    Stage 3A Moderate CKD (GFR = 45-59 mL/min/1 73 square meters)    Stage 3B Moderate CKD (GFR = 30-44 mL/min/1 73 square meters)    Stage 4 Severe CKD (GFR = 15-29 mL/min/1 73 square meters)    Stage 5 End Stage CKD (GFR <15 mL/min/1 73 square meters)  Note: GFR calculation is accurate only with a steady state creatinine    CBC and differential [[id number]]  (Abnormal) Collected: 04/02/21 1210    Lab Status: Final result Specimen: Blood from Arm, Left Updated: 04/02/21 1222     WBC 8 67 Thousand/uL      RBC 4 25 Million/uL      Hemoglobin 13 5 g/dL      Hematocrit 40 3 %      MCV 95 fL      MCH 31 8 pg      MCHC 33 5 g/dL      RDW 12 9 %      MPV 10 7 fL      Platelets 219 Thousands/uL      nRBC 0 /100 WBCs      Neutrophils Relative 79 %      Immat GRANS % 1 %      Lymphocytes Relative 11 %      Monocytes Relative 7 %      Eosinophils Relative 2 %      Basophils Relative 0 %      Neutrophils Absolute 6 86 Thousands/µL      Immature Grans Absolute 0 04 Thousand/uL      Lymphocytes Absolute 0 97 Thousands/µL      Monocytes Absolute 0 63 Thousand/µL      Eosinophils Absolute 0 14 Thousand/µL      Basophils Absolute 0 03 Thousands/µL     Vitamin B12 [[id number]] Collected: 04/02/21 1210    Lab Status:  In process Specimen: Blood from Arm, Left Updated: 04/02/21 1218    Folate [[id number]] Collected: 04/02/21 1210    Lab Status: In process Specimen: Blood from Arm, Left Updated: 04/02/21 1218                 CT head without contrast   Final Result by Matt Romero MD (04/02 1424)      No acute intracranial abnormality  Microangiopathic changes  Workstation performed: [id number]                    Procedures  Procedures         ED Course                                           MDM  Number of Diagnoses or Management Options  Confusion:   Diagnosis management comments: Differential diagnosis includes but not limited to: dementia, psychosis, electrolyte abnormality, dehydration, infectious eitiology       Amount and/or Complexity of Data Reviewed  Clinical lab tests: ordered and reviewed  Tests in the radiology section of CPT®: ordered and reviewed  Obtain history from someone other than the patient: yes        Disposition  Final diagnoses:   Confusion     Time reflects when diagnosis was documented in both MDM as applicable and the Disposition within this note     Time User Action Codes Description Comment    4/2/2021  2:29 PM Tito Tan 26 [R41 0] Confusion       ED Disposition     ED Disposition Condition Date/Time Comment    Discharge Stable Fri Apr 2, 2021  2:29 PM Pablito Baig discharge to home/self care  Follow-up Information     Follow up With Specialties Details Why Contact Info    Jose Antonio De La Garza MD Internal Medicine   [address] [place]       Fer Aguilar MD Psychiatry   [address] [phone]            Patient's Medications   Discharge Prescriptions    No medications on file     No discharge procedures on file      PDMP Review     None          ED Provider  Electronically Signed by           Diane Patterson PA-C  04/02/21 5664

## 2021-04-19 ENCOUNTER — APPOINTMENT (OUTPATIENT)
Dept: LAB | Facility: CLINIC | Age: 76
End: 2021-04-19
Payer: MEDICARE

## 2021-04-19 ENCOUNTER — TRANSCRIBE ORDERS (OUTPATIENT)
Dept: LAB | Facility: CLINIC | Age: 76
End: 2021-04-19

## 2021-04-19 DIAGNOSIS — F31.60 MIXED BIPOLAR I DISORDER (HCC): Primary | ICD-10-CM

## 2021-04-19 DIAGNOSIS — F31.60 MIXED BIPOLAR I DISORDER (HCC): ICD-10-CM

## 2021-04-19 LAB
ALBUMIN SERPL BCP-MCNC: 3.6 G/DL (ref 3.5–5)
ALP SERPL-CCNC: 114 U/L (ref 46–116)
ALT SERPL W P-5'-P-CCNC: 34 U/L (ref 12–78)
ANION GAP SERPL CALCULATED.3IONS-SCNC: 6 MMOL/L (ref 4–13)
AST SERPL W P-5'-P-CCNC: 19 U/L (ref 5–45)
BILIRUB SERPL-MCNC: 0.75 MG/DL (ref 0.2–1)
BUN SERPL-MCNC: 21 MG/DL (ref 5–25)
CALCIUM SERPL-MCNC: 9 MG/DL (ref 8.3–10.1)
CHLORIDE SERPL-SCNC: 112 MMOL/L (ref 100–108)
CO2 SERPL-SCNC: 28 MMOL/L (ref 21–32)
CREAT SERPL-MCNC: 1.22 MG/DL (ref 0.6–1.3)
GFR SERPL CREATININE-BSD FRML MDRD: 58 ML/MIN/1.73SQ M
GLUCOSE P FAST SERPL-MCNC: 97 MG/DL (ref 65–99)
LITHIUM SERPL-SCNC: 0.8 MMOL/L (ref 0.5–1)
POTASSIUM SERPL-SCNC: 4 MMOL/L (ref 3.5–5.3)
PROT SERPL-MCNC: 6.7 G/DL (ref 6.4–8.2)
SODIUM SERPL-SCNC: 146 MMOL/L (ref 136–145)
TSH SERPL DL<=0.05 MIU/L-ACNC: 1.76 UIU/ML (ref 0.36–3.74)

## 2021-04-19 PROCEDURE — 36415 COLL VENOUS BLD VENIPUNCTURE: CPT

## 2021-04-19 PROCEDURE — 80178 ASSAY OF LITHIUM: CPT

## 2021-04-19 PROCEDURE — 80053 COMPREHEN METABOLIC PANEL: CPT

## 2021-04-19 PROCEDURE — 84443 ASSAY THYROID STIM HORMONE: CPT

## 2021-04-27 ENCOUNTER — OFFICE VISIT (OUTPATIENT)
Dept: PULMONOLOGY | Facility: CLINIC | Age: 76
End: 2021-04-27
Payer: MEDICARE

## 2021-04-27 VITALS
DIASTOLIC BLOOD PRESSURE: 80 MMHG | OXYGEN SATURATION: 100 % | SYSTOLIC BLOOD PRESSURE: 130 MMHG | WEIGHT: 168 LBS | HEART RATE: 58 BPM | BODY MASS INDEX: 26.37 KG/M2 | HEIGHT: 67 IN | TEMPERATURE: 97.4 F

## 2021-04-27 DIAGNOSIS — Z85.118 HISTORY OF LUNG CANCER: ICD-10-CM

## 2021-04-27 DIAGNOSIS — K21.9 GASTROESOPHAGEAL REFLUX DISEASE WITHOUT ESOPHAGITIS: ICD-10-CM

## 2021-04-27 DIAGNOSIS — J44.9 CHRONIC OBSTRUCTIVE PULMONARY DISEASE, UNSPECIFIED COPD TYPE (HCC): Primary | ICD-10-CM

## 2021-04-27 DIAGNOSIS — J30.9 ALLERGIC RHINITIS, UNSPECIFIED SEASONALITY, UNSPECIFIED TRIGGER: ICD-10-CM

## 2021-04-27 PROCEDURE — 99215 OFFICE O/P EST HI 40 MIN: CPT | Performed by: INTERNAL MEDICINE

## 2021-04-27 NOTE — PROGRESS NOTES
Office Progress Note - Pulmonary    Hallie Fish 70 y o  male MRN: [id number]    Encounter: [id number]      Assessment:   Chronic obstructive pulmonary disease   Allergic rhinitis   Gastroesophageal reflux disease   History of lung cancer  Plan:    Trial of Anoro Ellipta 1 inhalation once a day   Albuterol rescue inhaler 2 inhalations times a day as needed   Hold the Breo and the ipratropium during the trial of Anoro   Fluticasone nasal spray 2 sprays to each nostril once a day   Fexofenadine 180 mg once a day   Follow-up in 6 months  Discussion:   The patient's COPD is in remission  Because he has a large co-payment for the ipratropium  I have provided the patient with Anoro Ellipta samples  He will use it once a day  When he uses the Anoro he will hold the Breo and the ipratropium and use only the albuterol rescue inhaler  If it works for him then he will call my office and we will call the prescription to the pharmacy  At that time we will discontinue the Breo and ipratropium  His allergic rhinitis is well treated  I have maintained him on the fluticasone nasal spray 2 sprays to each nostril once a day and fexofenadine 180 mg once a day  He will be due for his annual CT of the chest in January of 2022  I will see him in 6 months in a follow-up visit  Subjective: The patient is here for a follow-up visit  He denies any significant cough or sputum production  Denies any wheezing or chest tightness  No chest pain or palpitations  No nocturnal symptoms  He is using Breo 200/25, 1 inhalation once a day and ipratropium inhaler 2 inhalations 4 times a day  He uses the albuterol mainly when he goes out for a walk  His co-payment for the ipratropium was more than 700 dollars for 3 months  Review of systems:  A 12 point system review is done and aside from what is stated above the rest of the review of systems is negative        Family history and social history are reviewed  Medications list is reviewed  Vitals: Blood pressure 130/80, pulse 58, temperature (!) 97 4 °F (36 3 °C), temperature source Tympanic, height 5' 7" (1 702 m), weight 76 2 kg (168 lb), SpO2 100 %  ,     Physical Exam  Gen: Awake, alert, oriented x 3, no acute distress  HEENT: Mucous membranes moist, no oral lesions, no thrush  NECK: No accessory muscle use, JVP not elevated  Cardiac: Regular, single S1, single S2, no murmurs, no rubs, no gallops  Lungs:  Decreased breath sounds  No wheezing or  Abdomen: normoactive bowel sounds, soft nontender, nondistended, no rebound or rigidity, no guarding  Extremities: no cyanosis, no clubbing, no edema  Neuro:  Grossly nonfocal   Skin:  No rash      Lab Results   Component Value Date    WBC 8 67 04/02/2021    HGB 13 5 04/02/2021    HCT 40 3 04/02/2021    MCV 95 04/02/2021     04/02/2021     Lab Results   Component Value Date     10/19/2015    SODIUM 146 (H) 04/19/2021    K 4 0 04/19/2021     (H) 04/19/2021    CO2 28 04/19/2021    ANIONGAP 8 10/19/2015    AGAP 6 04/19/2021    BUN 21 04/19/2021    CREATININE 1 22 04/19/2021    GLUC 100 04/02/2021    GLUF 97 04/19/2021    CALCIUM 9 0 04/19/2021    AST 19 04/19/2021    ALT 34 04/19/2021    ALKPHOS 114 04/19/2021    PROT 7 2 10/19/2015    TP 6 7 04/19/2021    BILITOT 0 32 10/19/2015    TBILI 0 75 04/19/2021    EGFR 58 04/19/2021           Answers for HPI/ROS submitted by the patient on 4/21/2021   Primary symptoms  Have you had a change in appetite?: No  Do you have chest pain?: No  Do you have shortness of breath that occurs with effort or exertion?: Yes  Do you have ear congestion?: No  Do you have ear pain?: No  Do you have a fever?: No  Do you have headaches?: Yes  Do you have heartburn?: No  Do you have fatigue?: No  Do you have muscle pain?: Yes  Do you have nasal congestion?: No  Do you have shortness of breath when lying flat?: No  Do you have shortness of breath when you wake up?: No  Do you have post-nasal drip?: No  Do you have a runny nose?: No  Do you have sneezing?: Yes  Do you have a sore throat?: No  Do you have sweats?: No  Do you have trouble swallowing?: No  Which of the following makes your symptoms worse?: climbing stairs, strenuous activity  Which of the following makes your symptoms better?: cold air, nitroglycerine, steroid inhaler

## 2021-05-11 ENCOUNTER — OFFICE VISIT (OUTPATIENT)
Dept: OBGYN CLINIC | Facility: HOSPITAL | Age: 76
End: 2021-05-11
Payer: MEDICARE

## 2021-05-11 VITALS
SYSTOLIC BLOOD PRESSURE: 164 MMHG | BODY MASS INDEX: 26.31 KG/M2 | DIASTOLIC BLOOD PRESSURE: 86 MMHG | HEART RATE: 50 BPM | HEIGHT: 67 IN

## 2021-05-11 DIAGNOSIS — M70.62 TROCHANTERIC BURSITIS OF LEFT HIP: Primary | ICD-10-CM

## 2021-05-11 PROCEDURE — 99213 OFFICE O/P EST LOW 20 MIN: CPT | Performed by: ORTHOPAEDIC SURGERY

## 2021-05-11 PROCEDURE — 20610 DRAIN/INJ JOINT/BURSA W/O US: CPT | Performed by: ORTHOPAEDIC SURGERY

## 2021-05-11 RX ORDER — BUPIVACAINE HYDROCHLORIDE 2.5 MG/ML
2 INJECTION, SOLUTION INFILTRATION; PERINEURAL
Status: COMPLETED | OUTPATIENT
Start: 2021-05-11 | End: 2021-05-11

## 2021-05-11 RX ORDER — LIDOCAINE HYDROCHLORIDE 10 MG/ML
2 INJECTION, SOLUTION INFILTRATION; PERINEURAL
Status: COMPLETED | OUTPATIENT
Start: 2021-05-11 | End: 2021-05-11

## 2021-05-11 RX ORDER — BETAMETHASONE SODIUM PHOSPHATE AND BETAMETHASONE ACETATE 3; 3 MG/ML; MG/ML
12 INJECTION, SUSPENSION INTRA-ARTICULAR; INTRALESIONAL; INTRAMUSCULAR; SOFT TISSUE
Status: COMPLETED | OUTPATIENT
Start: 2021-05-11 | End: 2021-05-11

## 2021-05-11 RX ADMIN — BETAMETHASONE SODIUM PHOSPHATE AND BETAMETHASONE ACETATE 12 MG: 3; 3 INJECTION, SUSPENSION INTRA-ARTICULAR; INTRALESIONAL; INTRAMUSCULAR; SOFT TISSUE at 10:49

## 2021-05-11 RX ADMIN — BUPIVACAINE HYDROCHLORIDE 2 ML: 2.5 INJECTION, SOLUTION INFILTRATION; PERINEURAL at 10:49

## 2021-05-11 RX ADMIN — LIDOCAINE HYDROCHLORIDE 2 ML: 10 INJECTION, SOLUTION INFILTRATION; PERINEURAL at 10:49

## 2021-05-11 NOTE — PROGRESS NOTES
Assessment:  No diagnosis found  Plan:      To do next visit:  No follow-ups on file  The above stated was discussed in layman's terms and the patient expressed understanding  All questions were answered to the patient's satisfaction  Scribe Attestation    I,:   am acting as a scribe while in the presence of the attending physician :       I,:   personally performed the services described in this documentation    as scribed in my presence :             Subjective:   Gabriella Lauren is a 70 y o  male who presents       Review of systems negative unless otherwise specified in HPI  Review of Systems    Past Medical History:   Diagnosis Date    [facility]     Benign colon polyp     Bipolar 1 disorder ([facility]     Cardiac disease     MI    Cervical cord compression with myelopathy [facility]     COPD (chronic obstructive pulmonary disease) [facility]     Diverticulitis     Diverticulosis     Gait disturbance     uses cane, leg brace on right    GERD (gastroesophageal reflux disease)     Heart attack ([facility] ) 1996    Hx of resection of large bowel 5/3/2016    Hyperlipidemia     Hypertension     IBS (irritable bowel syndrome)     Lumbar stenosis     Lung cancer ([facility]     2007 Left lower lobectomy and 2011 Right lung with surgery     Myocardial infarction [facility])     involving other coronary artery    Prostate cancer ([facility]     Shortness of breath     Small bowel obstruction ([facility] ) 11/16/2016       Past Surgical History:   Procedure Laterality Date    ANGIOPLASTY      stent    CARDIAC SURGERY      CERVICAL SPINE SURGERY      Cervical decompression with cervical fusion from C3-C7 for spinal stenosis      COLON SURGERY  11/04/2014    ESOPHAGOGASTRODUODENOSCOPY  01/03/2013    with possible Schatzki's ring & small hiatal hernia, mild gastritis    FL INJECTION LEFT HIP (NON ARTHROGRAM)  12/11/2018    FL INJECTION LEFT HIP (NON ARTHROGRAM)  5/9/2019    IR BIOPSY LUNG  7/6/2020    IR EVAR 8/6/2018    LUNG CANCER SURGERY Right 03/2011    wedge resection for lung tumor, 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    LUNG LOBECTOMY Left     [place] ARTHRODESIS ANT INTERBODY MIN DISCECTOMY, CERVICAL BELOW C2 N/A 5/2/2016    Procedure: Anterior cervical diskectomy C3/4, C5/6, C6/7 with anterior plate fixation fusion C3-7;  Posterior decompressive laminectomy C3-7 with lateral mass fixation fusion C3-7 (IMPULSE MONITORING); Surgeon: Vane Traylor MD;  [facility];  Service: Neurosurgery    [place] ARTHRODESIS POSTERIOR INTERBODY LUMBAR N/A 1/30/2017    Procedure: L4-5 AND L5-S1 DECOMPRESSIVE FORAMINOTOMIES, TRANSFORAMINAL LUMBAR INTERBODY AND PEDICLE SCREW FIXATION FUSION L4-S1 (IMPULSE);   Surgeon: Vane Traylor MD;  [facility];  Service: Neurosurgery    [place] [address]  RPR DPLMNT AORTO-AORTIC NDGFT N/A 8/6/2018    Procedure: REPAIR ANEURYSM ENDOVASCULAR ABDOMINAL AORTIC  (EVAR) WITH BILATERAL PERCUTANEOUS FEMORAL ACCESS WITH ULTRASOUND GUIDANCE ON THE RIGHT AND PRE CLOSURE;  Surgeon: Bard Dolores MD;  [facility];  Service: Vascular    PROSTATECTOMY  2007    for prostate CA - no chemo/RT    SIGMOIDECTOMY      for divertic    SMALL INTESTINE SURGERY N/A 11/17/2016    Procedure: Exploratory Laparotomy, Lysis of adhesions to release small bowel obstruction;  Surgeon: Patrice Arenas MD;  [facility];  Service:        Family History   Problem Relation Age of Onset    Heart attack Father     Colon cancer Father     Coronary artery disease Father     Heart disease Family     Hyperlipidemia Family     Hypertension Family        Social History     Occupational History    Occupation: Retired   Tobacco Use    Smoking status: Former Smoker     Packs/day: 1 00     Years: 35 00     Pack years: 35 00     Types: Cigarettes     Quit date: 2011     Years since quitting: 10 3    Smokeless tobacco: Never Used   Substance and Sexual Activity    Alcohol use: Yes     Frequency: 4 or more times a week     Drinks per session: 1 or 2     Comment: 6 drinks a week    Drug use: No    Sexual activity: Not on file         Current Outpatient Medications:     acetaminophen (TYLENOL) 500 mg tablet, Take 500 mg by mouth as needed for mild pain , Disp: , Rfl:     albuterol (PROAIR HFA) 90 mcg/act inhaler, Inhale 2 puffs every 6 (six) hours as needed for wheezing, Disp: 3 Inhaler, Rfl: 3    amLODIPine (NORVASC) 2 5 mg tablet, TAKE 1 TABLET BY MOUTH  DAILY, Disp: 90 tablet, Rfl: 3    aspirin (ECOTRIN LOW STRENGTH) 81 mg EC tablet, Take 81 mg by mouth daily, Disp: , Rfl:     atorvastatin (LIPITOR) 40 mg tablet, TAKE 1 TABLET BY MOUTH  DAILY, Disp: 90 tablet, Rfl: 5    ATROVENT HFA 17 MCG/ACT inhaler, USE 2 PUFFS 4 TIMES DAILY (Patient taking differently: 3 times a day), Disp: 51 6 g, Rfl: 5    Breo Ellipta 200-25 MCG/INH inhaler, USE 1 INHALATION BY MOUTH  DAILY   RINSE MOUTH AFTER  USE , Disp: 180 each, Rfl: 3    Cholecalciferol (VITAMIN D PO), Take 2,000 Units by mouth daily  , Disp: , Rfl:     diazepam (VALIUM) 2 mg tablet, Take 2 mg by mouth every 6 (six) hours as needed , Disp: , Rfl:     diazepam (VALIUM) 5 mg tablet, Take 5 mg by mouth as needed for anxiety, Disp: , Rfl:     dicyclomine (BENTYL) 20 mg tablet, Take 1 tablet (20 mg total) by mouth every 6 (six) hours as needed (abd cramping), Disp: 90 tablet, Rfl: 3    fexofenadine (ALLEGRA) 180 MG tablet, Take 180 mg by mouth as needed , Disp: , Rfl:     FLUoxetine (PROzac) 10 mg capsule, Take 10 mg by mouth daily , Disp: , Rfl:     fluticasone (FLONASE) 50 mcg/act nasal spray, 1 spray into each nostril daily , Disp: , Rfl:     lithium carbonate (LITHOBID) 300 mg CR tablet, Take 300 mg by mouth One tab by mouth every other day and alternating with 2 tabs every other day at bedtime   , Disp: , Rfl:     metoprolol succinate (TOPROL-XL) 25 mg 24 hr tablet, TAKE 1 TABLET BY MOUTH  DAILY, Disp: 90 tablet, Rfl: 5    mupirocin (BACTROBAN) 2 % ointment, Apply topically 2 (two) times a day as needed (rash) (Patient not taking: Reported on 4/2/2021), Disp: 22 g, Rfl: 0    nitroglycerin (NITRODUR) 0 2 mg/hr, APPLY 1 PATCH DAILY (OFF  FOR 12 HOURS), Disp: 90 patch, Rfl: 3    omega-3-acid ethyl esters (LOVAZA) 1 g capsule, TAKE 1 CAPSULE BY MOUTH 3  TIMES DAILY, Disp: 270 capsule, Rfl: 5    omeprazole (PriLOSEC) 20 mg delayed release capsule, TAKE 1 CAPSULE BY MOUTH  DAILY, Disp: 90 capsule, Rfl: 3    Allergies   Allergen Reactions    Bactrim [Sulfamethoxazole-Trimethoprim] Other (See Comments)     AILYN    Nsaids      Annotation - [id number]: unable to take due to use of lithium   Oxycodone Rash            Vitals:    05/11/21 0957   BP: 164/86   Pulse: (!) 50       Objective:                    Ortho Exam    Diagnostics, reviewed and taken today if performed as documented:    None performed      The attending physician has personally reviewed the pertinent films in PACS and interpretation is as follows:      Procedures, if performed today:    Procedures    None performed      Portions of the record may have been created with voice recognition software  Occasional wrong word or "sound a like" substitutions may have occurred due to the inherent limitations of voice recognition software  Read the chart carefully and recognize, using context, where substitutions have occurred

## 2021-05-11 NOTE — PROGRESS NOTES
Assessment:  1  Trochanteric bursitis of left hip         Plan:  The patient was provided with left trochanteric bursa steroid injection  The patient tolerated the procedure well  He was shown IT band and buttock stretches  The patient should follow up in 3 months  To do next visit:  Return in about 3 months (around 8/11/2021)  The above stated was discussed in layman's terms and the patient expressed understanding  All questions were answered to the patient's satisfaction  Scribe Attestation    I,:  Gavino Holcomb am acting as a scribe while in the presence of the attending physician :       I,:  Kwaku Weathers MD personally performed the services described in this documentation    as scribed in my presence :             Subjective:   Patel Davis is a 70 y o  male who presents for follow up of left hip  He is s/p left trochanteric bursa steroid injection with benefit, 2/9/2021  Today he complains of left lateral hip, buttock and lateral thigh pain  Prolonged weight bearing and repetitive bending aggravates while rest alleviates          Review of systems negative unless otherwise specified in HPI    Past Medical History:   Diagnosis Date    [facility]     Benign colon polyp     Bipolar 1 disorder ([facility]     Cardiac disease     MI    Cervical cord compression with myelopathy (HCC)     COPD (chronic obstructive pulmonary disease) (HCC)     Diverticulitis     Diverticulosis     Gait disturbance     uses cane, leg brace on right    GERD (gastroesophageal reflux disease)     Heart attack ([facility] ) 1996    Hx of resection of large bowel 5/3/2016    Hyperlipidemia     Hypertension     IBS (irritable bowel syndrome)     Lumbar stenosis     Lung cancer ([facility] )     2007 Left lower lobectomy and 2011 Right lung with surgery     Myocardial infarction [facility])     involving other coronary artery    Prostate cancer (HCC)     Shortness of breath     Small bowel obstruction (HCC) 11/16/2016       Past Surgical History:   Procedure Laterality Date    ANGIOPLASTY      stent    CARDIAC SURGERY      CERVICAL SPINE SURGERY      Cervical decompression with cervical fusion from C3-C7 for spinal stenosis   COLON SURGERY  11/04/2014    ESOPHAGOGASTRODUODENOSCOPY  01/03/2013    with possible Schatzki's ring & small hiatal hernia, mild gastritis    FL INJECTION LEFT HIP (NON ARTHROGRAM)  12/11/2018    FL INJECTION LEFT HIP (NON ARTHROGRAM)  5/9/2019    IR BIOPSY LUNG  7/6/2020    IR EVAR  8/6/2018    LUNG CANCER SURGERY Right 03/2011    wedge resection for lung tumor, 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    LUNG LOBECTOMY Left     [place] ARTHRODESIS ANT INTERBODY MIN DISCECTOMY, CERVICAL BELOW C2 N/A 5/2/2016    Procedure: Anterior cervical diskectomy C3/4, C5/6, C6/7 with anterior plate fixation fusion C3-7;  Posterior decompressive laminectomy C3-7 with lateral mass fixation fusion C3-7 (IMPULSE MONITORING); Surgeon: Yara Cisneros MD;  [facility];  Service: Neurosurgery    [place] ARTHRODESIS POSTERIOR INTERBODY LUMBAR N/A 1/30/2017    Procedure: L4-5 AND L5-S1 DECOMPRESSIVE FORAMINOTOMIES, TRANSFORAMINAL LUMBAR INTERBODY AND PEDICLE SCREW FIXATION FUSION L4-S1 (IMPULSE);   Surgeon: Yara Cisneros MD;  [facility];  Service: Neurosurgery    [facility] [address] RPR DPLMNT AORTO-AORTIC NDGFT N/A 8/6/2018    Procedure: REPAIR ANEURYSM ENDOVASCULAR ABDOMINAL AORTIC  (EVAR) WITH BILATERAL PERCUTANEOUS FEMORAL ACCESS WITH ULTRASOUND GUIDANCE ON THE RIGHT AND PRE CLOSURE;  Surgeon: Mikel Henderson MD;  [facility];  Service: Vascular    PROSTATECTOMY  2007    for prostate CA - no chemo/RT    SIGMOIDECTOMY      for divertic    SMALL INTESTINE SURGERY N/A 11/17/2016    Procedure: Exploratory Laparotomy, Lysis of adhesions to release small bowel obstruction;  Surgeon: Hannah Sandy MD;  [facility];  Service:        Family History   Problem Relation Age of Onset    Heart attack Father     Colon cancer Father     Coronary artery disease Father     Heart disease Family     Hyperlipidemia Family     Hypertension Family        Social History     Occupational History    Occupation: Retired   Tobacco Use    Smoking status: Former Smoker     Packs/day: 1 00     Years: 35 00     Pack years: 35 00     Types: Cigarettes     Quit date: 2011     Years since quitting: 10 3    Smokeless tobacco: Never Used   Substance and Sexual Activity    Alcohol use: Yes     Frequency: 4 or more times a week     Drinks per session: 1 or 2     Comment: 6 drinks a week    Drug use: No    Sexual activity: Not on file         Current Outpatient Medications:     acetaminophen (TYLENOL) 500 mg tablet, Take 500 mg by mouth as needed for mild pain , Disp: , Rfl:     albuterol (PROAIR HFA) 90 mcg/act inhaler, Inhale 2 puffs every 6 (six) hours as needed for wheezing, Disp: 3 Inhaler, Rfl: 3    amLODIPine (NORVASC) 2 5 mg tablet, TAKE 1 TABLET BY MOUTH  DAILY, Disp: 90 tablet, Rfl: 3    aspirin (ECOTRIN LOW STRENGTH) 81 mg EC tablet, Take 81 mg by mouth daily, Disp: , Rfl:     atorvastatin (LIPITOR) 40 mg tablet, TAKE 1 TABLET BY MOUTH  DAILY, Disp: 90 tablet, Rfl: 5    ATROVENT HFA 17 MCG/ACT inhaler, USE 2 PUFFS 4 TIMES DAILY (Patient taking differently: 3 times a day), Disp: 51 6 g, Rfl: 5    Breo Ellipta 200-25 MCG/INH inhaler, USE 1 INHALATION BY MOUTH  DAILY    RINSE MOUTH AFTER  USE , Disp: 180 each, Rfl: 3    Cholecalciferol (VITAMIN D PO), Take 2,000 Units by mouth daily  , Disp: , Rfl:     diazepam (VALIUM) 2 mg tablet, Take 2 mg by mouth every 6 (six) hours as needed , Disp: , Rfl:     diazepam (VALIUM) 5 mg tablet, Take 5 mg by mouth as needed for anxiety, Disp: , Rfl:     dicyclomine (BENTYL) 20 mg tablet, Take 1 tablet (20 mg total) by mouth every 6 (six) hours as needed (abd cramping), Disp: 90 tablet, Rfl: 3    fexofenadine (ALLEGRA) 180 MG tablet, Take 180 mg by mouth as needed , Disp: , Rfl:     FLUoxetine (PROzac) 10 mg capsule, Take 10 mg by mouth daily , Disp: , Rfl:     fluticasone (FLONASE) 50 mcg/act nasal spray, 1 spray into each nostril daily , Disp: , Rfl:     lithium carbonate (LITHOBID) 300 mg CR tablet, Take 300 mg by mouth One tab by mouth every other day and alternating with 2 tabs every other day at bedtime  , Disp: , Rfl:     metoprolol succinate (TOPROL-XL) 25 mg 24 hr tablet, TAKE 1 TABLET BY MOUTH  DAILY, Disp: 90 tablet, Rfl: 5    mupirocin (BACTROBAN) 2 % ointment, Apply topically 2 (two) times a day as needed (rash) (Patient not taking: Reported on 4/2/2021), Disp: 22 g, Rfl: 0    nitroglycerin (NITRODUR) 0 2 mg/hr, APPLY 1 PATCH DAILY (OFF  FOR 12 HOURS), Disp: 90 patch, Rfl: 3    omega-3-acid ethyl esters (LOVAZA) 1 g capsule, TAKE 1 CAPSULE BY MOUTH 3  TIMES DAILY, Disp: 270 capsule, Rfl: 5    omeprazole (PriLOSEC) 20 mg delayed release capsule, TAKE 1 CAPSULE BY MOUTH  DAILY, Disp: 90 capsule, Rfl: 3    Allergies   Allergen Reactions    Bactrim [Sulfamethoxazole-Trimethoprim] Other (See Comments)     AILYN    Nsaids      Annotation - [id number]: unable to take due to use of lithium      Oxycodone Rash            Vitals:    05/11/21 0957   BP: 164/86   Pulse: (!) 50       Objective:  Physical exam  · General: Awake, Alert, Oriented  · Eyes: Pupils equal, round and reactive to light  · Heart: regular rate and rhythm  · Lungs: No audible wheezing  · Abdomen: soft                    Ortho Exam   Left hip:  TTP over greater trochanter  Good arc of motion  Patient sits comfortably in chair with hip flexed at 90 degrees  Patient stands from seated position without assistance  Calf compartments soft and supple  Sensation intact  Toes are warm sensate and mobile      Diagnostics, reviewed and taken today if performed as documented:    None performed     Procedures, if performed today:    Large joint arthrocentesis: L greater trochanteric bursa  Universal Protocol:  Consent: Verbal consent obtained  Risks and benefits: risks, benefits and alternatives were discussed  Consent given by: patient  Time out: Immediately prior to procedure a "time out" was called to verify the correct patient, procedure, equipment, support staff and site/side marked as required  Timeout called at: 5/11/2021 10:48 AM   Patient understanding: patient states understanding of the procedure being performed  Site marked: the operative site was marked  Patient identity confirmed: verbally with patient    Supporting Documentation  Indications: pain   Procedure Details  Location: hip - L greater trochanteric bursa  Needle size: 22 G  Ultrasound guidance: no  Approach: lateral  Medications administered: 12 mg betamethasone acetate-betamethasone sodium phosphate 6 (3-3) mg/mL; 2 mL bupivacaine 0 25 %; 2 mL lidocaine 1 %    Patient tolerance: patient tolerated the procedure well with no immediate complications  Dressing:  Sterile dressing applied               Portions of the record may have been created with voice recognition software  Occasional wrong word or "sound a like" substitutions may have occurred due to the inherent limitations of voice recognition software  Read the chart carefully and recognize, using context, where substitutions have occurred

## 2021-05-17 ENCOUNTER — TELEPHONE (OUTPATIENT)
Dept: PULMONOLOGY | Facility: CLINIC | Age: 76
End: 2021-05-17

## 2021-05-17 ENCOUNTER — TRANSCRIBE ORDERS (OUTPATIENT)
Dept: LAB | Facility: CLINIC | Age: 76
End: 2021-05-17

## 2021-05-17 ENCOUNTER — APPOINTMENT (OUTPATIENT)
Dept: LAB | Facility: CLINIC | Age: 76
End: 2021-05-17
Payer: MEDICARE

## 2021-05-17 DIAGNOSIS — F31.60 MIXED BIPOLAR I DISORDER (HCC): Primary | ICD-10-CM

## 2021-05-17 LAB
BUN SERPL-MCNC: 26 MG/DL (ref 5–25)
CREAT SERPL-MCNC: 1.11 MG/DL (ref 0.6–1.3)
GFR SERPL CREATININE-BSD FRML MDRD: 66 ML/MIN/1.73SQ M
LITHIUM SERPL-SCNC: 0.9 MMOL/L (ref 0.5–1)
TSH SERPL DL<=0.05 MIU/L-ACNC: 1.83 UIU/ML (ref 0.36–3.74)

## 2021-05-17 PROCEDURE — 36415 COLL VENOUS BLD VENIPUNCTURE: CPT

## 2021-05-17 PROCEDURE — 80178 ASSAY OF LITHIUM: CPT

## 2021-05-17 PROCEDURE — 82565 ASSAY OF CREATININE: CPT

## 2021-05-17 PROCEDURE — 84443 ASSAY THYROID STIM HORMONE: CPT

## 2021-05-17 PROCEDURE — 84520 ASSAY OF UREA NITROGEN: CPT

## 2021-06-08 ENCOUNTER — TELEPHONE (OUTPATIENT)
Dept: NEUROLOGY | Facility: CLINIC | Age: 76
End: 2021-06-08

## 2021-06-08 DIAGNOSIS — I70.90: ICD-10-CM

## 2021-06-08 RX ORDER — OMEGA-3-ACID ETHYL ESTERS 1 G/1
CAPSULE, LIQUID FILLED ORAL
Qty: 270 CAPSULE | Refills: 5 | Status: SHIPPED | OUTPATIENT
Start: 2021-06-08 | End: 2022-07-20

## 2021-06-08 NOTE — TELEPHONE ENCOUNTER
[facility]  Offered    Wed  6/9/21  1115  [address] another slot open in [place]  Please c/b asap - these appts will not be open long - calling other people to offer

## 2021-06-15 ENCOUNTER — OFFICE VISIT (OUTPATIENT)
Dept: NEUROLOGY | Facility: CLINIC | Age: 76
End: 2021-06-15
Payer: MEDICARE

## 2021-06-15 VITALS
HEART RATE: 78 BPM | DIASTOLIC BLOOD PRESSURE: 74 MMHG | BODY MASS INDEX: 25.53 KG/M2 | WEIGHT: 163 LBS | SYSTOLIC BLOOD PRESSURE: 138 MMHG

## 2021-06-15 DIAGNOSIS — G20 PARKINSONISM, UNSPECIFIED PARKINSONISM TYPE (HCC): Primary | ICD-10-CM

## 2021-06-15 PROCEDURE — 99214 OFFICE O/P EST MOD 30 MIN: CPT | Performed by: PHYSICIAN ASSISTANT

## 2021-06-15 NOTE — ASSESSMENT & PLAN NOTE
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  He is however also on Lithium which could certainly be contributing to some of his parkinsonian symptoms including his hand tremors  Although vascular parkinsonism does not always respond to levodopa I think it would be reasonable to start a trial in the future if symptoms progress  For now the patient feels that he is actually doing very well and does not wish to start any new medications  He was encouraged to continue with regular exercise  He feels that he is doing better in part because he is now more active  We did have a long discussion regarding the importance of stroke preventions  Stroke preventions discussed including:  - Remaining on ASA daily  - BP goal < 130/80  Continue BP medications, pressure was good in the office today  - LDL goal less then 70  On Lipitor  Last LDL was 81    - Will defer to PCP regarding management and monitoring of blood pressure, cholesterol     - Patient does not smoke and discussed the importance of continued smoking cessation    - Advised patient to avoid using NSAIDs (Motrin, Ibuprofen) for headaches or other pain mild pain and to use Tylenol instead  - Recommend mediterranean diet & regular exercise at least 4-5 times a week for 20-30 minutes  If the patient experiences any new stroke like symptoms such as facial droop on one side, weakness/paralysis on one side, speech trouble, numbness on one side, balance issues, any vision changes, extreme dizziness or any new headache, to call 9-1-1 immediately or to proceed to the nearest ER immediately

## 2021-06-15 NOTE — PATIENT INSTRUCTIONS
Patient overall doing well  Feels that he is doing better than at the last visit  Will not make any changes at this time  If symptoms worsen may consider a trial of Sinemet  He was encouraged to remain active  He does have some tremors which are likely related to his long term Lithium use  We discussed vascular parkinsonism  Stroke preventions discussed including:  - Remaining on ASA daily  - BP goal < 130/80  Continue BP medications, pressure was good in the office today  - LDL goal less then 70  On Lipitor  Last LDL was 81    - Will defer to PCP regarding management and monitoring of blood pressure, cholesterol     - Patient does not smoke and discussed the importance of continued smoking cessation    - Advised patient to avoid using NSAIDs (Motrin, Ibuprofen) for headaches or other pain mild pain and to use Tylenol instead  - Recommend mediterranean diet & regular exercise at least 4-5 times a week for 20-30 minutes  If the patient experiences any new stroke like symptoms such as facial droop on one side, weakness/paralysis on one side, speech trouble, numbness on one side, balance issues, any vision changes, extreme dizziness or any new headache, to call 9-1-1 immediately or to proceed to the nearest ER immediately

## 2021-06-21 ENCOUNTER — TELEPHONE (OUTPATIENT)
Dept: PULMONOLOGY | Facility: CLINIC | Age: 76
End: 2021-06-21

## 2021-06-21 DIAGNOSIS — J44.9 CHRONIC OBSTRUCTIVE PULMONARY DISEASE, UNSPECIFIED COPD TYPE (HCC): ICD-10-CM

## 2021-06-21 RX ORDER — IPRATROPIUM BROMIDE 17 UG/1
AEROSOL, METERED RESPIRATORY (INHALATION)
Qty: 51.6 G | Refills: 5 | Status: SHIPPED | OUTPATIENT
Start: 2021-06-21 | End: 2021-08-10

## 2021-06-21 NOTE — TELEPHONE ENCOUNTER
Patient calling saying he started taking Anoro last week but he had a bad reaction to it  He states he instantly felt light headed, he had to "occasionally breath deep", and his BP was high  He said this all happened right after taking the Anoro and then later on he would be okay  He said he stopped taking the Anoro and went back to [facility]  Since then, he has not had any symptoms and has been feeling fine

## 2021-06-22 RX ORDER — IPRATROPIUM BROMIDE 17 UG/1
2 AEROSOL, METERED RESPIRATORY (INHALATION) 4 TIMES DAILY
Qty: 38.7 G | Refills: 3 | Status: SHIPPED | OUTPATIENT
Start: 2021-06-22 | End: 2021-08-12 | Stop reason: SDUPTHER

## 2021-06-22 NOTE — TELEPHONE ENCOUNTER
Capri Aldana is asking to clarify the directions for Atrovent  Please resend order and include how many puffs a day

## 2021-06-24 DIAGNOSIS — J44.9 CHRONIC OBSTRUCTIVE PULMONARY DISEASE, UNSPECIFIED COPD TYPE (HCC): ICD-10-CM

## 2021-07-01 ENCOUNTER — OFFICE VISIT (OUTPATIENT)
Dept: INTERNAL MEDICINE CLINIC | Facility: CLINIC | Age: 76
End: 2021-07-01
Payer: MEDICARE

## 2021-07-01 VITALS
HEART RATE: 55 BPM | DIASTOLIC BLOOD PRESSURE: 80 MMHG | SYSTOLIC BLOOD PRESSURE: 158 MMHG | WEIGHT: 169 LBS | TEMPERATURE: 97.1 F | HEIGHT: 67 IN | BODY MASS INDEX: 26.53 KG/M2

## 2021-07-01 DIAGNOSIS — J44.9 CHRONIC OBSTRUCTIVE PULMONARY DISEASE, UNSPECIFIED COPD TYPE (HCC): ICD-10-CM

## 2021-07-01 DIAGNOSIS — G20 PARKINSONISM, UNSPECIFIED PARKINSONISM TYPE (HCC): ICD-10-CM

## 2021-07-01 DIAGNOSIS — I10 ESSENTIAL HYPERTENSION: Primary | ICD-10-CM

## 2021-07-01 DIAGNOSIS — F31.9 BIPOLAR AFFECTIVE DISORDER, REMISSION STATUS UNSPECIFIED (HCC): ICD-10-CM

## 2021-07-01 DIAGNOSIS — K21.9 GASTROESOPHAGEAL REFLUX DISEASE WITHOUT ESOPHAGITIS: ICD-10-CM

## 2021-07-01 PROCEDURE — 99214 OFFICE O/P EST MOD 30 MIN: CPT | Performed by: INTERNAL MEDICINE

## 2021-07-01 NOTE — PATIENT INSTRUCTIONS
Problem List Items Addressed This Visit        Digestive    Gastroesophageal reflux disease     Patient has been on omeprazole 20 mg every other day, he wants to try going back to every other day to see if any of his symptoms improve              Respiratory    COPD (chronic obstructive pulmonary disease) (HCC)     Breathing stable, follow up pulm            Cardiovascular and Mediastinum    Essential hypertension - Primary     Elevated in the office, good at home, continue meds along with healthy diet and exercise            Nervous and Auditory    Parkinsonism ([facility] )     Follow up neurology            Other    Bipolar affective disorder ([facility] )     Follow up psych

## 2021-07-01 NOTE — ASSESSMENT & PLAN NOTE
Patient has been on omeprazole 20 mg every other day, he wants to try going back to every other day to see if any of his symptoms improve

## 2021-07-01 NOTE — PROGRESS NOTES
Assessment/Plan:    COPD (chronic obstructive pulmonary disease) ([facility])  Breathing stable, follow up pulm    Essential hypertension  Elevated in the office, good at home, continue meds along with healthy diet and exercise    Parkinsonism ([facility] )  Follow up neurology    Mixed hyperlipidemia  Continue statin along with healthy diet and exercise    Bipolar affective disorder ([facility] )  Follow up psych    Gastroesophageal reflux disease  Patient has been on omeprazole 20 mg every other day, he wants to try going back to every other day to see if any of his symptoms improve  Diagnoses and all orders for this visit:    Essential hypertension    Chronic obstructive pulmonary disease, unspecified COPD type ([facility] )    Parkinsonism, unspecified Parkinsonism type ([facility] )    Bipolar affective disorder, remission status unspecified ([place] [address] )    Gastroesophageal reflux disease without esophagitis          Subjective:      Patient ID: Laurie Leon is a 70 y o  male  Hypertension:  Patient then monitor his blood pressure at home, was 130 over 76 yesterday    Hallucinations: pt has not had these since his last visit here  No problems thinking there is another woman in his house that isn't his wife, etc   He has reduced his Valium    Hypercholesterolemia:  Patient reports compliance with statin    About 1 5 months ago pt started having more gas, he tried Gas-X for this which helped a little  No change in diet  GERD:  No significant problems with food getting stuck, patient is having some more GERD symptoms, he has been taking the omeprazole 20 mg every other day  The following portions of the patient's history were reviewed and updated as appropriate: allergies, current medications, past family history, past medical history, past social history, past surgical history and problem list     Review of Systems   Constitutional: Negative for chills, fatigue and fever     HENT: Negative for congestion, nosebleeds, postnasal drip, sore throat and trouble swallowing  Eyes: Negative for pain  Respiratory: Negative for cough, chest tightness, shortness of breath and wheezing  Cardiovascular: Negative for chest pain, palpitations and leg swelling  Gastrointestinal: Negative for abdominal pain, constipation, diarrhea, nausea and vomiting  Endocrine: Negative for polydipsia and polyuria  Genitourinary: Negative for dysuria, flank pain and hematuria  Musculoskeletal: Positive for arthralgias  Negative for myalgias  Skin: Negative for rash  Neurological: Negative for dizziness, tremors, light-headedness and headaches  Hematological: Bruises/bleeds easily  Psychiatric/Behavioral: Negative for confusion and dysphoric mood  The patient is not nervous/anxious  Objective:      /80   Pulse 55   Temp (!) 97 1 °F (36 2 °C) (Temporal)   Ht 5' 7" (1 702 m)   Wt 76 7 kg (169 lb)   BMI 26 47 kg/m²          Physical Exam  Vitals reviewed  Constitutional:       General: He is not in acute distress  Appearance: Normal appearance  He is well-developed  HENT:      Head: Normocephalic and atraumatic  Right Ear: External ear normal       Left Ear: External ear normal       Nose: Nose normal    Eyes:      General: No scleral icterus  Conjunctiva/sclera: Conjunctivae normal    Neck:      Thyroid: No thyromegaly  Trachea: No tracheal deviation  Cardiovascular:      Rate and Rhythm: Normal rate and regular rhythm  Heart sounds: Normal heart sounds  No murmur heard  Pulmonary:      Effort: Pulmonary effort is normal  No respiratory distress  Breath sounds: Normal breath sounds  No wheezing or rales  Abdominal:      General: Bowel sounds are normal       Palpations: Abdomen is soft  Tenderness: There is no abdominal tenderness  There is no guarding or rebound  Musculoskeletal:      Cervical back: Normal range of motion and neck supple  Right lower leg: No edema        Left lower leg: No edema  Lymphadenopathy:      Cervical: No cervical adenopathy  Skin:     Coloration: Skin is not jaundiced or pale  Findings: Bruising present  Neurological:      General: No focal deficit present  Mental Status: He is alert and oriented to person, place, and time  Psychiatric:         Mood and Affect: Mood normal          Behavior: Behavior normal          Thought Content:  Thought content normal          Judgment: Judgment normal

## 2021-08-03 ENCOUNTER — APPOINTMENT (OUTPATIENT)
Dept: LAB | Facility: CLINIC | Age: 76
End: 2021-08-03
Payer: MEDICARE

## 2021-08-05 ENCOUNTER — HOSPITAL ENCOUNTER (OUTPATIENT)
Dept: NON INVASIVE DIAGNOSTICS | Facility: CLINIC | Age: 76
Discharge: HOME | End: 2021-08-05
Payer: MEDICARE

## 2021-08-05 DIAGNOSIS — Z95.5 HISTORY OF HEART ARTERY STENT: ICD-10-CM

## 2021-08-05 DIAGNOSIS — I25.10 CORONARY ARTERY DISEASE INVOLVING NATIVE CORONARY ARTERY OF NATIVE HEART WITHOUT ANGINA PECTORIS: ICD-10-CM

## 2021-08-05 DIAGNOSIS — I10 ESSENTIAL HYPERTENSION: ICD-10-CM

## 2021-08-05 DIAGNOSIS — E78.2 MIXED HYPERLIPIDEMIA: ICD-10-CM

## 2021-08-05 DIAGNOSIS — I25.2 PAST HISTORY OF MYOCARDIAL INFARCTION: ICD-10-CM

## 2021-08-05 DIAGNOSIS — I71.4 ABDOMINAL AORTIC ANEURYSM WITHOUT RUPTURE (HCC): ICD-10-CM

## 2021-08-05 PROCEDURE — 93226 XTRNL ECG REC<48 HR SCAN A/R: CPT

## 2021-08-05 PROCEDURE — 93225 XTRNL ECG REC<48 HRS REC: CPT

## 2021-08-10 ENCOUNTER — OFFICE VISIT (OUTPATIENT)
Dept: OBGYN CLINIC | Facility: HOSPITAL | Age: 76
End: 2021-08-10
Payer: MEDICARE

## 2021-08-10 VITALS
WEIGHT: 177 LBS | DIASTOLIC BLOOD PRESSURE: 90 MMHG | HEIGHT: 67 IN | SYSTOLIC BLOOD PRESSURE: 163 MMHG | BODY MASS INDEX: 27.78 KG/M2 | HEART RATE: 61 BPM

## 2021-08-10 DIAGNOSIS — M70.62 TROCHANTERIC BURSITIS OF LEFT HIP: Primary | ICD-10-CM

## 2021-08-10 PROCEDURE — 99213 OFFICE O/P EST LOW 20 MIN: CPT | Performed by: ORTHOPAEDIC SURGERY

## 2021-08-10 PROCEDURE — 20610 DRAIN/INJ JOINT/BURSA W/O US: CPT | Performed by: ORTHOPAEDIC SURGERY

## 2021-08-10 RX ORDER — LIDOCAINE HYDROCHLORIDE 10 MG/ML
2 INJECTION, SOLUTION INFILTRATION; PERINEURAL
Status: COMPLETED | OUTPATIENT
Start: 2021-08-10 | End: 2021-08-10

## 2021-08-10 RX ORDER — BETAMETHASONE SODIUM PHOSPHATE AND BETAMETHASONE ACETATE 3; 3 MG/ML; MG/ML
12 INJECTION, SUSPENSION INTRA-ARTICULAR; INTRALESIONAL; INTRAMUSCULAR; SOFT TISSUE
Status: COMPLETED | OUTPATIENT
Start: 2021-08-10 | End: 2021-08-10

## 2021-08-10 RX ORDER — BUPIVACAINE HYDROCHLORIDE 2.5 MG/ML
2 INJECTION, SOLUTION INFILTRATION; PERINEURAL
Status: COMPLETED | OUTPATIENT
Start: 2021-08-10 | End: 2021-08-10

## 2021-08-10 RX ADMIN — BUPIVACAINE HYDROCHLORIDE 2 ML: 2.5 INJECTION, SOLUTION INFILTRATION; PERINEURAL at 10:51

## 2021-08-10 RX ADMIN — LIDOCAINE HYDROCHLORIDE 2 ML: 10 INJECTION, SOLUTION INFILTRATION; PERINEURAL at 10:51

## 2021-08-10 RX ADMIN — BETAMETHASONE SODIUM PHOSPHATE AND BETAMETHASONE ACETATE 12 MG: 3; 3 INJECTION, SUSPENSION INTRA-ARTICULAR; INTRALESIONAL; INTRAMUSCULAR; SOFT TISSUE at 10:51

## 2021-08-10 NOTE — PROGRESS NOTES
Assessment:  1  Trochanteric bursitis of left hip         Plan:  The patient was provided with left trochanteric bursa steroid injection  The patient tolerated the procedure well  The patient should follow up in 3 months  To do next visit:  Return in about 3 months (around 11/10/2021)  The above stated was discussed in layman's terms and the patient expressed understanding  All questions were answered to the patient's satisfaction  Scribe Attestation    I,:  Avelino Jose Luisayan am acting as a scribe while in the presence of the attending physician :       I,:  Staci Rivera MD personally performed the services described in this documentation    as scribed in my presence :             Subjective:   Yasmine Torre is a 70 y o  male who presents for follow up of left hip  He is s/p left trochanteric bursa steroid injection with benefit, 5/11/2021  Today he complains of left lateral hip, buttock and lateral thigh pain  Prolonged weight bearing and repetitive bending aggravates while rest alleviates  He does walk at the [facility] for exercise            Review of systems negative unless otherwise specified in HPI    Past Medical History:   Diagnosis Date    [facility] )     Benign colon polyp     Bipolar 1 disorder ([facility] )     Cardiac disease     MI    Cervical cord compression with myelopathy (HCC)     COPD (chronic obstructive pulmonary disease) (HCC)     Diverticulitis     Diverticulosis     Gait disturbance     uses cane, leg brace on right    GERD (gastroesophageal reflux disease)     Heart attack ([facility] ) 1996    Hx of resection of large bowel 5/3/2016    Hyperlipidemia     Hypertension     IBS (irritable bowel syndrome)     Lumbar stenosis     Lung cancer ([facility] )     2007 Left lower lobectomy and 2011 Right lung with surgery     Myocardial infarction [facility])     involving other coronary artery    Prostate cancer (HCC)     Shortness of breath     Small bowel obstruction ([address] ) 11/16/2016       Past Surgical History:   Procedure Laterality Date    ANGIOPLASTY      stent    CARDIAC SURGERY      CERVICAL SPINE SURGERY      Cervical decompression with cervical fusion from C3-C7 for spinal stenosis   COLON SURGERY  11/04/2014    ESOPHAGOGASTRODUODENOSCOPY  01/03/2013    with possible Schatzki's ring & small hiatal hernia, mild gastritis    FL INJECTION LEFT HIP (NON ARTHROGRAM)  12/11/2018    FL INJECTION LEFT HIP (NON ARTHROGRAM)  5/9/2019    IR BIOPSY LUNG  7/6/2020    IR EVAR  8/6/2018    LUNG CANCER SURGERY Right 03/2011    wedge resection for lung tumor, 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    LUNG LOBECTOMY Left     [place] ARTHRODESIS ANT INTERBODY MIN DISCECTOMY, CERVICAL BELOW C2 N/A 5/2/2016    Procedure: Anterior cervical diskectomy C3/4, C5/6, C6/7 with anterior plate fixation fusion C3-7;  Posterior decompressive laminectomy C3-7 with lateral mass fixation fusion C3-7 (IMPULSE MONITORING); Surgeon: Hanna Ruiz MD;  [facility];  Service: Neurosurgery    [place] ARTHRODESIS POSTERIOR INTERBODY LUMBAR N/A 1/30/2017    Procedure: L4-5 AND L5-S1 DECOMPRESSIVE FORAMINOTOMIES, TRANSFORAMINAL LUMBAR INTERBODY AND PEDICLE SCREW FIXATION FUSION L4-S1 (IMPULSE);   Surgeon: Hanna Ruiz MD;  [facility];  Service: Neurosurgery    [place] [address] RPR DPLMNT AORTO-AORTIC NDGFT N/A 8/6/2018    Procedure: REPAIR ANEURYSM ENDOVASCULAR ABDOMINAL AORTIC  (EVAR) WITH BILATERAL PERCUTANEOUS FEMORAL ACCESS WITH ULTRASOUND GUIDANCE ON THE RIGHT AND PRE CLOSURE;  Surgeon: Fadumo Bradshaw MD;  [facility];  Service: Vascular    PROSTATECTOMY  2007    for prostate CA - no chemo/RT    SIGMOIDECTOMY      for divertic    SMALL INTESTINE SURGERY N/A 11/17/2016    Procedure: Exploratory Laparotomy, Lysis of adhesions to release small bowel obstruction;  Surgeon: Faisal Hicks MD;  [facility];  Service:        Family History   Problem Relation Age of Onset    Heart attack Father     Colon cancer Father     Coronary artery disease Father     Heart disease Family     Hyperlipidemia Family     Hypertension Family        Social History     Occupational History    Occupation: Retired   Tobacco Use    Smoking status: Former Smoker     Packs/day: 1 00     Years: 35 00     Pack years: 35 00     Types: Cigarettes     Quit date: 2011     Years since quitting: 10 6    Smokeless tobacco: Never Used   Vaping Use    Vaping Use: Never used   Substance and Sexual Activity    Alcohol use: Yes     Comment: 6 drinks a week    Drug use: No    Sexual activity: Not on file         Current Outpatient Medications:     acetaminophen (TYLENOL) 500 mg tablet, Take 500 mg by mouth as needed for mild pain , Disp: , Rfl:     albuterol (PROAIR HFA) 90 mcg/act inhaler, Inhale 2 puffs every 6 (six) hours as needed for wheezing, Disp: 3 Inhaler, Rfl: 3    amLODIPine (NORVASC) 2 5 mg tablet, TAKE 1 TABLET BY MOUTH  DAILY, Disp: 90 tablet, Rfl: 3    aspirin (ECOTRIN LOW STRENGTH) 81 mg EC tablet, Take 81 mg by mouth daily, Disp: , Rfl:     atorvastatin (LIPITOR) 40 mg tablet, TAKE 1 TABLET BY MOUTH  DAILY, Disp: 90 tablet, Rfl: 5    Cholecalciferol (VITAMIN D PO), Take 2,000 Units by mouth daily  , Disp: , Rfl:     diazepam (VALIUM) 2 mg tablet, Take 2 mg by mouth every 6 (six) hours as needed , Disp: , Rfl:     dicyclomine (BENTYL) 20 mg tablet, Take 1 tablet (20 mg total) by mouth every 6 (six) hours as needed (abd cramping), Disp: 90 tablet, Rfl: 3    fexofenadine (ALLEGRA) 180 MG tablet, Take 180 mg by mouth as needed Daily during the Summer, Disp: , Rfl:     FLUoxetine (PROzac) 10 mg capsule, Take 10 mg by mouth daily , Disp: , Rfl:     fluticasone (FLONASE) 50 mcg/act nasal spray, 1 spray into each nostril daily , Disp: , Rfl:     fluticasone-vilanterol (Breo Ellipta) 200-25 MCG/INH inhaler, Inhale 1 puff daily Rinse mouth after use , Disp: 180 blister, Rfl: 3    ipratropium (Atrovent HFA) 17 mcg/act inhaler, Inhale 2 puffs 4 (four) times a day, Disp: 38 7 g, Rfl: 3    lithium carbonate (LITHOBID) 300 mg CR tablet, Take 300 mg by mouth One tab by mouth every other day and alternating with 2 tabs every other day at bedtime  , Disp: , Rfl:     metoprolol succinate (TOPROL-XL) 25 mg 24 hr tablet, TAKE 1 TABLET BY MOUTH  DAILY, Disp: 90 tablet, Rfl: 5    mupirocin (BACTROBAN) 2 % ointment, Apply topically 2 (two) times a day as needed (rash) (Patient not taking: Reported on 4/2/2021), Disp: 22 g, Rfl: 0    nitroglycerin (NITRODUR) 0 2 mg/hr, APPLY 1 PATCH DAILY (OFF  FOR 12 HOURS) (Patient taking differently: as needed ), Disp: 90 patch, Rfl: 3    omega-3-acid ethyl esters (LOVAZA) 1 g capsule, TAKE 1 CAPSULE BY MOUTH 3  TIMES DAILY, Disp: 270 capsule, Rfl: 5    omeprazole (PriLOSEC) 20 mg delayed release capsule, TAKE 1 CAPSULE BY MOUTH  DAILY, Disp: 90 capsule, Rfl: 3    Allergies   Allergen Reactions    Bactrim [Sulfamethoxazole-Trimethoprim] Other (See Comments)     AILYN    Nsaids      Annotation - [id number]: unable to take due to use of lithium      Oxycodone Rash            Vitals:    08/10/21 1017   BP: 163/90   Pulse: 61       Objective:  Physical exam  · General: Awake, Alert, Oriented  · Eyes: Pupils equal, round and reactive to light  · Heart: regular rate and rhythm  · Lungs: No audible wheezing  · Abdomen: soft                    Ortho Exam   Left hip:  TTP over greater trochanter  Good arc of motion  Patient sits comfortably in chair with hip flexed at 90 degrees  Patient stands from seated position without assistance  Calf compartments soft and supple  Sensation intact  Toes are warm sensate and mobile      Diagnostics, reviewed and taken today if performed as documented:    None performed     Procedures, if performed today:    Large joint arthrocentesis: L greater trochanteric bursa  Universal Protocol:  Consent: Verbal consent obtained  Risks and benefits: risks, benefits and alternatives were discussed  Consent given by: patient  Time out: Immediately prior to procedure a "time out" was called to verify the correct patient, procedure, equipment, support staff and site/side marked as required  Timeout called at: 8/10/2021 10:50 AM   Patient understanding: patient states understanding of the procedure being performed  Site marked: the operative site was marked  Patient identity confirmed: verbally with patient    Supporting Documentation  Indications: pain   Procedure Details  Location: hip - L greater trochanteric bursa  Needle size: 22 G  Ultrasound guidance: no  Approach: lateral  Medications administered: 12 mg betamethasone acetate-betamethasone sodium phosphate 6 (3-3) mg/mL; 2 mL bupivacaine 0 25 %; 2 mL lidocaine 1 %    Patient tolerance: patient tolerated the procedure well with no immediate complications  Dressing:  Sterile dressing applied               Portions of the record may have been created with voice recognition software  Occasional wrong word or "sound a like" substitutions may have occurred due to the inherent limitations of voice recognition software  Read the chart carefully and recognize, using context, where substitutions have occurred

## 2021-08-11 ENCOUNTER — APPOINTMENT (OUTPATIENT)
Dept: LAB | Facility: CLINIC | Age: 76
End: 2021-08-11
Payer: MEDICARE

## 2021-08-11 DIAGNOSIS — I10 ESSENTIAL HYPERTENSION: ICD-10-CM

## 2021-08-11 DIAGNOSIS — I25.10 CORONARY ARTERY DISEASE INVOLVING NATIVE CORONARY ARTERY OF NATIVE HEART WITHOUT ANGINA PECTORIS: ICD-10-CM

## 2021-08-11 DIAGNOSIS — I25.2 PAST HISTORY OF MYOCARDIAL INFARCTION: ICD-10-CM

## 2021-08-11 DIAGNOSIS — I71.4 ABDOMINAL AORTIC ANEURYSM WITHOUT RUPTURE (HCC): ICD-10-CM

## 2021-08-11 DIAGNOSIS — Z95.5 HISTORY OF HEART ARTERY STENT: ICD-10-CM

## 2021-08-11 DIAGNOSIS — E78.2 MIXED HYPERLIPIDEMIA: ICD-10-CM

## 2021-08-11 LAB
ALBUMIN SERPL BCP-MCNC: 3.5 G/DL (ref 3.5–5)
ALP SERPL-CCNC: 114 U/L (ref 46–116)
ALT SERPL W P-5'-P-CCNC: 31 U/L (ref 12–78)
ANION GAP SERPL CALCULATED.3IONS-SCNC: 5 MMOL/L (ref 4–13)
AST SERPL W P-5'-P-CCNC: 18 U/L (ref 5–45)
BASOPHILS # BLD AUTO: 0.02 THOUSANDS/ΜL (ref 0–0.1)
BASOPHILS NFR BLD AUTO: 0 % (ref 0–1)
BILIRUB SERPL-MCNC: 0.75 MG/DL (ref 0.2–1)
BUN SERPL-MCNC: 34 MG/DL (ref 5–25)
CALCIUM SERPL-MCNC: 9.4 MG/DL (ref 8.3–10.1)
CHLORIDE SERPL-SCNC: 108 MMOL/L (ref 100–108)
CK MB SERPL-MCNC: 2.5 % (ref 0–2.5)
CK MB SERPL-MCNC: 4.5 NG/ML (ref 0–5)
CK SERPL-CCNC: 180 U/L (ref 39–308)
CO2 SERPL-SCNC: 25 MMOL/L (ref 21–32)
CREAT SERPL-MCNC: 1.3 MG/DL (ref 0.6–1.3)
EOSINOPHIL # BLD AUTO: 0 THOUSAND/ΜL (ref 0–0.61)
EOSINOPHIL NFR BLD AUTO: 0 % (ref 0–6)
ERYTHROCYTE [DISTWIDTH] IN BLOOD BY AUTOMATED COUNT: 12.8 % (ref 11.6–15.1)
GFR SERPL CREATININE-BSD FRML MDRD: 54 ML/MIN/1.73SQ M
GLUCOSE P FAST SERPL-MCNC: 113 MG/DL (ref 65–99)
HCT VFR BLD AUTO: 40 % (ref 36.5–49.3)
HGB BLD-MCNC: 13.2 G/DL (ref 12–17)
IMM GRANULOCYTES # BLD AUTO: 0.07 THOUSAND/UL (ref 0–0.2)
IMM GRANULOCYTES NFR BLD AUTO: 1 % (ref 0–2)
LYMPHOCYTES # BLD AUTO: 0.64 THOUSANDS/ΜL (ref 0.6–4.47)
LYMPHOCYTES NFR BLD AUTO: 6 % (ref 14–44)
MCH RBC QN AUTO: 31.5 PG (ref 26.8–34.3)
MCHC RBC AUTO-ENTMCNC: 33 G/DL (ref 31.4–37.4)
MCV RBC AUTO: 96 FL (ref 82–98)
MONOCYTES # BLD AUTO: 0.56 THOUSAND/ΜL (ref 0.17–1.22)
MONOCYTES NFR BLD AUTO: 5 % (ref 4–12)
NEUTROPHILS # BLD AUTO: 9.3 THOUSANDS/ΜL (ref 1.85–7.62)
NEUTS SEG NFR BLD AUTO: 88 % (ref 43–75)
NRBC BLD AUTO-RTO: 0 /100 WBCS
PLATELET # BLD AUTO: 184 THOUSANDS/UL (ref 149–390)
PMV BLD AUTO: 11.2 FL (ref 8.9–12.7)
POTASSIUM SERPL-SCNC: 4.6 MMOL/L (ref 3.5–5.3)
PROT SERPL-MCNC: 6.9 G/DL (ref 6.4–8.2)
RBC # BLD AUTO: 4.19 MILLION/UL (ref 3.88–5.62)
SODIUM SERPL-SCNC: 138 MMOL/L (ref 136–145)
WBC # BLD AUTO: 10.59 THOUSAND/UL (ref 4.31–10.16)

## 2021-08-11 PROCEDURE — 85025 COMPLETE CBC W/AUTO DIFF WBC: CPT

## 2021-08-11 PROCEDURE — 36415 COLL VENOUS BLD VENIPUNCTURE: CPT

## 2021-08-11 PROCEDURE — 82550 ASSAY OF CK (CPK): CPT

## 2021-08-11 PROCEDURE — 80053 COMPREHEN METABOLIC PANEL: CPT

## 2021-08-11 PROCEDURE — 82553 CREATINE MB FRACTION: CPT

## 2021-08-12 DIAGNOSIS — J44.9 CHRONIC OBSTRUCTIVE PULMONARY DISEASE, UNSPECIFIED COPD TYPE (HCC): ICD-10-CM

## 2021-08-12 RX ORDER — IPRATROPIUM BROMIDE 17 UG/1
2 AEROSOL, METERED RESPIRATORY (INHALATION) 4 TIMES DAILY
Qty: 38.7 G | Refills: 3 | Status: SHIPPED | OUTPATIENT
Start: 2021-08-12

## 2021-08-13 PROCEDURE — 93227 XTRNL ECG REC<48 HR R&I: CPT | Performed by: INTERNAL MEDICINE

## 2021-09-03 DIAGNOSIS — I10 ESSENTIAL HYPERTENSION: ICD-10-CM

## 2021-09-03 DIAGNOSIS — I25.10 CORONARY ARTERY DISEASE INVOLVING NATIVE CORONARY ARTERY OF NATIVE HEART WITHOUT ANGINA PECTORIS: ICD-10-CM

## 2021-09-08 ENCOUNTER — TELEMEDICINE (OUTPATIENT)
Dept: INTERNAL MEDICINE CLINIC | Facility: CLINIC | Age: 76
End: 2021-09-08
Payer: MEDICARE

## 2021-09-08 DIAGNOSIS — K21.9 GASTROESOPHAGEAL REFLUX DISEASE WITHOUT ESOPHAGITIS: Primary | ICD-10-CM

## 2021-09-08 PROCEDURE — 99213 OFFICE O/P EST LOW 20 MIN: CPT | Performed by: INTERNAL MEDICINE

## 2021-09-08 RX ORDER — METOPROLOL SUCCINATE 25 MG/1
TABLET, EXTENDED RELEASE ORAL
Qty: 90 TABLET | Refills: 3 | Status: SHIPPED | OUTPATIENT
Start: 2021-09-08

## 2021-09-08 RX ORDER — ATORVASTATIN CALCIUM 40 MG/1
TABLET, FILM COATED ORAL
Qty: 90 TABLET | Refills: 5 | Status: SHIPPED | OUTPATIENT
Start: 2021-09-08

## 2021-09-08 NOTE — PATIENT INSTRUCTIONS
Problem List Items Addressed This Visit        Digestive    Gastroesophageal reflux disease - Primary     Continue omeprazole daily along with antacid as needed, discussed again with patient red flags such as problems with food getting stuck in black tarry stool, and re-evaluation with GI if these were to occur

## 2021-09-08 NOTE — ASSESSMENT & PLAN NOTE
Continue omeprazole daily along with antacid as needed, discussed again with patient red flags such as problems with food getting stuck in black tarry stool, and re-evaluation with GI if these were to occur

## 2021-09-08 NOTE — PROGRESS NOTES
Virtual Regular Visit    Verification of patient location:    Patient is located in the following state in which I hold an active license [place]      Assessment/Plan:    Problem List Items Addressed This Visit        Digestive    Gastroesophageal reflux disease - Primary     Continue omeprazole daily along with antacid as needed, discussed again with patient red flags such as problems with food getting stuck in black tarry stool, and re-evaluation with GI if these were to occur                    Reason for visit is   Chief Complaint   Patient presents with    Virtual Regular Visit        Encounter provider Jenaro Murray MD    Provider located at [address] [place] [id number]      Recent Visits  No visits were found meeting these conditions  Showing recent visits within past 7 days and meeting all other requirements  Today's Visits  Date Type Provider Dept   09/08/21 Telemedicine Jenaro Murray MD [address] [facility] today's visits and meeting all other requirements  Future Appointments  No visits were found meeting these conditions  Showing future appointments within next 150 days and meeting all other requirements       The patient was identified by name and date of birth  Jimmie Burger was informed that this is a telemedicine visit and that the visit is being conducted through [address] [place] [address] Now and patient was informed that this is a secure, HIPAA-compliant platform  He agrees to proceed     My office door was closed  No one else was in the room  He acknowledged consent and understanding of privacy and security of the video platform  The patient has agreed to participate and understands they can discontinue the visit at any time  Patient is aware this is a billable service  Subjective  Jimmie Burger is a 70 y o  male    GERD: pt increased omeprazole from every other day dosing to daily dosing    He still has some symptoms when he lies down at night  When he gets symptoms in gifty he will take an antiacid which helps  Pt has rare problems with food getting stuck  No melena  Past Medical History:   Diagnosis Date    Aortic aneurysm (HCC)     Benign colon polyp     Bipolar 1 disorder ([facility] )     Cardiac disease     MI    Cervical cord compression with myelopathy (HCC)     COPD (chronic obstructive pulmonary disease) (HCC)     Diverticulitis     Diverticulosis     Gait disturbance     uses cane, leg brace on right    GERD (gastroesophageal reflux disease)     Heart attack ([facility] ) 1996    Hx of resection of large bowel 5/3/2016    Hyperlipidemia     Hypertension     IBS (irritable bowel syndrome)     Lumbar stenosis     Lung cancer ([facility] )     2007 Left lower lobectomy and 2011 Right lung with surgery     Myocardial infarction [facility])     involving other coronary artery    Prostate cancer ([facility] )     Shortness of breath     Small bowel obstruction ([facility] ) 11/16/2016       Past Surgical History:   Procedure Laterality Date    ANGIOPLASTY      stent    CARDIAC SURGERY      CERVICAL SPINE SURGERY      Cervical decompression with cervical fusion from C3-C7 for spinal stenosis   COLON SURGERY  11/04/2014    ESOPHAGOGASTRODUODENOSCOPY  01/03/2013    with possible Schatzki's ring & small hiatal hernia, mild gastritis    FL INJECTION LEFT HIP (NON ARTHROGRAM)  12/11/2018    FL INJECTION LEFT HIP (NON ARTHROGRAM)  5/9/2019    IR BIOPSY LUNG  7/6/2020    IR EVAR  8/6/2018    LUNG CANCER SURGERY Right 03/2011    wedge resection for lung tumor, 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    LUNG LOBECTOMY Left     [place] ARTHRODESIS ANT INTERBODY MIN DISCECTOMY, CERVICAL BELOW C2 N/A 5/2/2016    Procedure: Anterior cervical diskectomy C3/4, C5/6, C6/7 with anterior plate fixation fusion C3-7;  Posterior decompressive laminectomy C3-7 with lateral mass fixation fusion C3-7 (IMPULSE MONITORING);   Surgeon: Pranav Beard MD; [facility];  Service: Neurosurgery    [place] ARTHRODESIS POSTERIOR INTERBODY LUMBAR N/A 1/30/2017    Procedure: L4-5 AND L5-S1 DECOMPRESSIVE FORAMINOTOMIES, TRANSFORAMINAL LUMBAR INTERBODY AND PEDICLE SCREW FIXATION FUSION L4-S1 (IMPULSE); Surgeon: Komal Jackson MD;  Location: [facility];  Service: Neurosurgery    [place] [address] RPR DPLMNT AORTO-AORTIC NDGFT N/A 8/6/2018    Procedure: REPAIR ANEURYSM ENDOVASCULAR ABDOMINAL AORTIC  (EVAR) WITH BILATERAL PERCUTANEOUS FEMORAL ACCESS WITH ULTRASOUND GUIDANCE ON THE RIGHT AND PRE CLOSURE;  Surgeon: Destini Knight MD;  [facility];  Service: Vascular    PROSTATECTOMY  2007    for prostate CA - no chemo/RT    SIGMOIDECTOMY      for divertic    SMALL INTESTINE SURGERY N/A 11/17/2016    Procedure: Exploratory Laparotomy, Lysis of adhesions to release small bowel obstruction;  Surgeon: Rivera Limon MD;  [facility];  Service:        Current Outpatient Medications   Medication Sig Dispense Refill    acetaminophen (TYLENOL) 500 mg tablet Take 500 mg by mouth as needed for mild pain        albuterol (PROAIR HFA) 90 mcg/act inhaler Inhale 2 puffs every 6 (six) hours as needed for wheezing 3 Inhaler 3    amLODIPine (NORVASC) 2 5 mg tablet TAKE 1 TABLET BY MOUTH  DAILY 90 tablet 3    aspirin (ECOTRIN LOW STRENGTH) 81 mg EC tablet Take 81 mg by mouth daily      atorvastatin (LIPITOR) 40 mg tablet TAKE 1 TABLET BY MOUTH  DAILY 90 tablet 5    Cholecalciferol (VITAMIN D PO) Take 2,000 Units by mouth daily        diazepam (VALIUM) 2 mg tablet Take 2 mg by mouth every 6 (six) hours as needed       dicyclomine (BENTYL) 20 mg tablet Take 1 tablet (20 mg total) by mouth every 6 (six) hours as needed (abd cramping) 90 tablet 3    fexofenadine (ALLEGRA) 180 MG tablet Take 180 mg by mouth as needed Daily during the Summer      FLUoxetine (PROzac) 10 mg capsule Take 10 mg by mouth daily       fluticasone (FLONASE) 50 mcg/act nasal spray 2 sprays into each nostril daily      fluticasone-vilanterol (Breo Ellipta) 200-25 MCG/INH inhaler Inhale 1 puff daily Rinse mouth after use  180 blister 3    ipratropium (Atrovent HFA) 17 mcg/act inhaler Inhale 2 puffs 4 (four) times a day 38 7 g 3    lithium carbonate (LITHOBID) 300 mg CR tablet Take 300 mg by mouth One tab by mouth every other day and alternating with 2 tabs every other day at bedtime   metoprolol succinate (TOPROL-XL) 25 mg 24 hr tablet TAKE 1 TABLET BY MOUTH  DAILY 90 tablet 3    nitroglycerin (NITRODUR) 0 2 mg/hr APPLY 1 PATCH DAILY (OFF  FOR 12 HOURS) (Patient taking differently: as needed ) 90 patch 3    omega-3-acid ethyl esters (LOVAZA) 1 g capsule TAKE 1 CAPSULE BY MOUTH 3  TIMES DAILY 270 capsule 5    omeprazole (PriLOSEC) 20 mg delayed release capsule TAKE 1 CAPSULE BY MOUTH  DAILY 90 capsule 3    mupirocin (BACTROBAN) 2 % ointment Apply topically 2 (two) times a day as needed (rash) (Patient not taking: Reported on 4/2/2021) 22 g 0     No current facility-administered medications for this visit  Allergies   Allergen Reactions    Bactrim [Sulfamethoxazole-Trimethoprim] Other (See Comments)     AILYN    Nsaids      Annotation - [id number]: unable to take due to use of lithium   Oxycodone Rash       Review of Systems   Constitutional: Negative for chills, fatigue and fever  HENT: Negative for congestion, nosebleeds, postnasal drip, sore throat and trouble swallowing  Eyes: Negative for pain  Respiratory: Positive for shortness of breath (with exertion)  Negative for cough, chest tightness and wheezing  Cardiovascular: Negative for chest pain, palpitations and leg swelling  Gastrointestinal: Negative for abdominal pain, constipation, diarrhea, nausea and vomiting  GERD   Endocrine: Negative for polydipsia and polyuria  Genitourinary: Negative for dysuria, flank pain and hematuria  Musculoskeletal: Positive for back pain  Negative for arthralgias and myalgias  Skin: Negative for rash  Neurological: Positive for tremors  Negative for dizziness, light-headedness and headaches  Hematological: Does not bruise/bleed easily  Psychiatric/Behavioral: Negative for confusion and dysphoric mood  The patient is not nervous/anxious  Video Exam    There were no vitals filed for this visit  Physical Exam   It was my intent to perform this visit via video technology but the patient was not able to do a video connection so the visit was completed via audio telephone only  I spent 20 minutes with patient today in which greater than 50% of the time was spent in counseling/coordination of care regarding acute and chronic problems    VIRTUAL VISIT DISCLAIMER      Katty Eve verbally agrees to participate in [facility]  Pt is aware that [facility] could be limited without vital signs or the ability to perform a full hands-on physical exam  Tonny Villarreal understands he or the provider may request at any time to terminate the video visit and request the patient to seek care or treatment in person

## 2021-09-16 ENCOUNTER — PREPPED CHART (OUTPATIENT)
Dept: URBAN - METROPOLITAN AREA CLINIC 6 | Facility: CLINIC | Age: 76
End: 2021-09-16

## 2021-09-17 ENCOUNTER — ESTABLISHED COMPREHENSIVE EXAM (OUTPATIENT)
Dept: URBAN - METROPOLITAN AREA CLINIC 6 | Facility: CLINIC | Age: 76
End: 2021-09-17

## 2021-09-17 DIAGNOSIS — H35.30: ICD-10-CM

## 2021-09-17 DIAGNOSIS — H04.123: ICD-10-CM

## 2021-09-17 PROCEDURE — G8428 CUR MEDS NOT DOCUMENT: HCPCS

## 2021-09-17 PROCEDURE — 1036F TOBACCO NON-USER: CPT

## 2021-09-17 PROCEDURE — 92014 COMPRE OPH EXAM EST PT 1/>: CPT

## 2021-09-17 ASSESSMENT — VISUAL ACUITY
OS_SC: 20/30+1
OD_SC: 20/25-1

## 2021-09-17 ASSESSMENT — TONOMETRY
OD_IOP_MMHG: 13
OS_IOP_MMHG: 10

## 2021-09-20 ENCOUNTER — TELEPHONE (OUTPATIENT)
Dept: VASCULAR SURGERY | Facility: CLINIC | Age: 76
End: 2021-09-20

## 2021-09-20 NOTE — TELEPHONE ENCOUNTER
This is a reminder; patient is due for Office Visit   Please call patient and schedule the following by the dates provided  Routine EVAR Follow Up Schedule     Time Frame 1 m 3 m  5 m 1 yr 1 5 yr 2 yr 2 5 yr 3 yr  3 5 yr 4 5 yr 7 yr  8 yr   CTA   Abd pel w and wo []              CT abd pel wo contrast    []  []  []   []    EVAR Duplex  [] []  []  []  sched 12/12/21 []  []   OV []    []  []  [x]  Due 12/12/21 []  []           Please schedule from recall in apt desk      <<If you schedule pt for their apts, please route conversation to Gael Murray>>

## 2021-10-12 ENCOUNTER — OFFICE VISIT (OUTPATIENT)
Dept: CARDIOLOGY CLINIC | Facility: CLINIC | Age: 76
End: 2021-10-12
Payer: MEDICARE

## 2021-10-12 VITALS
WEIGHT: 178.1 LBS | OXYGEN SATURATION: 98 % | HEIGHT: 67 IN | BODY MASS INDEX: 27.95 KG/M2 | HEART RATE: 62 BPM | DIASTOLIC BLOOD PRESSURE: 76 MMHG | SYSTOLIC BLOOD PRESSURE: 138 MMHG

## 2021-10-12 DIAGNOSIS — Z95.5 HISTORY OF HEART ARTERY STENT: ICD-10-CM

## 2021-10-12 DIAGNOSIS — I70.90: ICD-10-CM

## 2021-10-12 DIAGNOSIS — E78.2 MIXED HYPERLIPIDEMIA: ICD-10-CM

## 2021-10-12 DIAGNOSIS — I49.3 ASYMPTOMATIC PVCS: ICD-10-CM

## 2021-10-12 DIAGNOSIS — I25.2 PAST HISTORY OF MYOCARDIAL INFARCTION: ICD-10-CM

## 2021-10-12 DIAGNOSIS — I71.4 ABDOMINAL AORTIC ANEURYSM WITHOUT RUPTURE (HCC): ICD-10-CM

## 2021-10-12 DIAGNOSIS — R73.09 ABNORMAL BLOOD SUGAR: ICD-10-CM

## 2021-10-12 DIAGNOSIS — I50.30 DIASTOLIC HEART FAILURE, UNSPECIFIED HF CHRONICITY (HCC): ICD-10-CM

## 2021-10-12 DIAGNOSIS — I10 ESSENTIAL HYPERTENSION: Primary | ICD-10-CM

## 2021-10-12 PROCEDURE — 99214 OFFICE O/P EST MOD 30 MIN: CPT | Performed by: INTERNAL MEDICINE

## 2021-10-19 ENCOUNTER — OFFICE VISIT (OUTPATIENT)
Dept: PULMONOLOGY | Facility: CLINIC | Age: 76
End: 2021-10-19
Payer: MEDICARE

## 2021-10-19 VITALS
SYSTOLIC BLOOD PRESSURE: 120 MMHG | HEIGHT: 67 IN | TEMPERATURE: 97.6 F | WEIGHT: 172 LBS | BODY MASS INDEX: 27 KG/M2 | HEART RATE: 71 BPM | DIASTOLIC BLOOD PRESSURE: 80 MMHG | OXYGEN SATURATION: 94 %

## 2021-10-19 DIAGNOSIS — Z85.118 HISTORY OF LUNG CANCER: ICD-10-CM

## 2021-10-19 DIAGNOSIS — K21.9 GASTROESOPHAGEAL REFLUX DISEASE WITHOUT ESOPHAGITIS: ICD-10-CM

## 2021-10-19 DIAGNOSIS — J30.9 ALLERGIC RHINITIS, UNSPECIFIED SEASONALITY, UNSPECIFIED TRIGGER: ICD-10-CM

## 2021-10-19 DIAGNOSIS — J44.9 CHRONIC OBSTRUCTIVE PULMONARY DISEASE, UNSPECIFIED COPD TYPE (HCC): Primary | ICD-10-CM

## 2021-10-19 PROCEDURE — 99214 OFFICE O/P EST MOD 30 MIN: CPT | Performed by: INTERNAL MEDICINE

## 2021-10-20 ENCOUNTER — APPOINTMENT (OUTPATIENT)
Dept: LAB | Facility: CLINIC | Age: 76
End: 2021-10-20
Payer: MEDICARE

## 2021-10-20 DIAGNOSIS — I10 ESSENTIAL HYPERTENSION: ICD-10-CM

## 2021-10-20 DIAGNOSIS — I70.90: ICD-10-CM

## 2021-10-20 DIAGNOSIS — I71.4 ABDOMINAL AORTIC ANEURYSM WITHOUT RUPTURE (HCC): ICD-10-CM

## 2021-10-20 DIAGNOSIS — R73.09 ABNORMAL BLOOD SUGAR: ICD-10-CM

## 2021-10-20 LAB
EST. AVERAGE GLUCOSE BLD GHB EST-MCNC: 103 MG/DL
HBA1C MFR BLD: 5.2 %

## 2021-10-20 PROCEDURE — 83036 HEMOGLOBIN GLYCOSYLATED A1C: CPT

## 2021-10-20 PROCEDURE — 36415 COLL VENOUS BLD VENIPUNCTURE: CPT

## 2021-10-21 ENCOUNTER — OFFICE VISIT (OUTPATIENT)
Dept: INTERNAL MEDICINE CLINIC | Facility: CLINIC | Age: 76
End: 2021-10-21
Payer: MEDICARE

## 2021-10-21 VITALS
HEIGHT: 67 IN | RESPIRATION RATE: 16 BRPM | SYSTOLIC BLOOD PRESSURE: 128 MMHG | WEIGHT: 174.4 LBS | HEART RATE: 56 BPM | DIASTOLIC BLOOD PRESSURE: 72 MMHG | BODY MASS INDEX: 27.37 KG/M2 | OXYGEN SATURATION: 98 %

## 2021-10-21 DIAGNOSIS — K21.9 GASTROESOPHAGEAL REFLUX DISEASE: ICD-10-CM

## 2021-10-21 DIAGNOSIS — K21.9 GASTROESOPHAGEAL REFLUX DISEASE WITHOUT ESOPHAGITIS: Primary | ICD-10-CM

## 2021-10-21 DIAGNOSIS — E78.2 MIXED HYPERLIPIDEMIA: ICD-10-CM

## 2021-10-21 DIAGNOSIS — F31.9 BIPOLAR AFFECTIVE DISORDER, REMISSION STATUS UNSPECIFIED (HCC): ICD-10-CM

## 2021-10-21 DIAGNOSIS — I10 ESSENTIAL HYPERTENSION: ICD-10-CM

## 2021-10-21 PROCEDURE — 99214 OFFICE O/P EST MOD 30 MIN: CPT | Performed by: INTERNAL MEDICINE

## 2021-10-26 ENCOUNTER — IMMUNIZATIONS (OUTPATIENT)
Dept: FAMILY MEDICINE CLINIC | Facility: HOSPITAL | Age: 76
End: 2021-10-26

## 2021-10-26 DIAGNOSIS — Z23 ENCOUNTER FOR IMMUNIZATION: Primary | ICD-10-CM

## 2021-10-26 PROCEDURE — 91300 COVID-19 PFIZER VACC 0.3 ML: CPT

## 2021-10-26 PROCEDURE — 0001A COVID-19 PFIZER VACC 0.3 ML: CPT

## 2021-11-04 ENCOUNTER — NURSE TRIAGE (OUTPATIENT)
Dept: OTHER | Facility: OTHER | Age: 76
End: 2021-11-04

## 2021-11-04 DIAGNOSIS — R35.0 URINARY FREQUENCY: Primary | ICD-10-CM

## 2021-11-05 ENCOUNTER — APPOINTMENT (OUTPATIENT)
Dept: LAB | Facility: CLINIC | Age: 76
End: 2021-11-05
Payer: MEDICARE

## 2021-11-05 DIAGNOSIS — R35.0 URINARY FREQUENCY: ICD-10-CM

## 2021-11-05 LAB
BACTERIA UR QL AUTO: ABNORMAL /HPF
BILIRUB UR QL STRIP: NEGATIVE
CLARITY UR: CLEAR
COLOR UR: YELLOW
GLUCOSE UR STRIP-MCNC: NEGATIVE MG/DL
HGB UR QL STRIP.AUTO: ABNORMAL
HYALINE CASTS #/AREA URNS LPF: ABNORMAL /LPF
KETONES UR STRIP-MCNC: NEGATIVE MG/DL
LEUKOCYTE ESTERASE UR QL STRIP: NEGATIVE
NITRITE UR QL STRIP: NEGATIVE
NON-SQ EPI CELLS URNS QL MICRO: ABNORMAL /HPF
PH UR STRIP.AUTO: 7 [PH]
PROT UR STRIP-MCNC: ABNORMAL MG/DL
RBC #/AREA URNS AUTO: ABNORMAL /HPF
SP GR UR STRIP.AUTO: 1.01 (ref 1–1.03)
UROBILINOGEN UR QL STRIP.AUTO: 1 E.U./DL
WBC #/AREA URNS AUTO: ABNORMAL /HPF

## 2021-11-05 PROCEDURE — 87086 URINE CULTURE/COLONY COUNT: CPT

## 2021-11-05 PROCEDURE — 81001 URINALYSIS AUTO W/SCOPE: CPT

## 2021-11-06 LAB — BACTERIA UR CULT: NORMAL

## 2021-11-12 ENCOUNTER — PROCEDURE VISIT (OUTPATIENT)
Dept: UROLOGY | Facility: CLINIC | Age: 76
End: 2021-11-12
Payer: MEDICARE

## 2021-11-12 VITALS
WEIGHT: 175 LBS | DIASTOLIC BLOOD PRESSURE: 80 MMHG | HEIGHT: 67 IN | SYSTOLIC BLOOD PRESSURE: 160 MMHG | HEART RATE: 40 BPM | BODY MASS INDEX: 27.47 KG/M2

## 2021-11-12 DIAGNOSIS — R35.0 URINARY FREQUENCY: Primary | ICD-10-CM

## 2021-11-12 LAB — POST-VOID RESIDUAL VOLUME, ML POC: 0 ML

## 2021-11-12 PROCEDURE — 51798 US URINE CAPACITY MEASURE: CPT

## 2021-11-16 ENCOUNTER — OFFICE VISIT (OUTPATIENT)
Dept: OBGYN CLINIC | Facility: HOSPITAL | Age: 76
End: 2021-11-16
Payer: MEDICARE

## 2021-11-16 ENCOUNTER — TELEPHONE (OUTPATIENT)
Dept: OBGYN CLINIC | Facility: HOSPITAL | Age: 76
End: 2021-11-16

## 2021-11-16 VITALS
HEART RATE: 56 BPM | SYSTOLIC BLOOD PRESSURE: 170 MMHG | DIASTOLIC BLOOD PRESSURE: 79 MMHG | BODY MASS INDEX: 27.94 KG/M2 | WEIGHT: 178 LBS | HEIGHT: 67 IN

## 2021-11-16 DIAGNOSIS — M70.62 TROCHANTERIC BURSITIS OF LEFT HIP: Primary | ICD-10-CM

## 2021-11-16 PROBLEM — M31.30 WEGENER'S GRANULOMATOSIS WITHOUT RENAL INVOLVEMENT (HCC): Status: ACTIVE | Noted: 2021-11-16

## 2021-11-16 PROCEDURE — 99213 OFFICE O/P EST LOW 20 MIN: CPT | Performed by: ORTHOPAEDIC SURGERY

## 2021-11-16 PROCEDURE — 20610 DRAIN/INJ JOINT/BURSA W/O US: CPT | Performed by: ORTHOPAEDIC SURGERY

## 2021-11-16 RX ORDER — DIAZEPAM 5 MG/1
2.5 TABLET ORAL
COMMUNITY
Start: 2021-11-11

## 2021-11-16 RX ORDER — LIDOCAINE HYDROCHLORIDE 10 MG/ML
2 INJECTION, SOLUTION EPIDURAL; INFILTRATION; INTRACAUDAL; PERINEURAL
Status: COMPLETED | OUTPATIENT
Start: 2021-11-16 | End: 2021-11-16

## 2021-11-16 RX ORDER — BUPIVACAINE HYDROCHLORIDE 2.5 MG/ML
2 INJECTION, SOLUTION INFILTRATION; PERINEURAL
Status: COMPLETED | OUTPATIENT
Start: 2021-11-16 | End: 2021-11-16

## 2021-11-16 RX ORDER — BETAMETHASONE SODIUM PHOSPHATE AND BETAMETHASONE ACETATE 3; 3 MG/ML; MG/ML
12 INJECTION, SUSPENSION INTRA-ARTICULAR; INTRALESIONAL; INTRAMUSCULAR; SOFT TISSUE
Status: COMPLETED | OUTPATIENT
Start: 2021-11-16 | End: 2021-11-16

## 2021-11-16 RX ADMIN — BETAMETHASONE SODIUM PHOSPHATE AND BETAMETHASONE ACETATE 12 MG: 3; 3 INJECTION, SUSPENSION INTRA-ARTICULAR; INTRALESIONAL; INTRAMUSCULAR; SOFT TISSUE at 09:30

## 2021-11-16 RX ADMIN — LIDOCAINE HYDROCHLORIDE 2 ML: 10 INJECTION, SOLUTION EPIDURAL; INFILTRATION; INTRACAUDAL; PERINEURAL at 09:30

## 2021-11-16 RX ADMIN — BUPIVACAINE HYDROCHLORIDE 2 ML: 2.5 INJECTION, SOLUTION INFILTRATION; PERINEURAL at 09:30

## 2021-11-16 NOTE — TELEPHONE ENCOUNTER
Called and spoke with pt  He is scheduled for OV with [facility] at the [place] location on 01/11/21 for results review of EVAR done 12/12/21

## 2021-11-18 ENCOUNTER — HOSPITAL ENCOUNTER (OUTPATIENT)
Dept: RADIOLOGY | Facility: HOSPITAL | Age: 76
Discharge: HOME | End: 2021-11-18
Attending: PODIATRIST
Payer: MEDICARE

## 2021-11-18 DIAGNOSIS — M79.671 RIGHT FOOT PAIN: ICD-10-CM

## 2021-11-18 DIAGNOSIS — M25.571 RIGHT ANKLE PAIN, UNSPECIFIED CHRONICITY: ICD-10-CM

## 2021-11-18 DIAGNOSIS — M21.6X1 OTHER ACQUIRED DEFORMITIES OF RIGHT FOOT: ICD-10-CM

## 2021-11-18 PROCEDURE — 73610 X-RAY EXAM OF ANKLE: CPT

## 2021-11-18 PROCEDURE — 73630 X-RAY EXAM OF FOOT: CPT

## 2021-11-26 ENCOUNTER — TELEPHONE (OUTPATIENT)
Dept: NEUROLOGY | Facility: CLINIC | Age: 76
End: 2021-11-26

## 2021-12-03 ENCOUNTER — APPOINTMENT (OUTPATIENT)
Dept: LAB | Facility: CLINIC | Age: 76
End: 2021-12-03
Payer: MEDICARE

## 2021-12-03 DIAGNOSIS — I10 ESSENTIAL HYPERTENSION: ICD-10-CM

## 2021-12-03 DIAGNOSIS — F31.60 MIXED BIPOLAR I DISORDER (HCC): ICD-10-CM

## 2021-12-03 LAB
BUN SERPL-MCNC: 28 MG/DL (ref 5–25)
CREAT SERPL-MCNC: 1.43 MG/DL (ref 0.6–1.3)
GFR SERPL CREATININE-BSD FRML MDRD: 48 ML/MIN/1.73SQ M
LITHIUM SERPL-SCNC: 0.8 MMOL/L (ref 0.5–1)
TSH SERPL DL<=0.05 MIU/L-ACNC: 2.38 UIU/ML (ref 0.36–3.74)

## 2021-12-03 PROCEDURE — 82565 ASSAY OF CREATININE: CPT

## 2021-12-03 PROCEDURE — 84443 ASSAY THYROID STIM HORMONE: CPT

## 2021-12-03 PROCEDURE — 84520 ASSAY OF UREA NITROGEN: CPT

## 2021-12-03 PROCEDURE — 36415 COLL VENOUS BLD VENIPUNCTURE: CPT

## 2021-12-03 PROCEDURE — 80178 ASSAY OF LITHIUM: CPT

## 2021-12-06 ENCOUNTER — OFFICE VISIT (OUTPATIENT)
Dept: GASTROENTEROLOGY | Facility: MEDICAL CENTER | Age: 76
End: 2021-12-06
Payer: MEDICARE

## 2021-12-06 VITALS
SYSTOLIC BLOOD PRESSURE: 141 MMHG | TEMPERATURE: 97.8 F | BODY MASS INDEX: 27.19 KG/M2 | WEIGHT: 173.6 LBS | DIASTOLIC BLOOD PRESSURE: 81 MMHG | HEART RATE: 62 BPM

## 2021-12-06 DIAGNOSIS — K21.9 GASTROESOPHAGEAL REFLUX DISEASE, UNSPECIFIED WHETHER ESOPHAGITIS PRESENT: Primary | ICD-10-CM

## 2021-12-06 PROCEDURE — 99213 OFFICE O/P EST LOW 20 MIN: CPT | Performed by: PHYSICIAN ASSISTANT

## 2021-12-06 RX ORDER — AMLODIPINE BESYLATE 2.5 MG/1
TABLET ORAL
Qty: 90 TABLET | Refills: 3 | Status: SHIPPED | OUTPATIENT
Start: 2021-12-06

## 2021-12-06 RX ORDER — FAMOTIDINE 40 MG/1
40 TABLET, FILM COATED ORAL
Qty: 30 TABLET | Refills: 3 | Status: SHIPPED | OUTPATIENT
Start: 2021-12-06

## 2021-12-13 ENCOUNTER — HOSPITAL ENCOUNTER (OUTPATIENT)
Dept: NON INVASIVE DIAGNOSTICS | Facility: CLINIC | Age: 76
Discharge: HOME | End: 2021-12-13
Payer: MEDICARE

## 2021-12-13 DIAGNOSIS — I71.4 ABDOMINAL AORTIC ANEURYSM WITHOUT RUPTURE (HCC): ICD-10-CM

## 2021-12-13 PROCEDURE — 93978 VASCULAR STUDY: CPT | Performed by: SURGERY

## 2021-12-13 PROCEDURE — 93923 UPR/LXTR ART STDY 3+ LVLS: CPT

## 2021-12-13 PROCEDURE — 93978 VASCULAR STUDY: CPT

## 2021-12-22 ENCOUNTER — OFFICE VISIT (OUTPATIENT)
Dept: INTERNAL MEDICINE CLINIC | Facility: CLINIC | Age: 76
End: 2021-12-22
Payer: MEDICARE

## 2021-12-22 VITALS
WEIGHT: 173 LBS | BODY MASS INDEX: 27.15 KG/M2 | HEART RATE: 52 BPM | SYSTOLIC BLOOD PRESSURE: 136 MMHG | HEIGHT: 67 IN | OXYGEN SATURATION: 99 % | DIASTOLIC BLOOD PRESSURE: 80 MMHG

## 2021-12-22 DIAGNOSIS — N18.31 STAGE 3A CHRONIC KIDNEY DISEASE (HCC): Primary | ICD-10-CM

## 2021-12-22 DIAGNOSIS — Z00.00 MEDICARE ANNUAL WELLNESS VISIT, SUBSEQUENT: ICD-10-CM

## 2021-12-22 DIAGNOSIS — K21.9 GASTROESOPHAGEAL REFLUX DISEASE WITHOUT ESOPHAGITIS: ICD-10-CM

## 2021-12-22 DIAGNOSIS — R35.0 FREQUENCY OF URINATION: ICD-10-CM

## 2021-12-22 DIAGNOSIS — I10 ESSENTIAL HYPERTENSION: ICD-10-CM

## 2021-12-22 PROCEDURE — 99214 OFFICE O/P EST MOD 30 MIN: CPT | Performed by: INTERNAL MEDICINE

## 2021-12-22 PROCEDURE — G0439 PPPS, SUBSEQ VISIT: HCPCS | Performed by: INTERNAL MEDICINE

## 2021-12-23 DIAGNOSIS — K21.9 GASTROESOPHAGEAL REFLUX DISEASE: ICD-10-CM

## 2021-12-24 RX ORDER — OMEPRAZOLE 20 MG/1
CAPSULE, DELAYED RELEASE ORAL
Qty: 90 CAPSULE | Refills: 3 | Status: SHIPPED | OUTPATIENT
Start: 2021-12-24 | End: 2022-04-28 | Stop reason: SDUPTHER

## 2022-01-08 ENCOUNTER — APPOINTMENT (OUTPATIENT)
Dept: LAB | Facility: CLINIC | Age: 77
End: 2022-01-08
Payer: MEDICARE

## 2022-01-08 DIAGNOSIS — C61 PROSTATE CANCER (HCC): ICD-10-CM

## 2022-01-08 LAB — PSA SERPL-MCNC: <0.1 NG/ML (ref 0–4)

## 2022-01-08 PROCEDURE — 84153 ASSAY OF PSA TOTAL: CPT

## 2022-01-11 ENCOUNTER — OFFICE VISIT (OUTPATIENT)
Dept: VASCULAR SURGERY | Facility: CLINIC | Age: 77
End: 2022-01-11
Payer: MEDICARE

## 2022-01-11 VITALS
WEIGHT: 171.6 LBS | BODY MASS INDEX: 26.93 KG/M2 | SYSTOLIC BLOOD PRESSURE: 160 MMHG | DIASTOLIC BLOOD PRESSURE: 90 MMHG | HEIGHT: 67 IN | HEART RATE: 64 BPM

## 2022-01-11 DIAGNOSIS — I71.4 ANEURYSM OF INFRARENAL ABDOMINAL AORTA (HCC): Primary | ICD-10-CM

## 2022-01-11 DIAGNOSIS — N18.31 STAGE 3A CHRONIC KIDNEY DISEASE (HCC): ICD-10-CM

## 2022-01-11 PROCEDURE — 99213 OFFICE O/P EST LOW 20 MIN: CPT | Performed by: NURSE PRACTITIONER

## 2022-01-11 NOTE — ASSESSMENT & PLAN NOTE
24-year-old male with HTN, HLD, COPD, CAD, lung CA '07 +'11, lumbar DDD, CKD, infrarenal AAA s/p EVAR by Dr Katey Hernandez '18 who presents to the office to review EVAR duplex 12/13/21   -EVAR duplex showed widely patent endograft without evidence of endoleak, sac size 4 3 cm    Right MIKO 1 2 and left MIKO noncompressible   -Blood pressure control  -Continue aspirin and statin therapy for best medical management   -Will repeat duplex in 1 year and also evaluate with lower extremity arterial duplex for aneurysmal disease   -Follow up in the office in 1 year

## 2022-01-11 NOTE — PROGRESS NOTES
Assessment/Plan:    Aneurysm of infrarenal abdominal aorta [facility])  26-year-old male with HTN, HLD, COPD, CAD, lung CA '07 +'11, lumbar DDD, CKD, infrarenal AAA s/p EVAR by Dr Genesis Torres who presents to the office to review EVAR duplex 12/13/21   -EVAR duplex showed widely patent endograft without evidence of endoleak, sac size 4 3 cm  Right MIKO 1 2 and left MIKO noncompressible   -Blood pressure control  -Continue aspirin and statin therapy for best medical management   -Will repeat duplex in 1 year and also evaluate with lower extremity arterial duplex for aneurysmal disease   -Follow up in the office in 1 year         Diagnoses and all orders for this visit:    Aneurysm of infrarenal abdominal aorta (HCC)  -     VAS evar endovascular aortic repair duplex; Future  -     VAS lower limb arterial duplex, complete bilateral; Future    Stage 3a chronic kidney disease (HCC)          Subjective:      Patient ID: Hawa Corey is a 78 y o  male  Patient present to rev EVAR done 12/13/21by dr Villalobos  Pt denies any changes on appetite or pain when eating  Pt is currently taking ASA and Atorvastatin  HPI  26-year-old male with HTN, HLD, COPD, CAD, lung CA '07 +'11, lumbar DDD, CKD, infrarenal AAA s/p EVAR by Dr Genesis Torres who presents to the office to review EVAR duplex 12/13/21  Patient last in the office in 2018 after a EVAR  He has had routine surveillance duplex imaging  Most recent EVAR duplex showed widely patent endograft without evidence of endoleak  He ambulates with a cane  He has a shuffled gait  He walks 30 minutes daily  He denies any complaints  We discussed reason for visit and reviewed study  He was concerned and blood pressure elevated at started visit 160/90  Takes blood pressure at home has readings 120 to 130/80  He states is typical for him to have elevated blood pressure at office visits    The following portions of the patient's history were reviewed and updated as appropriate: allergies, current medications, past family history, past medical history, past social history, past surgical history and problem list   ROS reviewed     Review of Systems   Constitutional: Positive for unexpected weight change (Lost 6 lbs )  HENT: Negative  Eyes: Negative  Respiratory: Positive for shortness of breath  Cardiovascular: Negative  Gastrointestinal: Negative  Endocrine: Negative  Genitourinary: Positive for urgency  Musculoskeletal: Negative  Skin: Negative  Allergic/Immunologic: Positive for environmental allergies  Neurological: Negative  Hematological: Negative  Psychiatric/Behavioral: The patient is nervous/anxious  Depression         Objective:  I have reviewed and made appropriate changes to the review of systems input by the medical assistant      Vitals:    01/11/22 0919   BP: 160/90   BP Location: Left arm   Patient Position: Sitting   Cuff Size: Adult   Pulse: 64   Weight: 77 8 kg (171 lb 9 6 oz)   Height: 5' 7" (1 702 m)       Patient Active Problem List   Diagnosis    Gait instability    Cervical myelopathy (HCC)    Adenocarcinoma of prostate ([address] )    CAD (coronary artery disease)    Bipolar affective disorder ([address] )    COPD (chronic obstructive pulmonary disease) (HCC)    Gastroesophageal reflux disease    Mixed hyperlipidemia    Essential hypertension    Aneurysm of infrarenal abdominal aorta (HCC)    History of lumbar surgery    Stage 3a chronic kidney disease (HCC)    Impaired mobility and activities of daily living    Cervical radiculopathy due to degenerative joint disease of spine    Degenerative disc disease, lumbar    Foraminal stenosis of lumbar region    Spondylolisthesis of lumbar region    Myelopathy concurrent with and due to lumbosacral intervertebral disc disorder    Arrhythmia    Diastolic heart failure ([address] )    New onset atrial fibrillation (HCC)    Pain in left hip    Greater trochanteric bursitis, left    Skin lesion    Medicare annual wellness visit, subsequent    Trochanteric bursitis of left hip    Primary osteoarthritis of left hip    Malignant neoplasm of lung (HCC)    Excessive gas    Extradural cyst of spine    Herniated lumbar disc without myelopathy    Gait abnormality    Skin lump of leg, right    Seasonal allergic rhinitis due to pollen    Braces as ambulation aid    Parkinsonism ([address] )    Change in mental status    Frequency of urination       Past Surgical History:   Procedure Laterality Date    ANGIOPLASTY      stent    CARDIAC SURGERY      CERVICAL SPINE SURGERY      Cervical decompression with cervical fusion from C3-C7 for spinal stenosis   COLON SURGERY  11/04/2014    ESOPHAGOGASTRODUODENOSCOPY  01/03/2013    with possible Schatzki's ring & small hiatal hernia, mild gastritis    FL INJECTION LEFT HIP (NON ARTHROGRAM)  12/11/2018    FL INJECTION LEFT HIP (NON ARTHROGRAM)  5/9/2019    IR BIOPSY LUNG  7/6/2020    IR EVAR  8/6/2018    LUNG CANCER SURGERY Right 03/2011    wedge resection for lung tumor, 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    LUNG LOBECTOMY Left     OK ARTHRODESIS ANT INTERBODY MIN DISCECTOMY, CERVICAL BELOW C2 N/A 5/2/2016    Procedure: Anterior cervical diskectomy C3/4, C5/6, C6/7 with anterior plate fixation fusion C3-7;  Posterior decompressive laminectomy C3-7 with lateral mass fixation fusion C3-7 (IMPULSE MONITORING); Surgeon: Sara Villar MD;  [facility];  Service: Neurosurgery    OK ARTHRODESIS POSTERIOR INTERBODY LUMBAR N/A 1/30/2017    Procedure: L4-5 AND L5-S1 DECOMPRESSIVE FORAMINOTOMIES, TRANSFORAMINAL LUMBAR INTERBODY AND PEDICLE SCREW FIXATION FUSION L4-S1 (IMPULSE);   Surgeon: Sara Villar MD;  [facility];  Service: Neurosurgery    [place] [address] RPR DPLMNT AORTO-AORTIC NDGFT N/A 8/6/2018    Procedure: REPAIR ANEURYSM ENDOVASCULAR ABDOMINAL AORTIC  (EVAR) WITH BILATERAL PERCUTANEOUS FEMORAL ACCESS WITH ULTRASOUND GUIDANCE ON THE RIGHT AND PRE CLOSURE;  Surgeon: Azalia Timmons MD;  [facility];  Service: Vascular    PROSTATECTOMY      for prostate CA - no chemo/RT    SIGMOIDECTOMY      for divertic    SMALL INTESTINE SURGERY N/A 2016    Procedure: Exploratory Laparotomy, Lysis of adhesions to release small bowel obstruction;  Surgeon: Angélica Fried MD;  [facility];  Service:        Family History   Problem Relation Age of Onset    Heart attack Father     Colon cancer Father     Coronary artery disease Father     Heart disease Family     Hyperlipidemia Family     Hypertension Family        Social History     Socioeconomic History    Marital status: /Civil Union     Spouse name: Not on file    Number of children: 1    Years of education: Not on file    Highest education level: Not on file   Occupational History    Occupation: Retired   Tobacco Use    Smoking status: Former Smoker     Packs/day: 1 00     Years: 35 00     Pack years: 35 00     Types: Cigarettes     Start date:      Quit date:      Years since quittin 0    Smokeless tobacco: Never Used   Vaping Use    Vaping Use: Never used   Substance and Sexual Activity    Alcohol use: Yes     Comment: 6 drinks a week    Drug use: No    Sexual activity: Not on file   Other Topics Concern    Not on file   Social History Narrative    Not on file     Social Determinants of Health     Financial Resource Strain: Not on file   Food Insecurity: Not on file   Transportation Needs: Not on file   Physical Activity: Not on file   Stress: Not on file   Social Connections: Not on file   Intimate Partner Violence: Not on file   Housing Stability: Not on file       Allergies   Allergen Reactions    Bactrim [Sulfamethoxazole-Trimethoprim] Other (See Comments)     AILYN    Nsaids      Annotation - [id number]: unable to take due to use of lithium      Oxycodone Rash         Current Outpatient Medications:    acetaminophen (TYLENOL) 500 mg tablet, Take 500 mg by mouth as needed for mild pain , Disp: , Rfl:     albuterol (PROAIR HFA) 90 mcg/act inhaler, Inhale 2 puffs every 6 (six) hours as needed for wheezing, Disp: 3 Inhaler, Rfl: 3    amLODIPine (NORVASC) 2 5 mg tablet, TAKE 1 TABLET BY MOUTH  DAILY, Disp: 90 tablet, Rfl: 3    aspirin (ECOTRIN LOW STRENGTH) 81 mg EC tablet, Take 81 mg by mouth daily, Disp: , Rfl:     atorvastatin (LIPITOR) 40 mg tablet, TAKE 1 TABLET BY MOUTH  DAILY, Disp: 90 tablet, Rfl: 5    Cholecalciferol (VITAMIN D PO), Take 2,000 Units by mouth daily  , Disp: , Rfl:     diazepam (VALIUM) 5 mg tablet, 2 5 mg  , Disp: , Rfl:     Diclofenac Sodium (VOLTAREN) 1 %, Apply 4 g topically 4 (four) times a day, Disp: 350 g, Rfl: 3    dicyclomine (BENTYL) 20 mg tablet, Take 1 tablet (20 mg total) by mouth every 6 (six) hours as needed (abd cramping), Disp: 90 tablet, Rfl: 3    famotidine (PEPCID) 40 MG tablet, Take 1 tablet (40 mg total) by mouth daily at bedtime as needed for heartburn, Disp: 30 tablet, Rfl: 3    fexofenadine (ALLEGRA) 180 MG tablet, Take 180 mg by mouth as needed Daily during the Summer, Disp: , Rfl:     FLUoxetine (PROzac) 10 mg capsule, Take 10 mg by mouth daily , Disp: , Rfl:     fluticasone (FLONASE) 50 mcg/act nasal spray, 2 sprays into each nostril daily, Disp: , Rfl:     fluticasone-vilanterol (Breo Ellipta) 200-25 MCG/INH inhaler, Inhale 1 puff daily Rinse mouth after use , Disp: 180 blister, Rfl: 3    ipratropium (Atrovent HFA) 17 mcg/act inhaler, Inhale 2 puffs 4 (four) times a day, Disp: 38 7 g, Rfl: 3    lithium carbonate (LITHOBID) 300 mg CR tablet, Take 300 mg by mouth One tab by mouth every other day and alternating with 2 tabs every other day at bedtime   , Disp: , Rfl:     metoprolol succinate (TOPROL-XL) 25 mg 24 hr tablet, TAKE 1 TABLET BY MOUTH  DAILY, Disp: 90 tablet, Rfl: 3    mupirocin (BACTROBAN) 2 % ointment, Apply topically 2 (two) times a day as needed (rash), Disp: 22 g, Rfl: 0    nitroglycerin (NITRODUR) 0 2 mg/hr, APPLY 1 PATCH DAILY (OFF  FOR 12 HOURS) (Patient taking differently: as needed ), Disp: 90 patch, Rfl: 3    omega-3-acid ethyl esters (LOVAZA) 1 g capsule, TAKE 1 CAPSULE BY MOUTH 3  TIMES DAILY, Disp: 270 capsule, Rfl: 5    omeprazole (PriLOSEC) 20 mg delayed release capsule, TAKE 1 CAPSULE BY MOUTH  DAILY, Disp: 90 capsule, Rfl: 3      /90 (BP Location: Left arm, Patient Position: Sitting, Cuff Size: Adult)   Pulse 64   Ht 5' 7" (1 702 m)   Wt 77 8 kg (171 lb 9 6 oz)   BMI 26 88 kg/m²          Physical Exam  Vitals and nursing note reviewed  Exam conducted with a chaperone present  Constitutional:       Appearance: Normal appearance  HENT:      Head: Normocephalic and atraumatic  Eyes:      Extraocular Movements: Extraocular movements intact  Cardiovascular:      Pulses:           Femoral pulses are 2+ on the right side  Popliteal pulses are 2+ on the right side and 0 on the left side  Posterior tibial pulses are 0 on the right side and 0 on the left side  Heart sounds: Normal heart sounds  Pulmonary:      Effort: Pulmonary effort is normal       Breath sounds: Normal breath sounds  Abdominal:      General: Bowel sounds are normal       Palpations: Abdomen is soft  Musculoskeletal:         General: Swelling present  Comments: Kyphosis, shuffled gait, ambulates with a cane   Neurological:      General: No focal deficit present  Mental Status: He is alert and oriented to person, place, and time     Psychiatric:         Mood and Affect: Mood normal          Behavior: Behavior normal

## 2022-01-13 ENCOUNTER — OFFICE VISIT (OUTPATIENT)
Dept: UROLOGY | Facility: CLINIC | Age: 77
End: 2022-01-13
Payer: MEDICARE

## 2022-01-13 VITALS — HEIGHT: 67 IN | BODY MASS INDEX: 27 KG/M2 | WEIGHT: 172 LBS

## 2022-01-13 DIAGNOSIS — R35.0 FREQUENCY OF URINATION: Primary | ICD-10-CM

## 2022-01-13 PROCEDURE — 99213 OFFICE O/P EST LOW 20 MIN: CPT | Performed by: PHYSICIAN ASSISTANT

## 2022-01-13 NOTE — PROGRESS NOTES
UROLOGY PROGRESS NOTE   Patient Identifiers: Jonathan Castaneda (MRN [id number])  Date of Service: 1/13/2022    Subjective:      19-year-old man history of prostate cancer  He had a robotic prostatectomy in [place]  PSA remains undetectable< 0 1  Uses about 2 pads per day for postvoid dribbling  He complains of urinary frequency and nocturia  Had side effects with anticholinergics in the past       Reason for visit:  Prostate cancer follow-up     Objective:     VITALS:    There were no vitals filed for this visit          LABS:  Lab Results   Component Value Date    HGB 13 2 08/11/2021    HCT 40 0 08/11/2021    WBC 10 59 (H) 08/11/2021     08/11/2021   ]    Lab Results   Component Value Date     10/19/2015    K 4 6 08/11/2021     08/11/2021    CO2 25 08/11/2021    BUN 28 (H) 12/03/2021    CREATININE 1 43 (H) 12/03/2021    CALCIUM 9 4 08/11/2021    GLUCOSE 187 (H) 11/14/2016   ]        INPATIENT MEDS:    Current Outpatient Medications:     acetaminophen (TYLENOL) 500 mg tablet, Take 500 mg by mouth as needed for mild pain , Disp: , Rfl:     albuterol (PROAIR HFA) 90 mcg/act inhaler, Inhale 2 puffs every 6 (six) hours as needed for wheezing, Disp: 3 Inhaler, Rfl: 3    amLODIPine (NORVASC) 2 5 mg tablet, TAKE 1 TABLET BY MOUTH  DAILY, Disp: 90 tablet, Rfl: 3    aspirin (ECOTRIN LOW STRENGTH) 81 mg EC tablet, Take 81 mg by mouth daily, Disp: , Rfl:     atorvastatin (LIPITOR) 40 mg tablet, TAKE 1 TABLET BY MOUTH  DAILY, Disp: 90 tablet, Rfl: 5    Cholecalciferol (VITAMIN D PO), Take 2,000 Units by mouth daily  , Disp: , Rfl:     diazepam (VALIUM) 5 mg tablet, 2 5 mg  , Disp: , Rfl:     Diclofenac Sodium (VOLTAREN) 1 %, Apply 4 g topically 4 (four) times a day, Disp: 350 g, Rfl: 3    dicyclomine (BENTYL) 20 mg tablet, Take 1 tablet (20 mg total) by mouth every 6 (six) hours as needed (abd cramping), Disp: 90 tablet, Rfl: 3    famotidine (PEPCID) 40 MG tablet, Take 1 tablet (40 mg total) by mouth daily at bedtime as needed for heartburn, Disp: 30 tablet, Rfl: 3    fexofenadine (ALLEGRA) 180 MG tablet, Take 180 mg by mouth as needed Daily during the Summer, Disp: , Rfl:     FLUoxetine (PROzac) 10 mg capsule, Take 10 mg by mouth daily , Disp: , Rfl:     fluticasone (FLONASE) 50 mcg/act nasal spray, 2 sprays into each nostril daily, Disp: , Rfl:     fluticasone-vilanterol (Breo Ellipta) 200-25 MCG/INH inhaler, Inhale 1 puff daily Rinse mouth after use , Disp: 180 blister, Rfl: 3    ipratropium (Atrovent HFA) 17 mcg/act inhaler, Inhale 2 puffs 4 (four) times a day, Disp: 38 7 g, Rfl: 3    lithium carbonate (LITHOBID) 300 mg CR tablet, Take 300 mg by mouth One tab by mouth every other day and alternating with 2 tabs every other day at bedtime  , Disp: , Rfl:     metoprolol succinate (TOPROL-XL) 25 mg 24 hr tablet, TAKE 1 TABLET BY MOUTH  DAILY, Disp: 90 tablet, Rfl: 3    mupirocin (BACTROBAN) 2 % ointment, Apply topically 2 (two) times a day as needed (rash), Disp: 22 g, Rfl: 0    nitroglycerin (NITRODUR) 0 2 mg/hr, APPLY 1 PATCH DAILY (OFF  FOR 12 HOURS) (Patient taking differently: as needed ), Disp: 90 patch, Rfl: 3    omega-3-acid ethyl esters (LOVAZA) 1 g capsule, TAKE 1 CAPSULE BY MOUTH 3  TIMES DAILY, Disp: 270 capsule, Rfl: 5    omeprazole (PriLOSEC) 20 mg delayed release capsule, TAKE 1 CAPSULE BY MOUTH  DAILY, Disp: 90 capsule, Rfl: 3      Physical Exam:   There were no vitals taken for this visit  GEN: no acute distress    RESP: breathing comfortably with no accessory muscle use    ABD: soft, non-tender, non-distended   INCISION:    EXT: no significant peripheral edema       RADIOLOGY:    none     Assessment:     1  Prostate cancer   2   Urinary frequency     Plan:   - will try Myrbetriq 25 mg  - follow-up in 6 months  -  -

## 2022-01-19 ENCOUNTER — OFFICE VISIT (OUTPATIENT)
Dept: INTERNAL MEDICINE CLINIC | Facility: CLINIC | Age: 77
End: 2022-01-19
Payer: MEDICARE

## 2022-01-19 VITALS
DIASTOLIC BLOOD PRESSURE: 82 MMHG | BODY MASS INDEX: 26.21 KG/M2 | SYSTOLIC BLOOD PRESSURE: 134 MMHG | OXYGEN SATURATION: 97 % | HEIGHT: 67 IN | HEART RATE: 61 BPM | WEIGHT: 167 LBS

## 2022-01-19 DIAGNOSIS — S81.811A SKIN TEAR OF RIGHT LOWER LEG WITHOUT COMPLICATION, INITIAL ENCOUNTER: Primary | ICD-10-CM

## 2022-01-19 PROCEDURE — 99213 OFFICE O/P EST LOW 20 MIN: CPT | Performed by: INTERNAL MEDICINE

## 2022-01-19 NOTE — PATIENT INSTRUCTIONS
Problem List Items Addressed This Visit        Musculoskeletal and Integument    Skin tear of right lower leg without complication - Primary     Patient has scabbing of the skin tear with very minimal erythema surrounding it, no signs of cellulitis, it is healing well  No need for antibiotics     Discussed with patient and wife signs of cellulitis, what to look for, and to call if they were to develop         Relevant Medications    mupirocin (BACTROBAN) 2 % ointment

## 2022-01-19 NOTE — ASSESSMENT & PLAN NOTE
Patient has scabbing of the skin tear with very minimal erythema surrounding it, no signs of cellulitis, it is healing well  No need for antibiotics     Discussed with patient and wife signs of cellulitis, what to look for, and to call if they were to develop

## 2022-01-19 NOTE — PROGRESS NOTES
Assessment/Plan:    Skin tear of right lower leg without complication  Patient has scabbing of the skin tear with very minimal erythema surrounding it, no signs of cellulitis, it is healing well  No need for antibiotics  Discussed with patient and wife signs of cellulitis, what to look for, and to call if they were to develop       Diagnoses and all orders for this visit:    Skin tear of right lower leg without complication, initial encounter  -     mupirocin (BACTROBAN) 2 % ointment; Apply topically 2 (two) times a day as needed (wound)          Subjective:      Patient ID: Stephany Hill is a 78 y o  male  BMI Counseling: Body mass index is 26 16 kg/m²  The BMI is above normal  Nutrition recommendations include encouraging healthy choices of fruits and vegetables and moderation in carbohydrate intake  Exercise recommendations include exercising 3-5 times per week  Rationale for BMI follow-up plan is due to patient being overweight or obese  Patient here for questions about wound on right shin and lower leg  No fevers or chills  He does not remember bumping it on anything, no significant pain in area, no itching in area      The following portions of the patient's history were reviewed and updated as appropriate: allergies, current medications, past family history, past medical history, past social history, past surgical history and problem list     Review of Systems   Constitutional: Negative for chills and fever  Skin: Positive for wound  Objective:      /82 (BP Location: Left arm, Patient Position: Sitting, Cuff Size: Standard)   Pulse 61   Ht 5' 7" (1 702 m)   Wt 75 8 kg (167 lb)   SpO2 97%   BMI 26 16 kg/m²          Physical Exam  Musculoskeletal:      Right lower leg: Edema (trace) present  Left lower leg: Edema (trace) present  Skin:     Comments: Small scab right shin, no drainage, minimal surrounding erythema

## 2022-01-27 ENCOUNTER — TELEPHONE (OUTPATIENT)
Dept: PULMONOLOGY | Facility: CLINIC | Age: 77
End: 2022-01-27

## 2022-02-02 ENCOUNTER — OFFICE VISIT (OUTPATIENT)
Dept: NEUROLOGY | Facility: CLINIC | Age: 77
End: 2022-02-02
Payer: MEDICARE

## 2022-02-02 ENCOUNTER — APPOINTMENT (OUTPATIENT)
Dept: LAB | Facility: CLINIC | Age: 77
End: 2022-02-02
Payer: MEDICARE

## 2022-02-02 ENCOUNTER — TRANSCRIBE ORDERS (OUTPATIENT)
Dept: LAB | Facility: CLINIC | Age: 77
End: 2022-02-02

## 2022-02-02 VITALS
HEIGHT: 67 IN | SYSTOLIC BLOOD PRESSURE: 131 MMHG | WEIGHT: 167 LBS | BODY MASS INDEX: 26.21 KG/M2 | TEMPERATURE: 96.7 F | DIASTOLIC BLOOD PRESSURE: 73 MMHG | HEART RATE: 84 BPM

## 2022-02-02 DIAGNOSIS — G20 PARKINSONISM, UNSPECIFIED PARKINSONISM TYPE (HCC): Primary | ICD-10-CM

## 2022-02-02 DIAGNOSIS — F31.60 MIXED BIPOLAR I DISORDER (HCC): Primary | ICD-10-CM

## 2022-02-02 DIAGNOSIS — F31.60 MIXED BIPOLAR I DISORDER (HCC): ICD-10-CM

## 2022-02-02 LAB
BUN SERPL-MCNC: 37 MG/DL (ref 5–25)
CREAT SERPL-MCNC: 1.77 MG/DL (ref 0.6–1.3)
GFR SERPL CREATININE-BSD FRML MDRD: 37 ML/MIN/1.73SQ M
LITHIUM SERPL-SCNC: 1.3 MMOL/L (ref 0.5–1)
TSH SERPL DL<=0.05 MIU/L-ACNC: 2.6 UIU/ML (ref 0.36–3.74)

## 2022-02-02 PROCEDURE — 80178 ASSAY OF LITHIUM: CPT

## 2022-02-02 PROCEDURE — 82565 ASSAY OF CREATININE: CPT

## 2022-02-02 PROCEDURE — 84520 ASSAY OF UREA NITROGEN: CPT

## 2022-02-02 PROCEDURE — 99214 OFFICE O/P EST MOD 30 MIN: CPT | Performed by: PHYSICIAN ASSISTANT

## 2022-02-02 PROCEDURE — 84443 ASSAY THYROID STIM HORMONE: CPT

## 2022-02-02 PROCEDURE — 36415 COLL VENOUS BLD VENIPUNCTURE: CPT

## 2022-02-02 NOTE — PATIENT INSTRUCTIONS
Patient with multifactorial gait disorder  His brain MRI revealed progressive white matter changes consistent with chronic microangiopathic disease  He continues to have mild parkinsonism on exam however LE slowness and gait issues remain the more predominant feature  This along with his MRI findings suggest perhaps an underlying vascular parkinsonism  He also remains on Lithium which could certainly be contributing to some of his parkinsonian symptoms including his hand tremors  Discussed the options at this time including 1  Continuing to monitor for any changes or progression of symptoms 2  Start a trial of low dose Sinemet to see if this would provide any benefit 3  Get a DaTscan to help clarify if symptoms are related to an underlying degenerative process versus Lithium use (would likely need to hold the Lithium for a period of time prior to study)  For now given symptoms remain stable and mild, the patient would like to hold off on starting any new medication and continue to watch for progression  Discussed the importance of getting regular exercise and activity  He would like to hold off on formal PT for now however may be interested in the future       Stroke preventions discussed including:  - Remaining on ASA daily  - BP goal < 130/80  Continue BP medications, pressure was good in the office today  - LDL goal less then 70  On Lipitor  No recent LDL in chart, however there is an order for one  - Will defer to PCP regarding management and monitoring of blood pressure, cholesterol     - Patient does not smoke and discussed the importance of continued smoking cessation    - Advised patient to avoid using NSAIDs (Motrin, Ibuprofen) for headaches or other pain mild pain and to use Tylenol instead  - Recommend mediterranean diet & regular exercise at least 4-5 times a week for 20-30 minutes

## 2022-02-02 NOTE — PROGRESS NOTES
Patient ID: Campos Burch is a 78 y o  male  Assessment/Plan:    Parkinsonism [facility])  Patient with multifactorial gait disorder  His brain MRI revealed progressive white matter changes consistent with chronic microangiopathic disease  He continues to have mild parkinsonism on exam however LE slowness and gait issues remain the more predominant feature  This along with his MRI findings suggest perhaps an underlying vascular parkinsonism  He also remains on Lithium which could certainly be contributing to some of his parkinsonian symptoms including his hand tremors  He has been on this medication for years and per the wife he is not able to stop it from a psych standpoint  He most recent Lithium level was elevated  Discussed the options at this time including 1  Continuing to monitor for any changes or progression of symptoms 2  Start a trial of low dose Sinemet to see if this would provide any benefit 3  Get a DaTscan to help clarify if symptoms are related to an underlying degenerative process versus Lithium use (would likely need to hold the Lithium for a period of time prior to study)  For now given symptoms remain stable and mild, the patient would like to hold off on starting any new medication and continue to watch for progression  Would get a MoCA at follow up for baseline  Discussed the importance of getting regular exercise and activity  He would like to hold off on formal PT for now however may be interested in the future       Stroke preventions discussed including:  - Remaining on ASA daily  - BP goal < 130/80  Continue BP medications, pressure was good in the office today  - LDL goal less then 70  On Lipitor  No recent LDL in chart, however there is an order for one      - Will defer to PCP regarding management and monitoring of blood pressure, cholesterol     - Patient does not smoke and discussed the importance of continued smoking cessation    - Advised patient to avoid using NSAIDs (Motrin, Ibuprofen) for headaches or other pain mild pain and to use Tylenol instead  - Recommend mediterranean diet & regular exercise at least 4-5 times a week for 20-30 minutes  Subjective:    Key Lopez is a right handed 77-year-old male with past medical history of bipolar depression treated with Lithium, afib, cervical and lumbar radiculopathy and left trochanteric bursitis who presents as a follow up for difficulty with gait  To review, he has longstanding gait dysfunction and was evaluated for this in our office back in 2016  His gait dysfunction has contributions from his cervical and lumbar degenerative disease, prior myelopathy and possible peripheral neuropathy and various musculoskeletal issues   Patient is on lithium for his bipolar disorder   He initially did not have any typical non motor symptoms we would expect to accompany idiopathic Parkinson's disease   It was felt best to monitor for progression with time given his polypharmacy  Brain MRI with progression of his small vessel disease felt likely to be the cause of his gait issues secondary to an underlying vascular parkinsonism  At his last visit he had some mild parkinsonian symptoms on exam however he felt that he was doing well   Question remained as to underlying vascular parkinsonism versus secondary from [place]        INTERVAL HISTORY:  Overall he is stable no significant change since the last visit   He does notice right hand tremor at times, perhaps slightly worse than before   Tremor comes and goes, he notices it more in the middle of the night if he wakes   He has some imbalance and shuffling however this is the same as in the past   He does use a cane at times   He did fall off a chair and struggled with getting up from the fall   He continues to try and walk regularly, no PT at this time   He can preform all of his ADLs on his own   No issues with swallowing   He sleeps well at night for the most part, some nights he will struggle however   He remains on Lithium for 10-11 years for his Bipolar, recent lithium level was slightly high   He does follow with psychiatry      Imaging:  Brain MRI  - No acute intracranial abnormality   Chronic lacunar infarct left centrum semiovale   Progressive cerebral volume loss and progressive white matter foci in periventricular regions compatible with with chronic microangiopathic disease  I personally reviewed and updated the ROS  Total time spent today was 50 minutes  Greater than 50% of total time was spent with the patient and / or family counseling and / or coordinating plan of care  Objective:    Blood pressure 131/73, pulse 84, temperature (!) 96 7 °F (35 9 °C), temperature source Temporal, height 5' 7" (1 702 m), weight 75 8 kg (167 lb)  Physical Exam  Constitutional:       General: He is awake  HENT:      Right Ear: Hearing normal       Left Ear: Hearing normal    Eyes:      General: Lids are normal       Extraocular Movements: Extraocular movements intact  Pupils: Pupils are equal, round, and reactive to light  Pulmonary:      Effort: Pulmonary effort is normal    Neurological:      Mental Status: He is alert  Psychiatric:         Speech: Speech normal          Neurological Exam  Mental Status  Awake and alert  Oriented to person, place and time  Oriented only to person and place  Speech is normal     Cranial Nerves  CN III, IV, VI: Extraocular movements intact bilaterally  Normal lids and orbits bilaterally  Pupils equal round and reactive to light bilaterally  CN V:  Right: Facial sensation is normal   Left: Facial sensation is normal on the left    CN VIII:  Right: Hearing is normal   Left: Hearing is normal   CN XI: Shoulder shrug strength is normal     Motor    Slight rigidity in the right upper extremity  No decrement or hesitations noted with finger taps and hand , mild right sided bradykinesia with ANDREINA  Mild bradykinesia with bilateral heel taps   Coordination  Mild postural tremor on the right and very slight on the left  Mild action tremors with finger to nose testing bilaterally   Intermittent resting tremor on the right   No leg tremor, no head tremor   Gait  Walks with cane   Pronation at the ankle   Reduced step height bilaterally   Imbalance noted on turn, able to catch himself   Date of exam:   2/4/22        Speech  0 1   Facial Expression      Rigidity - Neck      Rigidity - Upper Extremity (Right)  1 1   Rigidity - Upper Extremity (Left)   0 0   Rigidity - Lower Extremity (Right)  0 0   Rigidity - Lower Extremity (Left)   0 0   Finger Taps (Right)   1 1   Finger Taps (Left)   0 0   Hand  (Right)  0 0   Hand  (Left)   0 0   Pronation/Supination (Right)  1 1   Pronation/Supination (Left)   0 1   Heel Taps (Right) 1 1   Heel Taps(Left) 1 1   Arising from Chair   1 1   Gait   2 2   Postural Stability   1 1   Posture 1 1   Global spontaneity of movement 1 1   Postural Tremor (Right) 1 1   Postural Tremor (Left) 1 1   Kinetic Tremor (Right)  0 0   Kinetic Tremor (Left)  0 0   Rest tremor  RUE 0 0   Rest tremor  LUE 0 0   Rest tremor  RLE 0 0   Reset tremor  LLE 0 0   Lip/Jaw Tremor      Motor Exam Total:            ROS:    Review of Systems   Constitutional: Negative  Negative for appetite change and fever  HENT: Negative  Negative for hearing loss, tinnitus, trouble swallowing and voice change  Eyes: Negative  Negative for photophobia and pain  Respiratory: Negative  Negative for shortness of breath  Cardiovascular: Negative  Negative for palpitations  Gastrointestinal: Negative  Negative for nausea and vomiting  Endocrine: Negative  Negative for cold intolerance  Genitourinary: Negative  Negative for dysuria, frequency and urgency  Musculoskeletal: Positive for gait problem (shuffles  )  Negative for myalgias and neck pain  Skin: Negative    Negative for rash    Neurological: Positive for tremors (Progressing in the RT hand, wrist )  Negative for dizziness, seizures, syncope, facial asymmetry, speech difficulty, weakness, light-headedness, numbness and headaches  Hematological: Negative  Does not bruise/bleed easily  Psychiatric/Behavioral: Positive for confusion  Negative for hallucinations and sleep disturbance

## 2022-02-03 ENCOUNTER — TRANSCRIBE ORDERS (OUTPATIENT)
Dept: ADMINISTRATIVE | Facility: HOSPITAL | Age: 77
End: 2022-02-03

## 2022-02-03 DIAGNOSIS — Z85.118 HISTORY OF LUNG CANCER: Primary | ICD-10-CM

## 2022-02-03 DIAGNOSIS — F31.60 MIXED BIPOLAR I DISORDER (HCC): ICD-10-CM

## 2022-02-04 NOTE — ASSESSMENT & PLAN NOTE
Patient with multifactorial gait disorder  His brain MRI revealed progressive white matter changes consistent with chronic microangiopathic disease  He continues to have mild parkinsonism on exam however LE slowness and gait issues remain the more predominant feature  This along with his MRI findings suggest perhaps an underlying vascular parkinsonism  He also remains on Lithium which could certainly be contributing to some of his parkinsonian symptoms including his hand tremors  He has been on this medication for years and per the wife he is not able to stop it from a psych standpoint  He most recent Lithium level was elevated  Discussed the options at this time including 1  Continuing to monitor for any changes or progression of symptoms 2  Start a trial of low dose Sinemet to see if this would provide any benefit 3  Get a DaTscan to help clarify if symptoms are related to an underlying degenerative process versus Lithium use (would likely need to hold the Lithium for a period of time prior to study)  For now given symptoms remain stable and mild, the patient would like to hold off on starting any new medication and continue to watch for progression  Would get a MoCA at follow up for baseline  Discussed the importance of getting regular exercise and activity  He would like to hold off on formal PT for now however may be interested in the future       Stroke preventions discussed including:  - Remaining on ASA daily  - BP goal < 130/80  Continue BP medications, pressure was good in the office today  - LDL goal less then 70  On Lipitor  No recent LDL in chart, however there is an order for one  - Will defer to PCP regarding management and monitoring of blood pressure, cholesterol     - Patient does not smoke and discussed the importance of continued smoking cessation    - Advised patient to avoid using NSAIDs (Motrin, Ibuprofen) for headaches or other pain mild pain and to use Tylenol instead  - Recommend mediterranean diet & regular exercise at least 4-5 times a week for 20-30 minutes

## 2022-02-08 ENCOUNTER — TELEPHONE (OUTPATIENT)
Dept: GASTROENTEROLOGY | Facility: HOSPITAL | Age: 77
End: 2022-02-08

## 2022-02-09 ENCOUNTER — TELEPHONE (OUTPATIENT)
Dept: GASTROENTEROLOGY | Facility: MEDICAL CENTER | Age: 77
End: 2022-02-09

## 2022-02-09 ENCOUNTER — HOSPITAL ENCOUNTER (OUTPATIENT)
Dept: GASTROENTEROLOGY | Facility: HOSPITAL | Age: 77
Setting detail: OUTPATIENT SURGERY
Discharge: HOME | End: 2022-02-09
Attending: INTERNAL MEDICINE | Admitting: INTERNAL MEDICINE
Payer: MEDICARE

## 2022-02-09 ENCOUNTER — ANESTHESIA EVENT (OUTPATIENT)
Dept: GASTROENTEROLOGY | Facility: HOSPITAL | Age: 77
End: 2022-02-09

## 2022-02-09 ENCOUNTER — ANESTHESIA (OUTPATIENT)
Dept: GASTROENTEROLOGY | Facility: HOSPITAL | Age: 77
End: 2022-02-09

## 2022-02-09 VITALS
TEMPERATURE: 96 F | HEART RATE: 48 BPM | SYSTOLIC BLOOD PRESSURE: 102 MMHG | OXYGEN SATURATION: 98 % | RESPIRATION RATE: 18 BRPM | DIASTOLIC BLOOD PRESSURE: 66 MMHG

## 2022-02-09 DIAGNOSIS — K21.9 GASTROESOPHAGEAL REFLUX DISEASE, UNSPECIFIED WHETHER ESOPHAGITIS PRESENT: ICD-10-CM

## 2022-02-09 PROCEDURE — 88305 TISSUE EXAM BY PATHOLOGIST: CPT | Performed by: SPECIALIST

## 2022-02-09 PROCEDURE — 43239 EGD BIOPSY SINGLE/MULTIPLE: CPT | Performed by: INTERNAL MEDICINE

## 2022-02-09 RX ORDER — SODIUM CHLORIDE 9 MG/ML
INJECTION, SOLUTION INTRAVENOUS CONTINUOUS PRN
Status: DISCONTINUED | OUTPATIENT
Start: 2022-02-09 | End: 2022-02-09

## 2022-02-09 RX ORDER — PROPOFOL 10 MG/ML
INJECTION, EMULSION INTRAVENOUS AS NEEDED
Status: DISCONTINUED | OUTPATIENT
Start: 2022-02-09 | End: 2022-02-09

## 2022-02-09 RX ORDER — PROPOFOL 10 MG/ML
INJECTION, EMULSION INTRAVENOUS CONTINUOUS PRN
Status: DISCONTINUED | OUTPATIENT
Start: 2022-02-09 | End: 2022-02-09

## 2022-02-09 RX ADMIN — PROPOFOL 120 MCG/KG/MIN: 10 INJECTION, EMULSION INTRAVENOUS at 08:51

## 2022-02-09 RX ADMIN — PROPOFOL 110 MG: 10 INJECTION, EMULSION INTRAVENOUS at 08:51

## 2022-02-09 RX ADMIN — SODIUM CHLORIDE: 9 INJECTION, SOLUTION INTRAVENOUS at 08:46

## 2022-02-09 NOTE — ANESTHESIA POSTPROCEDURE EVALUATION
Post-Op Assessment Note    CV Status:  Stable  Pain Score: 0    Pain management: adequate     Mental Status:  Alert and awake   Hydration Status:  Euvolemic   PONV Controlled:  Controlled   Airway Patency:  Patent      Post Op Vitals Reviewed: Yes      Staff: Anesthesiologist, CRNA         No complications documented      BP      Temp  96   Pulse 48   Resp   14   SpO2   98/54

## 2022-02-09 NOTE — TELEPHONE ENCOUNTER
----- Message from Michael Berger MD sent at 2/9/2022  9:30 AM EST -----  Please schedule office appointment with me in 2 months  Thank you

## 2022-02-09 NOTE — H&P
History and Physical -  [facility]  Karla Malone 78 y o  male MRN: [id number]    HPI: Karla Malone is a 78y o  year old male who presents with GERD  Review of Systems    Historical Information   Past Medical History:   Diagnosis Date    Aortic aneurysm (HCC)     Benign colon polyp     Bipolar 1 disorder ([facility] )     Cardiac disease     MI    [facility]ervical cord compression with myelopathy ([facility] )     COPD (chronic obstructive pulmonary disease) (HCC)     Diverticulitis     Diverticulosis     Gait disturbance     uses cane, leg brace on right    GERD (gastroesophageal reflux disease)     [facility] 1996    Hx of resection of large bowel 5/3/2016    Hyperlipidemia     Hypertension     IBS (irritable bowel syndrome)     Lumbar stenosis     Lung cancer [facility]     2007 Left lower lobectomy and 2011 Right lung with surgery     Myocardial infarction [facility])     involving other coronary artery    Prostate cancer ([facility] )     Shortness of breath     Small bowel obstruction ([place] [address] ) 11/16/2016     Past Surgical History:   Procedure Laterality Date    ANGIOPLASTY      stent    CARDIAC SURGERY      CERVICAL SPINE SURGERY      Cervical decompression with cervical fusion from C3-C7 for spinal stenosis      COLON SURGERY  11/04/2014    ESOPHAGOGASTRODUODENOSCOPY  01/03/2013    with possible Schatzki's ring & small hiatal hernia, mild gastritis    FL INJECTION LEFT HIP (NON ARTHROGRAM)  12/11/2018    FL INJECTION LEFT HIP (NON ARTHROGRAM)  5/9/2019    IR BIOPSY LUNG  7/6/2020    IR EVAR  8/6/2018    LUNG CANCER SURGERY Right 03/2011    wedge resection for lung tumor, 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    LUNG LOBECTOMY Left     ID ARTHRODESIS ANT INTERBODY MIN DISCECTOMY, CERVICAL BELOW C2 N/A 5/2/2016    Procedure: Anterior cervical diskectomy C3/4, C5/6, C6/7 with anterior plate fixation fusion C3-7;  Posterior decompressive laminectomy C3-7 with lateral mass fixation fusion C3-7 (IMPULSE MONITORING); Surgeon: Yamilka Haines MD;  [facility];  Service: Neurosurgery    VT ARTHRODESIS POSTERIOR INTERBODY LUMBAR N/A 2017    Procedure: L4-5 AND L5-S1 DECOMPRESSIVE FORAMINOTOMIES, TRANSFORAMINAL LUMBAR INTERBODY AND PEDICLE SCREW FIXATION FUSION L4-S1 (IMPULSE); Surgeon: Yamilka Haines MD;  [facility];  Service: Neurosurgery    VT [address] RPR DPLMNT AORTO-AORTIC NDGFT N/A 2018    Procedure: REPAIR ANEURYSM ENDOVASCULAR ABDOMINAL AORTIC  (EVAR) WITH BILATERAL PERCUTANEOUS FEMORAL ACCESS WITH ULTRASOUND GUIDANCE ON THE RIGHT AND PRE CLOSURE;  Surgeon: Chino Bolaños MD;  [facility]: [facility];  Service: Vascular    PROSTATECTOMY      for prostate CA - no chemo/RT    SIGMOIDECTOMY      for divertic    SMALL INTESTINE SURGERY N/A 2016    Procedure: Exploratory Laparotomy, Lysis of adhesions to release small bowel obstruction;  Surgeon: Albino Aviles MD;  [facility];  Service:      Social History   Social History     Substance and Sexual Activity   Alcohol Use Yes    Comment: 6 drinks a week     Social History     Substance and Sexual Activity   Drug Use No     Social History     Tobacco Use   Smoking Status Former Smoker    Packs/day: 1 00    Years: 35 00    Pack years: 35 00    Types: Cigarettes    Start date: 5    Quit date:     Years since quittin 1   Smokeless Tobacco Never Used     Family History   Problem Relation Age of Onset    Heart attack Father     Colon cancer Father     Coronary artery disease Father     Heart disease Family     Hyperlipidemia Family     Hypertension Family        Meds/Allergies     (Not in a hospital admission)      Allergies   Allergen Reactions    Bactrim [Sulfamethoxazole-Trimethoprim] Other (See Comments)     AILYN    Nsaids      Annotation - [id number]: unable to take due to use of lithium      Oxycodone Rash       Objective     /79   Pulse (!) 52   Temp (!) 96 1 °F (35 6 °C) (Tympanic)   Resp 15   SpO2 99%       PHYSICAL EXAM    Gen: NAD  CV: RRR  CHEST: Clear  ABD: soft, NT/ND  EXT: no edema  Neuro: AAO      ASSESSMENT/PLAN:  This is a 78y o  year old male here for EGD for evaluation of GERD       PLAN:   Procedure: EGD

## 2022-02-09 NOTE — ANESTHESIA PREPROCEDURE EVALUATION
Procedure:  EGD    Relevant Problems   CARDIO   (+) Aneurysm of infrarenal abdominal aorta (HCC)   (+) Arrhythmia   (+) CAD (coronary artery disease)   (+) Essential hypertension   (+) Mixed hyperlipidemia   (+) New onset atrial fibrillation (HCC)      GI/HEPATIC   (+) Gastroesophageal reflux disease      /RENAL   (+) Adenocarcinoma of prostate (HCC)   (+) Stage 3a chronic kidney disease (HCC)      MUSCULOSKELETAL   (+) Degenerative disc disease, lumbar   (+) Primary osteoarthritis of left hip      NEURO/PSYCH   (+) Cervical myelopathy (HCC)   (+) Myelopathy concurrent with and due to lumbosacral intervertebral disc disorder      PULMONARY   (+) COPD (chronic obstructive pulmonary disease) (HCC)      Adequately NPO for procedure  Good functional capacity  Walks daily  No prior anesthesia complications  Hx of cervical fusion, poor neck extension  Physical Exam    Airway    Mallampati score: III  TM Distance: >3 FB  Neck ROM: limited     Dental   No notable dental hx     Cardiovascular  Rhythm: regular, Rate: normal,     Pulmonary  Pulmonary exam normal     Other Findings        Anesthesia Plan  ASA Score- 3     Anesthesia Type- IV sedation with anesthesia with ASA Monitors  Additional Monitors:   Airway Plan:     Comment: Spontaneous with supplemental O2  Plan Factors-Exercise tolerance (METS): >4 METS  Chart reviewed  EKG reviewed  Existing labs reviewed  Patient summary reviewed  Patient is not a current smoker  Patient did not smoke on day of surgery  Obstructive sleep apnea risk education given perioperatively  Induction- intravenous  Postoperative Plan-     Informed Consent- Anesthetic plan and risks discussed with patient and spouse  I personally reviewed this patient with the CRNA  Discussed and agreed on the Anesthesia Plan with the CRNA Gerhardt Sep

## 2022-02-15 ENCOUNTER — OFFICE VISIT (OUTPATIENT)
Dept: OBGYN CLINIC | Facility: HOSPITAL | Age: 77
End: 2022-02-15
Payer: MEDICARE

## 2022-02-15 VITALS
SYSTOLIC BLOOD PRESSURE: 125 MMHG | WEIGHT: 164.6 LBS | DIASTOLIC BLOOD PRESSURE: 80 MMHG | HEIGHT: 67 IN | HEART RATE: 61 BPM | BODY MASS INDEX: 25.83 KG/M2

## 2022-02-15 DIAGNOSIS — M70.62 TROCHANTERIC BURSITIS OF LEFT HIP: Primary | ICD-10-CM

## 2022-02-15 PROCEDURE — 99213 OFFICE O/P EST LOW 20 MIN: CPT | Performed by: ORTHOPAEDIC SURGERY

## 2022-02-15 PROCEDURE — 20610 DRAIN/INJ JOINT/BURSA W/O US: CPT | Performed by: ORTHOPAEDIC SURGERY

## 2022-02-15 RX ORDER — LIDOCAINE HYDROCHLORIDE 10 MG/ML
2 INJECTION, SOLUTION INFILTRATION; PERINEURAL
Status: COMPLETED | OUTPATIENT
Start: 2022-02-15 | End: 2022-02-15

## 2022-02-15 RX ORDER — BUPIVACAINE HYDROCHLORIDE 2.5 MG/ML
2 INJECTION, SOLUTION INFILTRATION; PERINEURAL
Status: COMPLETED | OUTPATIENT
Start: 2022-02-15 | End: 2022-02-15

## 2022-02-15 RX ORDER — BETAMETHASONE SODIUM PHOSPHATE AND BETAMETHASONE ACETATE 3; 3 MG/ML; MG/ML
12 INJECTION, SUSPENSION INTRA-ARTICULAR; INTRALESIONAL; INTRAMUSCULAR; SOFT TISSUE
Status: COMPLETED | OUTPATIENT
Start: 2022-02-15 | End: 2022-02-15

## 2022-02-15 RX ADMIN — BETAMETHASONE SODIUM PHOSPHATE AND BETAMETHASONE ACETATE 12 MG: 3; 3 INJECTION, SUSPENSION INTRA-ARTICULAR; INTRALESIONAL; INTRAMUSCULAR; SOFT TISSUE at 09:42

## 2022-02-15 RX ADMIN — LIDOCAINE HYDROCHLORIDE 2 ML: 10 INJECTION, SOLUTION INFILTRATION; PERINEURAL at 09:42

## 2022-02-15 RX ADMIN — BUPIVACAINE HYDROCHLORIDE 2 ML: 2.5 INJECTION, SOLUTION INFILTRATION; PERINEURAL at 09:42

## 2022-02-15 NOTE — PROGRESS NOTES
Assessment:   Diagnosis ICD-10-CM Associated Orders   1  Trochanteric bursitis of left hip  M70 64 Large joint arthrocentesis: L greater trochanteric bursa       Plan:  Diagnosis, treatment options and associated risks were discussed with the patient including no treatment, nonsurgical treatment and potential for surgical intervention  The patient was given the opportunity to ask questions regarding each  It was explained to the patient that based upon the clinical and radiographic findings, at this point in time, this is not a surgical problem  Treatment for the above diagnoses and associated symptoms of this nature is generally conservative including a physician directed physical therapy program, improved fitness/weight loss, anti-inflammatories, and potentially various injections  Sondra Rosey was offered, accepted, performed injection of cortisone to his left hip trochanteric bursa area today for symptomatic relief  He tolerated the procedure well  Ice and post injection protocol advised  Weightbearing activities as tolerated  To do next visit:  Return in about 3 months (around 5/15/2022) for re-check  The above stated was discussed in layman's terms and the patient expressed understanding  All questions were answered to the patient's satisfaction  Scribe Attestation    I,:  Claudell Alias am acting as a scribe while in the presence of the attending physician :       I,:  Radha Harrell MD personally performed the services described in this documentation    as scribed in my presence :             Subjective:   Tiesha Morales is a 78 y o  male who presents today with his wife for repeat evaluation of his left hip due to return of pain  His pain is laterally  He does find relief substantially with an injection of cortisone every 3 months or so to his left hip trochanteric bursa area  He presents using a single-point cane for ambulatory assistance  He does have Parkinson's    He has a history of lumbar spine fusion and denies any numbness or tingling in his left lower extremity  He denies any groin pain he does remain active and enjoys walking  Review of systems negative unless otherwise specified in HPI  Review of Systems    Past Medical History:   Diagnosis Date    Aortic aneurysm ([facility] )     Benign colon polyp     Bipolar 1 disorder ([facility] )     Cardiac disease     MI    Cervical cord compression with myelopathy ([facility] )     COPD (chronic obstructive pulmonary disease) (HCC)     Diverticulitis     Diverticulosis     Gait disturbance     uses cane, leg brace on right    GERD (gastroesophageal reflux disease)     Heart attack ([facility] ) 1996    Hx of resection of large bowel 5/3/2016    Hyperlipidemia     Hypertension     IBS (irritable bowel syndrome)     Lumbar stenosis     Lung cancer ([facility] )     2007 Left lower lobectomy and 2011 Right lung with surgery     Myocardial infarction [facility])     involving other coronary artery    Prostate cancer ([facility] )     Shortness of breath     Small bowel obstruction ([facility] ) 11/16/2016       Past Surgical History:   Procedure Laterality Date    ANGIOPLASTY      stent    CARDIAC SURGERY      CERVICAL SPINE SURGERY      Cervical decompression with cervical fusion from C3-C7 for spinal stenosis      COLON SURGERY  11/04/2014    ESOPHAGOGASTRODUODENOSCOPY  01/03/2013    with possible Schatzki's ring & small hiatal hernia, mild gastritis    FL INJECTION LEFT HIP (NON ARTHROGRAM)  12/11/2018    FL INJECTION LEFT HIP (NON ARTHROGRAM)  5/9/2019    IR BIOPSY LUNG  7/6/2020    IR EVAR  8/6/2018    LUNG CANCER SURGERY Right 03/2011    wedge resection for lung tumor, 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    LUNG LOBECTOMY Left     WI ARTHRODESIS ANT INTERBODY MIN DISCECTOMY, CERVICAL BELOW C2 N/A 5/2/2016    Procedure: Anterior cervical diskectomy C3/4, C5/6, C6/7 with anterior plate fixation fusion C3-7;  Posterior decompressive laminectomy C3-7 with lateral mass fixation fusion C3-7 (IMPULSE MONITORING); Surgeon: Debora Orta MD;  [facility];  Service: Neurosurgery    FL ARTHRODESIS POSTERIOR INTERBODY LUMBAR N/A 2017    Procedure: L4-5 AND L5-S1 DECOMPRESSIVE FORAMINOTOMIES, TRANSFORAMINAL LUMBAR INTERBODY AND PEDICLE SCREW FIXATION FUSION L4-S1 (IMPULSE);   Surgeon: Debora Orta MD;  [facility];  Service: Neurosurgery    [place] [address] RPR DPLMNT AORTO-AORTIC NDGFT N/A 2018    Procedure: REPAIR ANEURYSM ENDOVASCULAR ABDOMINAL AORTIC  (EVAR) WITH BILATERAL PERCUTANEOUS FEMORAL ACCESS WITH ULTRASOUND GUIDANCE ON THE RIGHT AND PRE CLOSURE;  Surgeon: Sandra Llanes MD;  [facility];  Service: Vascular    PROSTATECTOMY      for prostate CA - no chemo/RT    SIGMOIDECTOMY      for divertic    SMALL INTESTINE SURGERY N/A 2016    Procedure: Exploratory Laparotomy, Lysis of adhesions to release small bowel obstruction;  Surgeon: Kenrick Diego MD;  [facility];  Service:        Family History   Problem Relation Age of Onset    Heart attack Father     Colon cancer Father     Coronary artery disease Father     Heart disease Family     Hyperlipidemia Family     Hypertension Family        Social History     Occupational History    Occupation: Retired   Tobacco Use    Smoking status: Former Smoker     Packs/day: 1 00     Years: 35 00     Pack years: 35 00     Types: Cigarettes     Start date:      Quit date:      Years since quittin     Smokeless tobacco: Never Used   Vaping Use    Vaping Use: Never used   Substance and Sexual Activity    Alcohol use: Yes     Comment: 6 drinks a week    Drug use: No    Sexual activity: Not on file         Current Outpatient Medications:     acetaminophen (TYLENOL) 500 mg tablet, Take 500 mg by mouth as needed for mild pain , Disp: , Rfl:     albuterol (PROAIR HFA) 90 mcg/act inhaler, Inhale 2 puffs every 6 (six) hours as needed for wheezing, Disp: 3 Inhaler, Rfl: 3    amLODIPine (NORVASC) 2 5 mg tablet, TAKE 1 TABLET BY MOUTH  DAILY, Disp: 90 tablet, Rfl: 3    aspirin (ECOTRIN LOW STRENGTH) 81 mg EC tablet, Take 81 mg by mouth daily, Disp: , Rfl:     atorvastatin (LIPITOR) 40 mg tablet, TAKE 1 TABLET BY MOUTH  DAILY, Disp: 90 tablet, Rfl: 5    Cholecalciferol (VITAMIN D PO), Take 2,000 Units by mouth daily  , Disp: , Rfl:     diazepam (VALIUM) 5 mg tablet, 2 5 mg  , Disp: , Rfl:     Diclofenac Sodium (VOLTAREN) 1 %, Apply 4 g topically 4 (four) times a day, Disp: 350 g, Rfl: 3    dicyclomine (BENTYL) 20 mg tablet, Take 1 tablet (20 mg total) by mouth every 6 (six) hours as needed (abd cramping), Disp: 90 tablet, Rfl: 3    famotidine (PEPCID) 40 MG tablet, Take 1 tablet (40 mg total) by mouth daily at bedtime as needed for heartburn, Disp: 30 tablet, Rfl: 3    fexofenadine (ALLEGRA) 180 MG tablet, Take 180 mg by mouth as needed Daily during the Summer, Disp: , Rfl:     FLUoxetine (PROzac) 10 mg capsule, Take 10 mg by mouth daily , Disp: , Rfl:     fluticasone (FLONASE) 50 mcg/act nasal spray, 2 sprays into each nostril daily, Disp: , Rfl:     fluticasone-vilanterol (Breo Ellipta) 200-25 MCG/INH inhaler, Inhale 1 puff daily Rinse mouth after use , Disp: 180 blister, Rfl: 3    ipratropium (Atrovent HFA) 17 mcg/act inhaler, Inhale 2 puffs 4 (four) times a day, Disp: 38 7 g, Rfl: 3    lithium carbonate (LITHOBID) 300 mg CR tablet, Take 300 mg by mouth One tab by mouth every other day and alternating with 2 tabs every other day at bedtime   , Disp: , Rfl:     metoprolol succinate (TOPROL-XL) 25 mg 24 hr tablet, TAKE 1 TABLET BY MOUTH  DAILY, Disp: 90 tablet, Rfl: 3    Mirabegron ER 25 MG TB24, Take 25 mg by mouth daily at bedtime (Patient not taking: Reported on 2/2/2022 ), Disp: 30 tablet, Rfl: 10    mupirocin (BACTROBAN) 2 % ointment, Apply topically 2 (two) times a day as needed (rash), Disp: 22 g, Rfl: 0    mupirocin (Cedric Tinoco) 2 % ointment, Apply topically 2 (two) times a day as needed (wound), Disp: 22 g, Rfl: 0    nitroglycerin (NITRODUR) 0 2 mg/hr, APPLY 1 PATCH DAILY (OFF  FOR 12 HOURS) (Patient taking differently: as needed ), Disp: 90 patch, Rfl: 3    omega-3-acid ethyl esters (LOVAZA) 1 g capsule, TAKE 1 CAPSULE BY MOUTH 3  TIMES DAILY, Disp: 270 capsule, Rfl: 5    omeprazole (PriLOSEC) 20 mg delayed release capsule, TAKE 1 CAPSULE BY MOUTH  DAILY, Disp: 90 capsule, Rfl: 3    Allergies   Allergen Reactions    Bactrim [Sulfamethoxazole-Trimethoprim] Other (See Comments)     AILYN    Nsaids      Annotation - [id number]: unable to take due to use of lithium   Oxycodone Rash            Vitals:    02/15/22 0904   BP: 125/80   Pulse: 61       Objective:                    Left Hip Exam     Tenderness   The patient is experiencing tenderness in the greater trochanter  Range of Motion   Flexion: 100   External rotation: 30   Internal rotation: 20 (Without groin pain)     Muscle Strength   The patient has normal left hip strength  Other   Erythema: absent  Sensation: normal            Diagnostics, reviewed and taken today if performed as documented:    None performed        Procedures, if performed today:    Large joint arthrocentesis: L greater trochanteric bursa  Universal Protocol:  Consent: Verbal consent obtained  Risks and benefits: risks, benefits and alternatives were discussed  Consent given by: patient  Time out: Immediately prior to procedure a "time out" was called to verify the correct patient, procedure, equipment, support staff and site/side marked as required    Timeout called at: 2/15/2022 9:40 AM   Patient understanding: patient states understanding of the procedure being performed  Site marked: the operative site was marked  Patient identity confirmed: verbally with patient    Supporting Documentation  Indications: pain and diagnostic evaluation   Procedure Details  Location: hip - L greater trochanteric bursa  Preparation: Patient was prepped and draped in the usual sterile fashion  Needle size: 22 G  Ultrasound guidance: no  Approach: lateral  Medications administered: 2 mL lidocaine 1 %; 2 mL bupivacaine 0 25 %; 12 mg betamethasone acetate-betamethasone sodium phosphate 6 (3-3) mg/mL    Patient tolerance: patient tolerated the procedure well with no immediate complications  Dressing:  Sterile dressing applied              Portions of the record may have been created with voice recognition software  Occasional wrong word or "sound a like" substitutions may have occurred due to the inherent limitations of voice recognition software  Read the chart carefully and recognize, using context, where substitutions have occurred

## 2022-02-16 ENCOUNTER — APPOINTMENT (OUTPATIENT)
Dept: LAB | Facility: CLINIC | Age: 77
End: 2022-02-16
Payer: MEDICARE

## 2022-02-16 DIAGNOSIS — F31.60 MIXED BIPOLAR I DISORDER (HCC): ICD-10-CM

## 2022-02-16 DIAGNOSIS — Z85.118 HISTORY OF LUNG CANCER: ICD-10-CM

## 2022-02-16 LAB
BUN SERPL-MCNC: 39 MG/DL (ref 5–25)
CREAT SERPL-MCNC: 1.63 MG/DL (ref 0.6–1.3)
GFR SERPL CREATININE-BSD FRML MDRD: 40 ML/MIN/1.73SQ M
LITHIUM SERPL-SCNC: 1.1 MMOL/L (ref 0.5–1)
TSH SERPL DL<=0.05 MIU/L-ACNC: 1.49 UIU/ML (ref 0.36–3.74)

## 2022-02-16 PROCEDURE — 84520 ASSAY OF UREA NITROGEN: CPT

## 2022-02-16 PROCEDURE — 80178 ASSAY OF LITHIUM: CPT

## 2022-02-16 PROCEDURE — 82565 ASSAY OF CREATININE: CPT

## 2022-02-16 PROCEDURE — 36415 COLL VENOUS BLD VENIPUNCTURE: CPT

## 2022-02-16 PROCEDURE — 84443 ASSAY THYROID STIM HORMONE: CPT

## 2022-02-21 ENCOUNTER — OFFICE VISIT (OUTPATIENT)
Dept: INTERNAL MEDICINE CLINIC | Facility: CLINIC | Age: 77
End: 2022-02-21
Payer: MEDICARE

## 2022-02-21 ENCOUNTER — HOSPITAL ENCOUNTER (OUTPATIENT)
Dept: NON INVASIVE DIAGNOSTICS | Facility: CLINIC | Age: 77
Discharge: HOME | End: 2022-02-21
Payer: MEDICARE

## 2022-02-21 VITALS
WEIGHT: 163.4 LBS | HEIGHT: 67 IN | OXYGEN SATURATION: 99 % | SYSTOLIC BLOOD PRESSURE: 132 MMHG | DIASTOLIC BLOOD PRESSURE: 86 MMHG | BODY MASS INDEX: 25.65 KG/M2 | HEART RATE: 51 BPM

## 2022-02-21 DIAGNOSIS — I50.30 DIASTOLIC HEART FAILURE, UNSPECIFIED HF CHRONICITY (HCC): ICD-10-CM

## 2022-02-21 DIAGNOSIS — I25.2 PAST HISTORY OF MYOCARDIAL INFARCTION: ICD-10-CM

## 2022-02-21 DIAGNOSIS — Z95.5 HISTORY OF HEART ARTERY STENT: ICD-10-CM

## 2022-02-21 DIAGNOSIS — E78.2 MIXED HYPERLIPIDEMIA: ICD-10-CM

## 2022-02-21 DIAGNOSIS — I71.4 ABDOMINAL AORTIC ANEURYSM WITHOUT RUPTURE (HCC): ICD-10-CM

## 2022-02-21 DIAGNOSIS — K21.9 GASTROESOPHAGEAL REFLUX DISEASE WITHOUT ESOPHAGITIS: ICD-10-CM

## 2022-02-21 DIAGNOSIS — R73.09 ABNORMAL BLOOD SUGAR: ICD-10-CM

## 2022-02-21 DIAGNOSIS — I49.3 ASYMPTOMATIC PVCS: ICD-10-CM

## 2022-02-21 DIAGNOSIS — N18.31 STAGE 3A CHRONIC KIDNEY DISEASE (HCC): Primary | ICD-10-CM

## 2022-02-21 DIAGNOSIS — I10 ESSENTIAL HYPERTENSION: ICD-10-CM

## 2022-02-21 DIAGNOSIS — I70.90: ICD-10-CM

## 2022-02-21 PROCEDURE — 93226 XTRNL ECG REC<48 HR SCAN A/R: CPT

## 2022-02-21 PROCEDURE — 99214 OFFICE O/P EST MOD 30 MIN: CPT | Performed by: INTERNAL MEDICINE

## 2022-02-21 PROCEDURE — 93225 XTRNL ECG REC<48 HRS REC: CPT

## 2022-02-21 NOTE — PATIENT INSTRUCTIONS
Problem List Items Addressed This Visit        Digestive    Gastroesophageal reflux disease      I recommend trying to titrate down from every day dosing of omeprazole to every other day dosing to see how he does, and can follow-up GI            Genitourinary    Stage 3a chronic kidney disease ([facility] ) - Primary     Lab Results   Component Value Date    EGFR 40 02/16/2022    EGFR 37 02/02/2022    EGFR 48 12/03/2021    CREATININE 1 63 (H) 02/16/2022    CREATININE 1 77 (H) 02/02/2022    CREATININE 1 43 (H) 12/03/2021    patient not on diuretics, he has been using some Voltaren gel, and he is cutting back on this  He is not taking oral NSAIDs  Patient reports drinking a normal amount of water were this has not decreased lately  Urine flow okay  No black tarry stool  Patient is on lithium  Will get evaluation with Nephrology also  Pt is also on PPI daily           Relevant Orders    Ambulatory Referral to Nephrology

## 2022-02-21 NOTE — PROGRESS NOTES
Assessment/Plan:    Stage 3a chronic kidney disease ([facility] )  Lab Results   Component Value Date    EGFR 40 02/16/2022    EGFR 37 02/02/2022    EGFR 48 12/03/2021    CREATININE 1 63 (H) 02/16/2022    CREATININE 1 77 (H) 02/02/2022    CREATININE 1 43 (H) 12/03/2021    patient not on diuretics, he has been using some Voltaren gel, and he is cutting back on this  He is not taking oral NSAIDs  Patient reports drinking a normal amount of water were this has not decreased lately  Urine flow okay  No black tarry stool  Patient is on lithium  Will get evaluation with Nephrology also  Pt is also on PPI daily  Gastroesophageal reflux disease   I recommend trying to titrate down from every day dosing of omeprazole to every other day dosing to see how he does, and can follow-up GI       Diagnoses and all orders for this visit:    Stage 3a chronic kidney disease ([facility] )  -     Ambulatory Referral to Nephrology; Future    Gastroesophageal reflux disease without esophagitis          Subjective:      Patient ID: Janes Ma is a 78 y o  male  Has concerns about his kidney function      The following portions of the patient's history were reviewed and updated as appropriate: allergies, current medications, past family history, past medical history, past social history, past surgical history and problem list     Review of Systems   Constitutional: Negative for chills, fatigue and fever  HENT: Negative for congestion, nosebleeds, postnasal drip, sore throat and trouble swallowing  Eyes: Negative for pain  Respiratory: Negative for cough, chest tightness, shortness of breath and wheezing  Cardiovascular: Negative for chest pain, palpitations and leg swelling  Gastrointestinal: Negative for abdominal pain, constipation, diarrhea, nausea and vomiting  Endocrine: Negative for polydipsia and polyuria  Genitourinary: Negative for dysuria, flank pain and hematuria  Musculoskeletal: Negative for arthralgias  Skin: Negative for rash  Neurological: Positive for tremors  Negative for dizziness, light-headedness and headaches  Hematological: Does not bruise/bleed easily  Psychiatric/Behavioral: Negative for confusion and dysphoric mood  The patient is not nervous/anxious  Objective:      /86 (BP Location: Left arm, Patient Position: Sitting, Cuff Size: Standard)   Pulse (!) 51   Ht 5' 7" (1 702 m)   Wt 74 1 kg (163 lb 6 4 oz)   SpO2 99%   BMI 25 59 kg/m²          Physical Exam  Vitals reviewed  Constitutional:       General: He is not in acute distress  Appearance: Normal appearance  He is well-developed  HENT:      Head: Normocephalic and atraumatic  Right Ear: External ear normal       Left Ear: External ear normal    Eyes:      General: No scleral icterus  Conjunctiva/sclera: Conjunctivae normal    Neck:      Thyroid: No thyromegaly  Trachea: No tracheal deviation  Cardiovascular:      Rate and Rhythm: Normal rate and regular rhythm  Heart sounds: Normal heart sounds  Pulmonary:      Effort: Pulmonary effort is normal  No respiratory distress  Breath sounds: Normal breath sounds  No wheezing or rales  Abdominal:      General: Bowel sounds are normal       Palpations: Abdomen is soft  Tenderness: There is no abdominal tenderness  There is no guarding or rebound  Musculoskeletal:      Cervical back: Normal range of motion and neck supple  Right lower leg: No edema  Left lower leg: No edema  Lymphadenopathy:      Cervical: No cervical adenopathy  Skin:     Coloration: Skin is not jaundiced or pale  Neurological:      Mental Status: He is alert and oriented to person, place, and time  Psychiatric:         Behavior: Behavior normal          Thought Content:  Thought content normal          Judgment: Judgment normal

## 2022-02-21 NOTE — ASSESSMENT & PLAN NOTE
Lab Results   Component Value Date    EGFR 40 02/16/2022    EGFR 37 02/02/2022    EGFR 48 12/03/2021    CREATININE 1 63 (H) 02/16/2022    CREATININE 1 77 (H) 02/02/2022    CREATININE 1 43 (H) 12/03/2021    patient not on diuretics, he has been using some Voltaren gel, and he is cutting back on this  He is not taking oral NSAIDs  Patient reports drinking a normal amount of water were this has not decreased lately  Urine flow okay  No black tarry stool  Patient is on lithium  Will get evaluation with Nephrology also  Pt is also on PPI daily

## 2022-02-21 NOTE — ASSESSMENT & PLAN NOTE
I recommend trying to titrate down from every day dosing of omeprazole to every other day dosing to see how he does, and can follow-up GI

## 2022-02-23 ENCOUNTER — TRANSCRIBE ORDERS (OUTPATIENT)
Dept: LAB | Facility: CLINIC | Age: 77
End: 2022-02-23

## 2022-02-23 ENCOUNTER — APPOINTMENT (OUTPATIENT)
Dept: LAB | Facility: CLINIC | Age: 77
End: 2022-02-23
Payer: MEDICARE

## 2022-02-23 DIAGNOSIS — I49.3 ASYMPTOMATIC PVCS: ICD-10-CM

## 2022-02-23 DIAGNOSIS — F31.60 MIXED BIPOLAR I DISORDER (HCC): Primary | ICD-10-CM

## 2022-02-23 DIAGNOSIS — R73.09 ABNORMAL BLOOD SUGAR: ICD-10-CM

## 2022-02-23 DIAGNOSIS — E78.2 MIXED HYPERLIPIDEMIA: ICD-10-CM

## 2022-02-23 DIAGNOSIS — I50.30 DIASTOLIC HEART FAILURE, UNSPECIFIED HF CHRONICITY (HCC): ICD-10-CM

## 2022-02-23 DIAGNOSIS — I70.90: ICD-10-CM

## 2022-02-23 DIAGNOSIS — I10 ESSENTIAL HYPERTENSION: ICD-10-CM

## 2022-02-23 DIAGNOSIS — I71.4 ABDOMINAL AORTIC ANEURYSM WITHOUT RUPTURE (HCC): ICD-10-CM

## 2022-02-23 DIAGNOSIS — F31.60 MIXED BIPOLAR I DISORDER (HCC): ICD-10-CM

## 2022-02-23 DIAGNOSIS — Z95.5 HISTORY OF HEART ARTERY STENT: ICD-10-CM

## 2022-02-23 DIAGNOSIS — I25.2 PAST HISTORY OF MYOCARDIAL INFARCTION: ICD-10-CM

## 2022-02-23 LAB
ALBUMIN SERPL BCP-MCNC: 3.8 G/DL (ref 3.5–5)
ALP SERPL-CCNC: 118 U/L (ref 46–116)
ALT SERPL W P-5'-P-CCNC: 48 U/L (ref 12–78)
ANION GAP SERPL CALCULATED.3IONS-SCNC: 2 MMOL/L (ref 4–13)
AST SERPL W P-5'-P-CCNC: 23 U/L (ref 5–45)
BASOPHILS # BLD AUTO: 0.04 THOUSANDS/ΜL (ref 0–0.1)
BASOPHILS NFR BLD AUTO: 0 % (ref 0–1)
BILIRUB SERPL-MCNC: 0.78 MG/DL (ref 0.2–1)
BUN SERPL-MCNC: 33 MG/DL (ref 5–25)
CALCIUM SERPL-MCNC: 10.2 MG/DL (ref 8.3–10.1)
CHLORIDE SERPL-SCNC: 109 MMOL/L (ref 100–108)
CHOLEST SERPL-MCNC: 138 MG/DL
CK SERPL-CCNC: 135 U/L (ref 39–308)
CO2 SERPL-SCNC: 28 MMOL/L (ref 21–32)
CREAT SERPL-MCNC: 1.55 MG/DL (ref 0.6–1.3)
EOSINOPHIL # BLD AUTO: 0.22 THOUSAND/ΜL (ref 0–0.61)
EOSINOPHIL NFR BLD AUTO: 2 % (ref 0–6)
ERYTHROCYTE [DISTWIDTH] IN BLOOD BY AUTOMATED COUNT: 13 % (ref 11.6–15.1)
GFR SERPL CREATININE-BSD FRML MDRD: 43 ML/MIN/1.73SQ M
GLUCOSE P FAST SERPL-MCNC: 106 MG/DL (ref 65–99)
HCT VFR BLD AUTO: 43 % (ref 36.5–49.3)
HDLC SERPL-MCNC: 45 MG/DL
HGB BLD-MCNC: 13.8 G/DL (ref 12–17)
IMM GRANULOCYTES # BLD AUTO: 0.06 THOUSAND/UL (ref 0–0.2)
IMM GRANULOCYTES NFR BLD AUTO: 1 % (ref 0–2)
LDLC SERPL CALC-MCNC: 76 MG/DL (ref 0–100)
LITHIUM SERPL-SCNC: 1.1 MMOL/L (ref 0.5–1)
LYMPHOCYTES # BLD AUTO: 1.06 THOUSANDS/ΜL (ref 0.6–4.47)
LYMPHOCYTES NFR BLD AUTO: 11 % (ref 14–44)
MCH RBC QN AUTO: 31.1 PG (ref 26.8–34.3)
MCHC RBC AUTO-ENTMCNC: 32.1 G/DL (ref 31.4–37.4)
MCV RBC AUTO: 97 FL (ref 82–98)
MONOCYTES # BLD AUTO: 0.74 THOUSAND/ΜL (ref 0.17–1.22)
MONOCYTES NFR BLD AUTO: 7 % (ref 4–12)
NEUTROPHILS # BLD AUTO: 7.84 THOUSANDS/ΜL (ref 1.85–7.62)
NEUTS SEG NFR BLD AUTO: 79 % (ref 43–75)
NONHDLC SERPL-MCNC: 93 MG/DL
NRBC BLD AUTO-RTO: 0 /100 WBCS
NT-PROBNP SERPL-MCNC: 695 PG/ML
PLATELET # BLD AUTO: 184 THOUSANDS/UL (ref 149–390)
PMV BLD AUTO: 11.3 FL (ref 8.9–12.7)
POTASSIUM SERPL-SCNC: 5.2 MMOL/L (ref 3.5–5.3)
PROT SERPL-MCNC: 7.3 G/DL (ref 6.4–8.2)
RBC # BLD AUTO: 4.44 MILLION/UL (ref 3.88–5.62)
SODIUM SERPL-SCNC: 139 MMOL/L (ref 136–145)
TRIGL SERPL-MCNC: 84 MG/DL
WBC # BLD AUTO: 9.96 THOUSAND/UL (ref 4.31–10.16)

## 2022-02-23 PROCEDURE — 36415 COLL VENOUS BLD VENIPUNCTURE: CPT

## 2022-02-23 PROCEDURE — 80061 LIPID PANEL: CPT | Performed by: INTERNAL MEDICINE

## 2022-02-23 PROCEDURE — 83880 ASSAY OF NATRIURETIC PEPTIDE: CPT

## 2022-02-23 PROCEDURE — 82550 ASSAY OF CK (CPK): CPT

## 2022-02-23 PROCEDURE — 85025 COMPLETE CBC W/AUTO DIFF WBC: CPT

## 2022-02-23 PROCEDURE — 80053 COMPREHEN METABOLIC PANEL: CPT | Performed by: INTERNAL MEDICINE

## 2022-02-23 PROCEDURE — 80178 ASSAY OF LITHIUM: CPT

## 2022-02-24 ENCOUNTER — TELEPHONE (OUTPATIENT)
Dept: NEPHROLOGY | Facility: CLINIC | Age: 77
End: 2022-02-24

## 2022-02-24 NOTE — TELEPHONE ENCOUNTER
New Patient Intake Form   Patient Details   Berhane Arenas     1945     [id number]     Appointment Information   Who is calling to schedule? If not patient, what is callers name? Patient    Referring Provider  Dr DRU STARR PCP   Referring Provider Number [id number]   Reason for Appt (Diagnosis) CKD 3A    Is patient aware of why they are being referred? yes   Does Patient have labs done at [place] [address]? If not, where do they go? yes / [facility]   Has patient had labs / urine work done? List date of most recent lab / urine work yes    Has patient had a BMP & CBC done in the past 2 years? If so, list the date yes    Has patient been hospitalized recently? If yes, list name and location of hospital they were in no    Has patient been seen by a Nephrologist before? If yes, list name, location and phone number  no   Has patient been see by another Specialty before (ex  Neurology, urology, cardiology)? If yes, please list name, and specialty [address] [place] [address]    Has the patient had imaging done? If so, list the most recent date and type of imaging yes / ALL EPIC   Does the patient has a stone analysis report if history of kidney stones? no   Appointment Details   Is there a referral on file? yes    Appointment Date 4/7/2022    [address] [facility]   Location [place]    Miscellaneous   ALL RECORDS IN [address] [place] [address]

## 2022-02-25 PROCEDURE — 93227 XTRNL ECG REC<48 HR R&I: CPT | Performed by: INTERNAL MEDICINE

## 2022-03-04 ENCOUNTER — TELEPHONE (OUTPATIENT)
Dept: GASTROENTEROLOGY | Facility: AMBULARY SURGERY CENTER | Age: 77
End: 2022-03-04

## 2022-03-04 NOTE — TELEPHONE ENCOUNTER
Patients GI provider:  Dr Ting Joe     Number to return call: [phone]     Reason for call: Pt called requesting to speak with someone regarding same symptoms   Patient stated it is slightly better but would like to know if he should be seen sooner because of same symptoms or wait till his appt that is scheduled in 4/28/22     Scheduled procedure/appointment date if applicable: Apt/procedure 4/28/22

## 2022-03-04 NOTE — TELEPHONE ENCOUNTER
Please Advise    PMH: GERD  Last seen Provider: Dr Faby Jacinto  Last office visit: 12/6/21  Procedure: EGD 2/9/22 w normal biopsies  HPI: Called and spoke with patient  He states he continues to have acid reflux and dyspepsia symptoms intermittently  He is currently on 20 mg daily omeprazole and prn 40 mg pepcid at bedtime  Per last office visit he was on 40 mg omeprazole but patient states his PCP told him to decrease this  He states the pepcid does seem to help when he takes it but he states Dr Faby Jacinto cautioned him to use it "only when necessary" so he has been using it conservatively and is worried to overuse it  He tries to be careful with what he eats but he states that he is still having reflux some days, mostly at night   He wanted to see if pepcid could be used daily or if his omeprazole should be adjusted  Denies: nausea, vomiting abdominal pain  Recommendations:   -will defer to provider for recommendations  Next office visit: 4/28

## 2022-03-08 ENCOUNTER — TELEPHONE (OUTPATIENT)
Dept: OTHER | Facility: OTHER | Age: 77
End: 2022-03-08

## 2022-03-08 NOTE — TELEPHONE ENCOUNTER
Called and spoke with patient, we reviewed recommendations from Dr Bhavya Mason  He is interested in potentially getting a TIF however he states he would prefer to hold off on any pre procedure testing such as manometry until he can discuss it more in depth with Dr Bhavya Mason in upcoming appt   No further questions

## 2022-03-08 NOTE — TELEPHONE ENCOUNTER
I left  for pt to return our call, patient is Dr Holley Batres pt [facility] )

## 2022-03-08 NOTE — TELEPHONE ENCOUNTER
Called patient, he states he just wanted to verify that we are not making changes to his omeprazole  I explained no changes at this time but per Dr Maxine Clancy the goal would be to wean him off eventually and this will be discussed further at office visit   No further questions

## 2022-03-08 NOTE — TELEPHONE ENCOUNTER
Patient would like a call back to discuss Omeprazole medication  He stated nurse Latanya Serrano explain it to him, but he has other questions

## 2022-03-22 ENCOUNTER — RA CDI HCC (OUTPATIENT)
Dept: OTHER | Facility: HOSPITAL | Age: 77
End: 2022-03-22

## 2022-03-22 NOTE — PROGRESS NOTES
[facility] [address]  coding opportunities       Chart reviewed, no opportunity found: CHART REVIEWED, NO OPPORTUNITY FOUND        Patients Insurance     Medicare Insurance: Medicare

## 2022-03-25 ENCOUNTER — HOSPITAL ENCOUNTER (OUTPATIENT)
Dept: CT IMAGING | Facility: HOSPITAL | Age: 77
Discharge: HOME | End: 2022-03-25
Payer: MEDICARE

## 2022-03-25 DIAGNOSIS — Z85.118 HISTORY OF LUNG CANCER: ICD-10-CM

## 2022-03-25 PROCEDURE — 74176 CT ABD & PELVIS W/O CONTRAST: CPT

## 2022-03-25 PROCEDURE — G1004 CDSM NDSC: HCPCS

## 2022-03-25 PROCEDURE — 71250 CT THORAX DX C-: CPT

## 2022-03-30 ENCOUNTER — OFFICE VISIT (OUTPATIENT)
Dept: INTERNAL MEDICINE CLINIC | Facility: CLINIC | Age: 77
End: 2022-03-30
Payer: MEDICARE

## 2022-03-30 VITALS
BODY MASS INDEX: 26.53 KG/M2 | OXYGEN SATURATION: 99 % | SYSTOLIC BLOOD PRESSURE: 138 MMHG | HEART RATE: 47 BPM | WEIGHT: 169 LBS | DIASTOLIC BLOOD PRESSURE: 84 MMHG | HEIGHT: 67 IN

## 2022-03-30 DIAGNOSIS — R14.3 EXCESSIVE GAS: ICD-10-CM

## 2022-03-30 DIAGNOSIS — N18.31 STAGE 3A CHRONIC KIDNEY DISEASE (HCC): ICD-10-CM

## 2022-03-30 DIAGNOSIS — I48.91 NEW ONSET ATRIAL FIBRILLATION (HCC): ICD-10-CM

## 2022-03-30 DIAGNOSIS — I10 ESSENTIAL HYPERTENSION: ICD-10-CM

## 2022-03-30 DIAGNOSIS — C34.90 MALIGNANT NEOPLASM OF LUNG, UNSPECIFIED LATERALITY, UNSPECIFIED PART OF LUNG (HCC): ICD-10-CM

## 2022-03-30 DIAGNOSIS — I25.10 CORONARY ARTERY DISEASE INVOLVING NATIVE CORONARY ARTERY OF NATIVE HEART WITHOUT ANGINA PECTORIS: ICD-10-CM

## 2022-03-30 DIAGNOSIS — K21.9 GASTROESOPHAGEAL REFLUX DISEASE WITHOUT ESOPHAGITIS: Primary | ICD-10-CM

## 2022-03-30 PROCEDURE — 99214 OFFICE O/P EST MOD 30 MIN: CPT | Performed by: INTERNAL MEDICINE

## 2022-03-30 RX ORDER — DICYCLOMINE HCL 20 MG
20 TABLET ORAL EVERY 6 HOURS PRN
Qty: 90 TABLET | Refills: 3 | Status: SHIPPED | OUTPATIENT
Start: 2022-03-30

## 2022-03-30 RX ORDER — DICYCLOMINE HCL 20 MG
20 TABLET ORAL EVERY 6 HOURS PRN
Qty: 90 TABLET | Refills: 3
Start: 2022-03-30 | End: 2022-03-30 | Stop reason: SDUPTHER

## 2022-03-30 NOTE — ASSESSMENT & PLAN NOTE
Patient on proton pump inhibitor daily  No significant GERD symptoms, no problems with food getting stuck    Continue PPI as per GI, patient is also on famotidine

## 2022-03-30 NOTE — ASSESSMENT & PLAN NOTE
Pt had an isolated episode in 2018 associated with respiratory infection, no problems since     No palpitations

## 2022-03-30 NOTE — ASSESSMENT & PLAN NOTE
Lab Results   Component Value Date    EGFR 43 02/23/2022    EGFR 40 02/16/2022    EGFR 37 02/02/2022    CREATININE 1 55 (H) 02/23/2022    CREATININE 1 63 (H) 02/16/2022    CREATININE 1 77 (H) 02/02/2022   Pt to see nephrology, avoid NSAIDs, stay hydrated     Patient did try every other day dosing of proton pump inhibitor, but is back to every day dosing currently

## 2022-03-30 NOTE — PATIENT INSTRUCTIONS
Problem List Items Addressed This Visit        Digestive    Gastroesophageal reflux disease - Primary      Patient on proton pump inhibitor daily  No significant GERD symptoms, no problems with food getting stuck  Continue PPI as per GI, patient is also on famotidine            Respiratory    Malignant neoplasm of lung ([facility] )     Follow up pulmonary            Cardiovascular and Mediastinum    CAD (coronary artery disease)     Follow up Cardiology, no cardiopulm complaints         Essential hypertension     Controlled, continue meds along with healthy diet         New onset atrial fibrillation ([facility] )     Pt had an isolated episode in 2018 associated with respiratory infection, no problems since  No palpitations            Genitourinary    Stage 3a chronic kidney disease [facility])     Lab Results   Component Value Date    EGFR 43 02/23/2022    EGFR 40 02/16/2022    EGFR 37 02/02/2022    CREATININE 1 55 (H) 02/23/2022    CREATININE 1 63 (H) 02/16/2022    CREATININE 1 77 (H) 02/02/2022   Pt to see nephrology, avoid NSAIDs, stay hydrated     Patient did try every other day dosing of proton pump inhibitor, but is back to every day dosing currently

## 2022-03-30 NOTE — PROGRESS NOTES
Assessment/Plan:    Gastroesophageal reflux disease   Patient on proton pump inhibitor daily  No significant GERD symptoms, no problems with food getting stuck  Continue PPI as per GI, patient is also on famotidine    Essential hypertension  Controlled, continue meds along with healthy diet    CAD (coronary artery disease)  Follow up Cardiology, no cardiopulm complaints    New onset atrial fibrillation ([facility] )  Pt had an isolated episode in 2018 associated with respiratory infection, no problems since  No palpitations    Malignant neoplasm of lung ([facility] [address] )  Follow up pulmonary    Stage 3a chronic kidney disease [facility])  Lab Results   Component Value Date    EGFR 43 02/23/2022    EGFR 40 02/16/2022    EGFR 37 02/02/2022    CREATININE 1 55 (H) 02/23/2022    CREATININE 1 63 (H) 02/16/2022    CREATININE 1 77 (H) 02/02/2022   Pt to see nephrology, avoid NSAIDs, stay hydrated  Patient did try every other day dosing of proton pump inhibitor, but is back to every day dosing currently       Diagnoses and all orders for this visit:    Gastroesophageal reflux disease without esophagitis    Essential hypertension    Coronary artery disease involving native coronary artery of native heart without angina pectoris    New onset atrial fibrillation ([facility] )    Malignant neoplasm of lung, unspecified laterality, unspecified part of lung ([facility] )    Stage 3a chronic kidney disease ([facility] )          Subjective:      Patient ID: Jeny Norris is a 78 y o  male  Pt here for follow up      The following portions of the patient's history were reviewed and updated as appropriate: allergies, current medications, past family history, past medical history, past social history, past surgical history and problem list     Review of Systems   Constitutional: Negative for chills, fatigue and fever  HENT: Negative for congestion, nosebleeds, postnasal drip, sore throat and trouble swallowing  Eyes: Negative for pain     Respiratory: Negative for cough, chest tightness, shortness of breath and wheezing  Cardiovascular: Negative for chest pain, palpitations and leg swelling  Gastrointestinal: Negative for abdominal pain, constipation, diarrhea, nausea and vomiting  Endocrine: Negative for polydipsia and polyuria  Genitourinary: Negative for dysuria, flank pain and hematuria  Musculoskeletal: Positive for back pain  Negative for arthralgias and myalgias  Skin: Negative for rash  Neurological: Negative for dizziness, tremors, light-headedness and headaches  Hematological: Does not bruise/bleed easily  Psychiatric/Behavioral: Negative for confusion and dysphoric mood  The patient is not nervous/anxious  Objective:      /84 (BP Location: Left arm, Patient Position: Sitting, Cuff Size: Standard)   Pulse (!) 47   Ht 5' 7" (1 702 m)   Wt 76 7 kg (169 lb)   SpO2 99%   BMI 26 47 kg/m²          Physical Exam  Vitals reviewed  Constitutional:       General: He is not in acute distress  Appearance: Normal appearance  He is well-developed  HENT:      Head: Normocephalic and atraumatic  Right Ear: External ear normal       Left Ear: External ear normal       Nose: Nose normal    Eyes:      General: No scleral icterus  Conjunctiva/sclera: Conjunctivae normal    Neck:      Thyroid: No thyromegaly  Trachea: No tracheal deviation  Cardiovascular:      Rate and Rhythm: Normal rate and regular rhythm  Heart sounds: Normal heart sounds  No murmur heard  Pulmonary:      Effort: Pulmonary effort is normal  No respiratory distress  Breath sounds: Normal breath sounds  No wheezing or rales  Abdominal:      General: Bowel sounds are normal       Palpations: Abdomen is soft  Tenderness: There is no abdominal tenderness  There is no guarding or rebound  Musculoskeletal:      Cervical back: Normal range of motion and neck supple  Right lower leg: No edema  Left lower leg: No edema  Lymphadenopathy:      Cervical: No cervical adenopathy  Skin:     Coloration: Skin is not jaundiced or pale  Neurological:      General: No focal deficit present  Mental Status: He is alert and oriented to person, place, and time  Psychiatric:         Mood and Affect: Mood normal          Behavior: Behavior normal          Thought Content:  Thought content normal          Judgment: Judgment normal

## 2022-04-07 ENCOUNTER — CONSULT (OUTPATIENT)
Dept: NEPHROLOGY | Facility: CLINIC | Age: 77
End: 2022-04-07
Payer: MEDICARE

## 2022-04-07 VITALS
WEIGHT: 169 LBS | SYSTOLIC BLOOD PRESSURE: 140 MMHG | HEIGHT: 67 IN | DIASTOLIC BLOOD PRESSURE: 80 MMHG | BODY MASS INDEX: 26.53 KG/M2 | HEART RATE: 80 BPM

## 2022-04-07 DIAGNOSIS — R80.9 PROTEINURIA, UNSPECIFIED TYPE: ICD-10-CM

## 2022-04-07 DIAGNOSIS — R35.89 POLYURIA: Primary | ICD-10-CM

## 2022-04-07 DIAGNOSIS — N18.31 STAGE 3A CHRONIC KIDNEY DISEASE (HCC): ICD-10-CM

## 2022-04-07 PROCEDURE — 99204 OFFICE O/P NEW MOD 45 MIN: CPT | Performed by: INTERNAL MEDICINE

## 2022-04-07 NOTE — PATIENT INSTRUCTIONS
You are here for your 1st visit to evaluate your kidney function  As you noted the creatinine test which is the blood test has fluctuating over the years but recently it started to creep up a little bit and her here for evaluation  Her latest creatinine was 1 5 and 1 is normal so it is just slightly elevated  I do notice that there was some protein in the urine so we need to make sure that there is not a significant amount that would indicate a more intrinsic process  It is possible that lithium is affected the kidneys over the years  As we discussed I am going to send you for some testing blood and urine  I am going to measure the amount of protein in the urine to determine if you need a more aggressive workup if the protein is very high in the urine  Also send a couple rule out test to make sure there is no condition that could inflamed the kidneys  For now continue her medications at the same dosages I will contact you when the results come in to discuss the next step in our evaluation or treatment  With respect to your frequent urination I explained you that diabetes insipidus is something that can happen for people who were on lithium and basically patients do not retain water as much and they feel there bladder quicker and empty a lot have a lot of urine production  Down the road after recheck some tests I may be able to come up with a medicine called amiloride that potentially could help reduce the urine volume if we feel that is the cause

## 2022-04-07 NOTE — PROGRESS NOTES
Consultation - Nephrology   Kashmir Vitale 78 y o  male MRN: [id number]  Unit/Bed#:  Encounter: [id number]      Assessment/Plan     Assessment / Plan:  1  Renal    Patient appears to have chronic renal insufficiency in over the years the creatinine has fluctuated between normal values and the mid to low 1 range  He has noticed this and is here for evaluation  Patient has been on lithium for a little over 10 years and that certainly can affect kidney function by causing chronic interstitial disease  There can be other rare involvement and glomerular disease associated with this but that is much less common  He also has mild hypercalcemia times that could be due to lithium as well  When I assessed his history and urinalysis there is some protein in his urinalysis and has been there for a while  The question is whether not he has intrinsic disease given that there was proteinuria and I explained to him that the quantity is important in determining the etiology and workup and treatment  I personally reviewed the CT scan report from 03/25/2022 and with respect to kidney structure he had 2 kidneys with no hydronephrosis  Initially am just going to repeat a BMP  SPEP UPEP  Urine protein creatinine ratio    Based on these results will determine further testing needed and follow-up  2  Polyuria    The patient is on lithium and can cause diabetes insipidus  I explained this in detail to the patient and his wife  I suspect that his urine frequency is more related to this issue  I am going to do a fasting urine osmolality and if it appears dilute we could discuss using a medicine such as amiloride to help reduce the volume of urine  He had mild hyperkalemia on his most recent test so we would need to be careful in deciding to use this medication  Urine osmolality and will contact patient with results        History of Present Illness   Physician Requesting Consult: No att  providers found  Reason for Consult / Principal Problem:   chronic kidney disease and proteinuria with polyuria    Hx and PE limited by:   HPI: Mellissa Lazaro is a 78y o  year old male who presents for his initial evaluation  The patient is here with his wife and states that he has been monitoring his blood work over the years and he has noticed that his creatinine at times is high and then low and then normal   He was discussing this with his primary physician and based on his last labs it was felt that they should get it evaluated  The patient really had no acute complaints today and is here for evaluation  History obtained from chart review and the patient and his wife  Consults    Review of Systems   Constitutional: Negative for chills, diaphoresis, fatigue and fever  HENT: Negative  Eyes: Negative  Respiratory: Negative  Negative for cough, chest tightness, shortness of breath and wheezing  Cardiovascular: Negative  Negative for chest pain, palpitations and leg swelling  Gastrointestinal: Negative  Negative for abdominal distention, abdominal pain, diarrhea, nausea and vomiting  Genitourinary: Negative for difficulty urinating, dysuria, flank pain and hematuria  Feels he has a large urine volume and frequency  Musculoskeletal: Negative  Skin: Negative  Negative for rash  Neurological: Negative  Negative for dizziness, seizures, light-headedness and headaches  Psychiatric/Behavioral: Negative  Negative for agitation, behavioral problems, confusion and decreased concentration         Historical Information   Patient Active Problem List   Diagnosis    Gait instability    Cervical myelopathy (HCC)    Adenocarcinoma of prostate ([facility] )    CAD (coronary artery disease)    B[facility]    COPD (chronic obstructive pulmonary disease) ([facility]    Gastroesophageal reflux disease    Mixed hyperlipidemia    Essential hypertension    Aneurysm of infrarenal abdominal aorta ([facility] )    History of lumbar surgery    Stage 3a chronic kidney disease ([facility] )    Impaired mobility and activities of daily living    Cervical radiculopathy due to degenerative joint disease of spine    Degenerative disc disease, lumbar    Foraminal stenosis of lumbar region    Spondylolisthesis of lumbar region    Myelopathy concurrent with and due to lumbosacral intervertebral disc disorder    Arrhythmia    Diastolic heart failure ([facility] )    New onset atrial fibrillation (HCC)    Pain in left hip    Greater trochanteric bursitis, left    Skin lesion    Medicare annual wellness visit, subsequent    Trochanteric bursitis of left hip    Primary osteoarthritis of left hip    Malignant neoplasm of lung (HCC)    Excessive gas    Extradural cyst of spine    Herniated lumbar disc without myelopathy    Gait abnormality    Skin lump of leg, right    Seasonal allergic rhinitis due to pollen    Braces as ambulation aid    Parkinsonism ([address] )    Change in mental status    Frequency of urination    Skin tear of right lower leg without complication     Past Medical History:   Diagnosis Date    [facility])     Benign colon polyp     Bipolar 1 disorder ([facility] )     Cardiac disease     MI    Cervical cord compression with myelopathy ([facility])     COPD (chronic obstructive pulmonary disease) ([facility] )     Diverticulitis     Diverticulosis     Gait disturbance     uses cane, leg brace on right    GERD (gastroesophageal reflux disease)     Heart attack ([address] ) 1996    Hx of resection of large bowel 5/3/2016    Hyperlipidemia     Hypertension     IBS (irritable bowel syndrome)     Lumbar stenosis     Lung cancer ([facility] )     2007 Left lower lobectomy and 2011 Right lung with surgery     Myocardial infarction [facility])     involving other coronary artery    Prostate cancer ([facility] )     Shortness of breath     Small bowel obstruction ([facility] ) 11/16/2016   No history of diabetes, stroke, kidney stone, kidney infection, hepatitis, liver disease, lung disease  Past Surgical History:   Procedure Laterality Date    ANGIOPLASTY      stent    CARDIAC SURGERY      CERVICAL SPINE SURGERY      Cervical decompression with cervical fusion from C3-C7 for spinal stenosis   COLON SURGERY  11/04/2014    ESOPHAGOGASTRODUODENOSCOPY  01/03/2013    with possible Schatzki's ring & small hiatal hernia, mild gastritis    FL INJECTION LEFT HIP (NON ARTHROGRAM)  12/11/2018    FL INJECTION LEFT HIP (NON ARTHROGRAM)  5/9/2019    IR BIOPSY LUNG  7/6/2020    IR EVAR  8/6/2018    LUNG CANCER SURGERY Right 03/2011    wedge resection for lung tumor, 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    LUNG LOBECTOMY Left     ND ARTHRODESIS ANT INTERBODY MIN DISCECTOMY, CERVICAL BELOW C2 N/A 5/2/2016    Procedure: Anterior cervical diskectomy C3/4, C5/6, C6/7 with anterior plate fixation fusion C3-7;  Posterior decompressive laminectomy C3-7 with lateral mass fixation fusion C3-7 (IMPULSE MONITORING); Surgeon: Anna Prieto MD;  [facility];  Service: Neurosurgery    ND ARTHRODESIS POSTERIOR INTERBODY LUMBAR N/A 1/30/2017    Procedure: L4-5 AND L5-S1 DECOMPRESSIVE FORAMINOTOMIES, TRANSFORAMINAL LUMBAR INTERBODY AND PEDICLE SCREW FIXATION FUSION L4-S1 (IMPULSE);   Surgeon: Anna Prieto MD;  [facility];  Service: Neurosurgery    ND [address] RPR DPLMNT AORTO-AORTIC NDGFT N/A 8/6/2018    Procedure: REPAIR ANEURYSM ENDOVASCULAR ABDOMINAL AORTIC  (EVAR) WITH BILATERAL PERCUTANEOUS FEMORAL ACCESS WITH ULTRASOUND GUIDANCE ON THE RIGHT AND PRE CLOSURE;  Surgeon: Joanna Chavez MD;  [facility];  Service: Vascular    PROSTATECTOMY  2007    for prostate CA - no chemo/RT    SIGMOIDECTOMY      for divertic    SMALL INTESTINE SURGERY N/A 11/17/2016    Procedure: Exploratory Laparotomy, Lysis of adhesions to release small bowel obstruction;  Surgeon: Yeimi Jones MD;  [facility];  Service:      Social History Social History     Substance and Sexual Activity   Alcohol Use Yes    Comment: One glass per day     Social History     Substance and Sexual Activity   Drug Use No   No current tobacco ethanol or drug abuse  Social History     Tobacco Use   Smoking Status Former Smoker    Packs/day: 1 00    Years: 35 00    Pack years: 35 00    Types: Cigarettes    Start date: 5    Quit date:     Years since quittin 2   Smokeless Tobacco Never Used     Family History   Problem Relation Age of Onset    Heart attack Father     Colon cancer Father     Coronary artery disease Father     Heart disease Family     Hyperlipidemia Family     Hypertension Family        Meds/Allergies   current meds:   No current facility-administered medications for this visit  Allergies   Allergen Reactions    Bactrim [Sulfamethoxazole-Trimethoprim] Other (See Comments)     AILYN    Nsaids      Annotation - [id number]: unable to take due to use of lithium   Oxycodone Rash       Objective   [unfilled]  There is no height or weight on file to calculate BMI  Invasive Devices:        PHYSICAL EXAM:  There were no vitals taken for this visit  Physical Exam  Constitutional:       General: He is not in acute distress  Appearance: He is not toxic-appearing or diaphoretic  HENT:      Head: Normocephalic and atraumatic  Nose:      Comments: Wearing mask  Mouth/Throat:      Comments: Wearing mask  Eyes:      General: No scleral icterus  Extraocular Movements: Extraocular movements intact  Cardiovascular:      Rate and Rhythm: Normal rate and regular rhythm  Heart sounds: No friction rub  No gallop  Comments: No edema  Pulmonary:      Effort: Pulmonary effort is normal  No respiratory distress  Breath sounds: Normal breath sounds  No wheezing, rhonchi or rales  Abdominal:      General: Bowel sounds are normal  There is no distension  Palpations: Abdomen is soft  Tenderness:  There is no abdominal tenderness  There is no rebound  Musculoskeletal:      Cervical back: Normal range of motion and neck supple  Neurological:      General: No focal deficit present  Mental Status: He is alert and oriented to person, place, and time  Mental status is at baseline  Psychiatric:         Mood and Affect: Mood normal          Behavior: Behavior normal          Thought Content:  Thought content normal          Judgment: Judgment normal            Current Weight:    First Weight:      Lab Results:              Invalid input(s): LABGLOM        Invalid input(s): LABALBU

## 2022-04-07 NOTE — LETTER
April 7, 2022     Judi Stratton [address]  51   [address] [address]    Patient: Bonnie Zhang   YOB: 1945   Date of Visit: 4/7/2022       Dear Dr Dio Suárez: Thank you for referring Patti King to me for evaluation  Below are my notes for this consultation  If you have questions, please do not hesitate to call me  I look forward to following your patient along with you  Sincerely,        Vern Meadows MD        CC: No Recipients  Vern Meadows MD  4/7/2022  4:46 PM  Sign when Signing Visit  Consultation - Nephrology   Bonnie Zhang 78 y o  male MRN: [id number]  Unit/Bed#:  Encounter: [id number]      Assessment/Plan     Assessment / Plan:  1  Renal    Patient appears to have chronic renal insufficiency in over the years the creatinine has fluctuated between normal values and the mid to low 1 range  He has noticed this and is here for evaluation  Patient has been on lithium for a little over 10 years and that certainly can affect kidney function by causing chronic interstitial disease  There can be other rare involvement and glomerular disease associated with this but that is much less common  He also has mild hypercalcemia times that could be due to lithium as well  When I assessed his history and urinalysis there is some protein in his urinalysis and has been there for a while  The question is whether not he has intrinsic disease given that there was proteinuria and I explained to him that the quantity is important in determining the etiology and workup and treatment  I personally reviewed the CT scan report from 03/25/2022 and with respect to kidney structure he had 2 kidneys with no hydronephrosis  Initially am just going to repeat a BMP  SPEP UPEP  Urine protein creatinine ratio    Based on these results will determine further testing needed and follow-up  2  Polyuria    The patient is on lithium and can cause diabetes insipidus    I explained this in detail to the patient and his wife  I suspect that his urine frequency is more related to this issue  I am going to do a fasting urine osmolality and if it appears dilute we could discuss using a medicine such as amiloride to help reduce the volume of urine  He had mild hyperkalemia on his most recent test so we would need to be careful in deciding to use this medication  Urine osmolality and will contact patient with results  History of Present Illness   Physician Requesting Consult: No att  providers found  Reason for Consult / Principal Problem:   chronic kidney disease and proteinuria with polyuria    Hx and PE limited by:   HPI: Viri Llanes is a 78y o  year old male who presents for his initial evaluation  The patient is here with his wife and states that he has been monitoring his blood work over the years and he has noticed that his creatinine at times is high and then low and then normal   He was discussing this with his primary physician and based on his last labs it was felt that they should get it evaluated  The patient really had no acute complaints today and is here for evaluation  History obtained from chart review and the patient and his wife  Consults    Review of Systems   Constitutional: Negative for chills, diaphoresis, fatigue and fever  HENT: Negative  Eyes: Negative  Respiratory: Negative  Negative for cough, chest tightness, shortness of breath and wheezing  Cardiovascular: Negative  Negative for chest pain, palpitations and leg swelling  Gastrointestinal: Negative  Negative for abdominal distention, abdominal pain, diarrhea, nausea and vomiting  Genitourinary: Negative for difficulty urinating, dysuria, flank pain and hematuria  Feels he has a large urine volume and frequency  Musculoskeletal: Negative  Skin: Negative  Negative for rash  Neurological: Negative  Negative for dizziness, seizures, light-headedness and headaches  Psychiatric/Behavioral: Negative  Negative for agitation, behavioral problems, confusion and decreased concentration         Historical Information   Patient Active Problem List   Diagnosis    Gait instability    Cervical myelopathy (HCC)    [facility]    CAD (coronary artery disease)    Bipolar affective disorder ([facility] )    COPD (chronic obstructive pulmonary disease) ([facility]    Gastroesophageal reflux disease    Mixed hyperlipidemia    Essential hypertension    Aneurysm of infrarenal abdominal aorta (HCC)    History of lumbar surgery    Stage 3a chronic kidney disease ([facility] )    Impaired mobility and activities of daily living    Cervical radiculopathy due to degenerative joint disease of spine    Degenerative disc disease, lumbar    Foraminal stenosis of lumbar region    Spondylolisthesis of lumbar region    Myelopathy concurrent with and due to lumbosacral intervertebral disc disorder    Arrhythmia    Diastolic heart failure ([facility] )    New onset atrial fibrillation ([facility])    Pain in left hip    Greater trochanteric bursitis, left    Skin lesion    Medicare annual wellness visit, subsequent    Trochanteric bursitis of left hip    Primary osteoarthritis of left hip    Malignant neoplasm of lung (HCC)    Excessive gas    Extradural cyst of spine    Herniated lumbar disc without myelopathy    Gait abnormality    Skin lump of leg, right    Seasonal allergic rhinitis due to pollen    Braces as ambulation aid    Parkinsonism ([facility] )    Change in mental status    Frequency of urination    Skin tear of right lower leg without complication     Past Medical History:   Diagnosis Date    Aortic aneurysm (HCC)     Benign colon polyp     Bipolar 1 disorder (HCC)     Cardiac disease     MI    Cervical cord compression with myelopathy ([facility] )     COPD (chronic obstructive pulmonary disease) ([facility] )     Diverticulitis     Diverticulosis     Gait disturbance     uses cane, leg brace on right    GERD (gastroesophageal reflux disease)     [facility] 1996    Hx of resection of large bowel 5/3/2016    Hyperlipidemia     Hypertension     IBS (irritable bowel syndrome)     Lumbar stenosis     Lung cancer ([facility]     2007 Left lower lobectomy and 2011 Right lung with surgery     Myocardial infarction [facility])     involving other coronary artery    Prostate cancer ([facility]     Shortness of breath     Small bowel obstruction ([facility] 11/16/2016   No history of diabetes, stroke, kidney stone, kidney infection, hepatitis, liver disease, lung disease  Past Surgical History:   Procedure Laterality Date    ANGIOPLASTY      stent    CARDIAC SURGERY      CERVICAL SPINE SURGERY      Cervical decompression with cervical fusion from C3-C7 for spinal stenosis   COLON SURGERY  11/04/2014    ESOPHAGOGASTRODUODENOSCOPY  01/03/2013    with possible Schatzki's ring & small hiatal hernia, mild gastritis    FL INJECTION LEFT HIP (NON ARTHROGRAM)  12/11/2018    FL INJECTION LEFT HIP (NON ARTHROGRAM)  5/9/2019    IR BIOPSY LUNG  7/6/2020    IR EVAR  8/6/2018    LUNG CANCER SURGERY Right 03/2011    wedge resection for lung tumor, 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    LUNG LOBECTOMY Left     ME ARTHRODESIS ANT INTERBODY MIN DISCECTOMY, CERVICAL BELOW C2 N/A 5/2/2016    Procedure: Anterior cervical diskectomy C3/4, C5/6, C6/7 with anterior plate fixation fusion C3-7;  Posterior decompressive laminectomy C3-7 with lateral mass fixation fusion C3-7 (IMPULSE MONITORING); Surgeon: Misti Vivar MD;  [facility];  Service: Neurosurgery    ME ARTHRODESIS POSTERIOR INTERBODY LUMBAR N/A 1/30/2017    Procedure: L4-5 AND L5-S1 DECOMPRESSIVE FORAMINOTOMIES, TRANSFORAMINAL LUMBAR INTERBODY AND PEDICLE SCREW FIXATION FUSION L4-S1 (IMPULSE);   Surgeon: Misti Vivar MD;  [facility];  Service: Neurosurgery    ME [address] RPR DPLMNT AORTO-AORTIC NDGFT N/A 8/6/2018 Procedure: REPAIR ANEURYSM ENDOVASCULAR ABDOMINAL AORTIC  (EVAR) WITH BILATERAL PERCUTANEOUS FEMORAL ACCESS WITH ULTRASOUND GUIDANCE ON THE RIGHT AND PRE CLOSURE;  Surgeon: Quoc Miller MD;  [facility];  Service: Vascular    PROSTATECTOMY      for prostate CA - no chemo/RT    SIGMOIDECTOMY      for divertic    SMALL INTESTINE SURGERY N/A 2016    Procedure: Exploratory Laparotomy, Lysis of adhesions to release small bowel obstruction;  Surgeon: Sandra Yousif MD;  [facility];  Service:      Social History   Social History     Substance and Sexual Activity   Alcohol Use Yes    Comment: One glass per day     Social History     Substance and Sexual Activity   Drug Use No   No current tobacco ethanol or drug abuse  Social History     Tobacco Use   Smoking Status Former Smoker    Packs/day: 1 00    Years: 35 00    Pack years: 35 00    Types: Cigarettes    Start date:     Quit date:     Years since quittin 2   Smokeless Tobacco Never Used     Family History   Problem Relation Age of Onset    Heart attack Father     Colon cancer Father     Coronary artery disease Father     Heart disease Family     Hyperlipidemia Family     Hypertension Family        Meds/Allergies   current meds:   No current facility-administered medications for this visit  Allergies   Allergen Reactions    Bactrim [Sulfamethoxazole-Trimethoprim] Other (See Comments)     AILYN    Nsaids      Annotation - [id number]: unable to take due to use of lithium   Oxycodone Rash       Objective   [unfilled]  There is no height or weight on file to calculate BMI  Invasive Devices:        PHYSICAL EXAM:  There were no vitals taken for this visit  Physical Exam  Constitutional:       General: He is not in acute distress  Appearance: He is not toxic-appearing or diaphoretic  HENT:      Head: Normocephalic and atraumatic  Nose:      Comments: Wearing mask       Mouth/Throat: Comments: Wearing mask  Eyes:      General: No scleral icterus  Extraocular Movements: Extraocular movements intact  Cardiovascular:      Rate and Rhythm: Normal rate and regular rhythm  Heart sounds: No friction rub  No gallop  Comments: No edema  Pulmonary:      Effort: Pulmonary effort is normal  No respiratory distress  Breath sounds: Normal breath sounds  No wheezing, rhonchi or rales  Abdominal:      General: Bowel sounds are normal  There is no distension  Palpations: Abdomen is soft  Tenderness: There is no abdominal tenderness  There is no rebound  Musculoskeletal:      Cervical back: Normal range of motion and neck supple  Neurological:      General: No focal deficit present  Mental Status: He is alert and oriented to person, place, and time  Mental status is at baseline  Psychiatric:         Mood and Affect: Mood normal          Behavior: Behavior normal          Thought Content:  Thought content normal          Judgment: Judgment normal            Current Weight:    First Weight:      Lab Results:              Invalid input(s): LABGLOM        Invalid input(s): LABALBU

## 2022-04-12 ENCOUNTER — APPOINTMENT (OUTPATIENT)
Dept: LAB | Facility: CLINIC | Age: 77
End: 2022-04-12
Payer: MEDICARE

## 2022-04-12 DIAGNOSIS — N18.31 STAGE 3A CHRONIC KIDNEY DISEASE (HCC): ICD-10-CM

## 2022-04-12 LAB
ANION GAP SERPL CALCULATED.3IONS-SCNC: 1 MMOL/L (ref 4–13)
BUN SERPL-MCNC: 36 MG/DL (ref 5–25)
CALCIUM SERPL-MCNC: 10.7 MG/DL (ref 8.3–10.1)
CHLORIDE SERPL-SCNC: 111 MMOL/L (ref 100–108)
CO2 SERPL-SCNC: 26 MMOL/L (ref 21–32)
CREAT SERPL-MCNC: 1.51 MG/DL (ref 0.6–1.3)
CREAT UR-MCNC: 64.2 MG/DL
GFR SERPL CREATININE-BSD FRML MDRD: 44 ML/MIN/1.73SQ M
GLUCOSE P FAST SERPL-MCNC: 101 MG/DL (ref 65–99)
OSMOLALITY UR: 494 MMOL/KG
POTASSIUM SERPL-SCNC: 5.6 MMOL/L (ref 3.5–5.3)
PROT UR-MCNC: 148 MG/DL
PROT/CREAT UR: 2.31 MG/G{CREAT} (ref 0–0.1)
SODIUM SERPL-SCNC: 138 MMOL/L (ref 136–145)

## 2022-04-12 PROCEDURE — 36415 COLL VENOUS BLD VENIPUNCTURE: CPT

## 2022-04-12 PROCEDURE — 84166 PROTEIN E-PHORESIS/URINE/CSF: CPT | Performed by: INTERNAL MEDICINE

## 2022-04-12 PROCEDURE — 82570 ASSAY OF URINE CREATININE: CPT | Performed by: INTERNAL MEDICINE

## 2022-04-12 PROCEDURE — 84165 PROTEIN E-PHORESIS SERUM: CPT

## 2022-04-12 PROCEDURE — 84156 ASSAY OF PROTEIN URINE: CPT | Performed by: INTERNAL MEDICINE

## 2022-04-12 PROCEDURE — 83935 ASSAY OF URINE OSMOLALITY: CPT | Performed by: INTERNAL MEDICINE

## 2022-04-12 PROCEDURE — 84166 PROTEIN E-PHORESIS/URINE/CSF: CPT | Performed by: PATHOLOGY

## 2022-04-12 PROCEDURE — 80048 BASIC METABOLIC PNL TOTAL CA: CPT

## 2022-04-12 PROCEDURE — 84165 PROTEIN E-PHORESIS SERUM: CPT | Performed by: PATHOLOGY

## 2022-04-13 ENCOUNTER — TELEPHONE (OUTPATIENT)
Dept: NEUROLOGY | Facility: CLINIC | Age: 77
End: 2022-04-13

## 2022-04-13 NOTE — TELEPHONE ENCOUNTER
Called pt and spoke to Chago Wolfe in regards to r/s his upcoming appt as the doctor will not be in the office  I then offered the next available appt in the [place] location to which the patient agreed to  Patient was happy for call back and for getting him scheduled

## 2022-04-13 NOTE — TELEPHONE ENCOUNTER
LMOM stating that I am calling in regards to r/s the patients upcoming appt as the provider will not be in the office  I then asked the patient to please give us a call back to get this appt r/s

## 2022-04-14 LAB
ALBUMIN SERPL ELPH-MCNC: 4.02 G/DL (ref 3.5–5)
ALBUMIN SERPL ELPH-MCNC: 61.8 % (ref 52–65)
ALBUMIN UR ELPH-MCNC: 84.6 %
ALPHA1 GLOB MFR UR ELPH: 4.1 %
ALPHA1 GLOB SERPL ELPH-MCNC: 0.32 G/DL (ref 0.1–0.4)
ALPHA1 GLOB SERPL ELPH-MCNC: 4.9 % (ref 2.5–5)
ALPHA2 GLOB MFR UR ELPH: 2.1 %
ALPHA2 GLOB SERPL ELPH-MCNC: 0.86 G/DL (ref 0.4–1.2)
ALPHA2 GLOB SERPL ELPH-MCNC: 13.3 % (ref 7–13)
B-GLOBULIN MFR UR ELPH: 6.2 %
BETA GLOB ABNORMAL SERPL ELPH-MCNC: 0.46 G/DL (ref 0.4–0.8)
BETA1 GLOB SERPL ELPH-MCNC: 7 % (ref 5–13)
BETA2 GLOB SERPL ELPH-MCNC: 4.9 % (ref 2–8)
BETA2+GAMMA GLOB SERPL ELPH-MCNC: 0.32 G/DL (ref 0.2–0.5)
GAMMA GLOB ABNORMAL SERPL ELPH-MCNC: 0.53 G/DL (ref 0.5–1.6)
GAMMA GLOB MFR UR ELPH: 3 %
GAMMA GLOB SERPL ELPH-MCNC: 8.1 % (ref 12–22)
IGG/ALB SER: 1.62 {RATIO} (ref 1.1–1.8)
PROT PATTERN SERPL ELPH-IMP: ABNORMAL
PROT PATTERN UR ELPH-IMP: ABNORMAL
PROT SERPL-MCNC: 6.5 G/DL (ref 6.4–8.2)
PROT UR-MCNC: 152 MG/DL

## 2022-04-19 ENCOUNTER — OFFICE VISIT (OUTPATIENT)
Dept: CARDIOLOGY CLINIC | Facility: CLINIC | Age: 77
End: 2022-04-19
Payer: MEDICARE

## 2022-04-19 VITALS
HEART RATE: 56 BPM | HEIGHT: 67 IN | OXYGEN SATURATION: 99 % | SYSTOLIC BLOOD PRESSURE: 152 MMHG | DIASTOLIC BLOOD PRESSURE: 80 MMHG | BODY MASS INDEX: 26.13 KG/M2 | WEIGHT: 166.5 LBS

## 2022-04-19 DIAGNOSIS — R73.09 ABNORMAL BLOOD SUGAR: ICD-10-CM

## 2022-04-19 DIAGNOSIS — I71.4 ABDOMINAL AORTIC ANEURYSM WITHOUT RUPTURE (HCC): ICD-10-CM

## 2022-04-19 DIAGNOSIS — E78.2 MIXED HYPERLIPIDEMIA: ICD-10-CM

## 2022-04-19 DIAGNOSIS — I50.30 DIASTOLIC HEART FAILURE, UNSPECIFIED HF CHRONICITY (HCC): ICD-10-CM

## 2022-04-19 DIAGNOSIS — I10 ESSENTIAL HYPERTENSION: Primary | ICD-10-CM

## 2022-04-19 DIAGNOSIS — E83.52 SERUM CALCIUM ELEVATED: ICD-10-CM

## 2022-04-19 DIAGNOSIS — Z95.5 HISTORY OF HEART ARTERY STENT: ICD-10-CM

## 2022-04-19 DIAGNOSIS — I49.3 ASYMPTOMATIC PVCS: ICD-10-CM

## 2022-04-19 DIAGNOSIS — I25.2 PAST HISTORY OF MYOCARDIAL INFARCTION: ICD-10-CM

## 2022-04-19 DIAGNOSIS — I25.10 CORONARY ARTERY DISEASE INVOLVING NATIVE CORONARY ARTERY OF NATIVE HEART WITHOUT ANGINA PECTORIS: ICD-10-CM

## 2022-04-19 PROCEDURE — 99214 OFFICE O/P EST MOD 30 MIN: CPT | Performed by: INTERNAL MEDICINE

## 2022-04-19 NOTE — PROGRESS NOTES
Follow-up - Cardiology   Sai Flores 78 y o  male MRN: [id number]        Problems    Problem List Items Addressed This Visit        Cardiovascular and Mediastinum    Diastolic heart failure (HCC) (Chronic)    CAD (coronary artery disease)    Essential hypertension - Primary       Other    Mixed hyperlipidemia      Other Visit Diagnoses     Asymptomatic PVCs        Abdominal aortic aneurysm without rupture [facility])        Past history of myocardial infarction        History of heart artery stent        Abnormal blood sugar                Plan and discussion  Patient seen April 19, 2022  History of previous myocardial in  Stress test 2018   No angina   Stent in 2008   LDL in the 70 range   Blood pressure home normal   Stent in his abdominal aorta followed by vascular   History of prostate carcinoma status post robotic surgery   Lung surgery cancer operated 2002 and 2011 surgeries in [facility]  Recent CT scan March of 2022 no abnormalities  Continues to be followed by the pulmonary group   Does have chronic obstructive lung disease   Has cervical spine surgery knee and back surgery   Atrial fibrillation 2018 but none since   Treated for being bipolar  Frequent PVCs  Holter counter shows 8 6% PVCs with a heart rate drops down to 36  On metoprolol 25 mg daily  Will recheck Holter  Calcium is 10 2 and higher at 10 7 more recently  Check PTH and ionized calcium  From a cardiac standpoint he is very stable  His creatinine stable 1 5  GFR stable in the 40 range  HPI: Sai Flores is a 78y o  year old male      Plan and discussion  Patient seen October 12, 2021  Previous myocardial infarction   No angina   Nuclear study 2018 with small scar no ischemia   Stent in 2000 8  LDL 81  Blood pressure treated  Stent in his abdominal aorta  He is followed by the vascular group  History of prostate carcinoma   Lung cancer operated on 2002 1011  Surgeries in [place]  Continues to be followed by our Pulmonary group  Chronic obstructive lung disease   Cervical spine surgery knee and back surgery in the past   It atrial fibrillation 2018 associated with flu  Treated for being bipolar  No changes in the past year  Holter counter recently done within the past year shows 2 4% of PVCs   Blood sugar is 113  Will check A1c  Creatinine slightly elevated 1 3                    HPI: Anusha Rooney is a 70y o  year old male    Plan patient seen October 6, 2020  History of old myocardial infarction  No chest pain  Nuclear study 2018 with a small scar no ischemia  Echocardiogram 2018 mild elevation of PA pressures  Stent in 2098  LDL of 81 in the past year  Hypertension is treated  Stent in abdominal aorta   This is followed by vascular  Prostate carcinoma history   Recent PSA less than 0 1  Lung cancer operated on 2002 in 2011  Michel Dhaliwal surgery was in [place]  This is followed by our Pulmonary group  He recently had a CT scan and a PET scan and a biopsy  Michel Dhaliwal tells with the biopsy is negative for carcinoma  He has chronic obstructive lung disease  His cervical spine surgery in the past   He had back surgery 2017  Is a question of having atrial fibrillation 2018 with the flu   He checks his pulse regularly   Does not have any recurrent atrial fibrillation that we can tell  He is treated for bipolar  From a cardiac standpoint he is asymptomatic      No change in medication   HPI: Tonny Baig is a 74 y  o  year old male       Plan patient seen December 3, 2019  History of old myocardial infarction-no chest pain  Nuclear study 02/2018 without ischemia   Possibly small scar  Echocardiogram 2018 PA pressure my mildly elevated  Cardiac stent 1998-no angina  LDL 73  Hypertension well treated  Stent in abdominal aorta   Followed by vascular  Prostate carcinoma history  Lung cancer operated of 2002 and 2011  Lung cancer followed in [place]    Chronic obstructive lung disease  Cervical spine surgery in the past  Back surgery 2017  History of atrial fibrillation in 2018 under stressful situation no recurrence of atrial fibrillation  Treated for bipolar  He is basically asymptomatic from a cardiac standpoint  Made no change in his medication                  HPI: Tonny Baig is a 72y o  year old male    HPI: Tonny Baig is J 6 y o  year old male    HPI: Tonny Baig is H 28 y o  year old male Patient seen June 6, 2017  Long list of issues above  From cardiac standpoint he is very stable  His creatinine is 1 16  His aortic aneurysm is slightly less than 5 cm was being watched carefully  His alkaline phosphatase elevation is probably secondary to his back fusion  It is coming down very slowly  His last LDL is less than 80  His no cardiac complaints      Patient seen December 12, 2017  His complex past history is best summarized by March 6, 2017 note  I also should have mentioned that he is bipolar and is on lithium  Presently is creatinine is 1  16  Alk phosphatase is 233 last year and it is down to 183  This is not from the liver  It was from the bone  LDL is 61  Id a CT scan of the abdomen and pelvis  Aortic aneurysm 4 7 cm  This is followed could for carefully by the vascular group  Echocardiogram shows left her function to be 65%  There is minimal mitral regurgitation  His pulmonary disease is followed by the pulmonary group  From cardiac standpoint he is very stable  No change in cardiac medications that includes a statin and a beta-blocker and aspirin  As noted above he had a stent in 1998 to is coronaries   He has no chest        Patient seen June 19, 2018  His history includes the following old myocardial infarction  Stent in his coronary 1998  Hyperlipidemia  Hypertension  Abdominal aneurysm that is now 52 mm and he is scheduled for a stent  History prostate cancer  Lung cancer 2007 2011 with surgery in followed in [address] [facility] [address] christopher lung disease  Cervical spine surgery  Back surgery in 2017  Hyperlipidemia  Fatty liver  Bowel obstruction in the past  One episode of atrial fibrillation 2018   Now not taking Coumadin  No chest pain  To clear him for surgery I will do a nuclear stress test   Last nuclear stress test 2014  History of bipolar on lithium  Premature atrial contractions on Holter in February 2018  No cardiac complaints        Patient seen November 7, 2018  Since I last saw him he got a stent in his aortic aneurysm  Cristiana Suero is no leak  From a cardiac standpoint very stable  A1c is 5 3  LDL is 44  He has no chest pain  No recurrent atrial fibrillation  No heart failure  His last nuclear stress test his preoperative in July of 2018  Cristiana Suero is a scar present  Otherwise diagnosis is as summarized in previous note        Review of Systems   Constitutional: Negative      Psychiatric/Behavioral:        History of bipolar         Past Medical History:   Diagnosis Date    Aortic aneurysm (HCC)     Benign colon polyp     Bipolar 1 disorder (HCC)     Cardiac disease     MI    Cervical cord compression with myelopathy (HCC)     COPD (chronic obstructive pulmonary disease) (HCC)     Diverticulitis     Diverticulosis     Gait disturbance     uses cane, leg brace on right    GERD (gastroesophageal reflux disease)     Heart attack ([facility] [address] ) 1996    Hx of resection of large bowel 5/3/2016    Hyperlipidemia     Hypertension     IBS (irritable bowel syndrome)     Lumbar stenosis     Lung cancer ([facility] [address] )     2007 Left lower lobectomy and 2011 Right lung with surgery     Myocardial infarction [facility])     involving other coronary artery    Prostate cancer ([facility] [address] )     Shortness of breath     Small bowel obstruction ([facility] [address] ) 11/16/2016     Social History     Substance and Sexual Activity   Alcohol Use Yes    Comment: One glass per day     Social History     Substance and Sexual Activity   Drug Use No     Social History     Tobacco Use   Smoking Status Former Smoker    Packs/day: 1 00    Years: 35 00    Pack years: 35 00    Types: Cigarettes    Start date: 5    Quit date:     Years since quittin 3   Smokeless Tobacco Never Used       Allergies: Allergies   Allergen Reactions    Bactrim [Sulfamethoxazole-Trimethoprim] Other (See Comments)     AILYN    Nsaids      Annotation - [id number]: unable to take due to use of lithium      Oxycodone Rash       Medications:     Current Outpatient Medications:     acetaminophen (TYLENOL) 500 mg tablet, Take 500 mg by mouth as needed for mild pain , Disp: , Rfl:     albuterol (PROAIR HFA) 90 mcg/act inhaler, Inhale 2 puffs every 6 (six) hours as needed for wheezing, Disp: 3 Inhaler, Rfl: 3    amLODIPine (NORVASC) 2 5 mg tablet, TAKE 1 TABLET BY MOUTH  DAILY, Disp: 90 tablet, Rfl: 3    aspirin (ECOTRIN LOW STRENGTH) 81 mg EC tablet, Take 81 mg by mouth daily, Disp: , Rfl:     atorvastatin (LIPITOR) 40 mg tablet, TAKE 1 TABLET BY MOUTH  DAILY, Disp: 90 tablet, Rfl: 5    Cholecalciferol (VITAMIN D PO), Take 2,000 Units by mouth daily  , Disp: , Rfl:     diazepam (VALIUM) 5 mg tablet, 2 5 mg  , Disp: , Rfl:     Diclofenac Sodium (VOLTAREN) 1 %, Apply 4 g topically 4 (four) times a day, Disp: 350 g, Rfl: 3    dicyclomine (Bentyl) 20 mg tablet, Take 1 tablet (20 mg total) by mouth every 6 (six) hours as needed (abd cramping), Disp: 90 tablet, Rfl: 3    famotidine (PEPCID) 40 MG tablet, Take 1 tablet (40 mg total) by mouth daily at bedtime as needed for heartburn, Disp: 30 tablet, Rfl: 3    fexofenadine (ALLEGRA) 180 MG tablet, Take 180 mg by mouth as needed Daily during the Summer, Disp: , Rfl:     FLUoxetine (PROzac) 10 mg capsule, Take 10 mg by mouth daily , Disp: , Rfl:     fluticasone (FLONASE) 50 mcg/act nasal spray, 2 sprays into each nostril daily, Disp: , Rfl:     fluticasone-vilanterol (Breo Ellipta) 200-25 MCG/INH inhaler, Inhale 1 puff daily Rinse mouth after use , Disp: 180 blister, Rfl: 3    ipratropium (Atrovent HFA) 17 mcg/act inhaler, Inhale 2 puffs 4 (four) times a day, Disp: 38 7 g, Rfl: 3    lithium carbonate (LITHOBID) 300 mg CR tablet, Take 300 mg by mouth One tab by mouth every other day and alternating with 2 tabs every other day at bedtime  , Disp: , Rfl:     metoprolol succinate (TOPROL-XL) 25 mg 24 hr tablet, TAKE 1 TABLET BY MOUTH  DAILY, Disp: 90 tablet, Rfl: 3    Mirabegron ER 25 MG TB24, Take 25 mg by mouth daily at bedtime, Disp: 30 tablet, Rfl: 10    mupirocin (BACTROBAN) 2 % ointment, Apply topically 2 (two) times a day as needed (rash), Disp: 22 g, Rfl: 0    mupirocin (BACTROBAN) 2 % ointment, Apply topically 2 (two) times a day as needed (wound), Disp: 22 g, Rfl: 0    nitroglycerin (NITRODUR) 0 2 mg/hr, APPLY 1 PATCH DAILY (OFF  FOR 12 HOURS) (Patient taking differently: as needed ), Disp: 90 patch, Rfl: 3    omega-3-acid ethyl esters (LOVAZA) 1 g capsule, TAKE 1 CAPSULE BY MOUTH 3  TIMES DAILY, Disp: 270 capsule, Rfl: 5    omeprazole (PriLOSEC) 20 mg delayed release capsule, TAKE 1 CAPSULE BY MOUTH  DAILY, Disp: 90 capsule, Rfl: 3      Physical Exam  Constitutional:       Appearance: He is normal weight  Cardiovascular:      Rate and Rhythm: Normal rate and regular rhythm  Pulses: Normal pulses  Heart sounds: No murmur heard  Comments: Extrasystoles  Pulmonary:      Effort: Pulmonary effort is normal    Musculoskeletal:      Right lower leg: No edema  Left lower leg: No edema  Skin:     Coloration: Skin is not jaundiced or pale  Neurological:      Mental Status: He is alert and oriented to person, place, and time     Psychiatric:         Behavior: Behavior normal            Laboratory Studies:  CMP:      Invalid input(s): ALBUMIN  NT-proBNP:   Lab Results   Component Value Date    NTBNP 695 (H) 02/23/2022      Coags:    Lipid Profile:   Lab Results   Component Value Date    CHOL 139 10/19/2015     Lab Results   Component Value Date HDL 45 2022     Lab Results   Component Value Date    LDLCALC 76 2022     Lab Results   Component Value Date    TRIG 84 2022       Cardiac testing:     EKG reviewed personally:     Results for orders placed during the hospital encounter of 18    Echo complete with contrast if indicated    Narrative  [address]  [place], [address] [facility]  [phone]    Transthoracic Echocardiogram  2D, M-mode, Doppler, and Color Doppler    Study date:  2018    Patient: Katiana Britt  MR number: [id number]  Account number: [de-identified]  : 1945  Age: 81 years  Gender: Male  Status: Inpatient  Location: Bedside  Height: 67 in  Weight: 179 5 lb  BP: 128/ 76 mmHg    Indications: Limited for LV function    Diagnoses: I48 0 - Atrial fibrillation    Sonographer:  DANA Perrin, RDCS  Primary Physician:  Bg Kim MD  Referring Physician:  Semaj Powell MD  Group:  [facility]  Cardiology Fellow:  Tamia Bolanos MD  Interpreting Physician:  Ninfa Chu MD    SUMMARY    LEFT VENTRICLE:  Systolic function was normal  Ejection fraction was estimated to be 55 %  There were no regional wall motion abnormalities  Wall thickness was at the upper limits of normal     MITRAL VALVE:  There was trace regurgitation  TRICUSPID VALVE:  There was mild regurgitation  Estimated peak PA pressure was 43 mmHg  HISTORY: PRIOR HISTORY: COPD, Hypertension, Hyperlipidemia, A fib, Sepsis    PROCEDURE: The procedure was performed at the bedside  This was a routine study  The transthoracic approach was used  The study included complete 2D imaging, M-mode, complete spectral Doppler, and color Doppler  The heart rate was 72 bpm,  at the start of the study  Images were obtained from the parasternal, apical, subcostal, and suprasternal notch acoustic windows  Image quality was adequate      LEFT VENTRICLE: Size was normal  Systolic function was normal  Ejection fraction was estimated to be 55 %  There were no regional wall motion abnormalities  Wall thickness was at the upper limits of normal  There was no evidence of  concentric hypertrophy  DOPPLER: Left ventricular diastolic function parameters were normal     RIGHT VENTRICLE: The size was normal  Systolic function was normal     LEFT ATRIUM: Size was normal     RIGHT ATRIUM: Size was normal     MITRAL VALVE: Valve structure was normal  There was normal leaflet separation  DOPPLER: The transmitral velocity was within the normal range  There was no evidence for stenosis  There was trace regurgitation  AORTIC VALVE: The valve was trileaflet  Leaflets exhibited normal thickness and normal cuspal separation  DOPPLER: Transaortic velocity was within the normal range  There was no evidence for stenosis  There was no regurgitation  TRICUSPID VALVE: The valve structure was normal  There was normal leaflet separation  DOPPLER: The transtricuspid velocity was within the normal range  There was no evidence for stenosis  There was mild regurgitation  Pulmonary artery  systolic pressure was mildly increased  Estimated peak PA pressure was 43 mmHg  PULMONIC VALVE: Not well visualized  DOPPLER: The transpulmonic velocity was within the normal range  There was no evidence for stenosis  There was no significant regurgitation  PERICARDIUM: There was no pericardial effusion  A pericardial fat pad was present  AORTA: The root exhibited normal size  SYSTEMIC VEINS: IVC: The inferior vena cava was normal in size and course  Respirophasic changes were normal     PULMONARY VEINS: Not assessed      SYSTEM MEASUREMENT TABLES    2D  %FS: 28 95 %  EDV(Teich): 115 71 ml  EF(Teich): 55 44 %  ESV(Teich): 51 56 ml  IVSd: 1 07 cm  LA Diam: 3 51 cm  LVEDV MOD A4C: 115 44 ml  LVEF MOD A4C: 60 62 %  LVESV MOD A4C: 45 46 ml  LVIDd: 4 95 cm  LVIDs: 3 52 cm  LVLd A4C: 8 78 cm  LVLs A4C: 7 06 cm  LVPWd: 0 96 cm  SV MOD A4C: 69 98 ml  SV(Teich): 64 15 ml    CW  TR Vmax: 2 93 m/s  TR maxP 25 mmHg    PW  E': 0 1 m/s  E/E': 8 44  MV A Goyo: 0 59 m/s  MV Dec [place]: 4 51 m/s2  MV DecT: 178 2 ms  MV E Goyo: 0 8 m/s  MV E/A Ratio: 1 37  MV PHT: 51 68 ms  MVA By PHT: 4 26 cm2    [employer] Accredited Echocardiography Laboratory    Prepared and electronically signed by    Bonnie Yanes MD  Signed 2018 14:50:06    No results found for this or any previous visit  No results found for this or any previous visit  Results for orders placed during the hospital encounter of 18    NM myocardial perfusion spect (stress and/or rest)    [address]rett  [address]  [facility], [address] [facility]  [phone]    Rest/Stress Gated SPECT Myocardial Perfusion Imaging After Regadenoson    Patient: Stefan Edwards  MR number: [id number]  Account number: [de-identified]  : 1945  Age: 81 years  Gender: Male  Status: Outpatient  Location: [address] [place] [address] [facility] [place]  Height: 67 in  Weight: 185 lb  BP: 160/ 80 mmHg    Allergies: NSAIDS, OXYCODONE    Diagnosis: Z01 810 - Encounter for preprocedural cardiovascular examination    Interpreting Physician:  Jocelyn Pop MD  Referring Physician:  Hodan Taylor MD  Primary Physician:  Shayla Saini MD  Technician:  Benita Hendrickson  RN:  Ryan Shankar RN  Group:  [facility]  Report Prepared by[de-identified]  Ryan Shankar RN    INDICATIONS: Pre-operative risk assessment for AAA repair    HISTORY: The patient is a 81year old  male  History of 5 2 cm AAA, bipolar disorder, cervical and lumbar back surgery, wears brace right leg Chest pain status: no chest pain  Coronary artery disease risk factors: dyslipidemia, hypertension, and former smoking  Cardiovascular  history: coronary artery disease, prior myocardial infarction (), and arrhythmia   Prior cardiovascular procedures: percutaneous transluminal coronary angioplasty s/p stent x 1 (1998) Co-morbidity: history of lung cancer s/p  chemotherapy and left lobectomy and right wedge resection,COPD, and prostate cancer s/p prostatectomy Medications: a beta blocker, a calcium channel blocker, aspirin, and a lipid lowering agent  Previous test results: abnormal nuclear  study  PHYSICAL EXAM: Baseline physical exam screening: no wheezes audible  REST ECG: Normal sinus rhythm  The ECG showed occasional premature ventricular contractions  PROCEDURE: The study was performed at the [address] [facility]  A regadenoson infusion pharmacologic stress test was performed  Gated SPECT myocardial perfusion imaging was performed after stress and at rest  Systolic blood  pressure was 160 mmHg, at the start of the study  Diastolic blood pressure was 80 mmHg, at the start of the study  The heart rate was 64 bpm, at the start of the study  IV double checked  Regadenoson protocol:  HR bpm SBP mmHg DBP mmHg Symptoms  Baseline 64 160 80 none  Immediate 100 172 88 mild dyspnea  1 min 67 148 86 subsiding  2 min 65 144 82 none  The patient also performed low level exercise  STRESS SUMMARY: Duration of pharmacologic stress was 3 min  Maximal heart rate during stress was 100 bpm  The heart rate response to stress was normal  There was resting hypertension with an appropriate blood pressure response to stress  The rate-pressure product for the peak heart rate and blood pressure was 69654  There was no chest pain during stress  The stress test was terminated due to protocol completion  Pre oxygen saturation: 97 %  Peak oxygen saturation: 97 %  The stress ECG was negative for ischemia and normal  Arrhythmia during stress: isolated premature ventricular beats      ISOTOPE ADMINISTRATION:  Resting isotope administration Stress isotope administration  Agent Tetrofosmin Tetrofosmin  Dose 10 47 mCi 32 6 mCi  Date 06/26/2018 06/26/2018  Injection-image interval 40 min 54 min    The radiopharmaceutical was injected at the peak effect of pharmacologic stress  MYOCARDIAL PERFUSION IMAGING:  The image quality was good  Rotating projection images reveal mild diaphragmatic attenuation  Left ventricular size was normal  The TID ratio was 1 17  PERFUSION DEFECTS:  -  No significant ischemia visualized  There was a small, severe, fixed myocardial perfusion defect of the basalto mid inferoseptal wall  PERFUSION QUANTITATION:  The summed perfusion score measured 6 during stress, 2 at rest, with a difference of 3  GATED SPECT:  Non-gated study secondary to frequent PVCs  SUMMARY:  -  Stress results: There was resting hypertension with an appropriate blood pressure response to stress  There was no chest pain during stress  -  ECG conclusions: The stress ECG was negative for ischemia and normal   -  Perfusion imaging: No significant ischemia visualized  There was a small, severe, fixed myocardial perfusion defect of the basalto mid inferoseptal wall  -  Gated SPECT: Non-gated study secondary to frequent PVCs  Prepared and signed by    Zbigniew Mooney MD  Signed 06/26/2018 13:18:39      Carlos De Leon MD    Portions of the record may have been created with voice recognition software   Occasional wrong word or "sound a like" substitutions may have occurred due to the inherent limitations of voice recognition software   Read the chart carefully and recognize, using context, where substitutions have occurred

## 2022-04-20 ENCOUNTER — APPOINTMENT (OUTPATIENT)
Dept: LAB | Facility: CLINIC | Age: 77
End: 2022-04-20
Payer: MEDICARE

## 2022-04-20 DIAGNOSIS — E83.52 SERUM CALCIUM ELEVATED: ICD-10-CM

## 2022-04-20 LAB
CA-I BLD-SCNC: 1.33 MMOL/L (ref 1.12–1.32)
CALCIUM SERPL-MCNC: 10.2 MG/DL (ref 8.3–10.1)
PTH-INTACT SERPL-MCNC: 105.4 PG/ML (ref 18.4–80.1)

## 2022-04-20 PROCEDURE — 82330 ASSAY OF CALCIUM: CPT

## 2022-04-20 PROCEDURE — 36415 COLL VENOUS BLD VENIPUNCTURE: CPT

## 2022-04-20 PROCEDURE — 83970 ASSAY OF PARATHORMONE: CPT

## 2022-04-20 PROCEDURE — 82310 ASSAY OF CALCIUM: CPT

## 2022-04-21 ENCOUNTER — OFFICE VISIT (OUTPATIENT)
Dept: PULMONOLOGY | Facility: CLINIC | Age: 77
End: 2022-04-21
Payer: MEDICARE

## 2022-04-21 VITALS
TEMPERATURE: 97 F | WEIGHT: 166 LBS | OXYGEN SATURATION: 99 % | SYSTOLIC BLOOD PRESSURE: 138 MMHG | HEART RATE: 53 BPM | RESPIRATION RATE: 16 BRPM | DIASTOLIC BLOOD PRESSURE: 80 MMHG | HEIGHT: 67 IN | BODY MASS INDEX: 26.06 KG/M2

## 2022-04-21 DIAGNOSIS — K21.9 GASTROESOPHAGEAL REFLUX DISEASE WITHOUT ESOPHAGITIS: ICD-10-CM

## 2022-04-21 DIAGNOSIS — J30.9 ALLERGIC RHINITIS, UNSPECIFIED SEASONALITY, UNSPECIFIED TRIGGER: ICD-10-CM

## 2022-04-21 DIAGNOSIS — Z85.118 HISTORY OF LUNG CANCER: ICD-10-CM

## 2022-04-21 DIAGNOSIS — J44.9 CHRONIC OBSTRUCTIVE PULMONARY DISEASE, UNSPECIFIED COPD TYPE (HCC): Primary | ICD-10-CM

## 2022-04-21 PROCEDURE — 99214 OFFICE O/P EST MOD 30 MIN: CPT | Performed by: INTERNAL MEDICINE

## 2022-04-21 NOTE — PROGRESS NOTES
Office Progress Note - Pulmonary    Wagner Santos 78 y o  male MRN: [id number]    Encounter: [id number]      Assessment:   Chronic obstructive pulmonary disease   Allergic rhinitis   History of lung cancer   Gastroesophageal reflux disease  Plan:     Breo Ellipta 200/25, 1 inhalation once a day   Ipratropium nebulizer treatments 4 times a day as needed   Fexofenadine 180 mg once a day   Fluticasone nasal spray 2 sprays to each nostril once a day when the allergy season starts   Follow-up in 6 months  Discussion:   The patient's COPD is under remission  I have maintained him on the Breo Ellipta 200/25, 1 inhalation once a day  I have suggested the patient to take the ipratropium nebulizer treatments 4 times a day as needed instead of scheduled  If he notices a difference in his symptoms and he needs it more regularly then we will change to Trelegy Ellipta  The patient will call my office if he needs the ipratropium scheduled  His allergic rhinitis is well treated  I have maintained him on the fexofenadine 180 mg once a day  He will restart the fluticasone nasal spray 2 sprays to each nostril once a day wants the allergy season starts  There was no evidence of recurrence of the cancer on the CT chest abdomen pelvis  I will see him in 6 months and a follow-up visit  Subjective: The patient is here for a follow-up visit  Has dyspnea on exertion which is chronic and has not changed  He denies significant cough, wheezing or sputum production  Denies any chest pain  No nocturnal symptoms  He complain today that his co-payment for the ipratropium nebulizer treatment is over 700 dollars for 3 months  He is using Breo Ellipta 200/25, 1 inhalation once a day  Review of systems:  A 12 point system review is done and aside from what is stated above the rest of the review of systems is negative  Family history and social history are reviewed  Medications list is reviewed  Vitals: Blood pressure 138/80, pulse (!) 53, temperature (!) 97 °F (36 1 °C), temperature source Tympanic, resp  rate 16, height 5' 7" (1 702 m), weight 75 3 kg (166 lb), SpO2 99 %  ,     Physical Exam  Gen: Awake, alert, oriented x 3, no acute distress  HEENT: Mucous membranes moist, no oral lesions, no thrush  NECK: No accessory muscle use, JVP not elevated  Cardiac: Regular, single S1, single S2, no murmurs, no rubs, no gallops  Lungs:  Decreased breath sounds  No wheezing or rhonchi  Abdomen: normoactive bowel sounds, soft nontender, nondistended, no rebound or rigidity, no guarding  Extremities: no cyanosis, no clubbing  2+ edema  Neuro:  Grossly nonfocal   Skin:  No rash      Lab Results   Component Value Date    SODIUM 138 04/12/2022    K 5 6 (H) 04/12/2022     (H) 04/12/2022    CO2 26 04/12/2022    BUN 36 (H) 04/12/2022    CREATININE 1 51 (H) 04/12/2022    GLUC 100 04/02/2021    CALCIUM 10 2 (H) 04/20/2022

## 2022-04-22 ENCOUNTER — TELEPHONE (OUTPATIENT)
Dept: NEPHROLOGY | Facility: CLINIC | Age: 77
End: 2022-04-22

## 2022-04-22 NOTE — TELEPHONE ENCOUNTER
----- Message from Adelia Carrington MD sent at 4/22/2022  9:22 AM EDT -----  Let him know that the creatinine was stable at 1 5  Other tests OK which we can review at next visit  Put him on call back for follow up in July and do a BMP before visit  Thanks  Let me know he got message    Thanks   ----- Message -----  From: Lab, Background User  Sent: 4/12/2022  11:41 AM EDT  To: Adelia Carrington MD

## 2022-04-26 ENCOUNTER — APPOINTMENT (OUTPATIENT)
Dept: LAB | Facility: CLINIC | Age: 77
End: 2022-04-26
Payer: MEDICARE

## 2022-04-26 ENCOUNTER — TRANSCRIBE ORDERS (OUTPATIENT)
Dept: LAB | Facility: CLINIC | Age: 77
End: 2022-04-26

## 2022-04-26 DIAGNOSIS — F31.60 MIXED BIPOLAR I DISORDER (HCC): Primary | ICD-10-CM

## 2022-04-26 DIAGNOSIS — F31.60 MIXED BIPOLAR I DISORDER (HCC): ICD-10-CM

## 2022-04-26 LAB
BUN SERPL-MCNC: 38 MG/DL (ref 5–25)
CREAT SERPL-MCNC: 1.61 MG/DL (ref 0.6–1.3)
GFR SERPL CREATININE-BSD FRML MDRD: 41 ML/MIN/1.73SQ M
LITHIUM SERPL-SCNC: 1.3 MMOL/L (ref 0.5–1)
TSH SERPL DL<=0.05 MIU/L-ACNC: 2.34 UIU/ML (ref 0.45–4.5)

## 2022-04-26 PROCEDURE — 84520 ASSAY OF UREA NITROGEN: CPT

## 2022-04-26 PROCEDURE — 82565 ASSAY OF CREATININE: CPT

## 2022-04-26 PROCEDURE — 80178 ASSAY OF LITHIUM: CPT

## 2022-04-26 PROCEDURE — 36415 COLL VENOUS BLD VENIPUNCTURE: CPT

## 2022-04-26 PROCEDURE — 84443 ASSAY THYROID STIM HORMONE: CPT

## 2022-04-26 NOTE — TELEPHONE ENCOUNTER
Patient called because he was confused about the lab work  I explained that Dr Dev Munoz ordered a BMP to have done prior to his visit  I stated that he can go about a week before  I mailed BMP  All questions were answered

## 2022-04-27 ENCOUNTER — TELEPHONE (OUTPATIENT)
Dept: PULMONOLOGY | Facility: CLINIC | Age: 77
End: 2022-04-27

## 2022-04-27 DIAGNOSIS — J44.9 CHRONIC OBSTRUCTIVE PULMONARY DISEASE, UNSPECIFIED COPD TYPE (HCC): Primary | ICD-10-CM

## 2022-04-27 RX ORDER — FLUTICASONE FUROATE, UMECLIDINIUM BROMIDE AND VILANTEROL TRIFENATATE 200; 62.5; 25 UG/1; UG/1; UG/1
1 POWDER RESPIRATORY (INHALATION) DAILY
Qty: 180 BLISTER | Refills: 0 | Status: SHIPPED | OUTPATIENT
Start: 2022-04-27 | End: 2022-07-26

## 2022-04-27 NOTE — TELEPHONE ENCOUNTER
Pt called wanting to speak w/ the doctor or his assistant in regards to an inhaler question  He didn't want to elaborate but said its the HFA one    Please advise: [phone]

## 2022-04-27 NOTE — TELEPHONE ENCOUNTER
Patient reports he is not getting the full effect of Breo previous conversations was he could trial Trelegy    -  Trelegy order has been placed

## 2022-04-28 ENCOUNTER — OFFICE VISIT (OUTPATIENT)
Dept: GASTROENTEROLOGY | Facility: MEDICAL CENTER | Age: 77
End: 2022-04-28
Payer: MEDICARE

## 2022-04-28 VITALS
WEIGHT: 166.2 LBS | TEMPERATURE: 96.2 F | DIASTOLIC BLOOD PRESSURE: 97 MMHG | SYSTOLIC BLOOD PRESSURE: 178 MMHG | BODY MASS INDEX: 26.03 KG/M2 | HEART RATE: 52 BPM

## 2022-04-28 DIAGNOSIS — K21.9 GASTROESOPHAGEAL REFLUX DISEASE: ICD-10-CM

## 2022-04-28 DIAGNOSIS — K21.9 GASTROESOPHAGEAL REFLUX DISEASE WITHOUT ESOPHAGITIS: Primary | ICD-10-CM

## 2022-04-28 PROCEDURE — 99214 OFFICE O/P EST MOD 30 MIN: CPT | Performed by: INTERNAL MEDICINE

## 2022-04-28 RX ORDER — OMEPRAZOLE 20 MG/1
20 CAPSULE, DELAYED RELEASE ORAL DAILY
Qty: 90 CAPSULE | Refills: 3 | Status: SHIPPED | OUTPATIENT
Start: 2022-04-28

## 2022-04-28 NOTE — PATIENT INSTRUCTIONS
1  Omeprazole 20 mg daily - morning  2  Stop the Pepcid at night - take it if needed for a week at a time  May use Pepcid 20 mg daily  3  Avoid drinking during eating  4  Continue Picot (sodium bicarbonate) as needed

## 2022-05-02 ENCOUNTER — TRANSCRIBE ORDERS (OUTPATIENT)
Dept: LAB | Facility: CLINIC | Age: 77
End: 2022-05-02

## 2022-05-02 ENCOUNTER — APPOINTMENT (OUTPATIENT)
Dept: LAB | Facility: CLINIC | Age: 77
End: 2022-05-02
Payer: MEDICARE

## 2022-05-02 DIAGNOSIS — F31.60 MIXED BIPOLAR I DISORDER (HCC): ICD-10-CM

## 2022-05-02 DIAGNOSIS — F31.60 MIXED BIPOLAR I DISORDER (HCC): Primary | ICD-10-CM

## 2022-05-02 LAB
BUN SERPL-MCNC: 37 MG/DL (ref 5–25)
CREAT SERPL-MCNC: 1.7 MG/DL (ref 0.6–1.3)
GFR SERPL CREATININE-BSD FRML MDRD: 38 ML/MIN/1.73SQ M
LITHIUM SERPL-SCNC: 0.8 MMOL/L (ref 0.5–1)
TSH SERPL DL<=0.05 MIU/L-ACNC: 1.44 UIU/ML (ref 0.45–4.5)

## 2022-05-02 PROCEDURE — 82565 ASSAY OF CREATININE: CPT

## 2022-05-02 PROCEDURE — 84520 ASSAY OF UREA NITROGEN: CPT

## 2022-05-02 PROCEDURE — 80178 ASSAY OF LITHIUM: CPT

## 2022-05-02 PROCEDURE — 36415 COLL VENOUS BLD VENIPUNCTURE: CPT

## 2022-05-02 PROCEDURE — 84443 ASSAY THYROID STIM HORMONE: CPT

## 2022-05-03 NOTE — PROGRESS NOTES
Outpatient Follow up  [address]  [address]  [facility] [address]  Rosita Petersen MD  Ph : [phone]  Fax : [phone]  Mobile : [phone]  Email : [email or web address]  Also available on [employer]    Bonnie Zhang 78 y o  male MRN: [id number]    PCP: Hugo Morales MD  Referring: No referring provider defined for this encounter  Bonnie Zhang presented for a follow up visit  My recommendations are included  Please do not hesitate to contact me with any questions you may have  ASSESSMENT AND PLAN:      No problem-specific Assessment & Plan notes found for this encounter  Diagnoses and all orders for this visit:    Gastroesophageal reflux disease without esophagitis    Gastroesophageal reflux disease  -     omeprazole (PriLOSEC) 20 mg delayed release capsule; Take 1 capsule (20 mg total) by mouth daily      62-year-old gentleman who presents to us for follow-up of his gastroesophageal reflux disease  This is well controlled at this time  I would recommend the following    1  Taper the Omeprazole to 20 mg daily - take 1/2 hour before breakfast in the morning  2  Stop the Pepcid at night - take it if needed for a week at a time  May use Pepcid 20 mg daily  This is available over-the-counter  3  Avoid drinking during eating  4  Continue Picot (sodium bicarbonate) as needed  5  Discussed repeat colonoscopy at the next appointment     ______________________________________________________________________    SUBJECTIVE:  Patti King is a 62-year-old male with a past medical history of hypertension, hyperlipidemia, diverticulosis, COPD, bipolar 1 disorder, CAD who is being seen for follow-up for his acid reflux  He was treated with omeprazole 20 mg daily initially and then this was increased to 40 mg daily since his symptoms worsened and was started on Pepcid at night as well    His symptoms are improved at this time  He denies any dysphagia  He does feel bloated  He feels very gassy and is burping and belching  He occasionally uses Tums and Picot (sodium bicarb) for helping with  burping as needed  His last colonoscopy was in July of 2020 he had multiple polyps removed from the rectum, ascending colon x3, transverse colon x2, descending colon and sigmoid colon polyp all removed  Three of these were tubular adenomas and repeat was recommended in 2 years  EGD February 2022 which showed normal esophagus, thickened gastric folds for which biopsies were obtained and polyps which are likely fundic gland polyps  Biopsies were unremarkable  Answers for HPI/ROS submitted by the patient on 4/21/2022  Chronicity: recurrent  Onset: more than 1 year ago  Onset quality: sudden  Frequency: intermittently  Progression since onset: waxing and waning  Pain location: periumbilical region  Pain - numeric: 7/10  Pain quality: aching, cramping  Radiates to: suprapubic region  anorexia: Yes  belching: Yes  flatus: Yes  Aggravated by: bowel movement, certain positions  Relieved by: bowel movements, passing flatus, recumbency  Diagnostic workup: CT scan, upper endoscopy        REVIEW OF SYSTEMS IS OTHERWISE NEGATIVE        Historical Information   Past Medical History:   Diagnosis Date    Aortic aneurysm (HCC)     Benign colon polyp     Bipolar 1 disorder ([address] )     Cardiac disease     MI    Cervical cord compression with myelopathy ([address] )     COPD (chronic obstructive pulmonary disease) (HCC)     Diverticulitis     Diverticulosis     Gait disturbance     uses cane, leg brace on right    GERD (gastroesophageal reflux disease)     Heart attack ([address] ) 1996    Hx of resection of large bowel 5/3/2016    Hyperlipidemia     Hypertension     IBS (irritable bowel syndrome)     Lumbar stenosis     Lung cancer ([address] )     2007 Left lower lobectomy and 2011 Right lung with surgery     Myocardial infarction ([address] ) involving other coronary artery    Pro[facility]     Shortness of breath     Small bowel obstruction ([facility] ) 11/16/2016     Past Surgical History:   Procedure Laterality Date    ANGIOPLASTY      stent    CARDIAC SURGERY      CERVICAL SPINE SURGERY      Cervical decompression with cervical fusion from C3-C7 for spinal stenosis   COLON SURGERY  11/04/2014    ESOPHAGOGASTRODUODENOSCOPY  01/03/2013    with possible Schatzki's ring & small hiatal hernia, mild gastritis    FL INJECTION LEFT HIP (NON ARTHROGRAM)  12/11/2018    FL INJECTION LEFT HIP (NON ARTHROGRAM)  5/9/2019    IR BIOPSY LUNG  7/6/2020    IR EVAR  8/6/2018    LUNG CANCER SURGERY Right 03/2011    wedge resection for lung tumor, 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    LUNG LOBECTOMY Left     GA ARTHRODESIS ANT INTERBODY MIN DISCECTOMY, CERVICAL BELOW C2 N/A 5/2/2016    Procedure: Anterior cervical diskectomy C3/4, C5/6, C6/7 with anterior plate fixation fusion C3-7;  Posterior decompressive laminectomy C3-7 with lateral mass fixation fusion C3-7 (IMPULSE MONITORING); Surgeon: Rizwana Hogan MD;  [facility];  Service: Neurosurgery    GA ARTHRODESIS POSTERIOR INTERBODY LUMBAR N/A 1/30/2017    Procedure: L4-5 AND L5-S1 DECOMPRESSIVE FORAMINOTOMIES, TRANSFORAMINAL LUMBAR INTERBODY AND PEDICLE SCREW FIXATION FUSION L4-S1 (IMPULSE);   Surgeon: Rizwana Hogan MD;  [facility];  Service: Neurosurgery    [place] [address] R[address] DPLMNT AORTO-AORTIC NDGFT N/A 8/6/2018    Procedure: REPAIR ANEURYSM ENDOVASCULAR ABDOMINAL AORTIC  (EVAR) WITH BILATERAL PERCUTANEOUS FEMORAL ACCESS WITH ULTRASOUND GUIDANCE ON THE RIGHT AND PRE CLOSURE;  Surgeon: Alma Bella MD;  [facility];  Service: Vascular    PROSTATECTOMY  2007    for prostate CA - no chemo/RT    SIGMOIDECTOMY      for divertic    SMALL INTESTINE SURGERY N/A 11/17/2016    Procedure: Exploratory Laparotomy, Lysis of adhesions to release small bowel obstruction; Surgeon: Dennie Pang, MD;  [facility];  Service:      Social History   Social History     Substance and Sexual Activity   Alcohol Use Yes    Comment: One glass per day     Social History     Substance and Sexual Activity   Drug Use No     Social History     Tobacco Use   Smoking Status Former Smoker    Packs/day: 1 00    Years: 35 00    Pack years: 35 00    Types: Cigarettes    Start date: 5    Quit date:     Years since quittin 3   Smokeless Tobacco Never Used     Family History   Problem Relation Age of Onset    Heart attack Father     Colon cancer Father     Coronary artery disease Father     Heart disease Family     Hyperlipidemia Family     Hypertension Family        Meds/Allergies       Current Outpatient Medications:     acetaminophen (TYLENOL) 500 mg tablet    albuterol (PROAIR HFA) 90 mcg/act inhaler    amLODIPine (NORVASC) 2 5 mg tablet    aspirin (ECOTRIN LOW STRENGTH) 81 mg EC tablet    atorvastatin (LIPITOR) 40 mg tablet    Cholecalciferol (VITAMIN D PO)    diazepam (VALIUM) 5 mg tablet    Diclofenac Sodium (VOLTAREN) 1 %    dicyclomine (Bentyl) 20 mg tablet    famotidine (PEPCID) 40 MG tablet    fexofenadine (ALLEGRA) 180 MG tablet    FLUoxetine (PROzac) 10 mg capsule    fluticasone (FLONASE) 50 mcg/act nasal spray    fluticasone-umeclidinium-vilanterol (Trelegy Ellipta) 200-62 5-25 MCG/INH AEPB inhaler    ipratropium (Atrovent HFA) 17 mcg/act inhaler    lithium carbonate (LITHOBID) 300 mg CR tablet    metoprolol succinate (TOPROL-XL) 25 mg 24 hr tablet    mupirocin (BACTROBAN) 2 % ointment    mupirocin (BACTROBAN) 2 % ointment    nitroglycerin (NITRODUR) 0 2 mg/hr    omega-3-acid ethyl esters (LOVAZA) 1 g capsule    omeprazole (PriLOSEC) 20 mg delayed release capsule    Mirabegron ER 25 MG TB24    Allergies   Allergen Reactions    Bactrim [Sulfamethoxazole-Trimethoprim] Other (See Comments)     AILYN    Nsaids      Annotation - [id number]: unable to take due to use of lithium   Oxycodone Rash           Objective     Blood pressure (!) 178/97, pulse (!) 52, temperature (!) 96 2 °F (35 7 °C), temperature source Probe, weight 75 4 kg (166 lb 3 2 oz)  Body mass index is 26 03 kg/m²  PHYSICAL EXAM:      Physical Exam  Constitutional:       Appearance: Normal appearance  He is well-developed  HENT:      Head: Normocephalic and atraumatic  Eyes:      General: No scleral icterus  Conjunctiva/sclera: Conjunctivae normal       Pupils: Pupils are equal, round, and reactive to light  Cardiovascular:      Rate and Rhythm: Normal rate and regular rhythm  Heart sounds: Normal heart sounds  Pulmonary:      Effort: Pulmonary effort is normal  No respiratory distress  Breath sounds: Normal breath sounds  Abdominal:      General: Bowel sounds are normal  There is no distension  Palpations: Abdomen is soft  There is no mass  Tenderness: There is no abdominal tenderness  Hernia: No hernia is present  Musculoskeletal:         General: Normal range of motion  Cervical back: Normal range of motion  Lymphadenopathy:      Cervical: No cervical adenopathy  Skin:     General: Skin is warm  Neurological:      Mental Status: He is alert and oriented to person, place, and time  Psychiatric:         Behavior: Behavior normal          Thought Content: Thought content normal          Lab Results:   No visits with results within 1 Day(s) from this visit  Latest known visit with results is:   Appointment on 04/26/2022   Component Date Value    Lithium Lvl 04/26/2022 1 3*    TSH 3RD GENERATON 04/26/2022 2 340     BUN 04/26/2022 38*    Creatinine 04/26/2022 1 61*    eGFR 04/26/2022 41          Radiology Results:   No results found

## 2022-05-04 NOTE — PROGRESS NOTES
Office Progress Note - Pulmonary    Karin Jones 72 y o  male MRN: [id number]    Encounter: [id number]      Assessment:   Chronic obstructive pulmonary disease   Allergic rhinitis  Plan:     Breo 200/25, 1 inhalation once a day   Atrovent HFA 2 inhalations 4 times a day   Albuterol rescue inhaler as needed   Fluticasone nasal spray 2 sprays to each nostril once a day   Fexofenadine 180 mg once a day   Follow-up in 6 months  Discussion:   The patient's COPD is in remission  I have maintained him on the Breo 200/25, 1 inhalation once a day  He will use the Atrovent HFA 2 inhalations 4 times a day  He will use the rescue inhaler as needed  His allergic rhinitis is well treated  He will maintain the use of fluticasone nasal spray 2 sprays to each nostril once a day  And fexofenadine 180 mg once a day  I reminded him to get the influenza vaccine next month  I will see him in 6 months in a follow-up visit  Subjective: The patient is here for a follow-up visit  He denies any significant cough, wheezing or sputum production  Denies any chest pain or palpitations  He is using Breo 200/25, 1 inhalation once a day  He is also on Atrovent HFA 3 times a day  Denies any nocturnal symptoms  His allergic rhinitis is well treated  He is on fluticasone 2 sprays to each nostril once a day and fexofenadine 180 mg once a day  Review of systems:  A 12 point system review is done and aside from what is stated above the rest of the review of systems is negative  Family history and social history are reviewed  Medications list is reviewed  Vitals: Blood pressure 130/80, pulse 66, temperature 97 8 °F (36 6 °C), temperature source Tympanic, height 5' 7 5" (1 715 m), weight 80 kg (176 lb 6 4 oz), SpO2 97 %  ,     Physical Exam  Gen: Awake, alert, oriented x 3, no acute distress  HEENT: Mucous membranes moist, no oral lesions, no thrush  NECK: No accessory muscle use, JVP not elevated  Cardiac: Regular, single S1, single S2, no murmurs, no rubs, no gallops  Lungs:  Decreased breath sounds  No wheezing or rhonchi  Abdomen: normoactive bowel sounds, soft nontender, nondistended, no rebound or rigidity, no guarding  Extremities: no cyanosis, no clubbing, no edema  Neuro:  Grossly nonfocal   Skin:  No rash        Answers for HPI/ROS submitted by the patient on 9/11/2019   Primary symptoms  Do you have a cough?: Yes  When did you first notice your symptoms?: more than 1 month ago  How often do your symptoms occur?: rarely  Since you first noticed this problem, how has it changed?: rapidly improving  Have you had a change in appetite?: No  Do you have chest pain?: No  Do you have shortness of breath that occurs with effort or exertion?: Yes  Do you have ear congestion?: No  Do you have ear pain?: No  Do you have a fever?: No  Do you have headaches?: No  Do you have heartburn?: No  Do you have fatigue?: No  Do you have muscle pain?: No  Do you have nasal congestion?: No  Do you have shortness of breath when lying flat?: No  Do you have shortness of breath when you wake up?: No  Do you have post-nasal drip?: No  Do you have a runny nose?: No  Do you have sneezing?: Yes  Do you have a sore throat?: No  Do you have sweats?: No  Do you have trouble swallowing?: No  Have you experienced weight loss?: No  Which of the following makes your symptoms worse?: climbing stairs, pollen, strenuous activity  Which of the following makes your symptoms better?: cold air, ipratropium Protopic Counseling: Patient may experience a mild burning sensation during topical application. Protopic is not approved in children less than 2 years of age. There have been case reports of hematologic and skin malignancies in patients using topical calcineurin inhibitors although causality is questionable.

## 2022-05-05 RX ORDER — FLUTICASONE FUROATE, UMECLIDINIUM BROMIDE AND VILANTEROL TRIFENATATE 200; 62.5; 25 UG/1; UG/1; UG/1
1 POWDER RESPIRATORY (INHALATION) DAILY
Qty: 180 BLISTER | Refills: 0 | Status: SHIPPED | OUTPATIENT
Start: 2022-05-05 | End: 2022-07-18 | Stop reason: SDUPTHER

## 2022-05-05 RX ORDER — ALBUTEROL SULFATE 90 UG/1
2 AEROSOL, METERED RESPIRATORY (INHALATION) EVERY 6 HOURS PRN
Qty: 18 G | Refills: 1 | Status: SHIPPED | OUTPATIENT
Start: 2022-05-05 | End: 2022-07-06

## 2022-05-05 NOTE — TELEPHONE ENCOUNTER
Patient calling saying he would like Gorge Cleary to know that the Trelegy is working well for him and he has no side effects with this inhaler  [facility] a [facility]  He will need a 90 day supply and asking to call him when script is sent  Please advise  -continue Amlodipine, Metoprolol

## 2022-05-06 ENCOUNTER — TRANSCRIBE ORDERS (OUTPATIENT)
Dept: LAB | Facility: CLINIC | Age: 77
End: 2022-05-06

## 2022-05-06 ENCOUNTER — APPOINTMENT (OUTPATIENT)
Dept: LAB | Facility: CLINIC | Age: 77
End: 2022-05-06
Payer: MEDICARE

## 2022-05-06 DIAGNOSIS — F31.60 MIXED BIPOLAR I DISORDER (HCC): Primary | ICD-10-CM

## 2022-05-06 DIAGNOSIS — F31.60 MIXED BIPOLAR I DISORDER (HCC): ICD-10-CM

## 2022-05-06 LAB
BUN SERPL-MCNC: 35 MG/DL (ref 5–25)
CREAT SERPL-MCNC: 1.41 MG/DL (ref 0.6–1.3)
GFR SERPL CREATININE-BSD FRML MDRD: 48 ML/MIN/1.73SQ M
LITHIUM SERPL-SCNC: 0.8 MMOL/L (ref 0.5–1)
TSH SERPL DL<=0.05 MIU/L-ACNC: 1.58 UIU/ML (ref 0.45–4.5)

## 2022-05-06 PROCEDURE — 84520 ASSAY OF UREA NITROGEN: CPT

## 2022-05-06 PROCEDURE — 80178 ASSAY OF LITHIUM: CPT

## 2022-05-06 PROCEDURE — 82565 ASSAY OF CREATININE: CPT

## 2022-05-06 PROCEDURE — 36415 COLL VENOUS BLD VENIPUNCTURE: CPT

## 2022-05-06 PROCEDURE — 84443 ASSAY THYROID STIM HORMONE: CPT

## 2022-05-11 NOTE — TELEPHONE ENCOUNTER
Patient calling saying he spoke with [facility] regarding the script for Trelegy that was sent to them and they are stating we need to get a prior authorization  Please advise  Pt states he does have 17 more days of his current trelegy inhaler

## 2022-05-13 NOTE — PROGRESS NOTES
Addended by: JANIE DELONG on: 11/24/2020 05:00 PM     Modules accepted: Orders     Subjective:   73 y o male established patient presents for follow up of left hip pain  Pain is localized to lateral hip extending down the lateral thigh and in the buttock  Patient has received multiple greater trochanteric bursa corticosteroid injections, which he reports has provided approx 2 months of relief  He also received a fluoroscopic guided intraarticular left hip injection on 12/11/2018 which also provided temporary relief  He describes certain activities that exacerbate his hip pain, such as deep flexion  Pain is resolved at the present time but he states he typically has discomfort on a daily basis  He would like to receive a troch bursitis injection today  Review of Systems  Review of systems negative unless otherwise specified in HPI    Past Medical History  Past Medical History:   Diagnosis Date    [facility] )     Benign colon polyp     Bipolar 1 disorder ([facility] )     Cardiac disease     MI    Cervical cord compression with myelopathy ([facility] )     COPD (chronic obstructive pulmonary disease) (HCC)     Diverticulitis     Diverticulosis     Gait disturbance     uses cane, leg brace on right    GERD (gastroesophageal reflux disease)     Heart attack ([facility] ) 1996    Hx of resection of large bowel 5/3/2016    Hyperlipidemia     Hypertension     IBS (irritable bowel syndrome)     Lumbar stenosis     Lung cancer ([facility] )     2007 Left lower lobectomy and 2011 Right lung with surgery     Myocardial infarction [facility])     involving other coronary artery    Prostate cancer ([facility] )     Shortness of breath     [facility] obstruction ([facility] ) 11/16/2016       Past Surgical History  Past Surgical History:   Procedure Laterality Date    ANGIOPLASTY      stent    CARDIAC SURGERY      CERVICAL SPINE SURGERY      Cervical decompression with cervical fusion from C3-C7 for spinal stenosis      COLON SURGERY  11/04/2014    ESOPHAGOGASTRODUODENOSCOPY  01/03/2013    with possible Schatzki's ring & small hiatal hernia, mild gastritis    FL INJECTION LEFT HIP (NON ARTHROGRAM)  12/11/2018    IR EVAR  8/6/2018    LUNG CANCER SURGERY Right 03/2011    wedge resection for lung tumor, 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    LUNG LOBECTOMY Left     VT ARTHRODESIS ANT INTERBODY MIN DISCECTOMY, CERVICAL BELOW C2 N/A 5/2/2016    Procedure: Anterior cervical diskectomy C3/4, C5/6, C6/7 with anterior plate fixation fusion C3-7;  Posterior decompressive laminectomy C3-7 with lateral mass fixation fusion C3-7 (IMPULSE MONITORING); Surgeon: Gerardo Montana MD;  [facility];  Service: Neurosurgery    VT ARTHRODESIS POSTERIOR INTERBODY LUMBAR N/A 1/30/2017    Procedure: L4-5 AND L5-S1 DECOMPRESSIVE FORAMINOTOMIES, TRANSFORAMINAL LUMBAR INTERBODY AND PEDICLE SCREW FIXATION FUSION L4-S1 (IMPULSE); Surgeon: Gerardo Montana MD;  [facility];  Service: Neurosurgery    [place] [address] RPR DPLMNT AORTO-AORTIC NDGFT N/A 8/6/2018    Procedure: REPAIR ANEURYSM ENDOVASCULAR ABDOMINAL AORTIC  (EVAR) WITH BILATERAL PERCUTANEOUS FEMORAL ACCESS WITH ULTRASOUND GUIDANCE ON THE RIGHT AND PRE CLOSURE;  Surgeon: Dusty Bowles MD;  [facility];  Service: Vascular    PROSTATECTOMY  2007    for prostate CA - no chemo/RT    SIGMOIDECTOMY      for divertic    SMALL INTESTINE SURGERY N/A 11/17/2016    Procedure: Exploratory Laparotomy, Lysis of adhesions to release small bowel obstruction;  Surgeon: Shen Armendariz MD;  [facility];  Service:        Current Medications  Current Outpatient Prescriptions on File Prior to Visit   Medication Sig Dispense Refill    acetaminophen (TYLENOL) 500 mg tablet Take 500 mg by mouth as needed for mild pain   ADVAIR DISKUS 250-50 MCG/DOSE inhaler USE 1 INHALATION TWICE  DAILY    RINSE MOUTH AFTER  USE 1 Inhaler 5    amLODIPine (NORVASC) 2 5 mg tablet TAKE 1 TABLET BY MOUTH  DAILY 90 tablet 3    aspirin (ECOTRIN LOW STRENGTH) 81 mg EC tablet Take 81 mg by mouth daily      atorvastatin (LIPITOR) 40 mg tablet TAKE 1 TABLET BY MOUTH  DAILY 90 tablet 5    ATROVENT HFA 17 MCG/ACT inhaler USE 2 PUFFS 4 TIMES DAILY 51 6 g 5    B Complex Vitamins (VITAMIN B COMPLEX PO) Take by mouth      Cholecalciferol (VITAMIN D PO) Take 2,000 Units by mouth daily        diazepam (VALIUM) 5 mg tablet Take 5 mg by mouth as needed for anxiety      dicyclomine (BENTYL) 20 mg tablet Take 20 mg by mouth every 6 (six) hours      fexofenadine (ALLEGRA) 180 MG tablet Take 180 mg by mouth daily      FLUoxetine (PROzac) 10 mg capsule       fluticasone (FLONASE) 50 mcg/act nasal spray 1 spray into each nostril as needed   lithium carbonate (LITHOBID) 300 mg CR tablet Take 300 mg by mouth One tab by mouth every other day and alternating with 2 tabs every other day at bedtime   metoprolol succinate (TOPROL-XL) 25 mg 24 hr tablet TAKE 1 TABLET BY MOUTH  DAILY 90 tablet 5    nitroglycerin (NITRODUR) 0 2 mg/hr APPLY 1 PATCH DAILY (OFF  FOR 12 HOURS) 90 patch 3    omega-3-acid ethyl esters (LOVAZA) 1 g capsule TAKE 1 CAPSULE BY MOUTH 3  TIMES DAILY 270 capsule 5    omeprazole (PriLOSEC) 40 MG capsule Take 40 mg by mouth every other day        PROAIR  (90 Base) MCG/ACT inhaler INHALE 2 PUFFS BY MOUTH  EVERY 6 HOURS AS NEEDED  1 Inhaler 5    Wheat Dextrin (BENEFIBER DRINK MIX PO) Take 1 Package by mouth      [DISCONTINUED] FLUoxetine (PROzac) 10 MG tablet Take 10 mg by mouth daily       No current facility-administered medications on file prior to visit          Recent Labs [facility])    0  Lab Value Date/Time   HCT 41 6 10/12/2018 0816   HCT 39 5 10/19/2015 0911   HGB 13 5 10/12/2018 0816   HGB 12 6 10/19/2015 0911   WBC 11 01 (H) 10/12/2018 0816   WBC 9 47 10/19/2015 0911   INR 1 01 07/24/2018 1150   ESR 79 (H) 05/10/2016 0731   CRP 66 4 (H) 05/10/2016 0644   GLUCOSE 187 (H) 11/14/2016 0615   GLUCOSE 99 10/19/2015 0912   HGBA1C 5 3 10/12/2018 0816    Physical exam  · General: Awake, Alert, Oriented  · Eyes: Pupils equal, round and reactive to light  · Lungs: No audible wheezing  · Abdomen: soft  · Musculoskeletal: Left Hip   · Hip flexed to 90 degrees in a seated position  · No muscle atrophy noted  · Neurovascularly intact distally, SILT  · No tenderness to palpation of lateral hip, groin, thigh, or buttock  Procedure  Large joint arthrocentesis  Date/Time: 1/15/2019 9:53 AM  Consent given by: patient  Site marked: site marked  Timeout: Immediately prior to procedure a time out was called to verify the correct patient, procedure, equipment, support staff and site/side marked as required   Supporting Documentation  Indications: pain   Procedure Details  Location: left hip greater trochanteric bursa   Preparation: Patient was prepped and draped in the usual sterile fashion  Needle gauge: spinal needle  Ultrasound guidance: no  Medications administered: 2 mL lidocaine 2 %; 2 mL bupivacaine (PF) 0 5 %; 12 mg betamethasone acetate-betamethasone sodium phosphate 6 (3-3) mg/mL    Patient tolerance: patient tolerated the procedure well with no immediate complications  Dressing:  Sterile dressing applied          Assessment  1  Left hip pain  2  Left greater trochanteric bursitis  3  Left hip osteoarthritis  4  History of lumbar spine fusion and decompression for stenosis     Plan:   - He received a corticosteroid injection into the greater trochanteric bursa today (procedure note above), performed by Dr Corbin Galo  - Because he had significant improvement with the previous hip injection, we ordered another fluoroscopic guided intraarticular injection for the left hip which will be scheduled for approx 3/11/2019, 3 months after the previous hip injection was performed  - Follow up in clinic in 3 months    - Examination was supervised and the plan was formulated by the attending surgeon Dr Corbin Galo  no

## 2022-05-16 ENCOUNTER — OFFICE VISIT (OUTPATIENT)
Dept: NEUROLOGY | Facility: CLINIC | Age: 77
End: 2022-05-16
Payer: MEDICARE

## 2022-05-16 VITALS — DIASTOLIC BLOOD PRESSURE: 82 MMHG | TEMPERATURE: 98.4 F | SYSTOLIC BLOOD PRESSURE: 124 MMHG

## 2022-05-16 DIAGNOSIS — G20 PARKINSONISM, UNSPECIFIED PARKINSONISM TYPE (HCC): Primary | ICD-10-CM

## 2022-05-16 PROCEDURE — 99215 OFFICE O/P EST HI 40 MIN: CPT | Performed by: PSYCHIATRY & NEUROLOGY

## 2022-05-16 NOTE — PROGRESS NOTES
Patient ID: Ayse Coon is a 78 y o  male    Assessment/Plan:    Parkinsonism ([facility] )  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  We cannot say for insert that if lithium is contributing to his tremor and symptoms but I suspect in part may be contributing to his tremor  We discussed the option of a trial of levodopa  Given he feels he has been functioning well he does not wish to add any additional medications at this time  I do believe this is reasonable  We spoke about progression of symptoms and when to consider medications  Questions with regards to prognosis and any other clinical studies that could be done were answered  He will call prior to follow-up should he have any decline in symptoms and wished to try trial of levodopa  Diagnoses and all orders for this visit:    Parkinsonism, unspecified [facility]ism type ([facility] )      Spent 40 minutes on patient visit, counseling, reviewing prior workup in notes given this was my 1st visit, answering questions, and partial documentation      Subjective:      Lew La is a right handed man with bipolar disorder on Lithium, atrial fibrillation, cervical and lumbar radiculopathy and left trochanteric bursitis who presents for follow up for gait difficulty with the question of parkinsonism (neuroleptic induced versus vascular )      This is my first visit with the patient  Review of chart reveals a longstanding gait dysfunction felt to be multifactorial(cervical and lumbar degenerative disease, prior myelopathy and possible peripheral neuropathy and parkinsonism  Also noted to have right hand rest tremor      Patient is on lithium for his bipolar disorder  He initially did not have any typical non motor symptoms we would expect to accompany idiopathic Parkinson's disease  It was felt best to monitor for progression with time given his polypharmacy  Brain MRI with progression of his small vessel disease felt likely to be the cause of his gait issues secondary to an underlying vascular parkinsonism  He reports gait issues for at least 5 years  He has postural tremors in both hands for a few years  No current rest tremor  There is no difficulty arising out of chair  Speech is soft at times where he mumbles  There is no drooling  No difficulty chewing and swallowing  ADL's such as dressing and hygiene acts without issues  Handwriting is sloppy and varies in size  Sleeps well  There is no daytime sedation  No dream enactment  Imaging:  Brain MRI  -Chronic lacunar infarct left centrum semiovale  Progressive cerebral volume loss and progressive white matter foci in periventricular regions compatible with chronic microangiopathic disease  Objective:    /82 (BP Location: Left arm, Patient Position: Standing, Cuff Size: Standard)   Temp 98 4 °F (36 9 °C)       Physical Exam  Vitals reviewed  Eyes:      Extraocular Movements: Extraocular movements intact  Pupils: Pupils are equal, round, and reactive to light  Neurological:      Mental Status: He is alert  Deep Tendon Reflexes: Strength normal          Neurological Exam  Mental Status  Alert  Oriented to person, place, time and situation  Speech: hypophonia  Language is fluent with no aphasia   Attention and concentration are normal     Cranial Nerves  CN III, IV, VI: Extraocular movements intact bilaterally  Pupils equal round and reactive to light bilaterally  CN V: Facial sensation is normal   CN VIII: Hearing is normal   CN XI: Shoulder shrug strength is normal   CN XII: Tongue midline without atrophy or fasciculations  Motor   Increased muscle tone  Strength is 5/5 throughout all four extremities  Sensory  Light touch is normal in upper and lower extremities  Coordination  Right: Finger-to-nose normal  Rapid alternating movement abnormality:Left: Finger-to-nose normal  Rapid alternating movement abnormality:  See MDS UPDRS III  Gait  Casual gait: Normal pull test  Able to rise from chair without using arms  Date of exam:  2/4/22 5/16/22          Speech  1 1   Facial Expression    1   Rigidity - Neck    0   Rigidity - Upper Extremity (Right)  1 0   Rigidity - Upper Extremity (Left)   0 1   Rigidity - Lower Extremity (Right)  0 0   Rigidity - Lower Extremity (Left)   0 0   Finger Taps (Right)   1 1   Finger Taps (Left)   0 1   Hand  (Right)  0 0   Hand  (Left)   0 0   Pronation/Supination (Right)  1 2   Pronation/Supination (Left)   1 2   Heel Taps (Right) 1 1   Heel Taps(Left) 1 1   Arising from Chair   1 1   Gait   2 2   Postural Stability   1    Posture 1 3 cane   Global spontaneity of movement 1 1   Postural Tremor (Right) 1 1   Postural Tremor (Left) 1 1   Kinetic Tremor (Right)  0 1   Kinetic Tremor (Left)  0 1   Rest tremor  RUE 0 0   Rest tremor  LUE 0 0   Rest tremor  RLE 0 0   Reset tremor  LLE 0 0   Lip/Jaw Tremor    0   Motor Exam Total:               Current Outpatient Medications on File Prior to Visit   Medication Sig Dispense Refill    acetaminophen (TYLENOL) 500 mg tablet Take 500 mg by mouth as needed for mild pain        albuterol (ProAir HFA) 90 mcg/act inhaler Inhale 2 puffs every 6 (six) hours as needed for wheezing 18 g 1    amLODIPine (NORVASC) 2 5 mg tablet TAKE 1 TABLET BY MOUTH  DAILY 90 tablet 3    aspirin (ECOTRIN LOW STRENGTH) 81 mg EC tablet Take 81 mg by mouth daily      atorvastatin (LIPITOR) 40 mg tablet TAKE 1 TABLET BY MOUTH  DAILY 90 tablet 5    Cholecalciferol (VITAMIN D PO) Take 2,000 Units by mouth daily        dicyclomine (Bentyl) 20 mg tablet Take 1 tablet (20 mg total) by mouth every 6 (six) hours as needed (abd cramping) 90 tablet 3    famotidine (PEPCID) 40 MG tablet Take 1 tablet (40 mg total) by mouth daily at bedtime as needed for heartburn (Patient taking differently: Take 20 mg by mouth daily at bedtime as needed for heartburn) 30 tablet 3    fexofenadine (ALLEGRA) 180 MG tablet Take 180 mg by mouth as needed Daily during the Summer      FLUoxetine (PROzac) 10 mg capsule Take 10 mg by mouth daily       fluticasone (FLONASE) 50 mcg/act nasal spray 4 sprays into each nostril daily      fluticasone-umeclidinium-vilanterol (Trelegy Ellipta) 200-62 5-25 MCG/INH AEPB inhaler Inhale 1 puff daily Rinse mouth after use  180 blister 0    fluticasone-umeclidinium-vilanterol (Trelegy Ellipta) 200-62 5-25 MCG/INH AEPB inhaler Inhale 1 puff daily Rinse mouth after use   180 blister 0    lithium carbonate (LITHOBID) 300 mg CR tablet Take 300 mg by mouth in the morning      metoprolol succinate (TOPROL-XL) 25 mg 24 hr tablet TAKE 1 TABLET BY MOUTH  DAILY 90 tablet 3    mupirocin (BACTROBAN) 2 % ointment Apply topically 2 (two) times a day as needed (rash) 22 g 0    mupirocin (BACTROBAN) 2 % ointment Apply topically 2 (two) times a day as needed (wound) 22 g 0    nitroglycerin (NITRODUR) 0 2 mg/hr APPLY 1 PATCH DAILY (OFF  FOR 12 HOURS) (Patient taking differently: as needed) 90 patch 3    omega-3-acid ethyl esters (LOVAZA) 1 g capsule TAKE 1 CAPSULE BY MOUTH 3  TIMES DAILY 270 capsule 5    omeprazole (PriLOSEC) 20 mg delayed release capsule Take 1 capsule (20 mg total) by mouth daily 90 capsule 3    diazepam (VALIUM) 5 mg tablet 2 5 mg   (Patient not taking: Reported on 5/16/2022)      Diclofenac Sodium (VOLTAREN) 1 % Apply 4 g topically 4 (four) times a day (Patient not taking: Reported on 5/16/2022) 350 g 3    ipratropium (Atrovent HFA) 17 mcg/act inhaler Inhale 2 puffs 4 (four) times a day (Patient not taking: Reported on 5/16/2022) 38 7 g 3    Mirabegron ER 25 MG TB24 Take 25 mg by mouth daily at bedtime (Patient not taking: No sig reported) 30 tablet 10     No current facility-administered medications on file prior to visit           Deborah Haynes MD  Movement disorder physician  [address] [place] [address]

## 2022-05-16 NOTE — TELEPHONE ENCOUNTER
Pt called again re: the Trelegy prior auth  Was this started? He only has 2 weeks left of Trelegy before he needs a refill    Please advise: [phone]

## 2022-05-16 NOTE — PROGRESS NOTES
Review of Systems   Constitutional: Negative  Negative for appetite change and fever  HENT: Negative  Negative for hearing loss, tinnitus, trouble swallowing and voice change  Eyes: Negative  Negative for photophobia and pain  Respiratory: Negative  Negative for shortness of breath  Cardiovascular: Negative  Negative for palpitations  Gastrointestinal: Negative for nausea and vomiting  GERD   Endocrine: Negative  Negative for cold intolerance  Genitourinary: Negative  Negative for dysuria, frequency and urgency  Musculoskeletal: Positive for gait problem (Shuffle feet), myalgias (Stomach cramps at times), neck pain (Not always and due to a previous procedure) and neck stiffness (Only in the AM shortly)  Balance Issues rarely  Lumbar Fusion previously     Skin: Negative  Negative for rash  Allergic/Immunologic: Negative  Neurological: Positive for tremors (Right hand), speech difficulty (mummbles at times) and weakness (Legs)  Negative for dizziness, seizures, syncope, facial asymmetry, light-headedness, numbness and headaches  Hematological: Bruises/bleeds easily (Bruise at times)  Psychiatric/Behavioral: Negative  Negative for confusion, hallucinations and sleep disturbance  All other systems reviewed and are negative

## 2022-05-16 NOTE — TELEPHONE ENCOUNTER
Per Colby Garg from [facility] trelegy kumari not require an [facility]  Medication was filled to soon so it can not be filled until May 18,2022  Reference # is [id number]  Left message for patient informing him of this

## 2022-05-17 ENCOUNTER — OFFICE VISIT (OUTPATIENT)
Dept: OBGYN CLINIC | Facility: HOSPITAL | Age: 77
End: 2022-05-17
Payer: MEDICARE

## 2022-05-17 VITALS
HEIGHT: 67 IN | WEIGHT: 170 LBS | DIASTOLIC BLOOD PRESSURE: 81 MMHG | BODY MASS INDEX: 26.68 KG/M2 | HEART RATE: 59 BPM | SYSTOLIC BLOOD PRESSURE: 135 MMHG

## 2022-05-17 DIAGNOSIS — M70.62 TROCHANTERIC BURSITIS OF LEFT HIP: Primary | ICD-10-CM

## 2022-05-17 PROCEDURE — 20610 DRAIN/INJ JOINT/BURSA W/O US: CPT | Performed by: ORTHOPAEDIC SURGERY

## 2022-05-17 PROCEDURE — 99213 OFFICE O/P EST LOW 20 MIN: CPT | Performed by: ORTHOPAEDIC SURGERY

## 2022-05-17 RX ORDER — LIDOCAINE HYDROCHLORIDE 10 MG/ML
2 INJECTION, SOLUTION INFILTRATION; PERINEURAL
Status: COMPLETED | OUTPATIENT
Start: 2022-05-17 | End: 2022-05-17

## 2022-05-17 RX ORDER — BETAMETHASONE SODIUM PHOSPHATE AND BETAMETHASONE ACETATE 3; 3 MG/ML; MG/ML
12 INJECTION, SUSPENSION INTRA-ARTICULAR; INTRALESIONAL; INTRAMUSCULAR; SOFT TISSUE
Status: COMPLETED | OUTPATIENT
Start: 2022-05-17 | End: 2022-05-17

## 2022-05-17 RX ORDER — BUPIVACAINE HYDROCHLORIDE 2.5 MG/ML
2 INJECTION, SOLUTION INFILTRATION; PERINEURAL
Status: COMPLETED | OUTPATIENT
Start: 2022-05-17 | End: 2022-05-17

## 2022-05-17 RX ADMIN — BETAMETHASONE SODIUM PHOSPHATE AND BETAMETHASONE ACETATE 12 MG: 3; 3 INJECTION, SUSPENSION INTRA-ARTICULAR; INTRALESIONAL; INTRAMUSCULAR; SOFT TISSUE at 10:10

## 2022-05-17 RX ADMIN — BUPIVACAINE HYDROCHLORIDE 2 ML: 2.5 INJECTION, SOLUTION INFILTRATION; PERINEURAL at 10:10

## 2022-05-17 RX ADMIN — LIDOCAINE HYDROCHLORIDE 2 ML: 10 INJECTION, SOLUTION INFILTRATION; PERINEURAL at 10:10

## 2022-05-17 NOTE — PROGRESS NOTES
Assessment:   Diagnosis ICD-10-CM Associated Orders   1  Trochanteric bursitis of left hip  M70 64 Large joint arthrocentesis       Plan:  Diagnosis, treatment options and associated risks were discussed with the patient including no treatment, nonsurgical treatment and potential for surgical intervention  The patient was given the opportunity to ask questions regarding each  It was explained to the patient that based upon the clinical and radiographic findings, at this point in time, this is not a surgical problem  Treatment for the above diagnoses and associated symptoms of this nature is generally conservative including a physician directed physical therapy program, improved fitness/weight loss, anti-inflammatories, and potentially various injections  Lesia Barton was offered, accepted, performed an injection of cortisone to his left hip trochanteric bursa area today for symptomatic relief  Lesia Barton tolerated the procedure well  Ice and post injection protocol advised  Weight-bearing and activities as tolerated    To do next visit:  Return in about 3 months (around 8/17/2022) for re-check  The above stated was discussed in layman's terms and the patient expressed understanding  All questions were answered to the patient's satisfaction  Scribe Attestation    I,:  Jeffery Taylor am acting as a scribe while in the presence of the attending physician :       I,:  Umesh Frank MD personally performed the services described in this documentation    as scribed in my presence :             Subjective:   Aditi Madrid is a 78 y o  male who presents today for repeat evaluation of his left hip due to return of pain  His pain is laterally and has been diagnosed with trochanteric bursitis  He finds relief with intermittent injections of cortisone, his last being 3 months ago  He does see Neurology  He has fallen since his last visit  He uses a single-point cane for ambulatory assistance    He has increased pain laterally at his hip when he lays on his left side at night  Review of systems negative unless otherwise specified in HPI  Review of Systems    Past Medical History:   Diagnosis Date    [facility] )     Benign colon polyp     Bipolar 1 disorder ([facility] )     Cardiac disease     MI    Cervical cord compression with myelopathy ([facility] )     COPD (chronic obstructive pulmonary disease) (HCC)     Diverticulitis     Diverticulosis     Gait disturbance     uses cane, leg brace on right    GERD (gastroesophageal reflux disease)     Heart attack ([facility] ) 1996    Hx of resection of large bowel 5/3/2016    Hyperlipidemia     Hypertension     IBS (irritable bowel syndrome)     Lumbar stenosis     Lung cancer ([facility] )     2007 Left lower lobectomy and 2011 Right lung with surgery     Myocardial infarction [facility])     involving other coronary artery    Prostate cancer ([facility] )     Shortness of breath     Small bowel obstruction ([facility] ) 11/16/2016       Past Surgical History:   Procedure Laterality Date    ANGIOPLASTY      stent    CARDIAC SURGERY      CERVICAL SPINE SURGERY      Cervical decompression with cervical fusion from C3-C7 for spinal stenosis   COLON SURGERY  11/04/2014    ESOPHAGOGASTRODUODENOSCOPY  01/03/2013    with possible Schatzki's ring & small hiatal hernia, mild gastritis    FL INJECTION LEFT HIP (NON ARTHROGRAM)  12/11/2018    FL INJECTION LEFT HIP (NON ARTHROGRAM)  5/9/2019    IR BIOPSY LUNG  7/6/2020    IR EVAR  8/6/2018    LUNG CANCER SURGERY Right 03/2011    wedge resection for lung tumor, 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    LUNG LOBECTOMY Left     AR ARTHRODESIS ANT INTERBODY MIN DISCECTOMY, CERVICAL BELOW C2 N/A 5/2/2016    Procedure: Anterior cervical diskectomy C3/4, C5/6, C6/7 with anterior plate fixation fusion C3-7;  Posterior decompressive laminectomy C3-7 with lateral mass fixation fusion C3-7 (IMPULSE MONITORING);   Surgeon: Priyank Hill, MD;  [facility];  Service: Neurosurgery    MA ARTHRODESIS POSTERIOR INTERBODY LUMBAR N/A 2017    Procedure: L4-5 AND L5-S1 DECOMPRESSIVE FORAMINOTOMIES, TRANSFORAMINAL LUMBAR INTERBODY AND PEDICLE SCREW FIXATION FUSION L4-S1 (IMPULSE);   Surgeon: Hesham Whalen MD;  [facility];  Service: Neurosurgery    MA [address] R[address] DPLMNT AORTO-AORTIC NDGFT N/A 2018    Procedure: REPAIR ANEURYSM ENDOVASCULAR ABDOMINAL AORTIC  (EVAR) WITH BILATERAL PERCUTANEOUS FEMORAL ACCESS WITH ULTRASOUND GUIDANCE ON THE RIGHT AND PRE CLOSURE;  Surgeon: Mikie Cantrell MD;  [facility];  Service: Vascular    PROSTATECTOMY      for prostate CA - no chemo/RT    SIGMOIDECTOMY      for divertic    SMALL INTESTINE SURGERY N/A 2016    Procedure: Exploratory Laparotomy, Lysis of adhesions to release small bowel obstruction;  Surgeon: Mary Luciano MD;  [facility];  Service:        Family History   Problem Relation Age of Onset    Heart attack Father     Colon cancer Father     Coronary artery disease Father     Heart disease Family     Hyperlipidemia Family     Hypertension Family        Social History     Occupational History    Occupation: Retired   Tobacco Use    Smoking status: Former Smoker     Packs/day: 1 00     Years: 35 00     Pack years: 35 00     Types: Cigarettes     Start date:      Quit date:      Years since quittin 3    Smokeless tobacco: Never Used   Vaping Use    Vaping Use: Never used   Substance and Sexual Activity    Alcohol use: Yes     Comment: One glass per day    Drug use: No    Sexual activity: Not on file         Current Outpatient Medications:     acetaminophen (TYLENOL) 500 mg tablet, Take 500 mg by mouth as needed for mild pain , Disp: , Rfl:     albuterol (ProAir HFA) 90 mcg/act inhaler, Inhale 2 puffs every 6 (six) hours as needed for wheezing, Disp: 18 g, Rfl: 1    amLODIPine (NORVASC) 2 5 mg tablet, TAKE 1 TABLET BY MOUTH DAILY, Disp: 90 tablet, Rfl: 3    aspirin (ECOTRIN LOW STRENGTH) 81 mg EC tablet, Take 81 mg by mouth daily, Disp: , Rfl:     atorvastatin (LIPITOR) 40 mg tablet, TAKE 1 TABLET BY MOUTH  DAILY, Disp: 90 tablet, Rfl: 5    Cholecalciferol (VITAMIN D PO), Take 2,000 Units by mouth daily  , Disp: , Rfl:     diazepam (VALIUM) 5 mg tablet, 2 5 mg   (Patient not taking: Reported on 5/16/2022), Disp: , Rfl:     Diclofenac Sodium (VOLTAREN) 1 %, Apply 4 g topically 4 (four) times a day (Patient not taking: Reported on 5/16/2022), Disp: 350 g, Rfl: 3    dicyclomine (Bentyl) 20 mg tablet, Take 1 tablet (20 mg total) by mouth every 6 (six) hours as needed (abd cramping), Disp: 90 tablet, Rfl: 3    famotidine (PEPCID) 40 MG tablet, Take 1 tablet (40 mg total) by mouth daily at bedtime as needed for heartburn (Patient taking differently: Take 20 mg by mouth daily at bedtime as needed for heartburn), Disp: 30 tablet, Rfl: 3    fexofenadine (ALLEGRA) 180 MG tablet, Take 180 mg by mouth as needed Daily during the Summer, Disp: , Rfl:     FLUoxetine (PROzac) 10 mg capsule, Take 10 mg by mouth daily , Disp: , Rfl:     fluticasone (FLONASE) 50 mcg/act nasal spray, 4 sprays into each nostril daily, Disp: , Rfl:     fluticasone-umeclidinium-vilanterol (Trelegy Ellipta) 200-62 5-25 MCG/INH AEPB inhaler, Inhale 1 puff daily Rinse mouth after use , Disp: 180 blister, Rfl: 0    fluticasone-umeclidinium-vilanterol (Trelegy Ellipta) 200-62 5-25 MCG/INH AEPB inhaler, Inhale 1 puff daily Rinse mouth after use , Disp: 180 blister, Rfl: 0    ipratropium (Atrovent HFA) 17 mcg/act inhaler, Inhale 2 puffs 4 (four) times a day (Patient not taking: Reported on 5/16/2022), Disp: 38 7 g, Rfl: 3    lithium carbonate (LITHOBID) 300 mg CR tablet, Take 300 mg by mouth in the morning, Disp: , Rfl:     metoprolol succinate (TOPROL-XL) 25 mg 24 hr tablet, TAKE 1 TABLET BY MOUTH  DAILY, Disp: 90 tablet, Rfl: 3    Mirabegron ER 25 MG TB24, Take 25 mg by mouth daily at bedtime (Patient not taking: No sig reported), Disp: 30 tablet, Rfl: 10    mupirocin (BACTROBAN) 2 % ointment, Apply topically 2 (two) times a day as needed (rash), Disp: 22 g, Rfl: 0    mupirocin (BACTROBAN) 2 % ointment, Apply topically 2 (two) times a day as needed (wound), Disp: 22 g, Rfl: 0    nitroglycerin (NITRODUR) 0 2 mg/hr, APPLY 1 PATCH DAILY (OFF  FOR 12 HOURS) (Patient taking differently: as needed), Disp: 90 patch, Rfl: 3    omega-3-acid ethyl esters (LOVAZA) 1 g capsule, TAKE 1 CAPSULE BY MOUTH 3  TIMES DAILY, Disp: 270 capsule, Rfl: 5    omeprazole (PriLOSEC) 20 mg delayed release capsule, Take 1 capsule (20 mg total) by mouth daily, Disp: 90 capsule, Rfl: 3    Allergies   Allergen Reactions    Bactrim [Sulfamethoxazole-Trimethoprim] Other (See Comments)     AILYN    Nsaids      Annotation - [id number]: unable to take due to use of lithium   Oxycodone Rash            Vitals:    05/17/22 0942   BP: 135/81   Pulse: 59       Objective:                    Left Hip Exam     Tenderness   The patient is experiencing tenderness in the greater trochanter  Range of Motion   Flexion: 100   External rotation: 30   Internal rotation: 20 (without groin pain)     Muscle Strength   The patient has normal left hip strength  Other   Erythema: absent  Sensation: normal            Diagnostics, reviewed and taken today if performed as documented:    None performed          Procedures, if performed today:    Large joint arthrocentesis  Universal Protocol:  Consent: Verbal consent obtained  Risks and benefits: risks, benefits and alternatives were discussed  Consent given by: patient  Time out: Immediately prior to procedure a "time out" was called to verify the correct patient, procedure, equipment, support staff and site/side marked as required    Timeout called at: 5/17/2022 10:10 AM   Patient understanding: patient states understanding of the procedure being performed  Site marked: the operative site was marked  Patient identity confirmed: verbally with patient    Supporting Documentation  Indications: pain   Procedure Details  Preparation: Patient was prepped and draped in the usual sterile fashion  Needle size: 22 G  Ultrasound guidance: no  Medications administered: 12 mg betamethasone acetate-betamethasone sodium phosphate 6 (3-3) mg/mL; 2 mL lidocaine 1 %; 2 mL bupivacaine 0 25 %    Patient tolerance: patient tolerated the procedure well with no immediate complications  Dressing:  Sterile dressing applied              Portions of the record may have been created with voice recognition software  Occasional wrong word or "sound a like" substitutions may have occurred due to the inherent limitations of voice recognition software  Read the chart carefully and recognize, using context, where substitutions have occurred

## 2022-05-17 NOTE — ASSESSMENT & PLAN NOTE
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  We cannot say for insert that if lithium is contributing to his tremor and symptoms but I suspect in part may be contributing to his tremor  We discussed the option of a trial of levodopa  Given he feels he has been functioning well he does not wish to add any additional medications at this time  I do believe this is reasonable  We spoke about progression of symptoms and when to consider medications  Questions with regards to prognosis and any other clinical studies that could be done were answered  He will call prior to follow-up should he have any decline in symptoms and wished to try trial of levodopa

## 2022-05-20 NOTE — PROGRESS NOTES
PT Evaluation     Today's date: 2019  Patient name: Sadie Quispe  : 1945  MRN: [id number]  Referring provider: Raymundo Ty MD  Dx:   Encounter Diagnosis     ICD-10-CM    1  Foraminal stenosis of lumbar region M99 83 Ambulatory referral to Physical Therapy   2  Chronic left-sided low back pain with left-sided sciatica M54 42     G89 29                   Assessment  Assessment details: Pt is a 72 y o  y/o male who presents to PT for evaluation of chronic history of LBP  Pt reports that he has had on and off LBP for the past several years  He states had imaging done which found a left paramedian disc herniation at the L3-L4 level  Pt reports LBP that is central and L sided, and occasionally radiates to the hip buttock and thigh area  It worsens with prolonged walking and standing  Improves with sitting  The pt additionally reports fatigue in the L LE and burning into the thigh with prolonged walking  The pt denies N/T as well as any symptoms distal to the knee  Pt denies bowel and bladder incontinence as well as saddle anesthesia  Pt presents with s/s upon examination which are consistent with a posterior derangement (extension DP) and lumbar disc herniation  The pt additionally presents with s/s consistent with SIJD  Pt would benefit from skilled PT in order to reduce derangement and centralize symptoms, improve AROM of the lumbar spine, and improve strength of the L LE in order to reduce pt's functional limitations  Pt was educated regarding physical therapy diagnosis and the recommended plan of care, as well as the potential risks and benefits of treatment      Impairments: abnormal or restricted ROM, impaired physical strength, lacks appropriate home exercise program, pain with function and poor body mechanics  Functional limitations: standing and walking > 45 minutes, bed mobility, lifting, bending  Symptom irritability: moderateUnderstanding of Dx/Px/POC: good   Prognosis: good    Goals  STG (3 weeks)  1  Pt to be I in HEP  2  Pt to reduce pain with activity by 50%  3  Pt to improve lumbar AROM by 50%  4  Pt to be I in posture management without VCs  LTG (6 weeks)  1  Pt to reduce pain with activity to < 2/10  2 Pt to improve FOTO score to predicted dc value  3  Pt to manage symptoms independently  4  Pt to improve standing and walking tolerance to > 1 hour  Plan  Patient would benefit from: skilled physical therapy  Planned modality interventions: thermotherapy: hydrocollator packs, unattended electrical stimulation, cryotherapy and ultrasound  Planned therapy interventions: joint mobilization, manual therapy, abdominal trunk stabilization, activity modification, body mechanics training, patient education, postural training, neuromuscular re-education, flexibility, functional ROM exercises, therapeutic activities, stretching, strengthening, therapeutic exercise, home exercise program and graded exercise  Frequency: 2x week  Duration in visits: 12  Duration in weeks: 6  Plan of Care beginning date: 2019  Plan of Care expiration date: 10/16/2019  Treatment plan discussed with: patient        Subjective Evaluation    Pain  Current pain ratin  At best pain ratin  At worst pain ratin  Relieving factors: rest, heat and medications  Aggravating factors: lifting, walking and standing  Progression: worsening    Social Support    Employment status: not working  Exercise comments: walks a mile 4-5 times per week  Diagnostic Tests  MRI studies: abnormal (Superiorly extruded left paramedian disc herniation is new from the prior study extending 14 mm above the native disc margin resulting in lateral recess stenosis potentially impacting the descending left L3 nerve root    Correlate for left L3 )  Treatments  Previous treatment: physical therapy  Patient Goals  Patient goals for therapy: decreased pain and increased motion  Patient goal: increase walking tolerance        Objective     Red Flags: Negative for night pain, unexplained weight loss, bowel or bladder dysfunction, saddle anesthesia, fever, chills, N/V, changes in vision, headaches, dizziness, gait disturbances   Positive: hx of Ca    Posture:  Seated:    Gait:   Pt ambulates with increased step width and decreased step length  B increased toe out gait, with decreased heel strike at [facility]  Flat foot at IC  Decreased hip extension in TS  Neuro Screen:  Reflexes: 3+ B patellar, 2+ B Achilles  Dermatomes: In tact to light touch  Myotomes:  WNL    Range of Motion:    Lumbar ROM  Flexion Min loss   Extension Max loss   R Lateral Flexion Mod loss   L Lateral Flexion Min loss   R Rotation Mod loss   L Rotation Mod loss     Hip ROM  Flexion R 98 / L 110   Extension R 0 / L 0   Abduction    External Rotation R 45 / L 45   Internal Rotation R 30 / L 45   *R hip flexion, L IR reproduced L low back pain     Manual Muscle Testing:    Lower Quarter  Hip flexion R 4+/5 L 4/5   Hip extension R 4/5 L 4/5   Hip abduction    Hip adduction    Hip external rotation R 4+/5 L 4/5   Hip internal rotation R 4+/5 L 4/5   Knee flexion R 4/5 L 4-/5   Knee extension R 4+/5 L 4/5   Ankle DF R 5/5 L 5/5   Ankle PF R 4+/5 L 4+/5   Ankle inversion    Ankle eversion    Great toe extension R 5/5 L 5/5   Great toe flexion        Special Testing:  Slump: Positive L LE  Passive SLR: Positive on L LE (30 degrees)  Crossed SLR: Positive on R LE (50 degrees)  Active SLR: Positive  Alfonso: Positive (L hip pain)  Gaenslen's: Positive  SI Distraction: Positive  Thigh thrust: Negative  Sacral Thrust: Positive  Repeated Movements:   FIS increased pain, peripheralized   EIL increased pain, centralized    Joint Play:  Hypomobile L1-L5, pain with P-A of L4-L5    Palpation:  Tenderness, L PSIS, L erector spinae, SP's of L4-L5      Flowsheet Rows      Most Recent Value   PT/OT G-Codes   Current Score  50   Projected Score  61          Precautions: PMH: COPD, Gerd, Hx of MI, HTN, Lung Cancer, Prostate Cancer, Lumbar stenosis, Aortic aneurysm      Manual              L1-L5 P-A Grade III-IV Mob                                                                     Exercise Diary              Nu step (warmup)             Prone on elbows             PPU             PPU with self OP             Standing lumbar ext w/ towel OP             TrA in prone             Bridges                                                                                                                                                                                          Modalities The pt is a 75 year old  woman with a long hx of MDD and anxiety treated on an outpatient basis and multiple medical problems including MS, COPD, HTN, Obesity, and DM. In 9/21 the pt had a hip replacement, required rehab, and lost mobility, largely needing a wheel chair. The pt has been residing in [facility] since 11/15/2 and disliked living there. The pt is dx with Vascular Dementia and had been prescribed Namenda and Aricept by her neurologist, Dr.Scott Wiley. The pt's depressive symptoms worsened since residing in [facility] and she reported thinking the staff were talking badly about her. The pt reportedly made a questionable SI 5/12/22 by cutting her wrists with a butter knife. This is the first psychiatric hospitalization for this 75 year old  woman (second marriage of 30 years) with a hx of MDD and anxiety. The pt was treated on an outpatient basis since 2003 by Dr.Dinshaw Palma, [phone], until time of admission to [facility] 11/21. The pt has multiple medical problems including MS, COPD, HTN, Obesity, and DM. In 9/21 the pt had a hip replacement, required rehab, but daughter reports the pt showed a fair physical recovery. The pt's daughter reports the pt showed cognitive decline since 2020, was dx with Vascular Dementia, and was prescribed Namenda and Aricept by her neurologist, Dr.Scott Wiley. The pt's daughter reports the pt showed worsening confusion, sundowning, was episodically minimally responsive, and was admitted to [facility] for a w/u of her cognitive status. It was speculated that the pt's unresponsiveness was reflective of catatonia. The pt's depressive symptoms worsened since residing in [facility] and pt reported thinking the staff were talking badly about her. One month ago the pt was moved to a dementia unit. The pt reportedly made a questionable SA 5/12/22 by cutting her wrists with a butter knife.

## 2022-05-23 ENCOUNTER — TRANSCRIBE ORDERS (OUTPATIENT)
Dept: LAB | Facility: CLINIC | Age: 77
End: 2022-05-23

## 2022-05-23 ENCOUNTER — APPOINTMENT (OUTPATIENT)
Dept: LAB | Facility: CLINIC | Age: 77
End: 2022-05-23
Payer: MEDICARE

## 2022-05-23 DIAGNOSIS — F31.60 MIXED BIPOLAR I DISORDER (HCC): Primary | ICD-10-CM

## 2022-05-23 DIAGNOSIS — F31.60 MIXED BIPOLAR I DISORDER (HCC): ICD-10-CM

## 2022-05-23 LAB
BUN SERPL-MCNC: 48 MG/DL (ref 5–25)
CREAT SERPL-MCNC: 1.57 MG/DL (ref 0.6–1.3)
GFR SERPL CREATININE-BSD FRML MDRD: 42 ML/MIN/1.73SQ M
LITHIUM SERPL-SCNC: 0.7 MMOL/L (ref 0.5–1)
TSH SERPL DL<=0.05 MIU/L-ACNC: 1.99 UIU/ML (ref 0.45–4.5)

## 2022-05-23 PROCEDURE — 82565 ASSAY OF CREATININE: CPT

## 2022-05-23 PROCEDURE — 84443 ASSAY THYROID STIM HORMONE: CPT

## 2022-05-23 PROCEDURE — 80178 ASSAY OF LITHIUM: CPT

## 2022-05-23 PROCEDURE — 36415 COLL VENOUS BLD VENIPUNCTURE: CPT

## 2022-05-23 PROCEDURE — 84520 ASSAY OF UREA NITROGEN: CPT

## 2022-05-25 DIAGNOSIS — I25.10 CORONARY ARTERY DISEASE INVOLVING NATIVE HEART WITHOUT ANGINA PECTORIS, UNSPECIFIED VESSEL OR LESION TYPE: ICD-10-CM

## 2022-05-25 RX ORDER — NITROGLYCERIN 40 MG/1
PATCH TRANSDERMAL
Qty: 90 PATCH | Refills: 3 | Status: SHIPPED | OUTPATIENT
Start: 2022-05-25

## 2022-05-26 ENCOUNTER — TRANSCRIBE ORDERS (OUTPATIENT)
Dept: LAB | Facility: CLINIC | Age: 77
End: 2022-05-26

## 2022-05-26 ENCOUNTER — APPOINTMENT (OUTPATIENT)
Dept: LAB | Facility: CLINIC | Age: 77
End: 2022-05-26
Payer: MEDICARE

## 2022-05-26 DIAGNOSIS — F31.60 MIXED BIPOLAR I DISORDER (HCC): Primary | ICD-10-CM

## 2022-05-26 DIAGNOSIS — F31.60 MIXED BIPOLAR I DISORDER (HCC): ICD-10-CM

## 2022-05-26 LAB
BUN SERPL-MCNC: 39 MG/DL (ref 5–25)
CREAT SERPL-MCNC: 1.56 MG/DL (ref 0.6–1.3)
GFR SERPL CREATININE-BSD FRML MDRD: 43 ML/MIN/1.73SQ M
LITHIUM SERPL-SCNC: 0.8 MMOL/L (ref 0.5–1)
TSH SERPL DL<=0.05 MIU/L-ACNC: 1.13 UIU/ML (ref 0.45–4.5)

## 2022-05-26 PROCEDURE — 80178 ASSAY OF LITHIUM: CPT

## 2022-05-26 PROCEDURE — 84520 ASSAY OF UREA NITROGEN: CPT

## 2022-05-26 PROCEDURE — 84443 ASSAY THYROID STIM HORMONE: CPT

## 2022-05-26 PROCEDURE — 82565 ASSAY OF CREATININE: CPT

## 2022-05-26 PROCEDURE — 36415 COLL VENOUS BLD VENIPUNCTURE: CPT

## 2022-06-16 ENCOUNTER — TRANSCRIBE ORDERS (OUTPATIENT)
Dept: LAB | Facility: CLINIC | Age: 77
End: 2022-06-16

## 2022-06-16 ENCOUNTER — APPOINTMENT (OUTPATIENT)
Dept: LAB | Facility: CLINIC | Age: 77
End: 2022-06-16
Payer: MEDICARE

## 2022-06-16 DIAGNOSIS — F31.60 MIXED BIPOLAR I DISORDER (HCC): ICD-10-CM

## 2022-06-16 DIAGNOSIS — F31.60 MIXED BIPOLAR I DISORDER (HCC): Primary | ICD-10-CM

## 2022-06-16 LAB
BUN SERPL-MCNC: 45 MG/DL (ref 5–25)
CREAT SERPL-MCNC: 1.38 MG/DL (ref 0.6–1.3)
GFR SERPL CREATININE-BSD FRML MDRD: 50 ML/MIN/1.73SQ M
LITHIUM SERPL-SCNC: 0.8 MMOL/L (ref 0.5–1)
TSH SERPL DL<=0.05 MIU/L-ACNC: 1.86 UIU/ML (ref 0.45–4.5)

## 2022-06-16 PROCEDURE — 36415 COLL VENOUS BLD VENIPUNCTURE: CPT

## 2022-06-16 PROCEDURE — 84520 ASSAY OF UREA NITROGEN: CPT

## 2022-06-16 PROCEDURE — 84443 ASSAY THYROID STIM HORMONE: CPT

## 2022-06-16 PROCEDURE — 82565 ASSAY OF CREATININE: CPT

## 2022-06-16 PROCEDURE — 80178 ASSAY OF LITHIUM: CPT

## 2022-07-06 DIAGNOSIS — J44.9 CHRONIC OBSTRUCTIVE PULMONARY DISEASE, UNSPECIFIED COPD TYPE (HCC): ICD-10-CM

## 2022-07-08 ENCOUNTER — OFFICE VISIT (OUTPATIENT)
Dept: INTERNAL MEDICINE CLINIC | Facility: CLINIC | Age: 77
End: 2022-07-08
Payer: MEDICARE

## 2022-07-08 VITALS
SYSTOLIC BLOOD PRESSURE: 130 MMHG | WEIGHT: 175 LBS | HEART RATE: 49 BPM | DIASTOLIC BLOOD PRESSURE: 80 MMHG | OXYGEN SATURATION: 99 % | BODY MASS INDEX: 27.47 KG/M2 | HEIGHT: 67 IN

## 2022-07-08 DIAGNOSIS — F31.9 BIPOLAR AFFECTIVE DISORDER, REMISSION STATUS UNSPECIFIED (HCC): ICD-10-CM

## 2022-07-08 DIAGNOSIS — I10 ESSENTIAL HYPERTENSION: ICD-10-CM

## 2022-07-08 DIAGNOSIS — N18.31 STAGE 3A CHRONIC KIDNEY DISEASE (HCC): ICD-10-CM

## 2022-07-08 DIAGNOSIS — E78.2 MIXED HYPERLIPIDEMIA: Primary | ICD-10-CM

## 2022-07-08 PROCEDURE — 99214 OFFICE O/P EST MOD 30 MIN: CPT | Performed by: INTERNAL MEDICINE

## 2022-07-08 NOTE — ASSESSMENT & PLAN NOTE
Feels in regular rhythm on exam, patient had an episode of Afib in 2018 associated with a respiratory infection and not anticoagulation, he does follow with Cardiology

## 2022-07-08 NOTE — PROGRESS NOTES
Assessment/Plan:    Mixed hyperlipidemia  Continue atorvastatin and omega 3 along with healthy diet and exercise    Essential hypertension   Well controlled, continue meds along with healthy diet and exercise    Bipolar affective disorder ([facility] )  Continue meds and follow up psychiatry    Stage 3a chronic kidney disease ([facility] )  Lab Results   Component Value Date    EGFR 50 06/16/2022    EGFR 43 05/26/2022    EGFR 42 05/23/2022    CREATININE 1 38 (H) 06/16/2022    CREATININE 1 56 (H) 05/26/2022    CREATININE 1 57 (H) 05/23/2022    most recent GFR 50, follow-up renal, avoid NSAIDs    New onset atrial fibrillation ([facility] )   Feels in regular rhythm on exam, patient had an episode of Afib in 2018 associated with a respiratory infection and not anticoagulation, he does follow with Cardiology       Diagnoses and all orders for this visit:    Mixed hyperlipidemia    Essential hypertension    Bipolar affective disorder, remission status unspecified ([facility] )    Stage 3a chronic kidney disease ([facility] )        Subjective:      Patient ID: Tiesha Morales is a 78 y o  male  Pt here for follow up chronic conditions      The following portions of the patient's history were reviewed and updated as appropriate: allergies, current medications, past family history, past medical history, past social history, past surgical history and problem list     Review of Systems   Constitutional: Positive for fatigue (mild)  Negative for chills and fever  HENT: Negative for congestion, nosebleeds, postnasal drip, sore throat and trouble swallowing  Eyes: Negative for pain  Respiratory: Negative for cough, chest tightness, shortness of breath and wheezing  Cardiovascular: Negative for chest pain, palpitations and leg swelling  Gastrointestinal: Negative for abdominal pain, constipation, diarrhea, nausea and vomiting  Endocrine: Negative for polydipsia and polyuria  Genitourinary: Positive for frequency   Negative for dysuria, flank pain and hematuria  Musculoskeletal: Positive for arthralgias and back pain  Skin: Negative for rash  Neurological: Positive for tremors  Negative for dizziness and headaches  Hematological: Does not bruise/bleed easily  Psychiatric/Behavioral: Negative for confusion and dysphoric mood  The patient is not nervous/anxious  Objective:      /80 (BP Location: Left arm, Patient Position: Sitting, Cuff Size: Standard)   Pulse (!) 49   Ht 5' 7" (1 702 m)   Wt 79 4 kg (175 lb)   SpO2 99%   BMI 27 41 kg/m²          Physical Exam  Vitals reviewed  Constitutional:       General: He is not in acute distress  Appearance: Normal appearance  He is well-developed  HENT:      Head: Normocephalic and atraumatic  Comments: Bruising around left eye     Right Ear: External ear normal       Left Ear: External ear normal       Nose: Nose normal    Eyes:      General: No scleral icterus  Conjunctiva/sclera: Conjunctivae normal    Neck:      Thyroid: No thyromegaly  Trachea: No tracheal deviation  Cardiovascular:      Rate and Rhythm: Normal rate and regular rhythm  Heart sounds: Normal heart sounds  No murmur heard  Pulmonary:      Effort: Pulmonary effort is normal  No respiratory distress  Breath sounds: Normal breath sounds  No wheezing or rales  Abdominal:      General: Bowel sounds are normal       Palpations: Abdomen is soft  Tenderness: There is no abdominal tenderness  There is no guarding or rebound  Musculoskeletal:      Cervical back: Normal range of motion and neck supple  Right lower leg: Edema present  Left lower leg: Edema present  Lymphadenopathy:      Cervical: No cervical adenopathy  Skin:     Coloration: Skin is not jaundiced or pale  Neurological:      General: No focal deficit present  Mental Status: He is alert and oriented to person, place, and time     Psychiatric:         Mood and Affect: Mood normal          Behavior: Behavior normal          Thought Content:  Thought content normal          Judgment: Judgment normal

## 2022-07-08 NOTE — ASSESSMENT & PLAN NOTE
Lab Results   Component Value Date    EGFR 50 06/16/2022    EGFR 43 05/26/2022    EGFR 42 05/23/2022    CREATININE 1 38 (H) 06/16/2022    CREATININE 1 56 (H) 05/26/2022    CREATININE 1 57 (H) 05/23/2022    most recent GFR 50, follow-up renal, avoid NSAIDs

## 2022-07-08 NOTE — PATIENT INSTRUCTIONS
Problem List Items Addressed This Visit          Cardiovascular and Mediastinum    Essential hypertension      Well controlled, continue meds along with healthy diet and exercise              Genitourinary    Stage 3a chronic kidney disease ([facility] )     Lab Results   Component Value Date    EGFR 50 06/16/2022    EGFR 43 05/26/2022    EGFR 42 05/23/2022    CREATININE 1 38 (H) 06/16/2022    CREATININE 1 56 (H) 05/26/2022    CREATININE 1 57 (H) 05/23/2022    most recent GFR 50, follow-up renal, avoid NSAIDs              Other    Bipolar affective disorder ([facility] )     Continue meds and follow up psychiatry           Mixed hyperlipidemia - Primary     Continue atorvastatin and omega 3 along with healthy diet and exercise

## 2022-07-13 ENCOUNTER — APPOINTMENT (OUTPATIENT)
Dept: LAB | Facility: CLINIC | Age: 77
End: 2022-07-13
Payer: MEDICARE

## 2022-07-13 DIAGNOSIS — N18.9 CHRONIC KIDNEY DISEASE, UNSPECIFIED CKD STAGE: ICD-10-CM

## 2022-07-13 DIAGNOSIS — F31.60 MIXED BIPOLAR I DISORDER (HCC): ICD-10-CM

## 2022-07-13 LAB
ANION GAP SERPL CALCULATED.3IONS-SCNC: 4 MMOL/L (ref 4–13)
BUN SERPL-MCNC: 39 MG/DL (ref 5–25)
CALCIUM SERPL-MCNC: 9.7 MG/DL (ref 8.3–10.1)
CHLORIDE SERPL-SCNC: 112 MMOL/L (ref 100–108)
CO2 SERPL-SCNC: 22 MMOL/L (ref 21–32)
CREAT SERPL-MCNC: 1.49 MG/DL (ref 0.6–1.3)
GFR SERPL CREATININE-BSD FRML MDRD: 45 ML/MIN/1.73SQ M
GLUCOSE P FAST SERPL-MCNC: 99 MG/DL (ref 65–99)
LITHIUM SERPL-SCNC: 0.7 MMOL/L (ref 0.5–1)
POTASSIUM SERPL-SCNC: 5.3 MMOL/L (ref 3.5–5.3)
SODIUM SERPL-SCNC: 138 MMOL/L (ref 136–145)
TSH SERPL DL<=0.05 MIU/L-ACNC: 1.1 UIU/ML (ref 0.45–4.5)

## 2022-07-13 PROCEDURE — 80178 ASSAY OF LITHIUM: CPT

## 2022-07-13 PROCEDURE — 84443 ASSAY THYROID STIM HORMONE: CPT

## 2022-07-13 PROCEDURE — 80048 BASIC METABOLIC PNL TOTAL CA: CPT

## 2022-07-13 PROCEDURE — 36415 COLL VENOUS BLD VENIPUNCTURE: CPT

## 2022-07-18 DIAGNOSIS — J44.9 CHRONIC OBSTRUCTIVE PULMONARY DISEASE, UNSPECIFIED COPD TYPE (HCC): ICD-10-CM

## 2022-07-20 DIAGNOSIS — I70.90: ICD-10-CM

## 2022-07-20 RX ORDER — OMEGA-3-ACID ETHYL ESTERS 1 G/1
CAPSULE, LIQUID FILLED ORAL
Qty: 270 CAPSULE | Refills: 3 | Status: SHIPPED | OUTPATIENT
Start: 2022-07-20

## 2022-07-26 ENCOUNTER — OFFICE VISIT (OUTPATIENT)
Dept: NEPHROLOGY | Facility: CLINIC | Age: 77
End: 2022-07-26
Payer: MEDICARE

## 2022-07-26 VITALS
DIASTOLIC BLOOD PRESSURE: 70 MMHG | HEIGHT: 67 IN | WEIGHT: 178 LBS | HEART RATE: 64 BPM | SYSTOLIC BLOOD PRESSURE: 128 MMHG | BODY MASS INDEX: 27.94 KG/M2

## 2022-07-26 DIAGNOSIS — N18.9 CHRONIC KIDNEY DISEASE, UNSPECIFIED CKD STAGE: ICD-10-CM

## 2022-07-26 DIAGNOSIS — R80.8 OTHER PROTEINURIA: Primary | ICD-10-CM

## 2022-07-26 PROBLEM — R35.89 POLYURIA: Status: RESOLVED | Noted: 2022-04-07 | Resolved: 2022-07-26

## 2022-07-26 PROCEDURE — 99214 OFFICE O/P EST MOD 30 MIN: CPT | Performed by: INTERNAL MEDICINE

## 2022-07-26 NOTE — LETTER
July 26, 2022     [address]a [address]i [address]   [address] [address]    Patient: Ki Moya   YOB: 1945   Date of Visit: 7/26/2022       Dear Dr Ashanti Sorensen: Thank you for referring Sugar Loja to me for evaluation  Below are my notes for this consultation  If you have questions, please do not hesitate to call me  I look forward to following your patient along with you  Sincerely,        Ebonie Fontenot MD        CC: No Recipients  Ebonie Fontenot MD  7/26/2022  4:47 PM  Sign when Signing Visit  NEPHROLOGY PROGRESS NOTE    Ki Moya 78 y o  male MRN: [id number]  Unit/Bed#:  Encounter: [id number]  Reason for Consult:  Chronic kidney disease with proteinuria    Patient is here for routine follow-up  He is doing well his biggest complaint is urinary frequency as he states he goes sometime 16 times a day  Other than that he is doing well except he has some musculoskeletal pain  We reviewed his medications  ASSESSMENT/PLAN:  1  Renal    Patient's chronic kidney disease his creatinine level was stable at 1 4 and this is in his normal baseline range  When checking the patient had about 2 g of protein which fallen to to tubular interstitial range so this could be consistent with interstitial nephritis and I suspect chronic because there is no acute changes in his creatinine  The patient is on lithium but he so well controlled with respect to his psychiatric illness and I can not definitively say that it is playing a role so at this point I would continue that medication and not make any changes  We will monitor his creatinine and if this should increase unexpectedly or proteinuria increase he may warrant a biopsy at that time but for now will observe with medical therapy and observation  SPEP and UPEP were negative for M spike  I am reluctant to given ACE-inhibitor or ARB as he has mild hyperkalemia and I do not want to worsen that      Continue current medications  Monitor renal function and urine protein estimation  Will check BMP urine protein creatinine ratio before next visit  2  Urine frequency    The patient been on lithium that can cause nephrogenic diabetes insipidus  I did check a urine osmolality in the morning and was concentrated above 400 so that would exclude diabetes insipidus  He has seen Urology was told that he empties his bladder well so he may have been interstitial cystitis resulting in urinary frequency and urgency  In the past the doctor and Urology tried to prescribe myrbetric but he never took it  I told him he should start that see if it helps relieve his symptoms  If he has any side effects he should stop it and call me  SUBJECTIVE:  Review of Systems   Constitutional: Negative for chills, diaphoresis, fever and night sweats  HENT: Negative  Eyes: Negative  Cardiovascular: Negative  Negative for chest pain, dyspnea on exertion, leg swelling and orthopnea  Respiratory: Negative  Negative for cough, shortness of breath, sputum production and wheezing  Musculoskeletal: Positive for back pain  Gastrointestinal: Negative  Negative for abdominal pain, diarrhea, nausea and vomiting  Genitourinary: Positive for frequency  Negative for dysuria, flank pain, hematuria and incomplete emptying  Neurological: Negative for dizziness, focal weakness, headaches and weakness  Psychiatric/Behavioral: Negative for altered mental status, depression, hallucinations and hypervigilance  OBJECTIVE:  Current Weight: Weight - Scale: 80 7 kg (178 lb)  [email or web address]     Blood pressure 128/70, pulse 64, height 5' 7" (1 702 m), weight 80 7 kg (178 lb)  , Body mass index is 27 88 kg/m²      [unfilled]    Physical Exam: /70 (BP Location: Right arm, Patient Position: Sitting, Cuff Size: Standard)   Pulse 64   Ht 5' 7" (1 702 m)   Wt 80 7 kg (178 lb)   BMI 27 88 kg/m²   Physical Exam  Constitutional: General: He is not in acute distress  Appearance: He is not toxic-appearing or diaphoretic  HENT:      Head: Normocephalic and atraumatic  Nose:      Comments: Wearing mask     Mouth/Throat:      Comments: Wearing mask  Eyes:      General: No scleral icterus  Extraocular Movements: Extraocular movements intact  Cardiovascular:      Rate and Rhythm: Normal rate and regular rhythm  Heart sounds: No friction rub  No gallop  Comments: No edema  Pulmonary:      Effort: Pulmonary effort is normal  No respiratory distress  Breath sounds: No wheezing, rhonchi or rales  Abdominal:      General: Bowel sounds are normal  There is no distension  Palpations: Abdomen is soft  Tenderness: There is no abdominal tenderness  There is no rebound  Musculoskeletal:      Cervical back: Normal range of motion and neck supple  Neurological:      General: No focal deficit present  Mental Status: He is alert and oriented to person, place, and time  Mental status is at baseline  Psychiatric:         Mood and Affect: Mood normal          Behavior: Behavior normal          Thought Content:  Thought content normal          Judgment: Judgment normal          Medications:    Current Outpatient Medications:     acetaminophen (TYLENOL) 500 mg tablet, Take 500 mg by mouth as needed for mild pain , Disp: , Rfl:     amLODIPine (NORVASC) 2 5 mg tablet, TAKE 1 TABLET BY MOUTH  DAILY, Disp: 90 tablet, Rfl: 3    aspirin (ECOTRIN LOW STRENGTH) 81 mg EC tablet, Take 81 mg by mouth daily, Disp: , Rfl:     atorvastatin (LIPITOR) 40 mg tablet, TAKE 1 TABLET BY MOUTH  DAILY, Disp: 90 tablet, Rfl: 5    Cholecalciferol (VITAMIN D PO), Take 2,000 Units by mouth daily  , Disp: , Rfl:     dicyclomine (Bentyl) 20 mg tablet, Take 1 tablet (20 mg total) by mouth every 6 (six) hours as needed (abd cramping), Disp: 90 tablet, Rfl: 3    fexofenadine (ALLEGRA) 180 MG tablet, Take 180 mg by mouth as needed Daily during the Summer, Disp: , Rfl:     FLUoxetine (PROzac) 10 mg capsule, Take 10 mg by mouth daily , Disp: , Rfl:     fluticasone (FLONASE) 50 mcg/act nasal spray, 4 sprays into each nostril daily, Disp: , Rfl:     fluticasone-umeclidinium-vilanterol (Trelegy Ellipta) 200-62 5-25 MCG/INH AEPB inhaler, Inhale 1 puff daily Rinse mouth after use , Disp: 180 blister, Rfl: 0    lithium carbonate (LITHOBID) 300 mg CR tablet, Take 300 mg by mouth in the morning, Disp: , Rfl:     metoprolol succinate (TOPROL-XL) 25 mg 24 hr tablet, TAKE 1 TABLET BY MOUTH  DAILY, Disp: 90 tablet, Rfl: 3    Mirabegron ER 25 MG TB24, Take 25 mg by mouth daily at bedtime, Disp: 30 tablet, Rfl: 10    mupirocin (BACTROBAN) 2 % ointment, Apply topically 2 (two) times a day as needed (rash), Disp: 22 g, Rfl: 0    nitroglycerin (NITRODUR) 0 2 mg/hr, APPLY 1 PATCH TOPICALLY  ONCE DAILY  LEAVE ON FOR 12 TO 14 HOURS THEN REMOVE FOR A NITRATE-FREE INTERVAL OF  10 TO 12 HOURS, Disp: 90 patch, Rfl: 3    omega-3-acid ethyl esters (LOVAZA) 1 g capsule, TAKE 1 CAPSULE BY MOUTH 3  TIMES DAILY, Disp: 270 capsule, Rfl: 3    omeprazole (PriLOSEC) 20 mg delayed release capsule, Take 1 capsule (20 mg total) by mouth daily, Disp: 90 capsule, Rfl: 3    ProAir  (90 Base) MCG/ACT inhaler, USE 2 INHALATIONS BY MOUTH  EVERY 6 HOURS AS NEEDED FOR WHEEZING, Disp: 34 g, Rfl: 3    Trelegy Ellipta 200-62 5-25 MCG/INH AEPB inhaler, USE 1 INHALATION BY MOUTH  DAILY    RINSE MOUTH AFTER  USE, Disp: 180 each, Rfl: 3    diazepam (VALIUM) 5 mg tablet, 2 5 mg   (Patient not taking: No sig reported), Disp: , Rfl:     Diclofenac Sodium (VOLTAREN) 1 %, Apply 4 g topically 4 (four) times a day (Patient not taking: No sig reported), Disp: 350 g, Rfl: 3    famotidine (PEPCID) 40 MG tablet, Take 1 tablet (40 mg total) by mouth daily at bedtime as needed for heartburn (Patient not taking: No sig reported), Disp: 30 tablet, Rfl: 3    ipratropium (Atrovent HFA) 17 mcg/act inhaler, Inhale 2 puffs 4 (four) times a day (Patient not taking: No sig reported), Disp: 38 7 g, Rfl: 3    mupirocin (BACTROBAN) 2 % ointment, Apply topically 2 (two) times a day as needed (wound) (Patient not taking: Reported on 7/26/2022), Disp: 22 g, Rfl: 0    Laboratory Results:  Lab Results   Component Value Date    WBC 9 96 02/23/2022    HGB 13 8 02/23/2022    HCT 43 0 02/23/2022    MCV 97 02/23/2022     02/23/2022     Lab Results   Component Value Date    SODIUM 138 07/13/2022    K 5 3 07/13/2022     (H) 07/13/2022    CO2 22 07/13/2022    BUN 39 (H) 07/13/2022    CREATININE 1 49 (H) 07/13/2022    GLUC 100 04/02/2021    CALCIUM 9 7 07/13/2022     Lab Results   Component Value Date    CALCIUM 9 7 07/13/2022    PHOS 2 8 11/05/2014     No results found for: LABPROT

## 2022-07-26 NOTE — PROGRESS NOTES
NEPHROLOGY PROGRESS NOTE    Mikie Crane 78 y o  male MRN: [id number]  Unit/Bed#:  Encounter: [id number]  Reason for Consult:  Chronic kidney disease with proteinuria    Patient is here for routine follow-up  He is doing well his biggest complaint is urinary frequency as he states he goes sometime 16 times a day  Other than that he is doing well except he has some musculoskeletal pain  We reviewed his medications  ASSESSMENT/PLAN:  1  Renal    Patient's chronic kidney disease his creatinine level was stable at 1 4 and this is in his normal baseline range  When checking the patient had about 2 g of protein which fallen to to tubular interstitial range so this could be consistent with interstitial nephritis and I suspect chronic because there is no acute changes in his creatinine  The patient is on lithium but he so well controlled with respect to his psychiatric illness and I can not definitively say that it is playing a role so at this point I would continue that medication and not make any changes  We will monitor his creatinine and if this should increase unexpectedly or proteinuria increase he may warrant a biopsy at that time but for now will observe with medical therapy and observation  SPEP and UPEP were negative for M spike  I am reluctant to given ACE-inhibitor or ARB as he has mild hyperkalemia and I do not want to worsen that  Continue current medications  Monitor renal function and urine protein estimation  Will check BMP urine protein creatinine ratio before next visit  2  Urine frequency    The patient been on lithium that can cause nephrogenic diabetes insipidus  I did check a urine osmolality in the morning and was concentrated above 400 so that would exclude diabetes insipidus  He has seen Urology was told that he empties his bladder well so he may have been interstitial cystitis resulting in urinary frequency and urgency    In the past the doctor and Urology tried to prescribe myrbetric but he never took it  I told him he should start that see if it helps relieve his symptoms  If he has any side effects he should stop it and call me  SUBJECTIVE:  Review of Systems   Constitutional: Negative for chills, diaphoresis, fever and night sweats  HENT: Negative  Eyes: Negative  Cardiovascular: Negative  Negative for chest pain, dyspnea on exertion, leg swelling and orthopnea  Respiratory: Negative  Negative for cough, shortness of breath, sputum production and wheezing  Musculoskeletal: Positive for back pain  Gastrointestinal: Negative  Negative for abdominal pain, diarrhea, nausea and vomiting  Genitourinary: Positive for frequency  Negative for dysuria, flank pain, hematuria and incomplete emptying  Neurological: Negative for dizziness, focal weakness, headaches and weakness  Psychiatric/Behavioral: Negative for altered mental status, depression, hallucinations and hypervigilance  OBJECTIVE:  Current Weight: Weight - Scale: 80 7 kg (178 lb)  [email or web address]:     Blood pressure 128/70, pulse 64, height 5' 7" (1 702 m), weight 80 7 kg (178 lb)  , Body mass index is 27 88 kg/m²  @[facility]    Physical Exam: /70 (BP Location: Right arm, Patient Position: Sitting, Cuff Size: Standard)   Pulse 64   Ht 5' 7" (1 702 m)   Wt 80 7 kg (178 lb)   BMI 27 88 kg/m²   Physical Exam  Constitutional:       General: He is not in acute distress  Appearance: He is not toxic-appearing or diaphoretic  HENT:      Head: Normocephalic and atraumatic  Nose:      Comments: Wearing mask     Mouth/Throat:      Comments: Wearing mask  Eyes:      General: No scleral icterus  Extraocular Movements: Extraocular movements intact  Cardiovascular:      Rate and Rhythm: Normal rate and regular rhythm  Heart sounds: No friction rub  No gallop  Comments: No edema  Pulmonary:      Effort: Pulmonary effort is normal  No respiratory distress  Breath sounds: No wheezing, rhonchi or rales  Abdominal:      General: Bowel sounds are normal  There is no distension  Palpations: Abdomen is soft  Tenderness: There is no abdominal tenderness  There is no rebound  Musculoskeletal:      Cervical back: Normal range of motion and neck supple  Neurological:      General: No focal deficit present  Mental Status: He is alert and oriented to person, place, and time  Mental status is at baseline  Psychiatric:         Mood and Affect: Mood normal          Behavior: Behavior normal          Thought Content:  Thought content normal          Judgment: Judgment normal          Medications:    Current Outpatient Medications:     acetaminophen (TYLENOL) 500 mg tablet, Take 500 mg by mouth as needed for mild pain , Disp: , Rfl:     amLODIPine (NORVASC) 2 5 mg tablet, TAKE 1 TABLET BY MOUTH  DAILY, Disp: 90 tablet, Rfl: 3    aspirin (ECOTRIN LOW STRENGTH) 81 mg EC tablet, Take 81 mg by mouth daily, Disp: , Rfl:     atorvastatin (LIPITOR) 40 mg tablet, TAKE 1 TABLET BY MOUTH  DAILY, Disp: 90 tablet, Rfl: 5    Cholecalciferol (VITAMIN D PO), Take 2,000 Units by mouth daily  , Disp: , Rfl:     dicyclomine (Bentyl) 20 mg tablet, Take 1 tablet (20 mg total) by mouth every 6 (six) hours as needed (abd cramping), Disp: 90 tablet, Rfl: 3    fexofenadine (ALLEGRA) 180 MG tablet, Take 180 mg by mouth as needed Daily during the Summer, Disp: , Rfl:     FLUoxetine (PROzac) 10 mg capsule, Take 10 mg by mouth daily , Disp: , Rfl:     fluticasone (FLONASE) 50 mcg/act nasal spray, 4 sprays into each nostril daily, Disp: , Rfl:     fluticasone-umeclidinium-vilanterol (Trelegy Ellipta) 200-62 5-25 MCG/INH AEPB inhaler, Inhale 1 puff daily Rinse mouth after use , Disp: 180 blister, Rfl: 0    lithium carbonate (LITHOBID) 300 mg CR tablet, Take 300 mg by mouth in the morning, Disp: , Rfl:     metoprolol succinate (TOPROL-XL) 25 mg 24 hr tablet, TAKE 1 TABLET BY MOUTH DAILY, Disp: 90 tablet, Rfl: 3    Mirabegron ER 25 MG TB24, Take 25 mg by mouth daily at bedtime, Disp: 30 tablet, Rfl: 10    mupirocin (BACTROBAN) 2 % ointment, Apply topically 2 (two) times a day as needed (rash), Disp: 22 g, Rfl: 0    nitroglycerin (NITRODUR) 0 2 mg/hr, APPLY 1 PATCH TOPICALLY  ONCE DAILY  LEAVE ON FOR 12 TO 14 HOURS THEN REMOVE FOR A NITRATE-FREE INTERVAL OF  10 TO 12 HOURS, Disp: 90 patch, Rfl: 3    omega-3-acid ethyl esters (LOVAZA) 1 g capsule, TAKE 1 CAPSULE BY MOUTH 3  TIMES DAILY, Disp: 270 capsule, Rfl: 3    omeprazole (PriLOSEC) 20 mg delayed release capsule, Take 1 capsule (20 mg total) by mouth daily, Disp: 90 capsule, Rfl: 3    ProAir  (90 Base) MCG/ACT inhaler, USE 2 INHALATIONS BY MOUTH  EVERY 6 HOURS AS NEEDED FOR WHEEZING, Disp: 34 g, Rfl: 3    Trelegy Ellipta 200-62 5-25 MCG/INH AEPB inhaler, USE 1 INHALATION BY MOUTH  DAILY    RINSE MOUTH AFTER  USE, Disp: 180 each, Rfl: 3    diazepam (VALIUM) 5 mg tablet, 2 5 mg   (Patient not taking: No sig reported), Disp: , Rfl:     Diclofenac Sodium (VOLTAREN) 1 %, Apply 4 g topically 4 (four) times a day (Patient not taking: No sig reported), Disp: 350 g, Rfl: 3    famotidine (PEPCID) 40 MG tablet, Take 1 tablet (40 mg total) by mouth daily at bedtime as needed for heartburn (Patient not taking: No sig reported), Disp: 30 tablet, Rfl: 3    ipratropium (Atrovent HFA) 17 mcg/act inhaler, Inhale 2 puffs 4 (four) times a day (Patient not taking: No sig reported), Disp: 38 7 g, Rfl: 3    mupirocin (BACTROBAN) 2 % ointment, Apply topically 2 (two) times a day as needed (wound) (Patient not taking: Reported on 7/26/2022), Disp: 22 g, Rfl: 0    Laboratory Results:  Lab Results   Component Value Date    WBC 9 96 02/23/2022    HGB 13 8 02/23/2022    HCT 43 0 02/23/2022    MCV 97 02/23/2022     02/23/2022     Lab Results   Component Value Date    SODIUM 138 07/13/2022    K 5 3 07/13/2022     (H) 07/13/2022    CO2 22 07/13/2022    BUN 39 (H) 07/13/2022    CREATININE 1 49 (H) 07/13/2022    GLUC 100 04/02/2021    CALCIUM 9 7 07/13/2022     Lab Results   Component Value Date    CALCIUM 9 7 07/13/2022    PHOS 2 8 11/05/2014     No results found for: LABPROT

## 2022-07-26 NOTE — PATIENT INSTRUCTIONS
You are here for follow-up glad to hear your doing well except the frequent urination  With respect to that it sounds like the urologist make sure you were emptying her bladder the test I sent did show that you were able to concentrate your urine so you might have what we call is in irritable bladder  They tried to start you on a medication to Urology that we discussed your going to give that a try to see if it helps reduce the frequency  If you feel you have any side effects stop the medicine and call me  With respect your kidney function the creatinine stable at 1 4 which is even a little better than last visit the rule out tests I sent were normal   There is some protein in the urine but it is at a lower level so I do not feel you need a kidney biopsy at this point  Will just continue to monitor and please call if you have any questions or concerns

## 2022-08-03 ENCOUNTER — IMMUNIZATIONS (OUTPATIENT)
Dept: FAMILY MEDICINE CLINIC | Facility: CLINIC | Age: 77
End: 2022-08-03
Payer: MEDICARE

## 2022-08-03 DIAGNOSIS — Z23 ENCOUNTER FOR IMMUNIZATION: Primary | ICD-10-CM

## 2022-08-03 PROCEDURE — 0054A PR IMM ADMN SARSCOV2 30MCG/0.3ML TRIS-SUCROSE BST: CPT

## 2022-08-03 PROCEDURE — 91305 PR SARSCOV2 VACCINE 30MCG/0.3ML TRIS-SUCROSE IM USE: CPT

## 2022-08-12 DIAGNOSIS — I10 ESSENTIAL HYPERTENSION: ICD-10-CM

## 2022-08-15 RX ORDER — METOPROLOL SUCCINATE 25 MG/1
TABLET, EXTENDED RELEASE ORAL
Qty: 90 TABLET | Refills: 3 | Status: SHIPPED | OUTPATIENT
Start: 2022-08-15

## 2022-08-23 ENCOUNTER — OFFICE VISIT (OUTPATIENT)
Dept: OBGYN CLINIC | Facility: HOSPITAL | Age: 77
End: 2022-08-23
Payer: MEDICARE

## 2022-08-23 VITALS
WEIGHT: 175 LBS | HEART RATE: 60 BPM | BODY MASS INDEX: 27.47 KG/M2 | SYSTOLIC BLOOD PRESSURE: 143 MMHG | DIASTOLIC BLOOD PRESSURE: 82 MMHG | HEIGHT: 67 IN

## 2022-08-23 DIAGNOSIS — M70.62 TROCHANTERIC BURSITIS OF LEFT HIP: Primary | ICD-10-CM

## 2022-08-23 PROCEDURE — 99213 OFFICE O/P EST LOW 20 MIN: CPT

## 2022-08-23 PROCEDURE — 20610 DRAIN/INJ JOINT/BURSA W/O US: CPT

## 2022-08-23 RX ORDER — BUPIVACAINE HYDROCHLORIDE 2.5 MG/ML
2 INJECTION, SOLUTION INFILTRATION; PERINEURAL
Status: COMPLETED | OUTPATIENT
Start: 2022-08-23 | End: 2022-08-23

## 2022-08-23 RX ORDER — LIDOCAINE HYDROCHLORIDE 10 MG/ML
2 INJECTION, SOLUTION INFILTRATION; PERINEURAL
Status: COMPLETED | OUTPATIENT
Start: 2022-08-23 | End: 2022-08-23

## 2022-08-23 RX ORDER — BETAMETHASONE SODIUM PHOSPHATE AND BETAMETHASONE ACETATE 3; 3 MG/ML; MG/ML
12 INJECTION, SUSPENSION INTRA-ARTICULAR; INTRALESIONAL; INTRAMUSCULAR; SOFT TISSUE
Status: COMPLETED | OUTPATIENT
Start: 2022-08-23 | End: 2022-08-23

## 2022-08-23 RX ADMIN — LIDOCAINE HYDROCHLORIDE 2 ML: 10 INJECTION, SOLUTION INFILTRATION; PERINEURAL at 09:24

## 2022-08-23 RX ADMIN — BETAMETHASONE SODIUM PHOSPHATE AND BETAMETHASONE ACETATE 12 MG: 3; 3 INJECTION, SUSPENSION INTRA-ARTICULAR; INTRALESIONAL; INTRAMUSCULAR; SOFT TISSUE at 09:24

## 2022-08-23 RX ADMIN — BUPIVACAINE HYDROCHLORIDE 2 ML: 2.5 INJECTION, SOLUTION INFILTRATION; PERINEURAL at 09:24

## 2022-08-23 NOTE — PROGRESS NOTES
Assessment:   Diagnosis ICD-10-CM Associated Orders   1  Trochanteric bursitis of left hip  M70 62        Plan:  Juan Gaona was offered, accepted, performed an injection of cortisone to his left hip trochanteric bursa area today for symptomatic relief  Juan Gaona tolerated the procedure well  Ice and post injection protocol advised  Weight-bearing and activities as tolerated    To do next visit:  Return in about 3 months (around 11/23/2022) for Recheck  The above stated was discussed in layman's terms and the patient expressed understanding  All questions were answered to the patient's satisfaction  Scribe Attestation    I,:  Dhara Boss am acting as a scribe while in the presence of the attending physician :       I,:  Pilar Gale MD personally performed the services described in this documentation    as scribed in my presence :             Subjective:   Rojas Bower is a 78 y o  male who presents today for repeat evaluation of his left hip due to pain   Pt states his last injection on 5/17/2022 last for around 2 months    Review of systems negative unless otherwise specified in HPI  Review of Systems    Past Medical History:   Diagnosis Date    [facility]ortic [facility] [address] [facility]     Benign colon polyp     Bipolar 1 disorder ([facility] [address] [facility] )     Cardiac disease     MI    Cervical cord compression with myelopathy ([facility] [address] )     COPD (chronic obstructive pulmonary disease) (HCC)     Diverticulitis     Diverticulosis     Gait disturbance     uses cane, leg brace on right    GERD (gastroesophageal reflux disease)     Heart attack ([address] ) 1996    Hx of resection of large bowel 5/3/2016    Hyperlipidemia     Hypertension     IBS (irritable bowel syndrome)     Lumbar stenosis     Lung cancer ([facility] [address] )     2007 Left lower lobectomy and 2011 Right lung with surgery     Myocardial infarction [facility])     involving other coronary artery    Prostate cancer ([facility] [address] )     Shortness of breath     Small bowel obstruction ([facility] [address] ) 11/16/2016       Past Surgical History:   Procedure Laterality Date    ANGIOPLASTY      stent    CARDIAC SURGERY      CERVICAL SPINE SURGERY      Cervical decompression with cervical fusion from C3-C7 for spinal stenosis   COLON SURGERY  11/04/2014    ESOPHAGOGASTRODUODENOSCOPY  01/03/2013    with possible Schatzki's ring & small hiatal hernia, mild gastritis    FL INJECTION LEFT HIP (NON ARTHROGRAM)  12/11/2018    FL INJECTION LEFT HIP (NON ARTHROGRAM)  5/9/2019    IR BIOPSY LUNG  7/6/2020    IR EVAR  8/6/2018    LUNG CANCER SURGERY Right 03/2011    wedge resection for lung tumor, 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    LUNG LOBECTOMY Left     [place] ARTHRODESIS ANT INTERBODY MIN DISCECTOMY, CERVICAL BELOW C2 N/A 5/2/2016    Procedure: Anterior cervical diskectomy C3/4, C5/6, C6/7 with anterior plate fixation fusion C3-7;  Posterior decompressive laminectomy C3-7 with lateral mass fixation fusion C3-7 (IMPULSE MONITORING); Surgeon: Kae Hernández MD;  [facility];  Service: Neurosurgery    [place] ARTHRODESIS POSTERIOR INTERBODY LUMBAR N/A 1/30/2017    Procedure: L4-5 AND L5-S1 DECOMPRESSIVE FORAMINOTOMIES, TRANSFORAMINAL LUMBAR INTERBODY AND PEDICLE SCREW FIXATION FUSION L4-S1 (IMPULSE);   Surgeon: Kae Hernández MD;  [facility];  Service: Neurosurgery    [place] [address] RPR DPLMNT AORTO-AORTIC NDGFT N/A 8/6/2018    Procedure: REPAIR ANEURYSM ENDOVASCULAR ABDOMINAL AORTIC  (EVAR) WITH BILATERAL PERCUTANEOUS FEMORAL ACCESS WITH ULTRASOUND GUIDANCE ON THE RIGHT AND PRE CLOSURE;  Surgeon: Memo Petersen MD;  [facility];  Service: Vascular    PROSTATECTOMY  2007    for prostate CA - no chemo/RT    SIGMOIDECTOMY      for divertic    SMALL INTESTINE SURGERY N/A 11/17/2016    Procedure: Exploratory Laparotomy, Lysis of adhesions to release small bowel obstruction;  Surgeon: Rodriguez Sauer MD;  [facility];  Service:        Family History   Problem Relation Age of Onset    Heart attack Father     Colon cancer Father     Coronary artery disease Father     Heart disease Family     Hyperlipidemia Family     Hypertension Family        Social History     Occupational History    Occupation: Retired   Tobacco Use    Smoking status: Former Smoker     Packs/day: 1 00     Years: 35 00     Pack years: 35 00     Types: Cigarettes     Start date:      Quit date:      Years since quittin     Smokeless tobacco: Never Used   Vaping Use    Vaping Use: Never used   Substance and Sexual Activity    Alcohol use: Yes     Comment: One glass per day    Drug use: No    Sexual activity: Not on file         Current Outpatient Medications:     acetaminophen (TYLENOL) 500 mg tablet, Take 500 mg by mouth as needed for mild pain , Disp: , Rfl:     amLODIPine (NORVASC) 2 5 mg tablet, TAKE 1 TABLET BY MOUTH  DAILY, Disp: 90 tablet, Rfl: 3    aspirin (ECOTRIN LOW STRENGTH) 81 mg EC tablet, Take 81 mg by mouth daily, Disp: , Rfl:     atorvastatin (LIPITOR) 40 mg tablet, TAKE 1 TABLET BY MOUTH  DAILY, Disp: 90 tablet, Rfl: 5    Cholecalciferol (VITAMIN D PO), Take 2,000 Units by mouth daily  , Disp: , Rfl:     diazepam (VALIUM) 5 mg tablet, 2 5 mg   (Patient not taking: No sig reported), Disp: , Rfl:     dicyclomine (Bentyl) 20 mg tablet, Take 1 tablet (20 mg total) by mouth every 6 (six) hours as needed (abd cramping), Disp: 90 tablet, Rfl: 3    famotidine (PEPCID) 40 MG tablet, Take 1 tablet (40 mg total) by mouth daily at bedtime as needed for heartburn (Patient not taking: No sig reported), Disp: 30 tablet, Rfl: 3    fexofenadine (ALLEGRA) 180 MG tablet, Take 180 mg by mouth as needed Daily during the Summer, Disp: , Rfl:     FLUoxetine (PROzac) 10 mg capsule, Take 10 mg by mouth daily , Disp: , Rfl:     fluticasone (FLONASE) 50 mcg/act nasal spray, 4 sprays into each nostril daily, Disp: , Rfl:     lithium carbonate (LITHOBID) 300 mg CR tablet, Take 300 mg by mouth in the morning, Disp: , Rfl:     metoprolol succinate (TOPROL-XL) 25 mg 24 hr tablet, TAKE 1 TABLET BY MOUTH  DAILY, Disp: 90 tablet, Rfl: 3    Mirabegron ER 25 MG TB24, Take 25 mg by mouth daily at bedtime, Disp: 30 tablet, Rfl: 10    mupirocin (BACTROBAN) 2 % ointment, Apply topically 2 (two) times a day as needed (wound) (Patient not taking: Reported on 7/26/2022), Disp: 22 g, Rfl: 0    nitroglycerin (NITRODUR) 0 2 mg/hr, APPLY 1 PATCH TOPICALLY  ONCE DAILY  LEAVE ON FOR 12 TO 14 HOURS THEN REMOVE FOR A NITRATE-FREE INTERVAL OF  10 TO 12 HOURS, Disp: 90 patch, Rfl: 3    omega-3-acid ethyl esters (LOVAZA) 1 g capsule, TAKE 1 CAPSULE BY MOUTH 3  TIMES DAILY, Disp: 270 capsule, Rfl: 3    omeprazole (PriLOSEC) 20 mg delayed release capsule, Take 1 capsule (20 mg total) by mouth daily, Disp: 90 capsule, Rfl: 3    ProAir  (90 Base) MCG/ACT inhaler, USE 2 INHALATIONS BY MOUTH  EVERY 6 HOURS AS NEEDED FOR WHEEZING, Disp: 34 g, Rfl: 3    Trelegy Ellipta 200-62 5-25 MCG/INH AEPB inhaler, USE 1 INHALATION BY MOUTH  DAILY   RINSE MOUTH AFTER  USE, Disp: 180 each, Rfl: 3    Allergies   Allergen Reactions    Bactrim [Sulfamethoxazole-Trimethoprim] Other (See Comments)     AILYN    Nsaids      Annotation - [id number]: unable to take due to use of lithium   Oxycodone Rash          Vitals:    08/23/22 0838   BP: 143/82   Pulse: 60       Objective:                    Ortho Exam    Diagnostics, reviewed and taken today if performed as documented:    None performed     Procedures, if performed today:    Large joint arthrocentesis: L greater trochanteric bursa  Universal Protocol:  Consent: Verbal consent obtained    Risks and benefits: risks, benefits and alternatives were discussed  Consent given by: patient  Timeout called at: 8/23/2022 9:24 AM   Patient understanding: patient states understanding of the procedure being performed  Site marked: the operative site was marked  Patient identity confirmed: verbally with patient    Supporting Documentation  Indications: pain   Procedure Details  Location: hip - L greater trochanteric bursa  Needle size: 22 G  Ultrasound guidance: no  Approach: lateral  Medications administered: 2 mL bupivacaine 0 25 %; 2 mL lidocaine 1 %; 12 mg betamethasone acetate-betamethasone sodium phosphate 6 (3-3) mg/mL        Patient tolerance: patient tolerated the procedure well with no immediate complications  Dressing:  Sterile dressing applied    None performed     Portions of the record may have been created with voice recognition software  Occasional wrong word or "sound a like" substitutions may have occurred due to the inherent limitations of voice recognition software  Read the chart carefully and recognize, using context, where substitutions have occurred

## 2022-08-23 NOTE — PROGRESS NOTES
Assessment:  No diagnosis found  Plan:  ***    To do next visit:  No follow-ups on file  The above stated was discussed in layman's terms and the patient expressed understanding  All questions were answered to the patient's satisfaction  Scribe Attestation    I,:  Leroy Booth am acting as a scribe while in the presence of the attending physician :       I,:  Chinedu Beach MD personally performed the services described in this documentation    as scribed in my presence :             Subjective:   Enrique Workman is a 78 y o  male who presents today for follow up from left hip pain  Patient was last seen on 5/17/2022 where a CSI was performed to left greater trochanteric bursa  Patient states he got *** relief  Review of systems negative unless otherwise specified in HPI    Past Medical History:   Diagnosis Date    Aortic aneurysm ([facility] [address] )     Benign colon polyp     Bipolar 1 disorder ([facility] [address] )     Cardiac disease     MI    Cervical cord compression with myelopathy (HCC)     COPD (chronic obstructive pulmonary disease) (HCC)     Diverticulitis     Diverticulosis     Gait disturbance     uses cane, leg brace on right    GERD (gastroesophageal reflux disease)     Heart attack ([facility] [address] ) 1996    Hx of resection of large bowel 5/3/2016    Hyperlipidemia     Hypertension     IBS (irritable bowel syndrome)     Lumbar stenosis     Lung cancer ([facility] [address] )     2007 Left lower lobectomy and 2011 Right lung with surgery     Myocardial infarction [facility])     involving other coronary artery    Prostate cancer ([facility] [address] )     Shortness of breath     Small bowel obstruction ([facility] [address] ) 11/16/2016       Past Surgical History:   Procedure Laterality Date    ANGIOPLASTY      stent    CARDIAC SURGERY      CERVICAL SPINE SURGERY      Cervical decompression with cervical fusion from C3-C7 for spinal stenosis      COLON SURGERY  11/04/2014    ESOPHAGOGASTRODUODENOSCOPY  01/03/2013    with possible Schatzki's ring & small hiatal hernia, mild gastritis    FL INJECTION LEFT HIP (NON ARTHROGRAM)  12/11/2018    FL INJECTION LEFT HIP (NON ARTHROGRAM)  5/9/2019    IR BIOPSY LUNG  7/6/2020    IR EVAR  8/6/2018    LUNG CANCER SURGERY Right 03/2011    wedge resection for lung tumor, 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    LUNG LOBECTOMY Left     [place] ARTHRODESIS ANT INTERBODY MIN DISCECTOMY, CERVICAL BELOW C2 N/A 5/2/2016    Procedure: Anterior cervical diskectomy C3/4, C5/6, C6/7 with anterior plate fixation fusion C3-7;  Posterior decompressive laminectomy C3-7 with lateral mass fixation fusion C3-7 (IMPULSE MONITORING); Surgeon: Roda Eisenmenger, MD;  [facility];  Service: Neurosurgery    [place] ARTHRODESIS POSTERIOR INTERBODY LUMBAR N/A 1/30/2017    Procedure: L4-5 AND L5-S1 DECOMPRESSIVE FORAMINOTOMIES, TRANSFORAMINAL LUMBAR INTERBODY AND PEDICLE SCREW FIXATION FUSION L4-S1 (IMPULSE);   Surgeon: Roda Eisenmenger, MD;  [facility];  Service: Neurosurgery    [place] [address] R[address] DPLMNT AORTO-AORTIC NDGFT N/A 8/6/2018    Procedure: REPAIR ANEURYSM ENDOVASCULAR ABDOMINAL AORTIC  (EVAR) WITH BILATERAL PERCUTANEOUS FEMORAL ACCESS WITH ULTRASOUND GUIDANCE ON THE RIGHT AND PRE CLOSURE;  Surgeon: Teri Arzate MD;  [facility];  Service: Vascular    PROSTATECTOMY  2007    for prostate CA - no chemo/RT    SIGMOIDECTOMY      for divertic    SMALL INTESTINE SURGERY N/A 11/17/2016    Procedure: Exploratory Laparotomy, Lysis of adhesions to release small bowel obstruction;  Surgeon: Marcela Tena MD;  [facility];  Service:        Family History   Problem Relation Age of Onset    Heart attack Father     Colon cancer Father     Coronary artery disease Father     Heart disease Family     Hyperlipidemia Family     Hypertension Family        Social History     Occupational History    Occupation: Retired   Tobacco Use    Smoking status: Former Smoker     Packs/day: 1 00     Years: 35 00     Pack years: 35 00     Types: Cigarettes     Start date: 5     Quit date:      Years since quittin 6    Smokeless tobacco: Never Used   Vaping Use    Vaping Use: Never used   Substance and Sexual Activity    Alcohol use: Yes     Comment: One glass per day    Drug use: No    Sexual activity: Not on file         Current Outpatient Medications:     acetaminophen (TYLENOL) 500 mg tablet, Take 500 mg by mouth as needed for mild pain , Disp: , Rfl:     amLODIPine (NORVASC) 2 5 mg tablet, TAKE 1 TABLET BY MOUTH  DAILY, Disp: 90 tablet, Rfl: 3    aspirin (ECOTRIN LOW STRENGTH) 81 mg EC tablet, Take 81 mg by mouth daily, Disp: , Rfl:     atorvastatin (LIPITOR) 40 mg tablet, TAKE 1 TABLET BY MOUTH  DAILY, Disp: 90 tablet, Rfl: 5    Cholecalciferol (VITAMIN D PO), Take 2,000 Units by mouth daily  , Disp: , Rfl:     diazepam (VALIUM) 5 mg tablet, 2 5 mg   (Patient not taking: No sig reported), Disp: , Rfl:     dicyclomine (Bentyl) 20 mg tablet, Take 1 tablet (20 mg total) by mouth every 6 (six) hours as needed (abd cramping), Disp: 90 tablet, Rfl: 3    famotidine (PEPCID) 40 MG tablet, Take 1 tablet (40 mg total) by mouth daily at bedtime as needed for heartburn (Patient not taking: No sig reported), Disp: 30 tablet, Rfl: 3    fexofenadine (ALLEGRA) 180 MG tablet, Take 180 mg by mouth as needed Daily during the Summer, Disp: , Rfl:     FLUoxetine (PROzac) 10 mg capsule, Take 10 mg by mouth daily , Disp: , Rfl:     fluticasone (FLONASE) 50 mcg/act nasal spray, 4 sprays into each nostril daily, Disp: , Rfl:     lithium carbonate (LITHOBID) 300 mg CR tablet, Take 300 mg by mouth in the morning, Disp: , Rfl:     metoprolol succinate (TOPROL-XL) 25 mg 24 hr tablet, TAKE 1 TABLET BY MOUTH  DAILY, Disp: 90 tablet, Rfl: 3    Mirabegron ER 25 MG TB24, Take 25 mg by mouth daily at bedtime, Disp: 30 tablet, Rfl: 10    mupirocin (BACTROBAN) 2 % ointment, Apply topically 2 (two) times a day as needed (wound) (Patient not taking: Reported on 7/26/2022), Disp: 22 g, Rfl: 0    nitroglycerin (NITRODUR) 0 2 mg/hr, APPLY 1 PATCH TOPICALLY  ONCE DAILY  LEAVE ON FOR 12 TO 14 HOURS THEN REMOVE FOR A NITRATE-FREE INTERVAL OF  10 TO 12 HOURS, Disp: 90 patch, Rfl: 3    omega-3-acid ethyl esters (LOVAZA) 1 g capsule, TAKE 1 CAPSULE BY MOUTH 3  TIMES DAILY, Disp: 270 capsule, Rfl: 3    omeprazole (PriLOSEC) 20 mg delayed release capsule, Take 1 capsule (20 mg total) by mouth daily, Disp: 90 capsule, Rfl: 3    ProAir  (90 Base) MCG/ACT inhaler, USE 2 INHALATIONS BY MOUTH  EVERY 6 HOURS AS NEEDED FOR WHEEZING, Disp: 34 g, Rfl: 3    Trelegy Ellipta 200-62 5-25 MCG/INH AEPB inhaler, USE 1 INHALATION BY MOUTH  DAILY   RINSE MOUTH AFTER  USE, Disp: 180 each, Rfl: 3    Allergies   Allergen Reactions    Bactrim [Sulfamethoxazole-Trimethoprim] Other (See Comments)     AILYN    Nsaids      Annotation - [id number]: unable to take due to use of lithium   Oxycodone Rash            Vitals:    08/23/22 0838   BP: 143/82   Pulse: 60       Objective:  Physical exam  · General: Awake, Alert, Oriented  · Eyes: Pupils equal, round and reactive to light  · Heart: regular rate and rhythm  · Lungs: No audible wheezing  · Abdomen: soft                    Ortho Exam  ***    Diagnostics, reviewed and taken today if performed as documented:    None performed ***     The attending physician has personally reviewed the pertinent films in PACS and interpretation is as follows:  ***    Procedures, if performed today:    Procedures    None performed      Portions of the record may have been created with voice recognition software  Occasional wrong word or "sound a like" substitutions may have occurred due to the inherent limitations of voice recognition software  Read the chart carefully and recognize, using context, where substitutions have occurred

## 2022-09-09 ENCOUNTER — TELEPHONE (OUTPATIENT)
Dept: PULMONOLOGY | Facility: CLINIC | Age: 77
End: 2022-09-09

## 2022-09-09 NOTE — TELEPHONE ENCOUNTER
LM for patient to give a call back to set up a follow up Appt in [facility] with Dr Asuncion Cotton

## 2022-09-20 ENCOUNTER — ESTABLISHED COMPREHENSIVE EXAM (OUTPATIENT)
Dept: URBAN - METROPOLITAN AREA CLINIC 6 | Facility: CLINIC | Age: 77
End: 2022-09-20

## 2022-09-20 DIAGNOSIS — H35.30: ICD-10-CM

## 2022-09-20 DIAGNOSIS — H25.13: ICD-10-CM

## 2022-09-20 DIAGNOSIS — H04.123: ICD-10-CM

## 2022-09-20 PROCEDURE — 92134 CPTRZ OPH DX IMG PST SGM RTA: CPT

## 2022-09-20 PROCEDURE — 92014 COMPRE OPH EXAM EST PT 1/>: CPT

## 2022-09-20 ASSESSMENT — VISUAL ACUITY
OS_SC: 20/30+1
OD_SC: 20/30+1
OU_SC: J3

## 2022-09-20 ASSESSMENT — TONOMETRY
OD_IOP_MMHG: 16
OS_IOP_MMHG: 13

## 2022-10-06 ENCOUNTER — TRANSCRIBE ORDERS (OUTPATIENT)
Dept: LAB | Facility: CLINIC | Age: 77
End: 2022-10-06

## 2022-10-06 ENCOUNTER — APPOINTMENT (OUTPATIENT)
Dept: LAB | Facility: CLINIC | Age: 77
End: 2022-10-06
Payer: MEDICARE

## 2022-10-06 DIAGNOSIS — Z95.5 HISTORY OF HEART ARTERY STENT: ICD-10-CM

## 2022-10-06 DIAGNOSIS — I49.3 ASYMPTOMATIC PVCS: ICD-10-CM

## 2022-10-06 DIAGNOSIS — F31.60 MIXED BIPOLAR I DISORDER (HCC): ICD-10-CM

## 2022-10-06 DIAGNOSIS — F31.60 MIXED BIPOLAR I DISORDER (HCC): Primary | ICD-10-CM

## 2022-10-06 DIAGNOSIS — I10 ESSENTIAL HYPERTENSION: ICD-10-CM

## 2022-10-06 DIAGNOSIS — I71.40 ABDOMINAL AORTIC ANEURYSM WITHOUT RUPTURE: ICD-10-CM

## 2022-10-06 DIAGNOSIS — E78.2 MIXED HYPERLIPIDEMIA: ICD-10-CM

## 2022-10-06 DIAGNOSIS — R80.8 OTHER PROTEINURIA: ICD-10-CM

## 2022-10-06 DIAGNOSIS — I50.30 DIASTOLIC HEART FAILURE, UNSPECIFIED HF CHRONICITY (HCC): ICD-10-CM

## 2022-10-06 DIAGNOSIS — I25.10 CORONARY ARTERY DISEASE INVOLVING NATIVE CORONARY ARTERY OF NATIVE HEART WITHOUT ANGINA PECTORIS: ICD-10-CM

## 2022-10-06 DIAGNOSIS — I25.2 PAST HISTORY OF MYOCARDIAL INFARCTION: ICD-10-CM

## 2022-10-06 DIAGNOSIS — R73.09 ABNORMAL BLOOD SUGAR: ICD-10-CM

## 2022-10-06 DIAGNOSIS — E83.52 SERUM CALCIUM ELEVATED: ICD-10-CM

## 2022-10-06 LAB
ALBUMIN SERPL BCP-MCNC: 3.8 G/DL (ref 3.5–5)
ALP SERPL-CCNC: 167 U/L (ref 46–116)
ALT SERPL W P-5'-P-CCNC: 32 U/L (ref 12–78)
ANION GAP SERPL CALCULATED.3IONS-SCNC: 2 MMOL/L (ref 4–13)
AST SERPL W P-5'-P-CCNC: 21 U/L (ref 5–45)
BASOPHILS # BLD AUTO: 0.05 THOUSANDS/ΜL (ref 0–0.1)
BASOPHILS NFR BLD AUTO: 1 % (ref 0–1)
BILIRUB SERPL-MCNC: 0.75 MG/DL (ref 0.2–1)
BUN SERPL-MCNC: 29 MG/DL (ref 5–25)
CALCIUM SERPL-MCNC: 9.7 MG/DL (ref 8.3–10.1)
CHLORIDE SERPL-SCNC: 110 MMOL/L (ref 96–108)
CHOLEST SERPL-MCNC: 122 MG/DL
CK SERPL-CCNC: 151 U/L (ref 39–308)
CO2 SERPL-SCNC: 25 MMOL/L (ref 21–32)
CREAT SERPL-MCNC: 1.56 MG/DL (ref 0.6–1.3)
EOSINOPHIL # BLD AUTO: 0.49 THOUSAND/ΜL (ref 0–0.61)
EOSINOPHIL NFR BLD AUTO: 5 % (ref 0–6)
ERYTHROCYTE [DISTWIDTH] IN BLOOD BY AUTOMATED COUNT: 12.8 % (ref 11.6–15.1)
GFR SERPL CREATININE-BSD FRML MDRD: 43 ML/MIN/1.73SQ M
GLUCOSE P FAST SERPL-MCNC: 99 MG/DL (ref 65–99)
HCT VFR BLD AUTO: 40.8 % (ref 36.5–49.3)
HDLC SERPL-MCNC: 35 MG/DL
HGB BLD-MCNC: 12.9 G/DL (ref 12–17)
IMM GRANULOCYTES # BLD AUTO: 0.04 THOUSAND/UL (ref 0–0.2)
IMM GRANULOCYTES NFR BLD AUTO: 0 % (ref 0–2)
LDLC SERPL CALC-MCNC: 64 MG/DL (ref 0–100)
LITHIUM SERPL-SCNC: 0.9 MMOL/L (ref 0.5–1)
LYMPHOCYTES # BLD AUTO: 1.11 THOUSANDS/ΜL (ref 0.6–4.47)
LYMPHOCYTES NFR BLD AUTO: 12 % (ref 14–44)
MCH RBC QN AUTO: 30 PG (ref 26.8–34.3)
MCHC RBC AUTO-ENTMCNC: 31.6 G/DL (ref 31.4–37.4)
MCV RBC AUTO: 95 FL (ref 82–98)
MONOCYTES # BLD AUTO: 0.81 THOUSAND/ΜL (ref 0.17–1.22)
MONOCYTES NFR BLD AUTO: 9 % (ref 4–12)
NEUTROPHILS # BLD AUTO: 6.54 THOUSANDS/ΜL (ref 1.85–7.62)
NEUTS SEG NFR BLD AUTO: 73 % (ref 43–75)
NONHDLC SERPL-MCNC: 87 MG/DL
NRBC BLD AUTO-RTO: 0 /100 WBCS
PLATELET # BLD AUTO: 188 THOUSANDS/UL (ref 149–390)
PMV BLD AUTO: 10.7 FL (ref 8.9–12.7)
POTASSIUM SERPL-SCNC: 5.2 MMOL/L (ref 3.5–5.3)
PROT SERPL-MCNC: 6.8 G/DL (ref 6.4–8.4)
RBC # BLD AUTO: 4.3 MILLION/UL (ref 3.88–5.62)
SODIUM SERPL-SCNC: 137 MMOL/L (ref 135–147)
TRIGL SERPL-MCNC: 115 MG/DL
TSH SERPL DL<=0.05 MIU/L-ACNC: 1.72 UIU/ML (ref 0.45–4.5)
WBC # BLD AUTO: 9.04 THOUSAND/UL (ref 4.31–10.16)

## 2022-10-06 PROCEDURE — 84443 ASSAY THYROID STIM HORMONE: CPT

## 2022-10-06 PROCEDURE — 36415 COLL VENOUS BLD VENIPUNCTURE: CPT

## 2022-10-06 PROCEDURE — 80178 ASSAY OF LITHIUM: CPT

## 2022-10-06 PROCEDURE — 82550 ASSAY OF CK (CPK): CPT

## 2022-10-06 PROCEDURE — 80061 LIPID PANEL: CPT

## 2022-10-06 PROCEDURE — 85025 COMPLETE CBC W/AUTO DIFF WBC: CPT

## 2022-10-06 PROCEDURE — 80053 COMPREHEN METABOLIC PANEL: CPT

## 2022-10-11 ENCOUNTER — HOSPITAL ENCOUNTER (OUTPATIENT)
Dept: NON INVASIVE DIAGNOSTICS | Facility: CLINIC | Age: 77
Discharge: HOME | End: 2022-10-11
Payer: MEDICARE

## 2022-10-11 DIAGNOSIS — I49.3 ASYMPTOMATIC PVCS: ICD-10-CM

## 2022-10-11 DIAGNOSIS — R73.09 ABNORMAL BLOOD SUGAR: ICD-10-CM

## 2022-10-11 DIAGNOSIS — I50.30 DIASTOLIC HEART FAILURE, UNSPECIFIED HF CHRONICITY (HCC): ICD-10-CM

## 2022-10-11 DIAGNOSIS — I25.10 CORONARY ARTERY DISEASE INVOLVING NATIVE CORONARY ARTERY OF NATIVE HEART WITHOUT ANGINA PECTORIS: ICD-10-CM

## 2022-10-11 DIAGNOSIS — E78.2 MIXED HYPERLIPIDEMIA: ICD-10-CM

## 2022-10-11 DIAGNOSIS — Z95.5 HISTORY OF HEART ARTERY STENT: ICD-10-CM

## 2022-10-11 DIAGNOSIS — I25.2 PAST HISTORY OF MYOCARDIAL INFARCTION: ICD-10-CM

## 2022-10-11 DIAGNOSIS — I71.40 ABDOMINAL AORTIC ANEURYSM WITHOUT RUPTURE: ICD-10-CM

## 2022-10-11 DIAGNOSIS — I10 ESSENTIAL HYPERTENSION: ICD-10-CM

## 2022-10-11 DIAGNOSIS — E83.52 SERUM CALCIUM ELEVATED: ICD-10-CM

## 2022-10-11 PROCEDURE — 93225 XTRNL ECG REC<48 HRS REC: CPT

## 2022-10-11 PROCEDURE — 93226 XTRNL ECG REC<48 HR SCAN A/R: CPT

## 2022-10-14 ENCOUNTER — OFFICE VISIT (OUTPATIENT)
Dept: INTERNAL MEDICINE CLINIC | Facility: CLINIC | Age: 77
End: 2022-10-14
Payer: MEDICARE

## 2022-10-14 VITALS
OXYGEN SATURATION: 98 % | BODY MASS INDEX: 27.31 KG/M2 | DIASTOLIC BLOOD PRESSURE: 74 MMHG | HEART RATE: 52 BPM | WEIGHT: 174 LBS | HEIGHT: 67 IN | SYSTOLIC BLOOD PRESSURE: 118 MMHG

## 2022-10-14 DIAGNOSIS — E78.2 MIXED HYPERLIPIDEMIA: Primary | ICD-10-CM

## 2022-10-14 DIAGNOSIS — M25.552 HIP PAIN, BILATERAL: ICD-10-CM

## 2022-10-14 DIAGNOSIS — J44.9 CHRONIC OBSTRUCTIVE PULMONARY DISEASE, UNSPECIFIED COPD TYPE (HCC): ICD-10-CM

## 2022-10-14 DIAGNOSIS — M48.061 FORAMINAL STENOSIS OF LUMBAR REGION: ICD-10-CM

## 2022-10-14 DIAGNOSIS — M16.12 PRIMARY OSTEOARTHRITIS OF LEFT HIP: ICD-10-CM

## 2022-10-14 DIAGNOSIS — I10 ESSENTIAL HYPERTENSION: ICD-10-CM

## 2022-10-14 DIAGNOSIS — M25.551 HIP PAIN, BILATERAL: ICD-10-CM

## 2022-10-14 DIAGNOSIS — G95.9 CERVICAL MYELOPATHY (HCC): ICD-10-CM

## 2022-10-14 DIAGNOSIS — I48.91 NEW ONSET ATRIAL FIBRILLATION (HCC): ICD-10-CM

## 2022-10-14 DIAGNOSIS — Z23 NEED FOR VACCINATION: ICD-10-CM

## 2022-10-14 DIAGNOSIS — M51.36 DEGENERATIVE DISC DISEASE, LUMBAR: ICD-10-CM

## 2022-10-14 PROCEDURE — 99214 OFFICE O/P EST MOD 30 MIN: CPT | Performed by: INTERNAL MEDICINE

## 2022-10-14 PROCEDURE — G0008 ADMIN INFLUENZA VIRUS VAC: HCPCS

## 2022-10-14 PROCEDURE — 90662 IIV NO PRSV INCREASED AG IM: CPT

## 2022-10-14 NOTE — PATIENT INSTRUCTIONS
Problem List Items Addressed This Visit          Respiratory    COPD (chronic obstructive pulmonary disease) ([address] )     Continue inhalers and follow up pulm              Cardiovascular and Mediastinum    Essential hypertension      Controlled, continue meds           New onset atrial fibrillation [facility])      Patient with reported isolated event 2018 associated with respiratory infection, not on anticoagulation, he follows with Cardiology, he just got a Holter monitor              Nervous and Auditory    Cervical myelopathy (HCC)       Musculoskeletal and Integument    Degenerative disc disease, lumbar    Relevant Orders    XR spine lumbar minimum 4 views non injury    Foraminal stenosis of lumbar region    Relevant Orders    XR spine lumbar minimum 4 views non injury    Primary osteoarthritis of left hip    Relevant Orders    XR hip/pelv 2-3 vws left if performed       Other    Mixed hyperlipidemia - Primary     Continue statin along with healthy diet                 Other Visit Diagnoses       Need for vaccination        Relevant Orders    influenza vaccine, high-dose, PF 0 7 mL (FLUZONE HIGH-DOSE)    Hip pain, bilateral

## 2022-10-14 NOTE — PROGRESS NOTES
Name: Jay Rater      : 1945      MRN: [id number]  Encounter Provider: Nehal Day MD  Encounter Date: 10/14/2022   [facility] [address] [place], [place] [address]     1  Mixed hyperlipidemia  Assessment & Plan:  Continue statin along with healthy diet      2  Need for vaccination  -     influenza vaccine, high-dose, PF 0 7 mL (FLUZONE HIGH-DOSE)    3  Cervical myelopathy ([facility] [address] )    4  Foraminal stenosis of lumbar region  -     XR spine lumbar minimum 4 views non injury; Future; Expected date: 10/14/2022    5  Degenerative disc disease, lumbar  -     XR spine lumbar minimum 4 views non injury; Future; Expected date: 10/14/2022    6  Hip pain, bilateral    7  New onset atrial fibrillation [facility])  Assessment & Plan:   Patient with reported isolated event 2018 associated with respiratory infection, not on anticoagulation, he follows with Cardiology, he just got a Holter monitor      8  Essential hypertension  Assessment & Plan:   Controlled, continue meds      9  Chronic obstructive pulmonary disease, unspecified COPD type ([facility] [address] )  Assessment & Plan:  Continue inhalers and follow up pulm      10  Primary osteoarthritis of left hip  -     XR hip/pelv 2-3 vws left if performed; Future; Expected date: 10/14/2022            Subjective     Pt here for follow up    Review of Systems   Constitutional: Negative for chills, fatigue and fever  HENT: Negative for congestion, nosebleeds, postnasal drip, sore throat and trouble swallowing  Eyes: Negative for pain  Respiratory: Negative for cough, chest tightness, shortness of breath and wheezing  Cardiovascular: Negative for chest pain, palpitations and leg swelling  Gastrointestinal: Negative for abdominal pain, constipation, diarrhea, nausea and vomiting  Endocrine: Negative for polydipsia and polyuria  Genitourinary: Negative for dysuria, flank pain and hematuria  Musculoskeletal: Positive for arthralgias and back pain  Skin: Negative for rash  Neurological: Negative for dizziness, tremors, light-headedness and headaches  Hematological: Does not bruise/bleed easily  Psychiatric/Behavioral: Negative for confusion and dysphoric mood  The patient is not nervous/anxious  Past Medical History:   Diagnosis Date   • Aortic aneurysm [facility])    • Benign colon polyp    • Bipolar 1 disorder ([place] [address] )    • Cardiac disease     MI   • Cervical cord compression with myelopathy ([facility])    • COPD (chronic obstructive pulmonary disease) ([facility])    • Diverticulitis    • Diverticulosis    • Gait disturbance     uses cane, leg brace on right   • GERD (gastroesophageal reflux disease)    • Heart attack ([place] [address] ) 1996   • Hx of resection of large bowel 5/3/2016   • Hyperlipidemia    • Hypertension    • IBS (irritable bowel syndrome)    • Lumbar stenosis    • Lung cancer ([place] [address] )     2007 Left lower lobectomy and 2011 Right lung with surgery    • Myocardial infarction [facility])     involving other coronary artery   • Prostate cancer ([place] [address] )    • Shortness of breath    • Small bowel obstruction ([place] [address] ) 11/16/2016     Past Surgical History:   Procedure Laterality Date   • ANGIOPLASTY      stent   • CARDIAC SURGERY     • CERVICAL SPINE SURGERY      Cervical decompression with cervical fusion from C3-C7 for spinal stenosis     • COLON SURGERY  11/04/2014   • ESOPHAGOGASTRODUODENOSCOPY  01/03/2013    with possible Schatzki's ring & small hiatal hernia, mild gastritis   • FL INJECTION LEFT HIP (NON ARTHROGRAM)  12/11/2018   • FL INJECTION LEFT HIP (NON ARTHROGRAM)  5/9/2019   • IR BIOPSY LUNG  7/6/2020   • IR EVAR  8/6/2018   • LUNG CANCER SURGERY Right 03/2011    wedge resection for lung tumor, 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   • LUNG LOBECTOMY Left    • [place] ARTHRODESIS ANT INTERBODY MIN DISCECTOMY, CERVICAL BELOW C2 N/A 5/2/2016    Procedure: Anterior cervical diskectomy C3/4, C5/6, C6/7 with anterior plate fixation fusion C3-7;  Posterior decompressive laminectomy C3-7 with lateral mass fixation fusion C3-7 (IMPULSE MONITORING); Surgeon: Jennyfer Talavera MD;  [facility];  Service: Neurosurgery   • [place] ARTHRODESIS POSTERIOR INTERBODY LUMBAR N/A 2017    Procedure: L4-5 AND L5-S1 DECOMPRESSIVE FORAMINOTOMIES, TRANSFORAMINAL LUMBAR INTERBODY AND PEDICLE SCREW FIXATION FUSION L4-S1 (IMPULSE);   Surgeon: Jennyfer Talavera MD;  [facility];  Service: Neurosurgery   • [place] EVASC RPR DPLMNT AORTO-AORTIC NDGFT N/A 2018    Procedure: REPAIR ANEURYSM ENDOVASCULAR ABDOMINAL AORTIC  (EVAR) WITH BILATERAL PERCUTANEOUS FEMORAL ACCESS WITH ULTRASOUND GUIDANCE ON THE RIGHT AND PRE CLOSURE;  Surgeon: Yoandy Douglas MD;  [facility];  Service: Vascular   • PROSTATECTOMY      for prostate CA - no chemo/RT   • SIGMOIDECTOMY      for divertic   • SMALL INTESTINE SURGERY N/A 2016    Procedure: Exploratory Laparotomy, Lysis of adhesions to release small bowel obstruction;  Surgeon: Marshall Cao MD;  [facility];  Service:      Family History   Problem Relation Age of Onset   • Heart attack Father    • Colon cancer Father    • Coronary artery disease Father    • Heart disease Family    • Hyperlipidemia Family    • Hypertension Family      Social History     Socioeconomic History   • Marital status: /Civil Union     Spouse name: None   • Number of children: 1   • Years of education: None   • Highest education level: None   Occupational History   • Occupation: Retired   Tobacco Use   • Smoking status: Former Smoker     Packs/day: 1 00     Years: 35 00     Pack years: 35 00     Types: Cigarettes     Start date:      Quit date:      Years since quittin 7   • Smokeless tobacco: Never Used   Vaping Use   • Vaping Use: Never used   Substance and Sexual Activity   • Alcohol use: Yes     Comment: One glass per day   • Drug use: No   • Sexual activity: None   Other Topics Concern   • None   Social History Narrative   • None     Social Determinants of Health     Financial Resource Strain: Not on file   Food Insecurity: Not on file   Transportation Needs: Not on file   Physical Activity: Not on file   Stress: Not on file   Social Connections: Not on file   Intimate Partner Violence: Not on file   Housing Stability: Not on file     Current Outpatient Medications on File Prior to Visit   Medication Sig   • acetaminophen (TYLENOL) 500 mg tablet Take 500 mg by mouth as needed for mild pain  • amLODIPine (NORVASC) 2 5 mg tablet TAKE 1 TABLET BY MOUTH  DAILY   • aspirin (ECOTRIN LOW STRENGTH) 81 mg EC tablet Take 81 mg by mouth daily   • atorvastatin (LIPITOR) 40 mg tablet TAKE 1 TABLET BY MOUTH  DAILY   • Cholecalciferol (VITAMIN D PO) Take 2,000 Units by mouth daily     • dicyclomine (Bentyl) 20 mg tablet Take 1 tablet (20 mg total) by mouth every 6 (six) hours as needed (abd cramping)   • fexofenadine (ALLEGRA) 180 MG tablet Take 180 mg by mouth as needed Daily during the Summer   • FLUoxetine (PROzac) 10 mg capsule Take 10 mg by mouth daily    • fluticasone (FLONASE) 50 mcg/act nasal spray 2 sprays into each nostril daily   • lithium carbonate (LITHOBID) 300 mg CR tablet Take 300 mg by mouth in the morning   • metoprolol succinate (TOPROL-XL) 25 mg 24 hr tablet TAKE 1 TABLET BY MOUTH  DAILY   • Mirabegron ER 25 MG TB24 Take 25 mg by mouth daily at bedtime   • nitroglycerin (NITRODUR) 0 2 mg/hr APPLY 1 PATCH TOPICALLY  ONCE DAILY  LEAVE ON FOR 12 TO 14 HOURS THEN REMOVE FOR A NITRATE-FREE INTERVAL OF  10 TO 12 HOURS   • omega-3-acid ethyl esters (LOVAZA) 1 g capsule TAKE 1 CAPSULE BY MOUTH 3  TIMES DAILY   • omeprazole (PriLOSEC) 20 mg delayed release capsule Take 1 capsule (20 mg total) by mouth daily   • ProAir  (90 Base) MCG/ACT inhaler USE 2 INHALATIONS BY MOUTH  EVERY 6 HOURS AS NEEDED FOR WHEEZING   • Trelegy Ellipta 200-62 5-25 MCG/INH AEPB inhaler USE 1 INHALATION BY MOUTH  DAILY    RINSE MOUTH AFTER  USE   • diazepam (VALIUM) 5 mg tablet 2 5 mg   (Patient not taking: No sig reported)   • famotidine (PEPCID) 40 MG tablet Take 1 tablet (40 mg total) by mouth daily at bedtime as needed for heartburn (Patient not taking: No sig reported)   • mupirocin (BACTROBAN) 2 % ointment Apply topically 2 (two) times a day as needed (wound) (Patient not taking: No sig reported)     Allergies   Allergen Reactions   • Bactrim [Sulfamethoxazole-Trimethoprim] Other (See Comments)     AILYN   • Nsaids      Annotation - [id number]: unable to take due to use of lithium  • Oxycodone Rash     Immunization History   Administered Date(s) Administered   • COVID-19 PFIZER VACCINE 0 3 ML IM 02/17/2021, 03/10/2021, 10/26/2021   • COVID-19 Pfizer vac (Satya-sucrose, gray cap) 12 yr+ IM 08/03/2022   • INFLUENZA 10/01/2015, 10/07/2018, 09/27/2019, 09/27/2021   • Influenza Quadrivalent Preservative Free 3 years and older IM 10/01/2015   • Influenza Split High Dose Preservative Free IM 10/25/2016, 10/03/2018   • Influenza, high dose seasonal 0 7 mL 09/27/2019, 10/05/2020   • Influenza, seasonal, injectable 1945, 10/10/2012   • Pneumococcal Conjugate 13-Valent 10/25/2016   • Pneumococcal Polysaccharide PPV23 10/03/2019   • Td (adult), adsorbed 12/01/2009   • Zoster 01/01/2014, 01/01/2014   • influenza, trivalent, adjuvanted 09/27/2021       Objective     /74   Pulse (!) 52   Ht 5' 7" (1 702 m)   Wt 78 9 kg (174 lb)   SpO2 98%   BMI 27 25 kg/m²     Physical Exam  Constitutional:       General: He is not in acute distress  Appearance: Normal appearance  He is well-developed  HENT:      Head: Normocephalic and atraumatic  Right Ear: External ear normal       Left Ear: External ear normal       Nose: Nose normal    Eyes:      General: No scleral icterus  Conjunctiva/sclera: Conjunctivae normal    Neck:      Thyroid: No thyromegaly  Trachea: No tracheal deviation  Cardiovascular:      Rate and Rhythm: Normal rate and regular rhythm  Heart sounds: Normal heart sounds  No murmur heard  Pulmonary:      Effort: Pulmonary effort is normal  No respiratory distress  Breath sounds: Normal breath sounds  No wheezing or rales  Abdominal:      General: Bowel sounds are normal       Palpations: Abdomen is soft  Tenderness: There is no abdominal tenderness  There is no guarding or rebound  Musculoskeletal:      Cervical back: Normal range of motion and neck supple  Right lower leg: Edema (trace) present  Left lower leg: Edema (trace) present  Lymphadenopathy:      Cervical: No cervical adenopathy  Skin:     Coloration: Skin is not jaundiced or pale  Neurological:      General: No focal deficit present  Mental Status: He is alert and oriented to person, place, and time  Psychiatric:         Mood and Affect: Mood normal          Behavior: Behavior normal          Thought Content:  Thought content normal          Judgment: Judgment normal        Rey Viera MD

## 2022-10-14 NOTE — ASSESSMENT & PLAN NOTE
Patient with reported isolated event 2018 associated with respiratory infection, not on anticoagulation, he follows with Cardiology, he just got a Holter monitor

## 2022-10-15 ENCOUNTER — HOSPITAL ENCOUNTER (OUTPATIENT)
Dept: RADIOLOGY | Facility: HOSPITAL | Age: 77
Discharge: HOME | End: 2022-10-15
Payer: MEDICARE

## 2022-10-15 DIAGNOSIS — M48.061 FORAMINAL STENOSIS OF LUMBAR REGION: ICD-10-CM

## 2022-10-15 DIAGNOSIS — M51.36 DEGENERATIVE DISC DISEASE, LUMBAR: ICD-10-CM

## 2022-10-15 DIAGNOSIS — M16.12 PRIMARY OSTEOARTHRITIS OF LEFT HIP: ICD-10-CM

## 2022-10-15 PROCEDURE — 72110 X-RAY EXAM L-2 SPINE 4/>VWS: CPT

## 2022-10-15 PROCEDURE — 73502 X-RAY EXAM HIP UNI 2-3 VIEWS: CPT

## 2022-10-17 PROCEDURE — 93227 XTRNL ECG REC<48 HR R&I: CPT | Performed by: INTERNAL MEDICINE

## 2022-10-18 ENCOUNTER — OFFICE VISIT (OUTPATIENT)
Dept: CARDIOLOGY CLINIC | Facility: CLINIC | Age: 77
End: 2022-10-18
Payer: MEDICARE

## 2022-10-18 VITALS
HEART RATE: 52 BPM | DIASTOLIC BLOOD PRESSURE: 84 MMHG | HEIGHT: 67 IN | SYSTOLIC BLOOD PRESSURE: 150 MMHG | OXYGEN SATURATION: 98 % | BODY MASS INDEX: 27.58 KG/M2 | WEIGHT: 175.7 LBS

## 2022-10-18 DIAGNOSIS — I10 ESSENTIAL HYPERTENSION: Primary | ICD-10-CM

## 2022-10-18 DIAGNOSIS — I49.3 ASYMPTOMATIC PVCS: ICD-10-CM

## 2022-10-18 DIAGNOSIS — R73.09 ABNORMAL BLOOD SUGAR: ICD-10-CM

## 2022-10-18 DIAGNOSIS — I25.10 CORONARY ARTERY DISEASE INVOLVING NATIVE CORONARY ARTERY OF NATIVE HEART WITHOUT ANGINA PECTORIS: ICD-10-CM

## 2022-10-18 DIAGNOSIS — I71.40 ABDOMINAL AORTIC ANEURYSM (AAA) WITHOUT RUPTURE, UNSPECIFIED PART: ICD-10-CM

## 2022-10-18 DIAGNOSIS — E83.52 SERUM CALCIUM ELEVATED: ICD-10-CM

## 2022-10-18 DIAGNOSIS — I25.2 PAST HISTORY OF MYOCARDIAL INFARCTION: ICD-10-CM

## 2022-10-18 DIAGNOSIS — Z95.5 HISTORY OF HEART ARTERY STENT: ICD-10-CM

## 2022-10-18 DIAGNOSIS — E78.2 MIXED HYPERLIPIDEMIA: ICD-10-CM

## 2022-10-18 DIAGNOSIS — I50.30 DIASTOLIC HEART FAILURE, UNSPECIFIED HF CHRONICITY (HCC): ICD-10-CM

## 2022-10-18 PROCEDURE — 99214 OFFICE O/P EST MOD 30 MIN: CPT | Performed by: INTERNAL MEDICINE

## 2022-10-18 NOTE — PROGRESS NOTES
Follow-up - Cardiology   Teresa Hernandez 78 y o  male MRN: [id number]        Problems    Problem List Items Addressed This Visit        Cardiovascular and Mediastinum    Diastolic heart failure (HCC) (Chronic)    CAD (coronary artery disease)    Essential hypertension - Primary       Other    Mixed hyperlipidemia      Other Visit Diagnoses     Asymptomatic PVCs        Abdominal aortic aneurysm (AAA) without rupture, unspecified part        Past history of myocardial infarction        Abnormal blood sugar        Serum calcium elevated        History of heart artery stent                Plan and discussion  Patient seen October 18, 2022  Except for gaining 10 lb he is  stable  His issues are as follows  Myocardial infarction in the past in the inferior wall  Stress test 2018 was a fixed defect  Echocardiogram 2018 LV function 55%   No angina  Stent in 2008  LDL in the 70 range  Blood pressure at home is normal     Stent in his abdominal aorta with a recent ultrasound in 2021 followed by vascular  History of prostate carcinoma post robotic surgery   Lung cancer operation 2007 and 2011 in [place]  CT of his chest last in April of 2022 is followed by Pulmonary  He   His chronic obstructive lung disease  His cervical spine surgery   He had knee surgery   No atrial fibrillation since 2018  Treated for bipolar  Frequent PVCs in the past most recent Holter counter shows no atrial fibrillation and no PVCs  He is on a low dose of beta-blocker  Made no changes  He is amazingly stable for all the issues of the have occurred          HPI: Teresa Hernandez is a 78y o  year old male      Plan and discussion  Patient seen April 19, 2022  History of previous myocardial in    Stress test 2018   No angina   Stent in 2008   LDL in the 70 range   Blood pressure home normal   Stent in his abdominal aorta followed by vascular   History of prostate carcinoma status post robotic surgery   Lung surgery cancer operated 2002 and 2011 surgeries in [facility]  Recent CT scan March of 2022 no abnormalities  Continues to be followed by the pulmonary group   Does have chronic obstructive lung disease   Has cervical spine surgery knee and back surgery   Atrial fibrillation 2018 but none since   Treated for being bipolar  Frequent PVCs  Holter counter shows 8 6% PVCs with a heart rate drops down to 36  On metoprolol 25 mg daily  Will recheck Holter  Calcium is 10 2 and higher at 10 7 more recently  Check PTH and ionized calcium        From a cardiac standpoint he is very stable  His creatinine stable 1 5  GFR stable in the 40 range               HPI: Tiesha Morales is a 78y o  year old male      Plan and discussion  Patient seen October 12, 2021     Previous myocardial infarction   No angina   Nuclear study 2018 with small scar no ischemia   Stent in 2000 8     LDL 81     Blood pressure treated  Stent in his abdominal aorta  He is followed by the vascular group     History of prostate carcinoma   Lung cancer operated on 2002 1011   Surgeries in [facility] 74 to be followed by our Pulmonary group     Chronic obstructive lung disease   Cervical spine surgery knee and back surgery in the past   It atrial fibrillation 2018 associated with flu     Treated for being bipolar     No changes in the past year   Holter counter recently done within the past year shows 2 4% of PVCs   Blood sugar is 113     Will check A1c     Creatinine slightly elevated 1  3                    HPI: Tonny Baig is H 23 y  o  year old male    Plan patient seen October 6, 2020  History of old myocardial infarction  No chest pain  Nuclear study 2018 with a small scar no ischemia  Echocardiogram 2018 mild elevation of PA pressures  Stent in 2098  LDL of 81 in the past year  Hypertension is treated  Stent in abdominal aorta   This is followed by vascular    Prostate carcinoma history   Recent PSA less than 0 1  Lung cancer operated on 2002 in 2011  [facility] surgery was in [place]  This is followed by our Pulmonary group  He recently had a CT scan and a PET scan and a biopsy  [facility] tells with the biopsy is negative for carcinoma  He has chronic obstructive lung disease  His cervical spine surgery in the past   He had back surgery 2017  Is a question of having atrial fibrillation 2018 with the flu   He checks his pulse regularly   Does not have any recurrent atrial fibrillation that we can tell  He is treated for bipolar  From a cardiac standpoint he is asymptomatic      No change in medication   HPI: Tonny Baig is a 74 y  o  year old male       Plan patient seen December 3, 2019  History of old myocardial infarction-no chest pain  Nuclear study 02/2018 without ischemia   Possibly small scar  Echocardiogram 2018 PA pressure my mildly elevated  Cardiac stent 1998-no angina  LDL 73  Hypertension well treated  Stent in abdominal aorta   Followed by vascular  Prostate carcinoma history  Lung cancer operated of 2002 and 2011  Lung cancer followed in [place]  Chronic obstructive lung disease  Cervical spine surgery in the past  Back surgery 2017  History of atrial fibrillation in 2018 under stressful situation no recurrence of atrial fibrillation  Treated for bipolar  He is basically asymptomatic from a cardiac standpoint  Made no change in his medication            Review of Systems   Constitutional: Positive for fatigue  Respiratory: Negative  Cardiovascular: Negative  Hematological: Negative  Psychiatric/Behavioral: Negative            Past Medical History:   Diagnosis Date   • Aortic aneurysm [facility])    • Benign colon polyp    • Bipolar 1 disorder ([facility] )    • Cardiac disease     MI   • Cervical cord compression with myelopathy (HCC)    • COPD (chronic obstructive pulmonary disease) ([facility])    • Diverticulitis    • Diverticulosis    • Gait disturbance     uses cane, leg brace on right • GERD (gastroesophageal reflux disease)    • Heart attack ([place] [address] )    • Hx of resection of large bowel 5/3/2016   • Hyperlipidemia    • Hypertension    • IBS (irritable bowel syndrome)    • Lumbar stenosis    • Lung cancer ([place] [address] )      Left lower lobectomy and  Right lung with surgery    • Myocardial infarction [facility])     involving other coronary artery   • Prostate cancer ([place] [address] )    • Shortness of breath    • Small bowel obstruction ([place] [address] ) 2016     Social History     Substance and Sexual Activity   Alcohol Use Yes    Comment: One glass per day     Social History     Substance and Sexual Activity   Drug Use No     Social History     Tobacco Use   Smoking Status Former Smoker   • Packs/day: 1 00   • Years: 35 00   • Pack years: 35 00   • Types: Cigarettes   • Start date:    • Quit date:    • Years since quittin 8   Smokeless Tobacco Never Used       Allergies: Allergies   Allergen Reactions   • Bactrim [Sulfamethoxazole-Trimethoprim] Other (See Comments)     AILYN   • Nsaids      [facility] - [id number]: unable to take due to use of lithium     • Oxycodone Rash       Medications:     Current Outpatient Medications:   •  acetaminophen (TYLENOL) 500 mg tablet, Take 500 mg by mouth as needed for mild pain , Disp: , Rfl:   •  amLODIPine (NORVASC) 2 5 mg tablet, TAKE 1 TABLET BY MOUTH  DAILY, Disp: 90 tablet, Rfl: 3  •  aspirin (ECOTRIN LOW STRENGTH) 81 mg EC tablet, Take 81 mg by mouth daily, Disp: , Rfl:   •  atorvastatin (LIPITOR) 40 mg tablet, TAKE 1 TABLET BY MOUTH  DAILY, Disp: 90 tablet, Rfl: 5  •  Cholecalciferol (VITAMIN D PO), Take 2,000 Units by mouth daily  , Disp: , Rfl:   •  dicyclomine (Bentyl) 20 mg tablet, Take 1 tablet (20 mg total) by mouth every 6 (six) hours as needed (abd cramping), Disp: 90 tablet, Rfl: 3  •  famotidine (PEPCID) 40 MG tablet, Take 1 tablet (40 mg total) by mouth daily at bedtime as needed for heartburn, Disp: 30 tablet, Rfl: 3  •  fexofenadine (ALLEGRA) 180 MG tablet, Take 180 mg by mouth as needed Daily during the Summer, Disp: , Rfl:   •  FLUoxetine (PROzac) 10 mg capsule, Take 10 mg by mouth daily , Disp: , Rfl:   •  fluticasone (FLONASE) 50 mcg/act nasal spray, 2 sprays into each nostril daily, Disp: , Rfl:   •  lithium carbonate (LITHOBID) 300 mg CR tablet, Take 300 mg by mouth in the morning, Disp: , Rfl:   •  metoprolol succinate (TOPROL-XL) 25 mg 24 hr tablet, TAKE 1 TABLET BY MOUTH  DAILY, Disp: 90 tablet, Rfl: 3  •  Mirabegron ER 25 MG TB24, Take 25 mg by mouth daily at bedtime, Disp: 30 tablet, Rfl: 10  •  mupirocin (BACTROBAN) 2 % ointment, Apply topically 2 (two) times a day as needed (wound), Disp: 22 g, Rfl: 0  •  nitroglycerin (NITRODUR) 0 2 mg/hr, APPLY 1 PATCH TOPICALLY  ONCE DAILY  LEAVE ON FOR 12 TO 14 HOURS THEN REMOVE FOR A NITRATE-FREE INTERVAL OF  10 TO 12 HOURS, Disp: 90 patch, Rfl: 3  •  omega-3-acid ethyl esters (LOVAZA) 1 g capsule, TAKE 1 CAPSULE BY MOUTH 3  TIMES DAILY, Disp: 270 capsule, Rfl: 3  •  omeprazole (PriLOSEC) 20 mg delayed release capsule, Take 1 capsule (20 mg total) by mouth daily, Disp: 90 capsule, Rfl: 3  •  ProAir  (90 Base) MCG/ACT inhaler, USE 2 INHALATIONS BY MOUTH  EVERY 6 HOURS AS NEEDED FOR WHEEZING, Disp: 34 g, Rfl: 3  •  Trelegy Ellipta 200-62 5-25 MCG/INH AEPB inhaler, USE 1 INHALATION BY MOUTH  DAILY   RINSE MOUTH AFTER  USE, Disp: 180 each, Rfl: 3      Physical Exam  Constitutional:       Appearance: He is obese  Cardiovascular:      Rate and Rhythm: Normal rate and regular rhythm  Pulses: Normal pulses  Heart sounds: No murmur heard  Pulmonary:      Effort: Pulmonary effort is normal    Musculoskeletal:      Right lower leg: No edema  Left lower leg: No edema  Skin:     General: Skin is warm and dry  Coloration: Skin is pale  Neurological:      Mental Status: He is alert and oriented to person, place, and time     Psychiatric:         Behavior: Behavior normal            Laboratory Studies:  CMP:      Invalid input(s): ALBUMIN  NT-proBNP:   Lab Results   Component Value Date    NTBNP 695 (H) 2022      Coags:    Lipid Profile:   Lab Results   Component Value Date    CHOL 139 10/19/2015     Lab Results   Component Value Date    HDL 35 (L) 10/06/2022     Lab Results   Component Value Date    LDLCALC 64 10/06/2022     Lab Results   Component Value Date    TRIG 115 10/06/2022       Cardiac testing:     EKG reviewed personally:     Results for orders placed during the hospital encounter of 18    Echo complete with contrast if indicated    Narrative  [address]  [facility], [address] [facility]  [phone]    Transthoracic Echocardiogram  2D, M-mode, Doppler, and Color Doppler    Study date:  2018    Patient: Jerri Bansal  MR number: [id number]  Account number: [de-identified]  : 1945  Age: 81 years  Gender: Male  Status: Inpatient  Location: Bedside  Height: 67 in  Weight: 179 5 lb  BP: 128/ 76 mmHg    Indications: Limited for LV function    Diagnoses: I48 0 - Atrial fibrillation    Sonographer:  Mary Garland, DANA, RDCS  Primary Physician:  Zoraida Almonte MD  Referring Physician:  Irineo Ramos MD  Group:  [place] [id number] [facility]  Cardiology Fellow:  Phillip Louis MD  Interpreting Physician:  Alex Christine MD    SUMMARY    LEFT VENTRICLE:  Systolic function was normal  Ejection fraction was estimated to be 55 %  There were no regional wall motion abnormalities  Wall thickness was at the upper limits of normal     MITRAL VALVE:  There was trace regurgitation  TRICUSPID VALVE:  There was mild regurgitation  Estimated peak PA pressure was 43 mmHg  HISTORY: PRIOR HISTORY: COPD, Hypertension, Hyperlipidemia, A fib, Sepsis    PROCEDURE: The procedure was performed at the bedside  This was a routine study  The transthoracic approach was used   The study included complete 2D imaging, M-mode, complete spectral Doppler, and color Doppler  The heart rate was 72 bpm,  at the start of the study  Images were obtained from the parasternal, apical, subcostal, and suprasternal notch acoustic windows  Image quality was adequate  LEFT VENTRICLE: Size was normal  Systolic function was normal  Ejection fraction was estimated to be 55 %  There were no regional wall motion abnormalities  Wall thickness was at the upper limits of normal  There was no evidence of  concentric hypertrophy  DOPPLER: Left ventricular diastolic function parameters were normal     RIGHT VENTRICLE: The size was normal  Systolic function was normal     LEFT ATRIUM: Size was normal     RIGHT ATRIUM: Size was normal     MITRAL VALVE: Valve structure was normal  There was normal leaflet separation  DOPPLER: The transmitral velocity was within the normal range  There was no evidence for stenosis  There was trace regurgitation  AORTIC VALVE: The valve was trileaflet  Leaflets exhibited normal thickness and normal cuspal separation  DOPPLER: Transaortic velocity was within the normal range  There was no evidence for stenosis  There was no regurgitation  TRICUSPID VALVE: The valve structure was normal  There was normal leaflet separation  DOPPLER: The transtricuspid velocity was within the normal range  There was no evidence for stenosis  There was mild regurgitation  Pulmonary artery  systolic pressure was mildly increased  Estimated peak PA pressure was 43 mmHg  PULMONIC VALVE: Not well visualized  DOPPLER: The transpulmonic velocity was within the normal range  There was no evidence for stenosis  There was no significant regurgitation  PERICARDIUM: There was no pericardial effusion  A pericardial fat pad was present  AORTA: The root exhibited normal size  SYSTEMIC VEINS: IVC: The inferior vena cava was normal in size and course  Respirophasic changes were normal     PULMONARY VEINS: Not assessed      SYSTEM MEASUREMENT TABLES    2D  %FS: 28 95 %  EDV(Teich): 115 71 ml  EF(Teich): 55 44 %  ESV(Teich): 51 56 ml  IVSd: 1 07 cm  LA Diam: 3 51 cm  LVEDV MOD A4C: 115 44 ml  LVEF MOD A4C: 60 62 %  LVESV MOD A4C: 45 46 ml  LVIDd: 4 95 cm  LVIDs: 3 52 cm  LVLd A4C: 8 78 cm  LVLs A4C: 7 06 cm  LVPWd: 0 96 cm  SV MOD A4C: 69 98 ml  SV(Teich): 64 15 ml    CW  TR Vmax: 2 93 m/s  TR maxP 25 mmHg    PW  E': 0 1 m/s  E/E': 8 44  MV A [place]oyo: 0 59 m/s  MV Dec [place]: 4 51 m/s2  MV DecT: 178 2 ms  MV E Goyo: 0 8 m/s  MV E/A Ratio: 1 37  MV PHT: 51 68 ms  MVA By PHT: 4 26 cm2    [facility] Accredited Echocardiography Laboratory    Prepared and electronically signed by    Monica Infante MD  Signed 2018 14:50:06    No results found for this or any previous visit  No results found for this or any previous visit  Results for orders placed during the hospital encounter of 18    NM myocardial perfusion spect (stress and/or rest)    [address]  [phone]    Rest/Stress Gated SPECT Myocardial Perfusion Imaging After Regadenoson    Patient: Chio Gonzales  MR number: [id number]  Account number: [de-identified]  : 1945  Age: 81 years  Gender: Male  Status: Outpatient  Location: [address] [place] [address] [facility]  Height: 67 in  Weight: 185 lb  BP: 160/ 80 mmHg    Allergies: NSAIDS, OXYCODONE    Diagnosis: Z01 810 - Encounter for preprocedural cardiovascular examination    Interpreting Physician:  Odilon Anderson MD  Referring Physician:  Antonio Pavon MD  Primary Physician:  Guillermo Cueva MD  Technician:  Gin Nunes  RN:  Lizzette Braden RN  Group:  [facility]  Report Prepared by[de-identified]  Lizzette Braden RN    INDICATIONS: Pre-operative risk assessment for AAA repair    HISTORY: The patient is a 81year old  male    History of 5 2 cm AAA, bipolar disorder, cervical and lumbar back surgery, wears brace right leg Chest pain status: no chest pain  Coronary artery disease risk factors: dyslipidemia, hypertension, and former smoking  Cardiovascular  history: coronary artery disease, prior myocardial infarction (1996), and arrhythmia  Prior cardiovascular procedures: percutaneous transluminal coronary angioplasty s/p stent x 1 (1998) Co-morbidity: history of lung cancer s/p  chemotherapy and left lobectomy and right wedge resection,COPD, and prostate cancer s/p prostatectomy Medications: a beta blocker, a calcium channel blocker, aspirin, and a lipid lowering agent  Previous test results: abnormal nuclear  study  PHYSICAL EXAM: Baseline physical exam screening: no wheezes audible  REST ECG: Normal sinus rhythm  The ECG showed occasional premature ventricular contractions  PROCEDURE: The study was performed at the [place] [address] [facility]  A regadenoson infusion pharmacologic stress test was performed  Gated SPECT myocardial perfusion imaging was performed after stress and at rest  Systolic blood  pressure was 160 mmHg, at the start of the study  Diastolic blood pressure was 80 mmHg, at the start of the study  The heart rate was 64 bpm, at the start of the study  IV double checked  Regadenoson protocol:  HR bpm SBP mmHg DBP mmHg Symptoms  Baseline 64 160 80 none  Immediate 100 172 88 mild dyspnea  1 min 67 148 86 subsiding  2 min 65 144 82 none  The patient also performed low level exercise  STRESS SUMMARY: Duration of pharmacologic stress was 3 min  Maximal heart rate during stress was 100 bpm  The heart rate response to stress was normal  There was resting hypertension with an appropriate blood pressure response to stress  The rate-pressure product for the peak heart rate and blood pressure was 55802  There was no chest pain during stress  The stress test was terminated due to protocol completion  Pre oxygen saturation: 97 %  Peak oxygen saturation: 97 %    The stress ECG was negative for ischemia and normal  Arrhythmia during stress: isolated premature ventricular beats  ISOTOPE ADMINISTRATION:  Resting isotope administration Stress isotope administration  Agent Tetrofosmin Tetrofosmin  Dose 10 47 mCi 32 6 mCi  Date 06/26/2018 06/26/2018  Injection-image interval 40 min 54 min    The radiopharmaceutical was injected at the peak effect of pharmacologic stress  MYOCARDIAL PERFUSION IMAGING:  The image quality was good  Rotating projection images reveal mild diaphragmatic attenuation  Left ventricular size was normal  The TID ratio was 1 17  PERFUSION DEFECTS:  -  No significant ischemia visualized  There was a small, severe, fixed myocardial perfusion defect of the basalto mid inferoseptal wall  PERFUSION QUANTITATION:  The summed perfusion score measured 6 during stress, 2 at rest, with a difference of 3  GATED SPECT:  Non-gated study secondary to frequent PVCs  SUMMARY:  -  Stress results: There was resting hypertension with an appropriate blood pressure response to stress  There was no chest pain during stress  -  ECG conclusions: The stress ECG was negative for ischemia and normal   -  Perfusion imaging: No significant ischemia visualized  There was a small, severe, fixed myocardial perfusion defect of the basalto mid inferoseptal wall  -  Gated SPECT: Non-gated study secondary to frequent PVCs  Prepared and signed by    Thiago Ames MD  Signed 06/26/2018 13:18:39      Alesha Lemus MD    Portions of the record may have been created with voice recognition software   Occasional wrong word or "sound a like" substitutions may have occurred due to the inherent limitations of voice recognition software   Read the chart carefully and recognize, using context, where substitutions have occurred

## 2022-10-28 ENCOUNTER — OFFICE VISIT (OUTPATIENT)
Dept: GASTROENTEROLOGY | Facility: MEDICAL CENTER | Age: 77
End: 2022-10-28

## 2022-10-28 VITALS
SYSTOLIC BLOOD PRESSURE: 126 MMHG | OXYGEN SATURATION: 98 % | HEIGHT: 67 IN | HEART RATE: 48 BPM | BODY MASS INDEX: 27.5 KG/M2 | WEIGHT: 175.2 LBS | DIASTOLIC BLOOD PRESSURE: 70 MMHG

## 2022-10-28 DIAGNOSIS — Z12.11 ENCOUNTER FOR SCREENING FOR MALIGNANT NEOPLASM OF COLON: Primary | ICD-10-CM

## 2022-10-28 DIAGNOSIS — I10 ESSENTIAL HYPERTENSION: ICD-10-CM

## 2022-10-28 RX ORDER — AMLODIPINE BESYLATE 2.5 MG/1
TABLET ORAL
Qty: 90 TABLET | Refills: 3 | Status: SHIPPED | OUTPATIENT
Start: 2022-10-28

## 2022-10-28 NOTE — PATIENT INSTRUCTIONS
Scheduled date of colon (as of today) 1/17/23  Physician performing Dr Ezequiel Blackwell:  Location of procedure  [place]:  Bowel prep reviewed with patient: miralax/dulcolax  Instructions reviewed with patient by: ma  Clearances: na

## 2022-10-28 NOTE — PROGRESS NOTES
[facility] - Outpatient Follow-up Note  Yolanda Guan 78 y o  male MRN: [id number]  Encounter: [id number]          ASSESSMENT AND PLAN:      1  Encounter for screening for malignant neoplasm of colon:  Last colonoscopy was in July of 2020 with multiple polyps removed from the rectum, ascending colon x3, transverse colon x2, descending colon and sigmoid colon  All polyps were removed  Three of these were tubular adenomas and repeat was recommended in 2 years  He is agreeable to schedule this today  - Colonoscopy; Future  -MiraLax/Dulcolax  -f/u after colonoscopy     2  GERD: he is currently on omeprazole 20 mg daily and pepcid 20mg at bedtime  This is working fairly well  He does have breakthrough symptoms about twice/week  He is not interested in increasing his omeprazole dose  He is okay with taking and extra dose of pepcid as needed    -continue omeprazole 20mg daily  -continue pepcid 20mg at bedtime  -take additional pepcid 20mg PRN    Risks of colonoscopy were discussed including but not limited to bleeding, infection, perforation  He understands and agrees to proceed with procedure        ______________________________________________________________________    SUBJECTIVE:  Oksana Barkley is a 42-year-old male with a past medical history of COPD, bipolar 1 disorder, diverticulosis, hypertension, hyperlipidemia, CAD who is here for follow-up of his acid reflux  He is currently on omeprazole 20 mg daily in the morning as well as Pepcid 20 mg at bedtime  He has been on fluctuating doses of both these medications due to waxing and waning of his symptoms  He states that this time his symptoms are improved and well controlled  He experiences breakthrough symptoms about 2 times per week  He also uses Tums and sodium bicarb (picot) occasionally  His last EGD was in February of 2022 that was normal other than some thickened gastric folds  Biopsies were unremarkable    Last colonoscopy July of 2020 with multiple polyps removed from rectum, ascending colon x3, transverse colon x2, descending colon, sigmoid colon  All polyps removed  Some of these were tubular adenomas and repeat colonoscopy was recommended in 2 years      REVIEW OF SYSTEMS IS OTHERWISE NEGATIVE  Historical Information   Past Medical History:   Diagnosis Date   • Aortic aneurysm [facility])    • Benign colon polyp    • Bipolar 1 disorder ([facility] )    • Cardiac disease     MI   • Cervical cord compression with myelopathy (HCC)    • COPD (chronic obstructive pulmonary disease) ([facility])    • Diverticulitis    • Diverticulosis    • Gait disturbance     uses cane, leg brace on right   • GERD (gastroesophageal reflux disease)    • Heart attack ([facility]  ) 1996   • Hx of resection of large bowel 5/3/2016   • Hyperlipidemia    • Hypertension    • IBS (irritable bowel syndrome)    • Lumbar stenosis    • Lung cancer ([place] [address] )     2007 Left lower lobectomy and 2011 Right lung with surgery    • Myocardial infarction [facility])     involving other coronary artery   • Prostate cancer ([place] [address] )    • Shortness of breath    • Small bowel obstruction ([place] [address] ) 11/16/2016     Past Surgical History:   Procedure Laterality Date   • ANGIOPLASTY      stent   • CARDIAC SURGERY     • CERVICAL SPINE SURGERY      Cervical decompression with cervical fusion from C3-C7 for spinal stenosis     • COLON SURGERY  11/04/2014   • ESOPHAGOGASTRODUODENOSCOPY  01/03/2013    with possible Schatzki's ring & small hiatal hernia, mild gastritis   • FL INJECTION LEFT HIP (NON ARTHROGRAM)  12/11/2018   • FL INJECTION LEFT HIP (NON ARTHROGRAM)  5/9/2019   • IR BIOPSY LUNG  7/6/2020   • IR EVAR  8/6/2018   • LUNG CANCER SURGERY Right 03/2011    wedge resection for lung tumor, 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   • LUNG LOBECTOMY Left    • AR ARTHRODESIS ANT INTERBODY MIN DISCECTOMY, CERVICAL BELOW C2 N/A 5/2/2016    Procedure: Anterior cervical diskectomy C3/4, C5/6, C6/7 with anterior plate fixation fusion C3-7;  Posterior decompressive laminectomy C3-7 with lateral mass fixation fusion C3-7 (IMPULSE MONITORING); Surgeon: Lizzette Marcos MD;  [facility];  Service: Neurosurgery   • HI ARTHRODESIS POSTERIOR INTERBODY LUMBAR N/A 2017    Procedure: L4-5 AND L5-S1 DECOMPRESSIVE FORAMINOTOMIES, TRANSFORAMINAL LUMBAR INTERBODY AND PEDICLE SCREW FIXATION FUSION L4-S1 (IMPULSE);   Surgeon: Lizzette Marcos MD;  Location[facility];  Service: Neurosurgery   • HI EVASC RPR DPLMNT AORTO-AORTIC NDGFT N/A 2018    Procedure: REPAIR ANEURYSM ENDOVASCULAR ABDOMINAL AORTIC  (EVAR) WITH BILATERAL PERCUTANEOUS FEMORAL ACCESS WITH ULTRASOUND GUIDANCE ON THE RIGHT AND PRE CLOSURE;  Surgeon: Barbara Fernando MD;  [facility];  Service: Vascular   • PROSTATECTOMY      for prostate CA - no chemo/RT   • SIGMOIDECTOMY      for divertic   • SMALL INTESTINE SURGERY N/A 2016    Procedure: Exploratory Laparotomy, Lysis of adhesions to release small bowel obstruction;  Surgeon: Yasir Lewis MD;  [facility];  Service:      Social History   Social History     Substance and Sexual Activity   Alcohol Use Yes    Comment: One glass per day     Social History     Substance and Sexual Activity   Drug Use No     Social History     Tobacco Use   Smoking Status Former Smoker   • Packs/day: 1 00   • Years: 35 00   • Pack years: 35 00   • Types: Cigarettes   • Start date:    • Quit date:    • Years since quittin 8   Smokeless Tobacco Never Used     Family History   Problem Relation Age of Onset   • Heart attack Father    • Colon cancer Father    • Coronary artery disease Father    • Heart disease Family    • Hyperlipidemia Family    • Hypertension Family        Meds/Allergies       Current Outpatient Medications:   •  acetaminophen (TYLENOL) 500 mg tablet  •  amLODIPine (NORVASC) 2 5 mg tablet  •  aspirin (ECOTRIN LOW STRENGTH) 81 mg EC tablet  •  atorvastatin (LIPITOR) 40 mg tablet  •  Cholecalciferol (VITAMIN D PO)  •  dicyclomine (Bentyl) 20 mg tablet  •  famotidine (PEPCID) 40 MG tablet  •  fexofenadine (ALLEGRA) 180 MG tablet  •  FLUoxetine (PROzac) 10 mg capsule  •  fluticasone (FLONASE) 50 mcg/act nasal spray  •  lithium carbonate (LITHOBID) 300 mg CR tablet  •  metoprolol succinate (TOPROL-XL) 25 mg 24 hr tablet  •  Mirabegron ER 25 MG TB24  •  mupirocin (BACTROBAN) 2 % ointment  •  nitroglycerin (NITRODUR) 0 2 mg/hr  •  omega-3-acid ethyl esters (LOVAZA) 1 g capsule  •  omeprazole (PriLOSEC) 20 mg delayed release capsule  •  ProAir  (90 Base) MCG/ACT inhaler  •  Trelegy Ellipta 200-62 5-25 MCG/INH AEPB inhaler    Allergies   Allergen Reactions   • Bactrim [Sulfamethoxazole-Trimethoprim] Other (See Comments)     AILYN   • Nsaids      Annotation - [id number]: unable to take due to use of lithium  • Oxycodone Rash           Objective     Blood pressure 126/70, pulse (!) 48, height 5' 7" (1 702 m), weight 79 5 kg (175 lb 3 2 oz), SpO2 98 %  Body mass index is 27 44 kg/m²  PHYSICAL EXAM:      General Appearance:   Alert, cooperative, no distress   HEENT:   Normocephalic, atraumatic, anicteric      Neck:  Supple, symmetrical, trachea midline   Lungs:   Clear to auscultation bilaterally; no rales, rhonchi or wheezing; respirations unlabored    Heart[de-identified]   Regular rate and rhythm; no murmur, rub, or gallop  Abdomen:   Soft, non-tender, non-distended; normal bowel sounds; no masses, no organomegaly    Genitalia:   Deferred    Rectal:   Deferred    Extremities:  No cyanosis, clubbing or edema        Skin:  No jaundice, rashes, or lesions          Lab Results:   No visits with results within 1 Day(s) from this visit     Latest known visit with results is:   Appointment on 10/06/2022   Component Date Value   • WBC 10/06/2022 9 04    • RBC 10/06/2022 4 30    • Hemoglobin 10/06/2022 12 9    • Hematocrit 10/06/2022 40 8    • MCV 10/06/2022 95    • MCH 10/06/2022 30 0    • MCHC 10/06/2022 31 6    • RDW 10/06/2022 12 8    • MPV 10/06/2022 10 7    • Platelets 86/25/3332 188    • nRBC 10/06/2022 0    • Neutrophils Relative 10/06/2022 73    • Immat GRANS % 10/06/2022 0    • Lymphocytes Relative 10/06/2022 12 (A)   • Monocytes Relative 10/06/2022 9    • Eosinophils Relative 10/06/2022 5    • Basophils Relative 10/06/2022 1    • Neutrophils Absolute 10/06/2022 6 54    • Immature Grans Absolute 10/06/2022 0 04    • Lymphocytes Absolute 10/06/2022 1 11    • Monocytes Absolute 10/06/2022 0 81    • Eosinophils Absolute 10/06/2022 0 49    • Basophils Absolute 10/06/2022 0 05    • Total CK 10/06/2022 151    • Cholesterol 10/06/2022 122    • Triglycerides 10/06/2022 115    • HDL, Direct 10/06/2022 35 (A)   • LDL Calculated 10/06/2022 64    • Non-HDL-Chol (CHOL-HDL) 10/06/2022 87    • Sodium 10/06/2022 137    • Potassium 10/06/2022 5 2    • Chloride 10/06/2022 110 (A)   • CO2 10/06/2022 25    • ANION GAP 10/06/2022 2 (A)   • BUN 10/06/2022 29 (A)   • Creatinine 10/06/2022 1 56 (A)   • Glucose, Fasting 10/06/2022 99    • Calcium 10/06/2022 9 7    • AST 10/06/2022 21    • ALT 10/06/2022 32    • Alkaline Phosphatase 10/06/2022 167 (A)   • Total Protein 10/06/2022 6 8    • Albumin 10/06/2022 3 8    • Total Bilirubin 10/06/2022 0 75    • eGFR 10/06/2022 43    • Lithium Lvl 10/06/2022 0 9    • TSH 3RD GENERATON 10/06/2022 1 720          Radiology Results:   XR spine lumbar minimum 4 views non injury    Result Date: 10/20/2022  Narrative: LUMBAR SPINE INDICATION:   M48 061: Spinal stenosis, lumbar region without neurogenic claudication M51 36: Other intervertebral disc degeneration, lumbar region  COMPARISON:  L-spine x-ray 3/10/2017 VIEWS:  XR SPINE LUMBAR MINIMUM 4 VIEWS NON INJURY Images: 4 FINDINGS: There are 5 non rib bearing lumbar vertebral bodies  There is no evidence of acute fracture or destructive osseous lesion  Stable posterior spinal fusion hardware at L4-S1 with interbody spacers   Mild scoliotic deformity is noted  Stable grade 1 anterolisthesis of L4 on L5  Intervertebral disc space narrowing at L1-2, L2-3, L3-4 with endplate osteophytes present  Endovascular stent is present  Impression: Stable posterior spinal fusion at L4-S1  Multilevel degenerative changes of lumbar spine  Workstation performed: [id number]     XR hip/pelv 2-3 vws left if performed    Result Date: 10/20/2022  Narrative: LEFT HIP INDICATION:   M16 12: Unilateral primary osteoarthritis, left hip  COMPARISON:  None VIEWS:  XR HIP/PELV 2-3 VWS LEFT  W PELVIS IF PERFORMED Images: 3 FINDINGS: There is no acute fracture or dislocation  Mild left hip osteoarthritis is seen  No lytic or blastic osseous lesion  Atherosclerotic calcifications  Lumbar spine fusion at L4-S1  Impression: Mild degenerative changes of left hip  Workstation performed: [id number]     Holter monitor    Result Date: 10/17/2022  Narrative: INDICATIONS: PVCs DESCRIPTION OF FINDINGS: The patient was monitored for a total of 24 hours  The patient was predominantly in sinus bradycardia throughout the study  The average heart rate was 54 beats per minute  The heart rate ranged from a low of 36 beats per minute to a maximum of 80 beats per minute  Ventricular ectopic activity consisted of 47 beats (0 1% of total beats), of which 39 were single PVCs, 8 were interpolated PVCs, 0 were ventricular couplets, 0 were in bigeminy and 0 were in trigeminy  There was no sustained or nonsustained ventricular tachycardia  Supraventricular ectopic activity consisted of 76 beats (0 1% of total beats), of which 67 were single PACs, 1 were late beats, 8 were atrial couplets, 0 were in bigeminy and 0 were in trigeminy  There were 2 dropped beats  There were 0 atrial runs  There was no supraventricular tachycardia identified  There was no evidence of atrial fibrillation or atrial flutter  There were no significant pauses  The longest R-R interval was 2 1 seconds  There was no evidence of advanced degree heart block  The accompanying patient diary notes no symptoms  Impression: Predominantly sinus bradycardia, with an average heart rate of 54 beats per minute   Patient reached a maximum HR of 80 bpm, which is 55% of MPHR  67 (<0 1%) premature atrial contractions 39 (<0 1%) premature ventricular contractions No significant pauses or advanced degree heart block No evidence of atrial fibrillation or atrial flutter

## 2022-10-31 ENCOUNTER — APPOINTMENT (OUTPATIENT)
Dept: LAB | Facility: CLINIC | Age: 77
End: 2022-10-31

## 2022-10-31 LAB
ANION GAP SERPL CALCULATED.3IONS-SCNC: 2 MMOL/L (ref 4–13)
BUN SERPL-MCNC: 33 MG/DL (ref 5–25)
CALCIUM SERPL-MCNC: 10.3 MG/DL (ref 8.3–10.1)
CHLORIDE SERPL-SCNC: 109 MMOL/L (ref 96–108)
CO2 SERPL-SCNC: 27 MMOL/L (ref 21–32)
CREAT SERPL-MCNC: 1.48 MG/DL (ref 0.6–1.3)
CREAT UR-MCNC: 57.7 MG/DL
GFR SERPL CREATININE-BSD FRML MDRD: 45 ML/MIN/1.73SQ M
GLUCOSE P FAST SERPL-MCNC: 89 MG/DL (ref 65–99)
POTASSIUM SERPL-SCNC: 5.3 MMOL/L (ref 3.5–5.3)
PROT UR-MCNC: 108 MG/DL
PROT/CREAT UR: 1.87 MG/G{CREAT} (ref 0–0.1)
SODIUM SERPL-SCNC: 138 MMOL/L (ref 135–147)

## 2022-11-10 ENCOUNTER — OFFICE VISIT (OUTPATIENT)
Dept: PULMONOLOGY | Facility: CLINIC | Age: 77
End: 2022-11-10

## 2022-11-10 VITALS
WEIGHT: 177 LBS | TEMPERATURE: 96 F | BODY MASS INDEX: 27.78 KG/M2 | SYSTOLIC BLOOD PRESSURE: 130 MMHG | HEART RATE: 94 BPM | OXYGEN SATURATION: 97 % | DIASTOLIC BLOOD PRESSURE: 66 MMHG | HEIGHT: 67 IN

## 2022-11-10 DIAGNOSIS — K21.9 GASTROESOPHAGEAL REFLUX DISEASE WITHOUT ESOPHAGITIS: ICD-10-CM

## 2022-11-10 DIAGNOSIS — J44.9 CHRONIC OBSTRUCTIVE PULMONARY DISEASE, UNSPECIFIED COPD TYPE (HCC): Primary | ICD-10-CM

## 2022-11-10 DIAGNOSIS — Z85.118 HISTORY OF LUNG CANCER: ICD-10-CM

## 2022-11-10 DIAGNOSIS — J30.9 ALLERGIC RHINITIS, UNSPECIFIED SEASONALITY, UNSPECIFIED TRIGGER: ICD-10-CM

## 2022-11-10 NOTE — PROGRESS NOTES
Office Progress Note - Pulmonary    Evangelista Vegas 78 y o  male MRN: [id number]    Encounter: [id number]      Assessment:  • Chronic obstructive pulmonary disease  • Allergic rhinitis  • Dyspnea on exertion  • Gastroesophageal reflux disease  • History of lung cancer  Plan:   • Trelegy 1 inhalation once a day  • Albuterol 2 inhalations 4 times a day as needed  • Fexofenadine 180 mg once a day  • Fluticasone nasal spray 2 sprays to each nostril once a day  • Regular walks to improve stamina  • Follow-up in 6 months  Discussion:   The patient's COPD is in remission  I have maintained him on the Trelegy Ellipta 200, 1 inhalation once a day  He will use the albuterol rescue inhaler 2 inhalations 4 times a day as needed  His allergic rhinitis is well controlled  He is on the fluticasone nasal spray 2 sprays to each nostril once a day and fexofenadine 180 mg once a day  Have encouraged him to continue his regular walks to improve his stamina  He does not need prescription renewal today  He already has received the influenza vaccine for this season  I will see him in 6 months in a follow-up visit  Subjective: The patient is here for a follow-up visit  Has dyspnea on exertion which is chronic and has not changed  He denies any significant cough, wheezing or sputum production  Denies chest pain  He is on Trelegy once a day  He uses his albuterol rescue inhaler as needed  He has been using the albuterol before his regular walks  He has no nocturnal symptoms  Review of systems:  A 12 point system review is done and aside from what is stated above the rest of the review of systems is negative  Family history and social history are reviewed  Medications list is reviewed  Vitals: Blood pressure 130/66, pulse 94, temperature (!) 96 °F (35 6 °C), temperature source Tympanic, height 5' 7" (1 702 m), weight 80 3 kg (177 lb), SpO2 97 %  ,     Physical Exam  Gen: Awake, alert, oriented x 3, no acute distress  HEENT: Mucous membranes moist, no oral lesions, no thrush  NECK: No accessory muscle use, JVP not elevated  Cardiac: Regular, single S1, single S2, no murmurs, no rubs, no gallops  Lungs:  Decreased breath sounds  No wheezing or rhonchi  Abdomen: normoactive bowel sounds, soft nontender, nondistended, no rebound or rigidity, no guarding  Extremities: no cyanosis, no clubbing, no edema  Neuro:  Grossly nonfocal   Skin:  No rash      Lab Results   Component Value Date    WBC 9 04 10/06/2022    HGB 12 9 10/06/2022    HCT 40 8 10/06/2022    MCV 95 10/06/2022     10/06/2022     Lab Results   Component Value Date    SODIUM 138 10/31/2022    K 5 3 10/31/2022     (H) 10/31/2022    CO2 27 10/31/2022    BUN 33 (H) 10/31/2022    CREATININE 1 48 (H) 10/31/2022    GLUC 100 04/02/2021    CALCIUM 10 3 (H) 10/31/2022

## 2022-11-16 ENCOUNTER — OFFICE VISIT (OUTPATIENT)
Dept: NEUROLOGY | Facility: CLINIC | Age: 77
End: 2022-11-16

## 2022-11-16 ENCOUNTER — TELEPHONE (OUTPATIENT)
Dept: NEPHROLOGY | Facility: CLINIC | Age: 77
End: 2022-11-16

## 2022-11-16 VITALS
BODY MASS INDEX: 27.94 KG/M2 | HEIGHT: 67 IN | SYSTOLIC BLOOD PRESSURE: 138 MMHG | DIASTOLIC BLOOD PRESSURE: 60 MMHG | HEART RATE: 52 BPM | WEIGHT: 178 LBS | TEMPERATURE: 97.9 F

## 2022-11-16 DIAGNOSIS — G20 PARKINSONISM, UNSPECIFIED PARKINSONISM TYPE (HCC): Primary | ICD-10-CM

## 2022-11-16 NOTE — TELEPHONE ENCOUNTER
Appointment Confirmation   Person confirmed appointment with  If not patient, name of the person Answering Machine    Date and time of appointment 11/17/22  12:00    Patient acknowledged and will be at appointment? no    Did you advise the patient that they will need a urine sample if they are a new patient?  N/A    Did you advise the patient to bring their current medications for verification? (including any OTC) Yes    Additional Information

## 2022-11-16 NOTE — PROGRESS NOTES
Patient ID: Mery Lafleur is a 78 y o  male  Assessment/Plan:    Parkinsonism [facility])  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  He continues to have mild parkinsonism on exam however LE slowness and gait issues remain the more predominant feature  This along with his MRI findings suggest perhaps an underlying vascular parkinsonism  He does have some slightly worse left sided bradykinesia on exam today compared to the prior visit however he does not notice any clear change  He also remains on Lithium which could be contributing to some of his parkinsonian symptoms including his hand tremors  Tremors are overall stable and no bothersome per the patient  Discussed the options at this time including continuing to monitor for progression of symptoms and worsening of function or start a trial of low dose Sinemet to see if this would provide any benefit  For now he feels that he is still functioning well and would prefer to hold off on starting any new medication  Will have him watch for any changes or worsening of symptoms including worsening gait and mobility  Subjective:    Edith Qiu is a right handed man with bipolar disorder on Lithium, atrial fibrillation, cervical and lumbar radiculopathy and left trochanteric bursitis who presents for follow up for gait difficulty with the question of parkinsonism (neuroleptic induced versus vascular )      Review of chart reveals a longstanding gait dysfunction felt to be multifactorial(cervical and lumbar degenerative disease, prior myelopathy and possible peripheral neuropathy and parkinsonism)  Also noted to have right hand rest tremor   Patient is on lithium for his bipolar disorder   He initially did not have any typical non motor symptoms we would expect to accompany idiopathic Parkinson's disease   It was felt best to monitor for progression with time given his polypharmacy  Brain MRI with progression of his small vessel disease felt likely to be the cause of his gait issues secondary to an underlying vascular parkinsonism  At his last visit he continued to have parkinsonian symptoms however he felt that he was still functioning well so no medication was started  INTERVAL HISTORY:  He feels that things are overall stable other than some imbalance if he leans forward   He had one fall when he was trying to reach something up overhead   He uses the cane when out of the house, does not need the cane in the house   He does like to walk   He can still perform all of his ADLs on his own, no change   He does have reflux which makes it hard for him to eat and drink at times, this is actually better than it was, no worse, he follows with GI  No hallucinations   He is sleeping better, no yelling or thrashing in his sleep   No changes with Lithium dose for the past few years      Imaging:  Brain MRI  -Chronic lacunar infarct left centrum semiovale   Progressive cerebral volume loss and progressive white matter foci in periventricular regions compatible with chronic microangiopathic disease  I personally reviewed and updated the ROS  Objective:    Blood pressure 138/60, pulse (!) 52, temperature 97 9 °F (36 6 °C), temperature source Temporal, height 5' 7" (1 702 m), weight 80 7 kg (178 lb)  Physical Exam  Constitutional:       General: He is awake  Appearance: Normal appearance  HENT:      Right Ear: Hearing normal       Left Ear: Hearing normal    Eyes:      General: Lids are normal       Extraocular Movements: Extraocular movements intact  Pupils: Pupils are equal, round, and reactive to light  Pulmonary:      Effort: Pulmonary effort is normal    Neurological:      Mental Status: He is alert        Motor: Motor strength is normal    Psychiatric:         Speech: Speech normal          Neurological Exam  Mental Status  Awake and alert  Oriented only to person and place  Speech is normal     Cranial Nerves  CN III, IV, VI: Extraocular movements intact bilaterally  Normal lids and orbits bilaterally  Pupils equal round and reactive to light bilaterally  CN V:  Right: Facial sensation is normal   Left: Facial sensation is normal on the left  CN VIII:  Right: Hearing is normal   Left: Hearing is normal   CN XI: Shoulder shrug strength is normal     Motor   Strength is 5/5 throughout all four extremities       Sensory  Light touch is normal in upper and lower extremities  Coordination  Right: Finger-to-nose abnormality:Left: Finger-to-nose abnormality:    Gait    Walks with cane   Pronation at the ankle   Reduced step height bilaterally   Imbalance noted on turn                                    Date of exam:  11/16/22 5/16/22           Speech  1 1   Facial Expression   - 1   Rigidity - Neck    0   Rigidity - Upper Extremity (Right)  1 0   Rigidity - Upper Extremity (Left)   1 1   Rigidity - Lower Extremity (Right)  0 0   Rigidity - Lower Extremity (Left)   0 0   Finger Taps (Right)   1 1   Finger Taps (Left)   2 1   Hand  (Right)  1 0   Hand  (Left)   1 0   Pronation/Supination (Right)  1 2   Pronation/Supination (Left)   2 2   Heel Taps (Right) 1 1   Heel Taps(Left) 1 1   Arising from Chair   1 1   Gait   2 2   Postural Stability   1     Posture 3 cane 3 cane   Global spontaneity of movement 1 1   Postural Tremor (Right) 1 1   Postural Tremor (Left) 1 1   Kinetic Tremor (Right)  1 1   Kinetic Tremor (Left)  1 1   Rest tremor  RUE 0 0   Rest tremor  LUE 0 0   Rest tremor  RLE 0 0   Reset tremor  LLE 0 0   Lip/Jaw Tremor   - 0   Motor Exam Total:           ROS:    Review of Systems   Constitutional: Negative  Negative for appetite change and fever  HENT: Negative  Negative for hearing loss, tinnitus, trouble swallowing and voice change  Eyes: Negative  Negative for photophobia, pain and visual disturbance  Respiratory: Negative  Negative for shortness of breath  Cardiovascular: Negative  Negative for palpitations  Gastrointestinal: Negative  Negative for nausea and vomiting  Endocrine: Negative  Negative for cold intolerance  Genitourinary: Negative  Negative for dysuria, frequency and urgency  Musculoskeletal: Negative  Negative for gait problem, myalgias and neck pain  Skin: Negative  Negative for rash  Allergic/Immunologic: Negative  Neurological: Negative  Negative for dizziness, tremors, seizures, syncope, facial asymmetry, speech difficulty, weakness, light-headedness, numbness and headaches  Hematological: Negative  Does not bruise/bleed easily  Psychiatric/Behavioral: Negative  Negative for confusion, hallucinations and sleep disturbance

## 2022-11-16 NOTE — PATIENT INSTRUCTIONS
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  He continues to have mild parkinsonism on exam however LE slowness and gait issues remain the more predominant feature  This along with his MRI findings suggest perhaps an underlying vascular parkinsonism  He also remains on Lithium which could be contributing to some of his parkinsonian symptoms including his hand tremors  Discussed the options at this time including continuing to monitor for progression of symptoms and worsening of function or start a trial of low dose Sinemet to see if this would provide any benefit  For now he feels that he is still functioning well and would prefer to hold off on starting any new medication  Will have him watch for any changes or worsening of symptoms including worsening gait and mobility

## 2022-11-16 NOTE — ASSESSMENT & PLAN NOTE
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  He continues to have mild parkinsonism on exam however LE slowness and gait issues remain the more predominant feature  This along with his MRI findings suggest perhaps an underlying vascular parkinsonism  He does have some slightly worse left sided bradykinesia on exam today compared to the prior visit however he does not notice any clear change  He also remains on Lithium which could be contributing to some of his parkinsonian symptoms including his hand tremors  Tremors are overall stable and no bothersome per the patient  Discussed the options at this time including continuing to monitor for progression of symptoms and worsening of function or start a trial of low dose Sinemet to see if this would provide any benefit  For now he feels that he is still functioning well and would prefer to hold off on starting any new medication  Will have him watch for any changes or worsening of symptoms including worsening gait and mobility

## 2022-11-17 ENCOUNTER — OFFICE VISIT (OUTPATIENT)
Dept: NEPHROLOGY | Facility: CLINIC | Age: 77
End: 2022-11-17

## 2022-11-17 VITALS
WEIGHT: 178 LBS | HEIGHT: 67 IN | DIASTOLIC BLOOD PRESSURE: 62 MMHG | SYSTOLIC BLOOD PRESSURE: 118 MMHG | HEART RATE: 70 BPM | BODY MASS INDEX: 27.94 KG/M2

## 2022-11-17 DIAGNOSIS — R80.8 OTHER PROTEINURIA: Primary | ICD-10-CM

## 2022-11-17 DIAGNOSIS — N18.9 CHRONIC KIDNEY DISEASE, UNSPECIFIED CKD STAGE: ICD-10-CM

## 2022-11-17 NOTE — PROGRESS NOTES
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  I would continue this medication cause he so well compensated and functional on it that would be risky to change at this point  His creatinine is 1 4 has been stable at that for many years and protein estimation is actually lower than last time  Blood pressure is under great control on his current medical therapy  Overall the patient is doing great continue with blood pressure control and current medications and lipid treatment as well  The patient follows up with Urology and does empty his bladder completely in his urinary urgency is improved  Continue current medications  Labs and follow-up as scheduled    He was asked to call if there is any problems or concerns before next visit  SUBJECTIVE:  Review of Systems   Constitutional: Negative for chills, diaphoresis, fever and malaise/fatigue  HENT: Negative  Eyes: Negative  Cardiovascular: Negative for chest pain, dyspnea on exertion, leg swelling and orthopnea  Respiratory: Negative  Negative for cough, shortness of breath, sputum production and wheezing  Gastrointestinal: Negative  Negative for abdominal pain, diarrhea, nausea and vomiting  Genitourinary: Negative for dysuria, flank pain, hematuria and incomplete emptying  Neurological: Negative for dizziness, focal weakness, headaches and weakness  Psychiatric/Behavioral: Negative for altered mental status, depression, hallucinations and hypervigilance  OBJECTIVE:  Current Weight: Weight - Scale: 80 7 kg (178 lb)  [email or web address] com:     Blood pressure 118/62, pulse 70, height 5' 7" (1 702 m), weight 80 7 kg (178 lb)  , Body mass index is 27 88 kg/m²  [unfilled]    Physical Exam: /62 (BP Location: Right arm, Patient Position: Sitting, Cuff Size: Standard)   Pulse 70   Ht 5' 7" (1 702 m)   Wt 80 7 kg (178 lb)   BMI 27 88 kg/m²   Physical Exam  Constitutional:       General: He is not in acute distress  Appearance: He is not toxic-appearing or diaphoretic  HENT:      Head: Normocephalic and atraumatic  Nose:      Comments: Wearing mask  Mouth/Throat:      Comments: Wearing mask  Eyes:      General: No scleral icterus  Extraocular Movements: Extraocular movements intact  Cardiovascular:      Rate and Rhythm: Normal rate and regular rhythm  Heart sounds: No friction rub  No gallop  Comments: No significant edema  Pulmonary:      Effort: Pulmonary effort is normal  No respiratory distress  Breath sounds: No wheezing, rhonchi or rales  Abdominal:      General: Bowel sounds are normal  There is no distension  Palpations: Abdomen is soft  Tenderness: There is no abdominal tenderness  There is no rebound  Musculoskeletal:      Cervical back: Normal range of motion and neck supple  Neurological:      General: No focal deficit present  Mental Status: He is alert and oriented to person, place, and time  Mental status is at baseline  Psychiatric:         Mood and Affect: Mood normal          Behavior: Behavior normal          Thought Content:  Thought content normal          Judgment: Judgment normal          Medications:    Current Outpatient Medications:   •  acetaminophen (TYLENOL) 500 mg tablet, Take 500 mg by mouth as needed for mild pain , Disp: , Rfl:   •  amLODIPine (NORVASC) 2 5 mg tablet, TAKE 1 TABLET BY MOUTH  DAILY, Disp: 90 tablet, Rfl: 3  •  aspirin (ECOTRIN LOW STRENGTH) 81 mg EC tablet, Take 81 mg by mouth daily, Disp: , Rfl:   •  atorvastatin (LIPITOR) 40 mg tablet, TAKE 1 TABLET BY MOUTH  DAILY, Disp: 90 tablet, Rfl: 5  •  Cholecalciferol (VITAMIN D PO), Take 2,000 Units by mouth daily  , Disp: , Rfl:   •  dicyclomine (Bentyl) 20 mg tablet, Take 1 tablet (20 mg total) by mouth every 6 (six) hours as needed (abd cramping), Disp: 90 tablet, Rfl: 3  •  famotidine (PEPCID) 40 MG tablet, Take 1 tablet (40 mg total) by mouth daily at bedtime as needed for heartburn (Patient taking differently: Take 20 mg by mouth daily at bedtime as needed for heartburn), Disp: 30 tablet, Rfl: 3  •  fexofenadine (ALLEGRA) 180 MG tablet, Take 180 mg by mouth as needed Daily during the Summer, Disp: , Rfl:   •  FLUoxetine (PROzac) 10 mg capsule, Take 10 mg by mouth daily , Disp: , Rfl:   •  fluticasone (FLONASE) 50 mcg/act nasal spray, 2 sprays into each nostril daily, Disp: , Rfl:   •  lithium carbonate (LITHOBID) 300 mg CR tablet, Take 300 mg by mouth in the morning, Disp: , Rfl:   •  metoprolol succinate (TOPROL-XL) 25 mg 24 hr tablet, TAKE 1 TABLET BY MOUTH  DAILY, Disp: 90 tablet, Rfl: 3  •  Mirabegron ER 25 MG TB24, Take 25 mg by mouth daily at bedtime, Disp: 30 tablet, Rfl: 10  •  mupirocin (BACTROBAN) 2 % ointment, Apply topically 2 (two) times a day as needed (wound), Disp: 22 g, Rfl: 0  •  nitroglycerin (NITRODUR) 0 2 mg/hr, APPLY 1 PATCH TOPICALLY  ONCE DAILY   LEAVE ON FOR 12 TO 14 HOURS THEN REMOVE FOR A NITRATE-FREE INTERVAL OF  10 TO 12 HOURS, Disp: 90 patch, Rfl: 3  •  omega-3-acid ethyl esters (LOVAZA) 1 g capsule, TAKE 1 CAPSULE BY MOUTH 3  TIMES DAILY, Disp: 270 capsule, Rfl: 3  •  omeprazole (PriLOSEC) 20 mg delayed release capsule, Take 1 capsule (20 mg total) by mouth daily, Disp: 90 capsule, Rfl: 3  •  ProAir  (90 Base) MCG/ACT inhaler, USE 2 INHALATIONS BY MOUTH  EVERY 6 HOURS AS NEEDED FOR WHEEZING, Disp: 34 g, Rfl: 3  •  Trelegy Ellipta 200-62 5-25 MCG/INH AEPB inhaler, USE 1 INHALATION BY MOUTH  DAILY    RINSE MOUTH AFTER  USE, Disp: 180 each, Rfl: 3    Laboratory Results:  Lab Results   Component Value Date    WBC 9 04 10/06/2022    HGB 12 9 10/06/2022    HCT 40 8 10/06/2022    MCV 95 10/06/2022     10/06/2022     Lab Results   Component Value Date    SODIUM 138 10/31/2022    K 5 3 10/31/2022     (H) 10/31/2022    CO2 27 10/31/2022    BUN 33 (H) 10/31/2022    CREATININE 1 48 (H) 10/31/2022    GLUC 100 04/02/2021    CALCIUM 10 3 (H) 10/31/2022     Lab Results   Component Value Date    CALCIUM 10 3 (H) 10/31/2022    PHOS 2 8 11/05/2014     No results found for: LABPROT

## 2022-11-17 NOTE — PATIENT INSTRUCTIONS
You are here for follow-up I am glad her health has been doing well and you look great  Her blood pressure is excellent as well  I am glad to hear that your urinating less frequently although it is not as good as you would like it is improved  With respect to the kidney function creatinine is 1 4 the amount of protein in your urine is actually lower so that is good  Potentially is from lithium but for now I would not make any changes in the medications creatinine stable and as you know you told me that you been hearing it has been stable for years even prior to meeting me so there has been no progression  Labs and follow-up as scheduled please call if you have any questions or concerns before next visit

## 2022-11-17 NOTE — LETTER
November 17, 2022     [address] [address]    Patient: Sunny Matthews   YOB: 1945   Date of Visit: 11/17/2022       Dear Dr Celine Montiel: Thank you for referring Christ Donis to me for evaluation  Below are my notes for this consultation  If you have questions, please do not hesitate to call me  I look forward to following your patient along with you  Sincerely,        Lm Huerta MD        CC: No Recipients  Lm Huerta MD  11/17/2022  1:04 PM  Sign when Signing Visit  NEPHROLOGY PROGRESS NOTE    Sunny Matthews 78 y o  male MRN: [id number]  Unit/Bed#:  Encounter: [id number]  Reason for Consult:  Chronic kidney disease and proteinuria    Patient is here for follow-up states that his urine frequency is improved since he started the medication although it is not completely resolved but is improved  He has no complaints for me today was in good spirits  No changes in medications and no hospitalizations  ASSESSMENT/PLAN:  1  Renal    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 so potentially may 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  I would continue this medication cause he so well compensated and functional on it that would be risky to change at this point  His creatinine is 1 4 has been stable at that for many years and protein estimation is actually lower than last time  Blood pressure is under great control on his current medical therapy  Overall the patient is doing great continue with blood pressure control and current medications and lipid treatment as well  The patient follows up with Urology and does empty his bladder completely in his urinary urgency is improved  Continue current medications  Labs and follow-up as scheduled    He was asked to call if there is any problems or concerns before next visit      SUBJECTIVE:  Review of Systems   Constitutional: Negative for chills, diaphoresis, fever and malaise/fatigue  HENT: Negative  Eyes: Negative  Cardiovascular: Negative for chest pain, dyspnea on exertion, leg swelling and orthopnea  Respiratory: Negative  Negative for cough, shortness of breath, sputum production and wheezing  Gastrointestinal: Negative  Negative for abdominal pain, diarrhea, nausea and vomiting  Genitourinary: Negative for dysuria, flank pain, hematuria and incomplete emptying  Neurological: Negative for dizziness, focal weakness, headaches and weakness  Psychiatric/Behavioral: Negative for altered mental status, depression, hallucinations and hypervigilance  OBJECTIVE:  Current Weight: Weight - Scale: 80 7 kg (178 lb)  [email or web address] com:     Blood pressure 118/62, pulse 70, height 5' 7" (1 702 m), weight 80 7 kg (178 lb)  , Body mass index is 27 88 kg/m²  @[email or web address]@    Physical Exam: /62 (BP Location: Right arm, Patient Position: Sitting, Cuff Size: Standard)   Pulse 70   Ht 5' 7" (1 702 m)   Wt 80 7 kg (178 lb)   BMI 27 88 kg/m²   Physical Exam  Constitutional:       General: He is not in acute distress  Appearance: He is not toxic-appearing or diaphoretic  HENT:      Head: Normocephalic and atraumatic  Nose:      Comments: Wearing mask  Mouth/Throat:      Comments: Wearing mask  Eyes:      General: No scleral icterus  Extraocular Movements: Extraocular movements intact  Cardiovascular:      Rate and Rhythm: Normal rate and regular rhythm  Heart sounds: No friction rub  No gallop  Comments: No significant edema  Pulmonary:      Effort: Pulmonary effort is normal  No respiratory distress  Breath sounds: No wheezing, rhonchi or rales  Abdominal:      General: Bowel sounds are normal  There is no distension  Palpations: Abdomen is soft  Tenderness: There is no abdominal tenderness  There is no rebound     Musculoskeletal:      Cervical back: Normal range of motion and neck supple  Neurological:      General: No focal deficit present  Mental Status: He is alert and oriented to person, place, and time  Mental status is at baseline  Psychiatric:         Mood and Affect: Mood normal          Behavior: Behavior normal          Thought Content:  Thought content normal          Judgment: Judgment normal          Medications:    Current Outpatient Medications:   •  acetaminophen (TYLENOL) 500 mg tablet, Take 500 mg by mouth as needed for mild pain , Disp: , Rfl:   •  amLODIPine (NORVASC) 2 5 mg tablet, TAKE 1 TABLET BY MOUTH  DAILY, Disp: 90 tablet, Rfl: 3  •  aspirin (ECOTRIN LOW STRENGTH) 81 mg EC tablet, Take 81 mg by mouth daily, Disp: , Rfl:   •  atorvastatin (LIPITOR) 40 mg tablet, TAKE 1 TABLET BY MOUTH  DAILY, Disp: 90 tablet, Rfl: 5  •  Cholecalciferol (VITAMIN D PO), Take 2,000 Units by mouth daily  , Disp: , Rfl:   •  dicyclomine (Bentyl) 20 mg tablet, Take 1 tablet (20 mg total) by mouth every 6 (six) hours as needed (abd cramping), Disp: 90 tablet, Rfl: 3  •  famotidine (PEPCID) 40 MG tablet, Take 1 tablet (40 mg total) by mouth daily at bedtime as needed for heartburn (Patient taking differently: Take 20 mg by mouth daily at bedtime as needed for heartburn), Disp: 30 tablet, Rfl: 3  •  fexofenadine (ALLEGRA) 180 MG tablet, Take 180 mg by mouth as needed Daily during the Summer, Disp: , Rfl:   •  FLUoxetine (PROzac) 10 mg capsule, Take 10 mg by mouth daily , Disp: , Rfl:   •  fluticasone (FLONASE) 50 mcg/act nasal spray, 2 sprays into each nostril daily, Disp: , Rfl:   •  lithium carbonate (LITHOBID) 300 mg CR tablet, Take 300 mg by mouth in the morning, Disp: , Rfl:   •  metoprolol succinate (TOPROL-XL) 25 mg 24 hr tablet, TAKE 1 TABLET BY MOUTH  DAILY, Disp: 90 tablet, Rfl: 3  •  Mirabegron ER 25 MG TB24, Take 25 mg by mouth daily at bedtime, Disp: 30 tablet, Rfl: 10  •  mupirocin (BACTROBAN) 2 % ointment, Apply topically 2 (two) times a day as needed (wound), Disp: 22 g, Rfl: 0  •  nitroglycerin (NITRODUR) 0 2 mg/hr, APPLY 1 PATCH TOPICALLY  ONCE DAILY  LEAVE ON FOR 12 TO 14 HOURS THEN REMOVE FOR A NITRATE-FREE INTERVAL OF  10 TO 12 HOURS, Disp: 90 patch, Rfl: 3  •  omega-3-acid ethyl esters (LOVAZA) 1 g capsule, TAKE 1 CAPSULE BY MOUTH 3  TIMES DAILY, Disp: 270 capsule, Rfl: 3  •  omeprazole (PriLOSEC) 20 mg delayed release capsule, Take 1 capsule (20 mg total) by mouth daily, Disp: 90 capsule, Rfl: 3  •  ProAir  (90 Base) MCG/ACT inhaler, USE 2 INHALATIONS BY MOUTH  EVERY 6 HOURS AS NEEDED FOR WHEEZING, Disp: 34 g, Rfl: 3  •  Trelegy Ellipta 200-62 5-25 MCG/INH AEPB inhaler, USE 1 INHALATION BY MOUTH  DAILY    RINSE MOUTH AFTER  USE, Disp: 180 each, Rfl: 3    Laboratory Results:  Lab Results   Component Value Date    WBC 9 04 10/06/2022    HGB 12 9 10/06/2022    HCT 40 8 10/06/2022    MCV 95 10/06/2022     10/06/2022     Lab Results   Component Value Date    SODIUM 138 10/31/2022    K 5 3 10/31/2022     (H) 10/31/2022    CO2 27 10/31/2022    BUN 33 (H) 10/31/2022    CREATININE 1 48 (H) 10/31/2022    GLUC 100 04/02/2021    CALCIUM 10 3 (H) 10/31/2022     Lab Results   Component Value Date    CALCIUM 10 3 (H) 10/31/2022    PHOS 2 8 11/05/2014     No results found for: LABPROT

## 2022-11-29 ENCOUNTER — OFFICE VISIT (OUTPATIENT)
Dept: OBGYN CLINIC | Facility: HOSPITAL | Age: 77
End: 2022-11-29

## 2022-11-29 VITALS
BODY MASS INDEX: 27.31 KG/M2 | DIASTOLIC BLOOD PRESSURE: 77 MMHG | SYSTOLIC BLOOD PRESSURE: 148 MMHG | HEART RATE: 61 BPM | HEIGHT: 67 IN | WEIGHT: 174 LBS

## 2022-11-29 DIAGNOSIS — M70.62 TROCHANTERIC BURSITIS OF LEFT HIP: Primary | ICD-10-CM

## 2022-11-29 RX ORDER — BETAMETHASONE SODIUM PHOSPHATE AND BETAMETHASONE ACETATE 3; 3 MG/ML; MG/ML
12 INJECTION, SUSPENSION INTRA-ARTICULAR; INTRALESIONAL; INTRAMUSCULAR; SOFT TISSUE
Status: COMPLETED | OUTPATIENT
Start: 2022-11-29 | End: 2022-11-29

## 2022-11-29 RX ORDER — LIDOCAINE HYDROCHLORIDE 10 MG/ML
2 INJECTION, SOLUTION INFILTRATION; PERINEURAL
Status: COMPLETED | OUTPATIENT
Start: 2022-11-29 | End: 2022-11-29

## 2022-11-29 RX ORDER — BUPIVACAINE HYDROCHLORIDE 2.5 MG/ML
2 INJECTION, SOLUTION INFILTRATION; PERINEURAL
Status: COMPLETED | OUTPATIENT
Start: 2022-11-29 | End: 2022-11-29

## 2022-11-29 RX ADMIN — BETAMETHASONE SODIUM PHOSPHATE AND BETAMETHASONE ACETATE 12 MG: 3; 3 INJECTION, SUSPENSION INTRA-ARTICULAR; INTRALESIONAL; INTRAMUSCULAR; SOFT TISSUE at 10:25

## 2022-11-29 RX ADMIN — BUPIVACAINE HYDROCHLORIDE 2 ML: 2.5 INJECTION, SOLUTION INFILTRATION; PERINEURAL at 10:25

## 2022-11-29 RX ADMIN — LIDOCAINE HYDROCHLORIDE 2 ML: 10 INJECTION, SOLUTION INFILTRATION; PERINEURAL at 10:25

## 2022-11-29 NOTE — PROGRESS NOTES
Assessment:  1  Trochanteric bursitis of left hip            Plan:  Left trochanteric bursitis  The patient was provided with left trochanteric bursa steroid injection  The patient tolerated the procedure well  The patient should follow up in 3 months  To do next visit:  Return in about 3 months (around 2/28/2023)  The above stated was discussed in layman's terms and the patient expressed understanding  All questions were answered to the patient's satisfaction  Scribe Attestation    I,:  Raymond Garcia am acting as a scribe while in the presence of the attending physician :       I,:  Rigoberto Monzon MD personally performed the services described in this documentation    as scribed in my presence :             Subjective:   Carmen Frank is a 78 y o  male who presents for follow up of left hip  He is s/p left trochanteric bursa steroid injection with 6 weeks of benefit, 8/23/2022  Today he complains of left lateral hip pain that can extend over the lateral left thigh        Review of systems negative unless otherwise specified in HPI    Past Medical History:   Diagnosis Date   • Aortic aneurysm [facility])    • Benign colon polyp    • Bipolar 1 disorder ([facility] [address] )    • Cardiac disease     MI   • Cervical cord compression with myelopathy ([facility])    • COPD (chronic obstructive pulmonary disease) ([facility])    • Diverticulitis    • Diverticulosis    • Gait disturbance     uses cane, leg brace on right   • GERD (gastroesophageal reflux disease)    • Heart attack ([facility] [address] ) 1996   • Hx of resection of large bowel 5/3/2016   • Hyperlipidemia    • Hypertension    • IBS (irritable bowel syndrome)    • Lumbar stenosis    • Lung cancer ([facility] [address] )     2007 Left lower lobectomy and 2011 Right lung with surgery    • Myocardial infarction [facility])     involving other coronary artery   • Prostate cancer ([facility] [address] )    • Shortness of breath    • Small bowel obstruction ([facility] [address] ) 11/16/2016       Past Surgical History:   Procedure Laterality Date   • ANGIOPLASTY      stent   • CARDIAC SURGERY     • CERVICAL SPINE SURGERY      Cervical decompression with cervical fusion from C3-C7 for spinal stenosis  • COLON SURGERY  11/04/2014   • ESOPHAGOGASTRODUODENOSCOPY  01/03/2013    with possible Schatzki's ring & small hiatal hernia, mild gastritis   • FL INJECTION LEFT HIP (NON ARTHROGRAM)  12/11/2018   • FL INJECTION LEFT HIP (NON ARTHROGRAM)  5/9/2019   • IR BIOPSY LUNG  7/6/2020   • IR EVAR  8/6/2018   • LUNG CANCER SURGERY Right 03/2011    wedge resection for lung tumor, 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   • LUNG LOBECTOMY Left    • ME ARTHRODESIS ANT INTERBODY MIN DISCECTOMY, CERVICAL BELOW C2 N/A 5/2/2016    Procedure: Anterior cervical diskectomy C3/4, C5/6, C6/7 with anterior plate fixation fusion C3-7;  Posterior decompressive laminectomy C3-7 with lateral mass fixation fusion C3-7 (IMPULSE MONITORING); Surgeon: Dee Dee Haines MD;  [facility];  Service: Neurosurgery   • ME ARTHRODESIS POSTERIOR INTERBODY LUMBAR N/A 1/30/2017    Procedure: L4-5 AND L5-S1 DECOMPRESSIVE FORAMINOTOMIES, TRANSFORAMINAL LUMBAR INTERBODY AND PEDICLE SCREW FIXATION FUSION L4-S1 (IMPULSE);   Surgeon: Dee Dee Haines MD;  Location[facility];  Service: Neurosurgery   • ME EVASC RPR DPLMNT AORTO-AORTIC NDGFT N/A 8/6/2018    Procedure: REPAIR ANEURYSM ENDOVASCULAR ABDOMINAL AORTIC  (EVAR) WITH BILATERAL PERCUTANEOUS FEMORAL ACCESS WITH ULTRASOUND GUIDANCE ON THE RIGHT AND PRE CLOSURE;  Surgeon: Sandhya Kingsley MD;  Location: [facility];  Service: Vascular   • PROSTATECTOMY  2007    for prostate CA - no chemo/RT   • SIGMOIDECTOMY      for divertic   • SMALL INTESTINE SURGERY N/A 11/17/2016    Procedure: Exploratory Laparotomy, Lysis of adhesions to release small bowel obstruction;  Surgeon: Pranav George MD;  Location: [facility];  Service:        Family History   Problem Relation Age of Onset   • Heart attack Father    • Colon cancer Father    • Coronary artery disease Father    • Heart disease Family    • Hyperlipidemia Family    • Hypertension Family        Social History     Occupational History   • Occupation: Retired   Tobacco Use   • Smoking status: Former     Packs/day: 1 00     Years: 35 00     Pack years: 35 00     Types: Cigarettes     Start date:      Quit date:      Years since quittin    • Smokeless tobacco: Never   Vaping Use   • Vaping Use: Never used   Substance and Sexual Activity   • Alcohol use: Yes     Comment: One glass per day   • Drug use: No   • Sexual activity: Not on file         Current Outpatient Medications:   •  acetaminophen (TYLENOL) 500 mg tablet, Take 500 mg by mouth as needed for mild pain , Disp: , Rfl:   •  amLODIPine (NORVASC) 2 5 mg tablet, TAKE 1 TABLET BY MOUTH  DAILY, Disp: 90 tablet, Rfl: 3  •  aspirin (ECOTRIN LOW STRENGTH) 81 mg EC tablet, Take 81 mg by mouth daily, Disp: , Rfl:   •  atorvastatin (LIPITOR) 40 mg tablet, TAKE 1 TABLET BY MOUTH  DAILY, Disp: 90 tablet, Rfl: 5  •  Cholecalciferol (VITAMIN D PO), Take 2,000 Units by mouth daily  , Disp: , Rfl:   •  dicyclomine (Bentyl) 20 mg tablet, Take 1 tablet (20 mg total) by mouth every 6 (six) hours as needed (abd cramping), Disp: 90 tablet, Rfl: 3  •  famotidine (PEPCID) 40 MG tablet, Take 1 tablet (40 mg total) by mouth daily at bedtime as needed for heartburn (Patient taking differently: Take 20 mg by mouth daily at bedtime as needed for heartburn), Disp: 30 tablet, Rfl: 3  •  fexofenadine (ALLEGRA) 180 MG tablet, Take 180 mg by mouth as needed Daily during the Summer, Disp: , Rfl:   •  FLUoxetine (PROzac) 10 mg capsule, Take 10 mg by mouth daily , Disp: , Rfl:   •  fluticasone (FLONASE) 50 mcg/act nasal spray, 2 sprays into each nostril daily, Disp: , Rfl:   •  lithium carbonate (LITHOBID) 300 mg CR tablet, Take 300 mg by mouth in the morning, Disp: , Rfl:   •  metoprolol succinate (TOPROL-XL) 25 mg 24 hr tablet, TAKE 1 TABLET BY MOUTH  DAILY, Disp: 90 tablet, Rfl: 3  •  Mirabegron ER 25 MG TB24, Take 25 mg by mouth daily at bedtime, Disp: 30 tablet, Rfl: 10  •  mupirocin (BACTROBAN) 2 % ointment, Apply topically 2 (two) times a day as needed (wound), Disp: 22 g, Rfl: 0  •  nitroglycerin (NITRODUR) 0 2 mg/hr, APPLY 1 PATCH TOPICALLY  ONCE DAILY  LEAVE ON FOR 12 TO 14 HOURS THEN REMOVE FOR A NITRATE-FREE INTERVAL OF  10 TO 12 HOURS, Disp: 90 patch, Rfl: 3  •  omega-3-acid ethyl esters (LOVAZA) 1 g capsule, TAKE 1 CAPSULE BY MOUTH 3  TIMES DAILY, Disp: 270 capsule, Rfl: 3  •  omeprazole (PriLOSEC) 20 mg delayed release capsule, Take 1 capsule (20 mg total) by mouth daily, Disp: 90 capsule, Rfl: 3  •  ProAir  (90 Base) MCG/ACT inhaler, USE 2 INHALATIONS BY MOUTH  EVERY 6 HOURS AS NEEDED FOR WHEEZING, Disp: 34 g, Rfl: 3  •  Trelegy Ellipta 200-62 5-25 MCG/INH AEPB inhaler, USE 1 INHALATION BY MOUTH  DAILY   RINSE MOUTH AFTER  USE, Disp: 180 each, Rfl: 3    Allergies   Allergen Reactions   • Bactrim [Sulfamethoxazole-Trimethoprim] Other (See Comments)     AILYN   • Nsaids      Annotation - [id number]: unable to take due to use of lithium  • Oxycodone Rash            Vitals:    11/29/22 0955   BP: 148/77   Pulse: 61       Objective:  Physical exam  · General: Awake, Alert, Oriented  · Eyes: Pupils equal, round and reactive to light  · Heart: regular rate and rhythm  · Lungs: No audible wheezing  · Abdomen: soft                    Ortho Exam  Left hip:  TTP over greater trochanter  Good arc of motion  Patient sits comfortably in chair with hip flexed at 90 degrees  Patient stands from seated position without assistance  Calf compartments soft and supple  Sensation intact  Toes are warm sensate and mobile      Diagnostics, reviewed and taken today if performed as documented: The attending physician has personally reviewed the pertinent films in PACS and interpretation is as follows:  Left hip x-ray of 10/15/2022:  Mild arthritic changes        Procedures, if performed today:    Large joint arthrocentesis: L greater trochanteric bursa  Universal Protocol:  Consent: Verbal consent obtained  Risks and benefits: risks, benefits and alternatives were discussed  Consent given by: patient  Time out: Immediately prior to procedure a "time out" was called to verify the correct patient, procedure, equipment, support staff and site/side marked as required  Timeout called at: 11/29/2022 10:24 AM   Patient understanding: patient states understanding of the procedure being performed  Site marked: the operative site was marked  Patient identity confirmed: verbally with patient    Supporting Documentation  Indications: pain   Procedure Details  Location: hip - L greater trochanteric bursa  Needle size: 22 G  Ultrasound guidance: no  Approach: lateral  Medications administered: 12 mg betamethasone acetate-betamethasone sodium phosphate 6 (3-3) mg/mL; 2 mL bupivacaine 0 25 %; 2 mL lidocaine 1 %    Patient tolerance: patient tolerated the procedure well with no immediate complications  Dressing:  Sterile dressing applied            Portions of the record may have been created with voice recognition software  Occasional wrong word or "sound a like" substitutions may have occurred due to the inherent limitations of voice recognition software  Read the chart carefully and recognize, using context, where substitutions have occurred

## 2022-12-06 ENCOUNTER — TELEPHONE (OUTPATIENT)
Dept: PULMONOLOGY | Facility: CLINIC | Age: 77
End: 2022-12-06

## 2022-12-06 DIAGNOSIS — I25.10 CORONARY ARTERY DISEASE INVOLVING NATIVE CORONARY ARTERY OF NATIVE HEART WITHOUT ANGINA PECTORIS: ICD-10-CM

## 2022-12-06 DIAGNOSIS — J44.9 CHRONIC OBSTRUCTIVE PULMONARY DISEASE, UNSPECIFIED COPD TYPE (HCC): ICD-10-CM

## 2022-12-06 NOTE — TELEPHONE ENCOUNTER
Pt has called in stating he has been experiencing a cough that has gotten worse since his last visit  Pt states it is a phlegm cough that is clear colored   Agustín advise

## 2022-12-07 ENCOUNTER — OFFICE VISIT (OUTPATIENT)
Dept: PULMONOLOGY | Facility: CLINIC | Age: 77
End: 2022-12-07

## 2022-12-07 VITALS
HEIGHT: 68 IN | DIASTOLIC BLOOD PRESSURE: 78 MMHG | SYSTOLIC BLOOD PRESSURE: 110 MMHG | HEART RATE: 58 BPM | WEIGHT: 174 LBS | OXYGEN SATURATION: 98 % | TEMPERATURE: 97.4 F | BODY MASS INDEX: 26.37 KG/M2

## 2022-12-07 DIAGNOSIS — J30.9 ALLERGIC RHINITIS, UNSPECIFIED SEASONALITY, UNSPECIFIED TRIGGER: ICD-10-CM

## 2022-12-07 DIAGNOSIS — Z85.118 HISTORY OF LUNG CANCER: ICD-10-CM

## 2022-12-07 DIAGNOSIS — D71: ICD-10-CM

## 2022-12-07 DIAGNOSIS — K21.9 GASTROESOPHAGEAL REFLUX DISEASE WITHOUT ESOPHAGITIS: ICD-10-CM

## 2022-12-07 DIAGNOSIS — J44.9 CHRONIC OBSTRUCTIVE PULMONARY DISEASE, UNSPECIFIED COPD TYPE (HCC): Primary | ICD-10-CM

## 2022-12-07 RX ORDER — IPRATROPIUM BROMIDE 17 UG/1
AEROSOL, METERED RESPIRATORY (INHALATION)
Qty: 51.6 G | Refills: 3 | Status: SHIPPED | OUTPATIENT
Start: 2022-12-07

## 2022-12-07 NOTE — PROGRESS NOTES
Office Progress Note - Pulmonary    Dequan Flores 78 y o  male MRN: [id number]    Encounter: [id number]      Assessment:  • Chronic obstructive pulmonary disease  • Chronic cough  • Allergic rhinitis  • Dyspnea with exertion  • History of lung cancer  Plan:   • Saline nasal/sinus rinse once a day 2 hours before bedtime  • Fluticasone nasal spray 2 sprays to each nostril once a day  • Fexofenadine 180 mg once a day  • Trelegy 200, 1 inhalation once a day  • Albuterol rescue inhaler 2 medications 4 times a day as needed  • Follow-up in 4 weeks  Discussion:   The patient's cough in the morning is due to postnasal drip  This is secondary to the allergic rhinitis  I have started him on saline nasal/sinus rinses to be done in the evening 2 hours before bedtime  I have provided him with a sample bottle and showed him how to use it appropriately  I have maintained him on the albuterol rescue inhaler  He will use it 4 times a day as needed and also 15 minutes before he goes out for his walks  His COPD is remains in remission  I have maintained him on the Trelegy Ellipta 1 inhalation once a day  For his postnasal dripping out allergic rhinitis I have maintained him on the fluticasone nasal spray 2 sprays to each nostril once a day and fexofenadine 180 mg once a day  I will see him in follow-up soon  Subjective: The patient is here for a sick visit  He stated that his cough has increased  This is mainly in the morning when he wakes up  Also he noticed a cough when he goes out for his regular walk  There is no significant change in his dyspnea on exertion  Denies fever or chills  No chest pain  He is using Trelegy Ellipta 1 elation once a day and albuterol rescue inhaler 4 times daily as needed  Also on fexofenadine 180 mg once a day and fluticasone nasal spray 2 sprays to each nostril once a day      Review of systems:  A 12 point system review is done and aside from what is stated above the rest of the review of systems is negative  Family history and social history are reviewed  Medications list is reviewed  Vitals: Blood pressure 110/78, pulse 58, temperature (!) 97 4 °F (36 3 °C), temperature source Tympanic, height 5' 7 5" (1 715 m), weight 78 9 kg (174 lb), SpO2 98 %  ,     Physical Exam  Gen: Awake, alert, oriented x 3, no acute distress  HEENT: Mucous membranes moist, no oral lesions, no thrush  NECK: No accessory muscle use, JVP not elevated  Cardiac: Regular, single S1, single S2, no murmurs, no rubs, no gallops  Lungs: Decreased breath sounds  No wheezing or rhonchi  Abdomen: normoactive bowel sounds, soft nontender, nondistended, no rebound or rigidity, no guarding  Extremities: no cyanosis, no clubbing, no edema  Neuro:  Grossly nonfocal   Skin:  No rash

## 2022-12-08 RX ORDER — ATORVASTATIN CALCIUM 40 MG/1
TABLET, FILM COATED ORAL
Qty: 90 TABLET | Refills: 5 | Status: SHIPPED | OUTPATIENT
Start: 2022-12-08

## 2022-12-16 ENCOUNTER — HOSPITAL ENCOUNTER (OUTPATIENT)
Dept: NON INVASIVE DIAGNOSTICS | Facility: CLINIC | Age: 77
Discharge: HOME | End: 2022-12-16

## 2022-12-16 DIAGNOSIS — I71.43 ANEURYSM OF INFRARENAL ABDOMINAL AORTA: ICD-10-CM

## 2022-12-19 NOTE — PROGRESS NOTES
Assessment/Plan:    Aneurysm of infrarenal abdominal aorta [facility])  81-year-old male with HTN, HLD, COPD, CAD, lung CA '07 +'11, CKD,lumbar DDD, chronic back and hip pain, lumbar sx, Parkinson's, 5 2 cm infrarenal AAA s/p EVAR by Dr Giorgi Vega '18  Patient presents the office to review EVAR/LEAD 12/16/2022    -EVAR duplex showed widely patent endograft without evidence of endoleak, sac size approximate 4 6 cm oh (prior 4 3 cm), right MIKO 1 2 and left MIKO noncompressible  -Increase in AAA sac in comparison to prior duplex last year  No endoleak identified  -LEAD showed no significant arterial occlusive disease, no evidence of popliteal artery aneurysm, right MIKO 1 2/161/140 and left MIKO noncompressible/163/127  -Recommended repeat duplex in 6 months for close surveillance for aneurysm sac size  -Blood pressure adequately controlled  -Follow-up in 1 year or sooner if significant change on duplex imaging         Diagnoses and all orders for this visit:    S/P endovascular aneurysm repair  -     VAS evar endovascular aortic repair duplex; Future    Infrarenal abdominal aortic aneurysm (AAA) without rupture  -     VAS evar endovascular aortic repair duplex; Future          Subjective:      Patient ID: Claudene Peabody is a 78 y o  male  Patient present to review LEAD 12/16/2022  EVAR duplex showed and EVAR done on 1216/22  Patient present to ASA and Atorvastatin  Patient c/o difficulties walking along with some pain  HPI  81-year-old male with HTN, HLD, COPD, CAD, lung CA '07 +'11, CKD,lumbar DDD, chronic back and hip pain, lumbar sx, 5 2 cm infrarenal AAA s/p EVAR by Dr Giorgi Vega '18  Patient presents the office to review EVAR/LEAD 12/16/2022  Patient last seen in the office last year by me  He is accompanied by his wife  He has chronic low back pain and left hip pain secondary to bursitis, difficulty with ambulating   EVAR duplex showed widely patent endograft without evidence of endoleak, sac size approximate 4 6 cm oh (prior 4 3 cm), right MIKO 1 2 and left MIKO noncompressible  Lower extremity arterial duplex was done for evaluation of aneurysmal disease which artery size appeared normal and no significant arterial occlusive disease, right MIKO 1 2 and left MIKO noncompressible  The following portions of the patient's history were reviewed and updated as appropriate: allergies, current medications, past family history, past medical history, past social history, past surgical history and problem list   ROS reviewed     Review of Systems   Constitutional: Negative  HENT: Negative  Eyes: Negative  Respiratory: Negative  Cardiovascular: Negative  Gastrointestinal: Negative  Endocrine: Negative  Genitourinary: Negative  Musculoskeletal: Negative  Skin: Negative  Allergic/Immunologic: Negative  Neurological: Negative  Hematological: Negative  Psychiatric/Behavioral: Negative  Objective:    I have reviewed and made appropriate changes to the review of systems input by the medical assistant      Vitals:    12/20/22 1021   BP: 140/74   BP Location: Left arm   Patient Position: Sitting   Cuff Size: Adult   Pulse: 61   Weight: 81 kg (178 lb 9 6 oz)   Height: 5' 7 5" (1 715 m)       Patient Active Problem List   Diagnosis   • Gait instability   • Cervical myelopathy ([facility])   • Adenocarcinoma of prostate ([facility])   • CAD (coronary artery disease)   • Bipolar affective disorder ([facility] [address] )   • COPD (chronic obstructive pulmonary disease) ([facility])   • Gastroesophageal reflux disease   • Mixed hyperlipidemia   • Essential hypertension   • Aneurysm of infrarenal abdominal aorta   • History of lumbar surgery   • Stage 3a chronic kidney disease ([facility])   • Impaired mobility and activities of daily living   • Cervical radiculopathy due to degenerative joint disease of spine   • Degenerative disc disease, lumbar   • Foraminal stenosis of lumbar region   • Spondylolisthesis of lumbar region   • Myelopathy concurrent with and due to lumbosacral intervertebral disc disorder   • Arrhythmia   • Diastolic heart failure (HCC)   • New onset atrial fibrillation (HCC)   • Pain in left hip   • Greater trochanteric bursitis, left   • Skin lesion   • Medicare annual wellness visit, subsequent   • Trochanteric bursitis of left hip   • Primary osteoarthritis of left hip   • Malignant neoplasm of lung (HCC)   • Excessive gas   • Extradural cyst of spine   • Herniated lumbar disc without myelopathy   • Gait abnormality   • Skin lump of leg, right   • Seasonal allergic rhinitis due to pollen   • Braces as ambulation aid   • Parkinsonism (HCC)   • Change in mental status   • Frequency of urination   • Skin tear of right lower leg without complication   • Proteinuria   • CKD (chronic kidney disease)   • S/P endovascular aneurysm repair       Past Surgical History:   Procedure Laterality Date   • ANGIOPLASTY      stent   • CARDIAC SURGERY     • CERVICAL SPINE SURGERY      Cervical decompression with cervical fusion from C3-C7 for spinal stenosis  • COLON SURGERY  11/04/2014   • ESOPHAGOGASTRODUODENOSCOPY  01/03/2013    with possible Schatzki's ring & small hiatal hernia, mild gastritis   • FL INJECTION LEFT HIP (NON ARTHROGRAM)  12/11/2018   • FL INJECTION LEFT HIP (NON ARTHROGRAM)  5/9/2019   • IR BIOPSY LUNG  7/6/2020   • IR EVAR  8/6/2018   • LUNG CANCER SURGERY Right 03/2011    wedge resection for lung tumor, 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   • LUNG LOBECTOMY Left    • NH ARTHRODESIS ANT INTERBODY MIN DISCECTOMY, CERVICAL BELOW C2 N/A 5/2/2016    Procedure: Anterior cervical diskectomy C3/4, C5/6, C6/7 with anterior plate fixation fusion C3-7;  Posterior decompressive laminectomy C3-7 with lateral mass fixation fusion C3-7 (IMPULSE MONITORING);   Surgeon: Osiel Walters MD;  [facility];  Service: Neurosurgery   • NH ARTHRODESIS POSTERIOR INTERBODY LUMBAR N/A 1/30/2017    Procedure: L4-5 AND L5-S1 DECOMPRESSIVE FORAMINOTOMIES, TRANSFORAMINAL LUMBAR INTERBODY AND PEDICLE SCREW FIXATION FUSION L4-S1 (IMPULSE);   Surgeon: Dayanara Miller MD;  [facility];  Service: Neurosurgery   • NJ EVASC RPR DPLMNT AORTO-AORTIC NDGFT N/A 2018    Procedure: REPAIR ANEURYSM ENDOVASCULAR ABDOMINAL AORTIC  (EVAR) WITH BILATERAL PERCUTANEOUS FEMORAL ACCESS WITH ULTRASOUND GUIDANCE ON THE RIGHT AND PRE CLOSURE;  Surgeon: Isabella Brandon MD;  Location: [facility];  Service: Vascular   • PROSTATECTOMY      for prostate CA - no chemo/RT   • SIGMOIDECTOMY      for divertic   • SMALL INTESTINE SURGERY N/A 2016    Procedure: Exploratory Laparotomy, Lysis of adhesions to release small bowel obstruction;  Surgeon: Radha Romero MD;  Location[facility];  Service:        Family History   Problem Relation Age of Onset   • Heart attack Father    • Colon cancer Father    • Coronary artery disease Father    • Heart disease Family    • Hyperlipidemia Family    • Hypertension Family        Social History     Socioeconomic History   • Marital status: /Civil Union     Spouse name: Not on file   • Number of children: 1   • Years of education: Not on file   • Highest education level: Not on file   Occupational History   • Occupation: Retired   Tobacco Use   • Smoking status: Former     Packs/day: 1 00     Years: 35 00     Pack years: 35 00     Types: Cigarettes     Start date:      Quit date:      Years since quittin 9   • Smokeless tobacco: Never   Vaping Use   • Vaping Use: Never used   Substance and Sexual Activity   • Alcohol use: Yes     Comment: One glass per day   • Drug use: No   • Sexual activity: Not on file   Other Topics Concern   • Not on file   Social History Narrative   • Not on file     Social Determinants of Health     Financial Resource Strain: Not on file   Food Insecurity: Not on file   Transportation Needs: Not on file   Physical Activity: Not on file   Stress: Not on file   Social Connections: Not on file   Intimate Partner Violence: Not on file   Housing Stability: Not on file       Allergies   Allergen Reactions   • Bactrim [Sulfamethoxazole-Trimethoprim] Other (See Comments)     AILYN   • Nsaids      Annotation - [id number]: unable to take due to use of lithium  • Oxycodone Rash         Current Outpatient Medications:   •  acetaminophen (TYLENOL) 500 mg tablet, Take 500 mg by mouth as needed for mild pain , Disp: , Rfl:   •  amLODIPine (NORVASC) 2 5 mg tablet, TAKE 1 TABLET BY MOUTH  DAILY, Disp: 90 tablet, Rfl: 3  •  aspirin (ECOTRIN LOW STRENGTH) 81 mg EC tablet, Take 81 mg by mouth daily, Disp: , Rfl:   •  atorvastatin (LIPITOR) 40 mg tablet, TAKE 1 TABLET BY MOUTH  DAILY, Disp: 90 tablet, Rfl: 5  •  Cholecalciferol (VITAMIN D PO), Take 2,000 Units by mouth daily  , Disp: , Rfl:   •  dicyclomine (Bentyl) 20 mg tablet, Take 1 tablet (20 mg total) by mouth every 6 (six) hours as needed (abd cramping), Disp: 90 tablet, Rfl: 3  •  famotidine (PEPCID) 40 MG tablet, TAKE 1 TABLET BY MOUTH  DAILY AT BEDTIME AS NEEDED  FOR HEARTBURN (Patient taking differently: 20 mg Patient is taking 20mg), Disp: 30 tablet, Rfl: 11  •  fexofenadine (ALLEGRA) 180 MG tablet, Take 180 mg by mouth as needed Daily during the Summer, Disp: , Rfl:   •  FLUoxetine (PROzac) 10 mg capsule, Take 10 mg by mouth daily , Disp: , Rfl:   •  fluticasone (FLONASE) 50 mcg/act nasal spray, 2 sprays into each nostril daily, Disp: , Rfl:   •  lithium carbonate (LITHOBID) 300 mg CR tablet, Take 300 mg by mouth in the morning, Disp: , Rfl:   •  metoprolol succinate (TOPROL-XL) 25 mg 24 hr tablet, TAKE 1 TABLET BY MOUTH  DAILY, Disp: 90 tablet, Rfl: 3  •  Mirabegron ER 25 MG TB24, Take 25 mg by mouth daily at bedtime, Disp: 30 tablet, Rfl: 10  •  mupirocin (BACTROBAN) 2 % ointment, Apply topically 2 (two) times a day as needed (wound), Disp: 22 g, Rfl: 0  •  nitroglycerin (NITRODUR) 0 2 mg/hr, APPLY 1 PATCH TOPICALLY  ONCE DAILY   LEAVE ON FOR 12 TO [place] A NITRATE-FREE INTERVAL OF  10 TO 12 HOURS, Disp: 90 patch, Rfl: 3  •  omega-3-acid ethyl esters (LOVAZA) 1 g capsule, TAKE 1 CAPSULE BY MOUTH 3  TIMES DAILY, Disp: 270 capsule, Rfl: 3  •  omeprazole (PriLOSEC) 20 mg delayed release capsule, Take 1 capsule (20 mg total) by mouth daily, Disp: 90 capsule, Rfl: 3  •  ProAir  (90 Base) MCG/ACT inhaler, USE 2 INHALATIONS BY MOUTH  EVERY 6 HOURS AS NEEDED FOR WHEEZING, Disp: 34 g, Rfl: 3  •  Trelegy Ellipta 200-62 5-25 MCG/INH AEPB inhaler, USE 1 INHALATION BY MOUTH  DAILY   RINSE MOUTH AFTER  USE, Disp: 180 each, Rfl: 3  •  Atrovent HFA 17 MCG/ACT inhaler, INHALE 2 INHALATIONS BY  MOUTH 4 TIMES DAILY, Disp: 51 6 g, Rfl: 3    /74 (BP Location: Left arm, Patient Position: Sitting, Cuff Size: Adult)   Pulse 61   Ht 5' 7 5" (1 715 m)   Wt 81 kg (178 lb 9 6 oz)   BMI 27 56 kg/m²          Physical Exam  Vitals and nursing note reviewed  Constitutional:       Appearance: He is well-developed  HENT:      Head: Normocephalic and atraumatic  Eyes:      Extraocular Movements: Extraocular movements intact  Cardiovascular:      Pulses:           Femoral pulses are 2+ on the right side and 2+ on the left side  Posterior tibial pulses are 2+ on the right side and 2+ on the left side  Heart sounds: Normal heart sounds  Pulmonary:      Effort: Pulmonary effort is normal       Breath sounds: Normal breath sounds  Abdominal:      General: Bowel sounds are normal       Palpations: Abdomen is soft  Tenderness: There is no abdominal tenderness  Musculoskeletal:         General: Normal range of motion  Cervical back: Neck supple  Skin:     General: Skin is warm  Neurological:      General: No focal deficit present  Mental Status: He is alert and oriented to person, place, and time     Psychiatric:         Mood and Affect: Mood normal          Behavior: Behavior normal

## 2022-12-20 ENCOUNTER — OFFICE VISIT (OUTPATIENT)
Dept: VASCULAR SURGERY | Facility: CLINIC | Age: 77
End: 2022-12-20

## 2022-12-20 VITALS
WEIGHT: 178.6 LBS | SYSTOLIC BLOOD PRESSURE: 140 MMHG | DIASTOLIC BLOOD PRESSURE: 74 MMHG | HEART RATE: 61 BPM | BODY MASS INDEX: 27.07 KG/M2 | HEIGHT: 68 IN

## 2022-12-20 DIAGNOSIS — I71.43 INFRARENAL ABDOMINAL AORTIC ANEURYSM (AAA) WITHOUT RUPTURE: ICD-10-CM

## 2022-12-20 DIAGNOSIS — Z98.890 S/P ENDOVASCULAR ANEURYSM REPAIR: Primary | ICD-10-CM

## 2022-12-20 DIAGNOSIS — Z86.79 S/P ENDOVASCULAR ANEURYSM REPAIR: Primary | ICD-10-CM

## 2022-12-20 NOTE — ASSESSMENT & PLAN NOTE
72-year-old male with HTN, HLD, COPD, CAD, lung CA '07 +'11, CKD,lumbar DDD, chronic back and hip pain, lumbar sx, Parkinson's, 5 2 cm infrarenal AAA s/p EVAR by Dr Nila Stubbs '18  Patient presents the office to review EVAR/LEAD 12/16/2022    -EVAR duplex showed widely patent endograft without evidence of endoleak, sac size approximate 4 6 cm oh (prior 4 3 cm), right MKIO 1 2 and left MIKO noncompressible  -Increase in AAA sac in comparison to prior duplex last year    No endoleak identified  -LEAD showed no significant arterial occlusive disease, no evidence of popliteal artery aneurysm, right MKIO 1 2/161/140 and left MIKO noncompressible/163/127  -Recommended repeat duplex in 6 months for close surveillance for aneurysm sac size  -Blood pressure adequately controlled  -Follow-up in 1 year or sooner if significant change on duplex imaging

## 2022-12-28 ENCOUNTER — OFFICE VISIT (OUTPATIENT)
Dept: INTERNAL MEDICINE CLINIC | Facility: CLINIC | Age: 77
End: 2022-12-28

## 2022-12-28 VITALS
DIASTOLIC BLOOD PRESSURE: 88 MMHG | OXYGEN SATURATION: 99 % | SYSTOLIC BLOOD PRESSURE: 138 MMHG | BODY MASS INDEX: 26.98 KG/M2 | HEIGHT: 68 IN | HEART RATE: 51 BPM | WEIGHT: 178 LBS

## 2022-12-28 DIAGNOSIS — Z12.5 SCREENING FOR PROSTATE CANCER: Primary | ICD-10-CM

## 2022-12-28 DIAGNOSIS — Z00.00 MEDICARE ANNUAL WELLNESS VISIT, SUBSEQUENT: ICD-10-CM

## 2022-12-28 NOTE — PATIENT INSTRUCTIONS
Problem List Items Addressed This Visit          Other    Medicare annual wellness visit, subsequent     Discussed preventative health, cancer screening, immunizations, and safety issues  Patient's last colonoscopy was in 2020 with recommendations to recheck in 2 years, patient is going to get next month  I recommend Tdap vaccination if he were to have a puncture wound    I recommend getting the Shingrix shot to help prevent Shingles  You can get it a pharmacy, and they can administer it there  It is a two shot series with the second shot needed between 2-6 months after the first shot  I would not recommend getting the shot before an important or fun event in case you were to have a reaction to the shot like a sore arm or flu-like symptoms  I make the same recommendation about any shot, as people can have a reaction to any shot  Other Visit Diagnoses       Screening for prostate cancer    -  Primary    Relevant Orders    PSA, Total Screen            Medicare Preventive Visit Patient Instructions  Thank you for completing your Welcome to Medicare Visit or Medicare Annual Wellness Visit today  Your next wellness visit will be due in one year (12/29/2023)  The screening/preventive services that you may require over the next 5-10 years are detailed below  Some tests may not apply to you based off risk factors and/or age  Screening tests ordered at today's visit but not completed yet may show as past due  Also, please note that scanned in results may not display below    Preventive Screenings:  Service Recommendations Previous Testing/Comments   Colorectal Cancer Screening  Colonoscopy    Fecal Occult Blood Test (FOBT)/Fecal Immunochemical Test (FIT)  Fecal DNA/Cologuard Test  Flexible Sigmoidoscopy Age: 39-70 years old   Colonoscopy: every 10 years (May be performed more frequently if at higher risk)  OR  FOBT/FIT: every 1 year  OR  Cologuard: every 3 years  OR  Sigmoidoscopy: every 5 years  Screening may be recommended earlier than age 39 if at higher risk for colorectal cancer  Also, an individualized decision between you and your healthcare provider will decide whether screening between the ages of 74-80 would be appropriate  Colonoscopy: 07/16/2020  FOBT/FIT: Not on file  Cologuard: Not on file  Sigmoidoscopy: Not on file          Prostate Cancer Screening Individualized decision between patient and health care provider in men between ages of 53-78   Medicare will cover every 12 months beginning on the day after your 50th birthday PSA: <0 1 ng/mL           Hepatitis C Screening Once for adults born between 1945 and 1965  More frequently in patients at high risk for Hepatitis C Hep C Antibody: 06/07/2017        Diabetes Screening 1-2 times per year if you're at risk for diabetes or have pre-diabetes Fasting glucose: 89 mg/dL (10/31/2022)  A1C: 5 2 % (10/20/2021)      Cholesterol Screening Once every 5 years if you don't have a lipid disorder  May order more often based on risk factors  Lipid panel: 10/06/2022         Other Preventive Screenings Covered by Medicare:  Abdominal Aortic Aneurysm (AAA) Screening: covered once if your at risk  You're considered to be at risk if you have a family history of AAA or a male between the age of 73-68 who smoking at least 100 cigarettes in your lifetime  Lung Cancer Screening: covers low dose CT scan once per year if you meet all of the following conditions: (1) Age 50-69; (2) No signs or symptoms of lung cancer; (3) Current smoker or have quit smoking within the last 15 years; (4) You have a tobacco smoking history of at least 20 pack years (packs per day x number of years you smoked); (5) You get a written order from a healthcare provider    Glaucoma Screening: covered annually if you're considered high risk: (1) You have diabetes OR (2) Family history of glaucoma OR (3)  aged 48 and older OR (3)  American aged 72 and older  Osteoporosis Screening: covered every 2 years if you meet one of the following conditions: (1) Have a vertebral abnormality; (2) On glucocorticoid therapy for more than 3 months; (3) Have primary hyperparathyroidism; (4) On osteoporosis medications and need to assess response to drug therapy  HIV Screening: covered annually if you're between the age of 12-76  Also covered annually if you are younger than 13 and older than 72 with risk factors for HIV infection  For pregnant patients, it is covered up to 3 times per pregnancy  Immunizations:  Immunization Recommendations   Influenza Vaccine Annual influenza vaccination during flu season is recommended for all persons aged >= 6 months who do not have contraindications   Pneumococcal Vaccine   * Pneumococcal conjugate vaccine = PCV13 (Prevnar 13), PCV15 (Vaxneuvance), PCV20 (Prevnar 20)  * Pneumococcal polysaccharide vaccine = PPSV23 (Pneumovax) Adults 25-60 years old: 1-3 doses may be recommended based on certain risk factors  Adults 72 years old: 1-2 doses may be recommended based off what pneumonia vaccine you previously received   Hepatitis B Vaccine 3 dose series if at intermediate or high risk (ex: diabetes, end stage renal disease, liver disease)   Tetanus (Td) Vaccine - COST NOT COVERED BY MEDICARE PART B Following completion of primary series, a booster dose should be given every 10 years to maintain immunity against tetanus  Td may also be given as tetanus wound prophylaxis  Tdap Vaccine - COST NOT COVERED BY MEDICARE PART B Recommended at least once for all adults  For pregnant patients, recommended with each pregnancy     Shingles Vaccine (Shingrix) - COST NOT COVERED BY MEDICARE PART B  2 shot series recommended in those aged 48 and above     Health Maintenance Due:      Topic Date Due    Colorectal Cancer Screening  07/16/2022    Hepatitis C Screening  Completed     Immunizations Due:      Topic Date Due    Hepatitis B Vaccine (1 of 3 - 3-dose series) Never done COVID-19 Vaccine (5 - Booster for Juan [place] series) 09/28/2022     Advance Directives   What are advance directives? Advance directives are legal documents that state your wishes and plans for medical care  These plans are made ahead of time in case you lose your ability to make decisions for yourself  Advance directives can apply to any medical decision, such as the treatments you want, and if you want to donate organs  What are the types of advance directives? There are many types of advance directives, and each state has rules about how to use them  You may choose a combination of any of the following:  Living will: This is a written record of the treatment you want  You can also choose which treatments you do not want, which to limit, and which to stop at a certain time  This includes surgery, medicine, IV fluid, and tube feedings  Durable power of  for healthcare [facility] This is a written record that states who you want to make healthcare choices for you when you are unable to make them for yourself  This person, called a proxy, is usually a family member or a friend  You may choose more than 1 proxy  Do not resuscitate (DNR) order:  A DNR order is used in case your heart stops beating or you stop breathing  It is a request not to have certain forms of treatment, such as CPR  A DNR order may be included in other types of advance directives  Medical directive: This covers the care that you want if you are in a coma, near death, or unable to make decisions for yourself  You can list the treatments you want for each condition  Treatment may include pain medicine, surgery, blood transfusions, dialysis, IV or tube feedings, and a ventilator (breathing machine)  Values history: This document has questions about your views, beliefs, and how you feel and think about life  This information can help others choose the care that you would choose  Why are advance directives important?   An advance directive helps you control your care  Although spoken wishes may be used, it is better to have your wishes written down  Spoken wishes can be misunderstood, or not followed  Treatments may be given even if you do not want them  An advance directive may make it easier for your family to make difficult choices about your care  Weight Management   Why it is important to manage your weight:  Being overweight increases your risk of health conditions such as heart disease, high blood pressure, type 2 diabetes, and certain types of cancer  It can also increase your risk for osteoarthritis, sleep apnea, and other respiratory problems  Aim for a slow, steady weight loss  Even a small amount of weight loss can lower your risk of health problems  How to lose weight safely:  A safe and healthy way to lose weight is to eat fewer calories and get regular exercise  You can lose up about 1 pound a week by decreasing the number of calories you eat by 500 calories each day  Healthy meal plan for weight management:  A healthy meal plan includes a variety of foods, contains fewer calories, and helps you stay healthy  A healthy meal plan includes the following:  Eat whole-grain foods more often  A healthy meal plan should contain fiber  Fiber is the part of grains, fruits, and vegetables that is not broken down by your body  Whole-grain foods are healthy and provide extra fiber in your diet  Some examples of whole-grain foods are whole-wheat breads and pastas, oatmeal, brown rice, and bulgur  Eat a variety of vegetables every day  Include dark, leafy greens such as spinach, kale, nya greens, and mustard greens  Eat yellow and orange vegetables such as carrots, sweet potatoes, and winter squash  Eat a variety of fruits every day  Choose fresh or canned fruit (canned in its own juice or light syrup) instead of juice  Fruit juice has very little or no fiber  Eat low-fat dairy foods  Drink fat-free (skim) milk or 1% milk  Eat fat-free yogurt and low-fat cottage cheese  Try low-fat cheeses such as mozzarella and other reduced-fat cheeses  Choose meat and other protein foods that are low in fat  Choose beans or other legumes such as split peas or lentils  Choose fish, skinless poultry (chicken or turkey), or lean cuts of red meat (beef or pork)  Before you cook meat or poultry, cut off any visible fat  Use less fat and oil  Try baking foods instead of frying them  Add less fat, such as margarine, sour cream, regular salad dressing and mayonnaise to foods  Eat fewer high-fat foods  Some examples of high-fat foods include french fries, doughnuts, ice cream, and cakes  Eat fewer sweets  Limit foods and drinks that are high in sugar  This includes candy, cookies, regular soda, and sweetened drinks  Exercise:  Exercise at least 30 minutes per day on most days of the week  Some examples of exercise include walking, biking, dancing, and swimming  You can also fit in more physical activity by taking the stairs instead of the elevator or parking farther away from stores  Ask your healthcare provider about the best exercise plan for you  © Copyright [employer] 2018 Information is for End User's use only and may not be sold, redistributed or otherwise used for commercial purposes   All illustrations and images included in CareNotes® are the copyrighted property of A D [employer] M , [employer]  or [address] [place] [address]

## 2022-12-28 NOTE — PROGRESS NOTES
Assessment and Plan:     Problem List Items Addressed This Visit        Other    Medicare annual wellness visit, subsequent     Discussed preventative health, cancer screening, immunizations, and safety issues  Patient's last colonoscopy was in 2020 with recommendations to recheck in 2 years, patient is going to get next month  I recommend Tdap vaccination if he were to have a puncture wound    I recommend getting the Shingrix shot to help prevent Shingles  You can get it a pharmacy, and they can administer it there  It is a two shot series with the second shot needed between 2-6 months after the first shot  I would not recommend getting the shot before an important or fun event in case you were to have a reaction to the shot like a sore arm or flu-like symptoms  I make the same recommendation about any shot, as people can have a reaction to any shot  Other Visit Diagnoses     Screening for prostate cancer    -  Primary    Relevant Orders    PSA, Total Screen           Preventive health issues were discussed with patient, and age appropriate screening tests were ordered as noted in patient's After Visit Summary  Personalized health advice and appropriate referrals for health education or preventive services given if needed, as noted in patient's After Visit Summary       History of Present Illness:     Patient presents for a Medicare Wellness Visit    Patient here for annual wellness visit     Patient Care Team:  Drew Martínez MD as PCP - MD Ileana Kennedy MD Maximo Gift, MD Norina All, MD Donnald Barters, MD Lynnette Ladd, MD Salbador Furrow, PA-C Earnest Lax, MD Larina Pointer, MD Jonell Gaba, MD (Nephrology)     Review of Systems:     Review of Systems     Problem List:     Patient Active Problem List   Diagnosis   • Gait instability   • Cervical myelopathy [facility])   • Adenocarcinoma of prostate [facility])   • CAD (coronary artery disease) • Bipolar affective disorder ([place] [address] )   • COPD (chronic obstructive pulmonary disease) ([facility])   • Gastroesophageal reflux disease   • Mixed hyperlipidemia   • Essential hypertension   • Aneurysm of infrarenal abdominal aorta   • History of lumbar surgery   • Stage 3a chronic kidney disease ([facility])   • Impaired mobility and activities of daily living   • Cervical radiculopathy due to degenerative joint disease of spine   • Degenerative disc disease, lumbar   • Foraminal stenosis of lumbar region   • Spondylolisthesis of lumbar region   • Myelopathy concurrent with and due to lumbosacral intervertebral disc disorder   • Arrhythmia   • Diastolic heart failure ([facility])   • New onset atrial fibrillation ([facility])   • Pain in left hip   • Greater trochanteric bursitis, left   • Skin lesion   • Medicare annual wellness visit, subsequent   • Trochanteric bursitis of left hip   • Primary osteoarthritis of left hip   • Malignant neoplasm of lung ([facility])   • Excessive gas   • Extradural cyst of spine   • Herniated lumbar disc without myelopathy   • Gait abnormality   • Skin lump of leg, right   • Seasonal allergic rhinitis due to pollen   • Braces as ambulation aid   • Parkinsonism ([place] [address] )   • Change in mental status   • Frequency of urination   • Skin tear of right lower leg without complication   • Proteinuria   • CKD (chronic kidney disease)   • S/P endovascular aneurysm repair      Past Medical and Surgical History:     Past Medical History:   Diagnosis Date   • Aortic aneurysm ([place] [address] )    • Benign colon polyp    • Bipolar 1 disorder ([facility])    • Cardiac disease     MI   • Cervical cord compression with myelopathy ([facility])    • COPD (chronic obstructive pulmonary disease) ([facility])    • Diverticulitis    • Diverticulosis    • Gait disturbance     uses cane, leg brace on right   • GERD (gastroesophageal reflux disease)    • Heart attack ([place] [address] ) 1996   • Hx of resection of large bowel 5/3/2016   • Hyperlipidemia    • Hypertension    • IBS (irritable bowel syndrome)    • Lumbar stenosis    • Lung cancer [facility])     2007 Left lower lobectomy and 2011 Right lung with surgery    • Myocardial infarction [facility])     involving other coronary artery   • Prostate cancer ([facility] [address] )    • Shortness of breath    • Small bowel obstruction ([facility] [address] ) 11/16/2016     Past Surgical History:   Procedure Laterality Date   • ANGIOPLASTY      stent   • CARDIAC SURGERY     • CERVICAL SPINE SURGERY      Cervical decompression with cervical fusion from C3-C7 for spinal stenosis  • COLON SURGERY  11/04/2014   • ESOPHAGOGASTRODUODENOSCOPY  01/03/2013    with possible Schatzki's ring & small hiatal hernia, mild gastritis   • FL INJECTION LEFT HIP (NON ARTHROGRAM)  12/11/2018   • FL INJECTION LEFT HIP (NON ARTHROGRAM)  5/9/2019   • IR BIOPSY LUNG  7/6/2020   • IR EVAR  8/6/2018   • LUNG CANCER SURGERY Right 03/2011    wedge resection for lung tumor, 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   • LUNG LOBECTOMY Left    • OK ARTHRD ANT INTERBODY MIN DSC CRV BELOW C2 N/A 5/2/2016    Procedure: Anterior cervical diskectomy C3/4, C5/6, C6/7 with anterior plate fixation fusion C3-7;  Posterior decompressive laminectomy C3-7 with lateral mass fixation fusion C3-7 (IMPULSE MONITORING); Surgeon: Silverio Villafuerte MD;  Location: [facility];  Service: Neurosurgery   • OK ARTHRODESIS POSTERIOR INTERBODY [address] LUMBAR N/A 1/30/2017    Procedure: L4-5 AND L5-S1 DECOMPRESSIVE FORAMINOTOMIES, TRANSFORAMINAL LUMBAR INTERBODY AND PEDICLE SCREW FIXATION FUSION L4-S1 (IMPULSE);   Surgeon: Silverio Villafuerte MD;  [facility];  Service: Neurosurgery   • OK EVASC RPR DPLMNT AORTO-AORTIC NDGFT N/A 8/6/2018    Procedure: REPAIR ANEURYSM ENDOVASCULAR ABDOMINAL AORTIC  (EVAR) WITH BILATERAL PERCUTANEOUS FEMORAL ACCESS WITH ULTRASOUND GUIDANCE ON THE RIGHT AND PRE CLOSURE;  Surgeon: Destini Montiel MD;  Location[facility];  Service: Vascular   • PROSTATECTOMY  2007    for prostate CA - no chemo/RT   • SIGMOIDECTOMY for divertic   • SMALL INTESTINE SURGERY N/A 2016    Procedure: Exploratory Laparotomy, Lysis of adhesions to release small bowel obstruction;  Surgeon: Odessa Cowart MD;  [facility];  Service:       Family History:     Family History   Problem Relation Age of Onset   • Heart attack Father    • Colon cancer Father    • Coronary artery disease Father    • Heart disease Family    • Hyperlipidemia Family    • Hypertension Family       Social History:     Social History     Socioeconomic History   • Marital status: /Civil Union     Spouse name: None   • Number of children: 1   • Years of education: None   • Highest education level: None   Occupational History   • Occupation: Retired   Tobacco Use   • Smoking status: Former     Packs/day: 1 00     Years: 35 00     Pack years: 35 00     Types: Cigarettes     Start date:      Quit date:      Years since quittin 9   • Smokeless tobacco: Never   Vaping Use   • Vaping Use: Never used   Substance and Sexual Activity   • Alcohol use: Yes     Comment: One glass per day   • Drug use: No   • Sexual activity: None   Other Topics Concern   • None   Social History Narrative   • None     Social Determinants of Health     Financial Resource Strain: Low Risk    • Difficulty of Paying Living Expenses: Not hard at all   Food Insecurity: Not on file   Transportation Needs: No Transportation Needs   • Lack of Transportation (Medical): No   • Lack of Transportation (Non-Medical): No   Physical Activity: Not on file   Stress: Not on file   Social Connections: Not on file   Intimate Partner Violence: Not on file   Housing Stability: Not on file      Medications and Allergies:     Current Outpatient Medications   Medication Sig Dispense Refill   • acetaminophen (TYLENOL) 500 mg tablet Take 500 mg by mouth as needed for mild pain       • amLODIPine (NORVASC) 2 5 mg tablet TAKE 1 TABLET BY MOUTH  DAILY 90 tablet 3   • aspirin (ECOTRIN LOW STRENGTH) 81 mg EC tablet Take 81 mg by mouth daily     • atorvastatin (LIPITOR) 40 mg tablet TAKE 1 TABLET BY MOUTH  DAILY 90 tablet 5   • Cholecalciferol (VITAMIN D PO) Take 2,000 Units by mouth daily       • dicyclomine (Bentyl) 20 mg tablet Take 1 tablet (20 mg total) by mouth every 6 (six) hours as needed (abd cramping) 90 tablet 3   • famotidine (PEPCID) 40 MG tablet TAKE 1 TABLET BY MOUTH  DAILY AT BEDTIME AS NEEDED  FOR HEARTBURN (Patient taking differently: 20 mg Patient is taking 20mg) 30 tablet 11   • fexofenadine (ALLEGRA) 180 MG tablet Take 180 mg by mouth as needed Daily during the Summer     • FLUoxetine (PROzac) 10 mg capsule Take 10 mg by mouth daily      • fluticasone (FLONASE) 50 mcg/act nasal spray 2 sprays into each nostril daily     • lithium carbonate (LITHOBID) 300 mg CR tablet Take 300 mg by mouth in the morning     • metoprolol succinate (TOPROL-XL) 25 mg 24 hr tablet TAKE 1 TABLET BY MOUTH  DAILY 90 tablet 3   • Mirabegron ER 25 MG TB24 Take 25 mg by mouth daily at bedtime 30 tablet 10   • mupirocin (BACTROBAN) 2 % ointment Apply topically 2 (two) times a day as needed (wound) 22 g 0   • nitroglycerin (NITRODUR) 0 2 mg/hr APPLY 1 PATCH TOPICALLY  ONCE DAILY  LEAVE ON FOR 12 TO 14 HOURS THEN REMOVE FOR A NITRATE-FREE INTERVAL OF  10 TO 12 HOURS 90 patch 3   • omega-3-acid ethyl esters (LOVAZA) 1 g capsule TAKE 1 CAPSULE BY MOUTH 3  TIMES DAILY 270 capsule 3   • omeprazole (PriLOSEC) 20 mg delayed release capsule Take 1 capsule (20 mg total) by mouth daily 90 capsule 3   • ProAir  (90 Base) MCG/ACT inhaler USE 2 INHALATIONS BY MOUTH  EVERY 6 HOURS AS NEEDED FOR WHEEZING 34 g 3   • Trelegy Ellipta 200-62 5-25 MCG/INH AEPB inhaler USE 1 INHALATION BY MOUTH  DAILY   RINSE MOUTH AFTER   each 3     No current facility-administered medications for this visit       Allergies   Allergen Reactions   • Bactrim [Sulfamethoxazole-Trimethoprim] Other (See Comments)     AILYN   • Nsaids      Annotation - [id number]: unable to take due to use of lithium  • Oxycodone Rash      Immunizations:     Immunization History   Administered Date(s) Administered   • COVID-19 PFIZER VACCINE 0 3 ML IM 02/17/2021, 03/10/2021, 10/26/2021   • COVID-19 Pfizer vac (Satya-sucrose, gray cap) 12 yr+ IM 08/03/2022   • INFLUENZA 10/01/2015, 10/07/2018, 09/27/2019, 09/27/2021   • Influenza Quadrivalent Preservative Free 3 years and older IM 10/01/2015   • Influenza Split High Dose Preservative Free IM 10/25/2016, 10/03/2018   • Influenza, high dose seasonal 0 7 mL 09/27/2019, 10/05/2020, 10/14/2022   • Influenza, seasonal, injectable 1945, 10/10/2012   • Pneumococcal Conjugate 13-Valent 10/25/2016   • Pneumococcal Polysaccharide PPV23 10/03/2019   • Td (adult), adsorbed 12/01/2009   • Zoster 01/01/2014, 01/01/2014   • influenza, trivalent, adjuvanted 09/27/2021      Health Maintenance:         Topic Date Due   • Colorectal Cancer Screening  07/16/2022   • Hepatitis C Screening  Completed         Topic Date Due   • Hepatitis B Vaccine (1 of 3 - 3-dose series) Never done   • COVID-19 Vaccine (5 - Booster for [employer] series) 09/28/2022      Medicare Screening Tests and Risk Assessments:     Jay Hadley is here for his Subsequent Wellness visit  Health Risk Assessment:   Patient rates overall health as good  Patient feels that their physical health rating is slightly better  Patient is very satisfied with their life  Eyesight was rated as same  Hearing was rated as slightly worse  Patient feels that their emotional and mental health rating is same  Patients states they are sometimes angry  Patient states they are sometimes unusually tired/fatigued  Pain experienced in the last 7 days has been some  Patient's pain rating has been 3/10  Patient states that he has experienced no weight loss or gain in last 6 months  Fall Risk Screening:    In the past year, patient has experienced: no history of falling in past year      Home Safety:  Patient does not have trouble with stairs inside or outside of their home  Patient has working smoke alarms and has working carbon monoxide detector  Home safety hazards include: loose rugs on the floor  Nutrition:   Current diet is Regular and Limited junk food  Medications:   Patient is not currently taking any over-the-counter supplements  Patient is able to manage medications  Activities of Daily Living (ADLs)/Instrumental Activities of Daily Living (IADLs):   Walk and transfer into and out of bed and chair?: Yes  Dress and groom yourself?: Yes    Bathe or shower yourself?: Yes    Feed yourself? Yes  Do your laundry/housekeeping?: Yes  Manage your money, pay your bills and track your expenses?: Yes  Make your own meals?: Yes    Do your own shopping?: Yes    Previous Hospitalizations:   Any hospitalizations or ED visits within the last 12 months?: No      Advance Care Planning:   Living will: Yes    Durable POA for healthcare:  Yes    Advanced directive: Yes      Cognitive Screening:   Provider or family/friend/caregiver concerned regarding cognition?: No    PREVENTIVE SCREENINGS      Cardiovascular Screening:    General: Screening Not Indicated, History Lipid Disorder, Risks and Benefits Discussed and Screening Current      Diabetes Screening:     General: Screening Current and Risks and Benefits Discussed      Colorectal Cancer Screening:     General: Risks and Benefits Discussed      Prostate Cancer Screening:    General: History Prostate Cancer, Screening Not Indicated, Risks and Benefits Discussed and Screening Current      Osteoporosis Screening:    General: Screening Not Indicated      Abdominal Aortic Aneurysm (AAA) Screening:    Risk factors include: age between 73-69 yo and tobacco use        General: Screening Not Indicated, History AAA and Risks and Benefits Discussed      Lung Cancer Screening:     General: Screening Not Indicated and History Lung Cancer      Hepatitis C Screening:    General: Screening Current    Screening, Brief Intervention, and Referral to Treatment (SBIRT)    Screening  Typical number of drinks in a day: 1  Typical number of drinks in a week: 7  Interpretation: Low risk drinking behavior  AUDIT-C Screenin) How often did you have a drink containing alcohol in the past year? 4 or more times a week  2) How many drinks did you have on a typical day when you were drinking in the past year? 1 to 2  3) How often did you have 6 or more drinks on one occasion in the past year? less than monthly    AUDIT-C Score: 5  Interpretation: Score 4-12 (male): POSITIVE screen for alcohol misuse    AUDIT Screenin) How often during the last year have you found that you were not able to stop drinking once you had started? 0 - never  5) How often during the last year have you failed to do what was normally expected from you because of drinking? 0 - never  6) How often during the last year have you needed a first drink in the morning to get yourself going after a heavy drinking session? 0 - never  7) How often during the last year have you had a feeling of guilt or remorse after drinking? 0 - never  8) How often during the last year have you been unable to remember what happened the night before because you had been drinking? 0 - never  9) Have you or someone else been injured as a result of your drinking? 0 - no  10) Has a relative or friend or a doctor or another health worker been concerned about your drinking or suggested you cut down? 0 - no    AUDIT Score: 5  Interpretation: Low risk alcohol consumption    Single Item Drug Screening:  How often have you used an illegal drug (including marijuana) or a prescription medication for non-medical reasons in the past year? never    Single Item Drug Screen Score: 0  Interpretation: Negative screen for possible drug use disorder    Brief Intervention  Alcohol & drug use screenings were reviewed   No concerns regarding substance use disorder identified  Other Counseling Topics:   Car/seat belt/driving safety, skin self-exam and sunscreen  No results found  Physical Exam:     /88   Pulse (!) 51   Ht 5' 7 5" (1 715 m)   Wt 80 7 kg (178 lb)   SpO2 99%   BMI 27 47 kg/m²     Physical Exam  Vitals reviewed            Karma Amos MD

## 2022-12-28 NOTE — ASSESSMENT & PLAN NOTE
Discussed preventative health, cancer screening, immunizations, and safety issues  Patient's last colonoscopy was in 2020 with recommendations to recheck in 2 years, patient is going to get next month  I recommend Tdap vaccination if he were to have a puncture wound    I recommend getting the Shingrix shot to help prevent Shingles  You can get it a pharmacy, and they can administer it there  It is a two shot series with the second shot needed between 2-6 months after the first shot  I would not recommend getting the shot before an important or fun event in case you were to have a reaction to the shot like a sore arm or flu-like symptoms  I make the same recommendation about any shot, as people can have a reaction to any shot

## 2022-12-30 ENCOUNTER — TRANSCRIBE ORDERS (OUTPATIENT)
Dept: LAB | Facility: CLINIC | Age: 77
End: 2022-12-30

## 2022-12-30 ENCOUNTER — APPOINTMENT (OUTPATIENT)
Dept: LAB | Facility: CLINIC | Age: 77
End: 2022-12-30

## 2022-12-30 DIAGNOSIS — F31.60 MIXED BIPOLAR I DISORDER (HCC): ICD-10-CM

## 2022-12-30 DIAGNOSIS — F31.60 MIXED BIPOLAR I DISORDER (HCC): Primary | ICD-10-CM

## 2022-12-30 LAB
BUN SERPL-MCNC: 31 MG/DL (ref 5–25)
CREAT SERPL-MCNC: 1.59 MG/DL (ref 0.6–1.3)
GFR SERPL CREATININE-BSD FRML MDRD: 41 ML/MIN/1.73SQ M
LITHIUM SERPL-SCNC: 0.9 MMOL/L (ref 0.6–1.2)
TSH SERPL DL<=0.05 MIU/L-ACNC: 1.28 UIU/ML (ref 0.45–4.5)

## 2023-01-10 ENCOUNTER — APPOINTMENT (OUTPATIENT)
Dept: LAB | Facility: CLINIC | Age: 78
End: 2023-01-10

## 2023-01-10 ENCOUNTER — OFFICE VISIT (OUTPATIENT)
Dept: PULMONOLOGY | Facility: CLINIC | Age: 78
End: 2023-01-10

## 2023-01-10 VITALS
HEART RATE: 56 BPM | OXYGEN SATURATION: 98 % | DIASTOLIC BLOOD PRESSURE: 72 MMHG | SYSTOLIC BLOOD PRESSURE: 114 MMHG | TEMPERATURE: 96.8 F | HEIGHT: 68 IN | BODY MASS INDEX: 27.01 KG/M2 | WEIGHT: 178.2 LBS | RESPIRATION RATE: 16 BRPM

## 2023-01-10 DIAGNOSIS — J30.9 ALLERGIC RHINITIS, UNSPECIFIED SEASONALITY, UNSPECIFIED TRIGGER: ICD-10-CM

## 2023-01-10 DIAGNOSIS — Z12.5 SCREENING FOR PROSTATE CANCER: ICD-10-CM

## 2023-01-10 DIAGNOSIS — J44.9 CHRONIC OBSTRUCTIVE PULMONARY DISEASE, UNSPECIFIED COPD TYPE (HCC): Primary | ICD-10-CM

## 2023-01-10 DIAGNOSIS — K21.9 GASTROESOPHAGEAL REFLUX DISEASE WITHOUT ESOPHAGITIS: ICD-10-CM

## 2023-01-10 LAB — PSA SERPL-MCNC: <0.1 NG/ML (ref 0–4)

## 2023-01-10 NOTE — PROGRESS NOTES
Office Progress Note - Pulmonary    Terrell Barnhart 78 y o  male MRN: [id number]    Encounter: [id number]      Assessment:  • Chronic cough  • Allergic rhinitis with postnasal dripping  • Chronic obstructive pulmonary disease  • Dyspnea on exertion  • History of lung cancer  Plan:   • Fluticasone nasal spray 2 sprays to each nostril once a day  • Fexofenadine 180 mg once a day  • Saline nasal/sinus rinse once a day as needed  • Trelegy Ellipta 200, 1 inhalation once a day  • Albuterol rescue inhaler 2 inhalations 4 times a day as needed  • Regular walks  • Follow-up in 4 months  Discussion:   The patient's cough has markedly improved  He still has occasional cough in the morning that wakes him up  I have changed the saline nasal/sinus rinse to once a day as needed  I have maintained him on the fluticasone nasal spray 2 sprays to each nostril once a day and fexofenadine 180 mg once a day  His COPD is in remission  I have maintained him on the Trelegy Ellipta 200, 1 inhalation once a day  He will use the albuterol rescue inhaler 2 inhalations 4 times a day as needed  I encouraged him to continue with the regular walks  I will see him in 4 months and a follow-up visit  Subjective: The patient is here for a follow-up visit  His cough has improved  However, he still has cough in the morning that wakes him up from sleep  He denies any chest pain  No wheezing  No significant sputum production  He is using saline nasal rinses once a day in the evening  He is on fluticasone nasal spray 2 sprays to each nostril once a day and fexofenadine 180 mg once a day  He is also using the Trelegy Ellipta 200, 1 inhalation once a day  On average he is using his rescue inhaler 2-3 times a day  He is walking every day for about 30 minutes  Review of systems:  A 12 point system review is done and aside from what is stated above the rest of the review of systems is negative        Family history and social history are reviewed  Medications list is reviewed  Vitals: Blood pressure 114/72, pulse 56, temperature (!) 96 8 °F (36 °C), temperature source Tympanic, resp  rate 16, height 5' 7 5" (1 715 m), weight 80 8 kg (178 lb 3 2 oz), SpO2 98 %  ,     Physical Exam  Gen: Awake, alert, oriented x 3, no acute distress  HEENT: Mucous membranes moist, no oral lesions, no thrush  NECK: No accessory muscle use, JVP not elevated  Cardiac: Regular, single S1, single S2, no murmurs, no rubs, no gallops  Lungs: Decreased breath sounds  No wheezing or rhonchi  Abdomen: normoactive bowel sounds, soft nontender, nondistended, no rebound or rigidity, no guarding  Extremities: no cyanosis, no clubbing, no edema  Neuro:  Grossly nonfocal   Skin:  No rash

## 2023-01-13 ENCOUNTER — OFFICE VISIT (OUTPATIENT)
Dept: UROLOGY | Facility: CLINIC | Age: 78
End: 2023-01-13

## 2023-01-13 VITALS
HEART RATE: 63 BPM | WEIGHT: 170 LBS | DIASTOLIC BLOOD PRESSURE: 82 MMHG | HEIGHT: 68 IN | SYSTOLIC BLOOD PRESSURE: 144 MMHG | BODY MASS INDEX: 25.76 KG/M2 | OXYGEN SATURATION: 98 %

## 2023-01-13 DIAGNOSIS — C61 PROSTATE CANCER (HCC): Primary | ICD-10-CM

## 2023-01-13 DIAGNOSIS — N18.31 STAGE 3A CHRONIC KIDNEY DISEASE (HCC): ICD-10-CM

## 2023-01-13 DIAGNOSIS — R35.0 FREQUENCY OF URINATION: ICD-10-CM

## 2023-01-13 NOTE — PROGRESS NOTES
UROLOGY PROGRESS NOTE   Patient Identifiers: Edith Qiu (MRN [id number])  Date of Service: 1/13/2023    Subjective:   13year-old man with history of prostate cancer  He had a robotic prostatectomy in [place] in 2007  His PSA remains < 0 1  He uses about 2 pads per day for postvoid dribbling  He complains of some urinary frequency and nocturia  He had side effects from anticholinergics in the past   He has been taking Myrbetriq's with good results      Reason for visit: Prostate cancer follow-up    Objective:     VITALS:    Vitals:    01/13/23 1023   BP: 144/82   Pulse: 63   SpO2: 98%     AUA SYMPTOM SCORE    Flowsheet Row Most Recent Value   AUA SYMPTOM SCORE    How often have you had a sensation of not emptying your bladder completely after you finished urinating? 1 (P)     How often have you had to urinate again less than two hours after you finished urinating? 2 (P)     How often have you found you stopped and started again several times when you urinate? 1 (P)     How often have you found it difficult to postpone urination? 1 (P)     How often have you had a weak urinary stream? 1 (P)     How often have you had to push or strain to begin urination? 1 (P)     How many times did you most typically get up to urinate from the time you went to bed at night until the time you got up in the morning? 4 (P)     Quality of Life: If you were to spend the rest of your life with your urinary condition just the way it is now, how would you feel about that? 4 (P)     AUA SYMPTOM SCORE 11 (P)             LABS:  Lab Results   Component Value Date    HGB 12 9 10/06/2022    HCT 40 8 10/06/2022    WBC 9 04 10/06/2022     10/06/2022   ]    Lab Results   Component Value Date     10/19/2015    K 5 3 10/31/2022     (H) 10/31/2022    CO2 27 10/31/2022    BUN 31 (H) 12/30/2022    CREATININE 1 59 (H) 12/30/2022    CALCIUM 10 3 (H) 10/31/2022    GLUCOSE 187 (H) 11/14/2016   ]        INPATIENT MEDS:    Current Outpatient Medications:   •  acetaminophen (TYLENOL) 500 mg tablet, Take 500 mg by mouth as needed for mild pain , Disp: , Rfl:   •  amLODIPine (NORVASC) 2 5 mg tablet, TAKE 1 TABLET BY MOUTH  DAILY, Disp: 90 tablet, Rfl: 3  •  aspirin (ECOTRIN LOW STRENGTH) 81 mg EC tablet, Take 81 mg by mouth daily, Disp: , Rfl:   •  atorvastatin (LIPITOR) 40 mg tablet, TAKE 1 TABLET BY MOUTH  DAILY, Disp: 90 tablet, Rfl: 5  •  Cholecalciferol (VITAMIN D PO), Take 2,000 Units by mouth daily  , Disp: , Rfl:   •  dicyclomine (Bentyl) 20 mg tablet, Take 1 tablet (20 mg total) by mouth every 6 (six) hours as needed (abd cramping), Disp: 90 tablet, Rfl: 3  •  famotidine (PEPCID) 40 MG tablet, TAKE 1 TABLET BY MOUTH  DAILY AT BEDTIME AS NEEDED  FOR HEARTBURN (Patient taking differently: 20 mg Patient is taking 20mg), Disp: 30 tablet, Rfl: 11  •  fexofenadine (ALLEGRA) 180 MG tablet, Take 180 mg by mouth as needed Daily during the Summer, Disp: , Rfl:   •  FLUoxetine (PROzac) 10 mg capsule, Take 10 mg by mouth daily , Disp: , Rfl:   •  fluticasone (FLONASE) 50 mcg/act nasal spray, 2 sprays into each nostril daily, Disp: , Rfl:   •  lithium carbonate (LITHOBID) 300 mg CR tablet, Take 300 mg by mouth in the morning, Disp: , Rfl:   •  metoprolol succinate (TOPROL-XL) 25 mg 24 hr tablet, TAKE 1 TABLET BY MOUTH  DAILY, Disp: 90 tablet, Rfl: 3  •  Mirabegron ER 25 MG TB24, Take 25 mg by mouth daily at bedtime, Disp: 30 tablet, Rfl: 10  •  mupirocin (BACTROBAN) 2 % ointment, Apply topically 2 (two) times a day as needed (wound), Disp: 22 g, Rfl: 0  •  nitroglycerin (NITRODUR) 0 2 mg/hr, APPLY 1 PATCH TOPICALLY  ONCE DAILY   LEAVE ON FOR 12 TO 14 HOURS THEN REMOVE FOR A NITRATE-FREE INTERVAL OF  10 TO 12 HOURS, Disp: 90 patch, Rfl: 3  •  omega-3-acid ethyl esters (LOVAZA) 1 g capsule, TAKE 1 CAPSULE BY MOUTH 3  TIMES DAILY, Disp: 270 capsule, Rfl: 3  •  omeprazole (PriLOSEC) 20 mg delayed release capsule, Take 1 capsule (20 mg total) by mouth daily, Disp: 90 capsule, Rfl: 3  •  ProAir  (90 Base) MCG/ACT inhaler, USE 2 INHALATIONS BY MOUTH  EVERY 6 HOURS AS NEEDED FOR WHEEZING, Disp: 34 g, Rfl: 3  •  Trelegy Ellipta 200-62 5-25 MCG/INH AEPB inhaler, USE 1 INHALATION BY MOUTH  DAILY   RINSE MOUTH AFTER  USE, Disp: 180 each, Rfl: 3      Physical Exam:   /82   Pulse 63   Ht 5' 7 5" (1 715 m)   Wt 77 1 kg (170 lb)   SpO2 98%   BMI 26 23 kg/m²   GEN: no acute distress    RESP: breathing comfortably with no accessory muscle use    ABD: soft, non-tender, non-distended   INCISION:    EXT: no significant peripheral edema         RADIOLOGY:   None    Assessment:   #1    Prostate cancer    Plan:   -Continue Myrbetriq  -Follow-up in 1 year with PSA prior to visit  -  -

## 2023-01-24 NOTE — PROGRESS NOTES
Daily Note     Today's date: 2018  Patient name: Nan Hillman  : 1945  MRN: [id number]  Referring provider: Sher Covarrubias MD  Dx:   Encounter Diagnosis   Name Primary?  Degenerative disc disease, lumbar Yes                  Subjective: Pt said he is doing well today overall but  Pt said his L LB was hurting this morning 4/10  Objective: See treatment diary below    Daily Treatment Diary      Exercise Diary    2/8                 TA in supine 10"x10  10" x 10  5" x 10reps x 2 sets                 TA with marches 15x ea 2#  20 x 2  2#   20 x 2 ,                  TA with bridges 15x  10 x 2   2 x 10                 TA with heel slides 12x ea  15x ea  2 x10 each side                 Clamshells in supine GTB 15x   GTB  2  X 10  GTB    2 x10                 Tandem gait 2 laps  2 laps  2 laps                  FTEO foam    2 x 30"  NP                  FTEC foam    2x30"  2 x 30"                  tandem stance    2 x 30"  2 x 30"                  alt toe taps    2 x 20  2 x 20  2#                  abdominal brace + knee ext + SLR      2 x 10                  high knee marches      forward/ lateral  2 laps each                  SLS unilateral UE      2 x 30"                                                                                                                                                                                                 Assessment:  Pt reported that LB was feeling good at end of session  Pt performed the addition of new exercises well  Pt improved with FTEC on foam balance with minimal postural sway  High knee marches was a challenge with getting knee high and maintaining balance  Supervised by Klarissa Rizzo PTA for entire session  Plan: Continue per plan of care  · 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, recurrence with CIS in 2016 BCG again  BCG refractory disease with pelvic metastases now s/p chemoradiation 2021, on current immunotherapy  · Chronic B/L PCN tubes  · CT A/P Jan 7: Stable bladder wall thickening, compatible with known malignancy  Stable metastatic retroperitoneal lymphadenopathy  Stable epicardial metastasis  · Per oncology note on Jan 6: Chemotherapy treatment should remain on hold for now untill he is clinically stable/discharged; particularly since he was positive flu  To follow-up with primary oncologist after discharge for reevaluation prior to resume treatment  · Port removed 2/2 bacteremia     Stable from urology point of view for discharge with indwelling Shirley catheter and manual irrigation until followed up in the office

## 2023-02-21 ENCOUNTER — TELEPHONE (OUTPATIENT)
Dept: PULMONOLOGY | Facility: CLINIC | Age: 78
End: 2023-02-21

## 2023-02-21 NOTE — TELEPHONE ENCOUNTER
Left message for patient to call back for their 6M follow up appt with dr Karey Gibbons in the [facility]

## 2023-02-23 ENCOUNTER — ANESTHESIA EVENT (OUTPATIENT)
Dept: GASTROENTEROLOGY | Facility: HOSPITAL | Age: 78
End: 2023-02-23

## 2023-02-23 ENCOUNTER — HOSPITAL ENCOUNTER (OUTPATIENT)
Dept: GASTROENTEROLOGY | Facility: HOSPITAL | Age: 78
Setting detail: OUTPATIENT SURGERY
End: 2023-02-23
Attending: INTERNAL MEDICINE

## 2023-02-23 ENCOUNTER — ANESTHESIA (OUTPATIENT)
Dept: GASTROENTEROLOGY | Facility: HOSPITAL | Age: 78
End: 2023-02-23

## 2023-02-23 VITALS
WEIGHT: 171 LBS | DIASTOLIC BLOOD PRESSURE: 60 MMHG | HEIGHT: 67 IN | BODY MASS INDEX: 26.84 KG/M2 | TEMPERATURE: 96.9 F | RESPIRATION RATE: 18 BRPM | HEART RATE: 62 BPM | SYSTOLIC BLOOD PRESSURE: 129 MMHG | OXYGEN SATURATION: 97 %

## 2023-02-23 DIAGNOSIS — Z12.11 ENCOUNTER FOR SCREENING FOR MALIGNANT NEOPLASM OF COLON: ICD-10-CM

## 2023-02-23 RX ORDER — PROPOFOL 10 MG/ML
INJECTION, EMULSION INTRAVENOUS CONTINUOUS PRN
Status: DISCONTINUED | OUTPATIENT
Start: 2023-02-23 | End: 2023-02-23

## 2023-02-23 RX ORDER — SODIUM CHLORIDE 9 MG/ML
INJECTION, SOLUTION INTRAVENOUS CONTINUOUS PRN
Status: DISCONTINUED | OUTPATIENT
Start: 2023-02-23 | End: 2023-02-23

## 2023-02-23 RX ORDER — PROPOFOL 10 MG/ML
INJECTION, EMULSION INTRAVENOUS AS NEEDED
Status: DISCONTINUED | OUTPATIENT
Start: 2023-02-23 | End: 2023-02-23

## 2023-02-23 RX ADMIN — SODIUM CHLORIDE: 0.9 INJECTION, SOLUTION INTRAVENOUS at 14:08

## 2023-02-23 RX ADMIN — PROPOFOL 100 MG: 10 INJECTION, EMULSION INTRAVENOUS at 14:13

## 2023-02-23 RX ADMIN — PROPOFOL 120 MCG/KG/MIN: 10 INJECTION, EMULSION INTRAVENOUS at 14:14

## 2023-02-23 NOTE — ANESTHESIA PREPROCEDURE EVALUATION
Procedure:  COLONOSCOPY    Relevant Problems   ANESTHESIA (within normal limits)      CARDIO  Stress test neg (3y ago)  Holter: No afib/flutter   (+) Aneurysm of infrarenal abdominal aorta   (+) Arrhythmia   (+) CAD (coronary artery disease)   (+) Essential hypertension   (+) Mixed hyperlipidemia   (+) Myocardial infarction (HCC)   (+) New onset atrial fibrillation (HCC)      GI/HEPATIC   (+) Gastroesophageal reflux disease      /RENAL   (+) Adenocarcinoma of prostate (HCC)   (+) CKD (chronic kidney disease)   (+) Stage 3a chronic kidney disease (HCC)      MUSCULOSKELETAL   (+) Degenerative disc disease, lumbar   (+) Primary osteoarthritis of left hip      NEURO/PSYCH   (+) Cervical myelopathy (HCC)   (+) Myelopathy concurrent with and due to lumbosacral intervertebral disc disorder      PULMONARY   (+) COPD (chronic obstructive pulmonary disease) (HCC)      Cardiovascular and Mediastinum   (+) Diastolic heart failure (HCC)      Respiratory   (+) Malignant neoplasm of lung (HCC)      Nervous and Auditory   (+) Cervical radiculopathy due to degenerative joint disease of spine   (+) Parkinsonism (HCC)      Other   (+) Bipolar affective disorder (HCC)        Physical Exam    Airway    Mallampati score: I  TM Distance: >3 FB  Neck ROM: full     Dental   No notable dental hx     Cardiovascular      Pulmonary      Other Findings        Anesthesia Plan  ASA Score- 3     Anesthesia Type- IV sedation with anesthesia with ASA Monitors  Additional Monitors:   Airway Plan:           Plan Factors-Exercise tolerance (METS): >4 METS  Chart reviewed  EKG reviewed  Existing labs reviewed  Patient summary reviewed  Patient is not a current smoker  Induction-     Postoperative Plan-     Informed Consent- Anesthetic plan and risks discussed with patient  I personally reviewed this patient with the CRNA  Discussed and agreed on the Anesthesia Plan with the CRNA  Karrie Nissen

## 2023-03-07 ENCOUNTER — OFFICE VISIT (OUTPATIENT)
Dept: OBGYN CLINIC | Facility: HOSPITAL | Age: 78
End: 2023-03-07

## 2023-03-07 VITALS
HEART RATE: 71 BPM | BODY MASS INDEX: 27.62 KG/M2 | DIASTOLIC BLOOD PRESSURE: 67 MMHG | WEIGHT: 176 LBS | SYSTOLIC BLOOD PRESSURE: 161 MMHG | HEIGHT: 67 IN

## 2023-03-07 DIAGNOSIS — Z98.890 H/O LUMBOSACRAL SPINE SURGERY: ICD-10-CM

## 2023-03-07 DIAGNOSIS — M51.36 DDD (DEGENERATIVE DISC DISEASE), LUMBAR: ICD-10-CM

## 2023-03-07 DIAGNOSIS — M48.062 SPINAL STENOSIS OF LUMBAR REGION WITH NEUROGENIC CLAUDICATION: ICD-10-CM

## 2023-03-07 DIAGNOSIS — M70.62 TROCHANTERIC BURSITIS OF LEFT HIP: ICD-10-CM

## 2023-03-07 DIAGNOSIS — M79.2 NEUROGENIC PAIN: Primary | ICD-10-CM

## 2023-03-07 RX ORDER — CEPHALEXIN 500 MG/1
CAPSULE ORAL
COMMUNITY
Start: 2023-03-06

## 2023-03-07 RX ORDER — BETAMETHASONE SODIUM PHOSPHATE AND BETAMETHASONE ACETATE 3; 3 MG/ML; MG/ML
6 INJECTION, SUSPENSION INTRA-ARTICULAR; INTRALESIONAL; INTRAMUSCULAR; SOFT TISSUE
Status: COMPLETED | OUTPATIENT
Start: 2023-03-07 | End: 2023-03-07

## 2023-03-07 RX ORDER — LIDOCAINE HYDROCHLORIDE 10 MG/ML
2 INJECTION, SOLUTION INFILTRATION; PERINEURAL
Status: COMPLETED | OUTPATIENT
Start: 2023-03-07 | End: 2023-03-07

## 2023-03-07 RX ADMIN — LIDOCAINE HYDROCHLORIDE 2 ML: 10 INJECTION, SOLUTION INFILTRATION; PERINEURAL at 11:00

## 2023-03-07 RX ADMIN — BETAMETHASONE SODIUM PHOSPHATE AND BETAMETHASONE ACETATE 6 MG: 3; 3 INJECTION, SUSPENSION INTRA-ARTICULAR; INTRALESIONAL; INTRAMUSCULAR; SOFT TISSUE at 11:00

## 2023-03-07 NOTE — PROGRESS NOTES
Subjective;    44-year-old male accompanied by his wife who has a history of lumbar spine surgery  He has received treatment to the left hip bursa, previously,  And relates that his last injection provided him little relief  A thorough verbal review of his symptoms finds, that he has predictable left leg symptoms that are neurogenic in their consistency, occur predictably with standing or axial loading  They also offer him retrograde pain that is burning or electrical from the lower left extremity ankle and calf region to the thigh left leg  Occurs without gross motor weakness    Has not seen a spine professional for some time  He does however have x-rays of the lumbar spine and his pelvis and hip done by his PCP  Past Medical History:   Diagnosis Date   • Allergic rhinitis 2015   • Aortic aneurysm [facility])    • Benign colon polyp    • Bipolar 1 disorder ([place] [address] )    • Cardiac disease     MI   • Cervical cord compression with myelopathy ([place] [address] )    • COPD (chronic obstructive pulmonary disease) ([place] [address] )    • Coronary artery disease 1995   • Diverticulitis    • Diverticulosis    • Emphysema of lung ([place] [address] ) 2012   • Gait disturbance     uses cane, leg brace on right   • GERD (gastroesophageal reflux disease)    • Heart attack ([place] [address] ) 1996   • Hx of resection of large bowel 05/03/2016   • Hyperlipidemia    • Hypertension    • IBS (irritable bowel syndrome)    • Lumbar stenosis    • Lung cancer ([place] [address] )     2007 Left lower lobectomy and 2011 Right lung with surgery    • Myocardial infarction [facility])     involving other coronary artery   • Prostate cancer ([place] [address] )    • Shortness of breath    • Small bowel obstruction ([place] [address] ) 11/16/2016       Past Surgical History:   Procedure Laterality Date   • ANGIOPLASTY      stent   • CARDIAC CATHETERIZATION  1995    angioplasty   • CARDIAC SURGERY     • CERVICAL SPINE SURGERY      Cervical decompression with cervical fusion from C3-C7 for spinal stenosis     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   • LUNG LOBECTOMY Left    • DC ARTHRD ANT INTERBODY MIN DSC CRV BELOW C2 N/A 05/02/2016    Procedure: Anterior cervical diskectomy C3/4, C5/6, C6/7 with anterior plate fixation fusion C3-7;  Posterior decompressive laminectomy C3-7 with lateral mass fixation fusion C3-7 (IMPULSE MONITORING); Surgeon: Josee Palacios MD;  Location: [facility];  Service: Neurosurgery   • DC ARTHRODESIS POSTERIOR INTERBODY [address] LUMBAR N/A 01/30/2017    Procedure: L4-5 AND L5-S1 DECOMPRESSIVE FORAMINOTOMIES, TRANSFORAMINAL LUMBAR INTERBODY AND PEDICLE SCREW FIXATION FUSION L4-S1 (IMPULSE);   Surgeon: Josee Palacios MD;  Location: [facility];  Service: Neurosurgery   • DC EVASC RPR DPLMNT AORTO-AORTIC NDGFT N/A 08/06/2018    Procedure: REPAIR ANEURYSM ENDOVASCULAR ABDOMINAL AORTIC  (EVAR) WITH BILATERAL PERCUTANEOUS FEMORAL ACCESS WITH ULTRASOUND GUIDANCE ON THE RIGHT AND PRE CLOSURE;  Surgeon: Alis Lane MD;  Location: [facility];  Service: Vascular   • PROSTATECTOMY  2007    for prostate CA - no chemo/RT   • SIGMOIDECTOMY      for divertic   • SMALL INTESTINE SURGERY N/A 11/17/2016    Procedure: Exploratory Laparotomy, Lysis of adhesions to release small bowel obstruction;  Surgeon: Ramirez Espinoza MD;  Location: [facility];  Service:    • SPINE SURGERY  2016, 2017   • TONSILLECTOMY  Y    1950       Family History   Problem Relation Age of Onset   • Hypertension Mother    • Heart attack Father    • Colon cancer Father    • Coronary artery disease Father    • Hypertension Father    • Heart disease Family    • Hyperlipidemia Family    • Hypertension Family        Social History     Tobacco Use   • Smoking status: Former     Packs/day: 1 50     Years: 35 00     Pack years: 52 50     Types: Cigarettes     Start date: 5     Quit date: 2011     Years since quittin 1   • Smokeless tobacco: Never   Vaping Use   • Vaping Use: Never used   Substance Use Topics   • Alcohol use: Yes     Alcohol/week: 2 0 - 3 0 standard drinks     Types: 2 - 3 Shots of liquor per week     Comment: One glass per day   • Drug use: No     Exam;    Gentleman's exam was done in front of his wife and in 2 positions standing and seated  Standing position, had reproducible pain in the piriformis fossa left side, well as the left inferior outer quadrant of his buttock, with lateral sidebending of 15 degrees at the waist, and also with posterior rotation  Was noticed that he has asymmetrical flank creases, left sharply cut,  the right is gentle  He has no lumbosacral pain in the midline  Is tolerant of forward flexion and extension posteriorly at the waist     The seated position he is tolerant of left hip internal and external rotation the hip flexed 90 degrees  Motor exam finds intact hip flexors and knee extensors which are symmetrical right and left  Has 4+/5 motor left tibialis anterior compared to the right  He exhibits equal pushoff  X-rays; lumbar spine shows multilevel spinal fusion maintained intravertebral graft to levels adequate screws and rods adjacent level DDD L2-3 and evidence of stenosis L2-3, seen on the lateral projection  This is not a formal radiologist interpretation the reader is directed to epic  AP pelvis left hip, show bone matrix that appears to be osteopenic, native hip is age-appropriate and without significant degeneration       Large joint arthrocentesis: L greater trochanteric bursa  Universal Protocol:  Consent given by: patient    Procedure Details  Location: hip - L greater trochanteric bursa  Needle size: 22 G  Approach: lateral  Medications administered: 2 mL lidocaine 1 %; 6 mg betamethasone acetate-betamethasone sodium phosphate 6 (3-3) mg/mL    Patient tolerance: patient tolerated the procedure well with no immediate complications  Dressing:  Sterile dressing applied      Impression; Left leg neurogenic pain  History of lumbar spine surgery  Left hip greater trochanteric bursitis    Plan;    He received a well-placed injection to the left hip greater trochanteric bursa  Is referred with confidence to [facility] spine and pain  Spent over 30 minutes with the patient of which half was for verbal face-to-face consultation      He is offered a return appointment for 3 additional months his experience was supervised by and plan formulated by surgeon it was my privilege to assist him with the delivery of this man's care

## 2023-03-07 NOTE — RESULT ENCOUNTER NOTE
Inform patient via [employer]  Please review the pathology/lab result of further discussion  Copied from [address] message :       [place],     One of the polyps was precancerous but the others were all completely benign  I would recommend repeating the colonoscopy in 5 to 7 years      Best regards,     London Ambrose MD

## 2023-03-10 ENCOUNTER — OFFICE VISIT (OUTPATIENT)
Dept: GASTROENTEROLOGY | Facility: MEDICAL CENTER | Age: 78
End: 2023-03-10

## 2023-03-10 VITALS
BODY MASS INDEX: 27.66 KG/M2 | TEMPERATURE: 96.5 F | HEART RATE: 52 BPM | SYSTOLIC BLOOD PRESSURE: 159 MMHG | WEIGHT: 176.6 LBS | DIASTOLIC BLOOD PRESSURE: 75 MMHG

## 2023-03-10 DIAGNOSIS — K21.9 GASTROESOPHAGEAL REFLUX DISEASE WITHOUT ESOPHAGITIS: Primary | ICD-10-CM

## 2023-03-10 DIAGNOSIS — D12.6 ADENOMA OF COLON: ICD-10-CM

## 2023-03-10 DIAGNOSIS — K21.9 GASTROESOPHAGEAL REFLUX DISEASE: ICD-10-CM

## 2023-03-10 RX ORDER — OMEPRAZOLE 20 MG/1
20 CAPSULE, DELAYED RELEASE ORAL 2 TIMES DAILY
Qty: 60 CAPSULE | Refills: 3 | Status: SHIPPED | OUTPATIENT
Start: 2023-03-10 | End: 2023-04-09

## 2023-03-10 NOTE — PROGRESS NOTES
Outpatient Follow up  [address]  [address]  [facility] [address]  Meghana Day MD  Ph : [phone]  Fax : [phone]  Mobile : [phone]  Email : [email or web address]  Also available on [employer]    Anastasia Baer 78 y o  male MRN: [id number]    PCP: Olvin Lemos MD  Referring: No referring provider defined for this encounter  Anastasia Baer presented for a follow up visit  My recommendations are included  Please do not hesitate to contact me with any questions you may have  ASSESSMENT AND PLAN:      No problem-specific Assessment & Plan notes found for this encounter  Diagnoses and all orders for this visit:    Gastroesophageal reflux disease without esophagitis    Adenoma of colon    Gastroesophageal reflux disease  -     FL Barium Swallow Motility Study; Future  -     omeprazole (PriLOSEC) 20 mg delayed release capsule; Take 1 capsule (20 mg total) by mouth 2 (two) times a day      1  GERD - persistent symptoms  - increase omeprazole to bid (20mg)  - barium motility study  - possible endoscopic or surgical therapy depending on the motility studies  Discussed TIF / Christiano Grime  - may need pH/manometery or BRAVO  2  Colon polyp :   - repeat colonoscopy in 5 - 7 years  ______________________________________________________________________    SUBJECTIVE:  79 y/o gentleman here to follow up after colonoscopy and for GERD  Colonoscopy 2/23 : 4 polyps seen and removed  All were small  (one was tubular adenoma)   Pancolonic diverticulosis  EGD 8/22 : Normal esophagus  Thickened gastric body folds  Biopsies done  Normal antrum  Biopsies done  A diminutive gastric body polyp - likely fundic gland  He is experiencing reflux symptoms  He is on omeprazole 20 mg daily but has to take pepcid in addition quite often  At times he has to take TUMS  He has symptoms at night and then has the symptoms   He has burning and regurgitation  No dysphagia  Answers for HPI/ROS submitted by the patient on 3/3/2023  Chronicity: recurrent  Onset: more than 1 month ago  Onset quality: gradual  Frequency: intermittently  Progression since onset: unchanged  Pain location: left flank, right flank  Pain - numeric: 4/10  Pain quality: burning, cramping  Radiates to: does not radiate  Aggravated by: nothing  Relieved by: belching, bowel movements, passing flatus, sitting up  Diagnostic workup: lower endoscopy        REVIEW OF SYSTEMS IS OTHERWISE NEGATIVE  Historical Information   Past Medical History:   Diagnosis Date   • Allergic rhinitis 2015   • Aortic aneurysm (HCC)    • Benign colon polyp    • Bipolar 1 disorder ([facility] )    • Cardiac disease     MI   • Cervical cord compression with myelopathy ([facility] )    • COPD (chronic obstructive pulmonary disease) ([place] [address] )    • Coronary artery disease 1995   • Diverticulitis    • Diverticulosis    • Emphysema of lung ([facility] ) 2012   • Gait disturbance     uses cane, leg brace on right   • GERD (gastroesophageal reflux disease)    • Heart attack ([place] [address] ) 1996   • Hx of resection of large bowel 05/03/2016   • Hyperlipidemia    • Hypertension    • IBS (irritable bowel syndrome)    • Lumbar stenosis    • Lung cancer ([facility] )     2007 Left lower lobectomy and 2011 Right lung with surgery    • Myocardial infarction [facility])     involving other coronary artery   • Prostate cancer ([place] [address] )    • Shortness of breath    • Small bowel obstruction ([facility] ) 11/16/2016     Past Surgical History:   Procedure Laterality Date   • ANGIOPLASTY      stent   • CARDIAC CATHETERIZATION  1995    angioplasty   • CARDIAC SURGERY     • CERVICAL SPINE SURGERY      Cervical decompression with cervical fusion from C3-C7 for spinal stenosis     • COLON SURGERY  11/04/2014   • ESOPHAGOGASTRODUODENOSCOPY  01/03/2013    with possible Schatzki's ring & small hiatal hernia, mild gastritis   • FL INJECTION LEFT HIP (NON ARTHROGRAM)  12/11/2018 • FL INJECTION LEFT HIP (NON ARTHROGRAM)  05/09/2019   • IR BIOPSY LUNG  07/06/2020   • IR EVAR  08/06/2018   • LUNG BIOPSY  2007   • LUNG CANCER SURGERY Right 03/2011    wedge resection for lung tumor, 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   • LUNG LOBECTOMY Left    • OR ARTHRD ANT INTERBODY MIN DSC CRV BELOW C2 N/A 05/02/2016    Procedure: Anterior cervical diskectomy C3/4, C5/6, C6/7 with anterior plate fixation fusion C3-7;  Posterior decompressive laminectomy C3-7 with lateral mass fixation fusion C3-7 (IMPULSE MONITORING); Surgeon: Magalis Linn MD;  Location: [facility];  Service: Neurosurgery   • OR ARTHRODESIS POSTERIOR INTERBODY [address] LUMBAR N/A 01/30/2017    Procedure: L4-5 AND L5-S1 DECOMPRESSIVE FORAMINOTOMIES, TRANSFORAMINAL LUMBAR INTERBODY AND PEDICLE SCREW FIXATION FUSION L4-S1 (IMPULSE);   Surgeon: Magalis Linn MD;  Location[facility];  Service: Neurosurgery   • OR EVASC RPR DPLMNT AORTO-AORTIC NDGFT N/A 08/06/2018    Procedure: REPAIR ANEURYSM ENDOVASCULAR ABDOMINAL AORTIC  (EVAR) WITH BILATERAL PERCUTANEOUS FEMORAL ACCESS WITH ULTRASOUND GUIDANCE ON THE RIGHT AND PRE CLOSURE;  Surgeon: Christian Pritchett MD;  Location[facility];  Service: Vascular   • PROSTATECTOMY  2007    for prostate CA - no chemo/RT   • SIGMOIDECTOMY      for divertic   • SMALL INTESTINE SURGERY N/A 11/17/2016    Procedure: Exploratory Laparotomy, Lysis of adhesions to release small bowel obstruction;  Surgeon: Shira Hernadez MD;  Location: [facility];  Service:    • SPINE SURGERY  2016, 2017   • TONSILLECTOMY  Y    1950     Social History   Social History     Substance and Sexual Activity   Alcohol Use Yes   • Alcohol/week: 2 0 - 3 0 standard drinks   • Types: 2 - 3 Shots of liquor per week    Comment: One glass per day     Social History     Substance and Sexual Activity   Drug Use No     Social History     Tobacco Use   Smoking Status Former   • Packs/day: 1 50   • Years: 35 00   • Pack years: 52 50 • Types: Cigarettes   • Start date: 5   • Quit date: 2011   • Years since quittin 1   Smokeless Tobacco Never     Family History   Problem Relation Age of Onset   • Hypertension Mother    • Heart attack Father    • Colon cancer Father    • Coronary artery disease Father    • Hypertension Father    • Heart disease Family    • Hyperlipidemia Family    • Hypertension Family        Meds/Allergies       Current Outpatient Medications:   •  acetaminophen (TYLENOL) 500 mg tablet  •  amLODIPine (NORVASC) 2 5 mg tablet  •  aspirin (ECOTRIN LOW STRENGTH) 81 mg EC tablet  •  atorvastatin (LIPITOR) 40 mg tablet  •  cephalexin (KEFLEX) 500 mg capsule  •  Cholecalciferol (VITAMIN D PO)  •  dicyclomine (Bentyl) 20 mg tablet  •  famotidine (PEPCID) 40 MG tablet  •  fexofenadine (ALLEGRA) 180 MG tablet  •  FLUoxetine (PROzac) 10 mg capsule  •  fluticasone (FLONASE) 50 mcg/act nasal spray  •  lithium carbonate (LITHOBID) 300 mg CR tablet  •  metoprolol succinate (TOPROL-XL) 25 mg 24 hr tablet  •  Mirabegron ER 25 MG TB24  •  mupirocin (BACTROBAN) 2 % ointment  •  nitroglycerin (NITRODUR) 0 2 mg/hr  •  omega-3-acid ethyl esters (LOVAZA) 1 g capsule  •  omeprazole (PriLOSEC) 20 mg delayed release capsule  •  ProAir  (90 Base) MCG/ACT inhaler  •  Trelegy Ellipta 200-62 5-25 MCG/INH AEPB inhaler    Allergies   Allergen Reactions   • Bactrim [Sulfamethoxazole-Trimethoprim] Other (See Comments)     AILYN   • Nsaids      Annotation - [id number]: unable to take due to use of lithium  • Oxycodone Rash           Objective     Blood pressure 159/75, pulse (!) 52, temperature (!) 96 5 °F (35 8 °C), temperature source Tympanic, weight 80 1 kg (176 lb 9 6 oz)  Body mass index is 27 66 kg/m²  PHYSICAL EXAM:      Physical Exam  Constitutional:       Appearance: Normal appearance  He is well-developed  HENT:      Head: Normocephalic and atraumatic  Eyes:      General: No scleral icterus       Conjunctiva/sclera: Conjunctivae normal       Pupils: Pupils are equal, round, and reactive to light  Cardiovascular:      Rate and Rhythm: Normal rate and regular rhythm  Heart sounds: Normal heart sounds  Pulmonary:      Effort: Pulmonary effort is normal  No respiratory distress  Breath sounds: Normal breath sounds  Abdominal:      General: Bowel sounds are normal  There is no distension  Palpations: Abdomen is soft  There is no mass  Tenderness: There is no abdominal tenderness  Hernia: No hernia is present  Musculoskeletal:         General: Normal range of motion  Cervical back: Normal range of motion  Lymphadenopathy:      Cervical: No cervical adenopathy  Skin:     General: Skin is warm  Neurological:      Mental Status: He is alert and oriented to person, place, and time  Psychiatric:         Behavior: Behavior normal          Thought Content: Thought content normal          Lab Results:   No visits with results within 1 Day(s) from this visit     Latest known visit with results is:   Hospital Outpatient Visit on 02/23/2023   Component Date Value   • Case Report 02/23/2023                      Value:Surgical Pathology Report                         Case: [id number]                                   Authorizing Provider:  Gabi Rogers MD             Collected:           02/23/2023 1425              Ordering Location:     [address] [place] [address]      Received:            02/23/2023 [place] [id number] Endoscopy                                                           Pathologist:           Radha Fritz MD                                                                 Specimens:   A) - Polyp, Colorectal, cold snare- transverse polyp x 2                                            B) - Polyp, Colorectal, cold snare - descending polyp x2 • Final Diagnosis 02/23/2023                      Value: This result contains rich text formatting which cannot be displayed here  • Additional Information 02/23/2023                      Value: This result contains rich text formatting which cannot be displayed here  • Synoptic Checklist 02/23/2023                      Value:                            COLON/RECTUM POLYP FORM - GI - All Specimens                                                                                     :    Adenoma(s)     • Gross Description 02/23/2023                      Value: This result contains rich text formatting which cannot be displayed here  Radiology Results:   Colonoscopy    Result Date: 2/23/2023  Narrative: Table formatting from the original result was not included  [address] [place] [address] Endoscopy [id number] [address]: 2/23/23 PHYSICIAN(S): Attending: Italo Callejas MD Fellow: No Staff Documented INDICATION: Encounter for screening for malignant neoplasm of colon POST-OP DIAGNOSIS: See the impression below  HISTORY: Prior colonoscopy: Less than 3 years ago  It is being repeated at an interval of less than 3 years because: This colonoscopy is being performed for a diagnostic indication BOWEL PREPARATION: Miralax/Dulcolax PREPROCEDURE: Informed consent was obtained for the procedure, including sedation  Risks including but not limited to bleeding, infection, perforation, adverse drug reaction and aspiration were explained in detail  Also explained about less than 100% sensitivity with the exam and other alternatives  The patient was placed in the left lateral decubitus position  Procedure: Colonoscopy DETAILS OF PROCEDURE: Patient was taken to the procedure room where a time out was performed to confirm correct patient and correct procedure   The patient underwent monitored anesthesia care, which was administered by an anesthesia professional  The patient's blood pressure, heart rate, level of consciousness, oxygen and respirations were monitored throughout the procedure  A digital rectal exam was performed  The scope was introduced through the anus and advanced to the cecum  Retroflexion was performed in the rectum  The quality of bowel preparation was evaluated using the [place] Bowel Preparation Scale with scores of: right colon = 2, transverse colon = 2, left colon = 2  The total BBPS score was 6  Bowel prep was adequate  The patient experienced no blood loss  The procedure was not difficult  The patient tolerated the procedure well  There were no apparent complications  ANESTHESIA INFORMATION: ASA: III Anesthesia Type: IV Sedation with Anesthesia MEDICATIONS: No administrations occurring from 1401 to 1441 on 02/23/23 FINDINGS: Two polyps measuring smaller than 5 mm in the transverse colon; removed by cold snare Two polyps measuring smaller than 5 mm in the descending colon; removed by cold snare Multiple small, moderate scattered pancolonic diverticula Otherwise normal examination  EVENTS: Procedure Events Event Event Time ENDO CECUM REACHED 2/23/2023  2:19 PM ENDO SCOPE OUT TIME 2/23/2023  2:33 PM SPECIMENS: ID Type Source Tests Collected by Time Destination 1 : cold snare- transverse polyp x 2 Tissue Polyp, Colorectal TISSUE EXAM Alyssa Jeong MD 2/23/2023  2:25 PM  2 : cold snare - descending polyp x2 Tissue Polyp, Colorectal TISSUE EXAM Alyssa Jeong MD 2/23/2023  2:27 PM  EQUIPMENT: Colonoscope -[id number] ENDOCUFF VISION LRG GREEN ID [id number]     Impression: 4 polyps seen and removed  All were small  Pancolonic diverticulosis  RECOMMENDATION:  Await pathology results  Repeat colonoscopy in 5 years  Personal history of colon polyps   If all polyps are adenomas may decrease interval to 3 years    Alyssa Jeong MD

## 2023-03-21 DIAGNOSIS — I25.10 CORONARY ARTERY DISEASE INVOLVING NATIVE HEART WITHOUT ANGINA PECTORIS, UNSPECIFIED VESSEL OR LESION TYPE: ICD-10-CM

## 2023-03-21 RX ORDER — NITROGLYCERIN 40 MG/1
PATCH TRANSDERMAL
Qty: 90 PATCH | Refills: 3 | Status: SHIPPED | OUTPATIENT
Start: 2023-03-21

## 2023-03-22 ENCOUNTER — TRANSCRIBE ORDERS (OUTPATIENT)
Dept: LAB | Facility: CLINIC | Age: 78
End: 2023-03-22

## 2023-03-22 ENCOUNTER — RA CDI HCC (OUTPATIENT)
Dept: OTHER | Facility: HOSPITAL | Age: 78
End: 2023-03-22

## 2023-03-22 ENCOUNTER — APPOINTMENT (OUTPATIENT)
Dept: LAB | Facility: CLINIC | Age: 78
End: 2023-03-22

## 2023-03-22 DIAGNOSIS — F31.60 MIXED BIPOLAR I DISORDER (HCC): Primary | ICD-10-CM

## 2023-03-22 DIAGNOSIS — F31.60 MIXED BIPOLAR I DISORDER (HCC): ICD-10-CM

## 2023-03-22 LAB
BUN SERPL-MCNC: 27 MG/DL (ref 5–25)
CREAT SERPL-MCNC: 1.41 MG/DL (ref 0.6–1.3)
GFR SERPL CREATININE-BSD FRML MDRD: 48 ML/MIN/1.73SQ M
LITHIUM SERPL-SCNC: 0.8 MMOL/L (ref 0.6–1.2)
TSH SERPL DL<=0.05 MIU/L-ACNC: 1.45 UIU/ML (ref 0.45–4.5)

## 2023-03-29 ENCOUNTER — OFFICE VISIT (OUTPATIENT)
Dept: INTERNAL MEDICINE CLINIC | Facility: CLINIC | Age: 78
End: 2023-03-29

## 2023-03-29 VITALS
HEIGHT: 67 IN | OXYGEN SATURATION: 99 % | SYSTOLIC BLOOD PRESSURE: 140 MMHG | DIASTOLIC BLOOD PRESSURE: 60 MMHG | TEMPERATURE: 96.1 F | WEIGHT: 178.4 LBS | BODY MASS INDEX: 28 KG/M2 | HEART RATE: 73 BPM

## 2023-03-29 DIAGNOSIS — I10 ESSENTIAL HYPERTENSION: ICD-10-CM

## 2023-03-29 DIAGNOSIS — I48.91 NEW ONSET ATRIAL FIBRILLATION (HCC): Primary | ICD-10-CM

## 2023-03-29 DIAGNOSIS — N18.31 STAGE 3A CHRONIC KIDNEY DISEASE (HCC): ICD-10-CM

## 2023-03-29 DIAGNOSIS — C34.90 MALIGNANT NEOPLASM OF LUNG, UNSPECIFIED LATERALITY, UNSPECIFIED PART OF LUNG (HCC): ICD-10-CM

## 2023-03-29 DIAGNOSIS — F31.9 BIPOLAR AFFECTIVE DISORDER, REMISSION STATUS UNSPECIFIED (HCC): ICD-10-CM

## 2023-03-29 DIAGNOSIS — G95.9 CERVICAL MYELOPATHY (HCC): ICD-10-CM

## 2023-03-29 DIAGNOSIS — E78.2 MIXED HYPERLIPIDEMIA: ICD-10-CM

## 2023-03-29 DIAGNOSIS — G20 PARKINSONISM, UNSPECIFIED PARKINSONISM TYPE (HCC): ICD-10-CM

## 2023-03-29 DIAGNOSIS — I50.30 DIASTOLIC HEART FAILURE, UNSPECIFIED HF CHRONICITY (HCC): ICD-10-CM

## 2023-03-29 DIAGNOSIS — I71.40 ABDOMINAL AORTIC ANEURYSM (AAA) WITHOUT RUPTURE, UNSPECIFIED PART (HCC): ICD-10-CM

## 2023-03-29 NOTE — ASSESSMENT & PLAN NOTE
Lab Results   Component Value Date    EGFR 48 03/22/2023    EGFR 41 12/30/2022    EGFR 45 10/31/2022    CREATININE 1 41 (H) 03/22/2023    CREATININE 1 59 (H) 12/30/2022    CREATININE 1 48 (H) 10/31/2022   Stable, avoid excessive use of NSAIDs, stay hydrated

## 2023-03-29 NOTE — PROGRESS NOTES
Name: Chepe Duarte      : 1945      MRN: [id number]  Encounter Provider: Juan Mcgarry MD  Encounter Date: 3/29/2023   Encounter department: [facility]     1  New onset atrial fibrillation [facility])  Assessment & Plan:  Patient had reported isolated event in 2018 associate with respiratory infection, he follows with cardiology, he has had Holter monitors, not on anticoagulation, no recurrent problems with this      2  Malignant neoplasm of lung, unspecified laterality, unspecified part of lung ([facility] )  Assessment & Plan:  Follow up Pulm      3  Abdominal aortic aneurysm (AAA) without rupture, unspecified part  Assessment & Plan:  Patient had repair of this      4  Parkinsonism, unspecified Parkinsonism type [facility])  Assessment & Plan:  Follow up Neurology      5  Cervical myelopathy (HCC)  Assessment & Plan:  Stable      6  Diastolic heart failure, unspecified HF chronicity (HCC)  Assessment & Plan:  Wt Readings from Last 3 Encounters:   23 80 9 kg (178 lb 6 4 oz)   03/10/23 80 1 kg (176 lb 9 6 oz)   23 79 8 kg (176 lb)     Mild, follow-up cardiology, patient doing well          7  Bipolar affective disorder, remission status unspecified ([facility] )  Assessment & Plan:  Continue medications, follow-up psychiatry      8  Mixed hyperlipidemia  Assessment & Plan:  Continue atorvastatin along with healthy diet and exercise      9  Stage 3a chronic kidney disease [facility])  Assessment & Plan:  Lab Results   Component Value Date    EGFR 48 2023    EGFR 41 2022    EGFR 45 10/31/2022    CREATININE 1 41 (H) 2023    CREATININE 1 59 (H) 2022    CREATININE 1 48 (H) 10/31/2022   Stable, avoid excessive use of NSAIDs, stay hydrated      10  Essential hypertension  Assessment & Plan:  Borderline, continue meds along with healthy diet and exercise, patient walks daily        BMI Counseling: Body mass index is 27 94 kg/m²   The BMI is above normal  Nutrition recommendations include encouraging healthy choices of fruits and vegetables and moderation in carbohydrate intake  Exercise recommendations include exercising 3-5 times per week  Rationale for BMI follow-up plan is due to patient being overweight or obese  Subjective     Pt here for follow up and has some concerns  He feels cold often  Review of Systems   Constitutional: Negative for chills, fatigue and fever  HENT: Negative for congestion, nosebleeds, postnasal drip, sore throat and trouble swallowing  Eyes: Negative for pain  Respiratory: Negative for cough, chest tightness, shortness of breath and wheezing  Cardiovascular: Negative for chest pain, palpitations and leg swelling  Gastrointestinal: Negative for abdominal pain, constipation, diarrhea, nausea and vomiting  Endocrine: Negative for polydipsia and polyuria  Genitourinary: Negative for dysuria, flank pain and hematuria  Musculoskeletal: Negative for arthralgias, back pain and myalgias  Skin: Negative for rash  Neurological: Positive for tremors  Negative for dizziness, light-headedness and headaches  Hematological: Does not bruise/bleed easily  Psychiatric/Behavioral: Negative for confusion and dysphoric mood  The patient is not nervous/anxious          Past Medical History:   Diagnosis Date   • Allergic rhinitis 2015   • Aortic aneurysm [facility])    • Benign colon polyp    • Bipolar 1 disorder ([facility] )    • Cardiac disease     MI   • Cervical cord compression with myelopathy ([facility] )    • COPD (chronic obstructive pulmonary disease) ([facility] )    • Coronary artery disease 1995   • Diverticulitis    • Diverticulosis    • Emphysema of lung ([facility] ) 2012   • Gait disturbance     uses cane, leg brace on right   • GERD (gastroesophageal reflux disease)    • Heart attack ([facility] ) 1996   • Hx of resection of large bowel 05/03/2016   • Hyperlipidemia    • Hypertension    • IBS (irritable bowel syndrome)    • Lumbar stenosis    • Lung cancer [facility])     2007 Left lower lobectomy and 2011 Right lung with surgery    • Myocardial infarction [facility])     involving other coronary artery   • Prostate cancer ([facility] )    • Shortness of breath    • Small bowel obstruction ([facility] ) 11/16/2016     Past Surgical History:   Procedure Laterality Date   • ANGIOPLASTY      stent   • CARDIAC CATHETERIZATION  1995    angioplasty   • CARDIAC SURGERY     • CERVICAL SPINE SURGERY      Cervical decompression with cervical fusion from C3-C7 for spinal stenosis  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   • LUNG LOBECTOMY Left    • [place] ARTHRD ANT INTERBODY MIN DSC CRV BELOW C2 N/A 05/02/2016    Procedure: Anterior cervical diskectomy C3/4, C5/6, C6/7 with anterior plate fixation fusion C3-7;  Posterior decompressive laminectomy C3-7 with lateral mass fixation fusion C3-7 (IMPULSE MONITORING); Surgeon: Hanny Foley MD;  Location: [facility];  Service: Neurosurgery   • [place] ARTHRODESIS POSTERIOR INTERBODY [address] LUMBAR N/A 01/30/2017    Procedure: L4-5 AND L5-S1 DECOMPRESSIVE FORAMINOTOMIES, TRANSFORAMINAL LUMBAR INTERBODY AND PEDICLE SCREW FIXATION FUSION L4-S1 (IMPULSE);   Surgeon: Hanny Foley MD;  Location: [facility];  Service: Neurosurgery   • W[place] EVASC RPR DPLMNT AORTO-AORTIC NDGFT N/A 08/06/2018    Procedure: REPAIR ANEURYSM ENDOVASCULAR ABDOMINAL AORTIC  (EVAR) WITH BILATERAL PERCUTANEOUS FEMORAL ACCESS WITH ULTRASOUND GUIDANCE ON THE RIGHT AND PRE CLOSURE;  Surgeon: Grayson Gonzalez MD;  Location: [facility];  Service: Vascular   • PROSTATECTOMY  2007    for prostate CA - no chemo/RT   • SIGMOIDECTOMY      for divertic   • SMALL INTESTINE SURGERY N/A 2016    Procedure: Exploratory Laparotomy, Lysis of adhesions to release small bowel obstruction;  Surgeon: Katarina Conley MD;  Location: [facility];  Service:    • SPINE SURGERY  ,    • TONSILLECTOMY  Y    89     Family History   Problem Relation Age of Onset   • Hypertension Mother    • Heart attack Father    • Colon cancer Father    • Coronary artery disease Father    • Hypertension Father    • Heart disease Family    • Hyperlipidemia Family    • Hypertension Family      Social History     Socioeconomic History   • Marital status: /Civil Union     Spouse name: None   • Number of children: 1   • Years of education: None   • Highest education level: None   Occupational History   • Occupation: Retired   Tobacco Use   • Smoking status: Former     Packs/day: 1 50     Years: 35 00     Pack years: 52 50     Types: Cigarettes     Start date: 5     Quit date: 2011     Years since quittin 2   • Smokeless tobacco: Never   Vaping Use   • Vaping Use: Never used   Substance and Sexual Activity   • Alcohol use: Yes     Alcohol/week: 2 0 - 3 0 standard drinks     Types: 2 - 3 Shots of liquor per week     Comment: One glass per day   • Drug use: No   • Sexual activity: Yes     Partners: Female     Birth control/protection: Post-menopausal, None   Other Topics Concern   • None   Social History Narrative   • None     Social Determinants of Health     Financial Resource Strain: Low Risk    • Difficulty of Paying Living Expenses: Not hard at all   Food Insecurity: Not on file   Transportation Needs: No Transportation Needs   • Lack of Transportation (Medical): No   • Lack of Transportation (Non-Medical):  No   Physical Activity: Not on file   Stress: Not on file   Social Connections: Not on file   Intimate Partner Violence: Not on file   Housing Stability: Not on file     Current Outpatient Medications on File Prior to Visit   Medication Sig   • acetaminophen (TYLENOL) 500 mg tablet Take 500 mg by mouth as needed for mild pain  • amLODIPine (NORVASC) 2 5 mg tablet TAKE 1 TABLET BY MOUTH  DAILY   • aspirin (ECOTRIN LOW STRENGTH) 81 mg EC tablet Take 81 mg by mouth daily   • atorvastatin (LIPITOR) 40 mg tablet TAKE 1 TABLET BY MOUTH  DAILY   • Cholecalciferol (VITAMIN D PO) Take 2,000 Units by mouth daily     • dicyclomine (Bentyl) 20 mg tablet Take 1 tablet (20 mg total) by mouth every 6 (six) hours as needed (abd cramping)   • fexofenadine (ALLEGRA) 180 MG tablet Take 180 mg by mouth as needed Daily during the Summer   • FLUoxetine (PROzac) 10 mg capsule Take 10 mg by mouth daily    • fluticasone (FLONASE) 50 mcg/act nasal spray 2 sprays into each nostril daily   • lithium carbonate (LITHOBID) 300 mg CR tablet Take 300 mg by mouth in the morning   • metoprolol succinate (TOPROL-XL) 25 mg 24 hr tablet TAKE 1 TABLET BY MOUTH  DAILY   • Mirabegron ER 25 MG TB24 Take 25 mg by mouth daily at bedtime   • mupirocin (BACTROBAN) 2 % ointment Apply topically 2 (two) times a day as needed (wound)   • nitroglycerin (NITRODUR) 0 2 mg/hr APPLY 1 PATCH TOPICALLY  ONCE DAILY  LEAVE ON FOR 12 TO 14 HOURS THEN REMOVE FOR A NITRATE-FREE INTERVAL OF  10 TO 12 HOURS   • omega-3-acid ethyl esters (LOVAZA) 1 g capsule TAKE 1 CAPSULE BY MOUTH 3  TIMES DAILY   • omeprazole (PriLOSEC) 20 mg delayed release capsule Take 1 capsule (20 mg total) by mouth 2 (two) times a day   • ProAir  (90 Base) MCG/ACT inhaler USE 2 INHALATIONS BY MOUTH  EVERY 6 HOURS AS NEEDED FOR WHEEZING   • Trelegy Ellipta 200-62 5-25 MCG/INH AEPB inhaler USE 1 INHALATION BY MOUTH  DAILY    RINSE MOUTH AFTER  USE   • cephalexin (KEFLEX) 500 mg capsule    • famotidine (PEPCID) 40 MG tablet TAKE 1 TABLET BY MOUTH  DAILY AT BEDTIME AS NEEDED  FOR HEARTBURN (Patient taking differently: 20 mg Patient is taking 20mg)     Allergies   Allergen Reactions   • Bactrim "[Sulfamethoxazole-Trimethoprim] Other (See Comments)     AILYN   • Nsaids      Annotation - [id number]: unable to take due to use of lithium  • Oxycodone Rash     Immunization History   Administered Date(s) Administered   • COVID-19 PFIZER VACCINE 0 3 ML IM 02/17/2021, 03/10/2021, 10/26/2021   • COVID-19 Pfizer vac (Satya-sucrose, gray cap) 12 yr+ IM 08/03/2022   • INFLUENZA 10/01/2015, 10/07/2018, 09/27/2019, 09/27/2021   • Influenza Quadrivalent Preservative Free 3 years and older IM 10/01/2015   • Influenza Split High Dose Preservative Free IM 10/25/2016, 10/03/2018   • Influenza, high dose seasonal 0 7 mL 09/27/2019, 10/05/2020, 10/14/2022   • Influenza, seasonal, injectable 1945, 10/10/2012   • Pneumococcal Conjugate 13-Valent 10/25/2016   • Pneumococcal Polysaccharide PPV23 10/03/2019   • Td (adult), adsorbed 12/01/2009   • Zoster 01/01/2014, 01/01/2014   • influenza, trivalent, adjuvanted 09/27/2021       Objective     /60 (BP Location: Left arm, Patient Position: Sitting, Cuff Size: Adult)   Pulse 73   Temp (!) 96 1 °F (35 6 °C) (Probe)   Ht 5' 7\" (1 702 m)   Wt 80 9 kg (178 lb 6 4 oz)   SpO2 99%   BMI 27 94 kg/m²     Physical Exam  Vitals reviewed  Constitutional:       General: He is not in acute distress  Appearance: He is well-developed  HENT:      Head: Normocephalic and atraumatic  Right Ear: External ear normal       Left Ear: External ear normal    Eyes:      General: No scleral icterus  Conjunctiva/sclera: Conjunctivae normal    Neck:      Thyroid: No thyromegaly  Trachea: No tracheal deviation  Cardiovascular:      Rate and Rhythm: Normal rate and regular rhythm  Frequent extrasystoles (feels like in trigeminy on exam) are present  Heart sounds: Normal heart sounds  No murmur heard  Pulmonary:      Effort: Pulmonary effort is normal  No respiratory distress  Breath sounds: Normal breath sounds  No wheezing or rales     Abdominal:      General: Bowel " sounds are normal       Palpations: Abdomen is soft  Tenderness: There is no abdominal tenderness  There is no guarding or rebound  Musculoskeletal:      Cervical back: Normal range of motion and neck supple  Right lower leg: No edema  Left lower leg: No edema  Lymphadenopathy:      Cervical: No cervical adenopathy  Skin:     Coloration: Skin is not jaundiced or pale  Neurological:      General: No focal deficit present  Mental Status: He is alert and oriented to person, place, and time  Psychiatric:         Mood and Affect: Mood normal          Behavior: Behavior normal          Thought Content:  Thought content normal          Judgment: Judgment normal        Alex Bah MD

## 2023-03-29 NOTE — ASSESSMENT & PLAN NOTE
Wt Readings from Last 3 Encounters:   03/29/23 80 9 kg (178 lb 6 4 oz)   03/10/23 80 1 kg (176 lb 9 6 oz)   03/07/23 79 8 kg (176 lb)     Mild, follow-up cardiology, patient doing well

## 2023-03-29 NOTE — ASSESSMENT & PLAN NOTE
Patient had reported isolated event in 2018 associate with respiratory infection, he follows with cardiology, he has had Holter monitors, not on anticoagulation, no recurrent problems with this

## 2023-03-29 NOTE — PATIENT INSTRUCTIONS
Problem List Items Addressed This Visit          Respiratory    Malignant neoplasm of lung ([facility] )     Follow up Pulm            Cardiovascular and Mediastinum    Diastolic heart failure (HCC) (Chronic)     Wt Readings from Last 3 Encounters:   03/29/23 80 9 kg (178 lb 6 4 oz)   03/10/23 80 1 kg (176 lb 9 6 oz)   03/07/23 79 8 kg (176 lb)   Mild, follow-up cardiology, patient doing well             Essential hypertension     Borderline, continue meds along with healthy diet and exercise, patient walks daily         New onset atrial fibrillation ([place] [address] ) - Primary     Patient had reported isolated event in 2018 associate with respiratory infection, he follows with cardiology, he has had Holter monitors, not on anticoagulation, no recurrent problems with this         Abdominal aortic aneurysm (AAA) without rupture, unspecified part     Patient had repair of this            Nervous and Auditory    Cervical myelopathy ([place] [address] )     Stable         Parkinsonism ([place] [address] )     Follow up Neurology            Genitourinary    Stage 3a chronic kidney disease ([place] [address] )     Lab Results   Component Value Date    EGFR 48 03/22/2023    EGFR 41 12/30/2022    EGFR 45 10/31/2022    CREATININE 1 41 (H) 03/22/2023    CREATININE 1 59 (H) 12/30/2022    CREATININE 1 48 (H) 10/31/2022   Stable, avoid excessive use of NSAIDs, stay hydrated            Other    Bipolar affective disorder ([place] [address] )     Continue medications, follow-up psychiatry         Mixed hyperlipidemia     Continue atorvastatin along with healthy diet and exercise

## 2023-03-30 ENCOUNTER — CONSULT (OUTPATIENT)
Dept: PAIN MEDICINE | Facility: CLINIC | Age: 78
End: 2023-03-30

## 2023-03-30 ENCOUNTER — HOSPITAL ENCOUNTER (OUTPATIENT)
Dept: RADIOLOGY | Facility: HOSPITAL | Age: 78
Discharge: HOME | End: 2023-03-30
Attending: INTERNAL MEDICINE

## 2023-03-30 VITALS
SYSTOLIC BLOOD PRESSURE: 158 MMHG | WEIGHT: 178 LBS | BODY MASS INDEX: 27.94 KG/M2 | HEIGHT: 67 IN | HEART RATE: 50 BPM | DIASTOLIC BLOOD PRESSURE: 81 MMHG

## 2023-03-30 DIAGNOSIS — Z98.890 H/O LUMBOSACRAL SPINE SURGERY: ICD-10-CM

## 2023-03-30 DIAGNOSIS — M51.36 DDD (DEGENERATIVE DISC DISEASE), LUMBAR: ICD-10-CM

## 2023-03-30 DIAGNOSIS — M48.062 SPINAL STENOSIS OF LUMBAR REGION WITH NEUROGENIC CLAUDICATION: ICD-10-CM

## 2023-03-30 DIAGNOSIS — M54.16 LUMBAR RADICULOPATHY: Primary | ICD-10-CM

## 2023-03-30 DIAGNOSIS — K21.9 GASTROESOPHAGEAL REFLUX DISEASE: ICD-10-CM

## 2023-03-30 DIAGNOSIS — M51.26 LUMBAR DISC HERNIATION: ICD-10-CM

## 2023-03-30 NOTE — PROGRESS NOTES
Assessment  1  Lumbar radiculopathy    2  H/O lumbosacral spine surgery    3  Spinal stenosis of lumbar region with neurogenic claudication    4  DDD (degenerative disc disease), lumbar    5  Lumbar disc herniation        Plan  72-year-old male with a history of A-fib, CKD, lung cancer, CAD, COPD, prostate cancer, previous lumbar fusion from L4-S1, presenting for initial consultation regarding left-sided lumbosacral back pain that radiates into the left buttock and hip and into the anteromedial aspect of the left thigh to the knee with associated subjective weakness  Pain has been present since 2019 and has acutely worsened over the past 4 months  He denies any specific trauma or inciting event  X-ray of the lumbar spine October 2022 demonstrates stable posterior fusion from L4-S1 with mild scoliotic deformity and grade 1 anterolisthesis of L4 on L5  MRI of the lumbar spine from 2019 again demonstrates L4-S1 fusion  Disc herniation at L3-4 bulging and left lateral recess stenosis with likely impingement of left L3 root  Moderate central stenosis  Moderate right and mild left foraminal stenosis at L5-S1  The patient does take Tylenol as needed with some relief  Physical therapy has helped in the past and he does continue to do his home exercise program   Unable to take NSAIDs secondary to CKD  The patient's symptoms are consistent with lumbar radiculopathy predominantly in the L3 and L4 distribution likely stemming from disc herniation  Considering symptoms have acutely worsened I do feel an updated MRI is warranted to evaluate for worsening disc herniation or metastasis considering history of malignancy  1   I will order an MRI of the lumbar spine with and without contrast  2  Patient may continue with Tylenol as needed and should not exceed more than 3000 mg in 24 hours  3    I will follow-up with the patient in 6 weeks and call with results of imaging once received      My impressions and treatment recommendations were discussed in detail with the patient who verbalized understanding and had no further questions  Discharge instructions were provided  I personally saw and examined the patient and I agree with the above discussed plan of care  No orders of the defined types were placed in this encounter  No orders of the defined types were placed in this encounter  History of Present Illness    Nick Mcfarland is a 78 y o  male with a history of A-fib, CKD, lung cancer, CAD, COPD, prostate cancer, previous lumbar fusion from L4-S1, presenting for initial consultation regarding left-sided lumbosacral back pain that radiates into the left buttock and hip and into the anteromedial aspect of the left thigh to the knee with associated subjective weakness  Pain has been present since 2019 and has acutely worsened over the past 4 months  He denies any specific trauma or inciting event  He denies any right lower extremity symptoms, bladder or bowel incontinence, or saddle anesthesia  X-ray of the lumbar spine October 2022 demonstrates stable posterior fusion from L4-S1 with mild scoliotic deformity and grade 1 anterolisthesis of L4 on L5  MRI of the lumbar spine from 2019 again demonstrates L4-S1 fusion  Disc herniation at L3-4 bulging and left lateral recess stenosis with likely impingement of left L3 root  Moderate central stenosis  Moderate right and mild left foraminal stenosis at L5-S1  The patient does take Tylenol as needed with some relief  Physical therapy has helped in the past and he does continue to do his home exercise program   Unable to take NSAIDs secondary to CKD  The patient rates his pain a 5-8 out of 10 depending upon activity and the pain is nearly constant  The pain is worse in the morning  The pain is described as shooting, sharp, and throbbing  The pain is decreased with lying down and relaxation    The pain is increased with standing, bending, walking, exercise, coughing, and sneezing  He does find some relief with heat and ice application  Other than as stated above, the patient denies any interval changes in medications, medical condition, mental condition, symptoms, or allergies since the last office visit  I have personally reviewed and/or updated the patient's past medical history, past surgical history, family history, social history, current medications, allergies, and vital signs today  Review of Systems   Constitutional: Negative for fever and unexpected weight change  HENT: Positive for hearing loss  Negative for trouble swallowing  Eyes: Negative for visual disturbance  Respiratory: Positive for shortness of breath and wheezing  Cardiovascular: Negative for chest pain and palpitations  Gastrointestinal: Positive for abdominal pain  Negative for constipation, diarrhea, nausea and vomiting  Endocrine: Positive for polyuria  Negative for cold intolerance, heat intolerance and polydipsia  Genitourinary: Negative for difficulty urinating and frequency  Musculoskeletal: Positive for arthralgias and myalgias  Negative for gait problem and joint swelling  Skin: Negative for rash  Neurological: Negative for dizziness, seizures, syncope, weakness and headaches  Hematological: Does not bruise/bleed easily  Psychiatric/Behavioral: Negative for dysphoric mood  All other systems reviewed and are negative        Patient Active Problem List   Diagnosis   • Gait instability   • Cervical myelopathy (HCC)   • Adenocarcinoma of prostate ([facility] )   • CAD (coronary artery disease)   • Bipolar affective disorder ([facility] )   • COPD (chronic obstructive pulmonary disease) ([facility] )   • Gastroesophageal reflux disease   • Mixed hyperlipidemia   • Essential hypertension   • Aneurysm of infrarenal abdominal aorta   • History of lumbar surgery   • Stage 3a chronic kidney disease (HCC)   • Impaired mobility and activities of daily living   • Degenerative disc disease, lumbar   • Foraminal stenosis of lumbar region   • Spondylolisthesis of lumbar region   • Myelopathy concurrent with and due to lumbosacral intervertebral disc disorder   • Arrhythmia   • Diastolic heart failure ([facility])   • New onset atrial fibrillation ([facility])   • Pain in left hip   • Greater trochanteric bursitis, left   • Skin lesion   • Medicare annual wellness visit, subsequent   • Trochanteric bursitis of left hip   • Primary osteoarthritis of left hip   • Malignant neoplasm of lung ([facility])   • Excessive gas   • Extradural cyst of spine   • Herniated lumbar disc without myelopathy   • Gait abnormality   • Skin lump of leg, right   • Seasonal allergic rhinitis due to pollen   • Braces as ambulation aid   • Parkinsonism ([place] [address] )   • Change in mental status   • Frequency of urination   • Skin tear of right lower leg without complication   • Proteinuria   • S/P endovascular aneurysm repair   • Myocardial infarction [facility])   • Adenoma of colon   • Abdominal aortic aneurysm (AAA) without rupture, unspecified part       Past Medical History:   Diagnosis Date   • Allergic rhinitis 2015   • Aortic aneurysm ([facility])    • Benign colon polyp    • Bipolar 1 disorder ([facility])    • Cardiac disease     MI   • Cervical cord compression with myelopathy ([facility])    • COPD (chronic obstructive pulmonary disease) ([facility])    • Coronary artery disease 1995   • Diverticulitis    • Diverticulosis    • Emphysema of lung ([place] [address] ) 2012   • Gait disturbance     uses cane, leg brace on right   • GERD (gastroesophageal reflux disease)    • Heart attack ([place] [address] ) 1996   • Hx of resection of large bowel 05/03/2016   • Hyperlipidemia    • Hypertension    • IBS (irritable bowel syndrome)    • Lumbar stenosis    • Lung cancer ([place] [address] )     2007 Left lower lobectomy and 2011 Right lung with surgery    • Myocardial infarction [facility])     involving other coronary artery   • Prostate cancer ([place] [address] )    • Shortness of breath    • Small bowel obstruction ([place] [address] ) 11/16/2016 Past Surgical History:   Procedure Laterality Date   • ANGIOPLASTY      stent   • CARDIAC CATHETERIZATION  1995    angioplasty   • CARDIAC SURGERY     • CERVICAL SPINE SURGERY      Cervical decompression with cervical fusion from C3-C7 for spinal stenosis  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   • LUNG LOBECTOMY Left    • CO ARTHRD ANT INTERBODY MIN DSC CRV BELOW C2 N/A 05/02/2016    Procedure: Anterior cervical diskectomy C3/4, C5/6, C6/7 with anterior plate fixation fusion C3-7;  Posterior decompressive laminectomy C3-7 with lateral mass fixation fusion C3-7 (IMPULSE MONITORING); Surgeon: Daniela Slade MD;  Location: [facility];  Service: Neurosurgery   • CO ARTHRODESIS POSTERIOR INTERBODY [address] LUMBAR N/A 01/30/2017    Procedure: L4-5 AND L5-S1 DECOMPRESSIVE FORAMINOTOMIES, TRANSFORAMINAL LUMBAR INTERBODY AND PEDICLE SCREW FIXATION FUSION L4-S1 (IMPULSE);   Surgeon: Daniela Slade MD;  [facility];  Service: Neurosurgery   • CO EVASC RPR DPLMNT AORTO-AORTIC NDGFT N/A 08/06/2018    Procedure: REPAIR ANEURYSM ENDOVASCULAR ABDOMINAL AORTIC  (EVAR) WITH BILATERAL PERCUTANEOUS FEMORAL ACCESS WITH ULTRASOUND GUIDANCE ON THE RIGHT AND PRE CLOSURE;  Surgeon: Tyler Romero MD;  [facility];  Service: Vascular   • PROSTATECTOMY  2007    for prostate CA - no chemo/RT   • SIGMOIDECTOMY      for divertic   • SMALL INTESTINE SURGERY N/A 11/17/2016    Procedure: Exploratory Laparotomy, Lysis of adhesions to release small bowel obstruction;  Surgeon: Kike Ramirez MD;  [facility];  Service:    • SPINE SURGERY  ,    • TONSILLECTOMY  Y           Family History   Problem Relation Age of Onset   • Hypertension Mother    • Heart attack Father    • Colon cancer Father    • Coronary artery disease Father    • Hypertension Father    • Heart disease Family    • Hyperlipidemia Family    • Hypertension Family        Social History     Occupational History   • Occupation: Retired   Tobacco Use   • Smoking status: Former     Packs/day: 1 50     Years: 35 00     Pack years: 52 50     Types: Cigarettes     Start date:      Quit date: 2011     Years since quittin 2   • Smokeless tobacco: Never   Vaping Use   • Vaping Use: Never used   Substance and Sexual Activity   • Alcohol use: Yes     Alcohol/week: 2 0 - 3 0 standard drinks     Types: 2 - 3 Shots of liquor per week     Comment: One glass per day   • Drug use: No   • Sexual activity: Yes     Partners: Female     Birth control/protection: Post-menopausal, None       Current Outpatient Medications on File Prior to Visit   Medication Sig   • acetaminophen (TYLENOL) 500 mg tablet Take 500 mg by mouth as needed for mild pain     • amLODIPine (NORVASC) 2 5 mg tablet TAKE 1 TABLET BY MOUTH  DAILY   • aspirin (ECOTRIN LOW STRENGTH) 81 mg EC tablet Take 81 mg by mouth daily   • atorvastatin (LIPITOR) 40 mg tablet TAKE 1 TABLET BY MOUTH  DAILY   • cephalexin (KEFLEX) 500 mg capsule    • Cholecalciferol (VITAMIN D PO) Take 2,000 Units by mouth daily     • dicyclomine (Bentyl) 20 mg tablet Take 1 tablet (20 mg total) by mouth every 6 (six) hours as needed (abd cramping)   • famotidine (PEPCID) 40 MG tablet TAKE 1 TABLET BY MOUTH  DAILY AT BEDTIME AS NEEDED  FOR HEARTBURN (Patient taking differently: 20 mg Patient is taking 20mg)   • fexofenadine (ALLEGRA) 180 MG tablet Take 180 mg by mouth as needed Daily during the Summer   • FLUoxetine (PROzac) 10 mg capsule Take 10 mg by mouth daily    • fluticasone (FLONASE) 50 mcg/act nasal spray 2 "sprays into each nostril daily   • lithium carbonate (LITHOBID) 300 mg CR tablet Take 300 mg by mouth in the morning   • metoprolol succinate (TOPROL-XL) 25 mg 24 hr tablet TAKE 1 TABLET BY MOUTH  DAILY   • Mirabegron ER 25 MG TB24 Take 25 mg by mouth daily at bedtime   • mupirocin (BACTROBAN) 2 % ointment Apply topically 2 (two) times a day as needed (wound)   • nitroglycerin (NITRODUR) 0 2 mg/hr APPLY 1 PATCH TOPICALLY  ONCE DAILY  LEAVE ON FOR 12 TO 14 HOURS THEN REMOVE FOR A NITRATE-FREE INTERVAL OF  10 TO 12 HOURS   • omega-3-acid ethyl esters (LOVAZA) 1 g capsule TAKE 1 CAPSULE BY MOUTH 3  TIMES DAILY   • omeprazole (PriLOSEC) 20 mg delayed release capsule Take 1 capsule (20 mg total) by mouth 2 (two) times a day   • ProAir  (90 Base) MCG/ACT inhaler USE 2 INHALATIONS BY MOUTH  EVERY 6 HOURS AS NEEDED FOR WHEEZING   • Trelegy Ellipta 200-62 5-25 MCG/INH AEPB inhaler USE 1 INHALATION BY MOUTH  DAILY   RINSE MOUTH AFTER  USE     No current facility-administered medications on file prior to visit  Allergies   Allergen Reactions   • Bactrim [Sulfamethoxazole-Trimethoprim] Other (See Comments)     AILYN   • Nsaids      Annotation - [id number]: unable to take due to use of lithium  • Oxycodone Rash       Physical Exam    /81   Pulse (!) 50   Ht 5' 7\" (1 702 m)   Wt 80 7 kg (178 lb)   BMI 27 88 kg/m²     Constitutional: normal, well developed, well nourished, alert, in no distress and non-toxic and no overt pain behavior  Eyes: anicteric  HEENT: grossly intact  Neck: supple, symmetric, trachea midline and no masses   Pulmonary:even and unlabored  Cardiovascular:1+ bilateral pretibial pitting edema noted   Skin:Normal without rashes or lesions and well hydrated  Psychiatric:Mood and affect appropriate  Neurologic:Cranial Nerves II-XII grossly intact  Musculoskeletal:antalgic gait  Well-healed vertical midline lumbar incision  Left lumbar paraspinals tender to palpation from L3-L5    Bilateral " patellar and Achilles reflexes were 2-4 and symmetrical   No clonus is noted bilaterally  Bilateral lower extremity strength 5 out of 5 in all muscular's  Sensation intact to light touch in L3-S1 dermatomes bilaterally  Negative straight leg raise bilaterally  Negative Landon's test bilaterally  Imaging    Study Result    Narrative & Impression   LUMBAR SPINE     INDICATION:   M48 061: Spinal stenosis, lumbar region without neurogenic claudication  M51 36: Other intervertebral disc degeneration, lumbar region        COMPARISON:  L-spine x-ray 3/10/2017     VIEWS:  XR SPINE LUMBAR MINIMUM 4 VIEWS NON INJURY  Images: 4     FINDINGS:     There are 5 non rib bearing lumbar vertebral bodies       There is no evidence of acute fracture or destructive osseous lesion      Stable posterior spinal fusion hardware at L4-S1 with interbody spacers      Mild scoliotic deformity is noted  Stable grade 1 anterolisthesis of L4 on L5      Intervertebral disc space narrowing at L1-2, L2-3, L3-4 with endplate osteophytes present      Endovascular stent is present      IMPRESSION:     Stable posterior spinal fusion at L4-S1      Multilevel degenerative changes of lumbar spine      Workstation performed: [id number]     Study Result    Narrative & Impression   LEFT HIP     INDICATION:   M16 12: Unilateral primary osteoarthritis, left hip        COMPARISON:  None     VIEWS:  XR HIP/PELV 2-3 VWS LEFT  W PELVIS IF PERFORMED   Images: 3     FINDINGS:     There is no acute fracture or dislocation      Mild left hip osteoarthritis is seen      No lytic or blastic osseous lesion      Atherosclerotic calcifications      Lumbar spine fusion at L4-S1      IMPRESSION:     Mild degenerative changes of left hip            Workstation performed: [id number]     Study Result    Narrative & Impression   MRI LUMBAR SPINE WITHOUT CONTRAST     INDICATION: G96 19: Other disorders of meninges, not elsewhere classified     Known lumbar cyst   Low back pain especially into the left hip  Prior surgery      COMPARISON:  None      TECHNIQUE:  Sagittal T1, sagittal T2, sagittal inversion recovery, axial T1 and axial T2, coronal T2     IMAGE QUALITY:  Diagnostic     FINDINGS:     VERTEBRAL BODIES:  Patient is status post L4-S1 bilateral pedicle screw fixation  No aggressive marrow pathology identified      SACRUM:  Normal signal within the sacrum  No evidence of insufficiency or stress fracture      DISTAL CORD AND CONUS:  Normal size and signal within the distal cord and conus        PARASPINAL SOFT TISSUES:  Infrarenal abdominal aortic aneurysm identified maximum transverse dimension of 4 1 cm      LOWER THORACIC DISC SPACES:  Normal disc height and signal   No disc herniation, canal stenosis or foraminal narrowing      LUMBAR DISC SPACES:     L1-L2:  No significant central or foraminal narrowing      L2-L3:  Mild annular bulge  Minimal retrolisthesis  No significant central or foraminal narrowing      L3-L4:  Superiorly extruded left paramedian disc herniation identified new from the prior study resulting in lateral recess stenosis on the left likely impinging on the descending left L3 nerve root  Correlate for left L3 radiculopathy  Moderate   central stenosis      L4-L5:  No significant central or foraminal narrowing      L5-S1:  Degenerative disc osteophyte complex with marginal osteophytes results in moderate right and mild left foraminal narrowing  Central canal patent  Disc material minimally flattening the ventral thecal sac      IMPRESSION:     Superiorly extruded left paramedian disc herniation is new from the prior study extending 14 mm above the native disc margin resulting in lateral recess stenosis potentially impacting the descending left L3 nerve root  Correlate for left L3   radiculopathy    Moderate central stenosis at this level      Postoperative changes of lumbar fusion from L4 through S1 with bilateral pedicle [facility]         Workstation performed: [id number]

## 2023-03-31 ENCOUNTER — TELEPHONE (OUTPATIENT)
Dept: GASTROENTEROLOGY | Facility: CLINIC | Age: 78
End: 2023-03-31

## 2023-03-31 NOTE — TELEPHONE ENCOUNTER
Patients GI provider:  Dr Le Munoz    Number to return call: [phone]    Reason for call: Pt received a message from Dr Le Munoz that he would be willing to discuss treatment options with him  He hasn't figured out how to answer his [employer] message but he will try      Scheduled procedure/appointment date if applicable: Apt/procedure 3/30/23

## 2023-03-31 NOTE — RESULT ENCOUNTER NOTE
Inform patient via [employer]  Please review the pathology/lab result of further discussion  Copied from [employer] message :       Román Henderson,     Your motility study was unremarkable  If reflux is ongoing I would recommend consideration for increasing the omeprazole to 40 mg daily or considering endoscopic or surgical interventions  If you are interested, I can call you to discuss further  Please let me know       Best regards,     Renu Bellamy MD

## 2023-04-03 ENCOUNTER — HOSPITAL ENCOUNTER (OUTPATIENT)
Dept: MRI IMAGING | Facility: HOSPITAL | Age: 78
Discharge: HOME | End: 2023-04-03
Attending: ANESTHESIOLOGY

## 2023-04-03 DIAGNOSIS — M54.16 LUMBAR RADICULOPATHY: ICD-10-CM

## 2023-04-03 RX ADMIN — GADOBUTROL 8 ML: 604.72 INJECTION INTRAVENOUS at 14:55

## 2023-04-06 ENCOUNTER — TELEPHONE (OUTPATIENT)
Dept: PAIN MEDICINE | Facility: CLINIC | Age: 78
End: 2023-04-06

## 2023-04-06 NOTE — TELEPHONE ENCOUNTER
Please notify the patient the MRI of his lumbar spine demonstrates new disc herniation at L2-3 resulting in mild to moderate recess and left foraminal stenosis (narrowing where nerves exist) disc herniation at L3-4 causing moderate central, recess, and foraminal stenosis noted  This level is unchanged  Disc protrusion at L5-S1 causing mild recess and moderate right foraminal stenosis  Stable fusion hardware from L4-S1    I can offer the patient a left L2 and L3 TFESI

## 2023-04-07 NOTE — TELEPHONE ENCOUNTER
S/W pt and advised of the same, pt would like to go fwd with injection, pt is taking 81mg ASA which does not need to be held for TFESI  Nurse informed pt procedure  will be calling to schedule for procedure next week  Pt verbalized understanding and appreciative of call

## 2023-04-26 NOTE — PROGRESS NOTES
Patient ID: Cedric Crockett is a 70 y o  male  Assessment/Plan:    Parkinsonism [facility])  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  He is however also on Lithium which could certainly be contributing to some of his parkinsonian symptoms including his hand tremors  Although vascular parkinsonism does not always respond to levodopa I think it would be reasonable to start a trial in the future if symptoms progress  For now the patient feels that he is actually doing very well and does not wish to start any new medications  He was encouraged to continue with regular exercise  He feels that he is doing better in part because he is now more active  We did have a long discussion regarding the importance of stroke preventions  Stroke preventions discussed including:  - Remaining on ASA daily  - BP goal < 130/80  Continue BP medications, pressure was good in the office today  - LDL goal less then 70  On Lipitor  Last LDL was 81    - Will defer to PCP regarding management and monitoring of blood pressure, cholesterol     - Patient does not smoke and discussed the importance of continued smoking cessation    - Advised patient to avoid using NSAIDs (Motrin, Ibuprofen) for headaches or other pain mild pain and to use Tylenol instead  - Recommend mediterranean diet & regular exercise at least 4-5 times a week for 20-30 minutes       If the patient experiences any new stroke like symptoms such as facial droop on one side, weakness/paralysis on one side, speech trouble, numbness on one side, balance issues, any vision changes, extreme dizziness or any new headache, to call 9-1-1 immediately or to proceed to the nearest ER immediately  Subjective:    Kristian Hernandez is a right handed 65-year-old male with past medical history of bipolar depression treated with Lithium, afib, cervical and lumbar radiculopathy and left trochanteric bursitis who presents as a follow up for difficulty with gait  To review, he has longstanding gait dysfunction and was evaluated for this in our office back in 2016  His gait dysfunction has contributions from his cervical and lumbar degenerative disease, prior myelopathy and possible peripheral neuropathy and various musculoskeletal issues  Patient is on lithium for his bipolar disorder  He initially did not have any typical non motor symptoms we would expect to accompany idiopathic Parkinson's disease  It was felt best to monitor for progression with time given his polypharmacy  At his last visit he had some progression of his gait however he also had worsening of pain which was limiting his activity and exercise  He had subtle parkinsonism on exam however this was unclear if it was related to lithium or an underlying parkinsonism  He was sent for an updated MRI of the brain and advised to continue with physical therapy  INTERVAL HISTORY:  His brain MRI showed progression of his small vessel disease  It was felt likely that this was the cause of his gait issues secondary to an underlying vascular parkinsonism  His main issues today is right hand tremor  He feels that his shuffling has actually improved since the last visit  It varies from day to day  He had been wearing a brace on the leg which he feels actually significantly helped with his walking  He does walk daily  No recent falls  His last fall was out of bed  He follows closely with psychiatry  He gets Lithium levels checked regularly  He has been on the same dose of Lithium for years  He does not notice the tremor however his wife does notice it slightly more on the right in the past year  Tremor seems to come out more when tired later in the day  He is more active which he feels has been very helpful  He can perform all of his ADLs on his own  Imanging:  Brain MRI  - No acute intracranial abnormality  Chronic lacunar infarct left centrum semiovale  Progressive cerebral volume loss and progressive white matter foci in periventricular regions compatible with with chronic microangiopathic disease  I personally reviewed and updated the ROS  Objective:    Blood pressure 138/74, pulse 78, weight 73 9 kg (163 lb)  Physical Exam  Constitutional:       General: He is awake  Appearance: Normal appearance  HENT:      Right Ear: Hearing normal       Left Ear: Hearing normal    Eyes:      General: Lids are normal       Extraocular Movements: Extraocular movements intact  Pupils: Pupils are equal, round, and reactive to light  Pulmonary:      Effort: Pulmonary effort is normal    Neurological:      Mental Status: He is alert  Psychiatric:         Speech: Speech normal          Neurological Exam  Mental Status  Awake and alert  Oriented to person, place and time  Speech is normal     Cranial Nerves  CN III, IV, VI: Extraocular movements intact bilaterally  Normal lids and orbits bilaterally  Pupils equal round and reactive to light bilaterally  CN V:  Right: Facial sensation is normal   Left: Facial sensation is normal on the left  CN VIII:  Right: Hearing is normal   Left: Hearing is normal   CN XI: Shoulder shrug strength is normal     Motor    Slight rigidity in the right upper extremity  No decrement or hesitations noted with finger taps and hand , mild right sided bradykinesia with ANDREINA  Mild bradykinesia with bilateral heel taps       Coordination  Mild postural tremor on the right and very slight on the left  Mild action tremors with finger to nose testing bilaterally   Intermittent resting tremor on the right   No leg tremor, no head tremor      Gait  Walks with cane   Pronation at the ankle   Reduced step height bilaterally   Imbalance noted on turn, able to catch himself   ROS:    Review of Systems   Constitutional: Negative  Negative for appetite change and fever  HENT: Negative  Negative for hearing loss, tinnitus, trouble swallowing and voice change  Eyes: Negative  Negative for photophobia and pain  Respiratory: Negative  Negative for shortness of breath  Cardiovascular: Negative  Negative for palpitations  Gastrointestinal: Negative  Negative for nausea and vomiting  Endocrine: Negative  Negative for cold intolerance  Genitourinary: Negative  Negative for dysuria, frequency and urgency  Musculoskeletal: Positive for back pain (Sometimes   due to having a few fusions), gait problem (Sometimes shuffles, has gotten better) and myalgias (Back and hips)  Negative for neck pain  Slight Balance issues, for instance when doing a quick turn or turning around     Skin: Negative  Negative for rash  Allergic/Immunologic: Negative  Neurological: Positive for tremors (Right hand and has stayed the same)  Negative for dizziness, seizures, syncope, facial asymmetry, speech difficulty, weakness, light-headedness, numbness and headaches  Hematological: Negative  Does not bruise/bleed easily  Psychiatric/Behavioral: Positive for hallucinations (Had one incident but was dehydrated) and sleep disturbance (Does not sleep well)  Negative for confusion  All other systems reviewed and are negative  Vtama Pregnancy And Lactation Text: It is unknown if this medication can cause problems during pregnancy and breastfeeding.

## 2023-05-02 ENCOUNTER — HOSPITAL ENCOUNTER (OUTPATIENT)
Dept: RADIOLOGY | Facility: CLINIC | Age: 78
Discharge: HOME | End: 2023-05-02

## 2023-05-02 VITALS
OXYGEN SATURATION: 91 % | SYSTOLIC BLOOD PRESSURE: 168 MMHG | TEMPERATURE: 97.8 F | RESPIRATION RATE: 18 BRPM | HEART RATE: 71 BPM | DIASTOLIC BLOOD PRESSURE: 90 MMHG

## 2023-05-02 DIAGNOSIS — M54.16 LUMBAR RADICULOPATHY: ICD-10-CM

## 2023-05-02 RX ORDER — PAPAVERINE HCL 150 MG
15 CAPSULE, EXTENDED RELEASE ORAL ONCE
Status: COMPLETED | OUTPATIENT
Start: 2023-05-02 | End: 2023-05-02

## 2023-05-02 RX ADMIN — LIDOCAINE HYDROCHLORIDE 2 ML: 20 INJECTION, SOLUTION EPIDURAL; INFILTRATION; INTRACAUDAL; PERINEURAL at 14:36

## 2023-05-02 RX ADMIN — IOHEXOL 2 ML: 300 INJECTION, SOLUTION INTRAVENOUS at 14:35

## 2023-05-02 RX ADMIN — DEXAMETHASONE SODIUM PHOSPHATE 15 MG: 10 INJECTION, SOLUTION INTRAMUSCULAR; INTRAVENOUS at 14:36

## 2023-05-02 NOTE — DISCHARGE INSTRUCTIONS
Epidural Steroid Injection   WHAT YOU NEED TO KNOW:   An epidural steroid injection (DEVON) is a procedure to inject steroid medicine into the epidural space  The epidural space is between your spinal cord and vertebrae  Steroids reduce inflammation and fluid buildup in your spine that may be causing pain  You may be given pain medicine along with the steroids  ACTIVITY  Do not drive or operate machinery today  No strenuous activity today - bending, lifting, etc   You may resume normal activites starting tomorrow - start slowly and as tolerated  You may shower today, but no tub baths or hot tubs  You may have numbness for several hours from the local anesthetic  Please use caution and common sense, especially with weight-bearing activities  CARE OF THE INJECTION SITE  If you have soreness or pain, apply ice to the area today (20 minutes on/20 minutes off)  Starting tomorrow, you may use warm, moist heat or ice if needed  You may have an increase or change in your discomfort for 36-48 hours after your treatment  Apply ice and continue with any pain medication you have been prescribed  Notify the [facility] if you have any of the following: redness, drainage, swelling, headache, stiff neck or fever above 100°F     SPECIAL INSTRUCTIONS  Our office will contact you in approximately 7 days for a progress report  MEDICATIONS  Continue to take all routine medications  Our office may have instructed you to hold some medications  As no general anesthesia was used in today's procedure, you should not experience any side effects related to anesthesia  If you are diabetic, the steroids used in today's injection may temporarily increase your blood sugar levels after the first few days after your injection  Please keep a close eye on your sugars and alert the doctor who manages your diabetes if your sugars are significantly high from your baseline or you are symptomatic       If you have a problem specifically related to your procedure, please call our office at [phone]  Problems not related to your procedure should be directed to your primary care physician

## 2023-05-02 NOTE — H&P
History of Present Illness: The patient is a 78 y o  male who presents with complaints of low back and leg pain  Past Medical History:   Diagnosis Date    Allergic rhinitis 2015    Aortic aneurysm (HCC)     Benign colon polyp     Bipolar 1 disorder ([facility]     Cardiac disease     MI    Cervical cord compression with myelopathy ([facility] )     COPD (chronic obstructive pulmonary disease) ([facility]     Coronary artery disease 1995    Diverticulitis     Diverticulosis     Emphysema of lung ([facility] ) 2012    Gait disturbance     uses cane, leg brace on right    GERD (gastroesophageal reflux disease)     Heart attack ([address] ) 1996    Hx of resection of large bowel 05/03/2016    Hyperlipidemia     Hypertension     IBS (irritable bowel syndrome)     Lumbar stenosis     Lung cancer ([facility] )     2007 Left lower lobectomy and 2011 Right lung with surgery     Myocardial infarction [facility])     involving other coronary artery    Prostate cancer ([facility] )     Shortness of breath     Small bowel obstruction ([facility] ) 11/16/2016       Past Surgical History:   Procedure Laterality Date    ANGIOPLASTY      stent    CARDIAC CATHETERIZATION  1995    angioplasty    CARDIAC SURGERY      CERVICAL SPINE SURGERY      Cervical decompression with cervical fusion from C3-C7 for spinal stenosis      COLON SURGERY  11/04/2014    ESOPHAGOGASTRODUODENOSCOPY  01/03/2013    with possible Schatzki's ring & small hiatal hernia, mild gastritis    FL INJECTION LEFT HIP (NON ARTHROGRAM)  12/11/2018    FL INJECTION LEFT HIP (NON ARTHROGRAM)  05/09/2019    IR BIOPSY LUNG  07/06/2020    IR EVAR  08/06/2018    LUNG BIOPSY  2007    LUNG CANCER SURGERY Right 03/2011    wedge resection for lung tumor, 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    LUNG LOBECTOMY Left     HI ARTHRD ANT INTERBODY  [address] CRV BELOW C2 N/A 05/02/2016    Procedure: Anterior cervical diskectomy C3/4, C5/6, C6/7 with anterior plate fixation fusion C3-7;  Posterior decompressive laminectomy C3-7 with lateral mass fixation fusion C3-7 (IMPULSE MONITORING); Surgeon: Corey Childers MD;  [facility];  Service: Neurosurgery    [place] ARTHRODESIS POSTERIOR INTERBODY [address] N/A 01/30/2017    Procedure: L4-5 AND L5-S1 DECOMPRESSIVE FORAMINOTOMIES, TRANSFORAMINAL LUMBAR INTERBODY AND PEDICLE SCREW FIXATION FUSION L4-S1 (IMPULSE);   Surgeon: Corey Childers MD;  [facility];  Service: Neurosurgery    [place] [address] R[address] DPLMNT AORTO-AORTIC NDGFT N/A 08/06/2018    Procedure: REPAIR ANEURYSM ENDOVASCULAR ABDOMINAL AORTIC  (EVAR) WITH BILATERAL PERCUTANEOUS FEMORAL ACCESS WITH ULTRASOUND GUIDANCE ON THE RIGHT AND PRE CLOSURE;  Surgeon: Jaya Cornejo MD;  [facility];  Service: Vascular    PROSTATECTOMY  2007    for prostate CA - no chemo/RT    SIGMOIDECTOMY      for divertic    SMALL INTESTINE SURGERY N/A 11/17/2016    Procedure: Exploratory Laparotomy, Lysis of adhesions to release small bowel obstruction;  Surgeon: Ryan Rowe MD;  [facility];  Service:    [facility]  2016, 2017    TONSILLECTOMY  Y    1950         Current Outpatient Medications:     acetaminophen (TYLENOL) 500 mg tablet, Take 500 mg by mouth as needed for mild pain , Disp: , Rfl:     amLODIPine (NORVASC) 2 5 mg tablet, TAKE 1 TABLET BY MOUTH  DAILY, Disp: 90 tablet, Rfl: 3    aspirin (ECOTRIN LOW STRENGTH) 81 mg EC tablet, Take 81 mg by mouth daily, Disp: , Rfl:     atorvastatin (LIPITOR) 40 mg tablet, TAKE 1 TABLET BY MOUTH  DAILY, Disp: 90 tablet, Rfl: 5    Cholecalciferol (VITAMIN D PO), Take 2,000 Units by mouth daily  , Disp: , Rfl:     dicyclomine (Bentyl) 20 mg tablet, Take 1 tablet (20 mg total) by mouth every 6 (six) hours as needed (abd cramping), Disp: 90 tablet, Rfl: 3    famotidine (PEPCID) 40 MG tablet, TAKE 1 TABLET BY MOUTH  DAILY AT BEDTIME AS NEEDED  FOR HEARTBURN (Patient taking differently: 20 mg Patient is taking 20mg), Disp: 30 tablet, Rfl: 11   fexofenadine (ALLEGRA) 180 MG tablet, Take 180 mg by mouth as needed Daily during the Summer, Disp: , Rfl:     FLUoxetine (PROzac) 10 mg capsule, Take 10 mg by mouth daily , Disp: , Rfl:     fluticasone (FLONASE) 50 mcg/act nasal spray, 2 sprays into each nostril daily, Disp: , Rfl:     lithium carbonate (LITHOBID) 300 mg CR tablet, Take 300 mg by mouth in the morning, Disp: , Rfl:     metoprolol succinate (TOPROL-XL) 25 mg 24 hr tablet, TAKE 1 TABLET BY MOUTH  DAILY, Disp: 90 tablet, Rfl: 3    Mirabegron ER 25 MG TB24, Take 25 mg by mouth daily at bedtime, Disp: 90 tablet, Rfl: 3    mupirocin (BACTROBAN) 2 % ointment, Apply topically 2 (two) times a day as needed (wound), Disp: 22 g, Rfl: 0    nitroglycerin (NITRODUR) 0 2 mg/hr, APPLY 1 PATCH TOPICALLY  ONCE DAILY  LEAVE ON FOR 12 TO 14 HOURS THEN REMOVE FOR A NITRATE-FREE INTERVAL OF  10 TO 12 HOURS, Disp: 90 patch, Rfl: 3    omega-3-acid ethyl esters (LOVAZA) 1 g capsule, TAKE 1 CAPSULE BY MOUTH 3  TIMES DAILY, Disp: 270 capsule, Rfl: 3    omeprazole (PriLOSEC) 20 mg delayed release capsule, Take 1 capsule (20 mg total) by mouth 2 (two) times a day (Patient taking differently: Take 20 mg by mouth daily), Disp: 60 capsule, Rfl: 3    ProAir  (90 Base) MCG/ACT inhaler, USE 2 INHALATIONS BY MOUTH  EVERY 6 HOURS AS NEEDED FOR WHEEZING, Disp: 34 g, Rfl: 3    Trelegy Ellipta 200-62 5-25 MCG/INH AEPB inhaler, USE 1 INHALATION BY MOUTH  DAILY   RINSE MOUTH AFTER  USE, Disp: 180 each, Rfl: 3    Allergies   Allergen Reactions    Bactrim [Sulfamethoxazole-Trimethoprim] Other (See Comments)     AILYN    Nsaids      Annotation - [id number]: unable to take due to use of lithium      Oxycodone Rash       Physical Exam:   Vitals:    05/02/23 1412   BP: (!) 174/95   Pulse: 69   Resp: 18   Temp: 97 8 °F (36 6 °C)   SpO2: 97%     General: Awake, Alert, Oriented x 3, Mood and affect appropriate  Respiratory: Respirations even and unlabored  Cardiovascular: Peripheral pulses intact; no edema  Musculoskeletal Exam: Left lumbar paraspinals tender to palpation    ASA Score: 2    Patient/Chart Verification  Patient ID Verified: Verbal  ID Band Applied: No  Consents Confirmed: Procedural, To be obtained in the Pre-Procedure area  Interval H&P(within 24 hr) Complete (required for Outpatients and Surgery Admit only): To be obtained in the Pre-Procedure area  Allergies Reviewed: Yes  Anticoag/NSAID held?: NA  Currently on antibiotics?: No    Assessment:   1   Lumbar radiculopathy        Plan: left L2 and L3 TFESI

## 2023-05-08 ENCOUNTER — OFFICE VISIT (OUTPATIENT)
Dept: PULMONOLOGY | Facility: CLINIC | Age: 78
End: 2023-05-08

## 2023-05-08 VITALS
HEART RATE: 76 BPM | SYSTOLIC BLOOD PRESSURE: 142 MMHG | WEIGHT: 176.1 LBS | HEIGHT: 67 IN | BODY MASS INDEX: 27.64 KG/M2 | DIASTOLIC BLOOD PRESSURE: 76 MMHG | OXYGEN SATURATION: 95 % | TEMPERATURE: 97.3 F | RESPIRATION RATE: 15 BRPM

## 2023-05-08 DIAGNOSIS — J44.9 CHRONIC OBSTRUCTIVE PULMONARY DISEASE, UNSPECIFIED COPD TYPE (HCC): Primary | ICD-10-CM

## 2023-05-08 DIAGNOSIS — Z85.118 HISTORY OF LUNG CANCER: ICD-10-CM

## 2023-05-08 DIAGNOSIS — J30.9 ALLERGIC RHINITIS, UNSPECIFIED SEASONALITY, UNSPECIFIED TRIGGER: ICD-10-CM

## 2023-05-08 DIAGNOSIS — K21.9 GASTROESOPHAGEAL REFLUX DISEASE WITHOUT ESOPHAGITIS: ICD-10-CM

## 2023-05-08 NOTE — PROGRESS NOTES
Office Progress Note - Pulmonary    Caroline Cordova 78 y o  male MRN: [id number]    Encounter: [id number]      Assessment:  • Chronic obstructive pulmonary disease  • Chronic cough  • Allergic rhinitis  • Dyspnea on exertion  • History of lung cancer  Plan:   • Trelegy Ellipta 200, 1 inhalation once a day  • Albuterol rescue inhaler 2 inhalations 4 times a day as needed  • Fluticasone nasal spray 2 sprays to each nostril once a day  • Saline nasal/sinus rinse once a day  • Fexofenadine 180 mg once a day  • Regular walks to improve stamina  • Follow-up in 6 months  Discussion:   The patient's COPD is in remission  I have maintained him on the Trelegy Ellipta 200, once a day  He will use albuterol rescue inhaler 2 inhalations 4 times a day as needed  His dyspnea on exertion is improving  I have encouraged him to take regular walks to improve stamina  His cough has markedly improved  It is mainly due to postnasal dripping from allergies  I have maintained him on the fluticasone nasal spray 2 sprays to each nostril once a day and fexofenadine 180 mg once a day  He will use the saline nasal/sinus rinse once a day  He had concerns about follow-up for his lung cancer  His oncologist is in [place]  I have recommended that he should establish an oncologist here in the [facility]  He will schedule an appointment with Dr Santiago Dawkins  I will see him in 6 months  Subjective: The patient is here for a follow-up visit  His cough has improved  However he gets intermittent cough especially lately with the allergies  He has postnasal dripping  Denies any significant wheezing or chest tightness  He is walking every day  He is using Trelegy Ellipta 200, 1 inhalation once a day  On average he uses his albuterol twice a day just before he goes for a walk  He is on the fluticasone nasal spray 2 sprays to each nostril once a day and fexofenadine 180 mg once a day    Also using the saline nasal "rinse  No nocturnal symptoms  Review of systems:  A 12 point system review is done and aside from what is stated above the rest of the review of systems is negative  Family history and social history are reviewed  Medications list is reviewed  Vitals: Blood pressure 142/76, pulse 76, temperature (!) 97 3 °F (36 3 °C), temperature source Tympanic, resp  rate 15, height 5' 7\" (1 702 m), weight 79 9 kg (176 lb 1 6 oz), SpO2 95 %  ,     Physical Exam  Gen: Awake, alert, oriented x 3, no acute distress  HEENT: Mucous membranes moist, no oral lesions, no thrush  NECK: No accessory muscle use, JVP not elevated  Cardiac: Regular, single S1, single S2, no murmurs, no rubs, no gallops  Lungs: Decreased breath sounds  No wheezing or rhonchi  Abdomen: normoactive bowel sounds, soft nontender, nondistended, no rebound or rigidity, no guarding  Extremities: no cyanosis, no clubbing, no edema  Neuro:  Grossly nonfocal   Skin:  No rash      Lab Results   Component Value Date    WBC 8 41 04/11/2023    HGB 12 3 04/11/2023    HCT 37 7 04/11/2023    MCV 94 04/11/2023     04/11/2023     Lab Results   Component Value Date    SODIUM 138 04/11/2023    K 4 7 04/11/2023     (H) 04/11/2023    CO2 26 04/11/2023    BUN 30 (H) 04/11/2023    CREATININE 1 53 (H) 04/11/2023    GLUC 100 04/02/2021    CALCIUM 10 2 (H) 04/11/2023           "

## 2023-05-09 ENCOUNTER — TELEPHONE (OUTPATIENT)
Dept: PAIN MEDICINE | Facility: CLINIC | Age: 78
End: 2023-05-09

## 2023-05-09 NOTE — TELEPHONE ENCOUNTER
Caller: Charline Quezada PT    Doctor: Dr Celeste Arreguin    Reason for call: Pt returned the call with 80 % for the first couple of days  improvement and pain level  3/10        Call back#: [phone]

## 2023-05-11 DIAGNOSIS — I10 ESSENTIAL HYPERTENSION: ICD-10-CM

## 2023-05-11 DIAGNOSIS — K21.9 GASTROESOPHAGEAL REFLUX DISEASE: ICD-10-CM

## 2023-05-12 RX ORDER — METOPROLOL SUCCINATE 25 MG/1
TABLET, EXTENDED RELEASE ORAL
Qty: 90 TABLET | Refills: 3 | Status: SHIPPED | OUTPATIENT
Start: 2023-05-12

## 2023-05-16 NOTE — TELEPHONE ENCOUNTER
Caller: Gosia Valle   Doctor/office:   AASHISH#: [phone]    % of improvement: 80%  Pain Scale (1-10): 1-2 when not in pain  7/10  When in pain

## 2023-05-19 ENCOUNTER — OFFICE VISIT (OUTPATIENT)
Dept: PAIN MEDICINE | Facility: CLINIC | Age: 78
End: 2023-05-19

## 2023-05-19 VITALS
HEIGHT: 67 IN | BODY MASS INDEX: 27.47 KG/M2 | SYSTOLIC BLOOD PRESSURE: 167 MMHG | WEIGHT: 175 LBS | DIASTOLIC BLOOD PRESSURE: 86 MMHG | HEART RATE: 57 BPM

## 2023-05-19 DIAGNOSIS — M51.36 DDD (DEGENERATIVE DISC DISEASE), LUMBAR: ICD-10-CM

## 2023-05-19 DIAGNOSIS — M48.061 FORAMINAL STENOSIS OF LUMBAR REGION: ICD-10-CM

## 2023-05-19 DIAGNOSIS — M54.16 LUMBAR RADICULOPATHY: Primary | ICD-10-CM

## 2023-05-19 DIAGNOSIS — M51.26 LUMBAR DISC HERNIATION: ICD-10-CM

## 2023-05-19 DIAGNOSIS — M48.062 SPINAL STENOSIS OF LUMBAR REGION WITH NEUROGENIC CLAUDICATION: ICD-10-CM

## 2023-05-19 DIAGNOSIS — M43.16 SPONDYLOLISTHESIS OF LUMBAR REGION: ICD-10-CM

## 2023-05-19 NOTE — PROGRESS NOTES
Assessment:  1  Lumbar radiculopathy    2  DDD (degenerative disc disease), lumbar    3  Foraminal stenosis of lumbar region    4  Spondylolisthesis of lumbar region    5  Lumbar disc herniation    6  Spinal stenosis of lumbar region with neurogenic claudication        Plan:  1  We can repeat left L2 and L3 TFESI as needed  I discussed with the patient that since there has been moderate to significant improvement in the pain symptoms, we will hold off on any repeat injections at this point in time  However, I reviewed with the patient that if their symptoms should return or worsen,  they should call our office to schedule to discuss repeating the injection  2   Patient may continue Tylenol as needed and should not exceed more than 3000 mg in 24 hours  3  We will avoid NSAIDs secondary to CKD  4  Continue with home exercise program as taught by physical therapy  5  Follow-up as needed at this time    History of Present Illness: The patient is a 78 y o  male with a history of A-fib, CKD, lung cancer, CAD, COPD, prostate cancer, previous lumbar fusion from L4-S1 last seen on 03/30/2023 who presents for a follow up office visit in regards to chronic left-sided lumbosacral back pain that radiates into the left buttock, hip and anterior medial aspect of the left thigh to the knee with associated subjective weakness the patient denies right-sided symptoms, bowel or bladder incontinence or saddle anesthesia  Patient is status post left L2 and L3 TFESI on May 2-second 2023 with an 80% improvement of his pain which is ongoing at this time  He is unable to take NSAIDs secondary to CKD  He does find some relief with Tylenol as needed    The patient rates his pain a 4 out of 10 on the numeric pain rating scale    He intermittently has pain in the morning which is described as dull aching, sharp and pressure-like    I have personally reviewed and/or updated the patient's past medical history, past surgical history, family history, social history, current medications, allergies, and vital signs today  Review of Systems:    Review of Systems   Respiratory: Negative for shortness of breath  Cardiovascular: Negative for chest pain  Gastrointestinal: Negative for constipation, diarrhea, nausea and vomiting  Musculoskeletal: Positive for gait problem  Negative for arthralgias, joint swelling and myalgias  Skin: Negative for rash  Neurological: Negative for dizziness, seizures and weakness  All other systems reviewed and are negative  Past Medical History:   Diagnosis Date   • Allergic rhinitis 2015   • Aortic aneurysm [facility])    • Benign colon polyp    • Bipolar 1 disorder ([facility] )    • Cardiac disease     MI   • Cervical cord compression with myelopathy ([place] [address] )    • COPD (chronic obstructive pulmonary disease) ([place] [address] )    • Coronary artery disease 1995   • Diverticulitis    • Diverticulosis    • Emphysema of lung ([place] [address] ) 2012   • Gait disturbance     uses cane, leg brace on right   • GERD (gastroesophageal reflux disease)    • Heart attack ([place] [address] ) 1996   • Hx of resection of large bowel 05/03/2016   • Hyperlipidemia    • Hypertension    • IBS (irritable bowel syndrome)    • Lumbar stenosis    • Lung cancer ([place] [address] )     2007 Left lower lobectomy and 2011 Right lung with surgery    • Myocardial infarction [facility])     involving other coronary artery   • Prostate cancer ([place] [address] )    • Shortness of breath    • Small bowel obstruction ([place] [address] ) 11/16/2016       Past Surgical History:   Procedure Laterality Date   • ANGIOPLASTY      stent   • CARDIAC CATHETERIZATION  1995    angioplasty   • CARDIAC SURGERY     • CERVICAL SPINE SURGERY      Cervical decompression with cervical fusion from C3-C7 for spinal stenosis     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   • LUNG LOBECTOMY Left    • AZ ARTHRD ANT INTERBODY MIN DSC CRV BELOW C2 N/A 05/02/2016    Procedure: Anterior cervical diskectomy C3/4, C5/6, C6/7 with anterior plate fixation fusion C3-7;  Posterior decompressive laminectomy C3-7 with lateral mass fixation fusion C3-7 (IMPULSE MONITORING); Surgeon: Carlos Oliva MD;  [facility];  Service: Neurosurgery   • AZ ARTHRODESIS POSTERIOR INTERBODY [address] LUMBAR N/A 01/30/2017    Procedure: L4-5 AND L5-S1 DECOMPRESSIVE FORAMINOTOMIES, TRANSFORAMINAL LUMBAR INTERBODY AND PEDICLE SCREW FIXATION FUSION L4-S1 (IMPULSE);   Surgeon: Carlos Oliva MD;  [facility];  Service: Neurosurgery   • AZ EVASC RPR DPLMNT AORTO-AORTIC NDGFT N/A 08/06/2018    Procedure: REPAIR ANEURYSM ENDOVASCULAR ABDOMINAL AORTIC  (EVAR) WITH BILATERAL PERCUTANEOUS FEMORAL ACCESS WITH ULTRASOUND GUIDANCE ON THE RIGHT AND PRE CLOSURE;  Surgeon: Lanre Hancock MD;  [facility];  Service: Vascular   • PROSTATECTOMY  2007    for prostate CA - no chemo/RT   • SIGMOIDECTOMY      for divertic   • SMALL INTESTINE SURGERY N/A 11/17/2016    Procedure: Exploratory Laparotomy, Lysis of adhesions to release small bowel obstruction;  Surgeon: Emily Shipman MD;  [facility];  Service:    • SPINE SURGERY  2016, 2017   • TONSILLECTOMY  Y    1950       Family History   Problem Relation Age of Onset   • Hypertension Mother    • Heart attack Father    • Colon cancer Father    • Coronary artery disease Father    • Hypertension Father    • Heart disease Family    • Hyperlipidemia Family    • Hypertension Family        Social History     Occupational History   • Occupation: Retired   Tobacco Use   • Smoking status: Former     Packs/day: 1 50     Years: 35 00 Pack years: 52 50     Types: Cigarettes     Start date: 1967     Quit date: 2011     Years since quittin 3   • Smokeless tobacco: Never   Vaping Use   • Vaping Use: Never used   Substance and Sexual Activity   • Alcohol use: Yes     Alcohol/week: 2 0 - 3 0 standard drinks     Types: 2 - 3 Shots of liquor per week     Comment: One glass per day   • Drug use: No   • Sexual activity: Not Currently     Partners: Female     Birth control/protection: Post-menopausal, None         Current Outpatient Medications:   •  acetaminophen (TYLENOL) 500 mg tablet, Take 500 mg by mouth as needed for mild pain , Disp: , Rfl:   •  amLODIPine (NORVASC) 2 5 mg tablet, TAKE 1 TABLET BY MOUTH  DAILY, Disp: 90 tablet, Rfl: 3  •  aspirin (ECOTRIN LOW STRENGTH) 81 mg EC tablet, Take 81 mg by mouth daily, Disp: , Rfl:   •  atorvastatin (LIPITOR) 40 mg tablet, TAKE 1 TABLET BY MOUTH  DAILY, Disp: 90 tablet, Rfl: 5  •  Cholecalciferol (VITAMIN D PO), Take 2,000 Units by mouth daily  , Disp: , Rfl:   •  dicyclomine (Bentyl) 20 mg tablet, Take 1 tablet (20 mg total) by mouth every 6 (six) hours as needed (abd cramping), Disp: 90 tablet, Rfl: 3  •  fexofenadine (ALLEGRA) 180 MG tablet, Take 180 mg by mouth as needed Daily during the Summer, Disp: , Rfl:   •  FLUoxetine (PROzac) 10 mg capsule, Take 10 mg by mouth daily , Disp: , Rfl:   •  lithium carbonate (LITHOBID) 300 mg CR tablet, Take 300 mg by mouth in the morning, Disp: , Rfl:   •  metoprolol succinate (TOPROL-XL) 25 mg 24 hr tablet, TAKE 1 TABLET BY MOUTH  DAILY, Disp: 90 tablet, Rfl: 3  •  Mirabegron ER 25 MG TB24, Take 25 mg by mouth daily at bedtime, Disp: 90 tablet, Rfl: 3  •  mupirocin (BACTROBAN) 2 % ointment, Apply topically 2 (two) times a day as needed (wound), Disp: 22 g, Rfl: 0  •  nitroglycerin (NITRODUR) 0 2 mg/hr, APPLY 1 PATCH TOPICALLY  ONCE DAILY   LEAVE ON FOR 12 TO 14 HOURS THEN REMOVE FOR A NITRATE-FREE INTERVAL OF  10 TO 12 HOURS, Disp: 90 patch, Rfl: 3  • "omega-3-acid ethyl esters (LOVAZA) 1 g capsule, TAKE 1 CAPSULE BY MOUTH 3  TIMES DAILY, Disp: 270 capsule, Rfl: 3  •  ProAir  (90 Base) MCG/ACT inhaler, USE 2 INHALATIONS BY MOUTH  EVERY 6 HOURS AS NEEDED FOR WHEEZING, Disp: 34 g, Rfl: 3  •  Trelegy Ellipta 200-62 5-25 MCG/INH AEPB inhaler, USE 1 INHALATION BY MOUTH  DAILY   RINSE MOUTH AFTER  USE, Disp: 180 each, Rfl: 3  •  famotidine (PEPCID) 40 MG tablet, TAKE 1 TABLET BY MOUTH  DAILY AT BEDTIME AS NEEDED  FOR HEARTBURN (Patient taking differently: 20 mg Patient is taking 20mg), Disp: 30 tablet, Rfl: 11  •  fluticasone (FLONASE) 50 mcg/act nasal spray, 2 sprays into each nostril daily, Disp: , Rfl:   •  omeprazole (PriLOSEC) 20 mg delayed release capsule, Take 1 capsule (20 mg total) by mouth 2 (two) times a day (Patient taking differently: Take 20 mg by mouth daily), Disp: 60 capsule, Rfl: 3    Allergies   Allergen Reactions   • Bactrim [Sulfamethoxazole-Trimethoprim] Other (See Comments)     AILYN   • Nsaids      Annotation - [id number]: unable to take due to use of lithium  • Oxycodone Rash       Physical Exam:    /86   Pulse 57   Ht 5' 7\" (1 702 m)   Wt 79 4 kg (175 lb)   BMI 27 41 kg/m²     Constitutional:normal, well developed, well nourished, alert, in no distress and non-toxic and no overt pain behavior  Eyes:anicteric  HEENT:grossly intact  Neck:supple, symmetric, trachea midline and no masses   Pulmonary:even and unlabored  Cardiovascular:No edema or pitting edema present  Skin:Normal without rashes or lesions and well hydrated  Psychiatric:Mood and affect appropriate  Neurologic:Cranial Nerves II-XII grossly intact  Musculoskeletal:antalgic and ambulates with cane      Imaging  No orders to display         No orders of the defined types were placed in this encounter      "

## 2023-05-22 ENCOUNTER — OFFICE VISIT (OUTPATIENT)
Dept: NEUROLOGY | Facility: CLINIC | Age: 78
End: 2023-05-22

## 2023-05-22 VITALS
BODY MASS INDEX: 27.72 KG/M2 | DIASTOLIC BLOOD PRESSURE: 82 MMHG | WEIGHT: 177 LBS | SYSTOLIC BLOOD PRESSURE: 132 MMHG | TEMPERATURE: 97.8 F

## 2023-05-22 DIAGNOSIS — R26.9 GAIT ABNORMALITY: ICD-10-CM

## 2023-05-22 DIAGNOSIS — G20 PARKINSONISM, UNSPECIFIED PARKINSONISM TYPE (HCC): Primary | ICD-10-CM

## 2023-05-22 NOTE — PROGRESS NOTES
Review of Systems   Constitutional: Negative  Negative for appetite change and fever  HENT: Negative  Negative for hearing loss, tinnitus, trouble swallowing and voice change  Eyes: Negative  Negative for photophobia, pain and visual disturbance  Respiratory: Negative  Negative for shortness of breath  Cardiovascular: Negative  Negative for palpitations  Gastrointestinal: Negative  Negative for nausea and vomiting  Endocrine: Negative  Negative for cold intolerance  Genitourinary: Negative  Negative for dysuria, frequency and urgency  Musculoskeletal: Positive for gait problem (occasionally shuffles), neck pain (Occasional) and neck stiffness (occasional)  Negative for myalgias  Hip pain     Skin: Negative  Negative for rash  Allergic/Immunologic: Negative  Neurological: Positive for tremors (Right Hand, has gotten a little better) and speech difficulty ([place] at times)  Negative for dizziness, seizures, syncope, facial asymmetry, weakness, light-headedness, numbness and headaches  Hematological: Negative  Does not bruise/bleed easily  Psychiatric/Behavioral: Negative  Negative for confusion, hallucinations and sleep disturbance  All other systems reviewed and are negative

## 2023-05-22 NOTE — PROGRESS NOTES
Patient ID: Sarita Luna is a 78 y o  male    Assessment/Plan:    Parkinsonism ([facility] [address] )  Chronic multifactorial gait changes along with postural and action tremors present for years and more recent development of mild bradykinesia and shuffling which on exam appears to be fairly stable  History of being on lithium for years, mixed action and postural tremor may be related to the chronic use  This is not bothersome to him and therefore does not require treatment  On exam today bradykinesia appears to be fairly stable over the past year  Perhaps mild vascular parkinsonism given stability  Recent injections for trochanteric bursitis appear to be assisting with underlying pain  Given he is functioning overall well there is not need for any additional medication at this time  If gait or mobility were to worsen we could consider a trial of levodopa  Discussed potential studies that can be used to distinguish between medication induced tremor, secondary parkinsonism and idiopathic Parkinson's disease  At this time given stability of symptoms I do not think that this is necessary  Diagnoses and all orders for this visit:    Parkinsonism, unspecified Parkinsonism type ([facility] [address] )    Gait abnormality        Subjective:      Tashi Trivedi is a right handed man with bipolar disorder on Lithium, atrial fibrillation, cervical and lumbar radiculopathy and left trochanteric bursitis who presents for follow up for gait difficulty for over 5 years with the question of parkinsonism (neuroleptic induced versus vascular )  Review of chart reveals a longstanding gait dysfunction felt to be multifactorial(cervical and lumbar degenerative disease, prior myelopathy and possible peripheral neuropathy and parkinsonism  Also noted to have right hand rest tremor    Pain management injected left trochanteric bursitis which has helped with left leg pain  No associated paresthesia  He is able to perform ADL's   He continues to have a mild left hand tremor which is not bothersome  He does not feel like this is worsened  He uses a cane  There are no falls  Denies any dysphagia  Speech is unchanged  Sleeps 6-8 hours nightly  Mood is controlled  He denies any dizziness or vertigo  Imaging:  Brain MRI  -Chronic lacunar infarct left centrum semiovale  Progressive cerebral volume loss and progressive white matter foci in periventricular regions compatible with chronic microangiopathic disease  Objective:    /82 (BP Location: Left arm, Patient Position: Standing, Cuff Size: Standard)   Temp 97 8 °F (36 6 °C)   Wt 80 3 kg (177 lb)   BMI 27 72 kg/m²       Physical Exam  Vitals reviewed  Eyes:      Extraocular Movements: Extraocular movements intact  Psychiatric:         Speech: Speech normal          Neurological Exam  Mental Status   Oriented only to person, place and situation  Recent and remote memory are intact  Speech is normal  Follows complex commands  Attention and concentration are normal     Cranial Nerves  CN III, IV, VI: Extraocular movements intact bilaterally  Right pupil: 1 mm  Left pupil: 1 mm  CN VII: Full and symmetric facial movement  CN VIII: Hearing is normal   CN IX, X: Palate elevates symmetrically  CN XI: Shoulder shrug strength is normal   CN XII: Tongue midline without atrophy or fasciculations  Motor   Normal muscle tone  Strength is 5/5 in all four extremities except as noted  Left IP 5-/5  Sensory  Light touch is normal in upper and lower extremities  Coordination  Right: Finger-to-nose normal Left: Finger-to-nose normal   Moderate amplitude action tremor on finger-to-nose bilaterally  Mild postural tremors of the hands outstretched  No rest tremor  Mild decrement on hand movements left greater than right       Gait  Casual gait is normal including stance, stride, and arm swing  Able to rise from chair without using arms  Slightly wide-based    Reduction in left stride  Carrying cane                                       Date of exam:  5/16/22 5/22/23          Speech  1 0   Facial Expression  1 1   Rigidity - Neck  0 0   Rigidity - Upper Extremity (Right)  0 0   Rigidity - Upper Extremity (Left)   1 0   Rigidity - Lower Extremity (Right)  0 0   Rigidity - Lower Extremity (Left)   0 0   Finger Taps (Right)   1 1   Finger Taps (Left)   1 1   Hand  (Right)  0 0   Hand  (Left)   0 1   Pronation/Supination (Right)  2 1   Pronation/Supination (Left)   2 2   Heel Taps (Right) 1 1   Heel Taps(Left) 1 1   Arising from Chair   1 2   Gait   2 2 better with cane   Postural Stability        Posture 3 cane 2   Global spontaneity of movement 1 1   Postural Tremor (Right) 1 1   Postural Tremor (Left) 1 1   Kinetic Tremor (Right)  1 2   Kinetic Tremor (Left)  1 2   Rest tremor  RUE 0 0   Rest tremor  LUE 0 0   Rest tremor  RLE 0 0   Reset tremor  LLE 0 0   Lip/Jaw Tremor  0 0   Motor Exam Total:                  Gerhard Villasenor MD  Movement disorder physician  [employer]

## 2023-05-22 NOTE — ASSESSMENT & PLAN NOTE
Chronic multifactorial gait changes along with postural and action tremors present for years and more recent development of mild bradykinesia and shuffling which on exam appears to be fairly stable  History of being on lithium for years, mixed action and postural tremor may be related to the chronic use  This is not bothersome to him and therefore does not require treatment  On exam today bradykinesia appears to be fairly stable over the past year  Perhaps mild vascular parkinsonism given stability  Recent injections for trochanteric bursitis appear to be assisting with underlying pain  Given he is functioning overall well there is not need for any additional medication at this time  If gait or mobility were to worsen we could consider a trial of levodopa  Discussed potential studies that can be used to distinguish between medication induced tremor, secondary parkinsonism and idiopathic Parkinson's disease  At this time given stability of symptoms I do not think that this is necessary

## 2023-05-30 ENCOUNTER — OFFICE VISIT (OUTPATIENT)
Dept: NEPHROLOGY | Facility: CLINIC | Age: 78
End: 2023-05-30

## 2023-05-30 VITALS
HEIGHT: 67 IN | SYSTOLIC BLOOD PRESSURE: 150 MMHG | HEART RATE: 78 BPM | DIASTOLIC BLOOD PRESSURE: 78 MMHG | WEIGHT: 176 LBS | BODY MASS INDEX: 27.62 KG/M2

## 2023-05-30 DIAGNOSIS — N18.31 STAGE 3A CHRONIC KIDNEY DISEASE (HCC): ICD-10-CM

## 2023-05-30 DIAGNOSIS — R80.8 OTHER PROTEINURIA: ICD-10-CM

## 2023-05-30 DIAGNOSIS — K21.9 GASTROESOPHAGEAL REFLUX DISEASE WITHOUT ESOPHAGITIS: Primary | ICD-10-CM

## 2023-05-30 DIAGNOSIS — N18.9 CHRONIC KIDNEY DISEASE, UNSPECIFIED CKD STAGE: Primary | ICD-10-CM

## 2023-05-30 RX ORDER — OMEPRAZOLE 20 MG/1
CAPSULE, DELAYED RELEASE ORAL
Qty: 180 CAPSULE | Refills: 3 | OUTPATIENT
Start: 2023-05-30

## 2023-05-30 RX ORDER — OMEPRAZOLE 40 MG/1
40 CAPSULE, DELAYED RELEASE ORAL DAILY
Qty: 90 CAPSULE | Refills: 2 | Status: SHIPPED | OUTPATIENT
Start: 2023-05-30 | End: 2023-06-01 | Stop reason: DRUGHIGH

## 2023-05-30 NOTE — PROGRESS NOTES
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  Protein estimation last was 1 8 g  That was lower than the prior assessment  Latest creatinine was 1 5 which is stable in his baseline range  When I brought up protein in the urine the patient says he has been hearing about that since he was a young man as well so that makes me feel even better about it because there is no significant progression of his kidney disease  At this point it could be due to lithium therapy causing chronic interstitial disease  I feel it is okay to continue this medication because he is so well compensated on it and his levels are maintained in the therapeutic range and renal function is stable  We will continue to monitor renal function and proteinuria  Continue with blood pressure control    No other changes  He does have mild hypercalcemia in the past we did an SPEP UPEP they were negative for M spike  This could be related to lithium therapy  At this point it is very minimally elevated so we will follow  The patient had slight abrasion above his eye no neurological symptoms no headache I just told him to monitor for any change in symptoms and if they felt this would develop they could go to the emergency room  SUBJECTIVE:  Review of Systems   Constitutional: Negative for chills, diaphoresis, fever and malaise/fatigue  HENT: Negative  Eyes: Negative  "  Cardiovascular: Negative for chest pain, dyspnea on exertion, leg swelling and orthopnea  Respiratory: Negative  Negative for cough, shortness of breath, sputum production and wheezing  Gastrointestinal: Negative for abdominal pain, diarrhea, nausea and vomiting  Genitourinary: Negative for dysuria, flank pain, hematuria and incomplete emptying  Improved urinary frequency and urgency  Neurological: Negative for dizziness, focal weakness, headaches and weakness  Psychiatric/Behavioral: Negative for altered mental status, hallucinations and hypervigilance  The patient is not nervous/anxious  OBJECTIVE:  Current Weight: Weight - Scale: 79 8 kg (176 lb)  [email or web address]:     Blood pressure 150/78, pulse 78, height 5' 7\" (1 702 m), weight 79 8 kg (176 lb)  , Body mass index is 27 57 kg/m²  @[facility]@    Physical Exam: /78 (BP Location: Left arm, Patient Position: Sitting, Cuff Size: Standard)   Pulse 78   Ht 5' 7\" (1 702 m)   Wt 79 8 kg (176 lb)   BMI 27 57 kg/m²   Physical Exam  Constitutional:       General: He is not in acute distress  Appearance: He is not toxic-appearing or diaphoretic  HENT:      Head: Normocephalic and atraumatic  Nose: Nose normal       Mouth/Throat:      Mouth: Mucous membranes are moist    Eyes:      General: No scleral icterus  Extraocular Movements: Extraocular movements intact  Cardiovascular:      Rate and Rhythm: Normal rate and regular rhythm  Heart sounds: No friction rub  No gallop  Comments:   mild edema left greater than right  Pulmonary:      Effort: Pulmonary effort is normal  No respiratory distress  Breath sounds: No wheezing, rhonchi or rales  Abdominal:      General: Bowel sounds are normal  There is no distension  Palpations: Abdomen is soft  Tenderness: There is no abdominal tenderness  There is no rebound     Musculoskeletal:      Cervical back: Normal range of motion and neck " supple  Skin:     Comments: Superficial abrasion over right eye  Neurological:      General: No focal deficit present  Mental Status: He is alert and oriented to person, place, and time  Mental status is at baseline  Psychiatric:         Mood and Affect: Mood normal          Behavior: Behavior normal          Thought Content:  Thought content normal          Judgment: Judgment normal          Medications:    Current Outpatient Medications:   •  acetaminophen (TYLENOL) 500 mg tablet, Take 500 mg by mouth as needed for mild pain , Disp: , Rfl:   •  amLODIPine (NORVASC) 2 5 mg tablet, TAKE 1 TABLET BY MOUTH  DAILY, Disp: 90 tablet, Rfl: 3  •  aspirin (ECOTRIN LOW STRENGTH) 81 mg EC tablet, Take 81 mg by mouth daily, Disp: , Rfl:   •  atorvastatin (LIPITOR) 40 mg tablet, TAKE 1 TABLET BY MOUTH  DAILY, Disp: 90 tablet, Rfl: 5  •  Cholecalciferol (VITAMIN D PO), Take 2,000 Units by mouth daily  , Disp: , Rfl:   •  dicyclomine (Bentyl) 20 mg tablet, Take 1 tablet (20 mg total) by mouth every 6 (six) hours as needed (abd cramping), Disp: 90 tablet, Rfl: 3  •  famotidine (PEPCID) 40 MG tablet, TAKE 1 TABLET BY MOUTH  DAILY AT BEDTIME AS NEEDED  FOR HEARTBURN (Patient taking differently: 20 mg if needed Patient is taking 20mg), Disp: 30 tablet, Rfl: 11  •  fexofenadine (ALLEGRA) 180 MG tablet, Take 180 mg by mouth as needed Daily during the Summer, Disp: , Rfl:   •  FLUoxetine (PROzac) 10 mg capsule, Take 10 mg by mouth daily , Disp: , Rfl:   •  fluticasone (FLONASE) 50 mcg/act nasal spray, 2 sprays into each nostril daily, Disp: , Rfl:   •  lithium carbonate (LITHOBID) 300 mg CR tablet, Take 300 mg by mouth in the morning, Disp: , Rfl:   •  metoprolol succinate (TOPROL-XL) 25 mg 24 hr tablet, TAKE 1 TABLET BY MOUTH  DAILY, Disp: 90 tablet, Rfl: 3  •  Mirabegron ER 25 MG TB24, Take 25 mg by mouth daily at bedtime, Disp: 90 tablet, Rfl: 3  •  mupirocin (BACTROBAN) 2 % ointment, Apply topically 2 (two) times a day as "needed (wound), Disp: 22 g, Rfl: 0  •  nitroglycerin (NITRODUR) 0 2 mg/hr, APPLY 1 PATCH TOPICALLY  ONCE DAILY  LEAVE ON FOR 12 TO 14 HOURS THEN REMOVE FOR A NITRATE-FREE INTERVAL OF  10 TO 12 HOURS (Patient taking differently: if needed), Disp: 90 patch, Rfl: 3  •  omega-3-acid ethyl esters (LOVAZA) 1 g capsule, TAKE 1 CAPSULE BY MOUTH 3  TIMES DAILY, Disp: 270 capsule, Rfl: 3  •  omeprazole (PriLOSEC) 20 mg delayed release capsule, Take 1 capsule (20 mg total) by mouth 2 (two) times a day (Patient taking differently: Take 20 mg by mouth daily 2 tabs daily), Disp: 60 capsule, Rfl: 3  •  ProAir  (90 Base) MCG/ACT inhaler, USE 2 INHALATIONS BY MOUTH  EVERY 6 HOURS AS NEEDED FOR WHEEZING, Disp: 34 g, Rfl: 3  •  Trelegy Ellipta 200-62 5-25 MCG/INH AEPB inhaler, USE 1 INHALATION BY MOUTH  DAILY    RINSE MOUTH AFTER  USE, Disp: 180 each, Rfl: 3    Laboratory Results:  Lab Results   Component Value Date    HCT 37 7 04/11/2023    HGB 12 3 04/11/2023    MCV 94 04/11/2023     04/11/2023    WBC 8 41 04/11/2023     Lab Results   Component Value Date    BUN 30 (H) 04/11/2023    CALCIUM 10 2 (H) 04/11/2023     (H) 04/11/2023    CO2 26 04/11/2023    CREATININE 1 53 (H) 04/11/2023    GLUC 100 04/02/2021    K 4 7 04/11/2023    SODIUM 138 04/11/2023     Lab Results   Component Value Date    CALCIUM 10 2 (H) 04/11/2023    PHOS 2 8 11/05/2014     No results found for: \"LABPROT\"    "

## 2023-05-30 NOTE — LETTER
May 30, 2023     Judi [address]  51   [address] [address]    Patient: Betzy Carbajal   YOB: 1945   Date of Visit: 5/30/2023       Dear Dr Yudy Kumar: Thank you for referring Jasminemmett Garcia to me for evaluation  Below are my notes for this consultation  If you have questions, please do not hesitate to call me  I look forward to following your patient along with you  Sincerely,        Reddy Dowling MD        CC: No Recipients    Reddy Dowling MD  5/30/2023 12:44 PM  Sign when Signing Visit  NEPHROLOGY PROGRESS NOTE    Betzy Carbajal 78 y o  male MRN: [id number]  Unit/Bed#:  Encounter: [id number]  Reason for Consult: Chronic kidney disease with proteinuria    The patient is here for follow-up unfortunately he tripped on the curb when he was coming up to the appointment he did not lose consciousness but he suffered a little bit of an abrasion above his right eye  He has no other symptoms no pain no headache no confusion and feels well  Reports that things have been good since that last seen him  ASSESSMENT/PLAN:  1  Renal    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  Protein estimation last was 1 8 g  That was lower than the prior assessment  Latest creatinine was 1 5 which is stable in his baseline range  When I brought up protein in the urine the patient says he has been hearing about that since he was a young man as well so that makes me feel even better about it because there is no significant progression of his kidney disease  At this point it could be due to lithium therapy causing chronic interstitial disease  I feel it is okay to continue this medication because he is so well compensated on it and his levels are maintained in the therapeutic range and renal function is stable  We will continue to monitor renal function and proteinuria  Continue with blood pressure control    No other changes      He does have mild hypercalcemia "in the past we did an SPEP UPEP they were negative for M spike  This could be related to lithium therapy  At this point it is very minimally elevated so we will follow  The patient had slight abrasion above his eye no neurological symptoms no headache I just told him to monitor for any change in symptoms and if they felt this would develop they could go to the emergency room  SUBJECTIVE:  Review of Systems   Constitutional: Negative for chills, diaphoresis, fever and malaise/fatigue  HENT: Negative  Eyes: Negative  Cardiovascular: Negative for chest pain, dyspnea on exertion, leg swelling and orthopnea  Respiratory: Negative  Negative for cough, shortness of breath, sputum production and wheezing  Gastrointestinal: Negative for abdominal pain, diarrhea, nausea and vomiting  Genitourinary: Negative for dysuria, flank pain, hematuria and incomplete emptying  Improved urinary frequency and urgency  Neurological: Negative for dizziness, focal weakness, headaches and weakness  Psychiatric/Behavioral: Negative for altered mental status, hallucinations and hypervigilance  The patient is not nervous/anxious  OBJECTIVE:  Current Weight: Weight - Scale: 79 8 kg (176 lb)  [email or web address]:     Blood pressure 150/78, pulse 78, height 5' 7\" (1 702 m), weight 79 8 kg (176 lb)  , Body mass index is 27 57 kg/m²  [unfilled]    Physical Exam: /78 (BP Location: Left arm, Patient Position: Sitting, Cuff Size: Standard)   Pulse 78   Ht 5' 7\" (1 702 m)   Wt 79 8 kg (176 lb)   BMI 27 57 kg/m²   Physical Exam  Constitutional:       General: He is not in acute distress  Appearance: He is not toxic-appearing or diaphoretic  HENT:      Head: Normocephalic and atraumatic  Nose: Nose normal       Mouth/Throat:      Mouth: Mucous membranes are moist    Eyes:      General: No scleral icterus  Extraocular Movements: Extraocular movements intact     Cardiovascular:      " Rate and Rhythm: Normal rate and regular rhythm  Heart sounds: No friction rub  No gallop  Comments:   mild edema left greater than right  Pulmonary:      Effort: Pulmonary effort is normal  No respiratory distress  Breath sounds: No wheezing, rhonchi or rales  Abdominal:      General: Bowel sounds are normal  There is no distension  Palpations: Abdomen is soft  Tenderness: There is no abdominal tenderness  There is no rebound  Musculoskeletal:      Cervical back: Normal range of motion and neck supple  Skin:     Comments: Superficial abrasion over right eye  Neurological:      General: No focal deficit present  Mental Status: He is alert and oriented to person, place, and time  Mental status is at baseline  Psychiatric:         Mood and Affect: Mood normal          Behavior: Behavior normal          Thought Content:  Thought content normal          Judgment: Judgment normal          Medications:    Current Outpatient Medications:   •  acetaminophen (TYLENOL) 500 mg tablet, Take 500 mg by mouth as needed for mild pain , Disp: , Rfl:   •  amLODIPine (NORVASC) 2 5 mg tablet, TAKE 1 TABLET BY MOUTH  DAILY, Disp: 90 tablet, Rfl: 3  •  aspirin (ECOTRIN LOW STRENGTH) 81 mg EC tablet, Take 81 mg by mouth daily, Disp: , Rfl:   •  atorvastatin (LIPITOR) 40 mg tablet, TAKE 1 TABLET BY MOUTH  DAILY, Disp: 90 tablet, Rfl: 5  •  Cholecalciferol (VITAMIN D PO), Take 2,000 Units by mouth daily  , Disp: , Rfl:   •  dicyclomine (Bentyl) 20 mg tablet, Take 1 tablet (20 mg total) by mouth every 6 (six) hours as needed (abd cramping), Disp: 90 tablet, Rfl: 3  •  famotidine (PEPCID) 40 MG tablet, TAKE 1 TABLET BY MOUTH  DAILY AT BEDTIME AS NEEDED  FOR HEARTBURN (Patient taking differently: 20 mg if needed Patient is taking 20mg), Disp: 30 tablet, Rfl: 11  •  fexofenadine (ALLEGRA) 180 MG tablet, Take 180 mg by mouth as needed Daily during the Summer, Disp: , Rfl:   •  FLUoxetine (PROzac) 10 "mg capsule, Take 10 mg by mouth daily , Disp: , Rfl:   •  fluticasone (FLONASE) 50 mcg/act nasal spray, 2 sprays into each nostril daily, Disp: , Rfl:   •  lithium carbonate (LITHOBID) 300 mg CR tablet, Take 300 mg by mouth in the morning, Disp: , Rfl:   •  metoprolol succinate (TOPROL-XL) 25 mg 24 hr tablet, TAKE 1 TABLET BY MOUTH  DAILY, Disp: 90 tablet, Rfl: 3  •  Mirabegron ER 25 MG TB24, Take 25 mg by mouth daily at bedtime, Disp: 90 tablet, Rfl: 3  •  mupirocin (BACTROBAN) 2 % ointment, Apply topically 2 (two) times a day as needed (wound), Disp: 22 g, Rfl: 0  •  nitroglycerin (NITRODUR) 0 2 mg/hr, APPLY 1 PATCH TOPICALLY  ONCE DAILY  LEAVE ON FOR 12 TO 14 HOURS THEN REMOVE FOR A NITRATE-FREE INTERVAL OF  10 TO 12 HOURS (Patient taking differently: if needed), Disp: 90 patch, Rfl: 3  •  omega-3-acid ethyl esters (LOVAZA) 1 g capsule, TAKE 1 CAPSULE BY MOUTH 3  TIMES DAILY, Disp: 270 capsule, Rfl: 3  •  omeprazole (PriLOSEC) 20 mg delayed release capsule, Take 1 capsule (20 mg total) by mouth 2 (two) times a day (Patient taking differently: Take 20 mg by mouth daily 2 tabs daily), Disp: 60 capsule, Rfl: 3  •  ProAir  (90 Base) MCG/ACT inhaler, USE 2 INHALATIONS BY MOUTH  EVERY 6 HOURS AS NEEDED FOR WHEEZING, Disp: 34 g, Rfl: 3  •  Trelegy Ellipta 200-62 5-25 MCG/INH AEPB inhaler, USE 1 INHALATION BY MOUTH  DAILY    RINSE MOUTH AFTER  USE, Disp: 180 each, Rfl: 3    Laboratory Results:  Lab Results   Component Value Date    HCT 37 7 04/11/2023    HGB 12 3 04/11/2023    MCV 94 04/11/2023     04/11/2023    WBC 8 41 04/11/2023     Lab Results   Component Value Date    BUN 30 (H) 04/11/2023    CALCIUM 10 2 (H) 04/11/2023     (H) 04/11/2023    CO2 26 04/11/2023    CREATININE 1 53 (H) 04/11/2023    GLUC 100 04/02/2021    K 4 7 04/11/2023    SODIUM 138 04/11/2023     Lab Results   Component Value Date    CALCIUM 10 2 (H) 04/11/2023    PHOS 2 8 11/05/2014     No results found for: \"LABPROT\"    "

## 2023-05-30 NOTE — PATIENT INSTRUCTIONS
You are here for follow-up your creatinine which is the blood test for the kidney function is stable at 1 5 which is your baseline  There is some protein in the urine but it is a lower level relatively speaking so that is why we say it may be attributed to lithium use which of course is required and I would just continue her medication because things are stable and you are doing very well  Blood pressures a little high today but you fell in the parking lot so its been around 120 is at home so that is good  The other issue calcium is a little bit elevated lithium can do this is well but it is just 0 1 above normal there is no symptoms so no changes  I would not take calcium supplements  You do not take these to do not begin them  In the past I did some other testing to rule out other causes that would be more concerning  And lung cancer is cured as well  Labs and follow-up as scheduled please call me if you have any questions or concerns before next visit  Regarding your head we looked at it it is a very superficial abrasion you do not seem to have any other neurological issues or symptoms so just observe at home and if you are concerned you can go to the emergency room

## 2023-05-31 ENCOUNTER — APPOINTMENT (EMERGENCY)
Dept: RADIOLOGY | Facility: HOSPITAL | Age: 78
End: 2023-05-31
Payer: MEDICARE

## 2023-05-31 ENCOUNTER — HOSPITAL ENCOUNTER (EMERGENCY)
Facility: HOSPITAL | Age: 78
Discharge: HOME | End: 2023-05-31
Attending: EMERGENCY MEDICINE
Payer: MEDICARE

## 2023-05-31 VITALS
SYSTOLIC BLOOD PRESSURE: 173 MMHG | TEMPERATURE: 97.4 F | RESPIRATION RATE: 18 BRPM | OXYGEN SATURATION: 96 % | HEART RATE: 50 BPM | DIASTOLIC BLOOD PRESSURE: 88 MMHG

## 2023-05-31 DIAGNOSIS — S09.90XA INJURY OF HEAD, INITIAL ENCOUNTER: ICD-10-CM

## 2023-05-31 DIAGNOSIS — S60.211A TRAUMATIC ECCHYMOSIS OF RIGHT WRIST, INITIAL ENCOUNTER: ICD-10-CM

## 2023-05-31 DIAGNOSIS — S00.11XA PERIORBITAL ECCHYMOSIS OF RIGHT EYE, INITIAL ENCOUNTER: Primary | ICD-10-CM

## 2023-05-31 DIAGNOSIS — T07.XXXA ABRASIONS OF MULTIPLE SITES: ICD-10-CM

## 2023-05-31 PROCEDURE — 90471 IMMUNIZATION ADMIN: CPT

## 2023-05-31 PROCEDURE — 73110 X-RAY EXAM OF WRIST: CPT

## 2023-05-31 PROCEDURE — 99284 EMERGENCY DEPT VISIT MOD MDM: CPT

## 2023-05-31 PROCEDURE — 90715 TDAP VACCINE 7 YRS/> IM: CPT | Performed by: EMERGENCY MEDICINE

## 2023-05-31 PROCEDURE — 70486 CT MAXILLOFACIAL W/O DYE: CPT

## 2023-05-31 PROCEDURE — 72125 CT NECK SPINE W/O DYE: CPT

## 2023-05-31 PROCEDURE — 70450 CT HEAD/BRAIN W/O DYE: CPT

## 2023-05-31 RX ADMIN — TETANUS TOXOID, REDUCED DIPHTHERIA TOXOID AND ACELLULAR PERTUSSIS VACCINE, ADSORBED 0.5 ML: 5; 2.5; 8; 8; 2.5 SUSPENSION INTRAMUSCULAR at 09:25

## 2023-05-31 NOTE — ED ATTENDING ATTESTATION
Final Diagnosis:  1  Periorbital ecchymosis of right eye, initial encounter    2  Abrasions of multiple sites    3  Traumatic ecchymosis of right wrist, initial encounter    4  Injury of head, initial encounter           I, Bj Billings MD, saw and evaluated the patient  All available labs and X-rays were ordered by me or the resident and have been reviewed by myself  I discussed the patient with the resident / non-physician and agree with the resident's / non-physician practitioner's findings and plan as documented in the resident's / non-physician practicitioner's note, except where noted  At this point, I agree with the current assessment done in the ED  I was present during key portions of all procedures performed unless otherwise stated  Trauma Alert: Trauma Acuity: C  Model of Arrival:   via    Trauma Team: Current Providers  Attending Provider: Bj Billings MD  Resident: Kaiser Adair MD  Registered Nurse: Vita Stubbs RN  Code Status: Prior  Advance Directive and Living Will:      Power of :    POLST:    Mechanism:  Details of Incident: Pt reports trip/fall on sidewalk while walking at doctors office yesterday, +headstrike, -LOC, +ASA  Injury Date: 05/30/23          Chief Complaint   Patient presents with   • Fall     Pt had trip/fall over curb yesterday, +headstrike, -LOC, +ASA     This is a 78 y o  male presenting for evaluation of fall  Yesterday the child was using his cane, tripped, fell, hit head  +head strike with right orbit  Also injured bilateral wrists  He did hit the chest with fall but has no pleuritic pain or other concerns  Tetanus up to date in 2009  +headache  +chronic neck pain  +facial pain  No pain with movement of eyes  Bilateral wrist pain  Able to ambulate  chornic right ankle pain/swelling (unchanged for years)      PMH:   has a past medical history of Allergic rhinitis (2015), Aortic aneurysm ([facility] [address] ), Benign colon polyp, Bipolar 1 disorder [facility]), Cardiac disease, Cervical cord compression with myelopathy ([facility] ), COPD (chronic obstructive pulmonary disease) ([place] [address] ), Coronary artery disease (), Diverticulitis, Diverticulosis, Emphysema of lung ([place] [address] ) (), Gait disturbance, GERD (gastroesophageal reflux disease), Heart attack ([place] [address] ) (), resection of large bowel (2016), Hyperlipidemia, Hypertension, IBS (irritable bowel syndrome), Lumbar stenosis, Lung cancer ([place] [address] ), Myocardial infarction [facility]), Prostate cancer ([place] [address] ), Shortness of breath, and Small bowel obstruction ([place] [address] ) (2016)  PSH:   has a past surgical history that includes Lung lobectomy (Left); Prostatectomy (); Lung cancer surgery (Right, 2011); Sigmoidectomy; Colon surgery (2014); Cervical spine surgery; Angioplasty; pr arthrodesis posterior interbody 1 ntrspc lumbar (N/A, 2017); Small intestine surgery (N/A, 2016); pr arthrd ant interbody min dsc crv below c2 (N/A, 2016); Esophagogastroduodenoscopy (2013); Cardiac surgery; pr evasc rpr dplmnt aorto-aortic ndgft (N/A, 2018); IR EVAR (2018); FL injection left hip (non arthrogram) (2018); FL injection left hip (non arthrogram) (2019); IR biopsy lung (2020); Lung biopsy (); Cardiac catheterization (); Spine surgery (, ); and Tonsillectomy ()      Social:  Social History     Substance and Sexual Activity   Alcohol Use Yes   • Alcohol/week: 2 0 - 3 0 standard drinks of alcohol   • Types: 2 - 3 Shots of liquor per week    Comment: One glass per day     Social History     Tobacco Use   Smoking Status Former   • Packs/day: 1 50   • Years: 35 00   • Total pack years: 52 50   • Types: Cigarettes   • Start date: 1967   • Quit date: 2011   • Years since quittin 4   Smokeless Tobacco Never     Social History     Substance and Sexual Activity   Drug Use No     PE:  Temperature: (!) 97 4 °F (36 3 °C) (23 0845)  Pulse: 69 (23 0845)  Respirations: 18 (05/31/23 0845)  Blood Pressure: (!) 213/105 (05/31/23 0845)  SpO2: 99 % (05/31/23 0845)    Primary Survey:   (A) Airway: intact  (B) Breathing: bilateral breath sounds  (C) Circulation: Pulses:   normal  (D) Disabliity:  GCS Total:  15  (E) Expose:  Completed    Secondary Survey:  General: VSS, NAD, awake, alert  Well-nourished, well-developed  Appears stated age  Speaking normally in full sentences  Head: Normocephalic, traumatic, tender over and under right orbit  Ecchymosis around the orbit  +diffuse neck pain (chronic per patient though)  Eyes: PERRL, EOM-I  No diplopia  No hyphema  No subconjunctival hemorrhages  Symmetrical lids  ENT: Atraumatic external nose and ears  MMM  No malocclusion  No stridor  Normal phonation  No drooling  Normal swallowing  Neck: Symmetric, trachea midline  No JVD  CV: RRR  +S1/S2  No murmurs or gallops  Peripheral pulses +2 throughout  No chest wall tenderness  Lungs:   Unlabored No retractions  CTAB, lungs sounds equal bilateral    No tachypnea  Abd: +BS, soft, NT/ND    MSK:   FROM   Tenderness of the bilateral wrists (mild)  With abrasions/ecchymosis  Back:   No rashes  Skin: Dry, abrasions on right forehead, bilateral wrists, and knees bilaterally  Neuro: AAOx3, GCS 15, CN II-XII grossly intact  Motor grossly intact  Psychiatric/Behavioral: Appropriate mood and affect   Exam: deferred  A:  - fall  P:  - CTH/CTC/CTF given fall/trauma  - XR wrists given swellin/gtenderness  - knee XRs low yield given ambulating on it ? doubt fx  - this was mechanical fall ? no cardiac workup  - 13 point ROS was performed and all are normal unless stated in the history above  - Nursing note reviewed  Vitals reviewed  - Orders placed by myself and/or advanced practitioner / resident     - Previous chart was reviewed  - No language barrier    - History obtained from patient  - There are no limitations to the history obtained     - Critical care time: Not applicable for this patient  Medications   tetanus-diphtheria-acellular pertussis (BOOSTRIX) IM injection 0 5 mL (0 5 mL Intramuscular Given 5/31/23 0925)     XR wrist 3+ views RIGHT   Final Result      No acute osseous abnormality  Workstation performed: [id number]         XR wrist 3+ views LEFT   Final Result      No acute osseous abnormality  Workstation performed: [id number]         TRAUMA - CT head wo contrast   Final Result      No acute intracranial abnormality  Workstation performed: [id number]         TRAUMA - CT spine cervical wo contrast   Final Result      No cervical spine fracture or traumatic malalignment  Stable extensive posterior and anterior fusion from C3-C7  Workstation performed: [id number]         TRAUMA - CT facial bones wo contrast   Final Result      No fracture identified  Workstation performed: [id number]           Orders Placed This Encounter   Procedures   • TRAUMA - CT head wo contrast   • TRAUMA - CT spine cervical wo contrast   • TRAUMA - CT facial bones wo contrast   • XR wrist 3+ views RIGHT   • XR wrist 3+ views LEFT   • Continuous pulse oximetry     Labs Reviewed - No data to display  Time reflects when diagnosis was documented in both MDM as applicable and the Disposition within this note     Time User Action Codes Description Comment    5/31/2023  9:52 AM Opal Dibbles Add [S00 11XA] Periorbital ecchymosis of right eye, initial encounter     5/31/2023  9:52 AM Candelario Gilford  XXXA] Abrasions of multiple sites     5/31/2023  9:52 AM Opal Dibbles Add [F84 114B] Traumatic ecchymosis of right wrist, initial encounter     5/31/2023  9:53 AM Opal Dibbles Add [D31 02AN] Injury of head, initial encounter       ED Disposition     ED Disposition   Discharge    Condition   Stable    Date/Time   Wed May 31, 2023  9:53 AM    Comment   Maryan Sharma discharge to home/self care                Follow-up Information     Follow up With Specialties Details Why Contact Info Additional Information    Hipolito Álvarez MD Internal Medicine Call on 5/31/2023  [address] [place] [address] [place]       [address] [facility] Call  As needed [address] [id number]  [phone] [address] [facility], [address] [place] [address], [address]  Use Entrance A         Discharge Medication List as of 5/31/2023 10:28 AM      CONTINUE these medications which have NOT CHANGED    Details   acetaminophen (TYLENOL) 500 mg tablet Take 500 mg by mouth as needed for mild pain , Historical Med      amLODIPine (NORVASC) 2 5 mg tablet TAKE 1 TABLET BY MOUTH  DAILY, Normal      aspirin (ECOTRIN LOW STRENGTH) 81 mg EC tablet Take 81 mg by mouth daily, Historical Med      atorvastatin (LIPITOR) 40 mg tablet TAKE 1 TABLET BY MOUTH  DAILY, Normal      Cholecalciferol (VITAMIN D PO) Take 2,000 Units by mouth daily  , Historical Med      dicyclomine (Bentyl) 20 mg tablet Take 1 tablet (20 mg total) by mouth every 6 (six) hours as needed (abd cramping), Starting Wed 3/30/2022, Normal      famotidine (PEPCID) 40 MG tablet TAKE 1 TABLET BY MOUTH  DAILY AT BEDTIME AS NEEDED  FOR HEARTBURN, Normal      fexofenadine (ALLEGRA) 180 MG tablet Take 180 mg by mouth as needed Daily during the Summer, Historical Med      FLUoxetine (PROzac) 10 mg capsule Take 10 mg by mouth daily , Starting Tue 11/13/2018, Historical Med      fluticasone (FLONASE) 50 mcg/act nasal spray 2 sprays into each nostril daily, Historical Med      lithium carbonate (LITHOBID) 300 mg CR tablet Take 300 mg by mouth in the morning, Starting Thu 8/16/2018, Historical Med      metoprolol succinate (TOPROL-XL) 25 mg 24 hr tablet TAKE 1 TABLET BY MOUTH  DAILY, Normal      Mirabegron ER 25 MG TB24 Take 25 mg by mouth daily at bedtime, Starting Fri 1/13/2023, Normal      mupirocin (BACTROBAN) 2 % ointment Apply topically 2 (two) times a day as needed (wound), Starting Wed 1/19/2022, Normal      nitroglycerin (NITRODUR) 0 2 mg/hr APPLY 1 PATCH TOPICALLY  ONCE DAILY  LEAVE ON FOR 12 TO 14 HOURS THEN REMOVE FOR A NITRATE-FREE INTERVAL OF  10 TO 12 HOURS, Normal      omega-3-acid ethyl esters (LOVAZA) 1 g capsule TAKE 1 CAPSULE BY MOUTH 3  TIMES DAILY, Normal      ProAir  (90 Base) MCG/ACT inhaler USE 2 INHALATIONS BY MOUTH  EVERY 6 HOURS AS NEEDED FOR WHEEZING, Normal      Trelegy Ellipta 200-62 5-25 MCG/INH AEPB inhaler USE 1 INHALATION BY MOUTH  DAILY   RINSE MOUTH AFTER  USE, Normal      omeprazole (PriLOSEC) 40 MG capsule Take 1 capsule (40 mg total) by mouth daily, Starting Tue 5/30/2023, Until Sat 2/24/2024, Normal           No discharge procedures on file  Prior to Admission Medications   Prescriptions Last Dose Informant Patient Reported? Taking? Cholecalciferol (VITAMIN D PO)  Self Yes No   Sig: Take 2,000 Units by mouth daily     FLUoxetine (PROzac) 10 mg capsule  Self Yes No   Sig: Take 10 mg by mouth daily    Mirabegron ER 25 MG TB24  Self No No   Sig: Take 25 mg by mouth daily at bedtime   ProAir  (90 Base) MCG/ACT inhaler  Self No No   Sig: USE 2 INHALATIONS BY MOUTH  EVERY 6 HOURS AS NEEDED FOR WHEEZING   Trelegy Ellipta 200-62 5-25 MCG/INH AEPB inhaler  Self No No   Sig: USE 1 INHALATION BY MOUTH  DAILY   RINSE MOUTH AFTER  USE   acetaminophen (TYLENOL) 500 mg tablet  Self Yes No   Sig: Take 500 mg by mouth as needed for mild pain     amLODIPine (NORVASC) 2 5 mg tablet  Self No No   Sig: TAKE 1 TABLET BY MOUTH  DAILY   aspirin (ECOTRIN LOW STRENGTH) 81 mg EC tablet  Self Yes No   Sig: Take 81 mg by mouth daily   atorvastatin (LIPITOR) 40 mg tablet  Self No No   Sig: TAKE 1 TABLET BY MOUTH  DAILY   dicyclomine (Bentyl) 20 mg tablet  Self No No   Sig: Take 1 tablet (20 mg total) by mouth every 6 (six) hours as "needed (abd cramping)   famotidine (PEPCID) 40 MG tablet  Self No No   Sig: TAKE 1 TABLET BY MOUTH  DAILY AT BEDTIME AS NEEDED  FOR HEARTBURN   Patient taking differently: 20 mg if needed Patient is taking 20mg   fexofenadine (ALLEGRA) 180 MG tablet  Self Yes No   Sig: Take 180 mg by mouth as needed Daily during the Summer   fluticasone (FLONASE) 50 mcg/act nasal spray  Self Yes No   Si sprays into each nostril daily   lithium carbonate (LITHOBID) 300 mg CR tablet  Self Yes No   Sig: Take 300 mg by mouth in the morning   metoprolol succinate (TOPROL-XL) 25 mg 24 hr tablet   No No   Sig: TAKE 1 TABLET BY MOUTH  DAILY   mupirocin (BACTROBAN) 2 % ointment  Self No No   Sig: Apply topically 2 (two) times a day as needed (wound)   nitroglycerin (NITRODUR) 0 2 mg/hr  Self No No   Sig: APPLY 1 PATCH TOPICALLY  ONCE DAILY  LEAVE ON FOR 12 TO 14 HOURS THEN REMOVE FOR A NITRATE-FREE INTERVAL OF  10 TO 12 HOURS   Patient taking differently: if needed   omega-3-acid ethyl esters (LOVAZA) 1 g capsule  Self No No   Sig: TAKE 1 CAPSULE BY MOUTH 3  TIMES DAILY      Facility-Administered Medications: None       Portions of the record may have been created with voice recognition software  Occasional wrong word or \"sound a like\" substitutions may have occurred due to the inherent limitations of voice recognition software  Read the chart carefully and recognize, using context, where substitutions have occurred      Electronically signed by:  Phillip Michele  "

## 2023-05-31 NOTE — ED PROVIDER NOTES
Emergency Department Trauma Note  Melanie Scott 78 y o  male MRN: [id number]  Unit/Bed#: ED 27/ED 27 Encounter: [id number]      Trauma Alert: Trauma Acuity: C  Model of Arrival:   via    Trauma Team: Current Providers  Resident: Ryan Rush MD  Registered Nurse: Miky Nance, RN  Consultants: {SL IP Trauma Consultants:[id number]}      History of Present Illness     Chief Complaint:   Chief Complaint   Patient presents with   • Fall     Pt had trip/fall over curb yesterday, +headstrike, -LOC, +ASA     HPI:  Melanie Scott is a 78 y o  male who presents with ***  Mechanism:Details of Incident: Pt reports trip/fall on sidewalk while walking at doctors office yesterday, +headstrike, -LOC, +ASA Injury Date: 05/30/23        HPI  Review of Systems   Skin: Positive for color change and wound  Neurological: Positive for headaches  All other systems reviewed and are negative        Historical Information     Immunizations:   Immunization History   Administered Date(s) Administered   • COVID-19 PFIZER VACCINE 0 3 ML IM 02/17/2021, 03/10/2021, 10/26/2021   • COVID-19 Pfizer vac (Satya-sucrose, gray cap) 12 yr+ IM 08/03/2022   • INFLUENZA 10/01/2015, 10/07/2018, 09/27/2019, 09/27/2021   • Influenza Quadrivalent Preservative Free 3 years and older IM 10/01/2015   • Influenza Split High Dose Preservative Free IM 10/25/2016, 10/03/2018   • Influenza, high dose seasonal 0 7 mL 09/27/2019, 10/05/2020, 10/14/2022   • Influenza, seasonal, injectable 1945, 10/10/2012   • Pneumococcal Conjugate 13-Valent 10/25/2016   • Pneumococcal Polysaccharide PPV23 10/03/2019   • Td (adult), adsorbed 12/01/2009   • Zoster 01/01/2014, 01/01/2014   • influenza, trivalent, adjuvanted 09/27/2021       Past Medical History:   Diagnosis Date   • Allergic rhinitis 2015   • Aortic aneurysm (HCC)    • Benign colon polyp    • Bipolar 1 disorder ([facility] )    • Cardiac disease     MI   • Cervical cord compression with myelopathy (HCC)    • COPD (chronic obstructive pulmonary disease) ([facility] )    • Coronary artery disease 1995   • Diverticulitis    • Diverticulosis    • Emphysema of lung ([facility] ) 2012   • Gait disturbance     uses cane, leg brace on right   • GERD (gastroesophageal reflux disease)    • Heart attack ([facility] ) 1996   • Hx of resection of large bowel 05/03/2016   • Hyperlipidemia    • Hypertension    • IBS (irritable bowel syndrome)    • Lumbar stenosis    • Lung cancer ([place] [address] )     2007 Left lower lobectomy and 2011 Right lung with surgery    • Myocardial infarction [facility])     involving other coronary artery   • Prostate cancer ([place] [address] )    • Shortness of breath    • Small bowel obstruction ([place] [address] ) 11/16/2016     Family History   Problem Relation Age of Onset   • Hypertension Mother    • Heart attack Father    • Colon cancer Father    • Coronary artery disease Father    • Hypertension Father    • Heart disease Family    • Hyperlipidemia Family    • Hypertension Family      Past Surgical History:   Procedure Laterality Date   • ANGIOPLASTY      stent   • CARDIAC CATHETERIZATION  1995    angioplasty   • CARDIAC SURGERY     • CERVICAL SPINE SURGERY      Cervical decompression with cervical fusion from C3-C7 for spinal stenosis  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   • LUNG LOBECTOMY Left    • WI ARTHRD ANT INTERBODY MIN DSC CRV BELOW C2 N/A 05/02/2016    Procedure: Anterior cervical diskectomy C3/4, C5/6, C6/7 with anterior plate fixation fusion C3-7;  Posterior decompressive laminectomy C3-7 with lateral mass fixation fusion C3-7 (IMPULSE MONITORING);   Surgeon: "Malia Meléndez MD;  Location: [facility];  Service: Neurosurgery   • VT ARTHRODESIS POSTERIOR INTERBODY 1 [address] N/A 2017    Procedure: L4-5 AND L5-S1 DECOMPRESSIVE FORAMINOTOMIES, TRANSFORAMINAL LUMBAR INTERBODY AND PEDICLE SCREW FIXATION FUSION L4-S1 (IMPULSE); Surgeon: Malia Meléndez MD;  Location: [facility];  Service: Neurosurgery   • VT EVASC RPR DPLMNT AORTO-AORTIC NDGFT N/A 2018    Procedure: REPAIR ANEURYSM ENDOVASCULAR ABDOMINAL AORTIC  (EVAR) WITH BILATERAL PERCUTANEOUS FEMORAL ACCESS WITH ULTRASOUND GUIDANCE ON THE RIGHT AND PRE CLOSURE;  Surgeon: Peg Chaparro MD;  Location: [facility];  Service: Vascular   • PROSTATECTOMY      for prostate CA - no chemo/RT   • SIGMOIDECTOMY      for divertic   • SMALL INTESTINE SURGERY N/A 2016    Procedure: Exploratory Laparotomy, Lysis of adhesions to release small bowel obstruction;  Surgeon: Sobia Arriola MD;  Location: [facility];  Service:    • SPINE SURGERY  2017   • TONSILLECTOMY  Y         Social History     Tobacco Use   • Smoking status: Former     Packs/day: 1 50     Years: 35 00     Total pack years: 52 50     Types: Cigarettes     Start date: 1967     Quit date: 2011     Years since quittin 4   • Smokeless tobacco: Never   Vaping Use   • Vaping Use: Never used   Substance Use Topics   • Alcohol use: Yes     Alcohol/week: 2 0 - 3 0 standard drinks of alcohol     Types: 2 - 3 Shots of liquor per week     Comment: One glass per day   • Drug use: No     E-Cigarette/Vaping   • E-Cigarette Use Never User      E-Cigarette/Vaping Substances   • Nicotine No    • THC No    • CBD No    • Flavoring No        Family History: {SL IP Trauma Family History:19371::\"non-contributory\"}    Meds/Allergies   Prior to Admission Medications   Prescriptions Last Dose Informant Patient Reported? Taking?    Cholecalciferol (VITAMIN D PO)  Self Yes No   Sig: Take 2,000 Units by mouth daily     FLUoxetine (PROzac) 10 mg " capsule  Self Yes No   Sig: Take 10 mg by mouth daily    Mirabegron ER 25 MG TB24  Self No No   Sig: Take 25 mg by mouth daily at bedtime   ProAir  (90 Base) MCG/ACT inhaler  Self No No   Sig: USE 2 INHALATIONS BY MOUTH  EVERY 6 HOURS AS NEEDED FOR WHEEZING   Trelegy Ellipta 200-62 5-25 MCG/INH AEPB inhaler  Self No No   Sig: USE 1 INHALATION BY MOUTH  DAILY   RINSE MOUTH AFTER  USE   acetaminophen (TYLENOL) 500 mg tablet  Self Yes No   Sig: Take 500 mg by mouth as needed for mild pain  amLODIPine (NORVASC) 2 5 mg tablet  Self No No   Sig: TAKE 1 TABLET BY MOUTH  DAILY   aspirin (ECOTRIN LOW STRENGTH) 81 mg EC tablet  Self Yes No   Sig: Take 81 mg by mouth daily   atorvastatin (LIPITOR) 40 mg tablet  Self No No   Sig: TAKE 1 TABLET BY MOUTH  DAILY   dicyclomine (Bentyl) 20 mg tablet  Self No No   Sig: Take 1 tablet (20 mg total) by mouth every 6 (six) hours as needed (abd cramping)   famotidine (PEPCID) 40 MG tablet  Self No No   Sig: TAKE 1 TABLET BY MOUTH  DAILY AT BEDTIME AS NEEDED  FOR HEARTBURN   Patient taking differently: 20 mg if needed Patient is taking 20mg   fexofenadine (ALLEGRA) 180 MG tablet  Self Yes No   Sig: Take 180 mg by mouth as needed Daily during the Summer   fluticasone (FLONASE) 50 mcg/act nasal spray  Self Yes No   Si sprays into each nostril daily   lithium carbonate (LITHOBID) 300 mg CR tablet  Self Yes No   Sig: Take 300 mg by mouth in the morning   metoprolol succinate (TOPROL-XL) 25 mg 24 hr tablet   No No   Sig: TAKE 1 TABLET BY MOUTH  DAILY   mupirocin (BACTROBAN) 2 % ointment  Self No No   Sig: Apply topically 2 (two) times a day as needed (wound)   nitroglycerin (NITRODUR) 0 2 mg/hr  Self No No   Sig: APPLY 1 PATCH TOPICALLY  ONCE DAILY   LEAVE ON FOR 12 TO 14 HOURS THEN REMOVE FOR A NITRATE-FREE INTERVAL OF  10 TO 12 HOURS   Patient taking differently: if needed   omega-3-acid ethyl esters (LOVAZA) 1 g capsule  Self No No   Sig: TAKE 1 CAPSULE BY MOUTH 3  TIMES DAILY omeprazole (PriLOSEC) 40 MG capsule   No No   Sig: Take 1 capsule (40 mg total) by mouth daily      Facility-Administered Medications: None       Allergies   Allergen Reactions   • Bactrim [Sulfamethoxazole-Trimethoprim] Other (See Comments)     AILYN   • Nsaids      Annotation - [id number]: unable to take due to use of lithium  • Oxycodone Rash       PHYSICAL EXAM    PE limited by: ***    Objective   Vitals:   First set: Temperature: (!) 97 4 °F (36 3 °C) (05/31/23 0845)  Pulse: 69 (05/31/23 0845)  Respirations: 18 (05/31/23 0845)  Blood Pressure: (!) 213/105 (05/31/23 0845)  SpO2: 99 % (05/31/23 0845)    Primary Survey:   (A) Airway: ***  (B) Breathing: ***  (C) Circulation: Pulses:   {Pulses:20198}  (D) Disabliity:  {GCS response:86585}  (E) Expose:  {Completed:18828}    Secondary Survey: (Click on Physical Exam tab above)  Physical Exam    Cervical spine cleared by clinical criteria? {YES/NO:56465}     Invasive Devices     None                 Lab Results:   Results Reviewed     None                 Imaging Studies:   Direct to CT: {YES/NO:20699}  No orders to display         Procedures  Procedures         ED Course  ED Course as of 05/31/23 0939   Wed May 31, 2023   0926 TRAUMA - CT head wo contrast  No acute intracranial abnormality  1635 TRAUMA - CT facial bones wo contrast  No fracture identified  2483 TRAUMA - CT spine cervical wo contrast  No cervical spine fracture or traumatic malalignment  Stable extensive posterior and anterior fusion from C3-C7  MDM            Disposition  [facility] Transfer: {YES/NO:65470}  Final diagnoses:   None     ED Disposition     None      Follow-up Information    None       Patient's Medications   Discharge Prescriptions    No medications on file     No discharge procedures on file      PDMP Review     None          ED Provider  Electronically Signed by Pupils are equal, round, and reactive to light  Cardiovascular:      Rate and Rhythm: Normal rate and regular rhythm  Heart sounds: Normal heart sounds  No murmur heard  Pulmonary:      Effort: Pulmonary effort is normal  No respiratory distress  Breath sounds: Normal breath sounds  Abdominal:      General: Bowel sounds are normal  There is no distension  Palpations: Abdomen is soft  There is no mass  Tenderness: There is no abdominal tenderness  There is no guarding  Musculoskeletal:         General: Normal range of motion  Cervical back: Neck supple  Skin:     General: Skin is warm and dry  Capillary Refill: Capillary refill takes less than 2 seconds  Comments: Skin tears present to right forehead, bilateral wrists, and bilateral knees; no tenderness to palpation; range of motion intact   Neurological:      General: No focal deficit present  Mental Status: He is alert and oriented to person, place, and time  Cranial Nerves: No cranial nerve deficit  Sensory: No sensory deficit  Motor: No weakness  Psychiatric:         Speech: Speech normal          Behavior: Behavior is cooperative  Cervical spine cleared by clinical criteria? No (imaging required)      Invasive Devices     None                 Lab Results:   Results Reviewed     None                 Imaging Studies:   Direct to CT: Yes  XR wrist 3+ views RIGHT   Final Result by Frances Portillo MD (06/01 0920)      No acute osseous abnormality  Workstation performed: [id number]         XR wrist 3+ views LEFT   Final Result by Frances Portillo MD (06/01 1922)      No acute osseous abnormality  Workstation performed: [id number]         TRAUMA - CT head wo contrast   Final Result by Junior Shiv MD (05/31 8626)      No acute intracranial abnormality                    Workstation performed: [id number]         TRAUMA - CT spine cervical wo contrast   Final Result by Je Jaimes MD (05/31 9335)      No cervical spine fracture or traumatic malalignment  Stable extensive posterior and anterior fusion from C3-C7  Workstation performed: [id number]         TRAUMA - CT facial bones wo contrast   Final Result by Je Jaimes MD (05/31 6468)      No fracture identified  Workstation performed: [id number]               Procedures  Procedures         ED Course  ED Course as of 06/10/23 1532   Wed May 31, 2023   0926 TRAUMA - CT head wo contrast  No acute intracranial abnormality  4410 TRAUMA - CT facial bones wo contrast  No fracture identified  2685 TRAUMA - CT spine cervical wo contrast  No cervical spine fracture or traumatic malalignment  Stable extensive posterior and anterior fusion from C3-C7    0950 XR wrist 3+ views RIGHT  No acute findings on wet read  0951 XR wrist 3+ views LEFT  Possible abnormality at the base of the 1st metacarpal  Will correlate clinically  1024 No tenderness to palpation at area on abnormality on L hand  Full ROM and CMS intact  Likely chronic as opposed to acute  Will not plan to splint but will give ortho info for as needed  Medical Decision Making  Pt is a 77yo M who presents after fall  Exam pertinent for periorbital ecchymosis and swelling  Differential diagnosis to include but not limited to cranial abnormality, facial fracture, dislocation  Due to head strike on aspirin, will get CT head  Will also obtain CT C-spine due to possible hyperextension  Due to facial swelling and ecchymosis, will also get facial bone CT  Will obtain bilateral wrist x-rays  ED course for results and details  Patient made aware of all results as well as plan for discharge  Patient able to ambulate at his baseline and is agreeable to plan  Vital signs stable and patient stable for discharge at this time  Plan to discharge pt with f/u to PCP and ortho as needed   Discussed returning the ED with new or worsening of symptoms  Discussed use of over the counter medications as stated on the bottle as needed for pain  Discussed keeping wounds clean and dry  Pt expressed understanding of discharge instructions, return precautions, and medication instructions and is stable for discharge at this time  All questions were answered and pt was discharged without incident  Amount and/or Complexity of Data Reviewed  Radiology: ordered  Decision-making details documented in ED Course  Risk  Prescription drug management  Disposition  Final diagnoses:   Periorbital ecchymosis of right eye, initial encounter   Abrasions of multiple sites   Traumatic ecchymosis of right wrist, initial encounter   Injury of head, initial encounter     Time reflects when diagnosis was documented in both MDM as applicable and the Disposition within this note     Time User Action Codes Description Comment    5/31/2023  9:52 AM Meg Acuña Add [S00 11XA] Periorbital ecchymosis of right eye, initial encounter     5/31/2023  9:52 AM Pauline Velazquez  XXXA] Abrasions of multiple sites     5/31/2023  9:52 AM Meg Acuña Add [H57 644E] Traumatic ecchymosis of right wrist, initial encounter     5/31/2023  9:53 AM Meg Acuña Add [R96 63OB] Injury of head, initial encounter       ED Disposition     ED Disposition   Discharge    Condition   Stable    Date/Time   Wed May 31, 2023  9:53 AM    Comment   Angel Baig discharge to home/self care                 Follow-up Information     Follow up With Specialties Details Why Contact Info Additional Information    Justen Devries MD Internal Medicine Call on 5/31/2023  [facility]        [address] [facility] Call  As needed [address] [phone] [address] [place] [address] [place], [address]  Use Entrance A         Discharge Medication List as of 5/31/2023 10:28 AM      CONTINUE these medications which have NOT CHANGED    Details   acetaminophen (TYLENOL) 500 mg tablet Take 500 mg by mouth as needed for mild pain , Historical Med      amLODIPine (NORVASC) 2 5 mg tablet TAKE 1 TABLET BY MOUTH  DAILY, Normal      aspirin (ECOTRIN LOW STRENGTH) 81 mg EC tablet Take 81 mg by mouth daily, Historical Med      atorvastatin (LIPITOR) 40 mg tablet TAKE 1 TABLET BY MOUTH  DAILY, Normal      Cholecalciferol (VITAMIN D PO) Take 2,000 Units by mouth daily  , Historical Med      dicyclomine (Bentyl) 20 mg tablet Take 1 tablet (20 mg total) by mouth every 6 (six) hours as needed (abd cramping), Starting Wed 3/30/2022, Normal      famotidine (PEPCID) 40 MG tablet TAKE 1 TABLET BY MOUTH  DAILY AT BEDTIME AS NEEDED  FOR HEARTBURN, Normal      fexofenadine (ALLEGRA) 180 MG tablet Take 180 mg by mouth as needed Daily during the Summer, Historical Med      FLUoxetine (PROzac) 10 mg capsule Take 10 mg by mouth daily , Starting Tue 11/13/2018, Historical Med      fluticasone (FLONASE) 50 mcg/act nasal spray 2 sprays into each nostril daily, Historical Med      lithium carbonate (LITHOBID) 300 mg CR tablet Take 300 mg by mouth in the morning, Starting Thu 8/16/2018, Historical Med      metoprolol succinate (TOPROL-XL) 25 mg 24 hr tablet TAKE 1 TABLET BY MOUTH  DAILY, Normal      Mirabegron ER 25 MG TB24 Take 25 mg by mouth daily at bedtime, Starting Fri 1/13/2023, Normal      mupirocin (BACTROBAN) 2 % ointment Apply topically 2 (two) times a day as needed (wound), Starting Wed 1/19/2022, Normal      nitroglycerin (NITRODUR) 0 2 mg/hr APPLY 1 PATCH TOPICALLY  ONCE DAILY   LEAVE ON FOR 12 TO 14 HOURS THEN REMOVE FOR A NITRATE-FREE INTERVAL OF  10 TO 12 HOURS, Normal      omega-3-acid ethyl esters (LOVAZA) 1 g capsule TAKE 1 CAPSULE BY MOUTH 3  TIMES DAILY, Normal      ProAir  (90 Base) MCG/ACT inhaler USE 2 INHALATIONS BY MOUTH  EVERY 6 HOURS AS NEEDED FOR WHEEZING, Normal      Trelegy Ellipta 200-62 5-25 MCG/INH AEPB inhaler USE 1 INHALATION BY MOUTH  DAILY   RINSE MOUTH AFTER  USE, Normal      omeprazole (PriLOSEC) 40 MG capsule Take 1 capsule (40 mg total) by mouth daily, Starting Tue 5/30/2023, Until Sat 2/24/2024, Normal           No discharge procedures on file      PDMP Review     None          ED Provider  Electronically Signed by         Kimberly Dickson MD  06/10/23 0633

## 2023-05-31 NOTE — DISCHARGE INSTRUCTIONS
Follow-up with PCP for further care  Contact info provided below if needed  Use over the counter medications as stated on the bottle as needed for pain control  Keep abrasions clean and dry  Return to the ED with new or worsening symptoms

## 2023-06-01 ENCOUNTER — TELEPHONE (OUTPATIENT)
Dept: GASTROENTEROLOGY | Facility: CLINIC | Age: 78
End: 2023-06-01

## 2023-06-01 ENCOUNTER — VBI (OUTPATIENT)
Dept: ADMINISTRATIVE | Facility: OTHER | Age: 78
End: 2023-06-01

## 2023-06-01 DIAGNOSIS — K21.9 GASTROESOPHAGEAL REFLUX DISEASE WITHOUT ESOPHAGITIS: Primary | ICD-10-CM

## 2023-06-01 RX ORDER — OMEPRAZOLE 20 MG/1
20 CAPSULE, DELAYED RELEASE ORAL
Qty: 60 CAPSULE | Refills: 0 | Status: SHIPPED | OUTPATIENT
Start: 2023-06-01 | End: 2023-06-02 | Stop reason: SDUPTHER

## 2023-06-01 NOTE — TELEPHONE ENCOUNTER
Patients GI provider:  Dr ALAS    Number to return call: [phone]    Reason for call: Pt calling office regarding omeprazole dosage  He states he takes 20mg 2x per day but recent script was called into pharmacy for 40mg  He prefers 20mg 2x per day  Please contact patient to discuss    Scheduled procedure/appointment date if applicable: 9/8/0708

## 2023-06-01 NOTE — TELEPHONE ENCOUNTER
Patients GI provider:  Dr Sebastian Lindquist     Number to return call: [phone]    Reason for call: Pt calling requesting to speak with someone for clarification for omeprazole    Scheduled procedure/appointment date if applicable: Apt/procedure 8-7-2023

## 2023-06-01 NOTE — TELEPHONE ENCOUNTER
Prescription sent per pt request  Spoke with pt and advised of same  All questions and concerns addressed

## 2023-06-01 NOTE — TELEPHONE ENCOUNTER
Enrique Walker    ED Visit Information     Ed visit date: 5/31/23   Diagnosis Description: Fall  In Network? Yes [facility]  Discharge status: Home  Discharged with meds ? No  Number of ED visits to date: 1  ED Severity:2     Outreach Information    Outreach successful: Yes 1  Date letter mailed:na  Date Finalized:6/1/23    Care Coordination    Follow up appointment with pcp: yes 7/12/23  Transportation issues ? No    [employer] type: 7 Day Outreach  [facility] PCP: Yes  Transportation: Friend/Family Transport  If able to choose an ED  Would you have chosen [facility]: Yes  Called PCP first?: No  Feels able to call PCP for urgent problems ?: Yes  Understands what emergencies can be handled by PCP ?: Yes  Ever any problems getting appointment with PCP for minor emergency/urgency problems?: No  Practice Contacted Patient ?: No  Pt had ED follow up with pcp/staff ?: No    Seen for follow-up out of network ?: No  Reason Patient went to ED instead of Urgent Care or PCP?: Patient Transferred Out of Network  06/01/2023 10:37 AM EDT by Esteban Conway MA 06/01/2023 10:37 AM EDT by PAULA KnagI Valentin Rousseau (Self) [phone] (Mobile)[phone] (Mobile)  [phone] (Mobile) Remove  - Call [employer] - Patient is feeling better  He does have an appt with PCP scheduled for 7/12/23

## 2023-06-02 RX ORDER — OMEPRAZOLE 20 MG/1
20 CAPSULE, DELAYED RELEASE ORAL
Qty: 180 CAPSULE | Refills: 0 | Status: SHIPPED | OUTPATIENT
Start: 2023-06-02 | End: 2023-08-31

## 2023-06-02 NOTE — TELEPHONE ENCOUNTER
Pt returned call, requested omeprazole prescription to be for a 90 day supply to be sent to [facility] home delivery pharmacy   All questions and concerns addressed

## 2023-06-06 ENCOUNTER — OFFICE VISIT (OUTPATIENT)
Dept: OBGYN CLINIC | Facility: HOSPITAL | Age: 78
End: 2023-06-06
Payer: MEDICARE

## 2023-06-06 VITALS
HEART RATE: 61 BPM | DIASTOLIC BLOOD PRESSURE: 87 MMHG | HEIGHT: 67 IN | SYSTOLIC BLOOD PRESSURE: 167 MMHG | BODY MASS INDEX: 27.57 KG/M2

## 2023-06-06 DIAGNOSIS — K21.9 GASTROESOPHAGEAL REFLUX DISEASE WITHOUT ESOPHAGITIS: ICD-10-CM

## 2023-06-06 DIAGNOSIS — M70.62 TROCHANTERIC BURSITIS OF LEFT HIP: Primary | ICD-10-CM

## 2023-06-06 PROCEDURE — 99213 OFFICE O/P EST LOW 20 MIN: CPT | Performed by: PHYSICIAN ASSISTANT

## 2023-06-06 PROCEDURE — 20610 DRAIN/INJ JOINT/BURSA W/O US: CPT | Performed by: PHYSICIAN ASSISTANT

## 2023-06-06 RX ORDER — LIDOCAINE HYDROCHLORIDE 10 MG/ML
2 INJECTION, SOLUTION INFILTRATION; PERINEURAL
Status: COMPLETED | OUTPATIENT
Start: 2023-06-06 | End: 2023-06-06

## 2023-06-06 RX ORDER — BETAMETHASONE SODIUM PHOSPHATE AND BETAMETHASONE ACETATE 3; 3 MG/ML; MG/ML
12 INJECTION, SUSPENSION INTRA-ARTICULAR; INTRALESIONAL; INTRAMUSCULAR; SOFT TISSUE
Status: COMPLETED | OUTPATIENT
Start: 2023-06-06 | End: 2023-06-06

## 2023-06-06 RX ADMIN — LIDOCAINE HYDROCHLORIDE 2 ML: 10 INJECTION, SOLUTION INFILTRATION; PERINEURAL at 09:45

## 2023-06-06 RX ADMIN — BETAMETHASONE SODIUM PHOSPHATE AND BETAMETHASONE ACETATE 12 MG: 3; 3 INJECTION, SUSPENSION INTRA-ARTICULAR; INTRALESIONAL; INTRAMUSCULAR; SOFT TISSUE at 09:45

## 2023-06-06 NOTE — PROGRESS NOTES
"Patient Name:  Sally Porter  MRN:  [id number]    Assessment & Plan     Left hip greater trochanteric bursitis  1  Corticosteroid injection performed today into the left hip greater trochanteric bursa  2  Activities as tolerated with modification avoid pain  3  Patient may follow-up in 3 months for repeat evaluation and repeat injection at that time as indicated  Chief Complaint     Follow-up left hip pain    History of the Present Illness     Sally Porter is a 78 y o  male returns to the office today for follow-up regarding his left hip  He was last seen on 3/7/2023  At that time he received a corticosteroid injection into the left hip greater trochanteric bursa  He noted significant improvement up until recently  He does note a slight return of his pain which is not as severe as his initial presentation  He notes primarily lateral based hip pain with radiation distally along the lateral thigh to the knee  Pain is worse with direct pressure as well as lying on his left side  He denies any significant groin pain  He denies any weakness or instability  No numbness or tingling  No fevers or chills  He is seeing pain management for chronic back issues  Physical Exam     /87   Pulse 61   Ht 5' 7\" (1 702 m)   BMI 27 57 kg/m²     Left hip: No gross deformity  Skin intact  Significant tenderness over the greater trochanter  No tenderness anterior hip  Hip range of motion is intact and full without significant pain  Negative logroll test   Negative SAWYER and FADIR test   Negative straight leg raise and resisted straight leg raise test   Sensation is intact distally  Brisk capillary refill  Eyes: Anicteric sclerae  ENT: Trachea midline  Lungs: Normal respiratory effort  CV: Capillary refill is less than 2 seconds  Skin: Intact without erythema  Lymph: No palpable lymphadenopathy  Neuro: Sensation is grossly intact to light touch    Psych: Mood and affect are " appropriate  Past Medical History:   Diagnosis Date   • Allergic rhinitis 2015   • Aortic aneurysm [facility])    • Benign colon polyp    • Bipolar 1 disorder ([place] [address] )    • Cardiac disease     MI   • Cervical cord compression with myelopathy ([place] [address] )    • COPD (chronic obstructive pulmonary disease) ([place] [address] )    • Coronary artery disease 1995   • Diverticulitis    • Diverticulosis    • Emphysema of lung ([place] [address] ) 2012   • Gait disturbance     uses cane, leg brace on right   • GERD (gastroesophageal reflux disease)    • Heart attack ([place] [address] ) 1996   • Hx of resection of large bowel 05/03/2016   • Hyperlipidemia    • Hypertension    • IBS (irritable bowel syndrome)    • Lumbar stenosis    • Lung cancer ([place] [address] )     2007 Left lower lobectomy and 2011 Right lung with surgery    • Myocardial infarction [facility])     involving other coronary artery   • Prostate cancer ([place] [address] )    • Shortness of breath    • Small bowel obstruction ([place] [address] ) 11/16/2016       Past Surgical History:   Procedure Laterality Date   • ANGIOPLASTY      stent   • CARDIAC CATHETERIZATION  1995    angioplasty   • CARDIAC SURGERY     • CERVICAL SPINE SURGERY      Cervical decompression with cervical fusion from C3-C7 for spinal stenosis  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   • LUNG LOBECTOMY Left    • WI ARTHRD ANT INTERBODY MIN DSC CRV BELOW C2 N/A 05/02/2016    Procedure: Anterior cervical diskectomy C3/4, C5/6, C6/7 with anterior plate fixation fusion C3-7;  Posterior decompressive laminectomy C3-7 with lateral mass fixation fusion C3-7 (IMPULSE MONITORING);   Surgeon: Nnia Olivas Lana Garza MD;  [facility];  Service: Neurosurgery   • RI ARTHRODESIS POSTERIOR INTERBODY 1 [address] N/A 01/30/2017    Procedure: L4-5 AND L5-S1 DECOMPRESSIVE FORAMINOTOMIES, TRANSFORAMINAL LUMBAR INTERBODY AND PEDICLE SCREW FIXATION FUSION L4-S1 (IMPULSE); Surgeon: Vita Dean MD;  [facility];  Service: Neurosurgery   • RI EVASC RPR DPLMNT AORTO-AORTIC NDGFT N/A 08/06/2018    Procedure: REPAIR ANEURYSM ENDOVASCULAR ABDOMINAL AORTIC  (EVAR) WITH BILATERAL PERCUTANEOUS FEMORAL ACCESS WITH ULTRASOUND GUIDANCE ON THE RIGHT AND PRE CLOSURE;  Surgeon: Nabor Davis MD;  [facility];  Service: Vascular   • PROSTATECTOMY  2007    for prostate CA - no chemo/RT   • SIGMOIDECTOMY      for divertic   • SMALL INTESTINE SURGERY N/A 11/17/2016    Procedure: Exploratory Laparotomy, Lysis of adhesions to release small bowel obstruction;  Surgeon: Lakisha Meek MD;  [facility];  Service:    • SPINE SURGERY  2016, 2017   • TONSILLECTOMY  Y    1950       Allergies   Allergen Reactions   • Bactrim [Sulfamethoxazole-Trimethoprim] Other (See Comments)     AILYN   • Nsaids      Annotation - [id number]: unable to take due to use of lithium  • Oxycodone Rash       Current Outpatient Medications on File Prior to Visit   Medication Sig Dispense Refill   • acetaminophen (TYLENOL) 500 mg tablet Take 500 mg by mouth as needed for mild pain       • amLODIPine (NORVASC) 2 5 mg tablet TAKE 1 TABLET BY MOUTH  DAILY 90 tablet 3   • aspirin (ECOTRIN LOW STRENGTH) 81 mg EC tablet Take 81 mg by mouth daily     • atorvastatin (LIPITOR) 40 mg tablet TAKE 1 TABLET BY MOUTH  DAILY 90 tablet 5   • Cholecalciferol (VITAMIN D PO) Take 2,000 Units by mouth daily       • dicyclomine (Bentyl) 20 mg tablet Take 1 tablet (20 mg total) by mouth every 6 (six) hours as needed (abd cramping) 90 tablet 3   • famotidine (PEPCID) 40 MG tablet TAKE 1 TABLET BY MOUTH  DAILY AT BEDTIME AS NEEDED  FOR HEARTBURN (Patient taking differently: 20 mg if needed Patient is taking 20mg) 30 tablet 11   • fexofenadine (ALLEGRA) 180 MG tablet Take 180 mg by mouth as needed Daily during the Summer     • FLUoxetine (PROzac) 10 mg capsule Take 10 mg by mouth daily      • fluticasone (FLONASE) 50 mcg/act nasal spray 2 sprays into each nostril daily     • lithium carbonate (LITHOBID) 300 mg CR tablet Take 300 mg by mouth in the morning     • metoprolol succinate (TOPROL-XL) 25 mg 24 hr tablet TAKE 1 TABLET BY MOUTH  DAILY 90 tablet 3   • Mirabegron ER 25 MG TB24 Take 25 mg by mouth daily at bedtime 90 tablet 3   • mupirocin (BACTROBAN) 2 % ointment Apply topically 2 (two) times a day as needed (wound) 22 g 0   • nitroglycerin (NITRODUR) 0 2 mg/hr APPLY 1 PATCH TOPICALLY  ONCE DAILY  LEAVE ON FOR 12 TO 14 HOURS THEN REMOVE FOR A NITRATE-FREE INTERVAL OF  10 TO 12 HOURS (Patient taking differently: if needed) 90 patch 3   • omega-3-acid ethyl esters (LOVAZA) 1 g capsule TAKE 1 CAPSULE BY MOUTH 3  TIMES DAILY 270 capsule 3   • omeprazole (PriLOSEC) 20 mg delayed release capsule Take 1 capsule (20 mg total) by mouth 2 (two) times a day before meals 180 capsule 0   • ProAir  (90 Base) MCG/ACT inhaler USE 2 INHALATIONS BY MOUTH  EVERY 6 HOURS AS NEEDED FOR WHEEZING 34 g 3   • Trelegy Ellipta 200-62 5-25 MCG/INH AEPB inhaler USE 1 INHALATION BY MOUTH  DAILY   RINSE MOUTH AFTER   each 3     No current facility-administered medications on file prior to visit  Social History     Tobacco Use   • Smoking status: Former     Packs/day: 1 50     Years: 35 00     Total pack years: 52 50     Types: Cigarettes     Start date: 1967     Quit date: 2011     Years since quittin 4   • Smokeless tobacco: Never   Vaping Use   • Vaping Use: Never used   Substance Use Topics   • Alcohol use: Yes     Alcohol/week: 2 0 - 3 0 standard drinks of alcohol     Types: 2 - 3 Shots of liquor per week     Comment: One glass per day   • Drug use:  No Family History   Problem Relation Age of Onset   • Hypertension Mother    • Heart attack Father    • Colon cancer Father    • Coronary artery disease Father    • Hypertension Father    • Heart disease Family    • Hyperlipidemia Family    • Hypertension Family        Review of Systems     As stated in the HPI  All other systems reviewed and are negative        Large joint arthrocentesis: L greater trochanteric bursa  Procedure Details  Location: hip - L greater trochanteric bursa  Needle size: 22 G  Medications administered: 2 mL lidocaine 1 %; 12 mg betamethasone acetate-betamethasone sodium phosphate 6 (3-3) mg/mL    Patient tolerance: patient tolerated the procedure well with no immediate complications  Dressing:  Sterile dressing applied

## 2023-06-07 RX ORDER — OMEPRAZOLE 20 MG/1
20 CAPSULE, DELAYED RELEASE ORAL
Qty: 180 CAPSULE | Refills: 0 | OUTPATIENT
Start: 2023-06-07 | End: 2023-09-05

## 2023-06-07 NOTE — TELEPHONE ENCOUNTER
Spoke with Chris Loza at [facility], she stated the 6/2/23 escribe prescription was cancelled on their end  Gave a verbal order over the phone to pharmacist [facility], he  will overnight prescription to pt  Spoke with pt, advised that prescription would be overnighted to him and to call if he does not receive it   Pt appreciative and verbalized understanding of the same

## 2023-06-07 NOTE — TELEPHONE ENCOUNTER
Patient called and stated [facility] has not received medication request and that they have faxed over request to us but we haven't responded yet please review thank you

## 2023-06-09 NOTE — TELEPHONE ENCOUNTER
Nickolas Marina called, she wanted to let you know Doneta Husbands got his CT Scan done on 3/25 in case you wanted to review scan before his 4/21 fu [address]      If you need to contact them back:  #[phone] Nickolas Marina  #[phone] 
Patients wife Nickolas Marina called she wanted to let you know patient had a CT Scan done on 1/19 in case you wanted to review scan    [phone]
No

## 2023-06-20 ENCOUNTER — HOSPITAL ENCOUNTER (OUTPATIENT)
Dept: NON INVASIVE DIAGNOSTICS | Facility: CLINIC | Age: 78
Discharge: HOME | End: 2023-06-20
Payer: MEDICARE

## 2023-06-20 DIAGNOSIS — Z98.890 S/P ENDOVASCULAR ANEURYSM REPAIR: ICD-10-CM

## 2023-06-20 DIAGNOSIS — I71.43 INFRARENAL ABDOMINAL AORTIC ANEURYSM (AAA) WITHOUT RUPTURE (HCC): ICD-10-CM

## 2023-06-20 DIAGNOSIS — Z86.79 S/P ENDOVASCULAR ANEURYSM REPAIR: ICD-10-CM

## 2023-06-20 PROCEDURE — 93979 VASCULAR STUDY: CPT | Performed by: SURGERY

## 2023-06-20 PROCEDURE — 93978 VASCULAR STUDY: CPT

## 2023-06-20 PROCEDURE — 93923 UPR/LXTR ART STDY 3+ LVLS: CPT

## 2023-06-21 ENCOUNTER — OFFICE VISIT (OUTPATIENT)
Dept: PAIN MEDICINE | Facility: CLINIC | Age: 78
End: 2023-06-21
Payer: MEDICARE

## 2023-06-21 VITALS
DIASTOLIC BLOOD PRESSURE: 84 MMHG | HEART RATE: 65 BPM | BODY MASS INDEX: 27.62 KG/M2 | SYSTOLIC BLOOD PRESSURE: 156 MMHG | WEIGHT: 176 LBS | HEIGHT: 67 IN

## 2023-06-21 DIAGNOSIS — M51.36 DDD (DEGENERATIVE DISC DISEASE), LUMBAR: ICD-10-CM

## 2023-06-21 DIAGNOSIS — M51.06: ICD-10-CM

## 2023-06-21 DIAGNOSIS — M43.16 SPONDYLOLISTHESIS OF LUMBAR REGION: ICD-10-CM

## 2023-06-21 DIAGNOSIS — M51.26 LUMBAR DISC HERNIATION: ICD-10-CM

## 2023-06-21 DIAGNOSIS — M48.062 SPINAL STENOSIS OF LUMBAR REGION WITH NEUROGENIC CLAUDICATION: ICD-10-CM

## 2023-06-21 DIAGNOSIS — G89.4 CHRONIC PAIN SYNDROME: Primary | ICD-10-CM

## 2023-06-21 DIAGNOSIS — M54.16 LUMBAR RADICULOPATHY: ICD-10-CM

## 2023-06-21 DIAGNOSIS — M48.061 FORAMINAL STENOSIS OF LUMBAR REGION: ICD-10-CM

## 2023-06-21 PROCEDURE — 99214 OFFICE O/P EST MOD 30 MIN: CPT | Performed by: NURSE PRACTITIONER

## 2023-06-21 NOTE — PROGRESS NOTES
Assessment:  1  Chronic pain syndrome    2  Lumbar radiculopathy    3  Myelopathy concurrent with and due to lumbosacral intervertebral disc disorder    4  DDD (degenerative disc disease), lumbar    5  Foraminal stenosis of lumbar region    6  Spondylolisthesis of lumbar region    7  Lumbar disc herniation    8  Spinal stenosis of lumbar region with neurogenic claudication        Plan:  1  Patient will be scheduled for left L3 and L4 TFESI to address the inflammatory component of the patient's pain  Complete risks and benefits including bleeding, infection, tissue reaction, nerve injury and allergic reaction were discussed  The patient was agreeable and verbalized an understanding  2  Depending on response to epidural injection, may consider gabapentin trial  3  Patient may continue Tylenol as needed and should not exceed more than 3000 mg in 24 hours  4  Patient will avoid NSAIDs secondary to CKD  5  Continue with home exercise program as taught by physical therapy  6  Follow-up after procedure or sooner if needed    History of Present Illness: The patient is a 78 y o  male with a history of A-fib, CKD, lung cancer, CAD, COPD, prostate cancer and previous lumbar fusion from L4-S1 last seen on 05/19/2023 who presents for a follow up office visit in regards to chronic left-sided low back pain now radiating into the anterolateral aspect of the left lower extremity  He no longer complains of any pain in the medial aspect of the left lower extremity  He denies any right lower extremity symptoms, bowel or bladder incontinence or saddle anesthesia  Left L2 and L3 TFESI on May 2, 2023 resolved his inner thigh pain but he now continues with pain in the anterolateral thigh  He uses Tylenol on a as needed basis  He is unable to take NSAIDs secondary to CKD  He has never tried neural membrane stabilizers    The patient rates his pain a 7 out of 10 on the numeric pain rating scale    He intermittently has pain in the morning which is described as burning, sharp and cramping    I have personally reviewed and/or updated the patient's past medical history, past surgical history, family history, social history, current medications, allergies, and vital signs today  Review of Systems:    Review of Systems   Respiratory: Negative for shortness of breath  Cardiovascular: Negative for chest pain  Gastrointestinal: Negative for constipation, diarrhea, nausea and vomiting  Musculoskeletal: Positive for gait problem  Negative for arthralgias, joint swelling and myalgias  Skin: Negative for rash  Neurological: Negative for dizziness, seizures and weakness  All other systems reviewed and are negative  Past Medical History:   Diagnosis Date   • Allergic rhinitis 2015   • Aortic aneurysm [facility])    • Benign colon polyp    • Bipolar 1 disorder ([place] [address] )    • Cardiac disease     MI   • Cervical cord compression with myelopathy ([place] [address] )    • COPD (chronic obstructive pulmonary disease) ([place] [address] )    • Coronary artery disease 1995   • Diverticulitis    • Diverticulosis    • Emphysema of lung ([place] [address] ) 2012   • Gait disturbance     uses cane, leg brace on right   • GERD (gastroesophageal reflux disease)    • Heart attack ([place] [address] ) 1996   • Hx of resection of large bowel 05/03/2016   • Hyperlipidemia    • Hypertension    • IBS (irritable bowel syndrome)    • Lumbar stenosis    • Lung cancer ([place] [address] )     2007 Left lower lobectomy and 2011 Right lung with surgery    • Myocardial infarction [facility])     involving other coronary artery   • Prostate cancer ([place] [address] )    • Shortness of breath    • Small bowel obstruction ([place] [address] ) 11/16/2016       Past Surgical History:   Procedure Laterality Date   • ANGIOPLASTY      stent   • CARDIAC CATHETERIZATION  1995    angioplasty   • CARDIAC SURGERY     • CERVICAL SPINE SURGERY      Cervical decompression with cervical fusion from C3-C7 for spinal stenosis     • COLON SURGERY  11/04/2014   • ESOPHAGOGASTRODUODENOSCOPY 01/03/2013    with possible Schatzki's ring & small hiatal hernia, mild gastritis   • FL INJECTION LEFT HIP (NON ARTHROGRAM)  12/11/2018   • FL INJECTION LEFT HIP (NON ARTHROGRAM)  05/09/2019   • IR BIOPSY LUNG  07/06/2020   • IR EVAR  08/06/2018   • LUNG BIOPSY  2007   • LUNG CANCER SURGERY Right 03/2011    wedge resection for lung tumor, 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   • LUNG LOBECTOMY Left    • WI ARTHRD ANT INTERBODY MIN DSC CRV BELOW C2 N/A 05/02/2016    Procedure: Anterior cervical diskectomy C3/4, C5/6, C6/7 with anterior plate fixation fusion C3-7;  Posterior decompressive laminectomy C3-7 with lateral mass fixation fusion C3-7 (IMPULSE MONITORING); Surgeon: Faustino Peacock MD;  [facility];  Service: Neurosurgery   • WI ARTHRODESIS POSTERIOR INTERBODY 1 [address] LUMBAR N/A 01/30/2017    Procedure: L4-5 AND L5-S1 DECOMPRESSIVE FORAMINOTOMIES, TRANSFORAMINAL LUMBAR INTERBODY AND PEDICLE SCREW FIXATION FUSION L4-S1 (IMPULSE);   Surgeon: Faustino Peacock MD;  [facility];  Service: Neurosurgery   • WI EVASC RPR DPLMNT AORTO-AORTIC NDGFT N/A 08/06/2018    Procedure: REPAIR ANEURYSM ENDOVASCULAR ABDOMINAL AORTIC  (EVAR) WITH BILATERAL PERCUTANEOUS FEMORAL ACCESS WITH ULTRASOUND GUIDANCE ON THE RIGHT AND PRE CLOSURE;  Surgeon: Sarah Palmer MD;  [facility];  Service: Vascular   • PROSTATECTOMY  2007    for prostate CA - no chemo/RT   • SIGMOIDECTOMY      for divertic   • SMALL INTESTINE SURGERY N/A 11/17/2016    Procedure: Exploratory Laparotomy, Lysis of adhesions to release small bowel obstruction;  Surgeon: Jo Denson MD;  [facility];  Service:    • SPINE SURGERY  2016, 2017   • TONSILLECTOMY  Y    1950       Family History   Problem Relation Age of Onset   • Hypertension Mother    • Heart attack Father    • Colon cancer Father    • Coronary artery disease Father    • Hypertension Father    • Heart disease Family    • Hyperlipidemia Family    • Hypertension Family        Social History     Occupational History   • Occupation: Retired   Tobacco Use   • Smoking status: Former     Packs/day: 1 50     Years: 35 00     Total pack years: 52 50     Types: Cigarettes     Start date: 1967     Quit date: 2011     Years since quittin 4   • Smokeless tobacco: Never   Vaping Use   • Vaping Use: Never used   Substance and Sexual Activity   • Alcohol use:  Yes     Alcohol/week: 2 0 - 3 0 standard drinks of alcohol     Types: 2 - 3 Shots of liquor per week     Comment: One glass per day   • Drug use: No   • Sexual activity: Not Currently     Partners: Female     Birth control/protection: Post-menopausal, None         Current Outpatient Medications:   •  acetaminophen (TYLENOL) 500 mg tablet, Take 500 mg by mouth as needed for mild pain , Disp: , Rfl:   •  amLODIPine (NORVASC) 2 5 mg tablet, TAKE 1 TABLET BY MOUTH  DAILY, Disp: 90 tablet, Rfl: 3  •  aspirin (ECOTRIN LOW STRENGTH) 81 mg EC tablet, Take 81 mg by mouth daily, Disp: , Rfl:   •  atorvastatin (LIPITOR) 40 mg tablet, TAKE 1 TABLET BY MOUTH  DAILY, Disp: 90 tablet, Rfl: 5  •  Cholecalciferol (VITAMIN D PO), Take 2,000 Units by mouth daily  , Disp: , Rfl:   •  fexofenadine (ALLEGRA) 180 MG tablet, Take 180 mg by mouth as needed Daily during the Summer, Disp: , Rfl:   •  FLUoxetine (PROzac) 10 mg capsule, Take 10 mg by mouth daily , Disp: , Rfl:   •  metoprolol succinate (TOPROL-XL) 25 mg 24 hr tablet, TAKE 1 TABLET BY MOUTH  DAILY, Disp: 90 tablet, Rfl: 3  •  Mirabegron ER 25 MG TB24, Take 25 mg by mouth daily at bedtime, Disp: 90 tablet, Rfl: 3  •  omega-3-acid ethyl esters (LOVAZA) 1 g capsule, TAKE 1 CAPSULE BY MOUTH 3  TIMES DAILY, Disp: 270 capsule, Rfl: 3  •  omeprazole (PriLOSEC) 20 mg delayed release capsule, Take 1 capsule (20 mg total) by mouth 2 (two) times a day before meals, Disp: 180 capsule, Rfl: 0  •  ProAir  (90 Base) MCG/ACT inhaler, USE 2 INHALATIONS BY MOUTH  EVERY 6 HOURS AS "NEEDED FOR WHEEZING, Disp: 34 g, Rfl: 3  •  Trelegy Ellipta 200-62 5-25 MCG/INH AEPB inhaler, USE 1 INHALATION BY MOUTH  DAILY   RINSE MOUTH AFTER  USE, Disp: 180 each, Rfl: 3  •  dicyclomine (Bentyl) 20 mg tablet, Take 1 tablet (20 mg total) by mouth every 6 (six) hours as needed (abd cramping), Disp: 90 tablet, Rfl: 3  •  famotidine (PEPCID) 40 MG tablet, TAKE 1 TABLET BY MOUTH  DAILY AT BEDTIME AS NEEDED  FOR HEARTBURN (Patient taking differently: 20 mg if needed Patient is taking 20mg), Disp: 30 tablet, Rfl: 11  •  fluticasone (FLONASE) 50 mcg/act nasal spray, 2 sprays into each nostril daily, Disp: , Rfl:   •  lithium carbonate (LITHOBID) 300 mg CR tablet, Take 300 mg by mouth in the morning, Disp: , Rfl:   •  mupirocin (BACTROBAN) 2 % ointment, Apply topically 2 (two) times a day as needed (wound), Disp: 22 g, Rfl: 0  •  nitroglycerin (NITRODUR) 0 2 mg/hr, APPLY 1 PATCH TOPICALLY  ONCE DAILY  LEAVE ON FOR 12 TO 14 HOURS THEN REMOVE FOR A NITRATE-FREE INTERVAL OF  10 TO 12 HOURS (Patient taking differently: if needed), Disp: 90 patch, Rfl: 3    Allergies   Allergen Reactions   • Bactrim [Sulfamethoxazole-Trimethoprim] Other (See Comments)     AILYN   • Nsaids      Annotation - [id number]: unable to take due to use of lithium  • Oxycodone Rash       Physical Exam:    /84   Pulse 65   Ht 5' 7\" (1 702 m)   Wt 79 8 kg (176 lb)   BMI 27 57 kg/m²     Constitutional:normal, well developed, well nourished, alert, in no distress and non-toxic and no overt pain behavior    Eyes:anicteric  HEENT:grossly intact  Neck:supple, symmetric, trachea midline and no masses   Pulmonary:even and unlabored  Cardiovascular:No edema or pitting edema present  Skin:Normal without rashes or lesions and well hydrated  Psychiatric:Mood and affect appropriate  Neurologic:Cranial Nerves II-XII grossly intact  Musculoskeletal:ambulates with cane      Imaging  FL spine and pain procedure    (Results Pending)       MRI LUMBAR SPINE WITH " AND WITHOUT CONTRAST   INDICATION: [id number]: Radiculopathy, lumbar region  COMPARISON: MRI lumbar spine 7/2/2019   TECHNIQUE: Multiplanar, multisequence imaging of the lumbar spine was performed before and after gadolinium administration      IV Contrast: 8 mL of Gadobutrol injection (SINGLE-DOSE)   IMAGE QUALITY: Diagnostic   FINDINGS:   POSTSURGICAL FINDINGS: Stable posterior lumbar fixation hardware including bilateral transpedicular screws at the L4 through the S1 levels with interconnecting rods and secondary susceptibility artifacts  VERTEBRAL BODIES: There are 5 lumbar type vertebral bodies  Stable dextroscoliosis, apex at L3  No compression fracture  Modic type I endplate changes are noted at the L3-4 level  SACRUM: Normal signal within the visualized upper sacrum  No suspected insufficiency or stress fracture  DISTAL CORD AND CONUS: Normal size and signal within the distal cord and conus  The conus terminates at the L1 level  The cauda equina nerve roots appear within normal limits  PARASPINAL SOFT TISSUES: Mild dependent subcutaneous edema within the lower back region  LOWER THORACIC DISC SPACES: Normal disc height and signal  No disc herniation, canal stenosis or foraminal narrowing  LUMBAR DISC SPACES:   L1-2: Stable disc bulge without significant central canal or neural foraminal narrowing  L2-3: Mild disc bulge is again noted with a new small central disc extrusion with subligamentous extension superiorly  No central canal stenosis  Mild to moderate bilateral subarticular/lateral recess narrowing  Moderate left neural foraminal   stenosis  L3-4: Stable mild to moderate diffuse disc bulge  The previously noted left paracentral disc extrusion demonstrates decreased superior subligamentous extension  There is again bilateral ligamentum flavum and facet hypertrophy   Moderate central canal   and left greater than right subarticular/lateral recess narrowing is again noted with encroachment of the traversing left L4 nerve root  Moderate bilateral neural foraminal stenosis, unchanged  L4-5: No focal disc herniation, central canal stenosis, or neural foraminal narrowing  Suggestion of prior right hemilaminectomy with patency of the central canal    L5-S1: Stable disc bulge and broad-based central disc protrusion  The central canal is patent status post right hemilaminectomy  Mild bilateral subarticular/lateral recess narrowing  Moderate right neural foraminal stenosis with encroachment of the   exiting right L5 nerve root  POSTCONTRAST IMAGING: There is no suspicious enhancing granulation tissue impinging on the thecal sac  OTHER FINDINGS: Bilateral renal cortical cysts, largest located at the upper pole of the right kidney measuring 5 4 cm  IMPRESSION:   1  Stable posterior lumbar fixation hardware including bilateral transpedicular screws at the L4 through the S1 levels with interconnecting rods  2  New small central disc extrusion is noted at the L2-3 level with subligamentous extension superiorly with bilateral subarticular/lateral recess narrowing  Left paracentral disc extrusion at L3-4 is again noted with interval decreased subligamentous   extension  Persistent moderate central canal and left greater than right subarticular/lateral recess narrowing at L3-4 with encroachment of the traversing left L4 nerve root  Stable disc protrusion at L5-S1 abutting the traversing S1 nerve roots  The   central canal is patent at the L4-5 and L5-S1 level secondary to right hemilaminectomies  There is no suspicious enhancing granulation tissue impinging on the thecal sac  3  Bilateral renal cortical cysts, largest located at the upper pole of the right kidney     Workstation performed: [id number]     Orders Placed This Encounter   Procedures   • FL spine and pain procedure

## 2023-06-22 ENCOUNTER — TELEPHONE (OUTPATIENT)
Dept: PAIN MEDICINE | Facility: MEDICAL CENTER | Age: 78
End: 2023-06-22

## 2023-06-22 ENCOUNTER — TRANSCRIBE ORDERS (OUTPATIENT)
Dept: LAB | Facility: CLINIC | Age: 78
End: 2023-06-22

## 2023-06-22 ENCOUNTER — APPOINTMENT (OUTPATIENT)
Dept: LAB | Facility: CLINIC | Age: 78
End: 2023-06-22
Payer: MEDICARE

## 2023-06-22 DIAGNOSIS — F31.60 MIXED BIPOLAR I DISORDER (HCC): ICD-10-CM

## 2023-06-22 DIAGNOSIS — F31.60 MIXED BIPOLAR I DISORDER (HCC): Primary | ICD-10-CM

## 2023-06-22 LAB
BUN SERPL-MCNC: 27 MG/DL (ref 5–25)
LITHIUM SERPL-SCNC: 0.5 MMOL/L (ref 0.6–1.2)
TSH SERPL DL<=0.05 MIU/L-ACNC: 0.97 UIU/ML (ref 0.45–4.5)

## 2023-06-22 PROCEDURE — 80178 ASSAY OF LITHIUM: CPT

## 2023-06-22 PROCEDURE — 84443 ASSAY THYROID STIM HORMONE: CPT

## 2023-06-22 PROCEDURE — 36415 COLL VENOUS BLD VENIPUNCTURE: CPT

## 2023-06-22 PROCEDURE — 84520 ASSAY OF UREA NITROGEN: CPT

## 2023-06-22 NOTE — TELEPHONE ENCOUNTER
Caller: Malissa Baker     Doctor: Dr Gianfranco Noyola     Reason for call: Patient calling states missed call from office I do not see any notation      Call

## 2023-06-26 ENCOUNTER — HOSPITAL ENCOUNTER (OUTPATIENT)
Dept: RADIOLOGY | Facility: CLINIC | Age: 78
Discharge: HOME | End: 2023-06-26
Admitting: ANESTHESIOLOGY
Payer: MEDICARE

## 2023-06-26 VITALS
TEMPERATURE: 97.4 F | DIASTOLIC BLOOD PRESSURE: 81 MMHG | RESPIRATION RATE: 18 BRPM | OXYGEN SATURATION: 97 % | SYSTOLIC BLOOD PRESSURE: 183 MMHG | HEART RATE: 100 BPM

## 2023-06-26 DIAGNOSIS — M54.16 LUMBAR RADICULOPATHY: ICD-10-CM

## 2023-06-26 PROCEDURE — 64483 NJX AA&/STRD TFRM EPI L/S 1: CPT | Performed by: ANESTHESIOLOGY

## 2023-06-26 PROCEDURE — 64484 NJX AA&/STRD TFRM EPI L/S EA: CPT | Performed by: ANESTHESIOLOGY

## 2023-06-26 RX ORDER — PAPAVERINE HCL 150 MG
15 CAPSULE, EXTENDED RELEASE ORAL ONCE
Status: COMPLETED | OUTPATIENT
Start: 2023-06-26 | End: 2023-06-26

## 2023-06-26 RX ADMIN — IOHEXOL 2 ML: 300 INJECTION, SOLUTION INTRAVENOUS at 13:55

## 2023-06-26 RX ADMIN — LIDOCAINE HYDROCHLORIDE 2 ML: 20 INJECTION, SOLUTION EPIDURAL; INFILTRATION; INTRACAUDAL; PERINEURAL at 13:50

## 2023-06-26 RX ADMIN — DEXAMETHASONE SODIUM PHOSPHATE 15 MG: 10 INJECTION, SOLUTION INTRAMUSCULAR; INTRAVENOUS at 13:56

## 2023-06-26 NOTE — DISCHARGE INSTR - LAB
Epidural Steroid Injection   WHAT YOU NEED TO KNOW:   An epidural steroid injection (DEVON) is a procedure to inject steroid medicine into the epidural space  The epidural space is between your spinal cord and vertebrae  Steroids reduce inflammation and fluid buildup in your spine that may be causing pain  You may be given pain medicine along with the steroids  ACTIVITY  Do not drive or operate machinery today  No strenuous activity today - bending, lifting, etc   You may resume normal activites starting tomorrow - start slowly and as tolerated  You may shower today, but no tub baths or hot tubs  You may have numbness for several hours from the local anesthetic  Please use caution and common sense, especially with weight-bearing activities  CARE OF THE INJECTION SITE  If you have soreness or pain, apply ice to the area today (20 minutes on/20 minutes off)  Starting tomorrow, you may use warm, moist heat or ice if needed  You may have an increase or change in your discomfort for 36-48 hours after your treatment  Apply ice and continue with any pain medication you have been prescribed  Notify the [facility] if you have any of the following: redness, drainage, swelling, headache, stiff neck or fever above 100°F     SPECIAL INSTRUCTIONS  Our office will contact you in approximately 7 days for a progress report  MEDICATIONS  Continue to take all routine medications  Our office may have instructed you to hold some medications  As no general anesthesia was used in today's procedure, you should not experience any side effects related to anesthesia  If you are diabetic, the steroids used in today's injection may temporarily increase your blood sugar levels after the first few days after your injection  Please keep a close eye on your sugars and alert the doctor who manages your diabetes if your sugars are significantly high from your baseline or you are symptomatic       If you have a problem specifically related to your procedure, please call our office at [phone]  Problems not related to your procedure should be directed to your primary care physician

## 2023-06-26 NOTE — H&P
History of Present Illness: The patient is a 78 y o  male who presents with complaints of low back and leg pain  Past Medical History:   Diagnosis Date   • Allergic rhinitis 2015   • Aortic aneurysm [facility])    • Benign colon polyp    • Bipolar 1 disorder ([place] [address] )    • Cardiac disease     MI   • Cervical cord compression with myelopathy ([place] [address] )    • COPD (chronic obstructive pulmonary disease) ([place] [address] )    • Coronary artery disease 1995   • Diverticulitis    • Diverticulosis    • Emphysema of lung ([place] [address] ) 2012   • Gait disturbance     uses cane, leg brace on right   • GERD (gastroesophageal reflux disease)    • Heart attack ([place] [address] ) 1996   • Hx of resection of large bowel 05/03/2016   • Hyperlipidemia    • Hypertension    • IBS (irritable bowel syndrome)    • Lumbar stenosis    • Lung cancer ([place] [address] )     2007 Left lower lobectomy and 2011 Right lung with surgery    • Myocardial infarction [facility])     involving other coronary artery   • Prostate cancer ([place] [address] )    • Shortness of breath    • Small bowel obstruction ([place] [address] ) 11/16/2016       Past Surgical History:   Procedure Laterality Date   • ANGIOPLASTY      stent   • CARDIAC CATHETERIZATION  1995    angioplasty   • CARDIAC SURGERY     • CERVICAL SPINE SURGERY      Cervical decompression with cervical fusion from C3-C7 for spinal stenosis     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   • LUNG LOBECTOMY Left    • [place] ARTHRD ANT INTERBODY MIN DSC CRV BELOW C2 N/A 05/02/2016    Procedure: Anterior cervical diskectomy C3/4, C5/6, C6/7 with anterior plate fixation fusion C3-7;  Posterior decompressive laminectomy C3-7 with lateral mass fixation fusion C3-7 (IMPULSE MONITORING); Surgeon: Corlis Kocher, MD;  [facility];  Service: Neurosurgery   • [place] ARTHRODESIS POSTERIOR INTERBODY [address] LUMBAR N/A 01/30/2017    Procedure: L4-5 AND L5-S1 DECOMPRESSIVE FORAMINOTOMIES, TRANSFORAMINAL LUMBAR INTERBODY AND PEDICLE SCREW FIXATION FUSION L4-S1 (IMPULSE);   Surgeon: Corlis Kocher, MD;  [facility];  Service: Neurosurgery   • [place] EVASC RPR DPLMNT AORTO-AORTIC NDGFT N/A 08/06/2018    Procedure: REPAIR ANEURYSM ENDOVASCULAR ABDOMINAL AORTIC  (EVAR) WITH BILATERAL PERCUTANEOUS FEMORAL ACCESS WITH ULTRASOUND GUIDANCE ON THE RIGHT AND PRE CLOSURE;  Surgeon: Khadijah Ochoa MD;  [facility];  Service: Vascular   • PROSTATECTOMY  2007    for prostate CA - no chemo/RT   • SIGMOIDECTOMY      for divertic   • SMALL INTESTINE SURGERY N/A 11/17/2016    Procedure: Exploratory Laparotomy, Lysis of adhesions to release small bowel obstruction;  Surgeon: Rosa Ely MD;  [facility];  Service:    • SPINE SURGERY  2016, 2017   • TONSILLECTOMY  Y    1952         Current Outpatient Medications:   •  acetaminophen (TYLENOL) 500 mg tablet, Take 500 mg by mouth as needed for mild pain , Disp: , Rfl:   •  amLODIPine (NORVASC) 2 5 mg tablet, TAKE 1 TABLET BY MOUTH  DAILY, Disp: 90 tablet, Rfl: 3  •  aspirin (ECOTRIN LOW STRENGTH) 81 mg EC tablet, Take 81 mg by mouth daily, Disp: , Rfl:   •  atorvastatin (LIPITOR) 40 mg tablet, TAKE 1 TABLET BY MOUTH  DAILY, Disp: 90 tablet, Rfl: 5  •  Cholecalciferol (VITAMIN D PO), Take 2,000 Units by mouth daily  , Disp: , Rfl:   •  dicyclomine (Bentyl) 20 mg tablet, Take 1 tablet (20 mg total) by mouth every 6 (six) hours as needed (abd cramping), Disp: 90 tablet, Rfl: 3  •  famotidine (PEPCID) 40 MG tablet, TAKE 1 TABLET BY MOUTH  DAILY AT BEDTIME AS NEEDED  FOR HEARTBURN (Patient taking differently: 20 mg if needed Patient is taking 20mg), Disp: 30 tablet, Rfl: 11  •  fexofenadine (ALLEGRA) 180 MG tablet, Take 180 mg by mouth as needed Daily during the Summer, Disp: , Rfl:   •  FLUoxetine (PROzac) 10 mg capsule, Take 10 mg by mouth daily , Disp: , Rfl:   •  fluticasone (FLONASE) 50 mcg/act nasal spray, 2 sprays into each nostril daily, Disp: , Rfl:   •  lithium carbonate (LITHOBID) 300 mg CR tablet, Take 300 mg by mouth in the morning, Disp: , Rfl:   •  metoprolol succinate (TOPROL-XL) 25 mg 24 hr tablet, TAKE 1 TABLET BY MOUTH  DAILY, Disp: 90 tablet, Rfl: 3  •  Mirabegron ER 25 MG TB24, Take 25 mg by mouth daily at bedtime, Disp: 90 tablet, Rfl: 3  •  mupirocin (BACTROBAN) 2 % ointment, Apply topically 2 (two) times a day as needed (wound), Disp: 22 g, Rfl: 0  •  nitroglycerin (NITRODUR) 0 2 mg/hr, APPLY 1 PATCH TOPICALLY  ONCE DAILY  LEAVE ON FOR 12 TO 14 HOURS THEN REMOVE FOR A NITRATE-FREE INTERVAL OF  10 TO 12 HOURS (Patient taking differently: if needed), Disp: 90 patch, Rfl: 3  •  omega-3-acid ethyl esters (LOVAZA) 1 g capsule, TAKE 1 CAPSULE BY MOUTH 3  TIMES DAILY, Disp: 270 capsule, Rfl: 3  •  omeprazole (PriLOSEC) 20 mg delayed release capsule, Take 1 capsule (20 mg total) by mouth 2 (two) times a day before meals, Disp: 180 capsule, Rfl: 0  •  ProAir  (90 Base) MCG/ACT inhaler, USE 2 INHALATIONS BY MOUTH  EVERY 6 HOURS AS NEEDED FOR WHEEZING, Disp: 34 g, Rfl: 3  •  Trelegy Ellipta 200-62 5-25 MCG/INH AEPB inhaler, USE 1 INHALATION BY MOUTH  DAILY   RINSE MOUTH AFTER  USE, Disp: 180 each, Rfl: 3    Allergies   Allergen Reactions   • Bactrim [Sulfamethoxazole-Trimethoprim] Other (See Comments)     AILYN   • Nsaids      Annotation - [id number]: unable to take due to use of lithium     • Oxycodone Rash       Physical Exam:   Vitals:    06/26/23 1328   BP: 154/80   Pulse: 65   Resp: 18   Temp: (!) 97 4 °F (36 3 °C)   SpO2: 97%     General: Awake, Alert, Oriented x 3, Mood and affect appropriate  Respiratory: Respirations even and unlabored  Cardiovascular: Peripheral pulses intact; no edema  Musculoskeletal Exam: Lumbar paraspinals tender to palpation    ASA Score: 3    Patient/Chart Verification  Patient ID Verified: Verbal  ID Band Applied: No  Consents Confirmed: Procedural, To be obtained in the Pre-Procedure area  H&P( within 30 days) Verified: To be obtained in the Pre-Procedure area  Interval H&P(within 24 hr) Complete (required for Outpatients and Surgery Admit only): To be obtained in the Pre-Procedure area  Allergies Reviewed: Yes  Anticoag/NSAID held?: NA  Currently on antibiotics?: No    Assessment:   1   Lumbar radiculopathy        Plan: Left L3 and L4 TFESI

## 2023-07-03 ENCOUNTER — TELEPHONE (OUTPATIENT)
Dept: PAIN MEDICINE | Facility: CLINIC | Age: 78
End: 2023-07-03

## 2023-07-14 ENCOUNTER — OFFICE VISIT (OUTPATIENT)
Dept: INTERNAL MEDICINE CLINIC | Facility: CLINIC | Age: 78
End: 2023-07-14
Payer: MEDICARE

## 2023-07-14 VITALS
DIASTOLIC BLOOD PRESSURE: 78 MMHG | BODY MASS INDEX: 27.75 KG/M2 | SYSTOLIC BLOOD PRESSURE: 128 MMHG | WEIGHT: 177.2 LBS | OXYGEN SATURATION: 98 % | HEART RATE: 58 BPM

## 2023-07-14 DIAGNOSIS — E78.2 MIXED HYPERLIPIDEMIA: Primary | ICD-10-CM

## 2023-07-14 DIAGNOSIS — M54.16 LUMBAR RADICULOPATHY: ICD-10-CM

## 2023-07-14 DIAGNOSIS — I10 ESSENTIAL HYPERTENSION: ICD-10-CM

## 2023-07-14 DIAGNOSIS — K21.9 GASTROESOPHAGEAL REFLUX DISEASE WITHOUT ESOPHAGITIS: ICD-10-CM

## 2023-07-14 PROCEDURE — 99214 OFFICE O/P EST MOD 30 MIN: CPT | Performed by: INTERNAL MEDICINE

## 2023-07-14 NOTE — PROGRESS NOTES
Name: Erlin Arita      : 1945      MRN: [id number]  Encounter Provider: Jack Jones MD  Encounter Date: 2023   Encounter department: [facility]     1. Mixed hyperlipidemia  Assessment & Plan:  Continue statin along with healthy diet and exercise      2. Essential hypertension  Assessment & Plan:  Controlled, continue meds along with healthy diet and exercise      3. Lumbar radiculopathy  Assessment & Plan:  Follow up pain management, pt getting steroid injections. 4. Gastroesophageal reflux disease without esophagitis  Assessment & Plan:  Patient on omeprazole 20 mg once a day problems with food getting stuck. Subjective     Pt here for regular follow up and some issues    Review of Systems   Constitutional: Negative for chills, fatigue and fever. HENT: Negative for congestion, nosebleeds, postnasal drip, sore throat and trouble swallowing. Eyes: Negative for pain. Respiratory: Negative for cough, chest tightness, shortness of breath and wheezing. Cardiovascular: Negative for chest pain, palpitations and leg swelling. Gastrointestinal: Negative for abdominal pain, constipation, diarrhea, nausea and vomiting. Endocrine: Negative for polydipsia and polyuria. Genitourinary: Negative for dysuria, flank pain and hematuria. Musculoskeletal: Positive for arthralgias. Skin: Negative for rash. Neurological: Negative for dizziness, tremors and headaches. Hematological: Does not bruise/bleed easily. Psychiatric/Behavioral: Negative for confusion and dysphoric mood. The patient is not nervous/anxious.         Past Medical History:   Diagnosis Date   • Allergic    • Allergic rhinitis    • Anxiety     occasional   • Aortic aneurysm [facility])    • Benign colon polyp    • Bipolar 1 disorder ([facility])    • Cancer ([facility]) ,    • Cardiac disease     MI   • Cervical cord compression with myelopathy ([facility])    • COPD (chronic obstructive pulmonary disease) ([address])    • Coronary artery disease 1995   • Diverticulitis    • Diverticulitis of colon prior to 2014   • Diverticulosis    • Emphysema of lung ([address]) 2012   • Gait disturbance     uses cane, leg brace on right   • GERD (gastroesophageal reflux disease)    • Heart attack ([address]) 1996   • Hx of resection of large bowel 05/03/2016   • Hyperlipidemia    • Hypertension    • IBS (irritable bowel syndrome)    • Inflammatory bowel disease 2012   • Lumbar stenosis    • Lung cancer ([address])     2007 Left lower lobectomy and 2011 Right lung with surgery    • Myocardial infarction [facility])     involving other coronary artery   • Prostate cancer ([address])    • Shortness of breath    • Small bowel obstruction ([address]) 11/16/2016     Past Surgical History:   Procedure Laterality Date   • ANGIOPLASTY      stent   • CARDIAC CATHETERIZATION  1995    angioplasty   • CARDIAC SURGERY     • CERVICAL SPINE SURGERY      Cervical decompression with cervical fusion from C3-C7 for spinal stenosis. 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   • LUNG LOBECTOMY Left    • [place] ARTHRD ANT INTERBODY MIN DSC CRV BELOW C2 N/A 05/02/2016    Procedure: Anterior cervical diskectomy C3/4, C5/6, C6/7 with anterior plate fixation fusion C3-7;  Posterior decompressive laminectomy C3-7 with lateral mass fixation fusion C3-7 (IMPULSE MONITORING);   Surgeon: Ivan Kiser MD;  [facility];  Service: Neurosurgery   • [place] ARTHRODESIS POSTERIOR INTERBODY 1 [address] LUMBAR N/A 01/30/2017    Procedure: L4-5 AND L5-S1 DECOMPRESSIVE FORAMINOTOMIES, TRANSFORAMINAL LUMBAR INTERBODY AND PEDICLE SCREW FIXATION FUSION L4-S1 (IMPULSE); Surgeon: Jodi Valadez MD;  [facility];  Service: Neurosurgery   • [place] EVASC RPR DPLMNT AORTO-AORTIC NDGFT N/A 2018    Procedure: REPAIR ANEURYSM ENDOVASCULAR ABDOMINAL AORTIC  (EVAR) WITH BILATERAL PERCUTANEOUS FEMORAL ACCESS WITH ULTRASOUND GUIDANCE ON THE RIGHT AND PRE CLOSURE;  Surgeon: Sadi Francis MD;  [facility];  Service: Vascular   • PROSTATECTOMY      for prostate CA - no chemo/RT   • SIGMOIDECTOMY      for divertic   • SMALL INTESTINE SURGERY N/A 2016    Procedure: Exploratory Laparotomy, Lysis of adhesions to release small bowel obstruction;  Surgeon: Abdirizak Mancera MD;  [facility];  Service:    • SPINE SURGERY  ,    • TONSILLECTOMY  Y         Family History   Problem Relation Age of Onset   • Hypertension Mother    • Glaucoma Mother    • Heart attack Father    • Colon cancer Father    • Coronary artery disease Father    • Hypertension Father    • Heart disease Family    • Hyperlipidemia Family    • Hypertension Family      Social History     Socioeconomic History   • Marital status: /Civil Union     Spouse name: None   • Number of children: 1   • Years of education: None   • Highest education level: None   Occupational History   • Occupation: Retired   Tobacco Use   • Smoking status: Former     Packs/day: 1.00     Years: 35.00     Total pack years: 35.00     Types: Cigarettes     Start date: 1967     Quit date: 2011     Years since quittin.5   • Smokeless tobacco: Never   Vaping Use   • Vaping Use: Never used   Substance and Sexual Activity   • Alcohol use:  Yes     Alcohol/week: 2.0 - 3.0 standard drinks of alcohol     Types: 2 - 3 Shots of liquor per week     Comment: One glass per day   • Drug use: No   • Sexual activity: Yes     Partners: Female     Birth control/protection: Post-menopausal, None   Other Topics Concern   • None Social History Narrative   • None     Social Determinants of Health     Financial Resource Strain: Low Risk  (12/28/2022)    Overall Financial Resource Strain (CARDIA)    • Difficulty of Paying Living Expenses: Not hard at all   Food Insecurity: Not on file   Transportation Needs: No Transportation Needs (12/28/2022)    PRAPARE - Transportation    • Lack of Transportation (Medical): No    • Lack of Transportation (Non-Medical): No   Physical Activity: Not on file   Stress: Not on file   Social Connections: Not on file   Intimate Partner Violence: Not on file   Housing Stability: Not on file     Current Outpatient Medications on File Prior to Visit   Medication Sig   • acetaminophen (TYLENOL) 500 mg tablet Take 500 mg by mouth as needed for mild pain. • amLODIPine (NORVASC) 2.5 mg tablet TAKE 1 TABLET BY MOUTH  DAILY   • aspirin (ECOTRIN LOW STRENGTH) 81 mg EC tablet Take 81 mg by mouth daily   • atorvastatin (LIPITOR) 40 mg tablet TAKE 1 TABLET BY MOUTH  DAILY   • Cholecalciferol (VITAMIN D PO) Take 2,000 Units by mouth daily     • dicyclomine (Bentyl) 20 mg tablet Take 1 tablet (20 mg total) by mouth every 6 (six) hours as needed (abd cramping)   • fexofenadine (ALLEGRA) 180 MG tablet Take 180 mg by mouth as needed Daily during the Summer   • FLUoxetine (PROzac) 10 mg capsule Take 10 mg by mouth daily    • fluticasone (FLONASE) 50 mcg/act nasal spray 2 sprays into each nostril daily   • lithium carbonate (LITHOBID) 300 mg CR tablet Take 300 mg by mouth in the morning   • metoprolol succinate (TOPROL-XL) 25 mg 24 hr tablet TAKE 1 TABLET BY MOUTH  DAILY   • Mirabegron ER 25 MG TB24 Take 25 mg by mouth daily at bedtime   • mupirocin (BACTROBAN) 2 % ointment Apply topically 2 (two) times a day as needed (wound)   • nitroglycerin (NITRODUR) 0.2 mg/hr APPLY 1 PATCH TOPICALLY  ONCE DAILY.  LEAVE ON FOR 12 TO 14 HOURS THEN REMOVE FOR A NITRATE-FREE INTERVAL OF  10 TO 12 HOURS (Patient taking differently: if needed)   • omega-3-acid ethyl esters (LOVAZA) 1 g capsule TAKE 1 CAPSULE BY MOUTH 3  TIMES DAILY   • omeprazole (PriLOSEC) 20 mg delayed release capsule Take 1 capsule (20 mg total) by mouth 2 (two) times a day before meals   • ProAir  (90 Base) MCG/ACT inhaler USE 2 INHALATIONS BY MOUTH  EVERY 6 HOURS AS NEEDED FOR WHEEZING   • Trelegy Ellipta 200-62.5-25 MCG/INH AEPB inhaler USE 1 INHALATION BY MOUTH  DAILY . RINSE MOUTH AFTER  USE   • famotidine (PEPCID) 40 MG tablet TAKE 1 TABLET BY MOUTH  DAILY AT BEDTIME AS NEEDED  FOR HEARTBURN (Patient not taking: Reported on 7/14/2023)     Allergies   Allergen Reactions   • Bactrim [Sulfamethoxazole-Trimethoprim] Other (See Comments)     AILYN   • Nsaids      Annotation - [id number]: unable to take due to use of lithium. • Oxycodone Rash     Immunization History   Administered Date(s) Administered   • COVID-19 PFIZER VACCINE 0.3 ML IM 02/17/2021, 03/10/2021, 10/26/2021   • COVID-19 Pfizer vac (Satya-sucrose, gray cap) 12 yr+ IM 08/03/2022   • INFLUENZA 10/01/2015, 10/07/2018, 09/27/2019, 09/27/2021   • Influenza Quadrivalent Preservative Free 3 years and older IM 10/01/2015   • Influenza Split High Dose Preservative Free IM 10/25/2016, 10/03/2018   • Influenza, high dose seasonal 0.7 mL 09/27/2019, 10/05/2020, 10/14/2022   • Influenza, seasonal, injectable 1945, 10/10/2012   • Pneumococcal Conjugate 13-Valent 10/25/2016   • Pneumococcal Polysaccharide PPV23 10/03/2019   • Td (adult), adsorbed 12/01/2009   • Tdap 05/31/2023   • Zoster 01/01/2014, 01/01/2014   • influenza, trivalent, adjuvanted 09/27/2021       Objective     /78   Pulse 58   Wt 80.4 kg (177 lb 3.2 oz)   SpO2 98%   BMI 27.75 kg/m²     Physical Exam  Vitals reviewed. Constitutional:       General: He is not in acute distress. Appearance: Normal appearance. He is well-developed. HENT:      Head: Normocephalic and atraumatic.       Right Ear: External ear normal.      Left Ear: External ear normal.   Eyes:      General: No scleral icterus. Conjunctiva/sclera: Conjunctivae normal.   Neck:      Thyroid: No thyromegaly. Trachea: No tracheal deviation. Cardiovascular:      Rate and Rhythm: Normal rate and regular rhythm. Heart sounds: Normal heart sounds. No murmur heard. Pulmonary:      Effort: Pulmonary effort is normal. No respiratory distress. Breath sounds: Normal breath sounds. No wheezing or rales. Abdominal:      General: Bowel sounds are normal.      Palpations: Abdomen is soft. Tenderness: There is no abdominal tenderness. There is no guarding or rebound. Musculoskeletal:      Cervical back: Normal range of motion and neck supple. Right lower leg: Edema (mild) present. Left lower leg: Edema (mild) present. Lymphadenopathy:      Cervical: No cervical adenopathy. Neurological:      Mental Status: He is alert and oriented to person, place, and time. Psychiatric:         Behavior: Behavior normal.         Thought Content:  Thought content normal.         Judgment: Judgment normal.       Brandon Whitley MD

## 2023-07-14 NOTE — PATIENT INSTRUCTIONS
Problem List Items Addressed This Visit          Digestive    Gastroesophageal reflux disease     Patient on omeprazole 20 mg once a day problems with food getting stuck. Cardiovascular and Mediastinum    Essential hypertension     Controlled, continue meds along with healthy diet and exercise            Nervous and Auditory    Lumbar radiculopathy     Follow up pain management, pt getting steroid injections.             Other    Mixed hyperlipidemia - Primary     Continue statin along with healthy diet and exercise

## 2023-07-19 NOTE — PROGRESS NOTES
Assessment:  1. Lumbar radiculopathy    2. DDD (degenerative disc disease), lumbar    3. Foraminal stenosis of lumbar region    4. Spondylolisthesis of lumbar region    5. Lumbar disc herniation    6. Spinal stenosis of lumbar region with neurogenic claudication        Plan:  1. Patient will be scheduled for a left L3-4 LESI to address the inflammatory component of the patient's pain. Patient will be scheduled on or after September 26, 2023. Complete risks and benefits including bleeding, infection, tissue reaction, nerve injury and allergic reaction were discussed. The patient was agreeable and verbalized an understanding  2. Patient may benefit from a trial of gabapentin however he is currently on lithium. He will discuss with his psychiatrist about potentially weaning off of lithium and rotating to a different medication for mood we can potentially start him on gabapentin for pain  3. Continue to follow with orthopedics as scheduled  4. We will avoid NSAIDs secondary to CKD  5. Continue with home exercise program  6. Follow-up after procedure or sooner if needed    History of Present Illness: The patient is a 78 y.o. male with a history of A-fib, CKD, lung cancer, CAD, COPD, prostate cancer, bipolar disorder on lithium, and previous lumbar fusion from L4-S1 last seen on 06/21/2023 who presents for a follow up office visit in regards to chronic left-sided low back pain that radiates into the anterior left thigh. He denies right lower extremity symptoms, groin pain, bowel or bladder incontinence or saddle anesthesia. Patient has had left L2 and L3 TFESI on May 2, 2023 which resolved his inner thigh pain but continued with anterolateral thigh pain. He subsequently had a left L3 and L4 TFESI on June 26, 2023 which provided 50 to 60% improvement of his pain for about 7 to 10 days before the pain returned to baseline.   He does receive left GTB injections with orthopedics last on June 6, 2023 with some improvement he uses Tylenol on a as needed basis. He is unable to take NSAIDs secondary to CKD. He has never tried neural membrane stabilizers however he currently takes lithium for mood    The patient rates his pain a 6-7 out of 10 on the numeric pain rating scale. He intermittently has pain in the morning and in the evening which is described as burning, dull aching, sharp and shooting    I have personally reviewed and/or updated the patient's past medical history, past surgical history, family history, social history, current medications, allergies, and vital signs today. Review of Systems:    Review of Systems   Respiratory: Negative for shortness of breath. Cardiovascular: Negative for chest pain. Gastrointestinal: Negative for constipation, diarrhea, nausea and vomiting. Musculoskeletal: Negative for arthralgias, gait problem, joint swelling and myalgias. Skin: Negative for rash. Neurological: Negative for dizziness, seizures and weakness. All other systems reviewed and are negative.         Past Medical History:   Diagnosis Date   • Allergic 2011   • Allergic rhinitis 2015   • Anxiety     occasional   • Aortic aneurysm [facility])    • Benign colon polyp    • Bipolar 1 disorder ([address])    • Cancer ([address]) 2007, 2011   • Cardiac disease     MI   • Cervical cord compression with myelopathy (HCC)    • COPD (chronic obstructive pulmonary disease) ([address])    • Coronary artery disease 1995   • Diverticulitis    • Diverticulitis of colon prior to 2014   • Diverticulosis    • Emphysema of lung ([address]) 2012   • Gait disturbance     uses cane, leg brace on right   • GERD (gastroesophageal reflux disease)    • Heart attack ([address]) 1996   • Hx of resection of large bowel 05/03/2016   • Hyperlipidemia    • Hypertension    • IBS (irritable bowel syndrome)    • Inflammatory bowel disease 2012   • Lumbar stenosis    • Lung cancer ([address])     2007 Left lower lobectomy and 2011 Right lung with surgery    • Myocardial infarction [facility])     involving other coronary artery   • Prostate cancer [facility])    • Shortness of breath    • Small bowel obstruction ([address]) 11/16/2016       Past Surgical History:   Procedure Laterality Date   • ANGIOPLASTY      stent   • CARDIAC CATHETERIZATION  1995    angioplasty   • CARDIAC SURGERY     • CERVICAL SPINE SURGERY      Cervical decompression with cervical fusion from C3-C7 for spinal stenosis. 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   • LUNG LOBECTOMY Left    • IL ARTHRD ANT INTERBODY MIN DSC CRV BELOW C2 N/A 05/02/2016    Procedure: Anterior cervical diskectomy C3/4, C5/6, C6/7 with anterior plate fixation fusion C3-7;  Posterior decompressive laminectomy C3-7 with lateral mass fixation fusion C3-7 (IMPULSE MONITORING); Surgeon: Chalino Morton MD;  [facility];  Service: Neurosurgery   • IL ARTHRODESIS POSTERIOR INTERBODY 1 [address] LUMBAR N/A 01/30/2017    Procedure: L4-5 AND L5-S1 DECOMPRESSIVE FORAMINOTOMIES, TRANSFORAMINAL LUMBAR INTERBODY AND PEDICLE SCREW FIXATION FUSION L4-S1 (IMPULSE);   Surgeon: Chalino Morton MD;  [facility];  Service: Neurosurgery   • IL EVASC RPR DPLMNT AORTO-AORTIC NDGFT N/A 08/06/2018    Procedure: REPAIR ANEURYSM ENDOVASCULAR ABDOMINAL AORTIC  (EVAR) WITH BILATERAL PERCUTANEOUS FEMORAL ACCESS WITH ULTRASOUND GUIDANCE ON THE RIGHT AND PRE CLOSURE;  Surgeon: Yamel Malhotra MD;  Location[facility];  Service: Vascular   • PROSTATECTOMY  2007    for prostate CA - no chemo/RT   • SIGMOIDECTOMY      for divertic   • SMALL INTESTINE SURGERY N/A 2016    Procedure: Exploratory Laparotomy, Lysis of adhesions to release small bowel obstruction;  Surgeon: Sher Fuller MD;  Location: [facility];  Service:    • SPINE SURGERY  ,    • TONSILLECTOMY  Y    2       Family History   Problem Relation Age of Onset   • Hypertension Mother    • Glaucoma Mother    • Heart attack Father    • Colon cancer Father    • Coronary artery disease Father    • Hypertension Father    • Heart disease Family    • Hyperlipidemia Family    • Hypertension Family        Social History     Occupational History   • Occupation: Retired   Tobacco Use   • Smoking status: Former     Packs/day: 1.00     Years: 35.00     Total pack years: 35.00     Types: Cigarettes     Start date: 1967     Quit date: 2011     Years since quittin.5   • Smokeless tobacco: Never   Vaping Use   • Vaping Use: Never used   Substance and Sexual Activity   • Alcohol use:  Yes     Alcohol/week: 2.0 - 3.0 standard drinks of alcohol     Types: 2 - 3 Shots of liquor per week     Comment: One glass per day   • Drug use: No   • Sexual activity: Yes     Partners: Female     Birth control/protection: Post-menopausal, None         Current Outpatient Medications:   •  amLODIPine (NORVASC) 2.5 mg tablet, TAKE 1 TABLET BY MOUTH  DAILY, Disp: 90 tablet, Rfl: 3  •  aspirin (ECOTRIN LOW STRENGTH) 81 mg EC tablet, Take 81 mg by mouth daily, Disp: , Rfl:   •  atorvastatin (LIPITOR) 40 mg tablet, TAKE 1 TABLET BY MOUTH  DAILY, Disp: 90 tablet, Rfl: 5  •  Cholecalciferol (VITAMIN D PO), Take 2,000 Units by mouth daily  , Disp: , Rfl:   •  dicyclomine (Bentyl) 20 mg tablet, Take 1 tablet (20 mg total) by mouth every 6 (six) hours as needed (abd cramping), Disp: 90 tablet, Rfl: 3  •  fexofenadine (ALLEGRA) 180 MG tablet, Take 180 mg by mouth as needed Daily during the Summer, Disp: , Rfl:   •  FLUoxetine (PROzac) 10 mg capsule, Take 10 mg by mouth daily , Disp: , Rfl:   •  fluticasone (FLONASE) 50 mcg/act nasal spray, 2 sprays into each nostril daily, Disp: , Rfl:   •  lithium carbonate (LITHOBID) 300 mg CR tablet, Take 300 mg by mouth in the morning, Disp: , Rfl:   •  metoprolol succinate (TOPROL-XL) 25 mg 24 hr tablet, TAKE 1 TABLET BY MOUTH  DAILY, Disp: 90 tablet, Rfl: 3  •  Mirabegron ER 25 MG TB24, Take 25 mg by mouth daily at bedtime, Disp: 90 tablet, Rfl: 3  •  mupirocin (BACTROBAN) 2 % ointment, Apply topically 2 (two) times a day as needed (wound), Disp: 22 g, Rfl: 0  •  omega-3-acid ethyl esters (LOVAZA) 1 g capsule, TAKE 1 CAPSULE BY MOUTH 3  TIMES DAILY, Disp: 270 capsule, Rfl: 3  •  omeprazole (PriLOSEC) 20 mg delayed release capsule, Take 1 capsule (20 mg total) by mouth 2 (two) times a day before meals, Disp: 180 capsule, Rfl: 0  •  ProAir  (90 Base) MCG/ACT inhaler, USE 2 INHALATIONS BY MOUTH  EVERY 6 HOURS AS NEEDED FOR WHEEZING, Disp: 34 g, Rfl: 3  •  Trelegy Ellipta 200-62.5-25 MCG/INH AEPB inhaler, USE 1 INHALATION BY MOUTH  DAILY . RINSE MOUTH AFTER  USE, Disp: 180 each, Rfl: 3  •  acetaminophen (TYLENOL) 500 mg tablet, Take 500 mg by mouth as needed for mild pain., Disp: , Rfl:   •  famotidine (PEPCID) 40 MG tablet, TAKE 1 TABLET BY MOUTH  DAILY AT BEDTIME AS NEEDED  FOR HEARTBURN (Patient not taking: Reported on 7/14/2023), Disp: 30 tablet, Rfl: 11  •  nitroglycerin (NITRODUR) 0.2 mg/hr, APPLY 1 PATCH TOPICALLY  ONCE DAILY. LEAVE ON FOR 12 TO 14 HOURS THEN REMOVE FOR A NITRATE-FREE INTERVAL OF  10 TO 12 HOURS (Patient taking differently: if needed), Disp: 90 patch, Rfl: 3    Allergies   Allergen Reactions   • Bactrim [Sulfamethoxazole-Trimethoprim] Other (See Comments)     AILYN   • Nsaids      Annotation - [id number]: unable to take due to use of lithium.    • Oxycodone Rash       Physical Exam:    /73   Pulse 70   Ht 5' 7" (1.702 m)   Wt 80.7 kg (178 lb)   BMI 27.88 kg/m²     Constitutional:normal, well developed, well nourished, alert, in no distress and non-toxic and no overt pain behavior. Eyes:anicteric  HEENT:grossly intact  Neck:supple, symmetric, trachea midline and no masses   Pulmonary:even and unlabored  Cardiovascular:No edema or pitting edema present  Skin:Normal without rashes or lesions and well hydrated  Psychiatric:Mood and affect appropriate  Neurologic:Cranial Nerves II-XII grossly intact  Musculoskeletal:antalgic. Bilateral lower extremity strength 5 out of 5 in all muscle groups. Equivocal straight leg raise on the left and negative on the right. Negative Landon's test bilaterally. Internal and external rotation of the left hip does not reproduce pain.   Negative Stinchfield test on the left      Imaging  FL spine and pain procedure    (Results Pending)         Orders Placed This Encounter   Procedures   • FL spine and pain procedure

## 2023-07-20 ENCOUNTER — OFFICE VISIT (OUTPATIENT)
Dept: PAIN MEDICINE | Facility: CLINIC | Age: 78
End: 2023-07-20
Payer: MEDICARE

## 2023-07-20 VITALS
DIASTOLIC BLOOD PRESSURE: 73 MMHG | WEIGHT: 178 LBS | HEIGHT: 67 IN | BODY MASS INDEX: 27.94 KG/M2 | HEART RATE: 70 BPM | SYSTOLIC BLOOD PRESSURE: 158 MMHG

## 2023-07-20 DIAGNOSIS — M43.16 SPONDYLOLISTHESIS OF LUMBAR REGION: ICD-10-CM

## 2023-07-20 DIAGNOSIS — M51.36 DDD (DEGENERATIVE DISC DISEASE), LUMBAR: ICD-10-CM

## 2023-07-20 DIAGNOSIS — M51.26 LUMBAR DISC HERNIATION: ICD-10-CM

## 2023-07-20 DIAGNOSIS — M54.16 LUMBAR RADICULOPATHY: Primary | ICD-10-CM

## 2023-07-20 DIAGNOSIS — M48.061 FORAMINAL STENOSIS OF LUMBAR REGION: ICD-10-CM

## 2023-07-20 DIAGNOSIS — M48.062 SPINAL STENOSIS OF LUMBAR REGION WITH NEUROGENIC CLAUDICATION: ICD-10-CM

## 2023-07-20 PROCEDURE — 99214 OFFICE O/P EST MOD 30 MIN: CPT | Performed by: NURSE PRACTITIONER

## 2023-07-20 NOTE — PATIENT INSTRUCTIONS
Epidural Steroid Injection   AMBULATORY CARE:   What you need to know about an epidural steroid injection (DEVON):  An DEVON is a procedure to inject steroid medicine into the epidural space. The epidural space is between your spinal cord and vertebrae. Steroids reduce inflammation and fluid buildup in your spine that may be causing pain. You may be given pain medicine along with the steroids. How to prepare for an DEVON:  Your provider will talk to you about how to prepare for your procedure. He or she will tell you what medicines to take or not take on the day of your procedure. You may need to stop taking blood thinners or other medicines several days before your procedure. You may need to adjust any diabetes medicine you take on the day of your procedure. Steroid medicine can increase your blood sugar level. Arrange for someone to drive you home when you are discharged. What will happen during an DEVON:   You will be given medicine to numb the procedure area. You will be awake for the procedure, but you will not feel pain. You may also be given medicine to help you relax. Contrast liquid will be used to help your provider see the area better. Tell the provider if you have ever had an allergic reaction to contrast liquid. Your provider may place the needle into your neck area, middle of your back, or tailbone area. He or she may inject the medicine next to the nerves that are causing your pain. He or she may instead inject the medicine into a larger area of the epidural space. This helps the medicine spread to more nerves. Your provider will use a fluoroscope to help guide the needle to the right place. A fluoroscope is a type of x-ray. After the procedure, a bandage will be placed over the injection site to prevent infection. What will happen after an DEVON:  You will have a bandage over the injection site to prevent infection. Your provider will tell you when you can bathe and any activity guidelines.  You will be able to go home. Risks of an DEVON:  You may have temporary or permanent nerve damage or paralysis. You may have bleeding or develop a serious infection, such as meningitis (swelling of the brain coverings). An abscess may also develop. An abscess is a pus-filled area under the skin. You may need surgery to fix the abscess. You may have a seizure, anxiety, or trouble sleeping. If you are a man, you may have temporary erectile dysfunction (not able to have an erection). Call your local emergency number (911 in the [address]) if:   You have a seizure. You have trouble moving your legs. Seek care immediately if:   Blood soaks through your bandage. You have a fever or chills, severe back pain, and the procedure area is sensitive to the touch. You cannot control when you urinate or have a bowel movement. Call your doctor if:   You have weakness or numbness in your legs. Your wound is red, swollen, or draining pus. You have nausea or are vomiting. Your face or neck is red and you feel warm. You have more pain than you had before the procedure. You have swelling in your hands or feet. You have questions or concerns about your condition or care. Care for your wound as directed: You may remove the bandage before you go to bed the day of your procedure. You may take a shower, but do not take a bath for at least 24 hours. Self-care:   Do not drive,  use machines, or do strenuous activity for 24 hours after your procedure or as directed. Continue other treatments  as directed. Steroid injections alone will not control your pain. The injections are meant to be used with other treatments, such as physical therapy. Follow up with your doctor as directed:  Write down your questions so you remember to ask them during your visits. © Copyright Yessi Caldwell 2022 Information is for End User's use only and may not be sold, redistributed or otherwise used for commercial purposes.   The above information is an  only. It is not intended as medical advice for individual conditions or treatments. Talk to your doctor, nurse or pharmacist before following any medical regimen to see if it is safe and effective for you.

## 2023-07-31 DIAGNOSIS — I70.90: ICD-10-CM

## 2023-08-01 RX ORDER — OMEGA-3-ACID ETHYL ESTERS 1 G/1
CAPSULE, LIQUID FILLED ORAL
Qty: 270 CAPSULE | Refills: 3 | Status: SHIPPED | OUTPATIENT
Start: 2023-08-01

## 2023-08-01 NOTE — TELEPHONE ENCOUNTER
Requested medication(s) are due for refill today: Yes  Patient has already received a courtesy refill: No  Other reason request has been forwarded to provider: JAGDISH xie

## 2023-08-18 DIAGNOSIS — J44.9 CHRONIC OBSTRUCTIVE PULMONARY DISEASE, UNSPECIFIED COPD TYPE (HCC): ICD-10-CM

## 2023-08-18 RX ORDER — FLUTICASONE FUROATE, UMECLIDINIUM BROMIDE AND VILANTEROL TRIFENATATE 200; 62.5; 25 UG/1; UG/1; UG/1
1 POWDER RESPIRATORY (INHALATION) DAILY
Qty: 180 BLISTER | Refills: 3 | Status: SHIPPED | OUTPATIENT
Start: 2023-08-18

## 2023-08-18 NOTE — TELEPHONE ENCOUNTER
Trelegy Ellipta 200-62.5-25 MCG/INH AEPB inhaler. .  Patient left a voice message requesting a 90 day supply refill be sent to [facility] on this medication.

## 2023-09-01 DIAGNOSIS — I10 ESSENTIAL HYPERTENSION: ICD-10-CM

## 2023-09-01 RX ORDER — AMLODIPINE BESYLATE 2.5 MG/1
TABLET ORAL
Qty: 90 TABLET | Refills: 3 | Status: SHIPPED | OUTPATIENT
Start: 2023-09-01

## 2023-09-04 DIAGNOSIS — J44.9 CHRONIC OBSTRUCTIVE PULMONARY DISEASE, UNSPECIFIED COPD TYPE (HCC): ICD-10-CM

## 2023-09-05 ENCOUNTER — OFFICE VISIT (OUTPATIENT)
Dept: OBGYN CLINIC | Facility: HOSPITAL | Age: 78
End: 2023-09-05
Payer: MEDICARE

## 2023-09-05 VITALS
HEART RATE: 61 BPM | WEIGHT: 181 LBS | HEIGHT: 67 IN | SYSTOLIC BLOOD PRESSURE: 186 MMHG | BODY MASS INDEX: 28.41 KG/M2 | DIASTOLIC BLOOD PRESSURE: 83 MMHG

## 2023-09-05 DIAGNOSIS — J44.9 CHRONIC OBSTRUCTIVE PULMONARY DISEASE, UNSPECIFIED COPD TYPE (HCC): ICD-10-CM

## 2023-09-05 DIAGNOSIS — M70.62 TROCHANTERIC BURSITIS OF LEFT HIP: Primary | ICD-10-CM

## 2023-09-05 PROCEDURE — 20610 DRAIN/INJ JOINT/BURSA W/O US: CPT | Performed by: ORTHOPAEDIC SURGERY

## 2023-09-05 PROCEDURE — 99213 OFFICE O/P EST LOW 20 MIN: CPT | Performed by: ORTHOPAEDIC SURGERY

## 2023-09-05 RX ORDER — BUPIVACAINE HYDROCHLORIDE 2.5 MG/ML
2 INJECTION, SOLUTION INFILTRATION; PERINEURAL
Status: COMPLETED | OUTPATIENT
Start: 2023-09-05 | End: 2023-09-05

## 2023-09-05 RX ORDER — BETAMETHASONE SODIUM PHOSPHATE AND BETAMETHASONE ACETATE 3; 3 MG/ML; MG/ML
12 INJECTION, SUSPENSION INTRA-ARTICULAR; INTRALESIONAL; INTRAMUSCULAR; SOFT TISSUE
Status: COMPLETED | OUTPATIENT
Start: 2023-09-05 | End: 2023-09-05

## 2023-09-05 RX ORDER — LIDOCAINE HYDROCHLORIDE 10 MG/ML
2 INJECTION, SOLUTION INFILTRATION; PERINEURAL
Status: COMPLETED | OUTPATIENT
Start: 2023-09-05 | End: 2023-09-05

## 2023-09-05 RX ADMIN — LIDOCAINE HYDROCHLORIDE 2 ML: 10 INJECTION, SOLUTION INFILTRATION; PERINEURAL at 09:30

## 2023-09-05 RX ADMIN — BUPIVACAINE HYDROCHLORIDE 2 ML: 2.5 INJECTION, SOLUTION INFILTRATION; PERINEURAL at 09:30

## 2023-09-05 RX ADMIN — BETAMETHASONE SODIUM PHOSPHATE AND BETAMETHASONE ACETATE 12 MG: 3; 3 INJECTION, SUSPENSION INTRA-ARTICULAR; INTRALESIONAL; INTRAMUSCULAR; SOFT TISSUE at 09:30

## 2023-09-05 NOTE — PROGRESS NOTES
Assessment:  1. Trochanteric bursitis of left hip            Plan:  Left trochanteric bursitis. The patient was provided with left trochanteric bursa steroid injection. The patient tolerated the procedure well. The patient should follow up in 3 months. To do next visit:  Return in about 3 months (around 12/5/2023). The above stated was discussed in layman's terms and the patient expressed understanding. All questions were answered to the patient's satisfaction. Scribe Attestation    I,:  Reji Patricio am acting as a scribe while in the presence of the attending physician.:       I,:  Jocelyn Willard MD personally performed the services described in this documentation    as scribed in my presence.:             Subjective:   Erlinda Rivas is a 78 y.o. male who presents for follow up of left hip  He is s/p left trochanteric bursa steroid injection with 2 months benefit, 6/6/2023. Today he complains of left lateral hip pain. Prolonged standing aggravates while rest alleviates. He does work with  including lumbar injections and medications.         Review of systems negative unless otherwise specified in HPI    Past Medical History:   Diagnosis Date   • Allergic 2011   • Allergic rhinitis 2015   • Anxiety     occasional   • Aortic aneurysm [facility])    • Benign colon polyp    • Bipolar 1 disorder ([address])    • Cancer ([address]) 2007, 2011   • Cardiac disease     MI   • Cervical cord compression with myelopathy (HCC)    • COPD (chronic obstructive pulmonary disease) ([address])    • Coronary artery disease 1995   • Diverticulitis    • Diverticulitis of colon prior to 2014   • Diverticulosis    • Emphysema of lung ([address]) 2012   • Gait disturbance     uses cane, leg brace on right   • GERD (gastroesophageal reflux disease)    • Heart attack ([address]) 1996   • Hx of resection of large bowel 05/03/2016   • Hyperlipidemia    • Hypertension    • IBS (irritable bowel syndrome)    • Inflammatory bowel disease 2012 • Lumbar stenosis    • Lung cancer [facility])     2007 Left lower lobectomy and 2011 Right lung with surgery    • Myocardial infarction [facility])     involving other coronary artery   • Prostate cancer ([address])    • Shortness of breath    • Small bowel obstruction ([address]) 11/16/2016       Past Surgical History:   Procedure Laterality Date   • ANGIOPLASTY      stent   • CARDIAC CATHETERIZATION  1995    angioplasty   • CARDIAC SURGERY     • CERVICAL SPINE SURGERY      Cervical decompression with cervical fusion from C3-C7 for spinal stenosis. 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   • LUNG LOBECTOMY Left    • RI ARTHRD ANT INTERBODY MIN DSC CRV BELOW C2 N/A 05/02/2016    Procedure: Anterior cervical diskectomy C3/4, C5/6, C6/7 with anterior plate fixation fusion C3-7;  Posterior decompressive laminectomy C3-7 with lateral mass fixation fusion C3-7 (IMPULSE MONITORING); Surgeon: Ale Mcguire MD;  Location: [facility];  Service: Neurosurgery   • RI ARTHRODESIS POSTERIOR INTERBODY [address] LUMBAR N/A 01/30/2017    Procedure: L4-5 AND L5-S1 DECOMPRESSIVE FORAMINOTOMIES, TRANSFORAMINAL LUMBAR INTERBODY AND PEDICLE SCREW FIXATION FUSION L4-S1 (IMPULSE);   Surgeon: Ale Mcguire MD;  Location: [facility];  Service: Neurosurgery   • RI EVASC RPR DPLMNT AORTO-AORTIC NDGFT N/A 08/06/2018    Procedure: REPAIR ANEURYSM ENDOVASCULAR ABDOMINAL AORTIC  (EVAR) WITH BILATERAL PERCUTANEOUS FEMORAL ACCESS WITH ULTRASOUND GUIDANCE ON THE RIGHT AND PRE CLOSURE;  Surgeon: Anette Saint, MD;  Location: [facility];  Service: Vascular   • PROSTATECTOMY      for prostate CA - no chemo/RT   • SIGMOIDECTOMY      for divertic   • SMALL INTESTINE SURGERY N/A 2016    Procedure: Exploratory Laparotomy, Lysis of adhesions to release small bowel obstruction;  Surgeon: Estella Llanes MD;  Location: [facility];  Service:    • SPINE SURGERY  ,    • TONSILLECTOMY  Y           Family History   Problem Relation Age of Onset   • Hypertension Mother    • Glaucoma Mother    • Heart attack Father    • Colon cancer Father    • Coronary artery disease Father    • Hypertension Father    • Heart disease Family    • Hyperlipidemia Family    • Hypertension Family        Social History     Occupational History   • Occupation: Retired   Tobacco Use   • Smoking status: Former     Packs/day: 1.00     Years: 35.00     Total pack years: 35.00     Types: Cigarettes     Start date: 1967     Quit date: 2011     Years since quittin.6   • Smokeless tobacco: Never   Vaping Use   • Vaping Use: Never used   Substance and Sexual Activity   • Alcohol use:  Yes     Alcohol/week: 2.0 - 3.0 standard drinks of alcohol     Types: 2 - 3 Shots of liquor per week     Comment: One glass per day   • Drug use: No   • Sexual activity: Yes     Partners: Female     Birth control/protection: Post-menopausal, None         Current Outpatient Medications:   •  acetaminophen (TYLENOL) 500 mg tablet, Take 500 mg by mouth as needed for mild pain., Disp: , Rfl:   •  amLODIPine (NORVASC) 2.5 mg tablet, TAKE 1 TABLET BY MOUTH DAILY, Disp: 90 tablet, Rfl: 3  •  aspirin (ECOTRIN LOW STRENGTH) 81 mg EC tablet, Take 81 mg by mouth daily, Disp: , Rfl:   •  atorvastatin (LIPITOR) 40 mg tablet, TAKE 1 TABLET BY MOUTH  DAILY, Disp: 90 tablet, Rfl: 5  •  Cholecalciferol (VITAMIN D PO), Take 2,000 Units by mouth daily  , Disp: , Rfl:   •  dicyclomine (Bentyl) 20 mg tablet, Take 1 tablet (20 mg total) by mouth every 6 (six) hours as needed (abd cramping), Disp: 90 tablet, Rfl: 3  • famotidine (PEPCID) 40 MG tablet, TAKE 1 TABLET BY MOUTH  DAILY AT BEDTIME AS NEEDED  FOR HEARTBURN (Patient not taking: Reported on 7/14/2023), Disp: 30 tablet, Rfl: 11  •  fexofenadine (ALLEGRA) 180 MG tablet, Take 180 mg by mouth as needed Daily during the Summer, Disp: , Rfl:   •  FLUoxetine (PROzac) 10 mg capsule, Take 10 mg by mouth daily , Disp: , Rfl:   •  fluticasone (FLONASE) 50 mcg/act nasal spray, 2 sprays into each nostril daily, Disp: , Rfl:   •  fluticasone-umeclidinium-vilanterol (Trelegy Ellipta) 200-62.5-25 mcg/actuation AEPB inhaler, Inhale 1 puff daily, Disp: 180 blister, Rfl: 3  •  lithium carbonate (LITHOBID) 300 mg CR tablet, Take 300 mg by mouth in the morning, Disp: , Rfl:   •  metoprolol succinate (TOPROL-XL) 25 mg 24 hr tablet, TAKE 1 TABLET BY MOUTH  DAILY, Disp: 90 tablet, Rfl: 3  •  Mirabegron ER 25 MG TB24, Take 25 mg by mouth daily at bedtime, Disp: 90 tablet, Rfl: 3  •  mupirocin (BACTROBAN) 2 % ointment, Apply topically 2 (two) times a day as needed (wound), Disp: 22 g, Rfl: 0  •  nitroglycerin (NITRODUR) 0.2 mg/hr, APPLY 1 PATCH TOPICALLY  ONCE DAILY. LEAVE ON FOR 12 TO 14 HOURS THEN REMOVE FOR A NITRATE-FREE INTERVAL OF  10 TO 12 HOURS (Patient taking differently: if needed), Disp: 90 patch, Rfl: 3  •  omega-3-acid ethyl esters (LOVAZA) 1 g capsule, TAKE 1 CAPSULE BY MOUTH 3  TIMES DAILY, Disp: 270 capsule, Rfl: 3  •  omeprazole (PriLOSEC) 20 mg delayed release capsule, Take 1 capsule (20 mg total) by mouth 2 (two) times a day before meals, Disp: 180 capsule, Rfl: 0  •  ProAir  (90 Base) MCG/ACT inhaler, USE 2 INHALATIONS BY MOUTH  EVERY 6 HOURS AS NEEDED FOR WHEEZING, Disp: 34 g, Rfl: 3    Allergies   Allergen Reactions   • Bactrim [Sulfamethoxazole-Trimethoprim] Other (See Comments)     AILYN   • Nsaids      Annotation - [id number]: unable to take due to use of lithium.    • Oxycodone Rash            Vitals:    09/05/23 0918   BP: (!) 186/83   Pulse: 61 Objective:  Physical exam  · General: Awake, Alert, Oriented  · Eyes: Pupils equal, round and reactive to light  · Heart: regular rate and rhythm  · Lungs: No audible wheezing  · Abdomen: soft                    Ortho Exam  Left hip:  TTP over greater trochanter  Good arc of motion  Patient sits comfortably in chair with hip flexed at 90 degrees  Patient stands from seated position without assistance  Calf compartments soft and supple  Sensation intact  Toes are warm sensate and mobile      Diagnostics, reviewed and taken today if performed as documented:    None performed     Procedures, if performed today:    Large joint arthrocentesis: L greater trochanteric bursa  Universal Protocol:  Consent: Verbal consent obtained. Risks and benefits: risks, benefits and alternatives were discussed  Consent given by: patient  Time out: Immediately prior to procedure a "time out" was called to verify the correct patient, procedure, equipment, support staff and site/side marked as required. Timeout called at: 9/5/2023 9:29 AM.  Patient understanding: patient states understanding of the procedure being performed  Site marked: the operative site was marked  Patient identity confirmed: verbally with patient    Supporting Documentation  Indications: pain   Procedure Details  Location: hip - L greater trochanteric bursa  Needle size: 22 G  Ultrasound guidance: no  Approach: lateral  Medications administered: 12 mg betamethasone acetate-betamethasone sodium phosphate 6 (3-3) mg/mL; 2 mL bupivacaine 0.25 %; 2 mL lidocaine 1 %    Patient tolerance: patient tolerated the procedure well with no immediate complications  Dressing:  Sterile dressing applied            Portions of the record may have been created with voice recognition software. Occasional wrong word or "sound a like" substitutions may have occurred due to the inherent limitations of voice recognition software.   Read the chart carefully and recognize, using context, where substitutions have occurred.

## 2023-09-06 RX ORDER — ALBUTEROL SULFATE 90 UG/1
2 AEROSOL, METERED RESPIRATORY (INHALATION) EVERY 6 HOURS PRN
Qty: 8.5 G | Refills: 3 | Status: SHIPPED | OUTPATIENT
Start: 2023-09-06 | End: 2023-12-05

## 2023-09-14 ENCOUNTER — APPOINTMENT (OUTPATIENT)
Dept: LAB | Facility: CLINIC | Age: 78
End: 2023-09-14
Payer: MEDICARE

## 2023-09-14 ENCOUNTER — TRANSCRIBE ORDERS (OUTPATIENT)
Dept: LAB | Facility: CLINIC | Age: 78
End: 2023-09-14

## 2023-09-14 ENCOUNTER — TELEPHONE (OUTPATIENT)
Dept: INTERNAL MEDICINE CLINIC | Facility: CLINIC | Age: 78
End: 2023-09-14

## 2023-09-14 ENCOUNTER — HOSPITAL ENCOUNTER (EMERGENCY)
Facility: HOSPITAL | Age: 78
Discharge: HOME | End: 2023-09-14
Attending: EMERGENCY MEDICINE
Payer: MEDICARE

## 2023-09-14 VITALS
TEMPERATURE: 98.2 F | OXYGEN SATURATION: 97 % | SYSTOLIC BLOOD PRESSURE: 222 MMHG | RESPIRATION RATE: 18 BRPM | HEART RATE: 64 BPM | DIASTOLIC BLOOD PRESSURE: 101 MMHG

## 2023-09-14 DIAGNOSIS — R79.89 ELEVATED LITHIUM LEVEL: Primary | ICD-10-CM

## 2023-09-14 DIAGNOSIS — F31.60 MIXED BIPOLAR I DISORDER (HCC): ICD-10-CM

## 2023-09-14 DIAGNOSIS — F31.60 MIXED BIPOLAR I DISORDER (HCC): Primary | ICD-10-CM

## 2023-09-14 DIAGNOSIS — R79.0 LOW MAGNESIUM LEVEL: ICD-10-CM

## 2023-09-14 LAB
BASOPHILS # BLD AUTO: 0.05 THOUSANDS/ÂΜL (ref 0–0.1)
BASOPHILS NFR BLD AUTO: 0 % (ref 0–1)
BUN SERPL-MCNC: 24 MG/DL (ref 5–25)
CREAT SERPL-MCNC: 1.33 MG/DL (ref 0.6–1.3)
EOSINOPHIL # BLD AUTO: 0.56 THOUSAND/ÂΜL (ref 0–0.61)
EOSINOPHIL NFR BLD AUTO: 5 % (ref 0–6)
ERYTHROCYTE [DISTWIDTH] IN BLOOD BY AUTOMATED COUNT: 13.2 % (ref 11.6–15.1)
GFR SERPL CREATININE-BSD FRML MDRD: 51 ML/MIN/1.73SQ M
HCT VFR BLD AUTO: 40.2 % (ref 36.5–49.3)
HGB BLD-MCNC: 13.6 G/DL (ref 12–17)
IMM GRANULOCYTES # BLD AUTO: 0.05 THOUSAND/UL (ref 0–0.2)
IMM GRANULOCYTES NFR BLD AUTO: 0 % (ref 0–2)
LITHIUM SERPL-SCNC: 0.4 MMOL/L (ref 0.6–1.2)
LITHIUM SERPL-SCNC: 4.5 MMOL/L (ref 0.6–1.2)
LYMPHOCYTES # BLD AUTO: 1.69 THOUSANDS/ÂΜL (ref 0.6–4.47)
LYMPHOCYTES NFR BLD AUTO: 15 % (ref 14–44)
MAGNESIUM SERPL-MCNC: 1 MG/DL (ref 1.9–2.7)
MCH RBC QN AUTO: 31.1 PG (ref 26.8–34.3)
MCHC RBC AUTO-ENTMCNC: 33.8 G/DL (ref 31.4–37.4)
MCV RBC AUTO: 92 FL (ref 82–98)
MONOCYTES # BLD AUTO: 0.99 THOUSAND/ÂΜL (ref 0.17–1.22)
MONOCYTES NFR BLD AUTO: 9 % (ref 4–12)
NEUTROPHILS # BLD AUTO: 8.22 THOUSANDS/ÂΜL (ref 1.85–7.62)
NEUTS SEG NFR BLD AUTO: 71 % (ref 43–75)
NRBC BLD AUTO-RTO: 0 /100 WBCS
PLATELET # BLD AUTO: 180 THOUSANDS/UL (ref 149–390)
PMV BLD AUTO: 10.8 FL (ref 8.9–12.7)
RBC # BLD AUTO: 4.38 MILLION/UL (ref 3.88–5.62)
TSH SERPL DL<=0.05 MIU/L-ACNC: 0.88 UIU/ML (ref 0.45–4.5)
WBC # BLD AUTO: 11.56 THOUSAND/UL (ref 4.31–10.16)

## 2023-09-14 PROCEDURE — 84520 ASSAY OF UREA NITROGEN: CPT

## 2023-09-14 PROCEDURE — 80178 ASSAY OF LITHIUM: CPT

## 2023-09-14 PROCEDURE — 85025 COMPLETE CBC W/AUTO DIFF WBC: CPT | Performed by: EMERGENCY MEDICINE

## 2023-09-14 PROCEDURE — 82565 ASSAY OF CREATININE: CPT

## 2023-09-14 PROCEDURE — 36415 COLL VENOUS BLD VENIPUNCTURE: CPT

## 2023-09-14 PROCEDURE — 80178 ASSAY OF LITHIUM: CPT | Performed by: EMERGENCY MEDICINE

## 2023-09-14 PROCEDURE — 99284 EMERGENCY DEPT VISIT MOD MDM: CPT

## 2023-09-14 PROCEDURE — 84443 ASSAY THYROID STIM HORMONE: CPT

## 2023-09-14 PROCEDURE — 99284 EMERGENCY DEPT VISIT MOD MDM: CPT | Performed by: EMERGENCY MEDICINE

## 2023-09-14 PROCEDURE — 83735 ASSAY OF MAGNESIUM: CPT | Performed by: EMERGENCY MEDICINE

## 2023-09-14 RX ORDER — MAGNESIUM 30 MG
30 TABLET ORAL 2 TIMES DAILY
Qty: 60 TABLET | Refills: 0 | Status: SHIPPED | OUTPATIENT
Start: 2023-09-14 | End: 2023-10-14

## 2023-09-14 NOTE — ED ATTENDING ATTESTATION
9/14/2023  I, Jerome Iglesias MD, saw and evaluated the patient. I have discussed the patient with the resident/non-physician practitioner and agree with the resident's/non-physician practitioner's findings, Plan of Care, and MDM as documented in the resident's/non-physician practitioner's note, except where noted. All available labs and Radiology studies were reviewed. I was present for key portions of any procedure(s) performed by the resident/non-physician practitioner and I was immediately available to provide assistance. At this point I agree with the current assessment done in the Emergency Department. I have conducted an independent evaluation of this patient a history and physical is as follows:    Chief Complaint   Patient presents with   • Abnormal Lab     Pt told to come to ER for elevated lithium level. Pt present hypertension 222/101     78 y.o. male presenting with elevated lithium level 4.5. Patient takes lithium 300 mg every night without any missed doses or medication errors. He had outpatient labs performed, unexpectedly found to have lithium level of 4.5. Patient has not had any symptoms: He has not had any nausea, vomiting, generalized weakness, ataxia, focal weakness, or numbness. He is in good health today. His kidney function today is with creatinine of 1.33 which is improved from his last creatinine of 1.53 in April. Patient has no complaints and no physical symptoms at present. On exam, patient is standing up in the results waiting area, no ataxia, gait is steady. His initial blood pressure was 222/101, rest of vitals revealed temp of 98.2, heart rate 64, respirate 18, SPO2 97% on room air. Rest of neurologic exam reveals symmetric face, patient is moving all extremities spontaneously without tremor or ataxia. 79-year-old gentleman with no symptoms presenting with incidentally found markedly elevated lithium level of 4.5.   Rest of labs obtained on outpatient basis revealed creatinine of 1.33 which is baseline to improved from baseline. TSH is normal.  We are obtaining repeat labs to ensure this is not a lab error. And, indeed, patient's lithium level is actually 0.4. This is more consistent with patient's current normal neurologic exam. Patient's magnesium was found to be low, 1.0. I feel that we may replete this by mouth. We will send prescription for oral magnesium replacement therapy. There is no further indication for work-up or admission at this time. Toxicology consulted and confirms that the patient is safe to be discharged to home. Patient is in agreement with this plan.

## 2023-09-14 NOTE — ED PROVIDER NOTES
History  Chief Complaint   Patient presents with   • Abnormal Lab     Pt told to come to ER for elevated lithium level. Pt present hypertension 222/101     HPI     Patient is a 72-year-old male with a past medical history of bipolar who presented for an elevated lithium level. Patient states that he had labs done by his PCP earlier today and his lithium level came back at 4.5. Patient has been on the medication for several years and has never had an elevated lithium level. He takes his medication at 8pm every night and last took the medication last night. He denies neurological symptoms. He feels well otherwise. Prior to Admission Medications   Prescriptions Last Dose Informant Patient Reported? Taking? Cholecalciferol (VITAMIN D PO)  Self Yes No   Sig: Take 2,000 Units by mouth daily     FLUoxetine (PROzac) 10 mg capsule  Self Yes No   Sig: Take 10 mg by mouth daily    Mirabegron ER 25 MG TB24  Self No No   Sig: Take 25 mg by mouth daily at bedtime   acetaminophen (TYLENOL) 500 mg tablet  Self Yes No   Sig: Take 500 mg by mouth as needed for mild pain.    albuterol (ProAir HFA) 90 mcg/act inhaler   No No   Sig: Inhale 2 puffs every 6 (six) hours as needed for wheezing or shortness of breath PLEASE PROVIDE 3 INHALERS FOR 90 DAY SUPPLY   amLODIPine (NORVASC) 2.5 mg tablet   No No   Sig: TAKE 1 TABLET BY MOUTH DAILY   aspirin (ECOTRIN LOW STRENGTH) 81 mg EC tablet  Self Yes No   Sig: Take 81 mg by mouth daily   atorvastatin (LIPITOR) 40 mg tablet  Self No No   Sig: TAKE 1 TABLET BY MOUTH  DAILY   dicyclomine (Bentyl) 20 mg tablet  Self No No   Sig: Take 1 tablet (20 mg total) by mouth every 6 (six) hours as needed (abd cramping)   famotidine (PEPCID) 40 MG tablet  Self No No   Sig: TAKE 1 TABLET BY MOUTH  DAILY AT BEDTIME AS NEEDED  FOR HEARTBURN   Patient not taking: Reported on 7/14/2023   fexofenadine (ALLEGRA) 180 MG tablet  Self Yes No   Sig: Take 180 mg by mouth as needed Daily during the Summer fluticasone (FLONASE) 50 mcg/act nasal spray  Self Yes No   Si sprays into each nostril daily   fluticasone-umeclidinium-vilanterol (Trelegy Ellipta) 200-62.5-25 mcg/actuation AEPB inhaler   No No   Sig: Inhale 1 puff daily   lithium carbonate (LITHOBID) 300 mg CR tablet  Self Yes No   Sig: Take 300 mg by mouth in the morning   metoprolol succinate (TOPROL-XL) 25 mg 24 hr tablet   No No   Sig: TAKE 1 TABLET BY MOUTH  DAILY   mupirocin (BACTROBAN) 2 % ointment  Self No No   Sig: Apply topically 2 (two) times a day as needed (wound)   nitroglycerin (NITRODUR) 0.2 mg/hr  Self No No   Sig: APPLY 1 PATCH TOPICALLY  ONCE DAILY.  LEAVE ON FOR 12 TO 14 HOURS THEN REMOVE FOR A NITRATE-FREE INTERVAL OF  10 TO 12 HOURS   Patient taking differently: if needed   omega-3-acid ethyl esters (LOVAZA) 1 g capsule   No No   Sig: TAKE 1 CAPSULE BY MOUTH 3  TIMES DAILY   omeprazole (PriLOSEC) 20 mg delayed release capsule   No No   Sig: Take 1 capsule (20 mg total) by mouth 2 (two) times a day before meals      Facility-Administered Medications: None       Past Medical History:   Diagnosis Date   • Allergic    • Allergic rhinitis    • Anxiety     occasional   • Aortic aneurysm [facility])    • Benign colon polyp    • Bipolar 1 disorder ([address])    • Cancer ([address]) ,    • Cardiac disease     MI   • Cervical cord compression with myelopathy (HCC)    • COPD (chronic obstructive pulmonary disease) ([address])    • Coronary artery disease    • Diverticulitis    • Diverticulitis of colon prior to    • Diverticulosis    • Emphysema of lung ([address])    • Gait disturbance     uses cane, leg brace on right   • GERD (gastroesophageal reflux disease)    • Heart attack ([address])    • Hx of resection of large bowel 2016   • Hyperlipidemia    • Hypertension    • IBS (irritable bowel syndrome)    • Inflammatory bowel disease    • Lumbar stenosis    • Lung cancer ([address])      Left lower lobectomy and  Right lung with surgery • Myocardial infarction [facility])     involving other coronary artery   • Prostate cancer ([address])    • Shortness of breath    • Small bowel obstruction ([address]) 11/16/2016       Past Surgical History:   Procedure Laterality Date   • ANGIOPLASTY      stent   • CARDIAC CATHETERIZATION  1995    angioplasty   • CARDIAC SURGERY     • CERVICAL SPINE SURGERY      Cervical decompression with cervical fusion from C3-C7 for spinal stenosis. 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   • LUNG LOBECTOMY Left    • AR ARTHRD ANT INTERBODY MIN DSC CRV BELOW C2 N/A 05/02/2016    Procedure: Anterior cervical diskectomy C3/4, C5/6, C6/7 with anterior plate fixation fusion C3-7;  Posterior decompressive laminectomy C3-7 with lateral mass fixation fusion C3-7 (IMPULSE MONITORING); Surgeon: Aida Kothari MD;  [facility];  Service: Neurosurgery   • AR ARTHRODESIS POSTERIOR INTERBODY [address] LUMBAR N/A 01/30/2017    Procedure: L4-5 AND L5-S1 DECOMPRESSIVE FORAMINOTOMIES, TRANSFORAMINAL LUMBAR INTERBODY AND PEDICLE SCREW FIXATION FUSION L4-S1 (IMPULSE);   Surgeon: Aida Kothari MD;  [facility];  Service: Neurosurgery   • AR EVASC RPR DPLMNT AORTO-AORTIC NDGFT N/A 08/06/2018    Procedure: REPAIR ANEURYSM ENDOVASCULAR ABDOMINAL AORTIC  (EVAR) WITH BILATERAL PERCUTANEOUS FEMORAL ACCESS WITH ULTRASOUND GUIDANCE ON THE RIGHT AND PRE CLOSURE;  Surgeon: Chacorta Ackerman MD;  Location[facility];  Service: Vascular   • PROSTATECTOMY  2007    for prostate CA - no chemo/RT   • SIGMOIDECTOMY      for divertic   • SMALL INTESTINE SURGERY N/A 2016    Procedure: Exploratory Laparotomy, Lysis of adhesions to release small bowel obstruction;  Surgeon: Nidia Ahumada, MD;  Location: [facility];  Service:    • SPINE SURGERY  ,    • TONSILLECTOMY  Y    1952       Family History   Problem Relation Age of Onset   • Hypertension Mother    • Glaucoma Mother    • Heart attack Father    • Colon cancer Father    • Coronary artery disease Father    • Hypertension Father    • Heart disease Family    • Hyperlipidemia Family    • Hypertension Family      I have reviewed and agree with the history as documented. E-Cigarette/Vaping   • E-Cigarette Use Never User      E-Cigarette/Vaping Substances   • Nicotine No    • THC No    • CBD No    • Flavoring No    • Other No    • Unknown No      Social History     Tobacco Use   • Smoking status: Former     Packs/day: 1.00     Years: 35.00     Total pack years: 35.00     Types: Cigarettes     Start date: 1967     Quit date: 2011     Years since quittin.7   • Smokeless tobacco: Never   Vaping Use   • Vaping Use: Never used   Substance Use Topics   • Alcohol use: Yes     Alcohol/week: 2.0 - 3.0 standard drinks of alcohol     Types: 2 - 3 Shots of liquor per week     Comment: One glass per day   • Drug use: No        Review of Systems   Constitutional: Negative for chills and fever. HENT: Negative for ear pain and sore throat. Eyes: Negative for pain and visual disturbance. Respiratory: Negative for cough and shortness of breath. Cardiovascular: Negative for chest pain and palpitations. Gastrointestinal: Negative for abdominal pain and vomiting. Genitourinary: Negative for dysuria and hematuria. Musculoskeletal: Negative for arthralgias and back pain. Skin: Negative for color change and rash. Neurological: Negative for seizures and syncope. All other systems reviewed and are negative.       Physical Exam  ED Triage Vitals [23 1644]   Temperature Pulse Respirations Blood Pressure SpO2   98.2 °F (36.8 °C) 64 18 (!) 222/101 97 %      Temp Source Heart Rate Source Patient Position - Orthostatic VS BP Location FiO2 (%)   Oral Monitor Sitting Left arm --      Pain Score       No Pain             Orthostatic Vital Signs  Vitals:    09/14/23 1644   BP: (!) 222/101   Pulse: 64   Patient Position - Orthostatic VS: Sitting       Physical Exam  Vitals and nursing note reviewed. Constitutional:       General: He is not in acute distress. Appearance: He is well-developed. HENT:      Head: Normocephalic and atraumatic. Eyes:      Conjunctiva/sclera: Conjunctivae normal.   Cardiovascular:      Rate and Rhythm: Normal rate and regular rhythm. Heart sounds: No murmur heard. Pulmonary:      Effort: Pulmonary effort is normal. No respiratory distress. Breath sounds: Normal breath sounds. Abdominal:      Palpations: Abdomen is soft. Tenderness: There is no abdominal tenderness. Musculoskeletal:         General: No swelling. Cervical back: Neck supple. Skin:     General: Skin is warm and dry. Capillary Refill: Capillary refill takes less than 2 seconds. Neurological:      Mental Status: He is alert.    Psychiatric:         Mood and Affect: Mood normal.         ED Medications  Medications - No data to display    Diagnostic Studies  Results Reviewed     Procedure Component Value Units Date/Time    Magnesium [[id number]]  (Abnormal) Collected: 09/14/23 1657    Lab Status: Final result Specimen: Blood from Arm, Right Updated: 09/14/23 1745     Magnesium 1.0 mg/dL     Lithium level [[id number]]  (Abnormal) Collected: 09/14/23 1657    Lab Status: Final result Specimen: Blood from Arm, Right Updated: 09/14/23 1744     Lithium Lvl 0.4 mmol/L     CBC and differential [[id number]]  (Abnormal) Collected: 09/14/23 1657    Lab Status: Final result Specimen: Blood from Arm, Right Updated: 09/14/23 1706     WBC 11.56 Thousand/uL      RBC 4.38 Million/uL Hemoglobin 13.6 g/dL      Hematocrit 40.2 %      MCV 92 fL      MCH 31.1 pg      MCHC 33.8 g/dL      RDW 13.2 %      MPV 10.8 fL      Platelets 068 Thousands/uL      nRBC 0 /100 WBCs      Neutrophils Relative 71 %      Immat GRANS % 0 %      Lymphocytes Relative 15 %      Monocytes Relative 9 %      Eosinophils Relative 5 %      Basophils Relative 0 %      Neutrophils Absolute 8.22 Thousands/µL      Immature Grans Absolute 0.05 Thousand/uL      Lymphocytes Absolute 1.69 Thousands/µL      Monocytes Absolute 0.99 Thousand/µL      Eosinophils Absolute 0.56 Thousand/µL      Basophils Absolute 0.05 Thousands/µL                  No orders to display         Procedures  Procedures      ED Course  ED Course as of 09/17/23 1233   Thu Sep 14, 2023   1733 WBC(!): 11.56   1753 Magnesium(!): 1.0   1753 LITHIUM LEVEL(!): 0.4   1839 Reached out to tox for recs   1852 Spoke with  on call who believed this to be a lab error and recommended discharge. Identification of Seniors at [facility] Most Recent Value   (ISAR) Identification of Seniors at Risk    Before the illness or injury that brought you to the Emergency, did you need someone to help you on a regular basis? 0 Filed at: 09/14/2023 1645   In the last 24 hours, have you needed more help than usual? 0 Filed at: 09/14/2023 1645   Have you been hospitalized for one or more nights during the past 6 months? 0 Filed at: 09/14/2023 1645   In general, do you see well? 0 Filed at: 09/14/2023 1645   In general, do you have serious problems with your memory? 0 Filed at: 09/14/2023 1645   Do you take more than three different medications every day? 1 Filed at: 09/14/2023 1645   ISAR Score 1 Filed at: 09/14/2023 1645                    SBIRT 22yo+    Flowsheet Row Most Recent Value   Initial Alcohol Screen: US AUDIT-C     1. How often do you have a drink containing alcohol? 6 Filed at: 09/14/2023 1646   2.  How many drinks containing alcohol do you have on a typical day you are drinking? 0 Filed at: 09/14/2023 1646   3a. Male UNDER 65: How often do you have five or more drinks on one occasion? 0 Filed at: 09/14/2023 1646   3b. FEMALE Any Age, or MALE 65+: How often do you have 4 or more drinks on one occassion? 0 Filed at: 09/14/2023 1646   Audit-C Score 6 Filed at: 09/14/2023 1646   JESSICA: How many times in the past year have you. .. Used an illegal drug or used a prescription medication for non-medical reasons? Never Filed at: 09/14/2023 1646                Medical Decision Making  Patient is a 70-year-old male with a past medical history of bipolar who presented for an elevated lithium level. Patient's lithium level this morning was 4.5. Will redraw another lithium level and CBC and BMP. The redraw lithium level was 0.4. Spoke with toxicology who believes this to be a lab error. Patient is asymptomatic at this time. His magnesium is 1.0 so a prescription for magnesium oxide was sent to his pharmacy. Patient was told to follow-up with his PCP. He was given return precautions and discharged from the ED. Elevated lithium level: acute illness or injury  Low magnesium level: acute illness or injury  Amount and/or Complexity of Data Reviewed  External Data Reviewed: labs and notes. Labs: ordered. Decision-making details documented in ED Course. ECG/medicine tests: ordered. Risk  OTC drugs.             Disposition  Final diagnoses:   Elevated lithium level   Low magnesium level     Time reflects when diagnosis was documented in both MDM as applicable and the Disposition within this note     Time User Action Codes Description Comment    9/14/2023  6:50 PM Rubin Vogt [R79.89] Elevated lithium level     9/14/2023  7:00 PM Wilian Hastings [R79.0] Low magnesium level       ED Disposition     ED Disposition   Discharge    Condition   Stable    Date/Time   Thu Sep 14, 2023  6:49 PM    Comment   [facility] discharge to home/self care.               Follow-up Information     Follow up With Specialties Details Why Contact Info    Grace Duckworth MD Internal Medicine   [address]  [phone]            Discharge Medication List as of 9/14/2023  6:50 PM      CONTINUE these medications which have NOT CHANGED    Details   acetaminophen (TYLENOL) 500 mg tablet Take 500 mg by mouth as needed for mild pain., Historical Med      albuterol (ProAir HFA) 90 mcg/act inhaler Inhale 2 puffs every 6 (six) hours as needed for wheezing or shortness of breath PLEASE PROVIDE 3 INHALERS FOR 90 DAY SUPPLY, Starting Wed 9/6/2023, Until Tue 12/5/2023 at 2359, Normal      amLODIPine (NORVASC) 2.5 mg tablet TAKE 1 TABLET BY MOUTH DAILY, Normal      aspirin (ECOTRIN LOW STRENGTH) 81 mg EC tablet Take 81 mg by mouth daily, Historical Med      atorvastatin (LIPITOR) 40 mg tablet TAKE 1 TABLET BY MOUTH  DAILY, Normal      Cholecalciferol (VITAMIN D PO) Take 2,000 Units by mouth daily  , Historical Med      dicyclomine (Bentyl) 20 mg tablet Take 1 tablet (20 mg total) by mouth every 6 (six) hours as needed (abd cramping), Starting Wed 3/30/2022, Normal      famotidine (PEPCID) 40 MG tablet TAKE 1 TABLET BY MOUTH  DAILY AT BEDTIME AS NEEDED  FOR HEARTBURN, Normal      fexofenadine (ALLEGRA) 180 MG tablet Take 180 mg by mouth as needed Daily during the Summer, Historical Med      FLUoxetine (PROzac) 10 mg capsule Take 10 mg by mouth daily , Starting Tue 11/13/2018, Historical Med      fluticasone (FLONASE) 50 mcg/act nasal spray 2 sprays into each nostril daily, Historical Med      fluticasone-umeclidinium-vilanterol (Trelegy Ellipta) 200-62.5-25 mcg/actuation AEPB inhaler Inhale 1 puff daily, Starting Fri 8/18/2023, Normal      lithium carbonate (LITHOBID) 300 mg CR tablet Take 300 mg by mouth in the morning, Starting Thu 8/16/2018, Historical Med      metoprolol succinate (TOPROL-XL) 25 mg 24 hr tablet TAKE 1 TABLET BY MOUTH  DAILY, Normal Mirabegron ER 25 MG TB24 Take 25 mg by mouth daily at bedtime, Starting Fri 1/13/2023, Normal      mupirocin (BACTROBAN) 2 % ointment Apply topically 2 (two) times a day as needed (wound), Starting Wed 1/19/2022, Normal      nitroglycerin (NITRODUR) 0.2 mg/hr APPLY 1 PATCH TOPICALLY  ONCE DAILY. LEAVE ON FOR 12 TO 14 HOURS THEN REMOVE FOR A NITRATE-FREE INTERVAL OF  10 TO 12 HOURS, Normal      omega-3-acid ethyl esters (LOVAZA) 1 g capsule TAKE 1 CAPSULE BY MOUTH 3  TIMES DAILY, Normal      omeprazole (PriLOSEC) 20 mg delayed release capsule Take 1 capsule (20 mg total) by mouth 2 (two) times a day before meals, Starting Fri 6/2/2023, Until Thu 8/31/2023, Normal           No discharge procedures on file. PDMP Review     None           ED Provider  Attending physically available and evaluated Cristy Sarkar. I managed the patient along with the ED Attending.     Electronically Signed by         Nayeli Jensen MD  09/17/23 [id number]

## 2023-09-14 NOTE — ED NOTES
Spoke to MD Dr. Cristy Oorsco, he asks that we recollect lithium level     Eliel Angeles, RN  09/14/23 4180

## 2023-09-14 NOTE — DISCHARGE INSTRUCTIONS
You were seen in the Emergency Department today for elevated lithium level. Please follow up with your primary care doctor in 1 week. Please return to the Emergency Department if you experience worsening of your current symptoms, blurry vision, dizziness, difficulty walking, or any other concerning symptoms.

## 2023-09-14 NOTE — TELEPHONE ENCOUNTER
Laboratory is calling with abnormal test results. Patient's lithium level is 4.5.         Please advise

## 2023-09-21 ENCOUNTER — ESTABLISHED COMPREHENSIVE EXAM (OUTPATIENT)
Dept: URBAN - METROPOLITAN AREA CLINIC 6 | Facility: CLINIC | Age: 78
End: 2023-09-21

## 2023-09-21 DIAGNOSIS — H35.3131: ICD-10-CM

## 2023-09-21 DIAGNOSIS — H02.402: ICD-10-CM

## 2023-09-21 DIAGNOSIS — H04.123: ICD-10-CM

## 2023-09-21 DIAGNOSIS — H25.13: ICD-10-CM

## 2023-09-21 PROCEDURE — 92134 CPTRZ OPH DX IMG PST SGM RTA: CPT

## 2023-09-21 PROCEDURE — 92014 COMPRE OPH EXAM EST PT 1/>: CPT

## 2023-09-21 ASSESSMENT — VISUAL ACUITY
OS_SC: 20/70
OD_GLARE: 20/80
OU_SC: J2
OS_GLARE: 20/100
OS_PH: 20/50 +/-2
OU_SC: 20/40-1

## 2023-09-21 ASSESSMENT — TONOMETRY
OS_IOP_MMHG: 13
OD_IOP_MMHG: 16

## 2023-09-28 ENCOUNTER — TELEPHONE (OUTPATIENT)
Age: 78
End: 2023-09-28

## 2023-09-28 NOTE — TELEPHONE ENCOUNTER
S/w the patient to review and he stated he is concerned because he is on Lithium and has bipolar and he read that it is not a good medication for that. He stated that he read some where that taking lamotrigine could help him? He stated he wait to discuss. Reviewed with him that I would inquire with you and the patient stated it's no rush but for now he would like to hold off on the gabapentin.

## 2023-09-28 NOTE — TELEPHONE ENCOUNTER
Caller: pt    Doctor: myra    Reason for call: pt is in a lot of pain and wants to know what to do about it?     Call back#: [phone]

## 2023-09-28 NOTE — TELEPHONE ENCOUNTER
S/w the patient and he stated the pain feels like it is getting more intense. He is scheduled next week for an DEVON and he is taking extra strength tylenol with out relief. What do you think JW? He stated he can't take NSAIDS.

## 2023-10-03 ENCOUNTER — HOSPITAL ENCOUNTER (OUTPATIENT)
Dept: RADIOLOGY | Facility: CLINIC | Age: 78
Discharge: HOME | End: 2023-10-03
Admitting: ANESTHESIOLOGY
Payer: MEDICARE

## 2023-10-03 VITALS
OXYGEN SATURATION: 96 % | SYSTOLIC BLOOD PRESSURE: 193 MMHG | TEMPERATURE: 97.3 F | RESPIRATION RATE: 18 BRPM | DIASTOLIC BLOOD PRESSURE: 80 MMHG | HEART RATE: 57 BPM

## 2023-10-03 DIAGNOSIS — M54.16 LUMBAR RADICULOPATHY: ICD-10-CM

## 2023-10-03 PROCEDURE — 62323 NJX INTERLAMINAR LMBR/SAC: CPT | Performed by: ANESTHESIOLOGY

## 2023-10-03 RX ORDER — METHYLPREDNISOLONE ACETATE 80 MG/ML
80 INJECTION, SUSPENSION INTRA-ARTICULAR; INTRALESIONAL; INTRAMUSCULAR; PARENTERAL; SOFT TISSUE ONCE
Status: COMPLETED | OUTPATIENT
Start: 2023-10-03 | End: 2023-10-03

## 2023-10-03 RX ADMIN — METHYLPREDNISOLONE ACETATE 80 MG: 80 INJECTION, SUSPENSION INTRA-ARTICULAR; INTRALESIONAL; INTRAMUSCULAR; SOFT TISSUE at 11:16

## 2023-10-03 RX ADMIN — IOHEXOL 1 ML: 300 INJECTION, SOLUTION INTRAVENOUS at 11:16

## 2023-10-03 NOTE — H&P
History of Present Illness: The patient is a 78 y.o. male who presents with complaints of low back and leg pain. Past Medical History:   Diagnosis Date   • Allergic 2011   • Allergic rhinitis 2015   • Anxiety     occasional   • Aortic aneurysm [facility])    • Benign colon polyp    • Bipolar 1 disorder ([address])    • Cancer ([address]) 2007, 2011   • Cardiac disease     MI   • Cervical cord compression with myelopathy (HCC)    • COPD (chronic obstructive pulmonary disease) ([address])    • Coronary artery disease 1995   • Diverticulitis    • Diverticulitis of colon prior to 2014   • Diverticulosis    • Emphysema of lung ([address]) 2012   • Gait disturbance     uses cane, leg brace on right   • GERD (gastroesophageal reflux disease)    • Heart attack ([address]) 1996   • Hx of resection of large bowel 05/03/2016   • Hyperlipidemia    • Hypertension    • IBS (irritable bowel syndrome)    • Inflammatory bowel disease 2012   • Lumbar stenosis    • Lung cancer ([address])     2007 Left lower lobectomy and 2011 Right lung with surgery    • Myocardial infarction [facility])     involving other coronary artery   • Prostate cancer ([address])    • Shortness of breath    • Small bowel obstruction ([address]) 11/16/2016       Past Surgical History:   Procedure Laterality Date   • ANGIOPLASTY      stent   • CARDIAC CATHETERIZATION  1995    angioplasty   • CARDIAC SURGERY     • CERVICAL SPINE SURGERY      Cervical decompression with cervical fusion from C3-C7 for spinal stenosis.    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   • LUNG LOBECTOMY Left    • [place] ARTHRD ANT INTERBODY MIN [address] CRV BELOW C2 N/A 05/02/2016    Procedure: Anterior cervical diskectomy C3/4, C5/6, C6/7 with anterior plate fixation fusion C3-7;  Posterior decompressive laminectomy C3-7 with lateral mass fixation fusion C3-7 (IMPULSE MONITORING); Surgeon: Antonia Monte MD;  [facility];  Service: Neurosurgery   • [place] ARTHRODESIS POSTERIOR INTERBODY 1 [address] LUMBAR N/A 01/30/2017    Procedure: L4-5 AND L5-S1 DECOMPRESSIVE FORAMINOTOMIES, TRANSFORAMINAL LUMBAR INTERBODY AND PEDICLE SCREW FIXATION FUSION L4-S1 (IMPULSE);   Surgeon: Antonia Monte MD;  [facility];  Service: Neurosurgery   • AZ EVASC RPR DPLMNT AORTO-AORTIC NDGFT N/A 08/06/2018    Procedure: REPAIR ANEURYSM ENDOVASCULAR ABDOMINAL AORTIC  (EVAR) WITH BILATERAL PERCUTANEOUS FEMORAL ACCESS WITH ULTRASOUND GUIDANCE ON THE RIGHT AND PRE CLOSURE;  Surgeon: Danisha Frank MD;  [facility];  Service: Vascular   • PROSTATECTOMY  2007    for prostate CA - no chemo/RT   • SIGMOIDECTOMY      for divertic   • SMALL INTESTINE SURGERY N/A 11/17/2016    Procedure: Exploratory Laparotomy, Lysis of adhesions to release small bowel obstruction;  Surgeon: Jessica Gramajo MD;  [facility];  Service:    • SPINE SURGERY  2016, 2017   • TONSILLECTOMY  Y    1950         Current Outpatient Medications:   •  acetaminophen (TYLENOL) 500 mg tablet, Take 500 mg by mouth as needed for mild pain., Disp: , Rfl:   •  albuterol (ProAir HFA) 90 mcg/act inhaler, Inhale 2 puffs every 6 (six) hours as needed for wheezing or shortness of breath PLEASE PROVIDE 3 INHALERS FOR 90 DAY SUPPLY, Disp: 8.5 g, Rfl: 3  •  amLODIPine (NORVASC) 2.5 mg tablet, TAKE 1 TABLET BY MOUTH DAILY, Disp: 90 tablet, Rfl: 3  •  aspirin (ECOTRIN LOW STRENGTH) 81 mg EC tablet, Take 81 mg by mouth daily, Disp: , Rfl:   •  atorvastatin (LIPITOR) 40 mg tablet, TAKE 1 TABLET BY MOUTH  DAILY, Disp: 90 tablet, Rfl: 5  •  Cholecalciferol (VITAMIN D PO), Take 2,000 Units by mouth daily  , Disp: , Rfl:   •  dicyclomine (Bentyl) 20 mg tablet, Take 1 tablet (20 mg total) by mouth every 6 (six) hours as needed (abd cramping), Disp: 90 tablet, Rfl: 3  •  famotidine (PEPCID) 40 MG tablet, TAKE 1 TABLET BY MOUTH  DAILY AT BEDTIME AS NEEDED  FOR HEARTBURN (Patient not taking: Reported on 7/14/2023), Disp: 30 tablet, Rfl: 11  •  fexofenadine (ALLEGRA) 180 MG tablet, Take 180 mg by mouth as needed Daily during the Summer, Disp: , Rfl:   •  FLUoxetine (PROzac) 10 mg capsule, Take 10 mg by mouth daily , Disp: , Rfl:   •  fluticasone (FLONASE) 50 mcg/act nasal spray, 2 sprays into each nostril daily, Disp: , Rfl:   •  fluticasone-umeclidinium-vilanterol (Trelegy Ellipta) 200-62.5-25 mcg/actuation AEPB inhaler, Inhale 1 puff daily, Disp: 180 blister, Rfl: 3  •  lithium carbonate (LITHOBID) 300 mg CR tablet, Take 300 mg by mouth in the morning, Disp: , Rfl:   •  magnesium 30 MG tablet, Take 1 tablet (30 mg total) by mouth 2 (two) times a day, Disp: 60 tablet, Rfl: 0  •  metoprolol succinate (TOPROL-XL) 25 mg 24 hr tablet, TAKE 1 TABLET BY MOUTH  DAILY, Disp: 90 tablet, Rfl: 3  •  Mirabegron ER 25 MG TB24, Take 25 mg by mouth daily at bedtime, Disp: 90 tablet, Rfl: 3  •  mupirocin (BACTROBAN) 2 % ointment, Apply topically 2 (two) times a day as needed (wound), Disp: 22 g, Rfl: 0  •  nitroglycerin (NITRODUR) 0.2 mg/hr, APPLY 1 PATCH TOPICALLY  ONCE DAILY.  LEAVE ON FOR 12 TO 14 HOURS THEN REMOVE FOR A NITRATE-FREE INTERVAL OF  10 TO 12 HOURS (Patient taking differently: if needed), Disp: 90 patch, Rfl: 3  •  omega-3-acid ethyl esters (LOVAZA) 1 g capsule, TAKE 1 CAPSULE BY MOUTH 3  TIMES DAILY, Disp: 270 capsule, Rfl: 3  •  omeprazole (PriLOSEC) 20 mg delayed release capsule, Take 1 capsule (20 mg total) by mouth 2 (two) times a day before meals, Disp: 180 capsule, Rfl: 0    Allergies   Allergen Reactions   • Bactrim [Sulfamethoxazole-Trimethoprim] Other (See Comments)     AILYN   • Nsaids      Annotation - [id number]: unable to take due to use of lithium.    • Oxycodone Rash       Physical Exam:   Vitals:    10/03/23 1053   BP: (!) 200/93   Pulse:    Resp:    Temp:    SpO2:      General: Awake, Alert, Oriented x 3, Mood and affect appropriate  Respiratory: Respirations even and unlabored  Cardiovascular: Peripheral pulses intact; no edema  Musculoskeletal Exam: Left lumbar paraspinals tender to palpation    ASA Score: 3         Assessment: Lumbar radiculitis    Plan: Left L3-4 LESI

## 2023-10-03 NOTE — DISCHARGE INSTRUCTIONS
Epidural Steroid Injection   WHAT YOU NEED TO KNOW:   An epidural steroid injection (DEVON) is a procedure to inject steroid medicine into the epidural space. The epidural space is between your spinal cord and vertebrae. Steroids reduce inflammation and fluid buildup in your spine that may be causing pain. You may be given pain medicine along with the steroids. ACTIVITY  Do not drive or operate machinery today. No strenuous activity today - bending, lifting, etc.  You may resume normal activites starting tomorrow - start slowly and as tolerated. You may shower today, but no tub baths or hot tubs. You may have numbness for several hours from the local anesthetic. Please use caution and common sense, especially with weight-bearing activities. CARE OF THE INJECTION SITE  If you have soreness or pain, apply ice to the area today (20 minutes on/20 minutes off). Starting tomorrow, you may use warm, moist heat or ice if needed. You may have an increase or change in your discomfort for 36-48 hours after your treatment. Apply ice and continue with any pain medication you have been prescribed. Notify the [facility] if you have any of the following: redness, drainage, swelling, headache, stiff neck or fever above 100°F.    SPECIAL INSTRUCTIONS  Our office will contact you in approximately 7 days for a progress report. MEDICATIONS  Continue to take all routine medications. Our office may have instructed you to hold some medications. As no general anesthesia was used in today's procedure, you should not experience any side effects related to anesthesia. If you are diabetic, the steroids used in today's injection may temporarily increase your blood sugar levels after the first few days after your injection. Please keep a close eye on your sugars and alert the doctor who manages your diabetes if your sugars are significantly high from your baseline or you are symptomatic.      If you have a problem specifically related to your procedure, please call our office at [phone]. Problems not related to your procedure should be directed to your primary care physician.

## 2023-10-10 ENCOUNTER — TELEPHONE (OUTPATIENT)
Dept: PAIN MEDICINE | Facility: CLINIC | Age: 78
End: 2023-10-10

## 2023-10-10 NOTE — TELEPHONE ENCOUNTER
Caller: Nancy Pearson  Doctor/office: [facility]  CB#:     % of improvement: 60%  Pain Scale (1-10): 3      Still gets pain occasional but its manageable.

## 2023-10-17 NOTE — TELEPHONE ENCOUNTER
Caller: Sujatha Rider  Doctor/office: [facility]   CB#:     % of improvement: 65%  Pain Scale (1-10):  4-5

## 2023-10-18 ENCOUNTER — OFFICE VISIT (OUTPATIENT)
Dept: INTERNAL MEDICINE CLINIC | Facility: CLINIC | Age: 78
End: 2023-10-18
Payer: MEDICARE

## 2023-10-18 VITALS
DIASTOLIC BLOOD PRESSURE: 77 MMHG | HEART RATE: 65 BPM | SYSTOLIC BLOOD PRESSURE: 135 MMHG | WEIGHT: 177 LBS | BODY MASS INDEX: 27.78 KG/M2 | OXYGEN SATURATION: 97 % | HEIGHT: 67 IN

## 2023-10-18 DIAGNOSIS — E78.2 MIXED HYPERLIPIDEMIA: ICD-10-CM

## 2023-10-18 DIAGNOSIS — F31.9 BIPOLAR AFFECTIVE DISORDER, REMISSION STATUS UNSPECIFIED (HCC): ICD-10-CM

## 2023-10-18 DIAGNOSIS — J44.9 CHRONIC OBSTRUCTIVE PULMONARY DISEASE, UNSPECIFIED COPD TYPE (HCC): ICD-10-CM

## 2023-10-18 DIAGNOSIS — I10 ESSENTIAL HYPERTENSION: Primary | ICD-10-CM

## 2023-10-18 DIAGNOSIS — N18.31 STAGE 3A CHRONIC KIDNEY DISEASE (HCC): ICD-10-CM

## 2023-10-18 DIAGNOSIS — E83.42 HYPOMAGNESEMIA: ICD-10-CM

## 2023-10-18 DIAGNOSIS — I48.91 NEW ONSET ATRIAL FIBRILLATION (HCC): ICD-10-CM

## 2023-10-18 PROCEDURE — 99214 OFFICE O/P EST MOD 30 MIN: CPT | Performed by: INTERNAL MEDICINE

## 2023-10-18 NOTE — PATIENT INSTRUCTIONS
Problem List Items Addressed This Visit          Respiratory    COPD (chronic obstructive pulmonary disease) ([address])     Continue inhalers, no respiratory distress, pulse ox 97% today            Cardiovascular and Mediastinum    Essential hypertension - Primary     A little elevated here today, better at home, continue medications along with healthy diet and exercise         New onset atrial fibrillation [facility])     Patient had a reported episode of A-fib in 2018 associate with a respiratory infection, this was felt to be an isolated episode, patient not on anticoagulation, and has not had a problem since then.   He does follow with cardiology            Genitourinary    Stage 3a chronic kidney disease ([address])       Other    Bipolar affective disorder ([address])     Continue meds, follow-up psychiatry         Mixed hyperlipidemia     Continue atorvastatin along with healthy diet          Other Visit Diagnoses       Hypomagnesemia        Relevant Orders    Magnesium

## 2023-10-18 NOTE — ASSESSMENT & PLAN NOTE
Patient had a reported episode of A-fib in 2018 associate with a respiratory infection, this was felt to be an isolated episode, patient not on anticoagulation, and has not had a problem since then.   He does follow with cardiology

## 2023-10-18 NOTE — ASSESSMENT & PLAN NOTE
A little elevated here today, better at home, continue medications along with healthy diet and exercise

## 2023-10-18 NOTE — PROGRESS NOTES
Name: Salvador Pulse      : 1945      MRN: [id number]  Encounter Provider: Moni Tai MD  Encounter Date: 10/18/2023   Encounter department: [facility]     1. Essential hypertension  Assessment & Plan:  A little elevated here today, better at home, continue medications along with healthy diet and exercise      2. Mixed hyperlipidemia  Assessment & Plan:  Continue atorvastatin along with healthy diet      3. Bipolar affective disorder, remission status unspecified ([facility])  Assessment & Plan:  Continue meds, follow-up psychiatry      4. Hypomagnesemia  -     Magnesium; Future    5. Stage 3a chronic kidney disease ([facility])    6. New onset atrial fibrillation [facility])  Assessment & Plan:  Patient had a reported episode of A-fib in 2018 associate with a respiratory infection, this was felt to be an isolated episode, patient not on anticoagulation, and has not had a problem since then. He does follow with cardiology      7. Chronic obstructive pulmonary disease, unspecified COPD type ([facility] [address] [facility])  Assessment & Plan:  Continue inhalers, no respiratory distress, pulse ox 97% today             Subjective     Patient here for regular follow-up      Review of Systems   Constitutional:  Negative for chills, fatigue and fever. HENT:  Negative for congestion, nosebleeds, postnasal drip, sore throat and trouble swallowing. Eyes:  Negative for pain. Respiratory:  Negative for cough, chest tightness, shortness of breath and wheezing. Cardiovascular:  Negative for chest pain, palpitations and leg swelling. Gastrointestinal:  Negative for abdominal pain, constipation, diarrhea, nausea and vomiting. Endocrine: Negative for polydipsia and polyuria. Genitourinary:  Negative for dysuria, flank pain and hematuria. Musculoskeletal:  Positive for arthralgias. Skin:  Negative for rash. Neurological:  Negative for dizziness, tremors, light-headedness and headaches. Hematological:  Does not bruise/bleed easily. Psychiatric/Behavioral:  Negative for confusion and dysphoric mood. The patient is not nervous/anxious. Past Medical History:   Diagnosis Date   • Allergic 2011   • Allergic rhinitis 2015   • Anxiety     occasional   • Aortic aneurysm [facility])    • Benign colon polyp    • Bipolar 1 disorder ([address])    • Cancer ([address]) 2007, 2011   • Cardiac disease     MI   • Cervical cord compression with myelopathy (HCC)    • COPD (chronic obstructive pulmonary disease) ([address])    • Coronary artery disease 1995   • Diverticulitis    • Diverticulitis of colon prior to 2014   • Diverticulosis    • Emphysema of lung ([address]) 2012   • Gait disturbance     uses cane, leg brace on right   • GERD (gastroesophageal reflux disease)    • Heart attack ([address]) 1996   • Hx of resection of large bowel 05/03/2016   • Hyperlipidemia    • Hypertension    • IBS (irritable bowel syndrome)    • Inflammatory bowel disease 2012   • Lumbar stenosis    • Lung cancer ([address])     2007 Left lower lobectomy and 2011 Right lung with surgery    • Myocardial infarction [facility])     involving other coronary artery   • Prostate cancer ([address])    • Shortness of breath    • Small bowel obstruction ([address]) 11/16/2016     Past Surgical History:   Procedure Laterality Date   • ANGIOPLASTY      stent   • CARDIAC CATHETERIZATION  1995    angioplasty   • CARDIAC SURGERY     • CERVICAL SPINE SURGERY      Cervical decompression with cervical fusion from C3-C7 for spinal stenosis.    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   • LUNG LOBECTOMY Left    • [place] ARTHRD ANT INTERBODY MIN [facility] CRV BELOW C2 N/A 05/02/2016    Procedure: Anterior cervical diskectomy C3/4, C5/6, C6/7 with anterior plate fixation fusion C3-7;  Posterior decompressive laminectomy C3-7 with lateral mass fixation fusion C3-7 (IMPULSE MONITORING); Surgeon: Gwendolyn Mejia MD;  [facility];  Service: Neurosurgery   • [place] ARTHRODESIS POSTERIOR INTERBODY 1 [address] LUMBAR N/A 01/30/2017    Procedure: L4-5 AND L5-S1 DECOMPRESSIVE FORAMINOTOMIES, TRANSFORAMINAL LUMBAR INTERBODY AND PEDICLE SCREW FIXATION FUSION L4-S1 (IMPULSE);   Surgeon: Gwendolyn Mejia MD;  [facility];  Service: Neurosurgery   • [place] EVASC RPR DPLMNT AORTO-AORTIC NDGFT N/A 08/06/2018    Procedure: REPAIR ANEURYSM ENDOVASCULAR ABDOMINAL AORTIC  (EVAR) WITH BILATERAL PERCUTANEOUS FEMORAL ACCESS WITH ULTRASOUND GUIDANCE ON THE RIGHT AND PRE CLOSURE;  Surgeon: George King MD;  [facility];  Service: Vascular   • PROSTATECTOMY  2007    for prostate CA - no chemo/RT   • SIGMOIDECTOMY      for divertic   • SMALL INTESTINE SURGERY N/A 11/17/2016    Procedure: Exploratory Laparotomy, Lysis of adhesions to release small bowel obstruction;  Surgeon: Libia Etienne MD;  [facility];  Service:    • SPINE SURGERY  2016, 2017   • TONSILLECTOMY  Y    1950     Family History   Problem Relation Age of Onset   • Hypertension Mother    • Glaucoma Mother    • Heart attack Father    • Colon cancer Father    • Coronary artery disease Father    • Hypertension Father    • Heart disease Family    • Hyperlipidemia Family    • Hypertension Family      Social History     Socioeconomic History   • Marital status: /Civil Union     Spouse name: None   • Number of children: 1   • Years of education: None   • Highest education level: None   Occupational History   • Occupation: Retired   Tobacco Use   • Smoking status: Former     Packs/day: 1.00     Years: 35.00     Total pack years: 35.00     Types: Cigarettes Start date: 1967     Quit date: 2011     Years since quittin.8   • Smokeless tobacco: Never   Vaping Use   • Vaping Use: Never used   Substance and Sexual Activity   • Alcohol use: Yes     Alcohol/week: 2.0 - 3.0 standard drinks of alcohol     Types: 2 - 3 Shots of liquor per week     Comment: One glass per day   • Drug use: No   • Sexual activity: Yes     Partners: Female     Birth control/protection: Post-menopausal, None   Other Topics Concern   • None   Social History Narrative   • None     Social Determinants of Health     Financial Resource Strain: Low Risk  (2022)    Overall Financial Resource Strain (CARDIA)    • Difficulty of Paying Living Expenses: Not hard at all   Food Insecurity: Not on file   Transportation Needs: No Transportation Needs (2022)    PRAPARE - Transportation    • Lack of Transportation (Medical): No    • Lack of Transportation (Non-Medical): No   Physical Activity: Not on file   Stress: Not on file   Social Connections: Not on file   Intimate Partner Violence: Not on file   Housing Stability: Not on file     Current Outpatient Medications on File Prior to Visit   Medication Sig   • acetaminophen (TYLENOL) 500 mg tablet Take 500 mg by mouth as needed for mild pain.    • albuterol (ProAir HFA) 90 mcg/act inhaler Inhale 2 puffs every 6 (six) hours as needed for wheezing or shortness of breath PLEASE PROVIDE 3 INHALERS FOR 90 DAY SUPPLY   • amLODIPine (NORVASC) 2.5 mg tablet TAKE 1 TABLET BY MOUTH DAILY   • aspirin (ECOTRIN LOW STRENGTH) 81 mg EC tablet Take 81 mg by mouth daily   • atorvastatin (LIPITOR) 40 mg tablet TAKE 1 TABLET BY MOUTH  DAILY   • Cholecalciferol (VITAMIN D PO) Take 2,000 Units by mouth daily     • dicyclomine (Bentyl) 20 mg tablet Take 1 tablet (20 mg total) by mouth every 6 (six) hours as needed (abd cramping)   • fexofenadine (ALLEGRA) 180 MG tablet Take 180 mg by mouth as needed Daily during the Summer   • FLUoxetine (PROzac) 10 mg capsule Take 10 mg by mouth daily    • fluticasone (FLONASE) 50 mcg/act nasal spray 2 sprays into each nostril daily   • fluticasone-umeclidinium-vilanterol (Trelegy Ellipta) 200-62.5-25 mcg/actuation AEPB inhaler Inhale 1 puff daily   • lithium carbonate (LITHOBID) 300 mg CR tablet Take 300 mg by mouth in the morning   • magnesium 30 MG tablet Take 1 tablet (30 mg total) by mouth 2 (two) times a day   • metoprolol succinate (TOPROL-XL) 25 mg 24 hr tablet TAKE 1 TABLET BY MOUTH  DAILY   • Mirabegron ER 25 MG TB24 Take 25 mg by mouth daily at bedtime   • mupirocin (BACTROBAN) 2 % ointment Apply topically 2 (two) times a day as needed (wound)   • nitroglycerin (NITRODUR) 0.2 mg/hr APPLY 1 PATCH TOPICALLY  ONCE DAILY. LEAVE ON FOR 12 TO 14 HOURS THEN REMOVE FOR A NITRATE-FREE INTERVAL OF  10 TO 12 HOURS (Patient taking differently: if needed)   • omega-3-acid ethyl esters (LOVAZA) 1 g capsule TAKE 1 CAPSULE BY MOUTH 3  TIMES DAILY   • famotidine (PEPCID) 40 MG tablet TAKE 1 TABLET BY MOUTH  DAILY AT BEDTIME AS NEEDED  FOR HEARTBURN (Patient not taking: Reported on 7/14/2023)   • omeprazole (PriLOSEC) 20 mg delayed release capsule Take 1 capsule (20 mg total) by mouth 2 (two) times a day before meals     Allergies   Allergen Reactions   • Bactrim [Sulfamethoxazole-Trimethoprim] Other (See Comments)     AILYN   • Nsaids      Annotation - [id number]: unable to take due to use of lithium.    • Oxycodone Rash     Immunization History   Administered Date(s) Administered   • COVID-19 PFIZER VACCINE 0.3 ML IM 02/17/2021, 03/10/2021, 10/26/2021   • COVID-19 Pfizer vac (Satya-sucrose, gray cap) 12 yr+ IM 08/03/2022   • INFLUENZA 10/01/2015, 10/07/2018, 09/27/2019, 09/27/2021   • Influenza Quadrivalent Preservative Free 3 years and older IM 10/01/2015   • Influenza Split High Dose Preservative Free IM 10/25/2016, 10/03/2018   • Influenza, high dose seasonal 0.7 mL 09/27/2019, 10/05/2020, 10/14/2022, 10/09/2023   • Influenza, seasonal, injectable 1945, 10/10/2012   • Pneumococcal Conjugate 13-Valent 10/25/2016   • Pneumococcal Polysaccharide PPV23 10/03/2019   • Td (adult), adsorbed 12/01/2009   • Tdap 05/31/2023   • Zoster 01/01/2014, 01/01/2014   • influenza, trivalent, adjuvanted 09/27/2021       Objective     /77   Pulse 65   Ht 5' 7" (1.702 m)   Wt 80.3 kg (177 lb)   SpO2 97%   BMI 27.72 kg/m²     Physical Exam  Vitals reviewed. Constitutional:       General: He is not in acute distress. Appearance: Normal appearance. He is well-developed. HENT:      Head: Normocephalic and atraumatic. Right Ear: External ear normal.      Left Ear: External ear normal.   Eyes:      General: No scleral icterus. Conjunctiva/sclera: Conjunctivae normal.   Neck:      Thyroid: No thyromegaly. Trachea: No tracheal deviation. Cardiovascular:      Rate and Rhythm: Normal rate and regular rhythm. Heart sounds: Normal heart sounds. No murmur heard. Pulmonary:      Effort: Pulmonary effort is normal. No respiratory distress. Breath sounds: Normal breath sounds. No wheezing or rales. Musculoskeletal:      Cervical back: Normal range of motion and neck supple. Right lower leg: No edema. Left lower leg: No edema. Lymphadenopathy:      Cervical: No cervical adenopathy. Skin:     Coloration: Skin is not jaundiced or pale. Neurological:      Mental Status: He is alert and oriented to person, place, and time. Psychiatric:         Behavior: Behavior normal.         Thought Content:  Thought content normal.         Judgment: Judgment normal.       Edith Tejeda MD

## 2023-11-01 ENCOUNTER — APPOINTMENT (OUTPATIENT)
Dept: LAB | Facility: CLINIC | Age: 78
End: 2023-11-01
Payer: MEDICARE

## 2023-11-01 DIAGNOSIS — N18.9 CHRONIC KIDNEY DISEASE, UNSPECIFIED CKD STAGE: ICD-10-CM

## 2023-11-01 DIAGNOSIS — E83.42 HYPOMAGNESEMIA: ICD-10-CM

## 2023-11-01 DIAGNOSIS — F31.60 MIXED BIPOLAR I DISORDER (HCC): ICD-10-CM

## 2023-11-01 DIAGNOSIS — C61 PROSTATE CANCER (HCC): ICD-10-CM

## 2023-11-01 LAB
ANION GAP SERPL CALCULATED.3IONS-SCNC: 7 MMOL/L
BUN SERPL-MCNC: 24 MG/DL (ref 5–25)
CALCIUM SERPL-MCNC: 9.1 MG/DL (ref 8.4–10.2)
CHLORIDE SERPL-SCNC: 107 MMOL/L (ref 96–108)
CO2 SERPL-SCNC: 30 MMOL/L (ref 21–32)
CREAT SERPL-MCNC: 1.36 MG/DL (ref 0.6–1.3)
GFR SERPL CREATININE-BSD FRML MDRD: 50 ML/MIN/1.73SQ M
GLUCOSE P FAST SERPL-MCNC: 76 MG/DL (ref 65–99)
POTASSIUM SERPL-SCNC: 4.6 MMOL/L (ref 3.5–5.3)
SODIUM SERPL-SCNC: 144 MMOL/L (ref 135–147)

## 2023-11-01 PROCEDURE — 80048 BASIC METABOLIC PNL TOTAL CA: CPT

## 2023-11-01 PROCEDURE — 36415 COLL VENOUS BLD VENIPUNCTURE: CPT

## 2023-11-07 ENCOUNTER — OFFICE VISIT (OUTPATIENT)
Dept: NEPHROLOGY | Facility: CLINIC | Age: 78
End: 2023-11-07
Payer: MEDICARE

## 2023-11-07 VITALS
WEIGHT: 181 LBS | BODY MASS INDEX: 28.41 KG/M2 | HEART RATE: 70 BPM | HEIGHT: 67 IN | DIASTOLIC BLOOD PRESSURE: 78 MMHG | SYSTOLIC BLOOD PRESSURE: 140 MMHG

## 2023-11-07 DIAGNOSIS — R80.1 PERSISTENT PROTEINURIA: ICD-10-CM

## 2023-11-07 DIAGNOSIS — N18.9 CHRONIC RENAL IMPAIRMENT, UNSPECIFIED CKD STAGE: Primary | ICD-10-CM

## 2023-11-07 DIAGNOSIS — N18.31 STAGE 3A CHRONIC KIDNEY DISEASE (HCC): ICD-10-CM

## 2023-11-07 PROCEDURE — 99214 OFFICE O/P EST MOD 30 MIN: CPT | Performed by: INTERNAL MEDICINE

## 2023-11-07 NOTE — PROGRESS NOTES
NEPHROLOGY PROGRESS NOTE    Jimmey Carrel 78 y.o. male MRN: [id number]  Unit/Bed#:  Encounter: [id number]  Reason for Consult: Chronic kidney disease with proteinuria    The patient is here for his routine visit says that he is enjoying himself but the biggest problem is that he has back pain on his left side. He tells me that he was being managed by pain management and it was helping but then he twisted himself a certain way and it will flareup. He does take acetaminophen which she says helps and he was just wondering if he could take ibuprofen periodically. No other complaints. ASSESSMENT/PLAN:  1. 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 He has been on chronic lithium for his manic depression and is extremely well compensated and functional on this medication. Latest creatinine is 1.3 which is even better than it was when I met him so he is at the low end of his baseline range and fortunately there is been no progression over the time I been seeing him. His blood pressure is under good control although it is a little higher today in the office but he says it is usually lower than that. He was concerned about taking ibuprofen once in a while for pain and I explained to him that it can affect the kidney function and if he would take it intermittently it really only affects it more potentially by microscopic blood flow changes but if he keeps himself hydrated uses it on an as-needed basis occasionally I told him it would be okay. I did tell him he could discuss the medication Ultram with his pain management doctor to see if that would be an option as well. He then brought up the idea of potentially changing his lithium to see if there was a medication that could help his manic depression as well as his pain.   In my opinion I would not change his medication because he is very well compensated he does not have severe toxicity and is very functional.  He is monitored very carefully and closely by his psychiatrist monitor his renal function and his levels every 3 months. For now continue with above plan  Monitor labs and follow-up as scheduled    I told to call if there is any problems or concerns before next visit. I have spent a total time of 32 minutes on 11/07/23 in caring for this patient including Diagnostic results, Prognosis, Risks and benefits of tx options, Instructions for management, and Impressions. SUBJECTIVE:  Review of Systems   Constitutional: Negative for chills, decreased appetite, diaphoresis and fever. HENT: Negative. Eyes: Negative. Cardiovascular:  Negative for chest pain, dyspnea on exertion, orthopnea and palpitations. Respiratory: Negative. Negative for cough, shortness of breath, sputum production and wheezing. Musculoskeletal:  Positive for back pain. Gastrointestinal:  Negative for abdominal pain, diarrhea, nausea and vomiting. Genitourinary:  Negative for dysuria, flank pain, hematuria and incomplete emptying. Neurological:  Negative for dizziness, focal weakness, headaches and weakness. Psychiatric/Behavioral:  Negative for altered mental status, hallucinations and hypervigilance. The patient is not nervous/anxious. OBJECTIVE:  Current Weight: Weight - Scale: 82.1 kg (181 lb)  [email or web address]:     Height 5' 7" (1.702 m), weight 82.1 kg (181 lb). , Body mass index is 28.35 kg/m². [unfilled]    Physical Exam: Ht 5' 7" (1.702 m)   Wt 82.1 kg (181 lb)   BMI 28.35 kg/m²   Physical Exam  Constitutional:       General: He is not in acute distress. Appearance: He is not toxic-appearing or diaphoretic. HENT:      Head: Normocephalic and atraumatic. Nose: Nose normal.      Mouth/Throat:      Mouth: Mucous membranes are moist.   Eyes:      General: No scleral icterus. Extraocular Movements: Extraocular movements intact.    Cardiovascular:      Rate and Rhythm: Normal rate and regular rhythm. Heart sounds: No friction rub. No gallop. Comments: Mild edema. Pulmonary:      Effort: Pulmonary effort is normal. No respiratory distress. Breath sounds: No wheezing, rhonchi or rales. Abdominal:      General: Bowel sounds are normal. There is no distension. Palpations: Abdomen is soft. Tenderness: There is no abdominal tenderness. There is no rebound. Musculoskeletal:      Cervical back: Normal range of motion and neck supple. Neurological:      General: No focal deficit present. Mental Status: He is alert and oriented to person, place, and time. Mental status is at baseline. Psychiatric:         Mood and Affect: Mood normal.         Behavior: Behavior normal.         Thought Content:  Thought content normal.         Judgment: Judgment normal.         Medications:    Current Outpatient Medications:     acetaminophen (TYLENOL) 500 mg tablet, Take 500 mg by mouth as needed for mild pain., Disp: , Rfl:     albuterol (ProAir HFA) 90 mcg/act inhaler, Inhale 2 puffs every 6 (six) hours as needed for wheezing or shortness of breath PLEASE PROVIDE 3 INHALERS FOR 90 DAY SUPPLY, Disp: 8.5 g, Rfl: 3    amLODIPine (NORVASC) 2.5 mg tablet, TAKE 1 TABLET BY MOUTH DAILY, Disp: 90 tablet, Rfl: 3    aspirin (ECOTRIN LOW STRENGTH) 81 mg EC tablet, Take 81 mg by mouth daily, Disp: , Rfl:     atorvastatin (LIPITOR) 40 mg tablet, TAKE 1 TABLET BY MOUTH  DAILY, Disp: 90 tablet, Rfl: 5    Cholecalciferol (VITAMIN D PO), Take 2,000 Units by mouth daily  , Disp: , Rfl:     dicyclomine (Bentyl) 20 mg tablet, Take 1 tablet (20 mg total) by mouth every 6 (six) hours as needed (abd cramping), Disp: 90 tablet, Rfl: 3    fexofenadine (ALLEGRA) 180 MG tablet, Take 180 mg by mouth as needed Daily during the Summer, Disp: , Rfl:     FLUoxetine (PROzac) 10 mg capsule, Take 10 mg by mouth daily , Disp: , Rfl:     fluticasone (FLONASE) 50 mcg/act nasal spray, 2 sprays into each nostril daily, Disp: , Rfl:     fluticasone-umeclidinium-vilanterol (Trelegy Ellipta) 200-62.5-25 mcg/actuation AEPB inhaler, Inhale 1 puff daily, Disp: 180 blister, Rfl: 3    lithium carbonate (LITHOBID) 300 mg CR tablet, Take 300 mg by mouth in the morning, Disp: , Rfl:     magnesium 30 MG tablet, Take 1 tablet (30 mg total) by mouth 2 (two) times a day, Disp: 60 tablet, Rfl: 0    metoprolol succinate (TOPROL-XL) 25 mg 24 hr tablet, TAKE 1 TABLET BY MOUTH  DAILY, Disp: 90 tablet, Rfl: 3    Mirabegron ER 25 MG TB24, Take 25 mg by mouth daily at bedtime, Disp: 90 tablet, Rfl: 3    mupirocin (BACTROBAN) 2 % ointment, Apply topically 2 (two) times a day as needed (wound), Disp: 22 g, Rfl: 0    nitroglycerin (NITRODUR) 0.2 mg/hr, APPLY 1 PATCH TOPICALLY  ONCE DAILY.  LEAVE ON FOR 12 TO 14 HOURS THEN REMOVE FOR A NITRATE-FREE INTERVAL OF  10 TO 12 HOURS (Patient taking differently: if needed), Disp: 90 patch, Rfl: 3    omega-3-acid ethyl esters (LOVAZA) 1 g capsule, TAKE 1 CAPSULE BY MOUTH 3  TIMES DAILY, Disp: 270 capsule, Rfl: 3    omeprazole (PriLOSEC) 20 mg delayed release capsule, Take 1 capsule (20 mg total) by mouth 2 (two) times a day before meals, Disp: 180 capsule, Rfl: 0    famotidine (PEPCID) 40 MG tablet, TAKE 1 TABLET BY MOUTH  DAILY AT BEDTIME AS NEEDED  FOR HEARTBURN (Patient not taking: Reported on 7/14/2023), Disp: 30 tablet, Rfl: 11    Laboratory Results:  Lab Results   Component Value Date    WBC 11.56 (H) 09/14/2023    HGB 13.6 09/14/2023    HCT 40.2 09/14/2023    MCV 92 09/14/2023     09/14/2023     Lab Results   Component Value Date    SODIUM 144 11/01/2023    K 4.6 11/01/2023     11/01/2023    CO2 30 11/01/2023    BUN 24 11/01/2023    CREATININE 1.36 (H) 11/01/2023    GLUC 100 04/02/2021    CALCIUM 9.1 11/01/2023     Lab Results   Component Value Date    CALCIUM 9.1 11/01/2023    PHOS 2.8 11/05/2014     No results found for: "LABPROT"

## 2023-11-07 NOTE — LETTER
November 7, 2023     Reza Lyle, [address] [place] [id number] [place] [id number]    Patient: Pearl Flores   YOB: 1945   Date of Visit: 11/7/2023       Dear Dr. Luz Maria Davis: Thank you for referring Shikha Lomeli to me for evaluation. Below are my notes for this consultation. If you have questions, please do not hesitate to call me. I look forward to following your patient along with you. Sincerely,        Rubin Bruner MD        CC: MD Rubin Hayes MD  11/7/2023 11:26 AM  Sign when Signing Visit  NEPHROLOGY PROGRESS NOTE    Pearl Flores 78 y.o. male MRN: [id number]  Unit/Bed#:  Encounter: [id number]  Reason for Consult: Chronic kidney disease with proteinuria    The patient is here for his routine visit says that he is enjoying himself but the biggest problem is that he has back pain on his left side. He tells me that he was being managed by pain management and it was helping but then he twisted himself a certain way and it will flareup. He does take acetaminophen which she says helps and he was just wondering if he could take ibuprofen periodically. No other complaints. ASSESSMENT/PLAN:  1. 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 He has been on chronic lithium for his manic depression and is extremely well compensated and functional on this medication. Latest creatinine is 1.3 which is even better than it was when I met him so he is at the low end of his baseline range and fortunately there is been no progression over the time I been seeing him. His blood pressure is under good control although it is a little higher today in the office but he says it is usually lower than that.     He was concerned about taking ibuprofen once in a while for pain and I explained to him that it can affect the kidney function and if he would take it intermittently it really only affects it more potentially by microscopic blood flow changes but if he keeps himself hydrated uses it on an as-needed basis occasionally I told him it would be okay. I did tell him he could discuss the medication Ultram with his pain management doctor to see if that would be an option as well. He then brought up the idea of potentially changing his lithium to see if there was a medication that could help his manic depression as well as his pain. In my opinion I would not change his medication because he is very well compensated he does not have severe toxicity and is very functional.  He is monitored very carefully and closely by his psychiatrist monitor his renal function and his levels every 3 months. For now continue with above plan  Monitor labs and follow-up as scheduled    I told to call if there is any problems or concerns before next visit. I have spent a total time of 32 minutes on 11/07/23 in caring for this patient including Diagnostic results, Prognosis, Risks and benefits of tx options, Instructions for management, and Impressions. SUBJECTIVE:  Review of Systems   Constitutional: Negative for chills, decreased appetite, diaphoresis and fever. HENT: Negative. Eyes: Negative. Cardiovascular:  Negative for chest pain, dyspnea on exertion, orthopnea and palpitations. Respiratory: Negative. Negative for cough, shortness of breath, sputum production and wheezing. Musculoskeletal:  Positive for back pain. Gastrointestinal:  Negative for abdominal pain, diarrhea, nausea and vomiting. Genitourinary:  Negative for dysuria, flank pain, hematuria and incomplete emptying. Neurological:  Negative for dizziness, focal weakness, headaches and weakness. Psychiatric/Behavioral:  Negative for altered mental status, hallucinations and hypervigilance. The patient is not nervous/anxious.         OBJECTIVE:  Current Weight: Weight - Scale: 82.1 kg (181 lb)  [email or web address]:     Height 5' 7" (1.702 m), weight 82.1 kg (181 lb). , Body mass index is 28.35 kg/m². [unfilled]    Physical Exam: Ht 5' 7" (1.702 m)   Wt 82.1 kg (181 lb)   BMI 28.35 kg/m²   Physical Exam  Constitutional:       General: He is not in acute distress. Appearance: He is not toxic-appearing or diaphoretic. HENT:      Head: Normocephalic and atraumatic. Nose: Nose normal.      Mouth/Throat:      Mouth: Mucous membranes are moist.   Eyes:      General: No scleral icterus. Extraocular Movements: Extraocular movements intact. Cardiovascular:      Rate and Rhythm: Normal rate and regular rhythm. Heart sounds: No friction rub. No gallop. Comments: Mild edema. Pulmonary:      Effort: Pulmonary effort is normal. No respiratory distress. Breath sounds: No wheezing, rhonchi or rales. Abdominal:      General: Bowel sounds are normal. There is no distension. Palpations: Abdomen is soft. Tenderness: There is no abdominal tenderness. There is no rebound. Musculoskeletal:      Cervical back: Normal range of motion and neck supple. Neurological:      General: No focal deficit present. Mental Status: He is alert and oriented to person, place, and time. Mental status is at baseline. Psychiatric:         Mood and Affect: Mood normal.         Behavior: Behavior normal.         Thought Content:  Thought content normal.         Judgment: Judgment normal.         Medications:    Current Outpatient Medications:   •  acetaminophen (TYLENOL) 500 mg tablet, Take 500 mg by mouth as needed for mild pain., Disp: , Rfl:   •  albuterol (ProAir HFA) 90 mcg/act inhaler, Inhale 2 puffs every 6 (six) hours as needed for wheezing or shortness of breath PLEASE PROVIDE 3 INHALERS FOR 90 DAY SUPPLY, Disp: 8.5 g, Rfl: 3  •  amLODIPine (NORVASC) 2.5 mg tablet, TAKE 1 TABLET BY MOUTH DAILY, Disp: 90 tablet, Rfl: 3  •  aspirin (ECOTRIN LOW STRENGTH) 81 mg EC tablet, Take 81 mg by mouth daily, Disp: , Rfl:   •  atorvastatin (LIPITOR) 40 mg tablet, TAKE 1 TABLET BY MOUTH  DAILY, Disp: 90 tablet, Rfl: 5  •  Cholecalciferol (VITAMIN D PO), Take 2,000 Units by mouth daily  , Disp: , Rfl:   •  dicyclomine (Bentyl) 20 mg tablet, Take 1 tablet (20 mg total) by mouth every 6 (six) hours as needed (abd cramping), Disp: 90 tablet, Rfl: 3  •  fexofenadine (ALLEGRA) 180 MG tablet, Take 180 mg by mouth as needed Daily during the Summer, Disp: , Rfl:   •  FLUoxetine (PROzac) 10 mg capsule, Take 10 mg by mouth daily , Disp: , Rfl:   •  fluticasone (FLONASE) 50 mcg/act nasal spray, 2 sprays into each nostril daily, Disp: , Rfl:   •  fluticasone-umeclidinium-vilanterol (Trelegy Ellipta) 200-62.5-25 mcg/actuation AEPB inhaler, Inhale 1 puff daily, Disp: 180 blister, Rfl: 3  •  lithium carbonate (LITHOBID) 300 mg CR tablet, Take 300 mg by mouth in the morning, Disp: , Rfl:   •  magnesium 30 MG tablet, Take 1 tablet (30 mg total) by mouth 2 (two) times a day, Disp: 60 tablet, Rfl: 0  •  metoprolol succinate (TOPROL-XL) 25 mg 24 hr tablet, TAKE 1 TABLET BY MOUTH  DAILY, Disp: 90 tablet, Rfl: 3  •  Mirabegron ER 25 MG TB24, Take 25 mg by mouth daily at bedtime, Disp: 90 tablet, Rfl: 3  •  mupirocin (BACTROBAN) 2 % ointment, Apply topically 2 (two) times a day as needed (wound), Disp: 22 g, Rfl: 0  •  nitroglycerin (NITRODUR) 0.2 mg/hr, APPLY 1 PATCH TOPICALLY  ONCE DAILY.  LEAVE ON FOR 12 TO 14 HOURS THEN REMOVE FOR A NITRATE-FREE INTERVAL OF  10 TO 12 HOURS (Patient taking differently: if needed), Disp: 90 patch, Rfl: 3  •  omega-3-acid ethyl esters (LOVAZA) 1 g capsule, TAKE 1 CAPSULE BY MOUTH 3  TIMES DAILY, Disp: 270 capsule, Rfl: 3  •  omeprazole (PriLOSEC) 20 mg delayed release capsule, Take 1 capsule (20 mg total) by mouth 2 (two) times a day before meals, Disp: 180 capsule, Rfl: 0  •  famotidine (PEPCID) 40 MG tablet, TAKE 1 TABLET BY MOUTH  DAILY AT BEDTIME AS NEEDED  FOR HEARTBURN (Patient not taking: Reported on 7/14/2023), Disp: 30 tablet, Rfl: 11    Laboratory Results:  Lab Results   Component Value Date    WBC 11.56 (H) 09/14/2023    HGB 13.6 09/14/2023    HCT 40.2 09/14/2023    MCV 92 09/14/2023     09/14/2023     Lab Results   Component Value Date    SODIUM 144 11/01/2023    K 4.6 11/01/2023     11/01/2023    CO2 30 11/01/2023    BUN 24 11/01/2023    CREATININE 1.36 (H) 11/01/2023    GLUC 100 04/02/2021    CALCIUM 9.1 11/01/2023     Lab Results   Component Value Date    CALCIUM 9.1 11/01/2023    PHOS 2.8 11/05/2014     No results found for: "LABPROT"

## 2023-11-07 NOTE — PATIENT INSTRUCTIONS
You are here for follow-up it was very nice to see you and I am glad to hear your health is doing well except you are suffering from the chronic back pain. You are undergoing treatment with a pain specialist.  We discussed potentially the idea of changing one of your psychiatric medications to 1 that might help with nerve pain personally I do not think it is a good idea just because you are so well compensated and treated and functional on your current medication I be concerned that it would change this. Plus we do not even know that it would help your pain. You take Tylenol and say that that helps some but if it is very bad and you need to take an ibuprofen occasionally it is okay. You can discuss with the doctor another pain medicine called [facility] if the shots are not helping or you can see what your pain specialist says about this. Guard list your kidney function stable it is even a little better than it was last year with a creatinine of 1.3 blood pressure is under control continue current medications no changes please call me if you have any questions or concerns.

## 2023-11-16 ENCOUNTER — OFFICE VISIT (OUTPATIENT)
Dept: PAIN MEDICINE | Facility: CLINIC | Age: 78
End: 2023-11-16
Payer: MEDICARE

## 2023-11-16 VITALS
SYSTOLIC BLOOD PRESSURE: 189 MMHG | BODY MASS INDEX: 28.09 KG/M2 | WEIGHT: 179 LBS | HEART RATE: 62 BPM | DIASTOLIC BLOOD PRESSURE: 86 MMHG | HEIGHT: 67 IN

## 2023-11-16 DIAGNOSIS — M54.16 LUMBAR RADICULOPATHY: Primary | ICD-10-CM

## 2023-11-16 DIAGNOSIS — M51.26 LUMBAR DISC HERNIATION: ICD-10-CM

## 2023-11-16 DIAGNOSIS — M48.062 SPINAL STENOSIS OF LUMBAR REGION WITH NEUROGENIC CLAUDICATION: ICD-10-CM

## 2023-11-16 DIAGNOSIS — M48.061 FORAMINAL STENOSIS OF LUMBAR REGION: ICD-10-CM

## 2023-11-16 DIAGNOSIS — M43.16 SPONDYLOLISTHESIS OF LUMBAR REGION: ICD-10-CM

## 2023-11-16 PROCEDURE — 99214 OFFICE O/P EST MOD 30 MIN: CPT | Performed by: ANESTHESIOLOGY

## 2023-11-21 ENCOUNTER — OFFICE VISIT (OUTPATIENT)
Dept: NEUROLOGY | Facility: CLINIC | Age: 78
End: 2023-11-21
Payer: MEDICARE

## 2023-11-21 VITALS
BODY MASS INDEX: 28.36 KG/M2 | WEIGHT: 181.1 LBS | HEART RATE: 65 BPM | DIASTOLIC BLOOD PRESSURE: 80 MMHG | TEMPERATURE: 97.6 F | SYSTOLIC BLOOD PRESSURE: 141 MMHG

## 2023-11-21 DIAGNOSIS — G20.C PARKINSONISM, UNSPECIFIED PARKINSONISM TYPE: Primary | ICD-10-CM

## 2023-11-21 PROCEDURE — 99214 OFFICE O/P EST MOD 30 MIN: CPT | Performed by: PHYSICIAN ASSISTANT

## 2023-11-21 NOTE — PROGRESS NOTES
Patient ID: Roxann Boyd is a 78 y.o. male. Assessment/Plan:    Parkinsonism [facility])  Patient with multifactorial gait disorder along with postural and action tremors in the hands. His prior brain MRI revealed progressive white matter changes consistent with chronic microangiopathic disease. He continues to have mild parkinsonism on exam however LE slowness and gait issues remain the more predominant feature. This along with his MRI findings suggest perhaps an underlying vascular parkinsonism. Hand tremors likely related to chronic Lithium use which he remains on. Tremors are overall stable and not bothersome per the patient. Discussed the options at this time including continuing to monitor for progression of symptoms and worsening of function or start a trial of low dose Sinemet to see if this would provide any benefit in regards to his gait and mobility. He does have left leg pain secondary to nerve impingement in the back. He is following with PM for injections, may consider PT when pain improves. If gait or mobility were to worsen we could consider a trial of levodopa. Discussed potential studies that can be used to distinguish between medication induced tremor, secondary parkinsonism and idiopathic Parkinson's disease. At this time given stability of symptoms I do not think that this is necessary. Subjective:    Yoana Tate is a right handed man with bipolar disorder on Lithium, atrial fibrillation, cervical and lumbar radiculopathy and left trochanteric bursitis who presents for follow up for gait difficulty for over 5 years with the question of parkinsonism (neuroleptic induced versus vascular.)  Review of chart reveals a longstanding gait dysfunction felt to be multifactorial (cervical and lumbar degenerative disease, prior myelopathy and possible peripheral neuropathy and parkinsonism). Also noted to have right hand rest tremor.      At his last visit he was noted to have tremors and mild bradykinesia and shuffling. Felt the tremors were likely related to chronic lithium use. No treatment started. Question of some mild vascular parkinsonism as well. INTERVAL HISTORY:  He does not notice the tremors at this time   Tremors are not bothersome to him in any way   The tremors do not interfere with eating, drinking, writing  Family will notice the hand tremor at times when he is talking otherwise no tremors   Overall the tremors are the same, perhaps slightly more noticeable at times   Walking is overall stable   He will feel off balance at times  He did have a fall about 6 months ago, he was walking outside and he missed a curb   He is using the cane more often recently   He can perform all of his ADLs on his own   No issues with swallowing other than related to GERD  Sleeps well other than waking to use the bathroom, he can fall back to sleep   Memory is not as good as it was, it will take him longer to think of some things   No hallucinations   Remains on Lithium, mood well controlled     Imaging:  Brain MRI  -Chronic lacunar infarct left centrum semiovale. Progressive cerebral volume loss and progressive white matter foci in periventricular regions compatible with chronic microangiopathic disease. I personally reviewed and updated the ROS. Objective:    Blood pressure 141/80, pulse 65, temperature 97.6 °F (36.4 °C), weight 82.1 kg (181 lb 1.6 oz). Physical Exam  Constitutional:       Appearance: Normal appearance. HENT:      Right Ear: Hearing normal.      Left Ear: Hearing normal.   Eyes:      Extraocular Movements: Extraocular movements intact. Pulmonary:      Effort: Pulmonary effort is normal.   Neurological:      Mental Status: He is alert. Psychiatric:         Speech: Speech normal.         Neurological Exam  Mental Status  Alert. Oriented only to person, place and situation. Recent and remote memory are intact.  Speech is normal. Follows complex commands. Attention and concentration are normal.    Cranial Nerves  CN III, IV, VI: Extraocular movements intact bilaterally. Right pupil: 1 mm. Left pupil: 1 mm. CN VII: Full and symmetric facial movement. CN VIII:  Right: Hearing is normal.  Left: Hearing is normal.  CN IX, X: Palate elevates symmetrically  CN XI: Shoulder shrug strength is normal.  CN XII: Tongue midline without atrophy or fasciculations. Motor   Normal muscle tone. Strength is 5/5 in all four extremities except as noted. Left IP 5-/5. Sensory  Light touch is normal in upper and lower extremities. Coordination  Right: Finger-to-nose normal.Left: Finger-to-nose normal.  Mild amplitude action tremor on finger-to-nose bilaterally  Mild postural tremors of the hands outstretched. No rest tremor. Mild decrement on hand movements left greater than right. .    Gait  Casual gait is normal including stance, stride, and arm swing. Able to rise from chair without using arms. Slightly wide-based. Reduction in left stride, left foot eversion   Using cane . ROS:    Review of Systems   Constitutional:  Negative for appetite change, fatigue and fever. HENT: Negative. Negative for hearing loss, tinnitus, trouble swallowing and voice change. Eyes: Negative. Negative for photophobia, pain and visual disturbance. Respiratory: Negative. Negative for shortness of breath. Cardiovascular: Negative. Negative for palpitations. Gastrointestinal: Negative. Negative for nausea and vomiting. Endocrine: Negative. Negative for cold intolerance. Genitourinary: Negative. Negative for dysuria, frequency and urgency. Musculoskeletal:  Positive for gait problem (Shuffles feet at times, has stayed the same, but pays more attention to his walking). Negative for back pain, myalgias and neck pain. Balance Issues, has stayed the same  Inflamed nerve in Back   Skin: Negative. Negative for rash.    Allergic/Immunologic: Negative. Neurological:  Positive for tremors (Right Hand, states it has stayed the same). Negative for dizziness, seizures, syncope, facial asymmetry, speech difficulty, weakness, light-headedness, numbness and headaches. Hematological: Negative. Does not bruise/bleed easily. Psychiatric/Behavioral: Negative. Negative for confusion, hallucinations and sleep disturbance. All other systems reviewed and are negative.

## 2023-11-21 NOTE — PATIENT INSTRUCTIONS
Patient with multifactorial gait disorder along with postural and action tremors in the hands. His prior brain MRI revealed progressive white matter changes consistent with chronic microangiopathic disease. He continues to have mild parkinsonism on exam however LE slowness and gait issues remain the more predominant feature. This along with his MRI findings suggest perhaps an underlying vascular parkinsonism. Hand tremors likely related to chronic Lithium use which he remains on. Tremors are overall stable and not bothersome per the patient. Discussed the options at this time including continuing to monitor for progression of symptoms and worsening of function or start a trial of low dose Sinemet to see if this would provide any benefit in regards to his gait and mobility. He does have left leg pain secondary to nerve impingement in the back. He is following with PM for injections, may consider PT when pain improves. If gait or mobility were to worsen we could consider a trial of levodopa. Discussed potential studies that can be used to distinguish between medication induced tremor, secondary parkinsonism and idiopathic Parkinson's disease. At this time given stability of symptoms I do not think that this is necessary.

## 2023-11-25 DIAGNOSIS — R35.0 FREQUENCY OF URINATION: ICD-10-CM

## 2023-11-30 NOTE — PROGRESS NOTES
Jarret Frederick from [facility] stated that patient was given 9 visits  PT and 6 visits from skilled nursing of OT. Insurance will not pay for any other treatments, she has met goals with all. Patient can be referred to Out Patient if needed. Patient is bradycardic at night  Several times dropping down to 44  I woke patient up and he said he was feeling fine  Patient stated when he came to the floor for admission this evening that he takes his lopressor xl around dinner time  I gave patient his dose when he came to the floor approx 2200  Dose is scheduled for dinner time 1800 on 1/22  Continue to monitor patient

## 2023-12-01 RX ORDER — MIRABEGRON 25 MG/1
25 TABLET, FILM COATED, EXTENDED RELEASE ORAL
Qty: 90 TABLET | Refills: 3 | Status: SHIPPED | OUTPATIENT
Start: 2023-12-01

## 2023-12-04 ENCOUNTER — OFFICE VISIT (OUTPATIENT)
Dept: PULMONOLOGY | Facility: CLINIC | Age: 78
End: 2023-12-04
Payer: MEDICARE

## 2023-12-04 VITALS
SYSTOLIC BLOOD PRESSURE: 148 MMHG | BODY MASS INDEX: 27.91 KG/M2 | HEART RATE: 67 BPM | TEMPERATURE: 96.3 F | WEIGHT: 177.8 LBS | RESPIRATION RATE: 18 BRPM | HEIGHT: 67 IN | DIASTOLIC BLOOD PRESSURE: 84 MMHG | OXYGEN SATURATION: 98 %

## 2023-12-04 DIAGNOSIS — K21.9 GASTROESOPHAGEAL REFLUX DISEASE WITHOUT ESOPHAGITIS: ICD-10-CM

## 2023-12-04 DIAGNOSIS — J44.9 CHRONIC OBSTRUCTIVE PULMONARY DISEASE, UNSPECIFIED COPD TYPE (HCC): Primary | ICD-10-CM

## 2023-12-04 DIAGNOSIS — Z85.118 HISTORY OF LUNG CANCER: ICD-10-CM

## 2023-12-04 DIAGNOSIS — J30.9 ALLERGIC RHINITIS, UNSPECIFIED SEASONALITY, UNSPECIFIED TRIGGER: ICD-10-CM

## 2023-12-04 PROCEDURE — 99214 OFFICE O/P EST MOD 30 MIN: CPT | Performed by: INTERNAL MEDICINE

## 2023-12-04 NOTE — PROGRESS NOTES
Office Progress Note - Pulmonary    Darroll Tavo 78 y.o. male MRN: [id number]    Encounter: [id number]      Assessment:  Chronic obstructive pulmonary disease. Allergic rhinitis. History of lung cancer. Gastroesophageal reflux disease. Plan:   Trelegy Ellipta 200, 1 inhalation once a day. Albuterol rescue inhaler 2 inhalations 4 times a day as needed. Fluticasone nasal spray 2 sprays to each nostril once a day. Fexofenadine 180 mg once a day during the allergy season. Follow-up in 6 months    Discussion:   The patient's COPD is in remission. I have maintained him on the Trelegy Ellipta 1 inhalation once a day. He will use the albuterol rescue inhaler 2 inhalations 4 times daily as needed for wheezing and chest tightness. Allergic rhinitis is well treated. I have maintained him on the fluticasone nasal spray 2 sprays to each nostril once a day. He already has received the influenza vaccine for the season. I will see him in 6 months. Subjective: The patient is here for follow-up visit. He denies any significant cough, wheezing or sputum production. He is taking Trelegy once a day. On average he is using the albuterol once or twice a day when he is having wheezing. However he does not use it every day. Nocturnal symptoms. He is using fluticasone nasal spray 2 sprays to each nostril once a day. Review of systems:  A 12 point system review is done and aside from what is stated above the rest of the review of systems is negative. Family history and social history are reviewed. Medications list is reviewed. Vitals: Blood pressure 148/84, pulse 67, temperature (!) 96.3 °F (35.7 °C), temperature source Tympanic, resp. rate 18, height 5' 7" (1.702 m), weight 80.6 kg (177 lb 12.8 oz), SpO2 98 %. ,     Physical Exam  Gen: Awake, alert, oriented x 3, no acute distress  HEENT: Mucous membranes moist, no oral lesions, no thrush  NECK: No accessory muscle use, JVP not elevated  Cardiac: Regular, single S1, single S2, no murmurs, no rubs, no gallops  Lungs: Decreased breath sounds. No wheezing or rhonchi. Abdomen: normoactive bowel sounds, soft nontender, nondistended, no rebound or rigidity, no guarding  Extremities: no cyanosis, no clubbing, no edema  Neuro:  Grossly nonfocal.  Skin:  No rash.     Lab Results   Component Value Date    WBC 11.56 (H) 09/14/2023    HGB 13.6 09/14/2023    HCT 40.2 09/14/2023    MCV 92 09/14/2023     09/14/2023     Lab Results   Component Value Date    SODIUM 144 11/01/2023    K 4.6 11/01/2023     11/01/2023    CO2 30 11/01/2023    BUN 24 11/01/2023    CREATININE 1.36 (H) 11/01/2023    GLUC 100 04/02/2021    CALCIUM 9.1 11/01/2023

## 2023-12-05 ENCOUNTER — OFFICE VISIT (OUTPATIENT)
Dept: OBGYN CLINIC | Facility: HOSPITAL | Age: 78
End: 2023-12-05
Payer: MEDICARE

## 2023-12-05 VITALS — WEIGHT: 179 LBS | HEIGHT: 67 IN | BODY MASS INDEX: 28.09 KG/M2

## 2023-12-05 DIAGNOSIS — M70.62 TROCHANTERIC BURSITIS OF LEFT HIP: Primary | ICD-10-CM

## 2023-12-05 PROCEDURE — 20610 DRAIN/INJ JOINT/BURSA W/O US: CPT | Performed by: ORTHOPAEDIC SURGERY

## 2023-12-05 PROCEDURE — 99213 OFFICE O/P EST LOW 20 MIN: CPT | Performed by: ORTHOPAEDIC SURGERY

## 2023-12-05 RX ORDER — BETAMETHASONE SODIUM PHOSPHATE AND BETAMETHASONE ACETATE 3; 3 MG/ML; MG/ML
12 INJECTION, SUSPENSION INTRA-ARTICULAR; INTRALESIONAL; INTRAMUSCULAR; SOFT TISSUE
Status: COMPLETED | OUTPATIENT
Start: 2023-12-05 | End: 2023-12-05

## 2023-12-05 RX ORDER — BUPIVACAINE HYDROCHLORIDE 2.5 MG/ML
2 INJECTION, SOLUTION INFILTRATION; PERINEURAL
Status: COMPLETED | OUTPATIENT
Start: 2023-12-05 | End: 2023-12-05

## 2023-12-05 RX ORDER — LIDOCAINE HYDROCHLORIDE 10 MG/ML
2 INJECTION, SOLUTION INFILTRATION; PERINEURAL
Status: COMPLETED | OUTPATIENT
Start: 2023-12-05 | End: 2023-12-05

## 2023-12-05 RX ADMIN — LIDOCAINE HYDROCHLORIDE 2 ML: 10 INJECTION, SOLUTION INFILTRATION; PERINEURAL at 10:00

## 2023-12-05 RX ADMIN — BUPIVACAINE HYDROCHLORIDE 2 ML: 2.5 INJECTION, SOLUTION INFILTRATION; PERINEURAL at 10:00

## 2023-12-05 RX ADMIN — BETAMETHASONE SODIUM PHOSPHATE AND BETAMETHASONE ACETATE 12 MG: 3; 3 INJECTION, SUSPENSION INTRA-ARTICULAR; INTRALESIONAL; INTRAMUSCULAR; SOFT TISSUE at 10:00

## 2023-12-05 NOTE — PROGRESS NOTES
Assessment:  1. Trochanteric bursitis of left hip              Plan:  Left trochanteric bursitis. The patient was provided with left trochanteric bursa steroid injection. The patient tolerated the procedure well. The patient should follow up in 3 months. To do next visit:  Return in about 3 months (around 3/5/2024). The above stated was discussed in layman's terms and the patient expressed understanding. All questions were answered to the patient's satisfaction. Scribe Attestation      I,:  Yan Franz am acting as a scribe while in the presence of the attending physician.:       I,:  Radha Pyle MD personally performed the services described in this documentation    as scribed in my presence.:               Subjective:   Michael Yepez is a 78 y.o. male who presents for follow up of left hip  He is s/p left trochanteric bursa steroid injection with 2 months benefit, 9/5/2023. Today he complains of left lateral hip pain. Prolonged standing aggravates while rest alleviates. He does work with  including lumbar injections and medications.         Review of systems negative unless otherwise specified in HPI    Past Medical History:   Diagnosis Date    Allergic 2011    Allergic rhinitis 2015    Anxiety     occasional    Aortic aneurysm ([address])     Benign colon polyp     Bipolar 1 disorder ([address])     Cancer ([address]) 2007, 2011    Cardiac disease     MI    Cervical cord compression with myelopathy (HCC)     COPD (chronic obstructive pulmonary disease) ([address])     Coronary artery disease 1995    Diverticulitis     Diverticulitis of colon prior to 2014    Diverticulosis     Emphysema of lung ([address]) 2012    Gait disturbance     uses cane, leg brace on right    GERD (gastroesophageal reflux disease)     Heart attack ([address]) 1996    Hx of resection of large bowel 05/03/2016    Hyperlipidemia     Hypertension     IBS (irritable bowel syndrome)     Inflammatory bowel disease 2012    Lumbar stenosis     Lung cancer [facility])     2007 Left lower lobectomy and 2011 Right lung with surgery     Myocardial infarction [facility])     involving other coronary artery    Prostate cancer ([address])     Shortness of breath     Small bowel obstruction ([address]) 11/16/2016       Past Surgical History:   Procedure Laterality Date    ANGIOPLASTY      stent    CARDIAC CATHETERIZATION  1995    angioplasty    CARDIAC SURGERY      CERVICAL SPINE SURGERY      Cervical decompression with cervical fusion from C3-C7 for spinal stenosis. 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    LUNG LOBECTOMY Left     NM ARTHRD ANT INTERBODY MIN [address] CRV BELOW C2 N/A 05/02/2016    Procedure: Anterior cervical diskectomy C3/4, C5/6, C6/7 with anterior plate fixation fusion C3-7;  Posterior decompressive laminectomy C3-7 with lateral mass fixation fusion C3-7 (IMPULSE MONITORING); Surgeon: Leverne Eisenmenger, MD;  Location: [facility];  Service: Neurosurgery    NM ARTHRODESIS POSTERIOR INTERBODY 1 [address] [place] LUMBAR N/A 01/30/2017    Procedure: L4-5 AND L5-S1 DECOMPRESSIVE FORAMINOTOMIES, TRANSFORAMINAL LUMBAR INTERBODY AND PEDICLE SCREW FIXATION FUSION L4-S1 (IMPULSE);   Surgeon: Leverne Eisenmenger, MD;  Location: [facility];  Service: Neurosurgery    NM Edinson Lopez RPR DPLMNT AORTO-AORTIC NDGFT N/A 08/06/2018    Procedure: REPAIR ANEURYSM ENDOVASCULAR ABDOMINAL AORTIC  (EVAR) WITH BILATERAL PERCUTANEOUS FEMORAL ACCESS WITH ULTRASOUND GUIDANCE ON THE RIGHT AND PRE CLOSURE;  Surgeon: James Velasquez MD;  Location: [facility];  Service: Vascular    PROSTATECTOMY  2007 for prostate CA - no chemo/RT    SIGMOIDECTOMY      for divertic    SMALL INTESTINE SURGERY N/A 2016    Procedure: Exploratory Laparotomy, Lysis of adhesions to release small bowel obstruction;  Surgeon: Ivana Kitchen MD;  Location: [facility];  Service:     [address] [place], [place] [address]  ,     TONSILLECTOMY  Y           Family History   Problem Relation Age of Onset    Hypertension Mother     Glaucoma Mother     Heart attack Father     Colon cancer Father     Coronary artery disease Father     Hypertension Father     Kidney disease Father     Heart disease Family     Hyperlipidemia Family     Hypertension Family        Social History     Occupational History    Occupation: Retired   Tobacco Use    Smoking status: Former     Packs/day: 1.00     Years: 35.00     Total pack years: 35.00     Types: Cigarettes     Start date: 1967     Quit date: 2011     Years since quittin.9    Smokeless tobacco: Never   Vaping Use    Vaping Use: Never used   Substance and Sexual Activity    Alcohol use:  Yes     Alcohol/week: 2.0 - 3.0 standard drinks of alcohol     Types: 2 - 3 Shots of liquor per week     Comment: One glass per day    Drug use: No    Sexual activity: Not Currently     Partners: Female     Birth control/protection: Post-menopausal, None         Current Outpatient Medications:     acetaminophen (TYLENOL) 500 mg tablet, Take 500 mg by mouth as needed for mild pain., Disp: , Rfl:     albuterol (ProAir HFA) 90 mcg/act inhaler, Inhale 2 puffs every 6 (six) hours as needed for wheezing or shortness of breath PLEASE PROVIDE 3 INHALERS FOR 90 DAY SUPPLY, Disp: 8.5 g, Rfl: 3    amLODIPine (NORVASC) 2.5 mg tablet, TAKE 1 TABLET BY MOUTH DAILY, Disp: 90 tablet, Rfl: 3    aspirin (ECOTRIN LOW STRENGTH) 81 mg EC tablet, Take 81 mg by mouth daily, Disp: , Rfl:     atorvastatin (LIPITOR) 40 mg tablet, TAKE 1 TABLET BY MOUTH  DAILY, Disp: 90 tablet, Rfl: 5    Cholecalciferol (VITAMIN D PO), Take 2,000 Units by mouth daily  , Disp: , Rfl:     dicyclomine (Bentyl) 20 mg tablet, Take 1 tablet (20 mg total) by mouth every 6 (six) hours as needed (abd cramping), Disp: 90 tablet, Rfl: 3    famotidine (PEPCID) 40 MG tablet, TAKE 1 TABLET BY MOUTH  DAILY AT BEDTIME AS NEEDED  FOR HEARTBURN (Patient not taking: Reported on 7/14/2023), Disp: 30 tablet, Rfl: 11    fexofenadine (ALLEGRA) 180 MG tablet, Take 180 mg by mouth as needed Daily during the Summer, Disp: , Rfl:     FLUoxetine (PROzac) 10 mg capsule, Take 10 mg by mouth daily , Disp: , Rfl:     fluticasone (FLONASE) 50 mcg/act nasal spray, 2 sprays into each nostril daily, Disp: , Rfl:     fluticasone-umeclidinium-vilanterol (Trelegy Ellipta) 200-62.5-25 mcg/actuation AEPB inhaler, Inhale 1 puff daily, Disp: 180 blister, Rfl: 3    lithium carbonate (LITHOBID) 300 mg CR tablet, Take 300 mg by mouth in the morning, Disp: , Rfl:     magnesium 30 MG tablet, Take 1 tablet (30 mg total) by mouth 2 (two) times a day, Disp: 60 tablet, Rfl: 0    metoprolol succinate (TOPROL-XL) 25 mg 24 hr tablet, TAKE 1 TABLET BY MOUTH  DAILY, Disp: 90 tablet, Rfl: 3    mupirocin (BACTROBAN) 2 % ointment, Apply topically 2 (two) times a day as needed (wound), Disp: 22 g, Rfl: 0    Myrbetriq 25 MG TB24, TAKE 1 TABLET BY MOUTH DAILY AT  BEDTIME, Disp: 90 tablet, Rfl: 3    nitroglycerin (NITRODUR) 0.2 mg/hr, APPLY 1 PATCH TOPICALLY  ONCE DAILY.  LEAVE ON FOR 12 TO 14 HOURS THEN REMOVE FOR A NITRATE-FREE INTERVAL OF  10 TO 12 HOURS (Patient taking differently: if needed), Disp: 90 patch, Rfl: 3    omega-3-acid ethyl esters (LOVAZA) 1 g capsule, TAKE 1 CAPSULE BY MOUTH 3  TIMES DAILY, Disp: 270 capsule, Rfl: 3    omeprazole (PriLOSEC) 20 mg delayed release capsule, Take 1 capsule (20 mg total) by mouth 2 (two) times a day before meals, Disp: 180 capsule, Rfl: 0    Allergies   Allergen Reactions    Bactrim [Sulfamethoxazole-Trimethoprim] Other (See Comments)     AILYN    Nsaids      Annotation - [id number]: unable to take due to use of lithium. Oxycodone Rash            There were no vitals filed for this visit. Objective:  Physical exam  General: Awake, Alert, Oriented  Eyes: Pupils equal, round and reactive to light  Heart: regular rate and rhythm  Lungs: No audible wheezing  Abdomen: soft                    Ortho Exam  Left hip:  TTP over greater trochanter  Good arc of motion  Patient sits comfortably in chair with hip flexed at 90 degrees  Patient stands from seated position without assistance  Calf compartments soft and supple  Sensation intact  Toes are warm sensate and mobile      Diagnostics, reviewed and taken today if performed as documented:    None performed     Procedures, if performed today:    Large joint arthrocentesis: L greater trochanteric bursa  Universal Protocol:  Consent: Verbal consent obtained. Risks and benefits: risks, benefits and alternatives were discussed  Consent given by: patient  Time out: Immediately prior to procedure a "time out" was called to verify the correct patient, procedure, equipment, support staff and site/side marked as required. Timeout called at: 12/5/2023 10:42 AM.  Patient understanding: patient states understanding of the procedure being performed  Site marked: the operative site was marked  Patient identity confirmed: verbally with patient  Supporting Documentation  Indications: pain   Procedure Details  Location: hip - L greater trochanteric bursa  Needle size: 22 G  Ultrasound guidance: no  Approach: lateral  Medications administered: 12 mg betamethasone acetate-betamethasone sodium phosphate 6 (3-3) mg/mL; 2 mL bupivacaine 0.25 %; 2 mL lidocaine 1 %    Patient tolerance: patient tolerated the procedure well with no immediate complications  Dressing:  Sterile dressing applied            Portions of the record may have been created with voice recognition software.   Occasional wrong word or "sound a like" substitutions may have occurred due to the inherent limitations of voice recognition software. Read the chart carefully and recognize, using context, where substitutions have occurred.

## 2023-12-06 ENCOUNTER — APPOINTMENT (OUTPATIENT)
Dept: LAB | Facility: CLINIC | Age: 78
End: 2023-12-06
Payer: MEDICARE

## 2023-12-06 DIAGNOSIS — F31.60 MIXED BIPOLAR I DISORDER (HCC): ICD-10-CM

## 2023-12-06 LAB
ALBUMIN SERPL BCP-MCNC: 3.9 G/DL (ref 3.5–5)
ALP SERPL-CCNC: 103 U/L (ref 34–104)
ALT SERPL W P-5'-P-CCNC: 22 U/L (ref 7–52)
ANION GAP SERPL CALCULATED.3IONS-SCNC: 10 MMOL/L
AST SERPL W P-5'-P-CCNC: 24 U/L (ref 13–39)
BILIRUB SERPL-MCNC: 0.61 MG/DL (ref 0.2–1)
BUN SERPL-MCNC: 32 MG/DL (ref 5–25)
CALCIUM SERPL-MCNC: 8.8 MG/DL (ref 8.4–10.2)
CHLORIDE SERPL-SCNC: 106 MMOL/L (ref 96–108)
CO2 SERPL-SCNC: 26 MMOL/L (ref 21–32)
CREAT SERPL-MCNC: 1.71 MG/DL (ref 0.6–1.3)
GFR SERPL CREATININE-BSD FRML MDRD: 38 ML/MIN/1.73SQ M
GLUCOSE P FAST SERPL-MCNC: 104 MG/DL (ref 65–99)
LITHIUM SERPL-SCNC: 0.5 MMOL/L (ref 0.6–1.2)
MAGNESIUM SERPL-MCNC: 0.9 MG/DL (ref 1.9–2.7)
POTASSIUM SERPL-SCNC: 4.6 MMOL/L (ref 3.5–5.3)
PROT SERPL-MCNC: 6.5 G/DL (ref 6.4–8.4)
PSA SERPL-MCNC: <0.01 NG/ML (ref 0–4)
SODIUM SERPL-SCNC: 142 MMOL/L (ref 135–147)
TSH SERPL DL<=0.05 MIU/L-ACNC: 0.5 UIU/ML (ref 0.45–4.5)

## 2023-12-06 PROCEDURE — 36415 COLL VENOUS BLD VENIPUNCTURE: CPT

## 2023-12-06 PROCEDURE — 84153 ASSAY OF PSA TOTAL: CPT

## 2023-12-06 PROCEDURE — 80053 COMPREHEN METABOLIC PANEL: CPT

## 2023-12-06 PROCEDURE — 83735 ASSAY OF MAGNESIUM: CPT

## 2023-12-06 PROCEDURE — 84443 ASSAY THYROID STIM HORMONE: CPT

## 2023-12-06 PROCEDURE — 80178 ASSAY OF LITHIUM: CPT

## 2023-12-07 ENCOUNTER — TELEPHONE (OUTPATIENT)
Age: 78
End: 2023-12-07

## 2023-12-07 NOTE — TELEPHONE ENCOUNTER
Yayo Coreas is calling is regards to his magnesium medication. He states he received the message from Dr. Cathie Garza that he should double his dose for 3 days. He wanted to let him know that he has the magnesium pills and he starting doubling dose this morning. He will continue to do this for 3 days and then he assumes to go back to normal dose after the 3 days. He states he doesn't need a call back unless he shouldn't go back to normal dosage after the 3 days. Please advise, Thank you.

## 2023-12-29 ENCOUNTER — OFFICE VISIT (OUTPATIENT)
Dept: INTERNAL MEDICINE CLINIC | Facility: CLINIC | Age: 78
End: 2023-12-29
Payer: COMMERCIAL

## 2023-12-29 ENCOUNTER — TRANSCRIBE ORDERS (OUTPATIENT)
Dept: VASCULAR SURGERY | Facility: CLINIC | Age: 78
End: 2023-12-29

## 2023-12-29 VITALS
BODY MASS INDEX: 27.94 KG/M2 | WEIGHT: 178.4 LBS | HEART RATE: 61 BPM | DIASTOLIC BLOOD PRESSURE: 82 MMHG | OXYGEN SATURATION: 99 % | SYSTOLIC BLOOD PRESSURE: 138 MMHG

## 2023-12-29 DIAGNOSIS — Z00.00 MEDICARE ANNUAL WELLNESS VISIT, SUBSEQUENT: ICD-10-CM

## 2023-12-29 DIAGNOSIS — E83.42 HYPOMAGNESEMIA: Primary | ICD-10-CM

## 2023-12-29 DIAGNOSIS — Z98.890 S/P ENDOVASCULAR ANEURYSM REPAIR: Primary | ICD-10-CM

## 2023-12-29 DIAGNOSIS — Z86.79 S/P ENDOVASCULAR ANEURYSM REPAIR: Primary | ICD-10-CM

## 2023-12-29 DIAGNOSIS — E78.2 MIXED HYPERLIPIDEMIA: ICD-10-CM

## 2023-12-29 DIAGNOSIS — I10 ESSENTIAL HYPERTENSION: ICD-10-CM

## 2023-12-29 PROCEDURE — G0439 PPPS, SUBSEQ VISIT: HCPCS | Performed by: INTERNAL MEDICINE

## 2023-12-29 PROCEDURE — 99214 OFFICE O/P EST MOD 30 MIN: CPT | Performed by: INTERNAL MEDICINE

## 2023-12-29 NOTE — PROGRESS NOTES
Assessment and Plan:     Problem List Items Addressed This Visit        Cardiovascular and Mediastinum    Essential hypertension     Controlled, continue meds            Other    Mixed hyperlipidemia     Continue statin along with healthy diet and exercise         Medicare annual wellness visit, subsequent     Discussed preventative health, cancer screening, immunizations, and safety issues.  Last colonoscopy February 2023 with recommendations to recheck again in 5 years.  I recommend RSV vaccine at pharmacy.         Hypomagnesemia - Primary     Patient currently on 60 mg of magnesium daily, will recheck, and if still low, we can just give him the 400 mg daily         Relevant Orders    Comprehensive metabolic panel    Magnesium         Falls Plan of Care: balance, strength, and gait training instructions were provided.       Preventive health issues were discussed with patient, and age appropriate screening tests were ordered as noted in patient's After Visit Summary.  Personalized health advice and appropriate referrals for health education or preventive services given if needed, as noted in patient's After Visit Summary.     History of Present Illness:     Patient presents for a Medicare Wellness Visit    Patient here for wellness and some concerns       Patient Care Team:  Brendan Suarez MD as PCP - General  MD Jose Antonio Garnett MD Samuel Giamber, MD Mohamed Abdulhafid Turki, MD Camille Eyvazzadeh, MD David Shields, MD Daniel Kevin O'Rourke, MD Adam Dratch, MD (Nephrology)     Review of Systems:     Review of Systems   Constitutional:  Negative for chills, fatigue and fever.   HENT:  Negative for congestion, nosebleeds, postnasal drip, sore throat and trouble swallowing.    Eyes:  Negative for pain.   Respiratory:  Negative for cough, chest tightness, shortness of breath and wheezing.    Cardiovascular:  Negative for chest pain, palpitations and leg swelling.   Gastrointestinal:  Negative  for abdominal pain, constipation, diarrhea, nausea and vomiting.   Endocrine: Negative for polydipsia and polyuria.   Genitourinary:  Negative for dysuria, flank pain and hematuria.   Musculoskeletal:  Negative for arthralgias.   Skin:  Negative for rash.   Neurological:  Positive for tremors. Negative for dizziness, light-headedness and headaches.   Hematological:  Does not bruise/bleed easily.   Psychiatric/Behavioral:  Negative for confusion and dysphoric mood. The patient is not nervous/anxious.         Problem List:     Patient Active Problem List   Diagnosis   • Gait instability   • Cervical myelopathy ([facility])   • Adenocarcinoma of prostate ([facility])   • CAD (coronary artery disease)   • Bipolar affective disorder ([facility])   • COPD (chronic obstructive pulmonary disease) ([facility])   • Gastroesophageal reflux disease   • Mixed hyperlipidemia   • Essential hypertension   • Aneurysm of infrarenal abdominal aorta ([facility])   • H/O lumbosacral spine surgery   • Stage 3a chronic kidney disease ([facility])   • Impaired mobility and activities of daily living   • DDD (degenerative disc disease), lumbar   • Foraminal stenosis of lumbar region   • Spondylolisthesis of lumbar region   • Myelopathy concurrent with and due to lumbosacral intervertebral disc disorder   • Arrhythmia   • Diastolic heart failure ([facility])   • New onset atrial fibrillation ([facility])   • Pain in left hip   • Greater trochanteric bursitis, left   • Skin lesion   • Medicare annual wellness visit, subsequent   • Trochanteric bursitis of left hip   • Primary osteoarthritis of left hip   • Malignant neoplasm of lung ([facility])   • Excessive gas   • Extradural cyst of spine   • Lumbar disc herniation   • Gait abnormality   • Skin lump of leg, right   • Seasonal allergic rhinitis due to pollen   • Braces as ambulation aid   • Parkinsonism   • Change in mental status   • Frequency of urination   • Skin tear of right lower leg without complication   • Proteinuria   • S/P endovascular  aneurysm repair   • Myocardial infarction (HCC)   • Adenoma of colon   • Abdominal aortic aneurysm (AAA) without rupture, unspecified part ([facility])   • Lumbar radiculopathy   • Spinal stenosis of lumbar region with neurogenic claudication   • Hypomagnesemia      Past Medical and Surgical History:     Past Medical History:   Diagnosis Date   • Allergic 2011   • Allergic rhinitis 2015   • Anxiety     occasional   • Aortic aneurysm (HCC)    • Benign colon polyp    • Bipolar 1 disorder ([facility])    • Cancer ([facility]) 2007, 2011   • Cardiac disease     MI   • Cervical cord compression with myelopathy ([facility])    • COPD (chronic obstructive pulmonary disease) ([facility])    • Coronary artery disease 1995   • Diverticulitis    • Diverticulitis of colon prior to 2014   • Diverticulosis    • Emphysema of lung ([facility]) 2012   • Gait disturbance     uses cane, leg brace on right   • GERD (gastroesophageal reflux disease)    • Heart attack ([facility]) 1996   • Hx of resection of large bowel 05/03/2016   • Hyperlipidemia    • Hypertension    • IBS (irritable bowel syndrome)    • Inflammatory bowel disease 2012   • Lumbar stenosis    • Lung cancer ([facility])     2007 Left lower lobectomy and 2011 Right lung with surgery    • Myocardial infarction (HCC)     involving other coronary artery   • Prostate cancer ([facility])    • Shortness of breath    • Small bowel obstruction ([facility]) 11/16/2016     Past Surgical History:   Procedure Laterality Date   • ANGIOPLASTY      stent   • CARDIAC CATHETERIZATION  1995    angioplasty   • CARDIAC SURGERY     • CERVICAL SPINE SURGERY      Cervical decompression with cervical fusion from C3-C7 for spinal stenosis.   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   • LUNG LOBECTOMY Left    • LA ARTHRD ANT INTERBODY MIN DSC CRV BELOW C2 N/A 05/02/2016    Procedure: Anterior cervical diskectomy C3/4, C5/6, C6/7 with anterior plate fixation fusion C3-7;  Posterior decompressive laminectomy C3-7 with lateral mass fixation fusion C3-7 (IMPULSE MONITORING);  Surgeon: Jose Luis Moore MD;  Location: [facility];  Service: Neurosurgery   • LA ARTHRODESIS POSTERIOR INTERBODY 1 [facility] LUMBAR N/A 01/30/2017    Procedure: L4-5 AND L5-S1 DECOMPRESSIVE FORAMINOTOMIES, TRANSFORAMINAL LUMBAR INTERBODY AND PEDICLE SCREW FIXATION FUSION L4-S1 (IMPULSE);  Surgeon: Jose Luis Moore MD;  Location: [facility];  Service: Neurosurgery   • LA EVASC RPR DPLMNT AORTO-AORTIC NDGFT N/A 08/06/2018    Procedure: REPAIR ANEURYSM ENDOVASCULAR ABDOMINAL AORTIC  (EVAR) WITH BILATERAL PERCUTANEOUS FEMORAL ACCESS WITH ULTRASOUND GUIDANCE ON THE RIGHT AND PRE CLOSURE;  Surgeon: Shan Villalobos MD;  Location: [facility];  Service: Vascular   • PROSTATECTOMY  2007    for prostate CA - no chemo/RT   • SIGMOIDECTOMY      for divertic   • SMALL INTESTINE SURGERY N/A 11/17/2016    Procedure: Exploratory Laparotomy, Lysis of adhesions to release small bowel obstruction;  Surgeon: Aminta Sim MD;  Location: [facility];  Service:    • SPINE SURGERY  2016, 2017   • TONSILLECTOMY  Y    1952      Family History:     Family History   Problem Relation Age of Onset   • Hypertension Mother    • Glaucoma Mother    • Heart attack Father    • Colon cancer Father    • Coronary artery disease Father    • Hypertension Father    • Kidney disease Father    • Heart disease Family    • Hyperlipidemia Family    • Hypertension Family       Social History:     Social History     Socioeconomic History   • Marital status: /Civil Union     Spouse name: None   • Number of children: 1   • Years of  education: None   • Highest education level: None   Occupational History   • Occupation: Retired   Tobacco Use   • Smoking status: Former     Current packs/day: 0.00     Average packs/day: 1 pack/day for 44.0 years (44.0 ttl pk-yrs)     Types: Cigarettes     Start date: 1967     Quit date: 2011     Years since quittin.0   • Smokeless tobacco: Never   Vaping Use   • Vaping status: Never Used   Substance and Sexual Activity   • Alcohol use: Yes     Alcohol/week: 2.0 - 3.0 standard drinks of alcohol     Types: 2 - 3 Shots of liquor per week     Comment: One glass per day   • Drug use: No   • Sexual activity: Not Currently     Partners: Female     Birth control/protection: Post-menopausal, None   Other Topics Concern   • None   Social History Narrative   • None     Social Determinants of Health     Financial Resource Strain: Low Risk  (2023)    Overall Financial Resource Strain (CARDIA)    • Difficulty of Paying Living Expenses: Not hard at all   Food Insecurity: Not on file   Transportation Needs: No Transportation Needs (2023)    PRAPARE - Transportation    • Lack of Transportation (Medical): No    • Lack of Transportation (Non-Medical): No   Physical Activity: Not on file   Stress: Not on file   Social Connections: Not on file   Intimate Partner Violence: Not on file   Housing Stability: Not on file      Medications and Allergies:     Current Outpatient Medications   Medication Sig Dispense Refill   • acetaminophen (TYLENOL) 500 mg tablet Take 500 mg by mouth as needed for mild pain.     • amLODIPine (NORVASC) 2.5 mg tablet TAKE 1 TABLET BY MOUTH DAILY 90 tablet 3   • aspirin (ECOTRIN LOW STRENGTH) 81 mg EC tablet Take 81 mg by mouth daily     • atorvastatin (LIPITOR) 40 mg tablet TAKE 1 TABLET BY MOUTH  DAILY 90 tablet 5   • Cholecalciferol (VITAMIN D PO) Take 2,000 Units by mouth daily       • dicyclomine (Bentyl) 20 mg tablet Take 1 tablet (20 mg total) by mouth every 6 (six) hours as  needed (abd cramping) 90 tablet 3   • fexofenadine (ALLEGRA) 180 MG tablet Take 180 mg by mouth as needed Daily during the Summer     • FLUoxetine (PROzac) 10 mg capsule Take 10 mg by mouth daily      • fluticasone (FLONASE) 50 mcg/act nasal spray 2 sprays into each nostril daily     • fluticasone-umeclidinium-vilanterol (Trelegy Ellipta) 200-62.5-25 mcg/actuation AEPB inhaler Inhale 1 puff daily 180 blister 3   • lithium carbonate (LITHOBID) 300 mg CR tablet Take 300 mg by mouth in the morning     • metoprolol succinate (TOPROL-XL) 25 mg 24 hr tablet TAKE 1 TABLET BY MOUTH  DAILY 90 tablet 3   • mupirocin (BACTROBAN) 2 % ointment Apply topically 2 (two) times a day as needed (wound) 22 g 0   • Myrbetriq 25 MG TB24 TAKE 1 TABLET BY MOUTH DAILY AT  BEDTIME 90 tablet 3   • nitroglycerin (NITRODUR) 0.2 mg/hr APPLY 1 PATCH TOPICALLY  ONCE DAILY. LEAVE ON FOR 12 TO 14 HOURS THEN REMOVE FOR A NITRATE-FREE INTERVAL OF  10 TO 12 HOURS (Patient taking differently: if needed) 90 patch 3   • omega-3-acid ethyl esters (LOVAZA) 1 g capsule TAKE 1 CAPSULE BY MOUTH 3  TIMES DAILY 270 capsule 3   • famotidine (PEPCID) 40 MG tablet TAKE 1 TABLET BY MOUTH  DAILY AT BEDTIME AS NEEDED  FOR HEARTBURN (Patient not taking: Reported on 7/14/2023) 30 tablet 11   • magnesium 30 MG tablet Take 1 tablet (30 mg total) by mouth 2 (two) times a day 60 tablet 0   • omeprazole (PriLOSEC) 20 mg delayed release capsule Take 1 capsule (20 mg total) by mouth 2 (two) times a day before meals 180 capsule 0     No current facility-administered medications for this visit.     Allergies   Allergen Reactions   • Bactrim [Sulfamethoxazole-Trimethoprim] Other (See Comments)     AILYN   • Nsaids      Annotation - 20Nov2017: unable to take due to use of lithium.   • Oxycodone Rash      Immunizations:     Immunization History   Administered Date(s) Administered   • COVID-19 PFIZER VACCINE 0.3 ML IM 02/17/2021, 03/10/2021, 10/26/2021   • COVID-19 Pfizer vac  (Satya-sucrose, gray cap) 12 yr+ IM 08/03/2022   • COVID-19, unspecified 11/06/2023   • INFLUENZA 10/01/2015, 10/07/2018, 09/27/2019, 09/27/2021, 10/09/2023   • Influenza Quadrivalent Preservative Free 3 years and older IM 10/01/2015   • Influenza Split High Dose Preservative Free IM 10/25/2016, 10/03/2018   • Influenza, high dose seasonal 0.7 mL 09/27/2019, 10/05/2020, 10/14/2022, 10/09/2023   • Influenza, seasonal, injectable 1945, 10/10/2012   • Pneumococcal Conjugate 13-Valent 10/25/2016   • Pneumococcal Polysaccharide PPV23 10/03/2019   • Td (adult), adsorbed 12/01/2009   • Tdap 05/31/2023   • Zoster 01/01/2014, 01/01/2014   • influenza, trivalent, adjuvanted 09/27/2021      Health Maintenance:         Topic Date Due   • Colorectal Cancer Screening  02/22/2028   • Hepatitis C Screening  Completed         Topic Date Due   • COVID-19 Vaccine (6 - 2023-24 season) 01/01/2024      Medicare Screening Tests and Risk Assessments:     Tonny is here for his Subsequent Wellness visit.     Health Risk Assessment:   Patient rates overall health as fair. Patient feels that their physical health rating is same. Patient is very satisfied with their life. Eyesight was rated as slightly worse. Hearing was rated as slightly worse. Patient feels that their emotional and mental health rating is same. Patients states they are sometimes angry. Patient states they are sometimes unusually tired/fatigued. Pain experienced in the last 7 days has been some. Patient's pain rating has been 7/10. Patient states that he has experienced no weight loss or gain in last 6 months.     Fall Risk Screening:   In the past year, patient has experienced: history of falling in past year      Home Safety:  Patient does not have trouble with stairs inside or outside of their home. Patient has working smoke alarms and has working carbon monoxide detector. Home safety hazards include: loose rugs on the floor.     Nutrition:   Current diet is Regular.      Medications:   Patient is currently taking over-the-counter supplements. OTC medications include: see medication list. Patient is able to manage medications.     Activities of Daily Living (ADLs)/Instrumental Activities of Daily Living (IADLs):   Walk and transfer into and out of bed and chair?: Yes  Dress and groom yourself?: Yes    Bathe or shower yourself?: Yes    Feed yourself? Yes  Do your laundry/housekeeping?: Yes  Manage your money, pay your bills and track your expenses?: Yes  Make your own meals?: Yes    Do your own shopping?: Yes    Previous Hospitalizations:   Any hospitalizations or ED visits within the last 12 months?: Yes    How many hospitalizations have you had in the last year?: 1-2    Advance Care Planning:   Living will: Yes    Durable POA for healthcare: Yes    Advanced directive: Yes      Cognitive Screening:   Provider or family/friend/caregiver concerned regarding cognition?: No    PREVENTIVE SCREENINGS      Cardiovascular Screening:    General: Screening Not Indicated, History Lipid Disorder and Risks and Benefits Discussed    Due for: Lipid Panel      Diabetes Screening:     General: Screening Current and Risks and Benefits Discussed      Colorectal Cancer Screening:     General: Screening Current      Prostate Cancer Screening:    General: History Prostate Cancer, Screening Not Indicated and Risks and Benefits Discussed      Osteoporosis Screening:    General: Risks and Benefits Discussed      Abdominal Aortic Aneurysm (AAA) Screening:    Risk factors include: tobacco use        General: Screening Not Indicated and History AAA      Lung Cancer Screening:     General: Screening Not Indicated and History Lung Cancer      Hepatitis C Screening:    General: Screening Current    Screening, Brief Intervention, and Referral to Treatment (SBIRT)    Screening  Typical number of drinks in a day: 1  Typical number of drinks in a week: 6  Interpretation: Low risk drinking behavior.    AUDIT-C  Screenin) How often did you have a drink containing alcohol in the past year? 4 or more times a week  2) How many drinks did you have on a typical day when you were drinking in the past year? 1 to 2  3) How often did you have 6 or more drinks on one occasion in the past year? never    AUDIT-C Score: 4  Interpretation: Score 4-12 (male): POSITIVE screen for alcohol misuse    AUDIT Screenin) How often during the last year have you found that you were not able to stop drinking once you had started? 0 - never  5) How often during the last year have you failed to do what was normally expected from you because of drinking? 0 - never  6) How often during the last year have you needed a first drink in the morning to get yourself going after a heavy drinking session? 0 - never  7) How often during the last year have you had a feeling of guilt or remorse after drinking? 0 - never  8) How often during the last year have you been unable to remember what happened the night before because you had been drinking? 0 - never  9) Have you or someone else been injured as a result of your drinking? 0 - no  10) Has a relative or friend or a doctor or another health worker been concerned about your drinking or suggested you cut down? 0 - no    AUDIT Score: 4  Interpretation: Low risk alcohol consumption    Single Item Drug Screening:  How often have you used an illegal drug (including marijuana) or a prescription medication for non-medical reasons in the past year? never    Single Item Drug Screen Score: 0  Interpretation: Negative screen for possible drug use disorder    Brief Intervention  Alcohol & drug use screenings were reviewed. No concerns regarding substance use disorder identified.     Other Counseling Topics:   Car/seat belt/driving safety, skin self-exam and sunscreen.     No results found.     Physical Exam:     /82 (BP Location: Left arm, Patient Position: Sitting, Cuff Size: Large)   Pulse 61   Wt 80.9 kg  (178 lb 6.4 oz)   SpO2 99%   BMI 27.94 kg/m²     Physical Exam  Vitals reviewed.   Constitutional:       General: He is not in acute distress.     Appearance: He is well-developed.   HENT:      Head: Normocephalic and atraumatic.      Right Ear: External ear normal.      Left Ear: External ear normal.      Nose: Nose normal.   Eyes:      General: No scleral icterus.     Conjunctiva/sclera: Conjunctivae normal.   Neck:      Trachea: No tracheal deviation.   Cardiovascular:      Rate and Rhythm: Normal rate and regular rhythm.      Heart sounds: Normal heart sounds. No murmur heard.  Pulmonary:      Effort: Pulmonary effort is normal. No respiratory distress.      Breath sounds: Normal breath sounds. No wheezing or rales.   Musculoskeletal:      Cervical back: Normal range of motion and neck supple.      Right lower leg: Edema (mild) present.      Left lower leg: Edema (mild) present.   Lymphadenopathy:      Cervical: No cervical adenopathy.   Skin:     Coloration: Skin is not jaundiced or pale.   Neurological:      General: No focal deficit present.      Mental Status: He is alert and oriented to person, place, and time.   Psychiatric:         Mood and Affect: Mood normal.         Behavior: Behavior normal.         Thought Content: Thought content normal.         Judgment: Judgment normal.          Brendan Suarez MD

## 2023-12-29 NOTE — ASSESSMENT & PLAN NOTE
Discussed preventative health, cancer screening, immunizations, and safety issues.  Last colonoscopy February 2023 with recommendations to recheck again in 5 years.  I recommend RSV vaccine at pharmacy.

## 2023-12-29 NOTE — ASSESSMENT & PLAN NOTE
Patient currently on 60 mg of magnesium daily, will recheck, and if still low, we can just give him the 400 mg daily

## 2023-12-29 NOTE — PATIENT INSTRUCTIONS
Problem List Items Addressed This Visit          Cardiovascular and Mediastinum    Essential hypertension     Controlled, continue meds            Other    Mixed hyperlipidemia     Continue statin along with healthy diet and exercise         Medicare annual wellness visit, subsequent     Discussed preventative health, cancer screening, immunizations, and safety issues.  Last colonoscopy February 2023 with recommendations to recheck again in 5 years.  I recommend RSV vaccine at pharmacy.         Hypomagnesemia - Primary     Patient currently on 60 mg of magnesium daily, will recheck, and if still low, we can just give him the 400 mg daily         Relevant Orders    Comprehensive metabolic panel    Magnesium         Medicare Preventive Visit Patient Instructions  Thank you for completing your Welcome to Medicare Visit or Medicare Annual Wellness Visit today. Your next wellness visit will be due in one year (12/29/2024).  The screening/preventive services that you may require over the next 5-10 years are detailed below. Some tests may not apply to you based off risk factors and/or age. Screening tests ordered at today's visit but not completed yet may show as past due. Also, please note that scanned in results may not display below.  Preventive Screenings:  Service Recommendations Previous Testing/Comments   Colorectal Cancer Screening  Colonoscopy    Fecal Occult Blood Test (FOBT)/Fecal Immunochemical Test (FIT)  Fecal DNA/Cologuard Test  Flexible Sigmoidoscopy Age: 45-75 years old   Colonoscopy: every 10 years (May be performed more frequently if at higher risk)  OR  FOBT/FIT: every 1 year  OR  Cologuard: every 3 years  OR  Sigmoidoscopy: every 5 years  Screening may be recommended earlier than age 45 if at higher risk for colorectal cancer. Also, an individualized decision between you and your healthcare provider will decide whether screening between the ages of 76-85 would be appropriate. Colonoscopy:  02/23/2023  FOBT/FIT: Not on file  Cologuard: Not on file  Sigmoidoscopy: Not on file          Prostate Cancer Screening Individualized decision between patient and health care provider in men between ages of 55-69   Medicare will cover every 12 months beginning on the day after your 50th birthday PSA: <0.01 ng/mL           Hepatitis C Screening Once for adults born between 1945 and 1965  More frequently in patients at high risk for Hepatitis C Hep C Antibody: 06/07/2017        Diabetes Screening 1-2 times per year if you're at risk for diabetes or have pre-diabetes Fasting glucose: 104 mg/dL (12/6/2023)  A1C: 5.2 % (10/20/2021)      Cholesterol Screening Once every 5 years if you don't have a lipid disorder. May order more often based on risk factors. Lipid panel: 10/06/2022         Other Preventive Screenings Covered by Medicare:  Abdominal Aortic Aneurysm (AAA) Screening: covered once if your at risk. You're considered to be at risk if you have a family history of AAA or a male between the age of 65-75 who smoking at least 100 cigarettes in your lifetime.  Lung Cancer Screening: covers low dose CT scan once per year if you meet all of the following conditions: (1) Age 55-77; (2) No signs or symptoms of lung cancer; (3) Current smoker or have quit smoking within the last 15 years; (4) You have a tobacco smoking history of at least 20 pack years (packs per day x number of years you smoked); (5) You get a written order from a healthcare provider.  Glaucoma Screening: covered annually if you're considered high risk: (1) You have diabetes OR (2) Family history of glaucoma OR (3)  aged 50 and older OR (4)  American aged 65 and older  Osteoporosis Screening: covered every 2 years if you meet one of the following conditions: (1) Have a vertebral abnormality; (2) On glucocorticoid therapy for more than 3 months; (3) Have primary hyperparathyroidism; (4) On osteoporosis medications and need to  assess response to drug therapy.  HIV Screening: covered annually if you're between the age of 15-65. Also covered annually if you are younger than 15 and older than 65 with risk factors for HIV infection. For pregnant patients, it is covered up to 3 times per pregnancy.    Immunizations:  Immunization Recommendations   Influenza Vaccine Annual influenza vaccination during flu season is recommended for all persons aged >= 6 months who do not have contraindications   Pneumococcal Vaccine   * Pneumococcal conjugate vaccine = PCV13 (Prevnar 13), PCV15 (Vaxneuvance), PCV20 (Prevnar 20)  * Pneumococcal polysaccharide vaccine = PPSV23 (Pneumovax) Adults 19-65 yo with certain risk factors or if 65+ yo  If never received any pneumonia vaccine: recommend Prevnar 20 (PCV20)  Give PCV20 if previously received 1 dose of PCV13 or PPSV23   Hepatitis B Vaccine 3 dose series if at intermediate or high risk (ex: diabetes, end stage renal disease, liver disease)   Respiratory syncytial virus (RSV) Vaccine - COVERED BY MEDICARE PART D  * RSVPreF3 (Arexvy) CDC recommends that adults 60 years of age and older may receive a single dose of RSV vaccine using shared clinical decision-making (SCDM)   Tetanus (Td) Vaccine - COST NOT COVERED BY MEDICARE PART B Following completion of primary series, a booster dose should be given every 10 years to maintain immunity against tetanus. Td may also be given as tetanus wound prophylaxis.   Tdap Vaccine - COST NOT COVERED BY MEDICARE PART B Recommended at least once for all adults. For pregnant patients, recommended with each pregnancy.   Shingles Vaccine (Shingrix) - COST NOT COVERED BY MEDICARE PART B  2 shot series recommended in those 19 years and older who have or will have weakened immune systems or those 50 years and older     Health Maintenance Due:      Topic Date Due    Colorectal Cancer Screening  02/22/2028    Hepatitis C Screening  Completed     Immunizations Due:      Topic Date Due     COVID-19 Vaccine (5 - 2023-24 season) 09/01/2023     Advance Directives   What are advance directives?  Advance directives are legal documents that state your wishes and plans for medical care. These plans are made ahead of time in case you lose your ability to make decisions for yourself. Advance directives can apply to any medical decision, such as the treatments you want, and if you want to donate organs.   What are the types of advance directives?  There are many types of advance directives, and each state has rules about how to use them. You may choose a combination of any of the following:  Living will:  This is a written record of the treatment you want. You can also choose which treatments you do not want, which to limit, and which to stop at a certain time. This includes surgery, medicine, IV fluid, and tube feedings.   Durable power of  for healthcare (DPAHC):  This is a written record that states who you want to make healthcare choices for you when you are unable to make them for yourself. This person, called a proxy, is usually a family member or a friend. You may choose more than 1 proxy.  Do not resuscitate (DNR) order:  A DNR order is used in case your heart stops beating or you stop breathing. It is a request not to have certain forms of treatment, such as CPR. A DNR order may be included in other types of advance directives.  Medical directive:  This covers the care that you want if you are in a coma, near death, or unable to make decisions for yourself. You can list the treatments you want for each condition. Treatment may include pain medicine, surgery, blood transfusions, dialysis, IV or tube feedings, and a ventilator (breathing machine).  Values history:  This document has questions about your views, beliefs, and how you feel and think about life. This information can help others choose the care that you would choose.  Why are advance directives important?  An advance directive helps  you control your care. Although spoken wishes may be used, it is better to have your wishes written down. Spoken wishes can be misunderstood, or not followed. Treatments may be given even if you do not want them. An advance directive may make it easier for your family to make difficult choices about your care.   Weight Management   Why it is important to manage your weight:  Being overweight increases your risk of health conditions such as heart disease, high blood pressure, type 2 diabetes, and certain types of cancer. It can also increase your risk for osteoarthritis, sleep apnea, and other respiratory problems. Aim for a slow, steady weight loss. Even a small amount of weight loss can lower your risk of health problems.  How to lose weight safely:  A safe and healthy way to lose weight is to eat fewer calories and get regular exercise. You can lose up about 1 pound a week by decreasing the number of calories you eat by 500 calories each day.   Healthy meal plan for weight management:  A healthy meal plan includes a variety of foods, contains fewer calories, and helps you stay healthy. A healthy meal plan includes the following:  Eat whole-grain foods more often.  A healthy meal plan should contain fiber. Fiber is the part of grains, fruits, and vegetables that is not broken down by your body. Whole-grain foods are healthy and provide extra fiber in your diet. Some examples of whole-grain foods are whole-wheat breads and pastas, oatmeal, brown rice, and bulgur.  Eat a variety of vegetables every day.  Include dark, leafy greens such as spinach, kale, nya greens, and mustard greens. Eat yellow and orange vegetables such as carrots, sweet potatoes, and winter squash.   Eat a variety of fruits every day.  Choose fresh or canned fruit (canned in its own juice or light syrup) instead of juice. Fruit juice has very little or no fiber.  Eat low-fat dairy foods.  Drink fat-free (skim) milk or 1% milk. Eat fat-free  yogurt and low-fat cottage cheese. Try low-fat cheeses such as mozzarella and other reduced-fat cheeses.  Choose meat and other protein foods that are low in fat.  Choose beans or other legumes such as split peas or lentils. Choose fish, skinless poultry (chicken or turkey), or lean cuts of red meat (beef or pork). Before you cook meat or poultry, cut off any visible fat.   Use less fat and oil.  Try baking foods instead of frying them. Add less fat, such as margarine, sour cream, regular salad dressing and mayonnaise to foods. Eat fewer high-fat foods. Some examples of high-fat foods include french fries, doughnuts, ice cream, and cakes.  Eat fewer sweets.  Limit foods and drinks that are high in sugar. This includes candy, cookies, regular soda, and sweetened drinks.  Exercise:  Exercise at least 30 minutes per day on most days of the week. Some examples of exercise include walking, biking, dancing, and swimming. You can also fit in more physical activity by taking the stairs instead of the elevator or parking farther away from stores. Ask your healthcare provider about the best exercise plan for you.      © Copyright [employer] 2018 Information is for End User's use only and may not be sold, redistributed or otherwise used for commercial purposes. All illustrations and images included in CareNotes® are the copyrighted property of [employer] Inc[employer] or [employer]

## 2024-01-09 ENCOUNTER — HOSPITAL ENCOUNTER (OUTPATIENT)
Dept: RADIOLOGY | Facility: CLINIC | Age: 79
Discharge: HOME | End: 2024-01-09
Admitting: ANESTHESIOLOGY
Payer: MEDICARE

## 2024-01-09 ENCOUNTER — TELEPHONE (OUTPATIENT)
Dept: PAIN MEDICINE | Facility: CLINIC | Age: 79
End: 2024-01-09

## 2024-01-09 VITALS
RESPIRATION RATE: 18 BRPM | OXYGEN SATURATION: 98 % | TEMPERATURE: 97.7 F | SYSTOLIC BLOOD PRESSURE: 196 MMHG | DIASTOLIC BLOOD PRESSURE: 88 MMHG | HEART RATE: 61 BPM

## 2024-01-09 DIAGNOSIS — M54.16 LUMBAR RADICULOPATHY: ICD-10-CM

## 2024-01-09 PROCEDURE — 62323 NJX INTERLAMINAR LMBR/SAC: CPT | Performed by: ANESTHESIOLOGY

## 2024-01-09 RX ORDER — METHYLPREDNISOLONE ACETATE 80 MG/ML
80 INJECTION, SUSPENSION INTRA-ARTICULAR; INTRALESIONAL; INTRAMUSCULAR; PARENTERAL; SOFT TISSUE ONCE
Status: COMPLETED | OUTPATIENT
Start: 2024-01-09 | End: 2024-01-09

## 2024-01-09 RX ADMIN — IOHEXOL 1 ML: 300 INJECTION, SOLUTION INTRAVENOUS at 10:28

## 2024-01-09 RX ADMIN — METHYLPREDNISOLONE ACETATE 80 MG: 80 INJECTION, SUSPENSION INTRA-ARTICULAR; INTRALESIONAL; INTRAMUSCULAR; SOFT TISSUE at 10:28

## 2024-01-09 NOTE — TELEPHONE ENCOUNTER
Attempted to call Dr. Boni Nelson at [phone] in regards to the mutual patient and left a message with the  and your cell was provided. Encouraged him to attempt to call you at lunch or after procedures to review patient medications.

## 2024-01-09 NOTE — DISCHARGE INSTRUCTIONS
Epidural Steroid Injection   WHAT YOU NEED TO KNOW:   An epidural steroid injection (DEVON) is a procedure to inject steroid medicine into the epidural space. The epidural space is between your spinal cord and vertebrae. Steroids reduce inflammation and fluid buildup in your spine that may be causing pain. You may be given pain medicine along with the steroids.          ACTIVITY  Do not drive or operate machinery today.  No strenuous activity today - bending, lifting, etc.  You may resume normal activites starting tomorrow - start slowly and as tolerated.  You may shower today, but no tub baths or hot tubs.  You may have numbness for several hours from the local anesthetic. Please use caution and common sense, especially with weight-bearing activities.    CARE OF THE INJECTION SITE  If you have soreness or pain, apply ice to the area today (20 minutes on/20 minutes off).  Starting tomorrow, you may use warm, moist heat or ice if needed.  You may have an increase or change in your discomfort for 36-48 hours after your treatment.  Apply ice and continue with any pain medication you have been prescribed.  Notify the [facility] if you have any of the following: redness, drainage, swelling, headache, stiff neck or fever above 100°F.    SPECIAL INSTRUCTIONS  Our office will contact you in approximately 7 days for a progress report.    MEDICATIONS  Continue to take all routine medications.  Our office may have instructed you to hold some medications.    As no general anesthesia was used in today's procedure, you should not experience any side effects related to anesthesia.     If you are diabetic, the steroids used in today's injection may temporarily increase your blood sugar levels after the first few days after your injection. Please keep a close eye on your sugars and alert the doctor who manages your diabetes if your sugars are significantly high from your baseline or you are symptomatic.     If you have a  problem specifically related to your procedure, please call our office at [phone].  Problems not related to your procedure should be directed to your primary care physician.

## 2024-01-09 NOTE — H&P
History of Present Illness: The patient is a 78 y.o. male who presents with complaints of low back and leg pain.    Past Medical History:   Diagnosis Date    Allergic 2011    Allergic rhinitis 2015    Anxiety     occasional    Aortic aneurysm (HCC)     Benign colon polyp     Bipolar 1 disorder (HCC)     Cancer (HCC) 2007, 2011    Cardiac disease     MI    Cervical cord compression with myelopathy (HCC)     COPD (chronic obstructive pulmonary disease) (HCC)     Coronary artery disease 1995    Diverticulitis     Diverticulitis of colon prior to 2014    Diverticulosis     Emphysema of lung (HCC) 2012    Gait disturbance     uses cane, leg brace on right    GERD (gastroesophageal reflux disease)     Heart attack (HCC) 1996    Hx of resection of large bowel 05/03/2016    Hyperlipidemia     Hypertension     IBS (irritable bowel syndrome)     Inflammatory bowel disease 2012    Lumbar stenosis     Lung cancer (HCC)     2007 Left lower lobectomy and 2011 Right lung with surgery     Myocardial infarction (HCC)     involving other coronary artery    Prostate cancer (HCC)     Shortness of breath     Small bowel obstruction (HCC) 11/16/2016       Past Surgical History:   Procedure Laterality Date    ANGIOPLASTY      stent    CARDIAC CATHETERIZATION  1995    angioplasty    CARDIAC SURGERY      CERVICAL SPINE SURGERY      Cervical decompression with cervical fusion from C3-C7 for spinal stenosis.    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    LUNG LOBECTOMY Left     [place] ARTHRD ANT INTERBODY MIN DSC CRV BELOW C2 N/A 05/02/2016     Procedure: Anterior cervical diskectomy C3/4, C5/6, C6/7 with anterior plate fixation fusion C3-7;  Posterior decompressive laminectomy C3-7 with lateral mass fixation fusion C3-7 (IMPULSE MONITORING);  Surgeon: Jose Luis Moore MD;  Location: [facility];  Service: Neurosurgery    [place] ARTHRODESIS POSTERIOR INTERBODY 1 [facility] LUMBAR N/A 01/30/2017    Procedure: L4-5 AND L5-S1 DECOMPRESSIVE FORAMINOTOMIES, TRANSFORAMINAL LUMBAR INTERBODY AND PEDICLE SCREW FIXATION FUSION L4-S1 (IMPULSE);  Surgeon: Jose Luis Moore MD;  Location: [facility];  Service: Neurosurgery    [place] EVASC RPR DPLMNT AORTO-AORTIC NDGFT N/A 08/06/2018    Procedure: REPAIR ANEURYSM ENDOVASCULAR ABDOMINAL AORTIC  (EVAR) WITH BILATERAL PERCUTANEOUS FEMORAL ACCESS WITH ULTRASOUND GUIDANCE ON THE RIGHT AND PRE CLOSURE;  Surgeon: Shan Villalobos MD;  Location: [facility];  Service: Vascular    PROSTATECTOMY  2007    for prostate CA - no chemo/RT    SIGMOIDECTOMY      for divertic    SMALL INTESTINE SURGERY N/A 11/17/2016    Procedure: Exploratory Laparotomy, Lysis of adhesions to release small bowel obstruction;  Surgeon: Aminta Sim MD;  Location: [facility];  Service:     SPINE SURGERY  2016, 2017    TONSILLECTOMY  Y    1952         Current Outpatient Medications:     acetaminophen (TYLENOL) 500 mg tablet, Take 500 mg by mouth as needed for mild pain., Disp: , Rfl:     amLODIPine (NORVASC) 2.5 mg tablet, TAKE 1 TABLET BY MOUTH DAILY, Disp: 90 tablet, Rfl: 3    aspirin (ECOTRIN LOW STRENGTH) 81 mg EC tablet, Take 81 mg by mouth daily, Disp: , Rfl:     atorvastatin (LIPITOR) 40 mg tablet, TAKE 1 TABLET BY MOUTH  DAILY, Disp: 90 tablet, Rfl: 5    Cholecalciferol (VITAMIN D PO), Take 2,000 Units by mouth daily  , Disp: , Rfl:     dicyclomine (Bentyl) 20 mg tablet, Take 1 tablet (20 mg total) by mouth every 6 (six) hours as needed (abd cramping), Disp: 90 tablet, Rfl: 3    famotidine (PEPCID) 40 MG tablet, TAKE 1 TABLET BY MOUTH  DAILY AT BEDTIME AS  NEEDED  FOR HEARTBURN (Patient not taking: Reported on 7/14/2023), Disp: 30 tablet, Rfl: 11    fexofenadine (ALLEGRA) 180 MG tablet, Take 180 mg by mouth as needed Daily during the Summer, Disp: , Rfl:     FLUoxetine (PROzac) 10 mg capsule, Take 10 mg by mouth daily , Disp: , Rfl:     fluticasone (FLONASE) 50 mcg/act nasal spray, 2 sprays into each nostril daily, Disp: , Rfl:     fluticasone-umeclidinium-vilanterol (Trelegy Ellipta) 200-62.5-25 mcg/actuation AEPB inhaler, Inhale 1 puff daily, Disp: 180 blister, Rfl: 3    lithium carbonate (LITHOBID) 300 mg CR tablet, Take 300 mg by mouth in the morning, Disp: , Rfl:     magnesium 30 MG tablet, Take 1 tablet (30 mg total) by mouth 2 (two) times a day, Disp: 60 tablet, Rfl: 0    metoprolol succinate (TOPROL-XL) 25 mg 24 hr tablet, TAKE 1 TABLET BY MOUTH  DAILY, Disp: 90 tablet, Rfl: 3    mupirocin (BACTROBAN) 2 % ointment, Apply topically 2 (two) times a day as needed (wound), Disp: 22 g, Rfl: 0    Myrbetriq 25 MG TB24, TAKE 1 TABLET BY MOUTH DAILY AT  BEDTIME, Disp: 90 tablet, Rfl: 3    nitroglycerin (NITRODUR) 0.2 mg/hr, APPLY 1 PATCH TOPICALLY  ONCE DAILY. LEAVE ON FOR 12 TO 14 HOURS THEN REMOVE FOR A NITRATE-FREE INTERVAL OF  10 TO 12 HOURS (Patient taking differently: if needed), Disp: 90 patch, Rfl: 3    omega-3-acid ethyl esters (LOVAZA) 1 g capsule, TAKE 1 CAPSULE BY MOUTH 3  TIMES DAILY, Disp: 270 capsule, Rfl: 3    omeprazole (PriLOSEC) 20 mg delayed release capsule, Take 1 capsule (20 mg total) by mouth 2 (two) times a day before meals, Disp: 180 capsule, Rfl: 0    Allergies   Allergen Reactions    Bactrim [Sulfamethoxazole-Trimethoprim] Other (See Comments)     AILYN    Nsaids      Annotation - 20Nov2017: unable to take due to use of lithium.    Oxycodone Rash       Physical Exam:   Vitals:    01/09/24 1032   BP: (!) 196/88   Pulse: 61   Resp: 18   Temp:    SpO2: 98%     General: Awake, Alert, Oriented x 3, Mood and affect appropriate  Respiratory:  Respirations even and unlabored  Cardiovascular: Peripheral pulses intact; no edema  Musculoskeletal Exam: Left lumbar paraspinals tender to palpation    ASA Score: 3         Assessment:   1. Lumbar radiculopathy        Plan: L3-4 LESI

## 2024-01-16 ENCOUNTER — TELEPHONE (OUTPATIENT)
Dept: PAIN MEDICINE | Facility: CLINIC | Age: 79
End: 2024-01-16

## 2024-01-16 NOTE — TELEPHONE ENCOUNTER
Caller: Tonny Victoria   Doctor/office: Dr Diamond   CB#: [phone]    % of improvement: 85%  Pain Scale (1-10): 2-3/10

## 2024-01-22 ENCOUNTER — TELEPHONE (OUTPATIENT)
Age: 79
End: 2024-01-22

## 2024-01-22 DIAGNOSIS — M54.16 LUMBAR RADICULOPATHY: Primary | ICD-10-CM

## 2024-01-26 DIAGNOSIS — I25.10 CORONARY ARTERY DISEASE INVOLVING NATIVE CORONARY ARTERY OF NATIVE HEART WITHOUT ANGINA PECTORIS: ICD-10-CM

## 2024-01-26 DIAGNOSIS — K21.9 GASTROESOPHAGEAL REFLUX DISEASE WITHOUT ESOPHAGITIS: ICD-10-CM

## 2024-01-26 RX ORDER — ATORVASTATIN CALCIUM 40 MG/1
TABLET, FILM COATED ORAL
Qty: 90 TABLET | Refills: 1 | Status: SHIPPED | OUTPATIENT
Start: 2024-01-26

## 2024-01-26 RX ORDER — OMEPRAZOLE 20 MG/1
CAPSULE, DELAYED RELEASE ORAL
Qty: 180 CAPSULE | Refills: 1 | Status: SHIPPED | OUTPATIENT
Start: 2024-01-26

## 2024-01-26 NOTE — TELEPHONE ENCOUNTER
Requested medication(s) are due for refill today: yes  Patient has already received a courtesy refill: No  Other reason request has been forwarded to provider:

## 2024-02-01 ENCOUNTER — OFFICE VISIT (OUTPATIENT)
Dept: PAIN MEDICINE | Facility: CLINIC | Age: 79
End: 2024-02-01
Payer: MEDICARE

## 2024-02-01 VITALS
SYSTOLIC BLOOD PRESSURE: 195 MMHG | BODY MASS INDEX: 27.62 KG/M2 | DIASTOLIC BLOOD PRESSURE: 86 MMHG | WEIGHT: 176 LBS | HEIGHT: 67 IN

## 2024-02-01 DIAGNOSIS — M48.062 SPINAL STENOSIS OF LUMBAR REGION WITH NEUROGENIC CLAUDICATION: ICD-10-CM

## 2024-02-01 DIAGNOSIS — M54.16 LUMBAR RADICULOPATHY: Primary | ICD-10-CM

## 2024-02-01 DIAGNOSIS — M43.16 SPONDYLOLISTHESIS OF LUMBAR REGION: ICD-10-CM

## 2024-02-01 DIAGNOSIS — M51.26 LUMBAR DISC HERNIATION: ICD-10-CM

## 2024-02-01 PROCEDURE — 99214 OFFICE O/P EST MOD 30 MIN: CPT | Performed by: ANESTHESIOLOGY

## 2024-02-01 RX ORDER — AMOXICILLIN 500 MG/1
CAPSULE ORAL
COMMUNITY
Start: 2024-01-29

## 2024-02-01 RX ORDER — DULOXETIN HYDROCHLORIDE 20 MG/1
20 CAPSULE, DELAYED RELEASE ORAL DAILY
Qty: 30 CAPSULE | Refills: 1 | Status: SHIPPED | OUTPATIENT
Start: 2024-02-01

## 2024-02-01 NOTE — PROGRESS NOTES
Assessment  1. Lumbar radiculopathy    2. Spondylolisthesis of lumbar region    3. Lumbar disc herniation    4. Spinal stenosis of lumbar region with neurogenic claudication        Plan  78-year-old male with a history of previous L4-S1 fusion, lumbar radiculopathy, lumbar spondylosis, and stenosis returning for follow-up.  The patient had an L3-4 LESI January 9, 2024 which gave him about 85% of relief.  Tylenol provides minimal relief. His psychiatrist has recommended against gabapentin since the patient is on lithium for bipolar disorder.  Instead, duloxetine has been agreed upon for neuropathic pain.  He has recently had his Prozac discontinued about a week ago. Unable to take NSAIDS secondary to CKD.      1.  Will repeat L3-4 LESI as needed  2.  I will start duloxetine 20 mg daily.  This was discussed with his psychiatrist, Dr. Nelson.  3.  Patient will continue with his home exercise program  4. I will f/u with the patient in 2 months      My impressions and treatment recommendations were discussed in detail with the patient who verbalized understanding and had no further questions.  Discharge instructions were provided. I personally saw and examined the patient and I agree with the above discussed plan of care.    No orders of the defined types were placed in this encounter.    New Medications Ordered This Visit   Medications    amoxicillin (AMOXIL) 500 mg capsule     Sig: TAKE TWO CAPSULES BY MOUTH NOW, THEN, THEN TAKE ONE CAPSULE BY MOUTH THREE TIMES A DAY UNTIL COMPLETE       History of Present Illness    Tonny Baig is a 78 y.o. male with a history of previous L4-S1 fusion, lumbar radiculopathy, lumbar spondylosis, spondylolisthesis, and stenosis returning for follow-up.  The patient does have low back pain radiating into the left lower extremity.  He does feel that this has improved since his last visit.  He denies any right lower extremity symptoms, bladder or bowel incontinence, or saddle  anesthesia.  The patient had an L3-4 LESI January 9, 2024 which gave him about 85% of relief.  Tylenol provides minimal relief. His psychiatrist has recommended against gabapentin since the patient is on lithium for bipolar disorder.  Instead, duloxetine has been agreed upon for neuropathic pain.  He has recently had his Prozac discontinued about a week ago. Unable to take NSAIDS secondary to CKD.   The patient rates his pain mild to moderate and worse in the evening.  The pain is intermittent and described as burning, dull, aching, and sharp.  The pain is increased with standing, walking, and exercise.  The pain is alleviated with sitting, relaxation, lying down, and injections.    Other than as stated above, the patient denies any interval changes in medications, medical condition, mental condition, symptoms, or allergies since the last office visit.    I have personally reviewed and/or updated the patient's past medical history, past surgical history, family history, social history, current medications, allergies, and vital signs today.     Review of Systems   Constitutional:  Negative for fever and unexpected weight change.   HENT:  Negative for trouble swallowing.    Eyes:  Negative for visual disturbance.   Respiratory:  Negative for shortness of breath and wheezing.    Cardiovascular:  Negative for chest pain and palpitations.   Gastrointestinal:  Negative for constipation, diarrhea, nausea and vomiting.   Endocrine: Negative for cold intolerance, heat intolerance and polydipsia.   Genitourinary:  Negative for difficulty urinating and frequency.   Musculoskeletal:  Positive for gait problem and joint swelling. Negative for arthralgias and myalgias.        Decreased ROM   Skin:  Negative for rash.   Neurological:  Positive for dizziness. Negative for seizures, syncope, weakness and headaches.   Hematological:  Does not bruise/bleed easily.   Psychiatric/Behavioral:  Negative for dysphoric mood.    All other  systems reviewed and are negative.      Patient Active Problem List   Diagnosis    Gait instability    Cervical myelopathy (HCC)    Adenocarcinoma of prostate (HCC)    CAD (coronary artery disease)    Bipolar affective disorder (HCC)    COPD (chronic obstructive pulmonary disease) (HCC)    Gastroesophageal reflux disease    Mixed hyperlipidemia    Essential hypertension    Aneurysm of infrarenal abdominal aorta (HCC)    H/O lumbosacral spine surgery    Stage 3a chronic kidney disease ([facility])    Impaired mobility and activities of daily living    DDD (degenerative disc disease), lumbar    Foraminal stenosis of lumbar region    Spondylolisthesis of lumbar region    Myelopathy concurrent with and due to lumbosacral intervertebral disc disorder    Arrhythmia    Diastolic heart failure (HCC)    New onset atrial fibrillation (HCC)    Pain in left hip    Greater trochanteric bursitis, left    Skin lesion    Medicare annual wellness visit, subsequent    Trochanteric bursitis of left hip    Primary osteoarthritis of left hip    Malignant neoplasm of lung ([facility])    Excessive gas    Extradural cyst of spine    Lumbar disc herniation    Gait abnormality    Skin lump of leg, right    Seasonal allergic rhinitis due to pollen    Braces as ambulation aid    Parkinsonism    Change in mental status    Frequency of urination    Skin tear of right lower leg without complication    Proteinuria    S/P endovascular aneurysm repair    Myocardial infarction (HCC)    Adenoma of colon    Abdominal aortic aneurysm (AAA) without rupture, unspecified part ([facility])    Lumbar radiculopathy    Spinal stenosis of lumbar region with neurogenic claudication    Hypomagnesemia       Past Medical History:   Diagnosis Date    Allergic 2011    Allergic rhinitis 2015    Anxiety     occasional    Aortic aneurysm (HCC)     Benign colon polyp     Bipolar 1 disorder (HCC)     Cancer ([facility]) 2007, 2011    Cardiac disease     MI    Cervical cord compression with  myelopathy (HCC)     COPD (chronic obstructive pulmonary disease) (HCC)     Coronary artery disease 1995    Diverticulitis     Diverticulitis of colon prior to 2014    Diverticulosis     Emphysema of lung (HCC) 2012    Gait disturbance     uses cane, leg brace on right    GERD (gastroesophageal reflux disease)     Heart attack (HCC) 1996    Hx of resection of large bowel 05/03/2016    Hyperlipidemia     Hypertension     IBS (irritable bowel syndrome)     Inflammatory bowel disease 2012    Lumbar stenosis     Lung cancer (HCC)     2007 Left lower lobectomy and 2011 Right lung with surgery     Myocardial infarction (HCC)     involving other coronary artery    Prostate cancer (HCC)     Shortness of breath     Small bowel obstruction (HCC) 11/16/2016       Past Surgical History:   Procedure Laterality Date    ANGIOPLASTY      stent    CARDIAC CATHETERIZATION  1995    angioplasty    CARDIAC SURGERY      CERVICAL SPINE SURGERY      Cervical decompression with cervical fusion from C3-C7 for spinal stenosis.    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    LUNG LOBECTOMY Left     MN ARTHRD ANT INTERBODY MIN DSC CRV BELOW C2 N/A 05/02/2016    Procedure: Anterior cervical diskectomy C3/4, C5/6, C6/7 with anterior plate fixation fusion C3-7;  Posterior decompressive laminectomy C3-7 with lateral mass fixation fusion C3-7 (IMPULSE MONITORING);  Surgeon: Jose Luis Moore MD;  Location: [facility];  Service: Neurosurgery    MN ARTHRODESIS POSTERIOR INTERBODY 1 [facility] LUMBAR N/A 01/30/2017    Procedure: L4-5 AND L5-S1  DECOMPRESSIVE FORAMINOTOMIES, TRANSFORAMINAL LUMBAR INTERBODY AND PEDICLE SCREW FIXATION FUSION L4-S1 (IMPULSE);  Surgeon: Jose Luis Moore MD;  Location: [facility];  Service: Neurosurgery    WV EVASC RPR DPLMNT AORTO-AORTIC NDGFT N/A 2018    Procedure: REPAIR ANEURYSM ENDOVASCULAR ABDOMINAL AORTIC  (EVAR) WITH BILATERAL PERCUTANEOUS FEMORAL ACCESS WITH ULTRASOUND GUIDANCE ON THE RIGHT AND PRE CLOSURE;  Surgeon: Shan Villalobos MD;  Location: [facility];  Service: Vascular    PROSTATECTOMY      for prostate CA - no chemo/RT    SIGMOIDECTOMY      for divertic    SMALL INTESTINE SURGERY N/A 2016    Procedure: Exploratory Laparotomy, Lysis of adhesions to release small bowel obstruction;  Surgeon: Aminta Sim MD;  Location: [facility];  Service:     SPINE SURGERY  ,     TONSILLECTOMY  Y    195       Family History   Problem Relation Age of Onset    Hypertension Mother     Glaucoma Mother     Heart attack Father     Colon cancer Father     Coronary artery disease Father     Hypertension Father     Kidney disease Father     Heart disease Family     Hyperlipidemia Family     Hypertension Family        Social History     Occupational History    Occupation: Retired   Tobacco Use    Smoking status: Former     Current packs/day: 0.00     Average packs/day: 1 pack/day for 44.0 years (44.0 ttl pk-yrs)     Types: Cigarettes     Start date: 1967     Quit date: 2011     Years since quittin.0    Smokeless tobacco: Never   Vaping Use    Vaping status: Never Used   Substance and Sexual Activity    Alcohol use: Yes     Alcohol/week: 2.0 - 3.0 standard drinks of alcohol     Types: 2 - 3 Shots of liquor per week     Comment: One glass per day    Drug use: No    Sexual activity: Not Currently     Partners: Female     Birth control/protection: Post-menopausal, None       Current Outpatient Medications on File Prior to Visit   Medication Sig    acetaminophen (TYLENOL) 500 mg tablet Take 500 mg  by mouth as needed for mild pain.    amLODIPine (NORVASC) 2.5 mg tablet TAKE 1 TABLET BY MOUTH DAILY    amoxicillin (AMOXIL) 500 mg capsule TAKE TWO CAPSULES BY MOUTH NOW, THEN, THEN TAKE ONE CAPSULE BY MOUTH THREE TIMES A DAY UNTIL COMPLETE    aspirin (ECOTRIN LOW STRENGTH) 81 mg EC tablet Take 81 mg by mouth daily    atorvastatin (LIPITOR) 40 mg tablet TAKE 1 TABLET BY MOUTH DAILY    Cholecalciferol (VITAMIN D PO) Take 2,000 Units by mouth daily      dicyclomine (Bentyl) 20 mg tablet Take 1 tablet (20 mg total) by mouth every 6 (six) hours as needed (abd cramping)    fexofenadine (ALLEGRA) 180 MG tablet Take 180 mg by mouth as needed Daily during the Summer    FLUoxetine (PROzac) 10 mg capsule Take 10 mg by mouth daily     fluticasone (FLONASE) 50 mcg/act nasal spray 2 sprays into each nostril daily    fluticasone-umeclidinium-vilanterol (Trelegy Ellipta) 200-62.5-25 mcg/actuation AEPB inhaler Inhale 1 puff daily    lithium carbonate (LITHOBID) 300 mg CR tablet Take 300 mg by mouth in the morning    metoprolol succinate (TOPROL-XL) 25 mg 24 hr tablet TAKE 1 TABLET BY MOUTH  DAILY    mupirocin (BACTROBAN) 2 % ointment Apply topically 2 (two) times a day as needed (wound)    Myrbetriq 25 MG TB24 TAKE 1 TABLET BY MOUTH DAILY AT  BEDTIME    nitroglycerin (NITRODUR) 0.2 mg/hr APPLY 1 PATCH TOPICALLY  ONCE DAILY. LEAVE ON FOR 12 TO 14 HOURS THEN REMOVE FOR A NITRATE-FREE INTERVAL OF  10 TO 12 HOURS (Patient taking differently: if needed)    omega-3-acid ethyl esters (LOVAZA) 1 g capsule TAKE 1 CAPSULE BY MOUTH 3  TIMES DAILY    omeprazole (PriLOSEC) 20 mg delayed release capsule TAKE 1 CAPSULE BY MOUTH TWICE  DAILY BEFORE MEALS    famotidine (PEPCID) 40 MG tablet TAKE 1 TABLET BY MOUTH  DAILY AT BEDTIME AS NEEDED  FOR HEARTBURN (Patient not taking: Reported on 7/14/2023)    magnesium 30 MG tablet Take 1 tablet (30 mg total) by mouth 2 (two) times a day     No current facility-administered medications on file prior to  "visit.       Allergies   Allergen Reactions    Bactrim [Sulfamethoxazole-Trimethoprim] Other (See Comments)     AILYN    Nsaids      Annotation - [id number]: unable to take due to use of lithium.    Oxycodone Rash       Physical Exam    BP (!) 195/86   Ht 5' 7\" (1.702 m)   Wt 79.8 kg (176 lb)   BMI 27.57 kg/m²     Constitutional: normal, well developed, well nourished, alert, in no distress and non-toxic and no overt pain behavior.  Eyes: anicteric  HEENT: grossly intact  Neck: supple, symmetric, trachea midline and no masses   Pulmonary:even and unlabored  Cardiovascular: Plus pretibial lower extremity edema bilaterally  Skin:Normal without rashes or lesions and well hydrated  Psychiatric:Mood and affect appropriate  Neurologic:Cranial Nerves II-XII grossly intact  Musculoskeletal:antalgic gait and ambulates with a cane.  Left lumbar paraspinals minimally tender to palpation from L3-L5.  Bilateral lower extremity strength 5 out of 5 in all muscle groups.  Sensation intact to light touch in L3-S1 dermatomes bilaterally.  Negative seated straight leg raise bilaterally.    Imaging  MRI LUMBAR SPINE WITH AND WITHOUT CONTRAST     INDICATION: M54.16: Radiculopathy, lumbar region.     COMPARISON:  MRI lumbar spine 7/2/2019     TECHNIQUE:  Multiplanar, multisequence imaging of the lumbar spine was performed before and after gadolinium administration. .          IV Contrast:  8 mL of Gadobutrol injection (SINGLE-DOSE)      IMAGE QUALITY:  Diagnostic     FINDINGS:     POSTSURGICAL FINDINGS: Stable posterior lumbar fixation hardware including bilateral transpedicular screws at the L4 through the S1 levels with interconnecting rods and secondary susceptibility artifacts.     VERTEBRAL BODIES:  There are 5 lumbar type vertebral bodies.  Stable dextroscoliosis, apex at L3.  No compression fracture.    Modic type I endplate changes are noted at the L3-4 level.     SACRUM:  Normal signal within the visualized upper sacrum. No " suspected insufficiency or stress fracture.     DISTAL CORD AND CONUS:  Normal size and signal within the distal cord and conus. The conus terminates at the L1 level.  The cauda equina nerve roots appear within normal limits.     PARASPINAL SOFT TISSUES:  Mild dependent subcutaneous edema within the lower back region.     LOWER THORACIC DISC SPACES:  Normal disc height and signal.  No disc herniation, canal stenosis or foraminal narrowing.     LUMBAR DISC SPACES:     L1-2: Stable disc bulge without significant central canal or neural foraminal narrowing.       L2-3: Mild disc bulge is again noted with a new small central disc extrusion with subligamentous extension superiorly.  No central canal stenosis.  Mild to moderate bilateral subarticular/lateral recess narrowing.  Moderate left neural foraminal   stenosis.     L3-4: Stable mild to moderate diffuse disc bulge.  The previously noted left paracentral disc extrusion demonstrates decreased superior subligamentous extension.  There is again bilateral ligamentum flavum and facet hypertrophy.  Moderate central canal   and left greater than right subarticular/lateral recess narrowing is again noted with encroachment of the traversing left L4 nerve root.  Moderate bilateral neural foraminal stenosis, unchanged.     L4-5: No focal disc herniation, central canal stenosis, or neural foraminal narrowing.  Suggestion of prior right hemilaminectomy with patency of the central canal.     L5-S1: Stable disc bulge and broad-based central disc protrusion.  The central canal is patent status post right hemilaminectomy.  Mild bilateral subarticular/lateral recess narrowing.  Moderate right neural foraminal stenosis with encroachment of the   exiting right L5 nerve root.        POSTCONTRAST IMAGING:  There is no suspicious enhancing granulation tissue impinging on the thecal sac.     OTHER FINDINGS:  Bilateral renal cortical cysts, largest located at the upper pole of the right  kidney measuring 5.4 cm.     IMPRESSION:     1. Stable posterior lumbar fixation hardware including bilateral transpedicular screws at the L4 through the S1 levels with interconnecting rods.     2. New small central disc extrusion is noted at the L2-3 level with subligamentous extension superiorly with bilateral subarticular/lateral recess narrowing.  Left paracentral disc extrusion at L3-4 is again noted with interval decreased subligamentous   extension.  Persistent moderate central canal and left greater than right subarticular/lateral recess narrowing at L3-4 with encroachment of the traversing left L4 nerve root.  Stable disc protrusion at L5-S1 abutting the traversing S1 nerve roots.  The   central canal is patent at the L4-5 and L5-S1 level secondary to right hemilaminectomies.  There is no suspicious enhancing granulation tissue impinging on the thecal sac.     3. Bilateral renal cortical cysts, largest located at the upper pole of the right kidney.

## 2024-02-12 ENCOUNTER — TRANSCRIBE ORDERS (OUTPATIENT)
Dept: LAB | Facility: CLINIC | Age: 79
End: 2024-02-12

## 2024-02-12 ENCOUNTER — TELEPHONE (OUTPATIENT)
Age: 79
End: 2024-02-12

## 2024-02-12 ENCOUNTER — LAB (OUTPATIENT)
Dept: LAB | Facility: CLINIC | Age: 79
End: 2024-02-12
Payer: MEDICARE

## 2024-02-12 DIAGNOSIS — Z95.5 HISTORY OF HEART ARTERY STENT: ICD-10-CM

## 2024-02-12 DIAGNOSIS — I10 ESSENTIAL HYPERTENSION: ICD-10-CM

## 2024-02-12 DIAGNOSIS — I25.2 PAST HISTORY OF MYOCARDIAL INFARCTION: ICD-10-CM

## 2024-02-12 DIAGNOSIS — E83.42 HYPOMAGNESEMIA: ICD-10-CM

## 2024-02-12 DIAGNOSIS — I25.10 CORONARY ARTERY DISEASE INVOLVING NATIVE CORONARY ARTERY OF NATIVE HEART WITHOUT ANGINA PECTORIS: ICD-10-CM

## 2024-02-12 DIAGNOSIS — I71.40 ABDOMINAL AORTIC ANEURYSM (AAA) WITHOUT RUPTURE, UNSPECIFIED PART (HCC): ICD-10-CM

## 2024-02-12 DIAGNOSIS — E78.2 MIXED HYPERLIPIDEMIA: ICD-10-CM

## 2024-02-12 DIAGNOSIS — F31.60 MIXED BIPOLAR I DISORDER (HCC): Primary | ICD-10-CM

## 2024-02-12 DIAGNOSIS — F31.60 MIXED BIPOLAR I DISORDER (HCC): ICD-10-CM

## 2024-02-12 LAB
ALBUMIN SERPL BCP-MCNC: 4 G/DL (ref 3.5–5)
ALP SERPL-CCNC: 102 U/L (ref 34–104)
ALT SERPL W P-5'-P-CCNC: 19 U/L (ref 7–52)
ANION GAP SERPL CALCULATED.3IONS-SCNC: 9 MMOL/L
AST SERPL W P-5'-P-CCNC: 21 U/L (ref 13–39)
BILIRUB SERPL-MCNC: 0.5 MG/DL (ref 0.2–1)
BNP SERPL-MCNC: 161 PG/ML (ref 0–100)
BUN SERPL-MCNC: 30 MG/DL (ref 5–25)
CALCIUM SERPL-MCNC: 9.6 MG/DL (ref 8.4–10.2)
CHLORIDE SERPL-SCNC: 104 MMOL/L (ref 96–108)
CO2 SERPL-SCNC: 28 MMOL/L (ref 21–32)
CREAT SERPL-MCNC: 1.51 MG/DL (ref 0.6–1.3)
GFR SERPL CREATININE-BSD FRML MDRD: 44 ML/MIN/1.73SQ M
GLUCOSE P FAST SERPL-MCNC: 93 MG/DL (ref 65–99)
LITHIUM SERPL-SCNC: 0.46 MMOL/L (ref 0.6–1.2)
MAGNESIUM SERPL-MCNC: 1.1 MG/DL (ref 1.9–2.7)
POTASSIUM SERPL-SCNC: 4.5 MMOL/L (ref 3.5–5.3)
PROT SERPL-MCNC: 6.6 G/DL (ref 6.4–8.4)
SODIUM SERPL-SCNC: 141 MMOL/L (ref 135–147)
TSH SERPL DL<=0.05 MIU/L-ACNC: 2.2 UIU/ML (ref 0.45–4.5)

## 2024-02-12 PROCEDURE — 83735 ASSAY OF MAGNESIUM: CPT

## 2024-02-12 PROCEDURE — 83880 ASSAY OF NATRIURETIC PEPTIDE: CPT

## 2024-02-12 PROCEDURE — 80053 COMPREHEN METABOLIC PANEL: CPT

## 2024-02-12 PROCEDURE — 84443 ASSAY THYROID STIM HORMONE: CPT

## 2024-02-12 PROCEDURE — 80178 ASSAY OF LITHIUM: CPT

## 2024-02-12 PROCEDURE — 36415 COLL VENOUS BLD VENIPUNCTURE: CPT

## 2024-02-12 RX ORDER — DULOXETIN HYDROCHLORIDE 20 MG/1
20 CAPSULE, DELAYED RELEASE ORAL DAILY
Qty: 90 CAPSULE | Refills: 1 | Status: SHIPPED | OUTPATIENT
Start: 2024-02-12

## 2024-02-12 NOTE — TELEPHONE ENCOUNTER
Caller: Tonny    Doctor: Dara    Reason for call: Pt stated he would like the Dr to send the script for DULoxetine (CYMBALTA) 20 mg capsule over to the pharmacy [facility].     Please advise    Call back#: [phone]

## 2024-02-12 NOTE — TELEPHONE ENCOUNTER
Caller: Tonny MARROQUIN    Doctor: Dr Diamond    Reason for call: Follow up from taking the medication Cymbalta . The medication is helping     Call back#: [phone]

## 2024-02-13 NOTE — TELEPHONE ENCOUNTER
Attempted to contact pt. Detailed VMMLOM(ok per medical communication consent on file). Provided with CB# and OH.

## 2024-02-16 ENCOUNTER — OFFICE VISIT (OUTPATIENT)
Dept: UROLOGY | Facility: CLINIC | Age: 79
End: 2024-02-16
Payer: MEDICARE

## 2024-02-16 VITALS
RESPIRATION RATE: 14 BRPM | HEART RATE: 81 BPM | WEIGHT: 172.4 LBS | OXYGEN SATURATION: 98 % | DIASTOLIC BLOOD PRESSURE: 82 MMHG | HEIGHT: 67 IN | SYSTOLIC BLOOD PRESSURE: 142 MMHG | BODY MASS INDEX: 27.06 KG/M2

## 2024-02-16 DIAGNOSIS — C61 PROSTATE CANCER (HCC): Primary | ICD-10-CM

## 2024-02-16 PROCEDURE — 99213 OFFICE O/P EST LOW 20 MIN: CPT | Performed by: PHYSICIAN ASSISTANT

## 2024-02-20 ENCOUNTER — HOSPITAL ENCOUNTER (OUTPATIENT)
Dept: NON INVASIVE DIAGNOSTICS | Facility: CLINIC | Age: 79
Discharge: HOME | End: 2024-02-20
Payer: MEDICARE

## 2024-02-20 VITALS
SYSTOLIC BLOOD PRESSURE: 142 MMHG | HEIGHT: 67 IN | WEIGHT: 172 LBS | DIASTOLIC BLOOD PRESSURE: 82 MMHG | BODY MASS INDEX: 27 KG/M2 | HEART RATE: 55 BPM

## 2024-02-20 DIAGNOSIS — I71.40 ABDOMINAL AORTIC ANEURYSM (AAA) WITHOUT RUPTURE, UNSPECIFIED PART (HCC): ICD-10-CM

## 2024-02-20 DIAGNOSIS — I25.2 PAST HISTORY OF MYOCARDIAL INFARCTION: ICD-10-CM

## 2024-02-20 DIAGNOSIS — I10 ESSENTIAL HYPERTENSION: ICD-10-CM

## 2024-02-20 DIAGNOSIS — E78.2 MIXED HYPERLIPIDEMIA: ICD-10-CM

## 2024-02-20 DIAGNOSIS — Z95.5 HISTORY OF HEART ARTERY STENT: ICD-10-CM

## 2024-02-20 DIAGNOSIS — I25.10 CORONARY ARTERY DISEASE INVOLVING NATIVE CORONARY ARTERY OF NATIVE HEART WITHOUT ANGINA PECTORIS: ICD-10-CM

## 2024-02-20 LAB
AORTIC ROOT: 3.6 CM
AORTIC VALVE MEAN VELOCITY: 5.5 M/S
APICAL FOUR CHAMBER EJECTION FRACTION: 55 %
AV AREA BY CONTINUOUS VTI: 2.5 CM2
AV AREA PEAK VELOCITY: 2 CM2
AV LVOT MEAN GRADIENT: 1 MMHG
AV LVOT PEAK GRADIENT: 2 MMHG
AV MEAN GRADIENT: 1 MMHG
AV PEAK GRADIENT: 3 MMHG
AV VALVE AREA: 2.45 CM2
AV VELOCITY RATIO: 0.87
BSA FOR ECHO PROCEDURE: 1.9 M2
DOP CALC AO PEAK VEL: 0.86 M/S
DOP CALC AO VTI: 19.35 CM
DOP CALC LVOT AREA: 2.27 CM2
DOP CALC LVOT CARDIAC INDEX: 1.54 L/MIN/M2
DOP CALC LVOT CARDIAC OUTPUT: 2.92 L/MIN
DOP CALC LVOT DIAMETER: 1.7 CM
DOP CALC LVOT PEAK VEL VTI: 20.92 CM
DOP CALC LVOT PEAK VEL: 0.75 M/S
DOP CALC LVOT STROKE INDEX: 24.7 ML/M2
DOP CALC LVOT STROKE VOLUME: 47.46
FRACTIONAL SHORTENING: 28 (ref 28–44)
INTERVENTRICULAR SEPTUM IN DIASTOLE (PARASTERNAL SHORT AXIS VIEW): 1.1 CM
INTERVENTRICULAR SEPTUM: 1.1 CM (ref 0.6–1.1)
LAAS-AP4: 19.7 CM2
LEFT ATRIUM AREA SYSTOLE SINGLE PLANE A4C: 20.1 CM2
LEFT ATRIUM SIZE: 3.7 CM
LEFT INTERNAL DIMENSION IN SYSTOLE: 3.3 CM (ref 2.1–4)
LEFT VENTRICULAR INTERNAL DIMENSION IN DIASTOLE: 4.6 CM (ref 3.5–6)
LEFT VENTRICULAR POSTERIOR WALL IN END DIASTOLE: 1.1 CM
LEFT VENTRICULAR STROKE VOLUME: 54 ML
LVSV (TEICH): 54 ML
RA PRESSURE ESTIMATED: 3 MMHG
RIGHT ATRIUM AREA SYSTOLE A4C: 21.8 CM2
RIGHT VENTRICLE ID DIMENSION: 4.4 CM
RV PSP: 37 MMHG
SL CV LV EF: 60
SL CV PED ECHO LEFT VENTRICLE DIASTOLIC VOLUME (MOD BIPLANE) 2D: 97 ML
SL CV PED ECHO LEFT VENTRICLE SYSTOLIC VOLUME (MOD BIPLANE) 2D: 43 ML
TR MAX PG: 34 MMHG
TR PEAK VELOCITY: 2.9 M/S
TRICUSPID ANNULAR PLANE SYSTOLIC EXCURSION: 2.3 CM
TRICUSPID VALVE PEAK REGURGITATION VELOCITY: 2.9 M/S

## 2024-02-20 PROCEDURE — 93306 TTE W/DOPPLER COMPLETE: CPT | Performed by: INTERNAL MEDICINE

## 2024-02-20 PROCEDURE — 93306 TTE W/DOPPLER COMPLETE: CPT

## 2024-02-21 PROBLEM — Z00.00 MEDICARE ANNUAL WELLNESS VISIT, SUBSEQUENT: Status: RESOLVED | Noted: 2018-10-11 | Resolved: 2024-02-21

## 2024-03-01 ENCOUNTER — TELEPHONE (OUTPATIENT)
Age: 79
End: 2024-03-01

## 2024-03-01 DIAGNOSIS — E83.42 HYPOMAGNESEMIA: Primary | ICD-10-CM

## 2024-03-01 NOTE — TELEPHONE ENCOUNTER
Pt doubled magnesium dose and now wants to know if you want him to restest and if so needs a blood order for magnesium. Please call pt when completed.

## 2024-03-05 ENCOUNTER — OFFICE VISIT (OUTPATIENT)
Dept: OBGYN CLINIC | Facility: HOSPITAL | Age: 79
End: 2024-03-05
Payer: MEDICARE

## 2024-03-05 VITALS — WEIGHT: 167 LBS | HEIGHT: 67 IN | BODY MASS INDEX: 26.21 KG/M2

## 2024-03-05 DIAGNOSIS — M70.62 TROCHANTERIC BURSITIS OF LEFT HIP: Primary | ICD-10-CM

## 2024-03-05 PROCEDURE — 20610 DRAIN/INJ JOINT/BURSA W/O US: CPT

## 2024-03-05 PROCEDURE — 99213 OFFICE O/P EST LOW 20 MIN: CPT | Performed by: ORTHOPAEDIC SURGERY

## 2024-03-05 RX ORDER — LIDOCAINE HYDROCHLORIDE 10 MG/ML
2 INJECTION, SOLUTION INFILTRATION; PERINEURAL
Status: COMPLETED | OUTPATIENT
Start: 2024-03-05 | End: 2024-03-05

## 2024-03-05 RX ORDER — BUPIVACAINE HYDROCHLORIDE 2.5 MG/ML
2 INJECTION, SOLUTION INFILTRATION; PERINEURAL
Status: COMPLETED | OUTPATIENT
Start: 2024-03-05 | End: 2024-03-05

## 2024-03-05 RX ORDER — BETAMETHASONE SODIUM PHOSPHATE AND BETAMETHASONE ACETATE 3; 3 MG/ML; MG/ML
12 INJECTION, SUSPENSION INTRA-ARTICULAR; INTRALESIONAL; INTRAMUSCULAR; SOFT TISSUE
Status: COMPLETED | OUTPATIENT
Start: 2024-03-05 | End: 2024-03-05

## 2024-03-05 RX ADMIN — BUPIVACAINE HYDROCHLORIDE 2 ML: 2.5 INJECTION, SOLUTION INFILTRATION; PERINEURAL at 09:45

## 2024-03-05 RX ADMIN — BETAMETHASONE SODIUM PHOSPHATE AND BETAMETHASONE ACETATE 12 MG: 3; 3 INJECTION, SUSPENSION INTRA-ARTICULAR; INTRALESIONAL; INTRAMUSCULAR; SOFT TISSUE at 09:45

## 2024-03-05 RX ADMIN — LIDOCAINE HYDROCHLORIDE 2 ML: 10 INJECTION, SOLUTION INFILTRATION; PERINEURAL at 09:45

## 2024-03-05 NOTE — PROGRESS NOTES
Assessment:   Diagnosis ICD-10-CM Associated Orders   1. Trochanteric bursitis of left hip  M70.62 Ambulatory Referral to Physical Therapy          Plan:  78 year old male with chronic left great troch bursitis returns to office for follow up   - Last CSI to left GT bursa in December, 2023 with about 2 months of pain relief   - CSI offered, accepted and provided to left GT bursa today  - Start physical therapy for left hip pain      Diagnostics reviewed and physical exam performed.  Diagnosis, treatment options and associated risks were discussed with the patient including no treatment, nonsurgical treatment and potential for surgical intervention.  The patient was given the opportunity to ask questions regarding each.        To do next visit:  Return in about 3 months (around 6/5/2024) for left hip .    The above stated was discussed in layman's terms and the patient expressed understanding.  All questions were answered to the patient's satisfaction.         Subjective:   Tonny Baig is a 78 y.o. male who presents for follow up of chronic left greater trochanteric bursitis.  Patient last seen in December, 2023 and received a CSI which he admits provided pain relief for about 2 months.  He has a history of lumbar radiculopathy, currently under care os spine and pain.  Patient expressed interest in starting physical therapy for chronic IT issues and pain.   Offered, accepted, and provided with left GT bursa injection today.    Pain score: 7/10        Review of systems negative unless otherwise specified in HPI    Past Medical History:   Diagnosis Date    Allergic 2011    Allergic rhinitis 2015    Anxiety     occasional    Aortic aneurysm (HCC)     Benign colon polyp     Bipolar 1 disorder (HCC)     Cancer (HCC) 2007, 2011    Cardiac disease     MI    Cervical cord compression with myelopathy (HCC)     COPD (chronic obstructive pulmonary disease) (HCC)     Coronary artery disease 1995    Diverticulitis      Diverticulitis of colon prior to 2014    Diverticulosis     Emphysema of lung (HCC) 2012    Gait disturbance     uses cane, leg brace on right    GERD (gastroesophageal reflux disease)     Heart attack (HCC) 1996    Hx of resection of large bowel 05/03/2016    Hyperlipidemia     Hypertension     IBS (irritable bowel syndrome)     Inflammatory bowel disease 2012    Lumbar stenosis     Lung cancer (HCC)     2007 Left lower lobectomy and 2011 Right lung with surgery     Myocardial infarction (HCC)     involving other coronary artery    Prostate cancer (HCC)     Shortness of breath     Small bowel obstruction (HCC) 11/16/2016       Past Surgical History:   Procedure Laterality Date    ANGIOPLASTY      stent    CARDIAC CATHETERIZATION  1995    angioplasty    CARDIAC SURGERY      CERVICAL SPINE SURGERY      Cervical decompression with cervical fusion from C3-C7 for spinal stenosis.    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    LUNG LOBECTOMY Left     [place] ARTHRD ANT INTERBODY MIN DSC CRV BELOW C2 N/A 05/02/2016    Procedure: Anterior cervical diskectomy C3/4, C5/6, C6/7 with anterior plate fixation fusion C3-7;  Posterior decompressive laminectomy C3-7 with lateral mass fixation fusion C3-7 (IMPULSE MONITORING);  Surgeon: Jose Luis Moore MD;  Location: [facility];  Service: Neurosurgery    [place] ARTHRODESIS POSTERIOR INTERBODY 1 [facility] LUMBAR N/A 01/30/2017    Procedure: L4-5 AND L5-S1 DECOMPRESSIVE FORAMINOTOMIES, TRANSFORAMINAL LUMBAR INTERBODY AND PEDICLE SCREW FIXATION FUSION L4-S1 (IMPULSE);  Surgeon: Jose Luis  MD Teresa;  Location: [facility];  Service: Neurosurgery    NM EVASC RPR DPLMNT AORTO-AORTIC NDGFT N/A 2018    Procedure: REPAIR ANEURYSM ENDOVASCULAR ABDOMINAL AORTIC  (EVAR) WITH BILATERAL PERCUTANEOUS FEMORAL ACCESS WITH ULTRASOUND GUIDANCE ON THE RIGHT AND PRE CLOSURE;  Surgeon: Shan Villalobos MD;  Location: [facility];  Service: Vascular    PROSTATECTOMY      for prostate CA - no chemo/RT    SIGMOIDECTOMY      for divertic    SMALL INTESTINE SURGERY N/A 2016    Procedure: Exploratory Laparotomy, Lysis of adhesions to release small bowel obstruction;  Surgeon: Aminta Sim MD;  Location: [facility];  Service:     SPINE SURGERY  ,     TONSILLECTOMY  Y    1952       Family History   Problem Relation Age of Onset    Hypertension Mother     Glaucoma Mother     Heart attack Father     Colon cancer Father     Coronary artery disease Father     Hypertension Father     Kidney disease Father     Heart disease Family     Hyperlipidemia Family     Hypertension Family        Social History     Occupational History    Occupation: Retired   Tobacco Use    Smoking status: Former     Current packs/day: 0.00     Average packs/day: 1 pack/day for 44.0 years (44.0 ttl pk-yrs)     Types: Cigarettes     Start date: 1967     Quit date: 2011     Years since quittin.1    Smokeless tobacco: Never   Vaping Use    Vaping status: Never Used   Substance and Sexual Activity    Alcohol use: Yes     Alcohol/week: 2.0 - 3.0 standard drinks of alcohol     Types: 2 - 3 Shots of liquor per week     Comment: One glass per day    Drug use: No    Sexual activity: Not Currently     Partners: Female     Birth control/protection: Post-menopausal, None         Current Outpatient Medications:     acetaminophen (TYLENOL) 500 mg tablet, Take 500 mg by mouth as needed for mild pain., Disp: , Rfl:     amLODIPine (NORVASC) 2.5 mg tablet, TAKE 1 TABLET BY MOUTH DAILY, Disp: 90 tablet, Rfl: 3    aspirin (ECOTRIN LOW  STRENGTH) 81 mg EC tablet, Take 81 mg by mouth daily, Disp: , Rfl:     atorvastatin (LIPITOR) 40 mg tablet, TAKE 1 TABLET BY MOUTH DAILY, Disp: 90 tablet, Rfl: 1    Cholecalciferol (VITAMIN D PO), Take 2,000 Units by mouth daily  , Disp: , Rfl:     dicyclomine (Bentyl) 20 mg tablet, Take 1 tablet (20 mg total) by mouth every 6 (six) hours as needed (abd cramping), Disp: 90 tablet, Rfl: 3    DULoxetine (CYMBALTA) 20 mg capsule, Take 1 capsule (20 mg total) by mouth daily, Disp: 90 capsule, Rfl: 1    fexofenadine (ALLEGRA) 180 MG tablet, Take 180 mg by mouth as needed Daily during the Summer, Disp: , Rfl:     fluticasone (FLONASE) 50 mcg/act nasal spray, 2 sprays into each nostril daily, Disp: , Rfl:     fluticasone-umeclidinium-vilanterol (Trelegy Ellipta) 200-62.5-25 mcg/actuation AEPB inhaler, Inhale 1 puff daily, Disp: 180 blister, Rfl: 3    lithium carbonate (LITHOBID) 300 mg CR tablet, Take 300 mg by mouth in the morning, Disp: , Rfl:     magnesium 30 MG tablet, Take 1 tablet (30 mg total) by mouth 2 (two) times a day, Disp: 60 tablet, Rfl: 0    metoprolol succinate (TOPROL-XL) 25 mg 24 hr tablet, TAKE 1 TABLET BY MOUTH  DAILY, Disp: 90 tablet, Rfl: 3    mupirocin (BACTROBAN) 2 % ointment, Apply topically 2 (two) times a day as needed (wound) (Patient not taking: Reported on 2/16/2024), Disp: 22 g, Rfl: 0    Myrbetriq 25 MG TB24, TAKE 1 TABLET BY MOUTH DAILY AT  BEDTIME, Disp: 90 tablet, Rfl: 3    nitroglycerin (NITRODUR) 0.2 mg/hr, APPLY 1 PATCH TOPICALLY  ONCE DAILY. LEAVE ON FOR 12 TO 14 HOURS THEN REMOVE FOR A NITRATE-FREE INTERVAL OF  10 TO 12 HOURS (Patient taking differently: if needed), Disp: 90 patch, Rfl: 3    omega-3-acid ethyl esters (LOVAZA) 1 g capsule, TAKE 1 CAPSULE BY MOUTH 3  TIMES DAILY, Disp: 270 capsule, Rfl: 3    omeprazole (PriLOSEC) 20 mg delayed release capsule, TAKE 1 CAPSULE BY MOUTH TWICE  DAILY BEFORE MEALS, Disp: 180 capsule, Rfl: 1    Allergies   Allergen Reactions    Bactrim  "[Sulfamethoxazole-Trimethoprim] Other (See Comments)     AILYN    Nsaids      Annotation - [id number]: unable to take due to use of lithium.    Oxycodone Rash          There were no vitals filed for this visit.    Objective:  Physical exam  General: Awake, Alert, Oriented  Eyes: Pupils equal, round and reactive to light  Heart: regular rate and rhythm  Lungs: No audible wheezing  Abdomen: soft                    Left Hip Exam     Tenderness   The patient is experiencing tenderness in the greater trochanter.    Other   Erythema: absent  Scars: absent    Comments:  Ecchymosis over proximal thigh from fall a few weeks ago             Diagnostics, reviewed and taken today if performed as documented:    None performed        Procedures, if performed today:    Large joint arthrocentesis: L greater trochanteric bursa  Universal Protocol:  Consent: Verbal consent obtained.  Risks and benefits: risks, benefits and alternatives were discussed  Consent given by: patient  Site marked: the operative site was marked  Supporting Documentation  Indications: pain   Procedure Details  Location: hip - L greater trochanteric bursa  Needle size: 22 G (spinal)  Medications administered: 2 mL bupivacaine 0.25 %; 12 mg betamethasone acetate-betamethasone sodium phosphate 6 (3-3) mg/mL; 2 mL lidocaine 1 %    Patient tolerance: patient tolerated the procedure well with no immediate complications  Dressing:  Sterile dressing applied            Portions of the record may have been created with voice recognition software.  Occasional wrong word or \"sound a like\" substitutions may have occurred due to the inherent limitations of voice recognition software.  Read the chart carefully and recognize, using context, where substitutions have occurred.      "

## 2024-03-06 ENCOUNTER — TELEPHONE (OUTPATIENT)
Dept: OTHER | Facility: OTHER | Age: 79
End: 2024-03-06

## 2024-03-06 ENCOUNTER — APPOINTMENT (OUTPATIENT)
Dept: LAB | Facility: CLINIC | Age: 79
End: 2024-03-06
Payer: MEDICARE

## 2024-03-06 DIAGNOSIS — C61 PROSTATE CANCER (HCC): ICD-10-CM

## 2024-03-06 DIAGNOSIS — E83.42 HYPOMAGNESEMIA: ICD-10-CM

## 2024-03-06 LAB
ALBUMIN SERPL BCP-MCNC: 3.9 G/DL (ref 3.5–5)
ALP SERPL-CCNC: 121 U/L (ref 34–104)
ALT SERPL W P-5'-P-CCNC: 17 U/L (ref 7–52)
ANION GAP SERPL CALCULATED.3IONS-SCNC: 8 MMOL/L
AST SERPL W P-5'-P-CCNC: 18 U/L (ref 13–39)
BILIRUB SERPL-MCNC: 0.56 MG/DL (ref 0.2–1)
BUN SERPL-MCNC: 33 MG/DL (ref 5–25)
CALCIUM SERPL-MCNC: 8.8 MG/DL (ref 8.4–10.2)
CHLORIDE SERPL-SCNC: 106 MMOL/L (ref 96–108)
CO2 SERPL-SCNC: 26 MMOL/L (ref 21–32)
CREAT SERPL-MCNC: 1.36 MG/DL (ref 0.6–1.3)
GFR SERPL CREATININE-BSD FRML MDRD: 50 ML/MIN/1.73SQ M
GLUCOSE P FAST SERPL-MCNC: 104 MG/DL (ref 65–99)
MAGNESIUM SERPL-MCNC: 0.9 MG/DL (ref 1.9–2.7)
POTASSIUM SERPL-SCNC: 4.7 MMOL/L (ref 3.5–5.3)
PROT SERPL-MCNC: 6.7 G/DL (ref 6.4–8.4)
SODIUM SERPL-SCNC: 140 MMOL/L (ref 135–147)

## 2024-03-06 PROCEDURE — 83735 ASSAY OF MAGNESIUM: CPT

## 2024-03-06 PROCEDURE — 36415 COLL VENOUS BLD VENIPUNCTURE: CPT

## 2024-03-06 PROCEDURE — 80053 COMPREHEN METABOLIC PANEL: CPT

## 2024-03-07 ENCOUNTER — TELEPHONE (OUTPATIENT)
Dept: INTERNAL MEDICINE CLINIC | Facility: CLINIC | Age: 79
End: 2024-03-07

## 2024-03-07 DIAGNOSIS — E83.42 HYPOMAGNESEMIA: Primary | ICD-10-CM

## 2024-03-07 NOTE — TELEPHONE ENCOUNTER
Lab Result: Magnesium 0.9   Date/Time Drawn: 03/06/24 at 9:13 am   Ordering Provider: Dr. Brendan Suarez    Lab Personnel's Name: Annemarie

## 2024-03-07 NOTE — TELEPHONE ENCOUNTER
Pt returned Carol's call states he received message that was left on VM states he understands what he needs to and doesn't have any further questions.    NFA

## 2024-03-07 NOTE — TELEPHONE ENCOUNTER
3/7 LM for patient to call back        ----- Message from Brendan Suarez MD sent at 3/7/2024  8:51 AM EST -----  Magnesium again is low, I put in a prescription for a slow release magnesium which may work better for him, sent to the pharmacy.  I also recommend evaluation with nephrology specifically for this, referral entered.  Check with patient if he has any reasons for acute magnesium loss such as diarrhea.  Additional interventions may include adding a medication like amiloride, but will defer to nephrology

## 2024-03-20 DIAGNOSIS — E83.42 HYPOMAGNESEMIA: Primary | ICD-10-CM

## 2024-03-28 ENCOUNTER — RA CDI HCC (OUTPATIENT)
Dept: OTHER | Facility: HOSPITAL | Age: 79
End: 2024-03-28

## 2024-03-28 ENCOUNTER — OFFICE VISIT (OUTPATIENT)
Dept: PAIN MEDICINE | Facility: CLINIC | Age: 79
End: 2024-03-28
Payer: COMMERCIAL

## 2024-03-28 VITALS
WEIGHT: 166 LBS | DIASTOLIC BLOOD PRESSURE: 94 MMHG | BODY MASS INDEX: 26.06 KG/M2 | SYSTOLIC BLOOD PRESSURE: 187 MMHG | HEIGHT: 67 IN

## 2024-03-28 DIAGNOSIS — M48.062 SPINAL STENOSIS OF LUMBAR REGION WITH NEUROGENIC CLAUDICATION: ICD-10-CM

## 2024-03-28 DIAGNOSIS — M43.16 SPONDYLOLISTHESIS OF LUMBAR REGION: ICD-10-CM

## 2024-03-28 DIAGNOSIS — M54.16 LUMBAR RADICULOPATHY: Primary | ICD-10-CM

## 2024-03-28 DIAGNOSIS — M51.26 LUMBAR DISC HERNIATION: ICD-10-CM

## 2024-03-28 PROCEDURE — 99214 OFFICE O/P EST MOD 30 MIN: CPT | Performed by: ANESTHESIOLOGY

## 2024-03-28 RX ORDER — DULOXETIN HYDROCHLORIDE 20 MG/1
20 CAPSULE, DELAYED RELEASE ORAL DAILY
Qty: 90 CAPSULE | Refills: 1 | Status: SHIPPED | OUTPATIENT
Start: 2024-03-28

## 2024-03-28 NOTE — PROGRESS NOTES
Assessment  1. Lumbar radiculopathy    2. Spinal stenosis of lumbar region with neurogenic claudication    3. Spondylolisthesis of lumbar region    4. Lumbar disc herniation        Plan  78-year-old male with a history of previous L4-S1 fusion, lumbar radiculopathy, lumbar spondylosis, and stenosis returning for follow-up.  The patient feels that his left-sided low back and leg pain are slowly returning.  He has had a few trips while at home, but no trauma.  The patient had an L3-4 LESI January 9, 2024 which gave him about 85% of relief.  Tylenol provides minimal relief.  Currently taking duloxetine 20 mg daily which is providing mild to moderate relief.  He denies any side effects.  Mood is stable.     1.  Will repeat L3-4 LESI   2.  I will continue duloxetine 20 mg daily.  3.  Patient will continue with his home exercise program  4. I will f/u with the patient in 2 months       Complete risks and benefits including bleeding, infection, tissue reaction, nerve injury and allergic reaction were discussed. The approach was demonstrated using models and literature was provided. Verbal and written consent was obtained.    My impressions and treatment recommendations were discussed in detail with the patient who verbalized understanding and had no further questions.  Discharge instructions were provided. I personally saw and examined the patient and I agree with the above discussed plan of care.    No orders of the defined types were placed in this encounter.    No orders of the defined types were placed in this encounter.      History of Present Illness    Tonny Baig is a 78 y.o. male with a history of previous L4-S1 fusion, lumbar radiculopathy, lumbar spondylosis, and stenosis returning for follow-up.  The patient feels that his left-sided low back and leg pain are slowly returning.  He has had a few trips while at home, but no trauma.  He does feel occasional weakness and numbness in the left leg.  He denies  any bowel incontinence or saddle anesthesia.  He does have some baseline bladder incontinence.  The patient had an L3-4 LESI January 9, 2024 which gave him about 85% of relief.  Tylenol provides minimal relief.  Currently taking duloxetine 20 mg daily which is providing mild to moderate relief.  He denies any side effects.  Mood is stable.  The patient rates his pain a 7 out of 10 and the pain is worse in the morning.  The pain is constant and described as burning, sharp, throbbing, pressure-like, and numbness.  The pain is increased with standing, walking, bending, and exercise.  The pain is alleviated with injections, medication, and relaxation.    Other than as stated above, the patient denies any interval changes in medications, medical condition, mental condition, symptoms, or allergies since the last office visit.    I have personally reviewed and/or updated the patient's past medical history, past surgical history, family history, social history, current medications, allergies, and vital signs today.     Review of Systems   Constitutional:  Negative for fever and unexpected weight change.   HENT:  Negative for trouble swallowing.    Eyes:  Negative for visual disturbance.   Respiratory:  Positive for shortness of breath. Negative for wheezing.    Cardiovascular:  Negative for chest pain and palpitations.   Gastrointestinal:  Negative for constipation, diarrhea, nausea and vomiting.   Endocrine: Negative for cold intolerance, heat intolerance and polydipsia.   Genitourinary:  Negative for difficulty urinating and frequency.   Musculoskeletal:  Positive for gait problem and joint swelling. Negative for arthralgias and myalgias.        Decreased ROM, pain in extremity   Skin:  Negative for rash.   Neurological:  Positive for dizziness and weakness. Negative for seizures, syncope and headaches.   Hematological:  Does not bruise/bleed easily.   Psychiatric/Behavioral:  Negative for dysphoric mood.    All other  systems reviewed and are negative.      Patient Active Problem List   Diagnosis    Gait instability    Cervical myelopathy (HCC)    Adenocarcinoma of prostate (HCC)    CAD (coronary artery disease)    Bipolar affective disorder (HCC)    COPD (chronic obstructive pulmonary disease) (HCC)    Gastroesophageal reflux disease    Mixed hyperlipidemia    Essential hypertension    Aneurysm of infrarenal abdominal aorta (HCC)    H/O lumbosacral spine surgery    Stage 3a chronic kidney disease (HCC)    Impaired mobility and activities of daily living    DDD (degenerative disc disease), lumbar    Foraminal stenosis of lumbar region    Spondylolisthesis of lumbar region    Myelopathy concurrent with and due to lumbosacral intervertebral disc disorder    Arrhythmia    Diastolic heart failure (HCC)    New onset atrial fibrillation (HCC)    Pain in left hip    Greater trochanteric bursitis, left    Skin lesion    Trochanteric bursitis of left hip    Primary osteoarthritis of left hip    Malignant neoplasm of lung (HCC)    Excessive gas    Extradural cyst of spine    Lumbar disc herniation    Gait abnormality    Skin lump of leg, right    Seasonal allergic rhinitis due to pollen    Braces as ambulation aid    Parkinsonism    Change in mental status    Frequency of urination    Skin tear of right lower leg without complication    Proteinuria    S/P endovascular aneurysm repair    Myocardial infarction (HCC)    Adenoma of colon    Abdominal aortic aneurysm (AAA) without rupture, unspecified part (HCC)    Lumbar radiculopathy    Spinal stenosis of lumbar region with neurogenic claudication    Hypomagnesemia       Past Medical History:   Diagnosis Date    Allergic 2011    Allergic rhinitis 2015    Anxiety     occasional    Aortic aneurysm (HCC)     Benign colon polyp     Bipolar 1 disorder (HCC)     Cancer (HCC) 2007, 2011    Cardiac disease     MI    Cervical cord compression with myelopathy (HCC)     COPD (chronic obstructive  pulmonary disease) (HCC)     Coronary artery disease 1995    Diverticulitis     Diverticulitis of colon prior to 2014    Diverticulosis     Emphysema of lung (HCC) 2012    Gait disturbance     uses cane, leg brace on right    GERD (gastroesophageal reflux disease)     Heart attack (HCC) 1996    Hx of resection of large bowel 05/03/2016    Hyperlipidemia     Hypertension     IBS (irritable bowel syndrome)     Inflammatory bowel disease 2012    Lumbar stenosis     Lung cancer (HCC)     2007 Left lower lobectomy and 2011 Right lung with surgery     Myocardial infarction (HCC)     involving other coronary artery    Prostate cancer (HCC)     Shortness of breath     Small bowel obstruction (HCC) 11/16/2016       Past Surgical History:   Procedure Laterality Date    ANGIOPLASTY      stent    CARDIAC CATHETERIZATION  1995    angioplasty    CARDIAC SURGERY      CERVICAL SPINE SURGERY      Cervical decompression with cervical fusion from C3-C7 for spinal stenosis.    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    LUNG LOBECTOMY Left     NJ ARTHRD ANT INTERBODY MIN DSC CRV BELOW C2 N/A 05/02/2016    Procedure: Anterior cervical diskectomy C3/4, C5/6, C6/7 with anterior plate fixation fusion C3-7;  Posterior decompressive laminectomy C3-7 with lateral mass fixation fusion C3-7 (IMPULSE MONITORING);  Surgeon: Jose Luis Moore MD;  Location: [facility];  Service: Neurosurgery    [place] ARTHRODESIS POSTERIOR INTERBODY 1 [facility] LUMBAR N/A 01/30/2017    Procedure: L4-5 AND L5-S1 DECOMPRESSIVE FORAMINOTOMIES, TRANSFORAMINAL LUMBAR  INTERBODY AND PEDICLE SCREW FIXATION FUSION L4-S1 (IMPULSE);  Surgeon: Jose Luis Moore MD;  Location: [facility];  Service: Neurosurgery    OK EVASC RPR DPLMNT AORTO-AORTIC NDGFT N/A 2018    Procedure: REPAIR ANEURYSM ENDOVASCULAR ABDOMINAL AORTIC  (EVAR) WITH BILATERAL PERCUTANEOUS FEMORAL ACCESS WITH ULTRASOUND GUIDANCE ON THE RIGHT AND PRE CLOSURE;  Surgeon: Shan Villalobos MD;  Location: [facility];  Service: Vascular    PROSTATECTOMY      for prostate CA - no chemo/RT    SIGMOIDECTOMY      for divertic    SMALL INTESTINE SURGERY N/A 2016    Procedure: Exploratory Laparotomy, Lysis of adhesions to release small bowel obstruction;  Surgeon: Aminta Sim MD;  Location: [facility];  Service:     SPINE SURGERY  ,     TONSILLECTOMY  Y    195       Family History   Problem Relation Age of Onset    Hypertension Mother     Glaucoma Mother     Heart attack Father     Colon cancer Father     Coronary artery disease Father     Hypertension Father     Kidney disease Father     Heart disease Family     Hyperlipidemia Family     Hypertension Family        Social History     Occupational History    Occupation: Retired   Tobacco Use    Smoking status: Former     Current packs/day: 0.00     Average packs/day: 1 pack/day for 44.0 years (44.0 ttl pk-yrs)     Types: Cigarettes     Start date: 1967     Quit date: 2011     Years since quittin.2    Smokeless tobacco: Never   Vaping Use    Vaping status: Never Used   Substance and Sexual Activity    Alcohol use: Yes     Alcohol/week: 2.0 - 3.0 standard drinks of alcohol     Types: 2 - 3 Shots of liquor per week     Comment: One glass per day    Drug use: No    Sexual activity: Not Currently     Partners: Female     Birth control/protection: Post-menopausal, None       Current Outpatient Medications on File Prior to Visit   Medication Sig    acetaminophen (TYLENOL) 500 mg tablet Take 500 mg by mouth as needed for mild pain.    amLODIPine  (NORVASC) 2.5 mg tablet TAKE 1 TABLET BY MOUTH DAILY    aspirin (ECOTRIN LOW STRENGTH) 81 mg EC tablet Take 81 mg by mouth daily    atorvastatin (LIPITOR) 40 mg tablet TAKE 1 TABLET BY MOUTH DAILY    Cholecalciferol (VITAMIN D PO) Take 2,000 Units by mouth daily      dicyclomine (Bentyl) 20 mg tablet Take 1 tablet (20 mg total) by mouth every 6 (six) hours as needed (abd cramping)    DULoxetine (CYMBALTA) 20 mg capsule Take 1 capsule (20 mg total) by mouth daily    fexofenadine (ALLEGRA) 180 MG tablet Take 180 mg by mouth as needed Daily during the Summer    fluticasone (FLONASE) 50 mcg/act nasal spray 2 sprays into each nostril daily    fluticasone-umeclidinium-vilanterol (Trelegy Ellipta) 200-62.5-25 mcg/actuation AEPB inhaler Inhale 1 puff daily    lithium carbonate (LITHOBID) 300 mg CR tablet Take 300 mg by mouth in the morning    magnesium chloride-calcium (SlowMag Mg Muscle/Heart) 71.5-119 mg Take 1 tablet by mouth 2 (two) times a day    metoprolol succinate (TOPROL-XL) 25 mg 24 hr tablet TAKE 1 TABLET BY MOUTH  DAILY    Myrbetriq 25 MG TB24 TAKE 1 TABLET BY MOUTH DAILY AT  BEDTIME    nitroglycerin (NITRODUR) 0.2 mg/hr APPLY 1 PATCH TOPICALLY  ONCE DAILY. LEAVE ON FOR 12 TO 14 HOURS THEN REMOVE FOR A NITRATE-FREE INTERVAL OF  10 TO 12 HOURS (Patient taking differently: if needed)    omega-3-acid ethyl esters (LOVAZA) 1 g capsule TAKE 1 CAPSULE BY MOUTH 3  TIMES DAILY    omeprazole (PriLOSEC) 20 mg delayed release capsule TAKE 1 CAPSULE BY MOUTH TWICE  DAILY BEFORE MEALS    magnesium 30 MG tablet Take 1 tablet (30 mg total) by mouth 2 (two) times a day    mupirocin (BACTROBAN) 2 % ointment Apply topically 2 (two) times a day as needed (wound) (Patient not taking: Reported on 2/16/2024)     No current facility-administered medications on file prior to visit.       Allergies   Allergen Reactions    Bactrim [Sulfamethoxazole-Trimethoprim] Other (See Comments)     AILYN    Nsaids      Annotation - [id number]: unable  "to take due to use of lithium.    Oxycodone Rash       Physical Exam    BP (!) 187/94   Ht 5' 7\" (1.702 m)   Wt 75.3 kg (166 lb)   BMI 26.00 kg/m²     Constitutional: normal, well developed, well nourished, alert, in no distress and non-toxic and no overt pain behavior.  Eyes: anicteric  HEENT: grossly intact  Neck: supple, symmetric, trachea midline and no masses   Pulmonary:even and unlabored  Cardiovascular:No edema or pitting edema present  Skin:Normal without rashes or lesions and well hydrated  Psychiatric:Mood and affect appropriate  Neurologic:Cranial Nerves II-XII grossly intact  Musculoskeletal:antalgic gait.  Left lumbar paraspinals tender to palpation from L3-L5.  Bilateral lower extremity strength 5 out of 5 in all muscle groups with the exception of left dorsiflexion and EHL which was 4-5.  Sensation intact to light touch in L3-S1 dermatomes bilaterally.  Negative seated straight leg raise bilaterally.    Imaging  MRI LUMBAR SPINE WITH AND WITHOUT CONTRAST     INDICATION: M54.16: Radiculopathy, lumbar region.     COMPARISON:  MRI lumbar spine 7/2/2019     TECHNIQUE:  Multiplanar, multisequence imaging of the lumbar spine was performed before and after gadolinium administration. .          IV Contrast:  8 mL of Gadobutrol injection (SINGLE-DOSE)      IMAGE QUALITY:  Diagnostic     FINDINGS:     POSTSURGICAL FINDINGS: Stable posterior lumbar fixation hardware including bilateral transpedicular screws at the L4 through the S1 levels with interconnecting rods and secondary susceptibility artifacts.     VERTEBRAL BODIES:  There are 5 lumbar type vertebral bodies.  Stable dextroscoliosis, apex at L3.  No compression fracture.    Modic type I endplate changes are noted at the L3-4 level.     SACRUM:  Normal signal within the visualized upper sacrum. No suspected insufficiency or stress fracture.     DISTAL CORD AND CONUS:  Normal size and signal within the distal cord and conus. The conus terminates at the " L1 level.  The cauda equina nerve roots appear within normal limits.     PARASPINAL SOFT TISSUES:  Mild dependent subcutaneous edema within the lower back region.     LOWER THORACIC DISC SPACES:  Normal disc height and signal.  No disc herniation, canal stenosis or foraminal narrowing.     LUMBAR DISC SPACES:     L1-2: Stable disc bulge without significant central canal or neural foraminal narrowing.       L2-3: Mild disc bulge is again noted with a new small central disc extrusion with subligamentous extension superiorly.  No central canal stenosis.  Mild to moderate bilateral subarticular/lateral recess narrowing.  Moderate left neural foraminal   stenosis.     L3-4: Stable mild to moderate diffuse disc bulge.  The previously noted left paracentral disc extrusion demonstrates decreased superior subligamentous extension.  There is again bilateral ligamentum flavum and facet hypertrophy.  Moderate central canal   and left greater than right subarticular/lateral recess narrowing is again noted with encroachment of the traversing left L4 nerve root.  Moderate bilateral neural foraminal stenosis, unchanged.     L4-5: No focal disc herniation, central canal stenosis, or neural foraminal narrowing.  Suggestion of prior right hemilaminectomy with patency of the central canal.     L5-S1: Stable disc bulge and broad-based central disc protrusion.  The central canal is patent status post right hemilaminectomy.  Mild bilateral subarticular/lateral recess narrowing.  Moderate right neural foraminal stenosis with encroachment of the   exiting right L5 nerve root.        POSTCONTRAST IMAGING:  There is no suspicious enhancing granulation tissue impinging on the thecal sac.     OTHER FINDINGS:  Bilateral renal cortical cysts, largest located at the upper pole of the right kidney measuring 5.4 cm.     IMPRESSION:     1. Stable posterior lumbar fixation hardware including bilateral transpedicular screws at the L4 through the S1  levels with interconnecting rods.     2. New small central disc extrusion is noted at the L2-3 level with subligamentous extension superiorly with bilateral subarticular/lateral recess narrowing.  Left paracentral disc extrusion at L3-4 is again noted with interval decreased subligamentous   extension.  Persistent moderate central canal and left greater than right subarticular/lateral recess narrowing at L3-4 with encroachment of the traversing left L4 nerve root.  Stable disc protrusion at L5-S1 abutting the traversing S1 nerve roots.  The   central canal is patent at the L4-5 and L5-S1 level secondary to right hemilaminectomies.  There is no suspicious enhancing granulation tissue impinging on the thecal sac.     3. Bilateral renal cortical cysts, largest located at the upper pole of the right kidney.

## 2024-03-30 ENCOUNTER — LAB (OUTPATIENT)
Dept: LAB | Facility: CLINIC | Age: 79
End: 2024-03-30
Payer: MEDICARE

## 2024-03-30 DIAGNOSIS — E83.42 HYPOMAGNESEMIA: ICD-10-CM

## 2024-03-30 LAB
ALBUMIN SERPL BCP-MCNC: 3.6 G/DL (ref 3.5–5)
ALP SERPL-CCNC: 103 U/L (ref 34–104)
ALT SERPL W P-5'-P-CCNC: 15 U/L (ref 7–52)
ANION GAP SERPL CALCULATED.3IONS-SCNC: 10 MMOL/L (ref 4–13)
AST SERPL W P-5'-P-CCNC: 20 U/L (ref 13–39)
BILIRUB SERPL-MCNC: 0.51 MG/DL (ref 0.2–1)
BUN SERPL-MCNC: 31 MG/DL (ref 5–25)
CALCIUM SERPL-MCNC: 9.4 MG/DL (ref 8.4–10.2)
CHLORIDE SERPL-SCNC: 106 MMOL/L (ref 96–108)
CO2 SERPL-SCNC: 25 MMOL/L (ref 21–32)
CREAT SERPL-MCNC: 1.39 MG/DL (ref 0.6–1.3)
GFR SERPL CREATININE-BSD FRML MDRD: 48 ML/MIN/1.73SQ M
GLUCOSE P FAST SERPL-MCNC: 92 MG/DL (ref 65–99)
MAGNESIUM SERPL-MCNC: 1.2 MG/DL (ref 1.9–2.7)
POTASSIUM SERPL-SCNC: 4.5 MMOL/L (ref 3.5–5.3)
PROT SERPL-MCNC: 6.3 G/DL (ref 6.4–8.4)
SODIUM SERPL-SCNC: 141 MMOL/L (ref 135–147)

## 2024-03-30 PROCEDURE — 83735 ASSAY OF MAGNESIUM: CPT

## 2024-03-30 PROCEDURE — 80053 COMPREHEN METABOLIC PANEL: CPT

## 2024-03-30 PROCEDURE — 36415 COLL VENOUS BLD VENIPUNCTURE: CPT

## 2024-04-01 NOTE — RESULT ENCOUNTER NOTE
Labs ok, will review in more detail at upcoming appt. magnesium is still low, but much better than previous, continue magnesium.  Chronic kidney disease is stable

## 2024-04-02 ENCOUNTER — OFFICE VISIT (OUTPATIENT)
Dept: INTERNAL MEDICINE CLINIC | Facility: CLINIC | Age: 79
End: 2024-04-02
Payer: MEDICARE

## 2024-04-02 VITALS
DIASTOLIC BLOOD PRESSURE: 72 MMHG | SYSTOLIC BLOOD PRESSURE: 118 MMHG | BODY MASS INDEX: 25.78 KG/M2 | HEART RATE: 64 BPM | WEIGHT: 164.6 LBS | OXYGEN SATURATION: 99 %

## 2024-04-02 DIAGNOSIS — I50.30 DIASTOLIC HEART FAILURE, UNSPECIFIED HF CHRONICITY (HCC): ICD-10-CM

## 2024-04-02 DIAGNOSIS — F31.9 BIPOLAR AFFECTIVE DISORDER, REMISSION STATUS UNSPECIFIED (HCC): ICD-10-CM

## 2024-04-02 DIAGNOSIS — C34.90 MALIGNANT NEOPLASM OF LUNG, UNSPECIFIED LATERALITY, UNSPECIFIED PART OF LUNG (HCC): ICD-10-CM

## 2024-04-02 DIAGNOSIS — I10 ESSENTIAL HYPERTENSION: ICD-10-CM

## 2024-04-02 DIAGNOSIS — E83.42 HYPOMAGNESEMIA: ICD-10-CM

## 2024-04-02 DIAGNOSIS — G95.9 CERVICAL MYELOPATHY (HCC): ICD-10-CM

## 2024-04-02 DIAGNOSIS — I25.10 CORONARY ARTERY DISEASE INVOLVING NATIVE HEART WITHOUT ANGINA PECTORIS, UNSPECIFIED VESSEL OR LESION TYPE: ICD-10-CM

## 2024-04-02 DIAGNOSIS — I48.91 NEW ONSET ATRIAL FIBRILLATION (HCC): ICD-10-CM

## 2024-04-02 DIAGNOSIS — E78.2 MIXED HYPERLIPIDEMIA: ICD-10-CM

## 2024-04-02 DIAGNOSIS — R14.3 EXCESSIVE GAS: ICD-10-CM

## 2024-04-02 DIAGNOSIS — G20.C PARKINSONISM, UNSPECIFIED PARKINSONISM TYPE: ICD-10-CM

## 2024-04-02 DIAGNOSIS — J44.9 CHRONIC OBSTRUCTIVE PULMONARY DISEASE, UNSPECIFIED COPD TYPE (HCC): ICD-10-CM

## 2024-04-02 DIAGNOSIS — N18.31 STAGE 3A CHRONIC KIDNEY DISEASE (HCC): Primary | ICD-10-CM

## 2024-04-02 PROCEDURE — 99214 OFFICE O/P EST MOD 30 MIN: CPT | Performed by: INTERNAL MEDICINE

## 2024-04-02 RX ORDER — NITROGLYCERIN 40 MG/1
PATCH TRANSDERMAL
Qty: 90 PATCH | Refills: 3 | Status: CANCELLED | OUTPATIENT
Start: 2024-04-02

## 2024-04-02 RX ORDER — DICYCLOMINE HCL 20 MG
20 TABLET ORAL EVERY 6 HOURS PRN
Qty: 90 TABLET | Refills: 3 | Status: SHIPPED | OUTPATIENT
Start: 2024-04-02

## 2024-04-02 NOTE — ASSESSMENT & PLAN NOTE
Patient currently on Slow-Mag 71.5-119 mg twice a day.  I recommend increasing to 3 times a day due to recent Magnesium of 1.2

## 2024-04-02 NOTE — ASSESSMENT & PLAN NOTE
Wt Readings from Last 3 Encounters:   04/02/24 74.7 kg (164 lb 9.6 oz)   03/28/24 75.3 kg (166 lb)   03/05/24 75.8 kg (167 lb)     No report of diastolic dysfunction on recent cardiac echo, follow-up cardiology

## 2024-04-02 NOTE — ASSESSMENT & PLAN NOTE
Patient had a reported episode of AFib in 2018 associated with a respiratory infection, this has been felt to be an isolated episode, and patient not on anticoagulation. Patient does follow with Cardiology.   Patient denies any heart palpitations

## 2024-04-02 NOTE — ASSESSMENT & PLAN NOTE
Lab Results   Component Value Date    EGFR 48 03/30/2024    EGFR 50 03/06/2024    EGFR 44 02/12/2024    CREATININE 1.39 (H) 03/30/2024    CREATININE 1.36 (H) 03/06/2024    CREATININE 1.51 (H) 02/12/2024   Stable, avoid excessive use of NSAIDs

## 2024-04-02 NOTE — PATIENT INSTRUCTIONS
Problem List Items Addressed This Visit          Cardiovascular and Mediastinum    Diastolic heart failure (HCC) (Chronic)     Wt Readings from Last 3 Encounters:   04/02/24 74.7 kg (164 lb 9.6 oz)   03/28/24 75.3 kg (166 lb)   03/05/24 75.8 kg (167 lb)     No report of diastolic dysfunction on recent cardiac echo, follow-up cardiology               CAD (coronary artery disease)    Essential hypertension     Pressure is well-controlled, continue meds         New onset atrial fibrillation (HCC)     Patient had a reported episode of AFib in 2018 associated with a respiratory infection, this has been felt to be an isolated episode, and patient not on anticoagulation. Patient does follow with Cardiology.   Patient denies any heart palpitations            Respiratory    COPD (chronic obstructive pulmonary disease) (HCC)     Follow-up pulmonary, continue inhalers         Malignant neoplasm of lung (HCC)     Follow-up pulmonary            Nervous and Auditory    Cervical myelopathy (HCC)     Stay active with stretching exercises         Parkinsonism     Follow-up neurology, patient not on medications for this            Genitourinary    Stage 3a chronic kidney disease (HCC) - Primary     Lab Results   Component Value Date    EGFR 48 03/30/2024    EGFR 50 03/06/2024    EGFR 44 02/12/2024    CREATININE 1.39 (H) 03/30/2024    CREATININE 1.36 (H) 03/06/2024    CREATININE 1.51 (H) 02/12/2024   Stable, avoid excessive use of NSAIDs            Behavioral Health    Bipolar affective disorder (HCC)     Continue medications, follow-up psychiatry            Other    Mixed hyperlipidemia     Continue atorvastatin         Excessive gas    Relevant Medications    dicyclomine (Bentyl) 20 mg tablet    Hypomagnesemia     Patient currently on Slow-Mag 71.5-119 mg twice a day.  I recommend increasing to 3 times a day due to recent Magnesium of 1.2         Relevant Orders    Magnesium

## 2024-04-02 NOTE — PROGRESS NOTES
Name: Tonny Baig      : 1945      MRN: [id number]  Encounter Provider: Brendan Suarez MD  Encounter Date: 2024   Encounter department: [facility]    Assessment & Plan     1. Stage 3a chronic kidney disease (HCC)  Assessment & Plan:  Lab Results   Component Value Date    EGFR 48 2024    EGFR 50 2024    EGFR 44 2024    CREATININE 1.39 (H) 2024    CREATININE 1.36 (H) 2024    CREATININE 1.51 (H) 2024   Stable, avoid excessive use of NSAIDs      2. Coronary artery disease involving native heart without angina pectoris, unspecified vessel or lesion type    3. Excessive gas  -     dicyclomine (Bentyl) 20 mg tablet; Take 1 tablet (20 mg total) by mouth every 6 (six) hours as needed (abd cramping)    4. New onset atrial fibrillation (HCC)  Assessment & Plan:  Patient had a reported episode of AFib in 2018 associated with a respiratory infection, this has been felt to be an isolated episode, and patient not on anticoagulation. Patient does follow with Cardiology.   Patient denies any heart palpitations      5. Chronic obstructive pulmonary disease, unspecified COPD type (HCC)  Assessment & Plan:  Follow-up pulmonary, continue inhalers      6. Bipolar affective disorder, remission status unspecified ([facility])  Assessment & Plan:  Continue medications, follow-up psychiatry      7. Malignant neoplasm of lung, unspecified laterality, unspecified part of lung (HCC)  Assessment & Plan:  Follow-up pulmonary      8. Cervical myelopathy (HCC)  Assessment & Plan:  Stay active with stretching exercises      9. Parkinsonism, unspecified Parkinsonism type  Assessment & Plan:  Follow-up neurology, patient not on medications for this      10. Diastolic heart failure, unspecified HF chronicity (HCC)  Assessment & Plan:  Wt Readings from Last 3 Encounters:   24 74.7 kg (164 lb 9.6 oz)   24 75.3 kg (166 lb)   24 75.8 kg (167 lb)     No report of  diastolic dysfunction on recent cardiac echo, follow-up cardiology            11. Essential hypertension  Assessment & Plan:  Pressure is well-controlled, continue meds      12. Mixed hyperlipidemia  Assessment & Plan:  Continue atorvastatin      13. Hypomagnesemia  Assessment & Plan:  Patient currently on Slow-Mag 71.5-119 mg twice a day.  I recommend increasing to 3 times a day due to recent Magnesium of 1.2    Orders:  -     Magnesium; Future        Falls Plan of Care: balance, strength, and gait training instructions were provided.         Subjective     Pt here for regular follow up      Review of Systems   Constitutional:  Positive for fatigue. Negative for chills and fever.   HENT:  Negative for congestion, nosebleeds, postnasal drip, sore throat and trouble swallowing.    Eyes:  Negative for pain.   Respiratory:  Positive for cough (mild) and shortness of breath (with exertion). Negative for chest tightness and wheezing.    Cardiovascular:  Negative for chest pain, palpitations and leg swelling.   Gastrointestinal:  Negative for abdominal pain, constipation, diarrhea, nausea and vomiting.   Endocrine: Negative for polydipsia and polyuria.   Genitourinary:  Negative for dysuria, flank pain and hematuria.   Musculoskeletal:  Positive for back pain. Negative for arthralgias and myalgias.   Skin:  Negative for rash.   Neurological:  Negative for dizziness, tremors, light-headedness and headaches.   Hematological:  Does not bruise/bleed easily.   Psychiatric/Behavioral:  Negative for confusion and dysphoric mood. The patient is not nervous/anxious.        Past Medical History:   Diagnosis Date   • Allergic 2011   • Allergic rhinitis 2015   • Anxiety     occasional   • Aortic aneurysm (HCC)    • Benign colon polyp    • Bipolar 1 disorder (HCC)    • Cancer (HCC) 2007, 2011   • Cardiac disease     MI   • Cervical cord compression with myelopathy (HCC)    • COPD (chronic obstructive pulmonary disease) (HCC)    •  Coronary artery disease 1995   • Diverticulitis    • Diverticulitis of colon prior to 2014   • Diverticulosis    • Emphysema of lung (HCC) 2012   • Gait disturbance     uses cane, leg brace on right   • GERD (gastroesophageal reflux disease)    • Heart attack (HCC) 1996   • Hx of resection of large bowel 05/03/2016   • Hyperlipidemia    • Hypertension    • IBS (irritable bowel syndrome)    • Inflammatory bowel disease 2012   • Lumbar stenosis    • Lung cancer (HCC)     2007 Left lower lobectomy and 2011 Right lung with surgery    • Myocardial infarction (HCC)     involving other coronary artery   • Prostate cancer (HCC)    • Shortness of breath    • Small bowel obstruction (HCC) 11/16/2016     Past Surgical History:   Procedure Laterality Date   • ANGIOPLASTY      stent   • CARDIAC CATHETERIZATION  1995    angioplasty   • CARDIAC SURGERY     • CERVICAL SPINE SURGERY      Cervical decompression with cervical fusion from C3-C7 for spinal stenosis.   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   • LUNG LOBECTOMY Left    • CA ARTHRD ANT INTERBODY MIN DSC CRV BELOW C2 N/A 05/02/2016    Procedure: Anterior cervical diskectomy C3/4, C5/6, C6/7 with anterior plate fixation fusion C3-7;  Posterior decompressive laminectomy C3-7 with lateral mass fixation fusion C3-7 (IMPULSE MONITORING);  Surgeon: Jose Luis Moore MD;  [facility];  Service: Neurosurgery   • [facility]    Procedure: L4-5 AND L5-S1 DECOMPRESSIVE FORAMINOTOMIES, TRANSFORAMINAL LUMBAR  INTERBODY AND PEDICLE SCREW FIXATION FUSION L4-S1 (IMPULSE);  Surgeon: Jose Luis Moore MD;  [facility];  Service: Neurosurgery   • DC EVASC RPR DPLMNT AORTO-AORTIC NDGFT N/A 2018    Procedure: REPAIR ANEURYSM ENDOVASCULAR ABDOMINAL AORTIC  (EVAR) WITH BILATERAL PERCUTANEOUS FEMORAL ACCESS WITH ULTRASOUND GUIDANCE ON THE RIGHT AND PRE CLOSURE;  Surgeon: Shan Villalobos MD;  [facility];  Service: Vascular   • PROSTATECTOMY      for prostate CA - no chemo/RT   • SIGMOIDECTOMY      for divertic   • SMALL INTESTINE SURGERY N/A 2016    Procedure: Exploratory Laparotomy, Lysis of adhesions to release small bowel obstruction;  Surgeon: Aminta Sim MD;  [facility];  Service:    • SPINE SURGERY  2017   • TONSILLECTOMY  Y         Family History   Problem Relation Age of Onset   • Hypertension Mother    • Glaucoma Mother    • Heart attack Father    • Colon cancer Father    • Coronary artery disease Father    • Hypertension Father    • Kidney disease Father    • Heart disease Family    • Hyperlipidemia Family    • Hypertension Family      Social History     Socioeconomic History   • Marital status: /Civil Union     Spouse name: None   • Number of children: 1   • Years of education: None   • Highest education level: None   Occupational History   • Occupation: Retired   Tobacco Use   • Smoking status: Former     Current packs/day: 0.00     Average packs/day: 1 pack/day for 44.0 years (44.0 ttl pk-yrs)     Types: Cigarettes     Start date: 1967     Quit date: 2011     Years since quittin.2     Passive exposure: Past   • Smokeless tobacco: Never   Vaping Use   • Vaping status: Never Used   Substance and Sexual Activity   • Alcohol use: Yes     Alcohol/week: 2.0 - 3.0 standard drinks of alcohol     Types: 2 - 3 Shots of liquor per week     Comment: One glass per day   • Drug use: No   • Sexual activity: Not Currently     Partners: Female     Birth  control/protection: Post-menopausal, None   Other Topics Concern   • None   Social History Narrative   • None     Social Determinants of Health     Financial Resource Strain: Low Risk  (12/29/2023)    Overall Financial Resource Strain (CARDIA)    • Difficulty of Paying Living Expenses: Not hard at all   Food Insecurity: Not on file   Transportation Needs: No Transportation Needs (12/29/2023)    PRAPARE - Transportation    • Lack of Transportation (Medical): No    • Lack of Transportation (Non-Medical): No   Physical Activity: Not on file   Stress: Not on file   Social Connections: Not on file   Intimate Partner Violence: Not on file   Housing Stability: Not on file     Current Outpatient Medications on File Prior to Visit   Medication Sig   • acetaminophen (TYLENOL) 500 mg tablet Take 500 mg by mouth as needed for mild pain.   • amLODIPine (NORVASC) 2.5 mg tablet TAKE 1 TABLET BY MOUTH DAILY   • aspirin (ECOTRIN LOW STRENGTH) 81 mg EC tablet Take 81 mg by mouth daily   • atorvastatin (LIPITOR) 40 mg tablet TAKE 1 TABLET BY MOUTH DAILY   • Cholecalciferol (VITAMIN D PO) Take 2,000 Units by mouth daily     • DULoxetine (CYMBALTA) 20 mg capsule Take 1 capsule (20 mg total) by mouth daily   • fexofenadine (ALLEGRA) 180 MG tablet Take 180 mg by mouth as needed Daily during the Summer   • fluticasone (FLONASE) 50 mcg/act nasal spray 2 sprays into each nostril daily   • fluticasone-umeclidinium-vilanterol (Trelegy Ellipta) 200-62.5-25 mcg/actuation AEPB inhaler Inhale 1 puff daily   • lithium carbonate (LITHOBID) 300 mg CR tablet Take 300 mg by mouth daily at bedtime   • magnesium chloride-calcium (SlowMag Mg Muscle/Heart) 71.5-119 mg Take 1 tablet by mouth 2 (two) times a day   • metoprolol succinate (TOPROL-XL) 25 mg 24 hr tablet TAKE 1 TABLET BY MOUTH  DAILY   • mupirocin (BACTROBAN) 2 % ointment Apply topically 2 (two) times a day as needed (wound)   • Myrbetriq 25 MG TB24 TAKE 1 TABLET BY MOUTH DAILY AT  BEDTIME   •  omega-3-acid ethyl esters (LOVAZA) 1 g capsule TAKE 1 CAPSULE BY MOUTH 3  TIMES DAILY   • omeprazole (PriLOSEC) 20 mg delayed release capsule TAKE 1 CAPSULE BY MOUTH TWICE  DAILY BEFORE MEALS   • [DISCONTINUED] dicyclomine (Bentyl) 20 mg tablet Take 1 tablet (20 mg total) by mouth every 6 (six) hours as needed (abd cramping)   • magnesium 30 MG tablet Take 1 tablet (30 mg total) by mouth 2 (two) times a day   • nitroglycerin (NITRODUR) 0.2 mg/hr APPLY 1 PATCH TOPICALLY  ONCE DAILY. LEAVE ON FOR 12 TO 14 HOURS THEN REMOVE FOR A NITRATE-FREE INTERVAL OF  10 TO 12 HOURS (Patient not taking: Reported on 4/2/2024)     Allergies   Allergen Reactions   • Bactrim [Sulfamethoxazole-Trimethoprim] Other (See Comments)     AILYN   • Nsaids      Annotation - 20Nov2017: unable to take due to use of lithium.   • Oxycodone Rash     Immunization History   Administered Date(s) Administered   • COVID-19 PFIZER VACCINE 0.3 ML IM 02/17/2021, 03/10/2021, 10/26/2021   • COVID-19 Pfizer Vac BIVALENT Satya-sucrose 12 Yr+ IM 10/20/2022   • COVID-19 Pfizer mRNA vacc PF satya-sucrose 12 yr and older (Comirnaty) 11/06/2023   • COVID-19 Pfizer vac (Satya-sucrose, gray cap) 12 yr+ IM 08/03/2022   • COVID-19, unspecified 11/06/2023   • INFLUENZA 10/01/2015, 10/07/2018, 09/27/2019, 10/05/2020, 09/27/2021, 10/14/2022, 10/09/2023   • Influenza Quadrivalent Preservative Free 3 years and older IM 10/01/2015   • Influenza Split High Dose Preservative Free IM 10/25/2016, 10/03/2018   • Influenza, high dose seasonal 0.7 mL 09/27/2019, 10/05/2020, 10/14/2022, 10/09/2023   • Influenza, seasonal, injectable 1945, 10/10/2012   • Pneumococcal Conjugate 13-Valent 10/25/2016   • Pneumococcal Polysaccharide PPV23 10/03/2019   • Td (adult), adsorbed 12/01/2009   • Tdap 05/31/2023   • Zoster 01/01/2014, 01/01/2014   • influenza, trivalent, adjuvanted 09/27/2021       Objective     /72   Pulse 64   Wt 74.7 kg (164 lb 9.6 oz)   SpO2 99%   BMI 25.78 kg/m²      Physical Exam  Vitals reviewed.   Constitutional:       General: He is not in acute distress.     Appearance: Normal appearance. He is well-developed.   HENT:      Head: Normocephalic and atraumatic.      Right Ear: External ear normal.      Left Ear: External ear normal.   Eyes:      General: No scleral icterus.     Conjunctiva/sclera: Conjunctivae normal.   Neck:      Thyroid: No thyromegaly.      Trachea: No tracheal deviation.   Cardiovascular:      Rate and Rhythm: Normal rate and regular rhythm.      Heart sounds: Normal heart sounds. No murmur heard.  Pulmonary:      Effort: Pulmonary effort is normal. No respiratory distress.      Breath sounds: Normal breath sounds. No wheezing or rales.   Musculoskeletal:      Cervical back: Normal range of motion and neck supple.      Right lower leg: No edema.      Left lower leg: No edema.   Lymphadenopathy:      Cervical: No cervical adenopathy.   Skin:     Coloration: Skin is not jaundiced or pale.   Neurological:      General: No focal deficit present.      Mental Status: He is alert and oriented to person, place, and time.   Psychiatric:         Behavior: Behavior normal.         Thought Content: Thought content normal.         Judgment: Judgment normal.       Brendan Suarez MD

## 2024-04-09 ENCOUNTER — TELEPHONE (OUTPATIENT)
Dept: NEUROLOGY | Facility: CLINIC | Age: 79
End: 2024-04-09

## 2024-04-09 NOTE — TELEPHONE ENCOUNTER
Called pt and LMOM stating that I am calling in regards to canceling appt due to having another appt being close. Patient was informed and was told to give us a call back if they have any questions.

## 2024-04-09 NOTE — TELEPHONE ENCOUNTER
Patient called in today because he is scheduled on 5/21 w/Alex and 6/3 w/Dr Lopez.    Per lov notes w/Dr Lopez 5/22/23 Return in about 6 months (around 11/22/2023) for with Alex Diego . Which he did follow those instructions    Per Lov notes 11/21/23 w/Alex Return in about 6 months (around 5/21/2024).      Patient thought he was told to keep the appt with Alex and cancel with Dr Ruth. But he is not sure.    Please assist [phone]

## 2024-04-15 ENCOUNTER — TELEPHONE (OUTPATIENT)
Dept: NEPHROLOGY | Facility: CLINIC | Age: 79
End: 2024-04-15

## 2024-04-15 NOTE — TELEPHONE ENCOUNTER
Patient returning call. States he had a magnesium level and CMP drawn on 3-30 for another provider with results in EPIC. Patient asking if he needs to repeat the labs for appt with Dr. Heller. Please advise, thank you.

## 2024-04-15 NOTE — TELEPHONE ENCOUNTER
Called patient and let him know that the blood work from 3/30 is fine. Patient verbally understood and had no further questions for me at this time.

## 2024-04-17 ENCOUNTER — HOSPITAL ENCOUNTER (OUTPATIENT)
Dept: RADIOLOGY | Facility: CLINIC | Age: 79
Discharge: HOME | End: 2024-04-17
Payer: MEDICARE

## 2024-04-17 VITALS
TEMPERATURE: 97.1 F | RESPIRATION RATE: 18 BRPM | SYSTOLIC BLOOD PRESSURE: 172 MMHG | OXYGEN SATURATION: 98 % | HEART RATE: 58 BPM | DIASTOLIC BLOOD PRESSURE: 80 MMHG

## 2024-04-17 DIAGNOSIS — M54.16 LUMBAR RADICULOPATHY: ICD-10-CM

## 2024-04-17 PROCEDURE — 62323 NJX INTERLAMINAR LMBR/SAC: CPT | Performed by: ANESTHESIOLOGY

## 2024-04-17 RX ORDER — METHYLPREDNISOLONE ACETATE 80 MG/ML
80 INJECTION, SUSPENSION INTRA-ARTICULAR; INTRALESIONAL; INTRAMUSCULAR; PARENTERAL; SOFT TISSUE ONCE
Status: COMPLETED | OUTPATIENT
Start: 2024-04-17 | End: 2024-04-17

## 2024-04-17 RX ADMIN — IOHEXOL 1 ML: 300 INJECTION, SOLUTION INTRAVENOUS at 10:20

## 2024-04-17 RX ADMIN — METHYLPREDNISOLONE ACETATE 80 MG: 80 INJECTION, SUSPENSION INTRA-ARTICULAR; INTRALESIONAL; INTRAMUSCULAR; SOFT TISSUE at 10:20

## 2024-04-17 NOTE — DISCHARGE INSTRUCTIONS
Epidural Steroid Injection   WHAT YOU NEED TO KNOW:   An epidural steroid injection (DEVON) is a procedure to inject steroid medicine into the epidural space. The epidural space is between your spinal cord and vertebrae. Steroids reduce inflammation and fluid buildup in your spine that may be causing pain. You may be given pain medicine along with the steroids.          ACTIVITY  Do not drive or operate machinery today.  No strenuous activity today - bending, lifting, etc.  You may resume normal activites starting tomorrow - start slowly and as tolerated.  You may shower today, but no tub baths or hot tubs.  You may have numbness for several hours from the local anesthetic. Please use caution and common sense, especially with weight-bearing activities.    CARE OF THE INJECTION SITE  If you have soreness or pain, apply ice to the area today (20 minutes on/20 minutes off).  Starting tomorrow, you may use warm, moist heat or ice if needed.  You may have an increase or change in your discomfort for 36-48 hours after your treatment.  Apply ice and continue with any pain medication you have been prescribed.  Notify the [facility] if you have any of the following: redness, drainage, swelling, headache, stiff neck or fever above 100°F.    SPECIAL INSTRUCTIONS  Our office will contact you in approximately 7 days for a progress report.    MEDICATIONS  Continue to take all routine medications.  Our office may have instructed you to hold some medications.    As no general anesthesia was used in today's procedure, you should not experience any side effects related to anesthesia.     If you are diabetic, the steroids used in today's injection may temporarily increase your blood sugar levels after the first few days after your injection. Please keep a close eye on your sugars and alert the doctor who manages your diabetes if your sugars are significantly high from your baseline or you are symptomatic.     If you have a  problem specifically related to your procedure, please call our office at [phone].  Problems not related to your procedure should be directed to your primary care physician.

## 2024-04-17 NOTE — H&P
History of Present Illness: The patient is a 78 y.o. male who presents with complaints of low back and leg pain.    Past Medical History:   Diagnosis Date    Allergic 2011    Allergic rhinitis 2015    Anxiety     occasional    Aortic aneurysm (HCC)     Benign colon polyp     Bipolar 1 disorder (HCC)     Cancer (HCC) 2007, 2011    Cardiac disease     MI    Cervical cord compression with myelopathy (HCC)     COPD (chronic obstructive pulmonary disease) (HCC)     Coronary artery disease 1995    Diverticulitis     Diverticulitis of colon prior to 2014    Diverticulosis     Emphysema of lung (HCC) 2012    Gait disturbance     uses cane, leg brace on right    GERD (gastroesophageal reflux disease)     Heart attack (HCC) 1996    Hx of resection of large bowel 05/03/2016    Hyperlipidemia     Hypertension     IBS (irritable bowel syndrome)     Inflammatory bowel disease 2012    Lumbar stenosis     Lung cancer (HCC)     2007 Left lower lobectomy and 2011 Right lung with surgery     Myocardial infarction (HCC)     involving other coronary artery    Prostate cancer (HCC)     Shortness of breath     Small bowel obstruction (HCC) 11/16/2016       Past Surgical History:   Procedure Laterality Date    ANGIOPLASTY      stent    CARDIAC CATHETERIZATION  1995    angioplasty    CARDIAC SURGERY      CERVICAL SPINE SURGERY      Cervical decompression with cervical fusion from C3-C7 for spinal stenosis.    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    LUNG LOBECTOMY Left     CA ARTHRD ANT INTERBODY MIN DSC CRV BELOW C2 N/A 05/02/2016     Procedure: Anterior cervical diskectomy C3/4, C5/6, C6/7 with anterior plate fixation fusion C3-7;  Posterior decompressive laminectomy C3-7 with lateral mass fixation fusion C3-7 (IMPULSE MONITORING);  Surgeon: Jose Luis Moore MD;  [facility];  Service: Neurosurgery    MT ARTHRODESIS POSTERIOR INTERBODY 1 [facility] LUMBAR N/A 01/30/2017    Procedure: L4-5 AND L5-S1 DECOMPRESSIVE FORAMINOTOMIES, TRANSFORAMINAL LUMBAR INTERBODY AND PEDICLE SCREW FIXATION FUSION L4-S1 (IMPULSE);  Surgeon: Jose Luis Moore MD;  [facility];  Service: Neurosurgery    MT EVASC RPR DPLMNT AORTO-AORTIC NDGFT N/A 08/06/2018    Procedure: REPAIR ANEURYSM ENDOVASCULAR ABDOMINAL AORTIC  (EVAR) WITH BILATERAL PERCUTANEOUS FEMORAL ACCESS WITH ULTRASOUND GUIDANCE ON THE RIGHT AND PRE CLOSURE;  Surgeon: Shan Villalobos MD;  [facility];  Service: Vascular    PROSTATECTOMY  2007    for prostate CA - no chemo/RT    SIGMOIDECTOMY      for divertic    SMALL INTESTINE SURGERY N/A 11/17/2016    Procedure: Exploratory Laparotomy, Lysis of adhesions to release small bowel obstruction;  Surgeon: Aminta Sim MD;  [facility];  Service:     SPINE SURGERY  2016, 2017    TONSILLECTOMY  Y    1950         Current Outpatient Medications:     acetaminophen (TYLENOL) 500 mg tablet, Take 500 mg by mouth as needed for mild pain., Disp: , Rfl:     amLODIPine (NORVASC) 2.5 mg tablet, TAKE 1 TABLET BY MOUTH DAILY, Disp: 90 tablet, Rfl: 3    aspirin (ECOTRIN LOW STRENGTH) 81 mg EC tablet, Take 81 mg by mouth daily, Disp: , Rfl:     atorvastatin (LIPITOR) 40 mg tablet, TAKE 1 TABLET BY MOUTH DAILY, Disp: 90 tablet, Rfl: 1    Cholecalciferol (VITAMIN D PO), Take 2,000 Units by mouth daily  , Disp: , Rfl:     dicyclomine (Bentyl) 20 mg tablet, Take 1 tablet (20 mg total) by mouth every 6 (six) hours as needed (abd cramping), Disp: 90 tablet, Rfl: 3    DULoxetine (CYMBALTA) 20 mg capsule, Take 1 capsule (20 mg total) by mouth  daily, Disp: 90 capsule, Rfl: 1    fexofenadine (ALLEGRA) 180 MG tablet, Take 180 mg by mouth as needed Daily during the Summer, Disp: , Rfl:     fluticasone (FLONASE) 50 mcg/act nasal spray, 2 sprays into each nostril daily, Disp: , Rfl:     fluticasone-umeclidinium-vilanterol (Trelegy Ellipta) 200-62.5-25 mcg/actuation AEPB inhaler, Inhale 1 puff daily, Disp: 180 blister, Rfl: 3    lithium carbonate (LITHOBID) 300 mg CR tablet, Take 300 mg by mouth daily at bedtime, Disp: , Rfl:     magnesium 30 MG tablet, Take 1 tablet (30 mg total) by mouth 2 (two) times a day, Disp: 60 tablet, Rfl: 0    magnesium chloride-calcium (SlowMag Mg Muscle/Heart) 71.5-119 mg, Take 1 tablet by mouth 2 (two) times a day, Disp: 60 tablet, Rfl: 5    metoprolol succinate (TOPROL-XL) 25 mg 24 hr tablet, TAKE 1 TABLET BY MOUTH  DAILY, Disp: 90 tablet, Rfl: 3    mupirocin (BACTROBAN) 2 % ointment, Apply topically 2 (two) times a day as needed (wound), Disp: 22 g, Rfl: 0    Myrbetriq 25 MG TB24, TAKE 1 TABLET BY MOUTH DAILY AT  BEDTIME, Disp: 90 tablet, Rfl: 3    nitroglycerin (NITRODUR) 0.2 mg/hr, APPLY 1 PATCH TOPICALLY  ONCE DAILY. LEAVE ON FOR 12 TO 14 HOURS THEN REMOVE FOR A NITRATE-FREE INTERVAL OF  10 TO 12 HOURS (Patient not taking: Reported on 4/2/2024), Disp: 90 patch, Rfl: 3    omega-3-acid ethyl esters (LOVAZA) 1 g capsule, TAKE 1 CAPSULE BY MOUTH 3  TIMES DAILY, Disp: 270 capsule, Rfl: 3    omeprazole (PriLOSEC) 20 mg delayed release capsule, TAKE 1 CAPSULE BY MOUTH TWICE  DAILY BEFORE MEALS, Disp: 180 capsule, Rfl: 1    Allergies   Allergen Reactions    Bactrim [Sulfamethoxazole-Trimethoprim] Other (See Comments)     AILYN    Nsaids      Annotation - 20Nov2017: unable to take due to use of lithium.    Oxycodone Rash       Physical Exam:   Vitals:    04/17/24 0959   BP: 154/80   Pulse:    Resp:    Temp:    SpO2:      General: Awake, Alert, Oriented x 3, Mood and affect appropriate  Respiratory: Respirations even and  unlabored  Cardiovascular: Peripheral pulses intact; no edema  Musculoskeletal Exam: Antalgic gait    ASA Score: 3         Assessment:   1. Lumbar radiculopathy        Plan: L3-4 LESJUAN

## 2024-04-23 ENCOUNTER — OFFICE VISIT (OUTPATIENT)
Dept: NEPHROLOGY | Facility: CLINIC | Age: 79
End: 2024-04-23
Payer: MEDICARE

## 2024-04-23 VITALS
BODY MASS INDEX: 25.43 KG/M2 | WEIGHT: 162 LBS | SYSTOLIC BLOOD PRESSURE: 136 MMHG | HEART RATE: 70 BPM | DIASTOLIC BLOOD PRESSURE: 76 MMHG | HEIGHT: 67 IN

## 2024-04-23 DIAGNOSIS — N18.31 STAGE 3A CHRONIC KIDNEY DISEASE (HCC): Primary | ICD-10-CM

## 2024-04-23 DIAGNOSIS — R80.8 OTHER PROTEINURIA: ICD-10-CM

## 2024-04-23 DIAGNOSIS — E83.42 HYPOMAGNESEMIA: ICD-10-CM

## 2024-04-23 PROCEDURE — 99214 OFFICE O/P EST MOD 30 MIN: CPT | Performed by: INTERNAL MEDICINE

## 2024-04-23 NOTE — PROGRESS NOTES
NEPHROLOGY PROGRESS NOTE    Tonny Baig 78 y.o. male MRN: [id number]  Unit/Bed#:  Encounter: [id number]  Reason for Consult: Chronic renal insufficiency and hypomagnesemia    The patient is here for routine follow-up and he says he feels well and since I have seen him he cannot recall that he has had any hospitalizations or specific complaints.  When I review his history he has been having issues with low magnesium although he has no symptoms such as pending blood work.    ASSESSMENT/PLAN:  1.  Renal    Patient has history of mild chronic kidney disease with lower levels of proteinuria therefore more into the tubulointerstitial range.  He has been on lithium for many years so it is possible he could have a little chronic interstitial nephritis but saying that he is doing tremendously well from a psychiatric standpoint he is well compensated and his creatinine is actually 1.3 which is towards the lower end of his baseline range and over the years there has been no progression.  He does not seem to have symptoms related to diabetes insipidus so overall I would continue everything you are doing and monitor with blood pressure control and not make any adjustments.  I explained to him that his creatinines been stable so far good prognosis with no progression.    2.  Hypomagnesemia    Lithium is not generally associated with low magnesium.  He does not seem to have other abnormalities in terms of electrolytes as you could potentially see potassium or calcium abnormalities but that is not present as these are normal.  He has no symptoms related to this.  He is on oral supplementation his last magnesium was 1.2 I told him just to continue trying to increase dietary intake.  He has no other obvious causes.  Proton pump inhibitors which she has been on for many years are listed as a cause but you really have to weigh the risk benefit of continuing the medication so I made no changes.  If it is persistently low potentially  "could try amiloride but for now no changes.    Check a magnesium when he gets his next blood work done and then follow-up as scheduled    I asked him to call if there is any problems or concerns before next visit.    I have spent a total time of 34 minutes on 04/23/24 in caring for this patient including Diagnostic results, Prognosis, Patient and family education, Impressions, Reviewing / ordering tests, medicine, procedures  , and Obtaining or reviewing history  .         SUBJECTIVE:  Review of Systems   Constitutional: Negative for chills, diaphoresis and fever.   HENT: Negative.     Eyes: Negative.    Cardiovascular: Negative.  Negative for chest pain, dyspnea on exertion, leg swelling and orthopnea.   Respiratory: Negative.  Negative for cough, shortness of breath, sputum production and wheezing.    Gastrointestinal:  Negative for abdominal pain, diarrhea, nausea and vomiting.   Genitourinary: Negative.    Neurological:  Negative for dizziness, focal weakness and headaches.   Psychiatric/Behavioral:  Negative for altered mental status, hallucinations and hypervigilance. The patient is not nervous/anxious.        OBJECTIVE:  Current Weight: Weight - Scale: 73.5 kg (162 lb)  Vitals:@TMAX(24)@Current:     Height 5' 7\" (1.702 m), weight 73.5 kg (162 lb). , Body mass index is 25.37 kg/m².    [unfilled]    Physical Exam: Ht 5' 7\" (1.702 m)   Wt 73.5 kg (162 lb)   BMI 25.37 kg/m²   Physical Exam  Constitutional:       General: He is not in acute distress.     Appearance: He is not toxic-appearing.   HENT:      Head: Normocephalic and atraumatic.      Nose: Nose normal.      Mouth/Throat:      Mouth: Mucous membranes are moist.   Eyes:      General: No scleral icterus.     Extraocular Movements: Extraocular movements intact.   Cardiovascular:      Rate and Rhythm: Normal rate and regular rhythm.      Heart sounds:      No friction rub. No gallop.   Pulmonary:      Effort: Pulmonary effort is normal. No respiratory " distress.      Breath sounds: No wheezing or rales.   Abdominal:      General: Bowel sounds are normal. There is no distension.      Palpations: Abdomen is soft.      Tenderness: There is no abdominal tenderness. There is no rebound.   Musculoskeletal:      Cervical back: Normal range of motion and neck supple.   Neurological:      Mental Status: He is alert and oriented to person, place, and time. Mental status is at baseline.   Psychiatric:         Mood and Affect: Mood normal.         Behavior: Behavior normal.         Thought Content: Thought content normal.         Medications:    Current Outpatient Medications:     acetaminophen (TYLENOL) 500 mg tablet, Take 500 mg by mouth as needed for mild pain., Disp: , Rfl:     amLODIPine (NORVASC) 2.5 mg tablet, TAKE 1 TABLET BY MOUTH DAILY, Disp: 90 tablet, Rfl: 3    aspirin (ECOTRIN LOW STRENGTH) 81 mg EC tablet, Take 81 mg by mouth daily, Disp: , Rfl:     atorvastatin (LIPITOR) 40 mg tablet, TAKE 1 TABLET BY MOUTH DAILY, Disp: 90 tablet, Rfl: 1    Cholecalciferol (VITAMIN D PO), Take 2,000 Units by mouth daily  , Disp: , Rfl:     dicyclomine (Bentyl) 20 mg tablet, Take 1 tablet (20 mg total) by mouth every 6 (six) hours as needed (abd cramping), Disp: 90 tablet, Rfl: 3    DULoxetine (CYMBALTA) 20 mg capsule, Take 1 capsule (20 mg total) by mouth daily, Disp: 90 capsule, Rfl: 1    fexofenadine (ALLEGRA) 180 MG tablet, Take 180 mg by mouth as needed Daily during the Summer, Disp: , Rfl:     fluticasone (FLONASE) 50 mcg/act nasal spray, 2 sprays into each nostril daily, Disp: , Rfl:     fluticasone-umeclidinium-vilanterol (Trelegy Ellipta) 200-62.5-25 mcg/actuation AEPB inhaler, Inhale 1 puff daily, Disp: 180 blister, Rfl: 3    lithium carbonate (LITHOBID) 300 mg CR tablet, Take 300 mg by mouth daily at bedtime, Disp: , Rfl:     magnesium chloride-calcium (SlowMag Mg Muscle/Heart) 71.5-119 mg, Take 1 tablet by mouth 2 (two) times a day, Disp: 60 tablet, Rfl: 5     "metoprolol succinate (TOPROL-XL) 25 mg 24 hr tablet, TAKE 1 TABLET BY MOUTH  DAILY, Disp: 90 tablet, Rfl: 3    mupirocin (BACTROBAN) 2 % ointment, Apply topically 2 (two) times a day as needed (wound), Disp: 22 g, Rfl: 0    Myrbetriq 25 MG TB24, TAKE 1 TABLET BY MOUTH DAILY AT  BEDTIME, Disp: 90 tablet, Rfl: 3    omega-3-acid ethyl esters (LOVAZA) 1 g capsule, TAKE 1 CAPSULE BY MOUTH 3  TIMES DAILY, Disp: 270 capsule, Rfl: 3    omeprazole (PriLOSEC) 20 mg delayed release capsule, TAKE 1 CAPSULE BY MOUTH TWICE  DAILY BEFORE MEALS, Disp: 180 capsule, Rfl: 1    magnesium 30 MG tablet, Take 1 tablet (30 mg total) by mouth 2 (two) times a day, Disp: 60 tablet, Rfl: 0    nitroglycerin (NITRODUR) 0.2 mg/hr, APPLY 1 PATCH TOPICALLY  ONCE DAILY. LEAVE ON FOR 12 TO 14 HOURS THEN REMOVE FOR A NITRATE-FREE INTERVAL OF  10 TO 12 HOURS (Patient not taking: Reported on 4/2/2024), Disp: 90 patch, Rfl: 3    Laboratory Results:  Lab Results   Component Value Date    WBC 11.56 (H) 09/14/2023    HGB 13.6 09/14/2023    HCT 40.2 09/14/2023    MCV 92 09/14/2023     09/14/2023     Lab Results   Component Value Date    SODIUM 141 03/30/2024    K 4.5 03/30/2024     03/30/2024    CO2 25 03/30/2024    BUN 31 (H) 03/30/2024    CREATININE 1.39 (H) 03/30/2024    GLUC 100 04/02/2021    CALCIUM 9.4 03/30/2024     Lab Results   Component Value Date    CALCIUM 9.4 03/30/2024    PHOS 2.8 11/05/2014     No results found for: \"LABPROT\"      "

## 2024-04-23 NOTE — PATIENT INSTRUCTIONS
You are here for follow-up your level is 1.3 which is at the low end of your baseline range again normal is 1 some mild kidney patient seen and nothing is really changed over the years.    Continue current medications and good blood pressure control.    I did think that has been low  Going on for some time now you are on some oral supplementation we cannot identify the exact but we will monitor it I told you that you are not really having any symptoms is related to the and will monitor with oral supplementation if it is an issue we could consider trying a pill called amiloride that may help increase it but for now we will just monitor

## 2024-04-23 NOTE — LETTER
April 23, 2024     Brendan Suarez MD  [address] [facility] [address]    Patient: Tonny Baig   YOB: 1945   Date of Visit: 4/23/2024       Dear Dr. Suarez:    Thank you for referring Tonny Baig to me for evaluation. Below are my notes for this consultation.    If you have questions, please do not hesitate to call me. I look forward to following your patient along with you.         Sincerely,        Gilson Heller MD        CC: No Recipients    Gilson Heller MD  4/23/2024  5:12 PM  Sign when Signing Visit  NEPHROLOGY PROGRESS NOTE    Tonny Baig 78 y.o. male MRN: [id number]  Unit/Bed#:  Encounter: [id number]  Reason for Consult: Chronic renal insufficiency and hypomagnesemia    The patient is here for routine follow-up and he says he feels well and since I have seen him he cannot recall that he has had any hospitalizations or specific complaints.  When I review his history he has been having issues with low magnesium although he has no symptoms such as pending blood work.    ASSESSMENT/PLAN:  1.  Renal    Patient has history of mild chronic kidney disease with lower levels of proteinuria therefore more into the tubulointerstitial range.  He has been on lithium for many years so it is possible he could have a little chronic interstitial nephritis but saying that he is doing tremendously well from a psychiatric standpoint he is well compensated and his creatinine is actually 1.3 which is towards the lower end of his baseline range and over the years there has been no progression.  He does not seem to have symptoms related to diabetes insipidus so overall I would continue everything you are doing and monitor with blood pressure control and not make any adjustments.  I explained to him that his creatinines been stable so far good prognosis with no progression.    2.  Hypomagnesemia    Lithium is not generally associated with low magnesium.  He does not seem to have other abnormalities in  "terms of electrolytes as you could potentially see potassium or calcium abnormalities but that is not present as these are normal.  He has no symptoms related to this.  He is on oral supplementation his last magnesium was 1.2 I told him just to continue trying to increase dietary intake.  He has no other obvious causes.  Proton pump inhibitors which she has been on for many years are listed as a cause but you really have to weigh the risk benefit of continuing the medication so I made no changes.  If it is persistently low potentially could try amiloride but for now no changes.    Check a magnesium when he gets his next blood work done and then follow-up as scheduled    I asked him to call if there is any problems or concerns before next visit.    I have spent a total time of 34 minutes on 04/23/24 in caring for this patient including Diagnostic results, Prognosis, Patient and family education, Impressions, Reviewing / ordering tests, medicine, procedures  , and Obtaining or reviewing history  .         SUBJECTIVE:  Review of Systems   Constitutional: Negative for chills, diaphoresis and fever.   HENT: Negative.     Eyes: Negative.    Cardiovascular: Negative.  Negative for chest pain, dyspnea on exertion, leg swelling and orthopnea.   Respiratory: Negative.  Negative for cough, shortness of breath, sputum production and wheezing.    Gastrointestinal:  Negative for abdominal pain, diarrhea, nausea and vomiting.   Genitourinary: Negative.    Neurological:  Negative for dizziness, focal weakness and headaches.   Psychiatric/Behavioral:  Negative for altered mental status, hallucinations and hypervigilance. The patient is not nervous/anxious.        OBJECTIVE:  Current Weight: Weight - Scale: 73.5 kg (162 lb)  Vitals:@TMAX(24)@Current:     Height 5' 7\" (1.702 m), weight 73.5 kg (162 lb). , Body mass index is 25.37 kg/m².    [unfilled]    Physical Exam: Ht 5' 7\" (1.702 m)   Wt 73.5 kg (162 lb)   BMI 25.37 kg/m² "   Physical Exam  Constitutional:       General: He is not in acute distress.     Appearance: He is not toxic-appearing.   HENT:      Head: Normocephalic and atraumatic.      Nose: Nose normal.      Mouth/Throat:      Mouth: Mucous membranes are moist.   Eyes:      General: No scleral icterus.     Extraocular Movements: Extraocular movements intact.   Cardiovascular:      Rate and Rhythm: Normal rate and regular rhythm.      Heart sounds:      No friction rub. No gallop.   Pulmonary:      Effort: Pulmonary effort is normal. No respiratory distress.      Breath sounds: No wheezing or rales.   Abdominal:      General: Bowel sounds are normal. There is no distension.      Palpations: Abdomen is soft.      Tenderness: There is no abdominal tenderness. There is no rebound.   Musculoskeletal:      Cervical back: Normal range of motion and neck supple.   Neurological:      Mental Status: He is alert and oriented to person, place, and time. Mental status is at baseline.   Psychiatric:         Mood and Affect: Mood normal.         Behavior: Behavior normal.         Thought Content: Thought content normal.         Medications:    Current Outpatient Medications:   •  acetaminophen (TYLENOL) 500 mg tablet, Take 500 mg by mouth as needed for mild pain., Disp: , Rfl:   •  amLODIPine (NORVASC) 2.5 mg tablet, TAKE 1 TABLET BY MOUTH DAILY, Disp: 90 tablet, Rfl: 3  •  aspirin (ECOTRIN LOW STRENGTH) 81 mg EC tablet, Take 81 mg by mouth daily, Disp: , Rfl:   •  atorvastatin (LIPITOR) 40 mg tablet, TAKE 1 TABLET BY MOUTH DAILY, Disp: 90 tablet, Rfl: 1  •  Cholecalciferol (VITAMIN D PO), Take 2,000 Units by mouth daily  , Disp: , Rfl:   •  dicyclomine (Bentyl) 20 mg tablet, Take 1 tablet (20 mg total) by mouth every 6 (six) hours as needed (abd cramping), Disp: 90 tablet, Rfl: 3  •  DULoxetine (CYMBALTA) 20 mg capsule, Take 1 capsule (20 mg total) by mouth daily, Disp: 90 capsule, Rfl: 1  •  fexofenadine (ALLEGRA) 180 MG tablet, Take 180  mg by mouth as needed Daily during the Summer, Disp: , Rfl:   •  fluticasone (FLONASE) 50 mcg/act nasal spray, 2 sprays into each nostril daily, Disp: , Rfl:   •  fluticasone-umeclidinium-vilanterol (Trelegy Ellipta) 200-62.5-25 mcg/actuation AEPB inhaler, Inhale 1 puff daily, Disp: 180 blister, Rfl: 3  •  lithium carbonate (LITHOBID) 300 mg CR tablet, Take 300 mg by mouth daily at bedtime, Disp: , Rfl:   •  magnesium chloride-calcium (SlowMag Mg Muscle/Heart) 71.5-119 mg, Take 1 tablet by mouth 2 (two) times a day, Disp: 60 tablet, Rfl: 5  •  metoprolol succinate (TOPROL-XL) 25 mg 24 hr tablet, TAKE 1 TABLET BY MOUTH  DAILY, Disp: 90 tablet, Rfl: 3  •  mupirocin (BACTROBAN) 2 % ointment, Apply topically 2 (two) times a day as needed (wound), Disp: 22 g, Rfl: 0  •  Myrbetriq 25 MG TB24, TAKE 1 TABLET BY MOUTH DAILY AT  BEDTIME, Disp: 90 tablet, Rfl: 3  •  omega-3-acid ethyl esters (LOVAZA) 1 g capsule, TAKE 1 CAPSULE BY MOUTH 3  TIMES DAILY, Disp: 270 capsule, Rfl: 3  •  omeprazole (PriLOSEC) 20 mg delayed release capsule, TAKE 1 CAPSULE BY MOUTH TWICE  DAILY BEFORE MEALS, Disp: 180 capsule, Rfl: 1  •  magnesium 30 MG tablet, Take 1 tablet (30 mg total) by mouth 2 (two) times a day, Disp: 60 tablet, Rfl: 0  •  nitroglycerin (NITRODUR) 0.2 mg/hr, APPLY 1 PATCH TOPICALLY  ONCE DAILY. LEAVE ON FOR 12 TO 14 HOURS THEN REMOVE FOR A NITRATE-FREE INTERVAL OF  10 TO 12 HOURS (Patient not taking: Reported on 4/2/2024), Disp: 90 patch, Rfl: 3    Laboratory Results:  Lab Results   Component Value Date    WBC 11.56 (H) 09/14/2023    HGB 13.6 09/14/2023    HCT 40.2 09/14/2023    MCV 92 09/14/2023     09/14/2023     Lab Results   Component Value Date    SODIUM 141 03/30/2024    K 4.5 03/30/2024     03/30/2024    CO2 25 03/30/2024    BUN 31 (H) 03/30/2024    CREATININE 1.39 (H) 03/30/2024    GLUC 100 04/02/2021    CALCIUM 9.4 03/30/2024     Lab Results   Component Value Date    CALCIUM 9.4 03/30/2024    PHOS 2.8  "11/05/2014     No results found for: \"LABPROT\"      "

## 2024-04-24 ENCOUNTER — TELEPHONE (OUTPATIENT)
Dept: PAIN MEDICINE | Facility: CLINIC | Age: 79
End: 2024-04-24

## 2024-04-24 ENCOUNTER — EVALUATION (OUTPATIENT)
Dept: PHYSICAL THERAPY | Facility: REHABILITATION | Age: 79
End: 2024-04-24
Payer: MEDICARE

## 2024-04-24 DIAGNOSIS — M70.62 TROCHANTERIC BURSITIS OF LEFT HIP: ICD-10-CM

## 2024-04-24 DIAGNOSIS — G89.29 CHRONIC LEFT-SIDED LOW BACK PAIN WITH LEFT-SIDED SCIATICA: Primary | ICD-10-CM

## 2024-04-24 DIAGNOSIS — M54.42 CHRONIC LEFT-SIDED LOW BACK PAIN WITH LEFT-SIDED SCIATICA: Primary | ICD-10-CM

## 2024-04-24 PROCEDURE — 97163 PT EVAL HIGH COMPLEX 45 MIN: CPT | Performed by: PHYSICAL THERAPIST

## 2024-04-24 PROCEDURE — 97110 THERAPEUTIC EXERCISES: CPT | Performed by: PHYSICAL THERAPIST

## 2024-04-24 NOTE — TELEPHONE ENCOUNTER
Patient Reports    50     %     improvement post injection    Pain Level  7   /10  Patient almost fell hurt his back. Will wait till next call.

## 2024-04-24 NOTE — PROGRESS NOTES
PT Evaluation     Today's date: 2024  Patient name: Tonny Baig  : 1945  MRN: [id number]  Referring provider: Michelle Ayoub PA-C  Dx:   Encounter Diagnosis     ICD-10-CM    1. Chronic left-sided low back pain with left-sided sciatica  M54.42     G89.29       2. Trochanteric bursitis of left hip  M70.62 Ambulatory Referral to Physical Therapy          Start Time: 1210  Stop Time: 1250  Total time in clinic (min): 40 minutes    Assessment  Assessment details: Tonny Baig is a 78 y.o. male presenting to outpatient physical therapy on 24 with referral from MD for CLBP. MSIS consistent with LS derangement with DP for flexion . Upon evaluation, Tonny demonstrates impaired gait, LE weakness, LS AROM deficits and pain with resulting functional loss. The listed impairments and functional limitation are effecting Tonny ability to function at prior level. They can continue to benefit from physical therapy services at this times in order to address the above discussed impairments and functional limitation in order to allow for a return to premorbid status.    HEP: LS RFISitting    Impairments: abnormal muscle firing, abnormal muscle tone, abnormal or restricted ROM, abnormal movement, activity intolerance, impaired physical strength and pain with function  Understanding of Dx/Px/POC: good   Prognosis: good    Plan  Patient would benefit from: skilled PT  Planned modality interventions: thermotherapy: hydrocollator packs  Planned therapy interventions: joint mobilization, manual therapy, ADL training, balance, balance/weight bearing training, neuromuscular re-education, home exercise program, therapeutic exercise, therapeutic activities, strengthening, patient education, functional ROM exercises and gait training  Frequency: 2x week  Duration in weeks: 12  Treatment plan discussed with: patient        Subjective Evaluation    History of Present Illness  Mechanism of injury: Tonny is a 78 y.o. male  presenting to physical therapy on 24 with referral from MD for left sided hip, leg and foot pain.     Patient symptoms are located along left lateral thigh mainly, not below knee    Patient symptoms are constant but increases with walking/standing, better sitting and lying    Patient denies bowel and bladder changes (denies urinary retention)/saddle numbness    Per MD note on 3/28/24:  78-year-old male with a history of previous L4-S1 fusion, lumbar radiculopathy, lumbar spondylosis, and stenosis returning for follow-up.  The patient feels that his left-sided low back and leg pain are slowly returning.  He has had a few trips while at home, but no trauma.  The patient had an L3-4 LESI 2024 which gave him about 85% of relief.  Tylenol provides minimal relief.  Currently taking duloxetine 20 mg daily which is providing mild to moderate relief.  He denies any side effects.  Mood is stable.            Patient Goals  Patient goals for therapy: decreased pain  Patient goal: improved walking time - 45 min  Pain  Current pain ratin  At worst pain ratin  Location: left glute, lateral thigh      Diagnostic Tests  MRI studies: abnormal    Short Term Goals:   1. Patient will be Independent with hep  2. Patient will improve pain with activity by 50%  3. Patient will report GROC 50% or greater  4. Patient will perform LS ext without increase in thigh pain  5. Patient will amb 10 min with leg pain 2/10 or less      Long Term Goals:   1. Patient will improve FOTO to greater then goal  2. Patient will improve pain with activity to 2/10 or less  3. Patient will continue with HEP independence to allow for decreased future reoccurrence of pain and loss in function  4. Patient will report GROC 75% or greater  5. Patient will amb 20 min with leg pain 2/10 or less      Objective    Posture: L Lat shift    Myotomes (L/R): 4+/5 L4  Dermatome: (pinprick- L/R):  decreased L3 - lateral thigh     Reflexes:  (L/R) L3-4:    3+ bl     S1:  2+ bl             Clonus= neg    GAIT: shuffle, decreased left DF with mild foot drop noted      Lumbar  % of normal   Flex. 50   Extn. 25   SG Left -   SG Right -   ROT Left 50   ROT Right 50   Repeated Movements  Standing:  extension=   increased thigh, W  Flexion=  NE,NE    Lying:   extension=   NT  Flexion= decreased thigh, Better        MMT         AROM          PROM    Hip       L       R        L           R      L     R   Flex.         Extn.         Abd.         Add.         IR.         ER.                  G. Max         G. Med.         Iliop.             Hip PROM WFL, no reproduction of leg pain    Neuro Dynamic Testing:  Slump test: L= neg    R=    neg   Straight leg raise:   L=    neg  R=   neg                        Precautions: CA, CKD HTN, h/o MI      Manuals                                                                 Neuro Re-Ed                                                                                                        Ther Ex             LS RFISitting 5+10                                                                                                       Ther Activity                                       Gait Training                                       Modalities

## 2024-05-02 DIAGNOSIS — I10 ESSENTIAL HYPERTENSION: ICD-10-CM

## 2024-05-06 ENCOUNTER — OFFICE VISIT (OUTPATIENT)
Dept: PHYSICAL THERAPY | Facility: REHABILITATION | Age: 79
End: 2024-05-06
Payer: MEDICARE

## 2024-05-06 DIAGNOSIS — M54.42 CHRONIC LEFT-SIDED LOW BACK PAIN WITH LEFT-SIDED SCIATICA: ICD-10-CM

## 2024-05-06 DIAGNOSIS — J44.9 CHRONIC OBSTRUCTIVE PULMONARY DISEASE, UNSPECIFIED COPD TYPE (HCC): ICD-10-CM

## 2024-05-06 DIAGNOSIS — G89.29 CHRONIC LEFT-SIDED LOW BACK PAIN WITH LEFT-SIDED SCIATICA: ICD-10-CM

## 2024-05-06 DIAGNOSIS — M70.62 TROCHANTERIC BURSITIS OF LEFT HIP: Primary | ICD-10-CM

## 2024-05-06 PROCEDURE — 97110 THERAPEUTIC EXERCISES: CPT | Performed by: PHYSICAL THERAPIST

## 2024-05-06 RX ORDER — METOPROLOL SUCCINATE 25 MG/1
25 TABLET, EXTENDED RELEASE ORAL DAILY
Qty: 90 TABLET | Refills: 3 | Status: SHIPPED | OUTPATIENT
Start: 2024-05-06

## 2024-05-06 RX ORDER — FLUTICASONE FUROATE, UMECLIDINIUM BROMIDE AND VILANTEROL TRIFENATATE 200; 62.5; 25 UG/1; UG/1; UG/1
1 POWDER RESPIRATORY (INHALATION) DAILY
Qty: 180 EACH | Refills: 3 | Status: SHIPPED | OUTPATIENT
Start: 2024-05-06

## 2024-05-06 NOTE — PROGRESS NOTES
"Daily Note     Today's date: 2024  Patient name: Tonny Baig  : 1945  MRN: [id number]  Referring provider: Michelle Ayoub PA-C  Dx:   Encounter Diagnosis     ICD-10-CM    1. Trochanteric bursitis of left hip  M70.62       2. Chronic left-sided low back pain with left-sided sciatica  M54.42     G89.29           Start Time: 830  Stop Time: 905  Total time in clinic (min): 35 minutes    Subjective: Patient reports doing fair. States that he did keep up with HEP (RFISitting) which was helpful with pain. Some days more then others.       Objective: See treatment diary below      Assessment: Tolerated treatment fair. Patient with difficulty rising from chair, cues for WS onto forefoot vs pressing through heels when standing. HEP was updated to add LAQ for beginning to strengthen quads.       Plan: Continue per plan of care.      Precautions: CA, CKD HTN, h/o MI      Manuals  5/6                                                               Neuro Re-Ed                                                                                                        Ther Ex  5/6           LS RFISitting 5+10 10x  2 sets           VG  L5  2x20           STS  2x5           LS ext endurance hold  3 KG ball  30\"x3                                                               Ther Activity                                       Gait Training                                       Modalities                                            "

## 2024-05-08 ENCOUNTER — APPOINTMENT (OUTPATIENT)
Dept: PHYSICAL THERAPY | Facility: REHABILITATION | Age: 79
End: 2024-05-08
Payer: MEDICARE

## 2024-05-08 ENCOUNTER — PRE-OP CATARACT MEASUREMENTS (OUTPATIENT)
Dept: URBAN - METROPOLITAN AREA CLINIC 6 | Facility: CLINIC | Age: 79
End: 2024-05-08

## 2024-05-08 DIAGNOSIS — H04.123: ICD-10-CM

## 2024-05-08 DIAGNOSIS — H02.403: ICD-10-CM

## 2024-05-08 DIAGNOSIS — H35.3131: ICD-10-CM

## 2024-05-08 DIAGNOSIS — H02.135: ICD-10-CM

## 2024-05-08 DIAGNOSIS — H25.813: ICD-10-CM

## 2024-05-08 DIAGNOSIS — H43.813: ICD-10-CM

## 2024-05-08 DIAGNOSIS — H02.132: ICD-10-CM

## 2024-05-08 PROCEDURE — 92136 OPHTHALMIC BIOMETRY: CPT

## 2024-05-08 PROCEDURE — 99214 OFFICE O/P EST MOD 30 MIN: CPT

## 2024-05-08 ASSESSMENT — VISUAL ACUITY
OD_GLARE: 20/70
OU_SC: J3
OS_SC: 20/60
OD_SC: 20/30
OS_PH: 20/40-2
OS_GLARE: 20/100

## 2024-05-08 ASSESSMENT — TONOMETRY
OD_IOP_MMHG: 14
OS_IOP_MMHG: 10

## 2024-05-09 ENCOUNTER — OFFICE VISIT (OUTPATIENT)
Dept: PHYSICAL THERAPY | Facility: REHABILITATION | Age: 79
End: 2024-05-09
Payer: MEDICARE

## 2024-05-09 DIAGNOSIS — M54.42 CHRONIC LEFT-SIDED LOW BACK PAIN WITH LEFT-SIDED SCIATICA: ICD-10-CM

## 2024-05-09 DIAGNOSIS — G89.29 CHRONIC LEFT-SIDED LOW BACK PAIN WITH LEFT-SIDED SCIATICA: ICD-10-CM

## 2024-05-09 DIAGNOSIS — M70.62 TROCHANTERIC BURSITIS OF LEFT HIP: Primary | ICD-10-CM

## 2024-05-09 PROCEDURE — 97110 THERAPEUTIC EXERCISES: CPT | Performed by: PHYSICAL THERAPIST

## 2024-05-09 NOTE — PROGRESS NOTES
"Daily Note     Today's date: 2024  Patient name: Tonyn Baig  : 1945  MRN: [id number]  Referring provider: Michelle Ayoub PA-C  Dx:   Encounter Diagnosis     ICD-10-CM    1. Trochanteric bursitis of left hip  M70.62       2. Chronic left-sided low back pain with left-sided sciatica  M54.42     G89.29           Start Time: 1530  Stop Time: 1610  Total time in clinic (min): 40 minutes    Subjective: Patient reports that he has been having a good amount of pain this week. He is feeling better today. He did do his exercises for home on top of PT and thinks that may have been a lot for 1 day.       Objective: See treatment diary below      Assessment: Tolerated treatment well. Patient  requires cues for STS for WS anteriorly as he has a strong posterior lean, swayback.  He did more with PT today, increasing level with VG and increased reps with STS.       Plan: Continue per plan of care.      Precautions: CA, CKD HTN, h/o MI      Manuals                                                                 Neuro Re-Ed                                                                                                        Ther Ex            LS RFISitting 5+10 10x  2 sets 15x          VG  L5  2x20 L5  20x  L7  2x20          STS  2x5 2x5 +3          LS ext endurance hold  3 KG ball  30\"x3 3 KG ball 30\"x2  4KB ball 30\"x1                                                              Ther Activity                                       Gait Training                                       Modalities                                              "

## 2024-05-10 ENCOUNTER — OFFICE VISIT (OUTPATIENT)
Dept: CARDIOLOGY CLINIC | Facility: CLINIC | Age: 79
End: 2024-05-10
Payer: MEDICARE

## 2024-05-10 VITALS
SYSTOLIC BLOOD PRESSURE: 160 MMHG | DIASTOLIC BLOOD PRESSURE: 78 MMHG | WEIGHT: 161.3 LBS | BODY MASS INDEX: 25.31 KG/M2 | OXYGEN SATURATION: 98 % | HEIGHT: 67 IN | HEART RATE: 63 BPM

## 2024-05-10 DIAGNOSIS — I25.10 CORONARY ARTERY DISEASE INVOLVING NATIVE CORONARY ARTERY OF NATIVE HEART WITHOUT ANGINA PECTORIS: Primary | ICD-10-CM

## 2024-05-10 DIAGNOSIS — I71.43 INFRARENAL ABDOMINAL AORTIC ANEURYSM (AAA) WITHOUT RUPTURE (HCC): Chronic | ICD-10-CM

## 2024-05-10 DIAGNOSIS — I10 ESSENTIAL HYPERTENSION: ICD-10-CM

## 2024-05-10 DIAGNOSIS — E78.2 MIXED HYPERLIPIDEMIA: ICD-10-CM

## 2024-05-10 DIAGNOSIS — N18.31 STAGE 3A CHRONIC KIDNEY DISEASE (HCC): ICD-10-CM

## 2024-05-10 DIAGNOSIS — Z98.890 S/P ENDOVASCULAR ANEURYSM REPAIR: ICD-10-CM

## 2024-05-10 DIAGNOSIS — Z86.79 S/P ENDOVASCULAR ANEURYSM REPAIR: ICD-10-CM

## 2024-05-10 DIAGNOSIS — I48.0 PAF (PAROXYSMAL ATRIAL FIBRILLATION) (HCC): ICD-10-CM

## 2024-05-10 PROBLEM — I49.9 ARRHYTHMIA: Status: RESOLVED | Noted: 2018-01-21 | Resolved: 2024-05-10

## 2024-05-10 PROBLEM — I50.30 DIASTOLIC HEART FAILURE (HCC): Chronic | Status: RESOLVED | Noted: 2018-01-21 | Resolved: 2024-05-10

## 2024-05-10 PROCEDURE — 93000 ELECTROCARDIOGRAM COMPLETE: CPT | Performed by: INTERNAL MEDICINE

## 2024-05-10 PROCEDURE — 99214 OFFICE O/P EST MOD 30 MIN: CPT | Performed by: INTERNAL MEDICINE

## 2024-05-10 NOTE — PROGRESS NOTES
[facility]  Follow up note  Tonny Baig 78 y.o. male MRN: [id number]        Impression:    Coronary artery disease  Inferior MI without transmural infarct or cardiomyopathy with likely stenting to the RCA, but details are lacking at this time.  Continues on aspirin  EF normal by recent echo  No angina    Mixed hypercholesterolemia  Persistently low HDL, but well suppressed LDL on current therapy    Paroxysmal atrial fibrillation  1 episode 1/18 during influenza infection  Multiple Holter since, multiple EKGs have been negative for recurrent A-fib  He is not managed on anticoagulation per his prior cardiologist, at this point with more than 6 years of no evidence of A-fib I would agree.    Hypertension  Multiple high office blood pressures, but he reports normotension when he does check at home  Has not done any intensive home blood pressure assessment recently though    AAA status post endovascular repair  stable by recent ultrasound monitoring      Plan:    2 weeks of daily home blood pressures, and report findings to my office  No change to medications at this time  Nephrology following him closely for CKD stage III, possible nephritis related to lithium/PPI, with hypomagnesemia on supplementation being addressed by their office  No additional cardiac testing needed at this time  1 year follow-up recommended      HPI:   Tonny Baig is a 78 y.o. year old male with a remote myocardial infarction in approximately 2008, an episode of atrial fibrillation in January 2018 in the context of influenza infection, hypertension, mixed hyperlipidemia, AAA status post endovascular repair, bipolar disorder on lithium, CKD stage III, persistent hypomagnesemia comes in to see me for the first time.    He has no cardiac complaints.  No palpitations, no chest pain, no angina, no shortness of breath.  His blood pressures are elevated, but he reports normotension at home.  He says he has office-based  hypertension.  He is on amlodipine 2.5 mg daily, metoprolol succinate 25 mg daily.  He has a history of very low burden PVCs, last Holter was 2022  He had a recent echo showing normal LVEF with minimal valve disease.  Back pain and chronic pain are his biggest issues, he takes Cymbalta for part of his neurologic pain suppression.  He has well-controlled LDL on atorvastatin.      Review of Systems   Constitutional:  Negative for appetite change, diaphoresis, fatigue and fever.   Respiratory:  Negative for chest tightness, shortness of breath and wheezing.    Cardiovascular:  Negative for chest pain, palpitations and leg swelling.   Gastrointestinal:  Negative for abdominal pain and blood in stool.   Musculoskeletal:  Positive for arthralgias and back pain. Negative for joint swelling.   Skin:  Negative for rash.   Neurological:  Negative for dizziness, syncope and light-headedness.       Past Medical History:   Diagnosis Date   • Allergic 2011   • Allergic rhinitis 2015   • Anxiety     occasional   • Aortic aneurysm ([facility])    • Benign colon polyp    • Bipolar 1 disorder ([facility])    • Cancer ([facility]) 2007, 2011   • Cardiac disease     MI   • Cervical cord compression with myelopathy ([facility])    • COPD (chronic obstructive pulmonary disease) ([facility])    • Coronary artery disease 1995   • Diverticulitis    • Diverticulitis of colon prior to 2014   • Diverticulosis    • Emphysema of lung ([facility]) 2012   • Gait disturbance     uses cane, leg brace on right   • GERD (gastroesophageal reflux disease)    • Heart attack ([facility]) 1996   • Hx of resection of large bowel 05/03/2016   • Hyperlipidemia    • Hypertension    • IBS (irritable bowel syndrome)    • Inflammatory bowel disease 2012   • Lumbar stenosis    • Lung cancer ([facility])     2007 Left lower lobectomy and 2011 Right lung with surgery    • Myocardial infarction (HCC)     involving other coronary artery   • Prostate cancer ([facility])    • Shortness of breath    • Small bowel obstruction ([facility])  2016     Social History     Substance and Sexual Activity   Alcohol Use Yes   • Alcohol/week: 2.0 - 3.0 standard drinks of alcohol   • Types: 2 - 3 Shots of liquor per week    Comment: One glass per day     Social History     Substance and Sexual Activity   Drug Use No     Social History     Tobacco Use   Smoking Status Former   • Current packs/day: 0.00   • Average packs/day: 1 pack/day for 44.0 years (44.0 ttl pk-yrs)   • Types: Cigarettes   • Start date: 1967   • Quit date: 2011   • Years since quittin.3   • Passive exposure: Past   Smokeless Tobacco Never       Allergies:  Allergies   Allergen Reactions   • Bactrim [Sulfamethoxazole-Trimethoprim] Other (See Comments)     AILYN   • Nsaids      Annotation - [id number]: unable to take due to use of lithium.   • Oxycodone Rash       Medications:     Current Outpatient Medications:   •  acetaminophen (TYLENOL) 500 mg tablet, Take 500 mg by mouth as needed for mild pain., Disp: , Rfl:   •  amLODIPine (NORVASC) 2.5 mg tablet, TAKE 1 TABLET BY MOUTH DAILY, Disp: 90 tablet, Rfl: 3  •  aspirin (ECOTRIN LOW STRENGTH) 81 mg EC tablet, Take 81 mg by mouth daily, Disp: , Rfl:   •  atorvastatin (LIPITOR) 40 mg tablet, TAKE 1 TABLET BY MOUTH DAILY, Disp: 90 tablet, Rfl: 1  •  Cholecalciferol (VITAMIN D PO), Take 2,000 Units by mouth daily  , Disp: , Rfl:   •  dicyclomine (Bentyl) 20 mg tablet, Take 1 tablet (20 mg total) by mouth every 6 (six) hours as needed (abd cramping), Disp: 90 tablet, Rfl: 3  •  DULoxetine (CYMBALTA) 20 mg capsule, Take 1 capsule (20 mg total) by mouth daily, Disp: 90 capsule, Rfl: 1  •  fexofenadine (ALLEGRA) 180 MG tablet, Take 180 mg by mouth as needed Daily during the Summer, Disp: , Rfl:   •  fluticasone (FLONASE) 50 mcg/act nasal spray, 2 sprays into each nostril daily, Disp: , Rfl:   •  lithium carbonate (LITHOBID) 300 mg CR tablet, Take 300 mg by mouth daily at bedtime, Disp: , Rfl:   •  magnesium chloride-calcium (SlowMag Mg  Muscle/Heart) 71.5-119 mg, Take 1 tablet by mouth 2 (two) times a day, Disp: 60 tablet, Rfl: 5  •  metoprolol succinate (TOPROL-XL) 25 mg 24 hr tablet, TAKE 1 TABLET BY MOUTH ONCE  DAILY, Disp: 90 tablet, Rfl: 3  •  mupirocin (BACTROBAN) 2 % ointment, Apply topically 2 (two) times a day as needed (wound), Disp: 22 g, Rfl: 0  •  Myrbetriq 25 MG TB24, TAKE 1 TABLET BY MOUTH DAILY AT  BEDTIME, Disp: 90 tablet, Rfl: 3  •  nitroglycerin (NITRODUR) 0.2 mg/hr, APPLY 1 PATCH TOPICALLY  ONCE DAILY. LEAVE ON FOR 12 TO 14 HOURS THEN REMOVE FOR A NITRATE-FREE INTERVAL OF  10 TO 12 HOURS, Disp: 90 patch, Rfl: 3  •  omega-3-acid ethyl esters (LOVAZA) 1 g capsule, TAKE 1 CAPSULE BY MOUTH 3  TIMES DAILY, Disp: 270 capsule, Rfl: 3  •  omeprazole (PriLOSEC) 20 mg delayed release capsule, TAKE 1 CAPSULE BY MOUTH TWICE  DAILY BEFORE MEALS, Disp: 180 capsule, Rfl: 1  •  Trelegy Ellipta 200-62.5-25 MCG/ACT AEPB inhaler, USE 1 INHALATION BY MOUTH DAILY, Disp: 180 each, Rfl: 3  ?    Vitals:    05/10/24 0939   BP: 160/78   Pulse: 63   SpO2: 98%     Weight (last 2 days)     Date/Time Weight    05/10/24 0939 73.2 (161.3)        Physical Exam  Constitutional:       General: He is not in acute distress.     Appearance: Normal appearance. He is well-developed. He is not diaphoretic.   HENT:      Head: Normocephalic and atraumatic.   Eyes:      General: No scleral icterus.     Conjunctiva/sclera: Conjunctivae normal.      Pupils: Pupils are equal, round, and reactive to light.   Neck:      Thyroid: No thyromegaly.      Vascular: No JVD.   Cardiovascular:      Rate and Rhythm: Normal rate and regular rhythm.      Heart sounds: Normal heart sounds. No murmur heard.     No friction rub. No gallop.   Pulmonary:      Effort: Pulmonary effort is normal. No respiratory distress.      Breath sounds: Normal breath sounds. No wheezing or rales.   Abdominal:      General: Bowel sounds are normal. There is no distension.      Palpations: Abdomen is soft.      " Tenderness: There is no abdominal tenderness.   Musculoskeletal:         General: No deformity. Normal range of motion.      Cervical back: Normal range of motion and neck supple.      Right lower leg: No edema.      Left lower leg: No edema.   Skin:     General: Skin is warm and dry.      Findings: No erythema or rash.   Neurological:      General: No focal deficit present.      Mental Status: He is alert and oriented to person, place, and time.      Cranial Nerves: No cranial nerve deficit.      Motor: No weakness.         Laboratory Studies:    Laboratory studies personally reviewed    Cardiac testing:     EKG reviewed personally:   Results for orders placed or performed in visit on 05/10/24   POCT ECG    Impression    Sinus rhythm, left axis deviation         Echocardiogram:      Stress tests:      Catheterization:      Holter:         Cameron Michele MD    Portions of the record may have been created with voice recognition software.  Occasional wrong word or \"sound a like\" substitutions may have occurred due to the inherent limitations of voice recognition software.  Read the chart carefully and recognize, using context, where substitutions have occurred.  "

## 2024-05-13 DIAGNOSIS — I25.10 CORONARY ARTERY DISEASE INVOLVING NATIVE HEART WITHOUT ANGINA PECTORIS, UNSPECIFIED VESSEL OR LESION TYPE: ICD-10-CM

## 2024-05-14 RX ORDER — NITROGLYCERIN 40 MG/1
PATCH TRANSDERMAL
Qty: 90 PATCH | Refills: 1 | Status: SHIPPED | OUTPATIENT
Start: 2024-05-14

## 2024-05-15 ENCOUNTER — OFFICE VISIT (OUTPATIENT)
Dept: PHYSICAL THERAPY | Facility: REHABILITATION | Age: 79
End: 2024-05-15
Payer: MEDICARE

## 2024-05-15 DIAGNOSIS — G89.29 CHRONIC LEFT-SIDED LOW BACK PAIN WITH LEFT-SIDED SCIATICA: ICD-10-CM

## 2024-05-15 DIAGNOSIS — M70.62 TROCHANTERIC BURSITIS OF LEFT HIP: Primary | ICD-10-CM

## 2024-05-15 DIAGNOSIS — M54.42 CHRONIC LEFT-SIDED LOW BACK PAIN WITH LEFT-SIDED SCIATICA: ICD-10-CM

## 2024-05-15 PROCEDURE — 97110 THERAPEUTIC EXERCISES: CPT | Performed by: PHYSICAL THERAPIST

## 2024-05-15 NOTE — PROGRESS NOTES
"Daily Note     Today's date: 5/15/2024  Patient name: Tonny Baig  : 1945  MRN: [id number]  Referring provider: Michelle Ayoub PA-C  Dx:   Encounter Diagnosis     ICD-10-CM    1. Trochanteric bursitis of left hip  M70.62       2. Chronic left-sided low back pain with left-sided sciatica  M54.42     G89.29           Start Time: 1205  Stop Time: 1247  Total time in clinic (min): 42 minutes    Subjective: Patient reports that he felt good after PT Monday. States that he has been working through STS at home and feeling better.       Objective: See treatment diary below      Assessment: Tolerated treatment well. Patient with improved standing tolerance, cues for TKE as he does have improved posture in TS and LS with maintaining knee ext. Cues for STS as he still does struggle with WS onto forefoot.       Plan: Continue per plan of care.      Precautions: CA, CKD HTN, h/o MI      Manuals                                                                 Neuro Re-Ed                                                                                                        Ther Ex  5/6 5/9 5/15         LS RFISitting 5+10 10x  2 sets 15x 15x         VG  L5  2x20 L5  20x  L7  2x20 L5 20x L7 3x20         STS  2x5 2x5 +3 2x7         LS ext endurance hold  3 KG ball  30\"x3 3 KG ball 30\"x2  4KB ball 30\"x1 4KG bal  30\"x2         LAQ    8#  3x8                                                Ther Activity                                       Gait Training                                       Modalities                                                "

## 2024-05-16 NOTE — PROGRESS NOTES
"Daily Note     Today's date: 2024  Patient name: Tonny Baig  : 1945  MRN: [id number]  Referring provider: Michelle Ayoub PA-C  Dx: No diagnosis found.               Subjective: ***      Objective: See treatment diary below      Assessment: Tolerated treatment {Tolerated treatment :}. Patient {assessment:}      Plan: {PLAN:}     Precautions: CA, CKD HTN, h/o MI      Manuals                                                                 Neuro Re-Ed                                                                                                        Ther Ex  5/6 5/9 5/15 5/17        LS RFISitting 5+10 10x  2 sets 15x 15x 15x        VG  L5  2x20 L5  20x  L7  2x20 L5 20x L7 3x20 L5 20x L7 3x20        STS  2x5 2x5 +3 2x7         LS ext endurance hold  3 KG ball  30\"x3 3 KG ball 30\"x2  4KB ball 30\"x1 4KG bal  30\"x2 4KG bal  30\"x2        LAQ    8#  3x8 8#  3x8                                               Ther Activity                                       Gait Training                                       Modalities                                                  "

## 2024-05-17 ENCOUNTER — APPOINTMENT (OUTPATIENT)
Dept: PHYSICAL THERAPY | Facility: REHABILITATION | Age: 79
End: 2024-05-17
Payer: MEDICARE

## 2024-05-20 ENCOUNTER — OFFICE VISIT (OUTPATIENT)
Dept: PHYSICAL THERAPY | Facility: REHABILITATION | Age: 79
End: 2024-05-20
Payer: MEDICARE

## 2024-05-20 DIAGNOSIS — M70.62 TROCHANTERIC BURSITIS OF LEFT HIP: Primary | ICD-10-CM

## 2024-05-20 DIAGNOSIS — G89.29 CHRONIC LEFT-SIDED LOW BACK PAIN WITH LEFT-SIDED SCIATICA: ICD-10-CM

## 2024-05-20 DIAGNOSIS — M54.42 CHRONIC LEFT-SIDED LOW BACK PAIN WITH LEFT-SIDED SCIATICA: ICD-10-CM

## 2024-05-20 PROCEDURE — 97110 THERAPEUTIC EXERCISES: CPT | Performed by: PHYSICAL THERAPIST

## 2024-05-20 NOTE — PROGRESS NOTES
"Daily Note     Today's date: 2024  Patient name: Tonny Baig  : 1945  MRN: [id number]  Referring provider: Michelle Ayoub PA-C  Dx:   Encounter Diagnosis     ICD-10-CM    1. Trochanteric bursitis of left hip  M70.62       2. Chronic left-sided low back pain with left-sided sciatica  M54.42     G89.29           Start Time: 1100  Stop Time: 1145  Total time in clinic (min): 45 minutes    Subjective: Patient reports that when he was walking out yesterday he had a near fall, caught himself on the bike to prevent fall. He did hit his hand.       Objective: See treatment diary below  RFIS: decreased leg and back pain, better    Assessment: Tolerated treatment well. Patient requires cues for STS for WS onto forefoot, cues when standing for posture and knee ext. He does fatigue with standing for time with front loaded weight for improving back extensor strength. After performing endurance exercises, RFIS performed as he had improved pain with RFIS. Will continue with LS and LS strengthening as tolerated.       Plan: Continue per plan of care.      Precautions: CA, CKD HTN, h/o MI      Manuals                                                                 Neuro Re-Ed                                                                                                        Ther Ex   5/9 5/15 5        LS RFISitting 5+10 10x  2 sets 15x 15x 4/10        VG  L5  2x20 L5  20x  L7  2x20 L5 20x L7 3x20 L7  20x  DL    3x5 SL        STS  2x5 2x5 +3 2x7 3x5 + foam        LS ext endurance hold  3 KG ball  30\"x3 3 KG ball 30\"x2  4KB ball 30\"x1 4KG bal  30\"x2 4KG bal  45\"x3        LAQ    8#  3x8 10#  3x8                                               Ther Activity                                       Gait Training                                       Modalities                                                    "

## 2024-05-21 ENCOUNTER — OFFICE VISIT (OUTPATIENT)
Dept: NEUROLOGY | Facility: CLINIC | Age: 79
End: 2024-05-21
Payer: MEDICARE

## 2024-05-21 VITALS
HEART RATE: 71 BPM | TEMPERATURE: 98 F | SYSTOLIC BLOOD PRESSURE: 130 MMHG | WEIGHT: 157.2 LBS | BODY MASS INDEX: 24.62 KG/M2 | DIASTOLIC BLOOD PRESSURE: 64 MMHG

## 2024-05-21 DIAGNOSIS — G20.C PARKINSONISM, UNSPECIFIED PARKINSONISM TYPE: Primary | ICD-10-CM

## 2024-05-21 PROCEDURE — 99213 OFFICE O/P EST LOW 20 MIN: CPT | Performed by: PHYSICIAN ASSISTANT

## 2024-05-21 PROCEDURE — G2211 COMPLEX E/M VISIT ADD ON: HCPCS | Performed by: PHYSICIAN ASSISTANT

## 2024-05-21 NOTE — ASSESSMENT & PLAN NOTE
Patient with multifactorial gait disorder along with postural and action tremors in the hands. His prior brain MRI revealed progressive white matter changes consistent with chronic microangiopathic disease.  He continues to have mild parkinsonism on exam however LE slowness and gait issues remain the more predominant feature. This along with his MRI findings suggest perhaps an underlying vascular parkinsonism.  Hand tremors likely related to chronic Lithium use which he remains on. Tremors are overall stable and not bothersome per the patient.      Once again discussed the options including continuing to monitor for progression of symptoms and worsening of function or starting a trial of low dose Sinemet to see if this would provide any benefit in regards to his gait and mobility. He does have left leg pain secondary to nerve impingement in the back. He is following with PM for injections and is now working with PT. Overall he feels that his gait issues are stable and wax and wane depending on his level of pain.      If gait or mobility were to worsen we could consider a trial of levodopa.  Discussed potential studies that can be used to distinguish between medication induced tremor, secondary parkinsonism and idiopathic Parkinson's disease.  At this time given stability of symptoms I do not think that this is necessary.    He was encouraged to continue with PT and exercises on his own once PT is completed. Using a cane or walker for added stability.

## 2024-05-21 NOTE — PROGRESS NOTES
Patient ID: Tonny Baig is a 78 y.o. male.    Assessment/Plan:    Parkinsonism (HCC)  Patient with multifactorial gait disorder along with postural and action tremors in the hands. His prior brain MRI revealed progressive white matter changes consistent with chronic microangiopathic disease.  He continues to have mild parkinsonism on exam however LE slowness and gait issues remain the more predominant feature. This along with his MRI findings suggest perhaps an underlying vascular parkinsonism.  Hand tremors likely related to chronic Lithium use which he remains on. Tremors are overall stable and not bothersome per the patient.      Once again discussed the options including continuing to monitor for progression of symptoms and worsening of function or starting a trial of low dose Sinemet to see if this would provide any benefit in regards to his gait and mobility. He does have left leg pain secondary to nerve impingement in the back. He is following with PM for injections and is now working with PT. Overall he feels that his gait issues are stable and wax and wane depending on his level of pain.      If gait or mobility were to worsen we could consider a trial of levodopa.  Discussed potential studies that can be used to distinguish between medication induced tremor, secondary parkinsonism and idiopathic Parkinson's disease.  At this time given stability of symptoms I do not think that this is necessary.    He was encouraged to continue with PT and exercises on his own once PT is completed. Using a cane or walker for added stability.            Subjective:    Tonny Baig is a right handed man with bipolar disorder on Lithium, atrial fibrillation, cervical and lumbar radiculopathy and left trochanteric bursitis who presents for follow up for gait difficulty for over 5 years with the question of parkinsonism (neuroleptic induced versus vascular.)  Review of chart reveals a longstanding gait dysfunction felt  to be multifactorial (cervical and lumbar degenerative disease, prior myelopathy and possible peripheral neuropathy and parkinsonism).  Also noted to have right hand rest tremor.      Noted to have tremors, mild bradykinesia and shuffling. Tremors felt to be related to chronic lithium use. Question of some mild vascular parkinsonism as well.     At his last visit he was overall stable and no changes made.      INTERVAL HISTORY:  Tremors remain the same   He does not even notice them for the most part   Wife will notice them more on the right and can come out anytime   Remains on Lithium, mood well controlled   He will occasionally trip, no recent falls   Overall he feels that his walking is stable   He started PT 3-4 weeks ago for back, hip and balance   He does see some benefit with the PT however it does make the pain worse at times   He uses a cane or walker   He can perform all of his ADLs on his own   Sleep is not always great, for the most part however he is getting sleep   Rare issues with swallowing related to GERD      Imaging:  Brain MRI  -Chronic lacunar infarct left centrum semiovale.  Progressive cerebral volume loss and progressive white matter foci in periventricular regions compatible with chronic microangiopathic disease.        I personally reviewed and updated the ROS.       Objective:    Blood pressure 130/64, pulse 71, temperature 98 °F (36.7 °C), weight 71.3 kg (157 lb 3.2 oz).    Physical Exam  Constitutional:       Appearance: Normal appearance.   HENT:      Right Ear: Hearing normal.      Left Ear: Hearing normal.   Eyes:      Extraocular Movements: Extraocular movements intact.   Pulmonary:      Effort: Pulmonary effort is normal.   Neurological:      Mental Status: He is alert.   Psychiatric:         Speech: Speech normal.         Neurological Exam  Mental Status  Alert. Oriented only to person, place and situation. Recent and remote memory are intact. Speech is normal. Follows complex  commands. Attention and concentration are normal.    Cranial Nerves  CN III, IV, VI: Extraocular movements intact bilaterally.  CN V:  Right: Facial sensation is normal.  Left: Facial sensation is normal on the left.  CN VII:  Right: There is no facial weakness.  Left: There is no facial weakness.  CN VIII:  Right: Hearing is normal.  Left: Hearing is normal.  CN IX, X: Palate elevates symmetrically  CN XI: Shoulder shrug strength is normal.  CN XII: Tongue midline without atrophy or fasciculations.    Motor   Normal muscle tone. Strength is 5/5 in all four extremities except as noted.  Left IP 5-/5.    Sensory  Light touch is normal in upper and lower extremities.     Coordination  Right: Finger-to-nose normal.Left: Finger-to-nose normal.  Mild amplitude action tremor on finger-to-nose bilaterally, slightly worse on the right   Mild postural tremors of the hands outstretched.  No rest tremor.  Mild decrement on hand movements left greater than right.  .    Gait  Casual gait is normal including stance, stride, and arm swing. Able to rise from chair without using arms.  Slightly wide-based.  Reduction in left stride, left foot eversion   Using cane .        ROS:    Review of Systems   Constitutional:  Negative for appetite change, fatigue and fever.   HENT: Negative.  Negative for hearing loss, tinnitus, trouble swallowing and voice change.    Eyes: Negative.  Negative for photophobia, pain and visual disturbance.   Respiratory: Negative.  Negative for shortness of breath.    Cardiovascular: Negative.  Negative for palpitations.   Gastrointestinal: Negative.  Negative for nausea and vomiting.   Endocrine: Negative.  Negative for cold intolerance.   Genitourinary: Negative.  Negative for dysuria, frequency and urgency.   Musculoskeletal:  Positive for back pain, gait problem (Shuffles and Stumbles at times) and myalgias (Legs). Negative for neck pain and neck stiffness.        Balance Issues, has stayed about the  same     Skin: Negative.  Negative for rash.   Allergic/Immunologic: Negative.    Neurological:  Positive for tremors (Right Hand / Arm, has stayed the same) and speech difficulty (Mumbles a little). Negative for dizziness, seizures, syncope, facial asymmetry, weakness, light-headedness, numbness and headaches.   Hematological: Negative.  Does not bruise/bleed easily.   Psychiatric/Behavioral: Negative.  Negative for confusion, hallucinations and sleep disturbance.    All other systems reviewed and are negative.

## 2024-05-22 ENCOUNTER — OFFICE VISIT (OUTPATIENT)
Dept: PHYSICAL THERAPY | Facility: REHABILITATION | Age: 79
End: 2024-05-22
Payer: MEDICARE

## 2024-05-22 DIAGNOSIS — G89.29 CHRONIC LEFT-SIDED LOW BACK PAIN WITH LEFT-SIDED SCIATICA: ICD-10-CM

## 2024-05-22 DIAGNOSIS — M70.62 TROCHANTERIC BURSITIS OF LEFT HIP: Primary | ICD-10-CM

## 2024-05-22 DIAGNOSIS — M54.42 CHRONIC LEFT-SIDED LOW BACK PAIN WITH LEFT-SIDED SCIATICA: ICD-10-CM

## 2024-05-22 PROCEDURE — 97112 NEUROMUSCULAR REEDUCATION: CPT | Performed by: PHYSICAL THERAPIST

## 2024-05-22 PROCEDURE — 97110 THERAPEUTIC EXERCISES: CPT | Performed by: PHYSICAL THERAPIST

## 2024-05-22 NOTE — PROGRESS NOTES
"Daily Note     Today's date: 2024  Patient name: Tonny Baig  : 1945  MRN: [id number]  Referring provider: Michelle Ayoub PA-C  Dx:   Encounter Diagnosis     ICD-10-CM    1. Trochanteric bursitis of left hip  M70.62       2. Chronic left-sided low back pain with left-sided sciatica  M54.42     G89.29           Start Time: 1250  Stop Time: 1330  Total time in clinic (min): 40 minutes  Subjective: Patient reports that he does continues to feel soreness the day after PT but has been doing well in the afternoon/evening. Soreness is usually less by 24 hours after PT.       Objective: See treatment diary below      Assessment: Tolerated treatment well. Patient did well with increased reps with STS and SL press on VG. We worked in balance board for ankle DF/PF strength. He is very challenged with HR as he is unable to perform HR clearing >2\" as TB was provided for strength through ROM.       Plan: Continue per plan of care.      Precautions: CA, CKD HTN, h/o MI      Manuals   5                                                              Neuro Re-Ed             Balance board        5' VC/TC       Gait training      3'                                                                        Ther Ex  5/6 5/9 5/15 5/20 5/22       LS RFISitting 5+10 10x  2 sets 15x 15x 4x10        VG  L5  2x20 L5  20x  L7  2x20 L5 20x L7 3x20 L7  20x  DL    3x5 SL L7  50x  DL    3x7 SL       STS  2x5 2x5 +3 2x7 3x5 + foam 3x5 + foam       LS ext endurance hold  3 KG ball  30\"x3 3 KG ball 30\"x2  4KB ball 30\"x1 4KG bal  30\"x2 4KG bal  45\"x3 4KG bal  45\"x3       LAQ    8#  3x8 10#  3x8 NP       Seated HR                                       Ther Activity                                       Gait Training                                       Modalities                                                      "

## 2024-05-23 ENCOUNTER — TELEPHONE (OUTPATIENT)
Dept: INTERNAL MEDICINE CLINIC | Facility: CLINIC | Age: 79
End: 2024-05-23

## 2024-05-23 ENCOUNTER — TRANSCRIBE ORDERS (OUTPATIENT)
Dept: LAB | Facility: CLINIC | Age: 79
End: 2024-05-23

## 2024-05-23 ENCOUNTER — LAB (OUTPATIENT)
Dept: LAB | Facility: CLINIC | Age: 79
End: 2024-05-23
Payer: MEDICARE

## 2024-05-23 DIAGNOSIS — N18.9 CHRONIC RENAL IMPAIRMENT, UNSPECIFIED CKD STAGE: ICD-10-CM

## 2024-05-23 DIAGNOSIS — I25.10 CORONARY ARTERY DISEASE INVOLVING NATIVE CORONARY ARTERY OF NATIVE HEART WITHOUT ANGINA PECTORIS: ICD-10-CM

## 2024-05-23 DIAGNOSIS — F31.60 MIXED BIPOLAR I DISORDER (HCC): Primary | ICD-10-CM

## 2024-05-23 DIAGNOSIS — F31.60 MIXED BIPOLAR I DISORDER (HCC): ICD-10-CM

## 2024-05-23 DIAGNOSIS — E83.42 HYPOMAGNESEMIA: ICD-10-CM

## 2024-05-23 LAB
ANION GAP SERPL CALCULATED.3IONS-SCNC: 7 MMOL/L (ref 4–13)
BUN SERPL-MCNC: 30 MG/DL (ref 5–25)
CALCIUM SERPL-MCNC: 8.3 MG/DL (ref 8.4–10.2)
CHLORIDE SERPL-SCNC: 110 MMOL/L (ref 96–108)
CHOLEST SERPL-MCNC: 126 MG/DL
CO2 SERPL-SCNC: 26 MMOL/L (ref 21–32)
CREAT SERPL-MCNC: 1.5 MG/DL (ref 0.6–1.3)
GFR SERPL CREATININE-BSD FRML MDRD: 44 ML/MIN/1.73SQ M
GLUCOSE P FAST SERPL-MCNC: 99 MG/DL (ref 65–99)
HDLC SERPL-MCNC: 37 MG/DL
LDLC SERPL CALC-MCNC: 71 MG/DL (ref 0–100)
LITHIUM SERPL-SCNC: 0.6 MMOL/L (ref 0.6–1.2)
MAGNESIUM SERPL-MCNC: 0.8 MG/DL (ref 1.9–2.7)
NONHDLC SERPL-MCNC: 89 MG/DL
POTASSIUM SERPL-SCNC: 4.4 MMOL/L (ref 3.5–5.3)
SODIUM SERPL-SCNC: 143 MMOL/L (ref 135–147)
TRIGL SERPL-MCNC: 90 MG/DL
TSH SERPL DL<=0.05 MIU/L-ACNC: 1.53 UIU/ML (ref 0.45–4.5)

## 2024-05-23 PROCEDURE — 84443 ASSAY THYROID STIM HORMONE: CPT

## 2024-05-23 PROCEDURE — 80178 ASSAY OF LITHIUM: CPT

## 2024-05-23 PROCEDURE — 80061 LIPID PANEL: CPT

## 2024-05-23 PROCEDURE — 80048 BASIC METABOLIC PNL TOTAL CA: CPT

## 2024-05-23 PROCEDURE — 36415 COLL VENOUS BLD VENIPUNCTURE: CPT

## 2024-05-23 PROCEDURE — 83735 ASSAY OF MAGNESIUM: CPT

## 2024-05-23 NOTE — PROGRESS NOTES
Assessment:  1. Lumbar radiculopathy    2. DDD (degenerative disc disease), lumbar    3. Foraminal stenosis of lumbar region    4. Lumbar disc herniation    5. H/O lumbosacral spine surgery    6. Spinal stenosis of lumbar region with neurogenic claudication    7. Chronic pain syndrome        Plan:  I will gently increase duloxetine to 30 mg daily for her ongoing pain complaints.  I advised the patient that if they experience any side effects or issues with the changes in their medication regiment, they should give our office a call to discuss. I also advised the patient not to drive or operate machinery until they see how the changes in the medication regimen affects them. The patient was agreeable and verbalized an understanding.  We can repeat L3-4 LESI as needed.  I discussed with the patient that since there has been moderate to significant improvement in the pain symptoms, we will hold off on any repeat injections at this point in time. However, I reviewed with the patient that if their symptoms should return or worsen,  they should call our office to schedule to discuss repeating the injection.  Continue with home exercise program  Will avoid NSAIDs secondary to CKD  Follow-up in 6 to 8 weeks or sooner if needed    History of Present Illness:    The patient is a 78 y.o. male with a history of previous L4-S1 fusion last seen on 3/28/2024 who presents for a follow up office visit in regards to chronic low back pain that radiates into the left lower extremity.  Patient is status post L3-4 LESI April 17, 2024 with an ongoing 60% improvement of his pain.  He does still have some left hip pain.  He is involved in physical therapy which she actually feels slightly exacerbating his symptoms.  He is managing his pain with duloxetine 20 mg daily without side effects.  His mood is stable.  He is also on lithium.    Patient rates his pain a 7 out of 10 on the numeric pain rating scale.  Pain is intermittent in the evening  and it is described as burning, dull aching, sharp, pressure-like, shooting and pins-and-needles    I have personally reviewed and/or updated the patient's past medical history, past surgical history, family history, social history, current medications, allergies, and vital signs today.       Review of Systems:    Review of Systems   Respiratory:  Positive for shortness of breath.    Cardiovascular:  Negative for chest pain.   Gastrointestinal:  Negative for constipation, diarrhea, nausea and vomiting.   Musculoskeletal:  Positive for back pain and gait problem. Negative for arthralgias, joint swelling and myalgias.   Skin:  Negative for rash.   Neurological:  Negative for dizziness, seizures and weakness.   All other systems reviewed and are negative.        Past Medical History:   Diagnosis Date    Allergic 2011    Allergic rhinitis 2015    Anxiety     occasional    Aortic aneurysm ([facility])     Benign colon polyp     Bipolar 1 disorder ([facility])     Cancer ([facility]) 2007, 2011    Cardiac disease     MI    Cervical cord compression with myelopathy ([facility])     COPD (chronic obstructive pulmonary disease) ([facility])     Coronary artery disease 1995    Diverticulitis     Diverticulitis of colon prior to 2014    Diverticulosis     Emphysema of lung ([facility]) 2012    Gait disturbance     uses cane, leg brace on right    GERD (gastroesophageal reflux disease)     Heart attack ([facility]) 1996    Hx of resection of large bowel 05/03/2016    Hyperlipidemia     Hypertension     IBS (irritable bowel syndrome)     Inflammatory bowel disease 2012    Lumbar stenosis     Lung cancer ([facility])     2007 Left lower lobectomy and 2011 Right lung with surgery     Myocardial infarction ([facility])     involving other coronary artery    Prostate cancer ([facility])     Shortness of breath     Small bowel obstruction ([facility]) 11/16/2016       Past Surgical History:   Procedure Laterality Date    ANGIOPLASTY      stent    CARDIAC CATHETERIZATION  1995    angioplasty    CARDIAC SURGERY       CERVICAL SPINE SURGERY      Cervical decompression with cervical fusion from C3-C7 for spinal stenosis.    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    LUNG LOBECTOMY Left     IL ARTHRD ANT INTERBODY MIN DSC CRV BELOW C2 N/A 05/02/2016    Procedure: Anterior cervical diskectomy C3/4, C5/6, C6/7 with anterior plate fixation fusion C3-7;  Posterior decompressive laminectomy C3-7 with lateral mass fixation fusion C3-7 (IMPULSE MONITORING);  Surgeon: Jose Luis Moore MD;  Location: [facility];  Service: Neurosurgery    IL [facility] N/A 01/30/2017    Procedure: L4-5 AND L5-S1 DECOMPRESSIVE FORAMINOTOMIES, TRANSFORAMINAL LUMBAR INTERBODY AND PEDICLE SCREW FIXATION FUSION L4-S1 (IMPULSE);  Surgeon: Jose Luis Moore MD;  Location: [facility];  Service: Neurosurgery    IL EVASC RPR DPLMNT AORTO-AORTIC NDGFT N/A 08/06/2018    Procedure: REPAIR ANEURYSM ENDOVASCULAR ABDOMINAL AORTIC  (EVAR) WITH BILATERAL PERCUTANEOUS FEMORAL ACCESS WITH ULTRASOUND GUIDANCE ON THE RIGHT AND PRE CLOSURE;  Surgeon: Shan Villalobos MD;  Location: [facility];  Service: Vascular    PROSTATECTOMY  2007    for prostate CA - no chemo/RT    SIGMOIDECTOMY      for divertic    SMALL INTESTINE SURGERY N/A 11/17/2016    Procedure: Exploratory Laparotomy, Lysis of adhesions to release small bowel obstruction;  Surgeon: Aminta Sim MD;  Location: [facility];  Service:     SPINE SURGERY  2016, 2017    TONSILLECTOMY  Y    1952       Family History   Problem Relation Age of Onset    Hypertension Mother      Glaucoma Mother     Heart attack Father     Colon cancer Father     Coronary artery disease Father     Hypertension Father     Kidney disease Father     Heart disease Family     Hyperlipidemia Family     Hypertension Family        Social History     Occupational History    Occupation: Retired   Tobacco Use    Smoking status: Former     Current packs/day: 0.00     Average packs/day: 1 pack/day for 44.0 years (44.0 ttl pk-yrs)     Types: Cigarettes     Start date: 1967     Quit date: 2011     Years since quittin.4     Passive exposure: Past    Smokeless tobacco: Never   Vaping Use    Vaping status: Never Used   Substance and Sexual Activity    Alcohol use: Yes     Alcohol/week: 2.0 - 3.0 standard drinks of alcohol     Types: 2 - 3 Shots of liquor per week     Comment: One glass per day    Drug use: No    Sexual activity: Not Currently     Partners: Female     Birth control/protection: Post-menopausal, None         Current Outpatient Medications:     acetaminophen (TYLENOL) 500 mg tablet, Take 500 mg by mouth as needed for mild pain., Disp: , Rfl:     amLODIPine (NORVASC) 2.5 mg tablet, TAKE 1 TABLET BY MOUTH DAILY, Disp: 90 tablet, Rfl: 3    aspirin (ECOTRIN LOW STRENGTH) 81 mg EC tablet, Take 81 mg by mouth daily, Disp: , Rfl:     atorvastatin (LIPITOR) 40 mg tablet, TAKE 1 TABLET BY MOUTH DAILY, Disp: 90 tablet, Rfl: 1    Cholecalciferol (VITAMIN D PO), Take 2,000 Units by mouth daily  , Disp: , Rfl:     dicyclomine (Bentyl) 20 mg tablet, Take 1 tablet (20 mg total) by mouth every 6 (six) hours as needed (abd cramping), Disp: 90 tablet, Rfl: 3    DULoxetine (CYMBALTA) 30 mg delayed release capsule, Take 1 capsule (30 mg total) by mouth daily, Disp: 90 capsule, Rfl: 0    fexofenadine (ALLEGRA) 180 MG tablet, Take 180 mg by mouth as needed Daily during the Summer, Disp: , Rfl:     fluticasone (FLONASE) 50 mcg/act nasal spray, 2 sprays into each nostril daily, Disp: , Rfl:     lithium carbonate (LITHOBID)  "300 mg CR tablet, Take 300 mg by mouth daily at bedtime, Disp: , Rfl:     magnesium chloride-calcium (SlowMag Mg Muscle/Heart) 71.5-119 mg, Take 1 tablet by mouth 2 (two) times a day (Patient taking differently: Take 1 tablet by mouth 3 (three) times a day), Disp: 60 tablet, Rfl: 5    metoprolol succinate (TOPROL-XL) 25 mg 24 hr tablet, TAKE 1 TABLET BY MOUTH ONCE  DAILY, Disp: 90 tablet, Rfl: 3    mupirocin (BACTROBAN) 2 % ointment, Apply topically 2 (two) times a day as needed (wound), Disp: 22 g, Rfl: 0    Myrbetriq 25 MG TB24, TAKE 1 TABLET BY MOUTH DAILY AT  BEDTIME, Disp: 90 tablet, Rfl: 3    nitroglycerin (NITRODUR) 0.2 mg/hr, APPLY 1 PATCH TOPICALLY ONCE  DAILY. LEAVE ON FOR 12 TO 14  HOURS THEN REMOVE FOR A  NITRATE-FREE INTERVAL OF 10 TO  12 HOURS, Disp: 90 patch, Rfl: 1    omega-3-acid ethyl esters (LOVAZA) 1 g capsule, TAKE 1 CAPSULE BY MOUTH 3  TIMES DAILY, Disp: 270 capsule, Rfl: 3    omeprazole (PriLOSEC) 20 mg delayed release capsule, TAKE 1 CAPSULE BY MOUTH TWICE  DAILY BEFORE MEALS, Disp: 180 capsule, Rfl: 1    Trelegy Ellipta 200-62.5-25 MCG/ACT AEPB inhaler, USE 1 INHALATION BY MOUTH DAILY, Disp: 180 each, Rfl: 3    Allergies   Allergen Reactions    Bactrim [Sulfamethoxazole-Trimethoprim] Other (See Comments)     AILYN    Nsaids      Annotation - [id number]: unable to take due to use of lithium.    Oxycodone Rash       Physical Exam:    /74   Pulse 79   Ht 5' 7\" (1.702 m)   Wt 71.2 kg (157 lb)   BMI 24.59 kg/m²     Constitutional:normal, well developed, well nourished, alert, in no distress and non-toxic and no overt pain behavior.  Eyes:anicteric  HEENT:grossly intact  Neck:supple, symmetric, trachea midline and no masses   Pulmonary:even and unlabored  Cardiovascular:No edema or pitting edema present  Skin:Normal without rashes or lesions and well hydrated  Psychiatric:Mood and affect appropriate  Neurologic:Cranial Nerves II-XII grossly intact  Musculoskeletal:antalgic " gait      Imaging  No orders to display         No orders of the defined types were placed in this encounter.

## 2024-05-24 ENCOUNTER — OFFICE VISIT (OUTPATIENT)
Dept: PAIN MEDICINE | Facility: CLINIC | Age: 79
End: 2024-05-24
Payer: MEDICARE

## 2024-05-24 ENCOUNTER — APPOINTMENT (OUTPATIENT)
Dept: PHYSICAL THERAPY | Facility: REHABILITATION | Age: 79
End: 2024-05-24
Payer: MEDICARE

## 2024-05-24 ENCOUNTER — TELEPHONE (OUTPATIENT)
Dept: PULMONOLOGY | Facility: CLINIC | Age: 79
End: 2024-05-24

## 2024-05-24 VITALS
HEIGHT: 67 IN | DIASTOLIC BLOOD PRESSURE: 74 MMHG | WEIGHT: 157 LBS | BODY MASS INDEX: 24.64 KG/M2 | SYSTOLIC BLOOD PRESSURE: 154 MMHG | HEART RATE: 79 BPM

## 2024-05-24 DIAGNOSIS — M51.36 DDD (DEGENERATIVE DISC DISEASE), LUMBAR: ICD-10-CM

## 2024-05-24 DIAGNOSIS — G89.4 CHRONIC PAIN SYNDROME: ICD-10-CM

## 2024-05-24 DIAGNOSIS — M51.26 LUMBAR DISC HERNIATION: ICD-10-CM

## 2024-05-24 DIAGNOSIS — Z98.890 H/O LUMBOSACRAL SPINE SURGERY: ICD-10-CM

## 2024-05-24 DIAGNOSIS — M54.16 LUMBAR RADICULOPATHY: Primary | ICD-10-CM

## 2024-05-24 DIAGNOSIS — M48.061 FORAMINAL STENOSIS OF LUMBAR REGION: ICD-10-CM

## 2024-05-24 DIAGNOSIS — M48.062 SPINAL STENOSIS OF LUMBAR REGION WITH NEUROGENIC CLAUDICATION: ICD-10-CM

## 2024-05-24 PROCEDURE — G2211 COMPLEX E/M VISIT ADD ON: HCPCS | Performed by: NURSE PRACTITIONER

## 2024-05-24 PROCEDURE — 99214 OFFICE O/P EST MOD 30 MIN: CPT | Performed by: NURSE PRACTITIONER

## 2024-05-24 RX ORDER — DULOXETIN HYDROCHLORIDE 30 MG/1
30 CAPSULE, DELAYED RELEASE ORAL DAILY
Qty: 90 CAPSULE | Refills: 0 | Status: SHIPPED | OUTPATIENT
Start: 2024-05-24

## 2024-05-24 NOTE — TELEPHONE ENCOUNTER
Called patient to schedule appointment for the 6M FU from [facility] and left a voicemail message.

## 2024-05-24 NOTE — TELEPHONE ENCOUNTER
I called patient to see if he was having any diarrhea and to check to see how he was feeling.  I recommended he double his dose of magnesium, and if he is feeling out of the ordinary, to get to the emergency room.  Left message with this info

## 2024-05-28 DIAGNOSIS — I10 ESSENTIAL HYPERTENSION: ICD-10-CM

## 2024-05-28 RX ORDER — AMLODIPINE BESYLATE 5 MG/1
5 TABLET ORAL DAILY
Qty: 90 TABLET | Refills: 3 | Status: SHIPPED | OUTPATIENT
Start: 2024-05-28

## 2024-05-29 ENCOUNTER — OFFICE VISIT (OUTPATIENT)
Dept: PHYSICAL THERAPY | Facility: REHABILITATION | Age: 79
End: 2024-05-29
Payer: MEDICARE

## 2024-05-29 DIAGNOSIS — R14.3 EXCESSIVE GAS: ICD-10-CM

## 2024-05-29 DIAGNOSIS — G89.29 CHRONIC LEFT-SIDED LOW BACK PAIN WITH LEFT-SIDED SCIATICA: ICD-10-CM

## 2024-05-29 DIAGNOSIS — M54.42 CHRONIC LEFT-SIDED LOW BACK PAIN WITH LEFT-SIDED SCIATICA: ICD-10-CM

## 2024-05-29 DIAGNOSIS — M70.62 TROCHANTERIC BURSITIS OF LEFT HIP: Primary | ICD-10-CM

## 2024-05-29 PROCEDURE — 97110 THERAPEUTIC EXERCISES: CPT | Performed by: PHYSICAL THERAPIST

## 2024-05-29 NOTE — PROGRESS NOTES
"Daily Note     Today's date: 2024  Patient name: Tonny Baig  : 1945  MRN: [id number]  Referring provider: Michelle Ayoub PA-C  Dx:   Encounter Diagnosis     ICD-10-CM    1. Trochanteric bursitis of left hip  M70.62       2. Chronic left-sided low back pain with left-sided sciatica  M54.42     G89.29           Start Time: 1345  Stop Time: 1415  Total time in clinic (min): 30 minutes    Subjective: Patient reports that he was sore for about 3 days after last session. Exercises at home continue to go well.       Objective: See treatment diary below  2 MWT: 235 ft - SPC  FOTO: improved    Assessment: Tolerated treatment well. Patient improving with weight shift forward as he has less posterior lean onto heels. Patient educated on HEP with STS, RFIS and walking at home.       Plan: Continue per plan of care.      Precautions: CA, CKD HTN, h/o MI      Manuals                                                                Neuro Re-Ed             Balance board        5' VC/TC       Gait training      3'                                                                        Ther Ex  5/6 5/9 5/15 5/20 5/22 5/29      LS RFISitting 5+10 10x  2 sets 15x 15x 4x10  3x10 - throughout session.       VG  L5  2x20 L5  20x  L7  2x20 L5 20x L7 3x20 L7  20x  DL    3x5 SL L7  50x  DL    3x7 SL L7  20+10+10      STS  2x5 2x5 +3 2x7 3x5 + foam 3x5 + foam 2x5  foam      LS ext endurance hold  3 KG ball  30\"x3 3 KG ball 30\"x2  4KB ball 30\"x1 4KG bal  30\"x2 4KG bal  45\"x3 4KG bal  45\"x3 4KG  30\"x2      LAQ    8#  3x8 10#  3x8 NP 10#  3x8      Seated HR             Walking       2'                   Ther Activity                                       Gait Training                                       Modalities                                                        "

## 2024-05-30 RX ORDER — DICYCLOMINE HCL 20 MG
TABLET ORAL
Qty: 360 TABLET | Refills: 0 | Status: SHIPPED | OUTPATIENT
Start: 2024-05-30

## 2024-05-31 NOTE — PROGRESS NOTES
UROLOGY PROGRESS NOTE   Patient Identifiers: Tonny Baig (MRN [id number])  Date of Service: 2/16/2024    Subjective:   78-year-old man history of prostate cancer.  He had a robotic prostatectomy in [place] in 2007.  His PSA has remained undetectable<0.1.  Uses about 2 pads per day for some dribbling.  No other complaints.  He has been taking Myrbetriq's which he believes helps.  Reason for visit: Prostate cancer follow-up    Objective:     VITALS:    Vitals:    02/16/24 0944   BP: 142/82   Pulse: 81   Resp: 14   SpO2: 98%     AUA SYMPTOM SCORE      Flowsheet Row Most Recent Value   AUA SYMPTOM SCORE    How often have you had a sensation of not emptying your bladder completely after you finished urinating? 1 (P)     How often have you had to urinate again less than two hours after you finished urinating? 1 (P)     How often have you found you stopped and started again several times when you urinate? 1 (P)     How often have you found it difficult to postpone urination? 1 (P)     How often have you had a weak urinary stream? 1 (P)     How often have you had to push or strain to begin urination? 1 (P)     How many times did you most typically get up to urinate from the time you went to bed at night until the time you got up in the morning? 5 (P)     Quality of Life: If you were to spend the rest of your life with your urinary condition just the way it is now, how would you feel about that? 2 (P)     AUA SYMPTOM SCORE 11 (P)               LABS:  Lab Results   Component Value Date    HGB 13.6 09/14/2023    HCT 40.2 09/14/2023    WBC 11.56 (H) 09/14/2023     09/14/2023   ]    Lab Results   Component Value Date     10/19/2015    K 4.4 05/23/2024     (H) 05/23/2024    CO2 26 05/23/2024    BUN 30 (H) 05/23/2024    CREATININE 1.50 (H) 05/23/2024    CALCIUM 8.3 (L) 05/23/2024    GLUCOSE 187 (H) 11/14/2016   ]        INPATIENT MEDS:    Current Outpatient Medications:     acetaminophen (TYLENOL) 500 mg  tablet, Take 500 mg by mouth as needed for mild pain., Disp: , Rfl:     aspirin (ECOTRIN LOW STRENGTH) 81 mg EC tablet, Take 81 mg by mouth daily, Disp: , Rfl:     atorvastatin (LIPITOR) 40 mg tablet, TAKE 1 TABLET BY MOUTH DAILY, Disp: 90 tablet, Rfl: 1    Cholecalciferol (VITAMIN D PO), Take 2,000 Units by mouth daily  , Disp: , Rfl:     fexofenadine (ALLEGRA) 180 MG tablet, Take 180 mg by mouth as needed Daily during the Summer, Disp: , Rfl:     fluticasone (FLONASE) 50 mcg/act nasal spray, 2 sprays into each nostril daily, Disp: , Rfl:     lithium carbonate (LITHOBID) 300 mg CR tablet, Take 300 mg by mouth daily at bedtime, Disp: , Rfl:     Myrbetriq 25 MG TB24, TAKE 1 TABLET BY MOUTH DAILY AT  BEDTIME, Disp: 90 tablet, Rfl: 3    omega-3-acid ethyl esters (LOVAZA) 1 g capsule, TAKE 1 CAPSULE BY MOUTH 3  TIMES DAILY, Disp: 270 capsule, Rfl: 3    omeprazole (PriLOSEC) 20 mg delayed release capsule, TAKE 1 CAPSULE BY MOUTH TWICE  DAILY BEFORE MEALS, Disp: 180 capsule, Rfl: 1    amLODIPine (NORVASC) 5 mg tablet, Take 1 tablet (5 mg total) by mouth daily, Disp: 90 tablet, Rfl: 3    dicyclomine (BENTYL) 20 mg tablet, TAKE 1 TABLET BY MOUTH EVERY 6  HOURS AS NEEDED FOR ABDOMINAL  CRAMPING, Disp: 360 tablet, Rfl: 0    DULoxetine (CYMBALTA) 30 mg delayed release capsule, Take 1 capsule (30 mg total) by mouth daily, Disp: 90 capsule, Rfl: 0    magnesium chloride-calcium (SlowMag Mg Muscle/Heart) 71.5-119 mg, Take 1 tablet by mouth 2 (two) times a day (Patient taking differently: Take 1 tablet by mouth 3 (three) times a day), Disp: 60 tablet, Rfl: 5    metoprolol succinate (TOPROL-XL) 25 mg 24 hr tablet, TAKE 1 TABLET BY MOUTH ONCE  DAILY, Disp: 90 tablet, Rfl: 3    mupirocin (BACTROBAN) 2 % ointment, Apply topically 2 (two) times a day as needed (wound), Disp: 22 g, Rfl: 0    nitroglycerin (NITRODUR) 0.2 mg/hr, APPLY 1 PATCH TOPICALLY ONCE  DAILY. LEAVE ON FOR 12 TO 14  HOURS THEN REMOVE FOR A  NITRATE-FREE INTERVAL OF 10  "TO  12 HOURS, Disp: 90 patch, Rfl: 1    Trelegy Ellipta 200-62.5-25 MCG/ACT AEPB inhaler, USE 1 INHALATION BY MOUTH DAILY, Disp: 180 each, Rfl: 3      Physical Exam:   /82 (BP Location: Left arm, Patient Position: Sitting, Cuff Size: Standard)   Pulse 81   Resp 14   Ht 5' 7\" (1.702 m)   Wt 78.2 kg (172 lb 6.4 oz)   SpO2 98%   BMI 27.00 kg/m²   GEN: no acute distress    RESP: breathing comfortably with no accessory muscle use    ABD: soft, non-tender, non-distended   INCISION:    EXT: no significant peripheral edema       RADIOLOGY:   None    Assessment:   #1.  Prostate cancer    Plan:   -Follow-up 1 year with PSA prior to visit  -  -  -          "

## 2024-06-05 ENCOUNTER — APPOINTMENT (OUTPATIENT)
Dept: PHYSICAL THERAPY | Facility: REHABILITATION | Age: 79
End: 2024-06-05
Payer: MEDICARE

## 2024-06-07 ENCOUNTER — OFFICE VISIT (OUTPATIENT)
Dept: PHYSICAL THERAPY | Facility: REHABILITATION | Age: 79
End: 2024-06-07
Payer: MEDICARE

## 2024-06-07 DIAGNOSIS — G89.29 CHRONIC LEFT-SIDED LOW BACK PAIN WITH LEFT-SIDED SCIATICA: Primary | ICD-10-CM

## 2024-06-07 DIAGNOSIS — M54.42 CHRONIC LEFT-SIDED LOW BACK PAIN WITH LEFT-SIDED SCIATICA: Primary | ICD-10-CM

## 2024-06-07 DIAGNOSIS — M70.62 TROCHANTERIC BURSITIS OF LEFT HIP: ICD-10-CM

## 2024-06-07 PROCEDURE — 97110 THERAPEUTIC EXERCISES: CPT | Performed by: PHYSICAL THERAPIST

## 2024-06-07 NOTE — PROGRESS NOTES
"Daily Note     Today's date: 2024  Patient name: Tonny Baig  : 1945  MRN: [id number]  Referring provider: Michelle Ayoub PA-C  Dx:   Encounter Diagnosis     ICD-10-CM    1. Chronic left-sided low back pain with left-sided sciatica  M54.42     G89.29       2. Trochanteric bursitis of left hip  M70.62           Start Time: 1005  Stop Time: 1045  Total time in clinic (min): 40 minutes    Subjective: Patient reports that he feels that adjustment to 1x/week has been good. He continues to do HEP and work.       Objective: See treatment diary below  2MWT: 280'    Assessment: Tolerated treatment well. Patient did increase his walking distance to 280 ft in 2 min vs 235 prior session. Cues with walking for knee ext, especially LLE as he fatigues.       Plan: Continue per plan of care.      Precautions: CA, CKD HTN, h/o MI      Manuals                                                                Neuro Re-Ed             Balance board        5' VC/TC       Gait training      3'                                                                        Ther Ex  5/6 5/9 5/15 5/20 5/22 5/29 6/6     LS RFISitting 5+10 10x  2 sets 15x 15x 4x10  3x10 - throughout session.  20x+10 throuhout session     Step up        4\" to march -  focus on stance leg     VG  L5  2x20 L5  20x  L7  2x20 L5 20x L7 3x20 L7  20x  DL    3x5 SL L7  50x  DL    3x7 SL L7  20+10+10 L7  20+20     STS  2x5 2x5 +3 2x7 3x5 + foam 3x5 + foam 2x5  foam 3x7 foam     LS ext endurance hold  3 KG ball  30\"x3 3 KG ball 30\"x2  4KB ball 30\"x1 4KG bal  30\"x2 4KG bal  45\"x3 4KG bal  45\"x3 4KG  30\"x2 4KG  30\"x2    Also with walking  -10ft x2 with CGA     LAQ    8#  3x8 10#  3x8 NP 10#  3x8 NP     Seated HR             Walking       2'                   Ther Activity                                       Gait Training                                       Modalities                                                          "

## 2024-06-10 ENCOUNTER — OFFICE VISIT (OUTPATIENT)
Dept: PHYSICAL THERAPY | Facility: REHABILITATION | Age: 79
End: 2024-06-10
Payer: MEDICARE

## 2024-06-10 DIAGNOSIS — M54.42 CHRONIC LEFT-SIDED LOW BACK PAIN WITH LEFT-SIDED SCIATICA: Primary | ICD-10-CM

## 2024-06-10 DIAGNOSIS — M70.62 TROCHANTERIC BURSITIS OF LEFT HIP: ICD-10-CM

## 2024-06-10 DIAGNOSIS — G89.29 CHRONIC LEFT-SIDED LOW BACK PAIN WITH LEFT-SIDED SCIATICA: Primary | ICD-10-CM

## 2024-06-10 PROCEDURE — 97110 THERAPEUTIC EXERCISES: CPT | Performed by: PHYSICAL THERAPIST

## 2024-06-10 RX ORDER — ALBUTEROL SULFATE 90 UG/1
AEROSOL, METERED RESPIRATORY (INHALATION)
COMMUNITY
Start: 2024-05-28

## 2024-06-10 NOTE — PROGRESS NOTES
"Daily Note     Today's date: 6/10/2024  Patient name: Tonny Baig  : 1945  MRN: [id number]  Referring provider: Michelle Ayoub PA-C  Dx:   Encounter Diagnosis     ICD-10-CM    1. Chronic left-sided low back pain with left-sided sciatica  M54.42     G89.29       2. Trochanteric bursitis of left hip  M70.62           Start Time: 1445  Stop Time: 1523  Total time in clinic (min): 38 minutes    Subjective: Patient reports feeling more sore today. States that he did a lot this weekend, likely too many STS (60). Feeling more L leg pain today.       Objective: See treatment diary below  2 MWT: 240', 210'    Assessment: Tolerated treatment well. Due to increased soreness and fatigue from the weekend, treatment intensity decreased today. His second round of walking was limited due to leg pain. Will continue to treat/progress as tolerated. Educated on not overexerting at home.        Plan: Continue per plan of care.      Precautions: CA, CKD HTN, h/o MI      Manuals   5                                                              Neuro Re-Ed             Balance board        5' VC/TC       Gait training      3'                                                                        Ther Ex  5/6 5/9 5/15 5/20 5/22 5/29 6/6 6/10    LS RFISitting 5+10 10x  2 sets 15x 15x 4x10  3x10 - throughout session.  20x+10 throuhout session 10x    Step up        4\" to march - TK focus on stance leg Standing alt march instead perf - 10x ea side with b/l UE support    VG  L5  2x20 L5  20x  L7  2x20 L5 20x L7 3x20 L7  20x  DL    3x5 SL L7  50x  DL    3x7 SL L7  20+10+10 L7  20+20 NP    STS  2x5 2x5 +3 2x7 3x5 + foam 3x5 + foam 2x5  foam 3x7 foam 3x5 foam    LS ext endurance hold  3 KG ball  30\"x3 3 KG ball 30\"x2  4KB ball 30\"x1 4KG bal  30\"x2 4KG bal  45\"x3 4KG bal  45\"x3 4KG  30\"x2 4KG  30\"x2    Also with walking  -10ft x2 with CGA 4 KG ball - 45\"x2    LAQ    8#  3x8 10#  3x8 NP 10#  3x8 NP 10#  3x10    Seated HR           "   Walking       2'  2'x2                 Ther Activity                                       Gait Training                                       Modalities

## 2024-06-11 ENCOUNTER — OFFICE VISIT (OUTPATIENT)
Dept: OBGYN CLINIC | Facility: HOSPITAL | Age: 79
End: 2024-06-11
Payer: MEDICARE

## 2024-06-11 VITALS
DIASTOLIC BLOOD PRESSURE: 76 MMHG | BODY MASS INDEX: 24.48 KG/M2 | HEIGHT: 67 IN | HEART RATE: 79 BPM | WEIGHT: 156 LBS | SYSTOLIC BLOOD PRESSURE: 149 MMHG

## 2024-06-11 DIAGNOSIS — M70.62 TROCHANTERIC BURSITIS OF LEFT HIP: Primary | ICD-10-CM

## 2024-06-11 PROCEDURE — 99213 OFFICE O/P EST LOW 20 MIN: CPT | Performed by: ORTHOPAEDIC SURGERY

## 2024-06-11 PROCEDURE — 20610 DRAIN/INJ JOINT/BURSA W/O US: CPT | Performed by: ORTHOPAEDIC SURGERY

## 2024-06-11 RX ORDER — LIDOCAINE HYDROCHLORIDE 10 MG/ML
2 INJECTION, SOLUTION INFILTRATION; PERINEURAL
Status: COMPLETED | OUTPATIENT
Start: 2024-06-11 | End: 2024-06-11

## 2024-06-11 RX ORDER — BETAMETHASONE SODIUM PHOSPHATE AND BETAMETHASONE ACETATE 3; 3 MG/ML; MG/ML
12 INJECTION, SUSPENSION INTRA-ARTICULAR; INTRALESIONAL; INTRAMUSCULAR; SOFT TISSUE
Status: COMPLETED | OUTPATIENT
Start: 2024-06-11 | End: 2024-06-11

## 2024-06-11 RX ORDER — BUPIVACAINE HYDROCHLORIDE 2.5 MG/ML
2 INJECTION, SOLUTION INFILTRATION; PERINEURAL
Status: COMPLETED | OUTPATIENT
Start: 2024-06-11 | End: 2024-06-11

## 2024-06-11 RX ADMIN — BETAMETHASONE SODIUM PHOSPHATE AND BETAMETHASONE ACETATE 12 MG: 3; 3 INJECTION, SUSPENSION INTRA-ARTICULAR; INTRALESIONAL; INTRAMUSCULAR; SOFT TISSUE at 11:00

## 2024-06-11 RX ADMIN — LIDOCAINE HYDROCHLORIDE 2 ML: 10 INJECTION, SOLUTION INFILTRATION; PERINEURAL at 11:00

## 2024-06-11 RX ADMIN — BUPIVACAINE HYDROCHLORIDE 2 ML: 2.5 INJECTION, SOLUTION INFILTRATION; PERINEURAL at 11:00

## 2024-06-11 NOTE — PROGRESS NOTES
Assessment:  1. Trochanteric bursitis of left hip            Plan:  Left trochanteric bursitis   Patient encouraged to continue physical therapy  The patient was provided with left trochanteric bursa steroid injection.  The patient tolerated the procedure well.  The patient should follow up in 3 months.      To do next visit:  Return in about 3 months (around 9/11/2024).    The above stated was discussed in layman's terms and the patient expressed understanding.  All questions were answered to the patient's satisfaction.       Scribe Attestation      I,:  Preet Alejandro am acting as a scribe while in the presence of the attending physician.:       I,:  Landon Galarza MD personally performed the services described in this documentation    as scribed in my presence.:               Subjective:   Tonny Baig is a 78 y.o. male who presents for follow up of left hip.  He is s/p left trochanteric bursa steroid injection with benefit, 3/5/2024.  Today he complains of left lateral hip pain with left lateral thigh pain.  Prolonged weight bearing and walking aggravates while rest alleviates.  He does participate in physical therapy with benefit.  He does use SPC for ambulation.        Review of systems negative unless otherwise specified in HPI    Past Medical History:   Diagnosis Date    Allergic 2011    Allergic rhinitis 2015    Anxiety     occasional    Aortic aneurysm (HCC)     Benign colon polyp     Bipolar 1 disorder (HCC)     Cancer ([facility]) 2007, 2011    Cardiac disease     MI    Cervical cord compression with myelopathy ([facility])     COPD (chronic obstructive pulmonary disease) ([facility])     Coronary artery disease 1995    Diverticulitis     Diverticulitis of colon prior to 2014    Diverticulosis     Emphysema of lung ([facility]) 2012    Gait disturbance     uses cane, leg brace on right    GERD (gastroesophageal reflux disease)     Heart attack ([facility]) 1996    Hx of resection of large bowel 05/03/2016    Hyperlipidemia      Hypertension     IBS (irritable bowel syndrome)     Inflammatory bowel disease 2012    Lumbar stenosis     Lung cancer (HCC)     2007 Left lower lobectomy and 2011 Right lung with surgery     Myocardial infarction (HCC)     involving other coronary artery    Prostate cancer (HCC)     Shortness of breath     Small bowel obstruction (HCC) 11/16/2016       Past Surgical History:   Procedure Laterality Date    ANGIOPLASTY      stent    CARDIAC CATHETERIZATION  1995    angioplasty    CARDIAC SURGERY      CERVICAL SPINE SURGERY      Cervical decompression with cervical fusion from C3-C7 for spinal stenosis.    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    LUNG LOBECTOMY Left     [place] ARTHRD ANT INTERBODY MIN DSC CRV BELOW C2 N/A 05/02/2016    Procedure: Anterior cervical diskectomy C3/4, C5/6, C6/7 with anterior plate fixation fusion C3-7;  Posterior decompressive laminectomy C3-7 with lateral mass fixation fusion C3-7 (IMPULSE MONITORING);  Surgeon: Jose Luis Moore MD;  Location: [facility];  Service: Neurosurgery    [place] ARTHRODESIS POSTERIOR INTERBODY [facility] LUMBAR N/A 01/30/2017    Procedure: L4-5 AND L5-S1 DECOMPRESSIVE FORAMINOTOMIES, TRANSFORAMINAL LUMBAR INTERBODY AND PEDICLE SCREW FIXATION FUSION L4-S1 (IMPULSE);  Surgeon: Jose Luis Moore MD;  Location: [facility];  Service: Neurosurgery    NY EVASC RPR DPLMNT AORTO-AORTIC NDGFT N/A 08/06/2018    Procedure: REPAIR ANEURYSM ENDOVASCULAR ABDOMINAL AORTIC  (EVAR) WITH BILATERAL PERCUTANEOUS FEMORAL ACCESS WITH ULTRASOUND GUIDANCE ON THE RIGHT AND PRE CLOSURE;   Surgeon: Shan Villalobos MD;  Location: [facility];  Service: Vascular    PROSTATECTOMY      for prostate CA - no chemo/RT    SIGMOIDECTOMY      for divertic    SMALL INTESTINE SURGERY N/A 2016    Procedure: Exploratory Laparotomy, Lysis of adhesions to release small bowel obstruction;  Surgeon: Aminta Sim MD;  Location: [facility];  Service:     SPINE SURGERY  ,     TONSILLECTOMY  Y    1952       Family History   Problem Relation Age of Onset    Hypertension Mother     Glaucoma Mother     Heart attack Father     Colon cancer Father     Coronary artery disease Father     Hypertension Father     Kidney disease Father     Heart disease Family     Hyperlipidemia Family     Hypertension Family        Social History     Occupational History    Occupation: Retired   Tobacco Use    Smoking status: Former     Current packs/day: 0.00     Average packs/day: 1 pack/day for 44.0 years (44.0 ttl pk-yrs)     Types: Cigarettes     Start date: 1967     Quit date: 2011     Years since quittin.4     Passive exposure: Past    Smokeless tobacco: Never   Vaping Use    Vaping status: Never Used   Substance and Sexual Activity    Alcohol use: Yes     Alcohol/week: 2.0 - 3.0 standard drinks of alcohol     Types: 2 - 3 Shots of liquor per week     Comment: One glass per day    Drug use: No    Sexual activity: Not Currently     Partners: Female     Birth control/protection: Post-menopausal, None         Current Outpatient Medications:     albuterol (PROVENTIL HFA,VENTOLIN HFA) 90 mcg/act inhaler, INHALE 2 PUFFS EVERY 6 HOURS AS NEEDED FOR WHEEZING OR SHORTNESS OF BREATH, Disp: , Rfl:     acetaminophen (TYLENOL) 500 mg tablet, Take 500 mg by mouth as needed for mild pain., Disp: , Rfl:     amLODIPine (NORVASC) 5 mg tablet, Take 1 tablet (5 mg total) by mouth daily, Disp: 90 tablet, Rfl: 3    aspirin (ECOTRIN LOW STRENGTH) 81 mg EC tablet, Take 81 mg by mouth daily, Disp: , Rfl:     atorvastatin (LIPITOR)  40 mg tablet, TAKE 1 TABLET BY MOUTH DAILY, Disp: 90 tablet, Rfl: 1    Cholecalciferol (VITAMIN D PO), Take 2,000 Units by mouth daily  , Disp: , Rfl:     dicyclomine (BENTYL) 20 mg tablet, TAKE 1 TABLET BY MOUTH EVERY 6  HOURS AS NEEDED FOR ABDOMINAL  CRAMPING, Disp: 360 tablet, Rfl: 0    DULoxetine (CYMBALTA) 30 mg delayed release capsule, Take 1 capsule (30 mg total) by mouth daily, Disp: 90 capsule, Rfl: 0    fexofenadine (ALLEGRA) 180 MG tablet, Take 180 mg by mouth as needed Daily during the Summer, Disp: , Rfl:     fluticasone (FLONASE) 50 mcg/act nasal spray, 2 sprays into each nostril daily, Disp: , Rfl:     lithium carbonate (LITHOBID) 300 mg CR tablet, Take 300 mg by mouth daily at bedtime, Disp: , Rfl:     magnesium chloride-calcium (SlowMag Mg Muscle/Heart) 71.5-119 mg, Take 1 tablet by mouth 2 (two) times a day (Patient taking differently: Take 1 tablet by mouth 3 (three) times a day), Disp: 60 tablet, Rfl: 5    metoprolol succinate (TOPROL-XL) 25 mg 24 hr tablet, TAKE 1 TABLET BY MOUTH ONCE  DAILY, Disp: 90 tablet, Rfl: 3    mupirocin (BACTROBAN) 2 % ointment, Apply topically 2 (two) times a day as needed (wound), Disp: 22 g, Rfl: 0    Myrbetriq 25 MG TB24, TAKE 1 TABLET BY MOUTH DAILY AT  BEDTIME, Disp: 90 tablet, Rfl: 3    nitroglycerin (NITRODUR) 0.2 mg/hr, APPLY 1 PATCH TOPICALLY ONCE  DAILY. LEAVE ON FOR 12 TO 14  HOURS THEN REMOVE FOR A  NITRATE-FREE INTERVAL OF 10 TO  12 HOURS, Disp: 90 patch, Rfl: 1    omega-3-acid ethyl esters (LOVAZA) 1 g capsule, TAKE 1 CAPSULE BY MOUTH 3  TIMES DAILY, Disp: 270 capsule, Rfl: 3    omeprazole (PriLOSEC) 20 mg delayed release capsule, TAKE 1 CAPSULE BY MOUTH TWICE  DAILY BEFORE MEALS, Disp: 180 capsule, Rfl: 1    Trelegy Ellipta 200-62.5-25 MCG/ACT AEPB inhaler, USE 1 INHALATION BY MOUTH DAILY, Disp: 180 each, Rfl: 3    Allergies   Allergen Reactions    Bactrim [Sulfamethoxazole-Trimethoprim] Other (See Comments)     AILYN    Nsaids      Annotation - 20Nov2017:  "unable to take due to use of lithium.    Oxycodone Rash            Vitals:    06/11/24 1103   BP: 149/76   Pulse: 79       Objective:  Physical exam  General: Awake, Alert, Oriented  Eyes: Pupils equal, round and reactive to light  Heart: regular rate and rhythm  Lungs: No audible wheezing  Abdomen: soft                    Ortho Exam  Left hip:  TTP over greater trochanter  Good arc of motion  Patient sits comfortably in chair with hip flexed at 90 degrees  Patient stands from seated position without assistance  Calf compartments soft and supple  Sensation intact  Toes are warm sensate and mobile    Diagnostics, reviewed and taken today if performed as documented:    None performed     Procedures, if performed today:    Large joint arthrocentesis: L greater trochanteric bursa  Universal Protocol:  Consent: Verbal consent obtained.  Risks and benefits: risks, benefits and alternatives were discussed  Consent given by: patient  Time out: Immediately prior to procedure a \"time out\" was called to verify the correct patient, procedure, equipment, support staff and site/side marked as required.  Timeout called at: 6/11/2024 11:40 AM.  Patient understanding: patient states understanding of the procedure being performed  Site marked: the operative site was marked  Patient identity confirmed: verbally with patient  Supporting Documentation  Indications: pain   Procedure Details  Location: hip - L greater trochanteric bursa  Needle size: 22 G  Ultrasound guidance: no  Approach: lateral  Medications administered: 12 mg betamethasone acetate-betamethasone sodium phosphate 6 (3-3) mg/mL; 2 mL bupivacaine 0.25 %; 2 mL lidocaine 1 %    Patient tolerance: patient tolerated the procedure well with no immediate complications  Dressing:  Sterile dressing applied            Portions of the record may have been created with voice recognition software.  Occasional wrong word or \"sound a like\" substitutions may have occurred due to the " inherent limitations of voice recognition software.  Read the chart carefully and recognize, using context, where substitutions have occurred.

## 2024-06-12 ENCOUNTER — APPOINTMENT (OUTPATIENT)
Dept: PHYSICAL THERAPY | Facility: REHABILITATION | Age: 79
End: 2024-06-12
Payer: MEDICARE

## 2024-06-17 ENCOUNTER — APPOINTMENT (OUTPATIENT)
Dept: PHYSICAL THERAPY | Facility: REHABILITATION | Age: 79
End: 2024-06-17
Payer: MEDICARE

## 2024-06-19 ENCOUNTER — OFFICE VISIT (OUTPATIENT)
Dept: PHYSICAL THERAPY | Facility: REHABILITATION | Age: 79
End: 2024-06-19
Payer: MEDICARE

## 2024-06-19 DIAGNOSIS — M70.62 TROCHANTERIC BURSITIS OF LEFT HIP: ICD-10-CM

## 2024-06-19 DIAGNOSIS — G89.29 CHRONIC LEFT-SIDED LOW BACK PAIN WITH LEFT-SIDED SCIATICA: Primary | ICD-10-CM

## 2024-06-19 DIAGNOSIS — M54.42 CHRONIC LEFT-SIDED LOW BACK PAIN WITH LEFT-SIDED SCIATICA: Primary | ICD-10-CM

## 2024-06-19 PROCEDURE — 97110 THERAPEUTIC EXERCISES: CPT | Performed by: PHYSICAL THERAPIST

## 2024-06-19 NOTE — PROGRESS NOTES
PT Re-evaluation    Today's date: 2024  Patient name: Tonny Baig  : 1945  MRN: [id number]  Referring provider: Michelle Ayoub PA-C  Dx:   Encounter Diagnosis     ICD-10-CM    1. Chronic left-sided low back pain with left-sided sciatica  M54.42     G89.29       2. Trochanteric bursitis of left hip  M70.62             Start Time: 1035  Stop Time: 1122  Total time in clinic (min): 47 minutes    Assessment  Impairments: abnormal muscle firing, abnormal muscle tone, abnormal or restricted ROM, abnormal movement, activity intolerance, impaired physical strength and pain with function    Assessment details: Patient is a 78 y.o. year old male who attended physical therapy for 10 treatment sessions regarding CLBP of L side. Patient reports 50% improvement at this time which correlates to improved impairments and functionality.  Patient has shown improvement throughout PT by demonstrating decreased pain, increased strength, and improved tolerance to activity.  Patient continues to present with pain, decreased ROM, decreased strength, and decreased tolerance to activity. Tonny would benefit from continued physical therapy to address these issues and to maximize function. Thank you.     Understanding of Dx/Px/POC: good     Prognosis: good    Plan  Patient would benefit from: skilled PT  Planned modality interventions: thermotherapy: hydrocollator packs    Planned therapy interventions: joint mobilization, manual therapy, ADL training, balance, balance/weight bearing training, neuromuscular re-education, home exercise program, therapeutic exercise, therapeutic activities, strengthening, patient education, functional ROM exercises and gait training    Frequency: 2x week  Duration in weeks: 12  Treatment plan discussed with: patient        Subjective Evaluation    History of Present Illness  Mechanism of injury: Evaluation:      Tonny is a 78 y.o. male presenting to physical therapy on 24 with referral  from MD for left sided hip, leg and foot pain.     Patient symptoms are located along left lateral thigh mainly, not below knee    Patient symptoms are constant but increases with walking/standing, better sitting and lying    Patient denies bowel and bladder changes (denies urinary retention)/saddle numbness    Per MD note on 3/28/24:  78-year-old male with a history of previous L4-S1 fusion, lumbar radiculopathy, lumbar spondylosis, and stenosis returning for follow-up.  The patient feels that his left-sided low back and leg pain are slowly returning.  He has had a few trips while at home, but no trauma.  The patient had an L3-4 LESI 2024 which gave him about 85% of relief.  Tylenol provides minimal relief.  Currently taking duloxetine 20 mg daily which is providing mild to moderate relief.  He denies any side effects.  Mood is stable.            Patient Goals  Patient goals for therapy: decreased pain  Patient goal: improved walking time - 45 min  Pain  Current pain ratin  At best pain ratin  At worst pain ratin  Location: left glute, lateral thigh      Diagnostic Tests  MRI studies: abnormal    Short Term Goals:   1. Patient will be Independent with hep - MET  2. Patient will improve pain with activity by 50% - NOT MET  3. Patient will report GROC 50% or greater - MET  4. Patient will perform LS ext without increase in thigh pain - MET  5. Patient will amb 10 min with leg pain 2/10 or less - NOT MET      Long Term Goals:   1. Patient will improve FOTO to greater then goal - IMPROVED  2. Patient will improve pain with activity to 2/10 or less - NOT MET  3. Patient will continue with HEP independence to allow for decreased future reoccurrence of pain and loss in function - IN PROGRESS  4. Patient will report GROC 75% or greater - NOT MET  5. Patient will amb 20 min with leg pain 2/10 or less - NOT MET      Objective    Posture: L Lat shift    Myotomes (L/R): 4+/5 L4  Dermatome: (pinprick-  "L/R):  decreased L3 - lateral thigh     Reflexes:  (L/R) L3-4:   3+ bl     S1:  2+ bl             Clonus= neg    GAIT: shuffle, decreased left DF with mild foot drop noted      Lumbar  % of normal   Flex. 50   Extn. 25   SG Left -   SG Right -   ROT Left 50   ROT Right 50   Repeated Movements  Standing:  extension=   increased thigh, W  Flexion=  NE,NE    Lying:   extension=   NT  Flexion= decreased thigh, Better (sitting)    Glute med/max: 3-/5 MMT        MMT         AROM          PROM    Hip       L       R        L           R      L     R   Flex.         Extn.         Abd.         Add.         IR.         ER.                  G. Max         G. Med.         Iliop.             Hip PROM WFL, no reproduction of leg pain    Neuro Dynamic Testing:  Slump test: L= neg    R=    neg   Straight leg raise:   L=    neg  R=   neg                        Precautions: CA, CKD HTN, h/o MI    Manuals  5/6 5                                                              Neuro Re-Ed       5/29      Balance board        5' VC/TC       Gait training      3'                                                                        Ther Ex  5/6 5/9 5/15 5/20 5/22 5/29 6/6 6/10 6/19   Bridge          5x   Hooklying ABD          OTB  5\"x10  2 sets   LS RFISitting 5+10 10x  2 sets 15x 15x 4x10  3x10 - throughout session.  20x+10 throuhout session 10x    Step up        4\" to march - TKE focus on stance leg Standing alt march instead perf - 10x ea side with b/l UE support    VG  L5  2x20 L5  20x  L7  2x20 L5 20x L7 3x20 L7  20x  DL    3x5 SL L7  50x  DL    3x7 SL L7  20+10+10 L7  20+20 NP    STS  2x5 2x5 +3 2x7 3x5 + foam 3x5 + foam 2x5  foam 3x7 foam 3x5 foam 2x7 foam   LS ext endurance hold  3 KG ball  30\"x3 3 KG ball 30\"x2  4KB ball 30\"x1 4KG bal  30\"x2 4KG bal  45\"x3 4KG bal  45\"x3 4KG  30\"x2 4KG  30\"x2    Also with walking  -10ft x2 with CGA 4 KG ball - 45\"x2 4 KG ball - 45\"x2   LAQ    8#  3x8 10#  3x8 NP 10#  3x8 NP 10#  3x10    Seated HR  "            Walking       2'  2'x2                 Ther Activity                                       Gait Training                                       Modalities

## 2024-06-26 ENCOUNTER — OFFICE VISIT (OUTPATIENT)
Dept: PHYSICAL THERAPY | Facility: REHABILITATION | Age: 79
End: 2024-06-26
Payer: MEDICARE

## 2024-06-26 DIAGNOSIS — M70.62 TROCHANTERIC BURSITIS OF LEFT HIP: Primary | ICD-10-CM

## 2024-06-26 DIAGNOSIS — G89.29 CHRONIC LEFT-SIDED LOW BACK PAIN WITH LEFT-SIDED SCIATICA: ICD-10-CM

## 2024-06-26 DIAGNOSIS — M54.42 CHRONIC LEFT-SIDED LOW BACK PAIN WITH LEFT-SIDED SCIATICA: ICD-10-CM

## 2024-06-26 PROCEDURE — 97110 THERAPEUTIC EXERCISES: CPT | Performed by: PHYSICAL THERAPIST

## 2024-06-26 NOTE — PROGRESS NOTES
"Daily Note     Today's date: 2024  Patient name: Tonny Baig  : 1945  MRN: [id number]  Referring provider: Michelle Ayoub PA-C  Dx:   Encounter Diagnosis     ICD-10-CM    1. Trochanteric bursitis of left hip  M70.62       2. Chronic left-sided low back pain with left-sided sciatica  M54.42     G89.29           Start Time: 0950  Stop Time: 1035  Total time in clinic (min): 45 minutes    Subjective: Patient reports that they have felt good with added band ABD and bridging with less hip pain.       Objective: See treatment diary below  2 MWT: 225'    Assessment: Tolerated treatment well. 25% of session spent working on gait mechanics with knee ext and longer step/stride length to improve efficiency and decreased fall risk.       Plan: Continue per plan of care.      Precautions: CA, CKD HTN, h/o MI    Manuals  5                                                              Neuro Re-Ed             Balance board        5' VC/TC       Gait training      3'                                                                        Ther Ex 6/26 5/6 5/9 5/15 5/20 5/22 5/29 6/6 6/10 6/19   Bridge 5\"  2x5  GTB         5x   Hooklying ABD GTB  5\"x10         OTB  5\"x10  2 sets   LS RFISitting  10x  2 sets 15x 15x 4x10  3x10 - throughout session.  20x+10 throuhout session 10x    Step up        4\" to march - TKE focus on stance leg Standing alt march instead perf - 10x ea side with b/l UE support    VG  L5  2x20 L5  20x  L7  2x20 L5 20x L7 3x20 L7  20x  DL    3x5 SL L7  50x  DL    3x7 SL L7  20+10+10 L7  20+20 NP    STS 10x foam  2 rds 2x5 2x5 +3 2x7 3x5 + foam 3x5 + foam 2x5  foam 3x7 foam 3x5 foam 2x7 foam   LS ext endurance hold 4 KG ball - 45\"x2 3 KG ball  30\"x3 3 KG ball 30\"x2  4KB ball 30\"x1 4KG bal  30\"x2 4KG bal  45\"x3 4KG bal  45\"x3 4KG  30\"x2 4KG  30\"x2    Also with walking  -10ft x2 with CGA 4 KG ball - 45\"x2 4 KG ball - 45\"x2   LAQ    8#  3x8 10#  3x8 NP 10#  3x8 NP 10#  3x10    Seated HR         "     Walking Gait [id number]      2'  2'x2                 Ther Activity                                       Gait Training                                       Modalities

## 2024-07-01 ENCOUNTER — HOSPITAL ENCOUNTER (OUTPATIENT)
Dept: NON INVASIVE DIAGNOSTICS | Facility: CLINIC | Age: 79
Discharge: HOME | End: 2024-07-01
Payer: MEDICARE

## 2024-07-01 DIAGNOSIS — Z86.79 S/P ENDOVASCULAR ANEURYSM REPAIR: ICD-10-CM

## 2024-07-01 DIAGNOSIS — Z98.890 S/P ENDOVASCULAR ANEURYSM REPAIR: ICD-10-CM

## 2024-07-01 PROCEDURE — 93978 VASCULAR STUDY: CPT

## 2024-07-01 PROCEDURE — 93978 VASCULAR STUDY: CPT | Performed by: SURGERY

## 2024-07-01 PROCEDURE — 93923 UPR/LXTR ART STDY 3+ LVLS: CPT

## 2024-07-02 ENCOUNTER — OFFICE VISIT (OUTPATIENT)
Dept: INTERNAL MEDICINE CLINIC | Facility: CLINIC | Age: 79
End: 2024-07-02
Payer: MEDICARE

## 2024-07-02 VITALS
HEART RATE: 74 BPM | HEIGHT: 67 IN | SYSTOLIC BLOOD PRESSURE: 128 MMHG | WEIGHT: 149 LBS | OXYGEN SATURATION: 99 % | BODY MASS INDEX: 23.39 KG/M2 | DIASTOLIC BLOOD PRESSURE: 68 MMHG

## 2024-07-02 DIAGNOSIS — Z01.818 PREOP EXAMINATION: Primary | ICD-10-CM

## 2024-07-02 DIAGNOSIS — I25.10 CORONARY ARTERY DISEASE INVOLVING NATIVE CORONARY ARTERY OF NATIVE HEART WITHOUT ANGINA PECTORIS: ICD-10-CM

## 2024-07-02 DIAGNOSIS — F31.9 BIPOLAR AFFECTIVE DISORDER, REMISSION STATUS UNSPECIFIED (HCC): ICD-10-CM

## 2024-07-02 DIAGNOSIS — I10 ESSENTIAL HYPERTENSION: ICD-10-CM

## 2024-07-02 PROCEDURE — 99213 OFFICE O/P EST LOW 20 MIN: CPT | Performed by: INTERNAL MEDICINE

## 2024-07-02 NOTE — PATIENT INSTRUCTIONS
Problem List Items Addressed This Visit          Cardiovascular and Mediastinum    CAD (coronary artery disease)     No cardiopulmonary complaints, follow-up cardiology, continue aspirin         Essential hypertension     Well-controlled, continue meds            Behavioral Health    Bipolar affective disorder (HCC)     Continue meds and follow-up psychiatry            Surgery/Wound/Pain    Preop examination - Primary     Patient is medically cleared for surgery, no cardiopulmonary complaints, exam today is okay.  Patient should hold fish oil 1 week before surgery.  He is low-dose aspirin 81 mg daily for CAD, he can continue this preop.  Blood pressure is well-controlled

## 2024-07-02 NOTE — ASSESSMENT & PLAN NOTE
Patient is medically cleared for surgery, no cardiopulmonary complaints, exam today is okay.  Patient should hold fish oil 1 week before surgery.  He is low-dose aspirin 81 mg daily for CAD, he can continue this preop.  Blood pressure is well-controlled

## 2024-07-02 NOTE — PROGRESS NOTES
Ambulatory Visit  Name: Tonny Baig      : 1945      MRN: [id number]  Encounter Provider: Brendan Suarez MD  Encounter Date: 2024   Encounter department: [facility]    Assessment & Plan   1. Preop examination  Assessment & Plan:  Patient is medically cleared for surgery, no cardiopulmonary complaints, exam today is okay.  Patient should hold fish oil 1 week before surgery.  He is low-dose aspirin 81 mg daily for CAD, he can continue this preop.  Blood pressure is well-controlled  2. Essential hypertension  Assessment & Plan:  Well-controlled, continue meds  3. Coronary artery disease involving native coronary artery of native heart without angina pectoris  Assessment & Plan:  No cardiopulmonary complaints, follow-up cardiology, continue aspirin  4. Bipolar affective disorder, remission status unspecified (HCC)  Assessment & Plan:  Continue meds and follow-up psychiatry         History of Present Illness     Patient here for preop medical clearance for cataract surgery.  No acute cardiopulmonary complaints, no chest pain, no significant shortness of breath.  No problems with anesthesia in the past.      Review of Systems   Constitutional:  Negative for chills, fatigue and fever.   HENT:  Negative for congestion, nosebleeds, postnasal drip, sore throat and trouble swallowing.    Eyes:  Negative for pain.   Respiratory:  Negative for cough, chest tightness, shortness of breath and wheezing.    Cardiovascular:  Negative for chest pain, palpitations and leg swelling.   Gastrointestinal:  Negative for abdominal pain, constipation, diarrhea, nausea and vomiting.   Endocrine: Negative for polydipsia and polyuria.   Genitourinary:  Negative for dysuria, flank pain and hematuria.   Musculoskeletal:  Negative for arthralgias.   Skin:  Negative for rash.   Neurological:  Negative for dizziness, tremors, light-headedness and headaches.   Hematological:  Does not bruise/bleed easily.    Psychiatric/Behavioral:  Negative for confusion and dysphoric mood. The patient is not nervous/anxious.      Past Medical History:   Diagnosis Date   • Allergic 2011   • Allergic rhinitis 2015   • Anxiety     occasional   • Aortic aneurysm (HCC)    • Benign colon polyp    • Bipolar 1 disorder (HCC)    • Cancer (HCC) 2007, 2011   • Cardiac disease     MI   • Cervical cord compression with myelopathy (HCC)    • COPD (chronic obstructive pulmonary disease) (HCC)    • Coronary artery disease 1995   • Diverticulitis    • Diverticulitis of colon prior to 2014   • Diverticulosis    • Emphysema of lung (HCC) 2012   • Gait disturbance     uses cane, leg brace on right   • GERD (gastroesophageal reflux disease)    • Heart attack (HCC) 1996   • Hx of resection of large bowel 05/03/2016   • Hyperlipidemia    • Hypertension    • IBS (irritable bowel syndrome)    • Inflammatory bowel disease 2012   • Lumbar stenosis    • Lung cancer (HCC)     2007 Left lower lobectomy and 2011 Right lung with surgery    • Myocardial infarction (HCC)     involving other coronary artery   • Prostate cancer ([facility])    • Shortness of breath    • Small bowel obstruction ([facility]) 11/16/2016     Past Surgical History:   Procedure Laterality Date   • ANGIOPLASTY      stent   • CARDIAC CATHETERIZATION  1995    angioplasty   • CARDIAC SURGERY     • CERVICAL SPINE SURGERY      Cervical decompression with cervical fusion from C3-C7 for spinal stenosis.   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   • LUNG LOBECTOMY Left    • DC ARTHRD ANT INTERBODY  MIN DSC CRV BELOW C2 N/A 2016    Procedure: Anterior cervical diskectomy C3/4, C5/6, C6/7 with anterior plate fixation fusion C3-7;  Posterior decompressive laminectomy C3-7 with lateral mass fixation fusion C3-7 (IMPULSE MONITORING);  Surgeon: Jose Luis Moore MD;  [facility];  Service: Neurosurgery   • [facility]    Procedure: L4-5 AND L5-S1 DECOMPRESSIVE FORAMINOTOMIES, TRANSFORAMINAL LUMBAR INTERBODY AND PEDICLE SCREW FIXATION FUSION L4-S1 (IMPULSE);  Surgeon: Jose Luis Moore MD;  [facility];  Service: Neurosurgery   • SC EVASC RPR DPLMNT AORTO-AORTIC NDGFT N/A 2018    Procedure: REPAIR ANEURYSM ENDOVASCULAR ABDOMINAL AORTIC  (EVAR) WITH BILATERAL PERCUTANEOUS FEMORAL ACCESS WITH ULTRASOUND GUIDANCE ON THE RIGHT AND PRE CLOSURE;  Surgeon: Shan Villalobos MD;  [facility];  Service: Vascular   • PROSTATECTOMY      for prostate CA - no chemo/RT   • SIGMOIDECTOMY      for divertic   • SMALL INTESTINE SURGERY N/A 2016    Procedure: Exploratory Laparotomy, Lysis of adhesions to release small bowel obstruction;  Surgeon: Aminta Sim MD;  [facility];  Service:    • SPINE SURGERY  2017   • TONSILLECTOMY  Y    89     Family History   Problem Relation Age of Onset   • Hypertension Mother    • Glaucoma Mother    • Heart attack Father    • Colon cancer Father    • Coronary artery disease Father    • Hypertension Father    • Kidney disease Father    • Heart disease Family    • Hyperlipidemia Family    • Hypertension Family      Social History     Tobacco Use   • Smoking status: Former     Current packs/day: 0.00     Average packs/day: 1 pack/day for 44.0 years (44.0 ttl pk-yrs)     Types: Cigarettes     Start date: 1967     Quit date: 2011     Years since quittin.5     Passive exposure: Past   • Smokeless tobacco: Never   Vaping Use   • Vaping status: Never Used   Substance and  Sexual Activity   • Alcohol use: Yes     Alcohol/week: 2.0 - 3.0 standard drinks of alcohol     Types: 2 - 3 Shots of liquor per week     Comment: One glass per day   • Drug use: No   • Sexual activity: Yes     Partners: Female     Birth control/protection: Post-menopausal, None     Current Outpatient Medications on File Prior to Visit   Medication Sig   • acetaminophen (TYLENOL) 500 mg tablet Take 500 mg by mouth as needed for mild pain.   • albuterol (PROVENTIL HFA,VENTOLIN HFA) 90 mcg/act inhaler INHALE 2 PUFFS EVERY 6 HOURS AS NEEDED FOR WHEEZING OR SHORTNESS OF BREATH   • amLODIPine (NORVASC) 5 mg tablet Take 1 tablet (5 mg total) by mouth daily   • aspirin (ECOTRIN LOW STRENGTH) 81 mg EC tablet Take 81 mg by mouth daily   • atorvastatin (LIPITOR) 40 mg tablet TAKE 1 TABLET BY MOUTH DAILY   • Cholecalciferol (VITAMIN D PO) Take 2,000 Units by mouth daily     • dicyclomine (BENTYL) 20 mg tablet TAKE 1 TABLET BY MOUTH EVERY 6  HOURS AS NEEDED FOR ABDOMINAL  CRAMPING   • DULoxetine (CYMBALTA) 30 mg delayed release capsule Take 1 capsule (30 mg total) by mouth daily   • fexofenadine (ALLEGRA) 180 MG tablet Take 180 mg by mouth as needed Daily during the Summer   • fluticasone (FLONASE) 50 mcg/act nasal spray 2 sprays into each nostril daily   • lithium carbonate (LITHOBID) 300 mg CR tablet Take 300 mg by mouth daily at bedtime   • magnesium chloride-calcium (SlowMag Mg Muscle/Heart) 71.5-119 mg Take 1 tablet by mouth 2 (two) times a day (Patient taking differently: Take 1 tablet by mouth 3 (three) times a day)   • metoprolol succinate (TOPROL-XL) 25 mg 24 hr tablet TAKE 1 TABLET BY MOUTH ONCE  DAILY   • mupirocin (BACTROBAN) 2 % ointment Apply topically 2 (two) times a day as needed (wound)   • Myrbetriq 25 MG TB24 TAKE 1 TABLET BY MOUTH DAILY AT  BEDTIME   • nitroglycerin (NITRODUR) 0.2 mg/hr APPLY 1 PATCH TOPICALLY ONCE  DAILY. LEAVE ON FOR 12 TO 14  HOURS THEN REMOVE FOR A  NITRATE-FREE INTERVAL OF 10 TO  12  "HOURS   • omega-3-acid ethyl esters (LOVAZA) 1 g capsule TAKE 1 CAPSULE BY MOUTH 3  TIMES DAILY   • omeprazole (PriLOSEC) 20 mg delayed release capsule TAKE 1 CAPSULE BY MOUTH TWICE  DAILY BEFORE MEALS   • Trelegy Ellipta 200-62.5-25 MCG/ACT AEPB inhaler USE 1 INHALATION BY MOUTH DAILY     Allergies   Allergen Reactions   • Bactrim [Sulfamethoxazole-Trimethoprim] Other (See Comments)     AILYN   • Nsaids      Annotation - 20Nov2017: unable to take due to use of lithium.   • Oxycodone Rash     Immunization History   Administered Date(s) Administered   • COVID-19 PFIZER VACCINE 0.3 ML IM 02/17/2021, 03/10/2021, 10/26/2021   • COVID-19 Pfizer Vac BIVALENT Satya-sucrose 12 Yr+ IM 10/20/2022   • COVID-19 Pfizer mRNA vacc PF satya-sucrose 12 yr and older (Comirnaty) 11/06/2023   • COVID-19 Pfizer vac (Satya-sucrose, gray cap) 12 yr+ IM 08/03/2022   • COVID-19, unspecified 11/06/2023   • INFLUENZA 10/01/2015, 10/07/2018, 09/27/2019, 10/05/2020, 09/27/2021, 10/14/2022, 10/09/2023   • Influenza Quadrivalent Preservative Free 3 years and older IM 10/01/2015   • Influenza Split High Dose Preservative Free IM 10/25/2016, 10/03/2018   • Influenza, high dose seasonal 0.7 mL 09/27/2019, 10/05/2020, 10/14/2022, 10/09/2023   • Influenza, seasonal, injectable 1945, 10/10/2012   • Pneumococcal Conjugate 13-Valent 10/25/2016   • Pneumococcal Polysaccharide PPV23 10/03/2019   • Td (adult), adsorbed 12/01/2009   • Tdap 05/31/2023   • Zoster 01/01/2014, 01/01/2014   • influenza, trivalent, adjuvanted 09/27/2021     Objective     /68   Pulse 74   Ht 5' 7\" (1.702 m)   Wt 67.6 kg (149 lb)   SpO2 99%   BMI 23.34 kg/m²     Physical Exam  Vitals reviewed.   Constitutional:       General: He is not in acute distress.     Appearance: Normal appearance. He is well-developed.   HENT:      Head: Normocephalic and atraumatic.      Right Ear: External ear normal.      Left Ear: External ear normal.      Nose: Nose normal.   Eyes:      " General: No scleral icterus.     Conjunctiva/sclera: Conjunctivae normal.   Neck:      Trachea: No tracheal deviation.   Cardiovascular:      Rate and Rhythm: Normal rate and regular rhythm.      Heart sounds: Normal heart sounds. No murmur heard.  Pulmonary:      Effort: Pulmonary effort is normal. No respiratory distress.      Breath sounds: Normal breath sounds. No wheezing or rales.   Musculoskeletal:      Cervical back: Normal range of motion and neck supple.      Right lower leg: No edema.      Left lower leg: No edema.   Lymphadenopathy:      Cervical: No cervical adenopathy.   Skin:     Coloration: Skin is not jaundiced or pale.   Neurological:      Mental Status: He is alert and oriented to person, place, and time.   Psychiatric:         Mood and Affect: Mood normal.         Behavior: Behavior normal.         Thought Content: Thought content normal.         Judgment: Judgment normal.       Administrative Statements

## 2024-07-02 NOTE — LETTER
2024     Cassius Shahid DO  [address]  [address]  [place] [address]    Patient: Tonny Baig   YOB: 1945   Date of Visit: 2024       Dear Dr. Shahid:    Thank you for referring Tonny Baig to me for evaluation. Below are my notes for this consultation.    If you have questions, please do not hesitate to call me. I look forward to following your patient along with you.         Sincerely,        Brendan Suarez MD        CC: No Recipients    Brendan Suarez MD  2024 10:58 AM  Sign when Signing Visit  Ambulatory Visit  Name: Tonny Baig      : 1945      MRN: [id number]  Encounter Provider: Brendan Suarez MD  Encounter Date: 2024   [facility]: [facility]    Assessment & Plan  1. Preop examination  Assessment & Plan:  Patient is medically cleared for surgery, no cardiopulmonary complaints, exam today is okay.  Patient should hold fish oil 1 week before surgery.  He is low-dose aspirin 81 mg daily for CAD, he can continue this preop.  Blood pressure is well-controlled  2. Essential hypertension  Assessment & Plan:  Well-controlled, continue meds  3. Coronary artery disease involving native coronary artery of native heart without angina pectoris  Assessment & Plan:  No cardiopulmonary complaints, follow-up cardiology, continue aspirin  4. Bipolar affective disorder, remission status unspecified (HCC)  Assessment & Plan:  Continue meds and follow-up psychiatry         History of Present Illness    Patient here for preop medical clearance for cataract surgery.  No acute cardiopulmonary complaints, no chest pain, no significant shortness of breath.  No problems with anesthesia in the past.      Review of Systems   Constitutional:  Negative for chills, fatigue and fever.   HENT:  Negative for congestion, nosebleeds, postnasal drip, sore throat and trouble swallowing.    Eyes:  Negative for pain.   Respiratory:  Negative for  cough, chest tightness, shortness of breath and wheezing.    Cardiovascular:  Negative for chest pain, palpitations and leg swelling.   Gastrointestinal:  Negative for abdominal pain, constipation, diarrhea, nausea and vomiting.   Endocrine: Negative for polydipsia and polyuria.   Genitourinary:  Negative for dysuria, flank pain and hematuria.   Musculoskeletal:  Negative for arthralgias.   Skin:  Negative for rash.   Neurological:  Negative for dizziness, tremors, light-headedness and headaches.   Hematological:  Does not bruise/bleed easily.   Psychiatric/Behavioral:  Negative for confusion and dysphoric mood. The patient is not nervous/anxious.      Past Medical History:   Diagnosis Date   • Allergic 2011   • Allergic rhinitis 2015   • Anxiety     occasional   • Aortic aneurysm ([facility])    • Benign colon polyp    • Bipolar 1 disorder ([facility])    • Cancer ([facility]) 2007, 2011   • Cardiac disease     MI   • Cervical cord compression with myelopathy ([facility])    • COPD (chronic obstructive pulmonary disease) ([facility])    • Coronary artery disease 1995   • Diverticulitis    • Diverticulitis of colon prior to 2014   • Diverticulosis    • Emphysema of lung ([facility]) 2012   • Gait disturbance     uses cane, leg brace on right   • GERD (gastroesophageal reflux disease)    • Heart attack ([facility]) 1996   • Hx of resection of large bowel 05/03/2016   • Hyperlipidemia    • Hypertension    • IBS (irritable bowel syndrome)    • Inflammatory bowel disease 2012   • Lumbar stenosis    • Lung cancer ([facility])     2007 Left lower lobectomy and 2011 Right lung with surgery    • Myocardial infarction ([facility])     involving other coronary artery   • Prostate cancer ([facility])    • Shortness of breath    • Small bowel obstruction ([facility]) 11/16/2016     Past Surgical History:   Procedure Laterality Date   • ANGIOPLASTY      stent   • CARDIAC CATHETERIZATION  1995    angioplasty   • CARDIAC SURGERY     • CERVICAL SPINE SURGERY      Cervical decompression with cervical fusion  from C3-C7 for spinal stenosis.   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   • LUNG LOBECTOMY Left    • AK ARTHRD ANT INTERBODY MIN DSC CRV BELOW C2 N/A 05/02/2016    Procedure: Anterior cervical diskectomy C3/4, C5/6, C6/7 with anterior plate fixation fusion C3-7;  Posterior decompressive laminectomy C3-7 with lateral mass fixation fusion C3-7 (IMPULSE MONITORING);  Surgeon: Jose Luis Moore MD;  [facility];  Service: Neurosurgery   • AK ARTHRODESIS POSTERIOR INTERBODY 1 [facility] LUMBAR N/A 01/30/2017    Procedure: L4-5 AND L5-S1 DECOMPRESSIVE FORAMINOTOMIES, TRANSFORAMINAL LUMBAR INTERBODY AND PEDICLE SCREW FIXATION FUSION L4-S1 (IMPULSE);  Surgeon: Jose Luis Moore MD;  [facility];  Service: Neurosurgery   • AK EVASC RPR DPLMNT AORTO-AORTIC NDGFT N/A 08/06/2018    Procedure: REPAIR ANEURYSM ENDOVASCULAR ABDOMINAL AORTIC  (EVAR) WITH BILATERAL PERCUTANEOUS FEMORAL ACCESS WITH ULTRASOUND GUIDANCE ON THE RIGHT AND PRE CLOSURE;  Surgeon: Shan Villalobos MD;  [facility];  Service: Vascular   • PROSTATECTOMY  2007    for prostate CA - no chemo/RT   • SIGMOIDECTOMY      for divertic   • SMALL INTESTINE SURGERY N/A 11/17/2016    Procedure: Exploratory Laparotomy, Lysis of adhesions to release small bowel obstruction;  Surgeon: Aminta Sim MD;  [facility];  Service:    • SPINE SURGERY  2016, 2017   • TONSILLECTOMY  Y    1952     Family History   Problem Relation Age of Onset   • Hypertension Mother    • Glaucoma Mother    • Heart attack Father    • Colon cancer  Father    • Coronary artery disease Father    • Hypertension Father    • Kidney disease Father    • Heart disease Family    • Hyperlipidemia Family    • Hypertension Family      Social History     Tobacco Use   • Smoking status: Former     Current packs/day: 0.00     Average packs/day: 1 pack/day for 44.0 years (44.0 ttl pk-yrs)     Types: Cigarettes     Start date: 1967     Quit date: 2011     Years since quittin.5     Passive exposure: Past   • Smokeless tobacco: Never   Vaping Use   • Vaping status: Never Used   Substance and Sexual Activity   • Alcohol use: Yes     Alcohol/week: 2.0 - 3.0 standard drinks of alcohol     Types: 2 - 3 Shots of liquor per week     Comment: One glass per day   • Drug use: No   • Sexual activity: Yes     Partners: Female     Birth control/protection: Post-menopausal, None     Current Outpatient Medications on File Prior to Visit   Medication Sig   • acetaminophen (TYLENOL) 500 mg tablet Take 500 mg by mouth as needed for mild pain.   • albuterol (PROVENTIL HFA,VENTOLIN HFA) 90 mcg/act inhaler INHALE 2 PUFFS EVERY 6 HOURS AS NEEDED FOR WHEEZING OR SHORTNESS OF BREATH   • amLODIPine (NORVASC) 5 mg tablet Take 1 tablet (5 mg total) by mouth daily   • aspirin (ECOTRIN LOW STRENGTH) 81 mg EC tablet Take 81 mg by mouth daily   • atorvastatin (LIPITOR) 40 mg tablet TAKE 1 TABLET BY MOUTH DAILY   • Cholecalciferol (VITAMIN D PO) Take 2,000 Units by mouth daily     • dicyclomine (BENTYL) 20 mg tablet TAKE 1 TABLET BY MOUTH EVERY 6  HOURS AS NEEDED FOR ABDOMINAL  CRAMPING   • DULoxetine (CYMBALTA) 30 mg delayed release capsule Take 1 capsule (30 mg total) by mouth daily   • fexofenadine (ALLEGRA) 180 MG tablet Take 180 mg by mouth as needed Daily during the Summer   • fluticasone (FLONASE) 50 mcg/act nasal spray 2 sprays into each nostril daily   • lithium carbonate (LITHOBID) 300 mg CR tablet Take 300 mg by mouth daily at bedtime   • magnesium chloride-calcium (SlowMag Mg  Muscle/Heart) 71.5-119 mg Take 1 tablet by mouth 2 (two) times a day (Patient taking differently: Take 1 tablet by mouth 3 (three) times a day)   • metoprolol succinate (TOPROL-XL) 25 mg 24 hr tablet TAKE 1 TABLET BY MOUTH ONCE  DAILY   • mupirocin (BACTROBAN) 2 % ointment Apply topically 2 (two) times a day as needed (wound)   • Myrbetriq 25 MG TB24 TAKE 1 TABLET BY MOUTH DAILY AT  BEDTIME   • nitroglycerin (NITRODUR) 0.2 mg/hr APPLY 1 PATCH TOPICALLY ONCE  DAILY. LEAVE ON FOR 12 TO 14  HOURS THEN REMOVE FOR A  NITRATE-FREE INTERVAL OF 10 TO  12 HOURS   • omega-3-acid ethyl esters (LOVAZA) 1 g capsule TAKE 1 CAPSULE BY MOUTH 3  TIMES DAILY   • omeprazole (PriLOSEC) 20 mg delayed release capsule TAKE 1 CAPSULE BY MOUTH TWICE  DAILY BEFORE MEALS   • Trelegy Ellipta 200-62.5-25 MCG/ACT AEPB inhaler USE 1 INHALATION BY MOUTH DAILY     Allergies   Allergen Reactions   • Bactrim [Sulfamethoxazole-Trimethoprim] Other (See Comments)     AILYN   • Nsaids      Annotation - [id number]: unable to take due to use of lithium.   • Oxycodone Rash     Immunization History   Administered Date(s) Administered   • COVID-19 PFIZER VACCINE 0.3 ML IM 02/17/2021, 03/10/2021, 10/26/2021   • COVID-19 Pfizer Vac BIVALENT Satya-sucrose 12 Yr+ IM 10/20/2022   • COVID-19 Pfizer mRNA vacc PF satya-sucrose 12 yr and older (Comirnaty) 11/06/2023   • COVID-19 Pfizer vac (Satya-sucrose, gray cap) 12 yr+ IM 08/03/2022   • COVID-19, unspecified 11/06/2023   • INFLUENZA 10/01/2015, 10/07/2018, 09/27/2019, 10/05/2020, 09/27/2021, 10/14/2022, 10/09/2023   • Influenza Quadrivalent Preservative Free 3 years and older IM 10/01/2015   • Influenza Split High Dose Preservative Free IM 10/25/2016, 10/03/2018   • Influenza, high dose seasonal 0.7 mL 09/27/2019, 10/05/2020, 10/14/2022, 10/09/2023   • Influenza, seasonal, injectable 1945, 10/10/2012   • Pneumococcal Conjugate 13-Valent 10/25/2016   • Pneumococcal Polysaccharide PPV23 10/03/2019   • Td (adult),  "adsorbed 12/01/2009   • Tdap 05/31/2023   • Zoster 01/01/2014, 01/01/2014   • influenza, trivalent, adjuvanted 09/27/2021     Objective    /68   Pulse 74   Ht 5' 7\" (1.702 m)   Wt 67.6 kg (149 lb)   SpO2 99%   BMI 23.34 kg/m²     Physical Exam  Vitals reviewed.   Constitutional:       General: He is not in acute distress.     Appearance: Normal appearance. He is well-developed.   HENT:      Head: Normocephalic and atraumatic.      Right Ear: External ear normal.      Left Ear: External ear normal.      Nose: Nose normal.   Eyes:      General: No scleral icterus.     Conjunctiva/sclera: Conjunctivae normal.   Neck:      Trachea: No tracheal deviation.   Cardiovascular:      Rate and Rhythm: Normal rate and regular rhythm.      Heart sounds: Normal heart sounds. No murmur heard.  Pulmonary:      Effort: Pulmonary effort is normal. No respiratory distress.      Breath sounds: Normal breath sounds. No wheezing or rales.   Musculoskeletal:      Cervical back: Normal range of motion and neck supple.      Right lower leg: No edema.      Left lower leg: No edema.   Lymphadenopathy:      Cervical: No cervical adenopathy.   Skin:     Coloration: Skin is not jaundiced or pale.   Neurological:      Mental Status: He is alert and oriented to person, place, and time.   Psychiatric:         Mood and Affect: Mood normal.         Behavior: Behavior normal.         Thought Content: Thought content normal.         Judgment: Judgment normal.       Administrative Statements        "

## 2024-07-03 ENCOUNTER — OFFICE VISIT (OUTPATIENT)
Dept: PHYSICAL THERAPY | Facility: REHABILITATION | Age: 79
End: 2024-07-03
Payer: MEDICARE

## 2024-07-03 ENCOUNTER — TELEPHONE (OUTPATIENT)
Dept: VASCULAR SURGERY | Facility: CLINIC | Age: 79
End: 2024-07-03

## 2024-07-03 DIAGNOSIS — M70.62 TROCHANTERIC BURSITIS OF LEFT HIP: ICD-10-CM

## 2024-07-03 DIAGNOSIS — G89.29 CHRONIC LEFT-SIDED LOW BACK PAIN WITH LEFT-SIDED SCIATICA: Primary | ICD-10-CM

## 2024-07-03 DIAGNOSIS — M54.42 CHRONIC LEFT-SIDED LOW BACK PAIN WITH LEFT-SIDED SCIATICA: Primary | ICD-10-CM

## 2024-07-03 LAB
DME PARACHUTE DELIVERY DATE ACTUAL: NORMAL
DME PARACHUTE DELIVERY DATE REQUESTED: NORMAL
DME PARACHUTE ITEM DESCRIPTION: NORMAL
DME PARACHUTE ORDER STATUS: NORMAL
DME PARACHUTE SUPPLIER NAME: NORMAL
DME PARACHUTE SUPPLIER PHONE: NORMAL

## 2024-07-03 PROCEDURE — 97110 THERAPEUTIC EXERCISES: CPT | Performed by: PHYSICAL THERAPIST

## 2024-07-03 NOTE — TELEPHONE ENCOUNTER
Attempted to contact patient to schedule appointment(s) listed below.  Requested patient call [phone] option 3 to schedule appointment(s).    Patient's appointment(s) are due now.    Dopplers  [] Abdominal Aorta Iliac (AOIL)  [] Carotid (CV)   [] Celiac and/or Mesenteric  [] Endovascular Aortic Repair (EVAR)   [] Hemodialysis Access (HD)   [] Lower Limb Arterial (BENJAMIN)  [] Lower Limb Venous (LEV)  [] Lower Limb Venous Duplex with Reflux (LEVDR)  [] Renal Artery  [] Upper Limb Arterial (UEA)    [] Upper Limb Venous (UEV)              [] MIKO and Waveform analysis     Advanced Imaging   [] CTA head/neck    [] CTA abdomen    [] CTA abdomen & pelvis    [] CT abdomen with/ without contrast  [] CT abdomen with contrast  [] CT abdomen without contrast    [] CT abdomen & pelvis with/ without contrast  [] CT abdomen & pelvis with contrast  [] CT abdomen & pelvis without contrast    Office Visit   [] New patient, patient last seen over 3 years ago  [x] Risk factor modification (RFM)   [x] Follow up   [] Lost to follow up (LTFU)   Called patient & LMOM to schedule RFM OV /pt l/s 12/20/22 w/Katiana RR EVAR 7/1/24   78

## 2024-07-03 NOTE — PROGRESS NOTES
"Daily Note     Today's date: 7/3/2024  Patient name: Tonny Baig  : 1945  MRN: [id number]  Referring provider: Michelle Ayoub PA-C  Dx:   Encounter Diagnosis     ICD-10-CM    1. Chronic left-sided low back pain with left-sided sciatica  M54.42     G89.29       2. Trochanteric bursitis of left hip  M70.62           Start Time: 1335  Stop Time: 1420  Total time in clinic (min): 45 minutes    Subjective: Patient reports that he has continued with his HEP, going well but does feel some soreness after.       Objective: See treatment diary below  2 MWT: 225' RW    Assessment: Tolerated treatment well. Patient does demonstrate improved gait mechanics with RW use. Cues for stride and step length due to foot drag as I feel this is more awareness vs inability.       Plan: Continue per plan of care.      Precautions: CA, CKD HTN, h/o MI    Manuals  5                                                              Neuro Re-Ed             Balance board        5' VC/TC       Gait training      3'                                                                        Ther Ex 6/26 7/3 5/9 5/15 5/20 5/22 5/29 6/6 6/10 6/19   Bridge 5\"  2x10  GTB 5\"  3x5  GTB        5x   Hooklying ABD GTB  5\"x10 GTB  5\"x10        OTB  5\"x10  2 sets   LS RFISitting   15x 15x 4x10  3x10 - throughout session.  20x+10 throuhout session 10x    Step up 6\" step  5x ea side       4\" to march - TK focus on stance leg Standing alt march instead perf - 10x ea side with b/l UE support    VG  L7  3x20 DL L5  20x  L7  2x20 L5 20x L7 3x20 L7  20x  DL    3x5 SL L7  50x  DL    3x7 SL L7  20+10+10 L7  20+20 NP    STS 10x foam  2 rds 2x10 foam 2x5 +3 2x7 3x5 + foam 3x5 + foam 2x5  foam 3x7 foam 3x5 foam 2x7 foam   LS ext endurance hold 4 KG ball - 45\"x2  3 KG ball 30\"x2  4KB ball 30\"x1 4KG bal  30\"x2 4KG bal  45\"x3 4KG bal  45\"x3 4KG  30\"x2 4KG  30\"x2    Also with walking  -10ft x2 with CGA 4 KG ball - 45\"x2 4 KG ball - 45\"x2   LAQ    8#  3x8 " 10#  3x8 NP 10#  3x8 NP 10#  3x10    Seated HR             Walking 2'      2'  2'x2                 Ther Activity                                       Gait Training                                       Modalities

## 2024-07-05 ENCOUNTER — TELEPHONE (OUTPATIENT)
Age: 79
End: 2024-07-05

## 2024-07-05 DIAGNOSIS — E83.42 HYPOMAGNESEMIA: Primary | ICD-10-CM

## 2024-07-05 NOTE — TELEPHONE ENCOUNTER
Patients wife asked when you wanted Tonny to have his magnesium level rechecked. She was unsure if it should be now or in 3 months. Please call Lynsey.

## 2024-07-08 ENCOUNTER — TELEPHONE (OUTPATIENT)
Age: 79
End: 2024-07-08

## 2024-07-08 DIAGNOSIS — M54.16 LUMBAR RADICULOPATHY: ICD-10-CM

## 2024-07-08 DIAGNOSIS — K21.9 GASTROESOPHAGEAL REFLUX DISEASE WITHOUT ESOPHAGITIS: ICD-10-CM

## 2024-07-08 RX ORDER — DULOXETIN HYDROCHLORIDE 30 MG/1
30 CAPSULE, DELAYED RELEASE ORAL DAILY
Qty: 90 CAPSULE | Refills: 3 | OUTPATIENT
Start: 2024-07-08

## 2024-07-08 NOTE — TELEPHONE ENCOUNTER
Phone call from patient's wife to schedule follow up appt. Patient on recall list for October - appt not found for October. Patient scheduled for 11/26/24, and added to wait list. Please review schedule for October Availability, and call patient.

## 2024-07-09 RX ORDER — OMEPRAZOLE 20 MG/1
CAPSULE, DELAYED RELEASE ORAL
Qty: 200 CAPSULE | Refills: 1 | Status: SHIPPED | OUTPATIENT
Start: 2024-07-09

## 2024-07-09 NOTE — PROGRESS NOTES
Assessment:  1. Lumbar radiculopathy    2. Chronic pain syndrome    3. DDD (degenerative disc disease), lumbar    4. Foraminal stenosis of lumbar region    5. Spondylolisthesis of lumbar region    6. H/O lumbosacral spine surgery        Plan:  Patient may continue duloxetine 30 mg daily as prescribed.  This medication was refilled today  We can repeat L3-4 LESI as needed  Will avoid NSAIDs secondary to CKD  Continue home exercise program  Follow-up in 8 weeks or sooner if needed    History of Present Illness:    The patient is a 78 y.o. male with previous L4-S1 fusion last seen on 05/24/2024  who presents for a follow up office visit in regards to chronic low back pain that will radiate into the left lower extremity.  Patient does get good relief with L3-4 LESI last performed in April 2024.  He does still have an ongoing 50% improvement of his pain from this procedure.  He continues on duloxetine 30 mg daily without side effects.    The patient rates his pain a 6 out of 10 on the numeric pain rating scale.  Pain is occasional at night and is described as dull aching, sharp, throbbing, pressure-like and pins-and-needles    I have personally reviewed and/or updated the patient's past medical history, past surgical history, family history, social history, current medications, allergies, and vital signs today.       Review of Systems:    Review of Systems   Respiratory:  Negative for shortness of breath.    Cardiovascular:  Negative for chest pain.   Gastrointestinal:  Negative for constipation, diarrhea, nausea and vomiting.   Musculoskeletal:  Positive for back pain and gait problem. Negative for arthralgias, joint swelling and myalgias.   Skin:  Negative for rash.   Neurological:  Negative for dizziness, seizures and weakness.   All other systems reviewed and are negative.        Past Medical History:   Diagnosis Date    Allergic 2011    Allergic rhinitis 2015    Anxiety     occasional    Aortic aneurysm (HCC)      Benign colon polyp     Bipolar 1 disorder (HCC)     Cancer (HCC) 2007, 2011    Cardiac disease     MI    Cervical cord compression with myelopathy (HCC)     COPD (chronic obstructive pulmonary disease) (HCC)     Coronary artery disease 1995    Diverticulitis     Diverticulitis of colon prior to 2014    Diverticulosis     Emphysema of lung (HCC) 2012    Gait disturbance     uses cane, leg brace on right    GERD (gastroesophageal reflux disease)     Heart attack (HCC) 1996    Hx of resection of large bowel 05/03/2016    Hyperlipidemia     Hypertension     IBS (irritable bowel syndrome)     Inflammatory bowel disease 2012    Lumbar stenosis     Lung cancer (HCC)     2007 Left lower lobectomy and 2011 Right lung with surgery     Myocardial infarction (HCC)     involving other coronary artery    Prostate cancer (HCC)     Shortness of breath     Small bowel obstruction (HCC) 11/16/2016       Past Surgical History:   Procedure Laterality Date    ANGIOPLASTY      stent    CARDIAC CATHETERIZATION  1995    angioplasty    CARDIAC SURGERY      CERVICAL SPINE SURGERY      Cervical decompression with cervical fusion from C3-C7 for spinal stenosis.    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    LUNG LOBECTOMY Left     CO ARTHRD ANT INTERBODY MIN DSC CRV BELOW C2 N/A 05/02/2016    Procedure: Anterior cervical diskectomy C3/4, C5/6, C6/7 with anterior plate fixation fusion C3-7;  Posterior decompressive laminectomy C3-7 with lateral mass fixation fusion C3-7 (IMPULSE MONITORING);  Surgeon: Jose Luis Moore MD;  Location: [facility]  OR;  Service: Neurosurgery    ND ARTHRODESIS POSTERIOR INTERBODY 1 NTRSPC LUMBAR N/A 2017    Procedure: L4-5 AND L5-S1 DECOMPRESSIVE FORAMINOTOMIES, TRANSFORAMINAL LUMBAR INTERBODY AND PEDICLE SCREW FIXATION FUSION L4-S1 (IMPULSE);  Surgeon: Jose Luis Moore MD;  Location: [facility];  Service: Neurosurgery    ND EVASC RPR DPLMNT AORTO-AORTIC NDGFT N/A 2018    Procedure: REPAIR ANEURYSM ENDOVASCULAR ABDOMINAL AORTIC  (EVAR) WITH BILATERAL PERCUTANEOUS FEMORAL ACCESS WITH ULTRASOUND GUIDANCE ON THE RIGHT AND PRE CLOSURE;  Surgeon: Shan Villalobos MD;  Location: [facility];  Service: Vascular    PROSTATECTOMY      for prostate CA - no chemo/RT    SIGMOIDECTOMY      for divertic    SMALL INTESTINE SURGERY N/A 2016    Procedure: Exploratory Laparotomy, Lysis of adhesions to release small bowel obstruction;  Surgeon: Aminta Sim MD;  Location: [facility];  Service:     SPINE SURGERY  2017    TONSILLECTOMY  Y    1952       Family History   Problem Relation Age of Onset    Hypertension Mother     Glaucoma Mother     Heart attack Father     Colon cancer Father     Coronary artery disease Father     Hypertension Father     Kidney disease Father     Heart disease Family     Hyperlipidemia Family     Hypertension Family        Social History     Occupational History    Occupation: Retired   Tobacco Use    Smoking status: Former     Current packs/day: 0.00     Average packs/day: 1 pack/day for 44.0 years (44.0 ttl pk-yrs)     Types: Cigarettes     Start date: 1967     Quit date: 2011     Years since quittin.5     Passive exposure: Past    Smokeless tobacco: Never   Vaping Use    Vaping status: Never Used   Substance and Sexual Activity    Alcohol use: Yes     Alcohol/week: 2.0 - 3.0 standard drinks of alcohol     Types: 2 - 3 Shots of liquor per week     Comment: One glass per day    Drug use: No    Sexual activity: Yes     Partners: Female     Birth control/protection:  Post-menopausal, None         Current Outpatient Medications:     acetaminophen (TYLENOL) 500 mg tablet, Take 500 mg by mouth as needed for mild pain., Disp: , Rfl:     albuterol (PROVENTIL HFA,VENTOLIN HFA) 90 mcg/act inhaler, INHALE 2 PUFFS EVERY 6 HOURS AS NEEDED FOR WHEEZING OR SHORTNESS OF BREATH, Disp: , Rfl:     amLODIPine (NORVASC) 5 mg tablet, Take 1 tablet (5 mg total) by mouth daily, Disp: 90 tablet, Rfl: 3    aspirin (ECOTRIN LOW STRENGTH) 81 mg EC tablet, Take 81 mg by mouth daily, Disp: , Rfl:     atorvastatin (LIPITOR) 40 mg tablet, Take 1 tablet (40 mg total) by mouth daily, Disp: 100 tablet, Rfl: 1    Cholecalciferol (VITAMIN D PO), Take 2,000 Units by mouth daily  , Disp: , Rfl:     dicyclomine (BENTYL) 20 mg tablet, TAKE 1 TABLET BY MOUTH EVERY 6  HOURS AS NEEDED FOR ABDOMINAL  CRAMPING, Disp: 360 tablet, Rfl: 0    DULoxetine (CYMBALTA) 30 mg delayed release capsule, Take 1 capsule (30 mg total) by mouth daily, Disp: 90 capsule, Rfl: 0    fexofenadine (ALLEGRA) 180 MG tablet, Take 180 mg by mouth as needed Daily during the Summer, Disp: , Rfl:     fluticasone (FLONASE) 50 mcg/act nasal spray, 2 sprays into each nostril daily, Disp: , Rfl:     lithium carbonate (LITHOBID) 300 mg CR tablet, Take 300 mg by mouth daily at bedtime, Disp: , Rfl:     metoprolol succinate (TOPROL-XL) 25 mg 24 hr tablet, TAKE 1 TABLET BY MOUTH ONCE  DAILY, Disp: 90 tablet, Rfl: 3    mupirocin (BACTROBAN) 2 % ointment, Apply topically 2 (two) times a day as needed (wound), Disp: 22 g, Rfl: 0    Myrbetriq 25 MG TB24, TAKE 1 TABLET BY MOUTH DAILY AT  BEDTIME, Disp: 90 tablet, Rfl: 3    nitroglycerin (NITRODUR) 0.2 mg/hr, APPLY 1 PATCH TOPICALLY ONCE  DAILY. LEAVE ON FOR 12 TO 14  HOURS THEN REMOVE FOR A  NITRATE-FREE INTERVAL OF 10 TO  12 HOURS, Disp: 90 patch, Rfl: 1    omega-3-acid ethyl esters (LOVAZA) 1 g capsule, Take 1 capsule (1 g total) by mouth 3 (three) times a day, Disp: 270 capsule, Rfl: 1    omeprazole (PriLOSEC)  "20 mg delayed release capsule, TAKE 1 CAPSULE BY MOUTH TWICE  DAILY BEFORE MEALS, Disp: 200 capsule, Rfl: 1    Trelegy Ellipta 200-62.5-25 MCG/ACT AEPB inhaler, USE 1 INHALATION BY MOUTH DAILY, Disp: 180 each, Rfl: 3    magnesium chloride-calcium (SlowMag Mg Muscle/Heart) 71.5-119 mg, Take 1 tablet by mouth 2 (two) times a day (Patient taking differently: Take 1 tablet by mouth 3 (three) times a day), Disp: 60 tablet, Rfl: 5    Allergies   Allergen Reactions    Bactrim [Sulfamethoxazole-Trimethoprim] Other (See Comments)     AILYN    Nsaids      Annotation - [id number]: unable to take due to use of lithium.    Oxycodone Rash       Physical Exam:    /68   Pulse 90   Ht 5' 7\" (1.702 m)   Wt 67.6 kg (149 lb)   BMI 23.34 kg/m²     Constitutional:normal, well developed, well nourished, alert, in no distress and non-toxic and no overt pain behavior.  Eyes:anicteric  HEENT:grossly intact  Neck:supple, symmetric, trachea midline and no masses   Pulmonary:even and unlabored  Cardiovascular:No edema or pitting edema present  Skin:Normal without rashes or lesions and well hydrated  Psychiatric:Mood and affect appropriate  Neurologic:Cranial Nerves II-XII grossly intact  Musculoskeletal:antalgic and ambulates with a walker      Imaging  No orders to display         No orders of the defined types were placed in this encounter.      "

## 2024-07-10 ENCOUNTER — TELEPHONE (OUTPATIENT)
Age: 79
End: 2024-07-10

## 2024-07-10 ENCOUNTER — OFFICE VISIT (OUTPATIENT)
Dept: PHYSICAL THERAPY | Facility: REHABILITATION | Age: 79
End: 2024-07-10
Payer: MEDICARE

## 2024-07-10 DIAGNOSIS — M54.42 CHRONIC LEFT-SIDED LOW BACK PAIN WITH LEFT-SIDED SCIATICA: Primary | ICD-10-CM

## 2024-07-10 DIAGNOSIS — G89.29 CHRONIC LEFT-SIDED LOW BACK PAIN WITH LEFT-SIDED SCIATICA: Primary | ICD-10-CM

## 2024-07-10 DIAGNOSIS — I25.10 CORONARY ARTERY DISEASE INVOLVING NATIVE CORONARY ARTERY OF NATIVE HEART WITHOUT ANGINA PECTORIS: ICD-10-CM

## 2024-07-10 DIAGNOSIS — I70.90: ICD-10-CM

## 2024-07-10 DIAGNOSIS — M70.62 TROCHANTERIC BURSITIS OF LEFT HIP: ICD-10-CM

## 2024-07-10 PROCEDURE — 97110 THERAPEUTIC EXERCISES: CPT | Performed by: PHYSICAL THERAPIST

## 2024-07-10 RX ORDER — ATORVASTATIN CALCIUM 40 MG/1
40 TABLET, FILM COATED ORAL DAILY
Qty: 100 TABLET | Refills: 1 | Status: SHIPPED | OUTPATIENT
Start: 2024-07-10

## 2024-07-10 RX ORDER — OMEGA-3-ACID ETHYL ESTERS 1 G/1
1 CAPSULE, LIQUID FILLED ORAL 3 TIMES DAILY
Qty: 270 CAPSULE | Refills: 1 | Status: SHIPPED | OUTPATIENT
Start: 2024-07-10

## 2024-07-10 NOTE — PROGRESS NOTES
"Daily Note     Today's date: 7/10/2024  Patient name: Tonny Baig  : 1945  MRN: [id number]  Referring provider: Michelle Ayoub PA-C  Dx:   Encounter Diagnosis     ICD-10-CM    1. Chronic left-sided low back pain with left-sided sciatica  M54.42     G89.29       2. Trochanteric bursitis of left hip  M70.62           Start Time: 1035  Stop Time: 1115  Total time in clinic (min): 40 minutes    Subjective: Patient arriving today with Rolator. Reports less leg pain and dos think that the Rolator does help with his pain.       Objective: See treatment diary below      Assessment: Tolerated treatment well. Patient continues to require cuing for step/stride length but his gait speed is improved with Rolator. Patient will undergo eye surgery and will be out of PT for 2-3 weeks. Will resume upon return.       Plan: Continue per plan of care.      Precautions: CA, CKD HTN, h/o MI    Manuals  5                                                              Neuro Re-Ed             Balance board               Gait training                                                                              Ther Ex 6/26 7/3 7/10 5/15 5/20 5/22 5/29 6/6 6/10 6/19   Bridge 5\"  2x10  GTB 5\"  3x5  GTB 2x10  YHB         5x   Hooklying ABD GTB  5\"x10 GTB  5\"x10 YHB  5\"x10   2 sets       OTB  5\"x10  2 sets   LS RFISitting    15x 4x10  3x10 - throughout session.  20x+10 throuhout session 10x    Step up 6\" step  5x ea side       4\" to march - TK focus on stance leg Standing alt march instead perf - 10x ea side with b/l UE support    VG  L7  3x20 DL L7  3x20 DL L5 20x L7 3x20 L7  20x  DL    3x5 SL L7  50x  DL    3x7 SL L7  20+10+10 L7  20+20 NP    STS 10x foam  2 rds 2x10 foam 2x10 foam 2x7 3x5 + foam 3x5 + foam 2x5  foam 3x7 foam 3x5 foam 2x7 foam   LS ext endurance hold 4 KG ball - 45\"x2   4KG bal  30\"x2 4KG bal  45\"x3 4KG bal  45\"x3 4KG  30\"x2 4KG  30\"x2    Also with walking  -10ft x2 with CGA 4 KG ball - 45\"x2 4 KG ball - " "45\"x2   LAQ    8#  3x8 10#  3x8 NP 10#  3x8 NP 10#  3x10    Seated HR             Walking 2'  3 rds 2'    2'  2'x2                 Ther Activity                                       Gait Training                                       Modalities                                                  "

## 2024-07-10 NOTE — TELEPHONE ENCOUNTER
Patients GI provider:  Dr. BHATTI    Number to return call: [phone]    Reason for call: Pt's wife calling for sooner available lilly with Dr. Bhatti. Patient is currently scheduled in February. As per last office visit note, patient was to return 6/2024. Please contact patient with any sooner availability.

## 2024-07-12 ENCOUNTER — OFFICE VISIT (OUTPATIENT)
Dept: PAIN MEDICINE | Facility: CLINIC | Age: 79
End: 2024-07-12
Payer: MEDICARE

## 2024-07-12 VITALS
SYSTOLIC BLOOD PRESSURE: 109 MMHG | DIASTOLIC BLOOD PRESSURE: 68 MMHG | HEIGHT: 67 IN | WEIGHT: 149 LBS | HEART RATE: 90 BPM | BODY MASS INDEX: 23.39 KG/M2

## 2024-07-12 DIAGNOSIS — M48.061 FORAMINAL STENOSIS OF LUMBAR REGION: ICD-10-CM

## 2024-07-12 DIAGNOSIS — M43.16 SPONDYLOLISTHESIS OF LUMBAR REGION: ICD-10-CM

## 2024-07-12 DIAGNOSIS — G89.4 CHRONIC PAIN SYNDROME: ICD-10-CM

## 2024-07-12 DIAGNOSIS — M54.16 LUMBAR RADICULOPATHY: Primary | ICD-10-CM

## 2024-07-12 DIAGNOSIS — M51.36 DDD (DEGENERATIVE DISC DISEASE), LUMBAR: ICD-10-CM

## 2024-07-12 DIAGNOSIS — Z98.890 H/O LUMBOSACRAL SPINE SURGERY: ICD-10-CM

## 2024-07-12 PROCEDURE — G2211 COMPLEX E/M VISIT ADD ON: HCPCS | Performed by: NURSE PRACTITIONER

## 2024-07-12 PROCEDURE — 99214 OFFICE O/P EST MOD 30 MIN: CPT | Performed by: NURSE PRACTITIONER

## 2024-07-12 RX ORDER — DULOXETIN HYDROCHLORIDE 30 MG/1
30 CAPSULE, DELAYED RELEASE ORAL DAILY
Qty: 90 CAPSULE | Refills: 0 | Status: SHIPPED | OUTPATIENT
Start: 2024-07-12

## 2024-07-16 ENCOUNTER — SURGERY/PROCEDURE (OUTPATIENT)
Dept: URBAN - METROPOLITAN AREA SURGICAL CENTER 6 | Facility: SURGICAL CENTER | Age: 79
End: 2024-07-16

## 2024-07-16 DIAGNOSIS — H25.812: ICD-10-CM

## 2024-07-16 PROCEDURE — 68841 INSJ RX ELUT IMPLT LAC CANAL: CPT

## 2024-07-16 PROCEDURE — 66984 XCAPSL CTRC RMVL W/O ECP: CPT

## 2024-07-17 ENCOUNTER — 1 DAY POST-OP (OUTPATIENT)
Dept: URBAN - METROPOLITAN AREA CLINIC 6 | Facility: CLINIC | Age: 79
End: 2024-07-17

## 2024-07-17 DIAGNOSIS — Z96.1: ICD-10-CM

## 2024-07-17 PROCEDURE — 99024 POSTOP FOLLOW-UP VISIT: CPT

## 2024-07-17 ASSESSMENT — VISUAL ACUITY
OS_SC: 20/60
OS_PH: 20/40
OD_SC: 20/20

## 2024-07-17 ASSESSMENT — TONOMETRY
OD_IOP_MMHG: 14
OS_IOP_MMHG: 15

## 2024-07-17 NOTE — TELEPHONE ENCOUNTER
Pt's wife called stated pt can't do 7- because he has cataract surgery. Appt was canceled and would like to keep the appt that's scheduled on 2-26

## 2024-07-25 ENCOUNTER — 1 WEEK POST-OP (OUTPATIENT)
Dept: URBAN - METROPOLITAN AREA CLINIC 6 | Facility: CLINIC | Age: 79
End: 2024-07-25

## 2024-07-25 DIAGNOSIS — Z96.1: ICD-10-CM

## 2024-07-25 DIAGNOSIS — H25.811: ICD-10-CM

## 2024-07-25 PROCEDURE — 99024 POSTOP FOLLOW-UP VISIT: CPT

## 2024-07-25 ASSESSMENT — TONOMETRY
OS_IOP_MMHG: 10
OD_IOP_MMHG: 11

## 2024-07-25 ASSESSMENT — VISUAL ACUITY
OD_SC: 20/40
OS_SC: 20/30-2

## 2024-07-30 ENCOUNTER — SURGERY/PROCEDURE (OUTPATIENT)
Dept: URBAN - METROPOLITAN AREA SURGICAL CENTER 6 | Facility: SURGICAL CENTER | Age: 79
End: 2024-07-30

## 2024-07-30 DIAGNOSIS — H25.811: ICD-10-CM

## 2024-07-30 PROCEDURE — 66984 XCAPSL CTRC RMVL W/O ECP: CPT | Mod: 79,RT

## 2024-07-30 PROCEDURE — 68841 INSJ RX ELUT IMPLT LAC CANAL: CPT | Mod: 79,RT

## 2024-07-31 ENCOUNTER — 1 DAY POST-OP (OUTPATIENT)
Dept: URBAN - METROPOLITAN AREA CLINIC 6 | Facility: CLINIC | Age: 79
End: 2024-07-31

## 2024-07-31 DIAGNOSIS — Z96.1: ICD-10-CM

## 2024-07-31 PROCEDURE — 99024 POSTOP FOLLOW-UP VISIT: CPT

## 2024-07-31 ASSESSMENT — VISUAL ACUITY
OS_SC: J5
OS_SC: 20/30-2
OD_SC: J7
OD_SC: 20/50-2

## 2024-07-31 ASSESSMENT — TONOMETRY
OD_IOP_MMHG: 28
OS_IOP_MMHG: 11

## 2024-08-02 ENCOUNTER — TELEPHONE (OUTPATIENT)
Age: 79
End: 2024-08-02

## 2024-08-02 DIAGNOSIS — N32.81 OAB (OVERACTIVE BLADDER): Primary | ICD-10-CM

## 2024-08-02 RX ORDER — TROSPIUM CHLORIDE 20 MG/1
20 TABLET, FILM COATED ORAL 2 TIMES DAILY
Qty: 180 TABLET | Refills: 3 | Status: SHIPPED | OUTPATIENT
Start: 2024-08-02

## 2024-08-02 NOTE — TELEPHONE ENCOUNTER
Patient was calling because he is currently on Myrbetriq 25 MG. The home delivery service [facility] will not longer be carrying the generic version of this medication. Patient states the name brand version is way too expensive. Also, that the Myrbetriq has not been working well for him.     He was informed by [facility] of the following medications that are similar.     1) Solifenacin **  2) Berodine  3) Trospium      The patient was wondering if the provider believes that one of those medications might work better for him. He was looking to try the Solifenacin. But is open to trying any of them the provider deems necessary.     Patient will still be using [facility], so if a new medication is prescribed please send it to them.     Patient would also like a call to discuss his options.    Tonny  [phone]

## 2024-08-02 NOTE — TELEPHONE ENCOUNTER
Spoke to patient and advised of AP's note. Patient is agreeable in trying Trospium. Advised medication was sent to the pharmacy.

## 2024-08-09 ENCOUNTER — 1 WEEK POST-OP (OUTPATIENT)
Dept: URBAN - METROPOLITAN AREA CLINIC 6 | Facility: CLINIC | Age: 79
End: 2024-08-09

## 2024-08-09 ENCOUNTER — APPOINTMENT (OUTPATIENT)
Dept: PHYSICAL THERAPY | Facility: REHABILITATION | Age: 79
End: 2024-08-09
Payer: MEDICARE

## 2024-08-09 DIAGNOSIS — Z96.1: ICD-10-CM

## 2024-08-09 PROCEDURE — 99024 POSTOP FOLLOW-UP VISIT: CPT

## 2024-08-09 ASSESSMENT — VISUAL ACUITY
OD_SC: 20/50-2
OS_SC: 20/30-1
OD_PH: 20/30-2

## 2024-08-09 ASSESSMENT — TONOMETRY
OS_IOP_MMHG: 13
OD_IOP_MMHG: 16

## 2024-08-13 ENCOUNTER — APPOINTMENT (OUTPATIENT)
Dept: LAB | Facility: CLINIC | Age: 79
End: 2024-08-13
Payer: MEDICARE

## 2024-08-13 DIAGNOSIS — F31.60 MIXED BIPOLAR I DISORDER (HCC): ICD-10-CM

## 2024-08-13 DIAGNOSIS — E83.42 HYPOMAGNESEMIA: ICD-10-CM

## 2024-08-13 LAB
ALBUMIN SERPL BCG-MCNC: 3.8 G/DL (ref 3.5–5)
ALP SERPL-CCNC: 113 U/L (ref 34–104)
ALT SERPL W P-5'-P-CCNC: 19 U/L (ref 7–52)
ANION GAP SERPL CALCULATED.3IONS-SCNC: 9 MMOL/L (ref 4–13)
AST SERPL W P-5'-P-CCNC: 17 U/L (ref 13–39)
BILIRUB SERPL-MCNC: 0.51 MG/DL (ref 0.2–1)
BUN SERPL-MCNC: 38 MG/DL (ref 5–25)
CALCIUM SERPL-MCNC: 9.9 MG/DL (ref 8.4–10.2)
CHLORIDE SERPL-SCNC: 106 MMOL/L (ref 96–108)
CO2 SERPL-SCNC: 27 MMOL/L (ref 21–32)
CREAT SERPL-MCNC: 1.46 MG/DL (ref 0.6–1.3)
GFR SERPL CREATININE-BSD FRML MDRD: 46 ML/MIN/1.73SQ M
GLUCOSE P FAST SERPL-MCNC: 107 MG/DL (ref 65–99)
LITHIUM SERPL-SCNC: 0.56 MMOL/L (ref 0.6–1.2)
MAGNESIUM SERPL-MCNC: 1.6 MG/DL (ref 1.9–2.7)
POTASSIUM SERPL-SCNC: 4.9 MMOL/L (ref 3.5–5.3)
PROT SERPL-MCNC: 6.5 G/DL (ref 6.4–8.4)
PSA SERPL-MCNC: <0.008 NG/ML (ref 0–4)
SODIUM SERPL-SCNC: 142 MMOL/L (ref 135–147)
TSH SERPL DL<=0.05 MIU/L-ACNC: 1.6 UIU/ML (ref 0.45–4.5)

## 2024-08-13 PROCEDURE — 80178 ASSAY OF LITHIUM: CPT

## 2024-08-13 PROCEDURE — 83735 ASSAY OF MAGNESIUM: CPT

## 2024-08-13 PROCEDURE — 80053 COMPREHEN METABOLIC PANEL: CPT

## 2024-08-13 PROCEDURE — 36415 COLL VENOUS BLD VENIPUNCTURE: CPT

## 2024-08-13 PROCEDURE — 84443 ASSAY THYROID STIM HORMONE: CPT

## 2024-08-16 ENCOUNTER — OFFICE VISIT (OUTPATIENT)
Dept: PHYSICAL THERAPY | Facility: REHABILITATION | Age: 79
End: 2024-08-16
Payer: MEDICARE

## 2024-08-16 DIAGNOSIS — M25.552 LEFT HIP PAIN: Primary | ICD-10-CM

## 2024-08-16 PROCEDURE — 97110 THERAPEUTIC EXERCISES: CPT

## 2024-08-16 NOTE — PROGRESS NOTES
"Daily Note     Today's date: 2024  Patient name: Tonny Baig  : 1945  MRN: [id number]  Referring provider: Michelle Ayoub PA-C  Dx:   Encounter Diagnosis     ICD-10-CM    1. Left hip pain  M25.552                      Subjective: Tonny is feeling ok overall, he feels that he has met his walking goal, reports compliance to HEP.   He reports a fall over a wk ago, denies injury and did not seek medical attention.       Objective: See treatment diary below      Assessment: Tolerated treatment well. Shuffling gait noted as he fatigued with ambulation around the clinic. Fair management of his AD in confined spaces. Cuing with STS tranfers to ensure safety. Glute sets were performed instead of bridges, patient with c/o sharp pain. Continued PT would be beneficial to improve function.          Plan: possible D/c next wk.        Precautions: CA, CKD HTN, h/o MI    Manuals  5                                                              Neuro Re-Ed             Balance board               Gait training                                                                              Ther Ex 6/26 7/3 7/10 8/16 5/20 5/22 5/29 6/6 6/10 6/19   Bridge 5\"  2x10  GTB 5\"  3x5  GTB 2x10  YHB   Deferred       5x   Glute sets     2x10x2\"         Hooklying ABD GTB  5\"x10 GTB  5\"x10 YHB  5\"x10   2 sets Siting YHB  5\"x10   2 sets      OTB  5\"x10  2 sets   LS RFISitting     4x10  3x10 - throughout session.  20x+10 throuhout session 10x    Step up 6\" step  5x ea side       4\" to march -  focus on stance leg Standing alt march instead perf - 10x ea side with b/l UE support    VG  L7  3x20 DL L7  3x20 DL L7  3x20 DL L7  20x  DL    3x5 SL L7  50x  DL    3x7 SL L7  20+10+10 L7  20+20 NP    STS 10x foam  2 rds 2x10 foam 2x10 foam 2x10 foam 3x5 + foam 3x5 + foam 2x5  foam 3x7 foam 3x5 foam 2x7 foam   LS ext endurance hold 4 KG ball - 45\"x2    4KG bal  45\"x3 4KG bal  45\"x3 4KG  30\"x2 4KG  30\"x2    Also with walking  -10ft x2 " "with CGA 4 KG ball - 45\"x2 4 KG ball - 45\"x2   LAQ     10#  3x8 NP 10#  3x8 NP 10#  3x10    Seated HR             Walking 2'  3 rds 2' 3 rds 2'   2'  2'x2                 Ther Activity                                       Gait Training                                       Modalities                                                    "

## 2024-08-22 ENCOUNTER — OFFICE VISIT (OUTPATIENT)
Dept: VASCULAR SURGERY | Facility: CLINIC | Age: 79
End: 2024-08-22
Payer: MEDICARE

## 2024-08-22 VITALS
DIASTOLIC BLOOD PRESSURE: 60 MMHG | HEART RATE: 65 BPM | OXYGEN SATURATION: 97 % | HEIGHT: 67 IN | WEIGHT: 156.2 LBS | RESPIRATION RATE: 18 BRPM | BODY MASS INDEX: 24.52 KG/M2 | SYSTOLIC BLOOD PRESSURE: 122 MMHG

## 2024-08-22 DIAGNOSIS — I71.43 INFRARENAL ABDOMINAL AORTIC ANEURYSM (AAA) WITHOUT RUPTURE (HCC): Primary | Chronic | ICD-10-CM

## 2024-08-22 DIAGNOSIS — Z86.79 S/P ENDOVASCULAR ANEURYSM REPAIR: ICD-10-CM

## 2024-08-22 DIAGNOSIS — Z98.890 S/P ENDOVASCULAR ANEURYSM REPAIR: ICD-10-CM

## 2024-08-22 PROCEDURE — 99213 OFFICE O/P EST LOW 20 MIN: CPT | Performed by: NURSE PRACTITIONER

## 2024-08-22 NOTE — PROGRESS NOTES
Ambulatory Visit  Name: Tonny Baig      : 1945      MRN: [id number]  Encounter Provider: ELSA Alegria  Encounter Date: 2024   Encounter department: [facility]    Assessment & Plan   1. Infrarenal abdominal aortic aneurysm (AAA) without rupture (HCC)  Assessment & Plan:  79-year-old male with HTN, HLD, COPD, CAD, lung CA ' +, CKD,lumbar DDD, chronic back and hip pain, lumbar sx, Parkinson's, 5.2 cm infrarenal AAA s/p EVAR by Dr. Wilfredo Torres     Patient returns the office to review EVAR duplex 2024.  Last office visit with me 2022    -EVAR duplex shows widely patent endograft without of any evidence of endoleak, residual AAA sac 4.5 x 4.9 cm, distal to graft right iliac measures 1.4 cm and left iliac measures 1.3 cm, greater than 70% celiac trunk stenosis.  Right MIKO 1.25 and left MIKO NC, bilateral GTP within healing range   -LEAD 2022 showed no evidence of arterial occlusive disease, no popliteal aneurysm, right MIKO 1.2/161/140 and left MIKO noncompressible/153/127  -No abdominal complaints.  Chronic intermittent low back pain improving with PT and injections  -No postprandial abdominal pain, food fear or unintentional weight loss  -Blood pressure well-controlled  -Recommended yearly EVAR duplex  -Follow-up in the office in 1 year  Orders:  -      VAS EVAR DUPLEX EVALUATION; Future; Expected date: 2025  2. S/P endovascular aneurysm repair  -      VAS EVAR DUPLEX EVALUATION; Future; Expected date: 2025    Chief Complaint   Patient presents with    Follow-up     Pt had EVAR on 2024. Pt denies pain with eating, changes in appetite, and abnormal BP.       History of Present Illness     Tonny Baig is a 78 y.o. male with HTN, HLD, COPD, CAD, lung CA ' +, CKD,lumbar DDD, chronic back and hip pain, lumbar sx, Parkinson's, 5.2 cm infrarenal AAA s/p EVAR by Dr. Wilfredo Torres. Patient returns the office to review EVAR duplex 2024.  Last  "office visit with me December 2022. No abdominal complaints. He has chronic intermittent low back pain improving with PT and injections. He denies postprandial abdominal pain, food fear or unintentional weight loss.  He ambulates with a rolling walker.  EVAR duplex shows widely patent endograft without of any evidence of endoleak, residual AAA sac 4.5 x 4.9 cm, distal to graft right iliac measures 1.4 cm and left iliac measures 1.3 cm, greater than 70% celiac trunk stenosis.  Right MIKO 1.25 and left MIKO NC, bilateral GTP within healing range       Review of Systems   Constitutional: Negative.    HENT: Negative.     Eyes: Negative.    Respiratory: Negative.     Cardiovascular:  Positive for leg swelling.   Gastrointestinal: Negative.    Endocrine: Negative.    Genitourinary: Negative.    Musculoskeletal:  Positive for back pain and gait problem.   Skin: Negative.    Hematological: Negative.    Psychiatric/Behavioral: Negative.       Medical History Reviewed by provider this encounter:  Tobacco  Allergies  Meds  Problems  Med Hx  Surg Hx  Fam Hx       Objective   I have reviewed and made appropriate changes to the review of systems input by the medical assistant.    Vitals:    08/22/24 1403 08/22/24 1406   BP: 118/62 122/60   BP Location: Right arm Left arm   Patient Position: Sitting Sitting   Cuff Size: Standard Standard   Pulse: 65    Resp: 18    SpO2: 97%    Weight: 70.9 kg (156 lb 3.2 oz)    Height: 5' 7\" (1.702 m)        Patient Active Problem List   Diagnosis    Gait instability    Cervical myelopathy (HCC)    Adenocarcinoma of prostate (HCC)    CAD (coronary artery disease)    Bipolar affective disorder (HCC)    COPD (chronic obstructive pulmonary disease) (HCC)    Gastroesophageal reflux disease    Mixed hyperlipidemia    Essential hypertension    Aneurysm of infrarenal abdominal aorta (HCC)    H/O lumbosacral spine surgery    Stage 3a chronic kidney disease (HCC)    Impaired mobility and activities of " daily living    DDD (degenerative disc disease), lumbar    Foraminal stenosis of lumbar region    Spondylolisthesis of lumbar region    Myelopathy concurrent with and due to lumbosacral intervertebral disc disorder    PAF (paroxysmal atrial fibrillation) (formerly [facility])    Pain in left hip    Greater trochanteric bursitis, left    Skin lesion    Trochanteric bursitis of left hip    Primary osteoarthritis of left hip    Malignant neoplasm of lung (HCC)    Excessive gas    Extradural cyst of spine    Lumbar disc herniation    Gait abnormality    Skin lump of leg, right    Seasonal allergic rhinitis due to pollen    Braces as ambulation aid    Parkinsonism    Change in mental status    Frequency of urination    Skin tear of right lower leg without complication    Proteinuria    S/P endovascular aneurysm repair    Myocardial infarction (HCC)    Adenoma of colon    Abdominal aortic aneurysm (AAA) without rupture, unspecified part (formerly [facility])    Lumbar radiculopathy    Spinal stenosis of lumbar region with neurogenic claudication    Hypomagnesemia    Preop examination       Past Surgical History:   Procedure Laterality Date    ANGIOPLASTY      stent    CARDIAC CATHETERIZATION  1995    angioplasty    CARDIAC SURGERY      CERVICAL SPINE SURGERY      Cervical decompression with cervical fusion from C3-C7 for spinal stenosis.    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    LUNG LOBECTOMY Left     DE ARTHRD ANT INTERBODY MIN DSC CRV BELOW C2 N/A 05/02/2016    Procedure: Anterior cervical diskectomy C3/4, C5/6, C6/7 with anterior plate  fixation fusion C3-7;  Posterior decompressive laminectomy C3-7 with lateral mass fixation fusion C3-7 (IMPULSE MONITORING);  Surgeon: Jose Luis Moore MD;  Location: [facility];  Service: Neurosurgery    PA ARTHRODESIS POSTERIOR INTERBODY 1 [facility] LUMBAR N/A 2017    Procedure: L4-5 AND L5-S1 DECOMPRESSIVE FORAMINOTOMIES, TRANSFORAMINAL LUMBAR INTERBODY AND PEDICLE SCREW FIXATION FUSION L4-S1 (IMPULSE);  Surgeon: Jose Luis Moore MD;  Location: [facility];  Service: Neurosurgery    PA EVASC RPR DPLMNT AORTO-AORTIC NDGFT N/A 2018    Procedure: REPAIR ANEURYSM ENDOVASCULAR ABDOMINAL AORTIC  (EVAR) WITH BILATERAL PERCUTANEOUS FEMORAL ACCESS WITH ULTRASOUND GUIDANCE ON THE RIGHT AND PRE CLOSURE;  Surgeon: Shan Villalobos MD;  Location: [facility];  Service: Vascular    PROSTATECTOMY      for prostate CA - no chemo/RT    SIGMOIDECTOMY      for divertic    SMALL INTESTINE SURGERY N/A 2016    Procedure: Exploratory Laparotomy, Lysis of adhesions to release small bowel obstruction;  Surgeon: Aminta Sim MD;  Location: [facility];  Service:     SPINE SURGERY  ,     TONSILLECTOMY  Y    1952       Family History   Problem Relation Age of Onset    Hypertension Mother     Glaucoma Mother     Heart attack Father     Colon cancer Father     Coronary artery disease Father     Hypertension Father     Kidney disease Father     Heart disease Family     Hyperlipidemia Family     Hypertension Family        Social History     Socioeconomic History    Marital status: /Civil Union     Spouse name: Not on file    Number of children: 1    Years of education: Not on file    Highest education level: Not on file   Occupational History    Occupation: Retired   Tobacco Use    Smoking status: Former     Current packs/day: 0.00     Average packs/day: 1 pack/day for 44.0 years (44.0 ttl pk-yrs)     Types: Cigarettes     Start date: 1967     Quit date: 2011     Years since quittin.6     Passive  exposure: Past    Smokeless tobacco: Never   Vaping Use    Vaping status: Never Used   Substance and Sexual Activity    Alcohol use: Yes     Alcohol/week: 2.0 - 3.0 standard drinks of alcohol     Types: 2 - 3 Shots of liquor per week     Comment: One glass per day    Drug use: No    Sexual activity: Yes     Partners: Female     Birth control/protection: Post-menopausal, None   Other Topics Concern    Not on file   Social History Narrative    Not on file     Social Determinants of Health     Financial Resource Strain: Low Risk  (12/29/2023)    Overall Financial Resource Strain (CARDIA)     Difficulty of Paying Living Expenses: Not hard at all   Food Insecurity: Not on file   Transportation Needs: No Transportation Needs (12/29/2023)    PRAPARE - Transportation     Lack of Transportation (Medical): No     Lack of Transportation (Non-Medical): No   Physical Activity: Not on file   Stress: Not on file   Social Connections: Not on file   Intimate Partner Violence: Not on file   Housing Stability: Not on file       Allergies   Allergen Reactions    Bactrim [Sulfamethoxazole-Trimethoprim] Other (See Comments)     AILYN    Nsaids      Annotation - 20Nov2017: unable to take due to use of lithium.    Oxycodone Rash         Current Outpatient Medications:     acetaminophen (TYLENOL) 500 mg tablet, Take 500 mg by mouth as needed for mild pain., Disp: , Rfl:     albuterol (PROVENTIL HFA,VENTOLIN HFA) 90 mcg/act inhaler, INHALE 2 PUFFS EVERY 6 HOURS AS NEEDED FOR WHEEZING OR SHORTNESS OF BREATH, Disp: , Rfl:     amLODIPine (NORVASC) 5 mg tablet, Take 1 tablet (5 mg total) by mouth daily, Disp: 90 tablet, Rfl: 3    aspirin (ECOTRIN LOW STRENGTH) 81 mg EC tablet, Take 81 mg by mouth daily, Disp: , Rfl:     atorvastatin (LIPITOR) 40 mg tablet, Take 1 tablet (40 mg total) by mouth daily, Disp: 100 tablet, Rfl: 1    Cholecalciferol (VITAMIN D PO), Take 2,000 Units by mouth daily  , Disp: , Rfl:     dicyclomine (BENTYL) 20 mg tablet,  TAKE 1 TABLET BY MOUTH EVERY 6  HOURS AS NEEDED FOR ABDOMINAL  CRAMPING, Disp: 360 tablet, Rfl: 0    DULoxetine (CYMBALTA) 30 mg delayed release capsule, Take 1 capsule (30 mg total) by mouth daily, Disp: 90 capsule, Rfl: 0    fexofenadine (ALLEGRA) 180 MG tablet, Take 180 mg by mouth as needed Daily during the Summer, Disp: , Rfl:     fluticasone (FLONASE) 50 mcg/act nasal spray, 2 sprays into each nostril daily, Disp: , Rfl:     lithium carbonate (LITHOBID) 300 mg CR tablet, Take 300 mg by mouth daily at bedtime, Disp: , Rfl:     magnesium chloride-calcium (SlowMag Mg Muscle/Heart) 71.5-119 mg, Take 1 tablet by mouth 2 (two) times a day (Patient taking differently: Take 1 tablet by mouth 3 (three) times a day), Disp: 60 tablet, Rfl: 5    metoprolol succinate (TOPROL-XL) 25 mg 24 hr tablet, TAKE 1 TABLET BY MOUTH ONCE  DAILY, Disp: 90 tablet, Rfl: 3    nitroglycerin (NITRODUR) 0.2 mg/hr, APPLY 1 PATCH TOPICALLY ONCE  DAILY. LEAVE ON FOR 12 TO 14  HOURS THEN REMOVE FOR A  NITRATE-FREE INTERVAL OF 10 TO  12 HOURS (Patient taking differently: if needed), Disp: 90 patch, Rfl: 1    omega-3-acid ethyl esters (LOVAZA) 1 g capsule, Take 1 capsule (1 g total) by mouth 3 (three) times a day, Disp: 270 capsule, Rfl: 1    omeprazole (PriLOSEC) 20 mg delayed release capsule, TAKE 1 CAPSULE BY MOUTH TWICE  DAILY BEFORE MEALS, Disp: 200 capsule, Rfl: 1    Trelegy Ellipta 200-62.5-25 MCG/ACT AEPB inhaler, USE 1 INHALATION BY MOUTH DAILY, Disp: 180 each, Rfl: 3    mupirocin (BACTROBAN) 2 % ointment, Apply topically 2 (two) times a day as needed (wound) (Patient not taking: Reported on 8/22/2024), Disp: 22 g, Rfl: 0    trospium chloride (SANCTURA) 20 mg tablet, Take 1 tablet (20 mg total) by mouth 2 (two) times a day (Patient taking differently: Take 20 mg by mouth 2 (two) times a day Pt has not started taking.), Disp: 180 tablet, Rfl: 3   /60 (BP Location: Left arm, Patient Position: Sitting, Cuff Size: Standard)   Pulse 65   " Resp 18   Ht 5' 7\" (1.702 m)   Wt 70.9 kg (156 lb 3.2 oz)   SpO2 97%   BMI 24.46 kg/m²     Physical Exam  Vitals and nursing note reviewed. Exam conducted with a chaperone present.   Constitutional:       Appearance: Normal appearance.   HENT:      Head: Normocephalic and atraumatic.   Eyes:      Extraocular Movements: Extraocular movements intact.   Cardiovascular:      Heart sounds: Normal heart sounds.   Pulmonary:      Effort: Pulmonary effort is normal.      Breath sounds: Normal breath sounds.   Abdominal:      General: Bowel sounds are normal.      Palpations: Abdomen is soft.      Tenderness: There is no abdominal tenderness.   Musculoskeletal:         General: Swelling present.      Comments: Ambulates with rolling walker    Skin:     General: Skin is warm.   Neurological:      General: No focal deficit present.      Mental Status: He is alert and oriented to person, place, and time.   Psychiatric:         Mood and Affect: Mood normal.         Behavior: Behavior normal.           "

## 2024-08-22 NOTE — ASSESSMENT & PLAN NOTE
79-year-old male with HTN, HLD, COPD, CAD, lung CA '07 +'11, CKD,lumbar DDD, chronic back and hip pain, lumbar sx, Parkinson's, 5.2 cm infrarenal AAA s/p EVAR by Dr. Villalobos '18     Patient returns the office to review EVAR duplex 7/1/2024.  Last office visit with me December 2022    -EVAR duplex shows widely patent endograft without of any evidence of endoleak, residual AAA sac 4.5 x 4.9 cm, distal to graft right iliac measures 1.4 cm and left iliac measures 1.3 cm, greater than 70% celiac trunk stenosis.  Right MIKO 1.25 and left MIKO NC, bilateral GTP within healing range   -LEAD 12/2022 showed no evidence of arterial occlusive disease, no popliteal aneurysm, right MIKO 1.2/161/140 and left MIKO noncompressible/153/127  -No abdominal complaints.  Chronic intermittent low back pain improving with PT and injections  -No postprandial abdominal pain, food fear or unintentional weight loss  -Blood pressure well-controlled  -Recommended yearly EVAR duplex  -Follow-up in the office in 1 year

## 2024-08-23 ENCOUNTER — EVALUATION (OUTPATIENT)
Dept: PHYSICAL THERAPY | Facility: REHABILITATION | Age: 79
End: 2024-08-23
Payer: MEDICARE

## 2024-08-23 DIAGNOSIS — M25.552 LEFT HIP PAIN: Primary | ICD-10-CM

## 2024-08-23 PROCEDURE — 97112 NEUROMUSCULAR REEDUCATION: CPT | Performed by: PHYSICAL THERAPIST

## 2024-08-23 NOTE — PROGRESS NOTES
PT Re-evaluation    Today's date: 2024  Patient name: Tonny Baig  : 1945  MRN: [id number]  Referring provider: Michelle Ayoub PA-C  Dx:   Encounter Diagnosis     ICD-10-CM    1. Left hip pain  M25.552             Start Time: 0950  Stop Time: 1020  Total time in clinic (min): 30 minutes    Assessment  Impairments: abnormal muscle firing, abnormal muscle tone, abnormal or restricted ROM, abnormal movement, activity intolerance, impaired physical strength and pain with function    Assessment details: Patient is a 78 y.o. year old male who attended physical therapy for 20 treatment sessions regarding CLBP of L side. Patient reports 80% improvement at this time which correlates to improved impairments and functionality.  Patient has shown improvement throughout PT by demonstrating decreased pain, increased strength, and improved tolerance to activity.  Patient continues to present with pain, decreased ROM, decreased strength, and decreased tolerance to activity but good improvements overall as he was please with his progress. I have noticed his gait vastly improve which has decreased his fall risk and optimized function. Will DC PT.    Understanding of Dx/Px/POC: good     Prognosis: good    Plan  Patient would benefit from: skilled PT  Planned modality interventions: thermotherapy: hydrocollator packs    Planned therapy interventions: joint mobilization, manual therapy, ADL training, balance, balance/weight bearing training, neuromuscular re-education, home exercise program, therapeutic exercise, therapeutic activities, strengthening, patient education, functional ROM exercises and gait training    Frequency: 2x week  Duration in weeks: 12  Treatment plan discussed with: patient        Subjective Evaluation    History of Present Illness  Mechanism of injury: Evaluation:      Tonny is a 78 y.o. male presenting to physical therapy on 24 with referral from MD for left sided hip, leg and foot pain.      Patient symptoms are located along left lateral thigh mainly, not below knee    Patient symptoms are constant but increases with walking/standing, better sitting and lying    Patient denies bowel and bladder changes (denies urinary retention)/saddle numbness    Per MD note on 3/28/24:  78-year-old male with a history of previous L4-S1 fusion, lumbar radiculopathy, lumbar spondylosis, and stenosis returning for follow-up.  The patient feels that his left-sided low back and leg pain are slowly returning.  He has had a few trips while at home, but no trauma.  The patient had an L3-4 LESI 2024 which gave him about 85% of relief.  Tylenol provides minimal relief.  Currently taking duloxetine 20 mg daily which is providing mild to moderate relief.  He denies any side effects.  Mood is stable.            Patient Goals  Patient goals for therapy: decreased pain  Patient goal: improved walking time - 45 min  Pain  Current pain ratin  At best pain ratin  At worst pain ratin  Location: left glute, lateral thigh      Diagnostic Tests  MRI studies: abnormal    Short Term Goals:   1. Patient will be Independent with hep - MET  2. Patient will improve pain with activity by 50% - NOT MET  3. Patient will report GROC 50% or greater - MET  4. Patient will perform LS ext without increase in thigh pain - MET  5. Patient will amb 10 min with leg pain 2/10 or less - NOT MET      Long Term Goals:   1. Patient will improve FOTO to greater then goal - IMPROVED  2. Patient will improve pain with activity to 2/10 or less - NOT MET  3. Patient will continue with HEP independence to allow for decreased future reoccurrence of pain and loss in function - IN PROGRESS  4. Patient will report GROC 75% or greater - MET  5. Patient will amb 20 min with leg pain 2/10 or less - NOT MET      Objective    Posture: L Lat shift    Myotomes (L/R): 4+/5 L4  Dermatome: (pinprick- L/R):  decreased L3 - lateral thigh     Reflexes:   (L/R) L3-4:   3+ bl     S1:  2+ bl             Clonus= neg    GAIT: rollator - improved mechanics with knee ext, trunk flexion and alexandro. 2 MWT: 300 ft    Lumbar  % of normal   Flex. 50   Extn. 25   SG Left -   SG Right -   ROT Left 50   ROT Right 50           MMT         AROM          PROM    Hip       L       R        L           R      L     R   Flex.         Extn.         Abd.         Add.         IR.         ER.                  G. Max         G. Med.         Iliop.             Hip PROM WFL, no reproduction of leg pain    Neuro Dynamic Testing:  Slump test: L= neg    R=    neg   Straight leg raise:   L=    neg  R=   neg                        Precautions: CA, CKD HTN, h/o MI    Manuals                                                                 Neuro Re-Ed 8/23            Assessment 15'            Education 15'                                                                             Ther Ex  5/6 5/9 5/15 5/20 5/22 5/29 6/6 6/10 6/19                                                                                                                                                  Ther Activity                                       Gait Training                                       Modalities

## 2024-09-09 ENCOUNTER — OFFICE VISIT (OUTPATIENT)
Dept: INTERNAL MEDICINE CLINIC | Facility: CLINIC | Age: 79
End: 2024-09-09
Payer: MEDICARE

## 2024-09-09 VITALS
BODY MASS INDEX: 23.7 KG/M2 | HEIGHT: 67 IN | RESPIRATION RATE: 18 BRPM | HEART RATE: 74 BPM | TEMPERATURE: 98 F | DIASTOLIC BLOOD PRESSURE: 80 MMHG | SYSTOLIC BLOOD PRESSURE: 120 MMHG | WEIGHT: 151 LBS | OXYGEN SATURATION: 97 %

## 2024-09-09 DIAGNOSIS — R21 RASH: Primary | ICD-10-CM

## 2024-09-09 PROCEDURE — G2211 COMPLEX E/M VISIT ADD ON: HCPCS | Performed by: INTERNAL MEDICINE

## 2024-09-09 PROCEDURE — 99213 OFFICE O/P EST LOW 20 MIN: CPT | Performed by: INTERNAL MEDICINE

## 2024-09-09 RX ORDER — PRENATAL VIT 91/IRON/FOLIC/DHA 28-975-200
COMBINATION PACKAGE (EA) ORAL 2 TIMES DAILY
Qty: 42 G | Refills: 1 | Status: SHIPPED | OUTPATIENT
Start: 2024-09-09

## 2024-09-09 NOTE — ASSESSMENT & PLAN NOTE
Discussed possibility of drug rash from the trospium, can do trial of this to see if he improves.  Discussed other possibility of nummular eczema, and evaluation with dermatology if not improving.

## 2024-09-09 NOTE — PATIENT INSTRUCTIONS
Problem List Items Addressed This Visit          Musculoskeletal and Integument    Rash - Primary     Discussed possibility of drug rash from the trospium, can do trial of this to see if he improves.  Discussed other possibility of nummular eczema, and evaluation with dermatology if not improving.         Relevant Medications    terbinafine (LamISIL) 1 % cream

## 2024-09-09 NOTE — PROGRESS NOTES
Ambulatory Visit  Name: Tonny Baig      : 1945      MRN: [id number]  Encounter Provider: Brendan Suarez MD  Encounter Date: 2024   Encounter department: [facility]    Assessment & Plan   1. Rash  Assessment & Plan:  Discussed possibility of drug rash from the trospium, can do trial of this to see if he improves.  Discussed other possibility of nummular eczema, and evaluation with dermatology if not improving.  Orders:  -     terbinafine (LamISIL) 1 % cream; Apply topically 2 (two) times a day         History of Present Illness     Patient here for rash, onset possibly a few weeks ago, patient not sure.  It burns at times, other times itchy, and other times he does not notice it much. Only new med is Tropsium    Rash  Pertinent negatives include no fever.     Review of Systems   Constitutional:  Negative for chills and fever.   Skin:  Positive for rash.     Past Medical History:   Diagnosis Date   • Allergic    • Allergic rhinitis    • Anxiety     occasional   • Aortic aneurysm ([facility])    • Benign colon polyp    • Bipolar 1 disorder ([facility])    • Cancer ([facility]) ,    • Cardiac disease     MI   • Cervical cord compression with myelopathy ([facility])    • COPD (chronic obstructive pulmonary disease) ([facility])    • Coronary artery disease    • Diverticulitis    • Diverticulitis of colon prior to    • Diverticulosis    • Emphysema of lung ([facility])    • Gait disturbance     uses cane, leg brace on right   • GERD (gastroesophageal reflux disease)    • Heart attack ([facility])    • Hx of resection of large bowel 2016   • Hyperlipidemia    • Hypertension    • IBS (irritable bowel syndrome)    • Inflammatory bowel disease    • Lumbar stenosis    • Lung cancer ([facility])      Left lower lobectomy and  Right lung with surgery    • Myocardial infarction ([facility])     involving other coronary artery   • Prostate cancer ([facility])    • Shortness of breath    • Small bowel  obstruction (HCC) 11/16/2016     Past Surgical History:   Procedure Laterality Date   • ANGIOPLASTY      stent   • CARDIAC CATHETERIZATION  1995    angioplasty   • CARDIAC SURGERY     • CERVICAL SPINE SURGERY      Cervical decompression with cervical fusion from C3-C7 for spinal stenosis.   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   • LUNG LOBECTOMY Left    • TX ARTHRD ANT INTERBODY MIN DSC CRV BELOW C2 N/A 05/02/2016    Procedure: Anterior cervical diskectomy C3/4, C5/6, C6/7 with anterior plate fixation fusion C3-7;  Posterior decompressive laminectomy C3-7 with lateral mass fixation fusion C3-7 (IMPULSE MONITORING);  Surgeon: Jose Luis Moore MD;  [facility];  Service: Neurosurgery   • TX ARTHRODESIS POSTERIOR INTERBODY 1 [facility] LUMBAR N/A 01/30/2017    Procedure: L4-5 AND L5-S1 DECOMPRESSIVE FORAMINOTOMIES, TRANSFORAMINAL LUMBAR INTERBODY AND PEDICLE SCREW FIXATION FUSION L4-S1 (IMPULSE);  Surgeon: Jose Luis Moore MD;  [facility];  Service: Neurosurgery   • TX EVASC RPR DPLMNT AORTO-AORTIC NDGFT N/A 08/06/2018    Procedure: REPAIR ANEURYSM ENDOVASCULAR ABDOMINAL AORTIC  (EVAR) WITH BILATERAL PERCUTANEOUS FEMORAL ACCESS WITH ULTRASOUND GUIDANCE ON THE RIGHT AND PRE CLOSURE;  Surgeon: Shan Villalobos MD;  [facility];  Service: Vascular   • PROSTATECTOMY  2007    for prostate CA - no chemo/RT   • SIGMOIDECTOMY      for divertic   • SMALL INTESTINE SURGERY N/A 11/17/2016    Procedure: Exploratory Laparotomy, Lysis of adhesions to release small bowel obstruction;  Surgeon:  Aminta Sim MD;  [facility];  Service:    • SPINE SURGERY  , 2017   • TONSILLECTOMY  Y    1950     Family History   Problem Relation Age of Onset   • Hypertension Mother    • Glaucoma Mother    • Heart attack Father    • Colon cancer Father    • Coronary artery disease Father    • Hypertension Father    • Kidney disease Father    • Heart disease Family    • Hyperlipidemia Family    • Hypertension Family      Social History     Tobacco Use   • Smoking status: Former     Current packs/day: 0.00     Average packs/day: 1 pack/day for 44.0 years (44.0 ttl pk-yrs)     Types: Cigarettes     Start date: 1967     Quit date: 2011     Years since quittin.6     Passive exposure: Past   • Smokeless tobacco: Never   Vaping Use   • Vaping status: Never Used   Substance and Sexual Activity   • Alcohol use: Yes     Alcohol/week: 2.0 - 3.0 standard drinks of alcohol     Types: 2 - 3 Shots of liquor per week     Comment: One glass per day   • Drug use: No   • Sexual activity: Yes     Partners: Female     Birth control/protection: Post-menopausal, None     Current Outpatient Medications on File Prior to Visit   Medication Sig   • acetaminophen (TYLENOL) 500 mg tablet Take 500 mg by mouth as needed for mild pain.   • albuterol (PROVENTIL HFA,VENTOLIN HFA) 90 mcg/act inhaler INHALE 2 PUFFS EVERY 6 HOURS AS NEEDED FOR WHEEZING OR SHORTNESS OF BREATH   • amLODIPine (NORVASC) 5 mg tablet Take 1 tablet (5 mg total) by mouth daily   • aspirin (ECOTRIN LOW STRENGTH) 81 mg EC tablet Take 81 mg by mouth daily   • atorvastatin (LIPITOR) 40 mg tablet Take 1 tablet (40 mg total) by mouth daily   • Cholecalciferol (VITAMIN D PO) Take 2,000 Units by mouth daily     • dicyclomine (BENTYL) 20 mg tablet TAKE 1 TABLET BY MOUTH EVERY 6  HOURS AS NEEDED FOR ABDOMINAL  CRAMPING   • DULoxetine (CYMBALTA) 30 mg delayed release capsule Take 1 capsule (30 mg total) by mouth daily   • fexofenadine (ALLEGRA) 180 MG tablet Take  180 mg by mouth as needed Daily during the Summer   • fluticasone (FLONASE) 50 mcg/act nasal spray 2 sprays into each nostril daily   • lithium carbonate (LITHOBID) 300 mg CR tablet Take 300 mg by mouth daily at bedtime   • magnesium chloride-calcium (SlowMag Mg Muscle/Heart) 71.5-119 mg Take 1 tablet by mouth 2 (two) times a day (Patient taking differently: Take 1 tablet by mouth 3 (three) times a day)   • metoprolol succinate (TOPROL-XL) 25 mg 24 hr tablet TAKE 1 TABLET BY MOUTH ONCE  DAILY   • omega-3-acid ethyl esters (LOVAZA) 1 g capsule Take 1 capsule (1 g total) by mouth 3 (three) times a day   • omeprazole (PriLOSEC) 20 mg delayed release capsule TAKE 1 CAPSULE BY MOUTH TWICE  DAILY BEFORE MEALS   • Trelegy Ellipta 200-62.5-25 MCG/ACT AEPB inhaler USE 1 INHALATION BY MOUTH DAILY   • trospium chloride (SANCTURA) 20 mg tablet Take 1 tablet (20 mg total) by mouth 2 (two) times a day (Patient taking differently: Take 20 mg by mouth 2 (two) times a day Pt has not started taking.)   • mupirocin (BACTROBAN) 2 % ointment Apply topically 2 (two) times a day as needed (wound) (Patient not taking: Reported on 8/22/2024)   • nitroglycerin (NITRODUR) 0.2 mg/hr APPLY 1 PATCH TOPICALLY ONCE  DAILY. LEAVE ON FOR 12 TO 14  HOURS THEN REMOVE FOR A  NITRATE-FREE INTERVAL OF 10 TO  12 HOURS (Patient not taking: Reported on 9/9/2024)     Allergies   Allergen Reactions   • Bactrim [Sulfamethoxazole-Trimethoprim] Other (See Comments)     AILYN   • Nsaids      Annotation - 20Nov2017: unable to take due to use of lithium.   • Oxycodone Rash     Immunization History   Administered Date(s) Administered   • COVID-19 PFIZER VACCINE 0.3 ML IM 02/17/2021, 03/10/2021, 10/26/2021   • COVID-19 Pfizer Vac BIVALENT Satya-sucrose 12 Yr+ IM 10/20/2022   • COVID-19 Pfizer mRNA vacc PF satya-sucrose 12 yr and older (Comirnaty) 11/06/2023   • COVID-19 Pfizer vac (Satya-sucrose, gray cap) 12 yr+ IM 08/03/2022   • COVID-19, unspecified 11/06/2023   •  "INFLUENZA 10/01/2015, 10/07/2018, 09/27/2019, 10/05/2020, 09/27/2021, 10/14/2022, 10/09/2023   • Influenza Quadrivalent Preservative Free 3 years and older IM 10/01/2015   • Influenza Split High Dose Preservative Free IM 10/25/2016, 10/03/2018   • Influenza, high dose seasonal 0.7 mL 09/27/2019, 10/05/2020, 10/14/2022, 10/09/2023   • Influenza, seasonal, injectable 1945, 10/10/2012   • Pneumococcal Conjugate 13-Valent 10/25/2016   • Pneumococcal Polysaccharide PPV23 10/03/2019   • Td (adult), adsorbed 12/01/2009   • Tdap 05/31/2023   • Zoster 01/01/2014, 01/01/2014   • influenza, trivalent, adjuvanted 09/27/2021     Objective     /80 (BP Location: Left arm, Patient Position: Sitting, Cuff Size: Standard)   Pulse 74   Temp 98 °F (36.7 °C) (Tympanic)   Resp 18   Ht 5' 7\" (1.702 m)   Wt 68.5 kg (151 lb)   SpO2 97%   BMI 23.65 kg/m²     Physical Exam  Skin:     Comments: Patient has redness in groin consistent with tinea crura's, but also multiple round macules and papules on both legs and a little on trunk         "

## 2024-09-12 ENCOUNTER — POST-OP CHECK (OUTPATIENT)
Dept: URBAN - METROPOLITAN AREA CLINIC 6 | Facility: CLINIC | Age: 79
End: 2024-09-12

## 2024-09-12 DIAGNOSIS — Z96.1: ICD-10-CM

## 2024-09-12 DIAGNOSIS — H35.3131: ICD-10-CM

## 2024-09-12 PROCEDURE — 99024 POSTOP FOLLOW-UP VISIT: CPT

## 2024-09-12 PROCEDURE — 92134 CPTRZ OPH DX IMG PST SGM RTA: CPT | Mod: NC

## 2024-09-12 ASSESSMENT — TONOMETRY
OS_IOP_MMHG: 13
OD_IOP_MMHG: 13

## 2024-09-12 ASSESSMENT — VISUAL ACUITY
OS_PH: 20/40-1
OS_SC: 20/50+1
OD_PH: 20/25
OD_SC: 20/40+2

## 2024-09-16 NOTE — PROGRESS NOTES
Assessment:  1. Lumbar radiculopathy    2. DDD (degenerative disc disease), lumbar    3. Spondylolisthesis of lumbar region    4. H/O lumbosacral spine surgery    5. Spinal stenosis of lumbar region with neurogenic claudication        Plan:  Patient will be scheduled for an L3-4 LESI to address the inflammatory component of the patient's pain.  Complete risks and benefits including bleeding, infection, tissue reaction, nerve injury and allergic reaction were discussed. The patient was agreeable and verbalized an understanding  Patient may continue duloxetine 30 mg daily as prescribed.  This medication was refilled today. patient not a candidate for further increased dosing secondary to CKD. I also explained that considering the patient has been on this medication since February 2024 without any side effects or reaction that it was unlikely the rash that developed the past week or two was related to the duloxetine.   Will avoid NSAIDs secondary to CKD  Continue to follow with orthopedics as scheduled  Continue with home exercise program  Follow-up in 3 months or sooner if needed    History of Present Illness:    The patient is a 78 y.o. male with history of L4-S1 fusion last seen on 07/12/2024  who presents for a follow up office visit in regards to chroniclumbosacral back pain that radiates into the left lower extremity with associated paresthesias.  He denies bowel or bladder incontinence or saddle anesthesia.  Patient did have 50% improvement of his pain from L3-4 LESI April 17, 2024 which provided 4 months relief.  He would be interested in repeating this injection.  He did recently have a left GTB injection with orthopedics today as well    The patient rates his pain a 7 out of 10 on the numeric pain rating scale.  Pain is intermittent in the morning and is described as burning, dull aching, sharp, throbbing, shooting, and pins-and-needles.  He continues on duloxetine 30 mg daily with 40% improvement of his  pain.  He has not had side effects previously.  He states he recently developed a rash on his abdomen, saw dermatology who obtained a biopsy and is still waiting on results.  He is questioning whether or not this could be secondary to the duloxetine.     I have personally reviewed and/or updated the patient's past medical history, past surgical history, family history, social history, current medications, allergies, and vital signs today.       Review of Systems:    Review of Systems   Respiratory:  Negative for shortness of breath.    Cardiovascular:  Negative for chest pain.   Gastrointestinal:  Negative for constipation, diarrhea, nausea and vomiting.   Musculoskeletal:  Positive for back pain and gait problem. Negative for arthralgias, joint swelling and myalgias.   Skin:  Negative for rash.   Neurological:  Negative for dizziness, seizures and weakness.   All other systems reviewed and are negative.        Past Medical History:   Diagnosis Date    Allergic 2011    Allergic rhinitis 2015    Anxiety     occasional    Aortic aneurysm (HCC)     Benign colon polyp     Bipolar 1 disorder (HCC)     Cancer ([facility]) 2007, 2011    Cardiac disease     MI    Cervical cord compression with myelopathy ([facility])     COPD (chronic obstructive pulmonary disease) ([facility])     Coronary artery disease 1995    Diverticulitis     Diverticulitis of colon prior to 2014    Diverticulosis     Emphysema of lung ([facility]) 2012    Gait disturbance     uses cane, leg brace on right    GERD (gastroesophageal reflux disease)     Heart attack ([facility]) 1996    Hx of resection of large bowel 05/03/2016    Hyperlipidemia     Hypertension     IBS (irritable bowel syndrome)     Inflammatory bowel disease 2012    Lumbar stenosis     Lung cancer ([facility])     2007 Left lower lobectomy and 2011 Right lung with surgery     Myocardial infarction (HCC)     involving other coronary artery    Prostate cancer (HCC)     Shortness of breath     Small bowel obstruction ([facility])  11/16/2016       Past Surgical History:   Procedure Laterality Date    ANGIOPLASTY      stent    CARDIAC CATHETERIZATION  1995    angioplasty    CARDIAC SURGERY      CERVICAL SPINE SURGERY      Cervical decompression with cervical fusion from C3-C7 for spinal stenosis.    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    LUNG LOBECTOMY Left     RI ARTHRD ANT INTERBODY MIN DSC CRV BELOW C2 N/A 05/02/2016    Procedure: Anterior cervical diskectomy C3/4, C5/6, C6/7 with anterior plate fixation fusion C3-7;  Posterior decompressive laminectomy C3-7 with lateral mass fixation fusion C3-7 (IMPULSE MONITORING);  Surgeon: Jose Luis Moore MD;  [facility];  Service: Neurosurgery    RI ARTHRODESIS POSTERIOR INTERBODY 1 [facility] LUMBAR N/A 01/30/2017    Procedure: L4-5 AND L5-S1 DECOMPRESSIVE FORAMINOTOMIES, TRANSFORAMINAL LUMBAR INTERBODY AND PEDICLE SCREW FIXATION FUSION L4-S1 (IMPULSE);  Surgeon: Jose Luis Moore MD;  [facility];  Service: Neurosurgery    RI EVASC RPR DPLMNT AORTO-AORTIC NDGFT N/A 08/06/2018    Procedure: REPAIR ANEURYSM ENDOVASCULAR ABDOMINAL AORTIC  (EVAR) WITH BILATERAL PERCUTANEOUS FEMORAL ACCESS WITH ULTRASOUND GUIDANCE ON THE RIGHT AND PRE CLOSURE;  Surgeon: Shan Villalobos MD;  [facility];  Service: Vascular    PROSTATECTOMY  2007    for prostate CA - no chemo/RT    SIGMOIDECTOMY      for divertic    SMALL INTESTINE SURGERY N/A 11/17/2016    Procedure: Exploratory Laparotomy, Lysis of adhesions to release small bowel obstruction;  Surgeon: Aminta Sim MD;  Location:  [facility];  Service:     SPINE SURGERY  ,     TONSILLECTOMY  Y    1950       Family History   Problem Relation Age of Onset    Hypertension Mother     Glaucoma Mother     Heart attack Father     Colon cancer Father     Coronary artery disease Father     Hypertension Father     Kidney disease Father     Heart disease Family     Hyperlipidemia Family     Hypertension Family        Social History     Occupational History    Occupation: Retired   Tobacco Use    Smoking status: Former     Current packs/day: 0.00     Average packs/day: 1 pack/day for 44.0 years (44.0 ttl pk-yrs)     Types: Cigarettes     Start date: 1967     Quit date: 2011     Years since quittin.7     Passive exposure: Past    Smokeless tobacco: Never   Vaping Use    Vaping status: Never Used   Substance and Sexual Activity    Alcohol use: Yes     Alcohol/week: 2.0 - 3.0 standard drinks of alcohol     Types: 2 - 3 Shots of liquor per week     Comment: One glass per day    Drug use: No    Sexual activity: Yes     Partners: Female     Birth control/protection: Post-menopausal, None         Current Outpatient Medications:     acetaminophen (TYLENOL) 500 mg tablet, Take 500 mg by mouth as needed for mild pain., Disp: , Rfl:     albuterol (PROVENTIL HFA,VENTOLIN HFA) 90 mcg/act inhaler, INHALE 2 PUFFS EVERY 6 HOURS AS NEEDED FOR WHEEZING OR SHORTNESS OF BREATH, Disp: , Rfl:     amLODIPine (NORVASC) 5 mg tablet, Take 1 tablet (5 mg total) by mouth daily, Disp: 90 tablet, Rfl: 3    DULoxetine (CYMBALTA) 30 mg delayed release capsule, Take 1 capsule (30 mg total) by mouth daily, Disp: 90 capsule, Rfl: 1    aspirin (ECOTRIN LOW STRENGTH) 81 mg EC tablet, Take 81 mg by mouth daily, Disp: , Rfl:     atorvastatin (LIPITOR) 40 mg tablet, Take 1 tablet (40 mg total) by mouth daily, Disp: 100 tablet, Rfl: 1    Cholecalciferol (VITAMIN D PO), Take 2,000 Units by mouth daily  , Disp: , Rfl:     dicyclomine (BENTYL) 20 mg tablet, TAKE 1 TABLET BY  MOUTH EVERY 6  HOURS AS NEEDED FOR ABDOMINAL  CRAMPING, Disp: 360 tablet, Rfl: 0    fexofenadine (ALLEGRA) 180 MG tablet, Take 180 mg by mouth as needed Daily during the Summer, Disp: , Rfl:     fluticasone (FLONASE) 50 mcg/act nasal spray, 2 sprays into each nostril daily, Disp: , Rfl:     lithium carbonate (LITHOBID) 300 mg CR tablet, Take 300 mg by mouth daily at bedtime, Disp: , Rfl:     magnesium chloride-calcium (SlowMag Mg Muscle/Heart) 71.5-119 mg, Take 1 tablet by mouth 2 (two) times a day (Patient taking differently: Take 1 tablet by mouth 3 (three) times a day), Disp: 60 tablet, Rfl: 5    metoprolol succinate (TOPROL-XL) 25 mg 24 hr tablet, TAKE 1 TABLET BY MOUTH ONCE  DAILY, Disp: 90 tablet, Rfl: 3    mupirocin (BACTROBAN) 2 % ointment, Apply topically 2 (two) times a day as needed (wound) (Patient not taking: Reported on 8/22/2024), Disp: 22 g, Rfl: 0    nitroglycerin (NITRODUR) 0.2 mg/hr, APPLY 1 PATCH TOPICALLY ONCE  DAILY. LEAVE ON FOR 12 TO 14  HOURS THEN REMOVE FOR A  NITRATE-FREE INTERVAL OF 10 TO  12 HOURS (Patient not taking: Reported on 9/9/2024), Disp: 90 patch, Rfl: 1    omega-3-acid ethyl esters (LOVAZA) 1 g capsule, Take 1 capsule (1 g total) by mouth 3 (three) times a day, Disp: 270 capsule, Rfl: 1    omeprazole (PriLOSEC) 20 mg delayed release capsule, TAKE 1 CAPSULE BY MOUTH TWICE  DAILY BEFORE MEALS, Disp: 200 capsule, Rfl: 1    terbinafine (LamISIL) 1 % cream, Apply topically 2 (two) times a day, Disp: 42 g, Rfl: 1    Trelegy Ellipta 200-62.5-25 MCG/ACT AEPB inhaler, USE 1 INHALATION BY MOUTH DAILY, Disp: 180 each, Rfl: 3    triamcinolone (KENALOG) 0.1 % cream, APPLY TO AFFECTED AREA S) TWO TIMES A DAY, Disp: , Rfl:     trospium chloride (SANCTURA) 20 mg tablet, Take 1 tablet (20 mg total) by mouth 2 (two) times a day (Patient taking differently: Take 20 mg by mouth 2 (two) times a day Pt has not started taking.), Disp: 180 tablet, Rfl: 3  No current facility-administered medications  "for this visit.    Allergies   Allergen Reactions    Bactrim [Sulfamethoxazole-Trimethoprim] Other (See Comments)     AILYN    Nsaids      Annotation - [id number]: unable to take due to use of lithium.    Oxycodone Rash       Physical Exam:    /72   Pulse 79   Ht 5' 7\" (1.702 m)   Wt 69.9 kg (154 lb)   BMI 24.12 kg/m²     Constitutional:normal, well developed, well nourished, alert, in no distress and non-toxic and no overt pain behavior.  Eyes:anicteric  HEENT:grossly intact  Neck:supple, symmetric, trachea midline and no masses   Pulmonary:even and unlabored  Cardiovascular:No edema or pitting edema present  Skin:Normal without rashes or lesions and well hydrated  Psychiatric:Mood and affect appropriate  Neurologic:Cranial Nerves II-XII grossly intact  Musculoskeletal:antalgic and ambulates with a rolling walker      Imaging  FL spine and pain procedure    (Results Pending)         Orders Placed This Encounter   Procedures    FL spine and pain procedure       "

## 2024-09-17 ENCOUNTER — OFFICE VISIT (OUTPATIENT)
Dept: PAIN MEDICINE | Facility: CLINIC | Age: 79
End: 2024-09-17
Payer: MEDICARE

## 2024-09-17 ENCOUNTER — OFFICE VISIT (OUTPATIENT)
Dept: OBGYN CLINIC | Facility: HOSPITAL | Age: 79
End: 2024-09-17
Payer: MEDICARE

## 2024-09-17 VITALS
DIASTOLIC BLOOD PRESSURE: 78 MMHG | WEIGHT: 154.6 LBS | BODY MASS INDEX: 24.27 KG/M2 | SYSTOLIC BLOOD PRESSURE: 121 MMHG | HEART RATE: 71 BPM | HEIGHT: 67 IN

## 2024-09-17 VITALS
HEIGHT: 67 IN | DIASTOLIC BLOOD PRESSURE: 72 MMHG | SYSTOLIC BLOOD PRESSURE: 148 MMHG | BODY MASS INDEX: 24.17 KG/M2 | WEIGHT: 154 LBS | HEART RATE: 79 BPM

## 2024-09-17 DIAGNOSIS — M54.16 LUMBAR RADICULOPATHY: Primary | ICD-10-CM

## 2024-09-17 DIAGNOSIS — M48.062 SPINAL STENOSIS OF LUMBAR REGION WITH NEUROGENIC CLAUDICATION: ICD-10-CM

## 2024-09-17 DIAGNOSIS — M70.62 TROCHANTERIC BURSITIS OF LEFT HIP: Primary | ICD-10-CM

## 2024-09-17 DIAGNOSIS — M51.36 DDD (DEGENERATIVE DISC DISEASE), LUMBAR: ICD-10-CM

## 2024-09-17 DIAGNOSIS — M16.12 PRIMARY OSTEOARTHRITIS OF LEFT HIP: ICD-10-CM

## 2024-09-17 DIAGNOSIS — M76.32 ILIOTIBIAL BAND SYNDROME OF LEFT SIDE: ICD-10-CM

## 2024-09-17 DIAGNOSIS — Z98.890 H/O LUMBOSACRAL SPINE SURGERY: ICD-10-CM

## 2024-09-17 DIAGNOSIS — M43.16 SPONDYLOLISTHESIS OF LUMBAR REGION: ICD-10-CM
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
## 2024-09-17 PROCEDURE — 20610 DRAIN/INJ JOINT/BURSA W/O US: CPT | Performed by: ORTHOPAEDIC SURGERY

## 2024-09-17 PROCEDURE — 99214 OFFICE O/P EST MOD 30 MIN: CPT | Performed by: NURSE PRACTITIONER

## 2024-09-17 PROCEDURE — 99213 OFFICE O/P EST LOW 20 MIN: CPT | Performed by: ORTHOPAEDIC SURGERY

## 2024-09-17 PROCEDURE — G2211 COMPLEX E/M VISIT ADD ON: HCPCS | Performed by: NURSE PRACTITIONER

## 2024-09-17 RX ORDER — TRIAMCINOLONE ACETONIDE 1 MG/G
CREAM TOPICAL
COMMUNITY
Start: 2024-09-13

## 2024-09-17 RX ORDER — DULOXETIN HYDROCHLORIDE 30 MG/1
30 CAPSULE, DELAYED RELEASE ORAL DAILY
Qty: 90 CAPSULE | Refills: 1 | Status: SHIPPED | OUTPATIENT
Start: 2024-09-17

## 2024-09-17 RX ORDER — BETAMETHASONE SODIUM PHOSPHATE AND BETAMETHASONE ACETATE 3; 3 MG/ML; MG/ML
12 INJECTION, SUSPENSION INTRA-ARTICULAR; INTRALESIONAL; INTRAMUSCULAR; SOFT TISSUE
Status: COMPLETED | OUTPATIENT
Start: 2024-09-17 | End: 2024-09-17

## 2024-09-17 RX ORDER — LIDOCAINE HYDROCHLORIDE 10 MG/ML
4 INJECTION, SOLUTION INFILTRATION; PERINEURAL
Status: COMPLETED | OUTPATIENT
Start: 2024-09-17 | End: 2024-09-17

## 2024-09-17 RX ADMIN — LIDOCAINE HYDROCHLORIDE 4 ML: 10 INJECTION, SOLUTION INFILTRATION; PERINEURAL at 10:00

## 2024-09-17 RX ADMIN — BETAMETHASONE SODIUM PHOSPHATE AND BETAMETHASONE ACETATE 12 MG: 3; 3 INJECTION, SUSPENSION INTRA-ARTICULAR; INTRALESIONAL; INTRAMUSCULAR; SOFT TISSUE at 10:00

## 2024-09-17 NOTE — PATIENT INSTRUCTIONS
1. Trochanteric bursitis of left hip  Large joint arthrocentesis: L greater trochanteric bursa      2. Primary osteoarthritis of left hip        3. Iliotibial band syndrome of left side        4. Fall, initial encounter          Call or message through [facility] for referral back to PT if you wish to do so    Return in about 3 months (around 12/17/2024) for re-check, or sooner if symptoms worsen or fail to improve.

## 2024-09-17 NOTE — PROGRESS NOTES
Assessment:   Diagnosis ICD-10-CM Associated Orders   1. Trochanteric bursitis of left hip  M70.62 Large joint arthrocentesis: L greater trochanteric bursa      2. Primary osteoarthritis of left hip  M16.12       3. Iliotibial band syndrome of left side  M76.32       4. Fall, initial encounter  W19.XXXA           Plan:  Diagnosis, treatment options and associated risks were discussed with the patient including no treatment, nonsurgical treatment and potential for surgical intervention.  The patient was given the opportunity to ask questions regarding each.   It was explained to the patient that based upon the clinical and radiographic findings, at this point in time, this is not a surgical problem.  Treatment for the above diagnoses and associated symptoms of this nature is generally conservative including a physician directed physical therapy program, improved fitness/weight loss, anti-inflammatories, and potentially various injections.  Despite having modest degenerative changes at his left hip his symptoms and exam findings are consistent with trochanteric bursitis and IT band tendinitis.  He was offered, accepted, performed injection of cortisone to his left hip trochanteric bursa area today for symptomatic relief.  He tolerated the procedure well.  Ice and postinjection protocol advised.  Weightbearing and activity as tolerated.  If he wishes to attend formal physical therapy for a refresher course he may contact the office and a referral will be placed electronically.    To do next visit:  Return in about 3 months (around 12/17/2024) for re-check, or sooner if symptoms worsen or fail to improve.    The above stated was discussed in layman's terms and the patient expressed understanding.  All questions were answered to the patient's satisfaction.       Scribe Attestation      I,:  Sadi Ruelas am acting as a scribe while in the presence of the attending physician.:       I,:  Landon Galarza MD personally  performed the services described in this documentation    as scribed in my presence.:               Subjective:   Tonny Baig is a 78 y.o. male who presents today for repeat evaluation with his wife of his left hip due to return of pain.  His pain is laterally.  Is laterally at his hip and will radiate just to the proximal aspect of his knee, but not distally.  He uses a walker for ambulatory assistance.  He has a multilevel lumbar spine PSF.  He sees spine and pain with treatment for epidural steroid injections.  His pain laterally at his left hip bothers him at night and cannot lay comfortably on his left side.  He did tripped over his walker 2 weeks ago falling onto his left side aggravating his ribs more than anything.  He just finished up a course of formal physical therapy and is compliant with his home exercise program routinely      Review of systems negative unless otherwise specified in HPI  Review of Systems    Past Medical History:   Diagnosis Date    Allergic 2011    Allergic rhinitis 2015    Anxiety     occasional    Aortic aneurysm ([facility])     Benign colon polyp     Bipolar 1 disorder ([facility])     Cancer ([facility]) 2007, 2011    Cardiac disease     MI    Cervical cord compression with myelopathy ([facility])     COPD (chronic obstructive pulmonary disease) ([facility])     Coronary artery disease 1995    Diverticulitis     Diverticulitis of colon prior to 2014    Diverticulosis     Emphysema of lung ([facility]) 2012    Gait disturbance     uses cane, leg brace on right    GERD (gastroesophageal reflux disease)     Heart attack ([facility]) 1996    Hx of resection of large bowel 05/03/2016    Hyperlipidemia     Hypertension     IBS (irritable bowel syndrome)     Inflammatory bowel disease 2012    Lumbar stenosis     Lung cancer ([facility])     2007 Left lower lobectomy and 2011 Right lung with surgery     Myocardial infarction ([facility])     involving other coronary artery    Prostate cancer ([facility])     Shortness of breath     Small bowel  obstruction ([facility]) 11/16/2016       Past Surgical History:   Procedure Laterality Date    ANGIOPLASTY      stent    CARDIAC CATHETERIZATION  1995    angioplasty    CARDIAC SURGERY      CERVICAL SPINE SURGERY      Cervical decompression with cervical fusion from C3-C7 for spinal stenosis.    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    LUNG LOBECTOMY Left     KS ARTHRD ANT INTERBODY MIN DSC CRV BELOW C2 N/A 05/02/2016    Procedure: Anterior cervical diskectomy C3/4, C5/6, C6/7 with anterior plate fixation fusion C3-7;  Posterior decompressive laminectomy C3-7 with lateral mass fixation fusion C3-7 (IMPULSE MONITORING);  Surgeon: Joes Luis Moore MD;  [facility]: [facility];  Service: Neurosurgery    KS ARTHRODESIS POSTERIOR INTERBODY 1 [facility] LUMBAR N/A 01/30/2017    Procedure: L4-5 AND L5-S1 DECOMPRESSIVE FORAMINOTOMIES, TRANSFORAMINAL LUMBAR INTERBODY AND PEDICLE SCREW FIXATION FUSION L4-S1 (IMPULSE);  Surgeon: Jose Luis Moore MD;  Location: [facility];  Service: Neurosurgery    KS EVASC RPR DPLMNT AORTO-AORTIC NDGFT N/A 08/06/2018    Procedure: REPAIR ANEURYSM ENDOVASCULAR ABDOMINAL AORTIC  (EVAR) WITH BILATERAL PERCUTANEOUS FEMORAL ACCESS WITH ULTRASOUND GUIDANCE ON THE RIGHT AND PRE CLOSURE;  Surgeon: Shan Villalobos MD;  Location: [facility];  Service: Vascular    PROSTATECTOMY  2007    for prostate CA - no chemo/RT    SIGMOIDECTOMY      for divertic    SMALL INTESTINE SURGERY N/A 11/17/2016    Procedure: Exploratory Laparotomy, Lysis of adhesions to release small bowel obstruction;  Surgeon: Aminta  MD Chiquis;  Location: [facility];  Service:     SPINE SURGERY  ,     TONSILLECTOMY  Y    1952       Family History   Problem Relation Age of Onset    Hypertension Mother     Glaucoma Mother     Heart attack Father     Colon cancer Father     Coronary artery disease Father     Hypertension Father     Kidney disease Father     Heart disease Family     Hyperlipidemia Family     Hypertension Family        Social History     Occupational History    Occupation: Retired   Tobacco Use    Smoking status: Former     Current packs/day: 0.00     Average packs/day: 1 pack/day for 44.0 years (44.0 ttl pk-yrs)     Types: Cigarettes     Start date: 1967     Quit date: 2011     Years since quittin.7     Passive exposure: Past    Smokeless tobacco: Never   Vaping Use    Vaping status: Never Used   Substance and Sexual Activity    Alcohol use: Yes     Alcohol/week: 2.0 - 3.0 standard drinks of alcohol     Types: 2 - 3 Shots of liquor per week     Comment: One glass per day    Drug use: No    Sexual activity: Yes     Partners: Female     Birth control/protection: Post-menopausal, None         Current Outpatient Medications:     triamcinolone (KENALOG) 0.1 % cream, APPLY TO AFFECTED AREA S) TWO TIMES A DAY, Disp: , Rfl:     acetaminophen (TYLENOL) 500 mg tablet, Take 500 mg by mouth as needed for mild pain., Disp: , Rfl:     albuterol (PROVENTIL HFA,VENTOLIN HFA) 90 mcg/act inhaler, INHALE 2 PUFFS EVERY 6 HOURS AS NEEDED FOR WHEEZING OR SHORTNESS OF BREATH, Disp: , Rfl:     amLODIPine (NORVASC) 5 mg tablet, Take 1 tablet (5 mg total) by mouth daily, Disp: 90 tablet, Rfl: 3    aspirin (ECOTRIN LOW STRENGTH) 81 mg EC tablet, Take 81 mg by mouth daily, Disp: , Rfl:     atorvastatin (LIPITOR) 40 mg tablet, Take 1 tablet (40 mg total) by mouth daily, Disp: 100 tablet, Rfl: 1    Cholecalciferol (VITAMIN D PO), Take 2,000 Units by mouth daily  , Disp: , Rfl:     dicyclomine (BENTYL) 20 mg tablet, TAKE 1 TABLET BY  MOUTH EVERY 6  HOURS AS NEEDED FOR ABDOMINAL  CRAMPING, Disp: 360 tablet, Rfl: 0    DULoxetine (CYMBALTA) 30 mg delayed release capsule, Take 1 capsule (30 mg total) by mouth daily, Disp: 90 capsule, Rfl: 0    fexofenadine (ALLEGRA) 180 MG tablet, Take 180 mg by mouth as needed Daily during the Summer, Disp: , Rfl:     fluticasone (FLONASE) 50 mcg/act nasal spray, 2 sprays into each nostril daily, Disp: , Rfl:     lithium carbonate (LITHOBID) 300 mg CR tablet, Take 300 mg by mouth daily at bedtime, Disp: , Rfl:     magnesium chloride-calcium (SlowMag Mg Muscle/Heart) 71.5-119 mg, Take 1 tablet by mouth 2 (two) times a day (Patient taking differently: Take 1 tablet by mouth 3 (three) times a day), Disp: 60 tablet, Rfl: 5    metoprolol succinate (TOPROL-XL) 25 mg 24 hr tablet, TAKE 1 TABLET BY MOUTH ONCE  DAILY, Disp: 90 tablet, Rfl: 3    mupirocin (BACTROBAN) 2 % ointment, Apply topically 2 (two) times a day as needed (wound) (Patient not taking: Reported on 8/22/2024), Disp: 22 g, Rfl: 0    nitroglycerin (NITRODUR) 0.2 mg/hr, APPLY 1 PATCH TOPICALLY ONCE  DAILY. LEAVE ON FOR 12 TO 14  HOURS THEN REMOVE FOR A  NITRATE-FREE INTERVAL OF 10 TO  12 HOURS (Patient not taking: Reported on 9/9/2024), Disp: 90 patch, Rfl: 1    omega-3-acid ethyl esters (LOVAZA) 1 g capsule, Take 1 capsule (1 g total) by mouth 3 (three) times a day, Disp: 270 capsule, Rfl: 1    omeprazole (PriLOSEC) 20 mg delayed release capsule, TAKE 1 CAPSULE BY MOUTH TWICE  DAILY BEFORE MEALS, Disp: 200 capsule, Rfl: 1    terbinafine (LamISIL) 1 % cream, Apply topically 2 (two) times a day, Disp: 42 g, Rfl: 1    Trelegy Ellipta 200-62.5-25 MCG/ACT AEPB inhaler, USE 1 INHALATION BY MOUTH DAILY, Disp: 180 each, Rfl: 3    trospium chloride (SANCTURA) 20 mg tablet, Take 1 tablet (20 mg total) by mouth 2 (two) times a day (Patient taking differently: Take 20 mg by mouth 2 (two) times a day Pt has not started taking.), Disp: 180 tablet, Rfl: 3    Allergies  "  Allergen Reactions    Bactrim [Sulfamethoxazole-Trimethoprim] Other (See Comments)     AILYN    Nsaids      Annotation - [id number]: unable to take due to use of lithium.    Oxycodone Rash            Vitals:    09/17/24 0950   BP: 121/78   Pulse: 71       Body mass index is 24.21 kg/m².  Wt Readings from Last 3 Encounters:   09/17/24 70.1 kg (154 lb 9.6 oz)   09/09/24 68.5 kg (151 lb)   08/22/24 70.9 kg (156 lb 3.2 oz)       Objective:                    Left Hip Exam     Tenderness   The patient is experiencing tenderness in the greater trochanter and lateral (IT band disfuely).    Other   Erythema: absent  Sensation: normal    Comments:    Painless arc of PROM in all planes, no groin pain  Fairly good hip muscle strength    4+/5 Tib-Ant B/L            Diagnostics, reviewed and taken today if performed as documented:    None performed          Procedures, if performed today:    Large joint arthrocentesis: L greater trochanteric bursa  Universal Protocol:  Consent: Verbal consent obtained.  Risks and benefits: risks, benefits and alternatives were discussed  Consent given by: patient  Time out: Immediately prior to procedure a \"time out\" was called to verify the correct patient, procedure, equipment, support staff and site/side marked as required.  Timeout called at: 9/17/2024 10:14 AM.  Patient understanding: patient states understanding of the procedure being performed  Site marked: the operative site was marked  Patient identity confirmed: verbally with patient  Supporting Documentation  Indications: pain and diagnostic evaluation   Procedure Details  Location: hip - L greater trochanteric bursa  Preparation: Patient was prepped and draped in the usual sterile fashion  Needle size: 22 G  Ultrasound guidance: no  Approach: lateral  Medications administered: 4 mL lidocaine 1 %; 12 mg betamethasone acetate-betamethasone sodium phosphate 6 (3-3) mg/mL    Patient tolerance: patient tolerated the procedure well with no " "immediate complications  Dressing:  Sterile dressing applied             Portions of the record may have been created with voice recognition software.  Occasional wrong word or \"sound a like\" substitutions may have occurred due to the inherent limitations of voice recognition software.  Read the chart carefully and recognize, using context, where substitutions have occurred.  "

## 2024-09-20 ENCOUNTER — TELEPHONE (OUTPATIENT)
Age: 79
End: 2024-09-20

## 2024-09-23 ENCOUNTER — APPOINTMENT (OUTPATIENT)
Dept: LAB | Facility: CLINIC | Age: 79
End: 2024-09-23
Payer: MEDICARE

## 2024-09-23 DIAGNOSIS — E83.42 HYPOMAGNESEMIA: ICD-10-CM

## 2024-09-23 DIAGNOSIS — N18.31 STAGE 3A CHRONIC KIDNEY DISEASE (HCC): ICD-10-CM

## 2024-09-23 LAB
ANION GAP SERPL CALCULATED.3IONS-SCNC: 5 MMOL/L (ref 4–13)
BUN SERPL-MCNC: 50 MG/DL (ref 5–25)
CALCIUM SERPL-MCNC: 9.8 MG/DL (ref 8.4–10.2)
CHLORIDE SERPL-SCNC: 105 MMOL/L (ref 96–108)
CO2 SERPL-SCNC: 26 MMOL/L (ref 21–32)
CREAT SERPL-MCNC: 1.64 MG/DL (ref 0.6–1.3)
GFR SERPL CREATININE-BSD FRML MDRD: 40 ML/MIN/1.73SQ M
GLUCOSE P FAST SERPL-MCNC: 99 MG/DL (ref 65–99)
MAGNESIUM SERPL-MCNC: 1.8 MG/DL (ref 1.9–2.7)
POTASSIUM SERPL-SCNC: 5.6 MMOL/L (ref 3.5–5.3)
SODIUM SERPL-SCNC: 136 MMOL/L (ref 135–147)

## 2024-09-23 PROCEDURE — 80048 BASIC METABOLIC PNL TOTAL CA: CPT

## 2024-09-23 PROCEDURE — 83735 ASSAY OF MAGNESIUM: CPT

## 2024-09-23 PROCEDURE — 36415 COLL VENOUS BLD VENIPUNCTURE: CPT

## 2024-09-23 NOTE — TELEPHONE ENCOUNTER
Called patient he is ok to keep his appt. He was double checking bc he is getting a vaccine on the 25th  His procedure is the 10/9 so he will be fine

## 2024-09-24 ENCOUNTER — OFFICE VISIT (OUTPATIENT)
Dept: PULMONOLOGY | Facility: CLINIC | Age: 79
End: 2024-09-24
Payer: MEDICARE

## 2024-09-24 VITALS
OXYGEN SATURATION: 99 % | BODY MASS INDEX: 24.17 KG/M2 | WEIGHT: 154 LBS | TEMPERATURE: 96.4 F | RESPIRATION RATE: 16 BRPM | DIASTOLIC BLOOD PRESSURE: 80 MMHG | SYSTOLIC BLOOD PRESSURE: 122 MMHG | HEIGHT: 67 IN | HEART RATE: 63 BPM

## 2024-09-24 DIAGNOSIS — Z85.118 HISTORY OF LUNG CANCER: ICD-10-CM

## 2024-09-24 DIAGNOSIS — J44.9 CHRONIC OBSTRUCTIVE PULMONARY DISEASE, UNSPECIFIED COPD TYPE (HCC): Primary | ICD-10-CM

## 2024-09-24 DIAGNOSIS — K21.9 GASTROESOPHAGEAL REFLUX DISEASE WITHOUT ESOPHAGITIS: ICD-10-CM

## 2024-09-24 DIAGNOSIS — J30.9 ALLERGIC RHINITIS, UNSPECIFIED SEASONALITY, UNSPECIFIED TRIGGER: ICD-10-CM

## 2024-09-24 PROCEDURE — 99214 OFFICE O/P EST MOD 30 MIN: CPT | Performed by: INTERNAL MEDICINE

## 2024-09-24 NOTE — PROGRESS NOTES
Office Progress Note - Pulmonary    Tonny Baig 78 y.o. male MRN: [id number]    Encounter: [id number]      Assessment:  Obstructive pulmonary disease.  History of lung cancer.  Allergic rhinitis.  Gastroesophageal reflux disease.    Plan:   Trelegy Ellipta 200, 1 inhalation once a day.  Albuterol rescue inhaler 2 inhalations 4 times daily as needed.  Saline nasal/sinus rinse once a day as needed.  Fluticasone nasal spray 2 sprays to each nostril once a day.  Fexofenadine 180 mg once a day.  Follow-up in 6 months.    Discussion:   The patient's COPD is in remission.  I have maintained him on the Trelegy Ellipta 1 inhalation once a day.  He will use the albuterol rescue inhaler 2 inhalations 4 times a day as needed.  His allergic rhinitis is well treated.  He will use the saline nasal/sinus rinse once a day as needed.  I have maintained him on the fluticasone nasal spray 2 sprays to each nostril once a day.  He will also take the fexofenadine 180 mg once a day.  He already has received the influenza vaccine for the season.  He is scheduled to get the COVID-vaccine.  I will see him in 6 months.      Subjective:   The patient is here for a follow-up visit.  He denies any significant cough, wheezing or sputum production.  No nocturnal symptoms.  He is taking the Trelegy Ellipta 200, 1 inhalation once a day.  His use for the rescue inhaler depends on the weather.  In the summertime when it was warm and humid he use it up to 2 times a day.  Lately as the weather got better he rarely needs it.  He is taking fluticasone nasal spray 2 sprays to each nostril once a day and fexofenadine 180 mg once a day.  He has good appetite.    Review of systems:  A 12 point system review is done and aside from what is stated above the rest of the review of systems is negative.      Family history and social history are reviewed.    Medications list is reviewed.      Vitals: Blood pressure 122/80, pulse 63, temperature (!) 96.4 °F (35.8  "°C), temperature source Tympanic, resp. rate 16, height 5' 7\" (1.702 m), weight 69.9 kg (154 lb), SpO2 99%.,     Physical Exam  Gen: Awake, alert, oriented x 3, no acute distress  HEENT: Mucous membranes moist, no oral lesions, no thrush  NECK: No accessory muscle use, JVP not elevated  Cardiac: Regular, single S1, single S2, no murmurs, no rubs, no gallops  Lungs: Diminished breath sounds.  No wheezing or rhonchi.  Abdomen: normoactive bowel sounds, soft nontender, nondistended, no rebound or rigidity, no guarding  Extremities: no cyanosis, no clubbing, no edema  Neuro:  Grossly nonfocal.  Skin:  No rash.    Lab Results   Component Value Date    SODIUM 136 09/23/2024    K 5.6 (H) 09/23/2024     09/23/2024    CO2 26 09/23/2024    BUN 50 (H) 09/23/2024    CREATININE 1.64 (H) 09/23/2024    GLUC 100 04/02/2021    CALCIUM 9.8 09/23/2024       "

## 2024-10-04 ENCOUNTER — OFFICE VISIT (OUTPATIENT)
Dept: NEPHROLOGY | Facility: CLINIC | Age: 79
End: 2024-10-04
Payer: MEDICARE

## 2024-10-04 VITALS
HEART RATE: 80 BPM | DIASTOLIC BLOOD PRESSURE: 70 MMHG | WEIGHT: 153 LBS | HEIGHT: 67 IN | SYSTOLIC BLOOD PRESSURE: 118 MMHG | BODY MASS INDEX: 24.01 KG/M2

## 2024-10-04 DIAGNOSIS — N18.31 STAGE 3A CHRONIC KIDNEY DISEASE (HCC): Primary | ICD-10-CM

## 2024-10-04 PROCEDURE — 99214 OFFICE O/P EST MOD 30 MIN: CPT | Performed by: INTERNAL MEDICINE

## 2024-10-04 NOTE — PROGRESS NOTES
NEPHROLOGY PROGRESS NOTE    Tonny Baig 78 y.o. male MRN: [id number]  Unit/Bed#:  Encounter: [id number]  Reason for Consult: Chronic renal insufficiency and proteinuria    The patient is here for routine follow-up he really looks great he is ambulating quickly in good spirits says that his health been doing well with no intercurrent illnesses that he can recall.    ASSESSMENT/PLAN:  1.  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  He has been on chronic lithium therapy for years and is very well-controlled and compensated.  His latest creatinine is 1.6 which is stable and his baseline range there is subtle fluctuations over the years likely related to subtle changes in his volume status.  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  His latest magnesium levels are improved as well.    For now I would make no changes in medications  Labs and follow-up as scheduled  We will repeat a BMP in a month to repeat the potassium level and then if everything stable we will follow-up as scheduled.    I told him to call if there is any problems or concerns before next visit.    I have spent a total time of 30 minutes in caring for this patient on the day of the visit/encounter including Diagnostic results, Prognosis, Impressions, Reviewing / ordering tests, medicine, procedures  , and Obtaining or reviewing history  .         SUBJECTIVE:  Review of Systems   Constitutional: Negative for chills, diaphoresis and fever.   HENT: Negative.     Eyes: Negative.    Cardiovascular:  Negative for chest pain, dyspnea on exertion, leg swelling and orthopnea.   Respiratory:  Negative for cough, shortness of breath and sputum production.    Gastrointestinal:  Negative for abdominal pain, diarrhea, nausea and vomiting.   Genitourinary: Negative.    Neurological:   "Negative for dizziness and headaches.   Psychiatric/Behavioral:  Negative for altered mental status.        OBJECTIVE:  Current Weight:    Vitals:@TMAX(24)@Current:     Height 5' 7\" (1.702 m). , Body mass index is 24.12 kg/m².    [unfilled]    Physical Exam: Ht 5' 7\" (1.702 m)   BMI 24.12 kg/m²   Physical Exam  Constitutional:       General: He is not in acute distress.     Appearance: He is not toxic-appearing.   HENT:      Head: Normocephalic and atraumatic.      Nose: Nose normal.      Mouth/Throat:      Mouth: Mucous membranes are moist.   Eyes:      General: No scleral icterus.     Extraocular Movements: Extraocular movements intact.   Cardiovascular:      Rate and Rhythm: Normal rate and regular rhythm.      Heart sounds:      No gallop.   Pulmonary:      Effort: Pulmonary effort is normal. No respiratory distress.      Breath sounds: No wheezing or rales.   Abdominal:      General: Bowel sounds are normal. There is no distension.      Palpations: Abdomen is soft.      Tenderness: There is no abdominal tenderness.   Musculoskeletal:      Cervical back: Normal range of motion and neck supple.   Neurological:      Mental Status: He is alert and oriented to person, place, and time. Mental status is at baseline.   Psychiatric:         Mood and Affect: Mood normal.         Behavior: Behavior normal.         Medications:    Current Outpatient Medications:     acetaminophen (TYLENOL) 500 mg tablet, Take 500 mg by mouth as needed for mild pain., Disp: , Rfl:     albuterol (PROVENTIL HFA,VENTOLIN HFA) 90 mcg/act inhaler, INHALE 2 PUFFS EVERY 6 HOURS AS NEEDED FOR WHEEZING OR SHORTNESS OF BREATH, Disp: , Rfl:     amLODIPine (NORVASC) 5 mg tablet, Take 1 tablet (5 mg total) by mouth daily, Disp: 90 tablet, Rfl: 3    aspirin (ECOTRIN LOW STRENGTH) 81 mg EC tablet, Take 81 mg by mouth daily, Disp: , Rfl:     atorvastatin (LIPITOR) 40 mg tablet, Take 1 tablet (40 mg total) by mouth daily, Disp: 100 tablet, Rfl: 1    " Cholecalciferol (VITAMIN D PO), Take 2,000 Units by mouth daily  , Disp: , Rfl:     dicyclomine (BENTYL) 20 mg tablet, TAKE 1 TABLET BY MOUTH EVERY 6  HOURS AS NEEDED FOR ABDOMINAL  CRAMPING, Disp: 360 tablet, Rfl: 0    DULoxetine (CYMBALTA) 30 mg delayed release capsule, Take 1 capsule (30 mg total) by mouth daily, Disp: 90 capsule, Rfl: 1    fexofenadine (ALLEGRA) 180 MG tablet, Take 180 mg by mouth as needed Daily during the Summer, Disp: , Rfl:     fluticasone (FLONASE) 50 mcg/act nasal spray, 2 sprays into each nostril daily, Disp: , Rfl:     lithium carbonate (LITHOBID) 300 mg CR tablet, Take 300 mg by mouth daily at bedtime, Disp: , Rfl:     magnesium chloride-calcium (SlowMag Mg Muscle/Heart) 71.5-119 mg, Take 1 tablet by mouth 2 (two) times a day (Patient taking differently: Take 1 tablet by mouth 3 (three) times a day), Disp: 60 tablet, Rfl: 5    metoprolol succinate (TOPROL-XL) 25 mg 24 hr tablet, TAKE 1 TABLET BY MOUTH ONCE  DAILY, Disp: 90 tablet, Rfl: 3    mupirocin (BACTROBAN) 2 % ointment, Apply topically 2 (two) times a day as needed (wound) (Patient not taking: Reported on 8/22/2024), Disp: 22 g, Rfl: 0    nitroglycerin (NITRODUR) 0.2 mg/hr, APPLY 1 PATCH TOPICALLY ONCE  DAILY. LEAVE ON FOR 12 TO 14  HOURS THEN REMOVE FOR A  NITRATE-FREE INTERVAL OF 10 TO  12 HOURS (Patient not taking: Reported on 9/9/2024), Disp: 90 patch, Rfl: 1    omega-3-acid ethyl esters (LOVAZA) 1 g capsule, Take 1 capsule (1 g total) by mouth 3 (three) times a day, Disp: 270 capsule, Rfl: 1    omeprazole (PriLOSEC) 20 mg delayed release capsule, TAKE 1 CAPSULE BY MOUTH TWICE  DAILY BEFORE MEALS, Disp: 200 capsule, Rfl: 1    terbinafine (LamISIL) 1 % cream, Apply topically 2 (two) times a day, Disp: 42 g, Rfl: 1    Trelegy Ellipta 200-62.5-25 MCG/ACT AEPB inhaler, USE 1 INHALATION BY MOUTH DAILY, Disp: 180 each, Rfl: 3    triamcinolone (KENALOG) 0.1 % cream, APPLY TO AFFECTED AREA S) TWO TIMES A DAY, Disp: , Rfl:     trospium  "chloride (SANCTURA) 20 mg tablet, Take 1 tablet (20 mg total) by mouth 2 (two) times a day (Patient taking differently: Take 20 mg by mouth 2 (two) times a day Pt has not started taking.), Disp: 180 tablet, Rfl: 3    Laboratory Results:  Lab Results   Component Value Date    WBC 11.56 (H) 09/14/2023    HGB 13.6 09/14/2023    HCT 40.2 09/14/2023    MCV 92 09/14/2023     09/14/2023     Lab Results   Component Value Date    SODIUM 136 09/23/2024    K 5.6 (H) 09/23/2024     09/23/2024    CO2 26 09/23/2024    BUN 50 (H) 09/23/2024    CREATININE 1.64 (H) 09/23/2024    GLUC 100 04/02/2021    CALCIUM 9.8 09/23/2024     Lab Results   Component Value Date    CALCIUM 9.8 09/23/2024    PHOS 2.8 11/05/2014     No results found for: \"LABPROT\"      "

## 2024-10-04 NOTE — PATIENT INSTRUCTIONS
You are here for follow-up of course it is a pleasure only seeing you I hope when we next see each other the world and country are heading in the right direction no matter who get selected.    Your creatinine level was 1.6 so it is a little higher than prior baseline but these fluctuations can happen from subtle dehydration so overall I think there is no significant progression.    What we will do is we will just recheck it in a couple months and then if things remain stable we will follow-up in February with repeat labs.

## 2024-10-04 NOTE — LETTER
October 7, 2024     Brendan Suarez MD  [address] [facility] [address]    Patient: Tonny Baig   YOB: 1945   Date of Visit: 10/4/2024       Dear Dr. Suarez:    Thank you for referring Tonny Baig to me for evaluation. Below are my notes for this consultation.    If you have questions, please do not hesitate to call me. I look forward to following your patient along with you.         Sincerely,        Gilson Heller MD        CC: No Recipients    Gilson Heller MD  10/7/2024 12:34 PM  Sign when Signing Visit  NEPHROLOGY PROGRESS NOTE    Tonny Baig 78 y.o. male MRN: [id number]  Unit/Bed#:  Encounter: [id number]  Reason for Consult: Chronic renal insufficiency and proteinuria    The patient is here for routine follow-up he really looks great he is ambulating quickly in good spirits says that his health been doing well with no intercurrent illnesses that he can recall.    ASSESSMENT/PLAN:  1.  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  He has been on chronic lithium therapy for years and is very well-controlled and compensated.  His latest creatinine is 1.6 which is stable and his baseline range there is subtle fluctuations over the years likely related to subtle changes in his volume status.  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  His latest magnesium levels are improved as well.    For now I would make no changes in medications  Labs and follow-up as scheduled  We will repeat a BMP in a month to repeat the potassium level and then if everything stable we will follow-up as scheduled.    I told him to call if there is any problems or concerns before next visit.    I have spent a total time of 30 minutes in caring for this patient on the day of the visit/encounter including Diagnostic results, Prognosis,  "Impressions, Reviewing / ordering tests, medicine, procedures  , and Obtaining or reviewing history  .         SUBJECTIVE:  Review of Systems   Constitutional: Negative for chills, diaphoresis and fever.   HENT: Negative.     Eyes: Negative.    Cardiovascular:  Negative for chest pain, dyspnea on exertion, leg swelling and orthopnea.   Respiratory:  Negative for cough, shortness of breath and sputum production.    Gastrointestinal:  Negative for abdominal pain, diarrhea, nausea and vomiting.   Genitourinary: Negative.    Neurological:  Negative for dizziness and headaches.   Psychiatric/Behavioral:  Negative for altered mental status.        OBJECTIVE:  Current Weight:    Vitals:@TMAX(24)@Current:     Height 5' 7\" (1.702 m). , Body mass index is 24.12 kg/m².    [unfilled]    Physical Exam: Ht 5' 7\" (1.702 m)   BMI 24.12 kg/m²   Physical Exam  Constitutional:       General: He is not in acute distress.     Appearance: He is not toxic-appearing.   HENT:      Head: Normocephalic and atraumatic.      Nose: Nose normal.      Mouth/Throat:      Mouth: Mucous membranes are moist.   Eyes:      General: No scleral icterus.     Extraocular Movements: Extraocular movements intact.   Cardiovascular:      Rate and Rhythm: Normal rate and regular rhythm.      Heart sounds:      No gallop.   Pulmonary:      Effort: Pulmonary effort is normal. No respiratory distress.      Breath sounds: No wheezing or rales.   Abdominal:      General: Bowel sounds are normal. There is no distension.      Palpations: Abdomen is soft.      Tenderness: There is no abdominal tenderness.   Musculoskeletal:      Cervical back: Normal range of motion and neck supple.   Neurological:      Mental Status: He is alert and oriented to person, place, and time. Mental status is at baseline.   Psychiatric:         Mood and Affect: Mood normal.         Behavior: Behavior normal.         Medications:    Current Outpatient Medications:   •  acetaminophen (TYLENOL) " 500 mg tablet, Take 500 mg by mouth as needed for mild pain., Disp: , Rfl:   •  albuterol (PROVENTIL HFA,VENTOLIN HFA) 90 mcg/act inhaler, INHALE 2 PUFFS EVERY 6 HOURS AS NEEDED FOR WHEEZING OR SHORTNESS OF BREATH, Disp: , Rfl:   •  amLODIPine (NORVASC) 5 mg tablet, Take 1 tablet (5 mg total) by mouth daily, Disp: 90 tablet, Rfl: 3  •  aspirin (ECOTRIN LOW STRENGTH) 81 mg EC tablet, Take 81 mg by mouth daily, Disp: , Rfl:   •  atorvastatin (LIPITOR) 40 mg tablet, Take 1 tablet (40 mg total) by mouth daily, Disp: 100 tablet, Rfl: 1  •  Cholecalciferol (VITAMIN D PO), Take 2,000 Units by mouth daily  , Disp: , Rfl:   •  dicyclomine (BENTYL) 20 mg tablet, TAKE 1 TABLET BY MOUTH EVERY 6  HOURS AS NEEDED FOR ABDOMINAL  CRAMPING, Disp: 360 tablet, Rfl: 0  •  DULoxetine (CYMBALTA) 30 mg delayed release capsule, Take 1 capsule (30 mg total) by mouth daily, Disp: 90 capsule, Rfl: 1  •  fexofenadine (ALLEGRA) 180 MG tablet, Take 180 mg by mouth as needed Daily during the Summer, Disp: , Rfl:   •  fluticasone (FLONASE) 50 mcg/act nasal spray, 2 sprays into each nostril daily, Disp: , Rfl:   •  lithium carbonate (LITHOBID) 300 mg CR tablet, Take 300 mg by mouth daily at bedtime, Disp: , Rfl:   •  magnesium chloride-calcium (SlowMag Mg Muscle/Heart) 71.5-119 mg, Take 1 tablet by mouth 2 (two) times a day (Patient taking differently: Take 1 tablet by mouth 3 (three) times a day), Disp: 60 tablet, Rfl: 5  •  metoprolol succinate (TOPROL-XL) 25 mg 24 hr tablet, TAKE 1 TABLET BY MOUTH ONCE  DAILY, Disp: 90 tablet, Rfl: 3  •  mupirocin (BACTROBAN) 2 % ointment, Apply topically 2 (two) times a day as needed (wound) (Patient not taking: Reported on 8/22/2024), Disp: 22 g, Rfl: 0  •  nitroglycerin (NITRODUR) 0.2 mg/hr, APPLY 1 PATCH TOPICALLY ONCE  DAILY. LEAVE ON FOR 12 TO 14  HOURS THEN REMOVE FOR A  NITRATE-FREE INTERVAL OF 10 TO  12 HOURS (Patient not taking: Reported on 9/9/2024), Disp: 90 patch, Rfl: 1  •  omega-3-acid ethyl  "esters (LOVAZA) 1 g capsule, Take 1 capsule (1 g total) by mouth 3 (three) times a day, Disp: 270 capsule, Rfl: 1  •  omeprazole (PriLOSEC) 20 mg delayed release capsule, TAKE 1 CAPSULE BY MOUTH TWICE  DAILY BEFORE MEALS, Disp: 200 capsule, Rfl: 1  •  terbinafine (LamISIL) 1 % cream, Apply topically 2 (two) times a day, Disp: 42 g, Rfl: 1  •  Trelegy Ellipta 200-62.5-25 MCG/ACT AEPB inhaler, USE 1 INHALATION BY MOUTH DAILY, Disp: 180 each, Rfl: 3  •  triamcinolone (KENALOG) 0.1 % cream, APPLY TO AFFECTED AREA S) TWO TIMES A DAY, Disp: , Rfl:   •  trospium chloride (SANCTURA) 20 mg tablet, Take 1 tablet (20 mg total) by mouth 2 (two) times a day (Patient taking differently: Take 20 mg by mouth 2 (two) times a day Pt has not started taking.), Disp: 180 tablet, Rfl: 3    Laboratory Results:  Lab Results   Component Value Date    WBC 11.56 (H) 09/14/2023    HGB 13.6 09/14/2023    HCT 40.2 09/14/2023    MCV 92 09/14/2023     09/14/2023     Lab Results   Component Value Date    SODIUM 136 09/23/2024    K 5.6 (H) 09/23/2024     09/23/2024    CO2 26 09/23/2024    BUN 50 (H) 09/23/2024    CREATININE 1.64 (H) 09/23/2024    GLUC 100 04/02/2021    CALCIUM 9.8 09/23/2024     Lab Results   Component Value Date    CALCIUM 9.8 09/23/2024    PHOS 2.8 11/05/2014     No results found for: \"LABPROT\"      "

## 2024-10-09 ENCOUNTER — HOSPITAL ENCOUNTER (OUTPATIENT)
Dept: RADIOLOGY | Facility: CLINIC | Age: 79
Discharge: HOME | End: 2024-10-09
Payer: MEDICARE

## 2024-10-09 VITALS
RESPIRATION RATE: 20 BRPM | OXYGEN SATURATION: 96 % | TEMPERATURE: 97.3 F | SYSTOLIC BLOOD PRESSURE: 137 MMHG | HEART RATE: 54 BPM | DIASTOLIC BLOOD PRESSURE: 77 MMHG

## 2024-10-09 DIAGNOSIS — M54.16 LUMBAR RADICULOPATHY: ICD-10-CM

## 2024-10-09 PROCEDURE — 62323 NJX INTERLAMINAR LMBR/SAC: CPT | Performed by: ANESTHESIOLOGY

## 2024-10-09 RX ORDER — METHYLPREDNISOLONE ACETATE 80 MG/ML
80 INJECTION, SUSPENSION INTRA-ARTICULAR; INTRALESIONAL; INTRAMUSCULAR; PARENTERAL; SOFT TISSUE ONCE
Status: COMPLETED | OUTPATIENT
Start: 2024-10-09 | End: 2024-10-09

## 2024-10-09 RX ADMIN — IOHEXOL 1 ML: 300 INJECTION, SOLUTION INTRAVENOUS at 10:01

## 2024-10-09 RX ADMIN — METHYLPREDNISOLONE ACETATE 80 MG: 80 INJECTION, SUSPENSION INTRA-ARTICULAR; INTRALESIONAL; INTRAMUSCULAR; SOFT TISSUE at 10:01

## 2024-10-09 NOTE — DISCHARGE INSTR - LAB
Epidural Steroid Injection   WHAT YOU NEED TO KNOW:   An epidural steroid injection (DEVON) is a procedure to inject steroid medicine into the epidural space. The epidural space is between your spinal cord and vertebrae. Steroids reduce inflammation and fluid buildup in your spine that may be causing pain. You may be given pain medicine along with the steroids.          ACTIVITY  Do not drive or operate machinery today.  No strenuous activity today - bending, lifting, etc.  You may resume normal activites starting tomorrow - start slowly and as tolerated.  You may shower today, but no tub baths or hot tubs.  You may have numbness for several hours from the local anesthetic. Please use caution and common sense, especially with weight-bearing activities.    CARE OF THE INJECTION SITE  If you have soreness or pain, apply ice to the area today (20 minutes on/20 minutes off).  Starting tomorrow, you may use warm, moist heat or ice if needed.  You may have an increase or change in your discomfort for 36-48 hours after your treatment.  Apply ice and continue with any pain medication you have been prescribed.  Notify the [facility] if you have any of the following: redness, drainage, swelling, headache, stiff neck or fever above 100°F.    SPECIAL INSTRUCTIONS  Our office will contact you in approximately 14 days for a progress report.    MEDICATIONS  Continue to take all routine medications.  Our office may have instructed you to hold some medications.    As no general anesthesia was used in today's procedure, you should not experience any side effects related to anesthesia.     If you are diabetic, the steroids used in today's injection may temporarily increase your blood sugar levels after the first few days after your injection. Please keep a close eye on your sugars and alert the doctor who manages your diabetes if your sugars are significantly high from your baseline or you are symptomatic.     If you have a  problem specifically related to your procedure, please call our office at [phone].  Problems not related to your procedure should be directed to your primary care physician.

## 2024-10-09 NOTE — H&P
History of Present Illness: The patient is a 78 y.o. male who presents with complaints of low back and leg pain.    Past Medical History:   Diagnosis Date    Allergic 2011    Allergic rhinitis 2015    Anxiety     occasional    Aortic aneurysm (HCC)     Benign colon polyp     Bipolar 1 disorder (HCC)     Cancer (HCC) 2007, 2011    Cardiac disease     MI    Cervical cord compression with myelopathy (HCC)     COPD (chronic obstructive pulmonary disease) (HCC)     Coronary artery disease 1995    Diverticulitis     Diverticulitis of colon prior to 2014    Diverticulosis     Emphysema of lung (HCC) 2012    Gait disturbance     uses cane, leg brace on right    GERD (gastroesophageal reflux disease)     Heart attack (HCC) 1996    Hx of resection of large bowel 05/03/2016    Hyperlipidemia     Hypertension     IBS (irritable bowel syndrome)     Inflammatory bowel disease 2012    Lumbar stenosis     Lung cancer (HCC)     2007 Left lower lobectomy and 2011 Right lung with surgery     Myocardial infarction (HCC)     involving other coronary artery    Prostate cancer (HCC)     Shortness of breath     Small bowel obstruction (HCC) 11/16/2016       Past Surgical History:   Procedure Laterality Date    ANGIOPLASTY      stent    CARDIAC CATHETERIZATION  1995    angioplasty    CARDIAC SURGERY      CERVICAL SPINE SURGERY      Cervical decompression with cervical fusion from C3-C7 for spinal stenosis.    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    LUNG LOBECTOMY Left     [place] ARTHRD ANT INTERBODY MIN DSC CRV BELOW C2 N/A 05/02/2016     Procedure: Anterior cervical diskectomy C3/4, C5/6, C6/7 with anterior plate fixation fusion C3-7;  Posterior decompressive laminectomy C3-7 with lateral mass fixation fusion C3-7 (IMPULSE MONITORING);  Surgeon: Jose Luis Moore MD;  Location: [facility];  Service: Neurosurgery    [place] ARTHRODESIS POSTERIOR INTERBODY 1 [facility] LUMBAR N/A 01/30/2017    Procedure: L4-5 AND L5-S1 DECOMPRESSIVE FORAMINOTOMIES, TRANSFORAMINAL LUMBAR INTERBODY AND PEDICLE SCREW FIXATION FUSION L4-S1 (IMPULSE);  Surgeon: Jose Luis Moore MD;  Location: [facility];  Service: Neurosurgery    [place] EVASC RPR DPLMNT AORTO-AORTIC NDGFT N/A 08/06/2018    Procedure: REPAIR ANEURYSM ENDOVASCULAR ABDOMINAL AORTIC  (EVAR) WITH BILATERAL PERCUTANEOUS FEMORAL ACCESS WITH ULTRASOUND GUIDANCE ON THE RIGHT AND PRE CLOSURE;  Surgeon: Shan Villalobos MD;  Location: [facility];  Service: Vascular    PROSTATECTOMY  2007    for prostate CA - no chemo/RT    SIGMOIDECTOMY      for divertic    SMALL INTESTINE SURGERY N/A 11/17/2016    Procedure: Exploratory Laparotomy, Lysis of adhesions to release small bowel obstruction;  Surgeon: Aminta Sim MD;  Location: [facility];  Service:     SPINE SURGERY  2016, 2017    TONSILLECTOMY  Y    1950         Current Outpatient Medications:     acetaminophen (TYLENOL) 500 mg tablet, Take 500 mg by mouth as needed for mild pain., Disp: , Rfl:     albuterol (PROVENTIL HFA,VENTOLIN HFA) 90 mcg/act inhaler, INHALE 2 PUFFS EVERY 6 HOURS AS NEEDED FOR WHEEZING OR SHORTNESS OF BREATH, Disp: , Rfl:     amLODIPine (NORVASC) 5 mg tablet, Take 1 tablet (5 mg total) by mouth daily, Disp: 90 tablet, Rfl: 3    aspirin (ECOTRIN LOW STRENGTH) 81 mg EC tablet, Take 81 mg by mouth daily, Disp: , Rfl:     atorvastatin (LIPITOR) 40 mg tablet, Take 1 tablet (40 mg total) by mouth daily, Disp: 100 tablet, Rfl: 1    Cholecalciferol (VITAMIN D PO), Take 2,000 Units by mouth daily  , Disp: , Rfl:     dicyclomine (BENTYL) 20 mg tablet, TAKE 1  TABLET BY MOUTH EVERY 6  HOURS AS NEEDED FOR ABDOMINAL  CRAMPING, Disp: 360 tablet, Rfl: 0    DULoxetine (CYMBALTA) 30 mg delayed release capsule, Take 1 capsule (30 mg total) by mouth daily, Disp: 90 capsule, Rfl: 1    fexofenadine (ALLEGRA) 180 MG tablet, Take 180 mg by mouth as needed Daily during the Summer, Disp: , Rfl:     fluticasone (FLONASE) 50 mcg/act nasal spray, 2 sprays into each nostril daily, Disp: , Rfl:     lithium carbonate (LITHOBID) 300 mg CR tablet, Take 300 mg by mouth daily at bedtime, Disp: , Rfl:     magnesium chloride-calcium (SlowMag Mg Muscle/Heart) 71.5-119 mg, Take 1 tablet by mouth 2 (two) times a day (Patient taking differently: Take 1 tablet by mouth 4 (four) times a day), Disp: 60 tablet, Rfl: 5    metoprolol succinate (TOPROL-XL) 25 mg 24 hr tablet, TAKE 1 TABLET BY MOUTH ONCE  DAILY, Disp: 90 tablet, Rfl: 3    mupirocin (BACTROBAN) 2 % ointment, Apply topically 2 (two) times a day as needed (wound) (Patient not taking: Reported on 8/22/2024), Disp: 22 g, Rfl: 0    nitroglycerin (NITRODUR) 0.2 mg/hr, APPLY 1 PATCH TOPICALLY ONCE  DAILY. LEAVE ON FOR 12 TO 14  HOURS THEN REMOVE FOR A  NITRATE-FREE INTERVAL OF 10 TO  12 HOURS (Patient not taking: Reported on 9/9/2024), Disp: 90 patch, Rfl: 1    omega-3-acid ethyl esters (LOVAZA) 1 g capsule, Take 1 capsule (1 g total) by mouth 3 (three) times a day, Disp: 270 capsule, Rfl: 1    omeprazole (PriLOSEC) 20 mg delayed release capsule, TAKE 1 CAPSULE BY MOUTH TWICE  DAILY BEFORE MEALS, Disp: 200 capsule, Rfl: 1    terbinafine (LamISIL) 1 % cream, Apply topically 2 (two) times a day (Patient not taking: Reported on 10/4/2024), Disp: 42 g, Rfl: 1    Trelegy Ellipta 200-62.5-25 MCG/ACT AEPB inhaler, USE 1 INHALATION BY MOUTH DAILY, Disp: 180 each, Rfl: 3    triamcinolone (KENALOG) 0.1 % cream, prn, Disp: , Rfl:     trospium chloride (SANCTURA) 20 mg tablet, Take 1 tablet (20 mg total) by mouth 2 (two) times a day (Patient taking differently:  Take 20 mg by mouth 2 (two) times a day Pt has not started taking.), Disp: 180 tablet, Rfl: 3    Allergies   Allergen Reactions    Bactrim [Sulfamethoxazole-Trimethoprim] Other (See Comments)     AILYN    Nsaids      Annotation - [id number]: unable to take due to use of lithium.    Oxycodone Rash       Physical Exam:   Vitals:    10/09/24 0946   BP: 129/82   Pulse: 55   Resp: 20   Temp: (!) 97.3 °F (36.3 °C)   SpO2: 98%     General: Awake, Alert, Oriented x 3, Mood and affect appropriate  Respiratory: Respirations even and unlabored  Cardiovascular: Peripheral pulses intact; no edema  Musculoskeletal Exam: Antalgic gait    ASA Score: 3         Assessment:   1. Lumbar radiculopathy        Plan: L3-4 LESI

## 2024-10-14 ENCOUNTER — TELEPHONE (OUTPATIENT)
Age: 79
End: 2024-10-14

## 2024-10-14 DIAGNOSIS — M54.16 LUMBAR RADICULOPATHY: ICD-10-CM

## 2024-10-14 RX ORDER — DULOXETIN HYDROCHLORIDE 30 MG/1
30 CAPSULE, DELAYED RELEASE ORAL DAILY
Qty: 90 CAPSULE | Refills: 1 | Status: SHIPPED | OUTPATIENT
Start: 2024-10-14

## 2024-10-14 NOTE — TELEPHONE ENCOUNTER
I have no idea, but I just placed a new order through the mail order. If it's coming close to running out, have him call us so we can send a short supply to a local pharmacy

## 2024-10-14 NOTE — TELEPHONE ENCOUNTER
Caller: pt    Doctor: myra    Reason for call: pt needs a refill of his Cymbalta sent to [facility].    Call back#: [phone]

## 2024-10-14 NOTE — TELEPHONE ENCOUNTER
"S/w the patient to clarify because there is an order dated 9/17 c/ 1 refill.  Encouraged him to contact [facility] for the refill. He stated he did but then he received a letter stating they need the provider to put a new order in. He stated he has a half of bottle left but because it is mail order then he wants to get a jump on it because he is afraid to \"run out.\".  Why order refills if they are not going to honor it? Do you know anything about this?  "

## 2024-10-15 ENCOUNTER — OFFICE VISIT (OUTPATIENT)
Dept: INTERNAL MEDICINE CLINIC | Facility: CLINIC | Age: 79
End: 2024-10-15
Payer: MEDICARE

## 2024-10-15 VITALS
DIASTOLIC BLOOD PRESSURE: 64 MMHG | HEART RATE: 52 BPM | WEIGHT: 154.6 LBS | TEMPERATURE: 96.7 F | HEIGHT: 65 IN | SYSTOLIC BLOOD PRESSURE: 110 MMHG | OXYGEN SATURATION: 97 % | BODY MASS INDEX: 25.76 KG/M2

## 2024-10-15 DIAGNOSIS — R73.01 IMPAIRED FASTING GLUCOSE: ICD-10-CM

## 2024-10-15 DIAGNOSIS — I25.10 CORONARY ARTERY DISEASE INVOLVING NATIVE CORONARY ARTERY OF NATIVE HEART WITHOUT ANGINA PECTORIS: ICD-10-CM

## 2024-10-15 DIAGNOSIS — I48.0 PAF (PAROXYSMAL ATRIAL FIBRILLATION) (HCC): ICD-10-CM

## 2024-10-15 DIAGNOSIS — E83.42 HYPOMAGNESEMIA: ICD-10-CM

## 2024-10-15 DIAGNOSIS — K21.9 GASTROESOPHAGEAL REFLUX DISEASE WITHOUT ESOPHAGITIS: ICD-10-CM

## 2024-10-15 DIAGNOSIS — E55.9 VITAMIN D DEFICIENCY: ICD-10-CM

## 2024-10-15 DIAGNOSIS — N18.31 STAGE 3A CHRONIC KIDNEY DISEASE (HCC): ICD-10-CM

## 2024-10-15 DIAGNOSIS — I10 ESSENTIAL HYPERTENSION: Primary | ICD-10-CM

## 2024-10-15 DIAGNOSIS — Z23 ENCOUNTER FOR IMMUNIZATION: ICD-10-CM

## 2024-10-15 DIAGNOSIS — E78.2 MIXED HYPERLIPIDEMIA: ICD-10-CM

## 2024-10-15 PROCEDURE — 99214 OFFICE O/P EST MOD 30 MIN: CPT | Performed by: INTERNAL MEDICINE

## 2024-10-15 PROCEDURE — G0009 ADMIN PNEUMOCOCCAL VACCINE: HCPCS

## 2024-10-15 PROCEDURE — 90677 PCV20 VACCINE IM: CPT

## 2024-10-15 NOTE — ASSESSMENT & PLAN NOTE
Lab Results   Component Value Date    EGFR 40 09/23/2024    EGFR 46 08/13/2024    EGFR 44 05/23/2024    CREATININE 1.64 (H) 09/23/2024    CREATININE 1.46 (H) 08/13/2024    CREATININE 1.50 (H) 05/23/2024   Avoid NSAIDs, follow-up nephrology

## 2024-10-15 NOTE — ASSESSMENT & PLAN NOTE
No bleeding problems, no palpitations, patient had an episode of this in 2018 associated with a respiratory infection, and felt to be an isolated episode, patient follows with cardiology

## 2024-10-15 NOTE — ASSESSMENT & PLAN NOTE
Continue statin along with healthy diet    Orders:    CBC and differential; Future    Comprehensive metabolic panel; Future    Lipid Panel with Direct LDL reflex; Future    TSH, 3rd generation with Free T4 reflex; Future

## 2024-10-15 NOTE — PATIENT INSTRUCTIONS
Problem List Items Addressed This Visit          Cardiovascular and Mediastinum    CAD (coronary artery disease)     No chest pain or shortness of breath, continue meds, follow-up cardiology         Essential hypertension - Primary     Blood pressure is controlled, continue current meds         PAF (paroxysmal atrial fibrillation) ([facility])     No bleeding problems, no palpitations, patient had an episode of this in 2018 associated with a respiratory infection, and felt to be an isolated episode, patient follows with cardiology            Digestive    Gastroesophageal reflux disease     Continue PPI, patient has occasional GERD, no problems with food getting stuck.            Genitourinary    Stage 3a chronic kidney disease ([facility])     Lab Results   Component Value Date    EGFR 40 09/23/2024    EGFR 46 08/13/2024    EGFR 44 05/23/2024    CREATININE 1.64 (H) 09/23/2024    CREATININE 1.46 (H) 08/13/2024    CREATININE 1.50 (H) 05/23/2024   Avoid NSAIDs, follow-up nephrology            Other    Mixed hyperlipidemia     Continue statin along with healthy diet         Relevant Orders    CBC and differential    Comprehensive metabolic panel    Lipid Panel with Direct LDL reflex    TSH, 3rd generation with Free T4 reflex    Hypomagnesemia    Relevant Orders    Magnesium     Other Visit Diagnoses       Encounter for immunization        Relevant Orders    Pneumococcal Conjugate Vaccine 20-valent (Pcv20)    Vitamin D deficiency        Relevant Orders    Vitamin D 25 hydroxy    Impaired fasting glucose        Relevant Orders    Hemoglobin A1C

## 2024-10-15 NOTE — PROGRESS NOTES
Ambulatory Visit  Name: Tonny Bagi      : 1945      MRN: [id number]  Encounter Provider: Brendan Suarez MD  Encounter Date: 10/15/2024   Enc[facility] department: [facility]    Assessment & Plan  Essential hypertension  Blood pressure is controlled, continue current meds         PAF (paroxysmal atrial fibrillation) (HCC)  No bleeding problems, no palpitations, patient had an episode of this in 2018 associated with a respiratory infection, and felt to be an isolated episode, patient follows with cardiology         Coronary artery disease involving native coronary artery of native heart without angina pectoris  No chest pain or shortness of breath, continue meds, follow-up cardiology         Gastroesophageal reflux disease without esophagitis  Continue PPI, patient has occasional GERD, no problems with food getting stuck.         Mixed hyperlipidemia  Continue statin along with healthy diet    Orders:    CBC and differential; Future    Comprehensive metabolic panel; Future    Lipid Panel with Direct LDL reflex; Future    TSH, 3rd generation with Free T4 reflex; Future    Stage 3a chronic kidney disease (HCC)  Lab Results   Component Value Date    EGFR 40 2024    EGFR 46 2024    EGFR 44 2024    CREATININE 1.64 (H) 2024    CREATININE 1.46 (H) 2024    CREATININE 1.50 (H) 2024   Avoid NSAIDs, follow-up nephrology         Encounter for immunization    Orders:    Pneumococcal Conjugate Vaccine 20-valent (Pcv20)    Vitamin D deficiency    Orders:    Vitamin D 25 hydroxy; Future    Impaired fasting glucose    Orders:    Hemoglobin A1C; Future    Hypomagnesemia    Orders:    Magnesium; Future         History of Present Illness     Pt here for regular follow up      Review of Systems   Constitutional:  Negative for chills, fatigue and fever.   HENT:  Negative for congestion, nosebleeds, postnasal drip, sore throat and trouble swallowing.    Eyes:  Negative  for pain.   Respiratory:  Negative for cough, chest tightness, shortness of breath and wheezing.    Cardiovascular:  Negative for chest pain, palpitations and leg swelling.   Gastrointestinal:  Negative for abdominal pain, constipation, diarrhea, nausea and vomiting.   Endocrine: Negative for polydipsia and polyuria.   Genitourinary:  Negative for dysuria, flank pain and hematuria.   Musculoskeletal:  Negative for arthralgias.   Skin:  Negative for rash.   Neurological:  Negative for dizziness, tremors, light-headedness and headaches.   Hematological:  Does not bruise/bleed easily.   Psychiatric/Behavioral:  Negative for confusion and dysphoric mood. The patient is not nervous/anxious.      Past Medical History:   Diagnosis Date    Allergic 2011    Allergic rhinitis 2015    Anxiety     occasional    Aortic aneurysm ([facility])     Benign colon polyp     Bipolar 1 disorder ([facility])     Cancer ([facility]) 2007, 2011    Cardiac disease     MI    Cervical cord compression with myelopathy ([facility])     COPD (chronic obstructive pulmonary disease) ([facility])     Coronary artery disease 1995    Diverticulitis     Diverticulitis of colon prior to 2014    Diverticulosis     Emphysema of lung ([facility]) 2012    Gait disturbance     uses cane, leg brace on right    GERD (gastroesophageal reflux disease)     Heart attack ([facility]) 1996    Hx of resection of large bowel 05/03/2016    Hyperlipidemia     Hypertension     IBS (irritable bowel syndrome)     Inflammatory bowel disease 2012    Lumbar stenosis     Lung cancer ([facility])     2007 Left lower lobectomy and 2011 Right lung with surgery     Myocardial infarction ([facility])     involving other coronary artery    Prostate cancer ([facility])     Shortness of breath     Small bowel obstruction ([facility]) 11/16/2016     Past Surgical History:   Procedure Laterality Date    ANGIOPLASTY      stent    CARDIAC CATHETERIZATION  1995    angioplasty    CARDIAC SURGERY      CERVICAL SPINE SURGERY      Cervical decompression with cervical  fusion from C3-C7 for spinal stenosis.    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    LUNG LOBECTOMY Left     TX ARTHRD ANT INTERBODY MIN DSC CRV BELOW C2 N/A 05/02/2016    Procedure: Anterior cervical diskectomy C3/4, C5/6, C6/7 with anterior plate fixation fusion C3-7;  Posterior decompressive laminectomy C3-7 with lateral mass fixation fusion C3-7 (IMPULSE MONITORING);  Surgeon: Jose Luis Moore MD;  [facility];  Service: Neurosurgery    TX ARTHRODESIS POSTERIOR INTERBODY 1 [facility] LUMBAR N/A 01/30/2017    Procedure: L4-5 AND L5-S1 DECOMPRESSIVE FORAMINOTOMIES, TRANSFORAMINAL LUMBAR INTERBODY AND PEDICLE SCREW FIXATION FUSION L4-S1 (IMPULSE);  Surgeon: Jose Luis Moore MD;  [facility];  Service: Neurosurgery    TX EVASC RPR DPLMNT AORTO-AORTIC NDGFT N/A 08/06/2018    Procedure: REPAIR ANEURYSM ENDOVASCULAR ABDOMINAL AORTIC  (EVAR) WITH BILATERAL PERCUTANEOUS FEMORAL ACCESS WITH ULTRASOUND GUIDANCE ON THE RIGHT AND PRE CLOSURE;  Surgeon: Shan Villalobos MD;  [facility];  Service: Vascular    PROSTATECTOMY  2007    for prostate CA - no chemo/RT    SIGMOIDECTOMY      for divertic    SMALL INTESTINE SURGERY N/A 11/17/2016    Procedure: Exploratory Laparotomy, Lysis of adhesions to release small bowel obstruction;  Surgeon: Aminta Sim MD;  [facility];  Service:     SPINE SURGERY  2016, 2017    TONSILLECTOMY  Y    1950     Family History   Problem Relation Age of Onset    Hypertension Mother     Glaucoma Mother     Heart attack Father     Colon cancer Father      Coronary artery disease Father     Hypertension Father     Kidney disease Father     Heart disease Family     Hyperlipidemia Family     Hypertension Family      Social History     Tobacco Use    Smoking status: Former     Current packs/day: 0.00     Average packs/day: 1 pack/day for 44.0 years (44.0 ttl pk-yrs)     Types: Cigarettes     Start date: 1967     Quit date: 2011     Years since quittin.7     Passive exposure: Past    Smokeless tobacco: Never   Vaping Use    Vaping status: Never Used   Substance and Sexual Activity    Alcohol use: Yes     Alcohol/week: 2.0 - 3.0 standard drinks of alcohol     Types: 2 - 3 Shots of liquor per week     Comment: One glass per day    Drug use: No    Sexual activity: Yes     Partners: Female     Birth control/protection: Post-menopausal, None     Current Outpatient Medications on File Prior to Visit   Medication Sig    acetaminophen (TYLENOL) 500 mg tablet Take 500 mg by mouth as needed for mild pain.    albuterol (PROVENTIL HFA,VENTOLIN HFA) 90 mcg/act inhaler INHALE 2 PUFFS EVERY 6 HOURS AS NEEDED FOR WHEEZING OR SHORTNESS OF BREATH    amLODIPine (NORVASC) 5 mg tablet Take 1 tablet (5 mg total) by mouth daily    aspirin (ECOTRIN LOW STRENGTH) 81 mg EC tablet Take 81 mg by mouth daily    atorvastatin (LIPITOR) 40 mg tablet Take 1 tablet (40 mg total) by mouth daily    Cholecalciferol (VITAMIN D PO) Take 2,000 Units by mouth daily      dicyclomine (BENTYL) 20 mg tablet TAKE 1 TABLET BY MOUTH EVERY 6  HOURS AS NEEDED FOR ABDOMINAL  CRAMPING    DULoxetine (CYMBALTA) 30 mg delayed release capsule Take 1 capsule (30 mg total) by mouth daily    fexofenadine (ALLEGRA) 180 MG tablet Take 180 mg by mouth as needed Daily during the Summer    fluticasone (FLONASE) 50 mcg/act nasal spray 2 sprays into each nostril daily    lithium carbonate (LITHOBID) 300 mg CR tablet Take 300 mg by mouth daily at bedtime    magnesium chloride-calcium (SlowMag Mg Muscle/Heart) 71.5-119 mg  Take 1 tablet by mouth 2 (two) times a day (Patient taking differently: Take 1 tablet by mouth 4 (four) times a day)    metoprolol succinate (TOPROL-XL) 25 mg 24 hr tablet TAKE 1 TABLET BY MOUTH ONCE  DAILY    omega-3-acid ethyl esters (LOVAZA) 1 g capsule Take 1 capsule (1 g total) by mouth 3 (three) times a day    omeprazole (PriLOSEC) 20 mg delayed release capsule TAKE 1 CAPSULE BY MOUTH TWICE  DAILY BEFORE MEALS    Trelegy Ellipta 200-62.5-25 MCG/ACT AEPB inhaler USE 1 INHALATION BY MOUTH DAILY    triamcinolone (KENALOG) 0.1 % cream prn    trospium chloride (SANCTURA) 20 mg tablet Take 1 tablet (20 mg total) by mouth 2 (two) times a day (Patient taking differently: Take 20 mg by mouth 2 (two) times a day Pt has not started taking.)    mupirocin (BACTROBAN) 2 % ointment Apply topically 2 (two) times a day as needed (wound) (Patient not taking: Reported on 8/22/2024)    nitroglycerin (NITRODUR) 0.2 mg/hr APPLY 1 PATCH TOPICALLY ONCE  DAILY. LEAVE ON FOR 12 TO 14  HOURS THEN REMOVE FOR A  NITRATE-FREE INTERVAL OF 10 TO  12 HOURS (Patient not taking: Reported on 9/9/2024)    terbinafine (LamISIL) 1 % cream Apply topically 2 (two) times a day (Patient not taking: Reported on 10/4/2024)     Allergies   Allergen Reactions    Bactrim [Sulfamethoxazole-Trimethoprim] Other (See Comments)     AILYN    Nsaids      Annotation - 20Nov2017: unable to take due to use of lithium.    Oxycodone Rash     Immunization History   Administered Date(s) Administered    COVID-19 PFIZER VACCINE 0.3 ML IM 02/17/2021, 03/10/2021, 10/26/2021    COVID-19 Pfizer Vac BIVALENT Satya-sucrose 12 Yr+ IM 10/20/2022    COVID-19 Pfizer mRNA vacc PF satya-sucrose 12 yr and older (Comirnaty) 11/06/2023, 09/25/2024    COVID-19 Pfizer vac (Satya-sucrose, gray cap) 12 yr+ IM 08/03/2022    COVID-19, unspecified 11/06/2023    INFLUENZA 10/01/2015, 10/07/2018, 09/27/2019, 10/05/2020, 09/27/2021, 10/14/2022, 10/09/2023    Influenza Quadrivalent Preservative Free 3  "years and older IM 10/01/2015    Influenza Split High Dose Preservative Free IM 10/25/2016, 10/03/2018    Influenza, high dose seasonal 0.7 mL 09/27/2019, 10/05/2020, 10/14/2022, 10/09/2023    Influenza, seasonal, injectable 1945, 10/10/2012    Pneumococcal Conjugate 13-Valent 10/25/2016    Pneumococcal Polysaccharide PPV23 10/03/2019    Td (adult), adsorbed 12/01/2009    Tdap 05/31/2023    Zoster 01/01/2014, 01/01/2014    influenza, trivalent, adjuvanted 09/27/2021, 09/18/2024     Objective     /64 (BP Location: Left arm, Patient Position: Sitting, Cuff Size: Large)   Pulse (!) 52   Temp (!) 96.7 °F (35.9 °C) (Probe)   Ht 5' 5.35\" (1.66 m)   Wt 70.1 kg (154 lb 9.6 oz)   SpO2 97%   BMI 25.45 kg/m²     Physical Exam  Vitals reviewed.   Constitutional:       General: He is not in acute distress.     Appearance: Normal appearance. He is well-developed.   HENT:      Head: Normocephalic and atraumatic.      Right Ear: External ear normal.      Left Ear: External ear normal.      Nose: Nose normal.   Eyes:      General: No scleral icterus.     Conjunctiva/sclera: Conjunctivae normal.   Neck:      Trachea: No tracheal deviation.   Cardiovascular:      Rate and Rhythm: Normal rate and regular rhythm.      Heart sounds: Normal heart sounds. No murmur heard.  Pulmonary:      Effort: Pulmonary effort is normal. No respiratory distress.      Breath sounds: Normal breath sounds. No wheezing or rales.   Musculoskeletal:      Cervical back: Normal range of motion and neck supple.      Right lower leg: No edema.      Left lower leg: No edema.   Lymphadenopathy:      Cervical: No cervical adenopathy.   Neurological:      Mental Status: He is alert and oriented to person, place, and time.   Psychiatric:         Mood and Affect: Mood normal.         Behavior: Behavior normal.         Thought Content: Thought content normal.         Judgment: Judgment normal.         "

## 2024-10-23 ENCOUNTER — TELEPHONE (OUTPATIENT)
Dept: PAIN MEDICINE | Facility: CLINIC | Age: 79
End: 2024-10-23

## 2024-10-23 NOTE — TELEPHONE ENCOUNTER
Caller: Tonny  Doctor/office: RONAK ZENDEJAS#: [phone]    % of improvement: 50%  Pain Scale (1-10): 4-6/10

## 2024-11-04 ENCOUNTER — TRANSCRIBE ORDERS (OUTPATIENT)
Dept: LAB | Facility: CLINIC | Age: 79
End: 2024-11-04

## 2024-11-04 ENCOUNTER — APPOINTMENT (OUTPATIENT)
Dept: LAB | Facility: CLINIC | Age: 79
End: 2024-11-04
Payer: MEDICARE

## 2024-11-04 DIAGNOSIS — N18.31 STAGE 3A CHRONIC KIDNEY DISEASE (HCC): ICD-10-CM

## 2024-11-04 DIAGNOSIS — E78.2 MIXED HYPERLIPIDEMIA: ICD-10-CM

## 2024-11-04 DIAGNOSIS — F31.60 MIXED BIPOLAR I DISORDER (HCC): Primary | ICD-10-CM

## 2024-11-04 DIAGNOSIS — E55.9 VITAMIN D DEFICIENCY: ICD-10-CM

## 2024-11-04 DIAGNOSIS — R73.01 IMPAIRED FASTING GLUCOSE: ICD-10-CM

## 2024-11-04 DIAGNOSIS — E83.42 HYPOMAGNESEMIA: ICD-10-CM

## 2024-11-04 DIAGNOSIS — F31.60 MIXED BIPOLAR I DISORDER (HCC): ICD-10-CM

## 2024-11-04 LAB
BUN SERPL-MCNC: 46 MG/DL (ref 5–25)
CREAT SERPL-MCNC: 1.81 MG/DL (ref 0.6–1.3)
GFR SERPL CREATININE-BSD FRML MDRD: 35 ML/MIN/1.73SQ M
LITHIUM SERPL-SCNC: 1.4 MMOL/L (ref 0.6–1.2)
TSH SERPL DL<=0.05 MIU/L-ACNC: 1.67 UIU/ML (ref 0.45–4.5)

## 2024-11-04 PROCEDURE — 36415 COLL VENOUS BLD VENIPUNCTURE: CPT

## 2024-11-04 PROCEDURE — 82565 ASSAY OF CREATININE: CPT

## 2024-11-04 PROCEDURE — 84520 ASSAY OF UREA NITROGEN: CPT

## 2024-11-04 PROCEDURE — 80178 ASSAY OF LITHIUM: CPT

## 2024-11-04 PROCEDURE — 84443 ASSAY THYROID STIM HORMONE: CPT

## 2024-11-06 ENCOUNTER — TELEPHONE (OUTPATIENT)
Age: 79
End: 2024-11-06

## 2024-11-06 NOTE — TELEPHONE ENCOUNTER
Patient has been trying to transfer albuterol inhaler script from [facility] pharmacy to [facility] since the end of October. Patient states [facility] is needing authorization from us to fulfill this request. Please advise.

## 2024-11-08 NOTE — TELEPHONE ENCOUNTER
Called and left message for patient in regards to albuterol inhaler. Gave a verbal script to [facility] delivery for Albuterol inhaler.

## 2024-11-13 ENCOUNTER — TRANSCRIBE ORDERS (OUTPATIENT)
Dept: LAB | Facility: CLINIC | Age: 79
End: 2024-11-13

## 2024-11-13 ENCOUNTER — APPOINTMENT (OUTPATIENT)
Dept: LAB | Facility: CLINIC | Age: 79
End: 2024-11-13
Payer: MEDICARE

## 2024-11-13 DIAGNOSIS — F31.60 MIXED BIPOLAR I DISORDER (HCC): ICD-10-CM

## 2024-11-13 DIAGNOSIS — F31.60 MIXED BIPOLAR I DISORDER (HCC): Primary | ICD-10-CM

## 2024-11-13 LAB
BUN SERPL-MCNC: 48 MG/DL (ref 5–25)
CREAT SERPL-MCNC: 1.82 MG/DL (ref 0.6–1.3)
GFR SERPL CREATININE-BSD FRML MDRD: 35 ML/MIN/1.73SQ M
LITHIUM SERPL-SCNC: 0.5 MMOL/L (ref 0.6–1.2)
TSH SERPL DL<=0.05 MIU/L-ACNC: 1.79 UIU/ML (ref 0.45–4.5)

## 2024-11-13 PROCEDURE — 80178 ASSAY OF LITHIUM: CPT

## 2024-11-13 PROCEDURE — 36415 COLL VENOUS BLD VENIPUNCTURE: CPT

## 2024-11-13 PROCEDURE — 82565 ASSAY OF CREATININE: CPT

## 2024-11-13 PROCEDURE — 84443 ASSAY THYROID STIM HORMONE: CPT

## 2024-11-13 PROCEDURE — 84520 ASSAY OF UREA NITROGEN: CPT

## 2024-11-26 ENCOUNTER — TELEPHONE (OUTPATIENT)
Age: 79
End: 2024-11-26

## 2024-11-26 ENCOUNTER — APPOINTMENT (OUTPATIENT)
Dept: LAB | Facility: CLINIC | Age: 79
End: 2024-11-26

## 2024-11-26 ENCOUNTER — TRANSCRIBE ORDERS (OUTPATIENT)
Dept: LAB | Facility: CLINIC | Age: 79
End: 2024-11-26

## 2024-11-26 DIAGNOSIS — F31.60 MIXED BIPOLAR I DISORDER (HCC): Primary | ICD-10-CM

## 2024-11-26 DIAGNOSIS — I10 ESSENTIAL HYPERTENSION: Primary | ICD-10-CM

## 2024-11-26 DIAGNOSIS — E83.42 HYPOMAGNESEMIA: ICD-10-CM

## 2024-11-26 DIAGNOSIS — F31.60 MIXED BIPOLAR I DISORDER (HCC): ICD-10-CM

## 2024-11-26 DIAGNOSIS — N18.31 STAGE 3A CHRONIC KIDNEY DISEASE (HCC): ICD-10-CM

## 2024-11-26 NOTE — TELEPHONE ENCOUNTER
BMP has been added to the patient chart.     I have called the patient and left a voicemail letting him know the lab has been added.

## 2024-11-26 NOTE — TELEPHONE ENCOUNTER
Patient called in explaining that he was to have a BMP done now in November and then again in February. He went to the lab but there is only a BMP for feb and not for now.      Patient is asking if we can put the BMP order in so he can go back to the lab to get the order asap.

## 2024-11-27 ENCOUNTER — APPOINTMENT (OUTPATIENT)
Dept: LAB | Facility: CLINIC | Age: 79
End: 2024-11-27
Payer: MEDICARE

## 2024-11-27 DIAGNOSIS — N18.31 STAGE 3A CHRONIC KIDNEY DISEASE (HCC): ICD-10-CM

## 2024-11-27 DIAGNOSIS — F31.60 MIXED BIPOLAR I DISORDER (HCC): ICD-10-CM

## 2024-11-27 DIAGNOSIS — I10 ESSENTIAL HYPERTENSION: ICD-10-CM

## 2024-11-27 DIAGNOSIS — E83.42 HYPOMAGNESEMIA: ICD-10-CM

## 2024-11-27 LAB
ANION GAP SERPL CALCULATED.3IONS-SCNC: 6 MMOL/L (ref 4–13)
BUN SERPL-MCNC: 37 MG/DL (ref 5–25)
CALCIUM SERPL-MCNC: 9.8 MG/DL (ref 8.4–10.2)
CHLORIDE SERPL-SCNC: 108 MMOL/L (ref 96–108)
CO2 SERPL-SCNC: 26 MMOL/L (ref 21–32)
CREAT SERPL-MCNC: 1.75 MG/DL (ref 0.6–1.3)
GFR SERPL CREATININE-BSD FRML MDRD: 37 ML/MIN/1.73SQ M
GLUCOSE P FAST SERPL-MCNC: 94 MG/DL (ref 65–99)
LITHIUM SERPL-SCNC: 0.42 MMOL/L (ref 0.6–1.2)
POTASSIUM SERPL-SCNC: 5.6 MMOL/L (ref 3.5–5.3)
SODIUM SERPL-SCNC: 140 MMOL/L (ref 135–147)
TSH SERPL DL<=0.05 MIU/L-ACNC: 1.84 UIU/ML (ref 0.45–4.5)

## 2024-11-27 PROCEDURE — 36415 COLL VENOUS BLD VENIPUNCTURE: CPT

## 2024-11-27 PROCEDURE — 84443 ASSAY THYROID STIM HORMONE: CPT

## 2024-11-27 PROCEDURE — 80178 ASSAY OF LITHIUM: CPT

## 2024-11-27 PROCEDURE — 80048 BASIC METABOLIC PNL TOTAL CA: CPT

## 2024-12-02 DIAGNOSIS — I25.10 CORONARY ARTERY DISEASE INVOLVING NATIVE CORONARY ARTERY OF NATIVE HEART WITHOUT ANGINA PECTORIS: ICD-10-CM

## 2024-12-02 DIAGNOSIS — I70.90: ICD-10-CM

## 2024-12-02 DIAGNOSIS — K21.9 GASTROESOPHAGEAL REFLUX DISEASE WITHOUT ESOPHAGITIS: ICD-10-CM

## 2024-12-03 RX ORDER — OMEGA-3-ACID ETHYL ESTERS 1 G/1
1 CAPSULE, LIQUID FILLED ORAL 3 TIMES DAILY
Qty: 270 CAPSULE | Refills: 1 | Status: SHIPPED | OUTPATIENT
Start: 2024-12-03

## 2024-12-03 RX ORDER — ATORVASTATIN CALCIUM 40 MG/1
40 TABLET, FILM COATED ORAL DAILY
Qty: 90 TABLET | Refills: 1 | Status: SHIPPED | OUTPATIENT
Start: 2024-12-03

## 2024-12-04 DIAGNOSIS — J44.9 CHRONIC OBSTRUCTIVE PULMONARY DISEASE, UNSPECIFIED COPD TYPE (HCC): Primary | ICD-10-CM

## 2024-12-05 RX ORDER — ALBUTEROL SULFATE 90 UG/1
INHALANT RESPIRATORY (INHALATION)
Qty: 26.8 G | Refills: 12 | Status: SHIPPED | OUTPATIENT
Start: 2024-12-05 | End: 2025-03-05

## 2024-12-09 NOTE — PROGRESS NOTES
Assessment:  1. Lumbar radiculopathy        Plan:  Patient will be scheduled for an L3-4 LESI to address the inflammatory component of the patient's pain.  Complete risks and benefits including bleeding, infection, tissue reaction, nerve injury and allergic reaction were discussed. The patient was agreeable and verbalized an understanding  Patient may continue duloxetine as prescribed.  He does not require refills today  Will avoid NSAIDs secondary to CKD  Continue with home exercise program  Follow-up in 3 months or sooner if needed    History of Present Illness:    The patient is a 78 y.o. male with a history of L4-S1 fusion last seen on 09/17/2024  who presents for a follow up office visit in regards to chronic lumbosacral back pain that will radiate into the left lower extremity with associated paresthesias.   he is status post L3-4 LESI October 9, 2024 with 75% improvement of his pain for 2 months.  He did have a slip off of his walker without a fall however still felt he jolted his back which did exacerbate his pain.  He would like to schedule repeat injection.    The patient rates his pain a 7 out of 10 on the numeric pain rating scale.  Pain is intermittent at night and is described as burning, dull aching, sharp and throbbing.  He continues on duloxetine 30 mg daily and Tylenol as needed    I have personally reviewed and/or updated the patient's past medical history, past surgical history, family history, social history, current medications, allergies, and vital signs today.       Review of Systems:    Review of Systems   Respiratory:  Negative for shortness of breath.    Cardiovascular:  Negative for chest pain.   Gastrointestinal:  Positive for constipation. Negative for diarrhea, nausea and vomiting.   Musculoskeletal:  Positive for back pain and gait problem. Negative for arthralgias, joint swelling and myalgias.   Skin:  Negative for rash.   Neurological:  Positive for dizziness. Negative for seizures  and weakness.   All other systems reviewed and are negative.        Past Medical History:   Diagnosis Date    Allergic 2011    Allergic rhinitis 2015    Anxiety     occasional    Aortic aneurysm (HCC)     Benign colon polyp     Bipolar 1 disorder (HCC)     Cancer (HCC) 2007, 2011    Cardiac disease     MI    Cervical cord compression with myelopathy (HCC)     COPD (chronic obstructive pulmonary disease) (HCC)     Coronary artery disease 1995    Diverticulitis     Diverticulitis of colon prior to 2014    Diverticulosis     Emphysema of lung (HCC) 2012    Gait disturbance     uses cane, leg brace on right    GERD (gastroesophageal reflux disease)     Heart attack (HCC) 1996    Hx of resection of large bowel 05/03/2016    Hyperlipidemia     Hypertension     IBS (irritable bowel syndrome)     Inflammatory bowel disease 2012    Lumbar stenosis     Lung cancer (HCC)     2007 Left lower lobectomy and 2011 Right lung with surgery     Myocardial infarction (HCC)     involving other coronary artery    Prostate cancer (HCC)     Shortness of breath     Small bowel obstruction (HCC) 11/16/2016       Past Surgical History:   Procedure Laterality Date    ANGIOPLASTY      stent    CARDIAC CATHETERIZATION  1995    angioplasty    CARDIAC SURGERY      CERVICAL SPINE SURGERY      Cervical decompression with cervical fusion from C3-C7 for spinal stenosis.    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    LUNG LOBECTOMY Left     [place] ARTHRD ANT INTERBODY MIN DSC CRV BELOW C2 N/A 05/02/2016    Procedure: Anterior cervical diskectomy C3/4,  C5/6, C6/7 with anterior plate fixation fusion C3-7;  Posterior decompressive laminectomy C3-7 with lateral mass fixation fusion C3-7 (IMPULSE MONITORING);  Surgeon: Jose Luis Moore MD;  [facility];  Service: Neurosurgery    CT ARTHRODESIS POSTERIOR INTERBODY 1 [facility] LUMBAR N/A 2017    Procedure: L4-5 AND L5-S1 DECOMPRESSIVE FORAMINOTOMIES, TRANSFORAMINAL LUMBAR INTERBODY AND PEDICLE SCREW FIXATION FUSION L4-S1 (IMPULSE);  Surgeon: Jose Luis Moore MD;  [facility];  Service: Neurosurgery    CT EVASC RPR DPLMNT AORTO-AORTIC NDGFT N/A 2018    Procedure: REPAIR ANEURYSM ENDOVASCULAR ABDOMINAL AORTIC  (EVAR) WITH BILATERAL PERCUTANEOUS FEMORAL ACCESS WITH ULTRASOUND GUIDANCE ON THE RIGHT AND PRE CLOSURE;  Surgeon: Shan Villalobos MD;  [facility];  Service: Vascular    PROSTATECTOMY      for prostate CA - no chemo/RT    SIGMOIDECTOMY      for divertic    SMALL INTESTINE SURGERY N/A 2016    Procedure: Exploratory Laparotomy, Lysis of adhesions to release small bowel obstruction;  Surgeon: Aminta Sim MD;  [facility];  Service:     SPINE SURGERY  ,     TONSILLECTOMY  Y    1950       Family History   Problem Relation Age of Onset    Hypertension Mother     Glaucoma Mother     Heart attack Father     Colon cancer Father     Coronary artery disease Father     Hypertension Father     Kidney disease Father     Heart disease Family     Hyperlipidemia Family     Hypertension Family        Social History     Occupational History    Occupation: Retired   Tobacco Use    Smoking status: Former     Current packs/day: 0.00     Average packs/day: 1 pack/day for 44.0 years (44.0 ttl pk-yrs)     Types: Cigarettes     Start date: 1967     Quit date: 2011     Years since quittin.9     Passive exposure: Past    Smokeless tobacco: Never   Vaping Use    Vaping status: Never Used   Substance and Sexual Activity    Alcohol use: Yes     Alcohol/week: 2.0 -  3.0 standard drinks of alcohol     Types: 2 - 3 Shots of liquor per week     Comment: One glass per day    Drug use: No    Sexual activity: Yes     Partners: Female     Birth control/protection: Post-menopausal, None         Current Outpatient Medications:     acetaminophen (TYLENOL) 500 mg tablet, Take 500 mg by mouth as needed for mild pain., Disp: , Rfl:     albuterol (PROVENTIL HFA,VENTOLIN HFA) 90 mcg/act inhaler, USE 2 PUFFS BY MOUTH EVERY 6  HOURS AS NEEDED FOR WHEEZING OR  SHORTNESS OF BREATH, Disp: 26.8 g, Rfl: 12    amLODIPine (NORVASC) 5 mg tablet, Take 1 tablet (5 mg total) by mouth daily, Disp: 90 tablet, Rfl: 3    aspirin (ECOTRIN LOW STRENGTH) 81 mg EC tablet, Take 81 mg by mouth daily, Disp: , Rfl:     atorvastatin (LIPITOR) 40 mg tablet, TAKE 1 TABLET BY MOUTH DAILY, Disp: 90 tablet, Rfl: 1    Cholecalciferol (VITAMIN D PO), Take 2,000 Units by mouth daily  , Disp: , Rfl:     dicyclomine (BENTYL) 20 mg tablet, TAKE 1 TABLET BY MOUTH EVERY 6  HOURS AS NEEDED FOR ABDOMINAL  CRAMPING, Disp: 360 tablet, Rfl: 0    DULoxetine (CYMBALTA) 30 mg delayed release capsule, Take 1 capsule (30 mg total) by mouth daily, Disp: 90 capsule, Rfl: 1    fexofenadine (ALLEGRA) 180 MG tablet, Take 180 mg by mouth as needed Daily during the Summer, Disp: , Rfl:     fluticasone (FLONASE) 50 mcg/act nasal spray, 2 sprays into each nostril daily, Disp: , Rfl:     lithium carbonate (LITHOBID) 300 mg CR tablet, Take 300 mg by mouth daily at bedtime, Disp: , Rfl:     magnesium chloride-calcium (SlowMag Mg Muscle/Heart) 71.5-119 mg, Take 1 tablet by mouth 2 (two) times a day (Patient taking differently: Take 1 tablet by mouth 4 (four) times a day), Disp: 60 tablet, Rfl: 5    metoprolol succinate (TOPROL-XL) 25 mg 24 hr tablet, TAKE 1 TABLET BY MOUTH ONCE  DAILY, Disp: 90 tablet, Rfl: 3    mupirocin (BACTROBAN) 2 % ointment, Apply topically 2 (two) times a day as needed (wound) (Patient not taking: Reported on 8/22/2024), Disp: 22  "g, Rfl: 0    nitroglycerin (NITRODUR) 0.2 mg/hr, APPLY 1 PATCH TOPICALLY ONCE  DAILY. LEAVE ON FOR 12 TO 14  HOURS THEN REMOVE FOR A  NITRATE-FREE INTERVAL OF 10 TO  12 HOURS (Patient not taking: Reported on 9/9/2024), Disp: 90 patch, Rfl: 1    omega-3-acid ethyl esters (LOVAZA) 1 g capsule, TAKE 1 CAPSULE BY MOUTH 3 TIMES  DAILY, Disp: 270 capsule, Rfl: 1    omeprazole (PriLOSEC) 20 mg delayed release capsule, TAKE 1 CAPSULE BY MOUTH TWICE  DAILY BEFORE MEALS, Disp: 180 capsule, Rfl: 3    terbinafine (LamISIL) 1 % cream, Apply topically 2 (two) times a day (Patient not taking: Reported on 10/4/2024), Disp: 42 g, Rfl: 1    Trelegy Ellipta 200-62.5-25 MCG/ACT AEPB inhaler, USE 1 INHALATION BY MOUTH DAILY, Disp: 180 each, Rfl: 3    triamcinolone (KENALOG) 0.1 % cream, prn, Disp: , Rfl:     trospium chloride (SANCTURA) 20 mg tablet, Take 1 tablet (20 mg total) by mouth 2 (two) times a day (Patient taking differently: Take 20 mg by mouth 2 (two) times a day Pt has not started taking.), Disp: 180 tablet, Rfl: 3    Allergies   Allergen Reactions    Bactrim [Sulfamethoxazole-Trimethoprim] Other (See Comments)     AILYN    Nsaids      Annotation - [id number]: unable to take due to use of lithium.    Oxycodone Rash       Physical Exam:    /79   Pulse 73   Ht 5' 5.35\" (1.66 m)   Wt 69.9 kg (154 lb)   BMI 25.35 kg/m²     Constitutional:normal, well developed, well nourished, alert, in no distress and non-toxic and no overt pain behavior.  Eyes:anicteric  HEENT:grossly intact  Neck:supple, symmetric, trachea midline and no masses   Pulmonary:even and unlabored  Cardiovascular:No edema or pitting edema present  Skin:Normal without rashes or lesions and well hydrated  Psychiatric:Mood and affect appropriate  Neurologic:Cranial Nerves II-XII grossly intact  Musculoskeletal:antalgic and ambulates with cane      Imaging  FL spine and pain procedure    (Results Pending)         Orders Placed This Encounter   Procedures    FL " spine and pain procedure

## 2024-12-10 ENCOUNTER — OFFICE VISIT (OUTPATIENT)
Dept: PAIN MEDICINE | Facility: CLINIC | Age: 79
End: 2024-12-10
Payer: MEDICARE

## 2024-12-10 VITALS
DIASTOLIC BLOOD PRESSURE: 79 MMHG | BODY MASS INDEX: 25.66 KG/M2 | SYSTOLIC BLOOD PRESSURE: 145 MMHG | HEART RATE: 73 BPM | HEIGHT: 65 IN | WEIGHT: 154 LBS

## 2024-12-10 DIAGNOSIS — M54.16 LUMBAR RADICULOPATHY: Primary | ICD-10-CM

## 2024-12-10 PROCEDURE — G2211 COMPLEX E/M VISIT ADD ON: HCPCS | Performed by: NURSE PRACTITIONER

## 2024-12-10 PROCEDURE — 99214 OFFICE O/P EST MOD 30 MIN: CPT | Performed by: NURSE PRACTITIONER

## 2024-12-17 ENCOUNTER — OFFICE VISIT (OUTPATIENT)
Dept: OBGYN CLINIC | Facility: HOSPITAL | Age: 79
End: 2024-12-17
Payer: MEDICARE

## 2024-12-17 ENCOUNTER — TELEPHONE (OUTPATIENT)
Dept: PULMONOLOGY | Facility: CLINIC | Age: 79
End: 2024-12-17

## 2024-12-17 VITALS
DIASTOLIC BLOOD PRESSURE: 76 MMHG | HEART RATE: 76 BPM | WEIGHT: 166 LBS | BODY MASS INDEX: 27.66 KG/M2 | HEIGHT: 65 IN | SYSTOLIC BLOOD PRESSURE: 134 MMHG

## 2024-12-17 DIAGNOSIS — M76.32 ILIOTIBIAL BAND SYNDROME OF LEFT SIDE: ICD-10-CM

## 2024-12-17 DIAGNOSIS — M70.62 TROCHANTERIC BURSITIS OF LEFT HIP: Primary | ICD-10-CM

## 2024-12-17 DIAGNOSIS — M16.12 PRIMARY OSTEOARTHRITIS OF LEFT HIP: ICD-10-CM

## 2024-12-17 PROCEDURE — 99213 OFFICE O/P EST LOW 20 MIN: CPT | Performed by: ORTHOPAEDIC SURGERY

## 2024-12-17 PROCEDURE — 20610 DRAIN/INJ JOINT/BURSA W/O US: CPT

## 2024-12-17 RX ORDER — LIDOCAINE HYDROCHLORIDE 10 MG/ML
4 INJECTION, SOLUTION INFILTRATION; PERINEURAL
Status: COMPLETED | OUTPATIENT
Start: 2024-12-17 | End: 2024-12-17

## 2024-12-17 RX ORDER — BETAMETHASONE SODIUM PHOSPHATE AND BETAMETHASONE ACETATE 3; 3 MG/ML; MG/ML
12 INJECTION, SUSPENSION INTRA-ARTICULAR; INTRALESIONAL; INTRAMUSCULAR; SOFT TISSUE
Status: COMPLETED | OUTPATIENT
Start: 2024-12-17 | End: 2024-12-17

## 2024-12-17 RX ADMIN — BETAMETHASONE SODIUM PHOSPHATE AND BETAMETHASONE ACETATE 12 MG: 3; 3 INJECTION, SUSPENSION INTRA-ARTICULAR; INTRALESIONAL; INTRAMUSCULAR; SOFT TISSUE at 09:30

## 2024-12-17 RX ADMIN — LIDOCAINE HYDROCHLORIDE 4 ML: 10 INJECTION, SOLUTION INFILTRATION; PERINEURAL at 09:30

## 2024-12-17 NOTE — PROGRESS NOTES
Assessment:   Diagnosis ICD-10-CM Associated Orders   1. Trochanteric bursitis of left hip  M70.62 Ambulatory Referral to Physical Therapy     Large joint arthrocentesis: L greater trochanteric bursa     lidocaine (XYLOCAINE) 1 % injection 4 mL     betamethasone acetate-betamethasone sodium phosphate (CELESTONE) injection 12 mg      2. Iliotibial band syndrome of left side  M76.32 Ambulatory Referral to Physical Therapy     Large joint arthrocentesis: L greater trochanteric bursa     lidocaine (XYLOCAINE) 1 % injection 4 mL     betamethasone acetate-betamethasone sodium phosphate (CELESTONE) injection 12 mg      3. Primary osteoarthritis of left hip  M16.12           Plan:  78 y.o. male with chronic recurring greater trochanteric bursitis of the left hip as well as left IT syndrome.  It was explained to the patient that based upon the clinical and radiographic findings, at this point in time, this is not a surgical problem.  Treatment for the above diagnoses and associated symptoms of this nature is generally conservative including a physician directed physical therapy program, improved fitness/weight loss, anti-inflammatories, and potentially various injections.  Despite having modest degenerative changes at his left hip his symptoms and exam findings are consistent with trochanteric bursitis and IT band tendinitis.  Offered, accepted, provided with injection of corticosteroid to the left greater troches bursa today, which she tolerated well  Continue weightbearing activities as tolerated  Physical therapy order placed in chart for stretching of the left IT band  Follow-up in 3 months    The above stated was discussed in layman's terms and the patient expressed understanding.  All questions were answered to the patient's satisfaction.     To do next visit:  Return in about 3 months (around 3/17/2025) for Left hip.      Subjective:   Tonny Baig is a 78 y.o. male who presents for follow-up of his left hip.   Patient has chronic and recurrent greater trochanteric bursitis of the left hip as well as IT band syndrome on the left.  Patient last seen September 17 and was provided with a and injection of corticosteroid to the left greater troch bursa which he admits provided about 2 to 2-1/2 months of symptomatic pain relief.  He admits to return of lateral based left hip pain worse when pressure is applied.  Patient was offered, accepted, provided with repeat injection of corticosteroid to the left greater troches bursa today which she tolerated well.  A prescription for formal physical therapy was placed in his chart as well for stretching of the IT band should he choose to go.  Pain score 7/10      Review of systems negative unless otherwise specified in HPI    Past Medical History:   Diagnosis Date   • Allergic 2011   • Allergic rhinitis 2015   • Anxiety     occasional   • Aortic aneurysm ([facility])    • Benign colon polyp    • Bipolar 1 disorder ([facility])    • Cancer ([facility]) 2007, 2011   • Cardiac disease     MI   • Cervical cord compression with myelopathy ([facility])    • COPD (chronic obstructive pulmonary disease) ([facility])    • Coronary artery disease 1995   • Diverticulitis    • Diverticulitis of colon prior to 2014   • Diverticulosis    • Emphysema of lung ([facility]) 2012   • Gait disturbance     uses cane, leg brace on right   • GERD (gastroesophageal reflux disease)    • Heart attack ([facility]) 1996   • Hx of resection of large bowel 05/03/2016   • Hyperlipidemia    • Hypertension    • IBS (irritable bowel syndrome)    • Inflammatory bowel disease 2012   • Lumbar stenosis    • Lung cancer ([facility])     2007 Left lower lobectomy and 2011 Right lung with surgery    • Myocardial infarction ([facility])     involving other coronary artery   • Prostate cancer ([facility])    • Shortness of breath    • Small bowel obstruction ([facility]) 11/16/2016       Past Surgical History:   Procedure Laterality Date   • ANGIOPLASTY      stent   • CARDIAC CATHETERIZATION  1995     angioplasty   • CARDIAC SURGERY     • CERVICAL SPINE SURGERY      Cervical decompression with cervical fusion from C3-C7 for spinal stenosis.   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   • LUNG LOBECTOMY Left    • MS ARTHRD ANT INTERBODY MIN DSC CRV BELOW C2 N/A 05/02/2016    Procedure: Anterior cervical diskectomy C3/4, C5/6, C6/7 with anterior plate fixation fusion C3-7;  Posterior decompressive laminectomy C3-7 with lateral mass fixation fusion C3-7 (IMPULSE MONITORING);  Surgeon: Jose Luis Moore MD;  [facility];  Service: Neurosurgery   • MS ARTHRODESIS POSTERIOR INTERBODY 1 NTRSP LUMBAR N/A 01/30/2017    Procedure: L4-5 AND L5-S1 DECOMPRESSIVE FORAMINOTOMIES, TRANSFORAMINAL LUMBAR INTERBODY AND PEDICLE SCREW FIXATION FUSION L4-S1 (IMPULSE);  Surgeon: Jose Luis Moore MD;  [facility];  Service: Neurosurgery   • MS EVASC RPR DPLMNT AORTO-AORTIC NDGFT N/A 08/06/2018    Procedure: REPAIR ANEURYSM ENDOVASCULAR ABDOMINAL AORTIC  (EVAR) WITH BILATERAL PERCUTANEOUS FEMORAL ACCESS WITH ULTRASOUND GUIDANCE ON THE RIGHT AND PRE CLOSURE;  Surgeon: Shan Villalobos MD;  [facility];  Service: Vascular   • PROSTATECTOMY  2007    for prostate CA - no chemo/RT   • SIGMOIDECTOMY      for divertic   • SMALL INTESTINE SURGERY N/A 11/17/2016    Procedure: Exploratory Laparotomy, Lysis of adhesions to release small bowel obstruction;  Surgeon: Aminta Sim MD;  [facility];  Service:    • SPINE SURGERY  2016, 2017   • TONSILLECTOMY  Y    1950       Family History   Problem  Relation Age of Onset   • Hypertension Mother    • Glaucoma Mother    • Heart attack Father    • Colon cancer Father    • Coronary artery disease Father    • Hypertension Father    • Kidney disease Father    • Heart disease Family    • Hyperlipidemia Family    • Hypertension Family        Social History     Occupational History   • Occupation: Retired   Tobacco Use   • Smoking status: Former     Current packs/day: 0.00     Average packs/day: 1 pack/day for 44.0 years (44.0 ttl pk-yrs)     Types: Cigarettes     Start date: 1967     Quit date: 2011     Years since quittin.9     Passive exposure: Past   • Smokeless tobacco: Never   Vaping Use   • Vaping status: Never Used   Substance and Sexual Activity   • Alcohol use: Yes     Alcohol/week: 2.0 - 3.0 standard drinks of alcohol     Types: 2 - 3 Shots of liquor per week     Comment: One glass per day   • Drug use: No   • Sexual activity: Yes     Partners: Female     Birth control/protection: Post-menopausal, None         Current Outpatient Medications:   •  acetaminophen (TYLENOL) 500 mg tablet, Take 500 mg by mouth as needed for mild pain., Disp: , Rfl:   •  albuterol (PROVENTIL HFA,VENTOLIN HFA) 90 mcg/act inhaler, USE 2 PUFFS BY MOUTH EVERY 6  HOURS AS NEEDED FOR WHEEZING OR  SHORTNESS OF BREATH, Disp: 26.8 g, Rfl: 12  •  amLODIPine (NORVASC) 5 mg tablet, Take 1 tablet (5 mg total) by mouth daily, Disp: 90 tablet, Rfl: 3  •  aspirin (ECOTRIN LOW STRENGTH) 81 mg EC tablet, Take 81 mg by mouth daily, Disp: , Rfl:   •  atorvastatin (LIPITOR) 40 mg tablet, TAKE 1 TABLET BY MOUTH DAILY, Disp: 90 tablet, Rfl: 1  •  Cholecalciferol (VITAMIN D PO), Take 2,000 Units by mouth daily  , Disp: , Rfl:   •  dicyclomine (BENTYL) 20 mg tablet, TAKE 1 TABLET BY MOUTH EVERY 6  HOURS AS NEEDED FOR ABDOMINAL  CRAMPING, Disp: 360 tablet, Rfl: 0  •  DULoxetine (CYMBALTA) 30 mg delayed release capsule, Take 1 capsule (30 mg total) by mouth daily, Disp: 90 capsule, Rfl: 1  •   fexofenadine (ALLEGRA) 180 MG tablet, Take 180 mg by mouth as needed Daily during the Summer, Disp: , Rfl:   •  fluticasone (FLONASE) 50 mcg/act nasal spray, 2 sprays into each nostril daily, Disp: , Rfl:   •  lithium carbonate (LITHOBID) 300 mg CR tablet, Take 300 mg by mouth daily at bedtime, Disp: , Rfl:   •  magnesium chloride-calcium (SlowMag Mg Muscle/Heart) 71.5-119 mg, Take 1 tablet by mouth 2 (two) times a day (Patient taking differently: Take 1 tablet by mouth 4 (four) times a day), Disp: 60 tablet, Rfl: 5  •  metoprolol succinate (TOPROL-XL) 25 mg 24 hr tablet, TAKE 1 TABLET BY MOUTH ONCE  DAILY, Disp: 90 tablet, Rfl: 3  •  mupirocin (BACTROBAN) 2 % ointment, Apply topically 2 (two) times a day as needed (wound) (Patient not taking: Reported on 8/22/2024), Disp: 22 g, Rfl: 0  •  nitroglycerin (NITRODUR) 0.2 mg/hr, APPLY 1 PATCH TOPICALLY ONCE  DAILY. LEAVE ON FOR 12 TO 14  HOURS THEN REMOVE FOR A  NITRATE-FREE INTERVAL OF 10 TO  12 HOURS (Patient not taking: Reported on 9/9/2024), Disp: 90 patch, Rfl: 1  •  omega-3-acid ethyl esters (LOVAZA) 1 g capsule, TAKE 1 CAPSULE BY MOUTH 3 TIMES  DAILY, Disp: 270 capsule, Rfl: 1  •  omeprazole (PriLOSEC) 20 mg delayed release capsule, TAKE 1 CAPSULE BY MOUTH TWICE  DAILY BEFORE MEALS, Disp: 180 capsule, Rfl: 3  •  terbinafine (LamISIL) 1 % cream, Apply topically 2 (two) times a day (Patient not taking: Reported on 10/4/2024), Disp: 42 g, Rfl: 1  •  Trelegy Ellipta 200-62.5-25 MCG/ACT AEPB inhaler, USE 1 INHALATION BY MOUTH DAILY, Disp: 180 each, Rfl: 3  •  triamcinolone (KENALOG) 0.1 % cream, prn, Disp: , Rfl:   •  trospium chloride (SANCTURA) 20 mg tablet, Take 1 tablet (20 mg total) by mouth 2 (two) times a day (Patient taking differently: Take 20 mg by mouth 2 (two) times a day Pt has not started taking.), Disp: 180 tablet, Rfl: 3  No current facility-administered medications for this visit.    Allergies   Allergen Reactions   • Bactrim  "[Sulfamethoxazole-Trimethoprim] Other (See Comments)     AILYN   • Nsaids      Annotation - [id number]: unable to take due to use of lithium.   • Oxycodone Rash            Vitals:    12/17/24 0920   BP: 134/76   Pulse: 76       Objective:  Physical exam  General: Awake, Alert, Oriented  Eyes: Pupils equal, round and reactive to light  Heart: regular rate and rhythm  Lungs: No audible wheezing  Abdomen: soft                    Right Hip Exam     Tenderness   The patient is experiencing tenderness in the greater trochanter.    Other   Erythema: absent  Scars: absent  Pulse: present            Diagnostics, reviewed and taken today if performed as documented:    None performed        Procedures, if performed today:    Large joint arthrocentesis: L greater trochanteric bursa  Universal Protocol:  Consent: Verbal consent obtained.  Risks and benefits: risks, benefits and alternatives were discussed  Consent given by: patient  Site marked: the operative site was marked  Supporting Documentation  Indications: pain   Procedure Details  Location: hip - L greater trochanteric bursa  Needle size: 22 G  Medications administered: 12 mg betamethasone acetate-betamethasone sodium phosphate 6 (3-3) mg/mL; 4 mL lidocaine 1 %    Patient tolerance: patient tolerated the procedure well with no immediate complications  Dressing:  Sterile dressing applied          Portions of the record may have been created with voice recognition software.  Occasional wrong word or \"sound a like\" substitutions may have occurred due to the inherent limitations of voice recognition software.  Read the chart carefully and recognize, using context, where substitutions have occurred.      "

## 2024-12-17 NOTE — TELEPHONE ENCOUNTER
Called patient to schedule appointment for the 6M FU (around 3/24/2025) from the recall list and left a voicemail message.

## 2024-12-26 ENCOUNTER — TELEPHONE (OUTPATIENT)
Age: 79
End: 2024-12-26

## 2024-12-26 NOTE — TELEPHONE ENCOUNTER
Caller: Patient     Doctor: Michell     Reason for call: Patient had hip injection on 12/17 and after the anesthetic wore off the pain is progressively getting worse.  He is asking what can be done about it  Please advise ASAP    Call back#: [phone]

## 2024-12-26 NOTE — TELEPHONE ENCOUNTER
Called and spoke w/pt and he had injection on 12/17/24 for his Left hip pain/bursitis and was doing well for 24-36hours.  His pain has gotten progressively/steadily worse 7-8/10 since then-left low back to left hip to middle left thigh. Describes as nerve pain that is constant w/an intermittent [place] like pain.  No bowel/bladder issues. No groin pain. He sees pain management for lumbar radiculopathy (last appt 12/10/24). Takes tylenol ES 500mg 2 tabs about 3 times a day-reminded that max dosing is not more than 3000mg in 24 ours; cannot take NSAIDs due to CKD and on Lithium; and Duloxetine 30mg at bedtime prescribed by Dr Diamond, pain mgmt for back and thigh nerve pain.  He is using heat and walker. Will start PT in February. Has Pain Mgmt injection 1/22/25 scheduled.  Advised if pain severe and cannot walk to go to ED. Please review and advise. Thank you.

## 2025-01-03 ENCOUNTER — TELEPHONE (OUTPATIENT)
Age: 80
End: 2025-01-03

## 2025-01-16 ENCOUNTER — OFFICE VISIT (OUTPATIENT)
Dept: PAIN MEDICINE | Facility: CLINIC | Age: 80
End: 2025-01-16
Payer: MEDICARE

## 2025-01-16 VITALS — BODY MASS INDEX: 27.66 KG/M2 | HEIGHT: 65 IN | WEIGHT: 166 LBS

## 2025-01-16 DIAGNOSIS — M54.16 LUMBAR RADICULOPATHY: Primary | ICD-10-CM

## 2025-01-16 PROCEDURE — 99213 OFFICE O/P EST LOW 20 MIN: CPT | Performed by: NURSE PRACTITIONER

## 2025-01-16 NOTE — PROGRESS NOTES
Assessment:  1. Lumbar radiculopathy        Plan:  Patient currently scheduled for an L3-4 LESI next week.  He will keep this appointment  Patient may continue duloxetine as prescribed.  He does not require refills today  Patient will initiate in physical therapy after epidural steroid injection  Patient may continue Tylenol as needed  Patient is already scheduled for follow-up in March.  He will keep this appointment    History of Present Illness:    The patient is a 79 y.o. male with history of an L4-S1 fusion last seen on 12/10/2024  who presents for a follow up office visit in regards to chroniclow back pain that radiates into the anterolateral thigh of the left lower extremity with associated paresthesias.  He did have an L3-4 LESI October 9, 2024 with 75% improvement of his pain for about 2 to 3 months.  He is currently scheduled for repeat injection January 22, 2025.  He most recently had a left trochanteric bursa injection with orthopedics December 17, 2024 which only worsened his pain however he states previous injections have helped.  He does have a physical therapy referral and is planning to initiate after the injection improved some of his pain.    The patient rates his pain an 8 out of 10 on the numeric pain rating scale.  Pain is constant described as burning and sharp.  He continues on duloxetine 30 mg daily and Tylenol as needed with 50% improvement of his pain without side effects    I have personally reviewed and/or updated the patient's past medical history, past surgical history, family history, social history, current medications, allergies, and vital signs today.       Review of Systems:    Review of Systems   Respiratory:  Negative for shortness of breath.    Cardiovascular:  Negative for chest pain.   Gastrointestinal:  Negative for constipation, diarrhea, nausea and vomiting.   Musculoskeletal:  Positive for back pain and gait problem. Negative for arthralgias, joint swelling and myalgias.    Skin:  Negative for rash.   Neurological:  Negative for dizziness, seizures and weakness.   All other systems reviewed and are negative.        Past Medical History:   Diagnosis Date    Allergic 2011    Allergic rhinitis 2015    Anxiety     occasional    Aortic aneurysm ([facility])     Benign colon polyp     Bipolar 1 disorder ([facility])     Cancer ([facility]) 2007, 2011    Cardiac disease     MI    Cervical cord compression with myelopathy ([facility]CC)     COPD (chronic obstructive pulmonary disease) (HCC)     Coronary artery disease 1995    Diverticulitis     Diverticulitis of colon prior to 2014    Diverticulosis     Emphysema of lung ([facility]) 2012    Gait disturbance     uses cane, leg brace on right    GERD (gastroesophageal reflux disease)     Heart attack ([facility]) 1996    Hx of resection of large bowel 05/03/2016    Hyperlipidemia     Hypertension     IBS (irritable bowel syndrome)     Inflammatory bowel disease 2012    Lumbar stenosis     Lung cancer ([facility]CC)     2007 Left lower lobectomy and 2011 Right lung with surgery     Myocardial infarction ([facility])     involving other coronary artery    Prostate cancer (HCC)     Shortness of breath     Small bowel obstruction ([facility]CC) 11/16/2016       Past Surgical History:   Procedure Laterality Date    ANGIOPLASTY      stent    CARDIAC CATHETERIZATION  1995    angioplasty    CARDIAC SURGERY      CERVICAL SPINE SURGERY      Cervical decompression with cervical fusion from C3-C7 for spinal stenosis.    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    LUNG LOBECTOMY Left     SD ARTHRD ANT INTERBODY MIN DSC  CRV BELOW C2 N/A 2016    Procedure: Anterior cervical diskectomy C3/4, C5/6, C6/7 with anterior plate fixation fusion C3-7;  Posterior decompressive laminectomy C3-7 with lateral mass fixation fusion C3-7 (IMPULSE MONITORING);  Surgeon: Jose Luis Moore MD;  [facility];  Service: Neurosurgery    PA ARTHRODESIS POSTERIOR INTERBODY 1 [facility] LUMBAR N/A 2017    Procedure: L4-5 AND L5-S1 DECOMPRESSIVE FORAMINOTOMIES, TRANSFORAMINAL LUMBAR INTERBODY AND PEDICLE SCREW FIXATION FUSION L4-S1 (IMPULSE);  Surgeon: Jose Luis Moore MD;  [facility];  Service: Neurosurgery    PA EVASC RPR DPLMNT AORTO-AORTIC NDGFT N/A 2018    Procedure: REPAIR ANEURYSM ENDOVASCULAR ABDOMINAL AORTIC  (EVAR) WITH BILATERAL PERCUTANEOUS FEMORAL ACCESS WITH ULTRASOUND GUIDANCE ON THE RIGHT AND PRE CLOSURE;  Surgeon: Shan Villalobos MD;  [facility];  Service: Vascular    PROSTATECTOMY      for prostate CA - no chemo/RT    SIGMOIDECTOMY      for divertic    SMALL INTESTINE SURGERY N/A 2016    Procedure: Exploratory Laparotomy, Lysis of adhesions to release small bowel obstruction;  Surgeon: Aminta Sim MD;  [facility];  Service:     SPINE SURGERY  ,     TONSILLECTOMY  Y    1950       Family History   Problem Relation Age of Onset    Hypertension Mother     Glaucoma Mother     Heart attack Father     Colon cancer Father     Coronary artery disease Father     Hypertension Father     Kidney disease Father     Heart disease Family     Hyperlipidemia Family     Hypertension Family        Social History     Occupational History    Occupation: Retired   Tobacco Use    Smoking status: Former     Current packs/day: 0.00     Average packs/day: 1 pack/day for 44.0 years (44.0 ttl pk-yrs)     Types: Cigarettes     Start date: 1967     Quit date: 2011     Years since quittin.0     Passive exposure: Past    Smokeless tobacco: Never   Vaping Use    Vaping status: Never  Used   Substance and Sexual Activity    Alcohol use: Yes     Alcohol/week: 2.0 - 3.0 standard drinks of alcohol     Types: 2 - 3 Shots of liquor per week     Comment: One glass per day    Drug use: No    Sexual activity: Yes     Partners: Female     Birth control/protection: Post-menopausal, None         Current Outpatient Medications:     acetaminophen (TYLENOL) 500 mg tablet, Take 500 mg by mouth as needed for mild pain., Disp: , Rfl:     albuterol (PROVENTIL HFA,VENTOLIN HFA) 90 mcg/act inhaler, USE 2 PUFFS BY MOUTH EVERY 6  HOURS AS NEEDED FOR WHEEZING OR  SHORTNESS OF BREATH, Disp: 26.8 g, Rfl: 12    amLODIPine (NORVASC) 5 mg tablet, Take 1 tablet (5 mg total) by mouth daily, Disp: 90 tablet, Rfl: 3    aspirin (ECOTRIN LOW STRENGTH) 81 mg EC tablet, Take 81 mg by mouth daily, Disp: , Rfl:     atorvastatin (LIPITOR) 40 mg tablet, TAKE 1 TABLET BY MOUTH DAILY, Disp: 90 tablet, Rfl: 1    Cholecalciferol (VITAMIN D PO), Take 2,000 Units by mouth daily  , Disp: , Rfl:     dicyclomine (BENTYL) 20 mg tablet, TAKE 1 TABLET BY MOUTH EVERY 6  HOURS AS NEEDED FOR ABDOMINAL  CRAMPING, Disp: 360 tablet, Rfl: 0    DULoxetine (CYMBALTA) 30 mg delayed release capsule, Take 1 capsule (30 mg total) by mouth daily, Disp: 90 capsule, Rfl: 1    fexofenadine (ALLEGRA) 180 MG tablet, Take 180 mg by mouth as needed Daily during the Summer, Disp: , Rfl:     fluticasone (FLONASE) 50 mcg/act nasal spray, 2 sprays into each nostril daily, Disp: , Rfl:     lithium carbonate (LITHOBID) 300 mg CR tablet, Take 300 mg by mouth daily at bedtime, Disp: , Rfl:     metoprolol succinate (TOPROL-XL) 25 mg 24 hr tablet, TAKE 1 TABLET BY MOUTH ONCE  DAILY, Disp: 90 tablet, Rfl: 3    omega-3-acid ethyl esters (LOVAZA) 1 g capsule, TAKE 1 CAPSULE BY MOUTH 3 TIMES  DAILY, Disp: 270 capsule, Rfl: 1    omeprazole (PriLOSEC) 20 mg delayed release capsule, TAKE 1 CAPSULE BY MOUTH TWICE  DAILY BEFORE MEALS, Disp: 180 capsule, Rfl: 3    Trelegy Ellipta  "200-62.5-25 MCG/ACT AEPB inhaler, USE 1 INHALATION BY MOUTH DAILY, Disp: 180 each, Rfl: 3    triamcinolone (KENALOG) 0.1 % cream, prn, Disp: , Rfl:     magnesium chloride-calcium (SlowMag Mg Muscle/Heart) 71.5-119 mg, Take 1 tablet by mouth 2 (two) times a day (Patient taking differently: Take 1 tablet by mouth 4 (four) times a day), Disp: 60 tablet, Rfl: 5    mupirocin (BACTROBAN) 2 % ointment, Apply topically 2 (two) times a day as needed (wound) (Patient not taking: Reported on 8/22/2024), Disp: 22 g, Rfl: 0    nitroglycerin (NITRODUR) 0.2 mg/hr, APPLY 1 PATCH TOPICALLY ONCE  DAILY. LEAVE ON FOR 12 TO 14  HOURS THEN REMOVE FOR A  NITRATE-FREE INTERVAL OF 10 TO  12 HOURS (Patient not taking: Reported on 1/16/2025), Disp: 90 patch, Rfl: 1    terbinafine (LamISIL) 1 % cream, Apply topically 2 (two) times a day (Patient not taking: Reported on 10/4/2024), Disp: 42 g, Rfl: 1    trospium chloride (SANCTURA) 20 mg tablet, Take 1 tablet (20 mg total) by mouth 2 (two) times a day (Patient taking differently: Take 20 mg by mouth 2 (two) times a day Pt has not started taking.), Disp: 180 tablet, Rfl: 3    Allergies   Allergen Reactions    Bactrim [Sulfamethoxazole-Trimethoprim] Other (See Comments)     AILYN    Nsaids      Annotation - 20Nov2017: unable to take due to use of lithium.    Oxycodone Rash       Physical Exam:    Ht 5' 5.35\" (1.66 m)   Wt 75.3 kg (166 lb)   BMI 27.33 kg/m²     Constitutional:normal, well developed, well nourished, alert, in no distress and non-toxic and no overt pain behavior.  Eyes:anicteric  HEENT:grossly intact  Neck:supple, symmetric, trachea midline and no masses   Pulmonary:even and unlabored  Cardiovascular:No edema or pitting edema present  Skin:Normal without rashes or lesions and well hydrated  Psychiatric:Mood and affect appropriate  Neurologic:Cranial Nerves II-XII grossly intact  Musculoskeletal:antalgic and ambulates with a walker      Imaging  No orders to display         No " orders of the defined types were placed in this encounter.

## 2025-01-20 ENCOUNTER — TELEPHONE (OUTPATIENT)
Age: 80
End: 2025-01-20

## 2025-01-20 NOTE — TELEPHONE ENCOUNTER
Pt calling to confirm appt on 2/10 at 120 w/Dr Lopez in [facility]. Address was provided.

## 2025-01-21 ENCOUNTER — APPOINTMENT (OUTPATIENT)
Dept: LAB | Facility: CLINIC | Age: 80
DRG: 641 | End: 2025-01-21
Payer: MEDICARE

## 2025-01-21 ENCOUNTER — TELEPHONE (OUTPATIENT)
Dept: INTERNAL MEDICINE CLINIC | Facility: CLINIC | Age: 80
End: 2025-01-21

## 2025-01-21 ENCOUNTER — HOSPITAL ENCOUNTER (INPATIENT)
Facility: HOSPITAL | Age: 80
LOS: 1 days | Discharge: HOME | DRG: 641 | End: 2025-01-22
Attending: EMERGENCY MEDICINE | Admitting: STUDENT IN AN ORGANIZED HEALTH CARE EDUCATION/TRAINING PROGRAM
Payer: MEDICARE

## 2025-01-21 ENCOUNTER — RESULTS FOLLOW-UP (OUTPATIENT)
Dept: NEPHROLOGY | Facility: CLINIC | Age: 80
End: 2025-01-21

## 2025-01-21 ENCOUNTER — TRANSCRIBE ORDERS (OUTPATIENT)
Dept: LAB | Facility: CLINIC | Age: 80
End: 2025-01-21

## 2025-01-21 ENCOUNTER — APPOINTMENT (EMERGENCY)
Dept: RADIOLOGY | Facility: HOSPITAL | Age: 80
DRG: 641 | End: 2025-01-21
Payer: MEDICARE

## 2025-01-21 DIAGNOSIS — I10 ESSENTIAL HYPERTENSION: Primary | ICD-10-CM

## 2025-01-21 DIAGNOSIS — E87.8 LOW BICARBONATE: ICD-10-CM

## 2025-01-21 DIAGNOSIS — E87.5 HYPERKALEMIA: Primary | ICD-10-CM

## 2025-01-21 DIAGNOSIS — E83.42 HYPOMAGNESEMIA: ICD-10-CM

## 2025-01-21 DIAGNOSIS — F31.60 MIXED BIPOLAR I DISORDER (HCC): ICD-10-CM

## 2025-01-21 DIAGNOSIS — F31.60 MIXED BIPOLAR I DISORDER (HCC): Primary | ICD-10-CM

## 2025-01-21 DIAGNOSIS — N28.89 BILATERAL RENAL MASSES: ICD-10-CM

## 2025-01-21 DIAGNOSIS — N18.32 CKD STAGE 3B, GFR 30-44 ML/MIN (HCC): ICD-10-CM

## 2025-01-21 DIAGNOSIS — N18.31 STAGE 3A CHRONIC KIDNEY DISEASE (HCC): ICD-10-CM

## 2025-01-21 LAB
25(OH)D3 SERPL-MCNC: 28.1 NG/ML (ref 30–100)
ALBUMIN SERPL BCG-MCNC: 4.1 G/DL (ref 3.5–5)
ALBUMIN SERPL BCG-MCNC: 4.2 G/DL (ref 3.5–5)
ALP SERPL-CCNC: 162 U/L (ref 34–104)
ALP SERPL-CCNC: 171 U/L (ref 34–104)
ALT SERPL W P-5'-P-CCNC: 17 U/L (ref 7–52)
ALT SERPL W P-5'-P-CCNC: 17 U/L (ref 7–52)
ANION GAP SERPL CALCULATED.3IONS-SCNC: 10 MMOL/L (ref 4–13)
ANION GAP SERPL CALCULATED.3IONS-SCNC: 3 MMOL/L (ref 4–13)
ANION GAP SERPL CALCULATED.3IONS-SCNC: 7 MMOL/L (ref 4–13)
ANION GAP SERPL CALCULATED.3IONS-SCNC: 7 MMOL/L (ref 4–13)
AST SERPL W P-5'-P-CCNC: 16 U/L (ref 13–39)
AST SERPL W P-5'-P-CCNC: 19 U/L (ref 13–39)
BASE EXCESS BLDA CALC-SCNC: -6 MMOL/L (ref -2–3)
BASOPHILS # BLD AUTO: 0.07 THOUSANDS/ΜL (ref 0–0.1)
BASOPHILS # BLD AUTO: 0.08 THOUSANDS/ΜL (ref 0–0.1)
BASOPHILS NFR BLD AUTO: 1 % (ref 0–1)
BASOPHILS NFR BLD AUTO: 1 % (ref 0–1)
BILIRUB SERPL-MCNC: 0.56 MG/DL (ref 0.2–1)
BILIRUB SERPL-MCNC: 0.57 MG/DL (ref 0.2–1)
BUN SERPL-MCNC: 35 MG/DL (ref 5–25)
BUN SERPL-MCNC: 40 MG/DL (ref 5–25)
BUN SERPL-MCNC: 41 MG/DL (ref 5–25)
BUN SERPL-MCNC: 42 MG/DL (ref 5–25)
CA-I BLD-SCNC: 1.37 MMOL/L (ref 1.12–1.32)
CALCIUM SERPL-MCNC: 9 MG/DL (ref 8.4–10.2)
CALCIUM SERPL-MCNC: 9.7 MG/DL (ref 8.4–10.2)
CALCIUM SERPL-MCNC: 9.9 MG/DL (ref 8.4–10.2)
CALCIUM SERPL-MCNC: 9.9 MG/DL (ref 8.4–10.2)
CHLORIDE SERPL-SCNC: 104 MMOL/L (ref 96–108)
CHLORIDE SERPL-SCNC: 108 MMOL/L (ref 96–108)
CHLORIDE SERPL-SCNC: 109 MMOL/L (ref 96–108)
CHLORIDE SERPL-SCNC: 109 MMOL/L (ref 96–108)
CHOLEST SERPL-MCNC: 136 MG/DL (ref ?–200)
CO2 SERPL-SCNC: 19 MMOL/L (ref 21–32)
CO2 SERPL-SCNC: 21 MMOL/L (ref 21–32)
CO2 SERPL-SCNC: 22 MMOL/L (ref 21–32)
CO2 SERPL-SCNC: 24 MMOL/L (ref 21–32)
CREAT SERPL-MCNC: 1.89 MG/DL (ref 0.6–1.3)
CREAT SERPL-MCNC: 1.96 MG/DL (ref 0.6–1.3)
CREAT SERPL-MCNC: 2 MG/DL (ref 0.6–1.3)
CREAT SERPL-MCNC: 2.01 MG/DL (ref 0.6–1.3)
EOSINOPHIL # BLD AUTO: 0.31 THOUSAND/ΜL (ref 0–0.61)
EOSINOPHIL # BLD AUTO: 0.31 THOUSAND/ΜL (ref 0–0.61)
EOSINOPHIL NFR BLD AUTO: 3 % (ref 0–6)
EOSINOPHIL NFR BLD AUTO: 3 % (ref 0–6)
ERYTHROCYTE [DISTWIDTH] IN BLOOD BY AUTOMATED COUNT: 13 % (ref 11.6–15.1)
ERYTHROCYTE [DISTWIDTH] IN BLOOD BY AUTOMATED COUNT: 13.1 % (ref 11.6–15.1)
GFR SERPL CREATININE-BSD FRML MDRD: 31 ML/MIN/1.73SQ M
GFR SERPL CREATININE-BSD FRML MDRD: 31 ML/MIN/1.73SQ M
GFR SERPL CREATININE-BSD FRML MDRD: 32 ML/MIN/1.73SQ M
GFR SERPL CREATININE-BSD FRML MDRD: 33 ML/MIN/1.73SQ M
GLUCOSE P FAST SERPL-MCNC: 91 MG/DL (ref 65–99)
GLUCOSE SERPL-MCNC: 100 MG/DL (ref 65–140)
GLUCOSE SERPL-MCNC: 85 MG/DL (ref 65–140)
GLUCOSE SERPL-MCNC: 97 MG/DL (ref 65–140)
GLUCOSE SERPL-MCNC: 99 MG/DL (ref 65–140)
HCO3 BLDA-SCNC: 19.7 MMOL/L (ref 24–30)
HCT VFR BLD AUTO: 40.1 % (ref 36.5–49.3)
HCT VFR BLD AUTO: 41 % (ref 36.5–49.3)
HCT VFR BLD CALC: 34 % (ref 36.5–49.3)
HDLC SERPL-MCNC: 35 MG/DL
HGB BLD-MCNC: 12.9 G/DL (ref 12–17)
HGB BLD-MCNC: 13.4 G/DL (ref 12–17)
HGB BLDA-MCNC: 11.6 G/DL (ref 12–17)
IMM GRANULOCYTES # BLD AUTO: 0.09 THOUSAND/UL (ref 0–0.2)
IMM GRANULOCYTES # BLD AUTO: 0.09 THOUSAND/UL (ref 0–0.2)
IMM GRANULOCYTES NFR BLD AUTO: 1 % (ref 0–2)
IMM GRANULOCYTES NFR BLD AUTO: 1 % (ref 0–2)
LDLC SERPL CALC-MCNC: 76 MG/DL (ref 0–100)
LITHIUM SERPL-SCNC: 0.57 MMOL/L (ref 0.6–1.2)
LYMPHOCYTES # BLD AUTO: 1.59 THOUSANDS/ΜL (ref 0.6–4.47)
LYMPHOCYTES # BLD AUTO: 1.6 THOUSANDS/ΜL (ref 0.6–4.47)
LYMPHOCYTES NFR BLD AUTO: 13 % (ref 14–44)
LYMPHOCYTES NFR BLD AUTO: 14 % (ref 14–44)
MAGNESIUM SERPL-MCNC: 1.9 MG/DL (ref 1.9–2.7)
MAGNESIUM SERPL-MCNC: 2.1 MG/DL (ref 1.9–2.7)
MCH RBC QN AUTO: 30.9 PG (ref 26.8–34.3)
MCH RBC QN AUTO: 31.1 PG (ref 26.8–34.3)
MCHC RBC AUTO-ENTMCNC: 32.2 G/DL (ref 31.4–37.4)
MCHC RBC AUTO-ENTMCNC: 32.7 G/DL (ref 31.4–37.4)
MCV RBC AUTO: 95 FL (ref 82–98)
MCV RBC AUTO: 96 FL (ref 82–98)
MONOCYTES # BLD AUTO: 0.93 THOUSAND/ΜL (ref 0.17–1.22)
MONOCYTES # BLD AUTO: 1.03 THOUSAND/ΜL (ref 0.17–1.22)
MONOCYTES NFR BLD AUTO: 8 % (ref 4–12)
MONOCYTES NFR BLD AUTO: 8 % (ref 4–12)
NEUTROPHILS # BLD AUTO: 8.78 THOUSANDS/ΜL (ref 1.85–7.62)
NEUTROPHILS # BLD AUTO: 9.47 THOUSANDS/ΜL (ref 1.85–7.62)
NEUTS SEG NFR BLD AUTO: 73 % (ref 43–75)
NEUTS SEG NFR BLD AUTO: 74 % (ref 43–75)
NRBC BLD AUTO-RTO: 0 /100 WBCS
NRBC BLD AUTO-RTO: 0 /100 WBCS
PCO2 BLD: 21 MMOL/L (ref 21–32)
PCO2 BLD: 39.2 MM HG (ref 42–50)
PH BLD: 7.31 [PH] (ref 7.3–7.4)
PHOSPHATE SERPL-MCNC: 4.3 MG/DL (ref 2.3–4.1)
PLATELET # BLD AUTO: 228 THOUSANDS/UL (ref 149–390)
PLATELET # BLD AUTO: 233 THOUSANDS/UL (ref 149–390)
PMV BLD AUTO: 10.2 FL (ref 8.9–12.7)
PMV BLD AUTO: 10.3 FL (ref 8.9–12.7)
PO2 BLD: 50 MM HG (ref 35–45)
POTASSIUM BLD-SCNC: 5.6 MMOL/L (ref 3.5–5.3)
POTASSIUM SERPL-SCNC: 4.4 MMOL/L (ref 3.5–5.3)
POTASSIUM SERPL-SCNC: 5.7 MMOL/L (ref 3.5–5.3)
POTASSIUM SERPL-SCNC: 6.1 MMOL/L (ref 3.5–5.3)
POTASSIUM SERPL-SCNC: 6.6 MMOL/L (ref 3.5–5.3)
PROT SERPL-MCNC: 6.8 G/DL (ref 6.4–8.4)
PROT SERPL-MCNC: 7 G/DL (ref 6.4–8.4)
RBC # BLD AUTO: 4.17 MILLION/UL (ref 3.88–5.62)
RBC # BLD AUTO: 4.31 MILLION/UL (ref 3.88–5.62)
SAO2 % BLD FROM PO2: 81 % (ref 60–85)
SODIUM BLD-SCNC: 139 MMOL/L (ref 136–145)
SODIUM SERPL-SCNC: 135 MMOL/L (ref 135–147)
SODIUM SERPL-SCNC: 135 MMOL/L (ref 135–147)
SODIUM SERPL-SCNC: 136 MMOL/L (ref 135–147)
SODIUM SERPL-SCNC: 137 MMOL/L (ref 135–147)
SPECIMEN SOURCE: ABNORMAL
TRIGL SERPL-MCNC: 126 MG/DL (ref ?–150)
TSH SERPL DL<=0.05 MIU/L-ACNC: 2.49 UIU/ML (ref 0.45–4.5)
WBC # BLD AUTO: 11.78 THOUSAND/UL (ref 4.31–10.16)
WBC # BLD AUTO: 12.57 THOUSAND/UL (ref 4.31–10.16)

## 2025-01-21 PROCEDURE — 85014 HEMATOCRIT: CPT

## 2025-01-21 PROCEDURE — 82947 ASSAY GLUCOSE BLOOD QUANT: CPT

## 2025-01-21 PROCEDURE — 99291 CRITICAL CARE FIRST HOUR: CPT | Performed by: EMERGENCY MEDICINE

## 2025-01-21 PROCEDURE — 80048 BASIC METABOLIC PNL TOTAL CA: CPT | Performed by: STUDENT IN AN ORGANIZED HEALTH CARE EDUCATION/TRAINING PROGRAM

## 2025-01-21 PROCEDURE — 80178 ASSAY OF LITHIUM: CPT

## 2025-01-21 PROCEDURE — 93005 ELECTROCARDIOGRAM TRACING: CPT

## 2025-01-21 PROCEDURE — 85025 COMPLETE CBC W/AUTO DIFF WBC: CPT

## 2025-01-21 PROCEDURE — 99223 1ST HOSP IP/OBS HIGH 75: CPT | Performed by: STUDENT IN AN ORGANIZED HEALTH CARE EDUCATION/TRAINING PROGRAM

## 2025-01-21 PROCEDURE — 82330 ASSAY OF CALCIUM: CPT

## 2025-01-21 PROCEDURE — 83735 ASSAY OF MAGNESIUM: CPT

## 2025-01-21 PROCEDURE — 84132 ASSAY OF SERUM POTASSIUM: CPT

## 2025-01-21 PROCEDURE — 94644 CONT INHLJ TX 1ST HOUR: CPT

## 2025-01-21 PROCEDURE — 84295 ASSAY OF SERUM SODIUM: CPT

## 2025-01-21 PROCEDURE — 80048 BASIC METABOLIC PNL TOTAL CA: CPT

## 2025-01-21 PROCEDURE — 36415 COLL VENOUS BLD VENIPUNCTURE: CPT

## 2025-01-21 PROCEDURE — 76775 US EXAM ABDO BACK WALL LIM: CPT

## 2025-01-21 PROCEDURE — 96375 TX/PRO/DX INJ NEW DRUG ADDON: CPT

## 2025-01-21 PROCEDURE — 99284 EMERGENCY DEPT VISIT MOD MDM: CPT

## 2025-01-21 PROCEDURE — 84100 ASSAY OF PHOSPHORUS: CPT

## 2025-01-21 PROCEDURE — 80053 COMPREHEN METABOLIC PANEL: CPT

## 2025-01-21 PROCEDURE — 96366 THER/PROPH/DIAG IV INF ADDON: CPT

## 2025-01-21 PROCEDURE — 96365 THER/PROPH/DIAG IV INF INIT: CPT

## 2025-01-21 PROCEDURE — 82803 BLOOD GASES ANY COMBINATION: CPT

## 2025-01-21 RX ORDER — ALBUTEROL SULFATE 0.83 MG/ML
10 SOLUTION RESPIRATORY (INHALATION) ONCE
Status: COMPLETED | OUTPATIENT
Start: 2025-01-21 | End: 2025-01-21

## 2025-01-21 RX ORDER — ASPIRIN 81 MG/1
81 TABLET ORAL DAILY
Status: DISCONTINUED | OUTPATIENT
Start: 2025-01-22 | End: 2025-01-22 | Stop reason: HOSPADM

## 2025-01-21 RX ORDER — ACETAMINOPHEN 325 MG/1
650 TABLET ORAL EVERY 6 HOURS PRN
Status: DISCONTINUED | OUTPATIENT
Start: 2025-01-21 | End: 2025-01-22 | Stop reason: HOSPADM

## 2025-01-21 RX ORDER — ATORVASTATIN CALCIUM 40 MG/1
40 TABLET, FILM COATED ORAL DAILY
Status: DISCONTINUED | OUTPATIENT
Start: 2025-01-22 | End: 2025-01-22 | Stop reason: HOSPADM

## 2025-01-21 RX ORDER — ALBUTEROL SULFATE 90 UG/1
2 INHALANT RESPIRATORY (INHALATION) EVERY 6 HOURS PRN
Status: DISCONTINUED | OUTPATIENT
Start: 2025-01-21 | End: 2025-01-22 | Stop reason: HOSPADM

## 2025-01-21 RX ORDER — LITHIUM CARBONATE 300 MG/1
300 TABLET, FILM COATED, EXTENDED RELEASE ORAL
Status: DISCONTINUED | OUTPATIENT
Start: 2025-01-21 | End: 2025-01-21

## 2025-01-21 RX ORDER — FLUTICASONE FUROATE AND VILANTEROL 200; 25 UG/1; UG/1
1 POWDER RESPIRATORY (INHALATION) DAILY
Status: DISCONTINUED | OUTPATIENT
Start: 2025-01-22 | End: 2025-01-22 | Stop reason: HOSPADM

## 2025-01-21 RX ORDER — DEXTROSE MONOHYDRATE 25 G/50ML
25 INJECTION, SOLUTION INTRAVENOUS ONCE
Status: COMPLETED | OUTPATIENT
Start: 2025-01-21 | End: 2025-01-21

## 2025-01-21 RX ORDER — METOPROLOL SUCCINATE 25 MG/1
25 TABLET, EXTENDED RELEASE ORAL DAILY
Status: DISCONTINUED | OUTPATIENT
Start: 2025-01-22 | End: 2025-01-22 | Stop reason: HOSPADM

## 2025-01-21 RX ORDER — AMLODIPINE BESYLATE 5 MG/1
5 TABLET ORAL DAILY
Status: DISCONTINUED | OUTPATIENT
Start: 2025-01-22 | End: 2025-01-22 | Stop reason: HOSPADM

## 2025-01-21 RX ORDER — PANTOPRAZOLE SODIUM 40 MG/1
40 TABLET, DELAYED RELEASE ORAL
Status: DISCONTINUED | OUTPATIENT
Start: 2025-01-22 | End: 2025-01-22 | Stop reason: HOSPADM

## 2025-01-21 RX ORDER — DICYCLOMINE HCL 20 MG
20 TABLET ORAL
Status: DISCONTINUED | OUTPATIENT
Start: 2025-01-21 | End: 2025-01-22 | Stop reason: HOSPADM

## 2025-01-21 RX ORDER — SODIUM CHLORIDE, SODIUM GLUCONATE, SODIUM ACETATE, POTASSIUM CHLORIDE, MAGNESIUM CHLORIDE, SODIUM PHOSPHATE, DIBASIC, AND POTASSIUM PHOSPHATE .53; .5; .37; .037; .03; .012; .00082 G/100ML; G/100ML; G/100ML; G/100ML; G/100ML; G/100ML; G/100ML
1000 INJECTION, SOLUTION INTRAVENOUS ONCE
Status: COMPLETED | OUTPATIENT
Start: 2025-01-21 | End: 2025-01-21

## 2025-01-21 RX ORDER — NITROGLYCERIN 0.4 MG/1
0.4 TABLET SUBLINGUAL
Status: DISCONTINUED | OUTPATIENT
Start: 2025-01-21 | End: 2025-01-22 | Stop reason: HOSPADM

## 2025-01-21 RX ORDER — HEPARIN SODIUM 5000 [USP'U]/ML
5000 INJECTION, SOLUTION INTRAVENOUS; SUBCUTANEOUS EVERY 8 HOURS SCHEDULED
Status: DISCONTINUED | OUTPATIENT
Start: 2025-01-21 | End: 2025-01-22 | Stop reason: HOSPADM

## 2025-01-21 RX ORDER — FUROSEMIDE 10 MG/ML
20 INJECTION INTRAMUSCULAR; INTRAVENOUS ONCE
Status: COMPLETED | OUTPATIENT
Start: 2025-01-21 | End: 2025-01-21

## 2025-01-21 RX ORDER — LITHIUM CARBONATE 300 MG/1
300 TABLET, FILM COATED, EXTENDED RELEASE ORAL
Status: DISCONTINUED | OUTPATIENT
Start: 2025-01-22 | End: 2025-01-22 | Stop reason: HOSPADM

## 2025-01-21 RX ORDER — DULOXETIN HYDROCHLORIDE 30 MG/1
30 CAPSULE, DELAYED RELEASE ORAL DAILY
Status: DISCONTINUED | OUTPATIENT
Start: 2025-01-22 | End: 2025-01-22 | Stop reason: HOSPADM

## 2025-01-21 RX ADMIN — HEPARIN SODIUM 5000 UNITS: 5000 INJECTION INTRAVENOUS; SUBCUTANEOUS at 22:35

## 2025-01-21 RX ADMIN — DEXTROSE MONOHYDRATE 25 ML: 25 INJECTION, SOLUTION INTRAVENOUS at 20:04

## 2025-01-21 RX ADMIN — DICYCLOMINE HYDROCHLORIDE 20 MG: 20 TABLET ORAL at 22:36

## 2025-01-21 RX ADMIN — FUROSEMIDE 20 MG: 10 INJECTION, SOLUTION INTRAMUSCULAR; INTRAVENOUS at 16:04

## 2025-01-21 RX ADMIN — ALBUTEROL SULFATE 10 MG: 2.5 SOLUTION RESPIRATORY (INHALATION) at 16:04

## 2025-01-21 RX ADMIN — SODIUM CHLORIDE, SODIUM GLUCONATE, SODIUM ACETATE, POTASSIUM CHLORIDE, MAGNESIUM CHLORIDE, SODIUM PHOSPHATE, DIBASIC, AND POTASSIUM PHOSPHATE 1000 ML: .53; .5; .37; .037; .03; .012; .00082 INJECTION, SOLUTION INTRAVENOUS at 14:38

## 2025-01-21 RX ADMIN — INSULIN HUMAN 10 UNITS: 100 INJECTION, SOLUTION PARENTERAL at 20:05

## 2025-01-21 NOTE — TELEPHONE ENCOUNTER
Called patient and left a voicemail stating the below information.    I then called the patiens wife and spoke with her about the following information:    Please call patient let him know that his potassium is high to the level that I think he should just go over to the emergency room to have it repeated and also get some treatment for it.  His kidney function is relatively stable but I like him to go to the ER regarding the high potassium.  Let me know you got the message please.    Patients wife verbally understood and stated she will ask him to go to the ER.   Repeat labs have been added to the patients chart.

## 2025-01-21 NOTE — ED PROVIDER NOTES
Time reflects when diagnosis was documented in both MDM as applicable and the Disposition within this note       Time User Action Codes Description Comment    1/21/2025  5:57 PM Chase Grant Add [E87.5] Hyperkalemia     1/22/2025 11:01 AM AlbertMelissa CASTRO Add [E87.8] Low bicarbonate     1/22/2025 11:39 AM Verna Montenegro Add [E83.42] Hypomagnesemia     1/22/2025 11:41 AM Verna Montenegro Add [N18.32] CKD stage 3b, GFR 30-44 ml/min ([facility])     1/22/2025  2:10 PM Verna Montenegro Add [N28.89] Bilateral renal masses           ED Disposition       ED Disposition   Discharge    Condition   --    Date/Time   Wed Jan 22, 2025 11:52 AM    Comment   Case was discussed with slim               Assessment & Plan       Medical Decision Making  Amount and/or Complexity of Data Reviewed  Labs: ordered. Decision-making details documented in ED Course.  Radiology: ordered.    Risk  Prescription drug management.    79-year-old male sent here by nephrology for hyperkalemia.  The patient has stage III kidney disease and had an outpatient labs showing hyperkalemia of 6.6.  Patient has not had any medication changes denies any fever chills nausea vomiting or diarrhea.  The patient states that he does not have any chest pain or palpitations at this time and feels completely normal.    Plan to get repeat labs, EKG, phosphorus and ultrasound of the kidney.  The patient will be given albuterol Lasix and fluids.  Nephrology consulted.  The patient's potassium did not go down with supportive treatment.  The patient will be admitted to the hospital for observation while the potassium is treated and monitored for arrhythmias      ED Course as of 01/23/25 1900   Tue Jan 21, 2025   1515 Potassium(!): 5.7   1516 GLUCOSE: 99   1600 Spoke with nephro: obtain renal imaging. if negative for obstruction, and K down, would d/c on diuretic and we would have to coordinate follow up with nephrology soon     1723 Suboptimal visualization of the left  kidney due to overlying shadowing     A 2.9 cm right renal upper pole and 1.6 cm left renal lower pole hypoechoic lesions without internal color flow. Differential diagnoses include a complicated cyst or hypovascular renal neoplasm. Further assessment with nonemergent MRI abdomen with and   without contrast is recommended for better characterization. If the patient cannot get intravenous contrast, MRI abdomen without contrast can be obtained for further assessment     A 6.2 cm thinly septated right renal upper pole cyst. This can also be better characterized on subsequent MRI examination.     No hydronephrosis or nephrolithiasis. Increased echogenicity of bilateral renal parenchyma, compatible with medical renal disease.     The study was marked in EPIC for immediate notification.            Medications   multi-electrolyte (ISOLYTE-S PH 7.4) bolus 1,000 mL (0 mL Intravenous Stopped 1/21/25 1837)   albuterol inhalation solution 10 mg (10 mg Nebulization Given 1/21/25 1604)   furosemide (LASIX) injection 20 mg (20 mg Intravenous Given 1/21/25 1604)   insulin regular (HumuLIN R,NovoLIN R) injection 10 Units (10 Units Intravenous Given 1/21/25 2005)   dextrose 50 % IV solution 25 mL (25 mL Intravenous Given 1/21/25 2004)       ED Risk Strat Scores                          SBIRT 20yo+      Flowsheet Row Most Recent Value   Initial Alcohol Screen: US AUDIT-C     1. How often do you have a drink containing alcohol? 0 Filed at: 01/21/2025 1417   2. How many drinks containing alcohol do you have on a typical day you are drinking?  0 Filed at: 01/21/2025 1417   3a. Male UNDER 65: How often do you have five or more drinks on one occasion? 0 Filed at: 01/21/2025 1417   3b. FEMALE Any Age, or MALE 65+: How often do you have 4 or more drinks on one occassion? 0 Filed at: 01/21/2025 1417   Audit-C Score 0 Filed at: 01/21/2025 1417   JESSICA: How many times in the past year have you...    Used an illegal drug or used a prescription  medication for non-medical reasons? Never Filed at: 01/21/2025 8557                            History of Present Illness       Chief Complaint   Patient presents with    Abnormal Lab     Pt sent for elevated potassium level, no symptoms at this time       Past Medical History:   Diagnosis Date    Allergic 2011    Allergic rhinitis 2015    Anxiety     occasional    Aortic aneurysm (HCC)     Benign colon polyp     Bipolar 1 disorder (HCC)     Cancer (HCC) 2007, 2011    Cardiac disease     MI    Cervical cord compression with myelopathy (HCC)     COPD (chronic obstructive pulmonary disease) (HCC)     Coronary artery disease 1995    Diverticulitis     Diverticulitis of colon prior to 2014    Diverticulosis     Emphysema of lung (HCC) 2012    Gait disturbance     uses cane, leg brace on right    GERD (gastroesophageal reflux disease)     Heart attack (HCC) 1996    Hx of resection of large bowel 05/03/2016    Hyperlipidemia     Hypertension     IBS (irritable bowel syndrome)     Inflammatory bowel disease 2012    Lumbar stenosis     Lung cancer (HCC)     2007 Left lower lobectomy and 2011 Right lung with surgery     Myocardial infarction (HCC)     involving other coronary artery    Prostate cancer (HCC)     Shortness of breath     Small bowel obstruction (HCC) 11/16/2016      Past Surgical History:   Procedure Laterality Date    ANGIOPLASTY      stent    CARDIAC CATHETERIZATION  1995    angioplasty    CARDIAC SURGERY      CERVICAL SPINE SURGERY      Cervical decompression with cervical fusion from C3-C7 for spinal stenosis.    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    LUNG LOBECTOMY Left     NJ ARTHRD ANT INTERBODY MIN DSC CRV BELOW C2 N/A 2016    Procedure: Anterior cervical diskectomy C3/4, C5/6, C6/7 with anterior plate fixation fusion C3-7;  Posterior decompressive laminectomy C3-7 with lateral mass fixation fusion C3-7 (IMPULSE MONITORING);  Surgeon: Jose Luis Moore MD;  Location: [facility];  Service: Neurosurgery    N[place] ARTHRODESIS POSTERIOR INTERBODY 1 [facility] LUMBAR N/A 2017    Procedure: L4-5 AND L5-S1 DECOMPRESSIVE FORAMINOTOMIES, TRANSFORAMINAL LUMBAR INTERBODY AND PEDICLE SCREW FIXATION FUSION L4-S1 (IMPULSE);  Surgeon: Jose Luis Moore MD;  [facility];  Service: Neurosurgery    NJ EVASC RPR DPLMNT AORTO-AORTIC NDGFT N/A 2018    Procedure: REPAIR ANEURYSM ENDOVASCULAR ABDOMINAL AORTIC  (EVAR) WITH BILATERAL PERCUTANEOUS FEMORAL ACCESS WITH ULTRASOUND GUIDANCE ON THE RIGHT AND PRE CLOSURE;  Surgeon: Shan Villalobos MD;  Location[facility];  Service: Vascular    PROSTATECTOMY      for prostate CA - no chemo/RT    SIGMOIDECTOMY      for divertic    SMALL INTESTINE SURGERY N/A 2016    Procedure: Exploratory Laparotomy, Lysis of adhesions to release small bowel obstruction;  Surgeon: Aminta Sim MD;  Location: [facility];  Service:     SPINE SURGERY  2017    TONSILLECTOMY  Y    1952      Family History   Problem Relation Age of Onset    Hypertension Mother     Glaucoma Mother     Heart attack Father     Colon cancer Father     Coronary artery disease Father     Hypertension Father     Kidney disease Father     Heart disease Family     Hyperlipidemia Family     Hypertension Family       Social History     Tobacco Use    Smoking status: Former     Current packs/day: 0.00     Average packs/day: 1 pack/day for 44.0 years (44.0 ttl pk-yrs)     Types: Cigarettes     Start date: 1967     Quit date: 2011     Years since quittin.0     Passive exposure: Past     Smokeless tobacco: Never   Vaping Use    Vaping status: Never Used   Substance Use Topics    Alcohol use: Yes     Alcohol/week: 2.0 - 3.0 standard drinks of alcohol     Types: 2 - 3 Shots of liquor per week     Comment: One glass per day    Drug use: No      E-Cigarette/Vaping    E-Cigarette Use Never User       E-Cigarette/Vaping Substances    Nicotine No     THC No     CBD No     Flavoring No     Other No     Unknown No       I have reviewed and agree with the history as documented.     79-year-old male comes in for evaluation of hyperkalemia in the setting of CKD no new medications no palpitations no chest pain        Review of Systems   Constitutional:  Negative for chills and fever.   HENT:  Negative for ear pain and sore throat.    Eyes:  Negative for pain and visual disturbance.   Respiratory:  Negative for cough and shortness of breath.    Cardiovascular:  Negative for chest pain and palpitations.   Gastrointestinal:  Negative for abdominal pain and vomiting.   Genitourinary:  Negative for dysuria and hematuria.   Musculoskeletal:  Negative for arthralgias and back pain.   Skin:  Negative for color change and rash.   Neurological:  Negative for seizures and syncope.   All other systems reviewed and are negative.          Objective       ED Triage Vitals [01/21/25 1417]   Temperature Pulse Blood Pressure Respirations SpO2 Patient Position - Orthostatic VS   (!) 97.3 °F (36.3 °C) 85 (!) 176/91 18 96 % Sitting      Temp Source Heart Rate Source BP Location FiO2 (%) Pain Score    Temporal Monitor Left arm -- No Pain      Vitals      Date and Time Temp Pulse SpO2 Resp BP Pain Score FACES Pain Rating User   01/22/25 1130 -- 58 98 % -- -- -- -- AS   01/22/25 1100 -- 62 98 % 17 -- -- -- AS   01/22/25 1000 -- 64 98 % 18 173/81 -- -- AS   01/22/25 0945 -- 70 98 % 17 164/101 -- -- AS   01/22/25 0939 -- 65 -- -- 164/101 -- -- AS   01/22/25 0800 -- 84 96 % 18 -- -- -- AS   01/22/25 0730 -- 78 98 % 17 -- No Pain -- AS    01/22/25 0600 -- 64 -- -- 145/73 -- --    01/22/25 0545 -- 64 -- -- -- -- --    01/22/25 0400 -- 64 -- -- 142/83 -- --    01/22/25 0345 -- 70 -- -- -- -- --    01/22/25 0145 -- 68 -- -- 156/86 -- --    01/22/25 0053 -- -- -- -- -- 8 -- OO   01/21/25 2357 -- -- -- -- -- No Pain --    01/21/25 2345 -- 70 -- -- 134/82 -- --    01/21/25 2300 98.9 °F (37.2 °C) 70 -- -- -- No Pain --    01/21/25 2100 -- 80 -- -- -- -- --    01/21/25 1830 -- 90 97 % 18 141/95 -- --    01/21/25 1417 97.3 °F (36.3 °C) 85 96 % 18 176/91 No Pain -- AS            Physical Exam  Vitals and nursing note reviewed.   Constitutional:       General: He is not in acute distress.     Appearance: He is well-developed.   HENT:      Head: Normocephalic and atraumatic.   Eyes:      Conjunctiva/sclera: Conjunctivae normal.   Cardiovascular:      Rate and Rhythm: Normal rate and regular rhythm.      Heart sounds: No murmur heard.  Pulmonary:      Effort: Pulmonary effort is normal. No respiratory distress.      Breath sounds: Normal breath sounds.   Abdominal:      Palpations: Abdomen is soft.      Tenderness: There is no abdominal tenderness.   Musculoskeletal:         General: No swelling.      Cervical back: Neck supple.   Skin:     General: Skin is warm and dry.      Capillary Refill: Capillary refill takes less than 2 seconds.   Neurological:      Mental Status: He is alert.   Psychiatric:         Mood and Affect: Mood normal.         Results Reviewed       Procedure Component Value Units Date/Time    FLU/RSV/COVID - if FLU/RSV clinically relevant [[id number]]  (Normal) Collected: 01/22/25 1104    Lab Status: Final result Specimen: Nares from Nose Updated: 01/22/25 1156     SARS-CoV-2 Negative     INFLUENZA A PCR Negative     INFLUENZA B PCR Negative     RSV PCR Negative    Narrative:      This test has been performed using the CoV-2/Flu/RSV plus assay on the [employer] GeneXpert platform. This test has been validated by the   and verified by the performing laboratory.     This test is designed to amplify and detect the following: nucleocapsid (N), envelope (E), and RNA-dependent RNA polymerase (RdRP) genes of the SARS-CoV-2 genome; matrix (M), basic polymerase (PB2), and acidic protein (PA) segments of the influenza A genome; matrix (M) and non-structural protein (NS) segments of the influenza B genome, and the nucleocapsid genes of RSV A and RSV B.     Positive results are indicative of the presence of Flu A, Flu B, RSV, and/or SARS-CoV-2 RNA. Positive results for SARS-CoV-2 or suspected novel influenza should be reported to state, local, or federal health departments according to local reporting requirements.      All results should be assessed in conjunction with clinical presentation and other laboratory markers for clinical management.     FOR PEDIATRIC PATIENTS - copy/paste COVID Guidelines URL to browser: [email or web address]       Basic metabolic panel [[id number]]  (Abnormal) Collected: 01/22/25 0507    Lab Status: Final result Specimen: Blood from Arm, Right Updated: 01/22/25 0552     Sodium 134 mmol/L      Potassium 5.1 mmol/L      Chloride 105 mmol/L      CO2 20 mmol/L      ANION GAP 9 mmol/L      BUN 37 mg/dL      Creatinine 1.76 mg/dL      Glucose 83 mg/dL      Calcium 9.5 mg/dL      eGFR 36 ml/min/1.73sq m     Narrative:      National Kidney Disease Foundation guidelines for Chronic Kidney Disease (CKD):     Stage 1 with normal or high GFR (GFR > 90 mL/min/1.73 square meters)    Stage 2 Mild CKD (GFR = 60-89 mL/min/1.73 square meters)    Stage 3A Moderate CKD (GFR = 45-59 mL/min/1.73 square meters)    Stage 3B Moderate CKD (GFR = 30-44 mL/min/1.73 square meters)    Stage 4 Severe CKD (GFR = 15-29 mL/min/1.73 square meters)    Stage 5 End Stage CKD (GFR <15 mL/min/1.73 square meters)  Note: GFR calculation is accurate only with a steady state creatinine     Magnesium [[id number]]  (Normal) Collected: 01/22/25 0507    Lab Status: Final result Specimen: Blood from Arm, Right Updated: 01/22/25 0552     Magnesium 2.0 mg/dL     Calcium, ionized [[id number]]  (Abnormal) Collected: 01/22/25 0507    Lab Status: Final result Specimen: Blood from Arm, Right Updated: 01/22/25 0522     Calcium, Ionized 1.10 mmol/L     CBC and Platelet [[id number]]  (Normal) Collected: 01/22/25 0507    Lab Status: Final result Specimen: Blood from Arm, Right Updated: 01/22/25 0522     WBC 10.13 Thousand/uL      RBC 3.91 Million/uL      Hemoglobin 12.1 g/dL      Hematocrit 38.3 %      MCV 98 fL      MCH 30.9 pg      MCHC 31.6 g/dL      RDW 13.1 %      Platelets 189 Thousands/uL      MPV 10.3 fL     Basic metabolic panel [[id number]]  (Abnormal) Collected: 01/21/25 2101    Lab Status: Final result Specimen: Blood from Arm, Left Updated: 01/21/25 2132     Sodium 135 mmol/L      Potassium 4.4 mmol/L      Chloride 104 mmol/L      CO2 21 mmol/L      ANION GAP 10 mmol/L      BUN 35 mg/dL      Creatinine 2.01 mg/dL      Glucose 85 mg/dL      Calcium 9.7 mg/dL      eGFR 31 ml/min/1.73sq m     Narrative:      National Kidney Disease Foundation guidelines for Chronic Kidney Disease (CKD):     Stage 1 with normal or high GFR (GFR > 90 mL/min/1.73 square meters)    Stage 2 Mild CKD (GFR = 60-89 mL/min/1.73 square meters)    Stage 3A Moderate CKD (GFR = 45-59 mL/min/1.73 square meters)    Stage 3B Moderate CKD (GFR = 30-44 mL/min/1.73 square meters)    Stage 4 Severe CKD (GFR = 15-29 mL/min/1.73 square meters)    Stage 5 End Stage CKD (GFR <15 mL/min/1.73 square meters)  Note: GFR calculation is accurate only with a steady state creatinine    Basic metabolic panel [[id number]]  (Abnormal) Collected: 01/21/25 1703    Lab Status: Final result Specimen: Blood from Arm, Right Updated: 01/21/25 1730     Sodium 135 mmol/L      Potassium 6.1 mmol/L      Chloride 109 mmol/L      CO2 19 mmol/L      ANION GAP 7 mmol/L      BUN  40 mg/dL      Creatinine 1.89 mg/dL      Glucose 97 mg/dL      Calcium 9.0 mg/dL      eGFR 33 ml/min/1.73sq m     Narrative:      National Kidney Disease Foundation guidelines for Chronic Kidney Disease (CKD):     Stage 1 with normal or high GFR (GFR > 90 mL/min/1.73 square meters)    Stage 2 Mild CKD (GFR = 60-89 mL/min/1.73 square meters)    Stage 3A Moderate CKD (GFR = 45-59 mL/min/1.73 square meters)    Stage 3B Moderate CKD (GFR = 30-44 mL/min/1.73 square meters)    Stage 4 Severe CKD (GFR = 15-29 mL/min/1.73 square meters)    Stage 5 End Stage CKD (GFR <15 mL/min/1.73 square meters)  Note: GFR calculation is accurate only with a steady state creatinine    CBC and differential [[id number]]  (Abnormal) Collected: 01/21/25 1433    Lab Status: Final result Specimen: Blood from Arm, Right Updated: 01/21/25 1509     WBC 12.57 Thousand/uL      RBC 4.31 Million/uL      Hemoglobin 13.4 g/dL      Hematocrit 41.0 %      MCV 95 fL      MCH 31.1 pg      MCHC 32.7 g/dL      RDW 13.0 %      MPV 10.3 fL      Platelets 233 Thousands/uL      nRBC 0 /100 WBCs      Segmented % 74 %      Immature Grans % 1 %      Lymphocytes % 13 %      Monocytes % 8 %      Eosinophils Relative 3 %      Basophils Relative 1 %      Absolute Neutrophils 9.47 Thousands/µL      Absolute Immature Grans 0.09 Thousand/uL      Absolute Lymphocytes 1.60 Thousands/µL      Absolute Monocytes 1.03 Thousand/µL      Eosinophils Absolute 0.31 Thousand/µL      Basophils Absolute 0.07 Thousands/µL     Magnesium [[id number]]  (Normal) Collected: 01/21/25 1433    Lab Status: Final result Specimen: Blood from Arm, Right Updated: 01/21/25 1507     Magnesium 1.9 mg/dL     Phosphorus [[id number]]  (Abnormal) Collected: 01/21/25 1433    Lab Status: Final result Specimen: Blood from Arm, Right Updated: 01/21/25 1507     Phosphorus 4.3 mg/dL     Comprehensive metabolic panel [[id number]]  (Abnormal) Collected: 01/21/25 1433    Lab Status: Final result Specimen: Blood from Arm,  Right Updated: 01/21/25 1506     Sodium 137 mmol/L      Potassium 5.7 mmol/L      Chloride 108 mmol/L      CO2 22 mmol/L      ANION GAP 7 mmol/L      BUN 42 mg/dL      Creatinine 2.00 mg/dL      Glucose 99 mg/dL      Calcium 9.9 mg/dL      AST 16 U/L      ALT 17 U/L      Alkaline Phosphatase 171 U/L      Total Protein 7.0 g/dL      Albumin 4.2 g/dL      Total Bilirubin 0.57 mg/dL      eGFR 31 ml/min/1.73sq m     Narrative:      National Kidney Disease Foundation guidelines for Chronic Kidney Disease (CKD):     Stage 1 with normal or high GFR (GFR > 90 mL/min/1.73 square meters)    Stage 2 Mild CKD (GFR = 60-89 mL/min/1.73 square meters)    Stage 3A Moderate CKD (GFR = 45-59 mL/min/1.73 square meters)    Stage 3B Moderate CKD (GFR = 30-44 mL/min/1.73 square meters)    Stage 4 Severe CKD (GFR = 15-29 mL/min/1.73 square meters)    Stage 5 End Stage CKD (GFR <15 mL/min/1.73 square meters)  Note: GFR calculation is accurate only with a steady state creatinine    POCT Blood Gas (CG8+) [[id number]]  (Abnormal) Collected: 01/21/25 1500    Lab Status: Final result Specimen: Venous Updated: 01/21/25 1505     ph, Prateek ISTAT 7.310     pCO2, Prateek i-STAT 39.2 mm HG      pO2, Prateek i-STAT 50.0 mm HG      BE, i-STAT -6 mmol/L      HCO3, Prateek i-STAT 19.7 mmol/L      CO2, i-STAT 21 mmol/L      O2 Sat, i-STAT 81 %      SODIUM, I-STAT 139 mmol/l      Potassium, i-STAT 5.6 mmol/L      Calcium, Ionized i-STAT 1.37 mmol/L      Hct, i-STAT 34 %      Hgb, i-STAT 11.6 g/dl      Glucose, i-STAT 100 mg/dl      Specimen Type VENOUS            US kidney and bladder   Final Interpretation by Deysi Montenegro MD (01/21 0747)      Suboptimal visualization of the left kidney due to overlying shadowing      A 2.9 cm right renal upper pole and 1.6 cm left renal lower pole hypoechoic lesions without internal color flow. Differential diagnoses include a complicated cyst or hypovascular renal neoplasm. Further assessment with nonemergent MRI abdomen with  and    without contrast is recommended for better characterization. If the patient cannot get intravenous contrast, MRI abdomen without contrast can be obtained for further assessment      A 6.2 cm thinly septated right renal upper pole cyst. This can also be better characterized on subsequent MRI examination.      No hydronephrosis or nephrolithiasis. Increased echogenicity of bilateral renal parenchyma, compatible with medical renal disease.      The study was marked in EPIC for immediate notification.         Workstation performed: [id number]             Procedures    ED Medication and Procedure Management   Prior to Admission Medications   Prescriptions Last Dose Informant Patient Reported? Taking?   Cholecalciferol (VITAMIN D PO)  Self Yes No   Sig: Take 2,000 Units by mouth daily     DULoxetine (CYMBALTA) 30 mg delayed release capsule   No No   Sig: Take 1 capsule (30 mg total) by mouth daily   Trelegy Ellipta 200-62.5-25 MCG/ACT AEPB inhaler  Self No No   Sig: USE 1 INHALATION BY MOUTH DAILY   acetaminophen (TYLENOL) 500 mg tablet  Self Yes No   Sig: Take 500 mg by mouth as needed for mild pain.   albuterol (PROVENTIL HFA,VENTOLIN HFA) 90 mcg/act inhaler   No No   Sig: USE 2 PUFFS BY MOUTH EVERY 6  HOURS AS NEEDED FOR WHEEZING OR  SHORTNESS OF BREATH   amLODIPine (NORVASC) 5 mg tablet  Self No No   Sig: Take 1 tablet (5 mg total) by mouth daily   aspirin (ECOTRIN LOW STRENGTH) 81 mg EC tablet  Self Yes No   Sig: Take 81 mg by mouth daily   atorvastatin (LIPITOR) 40 mg tablet   No No   Sig: TAKE 1 TABLET BY MOUTH DAILY   dicyclomine (BENTYL) 20 mg tablet  Self No No   Sig: TAKE 1 TABLET BY MOUTH EVERY 6  HOURS AS NEEDED FOR ABDOMINAL  CRAMPING   fexofenadine (ALLEGRA) 180 MG tablet  Self Yes No   Sig: Take 180 mg by mouth as needed Daily during the Summer   fluticasone (FLONASE) 50 mcg/act nasal spray  Self Yes No   Si sprays into each nostril daily   lithium carbonate (LITHOBID) 300 mg CR tablet  Self Yes  No   Sig: Take 300 mg by mouth daily at bedtime   magnesium chloride-calcium (SlowMag Mg Muscle/Heart) 71.5-119 mg  Self No No   Sig: Take 1 tablet by mouth 2 (two) times a day   Patient taking differently: Take 1 tablet by mouth 4 (four) times a day   metoprolol succinate (TOPROL-XL) 25 mg 24 hr tablet  Self No No   Sig: TAKE 1 TABLET BY MOUTH ONCE  DAILY   mupirocin (BACTROBAN) 2 % ointment  Self No No   Sig: Apply topically 2 (two) times a day as needed (wound)   Patient not taking: Reported on 8/22/2024   nitroglycerin (NITRODUR) 0.2 mg/hr  Self No Yes   Sig: APPLY 1 PATCH TOPICALLY ONCE  DAILY. LEAVE ON FOR 12 TO 14  HOURS THEN REMOVE FOR A  NITRATE-FREE INTERVAL OF 10 TO  12 HOURS   omega-3-acid ethyl esters (LOVAZA) 1 g capsule   No No   Sig: TAKE 1 CAPSULE BY MOUTH 3 TIMES  DAILY   omeprazole (PriLOSEC) 20 mg delayed release capsule   No No   Sig: TAKE 1 CAPSULE BY MOUTH TWICE  DAILY BEFORE MEALS   terbinafine (LamISIL) 1 % cream  Self No No   Sig: Apply topically 2 (two) times a day   Patient not taking: Reported on 10/4/2024   triamcinolone (KENALOG) 0.1 % cream  Self Yes No   Sig: prn   trospium chloride (SANCTURA) 20 mg tablet  Self No No   Sig: Take 1 tablet (20 mg total) by mouth 2 (two) times a day   Patient taking differently: Take 20 mg by mouth 2 (two) times a day Pt has not started taking.      Facility-Administered Medications: None     Discharge Medication List as of 1/22/2025 12:05 PM        START taking these medications    Details   sodium bicarbonate 650 mg tablet Take 1 tablet (650 mg total) by mouth 2 (two) times daily after meals, Starting Wed 1/22/2025, Normal           CONTINUE these medications which have NOT CHANGED    Details   nitroglycerin (NITRODUR) 0.2 mg/hr APPLY 1 PATCH TOPICALLY ONCE  DAILY. LEAVE ON FOR 12 TO 14  HOURS THEN REMOVE FOR A  NITRATE-FREE INTERVAL OF 10 TO  12 HOURS, Normal      acetaminophen (TYLENOL) 500 mg tablet Take 500 mg by mouth as needed for mild pain.,  Historical Med      albuterol (PROVENTIL HFA,VENTOLIN HFA) 90 mcg/act inhaler USE 2 PUFFS BY MOUTH EVERY 6  HOURS AS NEEDED FOR WHEEZING OR  SHORTNESS OF BREATH, Normal      amLODIPine (NORVASC) 5 mg tablet Take 1 tablet (5 mg total) by mouth daily, Starting Tue 5/28/2024, Normal      aspirin (ECOTRIN LOW STRENGTH) 81 mg EC tablet Take 81 mg by mouth daily, Historical Med      atorvastatin (LIPITOR) 40 mg tablet TAKE 1 TABLET BY MOUTH DAILY, Starting Tue 12/3/2024, Normal      Cholecalciferol (VITAMIN D PO) Take 2,000 Units by mouth daily  , Historical Med      dicyclomine (BENTYL) 20 mg tablet TAKE 1 TABLET BY MOUTH EVERY 6  HOURS AS NEEDED FOR ABDOMINAL  CRAMPING, Normal      DULoxetine (CYMBALTA) 30 mg delayed release capsule Take 1 capsule (30 mg total) by mouth daily, Starting Mon 10/14/2024, Normal      fexofenadine (ALLEGRA) 180 MG tablet Take 180 mg by mouth as needed Daily during the Summer, Historical Med      fluticasone (FLONASE) 50 mcg/act nasal spray 2 sprays into each nostril daily, Historical Med      lithium carbonate (LITHOBID) 300 mg CR tablet Take 300 mg by mouth daily at bedtime, Starting Thu 8/16/2018, Historical Med      magnesium chloride-calcium (SlowMag Mg Muscle/Heart) 71.5-119 mg Take 1 tablet by mouth 2 (two) times a day, Starting Thu 3/7/2024, Normal      metoprolol succinate (TOPROL-XL) 25 mg 24 hr tablet TAKE 1 TABLET BY MOUTH ONCE  DAILY, Starting Mon 5/6/2024, Normal      omega-3-acid ethyl esters (LOVAZA) 1 g capsule TAKE 1 CAPSULE BY MOUTH 3 TIMES  DAILY, Starting Tue 12/3/2024, Normal      omeprazole (PriLOSEC) 20 mg delayed release capsule TAKE 1 CAPSULE BY MOUTH TWICE  DAILY BEFORE MEALS, Normal      Trelegy Ellipta 200-62.5-25 MCG/ACT AEPB inhaler USE 1 INHALATION BY MOUTH DAILY, Starting Mon 5/6/2024, Normal      triamcinolone (KENALOG) 0.1 % cream prn, Historical Med      trospium chloride (SANCTURA) 20 mg tablet Take 1 tablet (20 mg total) by mouth 2 (two) times a day,  Starting Fri 8/2/2024, Normal           STOP taking these medications       mupirocin (BACTROBAN) 2 % ointment Comments:   Reason for Stopping:         terbinafine (LamISIL) 1 % cream Comments:   Reason for Stopping:             Outpatient Discharge Orders   Basic metabolic panel   Standing Status: Standing Number of Occurrences: 2 Standing Exp. Date: 01/22/26     Ambulatory Referral to Urology   Standing Status: Future Standing Exp. Date: 01/22/26      Discharge Diet     ED SEPSIS DOCUMENTATION   Time reflects when diagnosis was documented in both MDM as applicable and the Disposition within this note       Time User Action Codes Description Comment    1/21/2025  5:57 PM Chase Grant Add [E87.5] Hyperkalemia     1/22/2025 11:01 AM Melissa Price Add [E87.8] Low bicarbonate     1/22/2025 11:39 AM Verna Montenegro Add [E83.42] Hypomagnesemia     1/22/2025 11:41 AM Verna Montenegro Add [N18.32] CKD stage 3b, GFR 30-44 ml/min ([facility])     1/22/2025  2:10 PM Verna Montenegro Add [N28.89] Bilateral renal masses                  Chase Grant MD  01/23/25 [id number]

## 2025-01-21 NOTE — TELEPHONE ENCOUNTER
----- Message from Gilson Heller MD sent at 1/21/2025 12:42 PM EST -----  Please call patient let him know that his potassium is high to the level that I think he should just go over to the emergency room to have it repeated and also get some treatment for it.  His kidney function is relatively stable but I like him to go to the ER regarding the high potassium.  Let me know you got the message please.    With

## 2025-01-21 NOTE — TELEPHONE ENCOUNTER
Addressed by nephrology, patient went to the emergency room to get the high potassium repeated, and treated if necessary

## 2025-01-21 NOTE — ED ATTENDING ATTESTATION
1/21/2025  I, Shayla Chiang MD, saw and evaluated the patient. I have discussed the patient with the resident/non-physician practitioner and agree with the resident's/non-physician practitioner's findings, Plan of Care, and MDM as documented in the resident's/non-physician practitioner's note, except where noted. All available labs and Radiology studies were reviewed.  I was present for key portions of any procedure(s) performed by the resident/non-physician practitioner and I was immediately available to provide assistance.       At this point I agree with the current assessment done in the Emergency Department.  I have conducted an independent evaluation of this patient a history and physical is as follows:  This is a 79-year-old male who was sent in by nephrology because of hyperkalemia.  Patient has a history of stage III kidney disease and had outpatient labs that showed that he had hyperkalemia.  Patient has not had palpitations, no syncope, no global weakness.  No change in medications.  No vomiting or diarrhea.  Patient states that he has a recent cold with a little nasal congestion, but it is not bothering him.  Review of systems otherwise negative in 12 systems reviewed.  On exam the patient awake and alert.  Vital signs were reviewed.  Patient is awake, alert, interactive.  The patient's pupils are equally round reactive to light.  Oropharynx is clear with moist mucous membranes.  Neck is supple and nontender with no adenopathy or JVD.  Heart is regular with no murmurs, rubs, or gallops.  Lungs are clear and equal with no wheezes, rales, or rhonchi.  Abdomen is soft and nontender with no masses, rebound, or guarding. There is no CVA tenderness.  The patient was completely exposed.  There is no skin breakdown.  There are no rashes or skin changes.  Extremities are warm and well perfused with good pulses. The patient has normal strength, sensation, and cranial nerves. MEDICAL DECISION  MAKING    Number and Complexity of Problems  Differential diagnosis: Hyperkalemia, renal failure, other electrolyte abnormality    Medical Decision Making Data  External documents reviewed: Labs from today reviewed, patient with a potassium of 6.6  My EKG interpretation: Sinus, patient has mild peaked T waves precordially compared to his prior EKG.  No widening of his QRS.  Has 2 PVCs  My CT interpretation:   My X-ray interpretation:   My ultrasound interpretation:     US kidney and bladder   Final Result      Suboptimal visualization of the left kidney due to overlying shadowing      A 2.9 cm right renal upper pole and 1.6 cm left renal lower pole hypoechoic lesions without internal color flow. Differential diagnoses include a complicated cyst or hypovascular renal neoplasm. Further assessment with nonemergent MRI abdomen with and    without contrast is recommended for better characterization. If the patient cannot get intravenous contrast, MRI abdomen without contrast can be obtained for further assessment      A 6.2 cm thinly septated right renal upper pole cyst. This can also be better characterized on subsequent MRI examination.      No hydronephrosis or nephrolithiasis. Increased echogenicity of bilateral renal parenchyma, compatible with medical renal disease.      The study was marked in EPIC for immediate notification.         Workstation performed: [id number]             Labs Reviewed   CBC AND DIFFERENTIAL - Abnormal       Result Value Ref Range Status    WBC 12.57 (*) 4.31 - 10.16 Thousand/uL Final    RBC 4.31  3.88 - 5.62 Million/uL Final    Hemoglobin 13.4  12.0 - 17.0 g/dL Final    Hematocrit 41.0  36.5 - 49.3 % Final    MCV 95  82 - 98 fL Final    MCH 31.1  26.8 - 34.3 pg Final    MCHC 32.7  31.4 - 37.4 g/dL Final    RDW 13.0  11.6 - 15.1 % Final    MPV 10.3  8.9 - 12.7 fL Final    Platelets 233  149 - 390 Thousands/uL Final    nRBC 0  /100 WBCs Final    Segmented % 74  43 - 75 % Final    Immature  Grans % 1  0 - 2 % Final    Lymphocytes % 13 (*) 14 - 44 % Final    Monocytes % 8  4 - 12 % Final    Eosinophils Relative 3  0 - 6 % Final    Basophils Relative 1  0 - 1 % Final    Absolute Neutrophils 9.47 (*) 1.85 - 7.62 Thousands/µL Final    Absolute Immature Grans 0.09  0.00 - 0.20 Thousand/uL Final    Absolute Lymphocytes 1.60  0.60 - 4.47 Thousands/µL Final    Absolute Monocytes 1.03  0.17 - 1.22 Thousand/µL Final    Eosinophils Absolute 0.31  0.00 - 0.61 Thousand/µL Final    Basophils Absolute 0.07  0.00 - 0.10 Thousands/µL Final   COMPREHENSIVE METABOLIC PANEL - Abnormal    Sodium 137  135 - 147 mmol/L Final    Potassium 5.7 (*) 3.5 - 5.3 mmol/L Final    Chloride 108  96 - 108 mmol/L Final    CO2 22  21 - 32 mmol/L Final    ANION GAP 7  4 - 13 mmol/L Final    BUN 42 (*) 5 - 25 mg/dL Final    Creatinine 2.00 (*) 0.60 - 1.30 mg/dL Final    Comment: Standardized to IDMS reference method    Glucose 99  65 - 140 mg/dL Final    Comment: If the patient is fasting, the ADA then defines impaired fasting glucose as > 100 mg/dL and diabetes as > or equal to 123 mg/dL.    Calcium 9.9  8.4 - 10.2 mg/dL Final    AST 16  13 - 39 U/L Final    ALT 17  7 - 52 U/L Final    Comment: Specimen collection should occur prior to Sulfasalazine administration due to the potential for falsely depressed results.     Alkaline Phosphatase 171 (*) 34 - 104 U/L Final    Total Protein 7.0  6.4 - 8.4 g/dL Final    Albumin 4.2  3.5 - 5.0 g/dL Final    Total Bilirubin 0.57  0.20 - 1.00 mg/dL Final    Comment: Use of this assay is not recommended for patients undergoing treatment with eltrombopag due to the potential for falsely elevated results.  N-acetyl-p-benzoquinone imine (metabolite of Acetaminophen) will generate erroneously low results in samples for patients that have taken an overdose of Acetaminophen.    eGFR 31  ml/min/1.73sq m Final    Narrative:     National Kid[employer] Disease Foundation guidelines for Chronic Kidney Disease (CKD):      Stage 1 with normal or high GFR (GFR > 90 mL/min/1.73 square meters)    Stage 2 Mild CKD (GFR = 60-89 mL/min/1.73 square meters)    Stage 3A Moderate CKD (GFR = 45-59 mL/min/1.73 square meters)    Stage 3B Moderate CKD (GFR = 30-44 mL/min/1.73 square meters)    Stage 4 Severe CKD (GFR = 15-29 mL/min/1.73 square meters)    Stage 5 End Stage CKD (GFR <15 mL/min/1.73 square meters)  Note: GFR calculation is accurate only with a steady state creatinine   PHOSPHORUS - Abnormal    Phosphorus 4.3 (*) 2.3 - 4.1 mg/dL Final   BASIC METABOLIC PANEL - Abnormal    Sodium 135  135 - 147 mmol/L Final    Potassium 6.1 (*) 3.5 - 5.3 mmol/L Final    Comment: Slightly Hemolyzed:Results may be affected.    Chloride 109 (*) 96 - 108 mmol/L Final    CO2 19 (*) 21 - 32 mmol/L Final    ANION GAP 7  4 - 13 mmol/L Final    BUN 40 (*) 5 - 25 mg/dL Final    Creatinine 1.89 (*) 0.60 - 1.30 mg/dL Final    Comment: Standardized to IDMS reference method    Glucose 97  65 - 140 mg/dL Final    Comment: If the patient is fasting, the ADA then defines impaired fasting glucose as > 100 mg/dL and diabetes as > or equal to 123 mg/dL.    Calcium 9.0  8.4 - 10.2 mg/dL Final    eGFR 33  ml/min/1.73sq m Final    Narrative:     National Kidney Disease Foundation guidelines for Chronic Kidney Disease (CKD):     Stage 1 with normal or high GFR (GFR > 90 mL/min/1.73 square meters)    Stage 2 Mild CKD (GFR = 60-89 mL/min/1.73 square meters)    Stage 3A Moderate CKD (GFR = 45-59 mL/min/1.73 square meters)    Stage 3B Moderate CKD (GFR = 30-44 mL/min/1.73 square meters)    Stage 4 Severe CKD (GFR = 15-29 mL/min/1.73 square meters)    Stage 5 End Stage CKD (GFR <15 mL/min/1.73 square meters)  Note: GFR calculation is accurate only with a steady state creatinine   POCT BLOOD GAS (CG8+) - Abnormal    ph, Prateek ISTAT 7.310  7.300 - 7.400 Final    pCO2, Prateek i-STAT 39.2 (*) 42.0 - 50.0 mm HG Final    pO2, Prateek i-STAT 50.0 (*) 35.0 - 45.0 mm HG Final    BE, i-STAT -6 (*) -2  - 3 mmol/L Final    HCO3, Prateek i-STAT 19.7 (*) 24.0 - 30.0 mmol/L Final    CO2, i-STAT 21  21 - 32 mmol/L Final    O2 Sat, i-STAT 81  60 - 85 % Final    SODIUM, I-STAT 139  136 - 145 mmol/l Final    Potassium, i-STAT 5.6 (*) 3.5 - 5.3 mmol/L Final    Calcium, Ionized i-STAT 1.37 (*) 1.12 - 1.32 mmol/L Final    Hct, i-STAT 34 (*) 36.5 - 49.3 % Final    Hgb, i-STAT 11.6 (*) 12.0 - 17.0 g/dl Final    Glucose, i-STAT 100  65 - 140 mg/dl Final    Specimen Type VENOUS   Final   MAGNESIUM - Normal    Magnesium 1.9  1.9 - 2.7 mg/dL Final       Labs reviewed by me are significant for: Hyperkalemia, chronic kidney disease with a GFR of 30    Clinical decision rules/scores are significant for:     Discussed case with:   Considered admission for:     Treatment and Disposition  ED course: Patient seen and examined.  IV access established.  Patient treated for hyperkalemia with albuterol, IV fluids, Lasix.  Potassium in the mid fives here.  Will plan to discuss with nephrology regarding further care and follow-up  Shared decision making:   Code status:     ED Course         Critical Care Time  CriticalCare Time    Date/Time: 1/21/2025 3:58 PM    Performed by: Shayla Chiang MD  Authorized by: Shayla Chiang MD    Critical care provider statement:     Critical care time (minutes):  35    Critical care time was exclusive of:  Separately billable procedures and treating other patients and teaching time    Critical care was necessary to treat or prevent imminent or life-threatening deterioration of the following conditions:  Renal failure    Critical care was time spent personally by me on the following activities:  Blood draw for specimens, obtaining history from patient or surrogate, development of treatment plan with patient or surrogate, discussions with consultants, discussions with primary provider, evaluation of patient's response to treatment, examination of patient, review of old charts, re-evaluation of patient's  condition, ordering and review of radiographic studies, ordering and review of laboratory studies and ordering and performing treatments and interventions

## 2025-01-22 ENCOUNTER — TRANSITIONAL CARE MANAGEMENT (OUTPATIENT)
Dept: INTERNAL MEDICINE CLINIC | Facility: CLINIC | Age: 80
End: 2025-01-22

## 2025-01-22 ENCOUNTER — TELEPHONE (OUTPATIENT)
Dept: PAIN MEDICINE | Facility: CLINIC | Age: 80
End: 2025-01-22

## 2025-01-22 VITALS
RESPIRATION RATE: 17 BRPM | HEART RATE: 58 BPM | SYSTOLIC BLOOD PRESSURE: 173 MMHG | OXYGEN SATURATION: 98 % | DIASTOLIC BLOOD PRESSURE: 81 MMHG | TEMPERATURE: 98.9 F

## 2025-01-22 PROBLEM — I49.3 ASYMPTOMATIC PVCS: Status: ACTIVE | Noted: 2025-01-22

## 2025-01-22 PROBLEM — E87.8 LOW BICARBONATE: Status: ACTIVE | Noted: 2025-01-22

## 2025-01-22 LAB
ANION GAP SERPL CALCULATED.3IONS-SCNC: 9 MMOL/L (ref 4–13)
ATRIAL RATE: 60 BPM
ATRIAL RATE: 75 BPM
BUN SERPL-MCNC: 37 MG/DL (ref 5–25)
CA-I BLD-SCNC: 1.1 MMOL/L (ref 1.12–1.32)
CALCIUM SERPL-MCNC: 9.5 MG/DL (ref 8.4–10.2)
CHLORIDE SERPL-SCNC: 105 MMOL/L (ref 96–108)
CO2 SERPL-SCNC: 20 MMOL/L (ref 21–32)
CREAT SERPL-MCNC: 1.76 MG/DL (ref 0.6–1.3)
ERYTHROCYTE [DISTWIDTH] IN BLOOD BY AUTOMATED COUNT: 13.1 % (ref 11.6–15.1)
EST. AVERAGE GLUCOSE BLD GHB EST-MCNC: 111 MG/DL
FLUAV RNA RESP QL NAA+PROBE: NEGATIVE
FLUBV RNA RESP QL NAA+PROBE: NEGATIVE
GFR SERPL CREATININE-BSD FRML MDRD: 36 ML/MIN/1.73SQ M
GLUCOSE SERPL-MCNC: 83 MG/DL (ref 65–140)
HBA1C MFR BLD: 5.5 %
HCT VFR BLD AUTO: 38.3 % (ref 36.5–49.3)
HGB BLD-MCNC: 12.1 G/DL (ref 12–17)
MAGNESIUM SERPL-MCNC: 2 MG/DL (ref 1.9–2.7)
MCH RBC QN AUTO: 30.9 PG (ref 26.8–34.3)
MCHC RBC AUTO-ENTMCNC: 31.6 G/DL (ref 31.4–37.4)
MCV RBC AUTO: 98 FL (ref 82–98)
P AXIS: 75 DEGREES
P AXIS: 78 DEGREES
PLATELET # BLD AUTO: 189 THOUSANDS/UL (ref 149–390)
PMV BLD AUTO: 10.3 FL (ref 8.9–12.7)
POTASSIUM SERPL-SCNC: 5.1 MMOL/L (ref 3.5–5.3)
PR INTERVAL: 146 MS
PR INTERVAL: 154 MS
QRS AXIS: 75 DEGREES
QRS AXIS: 81 DEGREES
QRSD INTERVAL: 96 MS
QRSD INTERVAL: 98 MS
QT INTERVAL: 368 MS
QT INTERVAL: 398 MS
QTC INTERVAL: 398 MS
QTC INTERVAL: 411 MS
RBC # BLD AUTO: 3.91 MILLION/UL (ref 3.88–5.62)
RSV RNA RESP QL NAA+PROBE: NEGATIVE
SARS-COV-2 RNA RESP QL NAA+PROBE: NEGATIVE
SODIUM SERPL-SCNC: 134 MMOL/L (ref 135–147)
T WAVE AXIS: 49 DEGREES
T WAVE AXIS: 60 DEGREES
VENTRICULAR RATE: 60 BPM
VENTRICULAR RATE: 75 BPM
WBC # BLD AUTO: 10.13 THOUSAND/UL (ref 4.31–10.16)

## 2025-01-22 PROCEDURE — 80048 BASIC METABOLIC PNL TOTAL CA: CPT | Performed by: STUDENT IN AN ORGANIZED HEALTH CARE EDUCATION/TRAINING PROGRAM

## 2025-01-22 PROCEDURE — 93005 ELECTROCARDIOGRAM TRACING: CPT

## 2025-01-22 PROCEDURE — 0241U HB NFCT DS VIR RESP RNA 4 TRGT: CPT | Performed by: STUDENT IN AN ORGANIZED HEALTH CARE EDUCATION/TRAINING PROGRAM

## 2025-01-22 PROCEDURE — 85027 COMPLETE CBC AUTOMATED: CPT | Performed by: STUDENT IN AN ORGANIZED HEALTH CARE EDUCATION/TRAINING PROGRAM

## 2025-01-22 PROCEDURE — 99223 1ST HOSP IP/OBS HIGH 75: CPT | Performed by: INTERNAL MEDICINE

## 2025-01-22 PROCEDURE — 82330 ASSAY OF CALCIUM: CPT

## 2025-01-22 PROCEDURE — 93010 ELECTROCARDIOGRAM REPORT: CPT | Performed by: INTERNAL MEDICINE

## 2025-01-22 PROCEDURE — 36415 COLL VENOUS BLD VENIPUNCTURE: CPT | Performed by: STUDENT IN AN ORGANIZED HEALTH CARE EDUCATION/TRAINING PROGRAM

## 2025-01-22 PROCEDURE — 99239 HOSP IP/OBS DSCHRG MGMT >30: CPT | Performed by: STUDENT IN AN ORGANIZED HEALTH CARE EDUCATION/TRAINING PROGRAM

## 2025-01-22 PROCEDURE — 83735 ASSAY OF MAGNESIUM: CPT | Performed by: STUDENT IN AN ORGANIZED HEALTH CARE EDUCATION/TRAINING PROGRAM

## 2025-01-22 RX ORDER — SODIUM BICARBONATE 650 MG/1
650 TABLET ORAL
Qty: 60 TABLET | Refills: 0 | Status: SHIPPED | OUTPATIENT
Start: 2025-01-22

## 2025-01-22 RX ORDER — SODIUM BICARBONATE 650 MG/1
650 TABLET ORAL
Status: DISCONTINUED | OUTPATIENT
Start: 2025-01-22 | End: 2025-01-22 | Stop reason: HOSPADM

## 2025-01-22 RX ADMIN — UMECLIDINIUM 1 PUFF: 62.5 AEROSOL, POWDER ORAL at 09:40

## 2025-01-22 RX ADMIN — ACETAMINOPHEN 650 MG: 325 TABLET, FILM COATED ORAL at 00:53

## 2025-01-22 RX ADMIN — SODIUM BICARBONATE 650 MG TABLET 650 MG: at 11:59

## 2025-01-22 RX ADMIN — ATORVASTATIN CALCIUM 40 MG: 40 TABLET, FILM COATED ORAL at 09:39

## 2025-01-22 RX ADMIN — PANTOPRAZOLE SODIUM 40 MG: 40 TABLET, DELAYED RELEASE ORAL at 05:03

## 2025-01-22 RX ADMIN — ASPIRIN 81 MG: 81 TABLET, COATED ORAL at 09:39

## 2025-01-22 RX ADMIN — AMLODIPINE BESYLATE 5 MG: 5 TABLET ORAL at 09:39

## 2025-01-22 RX ADMIN — HEPARIN SODIUM 5000 UNITS: 5000 INJECTION INTRAVENOUS; SUBCUTANEOUS at 05:03

## 2025-01-22 RX ADMIN — DULOXETINE 30 MG: 30 CAPSULE, DELAYED RELEASE ORAL at 09:43

## 2025-01-22 RX ADMIN — METOPROLOL SUCCINATE 25 MG: 25 TABLET, FILM COATED, EXTENDED RELEASE ORAL at 09:39

## 2025-01-22 RX ADMIN — FLUTICASONE FUROATE AND VILANTEROL TRIFENATATE 1 PUFF: 200; 25 POWDER RESPIRATORY (INHALATION) at 09:39

## 2025-01-22 NOTE — TELEPHONE ENCOUNTER
Caller: Lynsey    Doctor: Dara    Reason for call: patient is in hospital his potassium is too high, she is calling to reschedule procedure today 1/22 at 10 am    Call back#: [phone]

## 2025-01-22 NOTE — ASSESSMENT & PLAN NOTE
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  Bicarb 20 today  Not on any potassium containing medications  Denies high potassium food  Workup:  Kidney bladder ultrasound not reveal any hydronephrosis, reveals lateral renal cysts as well as a hypoechogenic lesion on right kidney  Plan:  Recommend sodium bicarb 650 mg 2 times daily  Recommend checking BMP weekly x 2 starting Monday  Recommend low potassium diet  With improved potassium okay for discharge per nephrology standpoint as long as medically ready per primary team

## 2025-01-22 NOTE — ASSESSMENT & PLAN NOTE
Lab Results   Component Value Date    EGFR 33 01/21/2025    EGFR 31 01/21/2025    EGFR 32 01/21/2025    CREATININE 1.89 (H) 01/21/2025    CREATININE 2.00 (H) 01/21/2025    CREATININE 1.96 (H) 01/21/2025   Follows with Dr. Heller of Nephrology  Renal function appears baseline, nephrology will be consulted for recommendations on medication management and hyperkalemia

## 2025-01-22 NOTE — ASSESSMENT & PLAN NOTE
Current blood pressure elevated at 160s over 100  On amlodipine 5 mg daily, as outpatient  Currently on amlodipine 5 mg daily,  Can up-titrate if BP remains elevated

## 2025-01-22 NOTE — H&P
"H&P - Hospitalist   Name: Tonny Baig 79 y.o. male I MRN: [id number]  Unit/Bed#: ED 19 I Date of Admission: 1/21/2025   Date of Service: 1/21/2025 I Hospital Day: 0     Assessment & Plan  CAD (coronary artery disease)  Resume aspirin, atorvastatin, Toprol-XL, and sublingual nitrogen PRN  COPD (chronic obstructive pulmonary disease) (HCC)  Respiratory status stable, substitute Trelegy for formulary equivalent and provide albuterol as needed  Gastroesophageal reflux disease  Resume Protonix 40 mg daily and Bentyl 20mg 4 times daily  Mixed hyperlipidemia  Resume atorvastatin 40 mg qd  Essential hypertension  Resume Toprol-XL 25 mg daily and Norvasc 5 mg p.o. daily  Stage 3a chronic kidney disease (HCC)  Lab Results   Component Value Date    EGFR 33 01/21/2025    EGFR 31 01/21/2025    EGFR 32 01/21/2025    CREATININE 1.89 (H) 01/21/2025    CREATININE 2.00 (H) 01/21/2025    CREATININE 1.96 (H) 01/21/2025   Follows with Dr. Heller of Nephrology  Renal function appears baseline, nephrology will be consulted for recommendations on medication management and hyperkalemia  Bilateral renal masses  US performed in the ED showing multiple renal masses  \"A 2.9 cm right renal upper pole and 1.6 cm left renal lower pole hypoechoic lesions without internal color flow. Differential diagnoses include a complicated cyst or hypovascular renal neoplasm. Further assessment with nonemergent MRI abdomen with and without contrast is recommended for better characterization. If the patient cannot get intravenous contrast, MRI abdomen without contrast can be obtained for further assessment\"  \"A 6.2 cm thinly septated right renal upper pole cyst. This can also be better characterized on subsequent MRI examination.\"  Tentatively would plan for outpatient MRI imaging, or at least consideration of imaging after patient's hyperkalemia resolves. Defer to Nephrology for further recommendations  Hyperkalemia  Patient with known history of CKD 3a and " history of hyperkalemia referred to the ED by his nephrologist after morning labs demonstrating potassium of 6.6  Patient states he feels well and no obvious peaked T waves on EKG  In the ED was given Plasma-Lyte and Lasix as well as albuterol and repeat potassium 5.7, 5.6 on i-STAT.  A subsequent BMP checked at 1703 shows potassium level 6.1, however notably the lab remarks that there appears to be some hemolysis.   Patient to be admitted and monitored on telemetry on SD2 level of care  Suspect that the Potassium on the 1703 BMP is falsely elevated due to hemolysis however we will recheck a BMP at 2100 and have low threshold to provide further medical interventions  Nephrology consulted and we appreciate their expertise  Unclear if lithium could be contributing to patient's persistent hyperkalemia, he states that dose has recently been reduced to 300 mg every 48 hour.  Patient denies a excess dietary potassium and does not believe he is taking any exogenous potassium via vitamins or supplements  Bipolar 1 disorder (HCC)  Controlled on lithium 300 mg every 48 hours  Lithium can contribute to hyperkalemia   Nephrology consulted    VTE Pharmacologic Prophylaxis:   Moderate Risk (Score 3-4) - Pharmacological DVT Prophylaxis Ordered: heparin.  Code Status: Level 1 - Full Code   Discussion with family: Updated  (wife) at bedside.    Anticipated Length of Stay: Patient will be admitted on an inpatient basis with an anticipated length of stay of greater than 2 midnights secondary to Hyperkalemia.    History of Present Illness   Chief Complaint: Abnormal labs    Tonny Baig is a 79 y.o. male with a PMH of CKD, hypertension, COPD, bipolar disorder, hyperlipidemia, GERD among others who presents with hyperkalemia.  He tells me that he has been in his normal state of health feeling well when he was told to come to the ED after he had abnormal potassium on his blood work.  He has had issues with hyperkalemia  recently and BNP this morning showed potassium level 6.6.  He came to the ED and received IV fluids, Lasix, and albuterol.  Repeat BMP showed potassium 5.7 with an i-STAT potassium of 5.6.  Few hours later a subsequent BMP showed a potassium of 6.1 although this was suspected to have some degree of hemolysis.  Patient was subsequently referred for admission.  He is resting comfortably and has no new complaints at present    Review of Systems   Constitutional:  Negative for chills and fever.   HENT:  Negative for sore throat.    Eyes:  Negative for visual disturbance.   Respiratory:  Negative for cough and shortness of breath.    Cardiovascular:  Negative for chest pain and palpitations.   Gastrointestinal:  Negative for abdominal pain and vomiting.   Genitourinary:  Negative for dysuria and hematuria.   Musculoskeletal:  Negative for arthralgias and back pain.   Skin:  Negative for color change and rash.   Neurological:  Negative for syncope.   All other systems reviewed and are negative.      Historical Information   Past Medical History:   Diagnosis Date    Allergic 2011    Allergic rhinitis 2015    Anxiety     occasional    Aortic aneurysm ([facility])     Benign colon polyp     Bipolar 1 disorder ([facility])     Cancer ([facility]) 2007, 2011    Cardiac disease     MI    Cervical cord compression with myelopathy ([facility])     COPD (chronic obstructive pulmonary disease) ([facility])     Coronary artery disease 1995    Diverticulitis     Diverticulitis of colon prior to 2014    Diverticulosis     Emphysema of lung ([facility]) 2012    Gait disturbance     uses cane, leg brace on right    GERD (gastroesophageal reflux disease)     Heart attack ([facility]) 1996    Hx of resection of large bowel 05/03/2016    Hyperlipidemia     Hypertension     IBS (irritable bowel syndrome)     Inflammatory bowel disease 2012    Lumbar stenosis     Lung cancer ([facility])     2007 Left lower lobectomy and 2011 Right lung with surgery     Myocardial infarction ([facility])     involving  other coronary artery    Prostate cancer (HCC)     Shortness of breath     Small bowel obstruction (HCC) 11/16/2016     Past Surgical History:   Procedure Laterality Date    ANGIOPLASTY      stent    CARDIAC CATHETERIZATION  1995    angioplasty    CARDIAC SURGERY      CERVICAL SPINE SURGERY      Cervical decompression with cervical fusion from C3-C7 for spinal stenosis.    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    LUNG LOBECTOMY Left     IA ARTHRD ANT INTERBODY MIN DSC CRV BELOW C2 N/A 05/02/2016    Procedure: Anterior cervical diskectomy C3/4, C5/6, C6/7 with anterior plate fixation fusion C3-7;  Posterior decompressive laminectomy C3-7 with lateral mass fixation fusion C3-7 (IMPULSE MONITORING);  Surgeon: Jose Luis Moore MD;  Location: [facility];  Service: Neurosurgery    IA ARTHRODESIS POSTERIOR INTERBODY 1 [facility] LUMBAR N/A 01/30/2017    Procedure: L4-5 AND L5-S1 DECOMPRESSIVE FORAMINOTOMIES, TRANSFORAMINAL LUMBAR INTERBODY AND PEDICLE SCREW FIXATION FUSION L4-S1 (IMPULSE);  Surgeon: Jose Luis Moore MD;  Location: [facility];  Service: Neurosurgery    IA EVASC RPR DPLMNT AORTO-AORTIC NDGFT N/A 08/06/2018    Procedure: REPAIR ANEURYSM ENDOVASCULAR ABDOMINAL AORTIC  (EVAR) WITH BILATERAL PERCUTANEOUS FEMORAL ACCESS WITH ULTRASOUND GUIDANCE ON THE RIGHT AND PRE CLOSURE;  Surgeon: Shan Villalobos MD;  Location: [facility];  Service: Vascular    PROSTATECTOMY  2007    for prostate CA - no chemo/RT    SIGMOIDECTOMY      for divertic    SMALL INTESTINE SURGERY N/A 11/17/2016    Procedure: Exploratory  Laparotomy, Lysis of adhesions to release small bowel obstruction;  Surgeon: Aminta Sim MD;  Location: [facility];  Service:     SPINE SURGERY  ,     TONSILLECTOMY  Y         Social History     Tobacco Use    Smoking status: Former     Current packs/day: 0.00     Average packs/day: 1 pack/day for 44.0 years (44.0 ttl pk-yrs)     Types: Cigarettes     Start date: 1967     Quit date: 2011     Years since quittin.0     Passive exposure: Past    Smokeless tobacco: Never   Vaping Use    Vaping status: Never Used   Substance and Sexual Activity    Alcohol use: Yes     Alcohol/week: 2.0 - 3.0 standard drinks of alcohol     Types: 2 - 3 Shots of liquor per week     Comment: One glass per day    Drug use: No    Sexual activity: Yes     Partners: Female     Birth control/protection: Post-menopausal, None     E-Cigarette/Vaping    E-Cigarette Use Never User      E-Cigarette/Vaping Substances    Nicotine No     THC No     CBD No     Flavoring No     Other No     Unknown No      Family History   Problem Relation Age of Onset    Hypertension Mother     Glaucoma Mother     Heart attack Father     Colon cancer Father     Coronary artery disease Father     Hypertension Father     Kidney disease Father     Heart disease Family     Hyperlipidemia Family     Hypertension Family      Social History:  Marital Status: /Civil Union   Occupation: Not on file  Patient Pre-hospital Living Situation: Home  Patient Pre-hospital Level of Mobility: walks  Patient Pre-hospital Diet Restrictions: N/A    Meds/Allergies   I have reviewed home medications with patient personally.  Prior to Admission medications    Medication Sig Start Date End Date Taking? Authorizing Provider   acetaminophen (TYLENOL) 500 mg tablet Take 500 mg by mouth as needed for mild pain.    Historical Provider, MD   albuterol (PROVENTIL HFA,VENTOLIN HFA) 90 mcg/act inhaler USE 2 PUFFS BY MOUTH EVERY 6  HOURS AS NEEDED FOR WHEEZING OR   SHORTNESS OF BREATH 12/5/24 3/5/25  ELSA Vogt   amLODIPine (NORVASC) 5 mg tablet Take 1 tablet (5 mg total) by mouth daily 5/28/24   Cameron Michele MD   aspirin (ECOTRIN LOW STRENGTH) 81 mg EC tablet Take 81 mg by mouth daily    Historical Provider, MD   atorvastatin (LIPITOR) 40 mg tablet TAKE 1 TABLET BY MOUTH DAILY 12/3/24   Cameron Michele MD   Cholecalciferol (VITAMIN D PO) Take 2,000 Units by mouth daily      Historical Provider, MD   dicyclomine (BENTYL) 20 mg tablet TAKE 1 TABLET BY MOUTH EVERY 6  HOURS AS NEEDED FOR ABDOMINAL  CRAMPING 5/30/24   Brendan Suarez MD   DULoxetine (CYMBALTA) 30 mg delayed release capsule Take 1 capsule (30 mg total) by mouth daily 10/14/24   ELSA Almaraz   fexofenadine (ALLEGRA) 180 MG tablet Take 180 mg by mouth as needed Daily during the Summer    Historical Provider, MD   fluticasone (FLONASE) 50 mcg/act nasal spray 2 sprays into each nostril daily    Historical Provider, MD   lithium carbonate (LITHOBID) 300 mg CR tablet Take 300 mg by mouth daily at bedtime 8/16/18   Historical Provider, MD   magnesium chloride-calcium (SlowMag Mg Muscle/Heart) 71.5-119 mg Take 1 tablet by mouth 2 (two) times a day  Patient taking differently: Take 1 tablet by mouth 4 (four) times a day 3/7/24   Brendan Suarez MD   metoprolol succinate (TOPROL-XL) 25 mg 24 hr tablet TAKE 1 TABLET BY MOUTH ONCE  DAILY 5/6/24   Lai Meraz MD   mupirocin (BACTROBAN) 2 % ointment Apply topically 2 (two) times a day as needed (wound)  Patient not taking: Reported on 8/22/2024 1/19/22   Brendan Suarez MD   nitroglycerin (NITRODUR) 0.2 mg/hr APPLY 1 PATCH TOPICALLY ONCE  DAILY. LEAVE ON FOR 12 TO 14  HOURS THEN REMOVE FOR A  NITRATE-FREE INTERVAL OF 10 TO  12 HOURS  Patient not taking: Reported on 1/16/2025 5/14/24   Brendan Suarez MD   zanee-1-cpgh ethyl esters (LOVAZA) 1 g capsule TAKE 1 CAPSULE BY MOUTH 3 TIMES  DAILY 12/3/24   Cameron Michele MD   omeprazole (PriLOSEC) 20 mg delayed  release capsule TAKE 1 CAPSULE BY MOUTH TWICE  DAILY BEFORE MEALS 12/3/24   ELSA Garcia   terbinafine (LamISIL) 1 % cream Apply topically 2 (two) times a day  Patient not taking: Reported on 10/4/2024 9/9/24   MD Kendy Quan 200-62.5-25 MCG/ACT AEPB inhaler USE 1 INHALATION BY MOUTH DAILY 5/6/24   ELSA Vogt   triamcinolone (KENALOG) 0.1 % cream prn 9/13/24   Historical Provider, MD   trospium chloride (SANCTURA) 20 mg tablet Take 1 tablet (20 mg total) by mouth 2 (two) times a day  Patient taking differently: Take 20 mg by mouth 2 (two) times a day Pt has not started taking. 8/2/24   Rob Nix PA-C     Allergies   Allergen Reactions    Bactrim [Sulfamethoxazole-Trimethoprim] Other (See Comments)     AILYN    Nsaids      Annotation - [id number]: unable to take due to use of lithium.    Oxycodone Rash       Objective :  Temp:  [97.3 °F (36.3 °C)] 97.3 °F (36.3 °C)  HR:  [85-90] 90  BP: (141-176)/(91-95) 141/95  Resp:  [18] 18  SpO2:  [96 %-97 %] 97 %  O2 Device: None (Room air)    Physical Exam  Vitals and nursing note reviewed.   Constitutional:       General: He is not in acute distress.     Appearance: He is well-developed.   HENT:      Head: Normocephalic and atraumatic.      Mouth/Throat:      Mouth: Mucous membranes are moist.   Eyes:      General: No scleral icterus.     Conjunctiva/sclera: Conjunctivae normal.   Cardiovascular:      Rate and Rhythm: Normal rate and regular rhythm.      Pulses: Normal pulses.      Heart sounds: No murmur heard.  Pulmonary:      Effort: Pulmonary effort is normal. No respiratory distress.      Breath sounds: Normal breath sounds. No wheezing or rhonchi.   Abdominal:      General: Abdomen is flat. Bowel sounds are normal.      Palpations: Abdomen is soft.      Tenderness: There is no abdominal tenderness. There is no guarding or rebound.   Musculoskeletal:         General: No swelling.      Cervical back: Neck  supple.      Right lower leg: No edema.      Left lower leg: No edema.   Skin:     General: Skin is warm and dry.      Capillary Refill: Capillary refill takes less than 2 seconds.   Neurological:      General: No focal deficit present.      Mental Status: He is alert and oriented to person, place, and time.   Psychiatric:         Mood and Affect: Mood normal.           Lab Results: I have reviewed the following results:  Results from last 7 days   Lab Units 01/21/25  1500 01/21/25  1433   WBC Thousand/uL  --  12.57*   HEMOGLOBIN g/dL  --  13.4   I STAT HEMOGLOBIN g/dl 11.6*  --    HEMATOCRIT %  --  41.0   HEMATOCRIT, ISTAT % 34*  --    PLATELETS Thousands/uL  --  233   SEGS PCT %  --  74   LYMPHO PCT %  --  13*   MONO PCT %  --  8   EOS PCT %  --  3     Results from last 7 days   Lab Units 01/21/25  1703 01/21/25  1500 01/21/25  1433   SODIUM mmol/L 135  --  137   POTASSIUM mmol/L 6.1*  --  5.7*   CHLORIDE mmol/L 109*  --  108   CO2 mmol/L 19*  --  22   CO2, I-STAT   --    < >  --    BUN mg/dL 40*  --  42*   CREATININE mg/dL 1.89*  --  2.00*   ANION GAP mmol/L 7  --  7   CALCIUM mg/dL 9.0  --  9.9   ALBUMIN g/dL  --   --  4.2   TOTAL BILIRUBIN mg/dL  --   --  0.57   ALK PHOS U/L  --   --  171*   ALT U/L  --   --  17   AST U/L  --   --  16   GLUCOSE RANDOM mg/dL 97  --  99    < > = values in this interval not displayed.             Lab Results   Component Value Date    HGBA1C 5.2 10/20/2021    HGBA1C 4.9 11/23/2019    HGBA1C 5.3 10/12/2018           Imaging Results Review: I reviewed radiology reports from this admission including: Ultrasound(s).  Other Study Results Review: EKG was personally reviewed and my interpretation is: NSR. HR 75. No peaked T waves..    Administrative Statements       ** Please Note: This note has been constructed using a voice recognition system. **

## 2025-01-22 NOTE — ASSESSMENT & PLAN NOTE
"US performed in the ED showing multiple renal masses  \"A 2.9 cm right renal upper pole and 1.6 cm left renal lower pole hypoechoic lesions without internal color flow. Differential diagnoses include a complicated cyst or hypovascular renal neoplasm. Further assessment with nonemergent MRI abdomen with and without contrast is recommended for better characterization. If the patient cannot get intravenous contrast, MRI abdomen without contrast can be obtained for further assessment\"  \"A 6.2 cm thinly septated right renal upper pole cyst. This can also be better characterized on subsequent MRI examination.\"  Tentatively would plan for outpatient MRI imaging, or at least consideration of imaging after patient's hyperkalemia resolves. Defer to Nephrology for further recommendations  "

## 2025-01-22 NOTE — ASSESSMENT & PLAN NOTE
Patient with known history of CKD 3a and history of hyperkalemia referred to the ED by his nephrologist after morning labs demonstrating potassium of 6.6  Patient states he feels well and no obvious peaked T waves on EKG  In the ED was given Plasma-Lyte and Lasix as well as albuterol and repeat potassium 5.7, 5.6 on i-STAT.  A subsequent BMP checked at 1703 shows potassium level 6.1, however notably the lab remarks that there appears to be some hemolysis.   Patient to be admitted and monitored on telemetry on SD2 level of care  Suspect that the Potassium on the 1703 BMP is falsely elevated due to hemolysis however we will recheck a BMP at 2100 and have low threshold to provide further medical interventions  Nephrology consulted and we appreciate their expertise  Unclear if lithium could be contributing to patient's persistent hyperkalemia, he states that dose has recently been reduced to 300 mg every 48 hour.  Patient denies a excess dietary potassium and does not believe he is taking any exogenous potassium via vitamins or supplements

## 2025-01-22 NOTE — ASSESSMENT & PLAN NOTE
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 Heller patient  Has upcoming appointment on 2/4/2025 at 12 noon with Dr. Heller [facility]

## 2025-01-22 NOTE — ASSESSMENT & PLAN NOTE
"US performed in the ED showing multiple renal masses  \"A 2.9 cm right renal upper pole and 1.6 cm left renal lower pole hypoechoic lesions without internal color flow. Differential diagnoses include a complicated cyst or hypovascular renal neoplasm. Further assessment with nonemergent MRI abdomen with and without contrast is recommended for better characterization. If the patient cannot get intravenous contrast, MRI abdomen without contrast can be obtained for further assessment\"  \"A 6.2 cm thinly septated right renal upper pole cyst. This can also be better characterized on subsequent MRI examination.\"  Outpatient urology referral provided for further management/surveillance  "

## 2025-01-22 NOTE — CONSULTS
NEPHROLOGY HOSPITAL CONSULTATION   Tonny Baig 79 y.o. 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  Bicarb 20 today  Not on any potassium containing medications  Denies high potassium food  Workup:  Kidney bladder ultrasound not reveal any hydronephrosis, reveals lateral renal cysts as well as a hypoechogenic lesion on right kidney  Plan:  Recommend sodium bicarb 650 mg 2 times daily  Recommend checking BMP weekly x 2 starting Monday  Recommend low potassium diet  With improved potassium okay for discharge per nephrology standpoint as long as medically ready per primary team    Low bicarbonate  Likely in the setting of tubular acidosis with chronic lithium use  Bicarb 20  As above will start sodium bicarbonate 650 mg 2 times daily  CKD stage 3b, GFR 30-44 ml/min ([facility])  Lab Results   Component Value Date    EGFR 36 01/22/2025    EGFR 31 01/21/2025    EGFR 33 01/21/2025    CREATININE 1.76 (H) 01/22/2025    CREATININE 2.01 (H) 01/21/2025    CREATININE 1.89 (H) 01/21/2025   Etiology: 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 Heller patient  Has upcoming appointment on 2/4/2025 at 12 noon with Dr. Heller [place] [facility]  Essential hypertension  Current blood pressure elevated at 160s over 100  On amlodipine 5 mg daily, as outpatient  Currently on amlodipine 5 mg daily,  Can up-titrate if BP remains elevated  Bipolar 1 disorder (HCC)  On lithium 300 mg every 48 hours-recently decreased  Monitor potassium levels, lithium  Bilateral renal masses  Recommend urology  evaluation-cussed with primary team    I have reviewed the nephrology recommendations including starting bicarb 650 mg 2 times daily, checking BMP weekly x 2 on discharge and recommend referral to urology for cysts and hypoechogenic lesion, with primary team, and we are in agreement with renal plan including the information outlined above.    HISTORY OF PRESENT ILLNESS:  Requesting Physician: Verna Montenegro DO  Reason for Consult: Hyperkalemia    Tonny Baig is a 79 y.o. male past medical history of III, hypertension, hyperlipidemia, COPD, bipolar disorder on lithium, CAD, presented to ER from nephrology recommendation due to abnormal labs revealing hyperkalemia.  Status post medical treatment with IV fluids, Lasix and albuterol.  Calcium has improved and currently 5.1.  Nephrology consulted for further evaluation.  Discussion the patient denies taking any potassium pills, multivitamins, over-the-counter herbals.  He also denies eating high potassium foods.  Patient denies diarrhea but cannot have she is with constipation requiring to take laxative which she can have loose stools at least twice per month.  Currently denies chest pain, shortness of breath, dizziness, lightheadedness, nausea, vomiting, diarrhea or constipation currently.  Asking to go home with does not need to stay.       PAST MEDICAL HISTORY:  Past Medical History:   Diagnosis Date    Allergic 2011    Allergic rhinitis 2015    Anxiety     occasional    Aortic aneurysm (HCC)     Benign colon polyp     Bipolar 1 disorder ([facility])     Cancer ([facility]) 2007, 2011    Cardiac disease     MI    Cervical cord compression with myelopathy ([facility])     COPD (chronic obstructive pulmonary disease) ([facility])     Coronary artery disease 1995    Diverticulitis     Diverticulitis of colon prior to 2014    Diverticulosis     Emphysema of lung ([facility]) 2012    Gait disturbance     uses cane, leg brace on right    GERD (gastroesophageal reflux disease)     Heart attack ([facility])  1996    Hx of resection of large bowel 05/03/2016    Hyperlipidemia     Hypertension     IBS (irritable bowel syndrome)     Inflammatory bowel disease 2012    Lumbar stenosis     Lung cancer (HCC)     2007 Left lower lobectomy and 2011 Right lung with surgery     Myocardial infarction (HCC)     involving other coronary artery    Prostate cancer (HCC)     Shortness of breath     Small bowel obstruction (HCC) 11/16/2016       PAST SURGICAL HISTORY:  Past Surgical History:   Procedure Laterality Date    ANGIOPLASTY      stent    CARDIAC CATHETERIZATION  1995    angioplasty    CARDIAC SURGERY      CERVICAL SPINE SURGERY      Cervical decompression with cervical fusion from C3-C7 for spinal stenosis.    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    LUNG LOBECTOMY Left     NY ARTHRD ANT INTERBODY MIN DSC CRV BELOW C2 N/A 05/02/2016    Procedure: Anterior cervical diskectomy C3/4, C5/6, C6/7 with anterior plate fixation fusion C3-7;  Posterior decompressive laminectomy C3-7 with lateral mass fixation fusion C3-7 (IMPULSE MONITORING);  Surgeon: Jose Luis Moore MD;  Location: [facility];  Service: Neurosurgery    NY ARTHRODESIS POSTERIOR INTERBODY 1 [facility] LUMBAR N/A 01/30/2017    Procedure: L4-5 AND L5-S1 DECOMPRESSIVE FORAMINOTOMIES, TRANSFORAMINAL LUMBAR INTERBODY AND PEDICLE SCREW FIXATION FUSION L4-S1 (IMPULSE);  Surgeon: Jose Luis Moore MD;  Location: [facility];  Service: Neurosurgery    NY EVASC RPR DPLMNT AORTO-AORTIC NDGFT N/A 08/06/2018    Procedure: REPAIR ANEURYSM ENDOVASCULAR ABDOMINAL AORTIC  (EVAR) WITH  BILATERAL PERCUTANEOUS FEMORAL ACCESS WITH ULTRASOUND GUIDANCE ON THE RIGHT AND PRE CLOSURE;  Surgeon: Shan Villalobos MD;  Location: [facility];  Service: Vascular    PROSTATECTOMY      for prostate CA - no chemo/RT    SIGMOIDECTOMY      for divertic    SMALL INTESTINE SURGERY N/A 2016    Procedure: Exploratory Laparotomy, Lysis of adhesions to release small bowel obstruction;  Surgeon: Aminta Sim MD;  Location: [facility];  Service:     SPINE SURGERY  ,     TONSILLECTOMY  Y           ALLERGIES:  Allergies   Allergen Reactions    Bactrim [Sulfamethoxazole-Trimethoprim] Other (See Comments)     AILYN    Nsaids      Annotation - [id number]: unable to take due to use of lithium.    Oxycodone Rash       SOCIAL HISTORY:  Social History     Substance and Sexual Activity   Alcohol Use Yes    Alcohol/week: 2.0 - 3.0 standard drinks of alcohol    Types: 2 - 3 Shots of liquor per week    Comment: One glass per day     Social History     Substance and Sexual Activity   Drug Use No     Social History     Tobacco Use   Smoking Status Former    Current packs/day: 0.00    Average packs/day: 1 pack/day for 44.0 years (44.0 ttl pk-yrs)    Types: Cigarettes    Start date: 1967    Quit date: 2011    Years since quittin.0    Passive exposure: Past   Smokeless Tobacco Never       FAMILY HISTORY:  Family History   Problem Relation Age of Onset    Hypertension Mother     Glaucoma Mother     Heart attack Father     Colon cancer Father     Coronary artery disease Father     Hypertension Father     Kidney disease Father     Heart disease Family     Hyperlipidemia Family     Hypertension Family        MEDICATIONS:    Current Facility-Administered Medications:     acetaminophen (TYLENOL) tablet 650 mg, 650 mg, Oral, Q6H PRN, Brendan Aquino, 650 mg at 25 0053    albuterol (PROVENTIL HFA,VENTOLIN HFA) inhaler 2 puff, 2 puff, Inhalation, Q6H PRN, Brendan Aquino    amLODIPine (NORVASC) tablet 5  mg, 5 mg, Oral, Daily, Brendan Aquino, 5 mg at 01/22/25 0939    aspirin (ECOTRIN LOW STRENGTH) EC tablet 81 mg, 81 mg, Oral, Daily, Brendan Aquino, 81 mg at 01/22/25 0939    atorvastatin (LIPITOR) tablet 40 mg, 40 mg, Oral, Daily, Brendan Aquino, 40 mg at 01/22/25 0939    dicyclomine (BENTYL) tablet 20 mg, 20 mg, Oral, 4x Daily (AC & HS), Brendan Aquino, 20 mg at 01/21/25 2236    DULoxetine (CYMBALTA) delayed release capsule 30 mg, 30 mg, Oral, Daily, Brendan Aquino, 30 mg at 01/22/25 0943    fluticasone-vilanterol 200-25 mcg/actuation 1 puff, 1 puff, Inhalation, Daily, 1 puff at 01/22/25 0939 **AND** umeclidinium 62.5 mcg/actuation inhaler AEPB 1 puff, 1 puff, Inhalation, Daily, Brendan Aquino, 1 puff at 01/22/25 0940    heparin (porcine) subcutaneous injection 5,000 Units, 5,000 Units, Subcutaneous, Q8H KATHRYN, Brendan Aquino, 5,000 Units at 01/22/25 0503    lithium carbonate (LITHOBID) CR tablet 300 mg, 300 mg, Oral, Q48H, Brendan Aquino    metoprolol succinate (TOPROL-XL) 24 hr tablet 25 mg, 25 mg, Oral, Daily, Brendan Aquino, 25 mg at 01/22/25 0939    nitroglycerin (NITROSTAT) SL tablet 0.4 mg, 0.4 mg, Sublingual, Q5 Min PRN, Brendan Aquino    pantoprazole (PROTONIX) EC tablet 40 mg, 40 mg, Oral, Early Morning, Brendan Aquino, 40 mg at 01/22/25 0503    Current Outpatient Medications:     acetaminophen (TYLENOL) 500 mg tablet, Take 500 mg by mouth as needed for mild pain., Disp: , Rfl:     albuterol (PROVENTIL HFA,VENTOLIN HFA) 90 mcg/act inhaler, USE 2 PUFFS BY MOUTH EVERY 6  HOURS AS NEEDED FOR WHEEZING OR  SHORTNESS OF BREATH, Disp: 26.8 g, Rfl: 12    amLODIPine (NORVASC) 5 mg tablet, Take 1 tablet (5 mg total) by mouth daily, Disp: 90 tablet, Rfl: 3    aspirin (ECOTRIN LOW STRENGTH) 81 mg EC tablet, Take 81 mg by mouth daily, Disp: , Rfl:     atorvastatin (LIPITOR) 40 mg tablet, TAKE 1 TABLET BY MOUTH DAILY, Disp: 90 tablet, Rfl: 1    Cholecalciferol (VITAMIN D PO), Take 2,000  Units by mouth daily  , Disp: , Rfl:     dicyclomine (BENTYL) 20 mg tablet, TAKE 1 TABLET BY MOUTH EVERY 6  HOURS AS NEEDED FOR ABDOMINAL  CRAMPING, Disp: 360 tablet, Rfl: 0    DULoxetine (CYMBALTA) 30 mg delayed release capsule, Take 1 capsule (30 mg total) by mouth daily, Disp: 90 capsule, Rfl: 1    fexofenadine (ALLEGRA) 180 MG tablet, Take 180 mg by mouth as needed Daily during the Summer, Disp: , Rfl:     fluticasone (FLONASE) 50 mcg/act nasal spray, 2 sprays into each nostril daily, Disp: , Rfl:     lithium carbonate (LITHOBID) 300 mg CR tablet, Take 300 mg by mouth daily at bedtime, Disp: , Rfl:     magnesium chloride-calcium (SlowMag Mg Muscle/Heart) 71.5-119 mg, Take 1 tablet by mouth 2 (two) times a day (Patient taking differently: Take 1 tablet by mouth 4 (four) times a day), Disp: 60 tablet, Rfl: 5    metoprolol succinate (TOPROL-XL) 25 mg 24 hr tablet, TAKE 1 TABLET BY MOUTH ONCE  DAILY, Disp: 90 tablet, Rfl: 3    mupirocin (BACTROBAN) 2 % ointment, Apply topically 2 (two) times a day as needed (wound) (Patient not taking: Reported on 8/22/2024), Disp: 22 g, Rfl: 0    nitroglycerin (NITRODUR) 0.2 mg/hr, APPLY 1 PATCH TOPICALLY ONCE  DAILY. LEAVE ON FOR 12 TO 14  HOURS THEN REMOVE FOR A  NITRATE-FREE INTERVAL OF 10 TO  12 HOURS (Patient not taking: Reported on 1/16/2025), Disp: 90 patch, Rfl: 1    omega-3-acid ethyl esters (LOVAZA) 1 g capsule, TAKE 1 CAPSULE BY MOUTH 3 TIMES  DAILY, Disp: 270 capsule, Rfl: 1    omeprazole (PriLOSEC) 20 mg delayed release capsule, TAKE 1 CAPSULE BY MOUTH TWICE  DAILY BEFORE MEALS, Disp: 180 capsule, Rfl: 3    terbinafine (LamISIL) 1 % cream, Apply topically 2 (two) times a day (Patient not taking: Reported on 10/4/2024), Disp: 42 g, Rfl: 1    Trelegy Ellipta 200-62.5-25 MCG/ACT AEPB inhaler, USE 1 INHALATION BY MOUTH DAILY, Disp: 180 each, Rfl: 3    triamcinolone (KENALOG) 0.1 % cream, prn, Disp: , Rfl:     trospium chloride (SANCTURA) 20 mg tablet, Take 1 tablet (20 mg  total) by mouth 2 (two) times a day (Patient taking differently: Take 20 mg by mouth 2 (two) times a day Pt has not started taking.), Disp: 180 tablet, Rfl: 3    REVIEW OF SYSTEMS:  Constitutional: Negative for fatigue, anorexia, fever, chills, diaphoresis  HENT: Negative for postnasal drip  Eyes: Negative for visual disturbance.   Respiratory: Negative for cough, shortness of breath and wheezing.   Cardiovascular: Negative for chest pain, palpitations and leg swelling.   Gastrointestinal: Negative for abdominal pain, constipation, diarrhea, nausea and vomiting.   Genitourinary: No dysuria, hematuria  Endocrine: Negative for polyuria.   Musculoskeletal: Negative for arthralgias, back pain and joint swelling.   Skin: Negative for rash.   Neurological: Negative for focal weakness, headaches, dizziness.  Hematological: Negative for easy bruising or bleeding.  Psychiatric/Behavioral: Negative for confusion and sleep disturbance.   All the systems were reviewed and were negative except as documented on the HPI.    PHYSICAL EXAM:  Current Weight:    First Weight:    Vitals:    01/22/25 0800 01/22/25 0939 01/22/25 0945 01/22/25 1000   BP:  (!) 164/101 (!) 164/101 (!) 173/81   BP Location:   Right arm    Pulse: 84 65 70 64   Resp: 18  17 18   Temp:       TempSrc:       SpO2: 96%  98% 98%       Intake/Output Summary (Last 24 hours) at 1/22/2025 1043  Last data filed at 1/22/2025 0700  Gross per 24 hour   Intake --   Output 1900 ml   Net -1900 ml     Physical Exam  Vitals reviewed.   Constitutional:       Appearance: Normal appearance.   HENT:      Head: Normocephalic and atraumatic.      Nose: Nose normal.      Mouth/Throat:      Mouth: Mucous membranes are moist.      Pharynx: Oropharynx is clear.   Eyes:      Extraocular Movements: Extraocular movements intact.      Conjunctiva/sclera: Conjunctivae normal.   Cardiovascular:      Rate and Rhythm: Normal rate and regular rhythm.      Pulses: Normal pulses.      Heart sounds:  "Normal heart sounds.   Pulmonary:      Effort: Pulmonary effort is normal.      Breath sounds: Normal breath sounds.   Abdominal:      General: Bowel sounds are normal.      Palpations: Abdomen is soft.   Musculoskeletal:         General: Normal range of motion.      Cervical back: Normal range of motion.   Skin:     General: Skin is warm and dry.   Neurological:      General: No focal deficit present.      Mental Status: He is alert and oriented to person, place, and time.   Psychiatric:         Mood and Affect: Mood normal.         Behavior: Behavior normal.          Lab Results:   Results from last 7 days   Lab Units 01/22/25  0507 01/21/25  2101 01/21/25  1703 01/21/25  1500 01/21/25  1433 01/21/25  0819   WBC Thousand/uL 10.13  --   --   --  12.57* 11.78*   HEMOGLOBIN g/dL 12.1  --   --   --  13.4 12.9   I STAT HEMOGLOBIN g/dl  --   --   --  11.6*  --   --    HEMATOCRIT % 38.3  --   --   --  41.0 40.1   HEMATOCRIT, ISTAT %  --   --   --  34*  --   --    PLATELETS Thousands/uL 189  --   --   --  233 228   POTASSIUM mmol/L 5.1 4.4 6.1*  --  5.7* 6.6*   CHLORIDE mmol/L 105 104 109*  --  108 109*   CO2 mmol/L 20* 21 19*  --  22 24   CO2, I-STAT mmol/L  --   --   --  21  --   --    BUN mg/dL 37* 35* 40*  --  42* 41*   CREATININE mg/dL 1.76* 2.01* 1.89*  --  2.00* 1.96*   CALCIUM mg/dL 9.5 9.7 9.0  --  9.9 9.9   MAGNESIUM mg/dL 2.0  --   --   --  1.9 2.1   PHOSPHORUS mg/dL  --   --   --   --  4.3*  --    ALK PHOS U/L  --   --   --   --  171* 162*   ALT U/L  --   --   --   --  17 17   AST U/L  --   --   --   --  16 19   GLUCOSE, ISTAT mg/dl  --   --   --  100  --   --      Other Studies:     Portions of the record may have been created with voice recognition software. Occasional wrong word or \"sound a like\" substitutions may have occurred due to the inherent limitations of voice recognition software. Read the chart carefully and recognize, using context, where substitutions have occurred.If you have any questions, " please contact the dictating provider.

## 2025-01-22 NOTE — ASSESSMENT & PLAN NOTE
Patient with known history of CKD 3a and history of hyperkalemia referred to the ED by his nephrologist after morning labs demonstrating potassium of 6.6  Patient states he feels well and no obvious peaked T waves on EKG  Potassium trended down to 5.1 this morning  Nephrology suspects dysfunction in the setting of chronic lithium use  Will start sodium bicarbonate 650 mg twice daily for acidosis correction thereby improving potassium levels  Repeat BMP weekly x 2 starting Monday with outpatient follow-up with nephrology  Stable for discharge from renal perspective

## 2025-01-22 NOTE — ASSESSMENT & PLAN NOTE
Likely in the setting of tubular acidosis with chronic lithium use  Bicarb 20  As above will start sodium bicarbonate 650 mg 2 times daily

## 2025-01-22 NOTE — ASSESSMENT & PLAN NOTE
Lab Results   Component Value Date    EGFR 36 01/22/2025    EGFR 31 01/21/2025    EGFR 33 01/21/2025    CREATININE 1.76 (H) 01/22/2025    CREATININE 2.01 (H) 01/21/2025    CREATININE 1.89 (H) 01/21/2025   Follows with Dr. Heller of Nephrology  Creatinine stable

## 2025-01-22 NOTE — ASSESSMENT & PLAN NOTE
Respiratory status stable, substitute Trelegy for formulary equivalent and provide albuterol as needed

## 2025-01-22 NOTE — ASSESSMENT & PLAN NOTE
Controlled on lithium 300 mg every 48 hours  Lithium can contribute to hyperkalemia   Nephrology consulted

## 2025-01-22 NOTE — DISCHARGE SUMMARY
"Discharge Summary - Hospitalist   Name: Tonny Baig 79 y.o. male I MRN: [id number]  Unit/Bed#: ED 19 I Date of Admission: 1/21/2025   Date of Service: 1/22/2025 I Hospital Day: 1     Assessment & Plan  Hyperkalemia  Patient with known history of CKD 3a and history of hyperkalemia referred to the ED by his nephrologist after morning labs demonstrating potassium of 6.6  Patient states he feels well and no obvious peaked T waves on EKG  Potassium trended down to 5.1 this morning  Nephrology suspects dysfunction in the setting of chronic lithium use  Will start sodium bicarbonate 650 mg twice daily for acidosis correction thereby improving potassium levels  Repeat BMP weekly x 2 starting Monday with outpatient follow-up with nephrology  Stable for discharge from renal perspective  CKD stage 3b, GFR 30-44 ml/min ([facility])  Lab Results   Component Value Date    EGFR 36 01/22/2025    EGFR 31 01/21/2025    EGFR 33 01/21/2025    CREATININE 1.76 (H) 01/22/2025    CREATININE 2.01 (H) 01/21/2025    CREATININE 1.89 (H) 01/21/2025   Follows with Dr. Heller of Nephrology  Creatinine stable  Bilateral renal masses  US performed in the ED showing multiple renal masses  \"A 2.9 cm right renal upper pole and 1.6 cm left renal lower pole hypoechoic lesions without internal color flow. Differential diagnoses include a complicated cyst or hypovascular renal neoplasm. Further assessment with nonemergent MRI abdomen with and without contrast is recommended for better characterization. If the patient cannot get intravenous contrast, MRI abdomen without contrast can be obtained for further assessment\"  \"A 6.2 cm thinly septated right renal upper pole cyst. This can also be better characterized on subsequent MRI examination.\"  Outpatient urology referral provided for further management/surveillance  CAD (coronary artery disease)  Continue  aspirin, atorvastatin, Toprol-XL, and sublingual nitrogen PRN  COPD (chronic obstructive pulmonary " disease) (HCC)  Respiratory status stable, substitute Trelegy for formulary equivalent and provide albuterol as needed  Gastroesophageal reflux disease  Resume Protonix 40 mg daily and Bentyl 20mg 4 times daily  Mixed hyperlipidemia  Continue atorvastatin 40 mg qd  Essential hypertension  Continue Toprol-XL 25 mg daily and Norvasc 5 mg p.o. daily  Bipolar 1 disorder (HCC)  Controlled on lithium 300 mg every 48 hours  Low bicarbonate  Start sodium bicarbonate 650 mg BID  Outpatient follow-up with nephrology  Asymptomatic PVCs  EKG with frequent PVCs and trigeminy  Asymptomatic  Heart rate in the 60s  Prior Holter monitor reviewed with high ventricular ectopy burden  Continue PTA Toprol-XL  Outpatient follow-up with cardiology     Medical Problems       Resolved Problems  Date Reviewed: 1/16/2025   None       Discharging Physician / Practitioner: Verna Montenegro DO  PCP: Brendan Suarez MD  Admission Date:   Admission Orders (From admission, onward)       Ordered        01/21/25 1757  INPATIENT ADMISSION  Once                          Discharge Date: 01/22/25    Consultations During Hospital Stay:  nephrology    Procedures Performed:   None    Significant Findings / Test Results:   Renal mass seen on ultrasound    Incidental Findings:   See above     Test Results Pending at Discharge (will require follow up):   None      Outpatient Tests Requested:  BMP weekly x2 starting Monday     Complications:  none     Reason for Admission: hyperkalemia    Hospital Course:   Tonny Baig is a 79 y.o. male patient who originally presented to the hospital on 1/21/2025 due to hyperkalemia noted on outpatient labs.  Noted to have potassium of 6.6 on outpatient labs.  Admission potassium of 5.7.  S/p treatment with improvement and current potassium is 5.1 this morning.  Nephrology was consulted who recommended initiation of sodium bicarbonate.  Nephrology suspects hyperkalemia likely due to dysfunction in setting of chronic  lithium use.  Patient noted to have multiple hypoechoic lesions noted bilaterally suspicious for cyst however cannot rule out neoplasm for which nephrology recommend outpatient follow-up with urology.  Referral to urology placed.  Recommend low potassium foods at discharge.  EKG with sinus and frequent PVCs.  Prior Holter monitor reviewed which showed only VT burden.  Recommend outpatient follow-up with cardiology as scheduled and continuing metoprolol.    The patient, initially admitted to the hospital as inpatient, was discharged earlier than expected given the following: Hyperkalemia which resolved and patient was medically stable for discharge home.  Please see above list of diagnoses and related plan for additional information.     Condition at Discharge: stable    Discharge Day Visit / Exam:   Subjective: Patient assessed at bedside.  No acute complaints.  Vitals: Blood Pressure: (!) 173/81 (01/22/25 1000)  Pulse: 58 (01/22/25 1130)  Temperature: 98.9 °F (37.2 °C) (01/21/25 2300)  Temp Source: Temporal (01/21/25 1417)  Respirations: 17 (01/22/25 1100)  SpO2: 98 % (01/22/25 1130)  Physical Exam  Vitals reviewed.   Constitutional:       General: He is not in acute distress.     Appearance: Normal appearance. He is not ill-appearing.   HENT:      Head: Normocephalic and atraumatic.   Cardiovascular:      Rate and Rhythm: Normal rate. Rhythm irregular.      Heart sounds: Normal heart sounds.   Pulmonary:      Effort: Pulmonary effort is normal. No respiratory distress.      Breath sounds: No wheezing or rales.   Abdominal:      General: Bowel sounds are normal.      Tenderness: There is no abdominal tenderness.   Musculoskeletal:      Right lower leg: No edema.      Left lower leg: No edema.   Neurological:      Mental Status: He is alert and oriented to person, place, and time. Mental status is at baseline.          Discussion with Family: Updated  (wife) at bedside.    Discharge  instructions/Information to patient and family:   See after visit summary for information provided to patient and family.      Provisions for Follow-Up Care:  See after visit summary for information related to follow-up care and any pertinent home health orders.      Mobility at time of Discharge:      HLM Goal NOT achieved. Continue to encourage mobility in post discharge setting.     Disposition:   Home    Planned Readmission: none     Discharge Medications:  See after visit summary for reconciled discharge medications provided to patient and/or family.      Administrative Statements   Discharge Statement:  I have spent a total time of 35 minutes in caring for this patient on the day of the visit/encounter. >30 minutes of time was spent on: Impressions, Counseling / Coordination of care, Documenting in the medical record, Reviewing / ordering tests, medicine, procedures  , and Communicating with other healthcare professionals .    **Please Note: This note may have been constructed using a voice recognition system**

## 2025-01-22 NOTE — ASSESSMENT & PLAN NOTE
EKG with frequent PVCs and trigeminy  Asymptomatic  Heart rate in the 60s  Prior Holter monitor reviewed with high ventricular ectopy burden  Continue PTA Toprol-XL  Outpatient follow-up with cardiology

## 2025-01-23 ENCOUNTER — TELEPHONE (OUTPATIENT)
Age: 80
End: 2025-01-23

## 2025-01-23 NOTE — TELEPHONE ENCOUNTER
Pt under care of Abdon Nix.    Last Seen: 2/16/24    Pt calling due to pt was seen in ED on 1/21/25 and had CT scan completed which showed:    IMPRESSION:     Suboptimal visualization of the left kidney due to overlying shadowing     A 2.9 cm right renal upper pole and 1.6 cm left renal lower pole hypoechoic lesions without internal color flow. Differential diagnoses include a complicated cyst or hypovascular renal neoplasm. Further assessment with nonemergent MRI abdomen with and   without contrast is recommended for better characterization. If the patient cannot get intravenous contrast, MRI abdomen without contrast can be obtained for further assessment     A 6.2 cm thinly septated right renal upper pole cyst. This can also be better characterized on subsequent MRI examination.     No hydronephrosis or nephrolithiasis. Increased echogenicity of bilateral renal parenchyma, compatible with medical renal disease.     The study was marked in EPIC for immediate notification.    Pt is scheduled with Abdon for 1 yr follow up on 2/19/25 and asking if sooner appt is necessary or if appt is scheduled in appropriate timeframe. Please review.     Pt can be reached at: [phone]

## 2025-01-27 ENCOUNTER — APPOINTMENT (OUTPATIENT)
Dept: LAB | Facility: CLINIC | Age: 80
End: 2025-01-27
Payer: MEDICARE

## 2025-01-27 ENCOUNTER — RESULTS FOLLOW-UP (OUTPATIENT)
Dept: NEPHROLOGY | Facility: CLINIC | Age: 80
End: 2025-01-27

## 2025-01-27 DIAGNOSIS — E83.42 HYPOMAGNESEMIA: ICD-10-CM

## 2025-01-27 DIAGNOSIS — E87.8 LOW BICARBONATE: ICD-10-CM

## 2025-01-27 DIAGNOSIS — E87.5 HYPERKALEMIA: ICD-10-CM

## 2025-01-27 DIAGNOSIS — I10 ESSENTIAL HYPERTENSION: ICD-10-CM

## 2025-01-27 DIAGNOSIS — N18.32 CKD STAGE 3B, GFR 30-44 ML/MIN (HCC): ICD-10-CM

## 2025-01-27 DIAGNOSIS — N28.89 BILATERAL RENAL MASSES: ICD-10-CM

## 2025-01-27 DIAGNOSIS — I10 ESSENTIAL HYPERTENSION: Primary | ICD-10-CM

## 2025-01-27 DIAGNOSIS — N18.31 STAGE 3A CHRONIC KIDNEY DISEASE (HCC): ICD-10-CM

## 2025-01-27 DIAGNOSIS — E87.5 HYPERKALEMIA: Primary | ICD-10-CM

## 2025-01-27 LAB
ALBUMIN SERPL BCG-MCNC: 4 G/DL (ref 3.5–5)
ALP SERPL-CCNC: 149 U/L (ref 34–104)
ALT SERPL W P-5'-P-CCNC: 16 U/L (ref 7–52)
ANION GAP SERPL CALCULATED.3IONS-SCNC: 2 MMOL/L (ref 4–13)
AST SERPL W P-5'-P-CCNC: 16 U/L (ref 13–39)
BILIRUB SERPL-MCNC: 0.51 MG/DL (ref 0.2–1)
BUN SERPL-MCNC: 42 MG/DL (ref 5–25)
CALCIUM SERPL-MCNC: 9.9 MG/DL (ref 8.4–10.2)
CHLORIDE SERPL-SCNC: 107 MMOL/L (ref 96–108)
CO2 SERPL-SCNC: 27 MMOL/L (ref 21–32)
CREAT SERPL-MCNC: 1.88 MG/DL (ref 0.6–1.3)
GFR SERPL CREATININE-BSD FRML MDRD: 33 ML/MIN/1.73SQ M
GLUCOSE SERPL-MCNC: 103 MG/DL (ref 65–140)
POTASSIUM SERPL-SCNC: 6.2 MMOL/L (ref 3.5–5.3)
PROT SERPL-MCNC: 6.5 G/DL (ref 6.4–8.4)
SODIUM SERPL-SCNC: 136 MMOL/L (ref 135–147)

## 2025-01-27 PROCEDURE — 36415 COLL VENOUS BLD VENIPUNCTURE: CPT

## 2025-01-27 PROCEDURE — 80053 COMPREHEN METABOLIC PANEL: CPT

## 2025-01-27 NOTE — TELEPHONE ENCOUNTER
Called patient and went over the following information:    Call patient and tell him I saw he had been admitted his potassium level is high again at 6.2.  I sent in a medicine that I want him to take 1 packet daily to help lower his potassium if he could pick it up today.  And started.    Also then put him in for a BMP to do Wednesday on the medication.    Lab has been ordered. Patient states he has called the pharmacy and they stated they wont have the medication until after 4 pm tomorrow. I advised that is fine and to have the blood work Thursday or Friday to make up for it. Patient verbally understood and had no further questions for me at this time.

## 2025-01-27 NOTE — TELEPHONE ENCOUNTER
Patient returning call and made aware:      Gilson Heller MD to Nephrology [facility]  Regarding result: Comprehensive metabolic panel       1/27/25  3:35 PM  Result Note  Call patient and tell him I saw he had been admitted his potassium level is high again at 6.2.  I sent in a medicine that I want him to take 1 packet daily to help lower his potassium if he could pick it up today.  And started. Also then put him in for a BMP to do Wednesday on the medication.  Let me know you got the message please.    Patient verbalized understanding.

## 2025-01-27 NOTE — TELEPHONE ENCOUNTER
----- Message from Gilson Heller MD sent at 1/27/2025  3:35 PM EST -----  Call patient and tell him I saw he had been admitted his potassium level is high again at 6.2.  I sent in a medicine that I want him to take 1 packet daily to help lower his potassium if he could pick it up today.  And started.    Also then put him in for a BMP to do Wednesday on the medication.  Let me know you got the message please.

## 2025-01-30 ENCOUNTER — APPOINTMENT (OUTPATIENT)
Dept: LAB | Facility: CLINIC | Age: 80
End: 2025-01-30
Payer: MEDICARE

## 2025-01-30 ENCOUNTER — OFFICE VISIT (OUTPATIENT)
Dept: INTERNAL MEDICINE CLINIC | Facility: CLINIC | Age: 80
End: 2025-01-30
Payer: MEDICARE

## 2025-01-30 VITALS
HEART RATE: 63 BPM | DIASTOLIC BLOOD PRESSURE: 72 MMHG | WEIGHT: 167 LBS | SYSTOLIC BLOOD PRESSURE: 116 MMHG | BODY MASS INDEX: 27.49 KG/M2 | OXYGEN SATURATION: 100 % | TEMPERATURE: 96.8 F

## 2025-01-30 DIAGNOSIS — I10 ESSENTIAL HYPERTENSION: ICD-10-CM

## 2025-01-30 DIAGNOSIS — C34.90 MALIGNANT NEOPLASM OF LUNG, UNSPECIFIED LATERALITY, UNSPECIFIED PART OF LUNG (HCC): ICD-10-CM

## 2025-01-30 DIAGNOSIS — I48.0 PAF (PAROXYSMAL ATRIAL FIBRILLATION) (HCC): ICD-10-CM

## 2025-01-30 DIAGNOSIS — E78.2 MIXED HYPERLIPIDEMIA: ICD-10-CM

## 2025-01-30 DIAGNOSIS — N18.32 CKD STAGE 3B, GFR 30-44 ML/MIN (HCC): ICD-10-CM

## 2025-01-30 DIAGNOSIS — G95.9 CERVICAL MYELOPATHY (HCC): ICD-10-CM

## 2025-01-30 DIAGNOSIS — N28.89 BILATERAL RENAL MASSES: ICD-10-CM

## 2025-01-30 DIAGNOSIS — G20.C PARKINSONISM, UNSPECIFIED PARKINSONISM TYPE (HCC): ICD-10-CM

## 2025-01-30 DIAGNOSIS — E87.5 HYPERKALEMIA: Primary | ICD-10-CM

## 2025-01-30 DIAGNOSIS — E83.42 HYPOMAGNESEMIA: ICD-10-CM

## 2025-01-30 DIAGNOSIS — I71.43 INFRARENAL ABDOMINAL AORTIC ANEURYSM (AAA) WITHOUT RUPTURE (HCC): Chronic | ICD-10-CM

## 2025-01-30 DIAGNOSIS — E87.5 HYPERKALEMIA: ICD-10-CM

## 2025-01-30 LAB
ANION GAP SERPL CALCULATED.3IONS-SCNC: 10 MMOL/L (ref 4–13)
BUN SERPL-MCNC: 43 MG/DL (ref 5–25)
CALCIUM SERPL-MCNC: 10 MG/DL (ref 8.4–10.2)
CHLORIDE SERPL-SCNC: 103 MMOL/L (ref 96–108)
CO2 SERPL-SCNC: 24 MMOL/L (ref 21–32)
CREAT SERPL-MCNC: 2.01 MG/DL (ref 0.6–1.3)
GFR SERPL CREATININE-BSD FRML MDRD: 31 ML/MIN/1.73SQ M
GLUCOSE P FAST SERPL-MCNC: 97 MG/DL (ref 65–99)
POTASSIUM SERPL-SCNC: 5.4 MMOL/L (ref 3.5–5.3)
SODIUM SERPL-SCNC: 137 MMOL/L (ref 135–147)

## 2025-01-30 PROCEDURE — 99495 TRANSJ CARE MGMT MOD F2F 14D: CPT | Performed by: INTERNAL MEDICINE

## 2025-01-30 PROCEDURE — 80048 BASIC METABOLIC PNL TOTAL CA: CPT

## 2025-01-30 PROCEDURE — 36415 COLL VENOUS BLD VENIPUNCTURE: CPT

## 2025-01-30 NOTE — ASSESSMENT & PLAN NOTE
Stay active with stretching exercises, patient does have chronic intermittent neck pain, no radicular symptoms currently

## 2025-01-30 NOTE — PROGRESS NOTES
Transition of Care Visit  Name: Tonny Baig      : 1945      MRN: [id number]  Encounter Provider: Brendan Suarez MD  Encounter Date: 2025   Encounter department: [facility]    Assessment & Plan  Hyperkalemia  Most recent potassium 6.2, nephrology called in [facility] for patient to take once a day which he has been taking over the past 2 days.         Essential hypertension  Blood pressure is ok, continue current meds       Parkinsonism, unspecified Parkinsonism type (HCC)  Patient did see neurology, he is monitoring for symptoms, he has not tried any medications for this       Cervical myelopathy (HCC)  Stay active with stretching exercises, patient does have chronic intermittent neck pain, no radicular symptoms currently       Infrarenal abdominal aortic aneurysm (AAA) without rupture ([facility])  Follow-up vascular       Malignant neoplasm of lung, unspecified laterality, unspecified part of lung (HCC)  Patient follows with pulmonary, last CT 3/25/2022 showed no evidence of metastases       Mixed hyperlipidemia  Continue with the atorvastatin along with healthy diet       PAF (paroxysmal atrial fibrillation) (HCC)  Patient had a reported episode of atrial fibrillation in 2018 associated with respiratory infection, this was likely an isolated episode and patient not anticoagulation, he does follow with cardiology            History of Present Illness     Transitional Care Management Review:   Tonny Baig is a 79 y.o. male here for TCM follow up.     During the TCM phone call patient stated:  TCM Call     Date and time call was made  2025  2:03 PM    Hospital care reviewed  Records not available    Patient was hospitialized at  [facility]    Date of Admission  25    Date of discharge  25    Diagnosis  Hyperkalemia    Disposition  Home    Were the patients medications reviewed and updated  No    Current Symptoms  None      TCM Call     Post hospital  issues  None    Should patient be enrolled in anticoag monitoring?  No    Scheduled for follow up?  Yes    Did you obtain your prescribed medications  Yes    Do you need help managing your prescriptions or medications  No    Is transportation to your appointment needed  No    I have advised the patient to call PCP with any new or worsening symptoms  Jamilah De Leon - /[place]    Are you recieving any outpatient services  No    Are you recieving home care services  No    Have you fallen in the last 12 months  No    Interperter language line needed  No        I reviewed patient's hospital for hyperkalemia, he has been followed by nephrology since discharge, his most recent potassium was elevated 6.6, and nephrology sent in a medication for patient to take once a day, Lokelma, and has been taking this once a day for 2 days.      Review of Systems   Constitutional:  Positive for fatigue. Negative for chills and fever.   HENT:  Negative for congestion, nosebleeds, postnasal drip, sore throat and trouble swallowing.    Eyes:  Negative for pain.   Respiratory:  Negative for cough, chest tightness, shortness of breath and wheezing.    Cardiovascular:  Negative for chest pain, palpitations and leg swelling.   Gastrointestinal:  Negative for abdominal pain, constipation, diarrhea, nausea and vomiting.   Endocrine: Positive for cold intolerance. Negative for polydipsia and polyuria.   Genitourinary:  Negative for dysuria, flank pain and hematuria.   Musculoskeletal:  Negative for arthralgias.   Skin:  Negative for rash.   Neurological:  Negative for dizziness, tremors, light-headedness and headaches.   Hematological:  Does not bruise/bleed easily.   Psychiatric/Behavioral:  Negative for confusion and dysphoric mood. The patient is not nervous/anxious.      Objective   /72 (BP Location: Left arm, Patient Position: Sitting, Cuff Size: Standard)   Pulse 63   Temp (!) 96.8 °F (36 °C) (Tympanic)   Wt 75.8 kg (167 lb)   SpO2  100%   BMI 27.49 kg/m²     Physical Exam  Vitals reviewed.   Constitutional:       General: He is not in acute distress.     Appearance: Normal appearance. He is well-developed.   HENT:      Head: Normocephalic and atraumatic.      Right Ear: External ear normal.      Left Ear: External ear normal.      Nose: Nose normal.   Eyes:      General: No scleral icterus.     Conjunctiva/sclera: Conjunctivae normal.   Neck:      Trachea: No tracheal deviation.   Cardiovascular:      Rate and Rhythm: Normal rate and regular rhythm.      Heart sounds: Normal heart sounds. No murmur heard.  Pulmonary:      Effort: Pulmonary effort is normal. No respiratory distress.      Breath sounds: Normal breath sounds. No wheezing or rales.   Musculoskeletal:      Cervical back: Normal range of motion and neck supple.      Right lower leg: Edema (trace) present.      Left lower leg: Edema (trace) present.   Lymphadenopathy:      Cervical: No cervical adenopathy.   Neurological:      Mental Status: He is alert and oriented to person, place, and time.   Psychiatric:         Mood and Affect: Mood normal.         Behavior: Behavior normal.         Thought Content: Thought content normal.         Judgment: Judgment normal.       Medications have been reviewed by provider in current encounter

## 2025-01-30 NOTE — PATIENT INSTRUCTIONS
Problem List Items Addressed This Visit          Cardiovascular and Mediastinum    Aneurysm of infrarenal abdominal aorta (HCC) (Chronic)    Follow-up vascular         Essential hypertension    Blood pressure is ok, continue current meds         PAF (paroxysmal atrial fibrillation) (HCC)    Patient had a reported episode of atrial fibrillation in 2018 associated with respiratory infection, this was likely an isolated episode and patient not anticoagulation, he does follow with cardiology            Respiratory    Malignant neoplasm of lung (HCC)    Patient follows with pulmonary, last CT 3/25/2022 showed no evidence of metastases            Nervous and Auditory    Cervical myelopathy ([facility])    Stay active with stretching exercises, patient does have chronic intermittent neck pain, no radicular symptoms currently         Parkinsonism (HCC)    Patient did see neurology, he is monitoring for symptoms, he has not tried any medications for this            Other    Mixed hyperlipidemia    Continue with the atorvastatin along with healthy diet         Hyperkalemia - Primary    Most recent potassium 6.2, nephrology called in [facility] for patient to take once a day which he has been taking over the past 3 days.

## 2025-01-30 NOTE — ASSESSMENT & PLAN NOTE
Patient had a reported episode of atrial fibrillation in 2018 associated with respiratory infection, this was likely an isolated episode and patient not anticoagulation, he does follow with cardiology

## 2025-01-30 NOTE — ASSESSMENT & PLAN NOTE
Patient did see neurology, he is monitoring for symptoms, he has not tried any medications for this

## 2025-01-31 ENCOUNTER — RESULTS FOLLOW-UP (OUTPATIENT)
Dept: NEPHROLOGY | Facility: CLINIC | Age: 80
End: 2025-01-31

## 2025-01-31 NOTE — TELEPHONE ENCOUNTER
----- Message from Gilson Heller MD sent at 1/31/2025 12:15 PM EST -----  Please call and let him know his potassium is down to a better level at 5.4 he should continue the Lokelma on a daily basis.  Let me know you got the message please.

## 2025-01-31 NOTE — TELEPHONE ENCOUNTER
Called patient and went over the following information:    Please call and let him know his potassium is down to a better level at 5.4 he should continue the Lokelma on a daily basis.      Patient verbally understood and had no further questions for me at this time.

## 2025-02-03 ENCOUNTER — APPOINTMENT (OUTPATIENT)
Dept: LAB | Facility: CLINIC | Age: 80
End: 2025-02-03
Payer: MEDICARE

## 2025-02-03 ENCOUNTER — TRANSCRIBE ORDERS (OUTPATIENT)
Dept: LAB | Facility: CLINIC | Age: 80
End: 2025-02-03

## 2025-02-03 DIAGNOSIS — E87.5 HYPERKALEMIA: ICD-10-CM

## 2025-02-03 DIAGNOSIS — F31.60 MIXED BIPOLAR I DISORDER (HCC): ICD-10-CM

## 2025-02-03 DIAGNOSIS — F31.60 MIXED BIPOLAR I DISORDER (HCC): Primary | ICD-10-CM

## 2025-02-03 DIAGNOSIS — E87.8 LOW BICARBONATE: ICD-10-CM

## 2025-02-03 LAB
ANION GAP SERPL CALCULATED.3IONS-SCNC: 11 MMOL/L (ref 4–13)
BUN SERPL-MCNC: 38 MG/DL (ref 5–25)
CALCIUM SERPL-MCNC: 9.7 MG/DL (ref 8.4–10.2)
CHLORIDE SERPL-SCNC: 107 MMOL/L (ref 96–108)
CO2 SERPL-SCNC: 24 MMOL/L (ref 21–32)
CREAT SERPL-MCNC: 1.81 MG/DL (ref 0.6–1.3)
GFR SERPL CREATININE-BSD FRML MDRD: 35 ML/MIN/1.73SQ M
GLUCOSE P FAST SERPL-MCNC: 101 MG/DL (ref 65–99)
LITHIUM SERPL-SCNC: 0.33 MMOL/L (ref 0.6–1.2)
POTASSIUM SERPL-SCNC: 5.1 MMOL/L (ref 3.5–5.3)
SODIUM SERPL-SCNC: 142 MMOL/L (ref 135–147)
TSH SERPL DL<=0.05 MIU/L-ACNC: 0.99 UIU/ML (ref 0.45–4.5)

## 2025-02-03 PROCEDURE — 80048 BASIC METABOLIC PNL TOTAL CA: CPT

## 2025-02-03 PROCEDURE — 80178 ASSAY OF LITHIUM: CPT

## 2025-02-03 PROCEDURE — 84443 ASSAY THYROID STIM HORMONE: CPT

## 2025-02-03 PROCEDURE — 36415 COLL VENOUS BLD VENIPUNCTURE: CPT

## 2025-02-04 ENCOUNTER — OFFICE VISIT (OUTPATIENT)
Dept: NEPHROLOGY | Facility: CLINIC | Age: 80
End: 2025-02-04
Payer: MEDICARE

## 2025-02-04 VITALS
HEART RATE: 78 BPM | HEIGHT: 65 IN | DIASTOLIC BLOOD PRESSURE: 78 MMHG | SYSTOLIC BLOOD PRESSURE: 130 MMHG | BODY MASS INDEX: 27.66 KG/M2 | WEIGHT: 166 LBS

## 2025-02-04 DIAGNOSIS — N18.9 CHRONIC KIDNEY DISEASE, UNSPECIFIED CKD STAGE: Primary | ICD-10-CM

## 2025-02-04 DIAGNOSIS — E87.5 HYPERKALEMIA: ICD-10-CM

## 2025-02-04 PROCEDURE — 99214 OFFICE O/P EST MOD 30 MIN: CPT | Performed by: INTERNAL MEDICINE

## 2025-02-04 NOTE — LETTER
2025     Brendan Suarez MD  [address] [facility] [id number]    Patient: Tonny Baig   YOB: 1945   Date of Visit: 2025       Dear Dr. Suarez:    Thank you for referring Tonny Baig to me for evaluation. Below are my notes for this consultation.    If you have questions, please do not hesitate to call me. I look forward to following your patient along with you.         Sincerely,        Gilson Heller MD        CC: No Recipients    Gilson Heller MD  2025  3:23 PM  Sign when Signing Visit  Name: Tonny Baig      : 1945      MRN: [id number]  Encounter Provider: Gilson Heller MD  Encounter Date: 2025   Encounter department: [facility]  :  Assessment & Plan  Chronic kidney disease, unspecified CKD stage  Lab Results   Component Value Date    EGFR 35 2025    EGFR 31 2025    EGFR 33 2025    CREATININE 1.81 (H) 2025    CREATININE 2.01 (H) 2025    CREATININE 1.88 (H) 2025     The patient has chronic kidney disease with lower levels of proteinuria and I suspect he could have chronic interstitial disease from lithium.  I cannot definitively say that and lithium is being used it is has a low dose low level and he absolutely is doing remarkable and this medication is very functional.  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    His latest creatinine is 1.8 there can be subtle fluctuations around 2 but overall the most recent is stable at his baseline.  So there is been no significant progression I would continue his current medications I would not consider changing his lithium unless his psychiatrist felt there was good medication and not to cause a decompensation but again I told the patient I am fine with him continuing this because he is doing so well I will just monitor his kidney function.    BMP  prior to visit and then follow-up  Monitor electrolytes         Hyperkalemia    The patient had outpatient labs and that revealed hyperkalemia.  I had him go to the hospital he was admitted.  On presentation his potassium was 6.6.  He was treated in the hospital and on discharge his potassium was 5.1.  He then had blood work done a few days after and it was up to 6.2.  I have initiated him on Lokelma and his potassium is 5.1.    He is on no potassium supplementation he does eat fruits so we discussed some potassium containing foods and we will continue to monitor.  He is having a little loose stool on Lokelma so I told him to take it every other day.    Will repeat a BMP in a few weeks to make sure the potassium is stable.      Orders:  •  Basic metabolic panel; Future    I asked him to call me if there is any problems or concerns before next visit.    I have spent a total time of 40 minutes in caring for this patient on the day of the visit/encounter including Diagnostic results, Instructions for management, Impressions, Reviewing / ordering tests, medicine, procedures  , and Obtaining or reviewing history  .       History of Present Illness  HPI  Tonny Baig is a 79 y.o. male who presents for routine follow-up.  The patient looks great says he has been doing well unfortunately his potassium was elevated and I referred him to the emergency room and he was admitted for treatment and management and then discharge.  Today he feels well is very gracious and talkative and in a good mood.  History obtained from: patient and his wife was present.    Review of Systems   Constitutional:  Negative for appetite change, chills and fever.   HENT: Negative.     Eyes: Negative.    Respiratory:  Negative for cough, chest tightness and shortness of breath.    Cardiovascular:  Negative for chest pain, palpitations and leg swelling.   Gastrointestinal:  Negative for abdominal pain, nausea and vomiting.        Some watery stool.  "  Genitourinary:         Unchanged urine frequency.  Feels he empties his bladder completely.   Neurological:  Negative for dizziness and headaches.   Psychiatric/Behavioral:  Negative for agitation, behavioral problems, confusion and decreased concentration.           Objective  /78 (BP Location: Left arm, Patient Position: Sitting, Cuff Size: Standard)   Pulse 78   Ht 5' 5.35\" (1.66 m)   Wt 75.3 kg (166 lb)   BMI 27.33 kg/m²      Physical Exam  Constitutional:       General: He is not in acute distress.     Appearance: He is not toxic-appearing.   HENT:      Head: Normocephalic and atraumatic.      Nose: Nose normal.      Mouth/Throat:      Mouth: Mucous membranes are moist.   Eyes:      General: No scleral icterus.     Extraocular Movements: Extraocular movements intact.   Cardiovascular:      Rate and Rhythm: Normal rate and regular rhythm.      Heart sounds:      No gallop.   Pulmonary:      Effort: Pulmonary effort is normal. No respiratory distress.      Breath sounds: No wheezing or rales.   Abdominal:      General: Bowel sounds are normal. There is no distension.      Palpations: Abdomen is soft.      Tenderness: There is no abdominal tenderness.   Neurological:      Mental Status: He is alert and oriented to person, place, and time. Mental status is at baseline.   Psychiatric:         Mood and Affect: Mood normal.         Behavior: Behavior normal.           "

## 2025-02-04 NOTE — ASSESSMENT & PLAN NOTE
The patient had outpatient labs and that revealed hyperkalemia.  I had him go to the hospital he was admitted.  On presentation his potassium was 6.6.  He was treated in the hospital and on discharge his potassium was 5.1.  He then had blood work done a few days after and it was up to 6.2.  I have initiated him on Lokelma and his potassium is 5.1.    He is on no potassium supplementation he does eat fruits so we discussed some potassium containing foods and we will continue to monitor.  He is having a little loose stool on Lokelma so I told him to take it every other day.    Will repeat a BMP in a few weeks to make sure the potassium is stable.      Orders:    Basic metabolic panel; Future    I asked him to call me if there is any problems or concerns before next visit.    I have spent a total time of 40 minutes in caring for this patient on the day of the visit/encounter including Diagnostic results, Instructions for management, Impressions, Reviewing / ordering tests, medicine, procedures  , and Obtaining or reviewing history  .

## 2025-02-04 NOTE — ASSESSMENT & PLAN NOTE
Lab Results   Component Value Date    EGFR 35 02/03/2025    EGFR 31 01/30/2025    EGFR 33 01/27/2025    CREATININE 1.81 (H) 02/03/2025    CREATININE 2.01 (H) 01/30/2025    CREATININE 1.88 (H) 01/27/2025     The patient has chronic kidney disease with lower levels of proteinuria and I suspect he could have chronic interstitial disease from lithium.  I cannot definitively say that and lithium is being used it is has a low dose low level and he absolutely is doing remarkable and this medication is very functional.  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    His latest creatinine is 1.8 there can be subtle fluctuations around 2 but overall the most recent is stable at his baseline.  So there is been no significant progression I would continue his current medications I would not consider changing his lithium unless his psychiatrist felt there was good medication and not to cause a decompensation but again I told the patient I am fine with him continuing this because he is doing so well I will just monitor his kidney function.    BMP prior to visit and then follow-up  Monitor electrolytes

## 2025-02-04 NOTE — PROGRESS NOTES
Name: Tonny Baig      : 1945      MRN: [id number]  Encounter Provider: Gilson Heller MD  Encounter Date: 2025   Encounter department: [facility] [place]  :  Assessment & Plan  Chronic kidney disease, unspecified CKD stage  Lab Results   Component Value Date    EGFR 35 2025    EGFR 31 2025    EGFR 33 2025    CREATININE 1.81 (H) 2025    CREATININE 2.01 (H) 2025    CREATININE 1.88 (H) 2025     The patient has chronic kidney disease with lower levels of proteinuria and I suspect he could have chronic interstitial disease from lithium.  I cannot definitively say that and lithium is being used it is has a low dose low level and he absolutely is doing remarkable and this medication is very functional.  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    His latest creatinine is 1.8 there can be subtle fluctuations around 2 but overall the most recent is stable at his baseline.  So there is been no significant progression I would continue his current medications I would not consider changing his lithium unless his psychiatrist felt there was good medication and not to cause a decompensation but again I told the patient I am fine with him continuing this because he is doing so well I will just monitor his kidney function.    BMP prior to visit and then follow-up  Monitor electrolytes         Hyperkalemia    The patient had outpatient labs and that revealed hyperkalemia.  I had him go to the hospital he was admitted.  On presentation his potassium was 6.6.  He was treated in the hospital and on discharge his potassium was 5.1.  He then had blood work done a few days after and it was up to 6.2.  I have initiated him on Lokelma and his potassium is 5.1.    He is on no potassium supplementation he does eat fruits so we discussed some potassium containing foods and we  "will continue to monitor.  He is having a little loose stool on Lokelma so I told him to take it every other day.    Will repeat a BMP in a few weeks to make sure the potassium is stable.      Orders:    Basic metabolic panel; Future    I asked him to call me if there is any problems or concerns before next visit.    I have spent a total time of 40 minutes in caring for this patient on the day of the visit/encounter including Diagnostic results, Instructions for management, Impressions, Reviewing / ordering tests, medicine, procedures  , and Obtaining or reviewing history  .       History of Present Illness   HPI  Tonny Baig is a 79 y.o. male who presents for routine follow-up.  The patient looks great says he has been doing well unfortunately his potassium was elevated and I referred him to the emergency room and he was admitted for treatment and management and then discharge.  Today he feels well is very gracious and talkative and in a good mood.  History obtained from: patient and his wife was present.    Review of Systems   Constitutional:  Negative for appetite change, chills and fever.   HENT: Negative.     Eyes: Negative.    Respiratory:  Negative for cough, chest tightness and shortness of breath.    Cardiovascular:  Negative for chest pain, palpitations and leg swelling.   Gastrointestinal:  Negative for abdominal pain, nausea and vomiting.        Some watery stool.   Genitourinary:         Unchanged urine frequency.  Feels he empties his bladder completely.   Neurological:  Negative for dizziness and headaches.   Psychiatric/Behavioral:  Negative for agitation, behavioral problems, confusion and decreased concentration.           Objective   /78 (BP Location: Left arm, Patient Position: Sitting, Cuff Size: Standard)   Pulse 78   Ht 5' 5.35\" (1.66 m)   Wt 75.3 kg (166 lb)   BMI 27.33 kg/m²      Physical Exam  Constitutional:       General: He is not in acute distress.     Appearance: He is " not toxic-appearing.   HENT:      Head: Normocephalic and atraumatic.      Nose: Nose normal.      Mouth/Throat:      Mouth: Mucous membranes are moist.   Eyes:      General: No scleral icterus.     Extraocular Movements: Extraocular movements intact.   Cardiovascular:      Rate and Rhythm: Normal rate and regular rhythm.      Heart sounds:      No gallop.   Pulmonary:      Effort: Pulmonary effort is normal. No respiratory distress.      Breath sounds: No wheezing or rales.   Abdominal:      General: Bowel sounds are normal. There is no distension.      Palpations: Abdomen is soft.      Tenderness: There is no abdominal tenderness.   Neurological:      Mental Status: He is alert and oriented to person, place, and time. Mental status is at baseline.   Psychiatric:         Mood and Affect: Mood normal.         Behavior: Behavior normal.

## 2025-02-10 ENCOUNTER — OFFICE VISIT (OUTPATIENT)
Dept: NEUROLOGY | Facility: CLINIC | Age: 80
End: 2025-02-10
Payer: MEDICARE

## 2025-02-10 VITALS
HEART RATE: 72 BPM | WEIGHT: 166.8 LBS | BODY MASS INDEX: 27.46 KG/M2 | TEMPERATURE: 97.7 F | SYSTOLIC BLOOD PRESSURE: 130 MMHG | DIASTOLIC BLOOD PRESSURE: 72 MMHG

## 2025-02-10 DIAGNOSIS — G20.C PARKINSONISM, UNSPECIFIED PARKINSONISM TYPE (HCC): Primary | ICD-10-CM

## 2025-02-10 PROCEDURE — 99214 OFFICE O/P EST MOD 30 MIN: CPT | Performed by: PSYCHIATRY & NEUROLOGY

## 2025-02-10 RX ORDER — MULTIVITAMIN WITH IRON
TABLET ORAL DAILY
COMMUNITY

## 2025-02-10 NOTE — PROGRESS NOTES
Name: Tonny Baig      : 1945      MRN: [id number]  Encounter Provider: Monse Mayer MD  Encounter Date: 2/10/2025   Encounter department: [facility] [place]  :  Assessment & Plan  Parkinsonism, unspecified Parkinsonism type (HCC)  Chronic multifactorial gait changes along with postural and action tremors present for years with later development of mild bradykinesia question of parkinsonism (medication-induced tremors and parkinsonism versus vascular parkinsonism given neuroimaging.) Very little if any in the form of progression over the years.  Tremor is not bothersome.  He has noted some increase in the amplitude of his rest tremor which is only on occasion.  Tremor of the right hand was briefly noted in the office.  No rigidity.    We once again discussed the option of undergoing further testing to rule out an underlying neurodegenerative condition such as Parkinson's disease.  Discussed skin biopsy looking for phosphorylated alpha synuclein.  We opted to not pursue at this point.  Will continue to monitor for any signs of progression.             History of Present Illness   Tonny Baig is a right handed man with bipolar disorder on Lithium, atrial fibrillation, cervical and lumbar radiculopathy and left trochanteric bursitis who presents for follow up for parkinsonism (neuroleptic/lithium induced versus vascular.)  Review of chart reveals a longstanding gait dysfunction felt to be multifactorial (cervical and lumbar degenerative disease, prior myelopathy and possible peripheral neuropathy and parkinsonism).  Also noted to have right hand rest tremor.      In previous visit we have review the option the options of starting a trial of low dose Sinemet to see if this would provide any benefit in regards to his gait and mobility. We discussed potential studies that can be used to distinguish between medication induced tremor, secondary parkinsonism and idiopathic  Parkinson's disease. Symptoms have been stable therefore we have continue to monitor his symptoms and he is opted out of a medication trial.    Currently having increased gait difficulty due to pain.  This started after his last left trochanteric bursa injection with orthopedics in December.  Previous injections have been helpful, but this worsened and he has trouble standing without being in pain.  He followed up with pain management for DEVON.  No change in gait.  No shuffling or freezing.  He continues to have bilateral action tremors which are unchanged.  They are not impacting daily functions.  He is able to perform ADLs independently.  He has noted perhaps an increase in the amplitude of his rest tremor in the right hand but still intermittently and rarely present.  Speech is unchanged.  No dysphagia.  No sleep difficulty.   No cognitive changes.     Imaging:  Brain MRI  -Chronic lacunar infarct left centrum semiovale.  Progressive cerebral volume loss and progressive white matter foci in periventricular regions compatible with chronic microangiopathic disease.      Review of Systems I have personally reviewed the MA's review of systems and made changes as necessary.         Objective   /72 (BP Location: Left arm, Patient Position: Standing, Cuff Size: Large)   Pulse 72   Temp 97.7 °F (36.5 °C) (Temporal)   Wt 75.7 kg (166 lb 12.8 oz)   BMI 27.46 kg/m²     Physical Exam  Vitals reviewed.   Eyes:      Extraocular Movements: Extraocular movements intact.   Neurological:      Mental Status: He is alert.      Motor: Motor strength is normal.  Psychiatric:         Speech: Speech normal.       Neurological Exam  Mental Status  Alert. Oriented only to person, place and situation. Speech is normal. Language is fluent with no aphasia. Attention and concentration are normal.    Cranial Nerves  CN III, IV, VI: Extraocular movements intact bilaterally.  CN VII: Full and symmetric facial movement.  CN VIII: Hearing  is normal.  CN IX, X: Palate elevates symmetrically  CN XI: Shoulder shrug strength is normal.  CN XII: Tongue midline without atrophy or fasciculations.    Motor   Normal muscle tone. Strength is 5/5 throughout all four extremities.    Coordination    See motor UPDRS.    Gait   Arose using hands..  Ambulates with walker.  No freezing or festination..                               Date of exam:  2/10/25        Speech  0   Facial Expression  1   Rigidity - Neck     Rigidity - Upper Extremity (Right)  0   Rigidity - Upper Extremity (Left)   0   Rigidity - Lower Extremity (Right)  0   Rigidity - Lower Extremity (Left)   0   Finger Taps (Right)   1   Finger Taps (Left)   1   Hand  (Right)  0   Hand  (Left)   2   Pronation/Supination (Right)  2   Pronation/Supination (Left)   2   Heel Taps (Right) 1   Heel Taps(Left) 1   Arising from Chair   2   Gait   3 walker, left hip pain    Postural Stability      Posture 2   Global spontaneity of movement 1   Postural Tremor (Right) 0   Postural Tremor (Left) 0   Kinetic Tremor (Right)  2   Kinetic Tremor (Left)  2   Rest tremor  RUE 2 brief     Rest tremor  LUE 0   Rest tremor  RLE 0   Reset tremor  LLE 0   Lip/Jaw Tremor  0   Motor Exam Total:            Administrative Statements   I have spent a total time of 30 minutes in caring for this patient on the day of the visit/encounter including Risks and benefits of tx options, Patient and family education, Impressions, Counseling / Coordination of care, Documenting in the medical record, and Obtaining or reviewing history  .

## 2025-02-10 NOTE — ASSESSMENT & PLAN NOTE
Chronic multifactorial gait changes along with postural and action tremors present for years with later development of mild bradykinesia question of parkinsonism (medication-induced tremors and parkinsonism versus vascular parkinsonism given neuroimaging.) Very little if any in the form of progression over the years.  Tremor is not bothersome.  He has noted some increase in the amplitude of his rest tremor which is only on occasion.  Tremor of the right hand was briefly noted in the office.  No rigidity.    We once again discussed the option of undergoing further testing to rule out an underlying neurodegenerative condition such as Parkinson's disease.  Discussed skin biopsy looking for phosphorylated alpha synuclein.  We opted to not pursue at this point.  Will continue to monitor for any signs of progression.

## 2025-02-10 NOTE — PROGRESS NOTES
Review of Systems   Constitutional:  Positive for fatigue. Negative for appetite change and fever.   HENT:  Negative for hearing loss, tinnitus, trouble swallowing and voice change.         At times has Dry Mouth     Eyes: Negative.  Negative for photophobia, pain and visual disturbance.   Respiratory: Negative.  Negative for shortness of breath.    Cardiovascular: Negative.  Negative for palpitations.   Gastrointestinal: Negative.  Negative for nausea and vomiting.        Abdominal cramps     Endocrine: Negative.  Negative for cold intolerance.   Genitourinary: Negative.  Negative for dysuria, frequency and urgency.   Musculoskeletal:  Positive for back pain, gait problem (Shuffles Feet at times), myalgias (Left Leg and Hip), neck pain and neck stiffness.        Balance Issues, stayed the same     Skin: Negative.  Negative for rash.   Allergic/Immunologic: Negative.    Neurological:  Positive for tremors (Minor Tremor in Right Hand and Arm) and speech difficulty (Mummbles). Negative for dizziness, seizures, syncope, facial asymmetry, weakness, light-headedness, numbness and headaches.   Hematological: Negative.  Does not bruise/bleed easily.   Psychiatric/Behavioral: Negative.  Negative for confusion, hallucinations and sleep disturbance.    All other systems reviewed and are negative.

## 2025-02-12 ENCOUNTER — HOSPITAL ENCOUNTER (OUTPATIENT)
Dept: RADIOLOGY | Facility: CLINIC | Age: 80
Discharge: HOME | End: 2025-02-12
Admitting: ANESTHESIOLOGY
Payer: MEDICARE

## 2025-02-12 VITALS
HEART RATE: 72 BPM | DIASTOLIC BLOOD PRESSURE: 91 MMHG | OXYGEN SATURATION: 99 % | TEMPERATURE: 97.5 F | SYSTOLIC BLOOD PRESSURE: 150 MMHG | RESPIRATION RATE: 16 BRPM

## 2025-02-12 DIAGNOSIS — M54.16 LUMBAR RADICULOPATHY: ICD-10-CM

## 2025-02-12 PROCEDURE — 62323 NJX INTERLAMINAR LMBR/SAC: CPT | Performed by: ANESTHESIOLOGY

## 2025-02-12 RX ORDER — METHYLPREDNISOLONE ACETATE 80 MG/ML
80 INJECTION, SUSPENSION INTRA-ARTICULAR; INTRALESIONAL; INTRAMUSCULAR; PARENTERAL; SOFT TISSUE ONCE
Status: COMPLETED | OUTPATIENT
Start: 2025-02-12 | End: 2025-02-12

## 2025-02-12 RX ADMIN — METHYLPREDNISOLONE ACETATE 80 MG: 80 INJECTION, SUSPENSION INTRA-ARTICULAR; INTRALESIONAL; INTRAMUSCULAR; SOFT TISSUE at 10:28

## 2025-02-12 RX ADMIN — IOHEXOL 1 ML: 300 INJECTION, SOLUTION INTRAVENOUS at 10:26

## 2025-02-12 NOTE — DISCHARGE INSTR - LAB
Epidural Steroid Injection   WHAT YOU NEED TO KNOW:   An epidural steroid injection (DEVON) is a procedure to inject steroid medicine into the epidural space. The epidural space is between your spinal cord and vertebrae. Steroids reduce inflammation and fluid buildup in your spine that may be causing pain. You may be given pain medicine along with the steroids.          ACTIVITY  Do not drive or operate machinery today.  No strenuous activity today - bending, lifting, etc.  You may resume normal activites starting tomorrow - start slowly and as tolerated.  You may shower today, but no tub baths or hot tubs.  You may have numbness for several hours from the local anesthetic. Please use caution and common sense, especially with weight-bearing activities.    CARE OF THE INJECTION SITE  If you have soreness or pain, apply ice to the area today (20 minutes on/20 minutes off).  Starting tomorrow, you may use warm, moist heat or ice if needed.  You may have an increase or change in your discomfort for 36-48 hours after your treatment.  Apply ice and continue with any pain medication you have been prescribed.  Notify the [facility] if you have any of the following: redness, drainage, swelling, headache, stiff neck or fever above 100°F.    SPECIAL INSTRUCTIONS  Our office will contact you in approximately 14 days for a progress report.    MEDICATIONS  Continue to take all routine medications.  Our office may have instructed you to hold some medications.    As no general anesthesia was used in today's procedure, you should not experience any side effects related to anesthesia.     If you are diabetic, the steroids used in today's injection may temporarily increase your blood sugar levels after the first few days after your injection. Please keep a close eye on your sugars and alert the doctor who manages your diabetes if your sugars are significantly high from your baseline or you are symptomatic.     If you have a  problem specifically related to your procedure, please call our office at [phone].  Problems not related to your procedure should be directed to your primary care physician.

## 2025-02-12 NOTE — H&P
History of Present Illness: The patient is a 79 y.o. male who presents with complaints of low back and leg pain.    Past Medical History:   Diagnosis Date    Allergic 2011    Allergic rhinitis 2015    Anxiety     occasional    Aortic aneurysm (HCC)     Benign colon polyp     Bipolar 1 disorder (HCC)     Cancer (HCC) 2007, 2011    Cardiac disease     MI    Cervical cord compression with myelopathy (HCC)     COPD (chronic obstructive pulmonary disease) (HCC)     Coronary artery disease 1995    Diverticulitis     Diverticulitis of colon prior to 2014    Diverticulosis     Emphysema of lung (HCC) 2012    Gait disturbance     uses cane, leg brace on right    GERD (gastroesophageal reflux disease)     Heart attack (HCC) 1996    Hx of resection of large bowel 05/03/2016    Hyperlipidemia     Hypertension     IBS (irritable bowel syndrome)     Inflammatory bowel disease 2012    Lumbar stenosis     Lung cancer (HCC)     2007 Left lower lobectomy and 2011 Right lung with surgery     Myocardial infarction (HCC)     involving other coronary artery    Prostate cancer (HCC)     Shortness of breath     Small bowel obstruction (HCC) 11/16/2016       Past Surgical History:   Procedure Laterality Date    ANGIOPLASTY      stent    CARDIAC CATHETERIZATION  1995    angioplasty    CARDIAC SURGERY      CERVICAL SPINE SURGERY      Cervical decompression with cervical fusion from C3-C7 for spinal stenosis.    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    LUNG LOBECTOMY Left     ID ARTHRD ANT INTERBODY MIN DSC CRV BELOW C2 N/A 05/02/2016     Procedure: Anterior cervical diskectomy C3/4, C5/6, C6/7 with anterior plate fixation fusion C3-7;  Posterior decompressive laminectomy C3-7 with lateral mass fixation fusion C3-7 (IMPULSE MONITORING);  Surgeon: Jose Luis Moore MD;  Location: [facility];  Service: Neurosurgery    IL ARTHRODESIS [facility]ER[facility]BODY [facility] LUM[facility] N/A 01/30/2017    Procedure: L4-5 AND L5-S1 DECOMPRESSIVE FORAMINOTOMIES, TRANSFORAMINAL LUMBAR INTERBODY AND PEDICLE SCREW FIXATION FUSION L4-S1 (IMPULSE);  Surgeon: Jose Luis Moore MD;  Location: [facility];  Service: Neurosurgery    IL EVASC RPR DPLMNT AORTO-AORTIC NDGFT N/A 08/06/2018    Procedure: REPAIR ANEURYSM ENDOVASCULAR ABDOMINAL AORTIC  (EVAR) WITH BILATERAL PERCUTANEOUS FEMORAL ACCESS WITH ULTRASOUND GUIDANCE ON THE RIGHT AND PRE CLOSURE;  Surgeon: Shan Villalobos MD;  Location: [facility];  Service: Vascular    PROSTATECTOMY  2007    for prostate CA - no chemo/RT    SIGMOIDECTOMY      for divertic    SMALL INTESTINE SURGERY N/A 11/17/2016    Procedure: Exploratory Laparotomy, Lysis of adhesions to release small bowel obstruction;  Surgeon: Aminta Sim MD;  Location: [facility];  Service:     SPINE SURGERY  2016, 2017    TONSILLECTOMY  Y    1950         Current Outpatient Medications:     acetaminophen (TYLENOL) 500 mg tablet, Take 500 mg by mouth as needed for mild pain., Disp: , Rfl:     albuterol (PROVENTIL HFA,VENTOLIN HFA) 90 mcg/act inhaler, USE 2 PUFFS BY MOUTH EVERY 6  HOURS AS NEEDED FOR WHEEZING OR  SHORTNESS OF BREATH, Disp: 26.8 g, Rfl: 12    amLODIPine (NORVASC) 5 mg tablet, Take 1 tablet (5 mg total) by mouth daily, Disp: 90 tablet, Rfl: 3    aspirin (ECOTRIN LOW STRENGTH) 81 mg EC tablet, Take 81 mg by mouth daily, Disp: , Rfl:     atorvastatin (LIPITOR) 40 mg tablet, TAKE 1 TABLET BY MOUTH DAILY, Disp: 90 tablet, Rfl: 1    Cholecalciferol (VITAMIN D PO), Take 2,000 Units by mouth daily  , Disp: , Rfl:     dicyclomine (BENTYL) 20 mg tablet, TAKE 1  TABLET BY MOUTH EVERY 6  HOURS AS NEEDED FOR ABDOMINAL  CRAMPING, Disp: 360 tablet, Rfl: 0    DULoxetine (CYMBALTA) 30 mg delayed release capsule, Take 1 capsule (30 mg total) by mouth daily, Disp: 90 capsule, Rfl: 1    fexofenadine (ALLEGRA) 180 MG tablet, Take 180 mg by mouth as needed Daily during the Summer, Disp: , Rfl:     fluticasone (FLONASE) 50 mcg/act nasal spray, 2 sprays into each nostril daily, Disp: , Rfl:     lithium carbonate (LITHOBID) 300 mg CR tablet, Take 300 mg by mouth daily at bedtime (Patient taking differently: Take 300 mg by mouth every other day), Disp: , Rfl:     Magnesium 250 MG TABS, Take by mouth in the morning, Disp: , Rfl:     magnesium chloride-calcium (SlowMag Mg Muscle/Heart) 71.5-119 mg, Take 1 tablet by mouth 2 (two) times a day (Patient not taking: Reported on 2/10/2025), Disp: 60 tablet, Rfl: 5    metoprolol succinate (TOPROL-XL) 25 mg 24 hr tablet, TAKE 1 TABLET BY MOUTH ONCE  DAILY, Disp: 90 tablet, Rfl: 3    nitroglycerin (NITRODUR) 0.2 mg/hr, APPLY 1 PATCH TOPICALLY ONCE  DAILY. LEAVE ON FOR 12 TO 14  HOURS THEN REMOVE FOR A  NITRATE-FREE INTERVAL OF 10 TO  12 HOURS (Patient taking differently: if needed), Disp: 90 patch, Rfl: 1    omega-3-acid ethyl esters (LOVAZA) 1 g capsule, TAKE 1 CAPSULE BY MOUTH 3 TIMES  DAILY, Disp: 270 capsule, Rfl: 1    omeprazole (PriLOSEC) 20 mg delayed release capsule, TAKE 1 CAPSULE BY MOUTH TWICE  DAILY BEFORE MEALS, Disp: 180 capsule, Rfl: 3    sodium bicarbonate 650 mg tablet, Take 1 tablet (650 mg total) by mouth 2 (two) times daily after meals (Patient not taking: Reported on 2/10/2025), Disp: 60 tablet, Rfl: 0    Sodium Zirconium Cyclosilicate (Lokelma) 10 g, Take 1 packet (10 g total) by mouth daily (Patient taking differently: Take 10 g by mouth every other day), Disp: 30 packet, Rfl: 5    Trelegy Ellipta 200-62.5-25 MCG/ACT AEPB inhaler, USE 1 INHALATION BY MOUTH DAILY, Disp: 180 each, Rfl: 3    triamcinolone (KENALOG) 0.1 % cream,  prn, Disp: , Rfl:     trospium chloride (SANCTURA) 20 mg tablet, Take 1 tablet (20 mg total) by mouth 2 (two) times a day (Patient not taking: Reported on 2/4/2025), Disp: 180 tablet, Rfl: 3    Allergies   Allergen Reactions    Bactrim [Sulfamethoxazole-Trimethoprim] Other (See Comments)     AILYN    Nsaids      Annotation - 20Nov2017: unable to take due to use of lithium.    Oxycodone Rash       Physical Exam:   Vitals:    02/12/25 1006   BP: 154/88   Pulse: 82   Resp: 16   Temp: 97.5 °F (36.4 °C)   SpO2: 98%     General: Awake, Alert, Oriented x 3, Mood and affect appropriate  Respiratory: Respirations even and unlabored  Cardiovascular: Peripheral pulses intact; no edema  Musculoskeletal Exam: antalgic gait    ASA Score: 3    Patient/Chart Verification  Patient ID Verified: Verbal  ID Band Applied: No  Consents Confirmed: To be obtained in the Procedural area  H&P( within 30 days) Verified: To be obtained in the Procedural area  Interval H&P(within 24 hr) Complete (required for Outpatients and Surgery Admit only): To be obtained in the Procedural area  Allergies Reviewed: Yes  Anticoag/NSAID held?: NA  Currently on antibiotics?: No    Assessment:   1. Lumbar radiculopathy        Plan: L3-4 LESI

## 2025-02-17 NOTE — PROGRESS NOTES
Vascular Surgery    Progress Note - Rosaura Bhatti 1945, 81 y o  male MRN: [id number]    Unit/Bed#: [facility] 529-01 Encounter: [id number]    Primary Care Provider: Sia Matthew MD   Date and time admitted to hospital: 8/6/2018  5:57 AM        * Aneurysm of infrarenal abdominal aorta [facility])   Assessment & Plan    AAA    ~S/P EVAR- 8/6    Plan:  --D/C [facility]  --D/C [place]  --Saline lock  --OOB/Amb  --Continue diet  --Pain contril  --Med/Surg  --D/C Home later today            Subjective:  Pt c/o crampy abdominal pain relieved with passing flatus    Vitals:  /55   Pulse 55   Temp 97 8 °F (36 6 °C) (Oral)   Resp 20   Ht 5' 7 5" (1 715 m)   Wt 82 1 kg (181 lb)   SpO2 95%   BMI 27 93 kg/m²     I/Os:  I/O last 3 completed shifts: In: 2857 5 [P O :480; I V :2327 5; IV Piggyback:50]  Out: 2325 [Urine:2325]  I/O this shift:  In: 200 [P O :200]  Out: 2925 [Urine:2925]    Lab Results and Cultures:   Lab Results   Component Value Date    WBC 11 26 (H) 08/07/2018    HGB 11 7 (L) 08/07/2018    HCT 35 1 (L) 08/07/2018    MCV 92 08/07/2018     08/07/2018     Lab Results   Component Value Date    GLUCOSE 105 08/07/2018    CALCIUM 8 8 08/07/2018     08/07/2018    K 4 7 08/07/2018    CO2 22 08/07/2018     (H) 08/07/2018    BUN 14 08/07/2018    CREATININE 1 00 08/07/2018     Lab Results   Component Value Date    INR 1 01 07/24/2018    INR 1 04 01/21/2018    INR 1 09 12/15/2016    PROTIME 13 4 07/24/2018    PROTIME 13 6 01/21/2018    PROTIME 14 2 12/15/2016        Blood Culture:   Lab Results   Component Value Date    BLOODCX No Growth After 5 Days  01/21/2018    BLOODCX No Growth After 5 Days   01/21/2018   ,   Urinalysis:   Lab Results   Component Value Date    COLORU Yellow 08/03/2018    COLORU Yellow 04/04/2015    CLARITYU Clear 08/03/2018    CLARITYU Clear 04/04/2015    SPECGRAV 1 015 08/03/2018    SPECGRAV 1 015 04/04/2015    PHUR 5 5 08/03/2018    PHUR 6 5 04/04/2015    LEUKOCYTESUR Negative 08/03/2018    LEUKOCYTESUR Negative 04/04/2015    NITRITE Negative 08/03/2018    NITRITE Negative 04/04/2015    PROTEINUA 100 (2+) (A) 08/03/2018    PROTEINUA 100 (2+) (A) 04/04/2015    GLUCOSEU Negative 08/03/2018    GLUCOSEU Negative 04/04/2015    KETONESU Negative 08/03/2018    KETONESU Negative 04/04/2015    BILIRUBINUR Negative 08/03/2018    BILIRUBINUR Negative 04/04/2015    BLOODU Trace (A) 08/03/2018    BLOODU Negative 04/04/2015   ,   Urine Culture:   Lab Results   Component Value Date    URINECX No Growth <1000 cfu/mL 11/14/2016   ,   Wound Culure: No results found for: WOUNDCULT    Medications:  Current Facility-Administered Medications   Medication Dose Route Frequency    acetaminophen (TYLENOL) tablet 650 mg  650 mg Oral Q6H PRN    albuterol (PROVENTIL HFA,VENTOLIN HFA) inhaler 2 puff  2 puff Inhalation Q6H PRN    amLODIPine (NORVASC) tablet 2 5 mg  2 5 mg Oral Daily    aspirin (ECOTRIN LOW STRENGTH) EC tablet 81 mg  81 mg Oral Daily    atorvastatin (LIPITOR) tablet 40 mg  40 mg Oral Daily    diazepam (VALIUM) tablet 5 mg  5 mg Oral Daily PRN    fluticasone (FLONASE) 50 mcg/act nasal spray 1 spray  1 spray Nasal Daily PRN    fluticasone-vilanterol (BREO ELLIPTA) 200-25 MCG/INH inhaler 1 puff  1 puff Inhalation Daily    heparin (porcine) subcutaneous injection 5,000 Units  5,000 Units Subcutaneous Q8H [address]    HYDROcodone-acetaminophen (NORCO) 5-325 mg per tablet 1 tablet  1 tablet Oral Q4H PRN    ipratropium (ATROVENT HFA) inhaler 2 puff  2 puff Inhalation Q6H    lithium carbonate (LITHOBID) CR tablet 600 mg  600 mg Oral Every Other Day    metoprolol succinate (TOPROL-XL) 24 hr tablet 25 mg  25 mg Oral Daily    pantoprazole (PROTONIX) EC tablet 40 mg  40 mg Oral Early Morning     Physical Exam:    General appearance: alert and oriented, in no acute distress  Lungs: clear to auscultation bilaterally  Heart: regular rate and rhythm, S1, S2 normal, no murmur, click, rub or gallop  Abdomen: soft, non-tender; bowel sounds normal; no masses,  no organomegaly  Extremities: extremities normal, warm and well-perfused; no cyanosis, clubbing, or edema    Wound/Incision:  B/L Groin incision clean, dry, and intact    Pulse exam:  DP: Right: 2+ Left: 2+        Jennifer Mcpherson PA-C  8/7/2018 (2) cough or sneeze

## 2025-02-19 ENCOUNTER — OFFICE VISIT (OUTPATIENT)
Dept: UROLOGY | Facility: CLINIC | Age: 80
End: 2025-02-19
Payer: MEDICARE

## 2025-02-19 VITALS
DIASTOLIC BLOOD PRESSURE: 80 MMHG | BODY MASS INDEX: 27.49 KG/M2 | SYSTOLIC BLOOD PRESSURE: 146 MMHG | HEART RATE: 44 BPM | HEIGHT: 65 IN | WEIGHT: 165 LBS | OXYGEN SATURATION: 100 %

## 2025-02-19 DIAGNOSIS — N28.1 KIDNEY CYST, ACQUIRED: Primary | ICD-10-CM

## 2025-02-19 DIAGNOSIS — N28.89 BILATERAL RENAL MASSES: ICD-10-CM

## 2025-02-19 PROCEDURE — 99213 OFFICE O/P EST LOW 20 MIN: CPT | Performed by: PHYSICIAN ASSISTANT

## 2025-02-19 NOTE — PROGRESS NOTES
UROLOGY PROGRESS NOTE   Patient Identifiers: Tonny Baig (MRN [id number])  Date of Service: 2/19/2025    Subjective:   [age]-year-old male history of prostate cancer.  He had a robotic prostatectomy in [place] in 2007.  His PSA remains undetectable.  He uses several pads per day and has urge incontinence.  He has tried Myrbetriq's which was too expensive and did not help and he also tried Tropium.  This was also not effective.  He had a kidney and bladder ultrasound in January which showed complicated cysts.  Sees nephrology for stage IIIb chronic kidney disease.  GFR around 35.  Has bilateral kidney cysts dating back to 2010.    Reason for visit: Prostate cancer/urgent continence/kidney cyst    Objective:     VITALS:    Vitals:    02/19/25 1036   BP: 146/80   Pulse: (!) 44   SpO2: 100%     AUA SYMPTOM SCORE      Flowsheet Row Most Recent Value   AUA SYMPTOM SCORE    How often have you had a sensation of not emptying your bladder completely after you finished urinating? 2 (P)     How often have you had to urinate again less than two hours after you finished urinating? 2 (P)     How often have you found you stopped and started again several times when you urinate? 1 (P)     How often have you found it difficult to postpone urination? 3 (P)     How often have you had a weak urinary stream? 1 (P)     How often have you had to push or strain to begin urination? 1 (P)     How many times did you most typically get up to urinate from the time you went to bed at night until the time you got up in the morning? 4 (P)     Quality of Life: If you were to spend the rest of your life with your urinary condition just the way it is now, how would you feel about that? 3 (P)     AUA SYMPTOM SCORE 14 (P)                LABS:  Lab Results   Component Value Date    HGB 12.1 01/22/2025    HCT 38.3 01/22/2025    WBC 10.13 01/22/2025     01/22/2025   ]    Lab Results   Component Value Date     10/19/2015    K 5.1  02/03/2025     02/03/2025    CO2 24 02/03/2025    BUN 38 (H) 02/03/2025    CREATININE 1.81 (H) 02/03/2025    CALCIUM 9.7 02/03/2025    GLUCOSE 100 01/21/2025   ]        INPATIENT MEDS:    Current Outpatient Medications:     acetaminophen (TYLENOL) 500 mg tablet, Take 500 mg by mouth as needed for mild pain., Disp: , Rfl:     albuterol (PROVENTIL HFA,VENTOLIN HFA) 90 mcg/act inhaler, USE 2 PUFFS BY MOUTH EVERY 6  HOURS AS NEEDED FOR WHEEZING OR  SHORTNESS OF BREATH, Disp: 26.8 g, Rfl: 12    amLODIPine (NORVASC) 5 mg tablet, Take 1 tablet (5 mg total) by mouth daily, Disp: 90 tablet, Rfl: 3    aspirin (ECOTRIN LOW STRENGTH) 81 mg EC tablet, Take 81 mg by mouth daily, Disp: , Rfl:     atorvastatin (LIPITOR) 40 mg tablet, TAKE 1 TABLET BY MOUTH DAILY, Disp: 90 tablet, Rfl: 1    Cholecalciferol (VITAMIN D PO), Take 2,000 Units by mouth daily  , Disp: , Rfl:     dicyclomine (BENTYL) 20 mg tablet, TAKE 1 TABLET BY MOUTH EVERY 6  HOURS AS NEEDED FOR ABDOMINAL  CRAMPING, Disp: 360 tablet, Rfl: 0    DULoxetine (CYMBALTA) 30 mg delayed release capsule, Take 1 capsule (30 mg total) by mouth daily, Disp: 90 capsule, Rfl: 1    fexofenadine (ALLEGRA) 180 MG tablet, Take 180 mg by mouth as needed Daily during the Summer, Disp: , Rfl:     fluticasone (FLONASE) 50 mcg/act nasal spray, 2 sprays into each nostril daily, Disp: , Rfl:     lithium carbonate (LITHOBID) 300 mg CR tablet, Take 300 mg by mouth daily at bedtime (Patient taking differently: Take 300 mg by mouth every other day), Disp: , Rfl:     Magnesium 250 MG TABS, Take by mouth in the morning, Disp: , Rfl:     metoprolol succinate (TOPROL-XL) 25 mg 24 hr tablet, TAKE 1 TABLET BY MOUTH ONCE  DAILY, Disp: 90 tablet, Rfl: 3    nitroglycerin (NITRODUR) 0.2 mg/hr, APPLY 1 PATCH TOPICALLY ONCE  DAILY. LEAVE ON FOR 12 TO 14  HOURS THEN REMOVE FOR A  NITRATE-FREE INTERVAL OF 10 TO  12 HOURS (Patient taking differently: if needed), Disp: 90 patch, Rfl: 1    omega-3-acid ethyl  "esters (LOVAZA) 1 g capsule, TAKE 1 CAPSULE BY MOUTH 3 TIMES  DAILY, Disp: 270 capsule, Rfl: 1    omeprazole (PriLOSEC) 20 mg delayed release capsule, TAKE 1 CAPSULE BY MOUTH TWICE  DAILY BEFORE MEALS, Disp: 180 capsule, Rfl: 3    Sodium Zirconium Cyclosilicate (Lokelma) 10 g, Take 1 packet (10 g total) by mouth daily (Patient taking differently: Take 10 g by mouth every other day), Disp: 30 packet, Rfl: 5    Trelegy Ellipta 200-62.5-25 MCG/ACT AEPB inhaler, USE 1 INHALATION BY MOUTH DAILY, Disp: 180 each, Rfl: 3    triamcinolone (KENALOG) 0.1 % cream, prn, Disp: , Rfl:     magnesium chloride-calcium (SlowMag Mg Muscle/Heart) 71.5-119 mg, Take 1 tablet by mouth 2 (two) times a day (Patient not taking: Reported on 2/10/2025), Disp: 60 tablet, Rfl: 5    sodium bicarbonate 650 mg tablet, Take 1 tablet (650 mg total) by mouth 2 (two) times daily after meals (Patient not taking: Reported on 2/19/2025), Disp: 60 tablet, Rfl: 0    trospium chloride (SANCTURA) 20 mg tablet, Take 1 tablet (20 mg total) by mouth 2 (two) times a day (Patient not taking: Reported on 2/4/2025), Disp: 180 tablet, Rfl: 3      Physical Exam:   /80 (BP Location: Left arm, Patient Position: Sitting, Cuff Size: Standard)   Pulse (!) 44   Ht 5' 5\" (1.651 m)   Wt 74.8 kg (165 lb)   SpO2 100%   BMI 27.46 kg/m²   GEN: no acute distress    RESP: breathing comfortably with no accessory muscle use    ABD: soft, non-tender, non-distended   INCISION:    EXT: no significant peripheral edema       RADIOLOGY:   IMPRESSION:     Suboptimal visualization of the left kidney due to overlying shadowing     A 2.9 cm right renal upper pole and 1.6 cm left renal lower pole hypoechoic lesions without internal color flow. Differential diagnoses include a complicated cyst or hypovascular renal neoplasm. Further assessment with nonemergent MRI abdomen with and   without contrast is recommended for better characterization. If the patient cannot get intravenous " contrast, MRI abdomen without contrast can be obtained for further assessment     A 6.2 cm thinly septated right renal upper pole cyst. This can also be better characterized on subsequent MRI examination.     No hydronephrosis or nephrolithiasis. Increased echogenicity of bilateral renal parenchyma, compatible with medical renal disease.        Assessment:   1.  Prostate cancer  #2.  Bilateral kidney cysts  #3.  Urge urinary incontinence    Plan:   -PSA remains undetectable with no signs of cancer recurrence  -I reviewed his imaging back to 2010.  Cysts were present albeit slightly smaller at that time.  No need for MRI now we will follow-up ultrasound in 6 months  -We talked about intravesical Botox for his urge incontinence  -I recommend he have an office cystoscopy first to make sure there is no bladder neck contracture from his prior prostate surgery.  He wishes to think about it and will consider

## 2025-02-26 ENCOUNTER — OFFICE VISIT (OUTPATIENT)
Dept: GASTROENTEROLOGY | Facility: MEDICAL CENTER | Age: 80
End: 2025-02-26
Payer: MEDICARE

## 2025-02-26 ENCOUNTER — TELEPHONE (OUTPATIENT)
Dept: PAIN MEDICINE | Facility: CLINIC | Age: 80
End: 2025-02-26

## 2025-02-26 VITALS
DIASTOLIC BLOOD PRESSURE: 97 MMHG | HEART RATE: 65 BPM | SYSTOLIC BLOOD PRESSURE: 165 MMHG | TEMPERATURE: 97.2 F | WEIGHT: 166.7 LBS | BODY MASS INDEX: 27.74 KG/M2

## 2025-02-26 DIAGNOSIS — R14.0 BLOATING: ICD-10-CM

## 2025-02-26 DIAGNOSIS — K59.1 FUNCTIONAL DIARRHEA: ICD-10-CM

## 2025-02-26 DIAGNOSIS — D12.6 ADENOMA OF COLON: Primary | ICD-10-CM

## 2025-02-26 DIAGNOSIS — K21.9 GASTROESOPHAGEAL REFLUX DISEASE WITHOUT ESOPHAGITIS: ICD-10-CM

## 2025-02-26 PROCEDURE — 99213 OFFICE O/P EST LOW 20 MIN: CPT | Performed by: INTERNAL MEDICINE

## 2025-02-26 NOTE — PROGRESS NOTES
Name: Tonny Baig      : 1945      MRN: [id number]  Encounter Provider: Jose Antonio Bhatti MD  Encounter Date: 2025   Encounter department: [facility]  :  Assessment & Plan  Adenoma of colon  Colonosocpy in         Gastroesophageal reflux disease without esophagitis  Omeprazole.   MVT - he will check with nephrology Dr. Heller.          Bloating  Activated charcoal   Gas -X        Functional diarrhea  Metamucil.   Perhaps related to the medication - Lokelma.   He will update us in 2 weeks.              History of Present Illness   Abdominal Pain  This is a recurrent problem. The current episode started more than 1 year ago. The onset quality is gradual. The problem occurs every several days. The most recent episode lasted 3 days. The problem has been gradually worsening. The pain is located in the epigastric region. The pain is at a severity of 7/10. The quality of the pain is aching, burning and cramping. The abdominal pain radiates to the epigastric region and chest. Associated symptoms include anorexia, belching, constipation, diarrhea and nausea. Nothing aggravates the pain. The pain is relieved by Activity, belching and passing flatus.     Tonny Baig is a 79 y.o. male who presents diarrhea       Last seen in .   He is having reflux intermittently.   He had high potassium - he is on Lokelma. He takes a packet every other day. He had diarrhea when on it and had to decrease the dose. He had constipation before.   He takes it and has diarrhea either that day or the next day.   He may go 1 - 2 times on that day then the stools are regular or hard.   He drinks about 64 - 80 oz / day.   No accidents.   No bleeding.   Appetite is okay.   Trying to lose weight.     Colonoscopy  :   4 polyps seen and removed. All were small.   Pancolonic diverticulosis.         Review of Systems   Constitutional: Negative.    HENT: Negative.     Eyes: Negative.     Respiratory: Negative.     Cardiovascular: Negative.    Gastrointestinal:  Positive for abdominal pain, anorexia, constipation, diarrhea and nausea.        See HPI   Endocrine: Negative.    Genitourinary: Negative.    Musculoskeletal: Negative.    Skin: Negative.    Allergic/Immunologic: Negative.    Neurological: Negative.    Hematological: Negative.    Psychiatric/Behavioral: Negative.     All other systems reviewed and are negative.         Objective   /97   Pulse 65   Temp (!) 97.2 °F (36.2 °C)   Wt 75.6 kg (166 lb 11.2 oz)   BMI 27.74 kg/m²      Physical Exam  Constitutional:       Appearance: Normal appearance. He is well-developed.   HENT:      Head: Normocephalic and atraumatic.   Eyes:      General: No scleral icterus.     Conjunctiva/sclera: Conjunctivae normal.      Pupils: Pupils are equal, round, and reactive to light.   Cardiovascular:      Rate and Rhythm: Normal rate and regular rhythm.      Heart sounds: Normal heart sounds.   Pulmonary:      Effort: Pulmonary effort is normal. No respiratory distress.      Breath sounds: Normal breath sounds.   Abdominal:      General: Bowel sounds are normal. There is no distension.      Palpations: Abdomen is soft. There is no mass.      Tenderness: There is no abdominal tenderness.      Hernia: No hernia is present.   Musculoskeletal:         General: Normal range of motion.      Cervical back: Normal range of motion.   Lymphadenopathy:      Cervical: No cervical adenopathy.   Skin:     General: Skin is warm.   Neurological:      Mental Status: He is alert and oriented to person, place, and time.   Psychiatric:         Behavior: Behavior normal.         Thought Content: Thought content normal.

## 2025-03-03 ENCOUNTER — OFFICE VISIT (OUTPATIENT)
Dept: INTERNAL MEDICINE CLINIC | Facility: CLINIC | Age: 80
End: 2025-03-03
Payer: MEDICARE

## 2025-03-03 VITALS
TEMPERATURE: 96.7 F | OXYGEN SATURATION: 100 % | SYSTOLIC BLOOD PRESSURE: 128 MMHG | HEART RATE: 50 BPM | BODY MASS INDEX: 27.66 KG/M2 | DIASTOLIC BLOOD PRESSURE: 70 MMHG | WEIGHT: 166.2 LBS

## 2025-03-03 DIAGNOSIS — I10 ESSENTIAL HYPERTENSION: ICD-10-CM

## 2025-03-03 DIAGNOSIS — J44.9 CHRONIC OBSTRUCTIVE PULMONARY DISEASE, UNSPECIFIED COPD TYPE (HCC): ICD-10-CM

## 2025-03-03 DIAGNOSIS — Z00.00 MEDICARE ANNUAL WELLNESS VISIT, SUBSEQUENT: Primary | ICD-10-CM

## 2025-03-03 DIAGNOSIS — E83.42 HYPOMAGNESEMIA: ICD-10-CM

## 2025-03-03 PROCEDURE — G0439 PPPS, SUBSEQ VISIT: HCPCS | Performed by: INTERNAL MEDICINE

## 2025-03-03 RX ORDER — FLUTICASONE FUROATE, UMECLIDINIUM BROMIDE AND VILANTEROL TRIFENATATE 200; 62.5; 25 UG/1; UG/1; UG/1
1 POWDER RESPIRATORY (INHALATION) DAILY
Qty: 180 EACH | Refills: 3 | Status: SHIPPED | OUTPATIENT
Start: 2025-03-03

## 2025-03-03 NOTE — PATIENT INSTRUCTIONS
Problem List Items Addressed This Visit          Other    Medicare annual wellness visit, subsequent - Primary    Discussed preventative health, cancer screening, immunizations, and safety issues.  Last colonoscopy done February 2023 with recommendations to recheck in 5 years.  Patient had Prevnar 20.  Patient had the flu shot this season.    I recommend getting the Shingrix shot to help prevent Shingles.  You can get it a pharmacy, and they can administer it there.  It is a two shot series with the second shot needed between 2-6 months after the first shot.  I would not recommend getting the shot before an important or fun event in case you were to have a reaction to the shot like a sore arm or flu-like symptoms.  I make the same recommendation about any shot, as people can have a reaction to any shot.         Hypomagnesemia    Relevant Orders    Magnesium       Medicare Preventive Visit Patient Instructions  Thank you for completing your Welcome to Medicare Visit or Medicare Annual Wellness Visit today. Your next wellness visit will be due in one year (3/4/2026).  The screening/preventive services that you may require over the next 5-10 years are detailed below. Some tests may not apply to you based off risk factors and/or age. Screening tests ordered at today's visit but not completed yet may show as past due. Also, please note that scanned in results may not display below.  Preventive Screenings:  Service Recommendations Previous Testing/Comments   Colorectal Cancer Screening  Colonoscopy    Fecal Occult Blood Test (FOBT)/Fecal Immunochemical Test (FIT)  Fecal DNA/Cologuard Test  Flexible Sigmoidoscopy Age: 45-75 years old   Colonoscopy: every 10 years (May be performed more frequently if at higher risk)  OR  FOBT/FIT: every 1 year  OR  Cologuard: every 3 years  OR  Sigmoidoscopy: every 5 years  Screening may be recommended earlier than age 45 if at higher risk for colorectal cancer. Also, an individualized  decision between you and your healthcare provider will decide whether screening between the ages of 76-85 would be appropriate. Colonoscopy: 02/23/2023  FOBT/FIT: Not on file  Cologuard: Not on file  Sigmoidoscopy: Not on file          Prostate Cancer Screening Individualized decision between patient and health care provider in men between ages of 55-69   Medicare will cover every 12 months beginning on the day after your 50th birthday PSA: <0.008 ng/mL           Hepatitis C Screening Once for adults born between 1945 and 1965  More frequently in patients at high risk for Hepatitis C Hep C Antibody: 06/07/2017        Diabetes Screening 1-2 times per year if you're at risk for diabetes or have pre-diabetes Fasting glucose: 101 mg/dL (2/3/2025)  A1C: 5.5 % (1/21/2025)      Cholesterol Screening Once every 5 years if you don't have a lipid disorder. May order more often based on risk factors. Lipid panel: 01/21/2025         Other Preventive Screenings Covered by Medicare:  Abdominal Aortic Aneurysm (AAA) Screening: covered once if your at risk. You're considered to be at risk if you have a family history of AAA or a male between the age of 65-75 who smoking at least 100 cigarettes in your lifetime.  Lung Cancer Screening: covers low dose CT scan once per year if you meet all of the following conditions: (1) Age 55-77; (2) No signs or symptoms of lung cancer; (3) Current smoker or have quit smoking within the last 15 years; (4) You have a tobacco smoking history of at least 20 pack years (packs per day x number of years you smoked); (5) You get a written order from a healthcare provider.  Glaucoma Screening: covered annually if you're considered high risk: (1) You have diabetes OR (2) Family history of glaucoma OR (3)  aged 50 and older OR (4)  American aged 65 and older  Osteoporosis Screening: covered every 2 years if you meet one of the following conditions: (1) Have a vertebral abnormality;  (2) On glucocorticoid therapy for more than 3 months; (3) Have primary hyperparathyroidism; (4) On osteoporosis medications and need to assess response to drug therapy.  HIV Screening: covered annually if you're between the age of 15-65. Also covered annually if you are younger than 15 and older than 65 with risk factors for HIV infection. For pregnant patients, it is covered up to 3 times per pregnancy.    Immunizations:  Immunization Recommendations   Influenza Vaccine Annual influenza vaccination during flu season is recommended for all persons aged >= 6 months who do not have contraindications   Pneumococcal Vaccine   * Pneumococcal conjugate vaccine = PCV13 (Prevnar 13), PCV15 (Vaxneuvance), PCV20 (Prevnar 20)  * Pneumococcal polysaccharide vaccine = PPSV23 (Pneumovax) Adults 19-65 yo with certain risk factors or if 65+ yo  If never received any pneumonia vaccine: recommend Prevnar 20 (PCV20)  Give PCV20 if previously received 1 dose of PCV13 or PPSV23   Hepatitis B Vaccine 3 dose series if at intermediate or high risk (ex: diabetes, end stage renal disease, liver disease)   Respiratory syncytial virus (RSV) Vaccine - COVERED BY MEDICARE PART D  * RSVPreF3 (Arexvy) CDC recommends that adults 60 years of age and older may receive a single dose of RSV vaccine using shared clinical decision-making (SCDM)   Tetanus (Td) Vaccine - COST NOT COVERED BY MEDICARE PART B Following completion of primary series, a booster dose should be given every 10 years to maintain immunity against tetanus. Td may also be given as tetanus wound prophylaxis.   Tdap Vaccine - COST NOT COVERED BY MEDICARE PART B Recommended at least once for all adults. For pregnant patients, recommended with each pregnancy.   Shingles Vaccine (Shingrix) - COST NOT COVERED BY MEDICARE PART B  2 shot series recommended in those 19 years and older who have or will have weakened immune systems or those 50 years and older     Health Maintenance Due:       Topic Date Due    Colorectal Cancer Screening  02/22/2028    Hepatitis C Screening  Completed     Immunizations Due:  There are no preventive care reminders to display for this patient.  Advance Directives   What are advance directives?  Advance directives are legal documents that state your wishes and plans for medical care. These plans are made ahead of time in case you lose your ability to make decisions for yourself. Advance directives can apply to any medical decision, such as the treatments you want, and if you want to donate organs.   What are the types of advance directives?  There are many types of advance directives, and each state has rules about how to use them. You may choose a combination of any of the following:  Living will:  This is a written record of the treatment you want. You can also choose which treatments you do not want, which to limit, and which to stop at a certain time. This includes surgery, medicine, IV fluid, and tube feedings.   Durable power of  for healthcare (DPAHC):  This is a written record that states who you want to make healthcare choices for you when you are unable to make them for yourself. This person, called a proxy, is usually a family member or a friend. You may choose more than 1 proxy.  Do not resuscitate (DNR) order:  A DNR order is used in case your heart stops beating or you stop breathing. It is a request not to have certain forms of treatment, such as CPR. A DNR order may be included in other types of advance directives.  Medical directive:  This covers the care that you want if you are in a coma, near death, or unable to make decisions for yourself. You can list the treatments you want for each condition. Treatment may include pain medicine, surgery, blood transfusions, dialysis, IV or tube feedings, and a ventilator (breathing machine).  Values history:  This document has questions about your views, beliefs, and how you feel and think about life. This  information can help others choose the care that you would choose.  Why are advance directives important?  An advance directive helps you control your care. Although spoken wishes may be used, it is better to have your wishes written down. Spoken wishes can be misunderstood, or not followed. Treatments may be given even if you do not want them. An advance directive may make it easier for your family to make difficult choices about your care.   Weight Management   Why it is important to manage your weight:  Being overweight increases your risk of health conditions such as heart disease, high blood pressure, type 2 diabetes, and certain types of cancer. It can also increase your risk for osteoarthritis, sleep apnea, and other respiratory problems. Aim for a slow, steady weight loss. Even a small amount of weight loss can lower your risk of health problems.  How to lose weight safely:  A safe and healthy way to lose weight is to eat fewer calories and get regular exercise. You can lose up about 1 pound a week by decreasing the number of calories you eat by 500 calories each day.   Healthy meal plan for weight management:  A healthy meal plan includes a variety of foods, contains fewer calories, and helps you stay healthy. A healthy meal plan includes the following:  Eat whole-grain foods more often.  A healthy meal plan should contain fiber. Fiber is the part of grains, fruits, and vegetables that is not broken down by your body. Whole-grain foods are healthy and provide extra fiber in your diet. Some examples of whole-grain foods are whole-wheat breads and pastas, oatmeal, brown rice, and bulgur.  Eat a variety of vegetables every day.  Include dark, leafy greens such as spinach, kale, nya greens, and mustard greens. Eat yellow and orange vegetables such as carrots, sweet potatoes, and winter squash.   Eat a variety of fruits every day.  Choose fresh or canned fruit (canned in its own juice or light syrup) instead  of juice. Fruit juice has very little or no fiber.  Eat low-fat dairy foods.  Drink fat-free (skim) milk or 1% milk. Eat fat-free yogurt and low-fat cottage cheese. Try low-fat cheeses such as mozzarella and other reduced-fat cheeses.  Choose meat and other protein foods that are low in fat.  Choose beans or other legumes such as split peas or lentils. Choose fish, skinless poultry (chicken or turkey), or lean cuts of red meat (beef or pork). Before you cook meat or poultry, cut off any visible fat.   Use less fat and oil.  Try baking foods instead of frying them. Add less fat, such as margarine, sour cream, regular salad dressing and mayonnaise to foods. Eat fewer high-fat foods. Some examples of high-fat foods include french fries, doughnuts, ice cream, and cakes.  Eat fewer sweets.  Limit foods and drinks that are high in sugar. This includes candy, cookies, regular soda, and sweetened drinks.  Exercise:  Exercise at least 30 minutes per day on most days of the week. Some examples of exercise include walking, biking, dancing, and swimming. You can also fit in more physical activity by taking the stairs instead of the elevator or parking farther away from stores. Ask your healthcare provider about the best exercise plan for you.      © Copyright [employer] 2018 Information is for End User's use only and may not be sold, redistributed or otherwise used for commercial purposes. All illustrations and images included in CareNotes® are the copyrighted property of [employer] or [employer]

## 2025-03-03 NOTE — TELEPHONE ENCOUNTER
Requested medication(s) are due for refill today: Yes  Patient has already received a courtesy refill: No  Other reason request has been forwarded to provider: Protocol failed. Please advise

## 2025-03-03 NOTE — PROGRESS NOTES
Name: Tonny Baig      : 1945      MRN: [id number]  Encounter Provider: Brendan Suarez MD  Encounter Date: 3/3/2025   Encounter department: [facility]  Medicare annual wellness visit, subsequent  Discussed preventative health, cancer screening, immunizations, and safety issues.  Last colonoscopy done 2023 with recommendations to recheck in 5 years.  Patient had Prevnar 20.  Patient had the flu shot this season.    I recommend getting the Shingrix shot to help prevent Shingles.  You can get it a pharmacy, and they can administer it there.  It is a two shot series with the second shot needed between 2-6 months after the first shot.  I would not recommend getting the shot before an important or fun event in case you were to have a reaction to the shot like a sore arm or flu-like symptoms.  I make the same recommendation about any shot, as people can have a reaction to any shot.       Hypomagnesemia    Orders:  •  Magnesium; Future       Preventive health issues were discussed with patient, and age appropriate screening tests were ordered as noted in patient's After Visit Summary. Personalized health advice and appropriate referrals for health education or preventive services given if needed, as noted in patient's After Visit Summary.    History of Present Illness     Patient here for annual wellness visit       Patient Care Team:  Brendan Suarez MD as PCP - General  MD Jose Antonio Garnett MD Samuel Giamber, MD Mohamed Abdulhafid Turki, MD Camille Eyvazzadeh, MD David Shields, MD Daniel Kevin O'Rourke, MD Adam Dratch, MD (Nephrology)  Kasey David PA-C (Gastroenterology)  ELSA Garcia as Nurse Practitioner (Gastroenterology)    Review of Systems   Constitutional:  Negative for chills and fever.   Respiratory:  Positive for shortness of breath (chronic, stable).    Cardiovascular:  Negative for chest pain.      Medical History Reviewed by provider this encounter:  Tobacco  Allergies  Meds  Problems  Med Hx  Surg Hx  Fam Hx       Annual Wellness Visit Questionnaire   Tonny is here for his Subsequent Wellness visit.     Health Risk Assessment:   Patient rates overall health as good. Patient feels that their physical health rating is same. Patient is very satisfied with their life. Eyesight was rated as same. Hearing was rated as same. Patient feels that their emotional and mental health rating is slightly better. Patients states they are never, rarely angry. Patient states they are sometimes unusually tired/fatigued. Pain experienced in the last 7 days has been a lot. Patient's pain rating has been 7/10. Patient states that he has experienced no weight loss or gain in last 6 months.     Depression Screening:   PHQ-2 Score: 0      Fall Risk Screening:   In the past year, patient has experienced: no history of falling in past year      Home Safety:  Patient does not have trouble with stairs inside or outside of their home. Patient has working smoke alarms and has working carbon monoxide detector. Home safety hazards include: none.     Nutrition:   Current diet is Regular.     Medications:   Patient is not currently taking any over-the-counter supplements. Patient is able to manage medications.     Activities of Daily Living (ADLs)/Instrumental Activities of Daily Living (IADLs):   Walk and transfer into and out of bed and chair?: Yes  Dress and groom yourself?: Yes    Bathe or shower yourself?: Yes    Feed yourself? Yes  Do your laundry/housekeeping?: Yes  Manage your money, pay your bills and track your expenses?: Yes  Make your own meals?: Yes    Do your own shopping?: No    Durable Medical Equipment Suppliers  walker/shower chair    Previous Hospitalizations:   Any hospitalizations or ED visits within the last 12 months?: Yes    How many hospitalizations have you had in the last year?: 1-2    Advance Care  Planning:   Living will: Yes    Durable POA for healthcare: Yes    Advanced directive: Yes    Advanced directive counseling given: Yes      Cognitive Screening:   Provider or family/friend/caregiver concerned regarding cognition?: No    PREVENTIVE SCREENINGS      Cardiovascular Screening:    General: Screening Not Indicated, History Lipid Disorder, Risks and Benefits Discussed and Screening Current      Diabetes Screening:     General: Screening Current and Risks and Benefits Discussed      Colorectal Cancer Screening:     General: Screening Current and Risks and Benefits Discussed      Prostate Cancer Screening:    General: History Prostate Cancer, Screening Not Indicated and Risks and Benefits Discussed      Abdominal Aortic Aneurysm (AAA) Screening:    Risk factors include: tobacco use        General: Screening Not Indicated, History AAA and Risks and Benefits Discussed      Lung Cancer Screening:     General: Screening Not Indicated, History Lung Cancer and Risks and Benefits Discussed      Hepatitis C Screening:    General: Screening Current and Risks and Benefits Discussed    Screening, Brief Intervention, and Referral to Treatment (SBIRT)     Screening  Typical number of drinks in a day: 1  Typical number of drinks in a week: 7  Interpretation: Low risk drinking behavior.    AUDIT-C Screenin) How often did you have a drink containing alcohol in the past year? 4 or more times a week  2) How many drinks did you have on a typical day when you were drinking in the past year? 1 to 2  3) How often did you have 6 or more drinks on one occasion in the past year? never    AUDIT-C Score: 4  Interpretation: Score 4-12 (male): POSITIVE screen for alcohol misuse    AUDIT Screenin) How often during the last year have you found that you were not able to stop drinking once you had started? 0 - never  5) How often during the last year have you failed to do what was normally expected from you because of drinking? 0 -  never  6) How often during the last year have you needed a first drink in the morning to get yourself going after a heavy drinking session? 0 - never  7) How often during the last year have you had a feeling of guilt or remorse after drinking? 0 - never  8) How often during the last year have you been unable to remember what happened the night before because you had been drinking? 0 - never  9) Have you or someone else been injured as a result of your drinking? 0 - no  10) Has a relative or friend or a doctor or another health worker been concerned about your drinking or suggested you cut down? 0 - no    AUDIT Score: 4  Interpretation: Low risk alcohol consumption    Single Item Drug Screening:  How often have you used an illegal drug (including marijuana) or a prescription medication for non-medical reasons in the past year? never    Single Item Drug Screen Score: 0  Interpretation: Negative screen for possible drug use disorder    Brief Intervention  Alcohol & drug use screenings were reviewed. No concerns regarding substance use disorder identified.     Other Counseling Topics:   Car/seat belt/driving safety, skin self-exam and sunscreen.     Social Drivers of Health     Financial Resource Strain: Low Risk  (12/29/2023)    Overall Financial Resource Strain (CARDI)    • Difficulty of Paying Living Expenses: Not hard at all   Food Insecurity: No Food Insecurity (2/26/2025)    Hunger Vital Sign    • Worried About Running Out of Food in the Last Year: Never true    • Ran Out of Food in the Last Year: Never true   Transportation Needs: No Transportation Needs (2/26/2025)    PRAPARE - Transportation    • Lack of Transportation (Medical): No    • Lack of Transportation (Non-Medical): No   Housing Stability: Low Risk  (2/26/2025)    Housing Stability Vital Sign    • Unable to Pay for Housing in the Last Year: No    • Number of Times Moved in the Last Year: 0    • Homeless in the Last Year: No   Utilities: Not At Risk  (2/26/2025)    [facility] Utilities    • Threatened with loss of utilities: No     No results found.    Objective   /70   Pulse (!) 50   Temp (!) 96.7 °F (35.9 °C) (Tympanic)   Wt 75.4 kg (166 lb 3.2 oz)   SpO2 100%   BMI 27.66 kg/m²     Physical Exam  Cardiovascular:      Rate and Rhythm: Normal rate and regular rhythm.      Heart sounds: Normal heart sounds. No murmur heard.  Pulmonary:      Effort: Pulmonary effort is normal. No respiratory distress.      Breath sounds: No stridor. Rales present. No wheezing or rhonchi.

## 2025-03-03 NOTE — ASSESSMENT & PLAN NOTE
Discussed preventative health, cancer screening, immunizations, and safety issues.  Last colonoscopy done February 2023 with recommendations to recheck in 5 years.  Patient had Prevnar 20.  Patient had the flu shot this season.    I recommend getting the Shingrix shot to help prevent Shingles.  You can get it a pharmacy, and they can administer it there.  It is a two shot series with the second shot needed between 2-6 months after the first shot.  I would not recommend getting the shot before an important or fun event in case you were to have a reaction to the shot like a sore arm or flu-like symptoms.  I make the same recommendation about any shot, as people can have a reaction to any shot.

## 2025-03-04 RX ORDER — METOPROLOL SUCCINATE 25 MG/1
25 TABLET, EXTENDED RELEASE ORAL DAILY
Qty: 90 TABLET | Refills: 1 | Status: SHIPPED | OUTPATIENT
Start: 2025-03-04

## 2025-03-04 NOTE — PROGRESS NOTES
Assessment:  1. Lumbar radiculopathy    2. Degeneration of intervertebral disc of lumbar region with discogenic back pain and lower extremity pain    3. Spinal stenosis of lumbar region with neurogenic claudication    4. Spondylolisthesis of lumbar region    5. Foraminal stenosis of lumbar region        Plan:  Patient may continue duloxetine as prescribed.  This medication was refilled today.  Would not increase further secondary to the patient being on lithium and because of his creatinine clearance/CKD  We can repeat L3-4 LESI as needed  Patient may continue Tylenol as needed  Patient will follow-up after his procedure that is currently scheduled (left GTB) or sooner if needed    History of Present Illness:    The patient is a 79 y.o. male with a history of an L4 S1 fusion last seen on 01/16/2025  who presents for a follow up office visit in regards to chronic low back pain that radiates into the medial and lateral aspect of the left lower extremity with associated paresthesias.  He denies bowel or bladder incontinence or saddle anesthesia.  Patient is status post L3-4 LESI February 12, 2025 with 50% improvement of his pain.  He does feel like the pain is already starting to return.  He continues on duloxetine 30 mg daily and Tylenol as needed.  He is scheduled for a left GTB injection in the next few weeks.    The patient rates his pain a 5 out of 10 on the numeric pain rating scale.  Pain is constant and described as burning, sharp, throbbing and shooting    I have personally reviewed and/or updated the patient's past medical history, past surgical history, family history, social history, current medications, allergies, and vital signs today.       Review of Systems:    Review of Systems   Respiratory:  Negative for shortness of breath.    Cardiovascular:  Negative for chest pain.   Gastrointestinal:  Negative for constipation, diarrhea, nausea and vomiting.   Musculoskeletal:  Positive for back pain and gait  problem. Negative for arthralgias, joint swelling and myalgias.   Skin:  Negative for rash.   Neurological:  Negative for dizziness, seizures and weakness.   All other systems reviewed and are negative.        Past Medical History:   Diagnosis Date    Allergic 2011    Allergic rhinitis 2015    Anxiety     occasional    Aortic aneurysm (HCC)     Benign colon polyp     Bipolar 1 disorder (HCC)     Cancer (HCC) 2007, 2011    Cardiac disease     MI    Cervical cord compression with myelopathy (HCC)     COPD (chronic obstructive pulmonary disease) (HCC)     Coronary artery disease 1995    Diverticulitis     Diverticulitis of colon prior to 2014    Diverticulosis     Emphysema of lung (HCC) 2012    Gait disturbance     uses cane, leg brace on right    GERD (gastroesophageal reflux disease)     Heart attack (HCC) 1996    Hx of resection of large bowel 05/03/2016    Hyperlipidemia     Hypertension     IBS (irritable bowel syndrome)     Inflammatory bowel disease 2012    Lumbar stenosis     Lung cancer (HCC)     2007 Left lower lobectomy and 2011 Right lung with surgery     Myocardial infarction (HCC)     involving other coronary artery    Prostate cancer (HCC)     Shortness of breath     Small bowel obstruction (HCC) 11/16/2016       Past Surgical History:   Procedure Laterality Date    ANGIOPLASTY      stent    CARDIAC CATHETERIZATION  1995    angioplasty    CARDIAC SURGERY      CERVICAL SPINE SURGERY      Cervical decompression with cervical fusion from C3-C7 for spinal stenosis.    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    LUNG LOBECTOMY Left     RI ARTHRD ANT INTERBODY MIN DSC CRV BELOW C2 N/A 2016    Procedure: Anterior cervical diskectomy C3/4, C5/6, C6/7 with anterior plate fixation fusion C3-7;  Posterior decompressive laminectomy C3-7 with lateral mass fixation fusion C3-7 (IMPULSE MONITORING);  Surgeon: Jose Luis Moore MD;  Location: [facility];  Service: Neurosurgery    [facility]    Procedure: L4-5 AND L5-S1 DECOMPRESSIVE FORAMINOTOMIES, TRANSFORAMINAL LUMBAR INTERBODY AND PEDICLE SCREW FIXATION FUSION L4-S1 (IMPULSE);  Surgeon: Jose Luis Moore MD;  Location: [facility];  Service: Neurosurgery    RI EVASC RPR DPLMNT AORTO-AORTIC NDGFT N/A 2018    Procedure: REPAIR ANEURYSM ENDOVASCULAR ABDOMINAL AORTIC  (EVAR) WITH BILATERAL PERCUTANEOUS FEMORAL ACCESS WITH ULTRASOUND GUIDANCE ON THE RIGHT AND PRE CLOSURE;  Surgeon: Shan Villalobos MD;  Location: [facility];  Service: Vascular    PROSTATECTOMY      for prostate CA - no chemo/RT    SIGMOIDECTOMY      for divertic    SMALL INTESTINE SURGERY N/A 2016    Procedure: Exploratory Laparotomy, Lysis of adhesions to release small bowel obstruction;  Surgeon: Aminta Sim MD;  Location: [facility];  Service:     SPINE SURGERY  2017    TONSILLECTOMY  Y    1952       Family History   Problem Relation Age of Onset    Hypertension Mother     Glaucoma Mother     Heart attack Father     Colon cancer Father     Coronary artery disease Father     Hypertension Father     Kidney disease Father     Heart disease Family     Hyperlipidemia Family     Hypertension Family        Social History     Occupational History    Occupation: Retired   Tobacco Use    Smoking status: Former     Current packs/day: 0.00     Average packs/day: 1 pack/day for 44.0 years (44.0 ttl pk-yrs)     Types: Cigarettes     Start date: 1967     Quit date: 2011     Years since quittin.1     Passive exposure:  Past    Smokeless tobacco: Never   Vaping Use    Vaping status: Never Used   Substance and Sexual Activity    Alcohol use: Yes     Alcohol/week: 2.0 - 3.0 standard drinks of alcohol     Types: 2 - 3 Shots of liquor per week     Comment: One glass per day    Drug use: No    Sexual activity: Not Currently     Partners: Female     Birth control/protection: Post-menopausal, None         Current Outpatient Medications:     acetaminophen (TYLENOL) 500 mg tablet, Take 500 mg by mouth as needed for mild pain., Disp: , Rfl:     albuterol (PROVENTIL HFA,VENTOLIN HFA) 90 mcg/act inhaler, USE 2 PUFFS BY MOUTH EVERY 6  HOURS AS NEEDED FOR WHEEZING OR  SHORTNESS OF BREATH, Disp: 26.8 g, Rfl: 12    amLODIPine (NORVASC) 5 mg tablet, Take 1 tablet (5 mg total) by mouth daily, Disp: 90 tablet, Rfl: 3    aspirin (ECOTRIN LOW STRENGTH) 81 mg EC tablet, Take 81 mg by mouth daily, Disp: , Rfl:     atorvastatin (LIPITOR) 40 mg tablet, TAKE 1 TABLET BY MOUTH DAILY, Disp: 90 tablet, Rfl: 1    Cholecalciferol (VITAMIN D PO), Take 2,000 Units by mouth daily  , Disp: , Rfl:     dicyclomine (BENTYL) 20 mg tablet, TAKE 1 TABLET BY MOUTH EVERY 6  HOURS AS NEEDED FOR ABDOMINAL  CRAMPING, Disp: 360 tablet, Rfl: 0    DULoxetine (CYMBALTA) 30 mg delayed release capsule, Take 1 capsule (30 mg total) by mouth daily, Disp: 90 capsule, Rfl: 1    fexofenadine (ALLEGRA) 180 MG tablet, Take 180 mg by mouth as needed Daily during the Summer, Disp: , Rfl:     fluticasone (FLONASE) 50 mcg/act nasal spray, 2 sprays into each nostril daily, Disp: , Rfl:     lithium carbonate (LITHOBID) 300 mg CR tablet, Take 300 mg by mouth daily at bedtime, Disp: , Rfl:     Magnesium 250 MG TABS, Take by mouth in the morning, Disp: , Rfl:     metoprolol succinate (TOPROL-XL) 25 mg 24 hr tablet, TAKE 1 TABLET BY MOUTH ONCE  DAILY, Disp: 90 tablet, Rfl: 1    nitroglycerin (NITRODUR) 0.2 mg/hr, APPLY 1 PATCH TOPICALLY ONCE  DAILY. LEAVE ON FOR 12 TO 14  HOURS THEN REMOVE FOR A   "NITRATE-FREE INTERVAL OF 10 TO  12 HOURS, Disp: 90 patch, Rfl: 1    omega-3-acid ethyl esters (LOVAZA) 1 g capsule, TAKE 1 CAPSULE BY MOUTH 3 TIMES  DAILY, Disp: 270 capsule, Rfl: 1    omeprazole (PriLOSEC) 20 mg delayed release capsule, TAKE 1 CAPSULE BY MOUTH TWICE  DAILY BEFORE MEALS, Disp: 180 capsule, Rfl: 3    Sodium Zirconium Cyclosilicate (Lokelma) 10 g, Take 1 packet (10 g total) by mouth daily, Disp: 30 packet, Rfl: 5    Trelegy Ellipta 200-62.5-25 MCG/ACT AEPB inhaler, USE 1 INHALATION BY MOUTH DAILY, Disp: 180 each, Rfl: 3    triamcinolone (KENALOG) 0.1 % cream, prn, Disp: , Rfl:     magnesium chloride-calcium (SlowMag Mg Muscle/Heart) 71.5-119 mg, Take 1 tablet by mouth 2 (two) times a day (Patient not taking: Reported on 2/10/2025), Disp: 60 tablet, Rfl: 5    sodium bicarbonate 650 mg tablet, Take 1 tablet (650 mg total) by mouth 2 (two) times daily after meals (Patient not taking: Reported on 2/10/2025), Disp: 60 tablet, Rfl: 0    trospium chloride (SANCTURA) 20 mg tablet, Take 1 tablet (20 mg total) by mouth 2 (two) times a day (Patient not taking: Reported on 2/4/2025), Disp: 180 tablet, Rfl: 3    Allergies   Allergen Reactions    Bactrim [Sulfamethoxazole-Trimethoprim] Other (See Comments)     AILYN    Nsaids      Annotation - 20Nov2017: unable to take due to use of lithium.    Oxycodone Rash       Physical Exam:    Ht 5' 5\" (1.651 m)   Wt 75.3 kg (166 lb)   BMI 27.62 kg/m²     Constitutional:normal, well developed, well nourished, alert, in no distress and non-toxic and no overt pain behavior.  Eyes:anicteric  HEENT:grossly intact  Neck:supple, symmetric, trachea midline and no masses   Pulmonary:even and unlabored  Cardiovascular:No edema or pitting edema present  Skin:Normal without rashes or lesions and well hydrated  Psychiatric:Mood and affect appropriate  Neurologic:Cranial Nerves II-XII grossly intact  Musculoskeletal:antalgic and ambulates with a walker      Imaging  No orders to display "         No orders of the defined types were placed in this encounter.

## 2025-03-05 ENCOUNTER — OFFICE VISIT (OUTPATIENT)
Dept: PAIN MEDICINE | Facility: CLINIC | Age: 80
End: 2025-03-05
Payer: MEDICARE

## 2025-03-05 VITALS — BODY MASS INDEX: 27.66 KG/M2 | WEIGHT: 166 LBS | HEIGHT: 65 IN

## 2025-03-05 DIAGNOSIS — M48.061 FORAMINAL STENOSIS OF LUMBAR REGION: ICD-10-CM

## 2025-03-05 DIAGNOSIS — M54.16 LUMBAR RADICULOPATHY: Primary | ICD-10-CM

## 2025-03-05 DIAGNOSIS — M51.362 DEGENERATION OF INTERVERTEBRAL DISC OF LUMBAR REGION WITH DISCOGENIC BACK PAIN AND LOWER EXTREMITY PAIN: ICD-10-CM

## 2025-03-05 DIAGNOSIS — M48.062 SPINAL STENOSIS OF LUMBAR REGION WITH NEUROGENIC CLAUDICATION: ICD-10-CM

## 2025-03-05 DIAGNOSIS — M43.16 SPONDYLOLISTHESIS OF LUMBAR REGION: ICD-10-CM

## 2025-03-05 PROCEDURE — 99214 OFFICE O/P EST MOD 30 MIN: CPT | Performed by: NURSE PRACTITIONER

## 2025-03-05 PROCEDURE — G2211 COMPLEX E/M VISIT ADD ON: HCPCS | Performed by: NURSE PRACTITIONER

## 2025-03-05 RX ORDER — DULOXETIN HYDROCHLORIDE 30 MG/1
30 CAPSULE, DELAYED RELEASE ORAL DAILY
Qty: 90 CAPSULE | Refills: 1 | Status: SHIPPED | OUTPATIENT
Start: 2025-03-05

## 2025-03-11 ENCOUNTER — RESULTS FOLLOW-UP (OUTPATIENT)
Dept: INTERNAL MEDICINE CLINIC | Facility: CLINIC | Age: 80
End: 2025-03-11

## 2025-03-11 ENCOUNTER — APPOINTMENT (OUTPATIENT)
Dept: LAB | Facility: CLINIC | Age: 80
End: 2025-03-11
Payer: MEDICARE

## 2025-03-11 ENCOUNTER — TRANSCRIBE ORDERS (OUTPATIENT)
Dept: LAB | Facility: CLINIC | Age: 80
End: 2025-03-11

## 2025-03-11 ENCOUNTER — RESULTS FOLLOW-UP (OUTPATIENT)
Dept: NEPHROLOGY | Facility: CLINIC | Age: 80
End: 2025-03-11

## 2025-03-11 DIAGNOSIS — F31.60 MIXED BIPOLAR I DISORDER (HCC): Primary | ICD-10-CM

## 2025-03-11 DIAGNOSIS — E87.5 HYPERKALEMIA: ICD-10-CM

## 2025-03-11 DIAGNOSIS — E83.42 HYPOMAGNESEMIA: ICD-10-CM

## 2025-03-11 DIAGNOSIS — F31.60 MIXED BIPOLAR I DISORDER (HCC): ICD-10-CM

## 2025-03-11 LAB
ANION GAP SERPL CALCULATED.3IONS-SCNC: 5 MMOL/L (ref 4–13)
BUN SERPL-MCNC: 44 MG/DL (ref 5–25)
CALCIUM SERPL-MCNC: 9.7 MG/DL (ref 8.4–10.2)
CHLORIDE SERPL-SCNC: 108 MMOL/L (ref 96–108)
CO2 SERPL-SCNC: 25 MMOL/L (ref 21–32)
CREAT SERPL-MCNC: 1.79 MG/DL (ref 0.6–1.3)
GFR SERPL CREATININE-BSD FRML MDRD: 35 ML/MIN/1.73SQ M
GLUCOSE P FAST SERPL-MCNC: 98 MG/DL (ref 65–99)
LITHIUM SERPL-SCNC: 0.57 MMOL/L (ref 0.6–1.2)
MAGNESIUM SERPL-MCNC: 1.8 MG/DL (ref 1.9–2.7)
POTASSIUM SERPL-SCNC: 5.5 MMOL/L (ref 3.5–5.3)
SODIUM SERPL-SCNC: 138 MMOL/L (ref 135–147)
TSH SERPL DL<=0.05 MIU/L-ACNC: 1.31 UIU/ML (ref 0.45–4.5)

## 2025-03-11 PROCEDURE — 36415 COLL VENOUS BLD VENIPUNCTURE: CPT

## 2025-03-11 PROCEDURE — 80048 BASIC METABOLIC PNL TOTAL CA: CPT

## 2025-03-11 PROCEDURE — 80178 ASSAY OF LITHIUM: CPT

## 2025-03-11 PROCEDURE — 83735 ASSAY OF MAGNESIUM: CPT

## 2025-03-11 PROCEDURE — 84443 ASSAY THYROID STIM HORMONE: CPT

## 2025-03-12 NOTE — TELEPHONE ENCOUNTER
----- Message from Laurel Bain PA-C sent at 3/11/2025  3:59 PM EDT -----  Please inform patient blood work showed potassium was a little elevated at 5.5.  Would continue his Lokelma daily and a low potassium diet.  Creatinine stable at 1.79

## 2025-03-13 ENCOUNTER — ESTABLISHED COMPREHENSIVE EXAM (OUTPATIENT)
Dept: URBAN - METROPOLITAN AREA CLINIC 6 | Facility: CLINIC | Age: 80
End: 2025-03-13

## 2025-03-13 DIAGNOSIS — H26.493: ICD-10-CM

## 2025-03-13 DIAGNOSIS — H02.135: ICD-10-CM

## 2025-03-13 DIAGNOSIS — H35.3131: ICD-10-CM

## 2025-03-13 DIAGNOSIS — H02.403: ICD-10-CM

## 2025-03-13 DIAGNOSIS — H43.813: ICD-10-CM

## 2025-03-13 DIAGNOSIS — H04.123: ICD-10-CM

## 2025-03-13 DIAGNOSIS — H02.132: ICD-10-CM

## 2025-03-13 PROCEDURE — 92134 CPTRZ OPH DX IMG PST SGM RTA: CPT

## 2025-03-13 PROCEDURE — 92014 COMPRE OPH EXAM EST PT 1/>: CPT

## 2025-03-13 ASSESSMENT — VISUAL ACUITY
OS_SC: 20/30+1
OD_SC: 20/25-1

## 2025-03-13 ASSESSMENT — TONOMETRY
OS_IOP_MMHG: 9
OD_IOP_MMHG: 13

## 2025-03-20 ENCOUNTER — PROCEDURE VISIT (OUTPATIENT)
Dept: PAIN MEDICINE | Facility: CLINIC | Age: 80
End: 2025-03-20
Payer: MEDICARE

## 2025-03-20 VITALS — BODY MASS INDEX: 27.79 KG/M2 | WEIGHT: 166.8 LBS | HEIGHT: 65 IN

## 2025-03-20 DIAGNOSIS — M70.62 TROCHANTERIC BURSITIS OF LEFT HIP: Primary | ICD-10-CM

## 2025-03-20 PROCEDURE — 20611 DRAIN/INJ JOINT/BURSA W/US: CPT | Performed by: ANESTHESIOLOGY

## 2025-03-20 RX ORDER — METHYLPREDNISOLONE ACETATE 40 MG/ML
40 INJECTION, SUSPENSION INTRA-ARTICULAR; INTRALESIONAL; INTRAMUSCULAR; SOFT TISSUE ONCE
Status: COMPLETED | OUTPATIENT
Start: 2025-03-20 | End: 2025-03-20

## 2025-03-20 RX ORDER — ROPIVACAINE HYDROCHLORIDE 2 MG/ML
40 INJECTION, SOLUTION EPIDURAL; INFILTRATION; PERINEURAL ONCE
Status: COMPLETED | OUTPATIENT
Start: 2025-03-20 | End: 2025-03-20

## 2025-03-20 RX ORDER — ALBUTEROL SULFATE 90 UG/1
INHALANT RESPIRATORY (INHALATION)
COMMUNITY
Start: 2025-01-14

## 2025-03-20 RX ADMIN — ROPIVACAINE HYDROCHLORIDE 40 MG: 2 INJECTION, SOLUTION EPIDURAL; INFILTRATION; PERINEURAL at 13:56

## 2025-03-20 RX ADMIN — METHYLPREDNISOLONE ACETATE 40 MG: 40 INJECTION, SUSPENSION INTRA-ARTICULAR; INTRALESIONAL; INTRAMUSCULAR; SOFT TISSUE at 13:55

## 2025-03-20 NOTE — PROGRESS NOTES
"Large joint arthrocentesis: L greater trochanteric bursa  Universal Protocol:  procedure performed by consultantConsent: Verbal consent obtained. Written consent obtained.  Risks and benefits: risks, benefits and alternatives were discussed  Consent given by: patient  Time out: Immediately prior to procedure a \"time out\" was called to verify the correct patient, procedure, equipment, support staff and site/side marked as required.  Timeout called at: 3/20/2025 1:40 PM.  Patient understanding: patient states understanding of the procedure being performed  Patient consent: the patient's understanding of the procedure matches consent given  Procedure consent: procedure consent matches procedure scheduled  Relevant documents: relevant documents present and verified  Site marked: the operative site was marked  Required items: required blood products, implants, devices, and special equipment available  Patient identity confirmed: verbally with patient  Supporting Documentation  Indications: pain   Procedure Details  Location: hip - L greater trochanteric bursa  Preparation: Patient was prepped and draped in the usual sterile fashion  Needle size: 25 G  Ultrasound guidance: yes            Indication: Left lateral hip pain  Preoperative diagnosis: Greater trochanteric bursitis  Postoperative diagnosis: Greater trochanteric bursitis  Procedure: Ultrasound guided left hip greater trochanteric bursa injection  After discussing the risks, benefits, and alternatives to the procedure, the patient expressed understanding and wished to proceed. The patient was brought to the procedure suite and placed in the side-lying position. A procedural pause was conducted to verify: Correct patient identity, procedure to be performed and as applicable, correct side and site, correct patient position, and availability of implants, special equipment or special requirements. A simple surgical tray was used. Prior to the procedure, the left hip " peritrochanteric bursal region was examined with a 12 MHz curvilinear transducer to visualize the left hip peritrochanteric bursal region and determine the optimal needle. Following this, the groin and lateral hip was prepared with a ChloraPrep scrub, then reexamined using the same transducer, sterile ultrasound transducer cover, and sterile ultrasound transducer gel. Thereafter, using ultrasound guidance, a 2.5 inch 25-gauge spinal needle was advanced into the left hip greater trochanteric bursal region. After visualization of the tip in the target area and negative aspiration of blood and other bodily fluids, a mixture of 40 mg of Depo-Medrol in 3 mL of 0.2% ropivacaine was injected into the right hip greater trochanteric bursal region. The patient tolerated the procedure well and there were no apparent complications. After an appropriate amount of observation, the patient was dismissed from the recovery area under their own power.    The patient received a total steroid dose of 40 mg of Depo-Medrol.

## 2025-03-21 DIAGNOSIS — I10 ESSENTIAL HYPERTENSION: ICD-10-CM

## 2025-03-21 RX ORDER — AMLODIPINE BESYLATE 5 MG/1
5 TABLET ORAL DAILY
Qty: 90 TABLET | Refills: 0 | Status: SHIPPED | OUTPATIENT
Start: 2025-03-21

## 2025-03-21 NOTE — PROGRESS NOTES
Recent office blood pressures look elevated, home blood pressure shows many a greater than 140 systolic.  Will increase amlodipine to 5 mg daily  
Vaccine status unknown

## 2025-04-02 PROBLEM — Z00.00 MEDICARE ANNUAL WELLNESS VISIT, SUBSEQUENT: Status: RESOLVED | Noted: 2018-10-11 | Resolved: 2025-04-02

## 2025-04-03 ENCOUNTER — TELEPHONE (OUTPATIENT)
Dept: PAIN MEDICINE | Facility: CLINIC | Age: 80
End: 2025-04-03

## 2025-04-08 ENCOUNTER — OFFICE VISIT (OUTPATIENT)
Dept: PULMONOLOGY | Facility: CLINIC | Age: 80
End: 2025-04-08
Payer: MEDICARE

## 2025-04-08 VITALS
OXYGEN SATURATION: 100 % | BODY MASS INDEX: 23.79 KG/M2 | WEIGHT: 157 LBS | HEART RATE: 69 BPM | SYSTOLIC BLOOD PRESSURE: 140 MMHG | HEIGHT: 68 IN | DIASTOLIC BLOOD PRESSURE: 82 MMHG

## 2025-04-08 DIAGNOSIS — Z85.118 HISTORY OF LUNG CANCER: ICD-10-CM

## 2025-04-08 DIAGNOSIS — K21.9 GASTROESOPHAGEAL REFLUX DISEASE WITHOUT ESOPHAGITIS: ICD-10-CM

## 2025-04-08 DIAGNOSIS — J44.9 CHRONIC OBSTRUCTIVE PULMONARY DISEASE, UNSPECIFIED COPD TYPE (HCC): Primary | ICD-10-CM

## 2025-04-08 DIAGNOSIS — J30.9 ALLERGIC RHINITIS, UNSPECIFIED SEASONALITY, UNSPECIFIED TRIGGER: ICD-10-CM

## 2025-04-08 PROCEDURE — 99214 OFFICE O/P EST MOD 30 MIN: CPT | Performed by: INTERNAL MEDICINE

## 2025-04-08 NOTE — PROGRESS NOTES
"Office Progress Note - Pulmonary    Tonny Baig 79 y.o. male MRN: [id number]    Encounter: [id number]      Assessment:  Chronic obstructive pulmonary disease.  Allergic rhinitis.  History of lung cancer.  Gastroesophageal reflux disease.    Plan:   Trelegy Ellipta 200 1 inhalation once a day.  Albuterol rescue inhaler 2 inhalations 4 times a day as needed.  Fluticasone nasal spray 2 sprays to each nostril once a day.  Fexofenadine 180 mg once a day during the allergy season.  Regular walks to improve stamina.  Follow-up in 6 months.    Discussion:   The patient's COPD is well treated.  I have maintained him on the Trelegy Ellipta 200, 1 inhalation once a day.  He will use the albuterol rescue inhaler 2 inhalations 4 times a day as needed.  Allergic rhinitis is well treated.  I have maintained him on the fexofenadine 180 mg once a day during the allergy season and fluticasone nasal spray 2 sprays to each nostril once a day.  I encouraged him to take regular walks to improve stamina.  I will see him in 6 months.      Subjective:   The patient is here for a follow-up visit.  Recently had cold symptoms.  He had congestion and cough.  This has resolved.  He is back to his baseline.  Denies any significant cough now or chest tightness.  No nocturnal symptoms.  He has good appetite.  He takes Trelegy Ellipta 200, 1 inhalation once a day.  Occasionally he needs to use his rescue inhaler.  He is on fluticasone nasal spray 2 sprays to each nostril once a day.  He is bothered by leg pains which limited his activities.  He is using a cane to ambulate.    Review of systems:  A 12 point system review is done and aside from what is stated above the rest of the review of systems is negative.      Family history and social history are reviewed.    Medications list is reviewed.      Vitals: Blood pressure 140/82, pulse 69, height 5' 7.5\" (1.715 m), weight 71.2 kg (157 lb), SpO2 100%.,     Physical Exam  Gen: Awake, alert, " oriented x 3, no acute distress  HEENT: Mucous membranes moist, no oral lesions, no thrush  NECK: No accessory muscle use, JVP not elevated  Cardiac: Regular, single S1, single S2, no murmurs, no rubs, no gallops  Lungs: Decreased breath sounds.  No wheezing or rhonchi.  Abdomen: normoactive bowel sounds, soft nontender, nondistended, no rebound or rigidity, no guarding  Extremities: no cyanosis, no clubbing, no edema  Neuro:  Grossly nonfocal.  Skin:  No rash.    Lab Results   Component Value Date    SODIUM 138 03/11/2025    K 5.5 (H) 03/11/2025     03/11/2025    CO2 25 03/11/2025    BUN 44 (H) 03/11/2025    CREATININE 1.79 (H) 03/11/2025    GLUC 103 01/27/2025    CALCIUM 9.7 03/11/2025

## 2025-04-09 ENCOUNTER — EVALUATION (OUTPATIENT)
Dept: PHYSICAL THERAPY | Facility: REHABILITATION | Age: 80
End: 2025-04-09
Payer: MEDICARE

## 2025-04-09 DIAGNOSIS — M76.32 ILIOTIBIAL BAND SYNDROME OF LEFT SIDE: ICD-10-CM

## 2025-04-09 DIAGNOSIS — M70.62 TROCHANTERIC BURSITIS OF LEFT HIP: ICD-10-CM

## 2025-04-09 PROCEDURE — 97110 THERAPEUTIC EXERCISES: CPT | Performed by: PHYSICAL THERAPIST

## 2025-04-09 PROCEDURE — 97162 PT EVAL MOD COMPLEX 30 MIN: CPT | Performed by: PHYSICAL THERAPIST

## 2025-04-09 NOTE — PROGRESS NOTES
PT Evaluation     Today's date: 2025  Patient name: Tonny Baig  : 1945  MRN: [id number]  Referring provider: Michelle Ayoub PA-C  Dx:   Encounter Diagnosis     ICD-10-CM    1. Trochanteric bursitis of left hip  M70.62 Ambulatory Referral to Physical Therapy      2. Iliotibial band syndrome of left side  M76.32 Ambulatory Referral to Physical Therapy          Start Time: 1030  Stop Time: 1115  Total time in clinic (min): 45 minutes    Assessment  Impairments: abnormal gait, abnormal muscle firing, abnormal muscle tone, abnormal or restricted ROM, abnormal movement, activity intolerance, impaired physical strength and pain with function    Assessment details: Tonny Baig is a 79 y.o. male presenting to outpatient physical therapy on 25 with referral from MD for chronic R hip pain and back pain with signs and symptoms consistent with hip and LS referral vs ITBS. Upon evaluation, Tonny demonstrates impaired hio strength, functional deficits. The listed impairments and functional limitation are effecting Tonny ability to function at prior level. They can continue to benefit from physical therapy services at this times in order to address the above discussed impairments and functional limitation in order to allow for a return to premorbid status     Understanding of Dx/Px/POC: good     Prognosis: good    Plan  Patient would benefit from: skilled PT  Planned modality interventions: thermotherapy: hydrocollator packs    Planned therapy interventions: joint mobilization, manual therapy, ADL training, balance, balance/weight bearing training, neuromuscular re-education, home exercise program, therapeutic exercise, therapeutic activities, strengthening, patient education, functional ROM exercises and gait training    Frequency: 2x week  Duration in weeks: 12  Treatment plan discussed with: patient        Subjective Evaluation    History of Present Illness  Mechanism of injury: Tonny is a 79 y.o.  "male presenting to physical therapy on 25 with referral from MD for chronic L lateral leg pain.       Patient symptoms are intermittent with most intense pain resulting in lateral thigh  Patient symptoms are localized and referred  Patient denies bowel and bladder changes (denies urinary retention)/saddle numbness              Recurrent probem    Quality of life: fair    Patient Goals  Patient goals for therapy: decreased pain and increased strength  Patient goal: imporve walking tolerance  Pain  Current pain ratin  At worst pain ratin  Location: see above    Treatments  Previous treatment: physical therapy and injection treatment    Short Term Goals:   1. Patient will be Independent with hep  2. Patient will improve pain with activity by 50%  3. Patient will report GROC 50% or greater      Long Term Goals:   1. Patient will improve FOTO to greater then goal  2. Patient will improve pain with activity to 2/10 or less  3. Patient will continue with HEP independence to allow for decreased future reoccurrence of pain and loss in function  4. Patient will report GROC 75% or greater      Objective    + clonus L (7 beat)  3+ L3-4, S1 reflex  Normal myotome    2 MWT: rollator - 280 ft  5x STS: 30\" foam pad under   Hip ABD: sitting test - 4+/5             Precautions: AAA, CKD      Manuals                                                                 Neuro Re-Ed             Seated hip ABD                                                                                           Ther Ex             VG             LAQ             Bridge             Side stepping at  rail                                                                 Ther Activity             STS                          Gait Training                                       Modalities                                            "

## 2025-04-12 DIAGNOSIS — I10 ESSENTIAL HYPERTENSION: ICD-10-CM

## 2025-04-15 NOTE — PROGRESS NOTES
Assessment  1. Lumbar radiculopathy    2. CKD stage 3b, GFR 30-44 ml/min ([facility])    3. H/O lumbosacral spine surgery    4. Lumbar spondylosis        Plan  Patient is 79-year-old male past medical history of L4-S1 fusion, CKD, returns for follow-up regarding his chronic lumbar radiculopathy.  Patient reports getting a left greater trochanteric bursal injection in March 2025 patient resulted in approximately 50% improvement improvement symptoms which lasted for a month.  Patient also states that he had an L3-L4 interlaminar epidural steroid injection done in February 2025 which resulted in 50% improvement in symptoms and looking for repeat injection again.  Patient states that he has had TF DEVON in the past which has not helped with his symptoms.  At this time, suspect that patient symptoms are secondary to lumbar radiculopathy would benefit from a repeat L3-L4 interlaminar epidural steroid injection.  I did discuss possibility of spinal cord stimulator with patient if repeat injections do not improve patient's symptoms.    Given that clinical presentation of lumbar radiculopathy matches MRI findings, I would like to proceed forward with performing L3-L4 ILESI. Risks vs benefits discussed in detail with the patient. These risks include, but are not limited to, bleeding, infection, nerve damage, paralysis. Patient is not on anticoagulant therapy. Patient denies contrast allergy. All patient’s questions were answered. Patient understands risks and is willing to pursue the procedure. The approach was demonstrated using models and literature was provided. Verbal consent obtained.      My impressions and treatment recommenations were discussed in detail with the patient who verbalized understanding and had no further questions.  Discharge instructions were provided. I personally saw and examined the patient and I agree with the above discussed plan of care.    No orders of the defined types were placed in this  encounter.    No orders of the defined types were placed in this encounter.      History of Present Illness    Tonny Baig is a 79 y.o. male past medical history of L4-S1 fusion, CKD, returns for follow-up regarding his chronic lumbar radiculopathy.  Patient reports getting a left greater trochanteric bursal injection in March 2025 patient resulted in approximately 50% improvement improvement symptoms which lasted for a month.  Patient also states that he had an L3-L4 interlaminar epidural steroid injection done in February 2025 which resulted in 50% improvement in symptoms and looking for repeat injection again.  Patient states that he has had TF DEVON in the past which has not helped with his symptoms.  Patient states that the pain is constant, described as a burning, dull aching, sharp, shooting like sensation, and states that the pain interferes with activities of daily living.  Patient states that he has been continuing conservative treatment.    I have personally reviewed and/or updated the patient's past medical history, past surgical history, family history, social history, current medications, allergies, and vital signs today.     Review of Systems   Constitutional:  Negative for fever and unexpected weight change.   HENT:  Negative for trouble swallowing.    Eyes:  Negative for visual disturbance.   Respiratory:  Negative for shortness of breath and wheezing.    Cardiovascular:  Negative for chest pain and palpitations.   Gastrointestinal:  Negative for constipation, diarrhea, nausea and vomiting.   Endocrine: Negative for cold intolerance, heat intolerance and polydipsia.   Genitourinary:  Negative for difficulty urinating and frequency.   Musculoskeletal:  Positive for back pain and gait problem. Negative for arthralgias, joint swelling and myalgias.   Skin:  Negative for rash.   Neurological:  Negative for dizziness, seizures, syncope, weakness and headaches.   Hematological:  Does not bruise/bleed  easily.   Psychiatric/Behavioral:  Negative for dysphoric mood.    All other systems reviewed and are negative.    General: no fevers, chills, infections  Neuro: no saddle anesthesia, no dropping objects or balance issues  GI: no changes in bowel habits  : no changes in bladder habits  Hem: no bleeding    Patient Active Problem List   Diagnosis    Gait instability    Cervical myelopathy ([facility])    Adenocarcinoma of prostate ([facility])    CAD (coronary artery disease)    Bipolar 1 disorder ([facility])    COPD (chronic obstructive pulmonary disease) ([facility])    Gastroesophageal reflux disease    Mixed hyperlipidemia    Essential hypertension    Aneurysm of infrarenal abdominal aorta ([facility])    H/O lumbosacral spine surgery    CKD stage 3b, GFR 30-44 ml/min ([facility])    Impaired mobility and activities of daily living    DDD (degenerative disc disease), lumbar    Foraminal stenosis of lumbar region    Spondylolisthesis of lumbar region    Myelopathy concurrent with and due to lumbosacral intervertebral disc disorder    PAF (paroxysmal atrial fibrillation) ([facility])    Pain in left hip    Greater trochanteric bursitis, left    Skin lesion    Trochanteric bursitis of left hip    Primary osteoarthritis of left hip    Malignant neoplasm of lung ([facility])    Excessive gas    Extradural cyst of spine    Lumbar disc herniation    Gait abnormality    Skin lump of leg, right    Seasonal allergic rhinitis due to pollen    Braces as ambulation aid    Parkinsonism ([facility])    Change in mental status    Frequency of urination    Skin tear of right lower leg without complication    Proteinuria    S/P endovascular aneurysm repair    Myocardial infarction ([facility])    Adenoma of colon    Abdominal aortic aneurysm (AAA) without rupture, unspecified part ([facility])    Lumbar radiculopathy    Spinal stenosis of lumbar region with neurogenic claudication    Hypomagnesemia    Preop examination    Rash    Bilateral renal masses    Hyperkalemia    Low bicarbonate    Asymptomatic  PVCs    CKD (chronic kidney disease)    Bloating    Functional diarrhea       Past Medical History:   Diagnosis Date    Allergic 2011    Allergic rhinitis 2015    Anxiety     occasional    Aortic aneurysm ([facility]CC)     Benign colon polyp     Bipolar 1 disorder (HCC)     Cancer (HCC) 2007, 2011    Cardiac disease     MI    Cervical cord compression with myelopathy (HCC)     COPD (chronic obstructive pulmonary disease) (HCC)     Coronary artery disease 1995    Diverticulitis     Diverticulitis of colon prior to 2014    Diverticulosis     Emphysema of lung (HCC) 2012    Gait disturbance     uses cane, leg brace on right    GERD (gastroesophageal reflux disease)     Heart attack (HCC) 1996    Hx of resection of large bowel 05/03/2016    Hyperlipidemia     Hypertension     IBS (irritable bowel syndrome)     Inflammatory bowel disease 2012    Lumbar stenosis     Lung cancer (HCC)     2007 Left lower lobectomy and 2011 Right lung with surgery     Myocardial infarction (HCC)     involving other coronary artery    Prostate cancer (HCC)     Shortness of breath     Small bowel obstruction (HCC) 11/16/2016       Past Surgical History:   Procedure Laterality Date    ANGIOPLASTY      stent    CARDIAC CATHETERIZATION  1995    angioplasty    CARDIAC SURGERY      CERVICAL SPINE SURGERY      Cervical decompression with cervical fusion from C3-C7 for spinal stenosis.    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    LUNG LOBECTOMY Left     RI ARTHRD ANT INTERBODY MIN DSC CRV BELOW C2 N/A 05/02/2016    Procedure: Anterior cervical  diskectomy C3/4, C5/6, C6/7 with anterior plate fixation fusion C3-7;  Posterior decompressive laminectomy C3-7 with lateral mass fixation fusion C3-7 (IMPULSE MONITORING);  Surgeon: Jose Luis Moore MD;  Location: [facility];  Service: Neurosurgery    NH ARTHRODESIS POSTERIOR INTERBODY 1 [facility] LUMBAR N/A 2017    Procedure: L4-5 AND L5-S1 DECOMPRESSIVE FORAMINOTOMIES, TRANSFORAMINAL LUMBAR INTERBODY AND PEDICLE SCREW FIXATION FUSION L4-S1 (IMPULSE);  Surgeon: Jose Luis Moore MD;  Location: [facility];  Service: Neurosurgery    NH EVASC RPR DPLMNT AORTO-AORTIC NDGFT N/A 2018    Procedure: REPAIR ANEURYSM ENDOVASCULAR ABDOMINAL AORTIC  (EVAR) WITH BILATERAL PERCUTANEOUS FEMORAL ACCESS WITH ULTRASOUND GUIDANCE ON THE RIGHT AND PRE CLOSURE;  Surgeon: Shan Villalobos MD;  Location: [facility];  Service: Vascular    PROSTATECTOMY      for prostate CA - no chemo/RT    SIGMOIDECTOMY      for divertic    SMALL INTESTINE SURGERY N/A 2016    Procedure: Exploratory Laparotomy, Lysis of adhesions to release small bowel obstruction;  Surgeon: Aminta Sim MD;  Location: [facility];  Service:     SPINE SURGERY  ,     TONSILLECTOMY  Y    1952       Family History   Problem Relation Age of Onset    Hypertension Mother     Glaucoma Mother     Heart attack Father     Colon cancer Father     Coronary artery disease Father     Hypertension Father     Kidney disease Father     Heart disease Family     Hyperlipidemia Family     Hypertension Family        Social History     Occupational History    Occupation: Retired   Tobacco Use    Smoking status: Former     Current packs/day: 0.00     Average packs/day: 1 pack/day for 44.0 years (44.0 ttl pk-yrs)     Types: Cigarettes     Start date: 1967     Quit date: 2011     Years since quittin.3     Passive exposure: Past    Smokeless tobacco: Never   Vaping Use    Vaping status: Never Used   Substance and Sexual Activity    Alcohol use: Yes      Alcohol/week: 2.0 - 3.0 standard drinks of alcohol     Types: 2 - 3 Shots of liquor per week     Comment: One glass per day    Drug use: No    Sexual activity: Not Currently     Partners: Female     Birth control/protection: Post-menopausal, None       Current Outpatient Medications on File Prior to Visit   Medication Sig    amLODIPine (NORVASC) 5 mg tablet TAKE 1 TABLET BY MOUTH DAILY    atorvastatin (LIPITOR) 40 mg tablet TAKE 1 TABLET BY MOUTH DAILY    Cholecalciferol (VITAMIN D PO) Take 2,000 Units by mouth daily      dicyclomine (BENTYL) 20 mg tablet TAKE 1 TABLET BY MOUTH EVERY 6  HOURS AS NEEDED FOR ABDOMINAL  CRAMPING    DULoxetine (CYMBALTA) 30 mg delayed release capsule Take 1 capsule (30 mg total) by mouth daily    fexofenadine (ALLEGRA) 180 MG tablet Take 180 mg by mouth as needed Daily during the Summer    fluticasone (FLONASE) 50 mcg/act nasal spray 2 sprays into each nostril daily    Magnesium 250 MG TABS Take by mouth in the morning    metoprolol succinate (TOPROL-XL) 25 mg 24 hr tablet TAKE 1 TABLET BY MOUTH ONCE  DAILY    omega-3-acid ethyl esters (LOVAZA) 1 g capsule TAKE 1 CAPSULE BY MOUTH 3 TIMES  DAILY    omeprazole (PriLOSEC) 20 mg delayed release capsule TAKE 1 CAPSULE BY MOUTH TWICE  DAILY BEFORE MEALS    Sodium Zirconium Cyclosilicate (Lokelma) 10 g Take 1 packet (10 g total) by mouth daily    Trelegy Ellipta 200-62.5-25 MCG/ACT AEPB inhaler USE 1 INHALATION BY MOUTH DAILY    acetaminophen (TYLENOL) 500 mg tablet Take 500 mg by mouth as needed for mild pain.    albuterol (PROVENTIL HFA,VENTOLIN HFA) 90 mcg/act inhaler     aspirin (ECOTRIN LOW STRENGTH) 81 mg EC tablet Take 81 mg by mouth daily    lithium carbonate (LITHOBID) 300 mg CR tablet Take 300 mg by mouth daily at bedtime    magnesium chloride-calcium (SlowMag Mg Muscle/Heart) 71.5-119 mg Take 1 tablet by mouth 2 (two) times a day (Patient not taking: Reported on 2/10/2025)    nitroglycerin (NITRODUR) 0.2 mg/hr APPLY 1 PATCH  "TOPICALLY ONCE  DAILY. LEAVE ON FOR 12 TO 14  HOURS THEN REMOVE FOR A  NITRATE-FREE INTERVAL OF 10 TO  12 HOURS    sodium bicarbonate 650 mg tablet Take 1 tablet (650 mg total) by mouth 2 (two) times daily after meals (Patient not taking: Reported on 4/17/2025)    triamcinolone (KENALOG) 0.1 % cream prn (Patient not taking: Reported on 4/17/2025)    trospium chloride (SANCTURA) 20 mg tablet Take 1 tablet (20 mg total) by mouth 2 (two) times a day (Patient not taking: Reported on 2/4/2025)     No current facility-administered medications on file prior to visit.       Allergies   Allergen Reactions    Bactrim [Sulfamethoxazole-Trimethoprim] Other (See Comments)     AILYN    Nsaids      Annotation - 20Nov2017: unable to take due to use of lithium.    Oxycodone Rash       Physical Exam    Ht 5' 5\" (1.651 m)   Wt 71.2 kg (157 lb)   BMI 26.13 kg/m²   There were no vitals filed for this visit.    Constitutional: no apparent distress, does not appear sedated   HEENT: pupils equal and round, symmetric facial muscles   Neck: supple  Pulmonary: good chest wall excursion, breathing unlabored   Psych: appropriate affect and insight. no evidence of aberrant behavior   Neuro: cranial nerves II-XII grossly intact    Gait: ambulates unassisted, gait is not antalgic          Imaging  "

## 2025-04-16 RX ORDER — AMLODIPINE BESYLATE 5 MG/1
5 TABLET ORAL DAILY
Qty: 90 TABLET | Refills: 3 | Status: SHIPPED | OUTPATIENT
Start: 2025-04-16

## 2025-04-17 ENCOUNTER — OFFICE VISIT (OUTPATIENT)
Dept: PAIN MEDICINE | Facility: CLINIC | Age: 80
End: 2025-04-17
Payer: MEDICARE

## 2025-04-17 VITALS — HEIGHT: 65 IN | WEIGHT: 157 LBS | BODY MASS INDEX: 26.16 KG/M2

## 2025-04-17 DIAGNOSIS — N18.32 CKD STAGE 3B, GFR 30-44 ML/MIN (HCC): ICD-10-CM

## 2025-04-17 DIAGNOSIS — M54.16 LUMBAR RADICULOPATHY: Primary | ICD-10-CM

## 2025-04-17 DIAGNOSIS — M47.816 LUMBAR SPONDYLOSIS: ICD-10-CM

## 2025-04-17 DIAGNOSIS — Z98.890 H/O LUMBOSACRAL SPINE SURGERY: ICD-10-CM

## 2025-04-17 DIAGNOSIS — M48.062 SPINAL STENOSIS OF LUMBAR REGION WITH NEUROGENIC CLAUDICATION: ICD-10-CM

## 2025-04-17 PROCEDURE — 99214 OFFICE O/P EST MOD 30 MIN: CPT | Performed by: ANESTHESIOLOGY

## 2025-04-17 PROCEDURE — G2211 COMPLEX E/M VISIT ADD ON: HCPCS | Performed by: ANESTHESIOLOGY

## 2025-04-17 RX ORDER — DULOXETIN HYDROCHLORIDE 30 MG/1
30 CAPSULE, DELAYED RELEASE ORAL DAILY
Qty: 90 CAPSULE | Refills: 1 | Status: SHIPPED | OUTPATIENT
Start: 2025-04-17

## 2025-04-21 ENCOUNTER — APPOINTMENT (OUTPATIENT)
Dept: PHYSICAL THERAPY | Facility: REHABILITATION | Age: 80
End: 2025-04-21
Payer: MEDICARE

## 2025-04-25 ENCOUNTER — OFFICE VISIT (OUTPATIENT)
Dept: PHYSICAL THERAPY | Facility: REHABILITATION | Age: 80
End: 2025-04-25
Payer: MEDICARE

## 2025-04-25 DIAGNOSIS — M70.62 TROCHANTERIC BURSITIS OF LEFT HIP: Primary | ICD-10-CM

## 2025-04-25 DIAGNOSIS — M76.32 ILIOTIBIAL BAND SYNDROME OF LEFT SIDE: ICD-10-CM

## 2025-04-25 PROCEDURE — 97110 THERAPEUTIC EXERCISES: CPT | Performed by: PHYSICAL THERAPIST

## 2025-04-25 NOTE — PROGRESS NOTES
"Daily Note     Today's date: 2025  Patient name: Tonny Baig  : 1945  MRN: [id number]  Referring provider: Michelle Ayoub PA-C  Dx: No diagnosis found.               Subjective: Patient reports doing okay today, hip pain not too bad.       Objective: See treatment diary below      Assessment: Tolerated treatment well. Patient demonstrated fatigue post treatment and exhibited good technique with therapeutic exercises. Challenged with resisted walking (batsheva). Will monitor soreness for NV.      Plan: Continue per plan of care.      Precautions: AAA, CKD      Manuals                                                                 Neuro Re-Ed             Seated hip ABD                                                                                           Ther Ex             VG 3x20 L5            LAQ             Bridge RHB  2x10            Side stepping at  rail             [place] walk outs 15#  10+8            STS + foam  2x10            JULEE hip ABD RHB  10\"x5  2x10 with ABD                         Ther Activity             STS                          Gait Training                                       Modalities                                            "

## 2025-04-28 ENCOUNTER — APPOINTMENT (OUTPATIENT)
Dept: LAB | Facility: CLINIC | Age: 80
End: 2025-04-28
Payer: MEDICARE

## 2025-04-28 ENCOUNTER — TRANSCRIBE ORDERS (OUTPATIENT)
Dept: LAB | Facility: CLINIC | Age: 80
End: 2025-04-28

## 2025-04-28 ENCOUNTER — TELEPHONE (OUTPATIENT)
Age: 80
End: 2025-04-28

## 2025-04-28 DIAGNOSIS — N18.9 CHRONIC KIDNEY DISEASE, UNSPECIFIED CKD STAGE: Primary | ICD-10-CM

## 2025-04-28 DIAGNOSIS — F31.60 MIXED BIPOLAR I DISORDER (HCC): ICD-10-CM

## 2025-04-28 DIAGNOSIS — F31.60 MIXED BIPOLAR I DISORDER (HCC): Primary | ICD-10-CM

## 2025-04-28 PROCEDURE — 84520 ASSAY OF UREA NITROGEN: CPT

## 2025-04-28 PROCEDURE — 84443 ASSAY THYROID STIM HORMONE: CPT

## 2025-04-28 PROCEDURE — 82565 ASSAY OF CREATININE: CPT

## 2025-04-28 PROCEDURE — 36415 COLL VENOUS BLD VENIPUNCTURE: CPT

## 2025-04-28 PROCEDURE — 80178 ASSAY OF LITHIUM: CPT

## 2025-04-28 NOTE — TELEPHONE ENCOUNTER
Patient would like to know if he needs updated labs for his June follow up with Dr Heller?    If so he would like them mailed to his home:      [address] [place] [id number]       He would like a call back with an update.

## 2025-04-29 LAB
BUN SERPL-MCNC: 39 MG/DL (ref 5–25)
CREAT SERPL-MCNC: 1.82 MG/DL (ref 0.6–1.3)
GFR SERPL CREATININE-BSD FRML MDRD: 35 ML/MIN/1.73SQ M
LITHIUM SERPL-SCNC: 0.43 MMOL/L (ref 0.6–1.2)
TSH SERPL DL<=0.05 MIU/L-ACNC: 1.1 UIU/ML (ref 0.45–4.5)

## 2025-05-01 NOTE — PROGRESS NOTES
"Daily Note     Today's date: 2025  Patient name: Tonny Baig  : 1945  MRN: [id number]  Referring provider: Michelle Ayoub PA-C  Dx:   Encounter Diagnosis     ICD-10-CM    1. Trochanteric bursitis of left hip  M70.62       2. Iliotibial band syndrome of left side  M76.32           Start Time: 1005  Stop Time: 1050  Total time in clinic (min): 45 minutes    Subjective: Patient reports feeling decent today, min pain since last session. Not feeling any leg pain at time of PT today.       Objective: See treatment diary below    Sp02-94% with exercise    Assessment: Tolerated treatment fair. He did have increased fatigue with exercises today, especially when doing resisted walk outs as we needed to adjust his reps and sets. Increased rest breaks today.       Plan: Continue per plan of care.      Precautions: AAA, CKD      Manuals                                                                 Neuro Re-Ed             Seated hip ABD                                                                                           Ther Ex  5/2           VG 3x20 L5 3'  L5           LAQ             Bridge RHB  2x10 RHB  2x10           Side stepping at  rail             [place] walk outs 15#  10+8 10#  3+3+3           STS + foam  2x10 + foam  2x10           JULEE hip ABD RHB  10\"x5  2x10 with ABD            Posterior chain endurance  5 KG ball  2x 30\"           Ther Activity             STS                          Gait Training                                       Modalities                                              "

## 2025-05-02 ENCOUNTER — OFFICE VISIT (OUTPATIENT)
Dept: PHYSICAL THERAPY | Facility: REHABILITATION | Age: 80
End: 2025-05-02
Payer: MEDICARE

## 2025-05-02 DIAGNOSIS — M70.62 TROCHANTERIC BURSITIS OF LEFT HIP: Primary | ICD-10-CM

## 2025-05-02 DIAGNOSIS — M76.32 ILIOTIBIAL BAND SYNDROME OF LEFT SIDE: ICD-10-CM

## 2025-05-02 PROCEDURE — 97110 THERAPEUTIC EXERCISES: CPT | Performed by: PHYSICAL THERAPIST

## 2025-05-09 ENCOUNTER — OFFICE VISIT (OUTPATIENT)
Dept: PHYSICAL THERAPY | Facility: REHABILITATION | Age: 80
End: 2025-05-09
Payer: MEDICARE

## 2025-05-09 DIAGNOSIS — M70.62 TROCHANTERIC BURSITIS OF LEFT HIP: Primary | ICD-10-CM

## 2025-05-09 DIAGNOSIS — M76.32 ILIOTIBIAL BAND SYNDROME OF LEFT SIDE: ICD-10-CM

## 2025-05-09 PROCEDURE — 97110 THERAPEUTIC EXERCISES: CPT | Performed by: PHYSICAL THERAPIST

## 2025-05-09 NOTE — PROGRESS NOTES
"Daily Note     Today's date: 2025  Patient name: Tonny Baig  : 1945  MRN: [id number]  Referring provider: Michelle Ayoub PA-C  Dx:   Encounter Diagnosis     ICD-10-CM    1. Trochanteric bursitis of left hip  M70.62       2. Iliotibial band syndrome of left side  M76.32           Start Time: 1000  Stop Time: 1040  Total time in clinic (min): 40 minutes    Subjective: Patient reports some lateral hip pain pre tx. After VG when waling, no pain reported.       Objective: See treatment diary below      Assessment: Tolerated treatment well. Patient did well with resisted walk outs (michel) but close supervision for balance.       Plan: Continue per plan of care.      Precautions: AAA, CKD      Manuals                                                                 Neuro Re-Ed             Seated hip ABD                                                                                           Ther Ex  5 5          VG 3x20 L5 3'  L5 50+25 L5          LAQ             Bridge RHB  2x10 RHB  2x10 RHB  2x10          Hooklying clam   RHB  10\"x10          Side stepping at  rail             [place] walk outs 15#  10+8 10#  3+3+3 10#  2x10          STS + foam  2x10 + foam  2x10 + foam 2x10          JULEE hip ABD RHB  10\"x5  2x10 with ABD            Posterior chain endurance  5 KG ball  2x 30\" NP          Ther Activity             STS                          Gait Training                                       Modalities                                                "

## 2025-05-12 ENCOUNTER — OFFICE VISIT (OUTPATIENT)
Dept: CARDIOLOGY CLINIC | Facility: CLINIC | Age: 80
End: 2025-05-12
Payer: MEDICARE

## 2025-05-12 VITALS
BODY MASS INDEX: 28.66 KG/M2 | DIASTOLIC BLOOD PRESSURE: 75 MMHG | SYSTOLIC BLOOD PRESSURE: 142 MMHG | WEIGHT: 172 LBS | HEART RATE: 67 BPM | OXYGEN SATURATION: 99 % | HEIGHT: 65 IN

## 2025-05-12 DIAGNOSIS — I48.0 PAF (PAROXYSMAL ATRIAL FIBRILLATION) (HCC): ICD-10-CM

## 2025-05-12 DIAGNOSIS — I10 ESSENTIAL HYPERTENSION: ICD-10-CM

## 2025-05-12 DIAGNOSIS — E78.2 MIXED HYPERLIPIDEMIA: ICD-10-CM

## 2025-05-12 DIAGNOSIS — N18.32 CKD STAGE 3B, GFR 30-44 ML/MIN (HCC): ICD-10-CM

## 2025-05-12 DIAGNOSIS — I49.3 ASYMPTOMATIC PVCS: Primary | ICD-10-CM

## 2025-05-12 DIAGNOSIS — I25.10 CORONARY ARTERY DISEASE INVOLVING NATIVE CORONARY ARTERY OF NATIVE HEART WITHOUT ANGINA PECTORIS: ICD-10-CM

## 2025-05-12 PROCEDURE — 99214 OFFICE O/P EST MOD 30 MIN: CPT | Performed by: INTERNAL MEDICINE

## 2025-05-12 PROCEDURE — G2211 COMPLEX E/M VISIT ADD ON: HCPCS | Performed by: INTERNAL MEDICINE

## 2025-05-12 NOTE — PROGRESS NOTES
[facility] Cardiology  Follow up note  Tonny Baig 79 y.o. male MRN: [id number]        Impression:    Coronary artery disease  Inferior MI without transmural infarct or cardiomyopathy with likely stenting to the RCA, but details are not available.  No cardiac complaints, no testing indicated.  Continue lifelong aspirin  Preserved EF by last echo 2/24    Mixed hypercholesterolemia  LDL well-controlled, has a persistently low HDL    Paroxysmal atrial fibrillation  1 episode 1/18 during influenza infection  Multiple Holters since, multiple EKGs have been negative for recurrent A-fib  Not previously managed on anticoagulation by his prior cardiologist, and without any recurrent detectable A-fib, have not had a strong reason to start anticoagulation    Hypertension  High normal blood pressures, increased amlodipine last year to 5 mg  See below for plan    AAA status post endovascular repair  Stable by follow-up imaging      Plan:    2 weeks of daily home blood pressures, and report findings to my office  No change to medications at this time  Nephrology following him closely for CKD stage III, possible nephritis related to lithium/PPI, with hypomagnesemia on supplementation being addressed by their office  No additional cardiac testing needed at this time  1 year follow-up recommended      HPI:   Tonny Baig is a 79 y.o. year old male with a remote myocardial infarction in approximately 2008, an episode of atrial fibrillation in January 2018 in the context of influenza infection, hypertension, mixed hyperlipidemia, AAA status post endovascular repair, bipolar disorder on lithium, CKD stage III, persistent hypomagnesemia comes in to see me for the first time.    He has no cardiac complaints, no palpitations, no recurrent A-fib detection  Normal EF by echo 2/24  Blood pressure high normal today, last blood pressure in my office was a year ago, we discussed the need for more monitoring  Amlodipine had been increased  last year.  His BMI is 28, weight 272  Renal function stable creatinine 1.8  Lipids are under good control    Review of Systems   Constitutional:  Negative for appetite change, diaphoresis, fatigue and fever.   Respiratory:  Negative for chest tightness, shortness of breath and wheezing.    Cardiovascular:  Negative for chest pain, palpitations and leg swelling.   Gastrointestinal:  Negative for abdominal pain and blood in stool.   Musculoskeletal:  Negative for arthralgias and joint swelling.   Skin:  Negative for rash.   Neurological:  Negative for dizziness, syncope and light-headedness.       Past Medical History:   Diagnosis Date   • Allergic 2011   • Allergic rhinitis 2015   • Anxiety     occasional   • Aortic aneurysm ([facility])    • Benign colon polyp    • Bipolar 1 disorder ([facility])    • Cancer ([facility]) 2007, 2011   • Cardiac disease     MI   • Cervical cord compression with myelopathy ([facility])    • COPD (chronic obstructive pulmonary disease) ([facility])    • Coronary artery disease 1995   • Diverticulitis    • Diverticulitis of colon prior to 2014   • Diverticulosis    • Emphysema of lung ([facility]) 2012   • Gait disturbance     uses cane, leg brace on right   • GERD (gastroesophageal reflux disease)    • Heart attack ([facility]) 1996   • Hx of resection of large bowel 05/03/2016   • Hyperlipidemia    • Hypertension    • IBS (irritable bowel syndrome)    • Inflammatory bowel disease 2012   • Lumbar stenosis    • Lung cancer ([facility])     2007 Left lower lobectomy and 2011 Right lung with surgery    • Myocardial infarction ([facility])     involving other coronary artery   • Prostate cancer ([facility])    • Shortness of breath    • Small bowel obstruction ([facility]) 11/16/2016     Social History     Substance and Sexual Activity   Alcohol Use Yes   • Alcohol/week: 2.0 - 3.0 standard drinks of alcohol   • Types: 2 - 3 Shots of liquor per week    Comment: One glass per day     Social History     Substance and Sexual Activity   Drug Use No     Social History      Tobacco Use   Smoking Status Former   • Current packs/day: 0.00   • Average packs/day: 1 pack/day for 44.0 years (44.0 ttl pk-yrs)   • Types: Cigarettes   • Start date: 1967   • Quit date: 2011   • Years since quittin.3   • Passive exposure: Past   Smokeless Tobacco Never       Allergies:  Allergies   Allergen Reactions   • Bactrim [Sulfamethoxazole-Trimethoprim] Other (See Comments)     AILYN   • Nsaids      Annotation - 2017: unable to take due to use of lithium.   • Oxycodone Rash       Medications:     Current Outpatient Medications:   •  acetaminophen (TYLENOL) 500 mg tablet, Take 500 mg by mouth as needed for mild pain., Disp: , Rfl:   •  albuterol (PROVENTIL HFA,VENTOLIN HFA) 90 mcg/act inhaler, , Disp: , Rfl:   •  amLODIPine (NORVASC) 5 mg tablet, TAKE 1 TABLET BY MOUTH DAILY, Disp: 90 tablet, Rfl: 3  •  aspirin (ECOTRIN LOW STRENGTH) 81 mg EC tablet, Take 81 mg by mouth daily, Disp: , Rfl:   •  atorvastatin (LIPITOR) 40 mg tablet, TAKE 1 TABLET BY MOUTH DAILY, Disp: 90 tablet, Rfl: 1  •  Cholecalciferol (VITAMIN D PO), Take 2,000 Units by mouth daily  , Disp: , Rfl:   •  dicyclomine (BENTYL) 20 mg tablet, TAKE 1 TABLET BY MOUTH EVERY 6  HOURS AS NEEDED FOR ABDOMINAL  CRAMPING, Disp: 360 tablet, Rfl: 0  •  DULoxetine (CYMBALTA) 30 mg delayed release capsule, Take 1 capsule (30 mg total) by mouth daily, Disp: 90 capsule, Rfl: 1  •  fexofenadine (ALLEGRA) 180 MG tablet, Take 180 mg by mouth as needed Daily during the Summer, Disp: , Rfl:   •  fluticasone (FLONASE) 50 mcg/act nasal spray, 2 sprays into each nostril daily, Disp: , Rfl:   •  lithium carbonate (LITHOBID) 300 mg CR tablet, Take 300 mg by mouth daily at bedtime, Disp: , Rfl:   •  Magnesium 250 MG TABS, Take by mouth in the morning, Disp: , Rfl:   •  metoprolol succinate (TOPROL-XL) 25 mg 24 hr tablet, TAKE 1 TABLET BY MOUTH ONCE  DAILY, Disp: 90 tablet, Rfl: 1  •  nitroglycerin (NITRODUR) 0.2 mg/hr, APPLY 1 PATCH TOPICALLY ONCE   DAILY. LEAVE ON FOR 12 TO 14  HOURS THEN REMOVE FOR A  NITRATE-FREE INTERVAL OF 10 TO  12 HOURS, Disp: 90 patch, Rfl: 1  •  omega-3-acid ethyl esters (LOVAZA) 1 g capsule, TAKE 1 CAPSULE BY MOUTH 3 TIMES  DAILY, Disp: 270 capsule, Rfl: 1  •  omeprazole (PriLOSEC) 20 mg delayed release capsule, TAKE 1 CAPSULE BY MOUTH TWICE  DAILY BEFORE MEALS, Disp: 180 capsule, Rfl: 3  •  Sodium Zirconium Cyclosilicate (Lokelma) 10 g, Take 1 packet (10 g total) by mouth daily, Disp: 30 packet, Rfl: 5  •  Trelegy Ellipta 200-62.5-25 MCG/ACT AEPB inhaler, USE 1 INHALATION BY MOUTH DAILY, Disp: 180 each, Rfl: 3      Vitals:    05/12/25 0928   BP: 142/75   Pulse: 67   SpO2: 99%     Weight (last 2 days)     None        Physical Exam  Constitutional:       General: He is not in acute distress.     Appearance: He is not diaphoretic.   HENT:      Head: Normocephalic and atraumatic.     Eyes:      General: No scleral icterus.     Conjunctiva/sclera: Conjunctivae normal.     Neck:      Vascular: No JVD.     Cardiovascular:      Rate and Rhythm: Normal rate and regular rhythm.      Heart sounds: Normal heart sounds. No murmur heard.  Pulmonary:      Effort: Pulmonary effort is normal. No respiratory distress.      Breath sounds: Normal breath sounds. No decreased breath sounds, wheezing, rhonchi or rales.     Musculoskeletal:      Cervical back: Normal range of motion.      Right lower leg: Normal. No edema.      Left lower leg: Normal. No edema.     Skin:     General: Skin is warm and dry.     Neurological:      Mental Status: He is alert and oriented to person, place, and time.         Laboratory Studies:    Laboratory studies personally reviewed    Cardiac testing:     EKG reviewed personally:   No results found for this visit on 05/12/25.        Echocardiogram:      Stress tests:      Catheterization:      Holter:         Cameron Michele MD    Portions of the record may have been created with voice recognition software.  Occasional wrong  "word or \"sound a like\" substitutions may have occurred due to the inherent limitations of voice recognition software.  Read the chart carefully and recognize, using context, where substitutions have occurred.  "

## 2025-05-14 ENCOUNTER — HOSPITAL ENCOUNTER (OUTPATIENT)
Dept: RADIOLOGY | Facility: CLINIC | Age: 80
Discharge: HOME | End: 2025-05-14
Attending: ANESTHESIOLOGY | Admitting: ANESTHESIOLOGY
Payer: MEDICARE

## 2025-05-14 VITALS
SYSTOLIC BLOOD PRESSURE: 135 MMHG | OXYGEN SATURATION: 97 % | DIASTOLIC BLOOD PRESSURE: 83 MMHG | RESPIRATION RATE: 18 BRPM | HEART RATE: 67 BPM | TEMPERATURE: 97.1 F

## 2025-05-14 DIAGNOSIS — M54.16 LUMBAR RADICULOPATHY: ICD-10-CM

## 2025-05-14 PROCEDURE — 62323 NJX INTERLAMINAR LMBR/SAC: CPT | Performed by: ANESTHESIOLOGY

## 2025-05-14 RX ORDER — METHYLPREDNISOLONE ACETATE 80 MG/ML
80 INJECTION, SUSPENSION INTRA-ARTICULAR; INTRALESIONAL; INTRAMUSCULAR; PARENTERAL; SOFT TISSUE ONCE
Status: COMPLETED | OUTPATIENT
Start: 2025-05-14 | End: 2025-05-14

## 2025-05-14 RX ADMIN — IOHEXOL 1 ML: 300 INJECTION, SOLUTION INTRAVENOUS at 10:10

## 2025-05-14 RX ADMIN — METHYLPREDNISOLONE ACETATE 80 MG: 80 INJECTION, SUSPENSION INTRA-ARTICULAR; INTRALESIONAL; INTRAMUSCULAR; SOFT TISSUE at 10:12

## 2025-05-14 NOTE — H&P
History of Present Illness: The patient is a 79 y.o. male who presents with complaints of low back and leg pain.    Past Medical History:   Diagnosis Date    Allergic 2011    Allergic rhinitis 2015    Anxiety     occasional    Aortic aneurysm (HCC)     Benign colon polyp     Bipolar 1 disorder (HCC)     Cancer (HCC) 2007, 2011    Cardiac disease     MI    Cervical cord compression with myelopathy (HCC)     COPD (chronic obstructive pulmonary disease) (HCC)     Coronary artery disease 1995    Diverticulitis     Diverticulitis of colon prior to 2014    Diverticulosis     Emphysema of lung (HCC) 2012    Gait disturbance     uses cane, leg brace on right    GERD (gastroesophageal reflux disease)     Heart attack (HCC) 1996    Hx of resection of large bowel 05/03/2016    Hyperlipidemia     Hypertension     IBS (irritable bowel syndrome)     Inflammatory bowel disease 2012    Lumbar stenosis     Lung cancer (HCC)     2007 Left lower lobectomy and 2011 Right lung with surgery     Myocardial infarction (HCC)     involving other coronary artery    Prostate cancer (HCC)     Shortness of breath     Small bowel obstruction (HCC) 11/16/2016       Past Surgical History:   Procedure Laterality Date    ANGIOPLASTY      stent    CARDIAC CATHETERIZATION  1995    angioplasty    CARDIAC SURGERY      CERVICAL SPINE SURGERY      Cervical decompression with cervical fusion from C3-C7 for spinal stenosis.    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    LUNG LOBECTOMY Left     NV ARTHRD ANT INTERBODY MIN DSC CRV BELOW C2 N/A 05/02/2016     Procedure: Anterior cervical diskectomy C3/4, C5/6, C6/7 with anterior plate fixation fusion C3-7;  Posterior decompressive laminectomy C3-7 with lateral mass fixation fusion C3-7 (IMPULSE MONITORING);  Surgeon: Jose Luis Moore MD;  Location: [facility];  Service: Neurosurgery    VA ARTHRODESIS POSTERIOR INTERBODY [facility] LUMBAR N/A 01/30/2017    Procedure: L4-5 AND L5-S1 DECOMPRESSIVE FORAMINOTOMIES, TRANSFORAMINAL LUMBAR INTERBODY AND PEDICLE SCREW FIXATION FUSION L4-S1 (IMPULSE);  Surgeon: Jose Luis Moore MD;  Location: [facility];  Service: Neurosurgery    VA EVASC RPR DPLMNT AORTO-AORTIC NDGFT N/A 08/06/2018    Procedure: REPAIR ANEURYSM ENDOVASCULAR ABDOMINAL AORTIC  (EVAR) WITH BILATERAL PERCUTANEOUS FEMORAL ACCESS WITH ULTRASOUND GUIDANCE ON THE RIGHT AND PRE CLOSURE;  Surgeon: Shan Villalobos MD;  Location: [facility];  Service: Vascular    PROSTATECTOMY  2007    for prostate CA - no chemo/RT    SIGMOIDECTOMY      for divertic    SMALL INTESTINE SURGERY N/A 11/17/2016    Procedure: Exploratory Laparotomy, Lysis of adhesions to release small bowel obstruction;  Surgeon: Aminta Sim MD;  Location: [facility];  Service:     SPINE SURGERY  2016, 2017    TONSILLECTOMY  Y    1952       Current Medications[1]    Allergies[2]    Physical Exam:   Vitals:    05/14/25 0954   BP: (!) 172/88   Pulse: 66   Resp: 18   Temp: (!) 97.1 °F (36.2 °C)   SpO2: 98%     General: Awake, Alert, Oriented x 3, Mood and affect appropriate  Respiratory: Respirations even and unlabored  Cardiovascular: Peripheral pulses intact; no edema  Musculoskeletal Exam: antalgic gait    ASA Score: 3         Assessment:   1. Lumbar radiculopathy        Plan: L3-4 LESI         [1]   Current Outpatient Medications:     acetaminophen (TYLENOL) 500 mg tablet, Take 500 mg by mouth as needed for mild pain., Disp: , Rfl:     albuterol (PROVENTIL HFA,VENTOLIN HFA) 90 mcg/act inhaler, , Disp: , Rfl:     amLODIPine (NORVASC) 5 mg tablet, TAKE 1  TABLET BY MOUTH DAILY, Disp: 90 tablet, Rfl: 3    aspirin (ECOTRIN LOW STRENGTH) 81 mg EC tablet, Take 81 mg by mouth daily, Disp: , Rfl:     atorvastatin (LIPITOR) 40 mg tablet, TAKE 1 TABLET BY MOUTH DAILY, Disp: 90 tablet, Rfl: 1    Cholecalciferol (VITAMIN D PO), Take 2,000 Units by mouth daily  , Disp: , Rfl:     dicyclomine (BENTYL) 20 mg tablet, TAKE 1 TABLET BY MOUTH EVERY 6  HOURS AS NEEDED FOR ABDOMINAL  CRAMPING, Disp: 360 tablet, Rfl: 0    DULoxetine (CYMBALTA) 30 mg delayed release capsule, Take 1 capsule (30 mg total) by mouth daily, Disp: 90 capsule, Rfl: 1    fexofenadine (ALLEGRA) 180 MG tablet, Take 180 mg by mouth as needed Daily during the Summer, Disp: , Rfl:     fluticasone (FLONASE) 50 mcg/act nasal spray, 2 sprays into each nostril daily, Disp: , Rfl:     lithium carbonate (LITHOBID) 300 mg CR tablet, Take 300 mg by mouth daily at bedtime, Disp: , Rfl:     Magnesium 250 MG TABS, Take by mouth in the morning, Disp: , Rfl:     metoprolol succinate (TOPROL-XL) 25 mg 24 hr tablet, TAKE 1 TABLET BY MOUTH ONCE  DAILY, Disp: 90 tablet, Rfl: 1    nitroglycerin (NITRODUR) 0.2 mg/hr, APPLY 1 PATCH TOPICALLY ONCE  DAILY. LEAVE ON FOR 12 TO 14  HOURS THEN REMOVE FOR A  NITRATE-FREE INTERVAL OF 10 TO  12 HOURS, Disp: 90 patch, Rfl: 1    omega-3-acid ethyl esters (LOVAZA) 1 g capsule, TAKE 1 CAPSULE BY MOUTH 3 TIMES  DAILY, Disp: 270 capsule, Rfl: 1    omeprazole (PriLOSEC) 20 mg delayed release capsule, TAKE 1 CAPSULE BY MOUTH TWICE  DAILY BEFORE MEALS, Disp: 180 capsule, Rfl: 3    Sodium Zirconium Cyclosilicate (Lokelma) 10 g, Take 1 packet (10 g total) by mouth daily, Disp: 30 packet, Rfl: 5    Trelegy Ellipta 200-62.5-25 MCG/ACT AEPB inhaler, USE 1 INHALATION BY MOUTH DAILY, Disp: 180 each, Rfl: 3  [2]   Allergies  Allergen Reactions    Bactrim [Sulfamethoxazole-Trimethoprim] Other (See Comments)     AILYN    Nsaids      Annotation - [id number]: unable to take due to use of lithium.    Oxycodone Rash

## 2025-05-14 NOTE — DISCHARGE INSTR - LAB
Epidural Steroid Injection   WHAT YOU NEED TO KNOW:   An epidural steroid injection (DEVON) is a procedure to inject steroid medicine into the epidural space. The epidural space is between your spinal cord and vertebrae. Steroids reduce inflammation and fluid buildup in your spine that may be causing pain. You may be given pain medicine along with the steroids.          ACTIVITY  Do not drive or operate machinery today.  No strenuous activity today - bending, lifting, etc.  You may resume normal activites starting tomorrow - start slowly and as tolerated.  You may shower today, but no tub baths or hot tubs.  You may have numbness for several hours from the local anesthetic. Please use caution and common sense, especially with weight-bearing activities.    CARE OF THE INJECTION SITE  If you have soreness or pain, apply ice to the area today (20 minutes on/20 minutes off).  Starting tomorrow, you may use warm, moist heat or ice if needed.  You may have an increase or change in your discomfort for 36-48 hours after your treatment.  Apply ice and continue with any pain medication you have been prescribed.  Notify the [facility] if you have any of the following: redness, drainage, swelling, headache, stiff neck or fever above 100°F.    SPECIAL INSTRUCTIONS  Our office will contact you in approximately 14 days for a progress report.    MEDICATIONS  Continue to take all routine medications.  Our office may have instructed you to hold some medications.    As no general anesthesia was used in today's procedure, you should not experience any side effects related to anesthesia.     If you are diabetic, the steroids used in today's injection may temporarily increase your blood sugar levels after the first few days after your injection. Please keep a close eye on your sugars and alert the doctor who manages your diabetes if your sugars are significantly high from your baseline or you are symptomatic.     If you have a  problem specifically related to your procedure, please call our office at [phone].  Problems not related to your procedure should be directed to your primary care physician.

## 2025-05-16 ENCOUNTER — APPOINTMENT (OUTPATIENT)
Dept: PHYSICAL THERAPY | Facility: REHABILITATION | Age: 80
End: 2025-05-16
Payer: MEDICARE

## 2025-05-23 ENCOUNTER — APPOINTMENT (OUTPATIENT)
Dept: PHYSICAL THERAPY | Facility: REHABILITATION | Age: 80
End: 2025-05-23
Payer: MEDICARE

## 2025-05-28 ENCOUNTER — TELEPHONE (OUTPATIENT)
Dept: PAIN MEDICINE | Facility: CLINIC | Age: 80
End: 2025-05-28

## 2025-05-28 NOTE — TELEPHONE ENCOUNTER
Caller: anne-marie Lancaster   Doctor/office: Dr covington  CB#: [phone]    % of improvement:50    Pain Scale (1-10): pain ranges from 3 to 7     Pain has improved but depends on the time of day

## 2025-06-04 RX ORDER — SODIUM FLUORIDE 6 MG/ML
PASTE, DENTIFRICE DENTAL 2 TIMES DAILY
COMMUNITY
Start: 2025-05-20

## 2025-06-05 ENCOUNTER — RA CDI HCC (OUTPATIENT)
Dept: OTHER | Facility: HOSPITAL | Age: 80
End: 2025-06-05

## 2025-06-06 ENCOUNTER — OFFICE VISIT (OUTPATIENT)
Dept: INTERNAL MEDICINE CLINIC | Facility: CLINIC | Age: 80
End: 2025-06-06

## 2025-06-06 VITALS
SYSTOLIC BLOOD PRESSURE: 136 MMHG | OXYGEN SATURATION: 98 % | TEMPERATURE: 97 F | BODY MASS INDEX: 28.52 KG/M2 | WEIGHT: 171.4 LBS | HEART RATE: 59 BPM | DIASTOLIC BLOOD PRESSURE: 78 MMHG

## 2025-06-06 DIAGNOSIS — E83.42 HYPOMAGNESEMIA: ICD-10-CM

## 2025-06-06 DIAGNOSIS — I10 ESSENTIAL HYPERTENSION: ICD-10-CM

## 2025-06-06 DIAGNOSIS — J44.9 CHRONIC OBSTRUCTIVE PULMONARY DISEASE, UNSPECIFIED COPD TYPE (HCC): ICD-10-CM

## 2025-06-06 DIAGNOSIS — E78.2 MIXED HYPERLIPIDEMIA: ICD-10-CM

## 2025-06-06 DIAGNOSIS — Z01.818 PREOP EXAM FOR INTERNAL MEDICINE: Primary | ICD-10-CM

## 2025-06-06 NOTE — ASSESSMENT & PLAN NOTE
Patient is medically cleared for surgery, no acute cardiopulmonary complaints, exam today is normal.  EKG done today shows Sinus bradycardia 56 bpm, normal axis, no ischemic changes, EKG okay.  Patient can hold aspirin and fish oil a week before surgery  Orders:  •  POCT ECG

## 2025-06-06 NOTE — ASSESSMENT & PLAN NOTE
Patient has chronic shortness of breath with exertion which is stable, continue current inhalers, pulse ox 98% today

## 2025-06-06 NOTE — PROGRESS NOTES
Name: Tonny Baig      : 1945      MRN: [id number]  Encounter Provider: Brendan Suarez MD  Encounter Date: 2025   Encounter department: [facility]  :  Assessment & Plan  Preop exam for internal medicine  Patient is medically cleared for surgery, no acute cardiopulmonary complaints, exam today is normal.  EKG done today shows Sinus bradycardia 56 bpm, normal axis, no ischemic changes, EKG okay.  Patient can hold aspirin and fish oil a week before surgery  Orders:  •  POCT ECG    Essential hypertension  Blood pressure okay, continue current meds       Chronic obstructive pulmonary disease, unspecified COPD type (HCC)  Patient has chronic shortness of breath with exertion which is stable, continue current inhalers, pulse ox 98% today       Mixed hyperlipidemia  Continue atorvastatin along with healthy diet       Hypomagnesemia    Orders:  •  Magnesium; Future           History of Present Illness   Patient here for regular follow-up, patient also is here for preop evaluation for eye surgery.    No acute cardiopulmonary complaints, patient has some chronic shortness of breath with exertion, no chest pain, no lightheadedness.  No problems with anesthesia in the past.  Patient uses rolling walker to get around to help prevent falls, and to help with back pain.      Review of Systems   Constitutional:  Negative for chills, fatigue and fever.   HENT:  Negative for congestion, nosebleeds, postnasal drip, sore throat and trouble swallowing.    Eyes:  Negative for pain.   Respiratory:  Positive for shortness of breath (chronic with exertion). Negative for cough, chest tightness and wheezing.    Cardiovascular:  Negative for chest pain, palpitations and leg swelling.   Gastrointestinal:  Negative for abdominal pain, constipation, diarrhea, nausea and vomiting.   Endocrine: Negative for polydipsia and polyuria.   Genitourinary:  Negative for dysuria, flank pain and hematuria.    Musculoskeletal:  Negative for arthralgias.   Skin:  Negative for rash.   Neurological:  Negative for dizziness, tremors, light-headedness and headaches.   Hematological:  Does not bruise/bleed easily.   Psychiatric/Behavioral:  Negative for confusion and dysphoric mood. The patient is not nervous/anxious.        Objective   /78   Pulse 59   Temp (!) 97 °F (36.1 °C) (Tympanic)   Wt 77.7 kg (171 lb 6.4 oz)   SpO2 98%   BMI 28.52 kg/m²      Physical Exam  Vitals reviewed.   Constitutional:       General: He is not in acute distress.     Appearance: Normal appearance. He is well-developed.   HENT:      Head: Normocephalic and atraumatic.      Right Ear: External ear normal.      Left Ear: External ear normal.      Nose: Nose normal.     Eyes:      General: No scleral icterus.     Conjunctiva/sclera: Conjunctivae normal.     Neck:      Trachea: No tracheal deviation.     Cardiovascular:      Rate and Rhythm: Normal rate and regular rhythm.      Heart sounds: Normal heart sounds. No murmur heard.  Pulmonary:      Effort: Pulmonary effort is normal. No respiratory distress.      Breath sounds: Normal breath sounds. No wheezing or rales.   Abdominal:      General: Bowel sounds are normal.     Musculoskeletal:      Cervical back: Normal range of motion and neck supple.      Right lower leg: Edema (mild) present.      Left lower leg: Edema (mild) present.   Lymphadenopathy:      Cervical: No cervical adenopathy.     Skin:     Coloration: Skin is not jaundiced or pale.     Neurological:      General: No focal deficit present.      Mental Status: He is alert and oriented to person, place, and time.     Psychiatric:         Mood and Affect: Mood normal.         Behavior: Behavior normal.         Thought Content: Thought content normal.         Judgment: Judgment normal.

## 2025-06-06 NOTE — LETTER
2025     Cassius Shahid DO  [address] [facility] [place] [address]  [facility] [address]    Patient: Tonny Baig   YOB: 1945   Date of Visit: 2025       Dear Dr. Cassius Shahid DO:    Thank you for referring Tonny Baig to me for evaluation. Below are my notes for this consultation.    If you have questions, please do not hesitate to call me. I look forward to following your patient along with you.         Sincerely,        Brendan Suarez MD        CC: No Recipients    Brendan Suarez MD  2025  9:30 AM  Incomplete  Name: Tonny Baig      : 1945      MRN: [id number]  Encounter Provider: Brendan Suarez MD  Encounter Date: 2025   Encounter department: [facility]  :  Assessment & Plan  Essential hypertension  Blood pressure okay, continue current meds       Chronic obstructive pulmonary disease, unspecified COPD type (HCC)  Patient has chronic shortness of breath with exertion which is stable, continue current inhalers, pulse ox 98% today       Mixed hyperlipidemia  Continue atorvastatin along with healthy diet       Hypomagnesemia    Orders:  •  Magnesium; Future    Preop exam for internal medicine    Orders:  •  POCT ECG           History of Present Illness  Patient here for regular follow-up, patient also is here for preop evaluation for eye surgery.    No acute cardiopulmonary complaints, patient has some chronic shortness of breath with exertion, no chest pain, no lightheadedness.  No problems with anesthesia in the past.  Patient uses rolling walker to get around to help prevent falls, and to help with back pain.      Review of Systems   Constitutional:  Negative for chills, fatigue and fever.   HENT:  Negative for congestion, nosebleeds, postnasal drip, sore throat and trouble swallowing.    Eyes:  Negative for pain.   Respiratory:  Positive for shortness of breath (chronic with exertion). Negative for cough, chest tightness and wheezing.     Cardiovascular:  Negative for chest pain, palpitations and leg swelling.   Gastrointestinal:  Negative for abdominal pain, constipation, diarrhea, nausea and vomiting.   Endocrine: Negative for polydipsia and polyuria.   Genitourinary:  Negative for dysuria, flank pain and hematuria.   Musculoskeletal:  Negative for arthralgias.   Skin:  Negative for rash.   Neurological:  Negative for dizziness, tremors, light-headedness and headaches.   Hematological:  Does not bruise/bleed easily.   Psychiatric/Behavioral:  Negative for confusion and dysphoric mood. The patient is not nervous/anxious.        Objective  /78   Pulse 59   Temp (!) 97 °F (36.1 °C) (Tympanic)   Wt 77.7 kg (171 lb 6.4 oz)   SpO2 98%   BMI 28.52 kg/m²      Physical Exam  Vitals reviewed.   Constitutional:       General: He is not in acute distress.     Appearance: Normal appearance. He is well-developed.   HENT:      Head: Normocephalic and atraumatic.      Right Ear: External ear normal.      Left Ear: External ear normal.      Nose: Nose normal.     Eyes:      General: No scleral icterus.     Conjunctiva/sclera: Conjunctivae normal.     Neck:      Trachea: No tracheal deviation.     Cardiovascular:      Rate and Rhythm: Normal rate and regular rhythm.      Heart sounds: Normal heart sounds. No murmur heard.  Pulmonary:      Effort: Pulmonary effort is normal. No respiratory distress.      Breath sounds: Normal breath sounds. No wheezing or rales.   Abdominal:      General: Bowel sounds are normal.     Musculoskeletal:      Cervical back: Normal range of motion and neck supple.      Right lower leg: Edema (mild) present.      Left lower leg: Edema (mild) present.   Lymphadenopathy:      Cervical: No cervical adenopathy.     Skin:     Coloration: Skin is not jaundiced or pale.     Neurological:      General: No focal deficit present.      Mental Status: He is alert and oriented to person, place, and time.     Psychiatric:         Mood and  Affect: Mood normal.         Behavior: Behavior normal.         Thought Content: Thought content normal.         Judgment: Judgment normal.           Brendan Suarez MD  2025  9:29 AM  Sign when Signing Visit  Name: Tonny Baig      : 1945      MRN: [id number]  Encounter Provider: Brendan Suarez MD  Encounter Date: 2025   Encounter department: [facility]  :  Assessment & Plan  Essential hypertension  Blood pressure okay, continue current meds       Chronic obstructive pulmonary disease, unspecified COPD type (HCC)  Patient has chronic shortness of breath with exertion which is stable, continue current inhalers, pulse ox 98% today       Mixed hyperlipidemia  Continue atorvastatin along with healthy diet       Hypomagnesemia    Orders:  •  Magnesium; Future    Preop exam for internal medicine    Orders:  •  POCT ECG           History of Present Illness  Patient here for regular follow-up, patient also is here for preop evaluation for eye surgery.    No acute cardiopulmonary complaints, patient has some chronic shortness of breath with exertion, no chest pain, no lightheadedness.  No problems with anesthesia in the past.  Patient uses rolling walker to get around to help prevent falls, and to help with back pain.    Review of Systems   Constitutional:  Negative for chills, fatigue and fever.   HENT:  Negative for congestion, nosebleeds, postnasal drip, sore throat and trouble swallowing.    Eyes:  Negative for pain.   Respiratory:  Positive for shortness of breath (chronic with exertion). Negative for cough, chest tightness and wheezing.    Cardiovascular:  Negative for chest pain, palpitations and leg swelling.   Gastrointestinal:  Negative for abdominal pain, constipation, diarrhea, nausea and vomiting.   Endocrine: Negative for polydipsia and polyuria.   Genitourinary:  Negative for dysuria, flank pain and hematuria.   Musculoskeletal:  Negative for arthralgias.   Skin:   Negative for rash.   Neurological:  Negative for dizziness, tremors, light-headedness and headaches.   Hematological:  Does not bruise/bleed easily.   Psychiatric/Behavioral:  Negative for confusion and dysphoric mood. The patient is not nervous/anxious.        Objective  /78   Pulse 59   Temp (!) 97 °F (36.1 °C) (Tympanic)   Wt 77.7 kg (171 lb 6.4 oz)   SpO2 98%   BMI 28.52 kg/m²      Physical Exam  Vitals reviewed.   Constitutional:       General: He is not in acute distress.     Appearance: Normal appearance. He is well-developed.   HENT:      Head: Normocephalic and atraumatic.      Right Ear: External ear normal.      Left Ear: External ear normal.      Nose: Nose normal.     Eyes:      General: No scleral icterus.     Conjunctiva/sclera: Conjunctivae normal.     Neck:      Trachea: No tracheal deviation.     Cardiovascular:      Rate and Rhythm: Normal rate and regular rhythm.      Heart sounds: Normal heart sounds. No murmur heard.  Pulmonary:      Effort: Pulmonary effort is normal. No respiratory distress.      Breath sounds: Normal breath sounds. No wheezing or rales.   Abdominal:      General: Bowel sounds are normal.     Musculoskeletal:      Cervical back: Normal range of motion and neck supple.      Right lower leg: Edema (mild) present.      Left lower leg: Edema (mild) present.   Lymphadenopathy:      Cervical: No cervical adenopathy.     Skin:     Coloration: Skin is not jaundiced or pale.     Neurological:      General: No focal deficit present.      Mental Status: He is alert and oriented to person, place, and time.     Psychiatric:         Mood and Affect: Mood normal.         Behavior: Behavior normal.         Thought Content: Thought content normal.         Judgment: Judgment normal.

## 2025-06-06 NOTE — PATIENT INSTRUCTIONS
Problem List Items Addressed This Visit          Cardiovascular and Mediastinum    Essential hypertension    Blood pressure okay, continue current meds               Respiratory    COPD (chronic obstructive pulmonary disease) (HCC)    Patient has chronic shortness of breath with exertion which is stable, continue current inhalers, pulse ox 98% today               Surgery/Wound/Pain    Preop exam for internal medicine - Primary      Orders:    POCT ECG         Relevant Orders    POCT ECG (Completed)       Other    Mixed hyperlipidemia    Continue atorvastatin along with healthy diet            Hypomagnesemia      Orders:    Magnesium; Future         Relevant Orders    Magnesium

## 2025-06-09 ENCOUNTER — OFFICE VISIT (OUTPATIENT)
Dept: PHYSICAL THERAPY | Facility: REHABILITATION | Age: 80
End: 2025-06-09
Payer: MEDICARE

## 2025-06-09 DIAGNOSIS — M70.62 TROCHANTERIC BURSITIS OF LEFT HIP: Primary | ICD-10-CM

## 2025-06-09 DIAGNOSIS — G89.29 CHRONIC BILATERAL LOW BACK PAIN WITH LEFT-SIDED SCIATICA: ICD-10-CM

## 2025-06-09 DIAGNOSIS — M54.42 CHRONIC BILATERAL LOW BACK PAIN WITH LEFT-SIDED SCIATICA: ICD-10-CM

## 2025-06-09 DIAGNOSIS — I25.10 CORONARY ARTERY DISEASE INVOLVING NATIVE CORONARY ARTERY OF NATIVE HEART WITHOUT ANGINA PECTORIS: ICD-10-CM

## 2025-06-09 PROCEDURE — 97110 THERAPEUTIC EXERCISES: CPT | Performed by: PHYSICAL THERAPIST

## 2025-06-09 RX ORDER — ATORVASTATIN CALCIUM 40 MG/1
40 TABLET, FILM COATED ORAL DAILY
Qty: 90 TABLET | Refills: 1 | Status: SHIPPED | OUTPATIENT
Start: 2025-06-09

## 2025-06-09 NOTE — PROGRESS NOTES
"Daily Note     Today's date: 2025  Patient name: Tonny Baig  : 1945  MRN: [id number]  Referring provider: Michelle Ayoub PA-C  Dx:   Encounter Diagnosis     ICD-10-CM    1. Trochanteric bursitis of left hip  M70.62       2. Chronic bilateral low back pain with left-sided sciatica  G89.29     M54.42           Start Time: 1005  Stop Time: 1045  Total time in clinic (min): 40 minutes    Subjective: Patient reports doing okay, does get more hip pain after seated band pull apart but does feel better after LS flexion exercises and STS.      Objective: See treatment diary below      Assessment: Tolerated treatment well. Patient did well with exercises today as he was able to achieve STS without compensation, posterior lean. Discussed HEP updated with stopping seated hip ABD JULEE due to increased hip pain. He will continue with RFIS and STS as these to improve his pain per subjective.       Plan: Continue per plan of care.      Precautions: AAA, CKD      Manuals                                                                 Neuro Re-Ed             Seated hip ABD                                                                                           Ther Ex          VG 3x20 L5 3'  L5 50+25 L5 3x20  L7         LAQ             Bridge RHB  2x10 RHB  2x10 RHB  2x10 DC         Hooklying clam   RHB  10\"x10 DC         Side stepping at  rail             Michel walk outs 15#  10+8 10#  3+3+3 10#  2x10 10#  2x10         STS + foam  2x10 + foam  2x10 + foam 2x10 + foam  2x10         JULEE hip ABD RHB  10\"x5  2x10 with ABD            Posterior chain endurance  5 KG ball  2x 30\" NP          Ther Activity             STS                          Gait Training                                       Modalities                                                  "

## 2025-06-11 ENCOUNTER — TELEPHONE (OUTPATIENT)
Age: 80
End: 2025-06-11

## 2025-06-11 NOTE — TELEPHONE ENCOUNTER
Spoke with Aylin from [facility], following up on request for cardiac risk assessment.    CC created on 6/4/25, just waiting on signature.    Procedure is 6/26/25.

## 2025-06-12 ENCOUNTER — APPOINTMENT (OUTPATIENT)
Dept: LAB | Facility: CLINIC | Age: 80
End: 2025-06-12
Attending: INTERNAL MEDICINE
Payer: MEDICARE

## 2025-06-12 DIAGNOSIS — E83.42 HYPOMAGNESEMIA: ICD-10-CM

## 2025-06-12 DIAGNOSIS — N18.9 CHRONIC KIDNEY DISEASE, UNSPECIFIED CKD STAGE: ICD-10-CM

## 2025-06-12 LAB
ANION GAP SERPL CALCULATED.3IONS-SCNC: 8 MMOL/L (ref 4–13)
BUN SERPL-MCNC: 39 MG/DL (ref 5–25)
CALCIUM SERPL-MCNC: 9.7 MG/DL (ref 8.4–10.2)
CHLORIDE SERPL-SCNC: 108 MMOL/L (ref 96–108)
CO2 SERPL-SCNC: 24 MMOL/L (ref 21–32)
CREAT SERPL-MCNC: 1.79 MG/DL (ref 0.6–1.3)
GFR SERPL CREATININE-BSD FRML MDRD: 35 ML/MIN/1.73SQ M
GLUCOSE P FAST SERPL-MCNC: 97 MG/DL (ref 65–99)
MAGNESIUM SERPL-MCNC: 2.1 MG/DL (ref 1.9–2.7)
POTASSIUM SERPL-SCNC: 5.2 MMOL/L (ref 3.5–5.3)
SODIUM SERPL-SCNC: 140 MMOL/L (ref 135–147)

## 2025-06-12 PROCEDURE — 80048 BASIC METABOLIC PNL TOTAL CA: CPT

## 2025-06-12 PROCEDURE — 36415 COLL VENOUS BLD VENIPUNCTURE: CPT

## 2025-06-12 PROCEDURE — 83735 ASSAY OF MAGNESIUM: CPT

## 2025-06-13 ENCOUNTER — RESULTS FOLLOW-UP (OUTPATIENT)
Dept: INTERNAL MEDICINE CLINIC | Facility: CLINIC | Age: 80
End: 2025-06-13

## 2025-06-13 ENCOUNTER — APPOINTMENT (OUTPATIENT)
Dept: PHYSICAL THERAPY | Facility: REHABILITATION | Age: 80
End: 2025-06-13
Payer: MEDICARE

## 2025-06-16 DIAGNOSIS — M54.16 LUMBAR RADICULOPATHY: Primary | ICD-10-CM

## 2025-06-17 ENCOUNTER — TELEPHONE (OUTPATIENT)
Age: 80
End: 2025-06-17

## 2025-06-17 ENCOUNTER — OFFICE VISIT (OUTPATIENT)
Dept: NEPHROLOGY | Facility: CLINIC | Age: 80
End: 2025-06-17
Payer: MEDICARE

## 2025-06-17 VITALS
HEIGHT: 65 IN | WEIGHT: 174.8 LBS | SYSTOLIC BLOOD PRESSURE: 142 MMHG | HEART RATE: 70 BPM | BODY MASS INDEX: 29.12 KG/M2 | DIASTOLIC BLOOD PRESSURE: 78 MMHG

## 2025-06-17 DIAGNOSIS — I70.90: ICD-10-CM

## 2025-06-17 DIAGNOSIS — N18.9 CHRONIC KIDNEY DISEASE, UNSPECIFIED CKD STAGE: Primary | ICD-10-CM

## 2025-06-17 PROCEDURE — 99214 OFFICE O/P EST MOD 30 MIN: CPT | Performed by: INTERNAL MEDICINE

## 2025-06-17 NOTE — ASSESSMENT & PLAN NOTE
Lab Results   Component Value Date    EGFR 35 06/12/2025    EGFR 35 04/28/2025    EGFR 35 03/11/2025    CREATININE 1.79 (H) 06/12/2025    CREATININE 1.82 (H) 04/28/2025    CREATININE 1.79 (H) 03/11/2025     Patient is chronic renal insufficiency with insignificant levels of proteinuria in the past.  He has been on lithium for many many years so he may have a little bit of chronic interstitial disease or nephrosclerosis.  Fortunately over the years I been following there has not been significant progression of his kidney disease and his creatinine is actually 1.7.  So there has been no progression since last visit and it is actually a little bit lower.  The patient continues on lithium and he should continue this as he is very well compensated from a psychiatric condition and I would not discontinue the medication.  Just focus on blood pressure control and routine health management and he has a good prognosis.    Continue current medications  Labs and follow-up as scheduled and I told him to call if there is any problems before next visit.    Orders:    Basic metabolic panel; Future

## 2025-06-17 NOTE — TELEPHONE ENCOUNTER
Caller: Tonny Baig    Doctor: Dr. Michele    Reason for call: pt states that [facility] is now only filling 1 month supply at a time for his omega 3 prescription. [facility] told pt that if the doctor requests they fill 3 months at a time, they will do it, but they need the provider to reach out to them. Can someone please reach out to [facility] and request this for patient?. Please advise pt when done.    Call back#: [phone]

## 2025-06-17 NOTE — PATIENT INSTRUCTIONS
You are here for follow-up it is always great to see you and glad to hear your health is doing well and your wit is as sharp as a tack.    Your creatinine was 1.7 and as you know and we went over that is actually little better than last visit so there is been no progression.  Your blood pressure is borderline today but it is generally under good control so for now no progression sounds like you are doing great just continue your current medications and follow-up as scheduled.  Of course call me if there is any problems before next visit.

## 2025-06-17 NOTE — PROGRESS NOTES
Name: Tonny Baig      : 1945      MRN: [id number]  Encounter Provider: Gilson Heller MD  Encounter Date: 2025   Encounter department: [facility]  :  Assessment & Plan  Chronic kidney disease, unspecified CKD stage  Lab Results   Component Value Date    EGFR 35 2025    EGFR 35 2025    EGFR 35 2025    CREATININE 1.79 (H) 2025    CREATININE 1.82 (H) 2025    CREATININE 1.79 (H) 2025     Patient is chronic renal insufficiency with insignificant levels of proteinuria in the past.  He has been on lithium for many many years so he may have a little bit of chronic interstitial disease or nephrosclerosis.  Fortunately over the years I been following there has not been significant progression of his kidney disease and his creatinine is actually 1.7.  So there has been no progression since last visit and it is actually a little bit lower.  The patient continues on lithium and he should continue this as he is very well compensated from a psychiatric condition and I would not discontinue the medication.  Just focus on blood pressure control and routine health management and he has a good prognosis.    Continue current medications  Labs and follow-up as scheduled and I told him to call if there is any problems before next visit.    Orders:    Basic metabolic panel; Future    I have spent a total time of 30 minutes in caring for this patient on the day of the visit/encounter including Diagnostic results, Prognosis, Impressions, Reviewing/placing orders in the medical record (including tests, medications, and/or procedures), and Obtaining or reviewing history  .     History of Present Illness   HPI  Tonny Baig is a 79 y.o. male who presents for routine follow-up.  The patient presents today he is in good spirits very gracious and jovial as usual.  He reports no significant changes in his medical state and no specific complaints for me  "today.  History obtained from: patient    Review of Systems   Constitutional:  Negative for chills, diaphoresis and fever.   HENT: Negative.     Eyes: Negative.    Respiratory:  Negative for cough, chest tightness, shortness of breath and wheezing.    Cardiovascular:  Negative for chest pain.        Ankle swelling at times   Gastrointestinal:  Negative for abdominal pain, diarrhea, nausea and vomiting.   Genitourinary: Negative.    Neurological:  Negative for headaches.   Psychiatric/Behavioral:  Negative for agitation, behavioral problems, confusion and decreased concentration.           Objective   /78 (BP Location: Left arm, Patient Position: Sitting, Cuff Size: Standard)   Pulse 70   Ht 5' 5\" (1.651 m)   Wt 79.3 kg (174 lb 12.8 oz)   BMI 29.09 kg/m²      Physical Exam  Constitutional:       General: He is not in acute distress.     Appearance: He is not toxic-appearing.   HENT:      Head: Normocephalic and atraumatic.      Nose: Nose normal.      Mouth/Throat:      Mouth: Mucous membranes are moist.     Eyes:      Extraocular Movements: Extraocular movements intact.       Cardiovascular:      Rate and Rhythm: Normal rate and regular rhythm.      Heart sounds:      No gallop.   Pulmonary:      Effort: Pulmonary effort is normal. No respiratory distress.      Breath sounds: No wheezing or rales.   Abdominal:      General: There is no distension.      Palpations: Abdomen is soft.      Tenderness: There is no abdominal tenderness.     Neurological:      Mental Status: He is alert and oriented to person, place, and time.     Psychiatric:         Mood and Affect: Mood normal.         Behavior: Behavior normal.           "

## 2025-06-18 RX ORDER — OMEGA-3-ACID ETHYL ESTERS 1 G/1
1 CAPSULE, LIQUID FILLED ORAL 3 TIMES DAILY
Qty: 270 CAPSULE | Refills: 3 | Status: SHIPPED | OUTPATIENT
Start: 2025-06-18

## 2025-06-24 ENCOUNTER — APPOINTMENT (OUTPATIENT)
Dept: PHYSICAL THERAPY | Facility: REHABILITATION | Age: 80
End: 2025-06-24
Payer: MEDICARE

## 2025-06-26 ENCOUNTER — SURGERY/PROCEDURE (OUTPATIENT)
Dept: URBAN - METROPOLITAN AREA SURGICAL CENTER 6 | Facility: SURGICAL CENTER | Age: 80
End: 2025-06-26

## 2025-06-26 DIAGNOSIS — H02.412: ICD-10-CM

## 2025-06-26 PROCEDURE — 67904 REPAIR EYELID DEFECT: CPT

## 2025-07-02 ENCOUNTER — HOSPITAL ENCOUNTER (OUTPATIENT)
Dept: NON INVASIVE DIAGNOSTICS | Facility: CLINIC | Age: 80
Discharge: HOME | End: 2025-07-02
Attending: NURSE PRACTITIONER
Payer: MEDICARE

## 2025-07-02 DIAGNOSIS — I71.43 INFRARENAL ABDOMINAL AORTIC ANEURYSM (AAA) WITHOUT RUPTURE (HCC): Chronic | ICD-10-CM

## 2025-07-02 DIAGNOSIS — Z86.79 S/P ENDOVASCULAR ANEURYSM REPAIR: ICD-10-CM

## 2025-07-02 DIAGNOSIS — Z98.890 S/P ENDOVASCULAR ANEURYSM REPAIR: ICD-10-CM

## 2025-07-02 PROCEDURE — 93978 VASCULAR STUDY: CPT | Performed by: SURGERY

## 2025-07-02 PROCEDURE — 93978 VASCULAR STUDY: CPT

## 2025-07-02 PROCEDURE — 93923 UPR/LXTR ART STDY 3+ LVLS: CPT

## 2025-07-03 ENCOUNTER — 1 WEEK POST-OP (OUTPATIENT)
Dept: URBAN - METROPOLITAN AREA CLINIC 6 | Facility: CLINIC | Age: 80
End: 2025-07-03

## 2025-07-03 DIAGNOSIS — Z98.890: ICD-10-CM

## 2025-07-03 PROCEDURE — 99024 POSTOP FOLLOW-UP VISIT: CPT

## 2025-07-03 ASSESSMENT — VISUAL ACUITY
OD_SC: 20/25
OS_SC: 20/30

## 2025-07-03 ASSESSMENT — TONOMETRY
OS_IOP_MMHG: 12
OD_IOP_MMHG: 11

## 2025-07-07 NOTE — PROGRESS NOTES
Name: Tonny Baig      : 1945      MRN: [id number]  Encounter Provider: ELSA Alegria  Encounter Date: 2025   Encounter department: [facility]  :  Assessment & Plan  Infrarenal abdominal aortic aneurysm (AAA) without rupture (HCC)  79-year-old male with HTN, HLD, COPD, CAD, lung CA '07 +'11, CKD,lumbar DDD, chronic back and hip pain, lumbar sx, Parkinson's, 5.2 cm infrarenal AAA s/p EVAR by Dr. Villalobos '18      Patient returns the office to review EVAR duplex 2025    -EVAR duplex showed a widely patent endograft without evidence of endoleak, sac size 4.8 1X4.4 to centimeters, aorta proximal to graft 2.5 cm, iliac arteries distal graft 1.28 cm on the right and 1.0 cm on the left.  -Bilateral renal arteries, celiac, SMA are patent.  Right MIKO noncompressible/153 GTP and left MIKO 1.26/   -Stable comparison to prior duplex  -Blood pressure adequately controlled  -Repeat duplex in 1 year  -Will also evaluate lower extremity arterial duplex to measure popliteal artery size  -Follow-up in the office in 1 year    Orders:     VAS EVAR DUPLEX EVALUATION; Future    VAS ARTERIAL DUPLEX- LOWER LIMB BILATERAL; Future    Localized edema  -Bilateral lower extremity swelling  -No associated discomfort  -Double layer Tubigrip sleeve, size E.  Wear daily and remove at night  -Rx for box of Tubigrip given  -Additionally calf pump exercises and periodic leg elevation    Orders:    Compression bandages        History of Present Illness   Tonny Baig is a 79 y.o. male who presents to the office for yearly visit to review EVAR duplex.  AAA status post EVAR in 2018.  He has chronic back pain follows at pain management..  Ambulates with rollator.    Pt is here for RR of EVAR duplex done on 25. Pt denies apetite changes, pain after eating, and lower back pain.    History obtained from: patient    Review of Systems   Constitutional:  Negative for appetite change.  "  Musculoskeletal:  Negative for back pain.     Medical History Reviewed by provider this encounter:  Tobacco  Allergies  Meds  Problems  Med Hx  Surg Hx  Fam Hx     .     Objective   I have reviewed and made appropriate changes to the review of systems input by the medical assistant.    Vitals:    25 1028 25 1031   BP: 148/74 118/68   BP Location: Right arm Left arm   Patient Position: Sitting Sitting   Cuff Size: Large Large   Pulse: 57    SpO2: 98%    Weight: 81.1 kg (178 lb 14.4 oz)    Height: 5' 5\" (1.651 m)        Problem List[1]    Past Surgical History[2]    Family History[3]    Social History     Socioeconomic History    Marital status: /Civil Union     Spouse name: Not on file    Number of children: 1    Years of education: Not on file    Highest education level: Not on file   Occupational History    Occupation: Retired   Tobacco Use    Smoking status: Former     Current packs/day: 0.00     Average packs/day: 1 pack/day for 44.0 years (44.0 ttl pk-yrs)     Types: Cigarettes     Start date: 1967     Quit date: 2011     Years since quittin.5     Passive exposure: Past    Smokeless tobacco: Never   Vaping Use    Vaping status: Never Used   Substance and Sexual Activity    Alcohol use: Yes     Alcohol/week: 2.0 - 3.0 standard drinks of alcohol     Types: 2 - 3 Shots of liquor per week     Comment: One glass per day    Drug use: No    Sexual activity: Yes     Partners: Female     Birth control/protection: Post-menopausal, None   Other Topics Concern    Not on file   Social History Narrative    Not on file     Social Drivers of Health     Financial Resource Strain: Low Risk  (2023)    Overall Financial Resource Strain (CARDIA)     Difficulty of Paying Living Expenses: Not hard at all   Food Insecurity: No Food Insecurity (2025)    Nursing - Inadequate Food Risk Classification     Worried About Running Out of Food in the Last Year: Never true     Ran Out of Food " "in the Last Year: Never true     Ran Out of Food in the Last Year: Not on file   Transportation Needs: No Transportation Needs (2/26/2025)    PRAPARE - Transportation     Lack of Transportation (Medical): No     Lack of Transportation (Non-Medical): No   Physical Activity: Not on file   Stress: Not on file   Social Connections: Not on file   Intimate Partner Violence: Not on file   Housing Stability: Low Risk  (2/26/2025)    Housing Stability Vital Sign     Unable to Pay for Housing in the Last Year: No     Number of Times Moved in the Last Year: 0     Homeless in the Last Year: No       Allergies[4]    Current Medications[5]    /68 (BP Location: Left arm, Patient Position: Sitting, Cuff Size: Large)   Pulse 57   Ht 5' 5\" (1.651 m)   Wt 81.1 kg (178 lb 14.4 oz)   SpO2 98%   BMI 29.77 kg/m²      Physical Exam  Vitals and nursing note reviewed. Exam conducted with a chaperone present.   Constitutional:       Appearance: Normal appearance.   HENT:      Head: Normocephalic and atraumatic.     Eyes:      Extraocular Movements: Extraocular movements intact.       Cardiovascular:      Heart sounds: Normal heart sounds.   Pulmonary:      Effort: Pulmonary effort is normal.   Abdominal:      Comments: Abdomen is obese     Musculoskeletal:         General: Swelling present.      Comments: Ambulates leaning forward with rollator     Skin:     General: Skin is warm.     Neurological:      General: No focal deficit present.      Mental Status: He is alert and oriented to person, place, and time.     Psychiatric:         Mood and Affect: Mood normal.         Behavior: Behavior normal.                [1]   Patient Active Problem List  Diagnosis    Gait instability    Cervical myelopathy (HCC)    Adenocarcinoma of prostate (HCC)    CAD (coronary artery disease)    Bipolar 1 disorder (HCC)    COPD (chronic obstructive pulmonary disease) (HCC)    Gastroesophageal reflux disease    Mixed hyperlipidemia    Essential " hypertension    Aneurysm of infrarenal abdominal aorta ([facility])    H/O lumbosacral spine surgery    Impaired mobility and activities of daily living    DDD (degenerative disc disease), lumbar    Foraminal stenosis of lumbar region    Spondylolisthesis of lumbar region    Myelopathy concurrent with and due to lumbosacral intervertebral disc disorder    PAF (paroxysmal atrial fibrillation) ([facility])    Pain in left hip    Greater trochanteric bursitis, left    Skin lesion    Trochanteric bursitis of left hip    Primary osteoarthritis of left hip    Malignant neoplasm of lung ([facility])    Excessive gas    Extradural cyst of spine    Lumbar disc herniation    Gait abnormality    Skin lump of leg, right    Seasonal allergic rhinitis due to pollen    Braces as ambulation aid    Parkinsonism ([facility])    Change in mental status    Frequency of urination    Skin tear of right lower leg without complication    Proteinuria    S/P endovascular aneurysm repair    Adenoma of colon    Abdominal aortic aneurysm (AAA) without rupture, unspecified part ([facility])    Lumbar radiculopathy    Spinal stenosis of lumbar region with neurogenic claudication    Hypomagnesemia    Preop exam for internal medicine    Rash    Bilateral renal masses    Hyperkalemia    Low bicarbonate    Asymptomatic PVCs    Bloating    Functional diarrhea    CKD (chronic kidney disease)    Localized edema   [2]   Past Surgical History:  Procedure Laterality Date    ABDOMINAL SURGERY  2015    ANGIOPLASTY      stent    BACK SURGERY      CARDIAC CATHETERIZATION  1995    angioplasty    CARDIAC SURGERY      CERVICAL SPINE SURGERY      Cervical decompression with cervical fusion from C3-C7 for spinal stenosis.    COLON SURGERY  11/04/2014    COLONOSCOPY  July 2020    CORONARY ANGIOPLASTY      CORONARY STENT PLACEMENT      ESOPHAGOGASTRODUODENOSCOPY  01/03/2013    with possible Schatzki's ring & small hiatal hernia, mild gastritis    FL INJECTION LEFT HIP (NON ARTHROGRAM)  12/11/2018     FL INJECTION LEFT HIP (NON ARTHROGRAM)  05/09/2019    FLEXIBLE SIGMOIDOSCOPY  2015    IR BIOPSY LUNG  07/06/2020    IR EVAR  08/06/2018    LAMINECTOMY  2016    LITHOTRIPSY      LUNG BIOPSY  2007    LUNG CANCER SURGERY Right 03/2011    wedge resection for lung tumor, 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    LUNG LOBECTOMY Left     NV ARTHRD ANT INTERBODY MIN DSC CRV BELOW C2 N/A 05/02/2016    Procedure: Anterior cervical diskectomy C3/4, C5/6, C6/7 with anterior plate fixation fusion C3-7;  Posterior decompressive laminectomy C3-7 with lateral mass fixation fusion C3-7 (IMPULSE MONITORING);  Surgeon: Jose Luis Moore MD;  Location: [facility];  Service: Neurosurgery    NV ARTHRODESIS POSTERIOR INTERBODY 1 [facility] LUMBAR N/A 01/30/2017    Procedure: L4-5 AND L5-S1 DECOMPRESSIVE FORAMINOTOMIES, TRANSFORAMINAL LUMBAR INTERBODY AND PEDICLE SCREW FIXATION FUSION L4-S1 (IMPULSE);  Surgeon: Jose Luis Moore MD;  Location: [facility];  Service: Neurosurgery    NV EVASC RPR DPLMNT AORTO-AORTIC NDGFT N/A 08/06/2018    Procedure: REPAIR ANEURYSM ENDOVASCULAR ABDOMINAL AORTIC  (EVAR) WITH BILATERAL PERCUTANEOUS FEMORAL ACCESS WITH ULTRASOUND GUIDANCE ON THE RIGHT AND PRE CLOSURE;  Surgeon: Shan Villalobos MD;  Location: [facility];  Service: Vascular    PROSTATECTOMY  2007    for prostate CA - no chemo/RT    SIGMOIDECTOMY      for divertic    SMALL INTESTINE SURGERY N/A 11/17/2016    Procedure: Exploratory Laparotomy, Lysis of adhesions to release small bowel obstruction;  Surgeon: Aminta Sim MD;  Location: [facility];  Service:     SPINAL FUSION      SPINE SURGERY  2016, 2017    THORACOABDOMINAL AORTIC ANEURYSM REPAIR      TONSILLECTOMY  Y    1952    UPPER GASTROINTESTINAL ENDOSCOPY  2015    VASCULAR SURGERY  2018    EVAR   [3]   Family History  Problem Relation Name Age of Onset    Hypertension Mother Venessa rileybuddy     Glaucoma Mother Venessa max     Heart disease Mother Venessa max     Heart attack Father father      Colon cancer Father father     Coronary artery disease Father father     Hypertension Father father     Kidney disease Father father     Cancer Father father     Heart disease Father father     Heart disease Family      Hyperlipidemia Family      Hypertension Family      Heart disease Family Paul Tryofe     Kidney disease Family Paul Trytebuddy     Hypertension Family Paul Baig    [4]   Allergies  Allergen Reactions    Bactrim [Sulfamethoxazole-Trimethoprim] Other (See Comments)     AILYN    Nsaids      Annotation - [id number]: unable to take due to use of lithium.    Oxycodone Rash   [5]   Current Outpatient Medications:     acetaminophen (TYLENOL) 500 mg tablet, Take 500 mg by mouth as needed for mild pain, Disp: , Rfl:     albuterol (PROVENTIL HFA,VENTOLIN HFA) 90 mcg/act inhaler, , Disp: , Rfl:     amLODIPine (NORVASC) 5 mg tablet, TAKE 1 TABLET BY MOUTH DAILY, Disp: 90 tablet, Rfl: 3    aspirin (ECOTRIN LOW STRENGTH) 81 mg EC tablet, Take 81 mg by mouth in the morning., Disp: , Rfl:     atorvastatin (LIPITOR) 40 mg tablet, TAKE 1 TABLET BY MOUTH DAILY, Disp: 90 tablet, Rfl: 1    Cholecalciferol (VITAMIN D PO), Take 2,000 Units by mouth in the morning., Disp: , Rfl:     dicyclomine (BENTYL) 20 mg tablet, TAKE 1 TABLET BY MOUTH EVERY 6  HOURS AS NEEDED FOR ABDOMINAL  CRAMPING (Patient taking differently: Take 20 mg by mouth as needed (abdominal cramping)), Disp: 360 tablet, Rfl: 0    DULoxetine (CYMBALTA) 30 mg delayed release capsule, Take 1 capsule (30 mg total) by mouth daily, Disp: 90 capsule, Rfl: 1    fexofenadine (ALLEGRA) 180 MG tablet, Take 180 mg by mouth as needed Daily during the Summer, Disp: , Rfl:     fluticasone (FLONASE) 50 mcg/act nasal spray, 2 sprays into each nostril in the morning., Disp: , Rfl:     lithium carbonate (LITHOBID) 300 mg CR tablet, Take 300 mg by mouth daily at bedtime, Disp: , Rfl:     Magnesium 250 MG TABS, Take by mouth in the morning (Patient taking differently: Take  250 mg by mouth 3 (three) times a day), Disp: , Rfl:     metoprolol succinate (TOPROL-XL) 25 mg 24 hr tablet, TAKE 1 TABLET BY MOUTH ONCE  DAILY, Disp: 90 tablet, Rfl: 1    nitroglycerin (NITRODUR) 0.2 mg/hr, APPLY 1 PATCH TOPICALLY ONCE  DAILY. LEAVE ON FOR 12 TO 14  HOURS THEN REMOVE FOR A  NITRATE-FREE INTERVAL OF 10 TO  12 HOURS, Disp: 90 patch, Rfl: 1    omega-3-acid ethyl esters (LOVAZA) 1 g capsule, Take 1 capsule (1 g total) by mouth 3 (three) times a day, Disp: 270 capsule, Rfl: 3    omeprazole (PriLOSEC) 20 mg delayed release capsule, TAKE 1 CAPSULE BY MOUTH TWICE  DAILY BEFORE MEALS, Disp: 180 capsule, Rfl: 3    Sodium Zirconium Cyclosilicate (Lokelma) 10 g, Take 1 packet (10 g total) by mouth daily, Disp: 30 packet, Rfl: 5    Trelegy Ellipta 200-62.5-25 MCG/ACT AEPB inhaler, USE 1 INHALATION BY MOUTH DAILY, Disp: 180 each, Rfl: 3    Sodium Fluoride 5000 PPM 1.1 % PSTE, 2 (two) times a day, Disp: , Rfl:

## 2025-07-08 ENCOUNTER — OFFICE VISIT (OUTPATIENT)
Dept: VASCULAR SURGERY | Facility: CLINIC | Age: 80
End: 2025-07-08
Payer: MEDICARE

## 2025-07-08 VITALS
BODY MASS INDEX: 29.81 KG/M2 | SYSTOLIC BLOOD PRESSURE: 118 MMHG | OXYGEN SATURATION: 98 % | DIASTOLIC BLOOD PRESSURE: 68 MMHG | HEIGHT: 65 IN | HEART RATE: 57 BPM | WEIGHT: 178.9 LBS

## 2025-07-08 DIAGNOSIS — I71.43 INFRARENAL ABDOMINAL AORTIC ANEURYSM (AAA) WITHOUT RUPTURE (HCC): Primary | Chronic | ICD-10-CM

## 2025-07-08 DIAGNOSIS — R60.0 LOCALIZED EDEMA: ICD-10-CM

## 2025-07-08 PROCEDURE — 99213 OFFICE O/P EST LOW 20 MIN: CPT | Performed by: NURSE PRACTITIONER

## 2025-07-08 NOTE — ASSESSMENT & PLAN NOTE
-Bilateral lower extremity swelling  -No associated discomfort  -Double layer Tubigrip sleeve, size E.  Wear daily and remove at night  -Rx for box of Tubigrip given  -Additionally calf pump exercises and periodic leg elevation    Orders:    Compression bandages

## 2025-07-08 NOTE — ASSESSMENT & PLAN NOTE
79-year-old male with HTN, HLD, COPD, CAD, lung CA '07 +'11, CKD,lumbar DDD, chronic back and hip pain, lumbar sx, Parkinson's, 5.2 cm infrarenal AAA s/p EVAR by Dr. Villalobos '18      Patient returns the office to review EVAR duplex 7/2/2025    -EVAR duplex showed a widely patent endograft without evidence of endoleak, sac size 4.8 1X4.4 to centimeters, aorta proximal to graft 2.5 cm, iliac arteries distal graft 1.28 cm on the right and 1.0 cm on the left.  -Bilateral renal arteries, celiac, SMA are patent.  Right MIKO noncompressible/153 GTP and left MIKO 1.26/   -Stable comparison to prior duplex  -Blood pressure adequately controlled  -Repeat duplex in 1 year  -Will also evaluate lower extremity arterial duplex to measure popliteal artery size  -Follow-up in the office in 1 year    Orders:     VAS EVAR DUPLEX EVALUATION; Future    VAS ARTERIAL DUPLEX- LOWER LIMB BILATERAL; Future

## 2025-07-16 ENCOUNTER — APPOINTMENT (OUTPATIENT)
Dept: LAB | Facility: CLINIC | Age: 80
End: 2025-07-16
Payer: MEDICARE

## 2025-07-16 ENCOUNTER — TRANSCRIBE ORDERS (OUTPATIENT)
Dept: LAB | Facility: CLINIC | Age: 80
End: 2025-07-16

## 2025-07-16 DIAGNOSIS — F31.60 MIXED BIPOLAR I DISORDER (HCC): ICD-10-CM

## 2025-07-16 DIAGNOSIS — F31.60 MIXED BIPOLAR I DISORDER (HCC): Primary | ICD-10-CM

## 2025-07-16 LAB
BUN SERPL-MCNC: 32 MG/DL (ref 5–25)
CREAT SERPL-MCNC: 1.68 MG/DL (ref 0.6–1.3)
GFR SERPL CREATININE-BSD FRML MDRD: 38 ML/MIN/1.73SQ M
LITHIUM SERPL-SCNC: 0.31 MMOL/L (ref 0.6–1.2)
TSH SERPL DL<=0.05 MIU/L-ACNC: 1.25 UIU/ML (ref 0.45–4.5)

## 2025-07-16 PROCEDURE — 84520 ASSAY OF UREA NITROGEN: CPT

## 2025-07-16 PROCEDURE — 82565 ASSAY OF CREATININE: CPT

## 2025-07-16 PROCEDURE — 36415 COLL VENOUS BLD VENIPUNCTURE: CPT

## 2025-07-16 PROCEDURE — 84443 ASSAY THYROID STIM HORMONE: CPT

## 2025-07-16 PROCEDURE — 80178 ASSAY OF LITHIUM: CPT

## 2025-07-17 ENCOUNTER — TELEPHONE (OUTPATIENT)
Age: 80
End: 2025-07-17

## 2025-07-17 DIAGNOSIS — E87.5 HYPERKALEMIA: ICD-10-CM

## 2025-07-17 NOTE — PROGRESS NOTES
Daily Note     Today's date: 2025  Patient name: Tonny Baig  : 1945  MRN: [id number]  Referring provider: Michelle Ayoub PA-C  Dx:   Encounter Diagnosis     ICD-10-CM    1. Chronic midline low back pain with left-sided sciatica  M54.42     G89.29           Start Time: 1250  Stop Time: 1310  Total time in clinic (min): 20 minutes    Subjective: Patient reports that he has been doing well with HEP to help with back pain and function. Last injection in LS was beneficial.       Objective: See treatment diary below      Assessment: Patient is a 79 y.o. year old male who attended physical therapy regarding CLBP and L hip pain. Patient reports  improvement at this time which correlates to improved impairments and functionality.  Patient has shown improvement throughout PT by demonstrating decreased pain, increased range of motion, increased strength, and improved tolerance to activity.  Will DC PT at this time. Patient is independent with HEP and is to continue with HEP         Plan: DC PT     Precautions: AAA, CKD      Manuals                                                                 Neuro Re-Ed             Seated hip ABD                                                                                           Ther Ex             VG             LAQ             Bridge             Hooklying clam             Side stepping at  rail             [place] walk outs             STS             JULEE hip ABD             Posterior chain endurance             Ther Activity             STS             Edu time 20'            Gait Training                                       Modalities

## 2025-07-17 NOTE — TELEPHONE ENCOUNTER
12/23/23  11:49 AM    CM noted dc order and spoke to MD about dc today. Patient is on TPN and has home health services through [facility]. CM updated [facility] of dc today and sent AVS and notified patient may need labs drawn on Tuesday. CM called and spoke to the pharmacist for [facility] regarding TPN delivery. CM was informed [facility] does not deliver TPN over the weekend and that patient would not be able to get her TPN until Tuesday and will likely need new lab work. The pharmacist requested the order and labs- CM sent via [address] CM spoke to patient and she is aware that they will not be able to deliver until Tuesday and aware she may need new labs for the TPN but feels she will be ok and prefers to go home. She has the number for [facility] and will call them on Tuesday, 12/26/23 to follow up. Patient asked about a work note- CM messaged attending.      [facility] Pt. Called and would like you to please FAX his lab results he had done yesterday to his   Psychiatrist.   Ty     Dr. Boni Nelson Psychiatrist   [address]   [place], [place]    P: [phone]  FAX: [phone]

## 2025-07-17 NOTE — TELEPHONE ENCOUNTER
Medication: Lokelma    Dose/Frequency: 10 g / 1 pack by mouth daily    Quantity: 90 day supply    Pharmacy: [facility]     Office:   [] PCP/Provider -   [x] Speciality/Provider -     Does the patient have enough for 3 days?   [] Yes   [x] No - Send as HP to POD

## 2025-07-18 ENCOUNTER — OFFICE VISIT (OUTPATIENT)
Dept: PHYSICAL THERAPY | Facility: REHABILITATION | Age: 80
End: 2025-07-18
Payer: MEDICARE

## 2025-07-18 DIAGNOSIS — G89.29 CHRONIC MIDLINE LOW BACK PAIN WITH LEFT-SIDED SCIATICA: Primary | ICD-10-CM

## 2025-07-18 DIAGNOSIS — M54.42 CHRONIC MIDLINE LOW BACK PAIN WITH LEFT-SIDED SCIATICA: Primary | ICD-10-CM

## 2025-07-18 PROCEDURE — 97530 THERAPEUTIC ACTIVITIES: CPT | Performed by: PHYSICAL THERAPIST

## 2025-07-18 NOTE — HOME EXERCISE EDUCATION
Program_ID:[id number]   Access Code: [email or web address]  URL: [email or web address]  Date: 07-  Prepared By: Adams Lee    Program Notes      Exercises      - Sit to Stand - 2 x daily - 7 x weekly - 2 sets - 10 reps      - Seated Lumbar Flexion Stretch - 2 x daily - 7 x weekly - 3 sets - 10 reps      - Staggered Sit-to-Stand - 1 x daily - 7 x weekly - 1 sets - 5 reps

## 2025-07-23 ENCOUNTER — APPOINTMENT (EMERGENCY)
Dept: RADIOLOGY | Facility: HOSPITAL | Age: 80
End: 2025-07-23
Payer: MEDICARE

## 2025-07-23 ENCOUNTER — HOSPITAL ENCOUNTER (OUTPATIENT)
Facility: HOSPITAL | Age: 80
Setting detail: OBSERVATION
Discharge: HOME | End: 2025-07-24
Attending: EMERGENCY MEDICINE | Admitting: STUDENT IN AN ORGANIZED HEALTH CARE EDUCATION/TRAINING PROGRAM
Payer: MEDICARE

## 2025-07-23 ENCOUNTER — APPOINTMENT (EMERGENCY)
Dept: RADIOLOGY | Facility: HOSPITAL | Age: 80
End: 2025-07-23
Attending: EMERGENCY MEDICINE
Payer: MEDICARE

## 2025-07-23 DIAGNOSIS — J44.1 COPD EXACERBATION (HCC): Primary | ICD-10-CM

## 2025-07-23 DIAGNOSIS — R09.02 HYPOXIA: ICD-10-CM

## 2025-07-23 DIAGNOSIS — R06.00 DYSPNEA: ICD-10-CM

## 2025-07-23 DIAGNOSIS — I10 ESSENTIAL HYPERTENSION: ICD-10-CM

## 2025-07-23 PROBLEM — R06.09 DYSPNEA ON EXERTION: Status: ACTIVE | Noted: 2025-07-23

## 2025-07-23 LAB
2HR DELTA HS TROPONIN: 0 NG/L
ALBUMIN SERPL BCG-MCNC: 3.9 G/DL (ref 3.5–5)
ALP SERPL-CCNC: 135 U/L (ref 34–104)
ALT SERPL W P-5'-P-CCNC: 17 U/L (ref 7–52)
ANION GAP SERPL CALCULATED.3IONS-SCNC: 7 MMOL/L (ref 4–13)
AST SERPL W P-5'-P-CCNC: 18 U/L (ref 13–39)
ATRIAL RATE: 79 BPM
BASOPHILS # BLD AUTO: 0.05 THOUSANDS/ÂΜL (ref 0–0.1)
BASOPHILS NFR BLD AUTO: 1 % (ref 0–1)
BILIRUB SERPL-MCNC: 0.47 MG/DL (ref 0.2–1)
BNP SERPL-MCNC: 111 PG/ML (ref 0–100)
BUN SERPL-MCNC: 29 MG/DL (ref 5–25)
CALCIUM SERPL-MCNC: 9.2 MG/DL (ref 8.4–10.2)
CARDIAC TROPONIN I PNL SERPL HS: 12 NG/L (ref ?–50)
CARDIAC TROPONIN I PNL SERPL HS: 12 NG/L (ref ?–50)
CHLORIDE SERPL-SCNC: 106 MMOL/L (ref 96–108)
CO2 SERPL-SCNC: 26 MMOL/L (ref 21–32)
CREAT SERPL-MCNC: 1.89 MG/DL (ref 0.6–1.3)
D DIMER PPP FEU-MCNC: 1.8 UG/ML FEU
EOSINOPHIL # BLD AUTO: 0.29 THOUSAND/ÂΜL (ref 0–0.61)
EOSINOPHIL NFR BLD AUTO: 3 % (ref 0–6)
ERYTHROCYTE [DISTWIDTH] IN BLOOD BY AUTOMATED COUNT: 12.9 % (ref 11.6–15.1)
FLUAV AG UPPER RESP QL IA.RAPID: NEGATIVE
FLUBV AG UPPER RESP QL IA.RAPID: NEGATIVE
GFR SERPL CREATININE-BSD FRML MDRD: 33 ML/MIN/1.73SQ M
GLUCOSE SERPL-MCNC: 110 MG/DL (ref 65–140)
HCT VFR BLD AUTO: 36.8 % (ref 36.5–49.3)
HGB BLD-MCNC: 11.9 G/DL (ref 12–17)
IMM GRANULOCYTES # BLD AUTO: 0.07 THOUSAND/UL (ref 0–0.2)
IMM GRANULOCYTES NFR BLD AUTO: 1 % (ref 0–2)
LYMPHOCYTES # BLD AUTO: 1.17 THOUSANDS/ÂΜL (ref 0.6–4.47)
LYMPHOCYTES NFR BLD AUTO: 13 % (ref 14–44)
MCH RBC QN AUTO: 30.2 PG (ref 26.8–34.3)
MCHC RBC AUTO-ENTMCNC: 32.3 G/DL (ref 31.4–37.4)
MCV RBC AUTO: 93 FL (ref 82–98)
MONOCYTES # BLD AUTO: 0.78 THOUSAND/ÂΜL (ref 0.17–1.22)
MONOCYTES NFR BLD AUTO: 9 % (ref 4–12)
NEUTROPHILS # BLD AUTO: 6.86 THOUSANDS/ÂΜL (ref 1.85–7.62)
NEUTS SEG NFR BLD AUTO: 73 % (ref 43–75)
NRBC BLD AUTO-RTO: 0 /100 WBCS
P AXIS: 49 DEGREES
PLATELET # BLD AUTO: 223 THOUSANDS/UL (ref 149–390)
PMV BLD AUTO: 10.3 FL (ref 8.9–12.7)
POTASSIUM SERPL-SCNC: 4.7 MMOL/L (ref 3.5–5.3)
PR INTERVAL: 148 MS
PROT SERPL-MCNC: 6.5 G/DL (ref 6.4–8.4)
QRS AXIS: 60 DEGREES
QRSD INTERVAL: 94 MS
QT INTERVAL: 362 MS
QTC INTERVAL: 415 MS
RBC # BLD AUTO: 3.94 MILLION/UL (ref 3.88–5.62)
SARS-COV+SARS-COV-2 AG RESP QL IA.RAPID: NEGATIVE
SODIUM SERPL-SCNC: 139 MMOL/L (ref 135–147)
T WAVE AXIS: 4 DEGREES
VENTRICULAR RATE: 79 BPM
WBC # BLD AUTO: 9.22 THOUSAND/UL (ref 4.31–10.16)

## 2025-07-23 PROCEDURE — 94664 DEMO&/EVAL PT USE INHALER: CPT

## 2025-07-23 PROCEDURE — 71275 CT ANGIOGRAPHY CHEST: CPT

## 2025-07-23 PROCEDURE — 94640 AIRWAY INHALATION TREATMENT: CPT

## 2025-07-23 PROCEDURE — 71046 X-RAY EXAM CHEST 2 VIEWS: CPT

## 2025-07-23 PROCEDURE — 99223 1ST HOSP IP/OBS HIGH 75: CPT | Performed by: STUDENT IN AN ORGANIZED HEALTH CARE EDUCATION/TRAINING PROGRAM

## 2025-07-23 PROCEDURE — 99285 EMERGENCY DEPT VISIT HI MDM: CPT | Performed by: EMERGENCY MEDICINE

## 2025-07-23 PROCEDURE — 84484 ASSAY OF TROPONIN QUANT: CPT

## 2025-07-23 PROCEDURE — 93005 ELECTROCARDIOGRAM TRACING: CPT

## 2025-07-23 PROCEDURE — 83880 ASSAY OF NATRIURETIC PEPTIDE: CPT

## 2025-07-23 PROCEDURE — 87811 SARS-COV-2 COVID19 W/OPTIC: CPT

## 2025-07-23 PROCEDURE — 94760 N-INVAS EAR/PLS OXIMETRY 1: CPT

## 2025-07-23 PROCEDURE — 85025 COMPLETE CBC W/AUTO DIFF WBC: CPT

## 2025-07-23 PROCEDURE — 99285 EMERGENCY DEPT VISIT HI MDM: CPT

## 2025-07-23 PROCEDURE — 36415 COLL VENOUS BLD VENIPUNCTURE: CPT

## 2025-07-23 PROCEDURE — 87804 INFLUENZA ASSAY W/OPTIC: CPT

## 2025-07-23 PROCEDURE — 80053 COMPREHEN METABOLIC PANEL: CPT

## 2025-07-23 PROCEDURE — 85379 FIBRIN DEGRADATION QUANT: CPT

## 2025-07-23 PROCEDURE — 93010 ELECTROCARDIOGRAM REPORT: CPT | Performed by: INTERNAL MEDICINE

## 2025-07-23 RX ORDER — FLUTICASONE FUROATE AND VILANTEROL 200; 25 UG/1; UG/1
1 POWDER RESPIRATORY (INHALATION) DAILY
Status: DISCONTINUED | OUTPATIENT
Start: 2025-07-24 | End: 2025-07-24 | Stop reason: HOSPADM

## 2025-07-23 RX ORDER — PREDNISONE 20 MG/1
40 TABLET ORAL DAILY
Status: DISCONTINUED | OUTPATIENT
Start: 2025-07-24 | End: 2025-07-24

## 2025-07-23 RX ORDER — DULOXETIN HYDROCHLORIDE 30 MG/1
30 CAPSULE, DELAYED RELEASE ORAL
Status: DISCONTINUED | OUTPATIENT
Start: 2025-07-23 | End: 2025-07-24 | Stop reason: HOSPADM

## 2025-07-23 RX ORDER — PANTOPRAZOLE SODIUM 20 MG/1
20 TABLET, DELAYED RELEASE ORAL
Status: DISCONTINUED | OUTPATIENT
Start: 2025-07-23 | End: 2025-07-24 | Stop reason: HOSPADM

## 2025-07-23 RX ORDER — LEVALBUTEROL INHALATION SOLUTION 1.25 MG/3ML
1.25 SOLUTION RESPIRATORY (INHALATION)
Status: DISCONTINUED | OUTPATIENT
Start: 2025-07-23 | End: 2025-07-23

## 2025-07-23 RX ORDER — CHLORAL HYDRATE 500 MG
1000 CAPSULE ORAL 3 TIMES DAILY
Status: DISCONTINUED | OUTPATIENT
Start: 2025-07-23 | End: 2025-07-24 | Stop reason: HOSPADM

## 2025-07-23 RX ORDER — AMLODIPINE BESYLATE 5 MG/1
5 TABLET ORAL
Status: DISCONTINUED | OUTPATIENT
Start: 2025-07-23 | End: 2025-07-24

## 2025-07-23 RX ORDER — ASPIRIN 81 MG/1
81 TABLET ORAL DAILY
Status: DISCONTINUED | OUTPATIENT
Start: 2025-07-23 | End: 2025-07-24 | Stop reason: HOSPADM

## 2025-07-23 RX ORDER — ATORVASTATIN CALCIUM 40 MG/1
40 TABLET, FILM COATED ORAL
Status: DISCONTINUED | OUTPATIENT
Start: 2025-07-23 | End: 2025-07-24 | Stop reason: HOSPADM

## 2025-07-23 RX ORDER — DICYCLOMINE HCL 20 MG
20 TABLET ORAL ONCE
Status: DISCONTINUED | OUTPATIENT
Start: 2025-07-23 | End: 2025-07-23

## 2025-07-23 RX ORDER — ACETAMINOPHEN 325 MG/1
650 TABLET ORAL EVERY 6 HOURS PRN
Status: DISCONTINUED | OUTPATIENT
Start: 2025-07-23 | End: 2025-07-24 | Stop reason: HOSPADM

## 2025-07-23 RX ORDER — IPRATROPIUM BROMIDE AND ALBUTEROL SULFATE 2.5; .5 MG/3ML; MG/3ML
3 SOLUTION RESPIRATORY (INHALATION) ONCE
Status: COMPLETED | OUTPATIENT
Start: 2025-07-23 | End: 2025-07-23

## 2025-07-23 RX ORDER — HEPARIN SODIUM 5000 [USP'U]/ML
5000 INJECTION, SOLUTION INTRAVENOUS; SUBCUTANEOUS EVERY 8 HOURS SCHEDULED
Status: DISCONTINUED | OUTPATIENT
Start: 2025-07-23 | End: 2025-07-24 | Stop reason: HOSPADM

## 2025-07-23 RX ORDER — METOPROLOL SUCCINATE 25 MG/1
25 TABLET, EXTENDED RELEASE ORAL
Status: DISCONTINUED | OUTPATIENT
Start: 2025-07-23 | End: 2025-07-24 | Stop reason: HOSPADM

## 2025-07-23 RX ORDER — LITHIUM CARBONATE 300 MG/1
300 TABLET, FILM COATED, EXTENDED RELEASE ORAL EVERY OTHER DAY
Status: DISCONTINUED | OUTPATIENT
Start: 2025-07-24 | End: 2025-07-24 | Stop reason: HOSPADM

## 2025-07-23 RX ORDER — LEVALBUTEROL INHALATION SOLUTION 1.25 MG/3ML
1.25 SOLUTION RESPIRATORY (INHALATION)
Status: DISCONTINUED | OUTPATIENT
Start: 2025-07-23 | End: 2025-07-24 | Stop reason: HOSPADM

## 2025-07-23 RX ORDER — FLUTICASONE PROPIONATE 50 MCG
2 SPRAY, SUSPENSION (ML) NASAL DAILY
Status: DISCONTINUED | OUTPATIENT
Start: 2025-07-24 | End: 2025-07-24 | Stop reason: HOSPADM

## 2025-07-23 RX ORDER — METHYLPREDNISOLONE SODIUM SUCCINATE 40 MG/ML
40 INJECTION, POWDER, LYOPHILIZED, FOR SOLUTION INTRAMUSCULAR; INTRAVENOUS ONCE
Status: COMPLETED | OUTPATIENT
Start: 2025-07-23 | End: 2025-07-23

## 2025-07-23 RX ORDER — SODIUM CHLORIDE FOR INHALATION 0.9 %
3 VIAL, NEBULIZER (ML) INHALATION
Status: DISCONTINUED | OUTPATIENT
Start: 2025-07-23 | End: 2025-07-23

## 2025-07-23 RX ADMIN — HEPARIN SODIUM 5000 UNITS: 5000 INJECTION INTRAVENOUS; SUBCUTANEOUS at 14:11

## 2025-07-23 RX ADMIN — SODIUM ZIRCONIUM CYCLOSILICATE 10 G: 10 POWDER, FOR SUSPENSION ORAL at 22:04

## 2025-07-23 RX ADMIN — DULOXETINE 30 MG: 30 CAPSULE, DELAYED RELEASE ORAL at 22:05

## 2025-07-23 RX ADMIN — PANTOPRAZOLE SODIUM 20 MG: 20 TABLET, DELAYED RELEASE ORAL at 16:41

## 2025-07-23 RX ADMIN — IPRATROPIUM BROMIDE AND ALBUTEROL SULFATE 3 ML: 2.5; .5 SOLUTION RESPIRATORY (INHALATION) at 09:09

## 2025-07-23 RX ADMIN — LEVALBUTEROL HYDROCHLORIDE 1.25 MG: 1.25 SOLUTION RESPIRATORY (INHALATION) at 19:06

## 2025-07-23 RX ADMIN — OMEGA-3 FATTY ACIDS CAP 1000 MG 1000 MG: 1000 CAP at 22:05

## 2025-07-23 RX ADMIN — HEPARIN SODIUM 5000 UNITS: 5000 INJECTION INTRAVENOUS; SUBCUTANEOUS at 22:05

## 2025-07-23 RX ADMIN — METOPROLOL SUCCINATE 25 MG: 25 TABLET, EXTENDED RELEASE ORAL at 22:05

## 2025-07-23 RX ADMIN — IOHEXOL 80 ML: 350 INJECTION, SOLUTION INTRAVENOUS at 09:41

## 2025-07-23 RX ADMIN — SODIUM ZIRCONIUM CYCLOSILICATE 10 G: 10 POWDER, FOR SUSPENSION ORAL at 14:10

## 2025-07-23 RX ADMIN — ATORVASTATIN CALCIUM 40 MG: 40 TABLET, FILM COATED ORAL at 16:42

## 2025-07-23 RX ADMIN — METHYLPREDNISOLONE SODIUM SUCCINATE 40 MG: 40 INJECTION, POWDER, FOR SOLUTION INTRAMUSCULAR; INTRAVENOUS at 14:11

## 2025-07-23 RX ADMIN — AMLODIPINE BESYLATE 5 MG: 5 TABLET ORAL at 22:05

## 2025-07-23 RX ADMIN — LEVALBUTEROL HYDROCHLORIDE 1.25 MG: 1.25 SOLUTION RESPIRATORY (INHALATION) at 16:11

## 2025-07-23 RX ADMIN — OMEGA-3 FATTY ACIDS CAP 1000 MG 1000 MG: 1000 CAP at 16:42

## 2025-07-23 NOTE — ASSESSMENT & PLAN NOTE
Lab Results   Component Value Date    EGFR 33 07/23/2025    EGFR 38 07/16/2025    EGFR 35 06/12/2025    CREATININE 1.89 (H) 07/23/2025    CREATININE 1.68 (H) 07/16/2025    CREATININE 1.79 (H) 06/12/2025     Creatinine at baseline

## 2025-07-23 NOTE — Clinical Note
Case was discussed with [facility] and the patient's admission status was agreed to be Admission Status: observation status to the service of Dr. Montenegro .

## 2025-07-23 NOTE — ASSESSMENT & PLAN NOTE
Blood pressure elevated 160-180 systolic with prior outpatient readings ranging in the 110-140 systolic  Patient reports better blood pressure readings at home and states they are currently elevated because he is in the hospital  Continue amlodipine 5 mg at bedtime  Can consider uptitrating antihypertensives if he continues to have elevated blood pressure readings

## 2025-07-23 NOTE — H&P
H&P - Hospitalist   Name: Tonyn Baig 79 y.o. male I MRN: [id number]  Unit/Bed#: ED 27 I Date of Admission: 7/23/2025   Date of Service: 7/23/2025 I Hospital Day: 0     Assessment & Plan  Dyspnea on exertion  Past medical history of COPD, CAD s/p stenting presenting with dyspnea on exertion worsening over the last 4 days with presentation concerning for COPD exacerbation although patient without any increased productive cough, sputum frequency but was noted to be wheezing on initial presentation  Elevated D-dimer, CTA chest negative for PE, pneumonia or edema but did note bilateral atelectasis  Troponin flat, BNP elevated although well below prior readings, EKG without ST changes, low suspicion for ACS  No fevers, no leukocytosis, COVID/flu negative  Currently saturating well on room air however desaturated to mid 80s bradycardia when ambulating per ED  Received DuoNeb in ED with improvement in symptoms and lungs appear clear on my evaluation, continue scheduled Xopenex x 24 hours  Will give IV Solu-Medrol 40 mg x 1 followed by oral prednisone 40 mg daily for total 5 days of therapy  Will observe overnight, will need ambulatory pulse ox prior to discharge  Will adjust antihypertensives if noted to have persistent elevated BP which could possibly contribute to dyspnea  COPD (chronic obstructive pulmonary disease) (HCC)  Continue Trelegy alternative  See above  Essential hypertension  Blood pressure elevated 160-180 systolic with prior outpatient readings ranging in the 110-140 systolic  Patient reports better blood pressure readings at home and states they are currently elevated because he is in the hospital  Continue amlodipine 5 mg at bedtime  Can consider uptitrating antihypertensives if he continues to have elevated blood pressure readings  CAD (coronary artery disease)  History of remote MI s/p stenting  Denies any chest pain  Continue aspirin and statin  Bipolar 1 disorder (HCC)  Continue lithium 300 mg  every other day  Continue Cymbalta 30 mg at bedtime  CKD (chronic kidney disease)  Lab Results   Component Value Date    EGFR 33 07/23/2025    EGFR 38 07/16/2025    EGFR 35 06/12/2025    CREATININE 1.89 (H) 07/23/2025    CREATININE 1.68 (H) 07/16/2025    CREATININE 1.79 (H) 06/12/2025     Creatinine at baseline      VTE Pharmacologic Prophylaxis:   Moderate Risk (Score 3-4) - Pharmacological DVT Prophylaxis Ordered: heparin.  Code Status: Level 1 - Full Code   Discussion with family: Updated  (wife) at bedside.    Anticipated Length of Stay: Patient will be admitted on an observation basis with an anticipated length of stay of less than 2 midnights secondary to dyspnea on exertion, IV steroids, scheduled nebs.    History of Present Illness   Chief Complaint: Dyspnea on exertion    Tonny Baig is a 79 y.o. male with a PMH of CAD status post remote stenting, bipolar 1 disorder, CKD, hypertension, COPD who presents with dyspnea on exertion worsening slowly over the last 4 days.  Denies shortness of breath at rest.  Reports having chest pressure earlier this morning which is now resolved.  Does report cough however without any increased sputum production or change in consistency.  No febrile illness or sick contacts.  Patient was noted to be desaturating to the mid 80s with exertion for which he was placed on 2 L nasal cannula however upon my evaluation patient was currently resting in the bed without any oxygen needs saturating 97-98%.  Patient received DuoNebs in the ED and reports that his breathing has improved but he still gets short of breath with exertion.    Review of Systems    Historical Information   Past Medical History[1]  Past Surgical History[2]  Social History[3]  E-Cigarette/Vaping    E-Cigarette Use Never User      E-Cigarette/Vaping Substances    Nicotine No     THC No     CBD No     Flavoring No     Other No     Unknown No          Meds/Allergies   I have reviewed home medications  with patient personally.  Prior to Admission medications    Medication Sig Start Date End Date Taking? Authorizing Provider   albuterol (PROVENTIL HFA,VENTOLIN HFA) 90 mcg/act inhaler  1/14/25  Yes Historical Provider, MD   amLODIPine (NORVASC) 5 mg tablet TAKE 1 TABLET BY MOUTH DAILY 4/16/25  Yes Cameron Michele MD   aspirin (ECOTRIN LOW STRENGTH) 81 mg EC tablet Take 81 mg by mouth in the morning.   Yes Historical Provider, MD   atorvastatin (LIPITOR) 40 mg tablet TAKE 1 TABLET BY MOUTH DAILY 6/9/25  Yes Cameron Michele MD   Cholecalciferol (VITAMIN D PO) Take 2,000 Units by mouth in the morning.   Yes Historical Provider, MD   DULoxetine (CYMBALTA) 30 mg delayed release capsule Take 1 capsule (30 mg total) by mouth daily  Patient taking differently: Take 30 mg by mouth daily at bedtime 4/17/25  Yes Prakash Diamond,    lithium carbonate (LITHOBID) 300 mg CR tablet Take 300 mg by mouth every other day 8/16/18  Yes Historical Provider, MD   Magnesium 250 MG TABS Take by mouth in the morning  Patient taking differently: Take 250 mg by mouth 3 (three) times a day   Yes Historical Provider, MD   metoprolol succinate (TOPROL-XL) 25 mg 24 hr tablet TAKE 1 TABLET BY MOUTH ONCE  DAILY 3/4/25  Yes Lai Meraz MD   zqkiq-1-gfft ethyl esters (LOVAZA) 1 g capsule Take 1 capsule (1 g total) by mouth 3 (three) times a day 6/18/25  Yes Cameron Michele MD   omeprazole (PriLOSEC) 20 mg delayed release capsule TAKE 1 CAPSULE BY MOUTH TWICE  DAILY BEFORE MEALS 12/3/24  Yes ELSA Garcia   Sodium Zirconium Cyclosilicate (Lokelma) 10 g Take 1 packet (10 g total) by mouth daily 7/17/25  Yes Gilson Heller MD   Trekarinagy Ellipta 200-62.5-25 MCG/ACT AEPB inhaler USE 1 INHALATION BY MOUTH DAILY 3/3/25  Yes ELSA Hurt   acetaminophen (TYLENOL) 500 mg tablet Take 500 mg by mouth as needed for mild pain    Historical Provider, MD   dicyclomine (BENTYL) 20 mg tablet TAKE 1 TABLET BY MOUTH EVERY 6  HOURS AS NEEDED FOR  ABDOMINAL  CRAMPING  Patient taking differently: Take 20 mg by mouth as needed (abdominal cramping) 5/30/24   Brendan Suarez MD   fexofenadine (ALLEGRA) 180 MG tablet Take 180 mg by mouth as needed Daily during the Summer    Historical Provider, MD   fluticasone (FLONASE) 50 mcg/act nasal spray 2 sprays into each nostril in the morning.    Historical Provider, MD   nitroglycerin (NITRODUR) 0.2 mg/hr APPLY 1 PATCH TOPICALLY ONCE  DAILY. LEAVE ON FOR 12 TO 14  HOURS THEN REMOVE FOR A  NITRATE-FREE INTERVAL OF 10 TO  12 HOURS 5/14/24   Brendan Suarez MD   Sodium Fluoride 5000 PPM 1.1 % PSTE 2 (two) times a day 5/20/25   Historical Provider, MD     Allergies   Allergen Reactions    Bactrim [Sulfamethoxazole-Trimethoprim] Other (See Comments)     AILYN    Nsaids      Annotation - [id number]: unable to take due to use of lithium.    Oxycodone Rash       Objective :  Temp:  [97.5 °F (36.4 °C)] 97.5 °F (36.4 °C)  HR:  [74-92] 78  BP: (163-187)/(87-97) 168/96  Resp:  [20] 20  SpO2:  [96 %-98 %] 98 %  O2 Device: None (Room air)    Physical Exam  Vitals reviewed.   Constitutional:       General: He is not in acute distress.     Appearance: Normal appearance. He is not ill-appearing.   HENT:      Head: Normocephalic and atraumatic.     Cardiovascular:      Rate and Rhythm: Normal rate and regular rhythm.      Heart sounds: Normal heart sounds.   Pulmonary:      Effort: Pulmonary effort is normal. No respiratory distress.      Breath sounds: No wheezing or rales.   Abdominal:      Palpations: Abdomen is soft.      Tenderness: There is no abdominal tenderness.     Musculoskeletal:      Right lower leg: Edema (Trace) present.      Left lower leg: Edema (Trace) present.     Neurological:      Mental Status: He is alert and oriented to person, place, and time. Mental status is at baseline.          Lines/Drains:            Lab Results: I have reviewed the following results:  Results from last 7 days   Lab Units 07/23/25  0705   WBC  Thousand/uL 9.22   HEMOGLOBIN g/dL 11.9*   HEMATOCRIT % 36.8   PLATELETS Thousands/uL 223   SEGS PCT % 73   LYMPHO PCT % 13*   MONO PCT % 9   EOS PCT % 3     Results from last 7 days   Lab Units 07/23/25  0705   SODIUM mmol/L 139   POTASSIUM mmol/L 4.7   CHLORIDE mmol/L 106   CO2 mmol/L 26   BUN mg/dL 29*   CREATININE mg/dL 1.89*   ANION GAP mmol/L 7   CALCIUM mg/dL 9.2   ALBUMIN g/dL 3.9   TOTAL BILIRUBIN mg/dL 0.47   ALK PHOS U/L 135*   ALT U/L 17   AST U/L 18   GLUCOSE RANDOM mg/dL 110             Lab Results   Component Value Date    HGBA1C 5.5 01/21/2025    HGBA1C 5.2 10/20/2021    HGBA1C 4.9 11/23/2019           Imaging Results Review: I reviewed radiology reports from this admission including: CT chest.  Other Study Results Review: EKG was reviewed.     Administrative Statements   I have spent a total time of 45 minutes in caring for this patient on the day of the visit/encounter including Impressions, Counseling / Coordination of care, Documenting in the medical record, Reviewing/placing orders in the medical record (including tests, medications, and/or procedures), Obtaining or reviewing history  , and Communicating with other healthcare professionals .    ** Please Note: This note has been constructed using a voice recognition system. **         [1]   Past Medical History:  Diagnosis Date    Abnormal ECG     Allergic 2011    Allergic rhinitis 2015    Aneurysm (HCC)     Anxiety     occasional    Aortic aneurysm (HCC)     Benign colon polyp     Bipolar 1 disorder (HCC)     Cancer ([facility]) 2007, 2011    Cardiac disease     MI    Cervical cord compression with myelopathy (HCC)     Cervical disc disorder     COPD (chronic obstructive pulmonary disease) (HCC)     Coronary artery disease 1995    Difficulty walking     Diverticulitis     Diverticulitis of colon prior to 2014    Diverticulosis     Elevated PSA 2007    Emphysema of lung (HCC) 2012    Gait disturbance     uses cane, leg brace on right    GERD  (gastroesophageal reflux disease)     Heart attack (HCC) 1996    Heart disease     Hx of resection of large bowel 05/03/2016    Hyperlipidemia     Hypertension     IBS (irritable bowel syndrome)     Inflammatory bowel disease 2012    Low back pain     Lumbar stenosis     Lumbosacral disc disease     Lung cancer (HCC)     2007 Left lower lobectomy and 2011 Right lung with surgery     Movement disorder     Gait    Myocardial infarction (HCC)     involving other coronary artery    Opioid abuse, in remission (HCC) 2007    Osteoarthritis     Peripheral neuropathy     Do not know    Prostate cancer (HCC)     Shortness of breath     Small bowel obstruction (HCC) 11/16/2016    Stomach disorder     Urinary incontinence 2019    Urinary retention ?    Vascular disorder    [2]   Past Surgical History:  Procedure Laterality Date    ABDOMINAL SURGERY  2015    ANGIOPLASTY      stent    BACK SURGERY      CARDIAC CATHETERIZATION  1995    angioplasty    CARDIAC SURGERY      CERVICAL SPINE SURGERY      Cervical decompression with cervical fusion from C3-C7 for spinal stenosis.    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    LUNG LOBECTOMY Left     VT ARTHRD ANT INTERBODY MIN DSC CRV BELOW C2 N/A 05/02/2016    Procedure: Anterior cervical diskectomy C3/4, C5/6, C6/7 with anterior plate fixation fusion C3-7;  Posterior decompressive laminectomy C3-7 with lateral mass fixation fusion C3-7 (IMPULSE  MONITORING);  Surgeon: Jose Luis Moore MD;  Location: [facility];  Service: Neurosurgery    MT ARTHRODESIS POSTERIOR INTERBODY 1 NTRSPC LUMBAR N/A 2017    Procedure: L4-5 AND L5-S1 DECOMPRESSIVE FORAMINOTOMIES, TRANSFORAMINAL LUMBAR INTERBODY AND PEDICLE SCREW FIXATION FUSION L4-S1 (IMPULSE);  Surgeon: Jose Luis Moore MD;  Location: [facility];  Service: Neurosurgery    MT EVASC RPR DPLMNT AORTO-AORTIC NDGFT N/A 2018    Procedure: REPAIR ANEURYSM ENDOVASCULAR ABDOMINAL AORTIC  (EVAR) WITH BILATERAL PERCUTANEOUS FEMORAL ACCESS WITH ULTRASOUND GUIDANCE ON THE RIGHT AND PRE CLOSURE;  Surgeon: Shan Villalobos MD;  Location: [facility];  Service: Vascular    PROSTATECTOMY      for prostate CA - no chemo/RT    SIGMOIDECTOMY      for divertic    SMALL INTESTINE SURGERY N/A 2016    Procedure: Exploratory Laparotomy, Lysis of adhesions to release small bowel obstruction;  Surgeon: Aminta Sim MD;  Location: [facility];  Service:     SPINAL FUSION      SPINE SURGERY  ,     THORACOABDOMINAL AORTIC ANEURYSM REPAIR      TONSILLECTOMY  Y        UPPER GASTROINTESTINAL ENDOSCOPY      VASCULAR SURGERY  2018    EVAR   [3]   Social History  Tobacco Use    Smoking status: Former     Current packs/day: 0.00     Average packs/day: 1 pack/day for 44.0 years (44.0 ttl pk-yrs)     Types: Cigarettes     Start date: 1967     Quit date: 2011     Years since quittin.5     Passive exposure: Past    Smokeless tobacco: Never   Vaping Use    Vaping status: Never Used   Substance and Sexual Activity    Alcohol use: Yes     Alcohol/week: 2.0 - 3.0 standard drinks of alcohol     Types: 2 - 3 Shots of liquor per week     Comment: One glass per day    Drug use: No    Sexual activity: Yes     Partners: Female     Birth control/protection: Post-menopausal, None

## 2025-07-23 NOTE — ASSESSMENT & PLAN NOTE
Past medical history of COPD, CAD s/p stenting presenting with dyspnea on exertion worsening over the last 4 days with presentation concerning for COPD exacerbation although patient without any increased productive cough, sputum frequency but was noted to be wheezing on initial presentation  Elevated D-dimer, CTA chest negative for PE, pneumonia or edema but did note bilateral atelectasis  Troponin flat, BNP elevated although well below prior readings, EKG without ST changes, low suspicion for ACS  No fevers, no leukocytosis, COVID/flu negative  Currently saturating well on room air however desaturated to mid 80s bradycardia when ambulating per ED  Received DuoNeb in ED with improvement in symptoms and lungs appear clear on my evaluation, continue scheduled Xopenex x 24 hours  Will give IV Solu-Medrol 40 mg x 1 followed by oral prednisone 40 mg daily for total 5 days of therapy  Will observe overnight, will need ambulatory pulse ox prior to discharge  Will adjust antihypertensives if noted to have persistent elevated BP which could possibly contribute to dyspnea

## 2025-07-23 NOTE — ED PROVIDER NOTES
"Time reflects when diagnosis was documented in both MDM as applicable and the Disposition within this note       Time User Action Codes Description Comment    7/23/2025 11:45 AM Angel Nelson [J44.1] COPD exacerbation (HCC)     7/23/2025 11:45 AM Angel Nelson [R06.00] Dyspnea     7/23/2025 11:45 AM Angel Nelson [R09.02] Hypoxia           ED Disposition       ED Disposition   Admit    Condition   Stable    Date/Time   Wed Jul 23, 2025 11:25 AM    Comment   Case was discussed with [facility] and the patient's admission status was agreed to be Admission Status: observation status to the service of Dr. Montenegro .               Assessment & Plan       Medical Decision Making  Patient is a 79 y.o. male  who presents to the ED with shortness of breath and chest pain.    Vital signs hypertensive 184/95 otherwise WNL. Exam as listed below.    History and physical exam are most consistent with COPD exacerbation, dyspnea, hypoxia  Differential diagnosis I considered includes but is not limited to cardiopulmonary process, infection, COPD exacerbation, malignancy    Plan labs, D-dimer, BNP, CXR  Duoneb for shortness of breath & wheezing  CTA PE study    Patient continues to desat into the mid 80's upon exertion. O2 sats will return to >95% after ~30 seconds of rest. Not requiring oxygen at this time.  Will admit for observation for COPD exacerbation    Case was discussed with [facility] and the patient's admission status was agreed to be Admission Status: observation status to the service of Dr. Montenegro .    View ED course below for further discussion on patient workup.     All labs reviewed and utilized in the medical decision making process  All radiology studies independently viewed by me and interpreted by the radiologist.  I reviewed all testing with the patient.     Portions of the record may have been created with voice recognition software. Occasional wrong word or \"sound a like\" substitutions may have occurred due to " the inherent limitations of voice recognition software. Read the chart carefully and recognize, using context, where substitutions have occurred.     ED 27/ED 27       Amount and/or Complexity of Data Reviewed  Labs: ordered. Decision-making details documented in ED Course.  Radiology: ordered.    Risk  Prescription drug management.  Decision regarding hospitalization.        ED Course as of 07/23/25 1223   Wed Jul 23, 2025   0742 Creatinine(!): 1.89  Hx of renal insufficiency; 1.68 1 week ago   0743 ALK PHOS(!): 135  Chronically elevated   0743 hs TnI 0hr: 12   0826 D-Dimer, Quant(!): 1.80   0836 CXR IMPRESSION:  No acute process or significant change from prior exam.     0936 Breathing improved after breathing treatment   0945 Delta 2hr hsTnI: 0   0955 EKG on my interpretation normal sinus rhythm, rate 79, normal axis, no prolonged intervals, nonspecific ST abnormalities   1015 IMPRESSION:  No pulmonary embolus.         Medications   acetaminophen (TYLENOL) tablet 650 mg (has no administration in time range)   amLODIPine (NORVASC) tablet 5 mg (has no administration in time range)   aspirin (ECOTRIN LOW STRENGTH) EC tablet 81 mg (has no administration in time range)   atorvastatin (LIPITOR) tablet 40 mg (has no administration in time range)   DULoxetine (CYMBALTA) delayed release capsule 30 mg (has no administration in time range)   fluticasone (FLONASE) 50 mcg/act nasal spray 2 spray (has no administration in time range)   lithium carbonate (LITHOBID) CR tablet 300 mg (has no administration in time range)   metoprolol succinate (TOPROL-XL) 24 hr tablet 25 mg (has no administration in time range)   fish oil capsule 1,000 mg (has no administration in time range)   pantoprazole (PROTONIX) EC tablet 20 mg (has no administration in time range)   Sodium Zirconium Cyclosilicate (Lokelma) 10 g (has no administration in time range)   fluticasone-vilanterol 200-25 mcg/actuation 1 puff (has no administration in time range)      And   umeclidinium 62.5 mcg/actuation inhaler AEPB 1 puff (has no administration in time range)   heparin (porcine) subcutaneous injection 5,000 Units (has no administration in time range)   predniSONE tablet 40 mg (has no administration in time range)   methylPREDNISolone sodium succinate (Solu-MEDROL) injection 40 mg (has no administration in time range)   levalbuterol (XOPENEX) inhalation solution 1.25 mg (has no administration in time range)   ipratropium-albuterol (DUO-NEB) 0.5-2.5 mg/3 mL inhalation solution 3 mL (3 mL Nebulization Given 7/23/25 0909)   iohexol (OMNIPAQUE) 350 MG/ML injection (MULTI-DOSE) 80 mL (80 mL Intravenous Given 7/23/25 0941)       ED Risk Strat Scores                    No data recorded        SBIRT 22yo+      Flowsheet Row Most Recent Value   Initial Alcohol Screen: US AUDIT-C     1. How often do you have a drink containing alcohol? 6 Filed at: 07/23/2025 0656   2. How many drinks containing alcohol do you have on a typical day you are drinking?  1 Filed at: 07/23/2025 0656   3a. Male UNDER 65: How often do you have five or more drinks on one occasion? 0 Filed at: 07/23/2025 0656   Audit-C Score 7 Filed at: 07/23/2025 0656   JESSICA: How many times in the past year have you...    Used an illegal drug or used a prescription medication for non-medical reasons? Never Filed at: 07/23/2025 0656                            History of Present Illness       Chief Complaint   Patient presents with    Shortness of Breath     Per ems, pt has been having sob on exertion x2 days, ems stated on exertion her did dsat into upper 80s. Pt also co of intermittent chest pain. Pt placed on 2L NC by ems but wears no o2 baseline.       Past Medical History[1]   Past Surgical History[2]   Family History[3]   Social History[4]   E-Cigarette/Vaping    E-Cigarette Use Never User       E-Cigarette/Vaping Substances    Nicotine No     THC No     CBD No     Flavoring No     Other No     Unknown No       I have  reviewed and agree with the history as documented.     79-year-old male with past medical history of CAD w/ stent placement, COPD, malignancy, htn, hyperlipidemia, infrarenal AAA with EVAR, lung cancer presents to the ED via EMS with shortness of breath and chest pressure. SOB has been worsening for past 3-4 days.  Worse on exertion.  New onset chest pressure beginning this morning. Intermittent pain felt across left shoulder.  Endorses cough without sputum production, occasional chills, rhinorrhea, sneezing, intermittent headache.  Generally has felt poorly for the past few days.  Denies sore throat, fever, nausea, vomiting, diarrhea, constipation. EMS found the patient to desat into the 80's on exertion and placed on 2L oxygen. Patient not on oxygen at home. At time of my evaluation patient on room air maintating saturations >95%.      Shortness of Breath  Associated symptoms: chest pain, cough, headaches and wheezing    Associated symptoms: no abdominal pain, no diaphoresis, no fever, no sore throat and no vomiting        Review of Systems   Constitutional:  Positive for chills. Negative for diaphoresis and fever.   HENT:  Positive for rhinorrhea. Negative for sore throat.    Respiratory:  Positive for cough, chest tightness, shortness of breath and wheezing.    Cardiovascular:  Positive for chest pain and leg swelling.   Gastrointestinal:  Negative for abdominal distention, abdominal pain, constipation, diarrhea, nausea and vomiting.   Genitourinary: Negative.    Neurological:  Positive for headaches. Negative for dizziness, weakness, light-headedness and numbness.           Objective       ED Triage Vitals   Temperature Pulse Blood Pressure Respirations SpO2 Patient Position - Orthostatic VS   07/23/25 0700 07/23/25 0657 07/23/25 0657 07/23/25 0657 07/23/25 0657 07/23/25 0657   97.5 °F (36.4 °C) 92 (!) 184/95 20 96 % Sitting      Temp Source Heart Rate Source BP Location FiO2 (%) Pain Score    07/23/25 0700  07/23/25 0657 07/23/25 0657 -- 07/23/25 0657    Oral Monitor Right arm  No Pain      Vitals      Date and Time Temp Pulse SpO2 Resp BP Pain Score FACES Pain Rating User   07/23/25 1218 -- 78 96 % -- -- -- -- ML   07/23/25 1100 -- 78 98 % -- 168/96 -- -- KENYA   07/23/25 1000 -- 74 98 % 20 178/97 -- -- AM   07/23/25 0900 -- 76 97 % 20 187/97 -- -- AM   07/23/25 0800 -- 74 97 % 20 163/87 -- -- AM   07/23/25 0700 97.5 °F (36.4 °C) -- -- -- -- -- -- AM   07/23/25 0657 -- 92 96 % 20 184/95 No Pain -- AM            Physical Exam  Constitutional:       Appearance: He is well-developed.   HENT:      Head: Normocephalic and atraumatic.     Cardiovascular:      Rate and Rhythm: Normal rate and regular rhythm.   Pulmonary:      Breath sounds: Examination of the right-lower field reveals wheezing. Examination of the left-lower field reveals wheezing. Wheezing present.   Chest:      Chest wall: No tenderness, crepitus or edema.   Abdominal:      General: Bowel sounds are normal. There is no distension.      Palpations: Abdomen is soft.     Musculoskeletal:      Right lower leg: No tenderness. 1+ Pitting Edema present.      Left lower leg: No tenderness. 1+ Pitting Edema present.     Skin:     General: Skin is warm.     Neurological:      General: No focal deficit present.      Mental Status: He is alert and oriented to person, place, and time.         Results Reviewed       Procedure Component Value Units Date/Time    HS Troponin I 2hr [[id number]]  (Normal) Collected: 07/23/25 0909    Lab Status: Final result Specimen: Blood from Line, Venous Updated: 07/23/25 0943     hs TnI 2hr 12 ng/L      Delta 2hr hsTnI 0 ng/L     D-Dimer [[id number]]  (Abnormal) Collected: 07/23/25 0705    Lab Status: Final result Specimen: Blood from Arm, Left Updated: 07/23/25 0822     D-Dimer, Quant 1.80 ug/ml FEU     Narrative:      In the evaluation for possible pulmonary embolism, in the appropriate (Well's Score of 4 or less) patient, the age adjusted  d-dimer cutoff for this patient can be calculated as:    Age x 0.01 (in ug/mL) for Age-adjusted D-dimer exclusion threshold for a patient over 50 years.    FLU/COVID Rapid Antigen (30 min. TAT) - Preferred screening test in ED [[id number]]  (Normal) Collected: 07/23/25 0736    Lab Status: Final result Specimen: Nares from Nose Updated: 07/23/25 0752     SARS COV Rapid Antigen Negative     Influenza A Rapid Antigen Negative     Influenza B Rapid Antigen Negative    Narrative:      This test has been performed using the [employer] Piper 2 FLU+SARS Antigen test under the Emergency Use Authorization (EUA). This test has been validated by the  and verified by the performing laboratory. The Piper uses lateral flow immunofluorescent sandwich assay to detect SARS-COV, Influenza A and Influenza B Antigen.     The Quidel Piper 2 SARS Antigen test does not differentiate between SARS-CoV and SARS-CoV-2.     Negative results are presumptive and may be confirmed with a molecular assay, if necessary, for patient management. Negative results do not rule out SARS-CoV-2 or influenza infection and should not be used as the sole basis for treatment or patient management decisions. A negative test result may occur if the level of antigen in a sample is below the limit of detection of this test.     Positive results are indicative of the presence of viral antigens, but do not rule out bacterial infection or co-infection with other viruses.     All test results should be used as an adjunct to clinical observations and other information available to the provider.    FOR PEDIATRIC PATIENTS - copy/paste COVID Guidelines URL to browser: [email or web address]    B-Type Natriuretic Peptide(BNP) [[id number]]  (Abnormal) Collected: 07/23/25 0705    Lab Status: Final result Specimen: Blood from Arm, Left Updated: 07/23/25 0745      pg/mL     Comprehensive metabolic panel [[id number]]   (Abnormal) Collected: 07/23/25 0705    Lab Status: Final result Specimen: Blood from Arm, Left Updated: 07/23/25 0740     Sodium 139 mmol/L      Potassium 4.7 mmol/L      Chloride 106 mmol/L      CO2 26 mmol/L      ANION GAP 7 mmol/L      BUN 29 mg/dL      Creatinine 1.89 mg/dL      Glucose 110 mg/dL      Calcium 9.2 mg/dL      AST 18 U/L      ALT 17 U/L      Alkaline Phosphatase 135 U/L      Total Protein 6.5 g/dL      Albumin 3.9 g/dL      Total Bilirubin 0.47 mg/dL      eGFR 33 ml/min/1.73sq m     Narrative:      National Kidney Disease Foundation guidelines for Chronic Kidney Disease (CKD):     Stage 1 with normal or high GFR (GFR > 90 mL/min/1.73 square meters)    Stage 2 Mild CKD (GFR = 60-89 mL/min/1.73 square meters)    Stage 3A Moderate CKD (GFR = 45-59 mL/min/1.73 square meters)    Stage 3B Moderate CKD (GFR = 30-44 mL/min/1.73 square meters)    Stage 4 Severe CKD (GFR = 15-29 mL/min/1.73 square meters)    Stage 5 End Stage CKD (GFR <15 mL/min/1.73 square meters)  Note: GFR calculation is accurate only with a steady state creatinine    CBC and differential [[id number]]  (Abnormal) Collected: 07/23/25 0705    Lab Status: Final result Specimen: Blood from Arm, Left Updated: 07/23/25 0737     WBC 9.22 Thousand/uL      RBC 3.94 Million/uL      Hemoglobin 11.9 g/dL      Hematocrit 36.8 %      MCV 93 fL      MCH 30.2 pg      MCHC 32.3 g/dL      RDW 12.9 %      MPV 10.3 fL      Platelets 223 Thousands/uL      nRBC 0 /100 WBCs      Segmented % 73 %      Immature Grans % 1 %      Lymphocytes % 13 %      Monocytes % 9 %      Eosinophils Relative 3 %      Basophils Relative 1 %      Absolute Neutrophils 6.86 Thousands/µL      Absolute Immature Grans 0.07 Thousand/uL      Absolute Lymphocytes 1.17 Thousands/µL      Absolute Monocytes 0.78 Thousand/µL      Eosinophils Absolute 0.29 Thousand/µL      Basophils Absolute 0.05 Thousands/µL     HS Troponin 0hr (reflex protocol) [[id number]]  (Normal) Collected: 07/23/25 0705     Lab Status: Final result Specimen: Blood from Arm, Left Updated: 25 0735     hs TnI 0hr 12 ng/L             CTA chest pe study   Final Interpretation by Aung Cantor MD ( [id number])      No pulmonary embolus.                  Computerized Assisted Algorithm (CAA) may have aided analysis of applicable images.      Workstation performed: [id number]         XR chest 2 views   Final Interpretation by Grady Boles MD ( 08)      No acute process or significant change from prior exam.            Workstation performed: [id number]             Procedures    ED Medication and Procedure Management   Prior to Admission Medications   Prescriptions Last Dose Informant Patient Reported? Taking?   Cholecalciferol (VITAMIN D PO) 2025 Self Yes Yes   Sig: Take 2,000 Units by mouth in the morning.   DULoxetine (CYMBALTA) 30 mg delayed release capsule 2025 Bedtime Self No Yes   Sig: Take 1 capsule (30 mg total) by mouth daily   Patient taking differently: Take 30 mg by mouth daily at bedtime   Magnesium 250 MG TABS 2025 Self Yes Yes   Sig: Take by mouth in the morning   Patient taking differently: Take 250 mg by mouth 3 (three) times a day   Sodium Fluoride 5000 PPM 1.1 % PSTE   Yes No   Si (two) times a day   Sodium Zirconium Cyclosilicate (Lokelma) 10 g 2025  No Yes   Sig: Take 1 packet (10 g total) by mouth daily   Trelegy Ellipta 200-62.5-25 MCG/ACT AEPB inhaler 2025 Self No Yes   Sig: USE 1 INHALATION BY MOUTH DAILY   acetaminophen (TYLENOL) 500 mg tablet Unknown Self Yes No   Sig: Take 500 mg by mouth as needed for mild pain   albuterol (PROVENTIL HFA,VENTOLIN HFA) 90 mcg/act inhaler 2025 Self Yes Yes   Patient taking differently: 1 puff every 4 (four) hours as needed for wheezing or shortness of breath   amLODIPine (NORVASC) 5 mg tablet 2025 Self No Yes   Sig: TAKE 1 TABLET BY MOUTH DAILY   aspirin (ECOTRIN LOW STRENGTH) 81 mg EC tablet 2025 Self Yes Yes   Sig:  Take 81 mg by mouth in the morning.   atorvastatin (LIPITOR) 40 mg tablet 2025  No Yes   Sig: TAKE 1 TABLET BY MOUTH DAILY   dicyclomine (BENTYL) 20 mg tablet  Self No No   Sig: TAKE 1 TABLET BY MOUTH EVERY 6  HOURS AS NEEDED FOR ABDOMINAL  CRAMPING   Patient taking differently: Take 20 mg by mouth as needed (abdominal cramping)   fexofenadine (ALLEGRA) 180 MG tablet  Self Yes No   Sig: Take 180 mg by mouth as needed Daily during the Summer   fluticasone (FLONASE) 50 mcg/act nasal spray  Self Yes No   Si sprays into each nostril in the morning.   lithium carbonate (LITHOBID) 300 mg CR tablet 2025 Self Yes Yes   Sig: Take 300 mg by mouth every other day   metoprolol succinate (TOPROL-XL) 25 mg 24 hr tablet 2025 Self No Yes   Sig: TAKE 1 TABLET BY MOUTH ONCE  DAILY   nitroglycerin (NITRODUR) 0.2 mg/hr  Self No No   Sig: APPLY 1 PATCH TOPICALLY ONCE  DAILY. LEAVE ON FOR 12 TO 14  HOURS THEN REMOVE FOR A  NITRATE-FREE INTERVAL OF 10 TO  12 HOURS   omega-3-acid ethyl esters (LOVAZA) 1 g capsule 2025  No Yes   Sig: Take 1 capsule (1 g total) by mouth 3 (three) times a day   omeprazole (PriLOSEC) 20 mg delayed release capsule 2025 Self No Yes   Sig: TAKE 1 CAPSULE BY MOUTH TWICE  DAILY BEFORE MEALS      Facility-Administered Medications: None     Patient's Medications   Discharge Prescriptions    No medications on file     No discharge procedures on file.  ED SEPSIS DOCUMENTATION   Time reflects when diagnosis was documented in both MDM as applicable and the Disposition within this note       Time User Action Codes Description Comment    2025 11:45 AM Angel Nelson [J44.1] COPD exacerbation (HCC)     2025 11:45 AM Angel Nelson [R06.00] Dyspnea     2025 11:45 AM Angel Nelson [R09.02] Hypoxia                      [1]   Past Medical History:  Diagnosis Date    Abnormal ECG     Allergic     Allergic rhinitis     Aneurysm (HCC)     Anxiety     occasional     Aortic aneurysm (HCC)     Benign colon polyp     Bipolar 1 disorder (HCC)     Cancer (HCC) 2007, 2011    Cardiac disease     MI    Cervical cord compression with myelopathy (HCC)     Cervical disc disorder     COPD (chronic obstructive pulmonary disease) (HCC)     Coronary artery disease 1995    Difficulty walking     Diverticulitis     Diverticulitis of colon prior to 2014    Diverticulosis     Elevated PSA 2007    Emphysema of lung (HCC) 2012    Gait disturbance     uses cane, leg brace on right    GERD (gastroesophageal reflux disease)     Heart attack (HCC) 1996    Heart disease     Hx of resection of large bowel 05/03/2016    Hyperlipidemia     Hypertension     IBS (irritable bowel syndrome)     Inflammatory bowel disease 2012    Low back pain     Lumbar stenosis     Lumbosacral disc disease     Lung cancer (HCC)     2007 Left lower lobectomy and 2011 Right lung with surgery     Movement disorder     Gait    Myocardial infarction ([facility])     involving other coronary artery    Opioid abuse, in remission ([facility]) 2007    Osteoarthritis     Peripheral neuropathy     Do not know    Prostate cancer ([facility])     Shortness of breath     Small bowel obstruction ([facility]) 11/16/2016    Stomach disorder     Urinary incontinence 2019    Urinary retention ?    Vascular disorder    [2]   Past Surgical History:  Procedure Laterality Date    ABDOMINAL SURGERY  2015    ANGIOPLASTY      stent    BACK SURGERY      CARDIAC CATHETERIZATION  1995    angioplasty    CARDIAC SURGERY      CERVICAL SPINE SURGERY      Cervical decompression with cervical fusion from C3-C7 for spinal stenosis.    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    LUNG LOBECTOMY Left     TX ARTHRD ANT INTERBODY MIN DSC CRV BELOW C2 N/A 05/02/2016    Procedure: Anterior cervical diskectomy C3/4, C5/6, C6/7 with anterior plate fixation fusion C3-7;  Posterior decompressive laminectomy C3-7 with lateral mass fixation fusion C3-7 (IMPULSE MONITORING);  Surgeon: Jose Luis Moore MD;  Location: [facility];  Service: Neurosurgery    TX ARTHRODESIS POSTERIOR INTERBODY 1 [facility] LUMBAR N/A 01/30/2017    Procedure: L4-5 AND L5-S1 DECOMPRESSIVE FORAMINOTOMIES, TRANSFORAMINAL LUMBAR INTERBODY AND PEDICLE SCREW FIXATION FUSION L4-S1 (IMPULSE);  Surgeon: Jose Luis Moore MD;  Location: [facility];  Service: Neurosurgery    TX EVASC RPR DPLMNT AORTO-AORTIC NDGFT N/A 08/06/2018    Procedure: REPAIR ANEURYSM ENDOVASCULAR ABDOMINAL AORTIC  (EVAR) WITH BILATERAL PERCUTANEOUS FEMORAL ACCESS WITH ULTRASOUND GUIDANCE ON THE RIGHT AND PRE CLOSURE;  Surgeon: Shan Villalobos MD;  Location: [facility];  Service: Vascular    PROSTATECTOMY  2007    for prostate CA - no chemo/RT    SIGMOIDECTOMY      for divertic    SMALL INTESTINE SURGERY N/A 11/17/2016    Procedure: Exploratory Laparotomy, Lysis of adhesions to release small bowel obstruction;  Surgeon: Aminta Sim MD;  Location: [facility];  Service:     SPINAL FUSION      SPINE SURGERY  2016, 2017    THORACOABDOMINAL AORTIC ANEURYSM REPAIR      TONSILLECTOMY  Y    1952    UPPER GASTROINTESTINAL ENDOSCOPY  2015    VASCULAR SURGERY  2018    EVAR   [3]   Family History  Problem Relation Name Age of Onset    Hypertension Mother Venessa trytebuddy     Glaucoma Mother Venessa trytebuddy     Heart disease Mother Venessa trytebuddy     Heart attack Father father     Colon cancer Father father     Coronary artery disease Father father      Hypertension Father father     Kidney disease Father father     Cancer Father father     Heart disease Father father     Heart disease Family      Hyperlipidemia Family      Hypertension Family      Heart disease Family Paul Baig     Kidney disease Family Paul Trytebuddy     Hypertension Family Paul Baig    [4]   Social History  Tobacco Use    Smoking status: Former     Current packs/day: 0.00     Average packs/day: 1 pack/day for 44.0 years (44.0 ttl pk-yrs)     Types: Cigarettes     Start date: 1967     Quit date: 2011     Years since quittin.5     Passive exposure: Past    Smokeless tobacco: Never   Vaping Use    Vaping status: Never Used   Substance Use Topics    Alcohol use: Yes     Alcohol/week: 2.0 - 3.0 standard drinks of alcohol     Types: 2 - 3 Shots of liquor per week     Comment: One glass per day    Drug use: No        Nayana Frey DO  25

## 2025-07-23 NOTE — RESPIRATORY THERAPY NOTE
"RT Protocol Note  Tonny Baig 79 y.o. male MRN: [id number]  Unit/Bed#: ED 27 Encounter: [id number]    Assessment    Principal Problem:    Dyspnea on exertion  Active Problems:    CAD (coronary artery disease)    Bipolar 1 disorder (HCC)    COPD (chronic obstructive pulmonary disease) (HCC)    Essential hypertension    CKD (chronic kidney disease)      Home Pulmonary Medications:  Proventil MDI prn, Trelegy MDI       Past Medical History[1]  Social History[2]    Subjective    Subjective Data: currently patient states breathing feels ok, no complaints. Patient states SOB during exertion. Current breath sounds are diminished, no wheezes heard. SpO2 97% on room air. Will continue with TID udn treatments as ordered for COPD excacerbation. Will continue to monitor patient    Objective    Physical Exam:   Assessment Type: Assess only  General Appearance: Alert, Awake  Respiratory Pattern: Spontaneous  Chest Assessment: Chest expansion symmetrical  Bilateral Breath Sounds: Diminished  O2 Device: room air    Vitals:  Blood pressure 168/96, pulse 78, temperature 97.5 °F (36.4 °C), temperature source Oral, resp. rate 20, height 5' 7\" (1.702 m), weight 74.8 kg (165 lb), SpO2 98%.          Imaging and other studies: Results Review Statement: I reviewed radiology reports from this admission including: chest xray.    O2 Device: room air     Plan: continue TID udn for COPD exacerbation    Respiratory Plan: Mild Distress pathway        Resp Comments: patient assessed per resp protocol. Patient presents with dyspnea. PMH: COPD, lung CA, lobectomy. + smoking hx. Patient takes proventil and trelegy MDIs at home        [1]   Past Medical History:  Diagnosis Date    Abnormal ECG     Allergic 2011    Allergic rhinitis 2015    Aneurysm (HCC)     Anxiety     occasional    Aortic aneurysm (HCC)     Benign colon polyp     Bipolar 1 disorder (HCC)     Cancer (HCC) 2007, 2011    Cardiac disease     MI    Cervical cord compression with " myelopathy (HCC)     Cervical disc disorder     COPD (chronic obstructive pulmonary disease) (HCC)     Coronary artery disease     Difficulty walking     Diverticulitis     Diverticulitis of colon prior to     Diverticulosis     Elevated PSA     Emphysema of lung ([facility])     Gait disturbance     uses cane, leg brace on right    GERD (gastroesophageal reflux disease)     Heart attack (HCC)     Heart disease     Hx of resection of large bowel 2016    Hyperlipidemia     Hypertension     IBS (irritable bowel syndrome)     Inflammatory bowel disease     Low back pain     Lumbar stenosis     Lumbosacral disc disease     Lung cancer ([facility])      Left lower lobectomy and  Right lung with surgery     Movement disorder     Gait    Myocardial infarction ([facility])     involving other coronary artery    Opioid abuse, in remission ([facility])     Osteoarthritis     Peripheral neuropathy     Do not know    Prostate cancer ([facility])     Shortness of breath     Small bowel obstruction ([facility]) 2016    Stomach disorder     Urinary incontinence 2019    Urinary retention ?    Vascular disorder    [2]   Social History  Socioeconomic History    Marital status: /Civil Union    Number of children: 1   Occupational History    Occupation: Retired   Tobacco Use    Smoking status: Former     Current packs/day: 0.00     Average packs/day: 1 pack/day for 44.0 years (44.0 ttl pk-yrs)     Types: Cigarettes     Start date: 1967     Quit date: 2011     Years since quittin.5     Passive exposure: Past    Smokeless tobacco: Never   Vaping Use    Vaping status: Never Used   Substance and Sexual Activity    Alcohol use: Yes     Alcohol/week: 2.0 - 3.0 standard drinks of alcohol     Types: 2 - 3 Shots of liquor per week     Comment: One glass per day    Drug use: No    Sexual activity: Yes     Partners: Female     Birth control/protection: Post-menopausal, None     Social Drivers of Health     Financial  Resource Strain: Low Risk  (12/29/2023)    Overall Financial Resource Strain (CARDIA)     Difficulty of Paying Living Expenses: Not hard at all   Food Insecurity: No Food Insecurity (2/26/2025)    Nursing - Inadequate Food Risk Classification     Worried About Running Out of Food in the Last Year: Never true     Ran Out of Food in the Last Year: Never true   Transportation Needs: No Transportation Needs (2/26/2025)    PRAPARE - Transportation     Lack of Transportation (Medical): No     Lack of Transportation (Non-Medical): No   Housing Stability: Low Risk  (2/26/2025)    Housing Stability Vital Sign     Unable to Pay for Housing in the Last Year: No     Number of Times Moved in the Last Year: 0     Homeless in the Last Year: No

## 2025-07-24 VITALS
HEIGHT: 67 IN | DIASTOLIC BLOOD PRESSURE: 86 MMHG | TEMPERATURE: 97.5 F | BODY MASS INDEX: 25.9 KG/M2 | SYSTOLIC BLOOD PRESSURE: 182 MMHG | HEART RATE: 78 BPM | WEIGHT: 165 LBS | RESPIRATION RATE: 18 BRPM | OXYGEN SATURATION: 94 %

## 2025-07-24 DIAGNOSIS — R06.09 DYSPNEA ON EXERTION: Primary | ICD-10-CM

## 2025-07-24 PROCEDURE — 99239 HOSP IP/OBS DSCHRG MGMT >30: CPT | Performed by: NURSE PRACTITIONER

## 2025-07-24 RX ORDER — PREDNISONE 10 MG/1
TABLET ORAL
Qty: 26 TABLET | Refills: 0 | Status: SHIPPED | OUTPATIENT
Start: 2025-07-25 | End: 2025-08-05

## 2025-07-24 RX ORDER — PREDNISONE 20 MG/1
40 TABLET ORAL DAILY
Qty: 8 TABLET | Refills: 0 | Status: CANCELLED | OUTPATIENT
Start: 2025-07-25

## 2025-07-24 RX ORDER — AMLODIPINE BESYLATE 5 MG/1
5 TABLET ORAL 2 TIMES DAILY
Qty: 30 TABLET | Refills: 0 | Status: SHIPPED | OUTPATIENT
Start: 2025-07-24

## 2025-07-24 RX ORDER — PREDNISONE 20 MG/1
20 TABLET ORAL DAILY
Status: DISCONTINUED | OUTPATIENT
Start: 2025-07-30 | End: 2025-07-24 | Stop reason: HOSPADM

## 2025-07-24 RX ORDER — AMLODIPINE BESYLATE 5 MG/1
5 TABLET ORAL 2 TIMES DAILY
Status: DISCONTINUED | OUTPATIENT
Start: 2025-07-24 | End: 2025-07-24 | Stop reason: HOSPADM

## 2025-07-24 RX ORDER — PREDNISONE 10 MG/1
10 TABLET ORAL DAILY
Status: DISCONTINUED | OUTPATIENT
Start: 2025-08-02 | End: 2025-07-24 | Stop reason: HOSPADM

## 2025-07-24 RX ORDER — PREDNISONE 20 MG/1
40 TABLET ORAL DAILY
Status: DISCONTINUED | OUTPATIENT
Start: 2025-07-25 | End: 2025-07-24 | Stop reason: HOSPADM

## 2025-07-24 RX ORDER — ALBUTEROL SULFATE 90 UG/1
2 INHALANT RESPIRATORY (INHALATION) EVERY 4 HOURS PRN
Start: 2025-07-24

## 2025-07-24 RX ADMIN — PANTOPRAZOLE SODIUM 20 MG: 20 TABLET, DELAYED RELEASE ORAL at 06:15

## 2025-07-24 RX ADMIN — PREDNISONE 40 MG: 20 TABLET ORAL at 08:30

## 2025-07-24 RX ADMIN — ASPIRIN 81 MG: 81 TABLET ORAL at 08:30

## 2025-07-24 RX ADMIN — HEPARIN SODIUM 5000 UNITS: 5000 INJECTION INTRAVENOUS; SUBCUTANEOUS at 06:17

## 2025-07-24 RX ADMIN — LEVALBUTEROL HYDROCHLORIDE 1.25 MG: 1.25 SOLUTION RESPIRATORY (INHALATION) at 07:37

## 2025-07-24 RX ADMIN — OMEGA-3 FATTY ACIDS CAP 1000 MG 1000 MG: 1000 CAP at 08:35

## 2025-07-24 NOTE — ED NOTES
Patient placed into a hospital bed due to being an admission hold and for comfort.     Ruchi Chan  07/23/25 3642

## 2025-07-24 NOTE — ASSESSMENT & PLAN NOTE
Blood pressure elevated 160-180 systolic with prior outpatient readings ranging in the 110-140 systolic  Patient reports his blood pressure has been elevated over this past week prior to admission  Discussed with Dr. Michele patient's primary cardiologist per patient request.  Will increase Norvasc to 5 mg twice daily and continue with metoprolol 25 mg daily  Patient was instructed to monitor his blood pressure closely at home and to hold the Norvasc if his systolic blood pressure is less than 120

## 2025-07-24 NOTE — DISCHARGE INSTR - AVS FIRST PAGE
Dear Tonny Baig,     It was our pleasure to care for you here at [facility].  It is our hope that we were always able to meet and exceed the expected standards for your care during your stay.  You were hospitalized due to shortness of breath with exertion.  You were cared for in the emergency room under the service of ELSA Bishop with the [facility] who covers for your primary care physician (PCP), Brendan Suarez MD, while you were hospitalized.  If you have any questions or concerns related to this hospitalization, you may contact us at .  For follow up, we recommend that you follow up with your primary care physician.  Please review this entire discharge summary as additional general instructions may be provided later as well.  However, at this time we provide for you here, the most important instructions / recommendations at discharge:     Check your blood pressure daily.  You will be increasing your amlodipine to 5 mg twice a day.  If you notice that your systolic blood pressure is less than 120 would hold amlodipine dose and contact cardiology office.  Follow-up outpatient with cardiology after discharge and obtain an echocardiogram.  You can call the central scheduling which is at [phone] to schedule  Usual weight yourself daily if you notice that you gain 3 pounds in a day or 5 pounds in a week to call the cardiology office and notify them of this weight change  He will be discharged on a prednisone taper to treat for mild COPD exacerbation      Sincerely,     ELSA Bishop

## 2025-07-24 NOTE — ASSESSMENT & PLAN NOTE
Past medical history of COPD, CAD s/p stenting presenting with dyspnea on exertion worsening over the last 4 days with presentation concerning for COPD exacerbation although patient without any increased productive cough, sputum frequency but was noted to be wheezing on initial presentation  Elevated D-dimer, CTA chest negative for PE, pneumonia or edema but did note bilateral atelectasis- encourage IS   Troponin flat, BNP elevated although well below prior readings, EKG without ST changes, low suspicion for ACS- d/w Dr. Michele, primary cardiologist, will order Echo outpatient   No fevers, no leukocytosis, COVID/flu negative  Currently saturating well on room air however desaturated to mid 80s bradycardia when ambulating per ED on admission- repeat amb pulse ox >91% on room air   C/w albuterol PRN   S/p IV Solu-Medrol 40 mg x 1 followed by oral prednisone taper  C/w trelegy   F/u with pulm outpatient after discharge   See plan under htn for med adjustments d/t elevated BP

## 2025-07-24 NOTE — DISCHARGE SUMMARY
Discharge Summary - Hospitalist   Name: Tonny Baig 79 y.o. male I MRN: [id number]  Unit/Bed#: ED 27 I Date of Admission: 7/23/2025   Date of Service: 7/24/2025 I Hospital Day: 0     Assessment & Plan  Dyspnea on exertion  Chronic obstructive pulmonary disease with acute exacerbation (HCC)  Past medical history of COPD, CAD s/p stenting presenting with dyspnea on exertion worsening over the last 4 days with presentation concerning for COPD exacerbation although patient without any increased productive cough, sputum frequency but was noted to be wheezing on initial presentation  Elevated D-dimer, CTA chest negative for PE, pneumonia or edema but did note bilateral atelectasis- encourage IS   Troponin flat, BNP elevated although well below prior readings, EKG without ST changes, low suspicion for ACS- d/w Dr. Michele, primary cardiologist, will order Echo outpatient   No fevers, no leukocytosis, COVID/flu negative  Currently saturating well on room air however desaturated to mid 80s bradycardia when ambulating per ED on admission- repeat amb pulse ox >91% on room air   C/w albuterol PRN   S/p IV Solu-Medrol 40 mg x 1 followed by oral prednisone taper  C/w trelegy   F/u with pulm outpatient after discharge   See plan under htn for med adjustments d/t elevated BP   Essential hypertension  Blood pressure elevated 160-180 systolic with prior outpatient readings ranging in the 110-140 systolic  Patient reports his blood pressure has been elevated over this past week prior to admission  Discussed with Dr. Michele patient's primary cardiologist per patient request.  Will increase Norvasc to 5 mg twice daily and continue with metoprolol 25 mg daily  Patient was instructed to monitor his blood pressure closely at home and to hold the Norvasc if his systolic blood pressure is less than 120  CAD (coronary artery disease)  History of remote MI s/p stenting  Denies any chest pain  Continue aspirin and statin  Bipolar 1 disorder  (HCC)  Continue lithium 300 mg every other day  Continue Cymbalta 30 mg at bedtime  CKD (chronic kidney disease)  Lab Results   Component Value Date    EGFR 33 07/23/2025    EGFR 38 07/16/2025    EGFR 35 06/12/2025    CREATININE 1.89 (H) 07/23/2025    CREATININE 1.68 (H) 07/16/2025    CREATININE 1.79 (H) 06/12/2025     Creatinine at baseline     Medical Problems       Resolved Problems  Date Reviewed: 7/18/2025   None       Discharging Physician / Practitioner: ELSA Bishop  PCP: Brendan Suarez MD  Admission Date:   Admission Orders (From admission, onward)       Ordered        07/23/25 1126  Place in Observation  Once                          Discharge Date: 07/24/25    Next Steps for Physician/AP Assuming Care:  ECHO   BMP in one week     Test Results Pending at Discharge (will require follow up):  None     Medication Changes for Discharge & Rationale:   Increased amlodipine to 5 mg twice daily  Prednisone 40 mg daily will taper by 10 mg every 3 days until off  See after visit summary for reconciled discharge medications provided to patient and/or family.     Consultations During Hospital Stay:  None    Procedures Performed:   7/23/2025 CTA of the chest PE study-no pulmonary embolism.  7/23/2025 chest x-ray-no acute process or significant change from prior exam  COVID flu negative    D-dimer 1.8    Significant Findings / Test Results:   Mild COPD exacerbation    Incidental Findings:   None    Hospital Course:   Tonny Baig is a 79 y.o. male patient who originally presented to the hospital on 7/23/2025 due to dyspnea on exertion.  The patient was concerned because he was at home noticing that he was having increasing shortness of breath with exertion along with some chest tightness and pressure.  He came to the emergency room for evaluation.  His EKG was baseline and troponin x 2 was his BNP was 111.  He was noted to have an elevated blood pressure on admission with a systolic in the 180s.  The  "patient had requested that we contact his primary cardiologist Dr. Michele and he agreed with increasing his Norvasc to 5 mg twice daily and continuing with his metoprolol 25 mg daily.  The patient will check his blood pressure daily and if he notices that his systolic blood pressure is less than 120 he will hold the Norvasc.  An outpatient echocardiogram was ordered and should be completed outpatient.  Source of his dyspnea was felt to be secondary to his COPD exacerbation he was started on IV steroids x 1 dose and then transition to p.o. prednisone which she will be continued on discharge with a taper.  Ambulatory pulse ox was checked and patient sats were greater than 91% while ambulating on room air.      Please see above list of diagnoses and related plan for additional information.     Discharge Day Visit / Exam:   Subjective: Patient reports he is feeling much better today.  Reports that his dyspnea has improved also denies any chest pain.  Vitals: Blood Pressure: (!) 182/86 (07/24/25 0800)  Pulse: 78 (07/24/25 0800)  Temperature: 97.5 °F (36.4 °C) (07/23/25 0700)  Temp Source: Oral (07/23/25 0700)  Respirations: 18 (07/24/25 0600)  Height: 5' 7\" (170.2 cm) (07/23/25 0657)  Weight - Scale: 74.8 kg (165 lb) (07/23/25 0657)  SpO2: 94 % (07/24/25 0800)  Physical Exam  Constitutional:       General: He is not in acute distress.    Cardiovascular:      Rate and Rhythm: Normal rate and regular rhythm.      Pulses: Normal pulses.      Heart sounds: Normal heart sounds. No murmur heard.  Pulmonary:      Effort: No respiratory distress.      Breath sounds: Normal breath sounds. No wheezing or rales.   Abdominal:      General: Bowel sounds are normal. There is no distension.      Palpations: Abdomen is soft.      Tenderness: There is no abdominal tenderness.     Musculoskeletal:         General: Swelling (Trace lower extremity bilateral) present. No tenderness.      Right lower leg: Edema (Trace) present.      Left " lower leg: Edema (Trace) present.     Skin:     General: Skin is warm and dry.      Findings: No erythema or rash.     Neurological:      General: No focal deficit present.      Mental Status: He is alert. Mental status is at baseline.     Psychiatric:         Attention and Perception: Attention normal.         Mood and Affect: Mood normal.          Discussion with Family: Updated  (wife) at bedside.    Discharge instructions/Information to patient and family:   See after visit summary for information provided to patient and family.      Provisions for Follow-Up Care:  See after visit summary for information related to follow-up care and any pertinent home health orders.      Mobility at time of Discharge:      HLM Goal achieved. Continue to encourage appropriate mobility.     Disposition:   Home    Planned Readmission: no    Administrative Statements   Discharge Statement:  I have spent a total time of 60 minutes in caring for this patient on the day of the visit/encounter. >30 minutes of time was spent on: Diagnostic results, Risks and benefits of tx options, Instructions for management, Patient and family education, Importance of tx compliance, Risk factor reductions, Impressions, Counseling / Coordination of care, Documenting in the medical record, Reviewing / ordering tests, medicine, procedures  , and Communicating with other healthcare professionals .    **Please Note: This note may have been constructed using a voice recognition system**

## 2025-07-25 ENCOUNTER — TRANSITIONAL CARE MANAGEMENT (OUTPATIENT)
Dept: INTERNAL MEDICINE CLINIC | Facility: CLINIC | Age: 80
End: 2025-07-25

## 2025-07-28 ENCOUNTER — OFFICE VISIT (OUTPATIENT)
Dept: INTERNAL MEDICINE CLINIC | Facility: CLINIC | Age: 80
End: 2025-07-28
Payer: MEDICARE

## 2025-07-28 VITALS
HEART RATE: 71 BPM | WEIGHT: 183 LBS | OXYGEN SATURATION: 98 % | BODY MASS INDEX: 28.66 KG/M2 | DIASTOLIC BLOOD PRESSURE: 86 MMHG | SYSTOLIC BLOOD PRESSURE: 158 MMHG | TEMPERATURE: 97.1 F

## 2025-07-28 DIAGNOSIS — E78.2 MIXED HYPERLIPIDEMIA: ICD-10-CM

## 2025-07-28 DIAGNOSIS — I10 ESSENTIAL HYPERTENSION: Primary | ICD-10-CM

## 2025-07-28 DIAGNOSIS — J44.1 CHRONIC OBSTRUCTIVE PULMONARY DISEASE WITH ACUTE EXACERBATION (HCC): ICD-10-CM

## 2025-07-28 PROCEDURE — 99496 TRANSJ CARE MGMT HIGH F2F 7D: CPT | Performed by: INTERNAL MEDICINE

## 2025-07-30 ENCOUNTER — TELEPHONE (OUTPATIENT)
Dept: NEUROLOGY | Facility: CLINIC | Age: 80
End: 2025-07-30

## 2025-07-30 ENCOUNTER — POST-OP CHECK (OUTPATIENT)
Dept: URBAN - METROPOLITAN AREA CLINIC 6 | Facility: CLINIC | Age: 80
End: 2025-07-30

## 2025-07-30 DIAGNOSIS — I25.10 CORONARY ARTERY DISEASE INVOLVING NATIVE HEART WITHOUT ANGINA PECTORIS, UNSPECIFIED VESSEL OR LESION TYPE: ICD-10-CM

## 2025-07-30 DIAGNOSIS — Z98.890: ICD-10-CM

## 2025-07-30 PROCEDURE — 99024 POSTOP FOLLOW-UP VISIT: CPT

## 2025-07-30 RX ORDER — NITROGLYCERIN 40 MG/1
1 PATCH TRANSDERMAL DAILY
Qty: 90 PATCH | Refills: 3 | Status: SHIPPED | OUTPATIENT
Start: 2025-07-30

## 2025-07-30 ASSESSMENT — TONOMETRY
OD_IOP_MMHG: 11
OS_IOP_MMHG: 10

## 2025-07-30 ASSESSMENT — VISUAL ACUITY
OD_SC: 20/30+2
OS_SC: 20/25-2
OU_SC: J3

## 2025-08-04 ENCOUNTER — HOSPITAL ENCOUNTER (OUTPATIENT)
Dept: RADIOLOGY | Facility: HOSPITAL | Age: 80
Discharge: HOME | End: 2025-08-04
Payer: MEDICARE

## 2025-08-04 DIAGNOSIS — N28.1 KIDNEY CYST, ACQUIRED: ICD-10-CM

## 2025-08-04 PROCEDURE — 76775 US EXAM ABDO BACK WALL LIM: CPT

## 2025-08-05 DIAGNOSIS — I10 ESSENTIAL HYPERTENSION: ICD-10-CM

## 2025-08-05 RX ORDER — AMLODIPINE BESYLATE 5 MG/1
5 TABLET ORAL 2 TIMES DAILY
Qty: 30 TABLET | Refills: 0 | Status: SHIPPED | OUTPATIENT
Start: 2025-08-05

## 2025-08-07 ENCOUNTER — OFFICE VISIT (OUTPATIENT)
Dept: GASTROENTEROLOGY | Facility: CLINIC | Age: 80
End: 2025-08-07

## 2025-08-07 ENCOUNTER — PROCEDURE VISIT (OUTPATIENT)
Dept: PAIN MEDICINE | Facility: CLINIC | Age: 80
End: 2025-08-07
Payer: MEDICARE

## 2025-08-07 VITALS
DIASTOLIC BLOOD PRESSURE: 74 MMHG | BODY MASS INDEX: 28.66 KG/M2 | SYSTOLIC BLOOD PRESSURE: 132 MMHG | HEIGHT: 67 IN | TEMPERATURE: 98 F

## 2025-08-07 DIAGNOSIS — R10.9 ABDOMINAL CRAMPING: ICD-10-CM

## 2025-08-07 DIAGNOSIS — D12.6 ADENOMA OF COLON: Primary | ICD-10-CM

## 2025-08-07 DIAGNOSIS — K21.9 GASTROESOPHAGEAL REFLUX DISEASE WITHOUT ESOPHAGITIS: ICD-10-CM

## 2025-08-07 DIAGNOSIS — M70.62 TROCHANTERIC BURSITIS OF LEFT HIP: Primary | ICD-10-CM

## 2025-08-07 PROCEDURE — 20611 DRAIN/INJ JOINT/BURSA W/US: CPT | Performed by: ANESTHESIOLOGY

## 2025-08-07 RX ORDER — METHYLPREDNISOLONE ACETATE 40 MG/ML
40 INJECTION, SUSPENSION INTRA-ARTICULAR; INTRALESIONAL; INTRAMUSCULAR; SOFT TISSUE ONCE
Status: COMPLETED | OUTPATIENT
Start: 2025-08-07 | End: 2025-08-07

## 2025-08-07 RX ORDER — ROPIVACAINE HYDROCHLORIDE 2 MG/ML
2 INJECTION, SOLUTION EPIDURAL; INFILTRATION; PERINEURAL ONCE
Status: COMPLETED | OUTPATIENT
Start: 2025-08-07 | End: 2025-08-07

## 2025-08-07 RX ORDER — FAMOTIDINE 40 MG/1
40 TABLET, FILM COATED ORAL
Qty: 30 TABLET | Refills: 2 | Status: SHIPPED | OUTPATIENT
Start: 2025-08-07

## 2025-08-07 RX ADMIN — ROPIVACAINE HYDROCHLORIDE 2 ML/HR: 2 INJECTION, SOLUTION EPIDURAL; INFILTRATION; PERINEURAL at 14:14

## 2025-08-07 RX ADMIN — METHYLPREDNISOLONE ACETATE 40 MG: 40 INJECTION, SUSPENSION INTRA-ARTICULAR; INTRALESIONAL; INTRAMUSCULAR; SOFT TISSUE at 14:16

## 2025-08-13 ENCOUNTER — TELEMEDICINE (OUTPATIENT)
Dept: NEUROLOGY | Facility: CLINIC | Age: 80
End: 2025-08-13
Payer: MEDICARE

## 2025-08-13 PROBLEM — R25.1 TREMOR: Status: ACTIVE | Noted: 2025-08-13

## 2025-08-19 ENCOUNTER — HOSPITAL ENCOUNTER (OUTPATIENT)
Dept: RADIOLOGY | Facility: CLINIC | Age: 80
Discharge: HOME | End: 2025-08-19
Attending: ANESTHESIOLOGY
Payer: MEDICARE

## 2025-08-19 VITALS
SYSTOLIC BLOOD PRESSURE: 162 MMHG | RESPIRATION RATE: 16 BRPM | OXYGEN SATURATION: 95 % | TEMPERATURE: 97.5 F | DIASTOLIC BLOOD PRESSURE: 76 MMHG | HEART RATE: 65 BPM

## 2025-08-19 DIAGNOSIS — M54.16 LUMBAR RADICULOPATHY: ICD-10-CM

## 2025-08-19 PROCEDURE — 62323 NJX INTERLAMINAR LMBR/SAC: CPT | Performed by: ANESTHESIOLOGY

## 2025-08-19 RX ORDER — METHYLPREDNISOLONE ACETATE 80 MG/ML
80 INJECTION, SUSPENSION INTRA-ARTICULAR; INTRALESIONAL; INTRAMUSCULAR; PARENTERAL; SOFT TISSUE ONCE
Status: COMPLETED | OUTPATIENT
Start: 2025-08-19 | End: 2025-08-19

## 2025-08-19 RX ADMIN — METHYLPREDNISOLONE ACETATE 80 MG: 80 INJECTION, SUSPENSION INTRA-ARTICULAR; INTRALESIONAL; INTRAMUSCULAR; SOFT TISSUE at 10:15

## 2025-08-19 RX ADMIN — IOHEXOL 1 ML: 300 INJECTION, SOLUTION INTRAVENOUS at 10:11

## 2025-08-21 ENCOUNTER — TELEPHONE (OUTPATIENT)
Dept: PAIN MEDICINE | Facility: CLINIC | Age: 80
End: 2025-08-21

## 2025-08-21 ENCOUNTER — HOSPITAL ENCOUNTER (OUTPATIENT)
Dept: NON INVASIVE DIAGNOSTICS | Facility: CLINIC | Age: 80
Discharge: HOME | End: 2025-08-21
Payer: MEDICARE

## 2025-08-21 VITALS
HEART RATE: 59 BPM | SYSTOLIC BLOOD PRESSURE: 162 MMHG | DIASTOLIC BLOOD PRESSURE: 76 MMHG | BODY MASS INDEX: 28.72 KG/M2 | HEIGHT: 67 IN | WEIGHT: 183 LBS

## 2025-08-21 DIAGNOSIS — R06.09 DYSPNEA ON EXERTION: ICD-10-CM

## 2025-08-21 LAB
AORTIC ROOT: 3.9 CM
AORTIC VALVE MEAN VELOCITY: 7.7 M/S
ASCENDING AORTA: 3.9 CM
AV AREA BY CONTINUOUS VTI: 2.1 CM2
AV AREA PEAK VELOCITY: 2.2 CM2
AV LVOT MEAN GRADIENT: 2 MMHG
AV LVOT PEAK GRADIENT: 3 MMHG
AV MEAN PRESS GRAD SYS DOP V1V2: 3 MMHG
AV ORIFICE AREA US: 2.11 CM2
AV PEAK GRADIENT: 5 MMHG
AV VELOCITY RATIO: 0.75
AV VMAX SYS DOP: 1.15 M/S
BSA FOR ECHO PROCEDURE: 1.95 M2
DOP CALC AO VTI: 27.72 CM
DOP CALC LVOT AREA: 2.83 CM2
DOP CALC LVOT CARDIAC INDEX: 1.8 L/MIN/M2
DOP CALC LVOT CARDIAC OUTPUT: 3.52 L/MIN
DOP CALC LVOT DIAMETER: 1.9 CM
DOP CALC LVOT PEAK VEL VTI: 20.67 CM
DOP CALC LVOT PEAK VEL: 0.91 M/S
DOP CALC LVOT STROKE INDEX: 30.8 ML/M2
DOP CALC LVOT STROKE VOLUME: 60
E WAVE DECELERATION TIME: 147 MS
E/A RATIO: 0.79
FRACTIONAL SHORTENING: 38 (ref 28–44)
GLOBAL LONGITUIDAL STRAIN: -17 %
INTERVENTRICULAR SEPTUM IN DIASTOLE (PARASTERNAL SHORT AXIS VIEW): 1.3 CM
INTERVENTRICULAR SEPTUM: 1.3 CM (ref 0.6–1.1)
LAAS-AP2: 24.7 CM2
LAAS-AP4: 27.3 CM2
LEFT ATRIUM SIZE: 3.4 CM
LEFT ATRIUM VOLUME (MOD BIPLANE): 90 ML
LEFT ATRIUM VOLUME INDEX (MOD BIPLANE): 46.2 ML/M2
LEFT INTERNAL DIMENSION IN SYSTOLE: 2.4 CM (ref 2.1–4)
LEFT VENTRICULAR INTERNAL DIMENSION IN DIASTOLE: 3.9 CM (ref 3.5–6)
LEFT VENTRICULAR POSTERIOR WALL IN END DIASTOLE: 1.3 CM
LEFT VENTRICULAR STROKE VOLUME: 46 ML
LV EF US.2D.A4C+ESTIMATED: 57 %
LVSV (TEICH): 46 ML
MV E'TISSUE VEL-LAT: 8 CM/S
MV E'TISSUE VEL-SEP: 7 CM/S
MV PEAK A VEL: 0.7 M/S
MV PEAK E VEL: 55 CM/S
RIGHT ATRIUM AREA SYSTOLE A4C: 25.8 CM2
RIGHT VENTRICLE ID DIMENSION: 4.5 CM
SINOTUBULAR JUNCTION: 3.3 CM
SL CV LEFT ATRIUM LENGTH A2C: 5.9 CM
SL CV LV EF: 60
SL CV PED ECHO LEFT VENTRICLE DIASTOLIC VOLUME (MOD BIPLANE) 2D: 66 ML
SL CV PED ECHO LEFT VENTRICLE SYSTOLIC VOLUME (MOD BIPLANE) 2D: 20 ML
SL CV SINUS OF VALSALVA 2D: 4 CM
STJ: 3.3 CM
TR MAX PG: 33 MMHG
TR PEAK VELOCITY: 2.9 M/S
TRICUSPID ANNULAR PLANE SYSTOLIC EXCURSION: 2.4 CM
TRICUSPID VALVE PEAK REGURGITATION VELOCITY: 2.89 M/S

## 2025-08-21 PROCEDURE — 93306 TTE W/DOPPLER COMPLETE: CPT

## 2025-08-21 PROCEDURE — 93356 MYOCRD STRAIN IMG SPCKL TRCK: CPT | Performed by: INTERNAL MEDICINE

## 2025-08-21 PROCEDURE — 93356 MYOCRD STRAIN IMG SPCKL TRCK: CPT

## 2025-08-21 PROCEDURE — 93306 TTE W/DOPPLER COMPLETE: CPT | Performed by: INTERNAL MEDICINE

## (undated) DEVICE — SI BRITE TIP 8F 11CM STR: Brand: BRITE TIP

## (undated) DEVICE — GLOVE SRG BIOGEL 8

## (undated) DEVICE — INTENDED FOR TISSUE SEPARATION, AND OTHER PROCEDURES THAT REQUIRE A SHARP SURGICAL BLADE TO PUNCTURE OR CUT.: Brand: BARD-PARKER SAFETY BLADES SIZE 15, STERILE

## (undated) DEVICE — 1200CC GUARDIAN II: Brand: GUARDIAN

## (undated) DEVICE — STOPCOCK 4 WAY LL HIGH PRESSURE

## (undated) DEVICE — DILATOR: Brand: COOK

## (undated) DEVICE — 3M™ IOBAN™ 2 ANTIMICROBIAL INCISE DRAPE 6651EZ: Brand: IOBAN™ 2

## (undated) DEVICE — GUIDEWIRE 2345050 BLUNT THREADED 24IN

## (undated) DEVICE — STERILE ICS CARDIOVASCULAR PK: Brand: CARDINAL HEALTH

## (undated) DEVICE — CATH DIAG 5FR .035 70CM PIG CSC 20

## (undated) DEVICE — PLUMEPEN PRO 10FT

## (undated) DEVICE — TRAY FOLEY 16FR URIMETER SURESTEP

## (undated) DEVICE — SYRINGE KIT,PACKAGED,,150FT,MK 7(ANGIO-ARTERION, 150ML SYR KIT W/QFT,MC)(60729385): Brand: MEDRAD® MARK 7 ARTERION DISPOSABLE SYRINGE 150 ML WITH QUICK FILL TUBE

## (undated) DEVICE — DRAPE C-ARMOUR

## (undated) DEVICE — DISPOSABLE EQUIPMENT COVER: Brand: SMALL TOWEL DRAPE

## (undated) DEVICE — INSTRUMENT 8670015 PAK 2 NEEDLES TROCAR

## (undated) DEVICE — INSULATED BLADE ELECTRODE;CAUTION: FOR MANUFACTURING, PROCESSING, OR REPACKING.: Brand: EDGE

## (undated) DEVICE — SYRINGE 50ML LL

## (undated) DEVICE — ANTIBACTERIAL VIOLET BRAIDED (POLYGLACTIN 910), SYNTHETIC ABSORBABLE SUTURE: Brand: COATED VICRYL

## (undated) DEVICE — GLOVE INDICATOR PI UNDERGLOVE SZ 8.5 BLUE

## (undated) DEVICE — 3M™ STERI-STRIP™ REINFORCED ADHESIVE SKIN CLOSURES, R1547, 1/2 IN X 4 IN (12 MM X 100 MM), 6 STRIPS/ENVELOPE: Brand: 3M™ STERI-STRIP™

## (undated) DEVICE — NON-DEHP HIGH FLOW RATE EXTENSION SET, MALE LUER LOCK ADAPTER

## (undated) DEVICE — SNAP KOVER: Brand: UNBRANDED

## (undated) DEVICE — SHEATH INTRO DRY SEAL 16FR 33CM

## (undated) DEVICE — UNIVERSAL SPINE,KIT: Brand: CARDINAL HEALTH

## (undated) DEVICE — DRAPE SHEET X-LG

## (undated) DEVICE — GLOVE SRG BIOGEL ORTHOPEDIC 7.5

## (undated) DEVICE — IV SET 15 DROP STERILE 0/Y GRAVITY

## (undated) DEVICE — SUT PROLENE 3-0 V-7 36 IN 8976H

## (undated) DEVICE — ADHESIVE SKN CLSR HISTOACRYL FLEX 0.5ML LF

## (undated) DEVICE — BLADE ELECTRODE: Brand: EDGE

## (undated) DEVICE — PROXIMATE PLUS MD MULTI-DIRECTIONAL RELEASE SKIN STAPLERS CONTAINS 35 STAINLESS STEEL STAPLES APPROXIMATE CLOSED DIMENSIONS: 6.9MM X 3.9MM WIDE: Brand: PROXIMATE

## (undated) DEVICE — GUIDEWIRE AMPLATZ .035 180CM 6CM ST SS

## (undated) DEVICE — X-RAY DETECTABLE SPONGES,16 PLY: Brand: VISTEC

## (undated) DEVICE — 1840 FOAM BLOCK NEEDLE COUNTER: Brand: DEVON

## (undated) DEVICE — SYRINGE 20ML LL

## (undated) DEVICE — INFUSER BAG 500ML

## (undated) DEVICE — FLOSEAL MATRIX IS INDICATED IN SURGICAL PROCEDURES (OTHER THAN IN OPHTHALMIC) AS AN ADJUNCT TO HEMOSTASIS WHEN CONTROL OF BLEEDING BY LIGATURE OR CONVENTIONAL PROCEDURES IS INEFFECTIVE OR IMPRACTICAL.: Brand: FLOSEAL HEMOSTATIC MATRIX

## (undated) DEVICE — SPONGE PVP SCRUB WING STERILE

## (undated) DEVICE — 3000CC GUARDIAN II: Brand: GUARDIAN

## (undated) DEVICE — COVER PROBE INTRAOPERATIVE 6 X 96 IN

## (undated) DEVICE — PREP SURGICAL PURPREP 26ML

## (undated) DEVICE — ANGIOGRAPHIC CATHETER: Brand: IMAGER™ II

## (undated) DEVICE — CATH BALLOON STENT GRAFT 12FR 40MM X 65CM

## (undated) DEVICE — DRESSING MEPILEX AG BORDER 4 X 4 IN

## (undated) DEVICE — LIGHT HANDLE COVER CAMERA DISP

## (undated) DEVICE — BIPOLAR SEALER 23-113-1 AQM 2.3: Brand: AQUAMANTYS™

## (undated) DEVICE — FLUID MANAGEMENT KIT - IR

## (undated) DEVICE — STERILE MAJOR GENERAL PACK: Brand: CARDINAL HEALTH

## (undated) DEVICE — TIBURON SPLIT SHEET: Brand: CONVERTORS

## (undated) DEVICE — SPECIMEN CONTAINER STERILE PEEL PACK

## (undated) DEVICE — SHEATH INTRO DRY SEAL 12FR 33CM

## (undated) DEVICE — Device

## (undated) DEVICE — INTENDED FOR TISSUE SEPARATION, AND OTHER PROCEDURES THAT REQUIRE A SHARP SURGICAL BLADE TO PUNCTURE OR CUT.: Brand: BARD-PARKER ® CARBON RIB-BACK BLADES

## (undated) DEVICE — MICROPUNCTURE 501

## (undated) DEVICE — REM POLYHESIVE ADULT PATIENT RETURN ELECTRODE: Brand: VALLEYLAB

## (undated) DEVICE — DRESSING MEPILEX AG BORDER 4 X 8 IN

## (undated) DEVICE — TOOL 14MH30 LEGEND 14CM 3MM: Brand: MIDAS REX ™

## (undated) RX ORDER — BRIMONIDINE TARTRATE, TIMOLOL MALEATE 2; 5 MG/ML; MG/ML
1 SOLUTION/ DROPS OPHTHALMIC TWICE A DAY
End: 2024-08-07